# Patient Record
Sex: FEMALE | Race: BLACK OR AFRICAN AMERICAN | NOT HISPANIC OR LATINO | Employment: OTHER | URBAN - METROPOLITAN AREA
[De-identification: names, ages, dates, MRNs, and addresses within clinical notes are randomized per-mention and may not be internally consistent; named-entity substitution may affect disease eponyms.]

---

## 2017-02-22 ENCOUNTER — HOSPITAL ENCOUNTER (OUTPATIENT)
Dept: RADIOLOGY | Facility: HOSPITAL | Age: 82
Discharge: HOME | End: 2017-02-22
Attending: SPECIALIST
Payer: MEDICARE

## 2017-02-22 ENCOUNTER — HOSPITAL ENCOUNTER (OUTPATIENT)
Dept: RADIOLOGY | Facility: OTHER | Age: 82
Discharge: HOME | End: 2017-02-22
Attending: SPECIALIST
Payer: MEDICARE

## 2017-02-22 DIAGNOSIS — Z12.31 VISIT FOR SCREENING MAMMOGRAM: ICD-10-CM

## 2017-02-22 DIAGNOSIS — R19.00 PELVIC MASS: ICD-10-CM

## 2017-02-22 PROCEDURE — 77063 BREAST TOMOSYNTHESIS BI: CPT | Mod: 26,,, | Performed by: INTERNAL MEDICINE

## 2017-02-22 PROCEDURE — 77067 SCR MAMMO BI INCL CAD: CPT | Mod: 26,,, | Performed by: INTERNAL MEDICINE

## 2017-02-22 PROCEDURE — 77067 SCR MAMMO BI INCL CAD: CPT | Mod: TC

## 2017-02-22 PROCEDURE — 76856 US EXAM PELVIC COMPLETE: CPT | Mod: TC

## 2017-02-22 PROCEDURE — 76856 US EXAM PELVIC COMPLETE: CPT | Mod: 26,,, | Performed by: RADIOLOGY

## 2017-02-22 PROCEDURE — 76830 TRANSVAGINAL US NON-OB: CPT | Mod: 26,,, | Performed by: RADIOLOGY

## 2018-01-04 ENCOUNTER — HOSPITAL ENCOUNTER (OUTPATIENT)
Dept: RADIOLOGY | Facility: OTHER | Age: 83
Discharge: HOME | End: 2018-01-04
Attending: SPECIALIST
Payer: MEDICARE

## 2018-01-04 DIAGNOSIS — N63.20 LUMP OF BREAST, LEFT: ICD-10-CM

## 2018-01-04 DIAGNOSIS — R10.31 RLQ ABDOMINAL PAIN: ICD-10-CM

## 2018-01-04 PROCEDURE — 76642 ULTRASOUND BREAST LIMITED: CPT | Mod: 26,LT,, | Performed by: RADIOLOGY

## 2018-01-04 PROCEDURE — 76830 TRANSVAGINAL US NON-OB: CPT | Mod: TC

## 2018-01-04 PROCEDURE — 76856 US EXAM PELVIC COMPLETE: CPT | Mod: 26,,, | Performed by: RADIOLOGY

## 2018-01-04 PROCEDURE — 77066 DX MAMMO INCL CAD BI: CPT | Mod: 26,,, | Performed by: RADIOLOGY

## 2018-01-04 PROCEDURE — 76830 TRANSVAGINAL US NON-OB: CPT | Mod: 26,,, | Performed by: RADIOLOGY

## 2018-01-04 PROCEDURE — 77062 BREAST TOMOSYNTHESIS BI: CPT | Mod: 26,,, | Performed by: RADIOLOGY

## 2018-01-04 PROCEDURE — 76642 ULTRASOUND BREAST LIMITED: CPT | Mod: TC,LT

## 2018-01-04 PROCEDURE — 77062 BREAST TOMOSYNTHESIS BI: CPT | Mod: TC

## 2018-01-11 ENCOUNTER — HOSPITAL ENCOUNTER (OUTPATIENT)
Dept: RADIOLOGY | Facility: OTHER | Age: 83
Discharge: HOME | End: 2018-01-11
Attending: SPECIALIST
Payer: MEDICARE

## 2018-01-11 DIAGNOSIS — R92.8 ABNORMAL MAMMOGRAM: ICD-10-CM

## 2018-01-11 DIAGNOSIS — N63.0 LUMP OR MASS IN BREAST: Primary | ICD-10-CM

## 2018-01-11 PROCEDURE — 77065 DX MAMMO INCL CAD UNI: CPT | Mod: 26,LT,, | Performed by: RADIOLOGY

## 2018-01-11 PROCEDURE — A4648 IMPLANTABLE TISSUE MARKER: HCPCS

## 2018-01-11 PROCEDURE — 77065 DX MAMMO INCL CAD UNI: CPT | Mod: TC,LT

## 2018-01-11 PROCEDURE — 27201044 US BREAST BIOPSY WITH IMAGING 1ST SITE LEFT

## 2018-01-11 PROCEDURE — 27201044 US BIOPSY LYMPH NODE AXILLA

## 2018-01-11 PROCEDURE — 19083 BX BREAST 1ST LESION US IMAG: CPT | Mod: LT,,, | Performed by: RADIOLOGY

## 2018-01-11 PROCEDURE — 88305 TISSUE EXAM BY PATHOLOGIST: CPT | Mod: 26,,, | Performed by: PATHOLOGY

## 2018-01-11 PROCEDURE — 27201044 US BREAST BIOPSY WITH IMAGING EA ADDITIONAL

## 2018-01-11 PROCEDURE — 88305 TISSUE EXAM BY PATHOLOGIST: CPT | Performed by: PATHOLOGY

## 2018-01-11 PROCEDURE — 25000003 PHARM REV CODE 250: Performed by: SPECIALIST

## 2018-01-11 PROCEDURE — 19083 BX BREAST 1ST LESION US IMAG: CPT

## 2018-01-11 PROCEDURE — 88360 TUMOR IMMUNOHISTOCHEM/MANUAL: CPT | Mod: 26,,, | Performed by: PATHOLOGY

## 2018-01-11 PROCEDURE — 38505 NEEDLE BIOPSY LYMPH NODES: CPT | Mod: 59,LT,, | Performed by: RADIOLOGY

## 2018-01-11 RX ORDER — LIDOCAINE HYDROCHLORIDE AND EPINEPHRINE 20; 10 MG/ML; UG/ML
10 INJECTION, SOLUTION INFILTRATION; PERINEURAL ONCE
Status: COMPLETED | OUTPATIENT
Start: 2018-01-11 | End: 2018-01-11

## 2018-01-11 RX ORDER — LIDOCAINE HYDROCHLORIDE 10 MG/ML
5 INJECTION INFILTRATION; PERINEURAL ONCE
Status: COMPLETED | OUTPATIENT
Start: 2018-01-11 | End: 2018-01-11

## 2018-01-11 RX ADMIN — LIDOCAINE HYDROCHLORIDE 5 ML: 10 INJECTION, SOLUTION INFILTRATION; PERINEURAL at 02:01

## 2018-01-11 RX ADMIN — LIDOCAINE HYDROCHLORIDE AND EPINEPHRINE 10 ML: 20; 10 INJECTION, SOLUTION INFILTRATION; PERINEURAL at 02:01

## 2018-01-11 NOTE — PLAN OF CARE
Pt stable after US Guided Biopsy of LEFT Breast. No visible bleeding at site. Biopsy site covered with steri-strips, CDI. Pt escorted to post op mammography exam by nurse and family. Post biopsy education discussed prior to biopsy by nurse and will be readdressed by mammography tech before discharge. Pt verbalized instructions given by nurse. Geri

## 2018-01-11 NOTE — PLAN OF CARE
Pt stable after US Guided Biopsy of LEFT Breast/Axilla. No visible bleeding at site. Biopsy site covered with steri-strips, CDI. Pt escorted to post op mammography exam by nurse and family. Post biopsy education discussed prior to biopsy by nurse and will be readdressed by mammography tech before discharge. Pt verbalized instructions given by nurse. Geri

## 2018-01-15 ENCOUNTER — INITIAL CONSULT (OUTPATIENT)
Dept: GYNECOLOGIC ONCOLOGY | Facility: CLINIC | Age: 83
End: 2018-01-15
Payer: MEDICARE

## 2018-01-15 VITALS
BODY MASS INDEX: 31.67 KG/M2 | SYSTOLIC BLOOD PRESSURE: 159 MMHG | DIASTOLIC BLOOD PRESSURE: 71 MMHG | HEART RATE: 69 BPM | WEIGHT: 167.63 LBS

## 2018-01-15 DIAGNOSIS — Z80.3 FAMILY HISTORY OF BREAST CANCER: ICD-10-CM

## 2018-01-15 DIAGNOSIS — C50.412 MALIGNANT NEOPLASM OF UPPER-OUTER QUADRANT OF LEFT FEMALE BREAST, UNSPECIFIED ESTROGEN RECEPTOR STATUS: ICD-10-CM

## 2018-01-15 DIAGNOSIS — N83.8 OVARIAN MASS: ICD-10-CM

## 2018-01-15 PROBLEM — N63.0 BREAST MASS: Status: ACTIVE | Noted: 2018-01-15

## 2018-01-15 PROCEDURE — 99999 PR PBB SHADOW E&M-EST. PATIENT-LVL III: CPT | Mod: PBBFAC,,, | Performed by: OBSTETRICS & GYNECOLOGY

## 2018-01-15 PROCEDURE — 99205 OFFICE O/P NEW HI 60 MIN: CPT | Mod: S$GLB,,, | Performed by: OBSTETRICS & GYNECOLOGY

## 2018-01-15 RX ORDER — UMECLIDINIUM 62.5 UG/1
AEROSOL, POWDER ORAL
Refills: 2 | COMMUNITY
Start: 2018-01-08 | End: 2022-08-11

## 2018-01-15 RX ORDER — DOXYCYCLINE HYCLATE 100 MG
TABLET ORAL
COMMUNITY
Start: 2017-10-16 | End: 2018-01-15

## 2018-01-15 RX ORDER — PREDNISONE 20 MG/1
20 TABLET ORAL DAILY
COMMUNITY
Start: 2017-12-22 | End: 2018-07-11

## 2018-01-15 RX ORDER — CEFDINIR 300 MG/1
300 CAPSULE ORAL DAILY
COMMUNITY
Start: 2017-12-22 | End: 2018-02-09 | Stop reason: CLARIF

## 2018-01-15 NOTE — LETTER
January 17, 2018      Tamara Machado MD  [address] [facility]  [place] [address]           [facility] - Gynecologic Oncology  [address], [address]  [facility] [address]  Phone: [phone]  Fax: [phone]          Patient: Anahi Cook   MR Number: [id number]   YOB: 1934   Date of Visit: 1/15/2018       Dear Dr. Tamara Machado:    Thank you for referring Anahi Cook to me for evaluation. Attached you will find relevant portions of my assessment and plan of care.    If you have questions, please do not hesitate to call me. I look forward to following Anahi Cook along with you.    Sincerely,    Reena Chairez MD    Enclosure  CC:  No Recipients    If you would like to receive this communication electronically, please contact [email or web address] or [phone] to request more information on [employer] Link access.    For providers and/or their staff who would like to refer a patient to [facility], please contact us through our one-stop-shop provider referral line, [facility], at [phone].    If you feel you have received this communication in error or would no longer like to receive these types of communications, please e-mail [email or web address]

## 2018-01-16 ENCOUNTER — TELEPHONE (OUTPATIENT)
Dept: RADIOLOGY | Facility: OTHER | Age: 83
End: 2018-01-16

## 2018-01-17 ENCOUNTER — TELEPHONE (OUTPATIENT)
Dept: SURGERY | Facility: CLINIC | Age: 83
End: 2018-01-17

## 2018-01-17 ENCOUNTER — TELEPHONE (OUTPATIENT)
Dept: GYNECOLOGIC ONCOLOGY | Facility: CLINIC | Age: 83
End: 2018-01-17

## 2018-01-17 ENCOUNTER — PATIENT MESSAGE (OUTPATIENT)
Dept: GYNECOLOGIC ONCOLOGY | Facility: CLINIC | Age: 83
End: 2018-01-17

## 2018-01-17 PROBLEM — Z80.3 FAMILY HISTORY OF BREAST CANCER: Status: ACTIVE | Noted: 2018-01-17

## 2018-01-17 PROBLEM — C50.919 BREAST CANCER: Status: ACTIVE | Noted: 2018-01-17

## 2018-01-17 NOTE — TELEPHONE ENCOUNTER
Called pt to reschedule appts for today per patient due to weather. Appts rescheduled for Monday 1/22/18 and Tuesday 1/23/18 pt has been informed of the changes.  MA/LPN

## 2018-01-17 NOTE — TELEPHONE ENCOUNTER
Return call to pt's granddaughter. Appt accidentally got canceled and unable to reschedule it herself. Appt rebooked for 1/22/17.

## 2018-01-17 NOTE — TELEPHONE ENCOUNTER
----- Message from Urvashi Rocha sent at 1/17/2018  9:44 AM CST -----  Contact: santos/granddaughter/[phone]  Pt granddaughter states while she was on [facility] she accidentally cancelled pt appt on Monday at 2 pm. Tried to r/s patient was unable to. Pt granddaughter also tried to r/s through [facility] but was also unable to. Requesting a call back.    Please advise thanks

## 2018-01-17 NOTE — PROGRESS NOTES
Subjective:      Patient ID: Anahi Cook is a 83 y.o. female.    Chief Complaint: Advice Only      HPI  Patient is an 82yo female who presents today as a referral from Dr. Tamara Machado for pelvic mass. Her daughter is present with her today. According to the daughter adnexal mass has been present since . On recent follow up there was an interval increase in the size of the mass. Imaging reviewed.    Pelvic US 18  1.  Interval increase in size in the left adnexal mass.  This increase in size is more concerning for a malignant process in the previous stable exams. Today it measures 4.7 cm x 5.7 cm x 4.1 cm.     normal 17.     She also has a palpable L breast mass which was biopsied on 17 showing invasive ductal carcinoma. Scheduled to see Dr. Caballero 18.     Prior abdominal surgeries include TVH (ovaries in situ), cholecystectomy. Medical history significant for HTN, COPD and now breast cancer.     Family history significant for maternal GM with breast cancer, maternal aunt with breast cancer, and sister with breast cancer. No history of ovarian, uterine, or colon cancer. No genetic testing that she is aware of.      x 3.     Review of Systems   Constitutional: Negative for appetite change, chills, fatigue and fever.   HENT: Negative for mouth sores.    Respiratory: Negative for cough and shortness of breath.    Cardiovascular: Negative for leg swelling.   Gastrointestinal: Negative for abdominal pain, blood in stool, constipation and diarrhea.   Endocrine: Negative for cold intolerance.   Genitourinary: Negative for dysuria and vaginal bleeding.   Musculoskeletal: Negative for myalgias.   Skin: Negative for rash.   Allergic/Immunologic: Negative.    Neurological: Negative for weakness and numbness.   Hematological: Negative for adenopathy. Does not bruise/bleed easily.   Psychiatric/Behavioral: Negative for confusion.       Past Medical History:   Diagnosis Date    Breast cancer  1/17/2018    Breast mass 1/15/2018    Chronic kidney disease, stage 2, mildly decreased GFR     COPD (chronic obstructive pulmonary disease)     Family history of breast cancer 1/17/2018    Hypertension     Osteoporosis, senile     Ovarian mass 1/15/2018     Past Surgical History:   Procedure Laterality Date    CHOLECYSTECTOMY      HYSTERECTOMY      ROTATOR CUFF REPAIR Right     rotator cuff repair right shoulder    TONSILLECTOMY       Family History   Problem Relation Age of Onset    Heart failure Mother     Kidney failure Mother     Heart attack Father     Breast cancer Sister 66    Heart attack Brother 58    Pulmonary embolism Brother 45    Heart attack Brother 52     Social History     Social History    Marital status: Single     Spouse name: N/A    Number of children: 3    Years of education: N/A     Occupational History    Multiple jobs, see social documentation section      Retired     Social History Main Topics    Smoking status: Former Smoker     Packs/day: 1.00     Years: 40.00     Types: Cigarettes    Smokeless tobacco: Never Used      Comment: Smoked >1 ppd for at least 40 years, quit around 1995    Alcohol use Yes      Comment: occasional glass of wine or cocktail    Drug use: No    Sexual activity: Yes     Partners: Male     Other Topics Concern    Not on file     Social History Narrative    Worked many jobs while raising 3 children.  She was a nurses aid, worked in retail at [employer], sold insurance and was a  in the [employer] [facility] [employer]'s office under Sidney Barthelemy.  She was  from her first , but took care of him at the end of his life.     Current Outpatient Prescriptions   Medication Sig    acetaminophen (TYLENOL) 325 MG tablet Take 325 mg by mouth every 6 (six) hours as needed for Pain.    albuterol (PROAIR HFA) 90 mcg/actuation inhaler Inhale 1-2 puffs into the lungs every 6 (six) hours as needed for Wheezing or Shortness of Breath.    albuterol  2.5 mg /3 mL (0.083 %) Nebu 3 mL, albuterol 5 mg/mL Nebu 0.5 mL Use one vial in nebulizer three times daily as needed for shortness of breath or wheezing    atorvastatin (LIPITOR) 20 MG tablet Take 20 mg by mouth once daily.    benazepril (LOTENSIN) 10 MG tablet Take 10 mg by mouth once daily.    calcium-vitamin D 250-100 mg-unit per tablet Take 1 tablet by mouth 2 (two) times daily.    cefdinir (OMNICEF) 300 MG capsule Take 300 mg by mouth once daily.     citalopram (CELEXA) 20 MG tablet Take 20 mg by mouth once daily.    conjugated estrogens (PREMARIN) vaginal cream Place vaginally once daily.    felodipine (PLENDIL) 10 MG 24 hr tablet Take 10 mg by mouth once daily.    fluticasone-vilanterol (BREO) 100-25 mcg/dose diskus inhaler Inhale 1 puff into the lungs once daily.    INCRUSE ELLIPTA 62.5 mcg/actuation DsDv USE 1 PUFF QD AT SAME TIME    multivitamin (THERAGRAN) per tablet Take 1 tablet by mouth once daily.    predniSONE (DELTASONE) 20 MG tablet Take 20 mg by mouth once daily.     torsemide (DEMADEX) 20 MG Tab Take 20 mg by mouth once daily.     No current facility-administered medications for this visit.      Review of patient's allergies indicates:  No Known Allergies    Objective:   Physical Exam:   Constitutional: She is oriented to person, place, and time. She appears well-developed and well-nourished.    HENT:   Head: Normocephalic and atraumatic.    Eyes: EOM are normal. Pupils are equal, round, and reactive to light.    Neck: Normal range of motion. Neck supple. No thyromegaly present.    Cardiovascular: Normal rate, regular rhythm and intact distal pulses.     Pulmonary/Chest: Effort normal and breath sounds normal. No respiratory distress. She has no wheezes.        Abdominal: Soft. Bowel sounds are normal. She exhibits no distension, no ascites and no mass. There is no tenderness.             Musculoskeletal: Normal range of motion and moves all extremeties.      Lymphadenopathy:     She  has no cervical adenopathy.        Right: No supraclavicular adenopathy present.        Left: No supraclavicular adenopathy present.    Neurological: She is alert and oriented to person, place, and time.    Skin: Skin is warm and dry. No rash noted.    Psychiatric: She has a normal mood and affect.       Assessment:     1. Ovarian mass    2. Malignant neoplasm of upper-outer quadrant of left female breast, unspecified estrogen receptor status    3. Family history of breast cancer        Plan:     Orders Placed This Encounter   Procedures    CT Chest With Contrast    CT Abdomen Pelvis With Contrast    Creatinine, serum       I discussed with the patient and her daughters the differential diagnosis of pelvic mass in postmenopausal woman including benign, borderline, and malignant process. Though the mass has been present for several years and her  is normal there has been an interval increase in the size of the lesion. Definitive diagnosis can only be made with surgical resection. I have reviewed the imaging and ideally would like to surgically resect this. She is a candidate for minimally invasive approach. Would plan for robotic BSO/possible staging based on intraoperative assessment and frozen section evaluation. Malignancy could represent metastatic disease from breast primary or concurrent malignancy with ovarian primary.      Her clinical situation is complicated by very recent diagnosis of breast cancer. She is scheduled for consultation with Dr. Caballero on 1/22/18. I will coordinate care with Dr. Caballero pending further evaluation of her breast cancer and treatment plans. I have ordered CT for further evaluation of pelvic mass while we are awaiting breast eval.      Needs genetic testing given strong family history of breast cancer. I recommend she see Bianca Hightower with the high risk breast clinic given she will be establishing with Dr. Caballero.

## 2018-01-22 ENCOUNTER — LAB VISIT (OUTPATIENT)
Dept: LAB | Facility: OTHER | Age: 83
End: 2018-01-22
Attending: OBSTETRICS & GYNECOLOGY
Payer: MEDICARE

## 2018-01-22 ENCOUNTER — OFFICE VISIT (OUTPATIENT)
Dept: SURGERY | Facility: CLINIC | Age: 83
End: 2018-01-22
Payer: MEDICARE

## 2018-01-22 VITALS
DIASTOLIC BLOOD PRESSURE: 77 MMHG | HEART RATE: 67 BPM | TEMPERATURE: 97 F | HEIGHT: 62 IN | WEIGHT: 165.81 LBS | BODY MASS INDEX: 30.51 KG/M2 | SYSTOLIC BLOOD PRESSURE: 170 MMHG

## 2018-01-22 DIAGNOSIS — N83.8 OVARIAN MASS: ICD-10-CM

## 2018-01-22 DIAGNOSIS — C50.912 HORMONE RECEPTOR POSITIVE BREAST CANCER, LEFT: Primary | ICD-10-CM

## 2018-01-22 LAB
CREAT SERPL-MCNC: 1 MG/DL
EST. GFR  (AFRICAN AMERICAN): >60 ML/MIN/1.73 M^2
EST. GFR  (NON AFRICAN AMERICAN): 52 ML/MIN/1.73 M^2

## 2018-01-22 PROCEDURE — 1126F AMNT PAIN NOTED NONE PRSNT: CPT | Mod: S$GLB,,, | Performed by: SURGERY

## 2018-01-22 PROCEDURE — 82565 ASSAY OF CREATININE: CPT

## 2018-01-22 PROCEDURE — 3008F BODY MASS INDEX DOCD: CPT | Mod: S$GLB,,, | Performed by: SURGERY

## 2018-01-22 PROCEDURE — 36415 COLL VENOUS BLD VENIPUNCTURE: CPT

## 2018-01-22 PROCEDURE — 99205 OFFICE O/P NEW HI 60 MIN: CPT | Mod: S$GLB,,, | Performed by: SURGERY

## 2018-01-22 PROCEDURE — 1159F MED LIST DOCD IN RCRD: CPT | Mod: S$GLB,,, | Performed by: SURGERY

## 2018-01-22 NOTE — Clinical Note
January 25, 2018      Rafa Knutson MD  [address]  [facility] [address]           [facility]  [address], [address]  [facility] [address]  Phone: [phone]  Fax: [phone]          Patient: Anahi Cook   MR Number: [id number]   YOB: 1934   Date of Visit: 1/22/2018       Dear Dr. Rafa Knutson:    Thank you for referring Anahi Cook to me for evaluation. Attached you will find relevant portions of my assessment and plan of care.    If you have questions, please do not hesitate to call me. I look forward to following Anahi Cook along with you.    Sincerely,    Eladia Ledesma  CC:  No Recipients    If you would like to receive this communication electronically, please contact [email or web address] or [phone] to request more information on [email or web address] Link access.    For providers and/or their staff who would like to refer a patient to [facility], please contact us through our one-stop-shop provider referral line, [facility], at [phone].    If you feel you have received this communication in error or would no longer like to receive these types of communications, please e-mail [email or web address]

## 2018-01-23 ENCOUNTER — TELEPHONE (OUTPATIENT)
Dept: SURGERY | Facility: CLINIC | Age: 83
End: 2018-01-23

## 2018-01-23 ENCOUNTER — HOSPITAL ENCOUNTER (OUTPATIENT)
Dept: RADIOLOGY | Facility: OTHER | Age: 83
Discharge: HOME | End: 2018-01-23
Attending: OBSTETRICS & GYNECOLOGY
Payer: MEDICARE

## 2018-01-23 ENCOUNTER — TELEPHONE (OUTPATIENT)
Dept: HEMATOLOGY/ONCOLOGY | Facility: CLINIC | Age: 83
End: 2018-01-23

## 2018-01-23 DIAGNOSIS — N83.8 OVARIAN MASS: ICD-10-CM

## 2018-01-23 PROCEDURE — 74177 CT ABD & PELVIS W/CONTRAST: CPT | Mod: 26,,, | Performed by: RADIOLOGY

## 2018-01-23 PROCEDURE — 71260 CT THORAX DX C+: CPT | Mod: 26,,, | Performed by: RADIOLOGY

## 2018-01-23 PROCEDURE — 71260 CT THORAX DX C+: CPT | Mod: TC

## 2018-01-23 PROCEDURE — 74177 CT ABD & PELVIS W/CONTRAST: CPT | Mod: TC

## 2018-01-23 PROCEDURE — 25500020 PHARM REV CODE 255: Performed by: OBSTETRICS & GYNECOLOGY

## 2018-01-23 RX ADMIN — IOHEXOL 75 ML: 350 INJECTION, SOLUTION INTRAVENOUS at 02:01

## 2018-01-23 RX ADMIN — IOHEXOL 25 ML: 350 INJECTION, SOLUTION INTRAVENOUS at 02:01

## 2018-01-23 NOTE — TELEPHONE ENCOUNTER
----- Message from Laz Lin RN sent at 1/23/2018  9:52 AM CST -----  Dr Contreras Dior sent Bianca a note about getting this pt in for genetics, and from what I was told, Bianca said to have her see Dr Caballero, and then she would see the pt. Not sure why appt for genetics could not have just been booked, but, pt has seen Dr Caballero, so please book her for genetics per Dr Chairez's request. From what I understand, pt's family has some weird hx of cancers.    Thanks,  Laz

## 2018-01-23 NOTE — TELEPHONE ENCOUNTER
"Contacted the patient regarding scheduling genetic counseling appointment.  The patient stated that she does not want to schedule at this time, because she stated "I think I had that done with " and wanted our office to check with  before scheduling the appointment.  I message  regarding this matter and is awaiting a response from her.  Per Bianca Hightower NP if our office has not heard from  regarding this matter contact the patient again regarding scheduling the appointment.  Bianca Hightower NP and  notified of this matter.  "

## 2018-01-25 ENCOUNTER — OFFICE VISIT (OUTPATIENT)
Dept: SURGERY | Facility: CLINIC | Age: 83
End: 2018-01-25
Payer: MEDICARE

## 2018-01-25 VITALS
RESPIRATION RATE: 18 BRPM | WEIGHT: 167 LBS | SYSTOLIC BLOOD PRESSURE: 159 MMHG | TEMPERATURE: 98 F | BODY MASS INDEX: 30.73 KG/M2 | DIASTOLIC BLOOD PRESSURE: 80 MMHG | HEIGHT: 62 IN | HEART RATE: 65 BPM

## 2018-01-25 DIAGNOSIS — N83.8 OVARIAN MASS: Primary | ICD-10-CM

## 2018-01-25 DIAGNOSIS — C50.912 HORMONE RECEPTOR POSITIVE BREAST CANCER, LEFT: ICD-10-CM

## 2018-01-25 PROCEDURE — 99205 OFFICE O/P NEW HI 60 MIN: CPT | Mod: S$GLB,,, | Performed by: INTERNAL MEDICINE

## 2018-01-25 PROCEDURE — 99999 PR PBB SHADOW E&M-EST. PATIENT-LVL III: CPT | Mod: PBBFAC,,, | Performed by: INTERNAL MEDICINE

## 2018-01-25 RX ORDER — LETROZOLE 2.5 MG/1
2.5 TABLET, FILM COATED ORAL DAILY
Qty: 90 TABLET | Refills: 1 | Status: SHIPPED | OUTPATIENT
Start: 2018-01-25 | End: 2018-04-25

## 2018-01-25 NOTE — PROGRESS NOTES
Subjective:       Patient ID: Anahi Cook is a 83 y.o. female.    Chief Complaint: No chief complaint on file.    HPI   REFERRING PHYSICIANS:  Dr. Caballero and Dr. Chairez.    REASON FOR REFERRAL:  Recent diagnosis of breast cancer, consideration for   neoadjuvant treatment.    HISTORY OF PRESENT ILLNESS:  Mrs. Cook is an 83-year-old female who recently   felt a mass in her left breast.  A biopsy that was performed on 2018   showed an infiltrating ductal carcinoma that was ER strongly positive, MI   strongly positive and HER-2 negative by immunohistochemistry.  The patient was   seen by Dr. Caballero and she is being referred to our service for evaluation.  Of   note, she has a recent diagnosis of a pelvic mass for which she was seen by Dr. Chairez.  I have reviewed Dr. Chairez's note.  She is recommending that she undergo   diagnostic workup for the pelvic mass.    PAST MEDICAL HISTORY:  Also significant for prior hysterectomy due to uterine   prolapse at 32, tonsillectomy and cholecystectomy.  She also has a history of   hypertension and osteoporosis.  She has also been diagnosed with CKD and COPD.    FAMILY HISTORY:  A sister has been diagnosed with breast cancer while a brother   has been diagnosed with acute myeloid leukemia.    SOCIAL HISTORY:  She used to work as an .  She is   .  She has a 30-year history of smoking more than one pack a day and she   quit 20 years ago.  She drinks alcohol socially.    GYN HISTORY:  She is .  She had menarche at 12, first live birth at 19 and   she underwent a simple hysterectomy at 32 due to uterine prolapse.    Review of Systems    Overall she feels well.   However, when asked, she admits to chronic back pain from DJD for which she is receiving treatment at a pain management Clinic.  She denies any anxiety, depression, easy bruising, fevers, chills, night  sweats, weight loss, nausea, vomiting, diarrhea, constipation, diplopia, blurred  vision, headache, chest pain, palpitations, shortness of breath, breast pain, abdominal pain, extremity pain, or difficulty ambulating.  However,  The remainder of the ten-point ROS, including general, skin, lymph, H/N, cardiorespiratory, GI, , Neuro, Endocrine, and psychiatric is negative.       Objective:      Physical Exam      She is alert, oriented to time, place, person, pleasant, well      nourished, in no acute physical distress.   She is accompanied by her daughter and her grand daughter.                                 VITAL SIGNS:  Reviewed                                      HEENT:  Normal.  There are no nasal, oral, lip, gingival, auricular, lid,    or conjunctival lesions.  Mucosae are moist and pink, and there is no        thrush.  Pupils are equal, reactive to light and accommodation.              Extraocular muscle movements are intact.   Dentition is fair.  Maxillary teeth are missing.  There is no frontal or maxillary tenderness.                                     NECK:  Supple without JVD, adenopathy, or thyromegaly.                       LUNGS:  Clear to auscultation without wheezing, rales, or rhonchi.           CARDIOVASCULAR:  Reveals an S1, S2, no murmurs, no rubs, no gallops.         ABDOMEN:  Soft, nontender, without organomegaly.  Bowel sounds are    present.                                                                     EXTREMITIES:  No cyanosis, clubbing, or edema.                               BREASTS: There is a large *5 cm) mass at the 12 o' clock position in the left breast.  It is not attached to the underlying chest wall or the overlying skin.  She does have left palpable axillary adenopathy.  There are no masses in her right breast.    Both breasts are large and pendulous.   A sentinel node biopsy scar is seen in the left axilla.  It is also well  healed.                                     LYMPHATIC:  There is no cervical, axillary, or supraclavicular adenopathy.    SKIN:  Warm and moist, without petechiae, rashes, induration, or ecchymoses.           NEUROLOGIC:  DTRs are 0-1+ bilaterally, symmetrical, motor function is 5/5,  and cranial nerves are  within normal limits.      Assessment:       1. Ovarian mass    2. Hormone receptor positive breast cancer, left        Plan:        I had a long discussion with her and her two relatives.  She does have a pelvic mass which has not been biopsied, and a known left breast malignancy.  Given her age she is not a candidate for chemotherapy.  It would be reasonable to offer letrozole in the neoadjuvant setting while she undergoes workup for the pelvic mass.  I have discussed above with Dr. Chairez; she was given a prescription for letrozole, and I will reevaluate her in 27 days from now at the [facility] (at her request).  I spent approximately 60 minutes reviewing the available records and evaluating the patient, out of which over 50% of the time was spent face to face with the patient in counseling and coordinating this patient's care.   and reevaluate her in one week.  Her multiple questions were answered to her satisfaction.

## 2018-01-26 ENCOUNTER — TELEPHONE (OUTPATIENT)
Dept: SURGERY | Facility: CLINIC | Age: 83
End: 2018-01-26

## 2018-01-26 NOTE — TELEPHONE ENCOUNTER
Contacted the patient regarding scheduling genetic counseling appointment per Dr.Katrina Chairez.  The patient is scheduled to be seen on Wednesday 2/21/18 at 3:30 pm.  The patient was instructed to fast 30 minutes before the appointment and to bring insurance card to the appointment.  The patient voiced understanding of appointment date, time, location, and instructions.  Bianca Hightower NP and Dr.Katrina Chairez notified of this matter.

## 2018-01-29 ENCOUNTER — OFFICE VISIT (OUTPATIENT)
Dept: GYNECOLOGIC ONCOLOGY | Facility: CLINIC | Age: 83
End: 2018-01-29
Payer: MEDICARE

## 2018-01-29 ENCOUNTER — TELEPHONE (OUTPATIENT)
Dept: GYNECOLOGIC ONCOLOGY | Facility: CLINIC | Age: 83
End: 2018-01-29

## 2018-01-29 VITALS
HEART RATE: 69 BPM | SYSTOLIC BLOOD PRESSURE: 155 MMHG | DIASTOLIC BLOOD PRESSURE: 56 MMHG | WEIGHT: 166.44 LBS | BODY MASS INDEX: 30.44 KG/M2

## 2018-01-29 DIAGNOSIS — R19.00 PELVIC MASS: Primary | ICD-10-CM

## 2018-01-29 DIAGNOSIS — R30.0 DYSURIA: ICD-10-CM

## 2018-01-29 DIAGNOSIS — C50.912 HORMONE RECEPTOR POSITIVE BREAST CANCER, LEFT: Primary | ICD-10-CM

## 2018-01-29 DIAGNOSIS — N83.8 OVARIAN MASS: ICD-10-CM

## 2018-01-29 PROCEDURE — 99214 OFFICE O/P EST MOD 30 MIN: CPT | Mod: S$GLB,,, | Performed by: OBSTETRICS & GYNECOLOGY

## 2018-01-29 PROCEDURE — 3008F BODY MASS INDEX DOCD: CPT | Mod: S$GLB,,, | Performed by: OBSTETRICS & GYNECOLOGY

## 2018-01-29 PROCEDURE — 99999 PR PBB SHADOW E&M-EST. PATIENT-LVL III: CPT | Mod: PBBFAC,,, | Performed by: OBSTETRICS & GYNECOLOGY

## 2018-01-29 PROCEDURE — 1126F AMNT PAIN NOTED NONE PRSNT: CPT | Mod: S$GLB,,, | Performed by: OBSTETRICS & GYNECOLOGY

## 2018-01-29 PROCEDURE — 1159F MED LIST DOCD IN RCRD: CPT | Mod: S$GLB,,, | Performed by: OBSTETRICS & GYNECOLOGY

## 2018-01-29 NOTE — LETTER
February 2, 2018        Tamara Machado MD  [address] [facility]  [place] [address]             [facility] - Gynecologic Oncology  [address], [address]  [facility] [address]  Phone: [phone]  Fax: [phone]   Patient: Anahi Cook   MR Number: [id number]   YOB: 1934   Date of Visit: 1/29/2018       Dear Dr. Machado:    Thank you for referring Anahi Cook to me for evaluation. Attached you will find relevant portions of my assessment and plan of care.    If you have questions, please do not hesitate to call me. I look forward to following Anahi Cook along with you.    Sincerely,      Reena Chairez MD            CC  No Recipients    Enclosure

## 2018-01-30 ENCOUNTER — TELEPHONE (OUTPATIENT)
Dept: GYNECOLOGIC ONCOLOGY | Facility: CLINIC | Age: 83
End: 2018-01-30

## 2018-01-30 ENCOUNTER — LAB VISIT (OUTPATIENT)
Dept: LAB | Facility: OTHER | Age: 83
End: 2018-01-30
Attending: OBSTETRICS & GYNECOLOGY
Payer: MEDICARE

## 2018-01-30 DIAGNOSIS — R30.0 DYSURIA: ICD-10-CM

## 2018-01-30 DIAGNOSIS — R30.0 DYSURIA: Primary | ICD-10-CM

## 2018-01-30 LAB
BILIRUB UR QL STRIP: NEGATIVE
CLARITY UR: CLEAR
COLOR UR: YELLOW
GLUCOSE UR QL STRIP: NEGATIVE
HGB UR QL STRIP: NEGATIVE
KETONES UR QL STRIP: NEGATIVE
LEUKOCYTE ESTERASE UR QL STRIP: NEGATIVE
NITRITE UR QL STRIP: NEGATIVE
PH UR STRIP: 7 [PH] (ref 5–8)
PROT UR QL STRIP: NEGATIVE
SP GR UR STRIP: <=1.005 (ref 1–1.03)
URN SPEC COLLECT METH UR: ABNORMAL
UROBILINOGEN UR STRIP-ACNC: NEGATIVE EU/DL

## 2018-01-30 PROCEDURE — 87086 URINE CULTURE/COLONY COUNT: CPT

## 2018-01-30 PROCEDURE — 81003 URINALYSIS AUTO W/O SCOPE: CPT

## 2018-01-31 LAB
BACTERIA UR CULT: NORMAL
BACTERIA UR CULT: NORMAL

## 2018-02-01 ENCOUNTER — OFFICE VISIT (OUTPATIENT)
Dept: SPINE | Facility: CLINIC | Age: 83
End: 2018-02-01
Payer: MEDICARE

## 2018-02-01 VITALS
DIASTOLIC BLOOD PRESSURE: 69 MMHG | SYSTOLIC BLOOD PRESSURE: 152 MMHG | HEIGHT: 62 IN | WEIGHT: 175 LBS | HEART RATE: 60 BPM | BODY MASS INDEX: 32.2 KG/M2 | TEMPERATURE: 95 F

## 2018-02-01 DIAGNOSIS — M54.17 LUMBOSACRAL RADICULOPATHY: Primary | ICD-10-CM

## 2018-02-01 DIAGNOSIS — M70.62 TROCHANTERIC BURSITIS OF LEFT HIP: ICD-10-CM

## 2018-02-01 DIAGNOSIS — M51.36 DDD (DEGENERATIVE DISC DISEASE), LUMBAR: ICD-10-CM

## 2018-02-01 PROCEDURE — 99213 OFFICE O/P EST LOW 20 MIN: CPT | Mod: S$GLB,,, | Performed by: NURSE PRACTITIONER

## 2018-02-01 PROCEDURE — 1125F AMNT PAIN NOTED PAIN PRSNT: CPT | Mod: S$GLB,,, | Performed by: NURSE PRACTITIONER

## 2018-02-01 PROCEDURE — 3008F BODY MASS INDEX DOCD: CPT | Mod: S$GLB,,, | Performed by: NURSE PRACTITIONER

## 2018-02-01 PROCEDURE — 99999 PR PBB SHADOW E&M-EST. PATIENT-LVL III: CPT | Mod: PBBFAC,,, | Performed by: NURSE PRACTITIONER

## 2018-02-01 PROCEDURE — 1159F MED LIST DOCD IN RCRD: CPT | Mod: S$GLB,,, | Performed by: NURSE PRACTITIONER

## 2018-02-01 NOTE — PROGRESS NOTES
Chronic patient Established Note (Follow up visit)      SUBJECTIVE:    Anahi Cook presents to the clinic for a follow-up appointment for lower back and left lower extremity pain.  Her pain begins to her left lower back and radiates down the lateral aspect of her left leg, usually stopping at the knee.  She also has tenderness when she lays on her left hip.  She has had both lumbar ESIs and GT bursa injections in the past.  She feels as though the ESIs are most helpful.  She had a GTB injection by ortho a few months ago with limited benefit.  Her last ASHUTOSH was in 2016.  She is scheduled for laparoscopic hysterectomy with Dr. Reena Chairez on 2/23/18.  She reports that she was verbally told that she could have an injection prior to this.  Since the last visit, Anahi Cook states the pain has been worsening. Current pain intensity is 7/10.    Pain Medications:  OTC Tylenol    Opioid Contract: no     report:  Not applicable    Pain Procedures:   7/21/14 L4-5 IL ASHUTOSH- significant benefit  12/1/15 Left GT bursa injection  4/26/16 Left GT bursa injection  12/15/16 L4-5 IL ASHUTOSH- significant benefit    Physical Therapy/Home Exercise: yes    Imaging:     Lumbar MRI 5/21/14    Narrative     Comparison: None    Findings:  L5-S1: There is no disk disease.  There is a 8mm extra canal synovial cyst emanating from the right facet joint.  There is mild facet arthropathy.  There is no central canal or neural foraminal narrowing  L4-L5: There is grade 1 anterolisthesis of L4 on L5 with uncovering of the disk.  There is a diffuse posterior disk bulge.  There is severe ligamentous thickening and moderate facet arthropathy generating moderate severe central canal stenosis.  There is   moderate right neural foraminal stenosis and severe left neural foraminal stenosis.  L3-L4: There is a posterior disk bulge with super imposed right foraminal broad-based protrusion.  There is mild to moderate ligamentous thickening and mild facet  arthropathy.  There is mild narrowing of the right lateral recess.  There is mild left   neural foraminal narrowing and severe right neural foraminal narrowing due to the foraminal protrusion.  L2-L3: There is a diffuse disk bulge present.  There is minimal ligamentous thickening and mild facet arthropathy generating at most mild central canal stenosis.  There is an extraforaminal bulge at this level as well leading to moderate right neural   foraminal stenosis and mild left neural foraminal stenosis.  L1-L2: No significant disease.    There is no marrow replacement process, infection, tumor present.  Limited evaluation of intra-abdominal structures unremarkable.  No abnormal masses or fluid collections are present.   Impression       Multilevel degenerative changes most prominent at L4-L5 where there is moderate to severe central canal stenosis and severe left foraminal stenosis.  Severe right foraminal stenosis is present at L3-L4 as well.         Past Medical History:  Past Medical History:   Diagnosis Date    Breast cancer 1/17/2018    Breast mass 1/15/2018    Chronic kidney disease, stage 2, mildly decreased GFR     COPD (chronic obstructive pulmonary disease)     Dysuria 1/29/2018    Family history of breast cancer 1/17/2018    Hypertension     Osteoporosis, senile     Ovarian mass 1/15/2018       Past Surgical History:  Past Surgical History:   Procedure Laterality Date    CHOLECYSTECTOMY      HYSTERECTOMY      ROTATOR CUFF REPAIR Right     rotator cuff repair right shoulder    TONSILLECTOMY         Family History:  Family History   Problem Relation Age of Onset    Heart failure Mother     Kidney failure Mother     Heart attack Father     Breast cancer Sister 66    Heart attack Brother 58    Pulmonary embolism Brother 45    Heart attack Brother 52       Social History:  Social History     Social History    Marital status: Single     Spouse name: N/A    Number of children: 3    Years of  "education: N/A     Occupational History    Multiple jobs, see social documentation section      Retired     Social History Main Topics    Smoking status: Former Smoker     Packs/day: 1.00     Years: 40.00     Types: Cigarettes    Smokeless tobacco: Never Used      Comment: Smoked >1 ppd for at least 40 years, quit around 1995    Alcohol use Yes      Comment: occasional glass of wine or cocktail    Drug use: No    Sexual activity: Yes     Partners: Male     Other Topics Concern    None     Social History Narrative    Worked many jobs while raising 3 children.  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  She was  from her first , but took care of him at the end of his life.       REVIEW OF SYSTEMS:    GENERAL:  No weight loss, malaise or fevers.  HEENT:   No recent changes in vision or hearing  NECK:  Negative for lumps, no difficulty with swallowing.  RESPIRATORY:  Negative for cough, wheezing or shortness of breath, patient denies any recent URI. COPD.  CARDIOVASCULAR:  Negative for chest pain, leg swelling or palpitations. Hypertension.  GI:  Negative for abdominal discomfort, blood in stools or black stools or change in bowel habits.  MUSCULOSKELETAL:  See HPI.  SKIN:  Negative for lesions, rash, and itching.  PSYCH:  No mood disorder or recent psychosocial stressors.  Patients sleep is not disturbed secondary to pain.  HEMATOLOGY/LYMPHOLOGY:  Negative for prolonged bleeding, bruising easily or swollen nodes.  Patient is not currently taking any anti-coagulants  NEURO:   No history of headaches, syncope, paralysis, seizures or tremors.  All other reviewed and negative other than HPI.    OBJECTIVE:    BP (!) 152/69   Pulse 60   Temp (!) 94.5 °F (34.7 °C)   Ht 5' 2" (1.575 m)   Wt 79.4 kg (175 lb)   BMI 32.01 kg/m²     PHYSICAL EXAMINATION:    GENERAL: Well appearing, in no acute distress, " alert and oriented x3.  PSYCH:  Mood and affect appropriate.  SKIN: Skin color, texture, turgor normal, no rashes or lesions.  HEAD/FACE:  Normocephalic, atraumatic. Cranial nerves grossly intact.  CV: RRR with palpation of the radial artery.  PULM: No evidence of respiratory difficulty, symmetric chest rise.  GI:  Soft and non-tender.  BACK: Straight leg raising in the sitting and supine positions is negative to radicular pain. No pain to palpation over the facet joints of the lumbar spine or spinous processes. Limited flexion and extension with pain.  There is pain with left lateral extension.  Mild facet loading bilaterally.  EXTREMITIES: Peripheral joint ROM is full and pain free without obvious instability or laxity in all four extremities. No deformities, edema, or skin discoloration. Good capillary refill.  MUSCULOSKELETAL: TTP over left GT bursa.  Provocative maneuvers of hips do not cause pain. There is no pain with palpation over the sacroiliac joints bilaterally.  FABERs test is negative.  Bilateral upper and lower extremity strength is normal and symmetric.  No atrophy or tone abnormalities are noted.  NEURO: Bilateral upper and lower extremity coordination and muscle stretch reflexes are physiologic and symmetric.  Plantar response are downgoing. No clonus.  No loss of sensation is noted.  GAIT: Normal.      ASSESSMENT: 83 y.o. year old female with lower back and left lower extremity pain, consistent with the following diagnoses:     1. Lumbosacral radiculopathy     2. DDD (degenerative disc disease), lumbar     3. Trochanteric bursitis of left hip           PLAN:     - Previous imaging was reviewed and discussed with the patient today.    - Will schedule for L4-5 IL ASHUTOSH.    The procedure, risks, benefits and options were discussed with patient. There are no contraindications to the procedure. The patient expressed understanding and agreed to proceed.  Consent obtained today.    - I sent a staff message  to Dr. Reena Chairez for approval for procedure as she is scheduled for laparoscopic hysterectomy on 2/23/18.  She responded with approval.  I will let her know the date once I receive this information.    - Consider left GT bursa injection in the future.    - The patient will continue a home exercise routine to help with pain and strengthening.      - Dr. Wilcox was consulted on the patient and agrees with this plan.      The above plan and management options were discussed at length with patient. Patient is in agreement with the above and verbalized understanding.    Sofia Schultz  02/01/2018

## 2018-02-02 RX ORDER — LIDOCAINE HYDROCHLORIDE 10 MG/ML
1 INJECTION, SOLUTION EPIDURAL; INFILTRATION; INTRACAUDAL; PERINEURAL ONCE
Status: CANCELLED | OUTPATIENT
Start: 2018-02-02 | End: 2018-02-02

## 2018-02-02 RX ORDER — SODIUM CHLORIDE 9 MG/ML
INJECTION, SOLUTION INTRAVENOUS CONTINUOUS
Status: CANCELLED | OUTPATIENT
Start: 2018-02-02

## 2018-02-02 NOTE — PROGRESS NOTES
Subjective:      Patient ID: Anahi Cook is a 83 y.o. female.    Chief Complaint: Follow-up      HPI  Presents today for treatment planning regarding pelvic mass. She has seen Gaby Caballero and Marquise regarding her new breast cancer. I have communicated with them and she has been started on neoadjuvant letrozole. This will allow for us to remove pelvic mass.      History:  Patient is an 82yo female who originally presented as a referral from Dr. Tamara Machado for pelvic mass. According to the daughter adnexal mass has been present since . On recent follow up there was an interval increase in the size of the mass. Imaging reviewed.    Pelvic US 18  1.  Interval increase in size in the left adnexal mass.  This increase in size is more concerning for a malignant process in the previous stable exams. Today it measures 4.7 cm x 5.7 cm x 4.1 cm.      normal 17.      She also has a palpable L breast mass which was biopsied on 17 showing invasive ductal carcinoma.      Prior abdominal surgeries include TVH (ovaries in situ), cholecystectomy. Medical history significant for HTN, COPD and now breast cancer.      Family history significant for maternal GM with breast cancer, maternal aunt with breast cancer, and sister with breast cancer. No history of ovarian, uterine, or colon cancer. No genetic testing that she is aware of.       x 3.   Review of Systems   Constitutional: Negative for appetite change, chills, fatigue and fever.   HENT: Negative for mouth sores.    Respiratory: Negative for cough and shortness of breath.    Cardiovascular: Negative for leg swelling.   Gastrointestinal: Negative for abdominal pain, blood in stool, constipation and diarrhea.   Endocrine: Negative for cold intolerance.   Genitourinary: Negative for dysuria and vaginal bleeding.   Musculoskeletal: Negative for myalgias.   Skin: Negative for rash.   Allergic/Immunologic: Negative.    Neurological: Negative for weakness  and numbness.   Hematological: Negative for adenopathy. Does not bruise/bleed easily.   Psychiatric/Behavioral: Negative for confusion.       Objective:   Physical Exam:   Constitutional: She is oriented to person, place, and time. She appears well-developed and well-nourished.    HENT:   Head: Normocephalic and atraumatic.    Eyes: EOM are normal. Pupils are equal, round, and reactive to light.    Neck: Normal range of motion. Neck supple. No thyromegaly present.    Cardiovascular: Normal rate, regular rhythm and intact distal pulses.     Pulmonary/Chest: Effort normal and breath sounds normal. No respiratory distress. She has no wheezes.        Abdominal: Soft. Bowel sounds are normal. She exhibits no distension, no ascites and no mass. There is no tenderness.             Musculoskeletal: Normal range of motion and moves all extremeties.      Lymphadenopathy:     She has no cervical adenopathy.        Right: No supraclavicular adenopathy present.        Left: No supraclavicular adenopathy present.    Neurological: She is alert and oriented to person, place, and time.    Skin: Skin is warm and dry. No rash noted.    Psychiatric: She has a normal mood and affect.       Assessment:     1. Hormone receptor positive breast cancer, left    2. Dysuria    3. Ovarian mass        Plan:     Orders Placed This Encounter   Procedures    CULTURE, URINE     I again discussed with the patient and her daughters the differential diagnosis of pelvic mass in postmenopausal woman including benign, borderline, and malignant process. Though the mass has been present for several years and her  is normal there has been an interval increase in the size of the lesion. Definitive diagnosis can only be made with surgical resection. I have reviewed the imaging and recommend surgical resection as diagnosis will impact prognosis and further treatment decisions regarding her breast cancer.     She is a candidate for minimally invasive  approach. Would plan for robotic BSO. Will omit staging due to her age, overall health, and concurrent node positive breast cancer. She desires to proceed. The risks, benefits, and indications of the procedure were discussed with the patient and her family members if present.  These included bleeding, transfusion, infection, damage to surrounding tissues (bowel, bladder, ureter), wound separation, perioperative cardiac events, VTE, pneumonia, and possible death.  She voiced understanding, all questions were answered and consents were signed.    Plan for robotic BSO 2/23/18  Pre op anesthesia    Keep genetics referral as well.

## 2018-02-05 NOTE — PROGRESS NOTES
Breast Surgery  [facility]  Department of Surgery      REFERRING PROVIDER: Rafa Knutson MD  [address] [place] [address]    Chief Complaint:     Subjective:      Patient ID: Anahi Cook is a 83 y.o. female who presents with newly diagnosed L breast cancer with node positivity. There are two breast masses adjacent (may be one mass), at 12OC, path with IDC, grade II, ER+MO+H2N- with rashaad metastasis.  In addition, she is seen by Dr. Chairez for a pelvic mass that needs to be removed.    Patient does not routinely do self breast exams.  Patient has noted a change on breast exam.  Patient denies nipple discharge. Patient denies to previous breast biopsy. Patient denies a personal history of breast cancer.    Findings at that time were the following:   Tumor size: 2.3  + 1.4 cm   Tumor rdgrdrrdarddrderd:rd rd3rd Estrogen Receptor: +   Progesterone Receptor: +   Her-2 jessica: -   Lymph node status: +   Lymphatic invasion: unknown     GYN History:  Age of menarche was 12. Age of menopause was 32 with partial hysterectomy, took HRT until her 70s.    Patient is . Age of first live birth was 19.     Past Medical History:   Diagnosis Date    Breast cancer 2018    Breast mass 1/15/2018    Chronic kidney disease, stage 2, mildly decreased GFR     COPD (chronic obstructive pulmonary disease)     Dysuria 2018    Family history of breast cancer 2018    Hypertension     Osteoporosis, senile     Ovarian mass 1/15/2018     Past Surgical History:   Procedure Laterality Date    CHOLECYSTECTOMY      HYSTERECTOMY      ROTATOR CUFF REPAIR Right     rotator cuff repair right shoulder    TONSILLECTOMY       Current Outpatient Prescriptions on File Prior to Visit   Medication Sig Dispense Refill    acetaminophen (TYLENOL) 325 MG tablet Take 325 mg by mouth every 6 (six) hours as needed for Pain.      albuterol (PROAIR HFA) 90 mcg/actuation inhaler Inhale 1-2 puffs into the lungs every 6 (six) hours as  needed for Wheezing or Shortness of Breath.      albuterol 2.5 mg /3 mL (0.083 %) Nebu 3 mL, albuterol 5 mg/mL Nebu 0.5 mL Use one vial in nebulizer three times daily as needed for shortness of breath or wheezing      atorvastatin (LIPITOR) 20 MG tablet Take 20 mg by mouth once daily.      benazepril (LOTENSIN) 10 MG tablet Take 10 mg by mouth once daily.      calcium-vitamin D 250-100 mg-unit per tablet Take 1 tablet by mouth 2 (two) times daily.      cefdinir (OMNICEF) 300 MG capsule Take 300 mg by mouth once daily.       citalopram (CELEXA) 20 MG tablet Take 20 mg by mouth once daily.      conjugated estrogens (PREMARIN) vaginal cream Place vaginally once daily.      felodipine (PLENDIL) 10 MG 24 hr tablet Take 10 mg by mouth once daily.      fluticasone-vilanterol (BREO) 100-25 mcg/dose diskus inhaler Inhale 1 puff into the lungs once daily. 1 each 3    INCRUSE ELLIPTA 62.5 mcg/actuation DsDv USE 1 PUFF QD AT SAME TIME  2    multivitamin (THERAGRAN) per tablet Take 1 tablet by mouth once daily.      predniSONE (DELTASONE) 20 MG tablet Take 20 mg by mouth once daily.       torsemide (DEMADEX) 20 MG Tab Take 20 mg by mouth once daily.       No current facility-administered medications on file prior to visit.      Social History     Social History    Marital status: Single     Spouse name: N/A    Number of children: 3    Years of education: N/A     Occupational History    Multiple jobs, see social documentation section      Retired     Social History Main Topics    Smoking status: Former Smoker     Packs/day: 1.00     Years: 40.00     Types: Cigarettes    Smokeless tobacco: Never Used      Comment: Smoked >1 ppd for at least 40 years, quit around 1995    Alcohol use Yes      Comment: occasional glass of wine or cocktail    Drug use: No    Sexual activity: Yes     Partners: Male     Other Topics Concern    Not on file     Social History Narrative    Worked many jobs while raising 3 children.   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  She was  from her first , but took care of him at the end of his life.     Family History   Problem Relation Age of Onset    Heart failure Mother     Kidney failure Mother     Heart attack Father     Breast cancer Sister 66     Lump, XRT, chemo, recurrence 10 years later, still alive.    Cancer Brother      leukemia    Heart attack Brother 58    Pulmonary embolism Brother 45    Heart attack Brother 52    Breast cancer Maternal Grandmother 60     advanced at diagnosis    Breast cancer Maternal Aunt 83      at 92        Review of Systems   Constitutional: Negative for appetite change, chills, fever and unexpected weight change.   HENT: Negative for facial swelling, postnasal drip and sore throat.    Eyes: Negative for redness and itching.   Respiratory: Negative for chest tightness and shortness of breath.    Cardiovascular: Negative for chest pain and palpitations.   Gastrointestinal: Negative for blood in stool, diarrhea, nausea and vomiting.   Genitourinary: Positive for pelvic pain. Negative for difficulty urinating and dysuria.   Musculoskeletal: Negative for arthralgias and joint swelling.   Skin: Negative for rash and wound.   Neurological: Negative for dizziness and syncope.   Hematological: Negative for adenopathy.   Psychiatric/Behavioral: Negative for agitation. The patient is not nervous/anxious.      Objective:   BP (!) 170/77 (BP Location: Right arm, Patient Position: Sitting, BP Method: Medium (Automatic))   Pulse 67   Temp 96.5 °F (35.8 °C) (Oral)   Ht 5' 2" (1.575 m)   Wt 75.2 kg (165 lb 12.6 oz)   BMI 30.32 kg/m²      Physical Exam   Constitutional: She appears well-developed and well-nourished.   HENT:   Head: Normocephalic.   Eyes: No scleral icterus.   Neck: Neck supple. No tracheal deviation present.   Cardiovascular: " Normal rate and regular rhythm.    Pulmonary/Chest: Breath sounds normal. No respiratory distress. Right breast exhibits no inverted nipple, no mass, no nipple discharge and no skin change. Left breast exhibits no inverted nipple, no mass, no nipple discharge and no skin change.       Abdominal: Soft. She exhibits no mass. There is no tenderness.       Musculoskeletal: She exhibits no edema.   Lymphadenopathy:     She has no cervical adenopathy.   Neurological: She is alert.   Skin: No rash noted. No erythema.     Psychiatric: She has a normal mood and affect.       Radiology review: Images personally reviewed by me in the clinic.   Mammogram:Mammo Digital Diagnostic Bilat with Tomosynthesis_CAD  Left  1. Mass: There is a 17 mm irregularly shaped mass with spiculated margins seen in the central region of the left breast in the middle depth.     2. Mass: There is an irregularly shaped mass with obscured margins seen in the left breast, 0 cm from the nipple.      These two mass are approximately 5 cm apart from medial to lateral on the mammogram.      3. Lymph Node: There is a 15 mm lymph node seen in the left axilla.      US Breast Left Limited  Left  1. Mass: There is a 23 mm x 9 mm x 17 mm irregularly shaped, hypoechoic mass with spiculated margins seen in the left breast at 11 o'clock, 3 cm from the nipple. The mass correlates with today's mammogram finding. Associated features include internal vascularity.      2. Mass: There is a 12 mm x 10 mm x 14 mm irregularly shaped, heterogeneous mass with angular margins seen in the left breast at 10 o'clock, 4 cm from the nipple. The mass correlates with today's mammogram finding.      3. Lymph Node: There are multiple similar 8 mm x 10 mm x 10 mm lymph nodes with angular margins seen in the left axilla.      Right  There is no evidence of suspicious masses, calcifications, or other abnormal findings.     Impression:  Left  1.  Mass: Left breast 23 mm x 9 mm x 17 mm mass  at the 11 o'clock position. Assessment: 5 - Highly suggestive of malignancy. Biopsy is recommended.      2.  Mass: Left breast 12 mm x 10 mm x 14 mm mass at the 10 o'clock position. Assessment: 5 - Highly suggestive of malignancy. Biopsy is recommended.     3.  Lymph Node: Left axilla 8 mm x 10 mm x 10 mm lymph node (multiple findings). Assessment: 4C - Suspicious finding. Biopsy is recommended.      Right  There is no mammographic or sonographic evidence of malignancy.     The findings and recommendations were discussed in detail with the patient.      BI-RADS Category:   Overall: 5 - Highly Suggestive of Malignancy        The patient's estimated lifetime risk of breast cancer (to age 85) based on Tyrer-Cuzick - 7 risk assessment model is: Tyrer-Cuzick: 0.83 %. According to the American Cancer Society,  patients with a lifetime breast cancer risk of 20% or higher might benefit from supplemental screening tests.        PATH:  Ancillary studies- (Performed on biopsy specimen [id number], Block 2)  ER: Positive (Strong, % of tumor cell nuclei)  ID: Positive (Intermediate to strong, 80-90% of tumor cell nuclei)  HER2: Negative    .  FINAL PATHOLOGIC DIAGNOSIS  1. FIBROADIPOSE TISSUE: METASTATIC ADENOCARCINOMA.  Note: Lymphoid tissue is not present within the specimen. The possibility that this may represent a primary lesion  cannot be excluded. Clinical correlation is necessary.    2. BREAST (LEFT NEEDLE BIOPSIES): INVASIVE DUCTAL CARCINOMA.  Note: The tumor has a maximum 8 mm linear dimension and is moderately differentiated by Calderon Wolff  criteria (3, 2, 1). A small area of benign breast tissue is present within the specimen . Hormone receptor assays will  be performed and results will be issued as a Supplemental report.    Assessment:       82 y/o female with T2N1 breast cancer that is ++-, also with pelvic mass  Plan:     Options for management were discussed with the patient and her family. We reviewed  the existing data noting the equivalency of breast conserving surgery with radiation therapy and mastectomy. We also reviewed the guidelines of the National Comprehensive Cancer Network for Stage II-III breast carcinoma. We discussed the need for lumpectomy margins to be negative for carcinoma, the necessity for postoperative radiation therapy after breast conservation in most cases, the possibility of a failed or false negative sentinel lymph node biopsy and the potential need for complete lymphadenectomy for a failed or positive sentinel lymph node biopsy were fully discussed. In the setting of mastectomy, delayed or immediate reconstruction options are available and were discussed.     In the setting of lumpectomy, radiation therapy would be recommended majority of the time.  The duration and treatment side effects were discussed with the patient.  This will coordinated with the radiation oncologist pending final pathology.    We also discussed the role of systemic therapy in the treatment of early stage breast cancer.  We discussed that this is based on tumor biology and rashaad status and will be determined based on final pathology.  We discussed that if the cancer is hormone positive, endocrine therapy would be recommended in most cases and its use can reduce the risk of recurrence as well as improve survival. Side effects of treatment were briefly discussed. We also discussed the potential role for chemotherapy based on a number of factors such as tumor phenotype (ER+ vs. triple negative vs. Hdw1cjo+) and this would be determined in coordination with the medical oncologist.    I will have her see medical oncology to discuss endocrine therapy in the adjuvant vs neoadjuvant setting and to evaluate forrisk vs benefit of systemic chemotherapy prior to planning her surgery  Will have her see genetics.  Dr. Chairez will continue to follow and will proceed depending on med onc.  She is resectable at this time, but may be  controlled on endocrine therapy, and could potentially reduce tumor burden in the neoadjuvant setting allowing the pelvic mass to be evaluated appropriately.  She would need medical clearance prior to surgery.    Patient was educated on breast cancer, receptors, wire localization lumpectomy, mastectomy, sentinel lymph node mapping and biopsy, axillary lymph node dissection, reconstruction, breast prosthesis with post-mastectomy bra and radiation therapy. Patient was given patient information binder including [facility] breast cancer treatment brochure.  All her questions were answered.    Total time spent with the patient: 60 minutes.  45 minutes of face to face consultation and 15 minutes of chart review and coordination of care.

## 2018-02-06 ENCOUNTER — PATIENT MESSAGE (OUTPATIENT)
Dept: SURGERY | Facility: OTHER | Age: 83
End: 2018-02-06

## 2018-02-07 ENCOUNTER — TELEPHONE (OUTPATIENT)
Dept: GYNECOLOGIC ONCOLOGY | Facility: CLINIC | Age: 83
End: 2018-02-07

## 2018-02-09 ENCOUNTER — HOSPITAL ENCOUNTER (OUTPATIENT)
Dept: PREADMISSION TESTING | Facility: OTHER | Age: 83
Discharge: HOME | End: 2018-02-09
Attending: ANESTHESIOLOGY
Payer: MEDICARE

## 2018-02-09 ENCOUNTER — ANESTHESIA EVENT (OUTPATIENT)
Dept: SURGERY | Facility: OTHER | Age: 83
End: 2018-02-09
Payer: MEDICARE

## 2018-02-09 VITALS
HEART RATE: 64 BPM | DIASTOLIC BLOOD PRESSURE: 63 MMHG | WEIGHT: 175 LBS | SYSTOLIC BLOOD PRESSURE: 138 MMHG | TEMPERATURE: 99 F | OXYGEN SATURATION: 93 % | BODY MASS INDEX: 32.2 KG/M2 | HEIGHT: 62 IN

## 2018-02-09 LAB
ABO + RH BLD: NORMAL
ANION GAP SERPL CALC-SCNC: 11 MMOL/L
BASOPHILS # BLD AUTO: 0.06 K/UL
BASOPHILS NFR BLD: 0.6 %
BLD GP AB SCN CELLS X3 SERPL QL: NORMAL
BUN SERPL-MCNC: 14 MG/DL
CALCIUM SERPL-MCNC: 9.5 MG/DL
CHLORIDE SERPL-SCNC: 100 MMOL/L
CO2 SERPL-SCNC: 33 MMOL/L
CREAT SERPL-MCNC: 1 MG/DL
DIFFERENTIAL METHOD: ABNORMAL
EOSINOPHIL # BLD AUTO: 0.3 K/UL
EOSINOPHIL NFR BLD: 2.4 %
ERYTHROCYTE [DISTWIDTH] IN BLOOD BY AUTOMATED COUNT: 14 %
EST. GFR  (AFRICAN AMERICAN): >60 ML/MIN/1.73 M^2
EST. GFR  (NON AFRICAN AMERICAN): 52 ML/MIN/1.73 M^2
GLUCOSE SERPL-MCNC: 105 MG/DL
HCT VFR BLD AUTO: 43.5 %
HGB BLD-MCNC: 14.4 G/DL
LYMPHOCYTES # BLD AUTO: 2 K/UL
LYMPHOCYTES NFR BLD: 19.6 %
MCH RBC QN AUTO: 29.8 PG
MCHC RBC AUTO-ENTMCNC: 33.1 G/DL
MCV RBC AUTO: 90 FL
MONOCYTES # BLD AUTO: 0.8 K/UL
MONOCYTES NFR BLD: 7.9 %
NEUTROPHILS # BLD AUTO: 7.2 K/UL
NEUTROPHILS NFR BLD: 69.2 %
PLATELET # BLD AUTO: 363 K/UL
PMV BLD AUTO: 10.9 FL
POTASSIUM SERPL-SCNC: 3.4 MMOL/L
RBC # BLD AUTO: 4.84 M/UL
SODIUM SERPL-SCNC: 144 MMOL/L
WBC # BLD AUTO: 10.39 K/UL

## 2018-02-09 PROCEDURE — 80048 BASIC METABOLIC PNL TOTAL CA: CPT

## 2018-02-09 PROCEDURE — 86901 BLOOD TYPING SEROLOGIC RH(D): CPT

## 2018-02-09 PROCEDURE — 36415 COLL VENOUS BLD VENIPUNCTURE: CPT

## 2018-02-09 PROCEDURE — 85025 COMPLETE CBC W/AUTO DIFF WBC: CPT

## 2018-02-09 RX ORDER — LIDOCAINE HYDROCHLORIDE 10 MG/ML
0.5 INJECTION, SOLUTION EPIDURAL; INFILTRATION; INTRACAUDAL; PERINEURAL ONCE
Status: CANCELLED | OUTPATIENT
Start: 2018-02-09 | End: 2018-02-09

## 2018-02-09 RX ORDER — ALBUTEROL SULFATE 0.83 MG/ML
2.5 SOLUTION RESPIRATORY (INHALATION)
Status: CANCELLED | OUTPATIENT
Start: 2018-02-09 | End: 2018-02-09

## 2018-02-09 RX ORDER — LORATADINE 10 MG/1
10 TABLET ORAL DAILY
COMMUNITY
End: 2018-06-12

## 2018-02-09 RX ORDER — SODIUM CHLORIDE, SODIUM LACTATE, POTASSIUM CHLORIDE, CALCIUM CHLORIDE 600; 310; 30; 20 MG/100ML; MG/100ML; MG/100ML; MG/100ML
INJECTION, SOLUTION INTRAVENOUS CONTINUOUS
Status: CANCELLED | OUTPATIENT
Start: 2018-02-09

## 2018-02-09 RX ORDER — MIDAZOLAM HYDROCHLORIDE 1 MG/ML
4 INJECTION INTRAMUSCULAR; INTRAVENOUS
Status: DISCONTINUED | OUTPATIENT
Start: 2018-02-23 | End: 2018-02-10 | Stop reason: HOSPADM

## 2018-02-09 NOTE — DISCHARGE INSTRUCTIONS
PRE-ADMIT TESTING -  [phone]    [address]  [place] [facility]          Your surgery has been scheduled at [facility]. We are pleased to have the opportunity to serve you. For Further Information please call [phone].    On the day of surgery please report to the Information Desk on the 1st floor.    · CONTACT YOUR PHYSICIAN'S OFFICE THE DAY PRIOR TO YOUR SURGERY TO OBTAIN YOUR ARRIVAL TIME.     · The evening before surgery do not eat anything after 9 p.m. ( this includes hard candy, chewing gum and mints).  You may only have GATORADE, POWERADE AND WATER  from 9 p.m. until you leave your home.   DO NOT DRINK ANY LIQUIDS ON THE WAY TO THE HOSPITAL.      SPECIAL MEDICATION INSTRUCTIONS: TAKE medications checked off by the Anesthesiologist on your Medication List.    Angiogram Patients: Take medications as instructed by your physician, including aspirin.     Surgery Patients:    If you take ASPIRIN - Your PHYSICIAN/SURGEON will need to inform you IF/OR when you need to stop taking aspirin prior to your surgery.     Do Not take any medications containing IBUPROFEN.  Do Not Wear any make-up or dark nail polish   (especially eye make-up) to surgery. If you come to surgery with makeup on you will be required to remove the makeup or nail polish.    Do not shave your surgical area at least 5 days prior to your surgery. The surgical prep will be performed at the hospital according to Infection Control regulations.    Leave all valuables at home.   Do Not wear any jewelry or watches, including any metal in body piercings.  Contact Lens must be removed before surgery. Either do not wear the contact lens or bring a case and solution for storage.  Please bring a container for eyeglasses or dentures as required.  Bring any paperwork your physician has provided, such as consent forms,  history and physicals, doctor's orders, etc.   Bring comfortable clothes that are loose fitting to wear upon  discharge. Take into consideration the type of surgery being performed.  Maintain your diet as advised per your physician the day prior to surgery.      Adequate rest the night before surgery is advised.   Park in the Parking lot behind the hospital or in the [facility] across the street from the parking lot. Parking is complimentary.  If you will be discharged the same day as your procedure, please arrange for a responsible adult to drive you home or to accompany you if traveling by taxi.   YOU WILL NOT BE PERMITTED TO DRIVE OR TO LEAVE THE HOSPITAL ALONE AFTER SURGERY.   It is strongly recommended that you arrange for someone to remain with you for the first 24 hrs following your surgery.       Thank you for your cooperation.  The Staff of [facility].        Bathing Instructions                                                                 Please shower the evening before and morning of your procedure with    ANTIBACTERIAL SOAP. ( DIAL, etc )  Concentrate on the surgical area   for at least 3 minutes and rinse completely. Dry off as usual.   Do not use any deodorant, powder, body lotions, perfume, after shave or    cologne.

## 2018-02-09 NOTE — ANESTHESIA PREPROCEDURE EVALUATION
02/09/2018  Anahi Cook is a 83 y.o., female.    Anesthesia Evaluation    I have reviewed the Patient Summary Reports.    I have reviewed the Nursing Notes.   I have reviewed the Medications.   Prednisone    Review of Systems  Anesthesia Hx:  Denies Family Hx of Anesthesia complications.   Denies Personal Hx of Anesthesia complications.   Social:  Former Smoker    Hematology/Oncology:        Current/Recent Cancer.   Cardiovascular:   Exercise tolerance: poor Hypertension hyperlipidemia Pulm HTN on stress echo 11/2017 Pulm systolic 49.  LVEF 60-65   Pulmonary:   COPD, moderate    Renal/:   Chronic Renal Disease, CRI    Hepatic/GI:   GERD    Endocrine:  Endocrine Normal    Psych:   depression          Physical Exam  General:  Obesity    Airway/Jaw/Neck:  Airway Findings: Mouth Opening: Normal Tongue: Normal  General Airway Assessment: Adult  Mallampati: II  TM Distance: Normal, at least 6 cm      Dental:  Dental Findings: Upper Dentures, lower partial dentures   Chest/Lungs:  Chest/Lungs Findings: Clear to auscultation         Mental Status:  Mental Status Findings:  Alert and Oriented, Cooperative         Anesthesia Plan  Type of Anesthesia, risks & benefits discussed:  Anesthesia Type:  general  Patient's Preference:   Intra-op Monitoring Plan: standard ASA monitors  Intra-op Monitoring Plan Comments:   Post Op Pain Control Plan: multimodal analgesia  Post Op Pain Control Plan Comments:   Induction:   IV  Beta Blocker:         Informed Consent: Patient understands risks and agrees with Anesthesia plan.  Questions answered. Anesthesia consent signed with patient.  ASA Score: 3     Day of Surgery Review of History & Physical:    H&P update referred to the surgeon.     Anesthesia Plan Notes: Pt last saw primary MD Dec. 2017.  Labs, EKG on chart.  Completed steroid dose pack about 1 month ago for COPD  exacerbation. Breathing at baseline at pre-op visit.        Ready For Surgery From Anesthesia Perspective.

## 2018-02-12 NOTE — PRE ADMISSION SCREENING
Labs reviewed per ,anesthesia,instructed to call patient and instruct to eat foods high in potassium.

## 2018-02-14 ENCOUNTER — PATIENT MESSAGE (OUTPATIENT)
Dept: HEMATOLOGY/ONCOLOGY | Facility: CLINIC | Age: 83
End: 2018-02-14

## 2018-02-15 ENCOUNTER — HOSPITAL ENCOUNTER (OUTPATIENT)
Facility: OTHER | Age: 83
Discharge: HOME | End: 2018-02-15
Attending: ANESTHESIOLOGY | Admitting: ANESTHESIOLOGY
Payer: MEDICARE

## 2018-02-15 ENCOUNTER — SURGERY (OUTPATIENT)
Age: 83
End: 2018-02-15

## 2018-02-15 VITALS
HEIGHT: 62 IN | WEIGHT: 175 LBS | SYSTOLIC BLOOD PRESSURE: 158 MMHG | OXYGEN SATURATION: 93 % | RESPIRATION RATE: 18 BRPM | BODY MASS INDEX: 32.2 KG/M2 | TEMPERATURE: 99 F | DIASTOLIC BLOOD PRESSURE: 72 MMHG | HEART RATE: 57 BPM

## 2018-02-15 DIAGNOSIS — G89.29 CHRONIC PAIN: ICD-10-CM

## 2018-02-15 DIAGNOSIS — M47.26 OSTEOARTHRITIS OF SPINE WITH RADICULOPATHY, LUMBAR REGION: Primary | ICD-10-CM

## 2018-02-15 PROCEDURE — 62322 NJX INTERLAMINAR LMBR/SAC: CPT | Performed by: ANESTHESIOLOGY

## 2018-02-15 PROCEDURE — 25000003 PHARM REV CODE 250: Performed by: ANESTHESIOLOGY

## 2018-02-15 PROCEDURE — 99152 MOD SED SAME PHYS/QHP 5/>YRS: CPT | Mod: ,,, | Performed by: ANESTHESIOLOGY

## 2018-02-15 PROCEDURE — 63600175 PHARM REV CODE 636 W HCPCS: Performed by: ANESTHESIOLOGY

## 2018-02-15 PROCEDURE — 62327 NJX INTERLAMINAR LMBR/SAC: CPT | Mod: ,,, | Performed by: ANESTHESIOLOGY

## 2018-02-15 PROCEDURE — 62323 NJX INTERLAMINAR LMBR/SAC: CPT | Performed by: ANESTHESIOLOGY

## 2018-02-15 PROCEDURE — 25500020 PHARM REV CODE 255: Performed by: ANESTHESIOLOGY

## 2018-02-15 RX ORDER — SODIUM CHLORIDE 9 MG/ML
500 INJECTION, SOLUTION INTRAVENOUS CONTINUOUS
Status: DISCONTINUED | OUTPATIENT
Start: 2018-02-15 | End: 2018-02-15 | Stop reason: HOSPADM

## 2018-02-15 RX ORDER — FENTANYL CITRATE 50 UG/ML
INJECTION, SOLUTION INTRAMUSCULAR; INTRAVENOUS
Status: DISCONTINUED | OUTPATIENT
Start: 2018-02-15 | End: 2018-02-15 | Stop reason: HOSPADM

## 2018-02-15 RX ORDER — DEXAMETHASONE SODIUM PHOSPHATE 100 MG/10ML
INJECTION INTRAMUSCULAR; INTRAVENOUS
Status: DISCONTINUED | OUTPATIENT
Start: 2018-02-15 | End: 2018-02-15 | Stop reason: HOSPADM

## 2018-02-15 RX ORDER — LIDOCAINE HYDROCHLORIDE 10 MG/ML
INJECTION INFILTRATION; PERINEURAL
Status: DISCONTINUED | OUTPATIENT
Start: 2018-02-15 | End: 2018-02-15 | Stop reason: HOSPADM

## 2018-02-15 RX ORDER — LIDOCAINE HYDROCHLORIDE 10 MG/ML
INJECTION, SOLUTION EPIDURAL; INFILTRATION; INTRACAUDAL; PERINEURAL
Status: DISCONTINUED | OUTPATIENT
Start: 2018-02-15 | End: 2018-02-15 | Stop reason: HOSPADM

## 2018-02-15 RX ORDER — MIDAZOLAM HYDROCHLORIDE 1 MG/ML
INJECTION INTRAMUSCULAR; INTRAVENOUS
Status: DISCONTINUED | OUTPATIENT
Start: 2018-02-15 | End: 2018-02-15 | Stop reason: HOSPADM

## 2018-02-15 RX ADMIN — FENTANYL CITRATE 25 MCG: 50 INJECTION, SOLUTION INTRAMUSCULAR; INTRAVENOUS at 02:02

## 2018-02-15 RX ADMIN — DEXAMETHASONE SODIUM PHOSPHATE 10 MG: 10 INJECTION INTRAMUSCULAR; INTRAVENOUS at 02:02

## 2018-02-15 RX ADMIN — LIDOCAINE HYDROCHLORIDE 10 ML: 10 INJECTION, SOLUTION INFILTRATION; PERINEURAL at 02:02

## 2018-02-15 RX ADMIN — IOHEXOL 5 ML: 300 INJECTION, SOLUTION INTRAVENOUS at 02:02

## 2018-02-15 RX ADMIN — LIDOCAINE HYDROCHLORIDE 10 ML: 10 INJECTION, SOLUTION EPIDURAL; INFILTRATION; INTRACAUDAL; PERINEURAL at 02:02

## 2018-02-15 RX ADMIN — MIDAZOLAM HYDROCHLORIDE 2 MG: 1 INJECTION, SOLUTION INTRAMUSCULAR; INTRAVENOUS at 02:02

## 2018-02-15 NOTE — OP NOTE
Caudal Epidural Steroid Injection under Fluoroscopy  Current medications reviewed. Time-out taken to identify patient and procedure prior to starting the procedure.      Date of Service: 02/15/2018    PCP: Pepito Theodore MD    I attest that I have reviewed the patient's home medications prior to the procedure and no contraindication have been identified. I  re-evaluated the patient after the patient was positioned for the procedure in the procedure room immediately before the procedural time-out. The vital signs are current and represent the current state of the patient which has not significantly changed since the preprocedure assessment.                                                   PROCEDURE:  Caudal epidural steroid injection under fluoroscopy with insertion of flexible catheter    REASON FOR PROCEDURE:  Lumbar radiculopathy [M54.16]  1. Osteoarthritis of spine with radiculopathy, lumbar region    2. Chronic pain        PHYSICIAN: Merly Wilcox MD  ASSISTANTS: Dontrell Kinney MD    MEDICATIONS INJECTED:  Preservative-free dexamethasone 10mg, sterile preservative free normal saline 2-4ml and preservative free sterile Bupivicaine 0.25% 5ml.    LOCAL ANESTHETIC GIVEN:  Xylocaine 1% 9ml with Sodium Bicarbonate 1ml.   SEDATION MEDICATIONS: Versed 2 mg IV; Fentanyl 25 mcg IV    ESTIMATED BLOOD LOSS:  None.    COMPLICATIONS:  None.    TECHNIQUE:   Laying in the prone position, the patient was prepped and draped in the usual sterile fashion using ChloraPrep and fenestrated drape.  Appropriate anatomic landmarks were determined including the superior LS-spine and sacral hiatus.  Local anesthetic was given by raising a wheel and going down to the periosteum.  A 3.5in 16-gauge spinal needle was introduced thru the sacral hiatus.  Omnipaque was injected to confirm placement in the appropriate area and that there was no vascular runoff.  The flexible catheter threaded through to the desired levels. Omnipaque was reinjected  to confirm placement in the appropriate area. The medication was then injected slowly.  The patient tolerated the procedure well.    PAIN BEFORE THE PROCEDURE:  8/10.    PAIN AFTER THE PROCEDURE: 0/10.    The patient was monitored after the procedure.  Patient was given post procedure and discharge instructions to follow at home.  We will see the patient back in two weeks or the patient may call to inform of status. The patient was discharged in a stable condition.

## 2018-02-15 NOTE — H&P
"HPI  Patient is here today for an L4-5 ILESI.  Denies any new onset numbness/weakness in BLE, saddle anesthesia, bowel/bladder incontinence, recent fever or chills.    PMHx, PSHx, Allergies, Medications reviewed in epic    ROS negative except pain complaints in HPI    OBJECTIVE:    BP (!) 170/79   Pulse (!) 57   Temp 98.7 °F (37.1 °C) (Oral)   Resp 18   Ht 5' 2" (1.575 m)   Wt 79.4 kg (175 lb)   SpO2 95%   Breastfeeding? No   BMI 32.01 kg/m²     PHYSICAL EXAMINATION:    GENERAL: Well appearing, in no acute distress, alert and oriented x3.  PSYCH:  Mood and affect appropriate.  SKIN: Skin color, texture, turgor normal, no rashes or lesions.  CV: RRR with palpation of the radial artery.  PULM: No evidence of respiratory difficulty, symmetric chest rise. Clear to auscultation.  NEURO: Cranial nerves grossly intact.    Plan:    Proceed with L4-5 ILESI today    Dontrell Kinney  02/15/2018    "

## 2018-02-15 NOTE — DISCHARGE INSTRUCTIONS
Home Care Instructions Pain Management:    1. DIET:   You may resume your normal diet today.   2. BATHING:   You may shower with luke warm water.  3. DRESSING:   You may remove your bandage today.   4. ACTIVITY LEVEL:   You may resume your normal activities 24 hrs after your procedure.  5. MEDICATIONS:   You may resume your normal medications today.   6. SPECIAL INSTRUCTIONS:   No heat to the injection site for 24 hrs including, bath or shower, heating pad, moist heat, or hot tubs.    Use ice pack to injection site for any pain or discomfort.  Apply ice packs for 20 minute intervals as needed.   If you have received any sedatives by mouth today you may not drive for 12 hours.    If you have received any sedation through your IV, you may not drive for 24 hrs.     PLEASE CALL YOUR DOCTOR IF:  1. Redness or swelling around the injection site.  2. Fever of 101 degrees  3. Drainage (pus) from the injection site.  4. For any continuous bleeding (some dried blood over the incision is normal.)    FOR EMERGENCIES:   If any unusual problems or difficulties occur during clinic hours, call [phone] or [phone].   Adult Procedural Sedation Instructions    Recovery After Procedural Sedation (Adult)  You have been given medicine by vein to make you sleep during your surgery. This may have included both a pain medicine and sleeping medicine. Most of the effects have worn off. But you may still have some drowsiness for the next 6 to 8 hours.  Home care  Follow these guidelines when you get home:  · For the next 8 hours, you should be watched by a responsible adult. This person should make sure your condition is not getting worse.  · Don't drink any alcohol for the next 24 hours.  · Don't drive, operate dangerous machinery, or make important business or personal decisions during the next 24 hours.  Note: Your healthcare provider may tell you not to take any medicine by mouth for pain or sleep in the next 4 hours. These medicines may  react with the medicines you were given in the hospital. This could cause a much stronger response than usual.  Follow-up care  Follow up with your healthcare provider if you are not alert and back to your usual level of activity within 12 hours.  When to seek medical advice  Call your healthcare provider right away if any of these occur:  · Drowsiness gets worse  · Weakness or dizziness gets worse  · Repeated vomiting  · You can't be awakened   Date Last Reviewed: 10/18/2016  © 1774-7208 [employer]. [address] [place] [address]. All rights reserved. This information is not intended as a substitute for professional medical care. Always follow your healthcare professional's instructions.      Thank you for allowing us to care for you today. You may receive a survey about the care we provided. Your feedback is valuable and helps us provide excellent care throughout the community.

## 2018-02-15 NOTE — DISCHARGE SUMMARY
Discharge Diagnosis:Lumbar degenerative joint disease with radiculopathy   Condition on Discharge: Stable.  Diet on Discharge: Same as before.  Activity: as per instruction sheet.  Discharge to: Home with a responsible adult.  Follow up: as per Discharge instructions

## 2018-02-21 ENCOUNTER — PATIENT MESSAGE (OUTPATIENT)
Dept: SURGERY | Facility: OTHER | Age: 83
End: 2018-02-21

## 2018-02-21 ENCOUNTER — OFFICE VISIT (OUTPATIENT)
Dept: SURGERY | Facility: CLINIC | Age: 83
End: 2018-02-21
Payer: MEDICARE

## 2018-02-21 ENCOUNTER — TELEPHONE (OUTPATIENT)
Dept: GYNECOLOGIC ONCOLOGY | Facility: CLINIC | Age: 83
End: 2018-02-21

## 2018-02-21 DIAGNOSIS — Z80.3 FAMILY HISTORY OF BREAST CANCER: ICD-10-CM

## 2018-02-21 DIAGNOSIS — C50.912 HORMONE RECEPTOR POSITIVE BREAST CANCER, LEFT: Primary | ICD-10-CM

## 2018-02-21 DIAGNOSIS — Z71.83 ENCOUNTER FOR NONPROCREATIVE GENETIC COUNSELING: ICD-10-CM

## 2018-02-21 PROCEDURE — 99215 OFFICE O/P EST HI 40 MIN: CPT | Mod: S$GLB,,, | Performed by: SURGERY

## 2018-02-21 PROCEDURE — 1159F MED LIST DOCD IN RCRD: CPT | Mod: S$GLB,,, | Performed by: SURGERY

## 2018-02-21 PROCEDURE — 3008F BODY MASS INDEX DOCD: CPT | Mod: S$GLB,,, | Performed by: SURGERY

## 2018-02-21 NOTE — PROGRESS NOTES
Mrs Cook presents for genetic counseling, referred by Dr Caballero. She is a 83 year old female with recent diagnosis of IDC. She also has an ovarian mass with surgery scheduled this week for definitive diagnosis, CA-125 normal. See pedigree for full family history which will be scanned into Epic media.  This patient does not have a known hereditary cancer genetic mutation on either side of the family and does not have known Ashenazi Sikhism ancestry.      We reviewed her medical and family history and discussed the genetics of breast cancer, cancer risks associated with a hereditary predisposition to cancer, and the benefits, risks, and limitations of genetic testing according to current NCCN guidelines.  Discussed sporadic verses family clustering verses hereditary cancer. The patients history raises the possibility of a genetic susceptibility to breast cancer, with the two genes most strongly associated with early-onset breast cancer are BRCA1 and BRCA2. Mutations in these genes increase the risk for both breast and ovarian cancer in women and breast and early prostate cancer in men, along with an elevated risk of pancreatic cancer and melanoma. Due to significant clinical overlap in hereditary cancer susceptibility genes, a molecular diagnosis of a hereditary cancer condition is necessary to clarify the cancer risks this patient and multigene testing was discussed based on her history of malignancies reported today. Genetic testing for mutations in the BRCA1 and BRCA2 genes involves the complete sequencing of both BRCA1 and BRCA2, testing for 5 large gene rearrangement mutations in the BRCA1 gene, and the BRACAnalysis Large Rearrangement Test (HILARY accounts for 6-10% of all mutations in HBOC). This testing is estimated to identify greater than 92% of mutations in the BRCA1 and BRCA2 genes.      We discussed possible results of genetic testing: positive result would mean that a mutation known to be associated with  a higher risk of cancer was identified; negative result would mean that no mutation known to increase the risk of cancer was identified; variant of uncertain significance (VUS) in which a genetic change was identified in a gene, but it is unclear if this change causes an increase in cancer risk normally associated with mutations in the gene. There is an estimated 1-2% chance of a reported variant of uncertain significance in BRCA1 and BRCA2 and up to a 30% with newer genes included in multigene panels. Due to the clinical uncertainty of this type of change, VUSs are not used to make medical management decisions for a patient. Finally, I advised the patient that her medical management may change based on these results and may include increased surveillance, use of chemoprevention, or prophylactic surgery. A tailored cancer prevention plan and implications of the patients test results for relatives will be reviewed once results are available.      Women who carry mutations in the BRCA genes have a 56%-87% risk to develop breast cancer and up to a 27%-44% risk to develop ovarian cancer during their lifetime. Women who carry a BRCA gene mutation also have a greater chance of developing a second primary breast cancer, up to 40%.  Men who carry a BRCA gene mutation have up to a 6% risk to develop breast cancer and an increased risk for early-onset prostate cancer (diagnosed under age 60).  Mutations in the BRCA2 gene have also been associated with an increased risk other types of cancer in both men and women in some families, most notably pancreatic cancer and melanoma.  We discussed cancer risks vary depending on the tumor suppressor gene in which a mutation arises. We reviewed that if she carries a mutation within an autosomal inherited gene, her children would have a 50% chance of having the same mutation, along with her siblings.      Discussed the cost of testing, various labs which can conduct genetic testing and  potential insurance coverage. She desires to proceed with testing, she expressed her understanding of the information discussed today and provided informed consent for testing.          RECOMMENDATIONS:                                                                1. Integrated BRACAnalysis with [employer] through [employer], results expected in approximately 2-3 weeks.   2. Post test counseling will be provided once results are available with healthcare management per results, including testing of any additional family members if pathogenic mutation is identified.     Time in counseling 45 min, total time 45 min

## 2018-02-22 ENCOUNTER — TELEPHONE (OUTPATIENT)
Dept: GYNECOLOGIC ONCOLOGY | Facility: CLINIC | Age: 83
End: 2018-02-22

## 2018-02-22 NOTE — TELEPHONE ENCOUNTER
Called pt to confirm surgery date 3/23/18 arrival time 5:30am and location at [facility]. Pt said thanks for calling.  MA/JADEN

## 2018-02-23 ENCOUNTER — HOSPITAL ENCOUNTER (OUTPATIENT)
Facility: OTHER | Age: 83
Discharge: HOME | End: 2018-02-24
Attending: OBSTETRICS & GYNECOLOGY | Admitting: OBSTETRICS & GYNECOLOGY
Payer: MEDICARE

## 2018-02-23 ENCOUNTER — ANESTHESIA (OUTPATIENT)
Dept: SURGERY | Facility: OTHER | Age: 83
End: 2018-02-23
Payer: MEDICARE

## 2018-02-23 DIAGNOSIS — C50.912 HORMONE RECEPTOR POSITIVE BREAST CANCER, LEFT: ICD-10-CM

## 2018-02-23 DIAGNOSIS — N83.8 OVARIAN MASS: ICD-10-CM

## 2018-02-23 DIAGNOSIS — Z90.722 S/P BSO (STATUS POST BILATERAL SALPINGO-OOPHORECTOMY): Primary | ICD-10-CM

## 2018-02-23 PROCEDURE — 71000039 HC RECOVERY, EACH ADD'L HOUR: Performed by: OBSTETRICS & GYNECOLOGY

## 2018-02-23 PROCEDURE — 25000003 PHARM REV CODE 250: Performed by: NURSE ANESTHETIST, CERTIFIED REGISTERED

## 2018-02-23 PROCEDURE — 27100098 HC SPACER

## 2018-02-23 PROCEDURE — 58661 LAPAROSCOPY REMOVE ADNEXA: CPT | Mod: AS,50,, | Performed by: NURSE PRACTITIONER

## 2018-02-23 PROCEDURE — 94640 AIRWAY INHALATION TREATMENT: CPT

## 2018-02-23 PROCEDURE — 27201423 OPTIME MED/SURG SUP & DEVICES STERILE SUPPLY: Performed by: OBSTETRICS & GYNECOLOGY

## 2018-02-23 PROCEDURE — 94761 N-INVAS EAR/PLS OXIMETRY MLT: CPT

## 2018-02-23 PROCEDURE — 25000003 PHARM REV CODE 250: Performed by: STUDENT IN AN ORGANIZED HEALTH CARE EDUCATION/TRAINING PROGRAM

## 2018-02-23 PROCEDURE — 37000008 HC ANESTHESIA 1ST 15 MINUTES: Performed by: OBSTETRICS & GYNECOLOGY

## 2018-02-23 PROCEDURE — 25000242 PHARM REV CODE 250 ALT 637 W/ HCPCS: Performed by: STUDENT IN AN ORGANIZED HEALTH CARE EDUCATION/TRAINING PROGRAM

## 2018-02-23 PROCEDURE — 88307 TISSUE EXAM BY PATHOLOGIST: CPT | Mod: 26,,, | Performed by: PATHOLOGY

## 2018-02-23 PROCEDURE — 36000711: Performed by: OBSTETRICS & GYNECOLOGY

## 2018-02-23 PROCEDURE — 25000003 PHARM REV CODE 250: Performed by: ANESTHESIOLOGY

## 2018-02-23 PROCEDURE — 94799 UNLISTED PULMONARY SVC/PX: CPT

## 2018-02-23 PROCEDURE — 99900035 HC TECH TIME PER 15 MIN (STAT)

## 2018-02-23 PROCEDURE — 25000242 PHARM REV CODE 250 ALT 637 W/ HCPCS: Performed by: NURSE ANESTHETIST, CERTIFIED REGISTERED

## 2018-02-23 PROCEDURE — 27000221 HC OXYGEN, UP TO 24 HOURS

## 2018-02-23 PROCEDURE — 63600175 PHARM REV CODE 636 W HCPCS: Performed by: OBSTETRICS & GYNECOLOGY

## 2018-02-23 PROCEDURE — 63600175 PHARM REV CODE 636 W HCPCS: Performed by: NURSE ANESTHETIST, CERTIFIED REGISTERED

## 2018-02-23 PROCEDURE — 25000003 PHARM REV CODE 250: Performed by: NURSE PRACTITIONER

## 2018-02-23 PROCEDURE — 37000009 HC ANESTHESIA EA ADD 15 MINS: Performed by: OBSTETRICS & GYNECOLOGY

## 2018-02-23 PROCEDURE — 58661 LAPAROSCOPY REMOVE ADNEXA: CPT | Mod: 50,,, | Performed by: OBSTETRICS & GYNECOLOGY

## 2018-02-23 PROCEDURE — 71000033 HC RECOVERY, INTIAL HOUR: Performed by: OBSTETRICS & GYNECOLOGY

## 2018-02-23 PROCEDURE — 88307 TISSUE EXAM BY PATHOLOGIST: CPT | Performed by: PATHOLOGY

## 2018-02-23 PROCEDURE — 25000242 PHARM REV CODE 250 ALT 637 W/ HCPCS: Performed by: ANESTHESIOLOGY

## 2018-02-23 PROCEDURE — 36000710: Performed by: OBSTETRICS & GYNECOLOGY

## 2018-02-23 RX ORDER — SIMETHICONE 80 MG
80 TABLET,CHEWABLE ORAL EVERY 4 HOURS PRN
Status: DISCONTINUED | OUTPATIENT
Start: 2018-02-23 | End: 2018-02-24 | Stop reason: HOSPADM

## 2018-02-23 RX ORDER — ONDANSETRON 2 MG/ML
4 INJECTION INTRAMUSCULAR; INTRAVENOUS DAILY PRN
Status: DISCONTINUED | OUTPATIENT
Start: 2018-02-23 | End: 2018-02-23 | Stop reason: HOSPADM

## 2018-02-23 RX ORDER — HYDROMORPHONE HYDROCHLORIDE 2 MG/ML
1 INJECTION, SOLUTION INTRAMUSCULAR; INTRAVENOUS; SUBCUTANEOUS EVERY 4 HOURS PRN
Status: DISCONTINUED | OUTPATIENT
Start: 2018-02-23 | End: 2018-02-24 | Stop reason: HOSPADM

## 2018-02-23 RX ORDER — DIPHENHYDRAMINE HCL 25 MG
25 CAPSULE ORAL EVERY 4 HOURS PRN
Status: DISCONTINUED | OUTPATIENT
Start: 2018-02-23 | End: 2018-02-24 | Stop reason: HOSPADM

## 2018-02-23 RX ORDER — ROCURONIUM BROMIDE 10 MG/ML
INJECTION, SOLUTION INTRAVENOUS
Status: DISCONTINUED | OUTPATIENT
Start: 2018-02-23 | End: 2018-02-23

## 2018-02-23 RX ORDER — HYDROCODONE BITARTRATE AND ACETAMINOPHEN 5; 325 MG/1; MG/1
1 TABLET ORAL EVERY 4 HOURS PRN
Status: DISCONTINUED | OUTPATIENT
Start: 2018-02-23 | End: 2018-02-24 | Stop reason: HOSPADM

## 2018-02-23 RX ORDER — GLYCOPYRROLATE 0.2 MG/ML
INJECTION INTRAMUSCULAR; INTRAVENOUS
Status: DISCONTINUED | OUTPATIENT
Start: 2018-02-23 | End: 2018-02-23

## 2018-02-23 RX ORDER — IBUPROFEN 600 MG/1
600 TABLET ORAL EVERY 6 HOURS PRN
Status: DISCONTINUED | OUTPATIENT
Start: 2018-02-23 | End: 2018-02-24 | Stop reason: HOSPADM

## 2018-02-23 RX ORDER — ONDANSETRON 8 MG/1
8 TABLET, ORALLY DISINTEGRATING ORAL EVERY 8 HOURS PRN
Status: DISCONTINUED | OUTPATIENT
Start: 2018-02-23 | End: 2018-02-24 | Stop reason: HOSPADM

## 2018-02-23 RX ORDER — ALBUTEROL SULFATE 90 UG/1
2 AEROSOL, METERED RESPIRATORY (INHALATION) EVERY 8 HOURS
Status: DISCONTINUED | OUTPATIENT
Start: 2018-02-23 | End: 2018-02-24 | Stop reason: HOSPADM

## 2018-02-23 RX ORDER — ALBUTEROL SULFATE 0.83 MG/ML
2.5 SOLUTION RESPIRATORY (INHALATION)
Status: COMPLETED | OUTPATIENT
Start: 2018-02-23 | End: 2018-02-23

## 2018-02-23 RX ORDER — LIDOCAINE HYDROCHLORIDE 10 MG/ML
1 INJECTION, SOLUTION EPIDURAL; INFILTRATION; INTRACAUDAL; PERINEURAL ONCE
Status: DISCONTINUED | OUTPATIENT
Start: 2018-02-23 | End: 2018-02-23 | Stop reason: HOSPADM

## 2018-02-23 RX ORDER — ATORVASTATIN CALCIUM 20 MG/1
20 TABLET, FILM COATED ORAL DAILY
Status: DISCONTINUED | OUTPATIENT
Start: 2018-02-23 | End: 2018-02-24 | Stop reason: HOSPADM

## 2018-02-23 RX ORDER — OXYCODONE HYDROCHLORIDE 5 MG/1
5 TABLET ORAL
Status: DISCONTINUED | OUTPATIENT
Start: 2018-02-23 | End: 2018-02-23 | Stop reason: HOSPADM

## 2018-02-23 RX ORDER — DEXTROSE MONOHYDRATE, SODIUM CHLORIDE, AND POTASSIUM CHLORIDE 50; 1.49; 4.5 G/1000ML; G/1000ML; G/1000ML
INJECTION, SOLUTION INTRAVENOUS CONTINUOUS
Status: DISCONTINUED | OUTPATIENT
Start: 2018-02-23 | End: 2018-02-24 | Stop reason: HOSPADM

## 2018-02-23 RX ORDER — DEXAMETHASONE SODIUM PHOSPHATE 4 MG/ML
INJECTION, SOLUTION INTRA-ARTICULAR; INTRALESIONAL; INTRAMUSCULAR; INTRAVENOUS; SOFT TISSUE
Status: DISCONTINUED | OUTPATIENT
Start: 2018-02-23 | End: 2018-02-23

## 2018-02-23 RX ORDER — MEPERIDINE HYDROCHLORIDE 50 MG/ML
12.5 INJECTION INTRAMUSCULAR; INTRAVENOUS; SUBCUTANEOUS ONCE AS NEEDED
Status: DISCONTINUED | OUTPATIENT
Start: 2018-02-23 | End: 2018-02-23 | Stop reason: HOSPADM

## 2018-02-23 RX ORDER — SODIUM CHLORIDE, SODIUM LACTATE, POTASSIUM CHLORIDE, CALCIUM CHLORIDE 600; 310; 30; 20 MG/100ML; MG/100ML; MG/100ML; MG/100ML
INJECTION, SOLUTION INTRAVENOUS CONTINUOUS
Status: DISCONTINUED | OUTPATIENT
Start: 2018-02-23 | End: 2018-02-24 | Stop reason: HOSPADM

## 2018-02-23 RX ORDER — ACETAMINOPHEN 10 MG/ML
INJECTION, SOLUTION INTRAVENOUS
Status: DISCONTINUED | OUTPATIENT
Start: 2018-02-23 | End: 2018-02-23

## 2018-02-23 RX ORDER — FENTANYL CITRATE 50 UG/ML
25 INJECTION, SOLUTION INTRAMUSCULAR; INTRAVENOUS EVERY 5 MIN PRN
Status: DISCONTINUED | OUTPATIENT
Start: 2018-02-23 | End: 2018-02-23 | Stop reason: HOSPADM

## 2018-02-23 RX ORDER — ONDANSETRON 2 MG/ML
INJECTION INTRAMUSCULAR; INTRAVENOUS
Status: DISCONTINUED | OUTPATIENT
Start: 2018-02-23 | End: 2018-02-23

## 2018-02-23 RX ORDER — LIDOCAINE HCL/PF 100 MG/5ML
SYRINGE (ML) INTRAVENOUS
Status: DISCONTINUED | OUTPATIENT
Start: 2018-02-23 | End: 2018-02-23

## 2018-02-23 RX ORDER — BENAZEPRIL HYDROCHLORIDE 10 MG/1
10 TABLET ORAL DAILY
Status: DISCONTINUED | OUTPATIENT
Start: 2018-02-23 | End: 2018-02-24 | Stop reason: HOSPADM

## 2018-02-23 RX ORDER — FENTANYL CITRATE 50 UG/ML
INJECTION, SOLUTION INTRAMUSCULAR; INTRAVENOUS
Status: DISCONTINUED | OUTPATIENT
Start: 2018-02-23 | End: 2018-02-23

## 2018-02-23 RX ORDER — HYDROMORPHONE HYDROCHLORIDE 2 MG/ML
0.4 INJECTION, SOLUTION INTRAMUSCULAR; INTRAVENOUS; SUBCUTANEOUS EVERY 5 MIN PRN
Status: DISCONTINUED | OUTPATIENT
Start: 2018-02-23 | End: 2018-02-23 | Stop reason: HOSPADM

## 2018-02-23 RX ORDER — ALBUTEROL SULFATE 0.83 MG/ML
SOLUTION RESPIRATORY (INHALATION)
Status: DISCONTINUED | OUTPATIENT
Start: 2018-02-23 | End: 2018-02-23

## 2018-02-23 RX ORDER — CITALOPRAM 20 MG/1
20 TABLET, FILM COATED ORAL DAILY
Status: DISCONTINUED | OUTPATIENT
Start: 2018-02-23 | End: 2018-02-24 | Stop reason: HOSPADM

## 2018-02-23 RX ORDER — LIDOCAINE HYDROCHLORIDE 10 MG/ML
0.5 INJECTION, SOLUTION EPIDURAL; INFILTRATION; INTRACAUDAL; PERINEURAL ONCE
Status: DISCONTINUED | OUTPATIENT
Start: 2018-02-23 | End: 2018-02-23 | Stop reason: HOSPADM

## 2018-02-23 RX ORDER — SODIUM CHLORIDE 9 MG/ML
INJECTION, SOLUTION INTRAVENOUS CONTINUOUS
Status: DISCONTINUED | OUTPATIENT
Start: 2018-02-23 | End: 2018-02-24 | Stop reason: HOSPADM

## 2018-02-23 RX ORDER — PROPOFOL 10 MG/ML
VIAL (ML) INTRAVENOUS
Status: DISCONTINUED | OUTPATIENT
Start: 2018-02-23 | End: 2018-02-23

## 2018-02-23 RX ORDER — SODIUM CHLORIDE 0.9 % (FLUSH) 0.9 %
3 SYRINGE (ML) INJECTION
Status: DISCONTINUED | OUTPATIENT
Start: 2018-02-23 | End: 2018-02-24 | Stop reason: HOSPADM

## 2018-02-23 RX ORDER — CEFAZOLIN SODIUM 2 G/50ML
2 SOLUTION INTRAVENOUS
Status: COMPLETED | OUTPATIENT
Start: 2018-02-23 | End: 2018-02-23

## 2018-02-23 RX ORDER — NEOSTIGMINE METHYLSULFATE 1 MG/ML
INJECTION, SOLUTION INTRAVENOUS
Status: DISCONTINUED | OUTPATIENT
Start: 2018-02-23 | End: 2018-02-23

## 2018-02-23 RX ORDER — TORSEMIDE 20 MG/1
20 TABLET ORAL DAILY
Status: DISCONTINUED | OUTPATIENT
Start: 2018-02-23 | End: 2018-02-24 | Stop reason: HOSPADM

## 2018-02-23 RX ADMIN — SODIUM CHLORIDE, SODIUM LACTATE, POTASSIUM CHLORIDE, AND CALCIUM CHLORIDE: 600; 310; 30; 20 INJECTION, SOLUTION INTRAVENOUS at 07:02

## 2018-02-23 RX ADMIN — CARBOXYMETHYLCELLULOSE SODIUM 2 DROP: 2.5 SOLUTION/ DROPS OPHTHALMIC at 07:02

## 2018-02-23 RX ADMIN — CITALOPRAM HYDROBROMIDE 20 MG: 20 TABLET ORAL at 03:02

## 2018-02-23 RX ADMIN — HYDROCODONE BITARTRATE AND ACETAMINOPHEN 1 TABLET: 5; 325 TABLET ORAL at 10:02

## 2018-02-23 RX ADMIN — CEFAZOLIN SODIUM 2 G: 2 SOLUTION INTRAVENOUS at 07:02

## 2018-02-23 RX ADMIN — ONDANSETRON 4 MG: 2 INJECTION INTRAMUSCULAR; INTRAVENOUS at 07:02

## 2018-02-23 RX ADMIN — PROPOFOL 200 MG: 10 INJECTION, EMULSION INTRAVENOUS at 07:02

## 2018-02-23 RX ADMIN — GLYCOPYRROLATE 0.2 MG: 0.2 INJECTION, SOLUTION INTRAMUSCULAR; INTRAVENOUS at 08:02

## 2018-02-23 RX ADMIN — ALBUTEROL SULFATE 2.5 MG: 2.5 SOLUTION RESPIRATORY (INHALATION) at 05:02

## 2018-02-23 RX ADMIN — SUGAMMADEX 318 MG: 100 INJECTION, SOLUTION INTRAVENOUS at 08:02

## 2018-02-23 RX ADMIN — SIMETHICONE CHEW TAB 80 MG 80 MG: 80 TABLET ORAL at 05:02

## 2018-02-23 RX ADMIN — BENAZEPRIL HYDROCHLORIDE 10 MG: 10 TABLET, FILM COATED ORAL at 05:02

## 2018-02-23 RX ADMIN — ACETAMINOPHEN 1000 MG: 10 INJECTION, SOLUTION INTRAVENOUS at 07:02

## 2018-02-23 RX ADMIN — GLYCOPYRROLATE 0.8 MG: 0.2 INJECTION, SOLUTION INTRAMUSCULAR; INTRAVENOUS at 08:02

## 2018-02-23 RX ADMIN — ALBUTEROL SULFATE 2 PUFF: 90 AEROSOL, METERED RESPIRATORY (INHALATION) at 11:02

## 2018-02-23 RX ADMIN — ALBUTEROL SULFATE 5 MG: 2.5 SOLUTION RESPIRATORY (INHALATION) at 08:02

## 2018-02-23 RX ADMIN — TORSEMIDE 20 MG: 20 TABLET ORAL at 06:02

## 2018-02-23 RX ADMIN — EPHEDRINE SULFATE 20 MG: 50 INJECTION INTRAMUSCULAR; INTRAVENOUS; SUBCUTANEOUS at 07:02

## 2018-02-23 RX ADMIN — EPHEDRINE SULFATE 15 MG: 50 INJECTION INTRAMUSCULAR; INTRAVENOUS; SUBCUTANEOUS at 08:02

## 2018-02-23 RX ADMIN — DEXTROSE, SODIUM CHLORIDE, AND POTASSIUM CHLORIDE: 5; .45; .15 INJECTION INTRAVENOUS at 11:02

## 2018-02-23 RX ADMIN — ROCURONIUM BROMIDE 50 MG: 10 INJECTION, SOLUTION INTRAVENOUS at 07:02

## 2018-02-23 RX ADMIN — ALBUTEROL SULFATE 2 PUFF: 90 AEROSOL, METERED RESPIRATORY (INHALATION) at 05:02

## 2018-02-23 RX ADMIN — GLYCOPYRROLATE 0.2 MG: 0.2 INJECTION, SOLUTION INTRAMUSCULAR; INTRAVENOUS at 07:02

## 2018-02-23 RX ADMIN — FENTANYL CITRATE 50 MCG: 50 INJECTION, SOLUTION INTRAMUSCULAR; INTRAVENOUS at 07:02

## 2018-02-23 RX ADMIN — EPHEDRINE SULFATE 15 MG: 50 INJECTION INTRAMUSCULAR; INTRAVENOUS; SUBCUTANEOUS at 07:02

## 2018-02-23 RX ADMIN — LIDOCAINE HYDROCHLORIDE 100 MG: 20 INJECTION, SOLUTION INTRAVENOUS at 07:02

## 2018-02-23 RX ADMIN — DEXAMETHASONE SODIUM PHOSPHATE 8 MG: 4 INJECTION, SOLUTION INTRAMUSCULAR; INTRAVENOUS at 07:02

## 2018-02-23 RX ADMIN — NEOSTIGMINE METHYLSULFATE 5 MG: 1 INJECTION INTRAVENOUS at 08:02

## 2018-02-23 NOTE — HOSPITAL COURSE
02/23/2018 - Scheduled RA-BSO. Uncomplicated case.  02/24/2018 - Patient meeting post op milestones. Doing well. Will d/c home later today.

## 2018-02-23 NOTE — OR NURSING
Pt without change from previous assessment. Room available. No c/o pain at this time. Report to [facility]. Daughter Patience updated by phone and given room number.

## 2018-02-23 NOTE — INTERVAL H&P NOTE
The patient has been examined and the H&P has been reviewed:    I concur with the findings and no changes have occurred since H&P was written.    Anesthesia/Surgery risks, benefits and alternative options discussed and understood by patient/family.    Proceed to OR for Davinci assisted laparoscopic BSO, resection of pelvic mass.    Amy Sanabria, NP-C, [facility] On[facility]        Active Hospital Problems    Diagnosis  POA    Hormone receptor positive breast cancer, left [C50.912]  Yes      Resolved Hospital Problems    Diagnosis Date Resolved POA   No resolved problems to display.

## 2018-02-23 NOTE — OPERATIVE NOTE ADDENDUM
Certification of Assistant at Surgery       Surgery Date: 2/23/2018     Participating Surgeons:  Surgeon(s) and Role:     * Reena Chairez MD - Primary        Amy Sanabria NP-C, [facility]    Procedures:  Procedure(s) (LRB):  XI ROBOT ASSISTED LAPAROSCOPIC SALPINGO-OOPHERECTOMY (Bilateral)    Assistant Surgeon's Certification of Necessity:  I understand that section 1842 (b) (6) (d) of the Social Security Act generally prohibits Medicare Part B reasonable charge payment for the services of assistants at surgery in teaching hospitals when qualified residents are available to furnish such services. I certify that the services for which payment is claimed were medically necessary, and that no qualified resident was available to perform the services. I further understand that these services are subject to post-payment review by the Medicare carrier.      Amy Snaabria NP    02/23/2018  8:59 AM

## 2018-02-23 NOTE — SUBJECTIVE & OBJECTIVE
"Interval History: Patient says she doing well. She denies pain. She has tolerated a  "soft diet" without complication.  She has not yet ambulated or voided given the presence of a garcia.  She denies any SOB, fever or chills.      Scheduled Meds:   atorvastatin  20 mg Oral Daily    citalopram  20 mg Oral Daily     Continuous Infusions:   sodium chloride 0.9%      dextrose 5 % and 0.45 % NaCl with KCl 20 mEq 125 mL/hr at 02/23/18 1139    lactated ringers       PRN Meds:diphenhydrAMINE, hydrocodone-acetaminophen 5-325mg, HYDROmorphone, ibuprofen, ondansetron, simethicone, sodium chloride 0.9%    Review of patient's allergies indicates:  No Known Allergies    Objective:     Vital Signs (Most Recent):  Temp: 98 °F (36.7 °C) (02/23/18 1030)  Pulse: 60 (02/23/18 1045)  Resp: 16 (02/23/18 1045)  BP: (!) 161/73 (02/23/18 1045)  SpO2: (!) 91 % (02/23/18 1045) Vital Signs (24h Range):  Temp:  [97.9 °F (36.6 °C)-98 °F (36.7 °C)] 98 °F (36.7 °C)  Pulse:  [60-75] 60  Resp:  [16-20] 16  SpO2:  [91 %-97 %] 91 %  BP: (132-186)/(66-84) 161/73     Weight: 77 kg (169 lb 12.1 oz)  Body mass index is 31.05 kg/m².  No LMP recorded. Patient has had a hysterectomy.    I&O (Last 24H):    Intake/Output Summary (Last 24 hours) at 02/23/18 1453  Last data filed at 02/23/18 1059   Gross per 24 hour   Intake              900 ml   Output              200 ml   Net              700 ml       Physical Exam:   Constitutional: She is oriented to person, place, and time. She appears well-developed and well-nourished. No distress.    HENT:   Head: Normocephalic and atraumatic.    Eyes: Conjunctivae are normal.    Neck: Neck supple.    Cardiovascular: Normal rate, regular rhythm and intact distal pulses.     Pulmonary/Chest: Effort normal and breath sounds normal. No respiratory distress. She has no wheezes. She has no rales.   Nasal Cannulae on at bedside.         Abdominal: Bowel sounds are normal. She exhibits abdominal incision (C,D,I No signs of " infection or inflammation). She exhibits no distension. There is tenderness (Mild TTP, Appropriate for Post-Op Period). There is no rebound and no guarding.     Genitourinary: Vagina normal.   Genitourinary Comments: Norris in place.            Musculoskeletal:   TEDs and SCDs in place.       Neurological: She is alert and oriented to person, place, and time.    Skin: Skin is warm and dry. She is not diaphoretic.    Psychiatric: She has a normal mood and affect. Her behavior is normal. Judgment and thought content normal.

## 2018-02-23 NOTE — ASSESSMENT & PLAN NOTE
- Patient doing well.  - Starting to meet Post-Operative Goals  - I.S.  - Encourage ambulation  - IVF discontinued  - Advance diet as tolerated  - Routine Post-Op Pain Management  - TEDs and SCDs when not ambulating

## 2018-02-23 NOTE — TRANSFER OF CARE
"Anesthesia Transfer of Care Note    Patient: Anahi Cook    Procedure(s) Performed: Procedure(s) (LRB):  XI ROBOT ASSISTED LAPAROSCOPIC SALPINGO-OOPHERECTOMY (Bilateral)    Patient location: PACU    Anesthesia Type: general    Transport from OR: Transported from OR on 2-3 L/min O2 by NC with adequate spontaneous ventilation    Post pain: adequate analgesia    Post assessment: no apparent anesthetic complications    Post vital signs: stable    Level of consciousness: awake, alert and oriented    Nausea/Vomiting: no nausea/vomiting    Complications: none    Transfer of care protocol was followed      Last vitals:   Visit Vitals  /70   Pulse 64   Temp 36.6 °C (97.9 °F) (Oral)   Resp 18   Ht 5' 2" (1.575 m)   Wt 79.4 kg (175 lb)   SpO2 (!) 94%   BMI 32.01 kg/m²     "

## 2018-02-23 NOTE — HPI
This is an 82 yo female who presents today for a scheduled RA-BSO.     She originally presented as a referral from Dr. Tamara Machado for pelvic mass. According to the daughter adnexal mass has been present since . On recent follow up there was an interval increase in the size of the mass. Imaging reviewed.    Pelvic US 18    1.  Interval increase in size in the left adnexal mass.  This increase in size is more concerning for a malignant process in the previous stable exams. Today it measures 4.7 cm x 5.7 cm x 4.1 cm.      normal 17.      She also has a palpable L breast mass which was biopsied on 17 showing invasive ductal carcinoma.      Prior abdominal surgeries include TVH (ovaries in situ), cholecystectomy. Medical history significant for HTN, COPD and now breast cancer.      Family history significant for maternal GM with breast cancer, maternal aunt with breast cancer, and sister with breast cancer. No history of ovarian, uterine, or colon cancer. No genetic testing that she is aware of.       x 3.

## 2018-02-23 NOTE — H&P (VIEW-ONLY)
Subjective:      Patient ID: Anahi Cook is a 83 y.o. female.    Chief Complaint: Follow-up      HPI  Presents today for treatment planning regarding pelvic mass. She has seen Gaby Caballero and Marquise regarding her new breast cancer. I have communicated with them and she has been started on neoadjuvant letrozole. This will allow for us to remove pelvic mass.      History:  Patient is an 84yo female who originally presented as a referral from Dr. Tamara Machado for pelvic mass. According to the daughter adnexal mass has been present since . On recent follow up there was an interval increase in the size of the mass. Imaging reviewed.    Pelvic US 18  1.  Interval increase in size in the left adnexal mass.  This increase in size is more concerning for a malignant process in the previous stable exams. Today it measures 4.7 cm x 5.7 cm x 4.1 cm.      normal 17.      She also has a palpable L breast mass which was biopsied on 17 showing invasive ductal carcinoma.      Prior abdominal surgeries include TVH (ovaries in situ), cholecystectomy. Medical history significant for HTN, COPD and now breast cancer.      Family history significant for maternal GM with breast cancer, maternal aunt with breast cancer, and sister with breast cancer. No history of ovarian, uterine, or colon cancer. No genetic testing that she is aware of.       x 3.   Review of Systems   Constitutional: Negative for appetite change, chills, fatigue and fever.   HENT: Negative for mouth sores.    Respiratory: Negative for cough and shortness of breath.    Cardiovascular: Negative for leg swelling.   Gastrointestinal: Negative for abdominal pain, blood in stool, constipation and diarrhea.   Endocrine: Negative for cold intolerance.   Genitourinary: Negative for dysuria and vaginal bleeding.   Musculoskeletal: Negative for myalgias.   Skin: Negative for rash.   Allergic/Immunologic: Negative.    Neurological: Negative for weakness  and numbness.   Hematological: Negative for adenopathy. Does not bruise/bleed easily.   Psychiatric/Behavioral: Negative for confusion.       Objective:   Physical Exam:   Constitutional: She is oriented to person, place, and time. She appears well-developed and well-nourished.    HENT:   Head: Normocephalic and atraumatic.    Eyes: EOM are normal. Pupils are equal, round, and reactive to light.    Neck: Normal range of motion. Neck supple. No thyromegaly present.    Cardiovascular: Normal rate, regular rhythm and intact distal pulses.     Pulmonary/Chest: Effort normal and breath sounds normal. No respiratory distress. She has no wheezes.        Abdominal: Soft. Bowel sounds are normal. She exhibits no distension, no ascites and no mass. There is no tenderness.             Musculoskeletal: Normal range of motion and moves all extremeties.      Lymphadenopathy:     She has no cervical adenopathy.        Right: No supraclavicular adenopathy present.        Left: No supraclavicular adenopathy present.    Neurological: She is alert and oriented to person, place, and time.    Skin: Skin is warm and dry. No rash noted.    Psychiatric: She has a normal mood and affect.       Assessment:     1. Hormone receptor positive breast cancer, left    2. Dysuria    3. Ovarian mass        Plan:     Orders Placed This Encounter   Procedures    CULTURE, URINE     I again discussed with the patient and her daughters the differential diagnosis of pelvic mass in postmenopausal woman including benign, borderline, and malignant process. Though the mass has been present for several years and her  is normal there has been an interval increase in the size of the lesion. Definitive diagnosis can only be made with surgical resection. I have reviewed the imaging and recommend surgical resection as diagnosis will impact prognosis and further treatment decisions regarding her breast cancer.     She is a candidate for minimally invasive  approach. Would plan for robotic BSO. Will omit staging due to her age, overall health, and concurrent node positive breast cancer. She desires to proceed. The risks, benefits, and indications of the procedure were discussed with the patient and her family members if present.  These included bleeding, transfusion, infection, damage to surrounding tissues (bowel, bladder, ureter), wound separation, perioperative cardiac events, VTE, pneumonia, and possible death.  She voiced understanding, all questions were answered and consents were signed.    Plan for robotic BSO 2/23/18  Pre op anesthesia    Keep genetics referral as well.

## 2018-02-23 NOTE — NURSING
Pt states she did not take her benazipril or torsemide this morning.  Paged GYN re: adding home meds.

## 2018-02-23 NOTE — ANESTHESIA POSTPROCEDURE EVALUATION
"Anesthesia Post Evaluation    Patient: Anahi Cook    Procedure(s) Performed: Procedure(s) (LRB):  XI ROBOT ASSISTED LAPAROSCOPIC SALPINGO-OOPHERECTOMY (Bilateral)    Final Anesthesia Type: general  Patient location during evaluation: PACU  Patient participation: Yes- Able to Participate  Level of consciousness: awake and alert  Post-procedure vital signs: reviewed and stable  Pain management: adequate  Airway patency: patent  PONV status at discharge: No PONV  Anesthetic complications: no      Cardiovascular status: blood pressure returned to baseline  Respiratory status: unassisted and room air  Hydration status: euvolemic  Follow-up not needed.        Visit Vitals  BP (!) 161/73   Pulse 60   Temp 36.7 °C (98 °F) (Oral)   Resp 16   Ht 5' 2" (1.575 m)   Wt 77 kg (169 lb 12.1 oz)   SpO2 (!) 91%   Breastfeeding? No   BMI 31.05 kg/m²       Pain/Nohemi Score: Pain Assessment Performed: Yes (2/23/2018  5:49 AM)  Presence of Pain: denies (2/23/2018 10:54 AM)  Nohemi Score: 8 (2/23/2018 10:54 AM)      "

## 2018-02-23 NOTE — PROGRESS NOTES
"[facility]  Obstetrics & Gynecology  Progress Note    Patient Name: Anahi Cook  MRN: [id number]  Admission Date: 2018  Primary Care Provider: Pepito Theodore MD  Principal Problem: S/P BSO (status post bilateral salpingo-oophorectomy)    Subjective:     HPI:  This is an 84 yo female who presents today for a scheduled RA-BSO.     She originally presented as a referral from Dr. Tamara Machado for pelvic mass. According to the daughter adnexal mass has been present since . On recent follow up there was an interval increase in the size of the mass. Imaging reviewed.    Pelvic US 18    1.  Interval increase in size in the left adnexal mass.  This increase in size is more concerning for a malignant process in the previous stable exams. Today it measures 4.7 cm x 5.7 cm x 4.1 cm.      normal 17.      She also has a palpable L breast mass which was biopsied on 17 showing invasive ductal carcinoma.      Prior abdominal surgeries include TVH (ovaries in situ), cholecystectomy. Medical history significant for HTN, COPD and now breast cancer.      Family history significant for maternal GM with breast cancer, maternal aunt with breast cancer, and sister with breast cancer. No history of ovarian, uterine, or colon cancer. No genetic testing that she is aware of.       x 3.     Interval History: Patient says she doing well. She denies pain. She has tolerated a  "soft diet" without complication.  She has not yet ambulated or voided given the presence of a garcia.  She denies any SOB, fever or chills.      Scheduled Meds:   atorvastatin  20 mg Oral Daily    citalopram  20 mg Oral Daily     Continuous Infusions:   sodium chloride 0.9%      dextrose 5 % and 0.45 % NaCl with KCl 20 mEq 125 mL/hr at 18 1139    lactated ringers       PRN Meds:diphenhydrAMINE, hydrocodone-acetaminophen 5-325mg, HYDROmorphone, ibuprofen, ondansetron, simethicone, sodium chloride 0.9%    Review of " patient's allergies indicates:  No Known Allergies    Objective:     Vital Signs (Most Recent):  Temp: 98 °F (36.7 °C) (02/23/18 1030)  Pulse: 60 (02/23/18 1045)  Resp: 16 (02/23/18 1045)  BP: (!) 161/73 (02/23/18 1045)  SpO2: (!) 91 % (02/23/18 1045) Vital Signs (24h Range):  Temp:  [97.9 °F (36.6 °C)-98 °F (36.7 °C)] 98 °F (36.7 °C)  Pulse:  [60-75] 60  Resp:  [16-20] 16  SpO2:  [91 %-97 %] 91 %  BP: (132-186)/(66-84) 161/73     Weight: 77 kg (169 lb 12.1 oz)  Body mass index is 31.05 kg/m².  No LMP recorded. Patient has had a hysterectomy.    I&O (Last 24H):    Intake/Output Summary (Last 24 hours) at 02/23/18 1453  Last data filed at 02/23/18 1059   Gross per 24 hour   Intake              900 ml   Output              200 ml   Net              700 ml       Physical Exam:   Constitutional: She is oriented to person, place, and time. She appears well-developed and well-nourished. No distress.    HENT:   Head: Normocephalic and atraumatic.    Eyes: Conjunctivae are normal.    Neck: Neck supple.    Cardiovascular: Normal rate, regular rhythm and intact distal pulses.     Pulmonary/Chest: Effort normal and breath sounds normal. No respiratory distress. She has no wheezes. She has no rales.   Nasal Cannulae on at bedside.         Abdominal: Bowel sounds are normal. She exhibits abdominal incision (C,D,I No signs of infection or inflammation). She exhibits no distension. There is tenderness (Mild TTP, Appropriate for Post-Op Period). There is no rebound and no guarding.     Genitourinary: Vagina normal.   Genitourinary Comments: Norris in place.            Musculoskeletal:   TEDs and SCDs in place.       Neurological: She is alert and oriented to person, place, and time.    Skin: Skin is warm and dry. She is not diaphoretic.    Psychiatric: She has a normal mood and affect. Her behavior is normal. Judgment and thought content normal.       Assessment/Plan:     * S/P robotic assisted laparoscopic BSO (bilateral  salpingo-oophorectomy)    - Patient doing well.  - Starting to meet Post-Operative Goals  - I.S.  - Encourage ambulation  - IVF discontinued  - Advance diet as tolerated  - Routine Post-Op Pain Management  - TEDs and SCDs when not ambulating        COPD exacerbation    - Regla Alvarez MD  Obstetrics & Gynecology  [facility]

## 2018-02-24 VITALS
HEART RATE: 80 BPM | HEIGHT: 62 IN | RESPIRATION RATE: 18 BRPM | DIASTOLIC BLOOD PRESSURE: 92 MMHG | BODY MASS INDEX: 31.24 KG/M2 | TEMPERATURE: 98 F | OXYGEN SATURATION: 95 % | WEIGHT: 169.75 LBS | SYSTOLIC BLOOD PRESSURE: 142 MMHG

## 2018-02-24 LAB
ANION GAP SERPL CALC-SCNC: 9 MMOL/L
ANISOCYTOSIS BLD QL SMEAR: SLIGHT
BASOPHILS # BLD AUTO: ABNORMAL K/UL
BASOPHILS NFR BLD: 0 %
BUN SERPL-MCNC: 11 MG/DL
CALCIUM SERPL-MCNC: 8.7 MG/DL
CHLORIDE SERPL-SCNC: 102 MMOL/L
CO2 SERPL-SCNC: 31 MMOL/L
CREAT SERPL-MCNC: 0.9 MG/DL
DIFFERENTIAL METHOD: ABNORMAL
EOSINOPHIL # BLD AUTO: ABNORMAL K/UL
EOSINOPHIL NFR BLD: 0 %
ERYTHROCYTE [DISTWIDTH] IN BLOOD BY AUTOMATED COUNT: 14.1 %
EST. GFR  (AFRICAN AMERICAN): >60 ML/MIN/1.73 M^2
EST. GFR  (NON AFRICAN AMERICAN): 59 ML/MIN/1.73 M^2
GLUCOSE SERPL-MCNC: 133 MG/DL
HCT VFR BLD AUTO: 40 %
HGB BLD-MCNC: 13 G/DL
LYMPHOCYTES # BLD AUTO: ABNORMAL K/UL
LYMPHOCYTES NFR BLD: 9 %
MCH RBC QN AUTO: 29.7 PG
MCHC RBC AUTO-ENTMCNC: 32.5 G/DL
MCV RBC AUTO: 92 FL
MONOCYTES # BLD AUTO: ABNORMAL K/UL
MONOCYTES NFR BLD: 8 %
NEUTROPHILS NFR BLD: 79 %
NEUTS BAND NFR BLD MANUAL: 4 %
OVALOCYTES BLD QL SMEAR: ABNORMAL
PLATELET # BLD AUTO: 293 K/UL
PLATELET BLD QL SMEAR: ABNORMAL
PMV BLD AUTO: 11.2 FL
POTASSIUM SERPL-SCNC: 4.3 MMOL/L
RBC # BLD AUTO: 4.37 M/UL
SODIUM SERPL-SCNC: 142 MMOL/L
WBC # BLD AUTO: 14.11 K/UL

## 2018-02-24 PROCEDURE — 36415 COLL VENOUS BLD VENIPUNCTURE: CPT

## 2018-02-24 PROCEDURE — 85007 BL SMEAR W/DIFF WBC COUNT: CPT

## 2018-02-24 PROCEDURE — 85027 COMPLETE CBC AUTOMATED: CPT

## 2018-02-24 PROCEDURE — 94640 AIRWAY INHALATION TREATMENT: CPT

## 2018-02-24 PROCEDURE — 80048 BASIC METABOLIC PNL TOTAL CA: CPT

## 2018-02-24 PROCEDURE — 94761 N-INVAS EAR/PLS OXIMETRY MLT: CPT

## 2018-02-24 PROCEDURE — 25000003 PHARM REV CODE 250: Performed by: STUDENT IN AN ORGANIZED HEALTH CARE EDUCATION/TRAINING PROGRAM

## 2018-02-24 PROCEDURE — 25000003 PHARM REV CODE 250: Performed by: NURSE PRACTITIONER

## 2018-02-24 RX ORDER — IBUPROFEN 800 MG/1
800 TABLET ORAL EVERY 6 HOURS PRN
Qty: 30 TABLET | Refills: 1 | Status: SHIPPED | OUTPATIENT
Start: 2018-02-24 | End: 2018-06-12

## 2018-02-24 RX ORDER — HYDROCODONE BITARTRATE AND ACETAMINOPHEN 5; 325 MG/1; MG/1
1 TABLET ORAL EVERY 6 HOURS PRN
Qty: 35 TABLET | Refills: 0 | Status: SHIPPED | OUTPATIENT
Start: 2018-02-24 | End: 2018-06-12

## 2018-02-24 RX ADMIN — TORSEMIDE 20 MG: 20 TABLET ORAL at 08:02

## 2018-02-24 RX ADMIN — CITALOPRAM HYDROBROMIDE 20 MG: 20 TABLET ORAL at 08:02

## 2018-02-24 RX ADMIN — SIMETHICONE CHEW TAB 80 MG 80 MG: 80 TABLET ORAL at 02:02

## 2018-02-24 RX ADMIN — ATORVASTATIN CALCIUM 20 MG: 20 TABLET, FILM COATED ORAL at 08:02

## 2018-02-24 RX ADMIN — BENAZEPRIL HYDROCHLORIDE 10 MG: 10 TABLET, FILM COATED ORAL at 06:02

## 2018-02-24 RX ADMIN — ALBUTEROL SULFATE 2 PUFF: 90 AEROSOL, METERED RESPIRATORY (INHALATION) at 09:02

## 2018-02-24 NOTE — SUBJECTIVE & OBJECTIVE
Interval History: Patient says she doing well. She denies pain. She has tolerated a diet without complication and is hungry for breakfast this morning.Dnies N/V.   She has not yet ambulated or voided as garcia just removed.  She is passing flatus. She denies any SOB, fever or chills.      Scheduled Meds:   albuterol  2 puff Inhalation Q8H    atorvastatin  20 mg Oral Daily    benazepril  10 mg Oral Daily    citalopram  20 mg Oral Daily    torsemide  20 mg Oral Daily     Continuous Infusions:   sodium chloride 0.9%      dextrose 5 % and 0.45 % NaCl with KCl 20 mEq Stopped (02/23/18 1934)    lactated ringers       PRN Meds:diphenhydrAMINE, hydrocodone-acetaminophen 5-325mg, HYDROmorphone, ibuprofen, ondansetron, simethicone, sodium chloride 0.9%    Review of patient's allergies indicates:  No Known Allergies    Objective:     Vital Signs (Most Recent):  Temp: 99.1 °F (37.3 °C) (02/24/18 0453)  Pulse: 66 (02/24/18 0453)  Resp: 20 (02/24/18 0453)  BP: (!) 158/71 (02/24/18 0453)  SpO2: 96 % (02/24/18 0453) Vital Signs (24h Range):  Temp:  [97.3 °F (36.3 °C)-99.1 °F (37.3 °C)] 99.1 °F (37.3 °C)  Pulse:  [60-75] 66  Resp:  [16-20] 20  SpO2:  [91 %-97 %] 96 %  BP: (121-186)/(58-84) 158/71     Weight: 77 kg (169 lb 12.1 oz)  Body mass index is 31.05 kg/m².  No LMP recorded. Patient has had a hysterectomy.    I&O (Last 24H):    Intake/Output Summary (Last 24 hours) at 02/24/18 0641  Last data filed at 02/24/18 0600   Gross per 24 hour   Intake             1600 ml   Output             2175 ml   Net             -575 ml       Physical Exam:   Constitutional: She is oriented to person, place, and time. She appears well-developed and well-nourished. No distress.    HENT:   Head: Normocephalic and atraumatic.    Eyes: Conjunctivae are normal.    Neck: Neck supple.    Cardiovascular: Normal rate, regular rhythm and intact distal pulses.     Pulmonary/Chest: Effort normal and breath sounds normal. No respiratory distress. She  has no wheezes. She has no rales.   Nasal Cannulae on at bedside.         Abdominal: Bowel sounds are normal. She exhibits abdominal incision (C,D,I No signs of infection or inflammation). She exhibits no distension. There is tenderness (Mild TTP, Appropriate for Post-Op Period). There is no rebound and no guarding.     Genitourinary: Vagina normal.   Genitourinary Comments: Norris removed..            Musculoskeletal:   TEDs and SCDs in place.       Neurological: She is alert and oriented to person, place, and time.    Skin: Skin is warm and dry. She is not diaphoretic.    Psychiatric: She has a normal mood and affect. Her behavior is normal. Judgment and thought content normal.

## 2018-02-24 NOTE — ASSESSMENT & PLAN NOTE
- Patient doing well.  -  meeting Post-Operative Goals  - I.S.  - Encourage ambulation  - IVF discontinued  - Advance diet to regular  - Routine Post-Op Pain Management  - TEDs and SCDs when not ambulating    - Plan to discharge home later today

## 2018-02-24 NOTE — PLAN OF CARE
Problem: Patient Care Overview  Goal: Plan of Care Review  Outcome: Outcome(s) achieved Date Met: 02/24/18  Patient remains free from injury or falls. Vital signs stable throughout night on room air- NC. Positions self independently. Pain managed with PO medication x1. Norris to gravity with good output. Passing flatus. Tolerating diet with no N/V. Daughter at bedside. Pt eager for d/c. Bed in low locked position and call light within reach. Will continue to monitor.

## 2018-02-24 NOTE — DISCHARGE SUMMARY
[facility]  Obstetrics & Gynecology  Discharge Summary    Patient Name: Anahi Cook  MRN: [id number]  Admission Date: 2018  Hospital Length of Stay: 0 days  Discharge Date and Time:  2018 6:44 AM  Attending Physician: Reena Chairez MD   Discharging Provider: Ruthy Lu MD  Primary Care Provider: Pepito Theodore MD    HPI:  This is an 82 yo female who presents today for a scheduled RA-BSO.     She originally presented as a referral from Dr. Tamara Machado for pelvic mass. According to the daughter adnexal mass has been present since . On recent follow up there was an interval increase in the size of the mass. Imaging reviewed.    Pelvic US 18    1.  Interval increase in size in the left adnexal mass.  This increase in size is more concerning for a malignant process in the previous stable exams. Today it measures 4.7 cm x 5.7 cm x 4.1 cm.      normal 17.      She also has a palpable L breast mass which was biopsied on 17 showing invasive ductal carcinoma.      Prior abdominal surgeries include TVH (ovaries in situ), cholecystectomy. Medical history significant for HTN, COPD and now breast cancer.      Family history significant for maternal GM with breast cancer, maternal aunt with breast cancer, and sister with breast cancer. No history of ovarian, uterine, or colon cancer. No genetic testing that she is aware of.       x 3.     Hospital Course:  2018 - Scheduled RA-BSO. Uncomplicated case.  2018 - Patient meeting post op milestones. Doing well. Will d/c home later today.     Procedure(s) (LRB):  XI ROBOT ASSISTED LAPAROSCOPIC SALPINGO-OOPHERECTOMY (Bilateral)         Significant Diagnostic Studies: Labs:   CBC   Recent Labs  Lab 18  0510   WBC 14.11*   HGB 13.0   HCT 40.0          Pending Diagnostic Studies:     Procedure Component Value Units Date/Time    Basic metabolic panel [[id number]] Collected:  18 0510    Order  Status:  Sent Lab Status:  In process Updated:  02/24/18 0617    Specimen:  Blood from Blood         Final Active Diagnoses:    Diagnosis Date Noted POA    PRINCIPAL PROBLEM:  S/P robotic assisted laparoscopic BSO (bilateral salpingo-oophorectomy) [Z90.722] 02/23/2018 Not Applicable    Hormone receptor positive breast cancer, left [C50.912] 01/25/2018 Yes    COPD exacerbation [J44.1] 01/17/2016 Yes      Problems Resolved During this Admission:    Diagnosis Date Noted Date Resolved POA        Discharged Condition: good    Disposition: Home or Self Care    Follow Up:  Follow-up Information     Reena Chairez MD. Schedule an appointment as soon as possible for a visit in 2 weeks.    Specialty:  Gynecologic Oncology  Why:  For wound re-check and post op follow up  Contact information:  [facility]  [facility] [address]  [phone]                 Patient Instructions:     Diet general     Diet Adult Regular     Activity as tolerated     Shower on day dressing removed (No bath)     Lifting restrictions     Call MD for:  temperature >100.4     Call MD for:  persistent nausea and vomiting     Call MD for:  severe uncontrolled pain     Call MD for:  difficulty breathing, headache or visual disturbances     Call MD for:  redness, tenderness, or signs of infection (pain, swelling, redness, odor or green/yellow discharge around incision site)     Call MD for:  persistent dizziness or light-headedness     Call MD for:  hives     Call MD for:  extreme fatigue     No dressing needed     Activity as tolerated     Other restrictions (specify):   Order Comments: Pelvic rest for 6 weeks     Notify your health care provider if you experience any of the following:  redness, tenderness, or signs of infection (pain, swelling, redness, odor or green/yellow discharge around incision site)     Notify your health care provider if you experience any of the following:  severe uncontrolled pain     Notify your health care provider  if you experience any of the following:  persistent nausea and vomiting or diarrhea     Notify your health care provider if you experience any of the following:  temperature >100.4       Medications:  Reconciled Home Medications:   Current Discharge Medication List      START taking these medications    Details   hydrocodone-acetaminophen 5-325mg (NORCO) 5-325 mg per tablet Take 1 tablet by mouth every 6 (six) hours as needed for Pain.  Qty: 35 tablet, Refills: 0      ibuprofen (ADVIL,MOTRIN) 800 MG tablet Take 1 tablet (800 mg total) by mouth every 6 (six) hours as needed for Pain.  Qty: 30 tablet, Refills: 1         CONTINUE these medications which have NOT CHANGED    Details   acetaminophen (TYLENOL) 325 MG tablet Take 325 mg by mouth every 6 (six) hours as needed for Pain.      albuterol (PROAIR HFA) 90 mcg/actuation inhaler Inhale 1-2 puffs into the lungs every 6 (six) hours as needed for Wheezing or Shortness of Breath.      albuterol 2.5 mg /3 mL (0.083 %) Nebu 3 mL, albuterol 5 mg/mL Nebu 0.5 mL Use one vial in nebulizer three times daily as needed for shortness of breath or wheezing      atorvastatin (LIPITOR) 20 MG tablet Take 20 mg by mouth once daily.      benazepril (LOTENSIN) 10 MG tablet Take 10 mg by mouth once daily.      calcium-vitamin D 250-100 mg-unit per tablet Take 1 tablet by mouth 2 (two) times daily.      felodipine (PLENDIL) 10 MG 24 hr tablet Take 10 mg by mouth once daily.      fluticasone-vilanterol (BREO) 100-25 mcg/dose diskus inhaler Inhale 1 puff into the lungs once daily.  Qty: 1 each, Refills: 3      INCRUSE ELLIPTA 62.5 mcg/actuation DsDv USE 1 PUFF QD AT SAME TIME  (patient takes at night)  Refills: 2      letrozole (FEMARA) 2.5 mg Tab Take 1 tablet (2.5 mg total) by mouth once daily.  Qty: 90 tablet, Refills: 1    Associated Diagnoses: Hormone receptor positive breast cancer, left      loratadine (CLARITIN) 10 mg tablet Take 10 mg by mouth once daily.      multivitamin  (THERAGRAN) per tablet Take 1 tablet by mouth once daily.      torsemide (DEMADEX) 20 MG Tab Take 20 mg by mouth once daily.      citalopram (CELEXA) 20 MG tablet Take 20 mg by mouth once daily.      predniSONE (DELTASONE) 20 MG tablet Take 20 mg by mouth once daily.              Ruthy Lu MD  Obstetrics & Gynecology  [facility]

## 2018-02-24 NOTE — PLAN OF CARE
Problem: Patient Care Overview  Goal: Plan of Care Review  Outcome: Ongoing (interventions implemented as appropriate)  Patient in no apparent distress. Sat's 96 % on 2 lpm. IS done. MDI given with spacer Q 8 . Will continue to monitor.

## 2018-02-24 NOTE — PROGRESS NOTES
[facility]  Obstetrics & Gynecology  Progress Note    Patient Name: Anahi Cook  MRN: [id number]  Admission Date: 2018  Primary Care Provider: Pepito Theodore MD  Principal Problem: S/P BSO (status post bilateral salpingo-oophorectomy)    Subjective:     HPI:  This is an 82 yo female who presents today for a scheduled RA-BSO.     She originally presented as a referral from Dr. Tamara Machado for pelvic mass. According to the daughter adnexal mass has been present since . On recent follow up there was an interval increase in the size of the mass. Imaging reviewed.    Pelvic US 18    1.  Interval increase in size in the left adnexal mass.  This increase in size is more concerning for a malignant process in the previous stable exams. Today it measures 4.7 cm x 5.7 cm x 4.1 cm.      normal 17.      She also has a palpable L breast mass which was biopsied on 17 showing invasive ductal carcinoma.      Prior abdominal surgeries include TVH (ovaries in situ), cholecystectomy. Medical history significant for HTN, COPD and now breast cancer.      Family history significant for maternal GM with breast cancer, maternal aunt with breast cancer, and sister with breast cancer. No history of ovarian, uterine, or colon cancer. No genetic testing that she is aware of.       x 3.     Interval History: Patient says she doing well. She denies pain. She has tolerated a diet without complication and is hungry for breakfast this morning.Dnies N/V.   She has not yet ambulated or voided as garcia just removed.  She is passing flatus. She denies any SOB, fever or chills.      Scheduled Meds:   albuterol  2 puff Inhalation Q8H    atorvastatin  20 mg Oral Daily    benazepril  10 mg Oral Daily    citalopram  20 mg Oral Daily    torsemide  20 mg Oral Daily     Continuous Infusions:   sodium chloride 0.9%      dextrose 5 % and 0.45 % NaCl with KCl 20 mEq Stopped (18)    lactated  ringers       PRN Meds:diphenhydrAMINE, hydrocodone-acetaminophen 5-325mg, HYDROmorphone, ibuprofen, ondansetron, simethicone, sodium chloride 0.9%    Review of patient's allergies indicates:  No Known Allergies    Objective:     Vital Signs (Most Recent):  Temp: 99.1 °F (37.3 °C) (02/24/18 0453)  Pulse: 66 (02/24/18 0453)  Resp: 20 (02/24/18 0453)  BP: (!) 158/71 (02/24/18 0453)  SpO2: 96 % (02/24/18 0453) Vital Signs (24h Range):  Temp:  [97.3 °F (36.3 °C)-99.1 °F (37.3 °C)] 99.1 °F (37.3 °C)  Pulse:  [60-75] 66  Resp:  [16-20] 20  SpO2:  [91 %-97 %] 96 %  BP: (121-186)/(58-84) 158/71     Weight: 77 kg (169 lb 12.1 oz)  Body mass index is 31.05 kg/m².  No LMP recorded. Patient has had a hysterectomy.    I&O (Last 24H):    Intake/Output Summary (Last 24 hours) at 02/24/18 0641  Last data filed at 02/24/18 0600   Gross per 24 hour   Intake             1600 ml   Output             2175 ml   Net             -575 ml       Physical Exam:   Constitutional: She is oriented to person, place, and time. She appears well-developed and well-nourished. No distress.    HENT:   Head: Normocephalic and atraumatic.    Eyes: Conjunctivae are normal.    Neck: Neck supple.    Cardiovascular: Normal rate, regular rhythm and intact distal pulses.     Pulmonary/Chest: Effort normal and breath sounds normal. No respiratory distress. She has no wheezes. She has no rales.   Nasal Cannulae on at bedside.         Abdominal: Bowel sounds are normal. She exhibits abdominal incision (C,D,I No signs of infection or inflammation). She exhibits no distension. There is tenderness (Mild TTP, Appropriate for Post-Op Period). There is no rebound and no guarding.     Genitourinary: Vagina normal.   Genitourinary Comments: Norris removed..            Musculoskeletal:   TEDs and SCDs in place.       Neurological: She is alert and oriented to person, place, and time.    Skin: Skin is warm and dry. She is not diaphoretic.    Psychiatric: She has a normal  mood and affect. Her behavior is normal. Judgment and thought content normal.       Assessment/Plan:     * S/P robotic assisted laparoscopic BSO (bilateral salpingo-oophorectomy)    - Patient doing well.  -  meeting Post-Operative Goals  - I.S.  - Encourage ambulation  - IVF discontinued  - Advance diet to regular  - Routine Post-Op Pain Management  - TEDs and SCDs when not ambulating    - Plan to discharge home later today        COPD exacerbation    - Proventil ROJELION             Ruthy Lu MD  Obstetrics & Gynecology  [facility]

## 2018-02-28 ENCOUNTER — TELEPHONE (OUTPATIENT)
Dept: GYNECOLOGIC ONCOLOGY | Facility: CLINIC | Age: 83
End: 2018-02-28

## 2018-02-28 NOTE — TELEPHONE ENCOUNTER
Called to check on patient after surgery and review benign pathology. No answer. Left voicemail. Will reach out to her daughter as well.

## 2018-03-01 ENCOUNTER — OFFICE VISIT (OUTPATIENT)
Dept: SPINE | Facility: CLINIC | Age: 83
End: 2018-03-01
Payer: MEDICARE

## 2018-03-01 VITALS
HEART RATE: 58 BPM | WEIGHT: 166 LBS | SYSTOLIC BLOOD PRESSURE: 180 MMHG | HEIGHT: 62 IN | DIASTOLIC BLOOD PRESSURE: 79 MMHG | BODY MASS INDEX: 30.55 KG/M2

## 2018-03-01 DIAGNOSIS — M51.36 DDD (DEGENERATIVE DISC DISEASE), LUMBAR: ICD-10-CM

## 2018-03-01 DIAGNOSIS — M54.17 LUMBOSACRAL RADICULOPATHY: Primary | ICD-10-CM

## 2018-03-01 DIAGNOSIS — M70.62 TROCHANTERIC BURSITIS OF LEFT HIP: ICD-10-CM

## 2018-03-01 PROCEDURE — 3078F DIAST BP <80 MM HG: CPT | Mod: S$GLB,,, | Performed by: NURSE PRACTITIONER

## 2018-03-01 PROCEDURE — 99213 OFFICE O/P EST LOW 20 MIN: CPT | Mod: S$GLB,,, | Performed by: NURSE PRACTITIONER

## 2018-03-01 PROCEDURE — 99999 PR PBB SHADOW E&M-EST. PATIENT-LVL III: CPT | Mod: PBBFAC,,, | Performed by: NURSE PRACTITIONER

## 2018-03-01 PROCEDURE — 3077F SYST BP >= 140 MM HG: CPT | Mod: S$GLB,,, | Performed by: NURSE PRACTITIONER

## 2018-03-01 RX ORDER — IPRATROPIUM BROMIDE AND ALBUTEROL SULFATE 2.5; .5 MG/3ML; MG/3ML
3 SOLUTION RESPIRATORY (INHALATION)
COMMUNITY
Start: 2018-02-06 | End: 2023-01-26

## 2018-03-01 RX ORDER — OMEPRAZOLE 20 MG/1
20 CAPSULE, DELAYED RELEASE ORAL DAILY
COMMUNITY
Start: 2018-02-06 | End: 2019-10-08

## 2018-03-01 NOTE — PROGRESS NOTES
Chronic patient Established Note (Follow up visit)      SUBJECTIVE:    Anahi Cook presents to the clinic for a follow-up appointment for lower back and left lower extremity pain.  She is s/p L4-5 IL ASHUTOSH on 2/15/18 with 100% pain relief.  She is no longer having significant back and leg pain.  Since her last visit, she did undergo laparoscopic hysterectomy with Dr. Reena Chairez on 2/23/18.  She reports recovering well from this.  She also had a breast biopsy on 1/11/18 which showed metastatic adenocarcinoma.  She is scheduled to follow up with oncology next week to discuss plan of care.  Since the last visit, Anahi Cook states the pain has been improving. Current pain intensity is 0/10.    Pain Medications:  OTC Tylenol    Opioid Contract: no     report:  Not applicable    Pain Procedures:   7/21/14 L4-5 IL ASHUTOSH- significant benefit  12/1/15 Left GT bursa injection  4/26/16 Left GT bursa injection  12/15/16 L4-5 IL ASHUTOSH- significant benefit  2/15/18 L4-5 IL ASHUTOSH- 100% relief    Physical Therapy/Home Exercise: yes    Imaging:     Lumbar MRI 5/21/14    Narrative     Comparison: None    Findings:  L5-S1: There is no disk disease.  There is a 8mm extra canal synovial cyst emanating from the right facet joint.  There is mild facet arthropathy.  There is no central canal or neural foraminal narrowing  L4-L5: There is grade 1 anterolisthesis of L4 on L5 with uncovering of the disk.  There is a diffuse posterior disk bulge.  There is severe ligamentous thickening and moderate facet arthropathy generating moderate severe central canal stenosis.  There is   moderate right neural foraminal stenosis and severe left neural foraminal stenosis.  L3-L4: There is a posterior disk bulge with super imposed right foraminal broad-based protrusion.  There is mild to moderate ligamentous thickening and mild facet arthropathy.  There is mild narrowing of the right lateral recess.  There is mild left   neural foraminal narrowing and  severe right neural foraminal narrowing due to the foraminal protrusion.  L2-L3: There is a diffuse disk bulge present.  There is minimal ligamentous thickening and mild facet arthropathy generating at most mild central canal stenosis.  There is an extraforaminal bulge at this level as well leading to moderate right neural   foraminal stenosis and mild left neural foraminal stenosis.  L1-L2: No significant disease.    There is no marrow replacement process, infection, tumor present.  Limited evaluation of intra-abdominal structures unremarkable.  No abnormal masses or fluid collections are present.   Impression       Multilevel degenerative changes most prominent at L4-L5 where there is moderate to severe central canal stenosis and severe left foraminal stenosis.  Severe right foraminal stenosis is present at L3-L4 as well.         Past Medical History:  Past Medical History:   Diagnosis Date    Anxiety     Back pain     Breast cancer 1/17/2018    Breast mass 1/15/2018    Chronic kidney disease, stage 2, mildly decreased GFR     COPD (chronic obstructive pulmonary disease)     emphysema    Depression     Dysuria 1/29/2018    Family history of breast cancer 1/17/2018    Hypertension     Osteoporosis, senile     Ovarian mass 1/15/2018    Pneumonia     S/P robotic assisted laparoscopic BSO (bilateral salpingo-oophorectomy) 2/23/2018       Past Surgical History:  Past Surgical History:   Procedure Laterality Date    CHOLECYSTECTOMY      EYE SURGERY      HYSTERECTOMY      ROTATOR CUFF REPAIR Right     rotator cuff repair right shoulder    TONSILLECTOMY         Family History:  Family History   Problem Relation Age of Onset    Heart failure Mother     Kidney failure Mother     Heart attack Father     Breast cancer Sister 66     Lump, XRT, chemo, recurrence 10 years later, still alive.    Cancer Brother      leukemia    Heart attack Brother 58    Pulmonary embolism Brother 45    Heart attack  Brother 52    Breast cancer Maternal Grandmother 60     advanced at diagnosis    Breast cancer Maternal Aunt 83      at 92       Social History:  Social History     Social History    Marital status: Single     Spouse name: N/A    Number of children: 3    Years of education: N/A     Occupational History    Multiple jobs, see social documentation section      Retired     Social History Main Topics    Smoking status: Former Smoker     Packs/day: 1.00     Years: 40.00     Types: Cigarettes    Smokeless tobacco: Never Used      Comment: Smoked >1 ppd for at least 40 years, quit around     Alcohol use Yes      Comment: occasional glass of wine or cocktail    Drug use: No    Sexual activity: Yes     Partners: Male     Other Topics Concern    Not on file     Social History Narrative    Worked many jobs while raising 3 children.  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  She was  from her first , but took care of him at the end of his life.       REVIEW OF SYSTEMS:    GENERAL:  No weight loss, malaise or fevers.  HEENT:   No recent changes in vision or hearing  NECK:  Negative for lumps, no difficulty with swallowing.  RESPIRATORY:  Negative for cough, wheezing or shortness of breath, patient denies any recent URI. COPD.  CARDIOVASCULAR:  Negative for chest pain, leg swelling or palpitations. Hypertension.  GI:  Negative for abdominal discomfort, blood in stools or black stools or change in bowel habits.  MUSCULOSKELETAL:  See HPI.  SKIN:  Negative for lesions, rash, and itching.  PSYCH:  No mood disorder or recent psychosocial stressors.  Patients sleep is not disturbed secondary to pain.  HEMATOLOGY/LYMPHOLOGY:  Negative for prolonged bleeding, bruising easily or swollen nodes.  Patient is not currently taking any anti-coagulants  NEURO:   No history of headaches, syncope, paralysis,  "seizures or tremors.  All other reviewed and negative other than HPI.    OBJECTIVE:    BP (!) 180/79   Pulse (!) 58   Ht 5' 2" (1.575 m)   Wt 75.3 kg (166 lb)   BMI 30.36 kg/m²     PHYSICAL EXAMINATION:    GENERAL: Well appearing, in no acute distress, alert and oriented x3.  PSYCH:  Mood and affect appropriate.  SKIN: Skin color, texture, turgor normal, no rashes or lesions.  HEAD/FACE:  Normocephalic, atraumatic. Cranial nerves grossly intact.  CV: RRR with palpation of the radial artery.  PULM: No evidence of respiratory difficulty, symmetric chest rise.  GI:  Soft and non-tender.  BACK: Straight leg raising in the sitting and supine positions is negative to radicular pain. No pain to palpation over the facet joints of the lumbar spine or spinous processes. Full ROM without pain. Mild facet loading bilaterally.  EXTREMITIES: Peripheral joint ROM is full and pain free without obvious instability or laxity in all four extremities. No deformities, edema, or skin discoloration. Good capillary refill.  MUSCULOSKELETAL: Provocative maneuvers of hips do not cause pain. There is no pain with palpation over the sacroiliac joints bilaterally.  FABERs test is negative.  Bilateral upper and lower extremity strength is normal and symmetric.  No atrophy or tone abnormalities are noted.  NEURO: Bilateral upper and lower extremity coordination and muscle stretch reflexes are physiologic and symmetric.  Plantar response are downgoing. No clonus.  No loss of sensation is noted.  GAIT: Normal.      ASSESSMENT: 83 y.o. year old female with lower back and left lower extremity pain, consistent with the following diagnoses:     1. Lumbosacral radiculopathy     2. DDD (degenerative disc disease), lumbar     3. Trochanteric bursitis of left hip           PLAN:     - Previous imaging was reviewed and discussed with the patient today.    - She is s/p L4-5 IL ASHUTOSH with significant benefit.  Can repeat as needed in the future.    - She is " scheduled to follow up with oncology next week regarding metastatic adenocarcinoma.    - The patient will continue a home exercise routine to help with pain and strengthening.      - Dr. Wilcox was consulted on the patient and agrees with this plan.    - RTC 3 months or sooner if needed.      The above plan and management options were discussed at length with patient. Patient is in agreement with the above and verbalized understanding.    Sofia Schultz  03/01/2018

## 2018-03-07 ENCOUNTER — OFFICE VISIT (OUTPATIENT)
Dept: GYNECOLOGIC ONCOLOGY | Facility: CLINIC | Age: 83
End: 2018-03-07
Payer: MEDICARE

## 2018-03-07 VITALS
HEIGHT: 62 IN | WEIGHT: 168.44 LBS | HEART RATE: 64 BPM | BODY MASS INDEX: 31 KG/M2 | SYSTOLIC BLOOD PRESSURE: 156 MMHG | DIASTOLIC BLOOD PRESSURE: 75 MMHG

## 2018-03-07 DIAGNOSIS — Z90.722 S/P BSO (STATUS POST BILATERAL SALPINGO-OOPHORECTOMY): Primary | ICD-10-CM

## 2018-03-07 DIAGNOSIS — C50.412 MALIGNANT NEOPLASM OF UPPER-OUTER QUADRANT OF LEFT FEMALE BREAST, UNSPECIFIED ESTROGEN RECEPTOR STATUS: ICD-10-CM

## 2018-03-07 DIAGNOSIS — N83.8 OVARIAN MASS: ICD-10-CM

## 2018-03-07 PROCEDURE — 99999 PR PBB SHADOW E&M-EST. PATIENT-LVL III: CPT | Mod: PBBFAC,,, | Performed by: OBSTETRICS & GYNECOLOGY

## 2018-03-07 PROCEDURE — 99024 POSTOP FOLLOW-UP VISIT: CPT | Mod: S$GLB,,, | Performed by: OBSTETRICS & GYNECOLOGY

## 2018-03-07 NOTE — LETTER
March 9, 2018        Tamara Machado MD  [address] [facility]  [place] [address]             [facility]  [address], [address]  [facility] [address]  Phone: [phone]  Fax: [phone]   Patient: Anahi Cook   MR Number: [id number]   YOB: 1934   Date of Visit: 3/7/2018       Dear Dr. Machado:    Thank you for referring Anahi Cook to me for evaluation. Attached you will find relevant portions of my assessment and plan of care.    If you have questions, please do not hesitate to call me. I look forward to following Anahi Cook along with you.    Sincerely,      Reena Chairez MD            CC  No Recipients    Enclosure

## 2018-03-08 ENCOUNTER — OFFICE VISIT (OUTPATIENT)
Dept: HEMATOLOGY/ONCOLOGY | Facility: CLINIC | Age: 83
End: 2018-03-08
Payer: MEDICARE

## 2018-03-08 VITALS
WEIGHT: 165.38 LBS | DIASTOLIC BLOOD PRESSURE: 90 MMHG | TEMPERATURE: 97 F | HEART RATE: 68 BPM | BODY MASS INDEX: 30.24 KG/M2 | RESPIRATION RATE: 17 BRPM | SYSTOLIC BLOOD PRESSURE: 199 MMHG

## 2018-03-08 DIAGNOSIS — C50.912 HORMONE RECEPTOR POSITIVE BREAST CANCER, LEFT: Primary | ICD-10-CM

## 2018-03-08 DIAGNOSIS — Z79.811 USE OF AROMATASE INHIBITORS: ICD-10-CM

## 2018-03-08 PROCEDURE — 99214 OFFICE O/P EST MOD 30 MIN: CPT | Mod: 25,S$GLB,, | Performed by: INTERNAL MEDICINE

## 2018-03-08 PROCEDURE — 3080F DIAST BP >= 90 MM HG: CPT | Mod: S$GLB,,, | Performed by: INTERNAL MEDICINE

## 2018-03-08 PROCEDURE — 3077F SYST BP >= 140 MM HG: CPT | Mod: S$GLB,,, | Performed by: INTERNAL MEDICINE

## 2018-03-08 PROCEDURE — 99999 PR PBB SHADOW E&M-EST. PATIENT-LVL III: CPT | Mod: PBBFAC,,, | Performed by: INTERNAL MEDICINE

## 2018-03-08 NOTE — PROGRESS NOTES
Subjective:       Patient ID: Anahi Cook is a 83 y.o. female.    Chief Complaint: No chief complaint on file.    HPI   Mrs. Cook returns today for follow up.  On 2/23/2018 she underwent a laparoscopic BSO.  The ovarian mass was benign.   Briefly, she is an 83-year-old female who recently felt a mass in her left breast.  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    She was started on letrozole and the plan was for her to undergo a BSO by Dr. Chairez, which she did.  Results as above.      Review of Systems    Overall she feels well.   She again admits to chronic back pain from DJD for which she is receiving treatment at a pain management Clinic.  She denies any anxiety, depression, easy bruising, fevers, chills, night  sweats, weight loss, nausea, vomiting, diarrhea, constipation, diplopia, blurred vision, headache, chest pain, palpitations, shortness of breath, breast pain, abdominal pain, extremity pain, or difficulty ambulating.  However,  The remainder of the ten-point ROS, including general, skin, lymph, H/N, cardiorespiratory, GI, , Neuro, Endocrine, and psychiatric is negative.       Objective:      Physical Exam      She is alert, oriented to time, place, person, pleasant, well      nourished, in no acute physical distress.   She is accompanied by her daughter and her grand daughter.                                 VITAL SIGNS:  Reviewed                                      HEENT:  Normal.  There are no nasal, oral, lip, gingival, auricular, lid,    or conjunctival lesions.  Mucosae are moist and pink, and there is no        thrush.  Pupils are equal, reactive to light and accommodation.              Extraocular muscle movements are intact.   Dentition is fair.  Maxillary teeth are missing.  There is no frontal or maxillary tenderness.                                      NECK:  Supple without JVD, adenopathy, or thyromegaly.                       LUNGS:  Clear to auscultation without wheezing, rales, or rhonchi.           CARDIOVASCULAR:  Reveals an S1, S2, no murmurs, no rubs, no gallops.         ABDOMEN:  Soft, nontender, without organomegaly.  Bowel sounds are    present.    Scars from her laparoscopic DONOVAN BSO are seen.  They are healing nicely.                                                            EXTREMITIES:  No cyanosis, clubbing, or edema.                               BREASTS: There is a 4 mass at the 12 o' clock position in the left breast.  It is not attached to the underlying chest wall or the overlying skin.  She no longer has any left palpable axillary adenopathy.  There are no masses in her right breast.    Both breasts are large and pendulous.   A sentinel node biopsy scar is seen in the left axilla.  It is also well  healed.                                     LYMPHATIC:  There is no cervical, axillary, or supraclavicular adenopathy.   SKIN:  Warm and moist, without petechiae, rashes, induration, or ecchymoses.           NEUROLOGIC:  DTRs are 0-1+ bilaterally, symmetrical, motor function is 5/5,  and cranial nerves are  within normal limits.      Assessment:       1. Hormone receptor positive breast cancer, left    2. Use of aromatase inhibitors        Plan:        I had a long discussion with her and her two relatives.  She will remain on letrozole, and I will reevaluate her in 3 months.   I have explained to them that typically we continue the letrozole for 6 months in the neoadjuvant setting..  Her multiple questions were answered to her satisfaction.

## 2018-03-09 NOTE — PROGRESS NOTES
Subjective:      Patient ID: Anahi Cook is a 83 y.o. female.    Chief Complaint: Post-op Evaluation (RA BSO )      HPI  S/p RABSO 18. Uncomplicated post op course. Final pathology reviewed and benign ovarian cystadenoma. She presents today for first post operative visit. Recovering well from surgery. Up and about, eating, +BM.      History:  Patient is an 84yo female who originally presented as a referral from Dr. Tamara Machado for pelvic mass. According to the daughter adnexal mass has been present since . On recent follow up there was an interval increase in the size of the mass. Imaging reviewed.    Pelvic US 18  1.  Interval increase in size in the left adnexal mass.  This increase in size is more concerning for a malignant process in the previous stable exams. Today it measures 4.7 cm x 5.7 cm x 4.1 cm.      normal 17.      She also has a palpable L breast mass which was biopsied on 17 showing invasive ductal carcinoma.      Prior abdominal surgeries include TVH (ovaries in situ), cholecystectomy. Medical history significant for HTN, COPD and now breast cancer.      Family history significant for maternal GM with breast cancer, maternal aunt with breast cancer, and sister with breast cancer. No history of ovarian, uterine, or colon cancer. No genetic testing that she is aware of.       x 3.     She has seen Gaby Caballero and Marquise regarding her new breast cancer. I have communicated with them and she has been started on neoadjuvant letrozole. This will allow for us to remove pelvic mass.      Review of Systems   Constitutional: Negative for appetite change, chills, fatigue and fever.   HENT: Negative for mouth sores.    Respiratory: Negative for cough and shortness of breath.    Cardiovascular: Negative for leg swelling.   Gastrointestinal: Negative for abdominal pain, blood in stool, constipation and diarrhea.   Endocrine: Negative for cold intolerance.   Genitourinary:  Negative for dysuria and vaginal bleeding.   Musculoskeletal: Negative for myalgias.   Skin: Negative for rash.   Allergic/Immunologic: Negative.    Neurological: Negative for weakness and numbness.   Hematological: Negative for adenopathy. Does not bruise/bleed easily.   Psychiatric/Behavioral: Negative for confusion.       Objective:   Physical Exam:   Constitutional: She is oriented to person, place, and time. She appears well-developed and well-nourished.    HENT:   Head: Normocephalic and atraumatic.    Eyes: EOM are normal. Pupils are equal, round, and reactive to light.    Neck: Normal range of motion. Neck supple. No thyromegaly present.    Cardiovascular: Normal rate, regular rhythm and intact distal pulses.     Pulmonary/Chest: Effort normal and breath sounds normal. No respiratory distress. She has no wheezes.        Abdominal: Soft. Bowel sounds are normal. She exhibits abdominal incision. She exhibits no distension, no ascites and no mass. There is no tenderness.   Incision sites healing well.              Musculoskeletal: Normal range of motion and moves all extremeties.      Lymphadenopathy:     She has no cervical adenopathy.        Right: No supraclavicular adenopathy present.        Left: No supraclavicular adenopathy present.    Neurological: She is alert and oriented to person, place, and time.    Skin: Skin is warm and dry. No rash noted.    Psychiatric: She has a normal mood and affect.       Assessment:     1. S/P robotic assisted laparoscopic BSO (bilateral salpingo-oophorectomy)    2. Ovarian mass    3. Malignant neoplasm of upper-outer quadrant of left female breast, unspecified estrogen receptor status      - normal post op exam, recovering well from surgery  - benign pathology    Plan:   No orders of the defined types were placed in this encounter.    Will plan to return care to Dr. Machado. Benign final pathology. May RTC with me as needed.

## 2018-03-12 ENCOUNTER — DOCUMENTATION ONLY (OUTPATIENT)
Dept: SURGERY | Facility: CLINIC | Age: 83
End: 2018-03-12

## 2018-03-12 NOTE — PROGRESS NOTES
Results received from [facility], negative [facility] with [facility], patient was phoned and results discussed

## 2018-03-20 ENCOUNTER — PATIENT MESSAGE (OUTPATIENT)
Dept: SURGERY | Facility: CLINIC | Age: 83
End: 2018-03-20

## 2018-05-02 ENCOUNTER — HOSPITAL ENCOUNTER (EMERGENCY)
Facility: OTHER | Age: 83
Discharge: HOME | End: 2018-05-02
Attending: EMERGENCY MEDICINE
Payer: MEDICARE

## 2018-05-02 VITALS
TEMPERATURE: 99 F | BODY MASS INDEX: 30 KG/M2 | RESPIRATION RATE: 16 BRPM | SYSTOLIC BLOOD PRESSURE: 194 MMHG | WEIGHT: 163 LBS | DIASTOLIC BLOOD PRESSURE: 88 MMHG | HEIGHT: 62 IN | OXYGEN SATURATION: 95 % | HEART RATE: 61 BPM

## 2018-05-02 DIAGNOSIS — S22.000A THORACIC COMPRESSION FRACTURE, CLOSED, INITIAL ENCOUNTER: ICD-10-CM

## 2018-05-02 DIAGNOSIS — S39.92XA BACK INJURY: ICD-10-CM

## 2018-05-02 DIAGNOSIS — S62.025A CLOSED NONDISPLACED FRACTURE OF MIDDLE THIRD OF SCAPHOID BONE OF LEFT WRIST, INITIAL ENCOUNTER: Primary | ICD-10-CM

## 2018-05-02 DIAGNOSIS — R42 DIZZINESS: ICD-10-CM

## 2018-05-02 DIAGNOSIS — S69.90XA WRIST INJURY: ICD-10-CM

## 2018-05-02 LAB
ALBUMIN SERPL BCP-MCNC: 3.4 G/DL
ALP SERPL-CCNC: 52 U/L
ALT SERPL W/O P-5'-P-CCNC: 22 U/L
ANION GAP SERPL CALC-SCNC: 9 MMOL/L
AST SERPL-CCNC: 24 U/L
BASOPHILS # BLD AUTO: 0.04 K/UL
BASOPHILS NFR BLD: 0.3 %
BILIRUB SERPL-MCNC: 0.7 MG/DL
BILIRUB UR QL STRIP: NEGATIVE
BUN SERPL-MCNC: 16 MG/DL
CALCIUM SERPL-MCNC: 9.3 MG/DL
CHLORIDE SERPL-SCNC: 103 MMOL/L
CLARITY UR: CLEAR
CO2 SERPL-SCNC: 30 MMOL/L
COLOR UR: YELLOW
CREAT SERPL-MCNC: 1.1 MG/DL
DIFFERENTIAL METHOD: ABNORMAL
EOSINOPHIL # BLD AUTO: 0.1 K/UL
EOSINOPHIL NFR BLD: 0.5 %
ERYTHROCYTE [DISTWIDTH] IN BLOOD BY AUTOMATED COUNT: 14.4 %
EST. GFR  (AFRICAN AMERICAN): 54 ML/MIN/1.73 M^2
EST. GFR  (NON AFRICAN AMERICAN): 47 ML/MIN/1.73 M^2
GLUCOSE SERPL-MCNC: 114 MG/DL
GLUCOSE UR QL STRIP: NEGATIVE
HCT VFR BLD AUTO: 40.8 %
HGB BLD-MCNC: 13.7 G/DL
HGB UR QL STRIP: NEGATIVE
KETONES UR QL STRIP: NEGATIVE
LEUKOCYTE ESTERASE UR QL STRIP: NEGATIVE
LYMPHOCYTES # BLD AUTO: 1.5 K/UL
LYMPHOCYTES NFR BLD: 11.6 %
MCH RBC QN AUTO: 30.2 PG
MCHC RBC AUTO-ENTMCNC: 33.6 G/DL
MCV RBC AUTO: 90 FL
MONOCYTES # BLD AUTO: 0.6 K/UL
MONOCYTES NFR BLD: 4.6 %
NEUTROPHILS # BLD AUTO: 10.7 K/UL
NEUTROPHILS NFR BLD: 82.6 %
NITRITE UR QL STRIP: NEGATIVE
PH UR STRIP: 7 [PH] (ref 5–8)
PLATELET # BLD AUTO: 278 K/UL
PMV BLD AUTO: 10.4 FL
POCT GLUCOSE: 117 MG/DL (ref 70–110)
POTASSIUM SERPL-SCNC: 4.1 MMOL/L
PROT SERPL-MCNC: 6.3 G/DL
PROT UR QL STRIP: NEGATIVE
RBC # BLD AUTO: 4.53 M/UL
SODIUM SERPL-SCNC: 142 MMOL/L
SP GR UR STRIP: <=1.005 (ref 1–1.03)
URN SPEC COLLECT METH UR: ABNORMAL
UROBILINOGEN UR STRIP-ACNC: NEGATIVE EU/DL
WBC # BLD AUTO: 12.91 K/UL

## 2018-05-02 PROCEDURE — 81003 URINALYSIS AUTO W/O SCOPE: CPT

## 2018-05-02 PROCEDURE — 96374 THER/PROPH/DIAG INJ IV PUSH: CPT

## 2018-05-02 PROCEDURE — 25000003 PHARM REV CODE 250: Performed by: EMERGENCY MEDICINE

## 2018-05-02 PROCEDURE — 85025 COMPLETE CBC W/AUTO DIFF WBC: CPT

## 2018-05-02 PROCEDURE — 29125 APPL SHORT ARM SPLINT STATIC: CPT | Mod: LT

## 2018-05-02 PROCEDURE — 63600175 PHARM REV CODE 636 W HCPCS: Performed by: EMERGENCY MEDICINE

## 2018-05-02 PROCEDURE — 93010 ELECTROCARDIOGRAM REPORT: CPT | Mod: ,,, | Performed by: INTERNAL MEDICINE

## 2018-05-02 PROCEDURE — 99284 EMERGENCY DEPT VISIT MOD MDM: CPT | Mod: 25

## 2018-05-02 PROCEDURE — 80053 COMPREHEN METABOLIC PANEL: CPT

## 2018-05-02 PROCEDURE — 82962 GLUCOSE BLOOD TEST: CPT

## 2018-05-02 RX ORDER — KETOROLAC TROMETHAMINE 30 MG/ML
15 INJECTION, SOLUTION INTRAMUSCULAR; INTRAVENOUS
Status: COMPLETED | OUTPATIENT
Start: 2018-05-02 | End: 2018-05-02

## 2018-05-02 RX ORDER — TRAMADOL HYDROCHLORIDE 50 MG/1
50 TABLET ORAL EVERY 6 HOURS PRN
Qty: 15 TABLET | Refills: 0 | Status: SHIPPED | OUTPATIENT
Start: 2018-05-02 | End: 2018-05-07

## 2018-05-02 RX ADMIN — KETOROLAC TROMETHAMINE 15 MG: 30 INJECTION, SOLUTION INTRAMUSCULAR at 02:05

## 2018-05-02 RX ADMIN — SODIUM CHLORIDE 500 ML: 0.9 INJECTION, SOLUTION INTRAVENOUS at 02:05

## 2018-05-02 NOTE — ED NOTES
Thumb spica splint applied and assessed by CHANEL MCKEON. Pt tolerated well. Good cap refill present after splint application.

## 2018-05-02 NOTE — DISCHARGE INSTRUCTIONS
Keep the splint clean and dry.    We have prescribed you pain medication. Please fill and take as directed. Do not drink alcohol or drive while taking this medication.  It is important to take stool softeners and/or eat plenty of fiber to prevent constipation while taking this medication.        Please return to the ER if you have chest pain, difficulty breathing, fevers, altered mental status, dizziness, weakness, or any other concerns.        We have provided you with a bone doctor to follow up with.  Call to make an appointment and let them know you were seen in the Emergency Department and provided with a referral.

## 2018-05-02 NOTE — ED PROVIDER NOTES
"Encounter Date: 5/2/2018    SCRIBE #1 NOTE: I, Jhonny Castro, am scribing for, and in the presence of, Dr. Gaitan.       History     Chief Complaint   Patient presents with    Dizziness     fall this morning and generalized weakness at home. EMS reports BP went 130's to 100's systolic with standing with dizziness appeared pale     Seen by provider: 1:41 PM    Patient is a 83 y.o. female who presents to the ED via EMS s/p fall which occurred this morning approximately 9 hours ago. Patient states she was rushing to go to the bathroom when she slipped and fell backwards injuring her back and left wrist. She did not hit her head. She reports immediate onset of low-to-mid back and left wrist pain. She states her granddaughter immediatly came into the bathroom after hearing her fall and found her sitting upright. Patient states her daughter was "concerned about broken bones" when she had difficulty getting out of bed later this morning. Patient states she uses a walker only sometimes, and otherwise is able to ambulate unassisted. She also reports one brief episode of dizziness, where she felt "about to pass out" while sitting on the toilet, which has since resolved. Patient denies any current chest pain, shortness of breath, dizziness, nausea, vomiting, or diarrhea. She denies taking any blood thinners. Patient reports taking two tylenol with minimal relief to pain.          The history is provided by the patient.     Review of patient's allergies indicates:  No Known Allergies  Past Medical History:   Diagnosis Date    Anxiety     Back pain     Breast cancer 1/17/2018    Breast mass 1/15/2018    Chronic kidney disease, stage 2, mildly decreased GFR     COPD (chronic obstructive pulmonary disease)     emphysema    Depression     Dysuria 1/29/2018    Family history of breast cancer 1/17/2018    Hypertension     Osteoporosis, senile     Ovarian mass 1/15/2018    Pneumonia     S/P robotic assisted laparoscopic " BSO (bilateral salpingo-oophorectomy) 2018     Past Surgical History:   Procedure Laterality Date    CHOLECYSTECTOMY      EYE SURGERY      HYSTERECTOMY      PA REMOVAL OF OVARY/TUBE(S)  2018    Robotic Assisted    ROTATOR CUFF REPAIR Right     rotator cuff repair right shoulder    TONSILLECTOMY       Family History   Problem Relation Age of Onset    Heart failure Mother     Kidney failure Mother     Heart attack Father     Breast cancer Sister 66     Lump, XRT, chemo, recurrence 10 years later, still alive.    Cancer Brother      leukemia    Heart attack Brother 58    Pulmonary embolism Brother 45    Heart attack Brother 52    Breast cancer Maternal Grandmother 60     advanced at diagnosis    Breast cancer Maternal Aunt 83      at 92     Social History   Substance Use Topics    Smoking status: Former Smoker     Packs/day: 1.00     Years: 40.00     Types: Cigarettes    Smokeless tobacco: Never Used      Comment: Smoked >1 ppd for at least 40 years, quit around     Alcohol use Yes      Comment: occasional glass of wine or cocktail     Review of Systems   Constitutional: Negative for chills and fever.   HENT: Negative for congestion.    Respiratory: Negative for cough and shortness of breath.    Cardiovascular: Negative for chest pain.   Gastrointestinal: Negative for abdominal pain, diarrhea, nausea and vomiting.   Genitourinary: Negative for difficulty urinating and dysuria.   Musculoskeletal: Positive for back pain, gait problem (secondary to back pain) and joint swelling.        Positive for left wrist pain.   Skin: Positive for color change. Negative for rash.   Neurological: Positive for dizziness (resolved) and light-headedness. Negative for headaches.       Physical Exam     Vitals:    18 1443 18 1545 18 1717 18 1719   BP: (!) 181/80 (!) 195/88 (!) 211/90 (!) 210/95   Pulse: (!) 52 (!) 54 (!) 56 (!) 54   Resp:  16     Temp:  98 °F (36.7 °C)      TempSrc:       SpO2: 98% (!) 94%     Weight:       Height:        05/02/18 1720   BP: (!) 214/98   Pulse: (!) 56   Resp:    Temp:    TempSrc:    SpO2:    Weight:    Height:       Physical Exam    Nursing note and vitals reviewed.  Constitutional: She appears well-developed and well-nourished. No distress.   HENT:   Head: Normocephalic and atraumatic.   Eyes: Conjunctivae and EOM are normal. Pupils are equal, round, and reactive to light.   Neck: Normal range of motion. Neck supple.   Cardiovascular: Normal rate, regular rhythm and normal heart sounds.   2+ DP/PT pulses.  2+ radial pulses bilaterally   Pulmonary/Chest: Breath sounds normal. No respiratory distress. She has no wheezes. She has no rales.   Abdominal: Soft. Bowel sounds are normal. She exhibits no distension. There is no tenderness.   Musculoskeletal: She exhibits tenderness.   Midline thoracic and lumbar spinal tenderness. No midline cervical tenderness. Tenderness to the right paraspinal region. Tenderness and swelling to the left wrist including the thenar region of her left palm with associated ecchymosis.    Neurological: She is alert and oriented to person, place, and time. She has normal strength. No cranial nerve deficit or sensory deficit.   Skin: Skin is warm and dry.   Psychiatric: She has a normal mood and affect. Her behavior is normal.         ED Course   Splint Application  Date/Time: 5/2/2018 5:45 PM  Performed by: JOLLY LAGUNAS.  Authorized by: JOLLY LAGUNAS   Consent Done: Not Needed  Location details: left wrist  Splint type: thumb spica  Supplies used: Ortho-Glass  Post-procedure: The splinted body part was neurovascularly unchanged following the procedure.  Patient tolerance: Patient tolerated the procedure well with no immediate complications        Labs Reviewed   COMPREHENSIVE METABOLIC PANEL - Abnormal; Notable for the following:        Result Value    CO2 30 (*)     Glucose 114 (*)     Albumin 3.4 (*)     Alkaline  Phosphatase 52 (*)     eGFR if  54 (*)     eGFR if non  47 (*)     All other components within normal limits   CBC W/ AUTO DIFFERENTIAL - Abnormal; Notable for the following:     WBC 12.91 (*)     Gran # (ANC) 10.7 (*)     Gran% 82.6 (*)     Lymph% 11.6 (*)     All other components within normal limits   URINALYSIS - Abnormal; Notable for the following:     Specific Gravity, UA <=1.005 (*)     All other components within normal limits   POCT GLUCOSE - Abnormal; Notable for the following:     POCT Glucose 117 (*)     All other components within normal limits   POCT GLUCOSE MONITORING CONTINUOUS     Imaging Results          X-Ray Wrist Complete Left (Final result)  Result time 05/02/18 15:18:33    Final result by Fabi Merritt MD (05/02/18 15:18:33)                 Impression:      Scaphoid fracture.      Electronically signed by: Fabi Merritt MD  Date:    05/02/2018  Time:    15:18             Narrative:    EXAMINATION:  XR WRIST COMPLETE 3 VIEWS LEFT    CLINICAL HISTORY:  Unspecified injury of unspecified wrist, hand and finger(s), initial encounter    TECHNIQUE:  PA, lateral, and oblique views of the left wrist were performed.    COMPARISON:  None    FINDINGS:  There is diffuse osseous demineralization.  There is degenerative change at the 1st carpal/metacarpal joint space.  There is a prominent ossification just proximal to the 1st metacarpal.  There is a nondisplaced fracture through the mid scaphoid.                               X-Ray Thoracic Spine AP Lateral (Final result)  Result time 05/02/18 15:10:43    Final result by Fabi Merritt MD (05/02/18 15:10:43)                 Impression:      Mild compression deformity along superior endplate of T12 new compared to January 2018.  Exam somewhat limited due to osseous demineralization and patient obliquity.      Electronically signed by: Fabi Merritt MD  Date:    05/02/2018  Time:    15:10             Narrative:     EXAMINATION:  XR THORACIC SPINE AP LATERAL    CLINICAL HISTORY:  Unspecified injury of lower back, initial encounter    TECHNIQUE:  Two views obtained.  Note that patient is slightly obliquely oriented on the lateral views which combines with severe osseous demineralization and result in limitation.    COMPARISON:  CT January 2018    FINDINGS:  There are surgical clips in the right upper quadrant.  There is tortuosity of the aorta with calcification along its wall.  Mild curvature of the spine is noted.  There is mild compression deformity along the superior endplate of T12.  No definite additional compression deformity noted.                               X-Ray Lumbar Spine Ap And Lateral (Final result)  Result time 05/02/18 15:08:39    Final result by Fabi Merritt MD (05/02/18 15:08:39)                 Impression:      Mild compression deformity along the superior endplate of T12, new compared to January 2018 CT.      Electronically signed by: Fabi Merritt MD  Date:    05/02/2018  Time:    15:08             Narrative:    EXAMINATION:  XR LUMBAR SPINE AP AND LATERAL    CLINICAL HISTORY:  Low back pain, minor trauma;    TECHNIQUE:  AP, lateral and spot images were performed of the lumbar spine.  Patient is slightly obliquely positioned on the lateral view.    COMPARISON:  CT from January 2018    FINDINGS:  There is severe osseous demineralization.  Surgical clips are present in the abdomen.  There is calcification along the wall of the aorta and branches.    Grade 1 anterolisthesis is present at L4-5.  T12 demonstrates mild compression deformity along the superior endplate which is a change compared to the January examination.  Remaining vertebral bodies are normal in height and alignment.  Discs are maintained.                                EKG Readings: (Independently Interpreted)   14:42 - Rate of 54. Sinus bradycardia. Normal axis. Normal intervals. Diffuse T wave flattening. No other ST or ischemic  changes.          Medical Decision Making:   History:   Old Medical Records: I decided to obtain old medical records.  Initial Assessment:   1:41PM:  Pt is a 82 y/o F who presents to ED s/p fall.  Pt has resulting back pain and wrist pain. Pt appears well, nontoxic. She does have areas of tenderness to the L wrist and thoracic/lumbar spine.  Will plan for imaging, labs, will continue to follow and reassess.    Independently Interpreted Test(s):   I have ordered and independently interpreted X-rays - see prior notes.  I have ordered and independently interpreted EKG Reading(s) - see prior notes  Clinical Tests:   Lab Tests: Ordered and Reviewed  Radiological Study: Ordered and Reviewed  Medical Tests: Ordered and Reviewed    4:30 PM:  Pt doing well, she is feeling better.  She does have a scaphoid fracture and a T12 end-plate fracture.  I updated pt regarding results and need for splint.  Her labs are otherwise unremarkable.    5:42 PM:  Pt was able to ambulate with no issues and felt well with no dizziness or SOB.  Will plan to have her f/u with ortho.  Will provide a prescription for pain meds to take as needed.  I did discuss with family regarding need for caution when using pain medication.  Pt tolerated splint placement well and she remains neurovascularly intact.  I counseled pt regarding supportive care measures.  I have discussed with the pt ED return warnings and need for close PCP f/u.  Pt agreeable to plan and all questions answered.  I feel that pt is stable for discharge and management as an outpatient and no further intervention is needed at this time.  Pt is comfortable returning to the ED if needed.  Will DC home in stable condition.                Scribe Attestation:   Scribe #1: I performed the above scribed service and the documentation accurately describes the services I performed. I attest to the accuracy of the note.    Attending Attestation:           Physician Attestation for  Scribe:  Physician Attestation Statement for Scribe #1: I, Dr. Gaitan, reviewed documentation, as scribed by Jhonny Castro in my presence, and it is both accurate and complete.                    Clinical Impression:     1. Closed nondisplaced fracture of middle third of scaphoid bone of left wrist, initial encounter    2. Dizziness    3. Back injury    4. Wrist injury    5. Thoracic compression fracture, closed, initial encounter                              Kenna Gaitan MD  05/02/18 4012

## 2018-05-02 NOTE — ED TRIAGE NOTES
Granddaughter reports pt had fall at 5 am with incontinence. She reports has been cool, clammy since and not as orientated as normally. States it's like she's sedated. Pt reporting falling at 5 am and unsure why. Reports mid back pain immediately after. Reports she then went to urinate later this morning and became dizzy and nauseated.

## 2018-05-02 NOTE — ED NOTES
Dr. Gaitan aware of pt elevated BP. Pt instructed to take home BP when gets home. Pt has medication here and is taking it now.

## 2018-05-03 ENCOUNTER — PES CALL (OUTPATIENT)
Dept: ADMINISTRATIVE | Facility: CLINIC | Age: 83
End: 2018-05-03

## 2018-05-05 ENCOUNTER — NURSE TRIAGE (OUTPATIENT)
Dept: ADMINISTRATIVE | Facility: CLINIC | Age: 83
End: 2018-05-05

## 2018-05-06 NOTE — TELEPHONE ENCOUNTER
"  Reason for Disposition   [1] SEVERE back pain (e.g., excruciating, unable to do any normal activities) AND [2] not improved 2 hours after pain medicine    Answer Assessment - Initial Assessment Questions  1. ONSET: "When did the pain begin?"       4days  2. LOCATION: "Where does it hurt?" (upper, mid or lower back)      back  3. SEVERITY: "How bad is the pain?"  (e.g., Scale 1-10; mild, moderate, or severe)    - MILD (1-3): doesn't interfere with normal activities     - MODERATE (4-7): interferes with normal activities or awakens from sleep     - SEVERE (8-10): excruciating pain, unable to do any normal activities       severe  4. PATTERN: "Is the pain constant?" (e.g., yes, no; constant, intermittent)       Only with movement  5. RADIATION: "Does the pain shoot into your legs or elsewhere?"      no  6. CAUSE:  "What do you think is causing the back pain?"       fallll fx to back  7. BACK OVERUSE:  "Any recent lifting of heavy objects, strenuous work or exercise?"      No patient had fall  8. MEDICATIONS: "What have you taken so far for the pain?" (e.g., nothing, acetaminophen, NSAIDS)      tramadol  9. NEUROLOGIC SYMPTOMS: "Do you have any weakness, numbness, or problems with bowel/bladder control?"      no  10. OTHER SYMPTOMS: "Do you have any other symptoms?" (e.g., fever, abdominal pain, burning with urination, blood in urine)        no  11. PREGNANCY: "Is there any chance you are pregnant?" (e.g., yes, no; LMP)        hyst    Protocols used: ST BACK PAIN-A-AH    "

## 2018-05-07 ENCOUNTER — TELEPHONE (OUTPATIENT)
Dept: ORTHOPEDICS | Facility: CLINIC | Age: 83
End: 2018-05-07

## 2018-05-07 ENCOUNTER — OFFICE VISIT (OUTPATIENT)
Dept: SPINE | Facility: CLINIC | Age: 83
End: 2018-05-07
Payer: MEDICARE

## 2018-05-07 ENCOUNTER — TELEPHONE (OUTPATIENT)
Dept: PAIN MEDICINE | Facility: CLINIC | Age: 83
End: 2018-05-07

## 2018-05-07 VITALS — WEIGHT: 162.94 LBS | BODY MASS INDEX: 29.98 KG/M2 | HEIGHT: 62 IN

## 2018-05-07 DIAGNOSIS — S22.080A COMPRESSION FRACTURE OF T12 VERTEBRA: ICD-10-CM

## 2018-05-07 DIAGNOSIS — M51.36 DDD (DEGENERATIVE DISC DISEASE), LUMBAR: Primary | ICD-10-CM

## 2018-05-07 PROCEDURE — 99999 PR PBB SHADOW E&M-EST. PATIENT-LVL III: CPT | Mod: PBBFAC,,, | Performed by: PHYSICIAN ASSISTANT

## 2018-05-07 PROCEDURE — 99204 OFFICE O/P NEW MOD 45 MIN: CPT | Mod: S$GLB,,, | Performed by: PHYSICIAN ASSISTANT

## 2018-05-07 RX ORDER — TRAMADOL HYDROCHLORIDE 50 MG/1
50-100 TABLET ORAL EVERY 6 HOURS PRN
Qty: 56 TABLET | Refills: 0 | Status: SHIPPED | OUTPATIENT
Start: 2018-05-07 | End: 2018-05-17 | Stop reason: SDUPTHER

## 2018-05-07 RX ORDER — CALCITONIN SALMON 200 [IU]/.09ML
1 SPRAY, METERED NASAL DAILY
Qty: 1 BOTTLE | Refills: 0 | Status: SHIPPED | OUTPATIENT
Start: 2018-05-07 | End: 2018-05-07 | Stop reason: SDUPTHER

## 2018-05-07 NOTE — TELEPHONE ENCOUNTER
I have called all numbers in patient chart multiple times. The phone is busy and I can not get in touch with anyone. I was trying to contact patient to let her know it would be best to see her regular doctor.

## 2018-05-07 NOTE — TELEPHONE ENCOUNTER
----- Message from Josee Don sent at 5/7/2018  6:07 PM CDT -----  Contact: patient's daughter  Says her mother will be a few minutes late to her appointment today. Says her mom must be transported.    She can be reached at [phone]    Thanks  KB

## 2018-05-08 RX ORDER — CALCITONIN SALMON 200 [IU]/.09ML
SPRAY, METERED NASAL
Qty: 11.1 ML | Refills: 0 | Status: SHIPPED | OUTPATIENT
Start: 2018-05-08 | End: 2018-06-26

## 2018-05-08 NOTE — PROGRESS NOTES
DATE: 5/8/2018  PATIENT: Anahi Cook    Supervising Physician: Alber Perez M.D.    CHIEF COMPLAINT: back pain    HISTORY:  Anahi Cook is a 83 y.o. female recently seen in the ED after falling at her home here for initial evaluation of low back pain (Back - 10). The pain has been present since she fell from standing 5/2. The patient describes the pain as aching.  The pain is worse with any movement and improved by rest. There is no associated numbness and tingling. There is no subjective weakness. Prior treatments have included tramadol and tylenol, but no calcitonin, physical therapy, or surgery.  She had an ASHUTOSH prior to falling for radicular pain in the left leg which gave her good relief.  She denies having back pain prior to falling.     The patient denies myelopathic symptoms such as handwriting changes or difficulty with buttons/coins/keys. Denies perineal paresthesias, bowel/bladder dysfunction.    PAST MEDICAL/SURGICAL HISTORY:  Past Medical History:   Diagnosis Date    Anxiety     Back pain     Breast cancer 1/17/2018    Breast mass 1/15/2018    Chronic kidney disease, stage 2, mildly decreased GFR     COPD (chronic obstructive pulmonary disease)     emphysema    Depression     Dysuria 1/29/2018    Family history of breast cancer 1/17/2018    Hypertension     Osteoporosis, senile     Ovarian mass 1/15/2018    Pneumonia     S/P robotic assisted laparoscopic BSO (bilateral salpingo-oophorectomy) 2/23/2018     Past Surgical History:   Procedure Laterality Date    CHOLECYSTECTOMY      EYE SURGERY      HYSTERECTOMY      MO REMOVAL OF OVARY/TUBE(S)  02/23/2018    Robotic Assisted    ROTATOR CUFF REPAIR Right     rotator cuff repair right shoulder    TONSILLECTOMY         Current Medications:   Current Outpatient Prescriptions:     acetaminophen (TYLENOL) 325 MG tablet, Take 325 mg by mouth every 6 (six) hours as needed for Pain., Disp: , Rfl:     albuterol (PROAIR HFA) 90  mcg/actuation inhaler, Inhale 1-2 puffs into the lungs every 6 (six) hours as needed for Wheezing or Shortness of Breath., Disp: , Rfl:     albuterol 2.5 mg /3 mL (0.083 %) Nebu 3 mL, albuterol 5 mg/mL Nebu 0.5 mL, Use one vial in nebulizer three times daily as needed for shortness of breath or wheezing, Disp: , Rfl:     albuterol-ipratropium 2.5mg-0.5mg/3mL (DUO-NEB) 0.5 mg-3 mg(2.5 mg base)/3 mL nebulizer solution, , Disp: , Rfl:     atorvastatin (LIPITOR) 20 MG tablet, Take 20 mg by mouth once daily., Disp: , Rfl:     benazepril (LOTENSIN) 10 MG tablet, Take 10 mg by mouth once daily., Disp: , Rfl:     calcium-vitamin D 250-100 mg-unit per tablet, Take 1 tablet by mouth 2 (two) times daily., Disp: , Rfl:     CETIRIZINE HCL (ZYRTEC ORAL), Take 10 mg by mouth as needed. , Disp: , Rfl:     citalopram (CELEXA) 20 MG tablet, Take 20 mg by mouth once daily., Disp: , Rfl:     felodipine (PLENDIL) 10 MG 24 hr tablet, Take 10 mg by mouth once daily., Disp: , Rfl:     fluticasone-vilanterol (BREO) 100-25 mcg/dose diskus inhaler, Inhale 1 puff into the lungs once daily., Disp: 1 each, Rfl: 3    hydrocodone-acetaminophen 5-325mg (NORCO) 5-325 mg per tablet, Take 1 tablet by mouth every 6 (six) hours as needed for Pain., Disp: 35 tablet, Rfl: 0    ibuprofen (ADVIL,MOTRIN) 800 MG tablet, Take 1 tablet (800 mg total) by mouth every 6 (six) hours as needed for Pain., Disp: 30 tablet, Rfl: 1    INCRUSE ELLIPTA 62.5 mcg/actuation DsDv, USE 1 PUFF QD AT SAME TIME  (patient takes at night), Disp: , Rfl: 2    loratadine (CLARITIN) 10 mg tablet, Take 10 mg by mouth once daily., Disp: , Rfl:     multivitamin (THERAGRAN) per tablet, Take 1 tablet by mouth once daily., Disp: , Rfl:     omeprazole (PRILOSEC) 20 MG capsule, , Disp: , Rfl:     predniSONE (DELTASONE) 20 MG tablet, Take 20 mg by mouth once daily. , Disp: , Rfl:     torsemide (DEMADEX) 20 MG Tab, Take 20 mg by mouth once daily., Disp: , Rfl:     calcitonin,  salmon, (FORTICAL) 200 unit/actuation nasal spray, INSERT 1 SPRAY IN NOSTRIL EVERY DAY, Disp: 11.1 mL, Rfl: 0    traMADol (ULTRAM) 50 mg tablet, Take 1-2 tablets ( mg total) by mouth every 6 (six) hours as needed., Disp: 56 tablet, Rfl: 0    Social History:   Social History     Social History    Marital status: Single     Spouse name: N/A    Number of children: 3    Years of education: N/A     Occupational History    Multiple jobs, see social documentation section      Retired     Social History Main Topics    Smoking status: Former Smoker     Packs/day: 1.00     Years: 40.00     Types: Cigarettes    Smokeless tobacco: Never Used      Comment: Smoked >1 ppd for at least 40 years, quit around 1995    Alcohol use Yes      Comment: occasional glass of wine or cocktail    Drug use: No    Sexual activity: Yes     Partners: Male     Other Topics Concern    Not on file     Social History Narrative    Worked many jobs while raising 3 children.  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  She was  from her first , but took care of him at the end of his life.       REVIEW OF SYSTEMS:  Constitution: Negative. Negative for chills, fever and night sweats.   Cardiovascular: Negative for chest pain and syncope.   Respiratory: Negative for cough and shortness of breath.   Gastrointestinal: See HPI. Negative for nausea/vomiting. Negative for abdominal pain.  Genitourinary: See HPI. Negative for discoloration or dysuria.  Skin: Negative for dry skin, itching and rash.   Hematologic/Lymphatic: Negative for bleeding problem. Does not bruise/bleed easily.   Musculoskeletal: Negative for falls and muscle weakness.   Neurological: See HPI. No seizures.   Endocrine: Negative for polydipsia, polyphagia and polyuria.   Allergic/Immunologic: Negative for hives and persistent infections.    PHYSICAL  "EXAMINATION:    Ht 5' 2" (1.575 m)   Wt 73.9 kg (162 lb 14.7 oz)   BMI 29.80 kg/m²     General: The patient is a very pleasant 83 y.o. female in no apparent distress, the patient is oriented to person, place and time.   Psych: Normal mood and affect  HEENT: Vision grossly intact, hearing intact to the spoken word.  Lungs: Respirations unlabored.  Gait: Normal station and gait, no difficulty with toe or heel walk.   Skin: Dorsal lumbar skin negative for rashes, lesions, hairy patches and surgical scars.  Range of motion: Lumbar range of motion is acceptable. There is mild left paramedian lumbar tenderness to palpation.  Spinal Balance: Global saggital and coronal spinal balance acceptable, no significant for scoliosis and kyphosis.  Musculoskeletal: No pain with the range of motion of the bilateral hips. No trochanteric tenderness to palpation.  Vascular: Bilateral lower extremities warm and well perfused, Dorsalis pedis pulses 2+ bilaterally.  Neurological: Normal strength and tone in all major motor groups in the bilateral lower extremities. Normal sensation to light touch in the L2-S1 dermatomes bilaterally.  Deep tendon reflexes symmetric 2+ in the bilateral lower extremities.  Negative Babinski bilaterally.  Straight leg raise negative bilaterally.     IMAGING:   Today I personally reviewed AP, Lat and Flex/Ex upright L-spine films that demonstrate grade I anterolisthesis of L4/5.  There is mild compression of T12.      ASSESSMENT/PLAN:    Anahi was seen today for back pain.    Diagnoses and all orders for this visit:    DDD (degenerative disc disease), lumbar    Compression fracture of T12 vertebra    Other orders  -     Discontinue: calcitonin, salmon, (FORTICAL) 200 unit/actuation nasal spray; 1 spray by Nasal route once daily.  -     traMADol (ULTRAM) 50 mg tablet; Take 1-2 tablets ( mg total) by mouth every 6 (six) hours as needed.        Prescription for calcitonin sent to the pharmacy.  I have " given her a one week prescription for tramadol.  I will not refill this prescription.  She also has a scaphoid fracture.  I have scheduled an appointment with the hand clinic to follow up for this.  Follow up in 4 weeks if symptoms persist.     We discussed the department policy regarding pain medications.  Patient understands and agrees.         Follow-up if symptoms worsen or fail to improve.

## 2018-05-10 ENCOUNTER — TELEPHONE (OUTPATIENT)
Dept: ORTHOPEDICS | Facility: CLINIC | Age: 83
End: 2018-05-10

## 2018-05-10 DIAGNOSIS — M79.642 LEFT HAND PAIN: Primary | ICD-10-CM

## 2018-05-10 DIAGNOSIS — M25.532 LEFT WRIST PAIN: ICD-10-CM

## 2018-05-10 NOTE — TELEPHONE ENCOUNTER
----- Message from Chasidy Dickinson sent at 5/10/2018 12:25 PM CDT -----  Contact: Pt daughter Patience can be reached art [phone]  Patience is calling to speak with the nurse requesting an appt this week for left hand fracture for pt.    No appt available.    Thank you!

## 2018-05-11 ENCOUNTER — OFFICE VISIT (OUTPATIENT)
Dept: ORTHOPEDICS | Facility: CLINIC | Age: 83
End: 2018-05-11
Payer: MEDICARE

## 2018-05-11 ENCOUNTER — TELEPHONE (OUTPATIENT)
Dept: ORTHOPEDICS | Facility: CLINIC | Age: 83
End: 2018-05-11

## 2018-05-11 ENCOUNTER — HOSPITAL ENCOUNTER (OUTPATIENT)
Dept: RADIOLOGY | Facility: OTHER | Age: 83
Discharge: HOME | End: 2018-05-11
Attending: PHYSICIAN ASSISTANT
Payer: MEDICARE

## 2018-05-11 VITALS
HEART RATE: 72 BPM | BODY MASS INDEX: 29.81 KG/M2 | WEIGHT: 162 LBS | DIASTOLIC BLOOD PRESSURE: 66 MMHG | HEIGHT: 62 IN | SYSTOLIC BLOOD PRESSURE: 162 MMHG

## 2018-05-11 DIAGNOSIS — M25.532 LEFT WRIST PAIN: ICD-10-CM

## 2018-05-11 DIAGNOSIS — M79.642 LEFT HAND PAIN: ICD-10-CM

## 2018-05-11 DIAGNOSIS — S62.025A CLOSED NONDISPLACED FRACTURE OF MIDDLE THIRD OF SCAPHOID BONE OF LEFT WRIST, INITIAL ENCOUNTER: Primary | ICD-10-CM

## 2018-05-11 PROCEDURE — 3077F SYST BP >= 140 MM HG: CPT | Mod: CPTII,S$GLB,, | Performed by: PHYSICIAN ASSISTANT

## 2018-05-11 PROCEDURE — 73110 X-RAY EXAM OF WRIST: CPT | Mod: 26,LT,, | Performed by: RADIOLOGY

## 2018-05-11 PROCEDURE — 73130 X-RAY EXAM OF HAND: CPT | Mod: 26,LT,, | Performed by: RADIOLOGY

## 2018-05-11 PROCEDURE — 99204 OFFICE O/P NEW MOD 45 MIN: CPT | Mod: S$GLB,,, | Performed by: PHYSICIAN ASSISTANT

## 2018-05-11 PROCEDURE — 73110 X-RAY EXAM OF WRIST: CPT | Mod: TC,FY,LT

## 2018-05-11 PROCEDURE — 99999 PR PBB SHADOW E&M-EST. PATIENT-LVL III: CPT | Mod: PBBFAC,,, | Performed by: PHYSICIAN ASSISTANT

## 2018-05-11 PROCEDURE — 73130 X-RAY EXAM OF HAND: CPT | Mod: TC,FY,LT

## 2018-05-11 PROCEDURE — 3078F DIAST BP <80 MM HG: CPT | Mod: CPTII,S$GLB,, | Performed by: PHYSICIAN ASSISTANT

## 2018-05-11 NOTE — PROGRESS NOTES
Subjective:      Patient ID: Anahi Cook is a 83 y.o. female.    Chief Complaint: Pain of the Left Hand      HPI  Anahi Cook is a 83 y.o. female presenting today for left scaphoid fracture. Pt had a fall 5/2/18. She slipped and fell onto the left wrist and back. She was seen in the ED later the same day where she was found to have left scaphoid fracture. She was placed into a splint. Pt also has low back pain since incident. She is taking Norco and Tylenol prn with good relief.       Review of patient's allergies indicates:  No Known Allergies      Current Outpatient Prescriptions   Medication Sig Dispense Refill    acetaminophen (TYLENOL) 325 MG tablet Take 325 mg by mouth every 6 (six) hours as needed for Pain.      albuterol (PROAIR HFA) 90 mcg/actuation inhaler Inhale 1-2 puffs into the lungs every 6 (six) hours as needed for Wheezing or Shortness of Breath.      albuterol 2.5 mg /3 mL (0.083 %) Nebu 3 mL, albuterol 5 mg/mL Nebu 0.5 mL Use one vial in nebulizer three times daily as needed for shortness of breath or wheezing      albuterol-ipratropium 2.5mg-0.5mg/3mL (DUO-NEB) 0.5 mg-3 mg(2.5 mg base)/3 mL nebulizer solution       atorvastatin (LIPITOR) 20 MG tablet Take 20 mg by mouth once daily.      benazepril (LOTENSIN) 10 MG tablet Take 10 mg by mouth once daily.      calcitonin, salmon, (FORTICAL) 200 unit/actuation nasal spray INSERT 1 SPRAY IN NOSTRIL EVERY DAY 11.1 mL 0    calcium-vitamin D 250-100 mg-unit per tablet Take 1 tablet by mouth 2 (two) times daily.      CETIRIZINE HCL (ZYRTEC ORAL) Take 10 mg by mouth as needed.       citalopram (CELEXA) 20 MG tablet Take 20 mg by mouth once daily.      felodipine (PLENDIL) 10 MG 24 hr tablet Take 10 mg by mouth once daily.      fluticasone-vilanterol (BREO) 100-25 mcg/dose diskus inhaler Inhale 1 puff into the lungs once daily. 1 each 3    hydrocodone-acetaminophen 5-325mg (NORCO) 5-325 mg per tablet Take 1 tablet by mouth every 6 (six)  hours as needed for Pain. 35 tablet 0    ibuprofen (ADVIL,MOTRIN) 800 MG tablet Take 1 tablet (800 mg total) by mouth every 6 (six) hours as needed for Pain. 30 tablet 1    INCRUSE ELLIPTA 62.5 mcg/actuation DsDv USE 1 PUFF QD AT SAME TIME  (patient takes at night)  2    loratadine (CLARITIN) 10 mg tablet Take 10 mg by mouth once daily.      multivitamin (THERAGRAN) per tablet Take 1 tablet by mouth once daily.      omeprazole (PRILOSEC) 20 MG capsule       predniSONE (DELTASONE) 20 MG tablet Take 20 mg by mouth once daily.       torsemide (DEMADEX) 20 MG Tab Take 20 mg by mouth once daily.      traMADol (ULTRAM) 50 mg tablet Take 1-2 tablets ( mg total) by mouth every 6 (six) hours as needed. 56 tablet 0     No current facility-administered medications for this visit.        Past Medical History:   Diagnosis Date    Anxiety     Back pain     Breast cancer 1/17/2018    Breast mass 1/15/2018    Chronic kidney disease, stage 2, mildly decreased GFR     COPD (chronic obstructive pulmonary disease)     emphysema    Depression     Dysuria 1/29/2018    Family history of breast cancer 1/17/2018    Hypertension     Osteoporosis, senile     Ovarian mass 1/15/2018    Pneumonia     S/P robotic assisted laparoscopic BSO (bilateral salpingo-oophorectomy) 2/23/2018       Past Surgical History:   Procedure Laterality Date    CHOLECYSTECTOMY      EYE SURGERY      HYSTERECTOMY      FL REMOVAL OF OVARY/TUBE(S)  02/23/2018    Robotic Assisted    ROTATOR CUFF REPAIR Right     rotator cuff repair right shoulder    TONSILLECTOMY         Review of Systems:  Constitutional: Negative for chills and fever.   Respiratory: Negative for cough and shortness of breath.    Gastrointestinal: Negative for nausea and vomiting.   Skin: Negative for rash.   Neurological: Negative for dizziness and headaches.   Psychiatric/Behavioral: Negative for depression.   MSK as in HPI       OBJECTIVE:     PHYSICAL EXAM:  BP (!)  "162/66 (BP Location: Left arm, Patient Position: Sitting, BP Method: Small (Automatic))   Pulse 72   Ht 5' 2" (1.575 m)   Wt 73.5 kg (162 lb)   BMI 29.63 kg/m²     GEN:  NAD, well-developed, well-groomed.  NEURO: Awake, alert, and oriented. Normal attention and concentration.    PSYCH: Normal mood and affect. Behavior is normal.  HEENT: No cervical lymphadenopathy noted.  CARDIOVASCULAR: Radial pulses 2+ bilaterally. No LE edema noted.  PULMONARY: Breath sounds normal. No respiratory distress.  SKIN: Intact, no rashes.      MSK:   LUE:  orthoglass splint removed. Good active ROM fingers. No skin abrasions to the arm noted. AIN/PIN/Radial/Median/Ulnar Nerves assessed in isolation without deficit. Radial & Ulnar arteries palpated 2+. Capillary Refill <3s. orthoglass thumb spica splint re-applied.      RADIOGRAPHS:  Xray left wrist 5/2/18  FINDINGS:  There is diffuse osseous demineralization.  There is degenerative change at the 1st carpal/metacarpal joint space.  There is a prominent ossification just proximal to the 1st metacarpal.  There is a nondisplaced fracture through the mid scaphoid.    Xray left wrist 5/11/18  FINDINGS:  Fiberglass cast material obscures bone and soft tissue detail.  There is hyperextension at the thumb MCP joint.  No evidence for fracture or dislocation.  Severe degenerative changes are noted at the base of thumb and triscaphe joints.  Moderate to severe degenerative changes are seen throughout the IP joints.  There is dorsal tilt of the lunate.  There is generalized osteopenia.    Comments: I have personally reviewed the imaging and I agree with the above radiologist's report.    ASSESSMENT/PLAN:       ICD-10-CM ICD-9-CM   1. Closed nondisplaced fracture of middle third of scaphoid bone of left wrist, initial encounter S62.025A 814.01       Orders Placed This Encounter    CT Wrist Without Contrast Left        Plan:   -continue orthoglass thumb spica  -CT, remove splint   -RTC for " results         The patient indicates understanding of these issues and agrees to the plan.    Nruia Holloway PA-C  [facility]   [facility] [place]

## 2018-05-11 NOTE — TELEPHONE ENCOUNTER
VM left for pt to find out if she can come in earlier for her xray and appt w/Nuria per provider requests.

## 2018-05-14 ENCOUNTER — HOSPITAL ENCOUNTER (OUTPATIENT)
Dept: RADIOLOGY | Facility: OTHER | Age: 83
Discharge: HOME | End: 2018-05-14
Attending: PHYSICIAN ASSISTANT
Payer: MEDICARE

## 2018-05-14 DIAGNOSIS — S62.025A CLOSED NONDISPLACED FRACTURE OF MIDDLE THIRD OF SCAPHOID BONE OF LEFT WRIST, INITIAL ENCOUNTER: ICD-10-CM

## 2018-05-14 PROCEDURE — 73200 CT UPPER EXTREMITY W/O DYE: CPT | Mod: TC,LT

## 2018-05-14 PROCEDURE — 73200 CT UPPER EXTREMITY W/O DYE: CPT | Mod: 26,LT,, | Performed by: RADIOLOGY

## 2018-05-17 ENCOUNTER — DOCUMENTATION ONLY (OUTPATIENT)
Dept: ORTHOPEDICS | Facility: CLINIC | Age: 83
End: 2018-05-17

## 2018-05-17 ENCOUNTER — OFFICE VISIT (OUTPATIENT)
Dept: ORTHOPEDICS | Facility: CLINIC | Age: 83
End: 2018-05-17
Payer: MEDICARE

## 2018-05-17 VITALS
WEIGHT: 162 LBS | HEIGHT: 62 IN | SYSTOLIC BLOOD PRESSURE: 142 MMHG | DIASTOLIC BLOOD PRESSURE: 82 MMHG | BODY MASS INDEX: 29.81 KG/M2 | HEART RATE: 67 BPM

## 2018-05-17 DIAGNOSIS — S62.025A CLOSED NONDISPLACED FRACTURE OF MIDDLE THIRD OF SCAPHOID BONE OF LEFT WRIST, INITIAL ENCOUNTER: Primary | ICD-10-CM

## 2018-05-17 PROCEDURE — 3079F DIAST BP 80-89 MM HG: CPT | Mod: CPTII,S$GLB,, | Performed by: ORTHOPAEDIC SURGERY

## 2018-05-17 PROCEDURE — 3077F SYST BP >= 140 MM HG: CPT | Mod: CPTII,S$GLB,, | Performed by: ORTHOPAEDIC SURGERY

## 2018-05-17 PROCEDURE — 99999 PR PBB SHADOW E&M-EST. PATIENT-LVL III: CPT | Mod: PBBFAC,,, | Performed by: ORTHOPAEDIC SURGERY

## 2018-05-17 PROCEDURE — 29075 APPL CST ELBW FNGR SHORT ARM: CPT | Mod: LT,S$GLB,, | Performed by: ORTHOPAEDIC SURGERY

## 2018-05-17 PROCEDURE — 99214 OFFICE O/P EST MOD 30 MIN: CPT | Mod: 25,S$GLB,, | Performed by: ORTHOPAEDIC SURGERY

## 2018-05-17 RX ORDER — TRAMADOL HYDROCHLORIDE 50 MG/1
50-100 TABLET ORAL EVERY 6 HOURS PRN
Qty: 56 TABLET | Refills: 0 | Status: SHIPPED | OUTPATIENT
Start: 2018-05-17 | End: 2018-05-27

## 2018-05-19 NOTE — PROGRESS NOTES
CHIEF COMPLAINT:  MRI results, left wrist.    HISTORY OF PRESENT ILLNESS:  Ms. Cook is an 83-year-old female who had   sustained a left wrist fracture.  She was referred to our clinic, evaluated,   sent for CT to detect the amount of displacement of the scaphoid fracture.  The   patient is saying she is doing very well.  The pain is decreased.  The cast   makes it feel really good to her.  Fingers are clean, dry and intact.    CT results performed and reviewed show a scaphoid waist fracture, nondisplaced.    PLAN:  The patient is 83.  She does have significant arthritis.  She is doing   well with nondisplaced scaphoid fracture.  We discussed conservative versus   surgical treatment.  At this time, we are electing for conservative treatment.    A cast was placed today with a portal for a bone stimulator.  The patient will   be given a bone stimulator to help scaphoid fracture healing.  The patient can   return to clinic in two to four weeks for serial cast check.      LES/KADE  dd: 05/18/2018 11:30:55 (CDT)  td: 05/19/2018 03:22:57 (CDT)  Doc ID   #[id number]  Job ID #[id number]    CC:

## 2018-05-21 ENCOUNTER — TELEPHONE (OUTPATIENT)
Dept: ORTHOPEDICS | Facility: CLINIC | Age: 83
End: 2018-05-21

## 2018-05-21 NOTE — TELEPHONE ENCOUNTER
Spoke w/pt in regards to message below. Notified pt that Kurt the rep with [employer] bone stimulator usually will call the patient to set up a delivery date and will show her how to use it when they get the auth from her insurance. Pt verbalized understanding.

## 2018-05-21 NOTE — TELEPHONE ENCOUNTER
----- Message from Amisha Singleton sent at 5/21/2018 10:05 AM CDT -----  Contact: Self            Name of Who is Calling: Self      What is the request in detail: Pt states she has a ball/stimulator for her wrist and was told someone from the clinica would contact her to advise her on how to work it. Pt has not heard from anyone.      Can the clinic reply by [facility]: N    What Number [facility] Back [facility]: [phone]

## 2018-05-28 ENCOUNTER — PATIENT MESSAGE (OUTPATIENT)
Dept: ORTHOPEDICS | Facility: CLINIC | Age: 83
End: 2018-05-28

## 2018-05-28 ENCOUNTER — PATIENT MESSAGE (OUTPATIENT)
Dept: GYNECOLOGIC ONCOLOGY | Facility: CLINIC | Age: 83
End: 2018-05-28

## 2018-05-31 RX ORDER — TRAMADOL HYDROCHLORIDE 50 MG/1
50 TABLET ORAL EVERY 6 HOURS PRN
Qty: 28 TABLET | Refills: 0 | Status: SHIPPED | OUTPATIENT
Start: 2018-05-31 | End: 2018-06-14 | Stop reason: SDUPTHER

## 2018-05-31 NOTE — TELEPHONE ENCOUNTER
----- Message from Pelon Olivia sent at 5/31/2018  4:07 PM CDT -----  Contact: [place] ([employer])        Name of Who is Calling: [place] ([employer])      What is the request in detail: Would like to speak with staff in regards to patient bone stem.    Can the clinic reply by [facility]: no      What Number to Call Back if not in [facility]: [phone]

## 2018-05-31 NOTE — TELEPHONE ENCOUNTER
Spoke with Ruthy from [facility] informed her I have faxed but over the request for pt bone stem. Fax number [phone].

## 2018-05-31 NOTE — TELEPHONE ENCOUNTER
Please call in Rx as above  Tramadol 50 mg tablets  Si tab by mouth every 6 hours as needed for moderate to severe pain  Dispense: #28

## 2018-05-31 NOTE — TELEPHONE ENCOUNTER
----- Message from Anayeli Hodge sent at 5/31/2018 11:21 AM CDT -----  Contact: pt  Can the clinic reply in [facility]: no    Please refill the medication(s) listed below. Please call the patient when the prescription(s) is ready for  at this phone number      [phone]    Medication #1 traMADol (ULTRAM) 50 mg tablet  Medication #2    Preferred Pharmacy:[facility] [address] [place], [place] [address] AT [facility] [address]

## 2018-06-01 RX ORDER — TRAMADOL HYDROCHLORIDE 50 MG/1
TABLET ORAL
Qty: 56 TABLET | Refills: 0 | OUTPATIENT
Start: 2018-06-01

## 2018-06-04 RX ORDER — CALCITONIN SALMON 200 [IU]/.09ML
SPRAY, METERED NASAL
Qty: 3.7 ML | Refills: 0 | Status: SHIPPED | OUTPATIENT
Start: 2018-06-04 | End: 2018-06-26

## 2018-06-08 NOTE — PROGRESS NOTES
Subjective:       Patient ID: Anahi Cook is a 83 y.o. female.    Chief Complaint: No chief complaint on file.    HPI   Mrs. Cook returns today for follow up.   Briefly, she is an 83-year-old female who recently felt a mass in her left breast.  A biopsy that was performed on 01/11/2018 showed an infiltrating ductal carcinoma that was ER strongly positive, MA strongly positive and HER-2 negative by immunohistochemistry.    She was started on letrozole and undwerwent a BSO by Dr. Chairez for an ovarian mass.  Her ovarian mass was benign.     Apparently she recently sustained a fall and broke her left wrist.  She is wearing a cast.      Review of Systems    Overall she feels OK  She has tolerated the letrozole well so far.  She has a cast on her left wrist and left forearm.  She again mentions her chronic back pain.  She denies any anxiety, depression, easy bruising, fevers, chills, night  sweats, weight loss, nausea, vomiting, diarrhea, constipation, diplopia, blurred vision, headache, chest pain, palpitations, shortness of breath, breast pain, abdominal pain, extremity pain, or difficulty ambulating.  The remainder of the ten-point ROS, including general, skin, lymph, H/N, cardiorespiratory, GI, , Neuro, Endocrine, and psychiatric is negative.       Objective:      Physical Exam      She is alert, oriented to time, place, person, pleasant, well      nourished, in no acute physical distress.   She is accompanied by her daughter.                                 VITAL SIGNS:  Reviewed                                      HEENT:  Normal.  There are no nasal, oral, lip, gingival, auricular, lid,    or conjunctival lesions.  Mucosae are moist and pink, and there is no        thrush.  Pupils are equal, reactive to light and accommodation.              Extraocular muscle movements are intact.   Dentition is fair.  Maxillary teeth are missing.  There is no frontal or maxillary tenderness.                                      NECK:  Supple without JVD, adenopathy, or thyromegaly.                       LUNGS:  Clear to auscultation without wheezing, rales, or rhonchi.           CARDIOVASCULAR:  Reveals an S1, S2, no murmurs, no rubs, no gallops.         ABDOMEN:  Soft, nontender, without organomegaly.  Bowel sounds are    present.    Scars from her laparoscopic DONOVAN-BSO are seen.                                                            EXTREMITIES:  No cyanosis, clubbing, or edema.   The left forearm and wrist are in a cast.                            BREASTS: There is a barely palpable mass at the 12 o' clock position in the left breast.  She no longer has any left palpable axillary adenopathy.  There are no masses in her right breast.    Both breasts are large and pendulous.                                   LYMPHATIC:  There is no cervical, axillary, or supraclavicular adenopathy.   SKIN:  Warm and moist, without petechiae, rashes, induration, or ecchymoses.           NEUROLOGIC:  DTRs are 0-1+ bilaterally, symmetrical, motor function is 5/5,  and cranial nerves are  within normal limits.      Assessment:       1. Hormone receptor positive breast cancer, left, on neoadjuvant letrozole with a significant clinical response.   2. Use of aromatase inhibitors        Plan:        I had a long discussion with her and her daughter.  She will remain on letrozole, and meet with Dr. Caballero to discuss her surgical options.   I have explained to them that typically we continue the letrozole for 6 months in the neoadjuvant setting.  Assuming that she will have surgery some time in July, I will see her in two months for follow up.  Her multiple questions were answered to her satisfaction.

## 2018-06-11 ENCOUNTER — OFFICE VISIT (OUTPATIENT)
Dept: HEMATOLOGY/ONCOLOGY | Facility: CLINIC | Age: 83
End: 2018-06-11
Payer: MEDICARE

## 2018-06-11 VITALS
OXYGEN SATURATION: 99 % | DIASTOLIC BLOOD PRESSURE: 88 MMHG | TEMPERATURE: 98 F | RESPIRATION RATE: 18 BRPM | SYSTOLIC BLOOD PRESSURE: 186 MMHG | HEART RATE: 67 BPM

## 2018-06-11 DIAGNOSIS — C50.912 HORMONE RECEPTOR POSITIVE BREAST CANCER, LEFT: Primary | ICD-10-CM

## 2018-06-11 DIAGNOSIS — Z79.811 USE OF AROMATASE INHIBITORS: ICD-10-CM

## 2018-06-11 PROCEDURE — 99999 PR PBB SHADOW E&M-EST. PATIENT-LVL I: CPT | Mod: PBBFAC,,, | Performed by: INTERNAL MEDICINE

## 2018-06-11 PROCEDURE — 99214 OFFICE O/P EST MOD 30 MIN: CPT | Mod: S$GLB,,, | Performed by: INTERNAL MEDICINE

## 2018-06-12 ENCOUNTER — OFFICE VISIT (OUTPATIENT)
Dept: ORTHOPEDICS | Facility: CLINIC | Age: 83
End: 2018-06-12
Payer: MEDICARE

## 2018-06-12 VITALS
WEIGHT: 162 LBS | SYSTOLIC BLOOD PRESSURE: 171 MMHG | HEART RATE: 60 BPM | HEIGHT: 62 IN | BODY MASS INDEX: 29.81 KG/M2 | DIASTOLIC BLOOD PRESSURE: 78 MMHG

## 2018-06-12 DIAGNOSIS — S62.025A CLOSED NONDISPLACED FRACTURE OF MIDDLE THIRD OF SCAPHOID BONE OF LEFT WRIST, INITIAL ENCOUNTER: Primary | ICD-10-CM

## 2018-06-12 PROCEDURE — 99999 PR PBB SHADOW E&M-EST. PATIENT-LVL III: CPT | Mod: PBBFAC,,, | Performed by: ORTHOPAEDIC SURGERY

## 2018-06-12 PROCEDURE — 99213 OFFICE O/P EST LOW 20 MIN: CPT | Mod: S$GLB,,, | Performed by: ORTHOPAEDIC SURGERY

## 2018-06-12 PROCEDURE — 3077F SYST BP >= 140 MM HG: CPT | Mod: CPTII,S$GLB,, | Performed by: ORTHOPAEDIC SURGERY

## 2018-06-12 PROCEDURE — 3078F DIAST BP <80 MM HG: CPT | Mod: CPTII,S$GLB,, | Performed by: ORTHOPAEDIC SURGERY

## 2018-06-13 ENCOUNTER — HOSPITAL ENCOUNTER (OUTPATIENT)
Dept: RADIOLOGY | Facility: HOSPITAL | Age: 83
Discharge: HOME | End: 2018-06-13
Attending: SURGERY
Payer: MEDICARE

## 2018-06-13 ENCOUNTER — TELEPHONE (OUTPATIENT)
Dept: ORTHOPEDICS | Facility: CLINIC | Age: 83
End: 2018-06-13

## 2018-06-13 ENCOUNTER — OFFICE VISIT (OUTPATIENT)
Dept: SURGERY | Facility: CLINIC | Age: 83
End: 2018-06-13
Payer: MEDICARE

## 2018-06-13 ENCOUNTER — RESEARCH ENCOUNTER (OUTPATIENT)
Dept: RESEARCH | Facility: HOSPITAL | Age: 83
End: 2018-06-13

## 2018-06-13 VITALS
BODY MASS INDEX: 29.23 KG/M2 | DIASTOLIC BLOOD PRESSURE: 83 MMHG | HEART RATE: 58 BPM | SYSTOLIC BLOOD PRESSURE: 185 MMHG | HEIGHT: 63 IN | TEMPERATURE: 98 F | WEIGHT: 165 LBS

## 2018-06-13 DIAGNOSIS — C50.812 MALIGNANT NEOPLASM OF OVERLAPPING SITES OF LEFT BREAST IN FEMALE, ESTROGEN RECEPTOR POSITIVE: ICD-10-CM

## 2018-06-13 DIAGNOSIS — C50.812 MALIGNANT NEOPLASM OF OVERLAPPING SITES OF LEFT BREAST IN FEMALE, ESTROGEN RECEPTOR POSITIVE: Primary | ICD-10-CM

## 2018-06-13 DIAGNOSIS — Z17.0 MALIGNANT NEOPLASM OF OVERLAPPING SITES OF LEFT BREAST IN FEMALE, ESTROGEN RECEPTOR POSITIVE: ICD-10-CM

## 2018-06-13 DIAGNOSIS — Z17.0 MALIGNANT NEOPLASM OF OVERLAPPING SITES OF LEFT BREAST IN FEMALE, ESTROGEN RECEPTOR POSITIVE: Primary | ICD-10-CM

## 2018-06-13 PROCEDURE — 99999 PR PBB SHADOW E&M-EST. PATIENT-LVL III: CPT | Mod: PBBFAC,,, | Performed by: SURGERY

## 2018-06-13 PROCEDURE — 77065 DX MAMMO INCL CAD UNI: CPT | Mod: TC,PO,LT

## 2018-06-13 PROCEDURE — 77061 BREAST TOMOSYNTHESIS UNI: CPT | Mod: TC,PO,LT

## 2018-06-13 PROCEDURE — 77065 DX MAMMO INCL CAD UNI: CPT | Mod: 26,LT,, | Performed by: RADIOLOGY

## 2018-06-13 PROCEDURE — 76642 ULTRASOUND BREAST LIMITED: CPT | Mod: 26,LT,, | Performed by: RADIOLOGY

## 2018-06-13 PROCEDURE — 76642 ULTRASOUND BREAST LIMITED: CPT | Mod: TC,PO,LT

## 2018-06-13 PROCEDURE — 77061 BREAST TOMOSYNTHESIS UNI: CPT | Mod: 26,LT,, | Performed by: RADIOLOGY

## 2018-06-13 PROCEDURE — 3079F DIAST BP 80-89 MM HG: CPT | Mod: CPTII,S$GLB,, | Performed by: SURGERY

## 2018-06-13 PROCEDURE — 3077F SYST BP >= 140 MM HG: CPT | Mod: CPTII,S$GLB,, | Performed by: SURGERY

## 2018-06-13 PROCEDURE — 99215 OFFICE O/P EST HI 40 MIN: CPT | Mod: S$GLB,,, | Performed by: SURGERY

## 2018-06-13 RX ORDER — LETROZOLE 2.5 MG/1
1 TABLET, FILM COATED ORAL
COMMUNITY
Start: 2018-01-26 | End: 2018-07-18 | Stop reason: SDUPTHER

## 2018-06-13 NOTE — PROGRESS NOTES
The patient and her friend/family member were approached by me in [facility] regarding participation in [employer] ([id number] or [id number]) study (IRB#[id number]). They were agreeable.    The Informed Consent Form (ICF) was reviewed with pt and her friend/family member. The discussion included:    - participation is voluntary;  - tissue will be collected from Pathology after routine tests have been conducted;   - pt can change her mind about participating at any time;  - if she changes her mind about participation, she can call us at contact info in the ICF, and we will discard samples remaining;  - samples that have been used prior to her notification will still be included in research;  - specimens will be stored with unique code that can only be linked to pt by [employer] staff;  - all medical information released to researchers will be stripped of identifiers;  - samples will not be released to outside researchers unless approved by internal committee;  - there is a small risk of loss of confidentiality, but we make every effort to ensure privacy;  - no other physical risks outside of those involved in standard of care procedure.    Dr. Caballero approved of the patient's participation and will continue to take care of the patient per her usual protocol - participation in [employer] program will not change the patient's present or future medical care. Pt did not have any questions. Pt willingly and independently signed the ICF. A copy of the signed ICF and my business card were given to pt with instructions to call with any questions that may arise or if she should change her mind regarding participation in [employer] program.

## 2018-06-13 NOTE — TELEPHONE ENCOUNTER
VM left for pt daughter in regards to message below. Per Lynette if pt is taking medication for her back she needs to get refill from providers she sees for her back. I will fwd message to those providers as well.

## 2018-06-13 NOTE — PROGRESS NOTES
Breast Surgery  [facility]  Department of Surgery      REFERRING PROVIDER: No referring provider defined for this encounter.    Chief Complaint:     Subjective:      Patient ID: Anahi Cook is a 83 y.o. female who presents with  L breast cancer with node positivity. There are two breast masses adjacent (may be one mass), at 12OC, path with IDC, grade II, ER+KS+H2N- with rashaad metastasis.  In addition, she is seen by Dr. Chairez for a pelvic mass that needs to be removed.    Patient does not routinely do self breast exams.  Patient has noted a change on breast exam.  Patient denies nipple discharge. Patient denies to previous breast biopsy. Patient denies a personal history of breast cancer.    Findings at that time were the following:   Tumor size: 2.3  + 1.4 cm   Tumor rdgrdrrdarddrderd:rd rd3rd Estrogen Receptor: +   Progesterone Receptor: +   Her-2 jessica: -   Lymph node status: +   Lymphatic invasion: unknown     Interval history:  Patient underwent genetic testing which was negative. She also underwent bilateral salpingo-oophorectomy for ovarian mass on 18 which was benign. She is also being followed by Dr. Harris in medical oncology and was started on letrozole in March. She states she is tolerating it well, had some hot flashes initially but these have resolved. States she can no longer feel the left breast lump.    GYN History:  Age of menarche was 12. Age of menopause was 32 with partial hysterectomy, took HRT until her 70s.    Patient is . Age of first live birth was 19.     Past Medical History:   Diagnosis Date    Anxiety     Back pain     Breast cancer 2018    Breast mass 1/15/2018    Chronic kidney disease, stage 2, mildly decreased GFR     COPD (chronic obstructive pulmonary disease)     emphysema    Depression     Dysuria 2018    Family history of breast cancer 2018    Hypertension     Osteoporosis, senile     Ovarian mass 1/15/2018    Pneumonia     S/P robotic assisted  laparoscopic BSO (bilateral salpingo-oophorectomy) 2/23/2018     Past Surgical History:   Procedure Laterality Date    CHOLECYSTECTOMY      EYE SURGERY      HYSTERECTOMY      HI REMOVAL OF OVARY/TUBE(S)  02/23/2018    Robotic Assisted    ROTATOR CUFF REPAIR Right     rotator cuff repair right shoulder    TONSILLECTOMY       Current Outpatient Prescriptions on File Prior to Visit   Medication Sig Dispense Refill    acetaminophen (TYLENOL) 325 MG tablet Take 325 mg by mouth every 6 (six) hours as needed for Pain.      albuterol (PROAIR HFA) 90 mcg/actuation inhaler Inhale 1-2 puffs into the lungs every 6 (six) hours as needed for Wheezing or Shortness of Breath.      albuterol 2.5 mg /3 mL (0.083 %) Nebu 3 mL, albuterol 5 mg/mL Nebu 0.5 mL Use one vial in nebulizer three times daily as needed for shortness of breath or wheezing      albuterol-ipratropium 2.5mg-0.5mg/3mL (DUO-NEB) 0.5 mg-3 mg(2.5 mg base)/3 mL nebulizer solution       atorvastatin (LIPITOR) 20 MG tablet Take 20 mg by mouth once daily.      benazepril (LOTENSIN) 10 MG tablet Take 20 mg by mouth once daily.       calcitonin, salmon, (FORTICAL) 200 unit/actuation nasal spray INSERT 1 SPRAY IN NOSTRIL EVERY DAY 11.1 mL 0    calcitonin, salmon, (FORTICAL) 200 unit/actuation nasal spray INSERT 1 SPRAY IN NOSTRIL EVERY DAY 3.7 mL 0    calcium-vitamin D 250-100 mg-unit per tablet Take 1 tablet by mouth 2 (two) times daily.      CETIRIZINE HCL (ZYRTEC ORAL) Take 10 mg by mouth as needed.       citalopram (CELEXA) 20 MG tablet Take 20 mg by mouth once daily.      felodipine (PLENDIL) 10 MG 24 hr tablet Take 20 mg by mouth once daily.       fluticasone-vilanterol (BREO) 100-25 mcg/dose diskus inhaler Inhale 1 puff into the lungs once daily. 1 each 3    INCRUSE ELLIPTA 62.5 mcg/actuation DsDv USE 1 PUFF QD AT SAME TIME  (patient takes at night)  2    multivitamin (THERAGRAN) per tablet Take 1 tablet by mouth once daily.      omeprazole  (PRILOSEC) 20 MG capsule       predniSONE (DELTASONE) 20 MG tablet Take 20 mg by mouth once daily.       torsemide (DEMADEX) 20 MG Tab Take 20 mg by mouth once daily.      traMADol (ULTRAM) 50 mg tablet Take 1 tablet (50 mg total) by mouth every 6 (six) hours as needed for Pain (moderate to severe pain). 28 tablet 0     No current facility-administered medications on file prior to visit.      Social History     Social History    Marital status: Single     Spouse name: N/A    Number of children: 3    Years of education: N/A     Occupational History    Multiple jobs, see social documentation section      Retired     Social History Main Topics    Smoking status: Former Smoker     Packs/day: 1.00     Years: 40.00     Types: Cigarettes    Smokeless tobacco: Never Used      Comment: Smoked >1 ppd for at least 40 years, quit around     Alcohol use Yes      Comment: occasional glass of wine or cocktail    Drug use: No    Sexual activity: Yes     Partners: Male     Other Topics Concern    Not on file     Social History Narrative    Worked many jobs while raising 3 children.  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 [employer]'s office under Sidney Barthelemy.  She was  from her first , but took care of him at the end of his life.     Family History   Problem Relation Age of Onset    Heart failure Mother     Kidney failure Mother     Heart attack Father     Breast cancer Sister 66        Lump, XRT, chemo, recurrence 10 years later, still alive.    Cancer Brother         leukemia    Heart attack Brother 58    Pulmonary embolism Brother 45    Heart attack Brother 52    Breast cancer Maternal Grandmother 60        advanced at diagnosis    Breast cancer Maternal Aunt 83         at 92        Review of Systems   Constitutional: Negative for appetite change, chills, fever and unexpected weight change.   HENT: Negative for facial  "swelling, postnasal drip and sore throat.    Eyes: Negative for redness and itching.   Respiratory: Negative for chest tightness and shortness of breath.    Cardiovascular: Negative for chest pain and palpitations.   Gastrointestinal: Negative for blood in stool, diarrhea, nausea and vomiting.   Genitourinary: Negative for difficulty urinating and dysuria.   Musculoskeletal: Negative for arthralgias and joint swelling.   Skin: Negative for rash and wound.   Neurological: Negative for dizziness and syncope.   Hematological: Negative for adenopathy.   Psychiatric/Behavioral: Negative for agitation. The patient is not nervous/anxious.      Objective:   BP (!) 185/83 (BP Location: Right arm, Patient Position: Sitting, BP Method: Medium (Automatic))   Pulse (!) 58   Temp 97.8 °F (36.6 °C) (Oral)   Ht 5' 3" (1.6 m)   Wt 74.8 kg (165 lb)   BMI 29.23 kg/m²     Physical Exam   Constitutional: She appears well-developed and well-nourished.   HENT:   Head: Normocephalic.   Eyes: No scleral icterus.   Neck: Neck supple. No tracheal deviation present.   Cardiovascular: Normal rate and regular rhythm.    Pulmonary/Chest: No respiratory distress. Right breast exhibits no inverted nipple, no mass, no nipple discharge and no skin change. Left breast exhibits no inverted nipple, no mass, no nipple discharge and no skin change.       Abdominal: Soft. She exhibits no mass. There is no tenderness.   Musculoskeletal: She exhibits no edema.   Lymphadenopathy:     She has no cervical adenopathy.   Neurological: She is alert.   Skin: No rash noted. No erythema.     Psychiatric: She has a normal mood and affect.       Radiology review: Images personally reviewed by me in the clinic.   Mammogram:Mammo Digital Diagnostic Bilat with Tomosynthesis_CAD  Left  1. Mass: There is a 17 mm irregularly shaped mass with spiculated margins seen in the central region of the left breast in the middle depth.     2. Mass: There is an irregularly shaped " mass with obscured margins seen in the left breast, 0 cm from the nipple.      These two mass are approximately 5 cm apart from medial to lateral on the mammogram.      3. Lymph Node: There is a 15 mm lymph node seen in the left axilla.      US Breast Left Limited  Left  1. Mass: There is a 23 mm x 9 mm x 17 mm irregularly shaped, hypoechoic mass with spiculated margins seen in the left breast at 11 o'clock, 3 cm from the nipple. The mass correlates with today's mammogram finding. Associated features include internal vascularity.      2. Mass: There is a 12 mm x 10 mm x 14 mm irregularly shaped, heterogeneous mass with angular margins seen in the left breast at 10 o'clock, 4 cm from the nipple. The mass correlates with today's mammogram finding.      3. Lymph Node: There are multiple similar 8 mm x 10 mm x 10 mm lymph nodes with angular margins seen in the left axilla.      Right  There is no evidence of suspicious masses, calcifications, or other abnormal findings.     Impression:  Left  1.  Mass: Left breast 23 mm x 9 mm x 17 mm mass at the 11 o'clock position. Assessment: 5 - Highly suggestive of malignancy. Biopsy is recommended.      2.  Mass: Left breast 12 mm x 10 mm x 14 mm mass at the 10 o'clock position. Assessment: 5 - Highly suggestive of malignancy. Biopsy is recommended.     3.  Lymph Node: Left axilla 8 mm x 10 mm x 10 mm lymph node (multiple findings). Assessment: 4C - Suspicious finding. Biopsy is recommended.      Right  There is no mammographic or sonographic evidence of malignancy.     The findings and recommendations were discussed in detail with the patient.      BI-RADS Category:   Overall: 5 - Highly Suggestive of Malignancy        The patient's estimated lifetime risk of breast cancer (to age 85) based on Tyrer-Cuzick - 7 risk assessment model is: Tyrer-Cuzick: 0.83 %. According to the American Cancer Society,  patients with a lifetime breast cancer risk of 20% or higher might benefit from  supplemental screening tests.        PATH:  Ancillary studies- (Performed on biopsy specimen [id number], Block 2)  ER: Positive (Strong, % of tumor cell nuclei)  NM: Positive (Intermediate to strong, 80-90% of tumor cell nuclei)  HER2: Negative    .  FINAL PATHOLOGIC DIAGNOSIS  1. FIBROADIPOSE TISSUE: METASTATIC ADENOCARCINOMA.  Note: Lymphoid tissue is not present within the specimen. The possibility that this may represent a primary lesion  cannot be excluded. Clinical correlation is necessary.    2. BREAST (LEFT NEEDLE BIOPSIES): INVASIVE DUCTAL CARCINOMA.  Note: The tumor has a maximum 8 mm linear dimension and is moderately differentiated by Calderon Wolff  criteria (3, 2, 1). A small area of benign breast tissue is present within the specimen . Hormone receptor assays will  be performed and results will be issued as a Supplemental report.    Assessment:       84 y/o female with T2N1 breast cancer that is ++- on letrozole  Plan:     Options for management were discussed with the patient and her family. We reviewed the existing data noting the equivalency of breast conserving surgery with radiation therapy and mastectomy. We also reviewed the guidelines of the National Comprehensive Cancer Network for Stage II-III breast carcinoma. We discussed the need for lumpectomy margins to be negative for carcinoma, the necessity for postoperative radiation therapy after breast conservation in most cases, the possibility of a failed or false negative sentinel lymph node biopsy and the potential need for complete lymphadenectomy for a failed or positive sentinel lymph node biopsy were fully discussed. In the setting of mastectomy, delayed or immediate reconstruction options are available and were discussed.     In the setting of lumpectomy, radiation therapy would be recommended majority of the time.  The duration and treatment side effects were discussed with the patient.  This will coordinated with the radiation  oncologist pending final pathology.    We also discussed the role of systemic therapy in the treatment of early stage breast cancer.  We discussed that this is based on tumor biology and rashaad status and will be determined based on final pathology.  We discussed that if the cancer is hormone positive, endocrine therapy would be recommended in most cases and its use can reduce the risk of recurrence as well as improve survival. Side effects of treatment were briefly discussed. We also discussed the potential role for chemotherapy based on a number of factors such as tumor phenotype (ER+ vs. triple negative vs. [id number]+) and this would be determined in coordination with the medical oncologist.    Continue following with Dr. Harris for letrozole, will plan on left partial mastectomy with sentinel lymph node biopsy, possible axillary dissection.  Adjuvant radiation will be recommended.  We discussed the ALLIANCE trial but endocrine therapy in the neoadjuvant setting is not included on this trial so she does not meet criteria.  She will need medical clearance prior to surgery given her history of COPD.  Will have her back to clinic prior to surgery to assure that we can visualize both clips on US.    Patient was educated on breast cancer, receptors, wire localization lumpectomy, mastectomy, sentinel lymph node mapping and biopsy, axillary lymph node dissection, reconstruction, breast prosthesis with post-mastectomy bra and radiation therapy. Patient was given patient information binder [facility].  All her questions were answered.    Total time spent with the patient: 60 minutes.  45 minutes of face to face consultation and 15 minutes of chart review and coordination of care.

## 2018-06-13 NOTE — Clinical Note
Will plan for surgery in the next few weeks.  She would like to go for a lumpectomy.  Glad the AI worked well for her!  Amanda

## 2018-06-13 NOTE — TELEPHONE ENCOUNTER
----- Message from Karlene Escalante sent at 6/13/2018 10:50 AM CDT -----  Contact: Daughter/Patience  Called requesting a refill for traMADol (ULTRAM) 50 mg tablet and stated that her mom is currently taking this medication for her back and she will be out in a few days. Would like it called in to [facility] [address] [place], [place] [address]ANGELA HSIEH AT [facility] [address],[phone]. Daughter would like a call back once the medication has been called into the pharmacy at [phone]

## 2018-06-14 RX ORDER — TRAMADOL HYDROCHLORIDE 50 MG/1
50 TABLET ORAL EVERY 6 HOURS PRN
Qty: 28 TABLET | Refills: 0 | Status: SHIPPED | OUTPATIENT
Start: 2018-06-14 | End: 2018-07-11

## 2018-06-14 NOTE — PROGRESS NOTES
CHIEF COMPLAINT:  Cast check.    HISTORY OF PRESENT ILLNESS:  Ms. Cook is an 83-year-old female.  She is status   post left wrist fracture.  She has been treated in a cast.  Specifically,   scaphoid fracture we had bone stimulator placed and she is here just for a cast   check.  She did not complain of any pain at this time.  She states she had ____   the cast herself and put Band-Aid on some of the sharper areas.  It does not   bother her underneath the cast.  She does not feel like it is loose and she   feels like her skin is okay.    PHYSICAL EXAMINATION:  Her cast is clean, dry and intact.  The areas where the   skin is exposed around the cast were assessed and showed that it was in good   condition.  There are no ulcers.  ____    PLAN:  Considering that she just started bone stimulator, I would like to see   her back in roughly three weeks with x-rays out of cast.      LES/HN  dd: 06/13/2018 16:00:31 (CDT)  td: 06/14/2018 13:42:55 (CDT)  Doc ID   #[id number]  Job ID #[id number]    CC:

## 2018-06-18 DIAGNOSIS — C50.812 MALIGNANT NEOPLASM OF OVERLAPPING SITES OF LEFT BREAST IN FEMALE, ESTROGEN RECEPTOR POSITIVE: Primary | ICD-10-CM

## 2018-06-18 DIAGNOSIS — Z17.0 MALIGNANT NEOPLASM OF OVERLAPPING SITES OF LEFT BREAST IN FEMALE, ESTROGEN RECEPTOR POSITIVE: Primary | ICD-10-CM

## 2018-06-26 ENCOUNTER — OFFICE VISIT (OUTPATIENT)
Dept: ORTHOPEDICS | Facility: CLINIC | Age: 83
End: 2018-06-26
Payer: MEDICARE

## 2018-06-26 ENCOUNTER — HOSPITAL ENCOUNTER (OUTPATIENT)
Dept: RADIOLOGY | Facility: OTHER | Age: 83
Discharge: HOME | End: 2018-06-26
Attending: ORTHOPAEDIC SURGERY
Payer: MEDICARE

## 2018-06-26 VITALS
SYSTOLIC BLOOD PRESSURE: 154 MMHG | WEIGHT: 165 LBS | BODY MASS INDEX: 29.23 KG/M2 | HEIGHT: 63 IN | HEART RATE: 71 BPM | DIASTOLIC BLOOD PRESSURE: 79 MMHG

## 2018-06-26 DIAGNOSIS — S62.025A CLOSED NONDISPLACED FRACTURE OF MIDDLE THIRD OF SCAPHOID BONE OF LEFT WRIST, INITIAL ENCOUNTER: Primary | ICD-10-CM

## 2018-06-26 DIAGNOSIS — S62.025A CLOSED NONDISPLACED FRACTURE OF MIDDLE THIRD OF SCAPHOID BONE OF LEFT WRIST, INITIAL ENCOUNTER: ICD-10-CM

## 2018-06-26 DIAGNOSIS — S62.025S CLOSED NONDISPLACED FRACTURE OF MIDDLE THIRD OF SCAPHOID BONE OF LEFT WRIST, SEQUELA: Primary | ICD-10-CM

## 2018-06-26 PROCEDURE — 73110 X-RAY EXAM OF WRIST: CPT | Mod: 26,LT,, | Performed by: RADIOLOGY

## 2018-06-26 PROCEDURE — 99214 OFFICE O/P EST MOD 30 MIN: CPT | Mod: S$GLB,,, | Performed by: ORTHOPAEDIC SURGERY

## 2018-06-26 PROCEDURE — 73110 X-RAY EXAM OF WRIST: CPT | Mod: TC,FY,LT

## 2018-06-26 PROCEDURE — 99999 PR PBB SHADOW E&M-EST. PATIENT-LVL III: CPT | Mod: PBBFAC,,, | Performed by: ORTHOPAEDIC SURGERY

## 2018-06-26 PROCEDURE — 3077F SYST BP >= 140 MM HG: CPT | Mod: CPTII,S$GLB,, | Performed by: ORTHOPAEDIC SURGERY

## 2018-06-26 PROCEDURE — 3078F DIAST BP <80 MM HG: CPT | Mod: CPTII,S$GLB,, | Performed by: ORTHOPAEDIC SURGERY

## 2018-06-26 NOTE — PROGRESS NOTES
Subjective:      Patient ID: Anahi Cook is a 83 y.o. female.    Chief Complaint: Pain of the Left Wrist      HPI  Anahi Cook is a 83 y.o. female presenting today for left scaphoid fracture. Pt had a fall 5/2/18. She slipped and fell onto the left wrist and back. She was seen in the ED later the same day where she was found to have left scaphoid fracture. She was placed into a splint initially then transitioned into a cast 6/12/18. She is using the bone stimulator. Denies pain.       Review of patient's allergies indicates:  No Known Allergies      Current Outpatient Prescriptions   Medication Sig Dispense Refill    acetaminophen (TYLENOL) 325 MG tablet Take 325 mg by mouth every 6 (six) hours as needed for Pain.      albuterol (PROAIR HFA) 90 mcg/actuation inhaler Inhale 1-2 puffs into the lungs every 6 (six) hours as needed for Wheezing or Shortness of Breath.      albuterol 2.5 mg /3 mL (0.083 %) Nebu 3 mL, albuterol 5 mg/mL Nebu 0.5 mL Use one vial in nebulizer three times daily as needed for shortness of breath or wheezing      albuterol-ipratropium 2.5mg-0.5mg/3mL (DUO-NEB) 0.5 mg-3 mg(2.5 mg base)/3 mL nebulizer solution       atorvastatin (LIPITOR) 20 MG tablet Take 20 mg by mouth once daily.      benazepril (LOTENSIN) 10 MG tablet Take 20 mg by mouth once daily.       calcium-vitamin D 250-100 mg-unit per tablet Take 1 tablet by mouth 2 (two) times daily.      CETIRIZINE HCL (ZYRTEC ORAL) Take 10 mg by mouth as needed.       citalopram (CELEXA) 20 MG tablet Take 20 mg by mouth once daily.      felodipine (PLENDIL) 10 MG 24 hr tablet Take 20 mg by mouth once daily.       fluticasone-vilanterol (BREO) 100-25 mcg/dose diskus inhaler Inhale 1 puff into the lungs once daily. 1 each 3    INCRUSE ELLIPTA 62.5 mcg/actuation DsDv USE 1 PUFF QD AT SAME TIME  (patient takes at night)  2    letrozole (FEMARA) 2.5 mg Tab Take 1 tablet by mouth.      multivitamin (THERAGRAN) per tablet Take 1 tablet by  "mouth once daily.      omeprazole (PRILOSEC) 20 MG capsule       predniSONE (DELTASONE) 20 MG tablet Take 20 mg by mouth once daily.       torsemide (DEMADEX) 20 MG Tab Take 20 mg by mouth once daily.      traMADol (ULTRAM) 50 mg tablet Take 1 tablet (50 mg total) by mouth every 6 (six) hours as needed for Pain (moderate to severe pain). 28 tablet 0     No current facility-administered medications for this visit.        Past Medical History:   Diagnosis Date    Anxiety     Back pain     Breast cancer 1/17/2018    Breast mass 1/15/2018    Chronic kidney disease, stage 2, mildly decreased GFR     COPD (chronic obstructive pulmonary disease)     emphysema    Depression     Dysuria 1/29/2018    Family history of breast cancer 1/17/2018    Hypertension     Osteoporosis, senile     Ovarian mass 1/15/2018    Pneumonia     S/P robotic assisted laparoscopic BSO (bilateral salpingo-oophorectomy) 2/23/2018       Past Surgical History:   Procedure Laterality Date    CHOLECYSTECTOMY      EYE SURGERY      HYSTERECTOMY      [place] REMOVAL OF OVARY/TUBE(S)  02/23/2018    Robotic Assisted    ROTATOR CUFF REPAIR Right     rotator cuff repair right shoulder    TONSILLECTOMY         Review of Systems:  Constitutional: Negative for chills and fever.   Respiratory: Negative for cough and shortness of breath.    Gastrointestinal: Negative for nausea and vomiting.   Skin: Negative for rash.   Neurological: Negative for dizziness and headaches.   Psychiatric/Behavioral: Negative for depression.   MSK as in HPI       OBJECTIVE:     PHYSICAL EXAM:  BP (!) 154/79   Pulse 71   Ht 5' 3" (1.6 m)   Wt 74.8 kg (165 lb)   LMP  (LMP Unknown)   BMI 29.23 kg/m²     GEN:  NAD, well-developed, well-groomed.  NEURO: Awake, alert, and oriented. Normal attention and concentration.    PSYCH: Normal mood and affect. Behavior is normal.  HEENT: No cervical lymphadenopathy noted.  CARDIOVASCULAR: Radial pulses 2+ bilaterally. No LE " edema noted.  PULMONARY: Breath sounds normal. No respiratory distress.  SKIN: Intact, no rashes.      MSK:   LUE:  Cast removed. Good active ROM fingers. No significant ttp fracture site. AIN/PIN/Radial/Median/Ulnar Nerves assessed in isolation without deficit. Radial & Ulnar arteries palpated 2+. Capillary Refill <3s.       RADIOGRAPHS:  Xray left wrist 6/26/18  FINDINGS:  Scaphoid waist fracture more apparent compared to prior.  There is continue widening scapholunate interval.  Advanced degenerative change of the 1st carpometacarpal joint.  With degenerative change of the radiocarpal joint and questionable ulnar positive.  Prominent overlying cast overlies the wrist in visual distal forearm with unusual superimposed metallic device projecting over the lateral wrist soft tissues.    Comments: I have personally reviewed the imaging and I agree with the above radiologist's report.    ASSESSMENT/PLAN:       ICD-10-CM ICD-9-CM   1. Closed nondisplaced fracture of middle third of scaphoid bone of left wrist, initial encounter S62.025A 814.01        Plan:   -pt transitioned to bracing today   -continue bone stimulator   -RTC 6 wks with xrays       The patient indicates understanding of these issues and agrees to the plan.    Nuria Holloway PA-C  Hand Clinic   [facility]

## 2018-06-28 RX ORDER — CALCITONIN SALMON 200 [IU]/.09ML
SPRAY, METERED NASAL
Qty: 3.7 ML | Refills: 0 | OUTPATIENT
Start: 2018-06-28

## 2018-06-29 NOTE — PROGRESS NOTES
I have personally taken the history and examined the patient. I agree with the Hand Surgery PA's note. The plan will be bone stim, brace. Pt NTTP over scaphoid, xrays show interval healing. PLan for cont bone stim. RTC 6 weeks with xrays

## 2018-07-02 ENCOUNTER — TELEPHONE (OUTPATIENT)
Dept: SURGERY | Facility: CLINIC | Age: 83
End: 2018-07-02

## 2018-07-02 NOTE — TELEPHONE ENCOUNTER
Return call to pt after receiving a voice mail Friday PM 6/29/18 after business hours. Pt asks if she can proceed with her scheduled pain injection 7/10/18. Pt due to have breast surgery w/Dr Caballero 7/12/18. Per Dr Caballero, pt may have her pain injection as scheduled 7/10/18. Pt voiced understanding.

## 2018-07-05 ENCOUNTER — HOSPITAL ENCOUNTER (OUTPATIENT)
Dept: RADIOLOGY | Facility: HOSPITAL | Age: 83
Discharge: HOME | End: 2018-07-05
Attending: SURGERY
Payer: MEDICARE

## 2018-07-05 DIAGNOSIS — C50.919 BREAST CANCER: ICD-10-CM

## 2018-07-05 PROCEDURE — A4648 IMPLANTABLE TISSUE MARKER: HCPCS | Mod: PO

## 2018-07-05 PROCEDURE — 19281 PERQ DEVICE BREAST 1ST IMAG: CPT | Mod: LT,,, | Performed by: RADIOLOGY

## 2018-07-06 ENCOUNTER — TELEPHONE (OUTPATIENT)
Dept: PAIN MEDICINE | Facility: CLINIC | Age: 83
End: 2018-07-06

## 2018-07-06 NOTE — TELEPHONE ENCOUNTER
----- Message from Lashawn Villarreal sent at 7/6/2018 11:33 AM CDT -----  Hello    Please advise if patients upcoming procedure is medically urgent  as its still pending auth with the ins comp?

## 2018-07-06 NOTE — TELEPHONE ENCOUNTER
Please see response below from Sofia Schultz NP    As per Sofia Schultz, patient was contacted to inform her ASHUTOSH procedure has to be cancelled on 07/09/18 due to her having breast surgery on 07/12/18.    Patient was also informed to call and reschedule the ASHUTOSH 4-6 weeks after her breast surgery.    Message   Received: Today   Message Contents   Sofia Schultz, LORAINE Lugo RN   Caller: Unspecified (Today, 11:50 AM)             We need to wait for auth.  Also, it looks like she is having a mastectomy next week.  She cannot have steroid injection 2 weeks prior to this regardless.

## 2018-07-06 NOTE — TELEPHONE ENCOUNTER
Please see message below, review and advise. Thank you.    Patient is scheduled for a Lumbar IL ASHUTOSH L4-5 on 07/09/18.

## 2018-07-11 ENCOUNTER — ANESTHESIA EVENT (OUTPATIENT)
Dept: SURGERY | Facility: HOSPITAL | Age: 83
End: 2018-07-11
Payer: MEDICARE

## 2018-07-11 ENCOUNTER — TELEPHONE (OUTPATIENT)
Dept: SURGERY | Facility: CLINIC | Age: 83
End: 2018-07-11

## 2018-07-11 NOTE — ANESTHESIA PREPROCEDURE EVALUATION
[facility]  Anesthesia Pre-Operative Evaluation         Patient Name: Anahi Cook  YOB: 1934  MRN: [id number]    SUBJECTIVE:     Pre-operative evaluation for Procedure(s) (LRB):  MASTECTOMY, PARTIAL-W/SEED LOCALIZATION (Left)  INJECTION, FOR SENTINEL NODE IDENTIFICATION (Left)  BIOPSY, LYMPH NODE, SENTINEL (Left)  LYMPHADENECTOMY, AXILLARY (Left)     07/11/2018    Anahi Cook is a 83 y.o. female w/ a significant PMHx of HTN, COPD, CKD Stage 2, chronic pain and Left breast invasive ductal carcinoma.     Patient now presents for the above procedure(s).      LDA: None documented.    Prev airway:   Present Prior to Hospital Arrival?: No; Method of Intubation: Direct laryngoscopy; Inserted by: CRNA; Airway Device: Endotracheal Tube; Mask Ventilation: Easy; Intubated: Postinduction; Blade: Claire #3; Airway Device Size: 7.5; Style: Cuffed; Cuff Inflation: Minimal occlusive pressure; Inflation Amount: 3; Placement Verified By: Auscultation, Capnometry; Grade: Grade I; Complicating Factors: None; Intubation Findings: Positive EtCO2, Bilateral breath sounds, Atraumatic/Condition of teeth unchanged;  Depth of Insertion: 21; Securment: Lips; Complications: None; Breath Sounds: Equal Bilateral; Insertion Attempts: 1; Removal Date: 02/23/18;  Removal Time: 0854    Drips: None documented.      Patient Active Problem List   Diagnosis    Lumbar radiculopathy    Trochanteric bursitis of left hip    Iliotibial band tendonitis    COPD exacerbation    Pneumonia, organism unspecified(486)    Essential hypertension    Hyperglycemia, unspecified    Hypoxemia    Ovarian mass    Breast mass    Breast cancer    Family history of breast cancer    Hormone receptor positive breast cancer, left    Dysuria    Chronic pain    S/P robotic assisted laparoscopic BSO (bilateral salpingo-oophorectomy)    Use of aromatase inhibitors       Review of patient's allergies indicates:   Allergen Reactions     Levaquin [levofloxacin] Itching    Penicillins Itching       Current Inpatient Medications:      No current facility-administered medications on file prior to encounter.      Current Outpatient Prescriptions on File Prior to Encounter   Medication Sig Dispense Refill    albuterol (PROAIR HFA) 90 mcg/actuation inhaler Inhale 1-2 puffs into the lungs every 6 (six) hours as needed for Wheezing or Shortness of Breath.      albuterol-ipratropium 2.5mg-0.5mg/3mL (DUO-NEB) 0.5 mg-3 mg(2.5 mg base)/3 mL nebulizer solution       atorvastatin (LIPITOR) 20 MG tablet Take 20 mg by mouth nightly.       benazepril (LOTENSIN) 10 MG tablet Take 20 mg by mouth once daily.       calcium-vitamin D 250-100 mg-unit per tablet Take 1 tablet by mouth 2 (two) times daily.      CETIRIZINE HCL (ZYRTEC ORAL) Take 10 mg by mouth nightly as needed.       citalopram (CELEXA) 20 MG tablet Take 20 mg by mouth nightly.       fluticasone-vilanterol (BREO) 100-25 mcg/dose diskus inhaler Inhale 1 puff into the lungs once daily. 1 each 3    INCRUSE ELLIPTA 62.5 mcg/actuation DsDv USE 1 PUFF QD AT SAME TIME  (patient takes at night)  2    letrozole (FEMARA) 2.5 mg Tab Take 1 tablet by mouth.      multivitamin (THERAGRAN) per tablet Take 1 tablet by mouth once daily.      omeprazole (PRILOSEC) 20 MG capsule       torsemide (DEMADEX) 20 MG Tab Take 20 mg by mouth once daily.      acetaminophen (TYLENOL) 325 MG tablet Take 325 mg by mouth every 6 (six) hours as needed for Pain.      felodipine (PLENDIL) 10 MG 24 hr tablet Take 20 mg by mouth once daily.          Past Surgical History:   Procedure Laterality Date    CHOLECYSTECTOMY      EYE SURGERY      HYSTERECTOMY      [place] REMOVAL OF OVARY/TUBE(S)  02/23/2018    Robotic Assisted    ROTATOR CUFF REPAIR Right     rotator cuff repair right shoulder    TONSILLECTOMY         Social History     Social History    Marital status: Single     Spouse name: N/A    Number of children: 3     Years of education: N/A     Occupational History    Multiple jobs, see social documentation section      Retired     Social History Main Topics    Smoking status: Former Smoker     Packs/day: 1.00     Years: 40.00     Types: Cigarettes    Smokeless tobacco: Never Used      Comment: Smoked >1 ppd for at least 40 years, quit around 1995    Alcohol use Yes      Comment: occasional glass of wine or cocktail    Drug use: No    Sexual activity: Yes     Partners: Male     Other Topics Concern    Not on file     Social History Narrative    Worked many jobs while raising 3 children.  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  She was  from her first , but took care of him at the end of his life.       OBJECTIVE:     Vital Signs Range (Last 24H):         CBC:   No results for input(s): WBC, RBC, HGB, HCT, PLT, MCV, MCH, MCHC in the last 72 hours.    CMP: No results for input(s): NA, K, CL, CO2, BUN, CREATININE, GLU, MG, PHOS, CALCIUM, ALBUMIN, PROT, ALKPHOS, ALT, AST, BILITOT in the last 72 hours.    INR:  No results for input(s): PT, INR, PROTIME, APTT in the last 72 hours.    Diagnostic Studies: No relevant studies.    EKG:   Vent. Rate : 054 BPM     Atrial Rate : 056 BPM     P-R Int : 152 ms          QRS Dur : 092 ms      QT Int : 418 ms       P-R-T Axes : 078 021 056 degrees     QTc Int : 397 ms    Sinus bradycardia with sinus arrhythmia  Right ventricular conduction delay  Nonspecific T wave abnormality  Abnormal ECG    Confirmed by Garrick Abreu MD ([id number]) on 5/2/2018 6:55:13 PM    2D ECHO:  Results for orders placed or performed during the hospital encounter of 10/12/16   2D echo with color flow doppler   Result Value Ref Range    EF 65 55 - 65    Diastolic Dysfunction No     Est. PA Systolic Pressure 49 (A)     Tricuspid Valve Regurgitation MILD TO MODERATE          ASSESSMENT/PLAN:          Anesthesia Evaluation    I have reviewed the Patient Summary Reports.    I have reviewed the Nursing Notes.   I have reviewed the Medications.     Review of Systems  Anesthesia Hx:  No problems with previous Anesthesia  History of prior surgery of interest to airway management or planning: Denies Family Hx of Anesthesia complications.   Denies Personal Hx of Anesthesia complications.   Social:  Former Smoker    Hematology/Oncology:  Hematology Normal      Current/Recent Cancer. Breast left   EENT/Dental:EENT/Dental Normal   Cardiovascular:   Hypertension    Pulmonary:   COPD    Renal/:   Chronic Renal Disease    Hepatic/GI:  Hepatic/GI Normal    Musculoskeletal:  Musculoskeletal Normal    Neurological:  Neurology Normal    Endocrine:  Endocrine Normal    Dermatological:  Skin Normal    Psych:   depression          Physical Exam  General:  Well nourished    Airway/Jaw/Neck:  Airway Findings: Mouth Opening: Normal Tongue: Large  General Airway Assessment: Adult  Mallampati: IV  Improves to III with phonation.  TM Distance: Normal, at least 6 cm  Jaw/Neck Findings:  Neck ROM: Normal ROM     Eyes/Ears/Nose:  EYES/EARS/NOSE FINDINGS: Normal   Dental:  Dental Findings: Upper Dentures   Chest/Lungs:  Chest/Lungs Clear    Heart/Vascular:  Heart Findings: Normal    Abdomen:  Abdomen Findings: Normal    Musculoskeletal:  Musculoskeletal Findings: Normal   Skin:  Skin Findings: Normal    Mental Status:  Mental Status Findings: Normal        Anesthesia Plan  Type of Anesthesia, risks & benefits discussed:  Anesthesia Type:  general  Patient's Preference:   Intra-op Monitoring Plan: standard ASA monitors  Intra-op Monitoring Plan Comments:   Post Op Pain Control Plan: multimodal analgesia, IV/PO Opioids PRN and per primary service following discharge from PACU  Post Op Pain Control Plan Comments:   Induction:   IV  Beta Blocker:  Patient is not currently on a Beta-Blocker (No further documentation required).        Informed Consent: Patient understands risks and agrees with Anesthesia plan.  Questions answered. Anesthesia consent signed with patient.  ASA Score: 3     Day of Surgery Review of History & Physical:    H&P update referred to the surgeon.         Ready For Surgery From Anesthesia Perspective.

## 2018-07-11 NOTE — PRE-PROCEDURE INSTRUCTIONS
Preop instructions: NPO after midnight, shower instructions, directions, leave all valuables at home, medication instructions for PM prior & am of procedure explained. Patient stated an understanding.     Patient denies any side effects or issues with anesthesia or sedation.   Patient does not know arrival time. Explained that this information comes from the surgeons office and if they have not heard from them by 3pm,  to call office. Patient stated an understanding.

## 2018-07-12 ENCOUNTER — HOSPITAL ENCOUNTER (OUTPATIENT)
Dept: RADIOLOGY | Facility: HOSPITAL | Age: 83
Discharge: HOME | End: 2018-07-12
Attending: SURGERY | Admitting: SURGERY
Payer: MEDICARE

## 2018-07-12 ENCOUNTER — ANESTHESIA (OUTPATIENT)
Dept: SURGERY | Facility: HOSPITAL | Age: 83
End: 2018-07-12
Payer: MEDICARE

## 2018-07-12 ENCOUNTER — HOSPITAL ENCOUNTER (OUTPATIENT)
Facility: HOSPITAL | Age: 83
Discharge: HOME | End: 2018-07-13
Attending: SURGERY | Admitting: SURGERY
Payer: MEDICARE

## 2018-07-12 DIAGNOSIS — C50.919 BREAST CANCER: ICD-10-CM

## 2018-07-12 DIAGNOSIS — C50.912 INFILTRATING DUCTAL CARCINOMA OF BREAST, LEFT: Primary | ICD-10-CM

## 2018-07-12 DIAGNOSIS — Z17.0 MALIGNANT NEOPLASM OF OVERLAPPING SITES OF LEFT BREAST IN FEMALE, ESTROGEN RECEPTOR POSITIVE: ICD-10-CM

## 2018-07-12 DIAGNOSIS — C50.812 MALIGNANT NEOPLASM OF OVERLAPPING SITES OF LEFT BREAST IN FEMALE, ESTROGEN RECEPTOR POSITIVE: ICD-10-CM

## 2018-07-12 PROCEDURE — 25000003 PHARM REV CODE 250: Performed by: STUDENT IN AN ORGANIZED HEALTH CARE EDUCATION/TRAINING PROGRAM

## 2018-07-12 PROCEDURE — D9220A PRA ANESTHESIA: Mod: ,,, | Performed by: ANESTHESIOLOGY

## 2018-07-12 PROCEDURE — 88307 TISSUE EXAM BY PATHOLOGIST: CPT | Performed by: PATHOLOGY

## 2018-07-12 PROCEDURE — 71000033 HC RECOVERY, INTIAL HOUR: Performed by: SURGERY

## 2018-07-12 PROCEDURE — 88360 TUMOR IMMUNOHISTOCHEM/MANUAL: CPT | Mod: 26,,, | Performed by: PATHOLOGY

## 2018-07-12 PROCEDURE — 94761 N-INVAS EAR/PLS OXIMETRY MLT: CPT

## 2018-07-12 PROCEDURE — 88360 TUMOR IMMUNOHISTOCHEM/MANUAL: CPT | Mod: 59 | Performed by: PATHOLOGY

## 2018-07-12 PROCEDURE — 76098 X-RAY EXAM SURGICAL SPECIMEN: CPT | Mod: 26,,, | Performed by: RADIOLOGY

## 2018-07-12 PROCEDURE — 27201423 OPTIME MED/SURG SUP & DEVICES STERILE SUPPLY: Performed by: SURGERY

## 2018-07-12 PROCEDURE — 88307 TISSUE EXAM BY PATHOLOGIST: CPT | Mod: 26,,, | Performed by: PATHOLOGY

## 2018-07-12 PROCEDURE — 88305 TISSUE EXAM BY PATHOLOGIST: CPT | Performed by: PATHOLOGY

## 2018-07-12 PROCEDURE — 88305 TISSUE EXAM BY PATHOLOGIST: CPT | Mod: 26,,, | Performed by: PATHOLOGY

## 2018-07-12 PROCEDURE — 37000009 HC ANESTHESIA EA ADD 15 MINS: Performed by: SURGERY

## 2018-07-12 PROCEDURE — 27200750 HC INSULATED NEEDLE/ STIMUPLEX: Performed by: STUDENT IN AN ORGANIZED HEALTH CARE EDUCATION/TRAINING PROGRAM

## 2018-07-12 PROCEDURE — G0378 HOSPITAL OBSERVATION PER HR: HCPCS

## 2018-07-12 PROCEDURE — 71000039 HC RECOVERY, EACH ADD'L HOUR: Performed by: SURGERY

## 2018-07-12 PROCEDURE — 63600175 PHARM REV CODE 636 W HCPCS: Performed by: ANESTHESIOLOGY

## 2018-07-12 PROCEDURE — A9520 TC99 TILMANOCEPT DIAG 0.5MCI: HCPCS

## 2018-07-12 PROCEDURE — 63600175 PHARM REV CODE 636 W HCPCS: Performed by: STUDENT IN AN ORGANIZED HEALTH CARE EDUCATION/TRAINING PROGRAM

## 2018-07-12 PROCEDURE — 36000707: Performed by: SURGERY

## 2018-07-12 PROCEDURE — 76942 ECHO GUIDE FOR BIOPSY: CPT | Performed by: ANESTHESIOLOGY

## 2018-07-12 PROCEDURE — 76098 X-RAY EXAM SURGICAL SPECIMEN: CPT | Mod: TC,PO

## 2018-07-12 PROCEDURE — 14301 TIS TRNFR ANY 30.1-60 SQ CM: CPT | Mod: ,,, | Performed by: SURGERY

## 2018-07-12 PROCEDURE — 37000008 HC ANESTHESIA 1ST 15 MINUTES: Performed by: SURGERY

## 2018-07-12 PROCEDURE — 63600175 PHARM REV CODE 636 W HCPCS

## 2018-07-12 PROCEDURE — 19301 PARTIAL MASTECTOMY: CPT | Mod: 51,LT,, | Performed by: SURGERY

## 2018-07-12 PROCEDURE — 63600175 PHARM REV CODE 636 W HCPCS: Mod: JW,JG | Performed by: SURGERY

## 2018-07-12 PROCEDURE — C1729 CATH, DRAINAGE: HCPCS | Performed by: SURGERY

## 2018-07-12 PROCEDURE — 64461 PVB THORACIC SINGLE INJ SITE: CPT | Mod: 59,LT,, | Performed by: ANESTHESIOLOGY

## 2018-07-12 PROCEDURE — 88342 IMHCHEM/IMCYTCHM 1ST ANTB: CPT | Mod: 26,59,, | Performed by: PATHOLOGY

## 2018-07-12 PROCEDURE — 36000706: Performed by: SURGERY

## 2018-07-12 PROCEDURE — 14302 TIS TRNFR ADDL 30 SQ CM: CPT | Mod: ,,, | Performed by: SURGERY

## 2018-07-12 RX ORDER — ONDANSETRON 2 MG/ML
INJECTION INTRAMUSCULAR; INTRAVENOUS
Status: DISCONTINUED | OUTPATIENT
Start: 2018-07-12 | End: 2018-07-12

## 2018-07-12 RX ORDER — ONDANSETRON 2 MG/ML
4 INJECTION INTRAMUSCULAR; INTRAVENOUS DAILY PRN
Status: DISCONTINUED | OUTPATIENT
Start: 2018-07-12 | End: 2018-07-12 | Stop reason: HOSPADM

## 2018-07-12 RX ORDER — ONDANSETRON 4 MG/1
4 TABLET, ORALLY DISINTEGRATING ORAL ONCE
Status: DISCONTINUED | OUTPATIENT
Start: 2018-07-12 | End: 2018-07-13 | Stop reason: HOSPADM

## 2018-07-12 RX ORDER — FLUTICASONE FUROATE AND VILANTEROL 100; 25 UG/1; UG/1
1 POWDER RESPIRATORY (INHALATION) DAILY
Status: DISCONTINUED | OUTPATIENT
Start: 2018-07-12 | End: 2018-07-13 | Stop reason: HOSPADM

## 2018-07-12 RX ORDER — ONDANSETRON 8 MG/1
8 TABLET, ORALLY DISINTEGRATING ORAL EVERY 8 HOURS PRN
Status: DISCONTINUED | OUTPATIENT
Start: 2018-07-12 | End: 2018-07-13 | Stop reason: HOSPADM

## 2018-07-12 RX ORDER — SODIUM CHLORIDE 9 MG/ML
INJECTION, SOLUTION INTRAVENOUS CONTINUOUS
Status: DISCONTINUED | OUTPATIENT
Start: 2018-07-12 | End: 2018-07-12

## 2018-07-12 RX ORDER — FENTANYL CITRATE 50 UG/ML
INJECTION, SOLUTION INTRAMUSCULAR; INTRAVENOUS
Status: DISCONTINUED | OUTPATIENT
Start: 2018-07-12 | End: 2018-07-12

## 2018-07-12 RX ORDER — ACETAMINOPHEN 10 MG/ML
INJECTION, SOLUTION INTRAVENOUS
Status: DISCONTINUED | OUTPATIENT
Start: 2018-07-12 | End: 2018-07-12

## 2018-07-12 RX ORDER — CEFAZOLIN SODIUM 1 G/3ML
INJECTION, POWDER, FOR SOLUTION INTRAMUSCULAR; INTRAVENOUS
Status: DISCONTINUED | OUTPATIENT
Start: 2018-07-12 | End: 2018-07-12

## 2018-07-12 RX ORDER — DEXAMETHASONE SODIUM PHOSPHATE 4 MG/ML
INJECTION, SOLUTION INTRA-ARTICULAR; INTRALESIONAL; INTRAMUSCULAR; INTRAVENOUS; SOFT TISSUE
Status: DISCONTINUED | OUTPATIENT
Start: 2018-07-12 | End: 2018-07-12

## 2018-07-12 RX ORDER — BENAZEPRIL HYDROCHLORIDE 20 MG/1
20 TABLET ORAL DAILY
Status: DISCONTINUED | OUTPATIENT
Start: 2018-07-13 | End: 2018-07-13 | Stop reason: HOSPADM

## 2018-07-12 RX ORDER — FELODIPINE 10 MG/1
20 TABLET, EXTENDED RELEASE ORAL DAILY
Status: DISCONTINUED | OUTPATIENT
Start: 2018-07-13 | End: 2018-07-13 | Stop reason: HOSPADM

## 2018-07-12 RX ORDER — CLINDAMYCIN PHOSPHATE 900 MG/50ML
900 INJECTION, SOLUTION INTRAVENOUS
Status: DISCONTINUED | OUTPATIENT
Start: 2018-07-12 | End: 2018-07-12

## 2018-07-12 RX ORDER — TORSEMIDE 20 MG/1
20 TABLET ORAL DAILY
Status: DISCONTINUED | OUTPATIENT
Start: 2018-07-13 | End: 2018-07-13 | Stop reason: HOSPADM

## 2018-07-12 RX ORDER — RAMELTEON 8 MG/1
8 TABLET ORAL NIGHTLY PRN
Status: DISCONTINUED | OUTPATIENT
Start: 2018-07-12 | End: 2018-07-13 | Stop reason: HOSPADM

## 2018-07-12 RX ORDER — GLYCOPYRROLATE 0.2 MG/ML
INJECTION INTRAMUSCULAR; INTRAVENOUS
Status: DISCONTINUED | OUTPATIENT
Start: 2018-07-12 | End: 2018-07-12

## 2018-07-12 RX ORDER — FENTANYL CITRATE 50 UG/ML
25 INJECTION, SOLUTION INTRAMUSCULAR; INTRAVENOUS EVERY 5 MIN PRN
Status: DISCONTINUED | OUTPATIENT
Start: 2018-07-12 | End: 2018-07-12

## 2018-07-12 RX ORDER — ROCURONIUM BROMIDE 10 MG/ML
INJECTION, SOLUTION INTRAVENOUS
Status: DISCONTINUED | OUTPATIENT
Start: 2018-07-12 | End: 2018-07-12

## 2018-07-12 RX ORDER — OXYCODONE AND ACETAMINOPHEN 5; 325 MG/1; MG/1
1 TABLET ORAL EVERY 4 HOURS PRN
Status: DISCONTINUED | OUTPATIENT
Start: 2018-07-12 | End: 2018-07-13 | Stop reason: HOSPADM

## 2018-07-12 RX ORDER — PROPOFOL 10 MG/ML
VIAL (ML) INTRAVENOUS
Status: DISCONTINUED | OUTPATIENT
Start: 2018-07-12 | End: 2018-07-12

## 2018-07-12 RX ORDER — SODIUM CHLORIDE AND POTASSIUM CHLORIDE 150; 900 MG/100ML; MG/100ML
INJECTION, SOLUTION INTRAVENOUS CONTINUOUS
Status: DISCONTINUED | OUTPATIENT
Start: 2018-07-12 | End: 2018-07-13

## 2018-07-12 RX ORDER — ISOSULFAN BLUE 50 MG/5ML
INJECTION, SOLUTION SUBCUTANEOUS
Status: DISCONTINUED | OUTPATIENT
Start: 2018-07-12 | End: 2018-07-12

## 2018-07-12 RX ORDER — EPHEDRINE SULFATE 50 MG/ML
INJECTION, SOLUTION INTRAVENOUS
Status: DISCONTINUED | OUTPATIENT
Start: 2018-07-12 | End: 2018-07-12

## 2018-07-12 RX ORDER — SODIUM CHLORIDE 0.9 % (FLUSH) 0.9 %
3 SYRINGE (ML) INJECTION
Status: DISCONTINUED | OUTPATIENT
Start: 2018-07-12 | End: 2018-07-13 | Stop reason: HOSPADM

## 2018-07-12 RX ORDER — ATORVASTATIN CALCIUM 20 MG/1
20 TABLET, FILM COATED ORAL NIGHTLY
Status: DISCONTINUED | OUTPATIENT
Start: 2018-07-12 | End: 2018-07-13 | Stop reason: HOSPADM

## 2018-07-12 RX ORDER — SODIUM CHLORIDE 0.9 % (FLUSH) 0.9 %
3 SYRINGE (ML) INJECTION
Status: DISCONTINUED | OUTPATIENT
Start: 2018-07-12 | End: 2018-07-12 | Stop reason: HOSPADM

## 2018-07-12 RX ORDER — IPRATROPIUM BROMIDE AND ALBUTEROL SULFATE 2.5; .5 MG/3ML; MG/3ML
3 SOLUTION RESPIRATORY (INHALATION) EVERY 4 HOURS PRN
Status: DISCONTINUED | OUTPATIENT
Start: 2018-07-12 | End: 2018-07-13

## 2018-07-12 RX ORDER — SODIUM CHLORIDE AND POTASSIUM CHLORIDE 150; 900 MG/100ML; MG/100ML
INJECTION, SOLUTION INTRAVENOUS
Status: COMPLETED
Start: 2018-07-12 | End: 2018-07-12

## 2018-07-12 RX ORDER — MIDAZOLAM HYDROCHLORIDE 1 MG/ML
0.5 INJECTION INTRAMUSCULAR; INTRAVENOUS
Status: DISCONTINUED | OUTPATIENT
Start: 2018-07-12 | End: 2018-07-12

## 2018-07-12 RX ORDER — PANTOPRAZOLE SODIUM 40 MG/1
40 TABLET, DELAYED RELEASE ORAL DAILY
Status: DISCONTINUED | OUTPATIENT
Start: 2018-07-12 | End: 2018-07-13 | Stop reason: HOSPADM

## 2018-07-12 RX ORDER — FENTANYL CITRATE 50 UG/ML
25 INJECTION, SOLUTION INTRAMUSCULAR; INTRAVENOUS EVERY 5 MIN PRN
Status: DISCONTINUED | OUTPATIENT
Start: 2018-07-12 | End: 2018-07-12 | Stop reason: HOSPADM

## 2018-07-12 RX ORDER — CITALOPRAM 20 MG/1
20 TABLET, FILM COATED ORAL NIGHTLY
Status: DISCONTINUED | OUTPATIENT
Start: 2018-07-12 | End: 2018-07-13 | Stop reason: HOSPADM

## 2018-07-12 RX ORDER — NEOSTIGMINE METHYLSULFATE 1 MG/ML
INJECTION, SOLUTION INTRAVENOUS
Status: DISCONTINUED | OUTPATIENT
Start: 2018-07-12 | End: 2018-07-12

## 2018-07-12 RX ORDER — LIDOCAINE HCL/PF 100 MG/5ML
SYRINGE (ML) INTRAVENOUS
Status: DISCONTINUED | OUTPATIENT
Start: 2018-07-12 | End: 2018-07-12

## 2018-07-12 RX ADMIN — ROCURONIUM BROMIDE 10 MG: 10 INJECTION, SOLUTION INTRAVENOUS at 10:07

## 2018-07-12 RX ADMIN — FENTANYL CITRATE 50 MCG: 50 INJECTION INTRAMUSCULAR; INTRAVENOUS at 09:07

## 2018-07-12 RX ADMIN — ROCURONIUM BROMIDE 5 MG: 10 INJECTION, SOLUTION INTRAVENOUS at 11:07

## 2018-07-12 RX ADMIN — SODIUM CHLORIDE: 0.9 INJECTION, SOLUTION INTRAVENOUS at 08:07

## 2018-07-12 RX ADMIN — FENTANYL CITRATE 75 MCG: 50 INJECTION, SOLUTION INTRAMUSCULAR; INTRAVENOUS at 11:07

## 2018-07-12 RX ADMIN — OXYCODONE HYDROCHLORIDE AND ACETAMINOPHEN 1 TABLET: 5; 325 TABLET ORAL at 05:07

## 2018-07-12 RX ADMIN — ATORVASTATIN CALCIUM 20 MG: 20 TABLET, FILM COATED ORAL at 07:07

## 2018-07-12 RX ADMIN — ACETAMINOPHEN 1000 MG: 10 INJECTION, SOLUTION INTRAVENOUS at 10:07

## 2018-07-12 RX ADMIN — ROCURONIUM BROMIDE 35 MG: 10 INJECTION, SOLUTION INTRAVENOUS at 09:07

## 2018-07-12 RX ADMIN — OXYCODONE HYDROCHLORIDE AND ACETAMINOPHEN 1 TABLET: 5; 325 TABLET ORAL at 10:07

## 2018-07-12 RX ADMIN — PROPOFOL 120 MG: 10 INJECTION, EMULSION INTRAVENOUS at 09:07

## 2018-07-12 RX ADMIN — FENTANYL CITRATE 25 MCG: 50 INJECTION, SOLUTION INTRAMUSCULAR; INTRAVENOUS at 11:07

## 2018-07-12 RX ADMIN — PROPOFOL 30 MG: 10 INJECTION, EMULSION INTRAVENOUS at 10:07

## 2018-07-12 RX ADMIN — PROPOFOL 30 MG: 10 INJECTION, EMULSION INTRAVENOUS at 12:07

## 2018-07-12 RX ADMIN — LIDOCAINE HYDROCHLORIDE 70 MG: 20 INJECTION, SOLUTION INTRAVENOUS at 09:07

## 2018-07-12 RX ADMIN — SODIUM CHLORIDE, SODIUM GLUCONATE, SODIUM ACETATE, POTASSIUM CHLORIDE, MAGNESIUM CHLORIDE, SODIUM PHOSPHATE, DIBASIC, AND POTASSIUM PHOSPHATE: .53; .5; .37; .037; .03; .012; .00082 INJECTION, SOLUTION INTRAVENOUS at 11:07

## 2018-07-12 RX ADMIN — ONDANSETRON 4 MG: 2 INJECTION INTRAMUSCULAR; INTRAVENOUS at 12:07

## 2018-07-12 RX ADMIN — FENTANYL CITRATE 100 MCG: 50 INJECTION, SOLUTION INTRAMUSCULAR; INTRAVENOUS at 09:07

## 2018-07-12 RX ADMIN — PROPOFOL 10 MG: 10 INJECTION, EMULSION INTRAVENOUS at 12:07

## 2018-07-12 RX ADMIN — SODIUM CHLORIDE AND POTASSIUM CHLORIDE: 9; 1.49 INJECTION, SOLUTION INTRAVENOUS at 02:07

## 2018-07-12 RX ADMIN — PROPOFOL 20 MG: 10 INJECTION, EMULSION INTRAVENOUS at 12:07

## 2018-07-12 RX ADMIN — EPHEDRINE SULFATE 10 MG: 50 INJECTION, SOLUTION INTRAMUSCULAR; INTRAVENOUS; SUBCUTANEOUS at 09:07

## 2018-07-12 RX ADMIN — PANTOPRAZOLE SODIUM 40 MG: 40 TABLET, DELAYED RELEASE ORAL at 05:07

## 2018-07-12 RX ADMIN — NEOSTIGMINE METHYLSULFATE 3 MG: 1 INJECTION INTRAVENOUS at 12:07

## 2018-07-12 RX ADMIN — DEXAMETHASONE SODIUM PHOSPHATE 4 MG: 4 INJECTION, SOLUTION INTRAMUSCULAR; INTRAVENOUS at 09:07

## 2018-07-12 RX ADMIN — CEFAZOLIN 2 G: 330 INJECTION, POWDER, FOR SOLUTION INTRAMUSCULAR; INTRAVENOUS at 09:07

## 2018-07-12 RX ADMIN — GLYCOPYRROLATE 0.6 MG: 0.2 INJECTION, SOLUTION INTRAMUSCULAR; INTRAVENOUS at 12:07

## 2018-07-12 RX ADMIN — SODIUM CHLORIDE AND POTASSIUM CHLORIDE: 150; 900 INJECTION, SOLUTION INTRAVENOUS at 02:07

## 2018-07-12 RX ADMIN — CITALOPRAM HYDROBROMIDE 20 MG: 20 TABLET ORAL at 07:07

## 2018-07-12 NOTE — TRANSFER OF CARE
"Anesthesia Transfer of Care Note    Patient: Anahi Cook    Procedure(s) Performed: Procedure(s) (LRB):  MASTECTOMY, PARTIAL-W/SEED LOCALIZATION (Left)  INJECTION, FOR SENTINEL NODE IDENTIFICATION (Left)  LYMPHADENECTOMY, AXILLARY (Left)    Patient location: PACU    Anesthesia Type: general    Transport from OR: Transported from OR on 6-10 L/min O2 by face mask with adequate spontaneous ventilation    Post pain: adequate analgesia    Post assessment: no apparent anesthetic complications    Post vital signs: stable    Level of consciousness: awake    Nausea/Vomiting: no nausea/vomiting    Complications: none    Transfer of care protocol was followed      Last vitals:   Visit Vitals  BP (!) 151/62 (BP Location: Right arm, Patient Position: Lying)   Pulse 65   Temp 36.8 °C (98.3 °F)   Resp 16   Ht 5' 2" (1.575 m)   Wt 72.1 kg (159 lb)   LMP  (LMP Unknown)   SpO2 97%   BMI 29.08 kg/m²     "

## 2018-07-12 NOTE — H&P (VIEW-ONLY)
Breast Surgery  [facility]  Department of Surgery      REFERRING PROVIDER: No referring provider defined for this encounter.    Chief Complaint:     Subjective:      Patient ID: Anahi Cook is a 83 y.o. female who presents with  L breast cancer with node positivity. There are two breast masses adjacent (may be one mass), at 12OC, path with IDC, grade II, ER+CO+H2N- with rashaad metastasis.  In addition, she is seen by Dr. Chairez for a pelvic mass that needs to be removed.    Patient does not routinely do self breast exams.  Patient has noted a change on breast exam.  Patient denies nipple discharge. Patient denies to previous breast biopsy. Patient denies a personal history of breast cancer.    Findings at that time were the following:   Tumor size: 2.3  + 1.4 cm   Tumor stgstrstastdstest:st st1st Estrogen Receptor: +   Progesterone Receptor: +   Her-2 jessica: -   Lymph node status: +   Lymphatic invasion: unknown     Interval history:  Patient underwent genetic testing which was negative. She also underwent bilateral salpingo-oophorectomy for ovarian mass on 18 which was benign. She is also being followed by Dr. Harris in medical oncology and was started on letrozole in March. She states she is tolerating it well, had some hot flashes initially but these have resolved. States she can no longer feel the left breast lump.    GYN History:  Age of menarche was 12. Age of menopause was 32 with partial hysterectomy, took HRT until her 70s.    Patient is . Age of first live birth was 19.     Past Medical History:   Diagnosis Date    Anxiety     Back pain     Breast cancer 2018    Breast mass 1/15/2018    Chronic kidney disease, stage 2, mildly decreased GFR     COPD (chronic obstructive pulmonary disease)     emphysema    Depression     Dysuria 2018    Family history of breast cancer 2018    Hypertension     Osteoporosis, senile     Ovarian mass 1/15/2018    Pneumonia     S/P robotic assisted  laparoscopic BSO (bilateral salpingo-oophorectomy) 2/23/2018     Past Surgical History:   Procedure Laterality Date    CHOLECYSTECTOMY      EYE SURGERY      HYSTERECTOMY      [place] REMOVAL OF OVARY/TUBE(S)  02/23/2018    Robotic Assisted    ROTATOR CUFF REPAIR Right     rotator cuff repair right shoulder    TONSILLECTOMY       Current Outpatient Prescriptions on File Prior to Visit   Medication Sig Dispense Refill    acetaminophen (TYLENOL) 325 MG tablet Take 325 mg by mouth every 6 (six) hours as needed for Pain.      albuterol (PROAIR HFA) 90 mcg/actuation inhaler Inhale 1-2 puffs into the lungs every 6 (six) hours as needed for Wheezing or Shortness of Breath.      albuterol 2.5 mg /3 mL (0.083 %) Nebu 3 mL, albuterol 5 mg/mL Nebu 0.5 mL Use one vial in nebulizer three times daily as needed for shortness of breath or wheezing      albuterol-ipratropium 2.5mg-0.5mg/3mL (DUO-NEB) 0.5 mg-3 mg(2.5 mg base)/3 mL nebulizer solution       atorvastatin (LIPITOR) 20 MG tablet Take 20 mg by mouth once daily.      benazepril (LOTENSIN) 10 MG tablet Take 20 mg by mouth once daily.       calcitonin, salmon, (FORTICAL) 200 unit/actuation nasal spray INSERT 1 SPRAY IN NOSTRIL EVERY DAY 11.1 mL 0    calcitonin, salmon, (FORTICAL) 200 unit/actuation nasal spray INSERT 1 SPRAY IN NOSTRIL EVERY DAY 3.7 mL 0    calcium-vitamin D 250-100 mg-unit per tablet Take 1 tablet by mouth 2 (two) times daily.      CETIRIZINE HCL (ZYRTEC ORAL) Take 10 mg by mouth as needed.       citalopram (CELEXA) 20 MG tablet Take 20 mg by mouth once daily.      felodipine (PLENDIL) 10 MG 24 hr tablet Take 20 mg by mouth once daily.       fluticasone-vilanterol (BREO) 100-25 mcg/dose diskus inhaler Inhale 1 puff into the lungs once daily. 1 each 3    INCRUSE ELLIPTA 62.5 mcg/actuation DsDv USE 1 PUFF QD AT SAME TIME  (patient takes at night)  2    multivitamin (THERAGRAN) per tablet Take 1 tablet by mouth once daily.      omeprazole  (PRILOSEC) 20 MG capsule       predniSONE (DELTASONE) 20 MG tablet Take 20 mg by mouth once daily.       torsemide (DEMADEX) 20 MG Tab Take 20 mg by mouth once daily.      traMADol (ULTRAM) 50 mg tablet Take 1 tablet (50 mg total) by mouth every 6 (six) hours as needed for Pain (moderate to severe pain). 28 tablet 0     No current facility-administered medications on file prior to visit.      Social History     Social History    Marital status: Single     Spouse name: N/A    Number of children: 3    Years of education: N/A     Occupational History    Multiple jobs, see social documentation section      Retired     Social History Main Topics    Smoking status: Former Smoker     Packs/day: 1.00     Years: 40.00     Types: Cigarettes    Smokeless tobacco: Never Used      Comment: Smoked >1 ppd for at least 40 years, quit around     Alcohol use Yes      Comment: occasional glass of wine or cocktail    Drug use: No    Sexual activity: Yes     Partners: Male     Other Topics Concern    Not on file     Social History Narrative    Worked many jobs while raising 3 children.  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  She was  from her first , but took care of him at the end of his life.     Family History   Problem Relation Age of Onset    Heart failure Mother     Kidney failure Mother     Heart attack Father     Breast cancer Sister 66        Lump, XRT, chemo, recurrence 10 years later, still alive.    Cancer Brother         leukemia    Heart attack Brother 58    Pulmonary embolism Brother 45    Heart attack Brother 52    Breast cancer Maternal Grandmother 60        advanced at diagnosis    Breast cancer Maternal Aunt 83         at 92        Review of Systems   Constitutional: Negative for appetite change, chills, fever and unexpected weight change.   HENT: Negative for facial  "swelling, postnasal drip and sore throat.    Eyes: Negative for redness and itching.   Respiratory: Negative for chest tightness and shortness of breath.    Cardiovascular: Negative for chest pain and palpitations.   Gastrointestinal: Negative for blood in stool, diarrhea, nausea and vomiting.   Genitourinary: Negative for difficulty urinating and dysuria.   Musculoskeletal: Negative for arthralgias and joint swelling.   Skin: Negative for rash and wound.   Neurological: Negative for dizziness and syncope.   Hematological: Negative for adenopathy.   Psychiatric/Behavioral: Negative for agitation. The patient is not nervous/anxious.      Objective:   BP (!) 185/83 (BP Location: Right arm, Patient Position: Sitting, BP Method: Medium (Automatic))   Pulse (!) 58   Temp 97.8 °F (36.6 °C) (Oral)   Ht 5' 3" (1.6 m)   Wt 74.8 kg (165 lb)   BMI 29.23 kg/m²     Physical Exam   Constitutional: She appears well-developed and well-nourished.   HENT:   Head: Normocephalic.   Eyes: No scleral icterus.   Neck: Neck supple. No tracheal deviation present.   Cardiovascular: Normal rate and regular rhythm.    Pulmonary/Chest: No respiratory distress. Right breast exhibits no inverted nipple, no mass, no nipple discharge and no skin change. Left breast exhibits no inverted nipple, no mass, no nipple discharge and no skin change.       Abdominal: Soft. She exhibits no mass. There is no tenderness.   Musculoskeletal: She exhibits no edema.   Lymphadenopathy:     She has no cervical adenopathy.   Neurological: She is alert.   Skin: No rash noted. No erythema.     Psychiatric: She has a normal mood and affect.       Radiology review: Images personally reviewed by me in the clinic.   Mammogram:Mammo Digital Diagnostic Bilat with Tomosynthesis_CAD  Left  1. Mass: There is a 17 mm irregularly shaped mass with spiculated margins seen in the central region of the left breast in the middle depth.     2. Mass: There is an irregularly shaped " mass with obscured margins seen in the left breast, 0 cm from the nipple.      These two mass are approximately 5 cm apart from medial to lateral on the mammogram.      3. Lymph Node: There is a 15 mm lymph node seen in the left axilla.      US Breast Left Limited  Left  1. Mass: There is a 23 mm x 9 mm x 17 mm irregularly shaped, hypoechoic mass with spiculated margins seen in the left breast at 11 o'clock, 3 cm from the nipple. The mass correlates with today's mammogram finding. Associated features include internal vascularity.      2. Mass: There is a 12 mm x 10 mm x 14 mm irregularly shaped, heterogeneous mass with angular margins seen in the left breast at 10 o'clock, 4 cm from the nipple. The mass correlates with today's mammogram finding.      3. Lymph Node: There are multiple similar 8 mm x 10 mm x 10 mm lymph nodes with angular margins seen in the left axilla.      Right  There is no evidence of suspicious masses, calcifications, or other abnormal findings.     Impression:  Left  1.  Mass: Left breast 23 mm x 9 mm x 17 mm mass at the 11 o'clock position. Assessment: 5 - Highly suggestive of malignancy. Biopsy is recommended.      2.  Mass: Left breast 12 mm x 10 mm x 14 mm mass at the 10 o'clock position. Assessment: 5 - Highly suggestive of malignancy. Biopsy is recommended.     3.  Lymph Node: Left axilla 8 mm x 10 mm x 10 mm lymph node (multiple findings). Assessment: 4C - Suspicious finding. Biopsy is recommended.      Right  There is no mammographic or sonographic evidence of malignancy.     The findings and recommendations were discussed in detail with the patient.      BI-RADS Category:   Overall: 5 - Highly Suggestive of Malignancy        The patient's estimated lifetime risk of breast cancer (to age 85) based on Tyrer-Cuzick - 7 risk assessment model is: Tyrer-Cuzick: 0.83 %. According to the American Cancer Society,  patients with a lifetime breast cancer risk of 20% or higher might benefit from  supplemental screening tests.        PATH:  Ancillary studies- (Performed on biopsy specimen [id number], Block 2)  ER: Positive (Strong, % of tumor cell nuclei)  WI: Positive (Intermediate to strong, 80-90% of tumor cell nuclei)  HER2: Negative    .  FINAL PATHOLOGIC DIAGNOSIS  1. FIBROADIPOSE TISSUE: METASTATIC ADENOCARCINOMA.  Note: Lymphoid tissue is not present within the specimen. The possibility that this may represent a primary lesion  cannot be excluded. Clinical correlation is necessary.    2. BREAST (LEFT NEEDLE BIOPSIES): INVASIVE DUCTAL CARCINOMA.  Note: The tumor has a maximum 8 mm linear dimension and is moderately differentiated by Calderon Wolff  criteria (3, 2, 1). A small area of benign breast tissue is present within the specimen . Hormone receptor assays will  be performed and results will be issued as a Supplemental report.    Assessment:       82 y/o female with T2N1 breast cancer that is ++- on letrozole  Plan:     Options for management were discussed with the patient and her family. We reviewed the existing data noting the equivalency of breast conserving surgery with radiation therapy and mastectomy. We also reviewed the guidelines of the National Comprehensive Cancer Network for Stage II-III breast carcinoma. We discussed the need for lumpectomy margins to be negative for carcinoma, the necessity for postoperative radiation therapy after breast conservation in most cases, the possibility of a failed or false negative sentinel lymph node biopsy and the potential need for complete lymphadenectomy for a failed or positive sentinel lymph node biopsy were fully discussed. In the setting of mastectomy, delayed or immediate reconstruction options are available and were discussed.     In the setting of lumpectomy, radiation therapy would be recommended majority of the time.  The duration and treatment side effects were discussed with the patient.  This will coordinated with the radiation  oncologist pending final pathology.    We also discussed the role of systemic therapy in the treatment of early stage breast cancer.  We discussed that this is based on tumor biology and rashaad status and will be determined based on final pathology.  We discussed that if the cancer is hormone positive, endocrine therapy would be recommended in most cases and its use can reduce the risk of recurrence as well as improve survival. Side effects of treatment were briefly discussed. We also discussed the potential role for chemotherapy based on a number of factors such as tumor phenotype (ER+ vs. triple negative vs. [id number] and this would be determined in coordination with the medical oncologist.    Continue following with Dr. Harris for letrozole, will plan on left partial mastectomy with sentinel lymph node biopsy, possible axillary dissection.  Adjuvant radiation will be recommended.  We discussed the ALLIANCE trial but endocrine therapy in the neoadjuvant setting is not included on this trial so she does not meet criteria.  She will need medical clearance prior to surgery given her history of COPD.  Will have her back to clinic prior to surgery to assure that we can visualize both clips on US.    Patient was educated on breast cancer, receptors, wire localization lumpectomy, mastectomy, sentinel lymph node mapping and biopsy, axillary lymph node dissection, reconstruction, breast prosthesis with post-mastectomy bra and radiation therapy. Patient was given patient information binder [facility].  All her questions were answered.    Total time spent with the patient: 60 minutes.  45 minutes of face to face consultation and 15 minutes of chart review and coordination of care.

## 2018-07-12 NOTE — BRIEF OP NOTE
[facility]  Surgery Department  Operative Note    SUMMARY     Date of Procedure: 7/12/2018     Procedure: Procedure(s) (LRB):  MASTECTOMY, PARTIAL-W/SEED LOCALIZATION (Left)  INJECTION, FOR SENTINEL NODE IDENTIFICATION (Left)  LYMPHADENECTOMY, AXILLARY (Left)     Surgeon(s) and Role:     * Amanda Caballero MD - Primary     * Adenike Gonzalez MD - Resident - Assisting        Pre-Operative Diagnosis: Malignant neoplasm of overlapping sites of left breast in female, estrogen receptor positive [C50.812, Z17.0]    Post-Operative Diagnosis: Post-Op Diagnosis Codes:     * Malignant neoplasm of overlapping sites of left breast in female, estrogen receptor positive [C50.812, Z17.0]    Anesthesia: General    Technical Procedures Used: Magseed localized lumpectomy and axillary dissection    Description of the Findings of the Procedure: Clip in specimens    Significant Surgical Tasks Conducted by the Assistant(s), if Applicable: n/a    Complications: No    Estimated Blood Loss (EBL): * No values recorded between 7/12/2018 10:15 AM and 7/12/2018  1:12 PM *           Implants: * No implants in log *    Specimens:   Specimen (12h ago through future)    Start     Ordered    07/12/18 1227  Specimen to Pathology - Surgery  Once     Comments:  1.) left breast lumpectomy   -  sent to  patho.2.) left axillary content  -  PERMANENT      07/12/18 1227                  Condition: Good    Disposition: PACU - hemodynamically stable.    Attestation: I was present and scrubbed for the entire procedure.

## 2018-07-12 NOTE — ANESTHESIA PROCEDURE NOTES
Erector Spinae Plane Single Injection Block    Patient location during procedure: pre-op   Block not for primary anesthetic.  Reason for block: at surgeon's request and post-op pain management   Post-op Pain Location: L chest pain   Start time: 7/12/2018 9:01 AM  Timeout: 7/12/2018 9:00 AM   End time: 7/12/2018 9:10 AM  Staffing  Anesthesiologist: VLADIMIR NEWBY  Resident/CRNA: QUIANA COX  Performed: resident/CRNA   Preanesthetic Checklist  Completed: patient identified, site marked, surgical consent, pre-op evaluation, timeout performed, IV checked, risks and benefits discussed and monitors and equipment checked  Peripheral Block  Patient position: sitting  Prep: ChloraPrep  Patient monitoring: heart rate, cardiac monitor, continuous pulse ox, continuous capnometry and frequent blood pressure checks  Block type: erector spinae plane (Erector Spinae Plane Block)  Laterality: left  Injection technique: single shot  Needle  Needle type: Tuohy   Needle gauge: 17 G  Needle length: 3.5 in  Needle localization: anatomical landmarks and ultrasound guidance   -ultrasound image captured on disc.  Assessment  Injection assessment: negative aspiration, negative parasthesia and local visualized surrounding nerve  Paresthesia pain: none  Heart rate change: no  Slow fractionated injection: yes  Medications:  Bolus administered: 40 mL of 0.375 ropivacaine  Additional Notes  Patient tolerated well.  [facility].

## 2018-07-12 NOTE — OP NOTE
DATE OF PROCEDURE: 7/12/2018    SURGEON: Surgeon(s) and Role:     * Amanda Caballero MD - Primary     * Adenike Gonzalez MD - Resident - Assisting    PREOPERATIVE DIAGNOSIS: Invasive breast carcinoma of the left breast upper outer quadrant    POSTOPERATIVE DIAGNOSIS: same    ANESTHESIA: regional and general    PROCEDURES PERFORMED:   1. left breast magseed localization partial mastectomy (lumpectomy) with excision for clear margins   2. injection of left breast with technetium-labeled radiocolloid for sentinel lymph node identification, but did not map  3. Axillary lymph node dissection      PROCEDURE IN DETAIL:   The patient underwent informed consent.  The films were reviewed.    She was then brought to the Operating Room and placed in the supine position. regional and general anesthesia was administered.    The left breast was injected in the subareolar region with the technetium-labeled radiocolloid. The left breast was further injected with the isosulfan Lymphazurin blue dye in the central subareolar region.  The left breast, anterior chest, arm and axilla were then prepped and draped in a sterile fashion.     Using the gamma probe, no activity was noted in the left axilla. We added 10cc of injectable saline in the subareolar region that did not ever map either the radiotracer or the blue dye to the axilla and therefore the decision was made to proceed with axillary dissection after more time was allowed for mapping while the lumpectomy was performed.      Next, we turned our attention to the left breast itself. Ultrasound was used to identify the breast mass at 12 o'clock.  The area if interest was identified with a clip within and then was marked for localization using ultrasound guidance.  Magseed guidance was also used to identify the prior biopsy site.  An incision was made in the upper outer quadrant of the left breast over the anticipated tract of the lesion in a mariella-areolar location with a crescent of  skin taken superior to the areola and de-epithelialized to allow for oncloplastic closure of the skin envelope.  The specimen was dissected circumferentially around the cancer.  We did dissect all the way down to, but not including the underlying pectoralis fascia.  The lumpectomy specimen was inked on the back table using green ink inferiorly, blue ink superiorly, orange ink laterally, yellow ink anteriorly, black ink posteriorly, and the red ink medially.  It was then fixed with acetic acid and submitted for specimen radiograph [facility], which confirmed the clip and area of interest within the specimen, and was interpreted in the operating room.  The additional area of suspicion was localized with ultrasound and taken in the same specimen.   Given the appearance and location of the lesion, no additional margins were taken.       Within the lumpectomy cavity, hemostasis was achieved with cautery. The wound was irrigated until clear. There was no evidence of bleeding.     We then turned our attention to the defect of the lumpectomy cavity. There was a lumpectomy defect measuring [id number], in total 30 squared centimeters. We proceeded to mobilize the adjacent tissue in all directions by using separate incisions posterior to the breast raising the remaining breast tissue off the pectoralis muscle followed by separate glandular incisions in the anterior plane lifting the subcutaneous tissue from the breast. This was carried inferiorly past the nipple for a 10 cm incision, and laterally and medially to include the remaining breast tissue in those directions for an incision of 10cm, followed by an incision of 3cm in depth. Ultimately we mobilized 100 squared centimeters of adjacent breast tissue and rearranged this with interrupted 3-0 vicryl and 2-0 vicryl sutures to eliminate the defect of the lumpectomy cavity. This was performed for a closure of the seroma cavity in preparation for radiotherapy per NCCN  guidelines.  There was no evidence of bleeding. It was closed in multiple layers with deep dermal and subcutaneous interrupted Vicryl sutures and a running 4-0 vicryl subcuticular skin closure.      We then turned our attention to the axilla where no trace of radioactivity was present.  We made a transverse incision and proceeded to look for blue lymphatics but none were found.   An axillary dissection was performed with removal of the associated lymph nodes and surrounding adipose tissue. This included levels I and II. This was accomplished by exposing the axillary vein superiorly. Small venous tributaries, lymphatics, and vessels were clipped and ligated or cauterized and divided. The subscapularis muscle was skeletonized. The long thoracic and thoracodorsal neurovascular bundles were identified and preserved. The tissue between the long thoracic and thoracodorsal bundle was removed.  Of note, at the end of the dissection, level 3 nodes were palpated and no abnormal nodes were noted.  Specimen radiograph was performed and identified the clip node present in the specimen.    The specimen was submitted to pathology. The wound was irrigated. A 19 Yi drain was placed in the axillary space. The skin incision was closed in layers with a 3-0 Vicryl suture for the deep dermis followed by a a running 4-0 vicryl used for the subcuticular closure. The drains were secured with sutures. Dermabond was applied along with a dry bulky gauze dressing.    Instrument, sponge, and needle counts were correct at closure and at the conclusion of the case.     Sterile fluff gauze was placed and a post-procedure bra was placed. She tolerated the procedure well without complication and was turned over to Anesthesia for transport to the recovery area in a satisfactory condition. All specimens were sent to Pathology for permanent sectioning.    ESTIMATED BLOOD LOSS: 20ml    COMPLICATIONS: none    DISPOSITION:  PACU--hemodynamically  stable    ATTESTATION:  I was present and scrubbed for the entire procedure.

## 2018-07-12 NOTE — PROGRESS NOTES
Spoke with Dr. Rosa and notified him that H&P needs to be updated. He states he will notify his colleague that this needs to be done.

## 2018-07-12 NOTE — PLAN OF CARE
Problem: Patient Care Overview  Goal: Plan of Care Review  Outcome: Ongoing (interventions implemented as appropriate)  Pt's VSS, NAD noted. Left chest incision clean, dry, and intact. Pt denies pain at this time. Bed locked in lowest position, side rails upx2, call bell within reach. Nonskids socks, winifred/scds on pt. Daughter at bedside.

## 2018-07-12 NOTE — PLAN OF CARE
Plan of care and periop routine discussed with patient. Pt verbalized understanding. All questions answered. VSS. Respirations even and unlabored. Pt reports surgical pain is tolerable. Family has been updated. Will continue to monitor.

## 2018-07-12 NOTE — ANESTHESIA POSTPROCEDURE EVALUATION
"Anesthesia Post Evaluation    Patient: Anahi Cook    Procedure(s) Performed: Procedure(s) (LRB):  MASTECTOMY, PARTIAL-W/SEED LOCALIZATION (Left)  INJECTION, FOR SENTINEL NODE IDENTIFICATION (Left)  LYMPHADENECTOMY, AXILLARY (Left)    Final Anesthesia Type: general  Patient location during evaluation: PACU  Patient participation: Yes- Able to Participate  Level of consciousness: awake and alert and oriented  Post-procedure vital signs: reviewed and stable  Pain management: adequate  Airway patency: patent  PONV status at discharge: No PONV  Anesthetic complications: no      Cardiovascular status: blood pressure returned to baseline  Respiratory status: unassisted, room air and spontaneous ventilation  Hydration status: euvolemic  Follow-up not needed.        Visit Vitals  BP (!) 149/66 (BP Location: Right arm, Patient Position: Lying)   Pulse 64   Temp 36.8 °C (98.3 °F)   Resp 14   Ht 5' 2" (1.575 m)   Wt 72.1 kg (159 lb)   LMP  (LMP Unknown)   SpO2 99%   BMI 29.08 kg/m²       Pain/Nohemi Score: Pain Assessment Performed: Yes (7/12/2018  1:15 PM)  Presence of Pain: non-verbal indicators absent (7/12/2018  1:15 PM)  Pain Rating Prior to Med Admin: 0 (7/12/2018  9:00 AM)  Nohemi Score: 8 (7/12/2018  1:15 PM)      "

## 2018-07-13 VITALS
DIASTOLIC BLOOD PRESSURE: 68 MMHG | HEART RATE: 71 BPM | SYSTOLIC BLOOD PRESSURE: 144 MMHG | OXYGEN SATURATION: 94 % | RESPIRATION RATE: 20 BRPM | TEMPERATURE: 98 F | HEIGHT: 62 IN | WEIGHT: 166 LBS | BODY MASS INDEX: 30.55 KG/M2

## 2018-07-13 PROCEDURE — 25000003 PHARM REV CODE 250: Performed by: STUDENT IN AN ORGANIZED HEALTH CARE EDUCATION/TRAINING PROGRAM

## 2018-07-13 PROCEDURE — 25000242 PHARM REV CODE 250 ALT 637 W/ HCPCS: Performed by: STUDENT IN AN ORGANIZED HEALTH CARE EDUCATION/TRAINING PROGRAM

## 2018-07-13 PROCEDURE — 94640 AIRWAY INHALATION TREATMENT: CPT

## 2018-07-13 PROCEDURE — G0378 HOSPITAL OBSERVATION PER HR: HCPCS

## 2018-07-13 PROCEDURE — 94761 N-INVAS EAR/PLS OXIMETRY MLT: CPT

## 2018-07-13 PROCEDURE — 25000242 PHARM REV CODE 250 ALT 637 W/ HCPCS

## 2018-07-13 RX ORDER — OXYCODONE AND ACETAMINOPHEN 5; 325 MG/1; MG/1
1-2 TABLET ORAL EVERY 4 HOURS PRN
Qty: 31 TABLET | Refills: 0 | Status: SHIPPED | OUTPATIENT
Start: 2018-07-13 | End: 2018-07-13

## 2018-07-13 RX ORDER — ALBUTEROL SULFATE 90 UG/1
2 AEROSOL, METERED RESPIRATORY (INHALATION) EVERY 6 HOURS PRN
Status: DISCONTINUED | OUTPATIENT
Start: 2018-07-13 | End: 2018-07-13 | Stop reason: HOSPADM

## 2018-07-13 RX ORDER — TIOTROPIUM BROMIDE 18 UG/1
1 CAPSULE ORAL; RESPIRATORY (INHALATION) DAILY
Status: DISCONTINUED | OUTPATIENT
Start: 2018-07-13 | End: 2018-07-13 | Stop reason: HOSPADM

## 2018-07-13 RX ORDER — IPRATROPIUM BROMIDE AND ALBUTEROL SULFATE 2.5; .5 MG/3ML; MG/3ML
3 SOLUTION RESPIRATORY (INHALATION) EVERY 4 HOURS
Status: DISCONTINUED | OUTPATIENT
Start: 2018-07-13 | End: 2018-07-13 | Stop reason: HOSPADM

## 2018-07-13 RX ORDER — IPRATROPIUM BROMIDE AND ALBUTEROL SULFATE 2.5; .5 MG/3ML; MG/3ML
SOLUTION RESPIRATORY (INHALATION)
Status: COMPLETED
Start: 2018-07-13 | End: 2018-07-13

## 2018-07-13 RX ORDER — IPRATROPIUM BROMIDE AND ALBUTEROL SULFATE 2.5; .5 MG/3ML; MG/3ML
3 SOLUTION RESPIRATORY (INHALATION) ONCE
Status: COMPLETED | OUTPATIENT
Start: 2018-07-13 | End: 2018-07-13

## 2018-07-13 RX ORDER — OXYCODONE AND ACETAMINOPHEN 5; 325 MG/1; MG/1
1-2 TABLET ORAL EVERY 4 HOURS PRN
Qty: 31 TABLET | Refills: 0 | Status: SHIPPED | OUTPATIENT
Start: 2018-07-13 | End: 2018-09-20

## 2018-07-13 RX ADMIN — FELODIPINE 20 MG: 10 TABLET, EXTENDED RELEASE ORAL at 09:07

## 2018-07-13 RX ADMIN — IPRATROPIUM BROMIDE AND ALBUTEROL SULFATE 3 ML: .5; 3 SOLUTION RESPIRATORY (INHALATION) at 07:07

## 2018-07-13 RX ADMIN — BENAZEPRIL HYDROCHLORIDE 20 MG: 20 TABLET, FILM COATED ORAL at 09:07

## 2018-07-13 RX ADMIN — TORSEMIDE 20 MG: 20 TABLET ORAL at 09:07

## 2018-07-13 RX ADMIN — IPRATROPIUM BROMIDE AND ALBUTEROL SULFATE 3 ML: 2.5; .5 SOLUTION RESPIRATORY (INHALATION) at 07:07

## 2018-07-13 RX ADMIN — PANTOPRAZOLE SODIUM 40 MG: 40 TABLET, DELAYED RELEASE ORAL at 09:07

## 2018-07-13 NOTE — PLAN OF CARE
Problem: Pain, Acute (Adult)  Goal: Acceptable Pain Control/Comfort Level  Patient will demonstrate the desired outcomes by discharge/transition of care.   Outcome: Ongoing (interventions implemented as appropriate)  Pt states pain is relieved with Percocet. Comfort and safety maintained

## 2018-07-13 NOTE — PLAN OF CARE
Problem: Patient Care Overview  Goal: Plan of Care Review  Outcome: Ongoing (interventions implemented as appropriate)  Pt's VSS, NAD noted. Pt educated on how to drain and measure fluid from ZANDRA drain. Pt aware of discharge and is awaiting her ride. Pt denies pain at this time. Bed locked in lowest position, side rails upx2, call bell within reach. Nonskid socks, teds, scds on pt.

## 2018-07-13 NOTE — PLAN OF CARE
Problem: Fall Risk (Adult)  Goal: Absence of Falls  Patient will demonstrate the desired outcomes by discharge/transition of care.   Outcome: Ongoing (interventions implemented as appropriate)  Pt instructed on calling for assist with ambulation due to limited range of motion to the left arm.

## 2018-07-13 NOTE — DISCHARGE INSTRUCTIONS
POSTOPERATIVE INSTRUCTIONS FOLLOWING BREAST BIOPSY OR LUMPECTOMY    The following are post-operative instructions that will help you to recover from your surgery.  Please read over these instructions carefully and contact us if we can answer any of your questions or concerns.    Dressing/breast binder (surgi-bra)  A surgical bra may be placed around your chest after your surgery.  If you are given the bra, please wear it for the first 48 hours after surgery. After 48 hours you can remove your surgical bra and dressing to shower/cleanse the breast with antibacterial soap and warm water. Do not take a tub bath and do not soak the surgical site for at least 2 weeks. Please do not remove the white strips of tape (steri-strips) that cover your incision- they will be removed at your clinic visit.    The final pathology report will be available approximately 7-10 days after your surgery.  Our office will call you with your pathology report when it becomes available.    Dr. Womack patients: please wear the surgical bra as close to 24 hours a day as possible until your post-operative clinic appointment.  If the elastic around the bra irritates your skin, you may wear a soft t-shirt underneath the bra. You may shower AFTER the drains are removed.  Please sponge bathe until then. You may go without wearing the bra long enough to bath, to launder and dry the bra. If you have fluffy filler placed inside the bra, the filler should be removed whenever the bra is taken off. Please reinsert the fluffy filler, or insert the new soft filler, under the bra when you put the bra back on.  If the bra is extremely uncomfortable, you may wear a supportive sports bra instead after 2 days.    Activity   You should be able to return to your regular activities 2 days after your surgery.  However, do not engage in strenuous activities in which you use your upper body such as:  golf, tennis, aerobics, washing windows, raking the yard, mopping,  vacuuming, heavy lifting (e.g children) until you are seen for your follow-up appointment in clinic. Do not lift anything heavier than a gallon of milk.    Medication for pain  You may find that over the counter pain medications may be sufficient for your pain.  You will be given a prescription for pain medication for more severe pain.  You should not drive or operate machinery while taking these.  Please take prescription pain medicine (narcotics) with food.  Narcotics can cause, or worsen, constipation.  You will need to increase your fluid intake, eat high fiber foods (such as fruits and bran) and make sure that you are up and walking. You may need to take an over the counter stool softener for constipation.    Please report the following:   Temperature greater than 101 degrees   Discharge or bad odor from the wound   Excessive bleeding, such as saturated bloody dressing or extreme bruising   Redness at incision and/or drain sites   Swelling or buildup of fluid around incision   Persistent fevers, chills, nausea, vomiting, or diarrhea    Additional information  Your surgeon will see you approximately 2 weeks following your surgery.  If this follow-up appointment has not been made, please call the office.    If you have any questions or problems, please call my office or my nurse.    Dr. April Weeks, LYSSA Lin, LYSSA Lin, LYSSA Rose RN  [phone]    Yelitza Rowland PA-C  [phone]  Otilia Corrigan RN  [phone]    After hours and on weekends, you may call the main [facility] line at [phone] and ask to have the general surgery resident paged or have me paged.    POSTOPERATIVE INSTRUCTIONS FOLLOWING SENTINEL   LYMPH NODE BIOPSY AND LUMPECTOMY      The following are post-operative instructions that will help you to recover from your surgery.  Please read  over these instructions carefully and contact us if we can answer any of your questions or concerns.    Post-op care/dressing/breast binder (surgi-bra)  A surgical bra may be placed around your chest after your surgery.  If you are given the bra, please wear it for the first 48 hours after surgery. After 48 hours you can remove your surgical bra and dressing to shower/cleanse the breast with antibacterial soap and warm water. Do not take a tub bath and do not soak the surgical site for at least 2 weeks. Please do not remove the white strips of tape (steri-strips) that cover your incision- they will be removed at your clinic visit.    The final pathology report will be available approximately 7-10 days after your surgery.  Our office will call you with your pathology report when it becomes available.    If blue dye was used to locate your sentinel lymph nodes, your urine and stool may be blue-green in color for 1 or 2 days.    Dr. Womack patients: please wear the surgical bra as close to 24 hours a day as possible until your post-operative clinic appointment.  If the elastic around the bra irritates your skin, you may wear a soft t-shirt underneath the bra. You may shower AFTER the drains are removed.  Please sponge bathe until then. You may go without wearing the bra long enough to bath, to launder and dry the bra. If you have fluffy filler placed inside the bra, the filler should be removed whenever the bra is taken off. Please reinsert the fluffy filler, or insert the new soft filler, under the bra when you put the bra back on.  If the bra is extremely uncomfortable, you may wear a supportive sports bra instead after 2 days.    Activity    You will be able to do much of your own personal care, such as bathing, dressing, preparing simple meals, etc.   A short walk each day will help with your recovery   You may find that you need to take rest breaks between activities, but you should not need to stay in bed for  prolonged periods of time during the day. A good rule during this time is to listen to your body, do what is comfortable, and stop and rest when your feel tired.  If it hurts, dont do it.   Return to taking your daily medications as prescribed   Please avoid activities that require moderate to heavy lifting (grocery shopping) or pushing/pulling (vacuuming) and repetitive motions (such as washing windows). Do not lift anything heavier than a gallon of milk.   Following a lymph node dissection, dont avoid using your arm, but dont exercise your arm until after your first post-operative visit.  At your first post-op visit, you will be given arm exercises to regain movement and flexibility.  You may be referred to physical therapy if needed.   You may restart driving when you are no longer on narcotics and you feel safe turning the wheel and stopping quickly.   You will need to be out of work approximately 1-2 weeks depending on your particular surgery and how well you are recovering.  We will evaluate how you are doing at the first post-op appointment.  This is a good time to ask when you may return to work and what activities you may do.    Medication for pain  You may find that over the counter pain medications may be sufficient for your pain.  You will be given a prescription for pain medication for more severe pain.  You should not drive or operate machinery while taking these.  Please take prescription pain medication (narcotics) with food.  Narcotics can cause, or worsen, constipation.  You will need to increase your fluid intake, eat high fiber foods (such as fruits and bran) and make sure that you are up and walking. You may need to take an over the counter stool softener for constipation. Short term use of an icepack may be helpful to decrease discomfort and swelling, particularly to the armpit after lymph node surgery. A small pillow positioned in the armpit may also decrease discomfort after lymph  node surgery.      Please report the following:     Temperature greater than 101 degrees   Discharge or bad odor from the wound   Excessive bleeding, such as bloody dressing or extreme bruising   Redness at incision and/or drain sites   Swelling or buildup of fluid around incision   Persistent fevers, chills, nausea, vomiting, or diarrhea    Additional information  Your surgeon will see you approximately 2 weeks following your surgery.  If this follow-up appointment has not been made, please call the office.    If you have any questions or problems, please call my office or my nurse.    Dr. April Weeks, RN Laz Lin, LYSSA Lin, RN Reena Rose, LYSSA  [phone]    Yelitza Rowland PA-C  [phone]  Otilia Corrigan, LYSSA  [phone]    After hours and on weekends, you may call the main [facility] line at [phone] and ask to have the general surgery resident paged or have me paged.  How to care for surgical Drain(s)  1) Wash hands--STRIP or milk the drainage tube as it comes out of your body toward the bulb.   a) Beginning where the drain comes out of your body, hold drainage tubing with one hand and with the other, stretch and release tubing an inch at time while moving downward with both hands toward the bulb.  b) Do this 2-3 times before emptying the bulb.  2) Remove the stopper from the bulbs port  (drainage port)  3) Pour the drainage in the measuring cup provided by the nurse  4) Flatten/squeeze the bulb to create a vacuum and replace the stopper before letting go of the bulb.  5) Record the date, time and amount of drainage in ccs (not ounces) each time bulb is emptied. If you have more than one drain, record each separately.  6) Discard the drainage into the toilet after measuring and then wash hands.  7) Empty bulbs 2-3 times/day or as needed if it fills up  before 8 hours.  8) Remember to bring the output record with you to your doctors appointment.      Lymphedema Risk Reduction    Lymphedema is a swelling of a part of the body, caused by an insufficient lymphatic system and an accumulation of fluid in the bodys tissues.  Lymphedema may occur when normal drainage of fluid is disrupted, such as an infection, injury, cancer, scar tissue, or removal of lymph nodes.    If you had a full axillary lymph node dissection procedure, you may be at greater risk for lymphedema.     For those patients having a sentinel lymph node biopsy, these risks may be smaller and the recommendations are provided for your review and consideration.    The following list contains recommendations for reducing your risk of developing lymphedema.    I. Skin Care--avoid trauma/injury to reduce infection risk   Keep the hand and arm on the side of surgery clean and dry   Pay attention to nail care and do not cut cuticles   Avoid punctures, such as injections and blood draws from you on the side of your surgery   Wear gloves while doing activities that may cause skin injury (washing dishes, gardening, etc.)   If scratches or punctures occur, wash area with soap and water, and observe for signs of infections (redness, drainage, swelling)   If a rash, itching, redness, pain, increased skin temperatures, fever, or flu-like symptoms occur, contact your physician immediately for early treatment of a possible infection  II. Activity/Lifestyle   Gradually build up the duration and intensity of any activity or exercise   Take frequent rest periods during activity to allow for arm recovery   Monitor your arm and upper body during and after activity for any change in size, shape, tissue, texture, soreness, heaviness, or firmness  III. Avoid constriction of your arm on the side of your surgery   Avoid having blood pressure taken on the arm on the side of your surgery   Wear loose fitting jewelry and  clothing   Be careful not to rest a heavy purse, luggage, or grocery bags on that arm   When you return to wearing a bra, make sure that it is well fitted and not too tight

## 2018-07-13 NOTE — SUBJECTIVE & OBJECTIVE
Interval History: No acute events overnight. Pain well controlled.Tolerating diet.    Medications:  Continuous Infusions:  Scheduled Meds:   albuterol-ipratropium  3 mL Nebulization Q4H    atorvastatin  20 mg Oral Nightly    benazepril  20 mg Oral Daily    citalopram  20 mg Oral Nightly    felodipine  20 mg Oral Daily    fluticasone-vilanterol  1 puff Inhalation Daily    ondansetron  4 mg Oral Once    pantoprazole  40 mg Oral Daily    tiotropium  1 capsule Inhalation Daily    torsemide  20 mg Oral Daily     PRN Meds:albuterol, ondansetron, oxyCODONE-acetaminophen, promethazine (PHENERGAN) IVPB, ramelteon, sodium chloride 0.9%     Review of patient's allergies indicates:   Allergen Reactions    Levaquin [levofloxacin] Itching    Penicillins Itching     Objective:     Vital Signs (Most Recent):  Temp: 98.1 °F (36.7 °C) (07/13/18 1032)  Pulse: 71 (07/13/18 1032)  Resp: 20 (07/13/18 1032)  BP: (!) 144/68 (07/13/18 1057)  SpO2: (!) 94 % (07/13/18 1032) Vital Signs (24h Range):  Temp:  [96.3 °F (35.7 °C)-98.1 °F (36.7 °C)] 98.1 °F (36.7 °C)  Pulse:  [61-72] 71  Resp:  [14-20] 20  SpO2:  [90 %-99 %] 94 %  BP: (141-174)/(61-76) 144/68     Weight: 75.3 kg (166 lb 0.1 oz)  Body mass index is 30.36 kg/m².    Intake/Output - Last 3 Shifts       07/11 0700 - 07/12 0659 07/12 0700 - 07/13 0659 07/13 0700 - 07/14 0659    P.O.  1040     I.V. (mL/kg)  2452.5 (32.6)     Total Intake(mL/kg)  3492.5 (46.4)     Urine (mL/kg/hr)  300     Drains  130 40 (0.1)    Total Output   430 40    Net   +3062.5 -40                 Physical Exam   Constitutional: She is oriented to person, place, and time. She appears well-developed and well-nourished. No distress.   Cardiovascular: Normal rate and regular rhythm.    Pulmonary/Chest: Effort normal. No respiratory distress.   Neurological: She is alert and oriented to person, place, and time.   Incisions healing well. NO signs of hematoma. ZANDRA with serosanguinous output    Significant  Labs:  CBC: No results for input(s): WBC, RBC, HGB, HCT, PLT, MCV, MCH, MCHC in the last 168 hours.  CMP: No results for input(s): GLU, CALCIUM, ALBUMIN, PROT, NA, K, CO2, CL, BUN, CREATININE, ALKPHOS, ALT, AST, BILITOT in the last 168 hours.

## 2018-07-13 NOTE — PROGRESS NOTES
[facility]  General Surgery  Progress Note    Subjective:     History of Present Illness:  No notes on file    Post-Op Info:  Procedure(s) (LRB):  MASTECTOMY, PARTIAL-W/SEED LOCALIZATION (Left)  INJECTION, FOR SENTINEL NODE IDENTIFICATION (Left)  LYMPHADENECTOMY, AXILLARY (Left)   1 Day Post-Op     Interval History: No acute events overnight. Pain well controlled.Tolerating diet.    Medications:  Continuous Infusions:  Scheduled Meds:   albuterol-ipratropium  3 mL Nebulization Q4H    atorvastatin  20 mg Oral Nightly    benazepril  20 mg Oral Daily    citalopram  20 mg Oral Nightly    felodipine  20 mg Oral Daily    fluticasone-vilanterol  1 puff Inhalation Daily    ondansetron  4 mg Oral Once    pantoprazole  40 mg Oral Daily    tiotropium  1 capsule Inhalation Daily    torsemide  20 mg Oral Daily     PRN Meds:albuterol, ondansetron, oxyCODONE-acetaminophen, promethazine (PHENERGAN) IVPB, ramelteon, sodium chloride 0.9%     Review of patient's allergies indicates:   Allergen Reactions    Levaquin [levofloxacin] Itching    Penicillins Itching     Objective:     Vital Signs (Most Recent):  Temp: 98.1 °F (36.7 °C) (07/13/18 1032)  Pulse: 71 (07/13/18 1032)  Resp: 20 (07/13/18 1032)  BP: (!) 144/68 (07/13/18 1057)  SpO2: (!) 94 % (07/13/18 1032) Vital Signs (24h Range):  Temp:  [96.3 °F (35.7 °C)-98.1 °F (36.7 °C)] 98.1 °F (36.7 °C)  Pulse:  [61-72] 71  Resp:  [14-20] 20  SpO2:  [90 %-99 %] 94 %  BP: (141-174)/(61-76) 144/68     Weight: 75.3 kg (166 lb 0.1 oz)  Body mass index is 30.36 kg/m².    Intake/Output - Last 3 Shifts       07/11 0700 - 07/12 0659 07/12 0700 - 07/13 0659 07/13 0700 - 07/14 0659    P.O.  1040     I.V. (mL/kg)  2452.5 (32.6)     Total Intake(mL/kg)  3492.5 (46.4)     Urine (mL/kg/hr)  300     Drains  130 40 (0.1)    Total Output   430 40    Net   +3062.5 -40                 Physical Exam   Constitutional: She is oriented to person, place, and time. She appears  well-developed and well-nourished. No distress.   Cardiovascular: Normal rate and regular rhythm.    Pulmonary/Chest: Effort normal. No respiratory distress.   Neurological: She is alert and oriented to person, place, and time.   Incisions healing well. NO signs of hematoma. ZANDRA with serosanguinous output    Significant Labs:  CBC: No results for input(s): WBC, RBC, HGB, HCT, PLT, MCV, MCH, MCHC in the last 168 hours.  CMP: No results for input(s): GLU, CALCIUM, ALBUMIN, PROT, NA, K, CO2, CL, BUN, CREATININE, ALKPHOS, ALT, AST, BILITOT in the last 168 hours.      Assessment/Plan:     * Infiltrating ductal carcinoma of breast, left    82 yo female s/p left lumpectomy and axillary dissection    Plan:  - Regular diet  - PO meds  - Home today            Adenike Gonzalez MD  General Surgery  [facility]

## 2018-07-13 NOTE — PROGRESS NOTES
Pt discharged and left unit via wheelchair with transport tech and daughter. Pt's VSS, NAD noted. Surgical site clean, dry, and intact. Pt and daughter both educated on ZANDRA drain measurement and care. Pt received PRN pain prescription, discharge instructions and Zandra drain log. Pt and daughter both performed teach back method and verbalized understanding. IV removed with catheter intact.

## 2018-07-13 NOTE — PLAN OF CARE
Problem: Patient Care Overview  Goal: Plan of Care Review  Outcome: Ongoing (interventions implemented as appropriate)  Pt in bed watching TV with daughter at bedside. No distress noted. Plan for the night and morning discussed. Meds discussed. Pt instructed on post mastectomy and ZANDRA drain care. Pending discharge for the morning discussed. Fall and safety maintained. Call light in reach.

## 2018-07-13 NOTE — ASSESSMENT & PLAN NOTE
82 yo female s/p left lumpectomy and axillary dissection    Plan:  - Regular diet  - PO meds  - Home today

## 2018-07-13 NOTE — DISCHARGE SUMMARY
[facility]  General Surgery  Discharge Summary      Patient Name: Anahi Cook  MRN: [id number]  Admission Date: 7/12/2018  Hospital Length of Stay: 0 days  Discharge Date and Time:  07/13/2018 6:59 AM  Attending Physician: Amanda Caballero MD   Discharging Provider: Norma De La Garza MD  Primary Care Provider: Pepito Theodore MD     HPI: Anahi Cook is a 83 y.o. female who presents with  L breast cancer with node positivity. There are two breast masses adjacent (may be one mass), at 12OC, path with IDC, grade II, ER+NJ+H2N- with rashaad metastasis.  In addition, she is seen by Dr. Chairez for a pelvic mass that needs to be removed.     Patient does not routinely do self breast exams.  Patient has noted a change on breast exam.  Patient denies nipple discharge. Patient denies to previous breast biopsy. Patient denies a personal history of breast cancer.     Findings at that time were the following:   Tumor size: 2.3  + 1.4 cm   Tumor rdgrdrrdarddrderd:rd rd3rd Estrogen Receptor: +   Progesterone Receptor: +   Her-2 jessica: -   Lymph node status: +   Lymphatic invasion: unknown     Procedure(s) (LRB):  MASTECTOMY, PARTIAL-W/SEED LOCALIZATION (Left)  INJECTION, FOR SENTINEL NODE IDENTIFICATION (Left)  LYMPHADENECTOMY, AXILLARY (Left)     Hospital Course: Anahi Cook underwent the above procedure on 7/12/18 as treatment for infiltrating ductal carcinoma of the left breast. She tolerated the procedure well and her post-op course was uncomplicated. Prior to discharge home on 07/13/2018 her pain was well controlled on oral medications, tolerated diet, ambulated, spontaneously voided. She was discharged home in good condition on POD#1.      Consults:     Significant Diagnostic Studies: Labs: CMP No results for input(s): NA, K, CL, CO2, GLU, BUN, CREATININE, CALCIUM, PROT, ALBUMIN, BILITOT, ALKPHOS, AST, ALT, ANIONGAP, ESTGFRAFRICA, EGFRNONAA in the last 48 hours. and CBC No results for input(s): WBC, HGB, HCT, PLT in the last 48  hours.    Pending Diagnostic Studies:     None        Final Active Diagnoses:    Diagnosis Date Noted POA    PRINCIPAL PROBLEM:  Infiltrating ductal carcinoma of breast, left [C50.912] 07/12/2018 Yes      Problems Resolved During this Admission:    Diagnosis Date Noted Date Resolved POA      Discharged Condition: good    Physical exam:  General: NAD  Neuro: AAOx4  Cardio: S1 and S2, RRR  Resp: Moving air appropriately, breathing even and unlabored, some wheezing noted, patient using home inhaler during rounds this morning  Breast: left breast periareolar incision clean, dry and intact with dermabond in place. Left axillary incision clean, dry and intact with dermabond in place. Drain with serosanguinous output.   Abd: Soft, NT, ND  Ext: Warm and well perfused      Disposition:     Follow Up:  Follow-up Information     Amanda Caballero MD In 2 weeks.    Specialties:  General Surgery, Breast Surgery  Why:  Post-op  Contact information:  [address]  [facility] [address]  [phone]                 Patient Instructions:     Diet Adult Regular     No driving until:   Order Comments: Do not drive while taking pain medications.     Notify your health care provider if you experience any of the following:  temperature >100.4     Notify your health care provider if you experience any of the following:  persistent nausea and vomiting or diarrhea     Notify your health care provider if you experience any of the following:  severe uncontrolled pain     Notify your health care provider if you experience any of the following:  redness, tenderness, or signs of infection (pain, swelling, redness, odor or green/yellow discharge around incision site)     No dressing needed   Order Comments: Incisions covered with dermabond (superglue), no dressing needed. Can keep covered with gauze as needed to protect clothing.     Activity as tolerated     Shower on day dressing removed (No bath)   Order Comments: Shower after 48 hours  after surgery, do not submerge incisions for 6 weeks.       Medications:  Reconciled Home Medications:      Medication List      START taking these medications    oxyCODONE-acetaminophen 5-325 mg per tablet  Commonly known as:  PERCOCET  Take 1-2 tablets by mouth every 4 (four) hours as needed for Pain (Do not drive while taking pain medication.).        CONTINUE taking these medications    acetaminophen 325 MG tablet  Commonly known as:  TYLENOL  Take 325 mg by mouth every 6 (six) hours as needed for Pain.     albuterol-ipratropium 2.5 mg-0.5 mg/3 mL nebulizer solution  Commonly known as:  DUO-NEB     atorvastatin 20 MG tablet  Commonly known as:  LIPITOR  Take 20 mg by mouth nightly.     benazepril 10 MG tablet  Commonly known as:  LOTENSIN  Take 20 mg by mouth once daily.     calcium-vitamin D 250-100 mg-unit per tablet  Take 1 tablet by mouth 2 (two) times daily.     citalopram 20 MG tablet  Commonly known as:  CELEXA  Take 20 mg by mouth nightly.     felodipine 10 MG 24 hr tablet  Commonly known as:  PLENDIL  Take 20 mg by mouth once daily.     FEMARA 2.5 mg Tab  Generic drug:  letrozole  Take 1 tablet by mouth.     fluticasone-vilanterol 100-25 mcg/dose diskus inhaler  Commonly known as:  BREO  Inhale 1 puff into the lungs once daily.     INCRUSE ELLIPTA 62.5 mcg/actuation Dsdv  Generic drug:  umeclidinium  USE 1 PUFF QD AT SAME TIME  (patient takes at night)     multivitamin per tablet  Commonly known as:  THERAGRAN  Take 1 tablet by mouth once daily.     omeprazole 20 MG capsule  Commonly known as:  PRILOSEC     PROAIR HFA 90 mcg/actuation inhaler  Generic drug:  albuterol  Inhale 1-2 puffs into the lungs every 6 (six) hours as needed for Wheezing or Shortness of Breath.     torsemide 20 MG Tab  Commonly known as:  DEMADEX  Take 20 mg by mouth once daily.     ZYRTEC ORAL  Take 10 mg by mouth nightly as needed.            Norma De La Garza MD  General Surgery  [facility]

## 2018-07-18 DIAGNOSIS — Z17.0 MALIGNANT NEOPLASM OF NIPPLE OF LEFT BREAST IN FEMALE, ESTROGEN RECEPTOR POSITIVE: Primary | ICD-10-CM

## 2018-07-18 DIAGNOSIS — C50.012 MALIGNANT NEOPLASM OF NIPPLE OF LEFT BREAST IN FEMALE, ESTROGEN RECEPTOR POSITIVE: Primary | ICD-10-CM

## 2018-07-18 RX ORDER — LETROZOLE 2.5 MG/1
2.5 TABLET, FILM COATED ORAL DAILY
Qty: 30 TABLET | Refills: 11 | Status: SHIPPED | OUTPATIENT
Start: 2018-07-18 | End: 2019-07-11 | Stop reason: SDUPTHER

## 2018-07-18 NOTE — TELEPHONE ENCOUNTER
----- Message from Lawson Mendoza sent at 7/18/2018 12:46 PM CDT -----  Contact: Pt   Pt is requesting refill letrozole (FEMARA) 2.5 mg Tab    Will like Rx sent [place] [email or web address] [id number]     Contact::[phone]

## 2018-07-19 ENCOUNTER — PATIENT MESSAGE (OUTPATIENT)
Dept: SURGERY | Facility: CLINIC | Age: 83
End: 2018-07-19

## 2018-07-20 ENCOUNTER — OFFICE VISIT (OUTPATIENT)
Dept: SURGERY | Facility: CLINIC | Age: 83
End: 2018-07-20
Payer: MEDICARE

## 2018-07-20 VITALS — DIASTOLIC BLOOD PRESSURE: 68 MMHG | HEART RATE: 80 BPM | SYSTOLIC BLOOD PRESSURE: 132 MMHG | TEMPERATURE: 98 F

## 2018-07-20 DIAGNOSIS — C50.912 INFILTRATING DUCTAL CARCINOMA OF BREAST, LEFT: Primary | ICD-10-CM

## 2018-07-20 PROCEDURE — 99999 PR PBB SHADOW E&M-EST. PATIENT-LVL II: CPT | Mod: PBBFAC,,, | Performed by: SURGERY

## 2018-07-20 PROCEDURE — 99024 POSTOP FOLLOW-UP VISIT: CPT | Mod: S$GLB,,, | Performed by: SURGERY

## 2018-07-20 NOTE — PROGRESS NOTES
Came in for drain malfunction and pain.   Stripped drain and got additional serous output near 30 cc although emptied earlier today.  Incisions CDI, no edema, no erythema other than immediately around the drain site.  Advised her to stop placing dressings around the drain.  Keep drain in place.  RTC next week for drain removal; and path.

## 2018-07-24 ENCOUNTER — OFFICE VISIT (OUTPATIENT)
Dept: SURGERY | Facility: CLINIC | Age: 83
End: 2018-07-24
Payer: MEDICARE

## 2018-07-24 VITALS
BODY MASS INDEX: 29.22 KG/M2 | DIASTOLIC BLOOD PRESSURE: 76 MMHG | HEART RATE: 73 BPM | HEIGHT: 62 IN | TEMPERATURE: 98 F | WEIGHT: 158.81 LBS | SYSTOLIC BLOOD PRESSURE: 141 MMHG

## 2018-07-24 DIAGNOSIS — C50.412 MALIGNANT NEOPLASM OF UPPER-OUTER QUADRANT OF LEFT BREAST IN FEMALE, ESTROGEN RECEPTOR POSITIVE: Primary | ICD-10-CM

## 2018-07-24 DIAGNOSIS — Z17.0 MALIGNANT NEOPLASM OF UPPER-OUTER QUADRANT OF LEFT BREAST IN FEMALE, ESTROGEN RECEPTOR POSITIVE: Primary | ICD-10-CM

## 2018-07-24 PROCEDURE — 99024 POSTOP FOLLOW-UP VISIT: CPT | Mod: S$GLB,,, | Performed by: SURGERY

## 2018-07-24 PROCEDURE — 99999 PR PBB SHADOW E&M-EST. PATIENT-LVL III: CPT | Mod: PBBFAC,,, | Performed by: SURGERY

## 2018-07-24 RX ORDER — PREDNISONE 20 MG/1
1 TABLET ORAL
COMMUNITY
End: 2018-12-14

## 2018-07-24 RX ORDER — ALBUTEROL SULFATE 0.83 MG/ML
3 SOLUTION RESPIRATORY (INHALATION)
COMMUNITY
End: 2023-01-26

## 2018-07-24 NOTE — PROGRESS NOTES
Breast Surgery  [facility]  Department of Surgery      REFERRING PROVIDER: No referring provider defined for this encounter.    Chief Complaint: Breast cancer    Subjective:      Patient ID: Anahi Cook is a 83 y.o. female who returns s/p L magseed localized lumpectomy with ALND on 7/12/18.  She is recovering well.  Drain output has been decreasing to minimal.        She initially presented with  L breast cancer with node positivity. There are two breast masses adjacent (may be one mass), at 12OC, path with IDC, grade II, ER+PA+H2N- with rashaad metastasis.  In addition, she is seen by Dr. Chairez for a pelvic mass that needs to be removed.  This was found to be benign but was resected by Dr. Chairez while receiving neoadjuvant endocrine therapy for the past 6 months with Dr. Harris.  She appeared to have some response on imaging but not complete response.    Patient does not routinely do self breast exams.  Patient has noted a change on breast exam.  Patient denies nipple discharge. Patient denies to previous breast biopsy. Patient denies a personal history of breast cancer.    Findings at that time were the following:   Tumor size: 2.3  + 1.4 cm   Tumor stgstrstastdstest:st st1st Estrogen Receptor: +   Progesterone Receptor: +   Her-2 jessica: -   Lymph node status: +   Lymphatic invasion: unknown     Interval history:  Patient underwent a lumpectomy with axillary dissection on 7/12/18. She has done well since surgery. Her ZANDRA output has been 15-20cc's/day for the past several days. She denies any fevers, chills, redness or tenderness. She is otherwise well.    Pathology:  SPECIMEN  1) Left breast lumpectomy.  2) Left axillary contents.  FINAL PATHOLOGIC DIAGNOSIS  1. 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  - Biopsy site is present.  - See CAP synoptic summary below and comment.  2. LEFT AXILLARY CONTENTS, AXILLARY DISSECTION:  - Invasive ductal  carcinoma, grade 1, 13 mm.  - Metastatic carcinoma in four of eighteen lymph nodes (4/18).  - Largest metastatic deposit measures 9 mm.  - Extranodal extension is present.  - See CAP synoptic summary below and comment.  SURGICAL PATHOLOGY CANCER CASE SUMMARY- Breast  Procedure: Excision (less than total mastectomy) and axillary dissection.  Specimen laterality: Left.  Tumor size: 2 foci, 33 mm (largest focus, greatest dimension) and 13 mm (greatest dimension).  Histologic type: Invasive ductal carcinoma.  Histologic grade: Grade 1 (glandular/tubular differentiation- 2, nuclear pleomorphism- 1, mitotic rate- 1)  Tumor focality: Multifocal, 2 foci.  Ductal carcinoma in situ: Present, negative for extensive intraductal component (EIC).  Size of DCIS: 8 mm.  - Number of blocks with DCIS: 3.  FCA9XAZ,ER,PGR,QWR5NTJ,ER,PGR  - Number of blocks examined: 9.  Architectural patterns: Cribriform and solid.  Nuclear grade: Low grade.  Necrosis: Not identified.  Note: The size (extent) of DCIS is an estimation of the volume of breast tissue occupied by DCIS.  Margins (33 mm focus)-  Uninvolved by invasive carcinoma, distances from closest margins are 3 mm to the lateral margin and 4 mm to the  medial margin.  Uninvolved by DCIS, distance from closest margin (medial) is 9 mm.  Treatment effect: No definite response to presurgical therapy in the invasive carcinoma or lymph nodes.  Lymphovascular invasion: Present.  Lymph nodes:  Total number of lymph nodes examined (sentinel and non-sentinel): 18.  Number of lymph nodes with Macrometastases (>2 mm): 2.  Number of lymph nodes with Micrometastases (> 0.2 mm to 2 mm and/or > 200 cells): 2.  Number of lymph nodes with isolated tumor cells (less than or equal to 0.2 mm and less than or equal to 200 cells):  0.  Size of largest metastatic deposit: 9 mm.  Extranodal extension: Present.  Pathologic staging (pTNM, AJCC 8th edition): ympT2 N2a.    GYN History:  Age of menarche was 12. Age  of menopause was 32 with partial hysterectomy, took HRT until her 70s.    Patient is . Age of first live birth was 19.     Past Medical History:   Diagnosis Date    Anxiety     Back pain     Breast cancer 2018    Breast mass 1/15/2018    Chronic kidney disease, stage 2, mildly decreased GFR     COPD (chronic obstructive pulmonary disease)     emphysema    Depression     Dysuria 2018    Family history of breast cancer 2018    Hypertension     Osteoporosis, senile     Ovarian mass 1/15/2018    Pneumonia     S/P robotic assisted laparoscopic BSO (bilateral salpingo-oophorectomy) 2018     Past Surgical History:   Procedure Laterality Date    AXILLARY NODE DISSECTION Left 2018    Procedure: LYMPHADENECTOMY, AXILLARY;  Surgeon: Amanda Caballero MD;  Location: [facility] OR [address] [place] [address];  Service: General;  Laterality: Left;    CHOLECYSTECTOMY      EYE SURGERY      HYSTERECTOMY      INJECTION FOR SENTINEL NODE IDENTIFICATION Left 2018    Procedure: INJECTION, FOR SENTINEL NODE IDENTIFICATION;  Surgeon: Amanda Caballero MD;  Location: [facility] OR [address] [place] [address];  Service: General;  Laterality: Left;    MASTECTOMY, PARTIAL Left 2018    Procedure: MASTECTOMY, PARTIAL-W/SEED LOCALIZATION;  Surgeon: Amanda Caballero MD;  Location: [facility] OR [address] [place] [address];  Service: General;  Laterality: Left;    [place] REMOVAL OF OVARY/TUBE(S)  2018    Robotic Assisted    ROTATOR CUFF REPAIR Right     rotator cuff repair right shoulder    TONSILLECTOMY       Current Outpatient Prescriptions on File Prior to Visit   Medication Sig Dispense Refill    acetaminophen (TYLENOL) 325 MG tablet Take 325 mg by mouth every 6 (six) hours as needed for Pain.      albuterol (PROAIR HFA) 90 mcg/actuation inhaler Inhale 1-2 puffs into the lungs every 6 (six) hours as needed for Wheezing or Shortness of Breath.      albuterol-ipratropium 2.5mg-0.5mg/3mL (DUO-NEB) 0.5 mg-3 mg(2.5 mg base)/3 mL nebulizer solution       atorvastatin  (LIPITOR) 20 MG tablet Take 20 mg by mouth nightly.       benazepril (LOTENSIN) 10 MG tablet Take 20 mg by mouth once daily.       calcium-vitamin D 250-100 mg-unit per tablet Take 1 tablet by mouth 2 (two) times daily.      CETIRIZINE HCL (ZYRTEC ORAL) Take 10 mg by mouth nightly as needed.       citalopram (CELEXA) 20 MG tablet Take 20 mg by mouth nightly.       felodipine (PLENDIL) 10 MG 24 hr tablet Take 20 mg by mouth once daily.       fluticasone-vilanterol (BREO) 100-25 mcg/dose diskus inhaler Inhale 1 puff into the lungs once daily. 1 each 3    INCRUSE ELLIPTA 62.5 mcg/actuation DsDv USE 1 PUFF QD AT SAME TIME  (patient takes at night)  2    letrozole (FEMARA) 2.5 mg Tab Take 1 tablet (2.5 mg total) by mouth once daily. 30 tablet 11    multivitamin (THERAGRAN) per tablet Take 1 tablet by mouth once daily.      omeprazole (PRILOSEC) 20 MG capsule       oxyCODONE-acetaminophen (PERCOCET) 5-325 mg per tablet Take 1-2 tablets by mouth every 4 (four) hours as needed for Pain (Do not drive while taking pain medication.). 31 tablet 0    torsemide (DEMADEX) 20 MG Tab Take 20 mg by mouth once daily.       No current facility-administered medications on file prior to visit.      Social History     Social History    Marital status: Single     Spouse name: N/A    Number of children: 3    Years of education: N/A     Occupational History    Multiple jobs, see social documentation section      Retired     Social History Main Topics    Smoking status: Former Smoker     Packs/day: 1.00     Years: 40.00     Types: Cigarettes    Smokeless tobacco: Never Used      Comment: Smoked >1 ppd for at least 40 years, quit around 1995    Alcohol use Yes      Comment: occasional glass of wine or cocktail    Drug use: No    Sexual activity: Yes     Partners: Male     Other Topics Concern    Not on file     Social History Narrative    Worked many jobs while raising 3 children.  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  She was  from her first , but took care of him at the end of his life.     Family History   Problem Relation Age of Onset    Heart failure Mother     Kidney failure Mother     Heart attack Father     Breast cancer Sister 66        Lump, XRT, chemo, recurrence 10 years later, still alive.    Cancer Brother         leukemia    Heart attack Brother 58    Pulmonary embolism Brother 45    Heart attack Brother 52    Breast cancer Maternal Grandmother 60        advanced at diagnosis    Breast cancer Maternal Aunt 83         at 92        Review of Systems   Constitutional: Negative for appetite change, chills, fever and unexpected weight change.   HENT: Negative for facial swelling, postnasal drip and sore throat.    Eyes: Negative for redness and itching.   Respiratory: Negative for chest tightness and shortness of breath.    Cardiovascular: Negative for chest pain and palpitations.   Gastrointestinal: Negative for blood in stool, diarrhea, nausea and vomiting.   Genitourinary: Negative for difficulty urinating and dysuria.   Musculoskeletal: Negative for arthralgias and joint swelling.   Skin: Negative for rash and wound.   Neurological: Negative for dizziness and syncope.   Hematological: Negative for adenopathy.   Psychiatric/Behavioral: Negative for agitation. The patient is not nervous/anxious.      Objective:   BP (!) 141/76 (BP Location: Right arm, Patient Position: Sitting, BP Method: Medium (Automatic))   Pulse 73   Temp 98.2 °F (36.8 °C)   Ht 5' 2" (1.575 m)   Wt 72 kg (158 lb 13.5 oz)   LMP  (LMP Unknown)   BMI 29.05 kg/m²     Physical Exam   Constitutional: She appears well-developed and well-nourished.   HENT:   Head: Normocephalic.   Eyes: No scleral icterus.   Neck: Neck supple. No tracheal deviation present.   Cardiovascular: Normal rate and regular rhythm.  "   Pulmonary/Chest: No respiratory distress. Right breast exhibits no inverted nipple, no mass, no nipple discharge and no skin change. Left breast exhibits no inverted nipple, no mass, no nipple discharge and no skin change.   Incision healing well. No signs of redness, tenderness or drainage. ZANDRA drain with serous drainage.   Abdominal: Soft. She exhibits no mass. There is no tenderness.   Musculoskeletal: She exhibits no edema.   Lymphadenopathy:     She has no cervical adenopathy.   Neurological: She is alert.   Skin: No rash noted. No erythema.     Psychiatric: She has a normal mood and affect.       FINAL PATHOLOGIC DIAGNOSIS  1. 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  - Biopsy site is present.  - See CAP synoptic summary below and comment.  2. LEFT AXILLARY CONTENTS, AXILLARY DISSECTION:  - Invasive ductal carcinoma, grade 1, 13 mm.  - Metastatic carcinoma in four of eighteen lymph nodes (4/18).  - Largest metastatic deposit measures 9 mm.  - Extranodal extension is present.  - See CAP synoptic summary below and comment.  SURGICAL PATHOLOGY CANCER CASE SUMMARY- Breast  Procedure: Excision (less than total mastectomy) and axillary dissection.  Specimen laterality: Left.  Tumor size: 2 foci, 33 mm (largest focus, greatest dimension) and 13 mm (greatest dimension).  Histologic type: Invasive ductal carcinoma.  Histologic grade: Grade 1 (glandular/tubular differentiation- 2, nuclear pleomorphism- 1, mitotic rate- 1)  Tumor focality: Multifocal, 2 foci.  Ductal carcinoma in situ: Present, negative for extensive intraductal component (EIC).  Size of DCIS: 8 mm.  - Number of blocks with DCIS: 3.  NDP5CUR,ER,PGR,TKV1QIK,ER,PGR    - Number of blocks examined: 9.  Architectural patterns: Cribriform and solid.  Nuclear grade: Low grade.  Necrosis: Not identified.  Note: The size (extent) of DCIS is an estimation of the volume of breast  tissue occupied by DCIS.  Margins (33 mm focus)-  Uninvolved by invasive carcinoma, distances from closest margins are 3 mm to the lateral margin and 4 mm to the  medial margin.  Uninvolved by DCIS, distance from closest margin (medial) is 9 mm.  Treatment effect: No definite response to presurgical therapy in the invasive carcinoma or lymph nodes.  Lymphovascular invasion: Present.  Lymph nodes:  Total number of lymph nodes examined (sentinel and non-sentinel): 18.  Number of lymph nodes with Macrometastases (>2 mm): 2.  Number of lymph nodes with Micrometastases (> 0.2 mm to 2 mm and/or > 200 cells): 2.  Number of lymph nodes with isolated tumor cells (less than or equal to 0.2 mm and less than or equal to 200 cells):  0.  Size of largest metastatic deposit: 9 mm.  Extranodal extension: Present.  Pathologic staging (pTNM, AJCC 8th edition): ympT2 N2a.  Ancillary studies- (Performed on 33 mm focus, block 1A)  ER: Positive (moderate intensity, >95% of tumor cell nuclei).  DC: Positive (weak to moderate intensity, 60% of tumor cell nuclei).  HER2: Negative.  Ki-67: 2%.  Ancillary studies- (Performed on 13 mm focus, block 2F)  ER: Positive (strong intensity, >95% of tumor cell nuclei).  DC: Positive (weak to moderate intensity, 21-30% of tumor cell nuclei).  HER2: Negative.  Ki-67: 2%.  COMMENT:  Immunohistochemistry for p63 was performed on block 1A to confirm the presence and quantify the DCIS.  All stains have satisfactory positive and negative controls.  Blocks for potential molecular or ancillary studies:  Tumor block: 1G (33 mm focus) and 2F (13 mm focus).    Assessment:       84 y/o female with T2N1 breast cancer that is ++-, neoadjuvant  Letrozole treatment  Plan:     Drain removed today.  Recovering well.  Will present at tumor board.  Continue follow up with Dr. Harris for continuation of Letrozole vs alternative  Will need rad onc consult for WBRT with axillary XRT.  Return in 4 months or sooner if there are  any concerns.

## 2018-07-27 ENCOUNTER — TELEPHONE (OUTPATIENT)
Dept: ORTHOPEDICS | Facility: CLINIC | Age: 83
End: 2018-07-27

## 2018-07-27 NOTE — TELEPHONE ENCOUNTER
VM left to notify pt LS out of office week of 8/7/18. Appt changed provider to see Segundo MCKEON but pt can r/s to see LS if she would like the following week.

## 2018-07-29 PROBLEM — C50.912 HORMONE RECEPTOR POSITIVE BREAST CANCER, LEFT: Status: RESOLVED | Noted: 2018-01-25 | Resolved: 2018-07-29

## 2018-07-29 PROBLEM — C50.412 MALIGNANT NEOPLASM OF UPPER-OUTER QUADRANT OF LEFT BREAST IN FEMALE, ESTROGEN RECEPTOR POSITIVE: Status: ACTIVE | Noted: 2018-07-29

## 2018-07-29 PROBLEM — C50.919 BREAST CANCER: Status: RESOLVED | Noted: 2018-01-17 | Resolved: 2018-07-29

## 2018-07-29 PROBLEM — Z17.0 MALIGNANT NEOPLASM OF UPPER-OUTER QUADRANT OF LEFT BREAST IN FEMALE, ESTROGEN RECEPTOR POSITIVE: Status: ACTIVE | Noted: 2018-07-29

## 2018-07-31 ENCOUNTER — TUMOR BOARD CONFERENCE (OUTPATIENT)
Dept: SURGERY | Facility: CLINIC | Age: 83
End: 2018-07-31

## 2018-07-31 NOTE — PROGRESS NOTES
Interdisciplinary Breast Cancer Conference    Anahi Cook    Female    Date Presented to Tumor Board: 07/31/18    HOSPTIAL/CLINIC PRESENTING: [facility]    TUMOR SITE: LEFT    TUMOR SITE: CENTRAL (10:00 middle depth 5cm FN)    Presenter: Norma De La Garza (on behalf of Amanda Caballero MD)    Reason For Consultation: Initial Presentation    Specialties Present: Medical Oncology;Research;Radiology;Navigation;Pathology;Surgical Oncology    Patient Status: a current patient    Treatment to Date: Hormonal Therapy;Surgical Intervention(s) (neoadjuvant AI for 6mo, partial mastectomy with SLNB)    Clinical Trial Eligibility: None available    Estrogen Receptor Status: Positive    Progesterone Status: Positive    Her2/TEQUILA Status: Negative    Cancer Staging  Malignant neoplasm of upper-outer quadrant of left breast in female, estrogen receptor positive  Staging form: Breast, AJCC 8th Edition  - Clinical stage from 7/29/2018: No Stage Recommended (ycT2(2), cN3, cM0, ER: Positive, OH: Positive, HER2: Negative) - Unsigned    At time of surgery: uptH6C0w, two foci, 4/18 lymph nodes positive  Stage IIA per AJCC 8th ed. pathologic prognostic staging system        RECOMMENDED PLAN: Radiation;Endocrine Therapy   No change in AI choice, continue with this adjuvantly  Radiation to breast, lymph nodes, supraclavicular area.     Neoadjuvant AI was done due to patient being worked up for pelvic mass, which was benign after workup    UNSTAGEABLE: No         PRESENTATION AT CANCER CONFERENCE: Prospective

## 2018-08-01 ENCOUNTER — TELEPHONE (OUTPATIENT)
Dept: ADMINISTRATIVE | Facility: OTHER | Age: 83
End: 2018-08-01

## 2018-08-01 NOTE — TELEPHONE ENCOUNTER
----- Message from LORAINE Hopkins sent at 8/1/2018 11:48 AM CDT -----  Regarding: FW: epidural  Dr. Wilcox said that the patient can have her ASHUTOSH with another physician to have it sooner.  Below is clearance from recent breast surgery.  Please call patient to reschedule injection.  ----- Message -----  From: Amanda Caballero MD  Sent: 7/31/2018   5:49 PM  To: LORAINE Hopkins  Subject: RE: epidural                                     She is free to have the injection from a breast surgery standpoint.    Thanks for reaching out!    Amanda      ----- Message -----  From: LORAINE Hopkins  Sent: 7/31/2018   8:21 AM  To: Amanda Caballero MD  Subject: epidural                                         Good Morning Dr. Caballero,  The patient would like to schedule and epidural with our office for her pain.  Is there a certain time you want her to wait after her surgery?  She is trying to get it in the next week.

## 2018-08-08 ENCOUNTER — HOSPITAL ENCOUNTER (OUTPATIENT)
Dept: RADIOLOGY | Facility: OTHER | Age: 83
Discharge: HOME | End: 2018-08-08
Attending: ORTHOPAEDIC SURGERY
Payer: MEDICARE

## 2018-08-08 ENCOUNTER — OFFICE VISIT (OUTPATIENT)
Dept: ORTHOPEDICS | Facility: CLINIC | Age: 83
End: 2018-08-08
Payer: MEDICARE

## 2018-08-08 ENCOUNTER — TELEPHONE (OUTPATIENT)
Dept: ORTHOPEDICS | Facility: CLINIC | Age: 83
End: 2018-08-08

## 2018-08-08 VITALS
HEART RATE: 80 BPM | SYSTOLIC BLOOD PRESSURE: 126 MMHG | DIASTOLIC BLOOD PRESSURE: 69 MMHG | BODY MASS INDEX: 29.08 KG/M2 | WEIGHT: 158 LBS | RESPIRATION RATE: 18 BRPM | HEIGHT: 62 IN

## 2018-08-08 DIAGNOSIS — S62.025S CLOSED NONDISPLACED FRACTURE OF MIDDLE THIRD OF SCAPHOID BONE OF LEFT WRIST, SEQUELA: Primary | ICD-10-CM

## 2018-08-08 DIAGNOSIS — S62.025S CLOSED NONDISPLACED FRACTURE OF MIDDLE THIRD OF SCAPHOID BONE OF LEFT WRIST, SEQUELA: ICD-10-CM

## 2018-08-08 DIAGNOSIS — S62.025K CLOSED NONDISPLACED FRACTURE OF MIDDLE THIRD OF SCAPHOID OF LEFT WRIST WITH NONUNION, SUBSEQUENT ENCOUNTER: ICD-10-CM

## 2018-08-08 PROCEDURE — 99213 OFFICE O/P EST LOW 20 MIN: CPT | Mod: S$GLB,,, | Performed by: PHYSICIAN ASSISTANT

## 2018-08-08 PROCEDURE — 99999 PR PBB SHADOW E&M-EST. PATIENT-LVL III: CPT | Mod: PBBFAC,,, | Performed by: PHYSICIAN ASSISTANT

## 2018-08-08 PROCEDURE — 73110 X-RAY EXAM OF WRIST: CPT | Mod: TC,FY,LT

## 2018-08-08 PROCEDURE — 3074F SYST BP LT 130 MM HG: CPT | Mod: CPTII,S$GLB,, | Performed by: PHYSICIAN ASSISTANT

## 2018-08-08 PROCEDURE — 3078F DIAST BP <80 MM HG: CPT | Mod: CPTII,S$GLB,, | Performed by: PHYSICIAN ASSISTANT

## 2018-08-08 PROCEDURE — 73110 X-RAY EXAM OF WRIST: CPT | Mod: 26,LT,, | Performed by: RADIOLOGY

## 2018-08-08 NOTE — TELEPHONE ENCOUNTER
Spoke w/pt, she states she does not want an xray at her next appointment. Pt states she thinks todays appt was pointless as she did not get to see the surgeon, and that a determination can be made by Dr Lopez off of todays xray. I notified pt I would note it on her next. Pt verbalized understanding.

## 2018-08-08 NOTE — PROGRESS NOTES
Subjective:      Patient ID: Anahi Cook is a 83 y.o. female.    Chief Complaint: Pain of the Left Wrist      HPI  Anahi Cook is a 83 y.o. female presenting today for left scaphoid fracture. Pt had a fall 5/2/18. She slipped and fell onto the left wrist and back. She was seen in the ED later the same day where she was found to have left scaphoid fracture. She was placed into a splint initially then transitioned into a cast 5/17/18. She was transitioned back into a splint at last visit 6/26/18. She is using the bone stimulator. Denies pain.       Review of patient's allergies indicates:  No Known Allergies      Current Outpatient Prescriptions   Medication Sig Dispense Refill    acetaminophen (TYLENOL) 325 MG tablet Take 325 mg by mouth every 6 (six) hours as needed for Pain.      albuterol (PROAIR HFA) 90 mcg/actuation inhaler Inhale 1-2 puffs into the lungs every 6 (six) hours as needed for Wheezing or Shortness of Breath.      albuterol (PROVENTIL) 2.5 mg /3 mL (0.083 %) nebulizer solution Inhale 3 mLs into the lungs.      albuterol-ipratropium 2.5mg-0.5mg/3mL (DUO-NEB) 0.5 mg-3 mg(2.5 mg base)/3 mL nebulizer solution       atorvastatin (LIPITOR) 20 MG tablet Take 20 mg by mouth nightly.       benazepril (LOTENSIN) 10 MG tablet Take 20 mg by mouth once daily.       calcium-vitamin D 250-100 mg-unit per tablet Take 1 tablet by mouth 2 (two) times daily.      CETIRIZINE HCL (ZYRTEC ORAL) Take 10 mg by mouth nightly as needed.       citalopram (CELEXA) 20 MG tablet Take 20 mg by mouth nightly.       felodipine (PLENDIL) 10 MG 24 hr tablet Take 20 mg by mouth once daily.       fluticasone-vilanterol (BREO) 100-25 mcg/dose diskus inhaler Inhale 1 puff into the lungs once daily. 1 each 3    INCRUSE ELLIPTA 62.5 mcg/actuation DsDv USE 1 PUFF QD AT SAME TIME  (patient takes at night)  2    letrozole (FEMARA) 2.5 mg Tab Take 1 tablet (2.5 mg total) by mouth once daily. 30 tablet 11    multivitamin  (THERAGRAN) per tablet Take 1 tablet by mouth once daily.      omeprazole (PRILOSEC) 20 MG capsule       oxyCODONE-acetaminophen (PERCOCET) 5-325 mg per tablet Take 1-2 tablets by mouth every 4 (four) hours as needed for Pain (Do not drive while taking pain medication.). 31 tablet 0    predniSONE (DELTASONE) 20 MG tablet Take 1 tablet by mouth.      torsemide (DEMADEX) 20 MG Tab Take 20 mg by mouth once daily.       No current facility-administered medications for this visit.        Past Medical History:   Diagnosis Date    Anxiety     Back pain     Breast cancer 1/17/2018    Breast mass 1/15/2018    Chronic kidney disease, stage 2, mildly decreased GFR     COPD (chronic obstructive pulmonary disease)     emphysema    Depression     Dysuria 1/29/2018    Family history of breast cancer 1/17/2018    Hypertension     Osteoporosis, senile     Ovarian mass 1/15/2018    Pneumonia     S/P robotic assisted laparoscopic BSO (bilateral salpingo-oophorectomy) 2/23/2018       Past Surgical History:   Procedure Laterality Date    AXILLARY NODE DISSECTION Left 7/12/2018    Procedure: LYMPHADENECTOMY, AXILLARY;  Surgeon: Amanda Caballero MD;  Location: [facility] OR [address] [place] [address];  Service: General;  Laterality: Left;    CHOLECYSTECTOMY      EYE SURGERY      HYSTERECTOMY      INJECTION FOR SENTINEL NODE IDENTIFICATION Left 7/12/2018    Procedure: INJECTION, FOR SENTINEL NODE IDENTIFICATION;  Surgeon: Amanda Caballero MD;  Location: [facility] OR [address] [place] [address];  Service: General;  Laterality: Left;    MASTECTOMY, PARTIAL Left 7/12/2018    Procedure: MASTECTOMY, PARTIAL-W/SEED LOCALIZATION;  Surgeon: Amanda Caballero MD;  Location: [address];  Service: General;  Laterality: Left;    [place] REMOVAL OF OVARY/TUBE(S)  02/23/2018    Robotic Assisted    ROTATOR CUFF REPAIR Right     rotator cuff repair right shoulder    TONSILLECTOMY         Review of Systems:  Constitutional: Negative for chills and fever.   Respiratory: Negative for  "cough and shortness of breath.    Gastrointestinal: Negative for nausea and vomiting.   Skin: Negative for rash.   Neurological: Negative for dizziness and headaches.   Psychiatric/Behavioral: Negative for depression.   MSK as in HPI       OBJECTIVE:     PHYSICAL EXAM:  /69 (BP Location: Right arm, Patient Position: Sitting, BP Method: Small (Automatic))   Pulse 80   Resp 18   Ht 5' 2" (1.575 m)   Wt 71.7 kg (158 lb)   LMP  (LMP Unknown)   BMI 28.90 kg/m²     GEN:  NAD, well-developed, well-groomed.  NEURO: Awake, alert, and oriented. Normal attention and concentration.    PSYCH: Normal mood and affect. Behavior is normal.  HEENT: No cervical lymphadenopathy noted.  CARDIOVASCULAR: Radial pulses 2+ bilaterally. No LE edema noted.  PULMONARY: Breath sounds normal. No respiratory distress.  SKIN: Intact, no rashes.      MSK:   LUE:  Good active ROM fingers. No ttp fracture site. AIN/PIN/Radial/Median/Ulnar Nerves assessed in isolation without deficit. Radial & Ulnar arteries palpated 2+. Capillary Refill <3s.       RADIOGRAPHS:  Xray left wrist 8/8/18  FINDINGS:  Once again, there is a fracture through the waist of the scaphoid bone and the fracture line is still quite evident.  There appears to be relatively increased density of the proximal pole of the scaphoid relative to the distal pole and avascular necrosis of the proximal pole of the scaphoid bone cannot be excluded.  There are degenerative changes which are most pronounced at the left 1st carpometacarpal joint.    Comments: I have personally reviewed the imaging and I agree with the above radiologist's report.    ASSESSMENT/PLAN:       ICD-10-CM ICD-9-CM   1. Closed nondisplaced fracture of middle third of scaphoid bone of left wrist, sequela S62.025S 905.2   2. 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 Da Silva to discuss possible surgery for non-union       The patient indicates understanding of these issues and agrees to the plan.    Nuria Holloway PA-C  [facility]   [facility]  [place], [place]

## 2018-08-08 NOTE — TELEPHONE ENCOUNTER
----- Message from Tammy Bosch sent at 8/8/2018 11:03 AM CDT -----  Contact: pt            Name of Who is Calling: pt      What is the request in detail: pt states she donot need a xray and is needing to speak with staff      Can the clinic reply by [facility]: no      What Number to Call Back [facility]: [phone]

## 2018-08-08 NOTE — PROGRESS NOTES
Subjective:       Patient ID: Anahi Cook is a 83 y.o. female.    Chief Complaint: No chief complaint on file.    HPI   Mrs. Cook returns today for follow up.   Briefly, she is an 83-year-old female who, late last year had felt a mass in her left breast.  A biopsy that was performed on 01/11/2018 showed an infiltrating ductal carcinoma that was ER strongly positive, IA strongly positive and HER-2 negative by immunohistochemistry.    She was started on letrozole and undwerwent a BSO by Dr. Chairez for an ovarian mass.  Her ovarian mass was benign. In mid July 2018 she underwent a lumpectomy and AND.  She had a 3.3 cm carcinoma and 4/8 positive nodes.  She has remained on letrozole, now in the adjuvant setting, and she returns today for follow up.      Review of Systems    Overall she feels OK  She has tolerated the letrozole well so far.  She still complains of pain on her left wrist and left forearm from her recent wrist fracture.  According to her daughter, surgery is contemplated.  She denies any anxiety, depression, easy bruising, fevers, chills, night  sweats, weight loss, nausea, vomiting, diarrhea, constipation, diplopia, blurred vision, headache, chest pain, palpitations, shortness of breath, breast pain, abdominal pain, extremity pain, or difficulty ambulating.  The remainder of the ten-point ROS, including general, skin, lymph, H/N, cardiorespiratory, GI, , Neuro, Endocrine, and psychiatric is negative.       Objective:      Physical Exam      She is alert, oriented to time, place, person, pleasant, well      nourished, in no acute physical distress.   She is accompanied by her daughter.                                 VITAL SIGNS:  Reviewed                                      HEENT:  Normal.  There are no nasal, oral, lip, gingival, auricular, lid,    or conjunctival lesions.  Mucosae are moist and pink, and there is no        thrush.  Pupils are equal, reactive to light and accommodation.               Extraocular muscle movements are intact.   Dentition is fair.  Maxillary teeth are missing.  There is no frontal or maxillary tenderness.                                     NECK:  Supple without JVD, adenopathy, or thyromegaly.                       LUNGS:  Clear to auscultation without wheezing, rales, or rhonchi.           CARDIOVASCULAR:  Reveals an S1, S2, no murmurs, no rubs, no gallops.         ABDOMEN:  Soft, nontender, without organomegaly.  Bowel sounds are    present.    Scars from her laparoscopic DONOVAN-BSO are seen.                                                            EXTREMITIES:  No cyanosis, clubbing, or edema.   The left forearm and wrist are in a cast.                            BREASTS: She is s/p left l;umpectomy with a healed periareolar incision.  She also has an incision from her axillary dissection;  It is well healed.   There are no masses in her right breast.    Both breasts are large and pendulous.                                   LYMPHATIC:  There is no cervical, axillary, or supraclavicular adenopathy.   SKIN:  Warm and moist, without petechiae, rashes, induration, or ecchymoses.           NEUROLOGIC:  DTRs are 0-1+ bilaterally, symmetrical, motor function is 5/5,  and cranial nerves are  within normal limits.      Assessment:       1. Hormone receptor positive breast cancer, left, on neoadjuvant letrozole with a significant clinical response.   2. Use of aromatase inhibitors        Plan:        I had a long discussion with her and her daughter.  She will remain on letrozole, and has an appointment with the radiation oncologist on the 16th of August.  Assuming she will start XRT in early September, I will see her in late October for follow up.  Her multiple questions were answered to her satisfaction.

## 2018-08-10 ENCOUNTER — OFFICE VISIT (OUTPATIENT)
Dept: HEMATOLOGY/ONCOLOGY | Facility: CLINIC | Age: 83
End: 2018-08-10
Payer: MEDICARE

## 2018-08-10 VITALS
RESPIRATION RATE: 16 BRPM | BODY MASS INDEX: 29.7 KG/M2 | HEIGHT: 62 IN | TEMPERATURE: 99 F | DIASTOLIC BLOOD PRESSURE: 66 MMHG | SYSTOLIC BLOOD PRESSURE: 141 MMHG | HEART RATE: 77 BPM | WEIGHT: 161.38 LBS | OXYGEN SATURATION: 91 %

## 2018-08-10 DIAGNOSIS — C50.412 MALIGNANT NEOPLASM OF UPPER-OUTER QUADRANT OF LEFT BREAST IN FEMALE, ESTROGEN RECEPTOR POSITIVE: Primary | ICD-10-CM

## 2018-08-10 DIAGNOSIS — Z79.811 USE OF AROMATASE INHIBITORS: ICD-10-CM

## 2018-08-10 DIAGNOSIS — Z17.0 MALIGNANT NEOPLASM OF UPPER-OUTER QUADRANT OF LEFT BREAST IN FEMALE, ESTROGEN RECEPTOR POSITIVE: Primary | ICD-10-CM

## 2018-08-10 PROCEDURE — 99214 OFFICE O/P EST MOD 30 MIN: CPT | Mod: S$GLB,,, | Performed by: INTERNAL MEDICINE

## 2018-08-10 PROCEDURE — 3078F DIAST BP <80 MM HG: CPT | Mod: CPTII,S$GLB,, | Performed by: INTERNAL MEDICINE

## 2018-08-10 PROCEDURE — 3077F SYST BP >= 140 MM HG: CPT | Mod: CPTII,S$GLB,, | Performed by: INTERNAL MEDICINE

## 2018-08-10 PROCEDURE — 99999 PR PBB SHADOW E&M-EST. PATIENT-LVL III: CPT | Mod: PBBFAC,,, | Performed by: INTERNAL MEDICINE

## 2018-08-16 ENCOUNTER — OFFICE VISIT (OUTPATIENT)
Dept: SURGERY | Facility: CLINIC | Age: 83
End: 2018-08-16
Payer: MEDICARE

## 2018-08-16 ENCOUNTER — TELEPHONE (OUTPATIENT)
Dept: PAIN MEDICINE | Facility: CLINIC | Age: 83
End: 2018-08-16

## 2018-08-16 VITALS
BODY MASS INDEX: 28.36 KG/M2 | TEMPERATURE: 98 F | SYSTOLIC BLOOD PRESSURE: 127 MMHG | HEART RATE: 70 BPM | DIASTOLIC BLOOD PRESSURE: 74 MMHG | HEIGHT: 63 IN | RESPIRATION RATE: 18 BRPM | WEIGHT: 160.06 LBS

## 2018-08-16 DIAGNOSIS — C50.412 MALIGNANT NEOPLASM OF UPPER-OUTER QUADRANT OF LEFT BREAST IN FEMALE, ESTROGEN RECEPTOR POSITIVE: Primary | ICD-10-CM

## 2018-08-16 DIAGNOSIS — Z17.0 MALIGNANT NEOPLASM OF UPPER-OUTER QUADRANT OF LEFT BREAST IN FEMALE, ESTROGEN RECEPTOR POSITIVE: Primary | ICD-10-CM

## 2018-08-16 PROBLEM — C50.912 INFILTRATING DUCTAL CARCINOMA OF BREAST, LEFT: Status: RESOLVED | Noted: 2018-07-12 | Resolved: 2018-08-16

## 2018-08-16 PROCEDURE — 99999 PR PBB SHADOW E&M-EST. PATIENT-LVL III: CPT | Mod: PBBFAC,,, | Performed by: RADIOLOGY

## 2018-08-16 PROCEDURE — 99204 OFFICE O/P NEW MOD 45 MIN: CPT | Mod: S$GLB,,, | Performed by: RADIOLOGY

## 2018-08-16 NOTE — TELEPHONE ENCOUNTER
----- Message from Melodie Ponce sent at 8/16/2018  3:00 PM CDT -----  Contact: pt  Name of Who is Calling:SHAYY JAMIL [[id number]]    What is the request in detail:  patient want to know is she can take predniSONE (DELTASONE) 20 MG tablet for her breathing before her procedure on 08/21/18    Can the clinic reply by [facility]: No    What Number to Call Back if not in [facility]:- [phone]

## 2018-08-16 NOTE — PROGRESS NOTES
REFERRING PHYSICIAN:   Amanda Caballero M.D., Martín Camarillo M.D.    DIAGNOSIS:    multifocal cT2 N1 M0 ([id number]) invasive ductal carcinoma of the left breast    HISTORY OF PRESENT ILLNESS:   Ms. Cook is an 83-year-old female who was recently diagnosed with left breast cancer after she presented with a palpable mass in the upper outer quadrant.  A mammogram on January 4, 2018 revealed a 17 mm spiculated lesion in the central region of the left breast as well as a 2nd mass in the 10 o'clock position.  There were also palpable left axillary lymphadenopathy.  A core needle biopsy of both of these lesions on January 11, 2018 revealed invasive ductal carcinoma which was ER positive (%), [place] positive (80- 90%), and her 2-.  A CT of the chest, abdomen, and pelvis on January 23, 2018 revealed no evidence of distant metastatic disease.  However there was a she 5.4 cm left adnexal mass which was suspicious.  She underwent bilateral salpingo-oophorectomy on February 23, 2018 which was negative for malignancy. She was started on endocrine therapy with letrozole and completed approximately 6 months.  On July 12, 2018, she underwent left breast lumpectomy and left axillary dissection.  The pathology from the left breast revealed 2 foci of grade 1, invasive ductal carcinoma, one measuring 33 mm and the other measuring 13 mm. The closest margin is 3 mm laterally.  Out of 18 lymph nodes removed, 2 were involved with macro metastasis and 2 were involved with micrometastasis with the largest deposit measuring 9 mm, for a total of 4/18 lymph nodes with involvement.  There was also extranodal extension noted.  She is recommended to undergo postoperative radiation and is here today with her daughter for discussion regarding that.    At present, patient is healing from the surgery without any unexpected side effects.  However, she fell approximately 6 weeks ago and had a fracture of the left wrist.  She has required a walker for  balance after her fall.  She denies left breast pain, edema, erythema, or nipple discharge. She also denies fever, night sweats, or weight loss. She also reports edema of the left elbow since her fall.    REVIEW OF SYSTEMS:  As above.  In addition, patient denies headaches, visual problems, dizziness, chest pain, shortness of breath, cough, nausea, vomiting, diarrhea, or any new bony pains. Patient also denies easy bruising, skin rashes, or numbness or tingling.    GYN HISTORY:   Menarche at age 12, , simple hystorectomy at age 32. BSO in 2018.     ECO-2, walks with walker after her recent fall    PAST MEDICAL HISTORY:  Past Medical History:   Diagnosis Date    Anxiety     Back pain     Breast cancer 2018    Breast mass 1/15/2018    Chronic kidney disease, stage 2, mildly decreased GFR     COPD (chronic obstructive pulmonary disease)     emphysema    Depression     Dysuria 2018    Family history of breast cancer 2018    Hypertension     Osteoporosis, senile     Ovarian mass 1/15/2018    Pneumonia     S/P robotic assisted laparoscopic BSO (bilateral salpingo-oophorectomy) 2018       PAST SURGICAL HISTORY:  Past Surgical History:   Procedure Laterality Date    CHOLECYSTECTOMY      EYE SURGERY      HYSTERECTOMY      MO REMOVAL OF OVARY/TUBE(S)  2018    Robotic Assisted    ROTATOR CUFF REPAIR Right     rotator cuff repair right shoulder    TONSILLECTOMY         ALLERGIES:   Review of patient's allergies indicates:   Allergen Reactions    Levaquin [levofloxacin] Itching    Penicillins Itching       MEDICATIONS:  Current Outpatient Medications   Medication Sig    acetaminophen (TYLENOL) 325 MG tablet Take 325 mg by mouth every 6 (six) hours as needed for Pain.    albuterol (PROAIR HFA) 90 mcg/actuation inhaler Inhale 1-2 puffs into the lungs every 6 (six) hours as needed for Wheezing or Shortness of Breath.    albuterol (PROVENTIL) 2.5 mg /3 mL (0.083 %)  nebulizer solution Inhale 3 mLs into the lungs.    albuterol-ipratropium 2.5mg-0.5mg/3mL (DUO-NEB) 0.5 mg-3 mg(2.5 mg base)/3 mL nebulizer solution     atorvastatin (LIPITOR) 20 MG tablet Take 20 mg by mouth nightly.     benazepril (LOTENSIN) 10 MG tablet Take 20 mg by mouth once daily.     calcium-vitamin D 250-100 mg-unit per tablet Take 1 tablet by mouth 2 (two) times daily.    CETIRIZINE HCL (ZYRTEC ORAL) Take 10 mg by mouth nightly as needed.     citalopram (CELEXA) 20 MG tablet Take 20 mg by mouth nightly.     felodipine (PLENDIL) 10 MG 24 hr tablet Take 20 mg by mouth once daily.     fluticasone-vilanterol (BREO) 100-25 mcg/dose diskus inhaler Inhale 1 puff into the lungs once daily.    INCRUSE ELLIPTA 62.5 mcg/actuation DsDv USE 1 PUFF QD AT SAME TIME  (patient takes at night)    letrozole (FEMARA) 2.5 mg Tab Take 1 tablet (2.5 mg total) by mouth once daily.    multivitamin (THERAGRAN) per tablet Take 1 tablet by mouth once daily.    omeprazole (PRILOSEC) 20 MG capsule     predniSONE (DELTASONE) 20 MG tablet Take 1 tablet by mouth.    torsemide (DEMADEX) 20 MG Tab Take 20 mg by mouth once daily.    oxyCODONE-acetaminophen (PERCOCET) 5-325 mg per tablet Take 1-2 tablets by mouth every 4 (four) hours as needed for Pain (Do not drive while taking pain medication.).     No current facility-administered medications for this visit.        SOCIAL HISTORY:  Social History     Socioeconomic History    Marital status: Single     Spouse name: Not on file    Number of children: 3    Years of education: Not on file    Highest education level: Not on file   Social Needs    Financial resource strain: Not on file    Food insecurity - worry: Not on file    Food insecurity - inability: Not on file    Transportation needs - medical: Not on file    Transportation needs - non-medical: Not on file   Occupational History    Occupation: Multiple jobs, see social documentation section     Comment: Retired  "  Tobacco Use    Smoking status: Former Smoker     Packs/day: 1.00     Years: 40.00     Pack years: 40.00     Types: Cigarettes    Smokeless tobacco: Never Used    Tobacco comment: Smoked >1 ppd for at least 40 years, quit around    Substance and Sexual Activity    Alcohol use: Yes     Comment: occasional glass of wine or cocktail    Drug use: No    Sexual activity: Yes     Partners: Male   Other Topics Concern    Not on file   Social History Narrative    Worked many jobs while raising 3 children.  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  She was  from her first , but took care of him at the end of his life.       FAMILY HISTORY:  Family History   Problem Relation Age of Onset    Heart failure Mother     Kidney failure Mother     Heart attack Father     Breast cancer Sister 66        Lump, XRT, chemo, recurrence 10 years later, still alive.    Cancer Brother         leukemia    Heart attack Brother 58    Pulmonary embolism Brother 45    Heart attack Brother 52    Breast cancer Maternal Grandmother 60        advanced at diagnosis    Breast cancer Maternal Aunt 83         at 92         PHYSICAL EXAMINATION:  Vitals:    18 1012   BP: 127/74   Pulse: 70   Resp: 18   Temp: 97.5 °F (36.4 °C)   Weight: 72.6 kg (160 lb 0.9 oz)   Height: 5' 2.5" (1.588 m)   Body mass index is 28.81 kg/m².  GENERAL: Patient is alert and oriented, in no acute distress.  HEENT:Extraocular muscles are intact.  Oropharynx is clear without lesions.  There is no cervical or supraclavicular lymphadenopathy palpated.  No thyromegaly noted.  HEART: Regular rate and rhythm.  LUNGS: Clear to auscultation bilaterally.  BREAST EXAM:  The left breast is slightly smaller than the right.  The scar secondary to lumpectomy is noted in the periareolar region of the left breast.  There is also a scar in the left " axilla secondary to sentinel node biopsy.  No abnormal masses palpated in the left breast or left axilla.  The right breast and right axilla are also without palpable masses.  ABDOMEN:Soft, nontender, nondistended, without hepatosplenomegaly.  Normoactive bowel sounds.  EXTREMITIES: No clubbing, cyanosis, or edema.  NEUROLOGICAL: Cranial nerve II through XII grossly intact.  Sensation is intact.  Strength is 5 out of 5 in the upper and lower extremities bilaterally.     ASSESSMENT:   This is an 83-year-old female with multifocal [id number] ([id number]) invasive ductal carcinoma of the left breast who underwent neoadjuvant endocrine therapy followed by a left breast lumpectomy and sentinel node biopsy on July 12, 2018 with 2 foci of primary lesions and 4/18 lymph nodes positive with extranodal extension, ER/MO positive and her 2-.    PLAN:   After review of pathology and radiological images, Ms. Cook is recommended to undergo postoperative radiation to the left breast and draining lymph nodes including the left supraclavicular and left internal mammary nodes to reduce her chance of local regional recurrence especially given the 4/18 lymph nodes positive with extranodal extension.  I plan to deliver approximately 5040 cGy +1000 cGy boost.    The risks, benefits, and side effects of radiation were explained in detail to the patient and her daughter.  All questions were answered and informed consent was signed.  I plan to see the patient back for radiation planning CT in approximately 2 weeks.    Psychosocial Distress screening score of Distress Score: 0 noted and reviewed. No intervention indicated.    I spent approximately 60 minutes reviewing the available records and evaluating the patient, out of which over 50% of the time was spent face to face with the patient in counseling and coordinating this patient's care.

## 2018-08-16 NOTE — PATIENT INSTRUCTIONS
Radiation Therapy Treatment  Radiation therapy can help you in your fight against cancer. It begins with a session to discuss treatment with your doctor. If you and your doctor decide on radiation, you will return for a simulation. The simulation is a planning session that helps the doctor target your cancer. He or she will design a radiation plan to protect normal tissues. When the simulation and plan are completed, you will begin your daily treatments. Treatment is usually once daily Monday to Friday. It takes less than a half an hour. Sometimes you may need radiation twice a day, with usually 6 hours between treatments. After the course of radiation is complete, you will be scheduled for follow-up appointments. This is to make sure the cancer is under control. The follow-ups will also make sure that any side effects from the treatment are taken care of.  Radiation therapy uses high-energy X-rays to kill cancer cells.   Your treatment planning visit: The simulation  Your radiation therapy team uses a special machine called a simulator to map out your treatment. The simulator is usually an X-ray machine (fluoroscopy), CT scanner, MRI scanner, or PET-CT scanner machine. Laser lights act as guides to help position your body accurately. During this visit:  · The team figures out the best position for your body. They make notes in your chart so youll be placed the same way each time.  · You may use special devices to keep your body correctly positioned and still during treatment. These may include molds, masks, rests, and blocks.  · The team makes ink marks on your skin. These will help you get in the same position for each treatment. Tiny permanent tattoos may also be used.   · Markers such as metal balls or wires may be put on or in your body. Sometime these are taped to the skin to help with the imaging process. These work with the X-rays to position your body. The markers are removed when the visit is  over.  After the team has the imaging and data, the information is sent into the computer planning system. Your doctor and the team of physicists and dosimetrists design a treatment field. The field will best target your cancer and how it might spread. It will also help limit radiation to nearby normal tissues.  Your treatments  Each treatment usually takes 10 to 30 minutes. You may need to change into a hospital gown. The radiation therapist puts you in the correct position on the treatment table, then leaves the room. Sometimes you may need more imaging before each treatment. The machine may take digital X-rays or a CT scan to help make sure you are lined up correctly. During treatment, lie as still as you can and breathe normally. You will hear noises coming from the machine. You can talk to the radiation therapist, who watches you from the control room on a TV monitor. After treatment, the therapist will help you off the table. You can then get dressed and go back to your normal activities.  Date Last Reviewed: 1/14/2016 © 2000-2017 [employer]. [address] [place] [address]. All rights reserved. This information is not intended as a substitute for professional medical care. Always follow your healthcare professional's instructions.        Discharge Instructions for Radiation Therapy  Radiation therapy uses high-energy X-rays to kill cancer cells and help you in your fight against cancer. Radiation destroys cancer cells gradually, over time. The goal of therapy is to focus on and kill as many cancer cells as possible. Radiation can also damage or kill some of the normal cells that are closest to the tumor. Damaged normal cells can repair themselves, often within a few days.  Caring for your skin  You should ask your healthcare provider for specific products that he or she recommends for washing and bathing. In general, use a mild nondetergent soap and warm (not hot) water to clean the  "area receiving radiation. Pat the region dry rather than rubbing.  Your healthcare provider may give you products to moisturize the skin and prevent infection. The goal is to prevent cracks or breaks in the skin that may be sensitive from treatment:   · Dont be surprised if your treatment causes skin redness, and a type of "sunburn" over time. Some radiation treatments can cause this.   · Ask your therapy team what lotion to use. Also ask for directions about when and how to apply it.  · Avoid prolonged or direct sunlight on the treated area. Ask your therapy team about using a sunscreen. You do not have to avoid going outside altogether, but must take appropriate precautions.  · Dont remove ink marks unless your radiation therapist says its OK. Dont scrub or use soap on the marks when you wash. Let water run over them and pat them dry.  · Protect your skin from heat or cold. Avoid hot tubs, saunas, heating pads, or ice packs.  · Avoid clothing that causes friction or rubbing on the skin.  Fighting fatigue  Radiation therapy may cause you to feel tired. Your body is working hard to heal and repair itself. To feel better, try these things:  · Do light exercise each day. Take short walks.  · Plan tasks for the times when you tend to have the most energy. Ask for help when you need it.  · Relax before you go to bed. This will help you sleep better. Try reading or listening to soothing music.  Coping with appetite changes  Here are ways to cope:  · Tell your therapy team if you find it hard to eat or you have no appetite. You may be referred to a nutritionist to help you with meal planning.  · Radiation to certain internal sites can cause nausea, depending on the location of treatment. This can affect your appetite. Think of healthy eating as part of your treatment. Try these tips:  ¨ Eat slowly.  ¨ Eat small meals several times a day.  ¨ Eat more food when youre feeling better.  ¨ Ask others to keep you company " when you eat.  ¨ Stock up on easy-to-prepare foods.  ¨ Eat foods high in protein and calories. Your healthcare provider may recommend liquid meal supplements.  ¨ Drink plenty of water and other fluids.  ¨ Ask your healthcare provider before taking any vitamins or over-the-counter supplements. Such products are not regulated by the FDA and can sometimes interfere with your treatments.   Dealing with other side effects  Here are suggestions to deal with other side effects:   · Be prepared for hair loss in the area being treated. The hair loss may be permanent. Be sure to discuss this with your healthcare provider.  · Sip cool water if your mouth or throat becomes dry or sore. Ice chips may also help.  · Tell your healthcare provider if you have diarrhea or constipation. You may be given a special diet.  · If you have trouble swallowing liquids, tell your healthcare provider.  Follow-up  Make a follow-up appointment as directed by your healthcare provider.     When to call your healthcare provider  Call your healthcare provider right away if you have any of the following:  · Unexpected headaches  · Trouble concentrating  · Ongoing fatigue  · Wheezing, shortness of breath, or trouble breathing  · Pain that doesnt go away, especially if its always in the same place  · New or unusual lumps, bumps, or swelling  · Dizziness or lightheadedness  · Unusual rashes, bruises, or bleeding  · Fever of 100.4°F (38°C) or higher, or chills  · Nausea and vomiting  · Diarrhea that doesnt improve with time  · Skin breakdown; significant pain due to skin irritation   Date Last Reviewed: 1/13/2016  © 4387-0095 [employer]. [address] [place] [address], [place] [address]. All rights reserved. This information is not intended as a substitute for professional medical care. Always follow your healthcare professional's instructions.        Radiation Therapy: Managing Short-Term Side Effects     Take short walks daily.   Radiation  therapy uses high-energy X-rays or particles to kill cancer cells. Some normal cells can also be affected. This causes side effects such as dry skin, tiredness (fatigue), or changes in your appetite. Most side effects go away when your radiation therapy is over.  Having side effects of radiation therapy does not mean that your cancer is getting worse or that therapy isnt working.   Caring for your skin  Skin problems may happen where your body gets radiation. Your skin may become dry, itchy, red, and peeling. It may darken in that spot, like a tan. To care for your skin:  · Dont scrub on the treatment area. Clean that area of the skin every day. Use warm water and mild soap, or as your healthcare provider advises. Pat the skin afterward or let it air dry.  · Ask your therapy team what lotion to use and when to use it.  · Keep the treated area out of the sun. Ask your team about using a sunscreen.  · Don't remove ink marks unless your radiation therapist says you can. Dont scrub the marks when you wash. Let water run over them and pat them dry.  · Protect your skin from heat or cold. Avoid hot tubs, saunas, hot pads, and ice packs.  · Wear soft, loose clothing to avoid rubbing skin.  Fighting tiredness  The cancer itself or the radiation therapy may cause you to feel tired. Your body is working hard to heal and repair itself. To feel better:  · Try light exercise each day. Take short walks.  · Plan tasks for the times when you tend to have the most energy. Ask for help when you need it.  · Relax before you go to bed to sleep better. Try reading or listening to soothing music.  · Be sure to let your cancer care team know if you continue to have fatigue that is not getting better. They may be able to offer ways to help.   Coping with appetite changes  Tell your therapy team if you find it hard to eat or have no appetite. You may need to see a nutritionist. This is a healthcare provider with special training in meal  planning. To keep your strength up, you need to eat well and maintain your weight. Think of healthy eating as part of your treatment. Try these tips:  · Eat slowly.  · Eat small meals several times a day.  · Eat more food when youre feeling better, even if it is not mealtime.  · Ask others to keep you company when you eat.  · Stock up on easy-to-prepare foods.  · Eat foods high in protein and calories.  · Drink plenty of water and other fluids.  · Ask your healthcare provider before taking any vitamins.  Site-specific side effects  These side effects include the following:   · You may lose hair in the area being treated. The hair often grows back after treatment.  · Your mouth or throat can become dry or sore if your head or neck is being treated. Sip cool water to help ease discomfort.  · Nausea and bowel changes can happen with radiation to the pelvic region. Tell your healthcare provider if you have nausea, diarrhea, or constipation. You may need to take medicine or follow a special diet.  Talk with your healthcare team  Radiation therapy can also have other side effects, including some that might not show up until years later. Be sure to talk with your healthcare team about what to expect with the type of radiation therapy you are getting, including when you should call them with concerns.   Date Last Reviewed: 5/1/2016  © 5389-6951 [employer]. [address] [place] [address], [place] [address]. All rights reserved. This information is not intended as a substitute for professional medical care. Always follow your healthcare professional's instructions.      Return for ct/sim in 2 weeks.

## 2018-08-16 NOTE — TELEPHONE ENCOUNTER
Staff contacted and spoke to patient, she was informed she can't have the oral steroids prior to her procedure, but we will not tell her she can't take her prednisone if it is to help her breathe.     Ms. Cook verbalized understanding

## 2018-08-21 ENCOUNTER — TELEPHONE (OUTPATIENT)
Dept: PAIN MEDICINE | Facility: CLINIC | Age: 83
End: 2018-08-21

## 2018-08-21 ENCOUNTER — HOSPITAL ENCOUNTER (OUTPATIENT)
Facility: OTHER | Age: 83
Discharge: HOME | End: 2018-08-21
Attending: ANESTHESIOLOGY | Admitting: ANESTHESIOLOGY
Payer: MEDICARE

## 2018-08-21 VITALS
RESPIRATION RATE: 18 BRPM | SYSTOLIC BLOOD PRESSURE: 155 MMHG | BODY MASS INDEX: 29.26 KG/M2 | TEMPERATURE: 99 F | DIASTOLIC BLOOD PRESSURE: 74 MMHG | HEIGHT: 62 IN | WEIGHT: 159 LBS | OXYGEN SATURATION: 93 % | HEART RATE: 66 BPM

## 2018-08-21 DIAGNOSIS — S22.080A T12 COMPRESSION FRACTURE: Primary | ICD-10-CM

## 2018-08-21 DIAGNOSIS — R93.7 ABNORMAL X-RAY OF THORACIC SPINE: ICD-10-CM

## 2018-08-21 DIAGNOSIS — C50.412 MALIGNANT NEOPLASM OF UPPER-OUTER QUADRANT OF LEFT BREAST IN FEMALE, ESTROGEN RECEPTOR POSITIVE: ICD-10-CM

## 2018-08-21 DIAGNOSIS — S22.000A COMPRESSION FRACTURE OF THORACIC VERTEBRA, CLOSED, INITIAL ENCOUNTER: Primary | ICD-10-CM

## 2018-08-21 DIAGNOSIS — M54.16 LUMBAR RADICULOPATHY: Primary | ICD-10-CM

## 2018-08-21 DIAGNOSIS — Z17.0 MALIGNANT NEOPLASM OF UPPER-OUTER QUADRANT OF LEFT BREAST IN FEMALE, ESTROGEN RECEPTOR POSITIVE: ICD-10-CM

## 2018-08-21 PROCEDURE — 62323 NJX INTERLAMINAR LMBR/SAC: CPT | Mod: ,,, | Performed by: ANESTHESIOLOGY

## 2018-08-21 PROCEDURE — 25500020 PHARM REV CODE 255: Performed by: ANESTHESIOLOGY

## 2018-08-21 PROCEDURE — A4216 STERILE WATER/SALINE, 10 ML: HCPCS | Performed by: ANESTHESIOLOGY

## 2018-08-21 PROCEDURE — 63600175 PHARM REV CODE 636 W HCPCS: Performed by: ANESTHESIOLOGY

## 2018-08-21 PROCEDURE — 25000003 PHARM REV CODE 250: Performed by: ANESTHESIOLOGY

## 2018-08-21 PROCEDURE — 62323 NJX INTERLAMINAR LMBR/SAC: CPT | Performed by: ANESTHESIOLOGY

## 2018-08-21 RX ORDER — LIDOCAINE HYDROCHLORIDE 10 MG/ML
INJECTION INFILTRATION; PERINEURAL
Status: DISCONTINUED | OUTPATIENT
Start: 2018-08-21 | End: 2018-08-21 | Stop reason: HOSPADM

## 2018-08-21 RX ORDER — DEXAMETHASONE SODIUM PHOSPHATE 100 MG/10ML
INJECTION INTRAMUSCULAR; INTRAVENOUS
Status: DISCONTINUED | OUTPATIENT
Start: 2018-08-21 | End: 2018-08-21 | Stop reason: HOSPADM

## 2018-08-21 RX ORDER — LIDOCAINE HYDROCHLORIDE 10 MG/ML
INJECTION, SOLUTION EPIDURAL; INFILTRATION; INTRACAUDAL; PERINEURAL
Status: DISCONTINUED | OUTPATIENT
Start: 2018-08-21 | End: 2018-08-21 | Stop reason: HOSPADM

## 2018-08-21 RX ORDER — SODIUM CHLORIDE 9 MG/ML
500 INJECTION, SOLUTION INTRAVENOUS CONTINUOUS
Status: DISCONTINUED | OUTPATIENT
Start: 2018-08-21 | End: 2018-08-21 | Stop reason: HOSPADM

## 2018-08-21 RX ORDER — ALPRAZOLAM 0.5 MG/1
0.5 TABLET, ORALLY DISINTEGRATING ORAL ONCE
Status: COMPLETED | OUTPATIENT
Start: 2018-08-21 | End: 2018-08-21

## 2018-08-21 RX ORDER — SODIUM CHLORIDE 9 MG/ML
INJECTION, SOLUTION INTRAMUSCULAR; INTRAVENOUS; SUBCUTANEOUS
Status: DISCONTINUED | OUTPATIENT
Start: 2018-08-21 | End: 2018-08-21 | Stop reason: HOSPADM

## 2018-08-21 RX ADMIN — ALPRAZOLAM 0.5 MG: 0.5 TABLET, ORALLY DISINTEGRATING ORAL at 01:08

## 2018-08-21 NOTE — TELEPHONE ENCOUNTER
----- Message from Merly Wilcox MD sent at 8/21/2018  3:50 PM CDT -----  Ok to do MRi & F/U I spoke with th patient today & she wanted to do something about the pain in this location. The sooner the better for fractures, thank you  ----- Message -----  From: LORAINE Hopkins  Sent: 8/21/2018   3:11 PM  To: Merly Wilcox MD, Reji Craig RN, #    Rachel called her daughter and I also got on the phone with her to discuss everything.  She does not want to schedule kypho right now but they did schedule the MRI.  She says someone told her her fracture had healed (maybe Kizzy, not sure).  I explained to her that this was most likely not the case as it was new and she was symptomatic.  She wants to see what the MRI shows and make a decision.  I offered her an appointment as well.  ----- Message -----  From: Merly Wilcox MD  Sent: 8/21/2018   3:02 PM  To: Reji Craig RN, #    Please make sure we get MRI & Kypho ASAP  ----- Message -----  From: LORAINE Hopkins  Sent: 8/21/2018   2:39 PM  To: Merly Wilcox MD    Oh man.  I didn't know she had a fracture.  We did not end up seeing her after her fall because she made an appointment with ortho instead.  ----- Message -----  From: Merly Wilcox MD  Sent: 8/21/2018   2:06 PM  To: LORAINE Hopkins, #    I ordered MRI T spine  Schedule ASAP for T12 Kyphoplasty once MRI done. Thank you

## 2018-08-21 NOTE — TELEPHONE ENCOUNTER
----- Message from Merly Wilcox MD sent at 8/21/2018  2:06 PM CDT -----  I ordered MRI T spine  Schedule ASAP for T12 Kyphoplasty once MRI done. Thank you

## 2018-08-21 NOTE — OP NOTE
Lumbar Interlaminar Epidural Steroid Injection under Fluoroscopic Guidance.  Time-out taken to identify patient and procedure side prior to starting the procedure.   I attest that I have reviewed the patient's home medications prior to the procedure and no contraindication have been identified. I  re-evaluated the patient after the patient was positioned for the procedure in the procedure room immediately before the procedural time-out. The vital signs are current and represent the current state of the patient which has not significantly changed since the preprocedure assessment.                                                               Date of Service: 08/21/2018    PCP: Pepito Theodore MD    Referring Physician:    Procedure:  L4-5 Interlaminar epidural steroid injection under fluoroscopic guidance.    Reasons for procedure: Lumbar radiculopathy [M54.16]   1. Lumbar radiculopathy      POSTOP DIAGNOSIS:  Lumbar radiculopathy [M54.16]     1. Lumbar radiculopathy      Physician: Merly Wilcox MD  ASSISTANTS: Jhonny Lainez MD    Medications injected: Preservative-free dexamethasone 10mg with 4mL of sterile Xylocaine-MPF 1% and 1ml of sterile preservative-free normal saline.    Local anesthetic injected:    Xylocaine 1% 9ml with Sodium Bicarbonate 1ml.   Sedation Medications: None    Estimated blood loss:  none.    Complications:  none.    Technique:  With the patient laying in a prone position, the area was prepped and draped in the usual sterile fashion using ChloraPrep and a fenestrated drape.  Local anesthetic was given using a 27-gauge needle by raising a wheal and going down to the hub of the needle.  A 3.5 inch 20-gauge Touhy needle was introduced under fluoroscopic guidance.  It met the lamina of the posterior element. The needle was then hinged above the lamina.  Loss of resistance technique was employed while advancing the needle.  Once in the desired position, contrast dye Omnipaque was injected to  confirm placement and there was no vascular runoff.  Digital subtraction was employed to confirm that there was no vascular runoff.  The medication was then injected slowly.  The patient tolerated the procedure well.      Pain before the procedure: 10/10    Pain after the procedure: 5/10    The patient was monitored after the procedure.   They were given post-procedure and discharge instructions to follow at home.  The patient was discharged in a stable condition.

## 2018-08-21 NOTE — PLAN OF CARE
Problem: Patient Care Overview  Goal: Plan of Care Review  Pt tolerated procedure well. Pt reports 5/10 pain after procedure. Assisted pt up for first time. Steady on feet. All discharge instructions given.

## 2018-08-21 NOTE — TELEPHONE ENCOUNTER
MD Sofia Maya, LORAINE; Shilpi Lugo RN; Bev Tripp MA   Cc: Reji Craig RN             Ok to do MRi & F/U I spoke with th patient today & she wanted to do something about the pain in this location. The sooner the better for fractures, thank you

## 2018-08-21 NOTE — DISCHARGE INSTRUCTIONS
Thank you for allowing us to care for you today. You may receive a survey about the care we provided. Your feedback is valuable and helps us provide excellent care throughout the community.     Home Care Instructions for Pain Management:    1. DIET:   You may resume your normal diet today.   2. BATHING:   You may shower with luke warm water. No tub baths or anything that will soak injection sites under water for the next 24 hours.  3. DRESSING:   You may remove your bandage today.   4. ACTIVITY LEVEL:   You may resume your normal activities 24 hrs after your procedure. Nothing strenuous today.  5. MEDICATIONS:   You may resume your normal medications today. To restart blood thinners, ask your doctor.  6. DRIVING    If you have received any sedatives by mouth today, you may not drive for 12 hours.    If you have received any sedation through your IV, you may not drive for 24 hrs.   7. SPECIAL INSTRUCTIONS:   No heat to the injection site for 24 hrs including, hot bath or shower, heating pad, moist heat, or hot tubs.    Use ice pack to injection site for any pain or discomfort.  Apply ice packs for 20 minute intervals as needed.    IF you have diabetes, be sure to monitor your blood sugar more closely. IF your injection contained steroids your blood sugar levels may become higher than normal.    If you are still having pain upon discharge:  Your pain may improve over the next 48 hours. The anesthetic (numbing medication) works immediately to 48 hours. IF your injection contained a steroid (anti-inflammatory medication), it takes approximately 3 days to start feeling relief and 7-10 days to see your greatest results from the medication. It is possible you may need subsequent injections. This would be discussed at your follow up appointment with pain management or your referring doctor.      PLEASE CALL YOUR DOCTOR IF:  1. Redness or swelling around the injection site.  2. Fever of 101 degrees or more  3. Drainage  (pus) from the injection site.  4. For any continuous bleeding (some dried blood over the incision is normal.)    FOR EMERGENCIES:   If any unusual problems or difficulties occur during clinic hours, call [phone] or 236.

## 2018-08-21 NOTE — TELEPHONE ENCOUNTER
----- Message from LORAINE Hopkins sent at 8/21/2018  3:11 PM CDT -----  Rachel called her daughter and I also got on the phone with her to discuss everything.  She does not want to schedule kypho right now but they did schedule the MRI.  She says someone told her her fracture had healed (maybe Kizzy, not sure).  I explained to her that this was most likely not the case as it was new and she was symptomatic.  She wants to see what the MRI shows and make a decision.  I offered her an appointment as well.  ----- Message -----  From: Merly Wilcox MD  Sent: 8/21/2018   3:02 PM  To: Reji Craig RN, #    Please make sure we get MRI & Kypho ASAP  ----- Message -----  From: LORAINE Hopkins  Sent: 8/21/2018   2:39 PM  To: Merly Wilcox MD    Oh man.  I didn't know she had a fracture.  We did not end up seeing her after her fall because she made an appointment with ortho instead.  ----- Message -----  From: Merly Wilcox MD  Sent: 8/21/2018   2:06 PM  To: LORAINE Hopkins, #    I ordered MRI T spine  Schedule ASAP for T12 Kyphoplasty once MRI done. Thank you

## 2018-08-21 NOTE — TELEPHONE ENCOUNTER
Contacted and spoke to patient daughter Patience Brandt, regarding scheduling patient for thoracic MRI and a kyphoplasty.     Daughter expressed her concern that they were aware she had a fractured, unsure what the procedure today will do if she requires something totally different.    The daughter spoke to Andrew Schultz and discuss the need for the MRI as soon as possible even if they want to hold off on the kyphoplasty until her radiation treatment is complete.     Ms. Brandt was also informed that since the MRI will include contrast, she would need a update creatinine level lab.     Ms. Brandt at that time ask that we hold off on scheduling anything as everything needs to be cleared by patient oncologist.     She will contact us, once they receive the all clear.

## 2018-08-21 NOTE — TELEPHONE ENCOUNTER
Ms. Parkinson, please see messages below.     Also, the case request order was entered in case the patient decides to have the Kypho.

## 2018-08-23 ENCOUNTER — TELEPHONE (OUTPATIENT)
Dept: PAIN MEDICINE | Facility: CLINIC | Age: 83
End: 2018-08-23

## 2018-08-23 ENCOUNTER — TELEPHONE (OUTPATIENT)
Dept: ORTHOPEDICS | Facility: CLINIC | Age: 83
End: 2018-08-23

## 2018-08-23 NOTE — TELEPHONE ENCOUNTER
----- Message from Leela Muñiz sent at 8/23/2018  4:01 PM CDT -----  Contact: pt            Name of Who is Calling: pt      What is the request in detail: pt is still in pain in her lower back. Call pt      Can the clinic reply by [facility]: no      What Number to Call Back if not in [facility]: [phone]

## 2018-08-23 NOTE — TELEPHONE ENCOUNTER
Spoke with patient and she is starting Radiation treatments that will last approx. 6-8 weeks and she wants to wait until that is completed before  Having an MRI and Kyphoplasty after she discusses results with Dr. Wilcox.

## 2018-08-23 NOTE — TELEPHONE ENCOUNTER
Spoke with pt in regards to message below , pt states she has on going Radiation at this time and wants to postpone any hand treatment that she has. Pt states she is doing fine at this time and will call if needed and will call back to schedule after radiation.

## 2018-08-23 NOTE — TELEPHONE ENCOUNTER
----- Message from Jackson Wayne sent at 8/23/2018  9:32 AM CDT -----            Name of Who is Calling:SHAYY JAMIL [[id number]]      What is the request in detail: Pt states she has to postpone any hand treatment or surgery until after she's done with radiation. She states the therapy did not work, but she will not  on any heavy items in the meantime. She's able to make a fist and open her hand and she's not having any pain. She's going to continue to use the ultrasound machine. If needed, you can contact the pt to discuss.      Can the clinic reply by [facility]: no      What Number to Call Back if not in [facility]: [phone]

## 2018-08-23 NOTE — TELEPHONE ENCOUNTER
Contacted and spoke with pt regarding back pain. Pt explained she is still having back pain and just wanted to see what area did the injection treat. Pt was informed the injection was for lower back pain and it takes 7-14 days to receive full benefit of injection. Pt was informed to continue using ice on that injection site area and to give it some time for medication to start working. Pt stated she was just confirming the treated are,and will give it more time.    Pt verbalized understanding

## 2018-08-28 ENCOUNTER — HOSPITAL ENCOUNTER (OUTPATIENT)
Dept: RADIATION THERAPY | Facility: HOSPITAL | Age: 83
Discharge: HOME | End: 2018-08-28
Attending: RADIOLOGY
Payer: MEDICARE

## 2018-08-28 PROCEDURE — 77290 THER RAD SIMULAJ FIELD CPLX: CPT | Mod: TC | Performed by: RADIOLOGY

## 2018-08-28 PROCEDURE — 77334 RADIATION TREATMENT AID(S): CPT | Mod: TC | Performed by: RADIOLOGY

## 2018-08-28 PROCEDURE — 77290 THER RAD SIMULAJ FIELD CPLX: CPT | Mod: 26,,, | Performed by: RADIOLOGY

## 2018-08-28 PROCEDURE — 77334 RADIATION TREATMENT AID(S): CPT | Mod: 26,,, | Performed by: RADIOLOGY

## 2018-08-28 PROCEDURE — 77014 HC CT GUIDANCE RADIATION THERAPY FLDS PLACEMENT: CPT | Mod: TC | Performed by: RADIOLOGY

## 2018-08-28 PROCEDURE — 77263 THER RADIOLOGY TX PLNG CPLX: CPT | Mod: ,,, | Performed by: RADIOLOGY

## 2018-08-31 PROCEDURE — 77300 RADIATION THERAPY DOSE PLAN: CPT | Mod: 26,,, | Performed by: RADIOLOGY

## 2018-08-31 PROCEDURE — 77300 RADIATION THERAPY DOSE PLAN: CPT | Mod: TC | Performed by: RADIOLOGY

## 2018-08-31 PROCEDURE — 77295 3-D RADIOTHERAPY PLAN: CPT | Mod: TC | Performed by: RADIOLOGY

## 2018-08-31 PROCEDURE — 77334 RADIATION TREATMENT AID(S): CPT | Mod: 26,,, | Performed by: RADIOLOGY

## 2018-08-31 PROCEDURE — 77295 3-D RADIOTHERAPY PLAN: CPT | Mod: 26,,, | Performed by: RADIOLOGY

## 2018-08-31 PROCEDURE — 77334 RADIATION TREATMENT AID(S): CPT | Mod: TC | Performed by: RADIOLOGY

## 2018-09-04 ENCOUNTER — HOSPITAL ENCOUNTER (OUTPATIENT)
Dept: RADIATION THERAPY | Facility: HOSPITAL | Age: 83
Discharge: HOME | End: 2018-09-04
Attending: RADIOLOGY
Payer: MEDICARE

## 2018-09-04 ENCOUNTER — APPOINTMENT (OUTPATIENT)
Dept: RADIATION THERAPY | Facility: OTHER | Age: 83
End: 2018-09-04
Attending: RADIOLOGY
Payer: MEDICARE

## 2018-09-05 PROCEDURE — 77280 THER RAD SIMULAJ FIELD SMPL: CPT | Mod: TC | Performed by: RADIOLOGY

## 2018-09-05 PROCEDURE — 77280 THER RAD SIMULAJ FIELD SMPL: CPT | Mod: 26,,, | Performed by: RADIOLOGY

## 2018-09-06 PROCEDURE — 77412 RADIATION TX DELIVERY LVL 3: CPT | Performed by: RADIOLOGY

## 2018-09-07 PROCEDURE — 77412 RADIATION TX DELIVERY LVL 3: CPT | Performed by: RADIOLOGY

## 2018-09-10 PROCEDURE — 77412 RADIATION TX DELIVERY LVL 3: CPT | Performed by: RADIOLOGY

## 2018-09-11 ENCOUNTER — DOCUMENTATION ONLY (OUTPATIENT)
Dept: RADIATION ONCOLOGY | Facility: CLINIC | Age: 83
End: 2018-09-11

## 2018-09-11 PROCEDURE — 77412 RADIATION TX DELIVERY LVL 3: CPT | Performed by: RADIOLOGY

## 2018-09-11 NOTE — PLAN OF CARE
Problem: Patient Care Overview  Goal: Plan of Care Review  Outcome: Ongoing (interventions implemented as appropriate)  Pt. On day 4 of xrt to breast.  Pt. With no c/o.  Using cream.  Skin care discussed.

## 2018-09-12 PROCEDURE — 77336 RADIATION PHYSICS CONSULT: CPT | Performed by: RADIOLOGY

## 2018-09-12 PROCEDURE — 77412 RADIATION TX DELIVERY LVL 3: CPT | Performed by: RADIOLOGY

## 2018-09-13 PROCEDURE — 77412 RADIATION TX DELIVERY LVL 3: CPT | Performed by: RADIOLOGY

## 2018-09-13 PROCEDURE — 77417 THER RADIOLOGY PORT IMAGE(S): CPT | Performed by: RADIOLOGY

## 2018-09-14 PROCEDURE — 77412 RADIATION TX DELIVERY LVL 3: CPT | Performed by: RADIOLOGY

## 2018-09-18 ENCOUNTER — DOCUMENTATION ONLY (OUTPATIENT)
Dept: RADIATION ONCOLOGY | Facility: CLINIC | Age: 83
End: 2018-09-18

## 2018-09-18 PROCEDURE — 77412 RADIATION TX DELIVERY LVL 3: CPT | Performed by: RADIOLOGY

## 2018-09-19 ENCOUNTER — TELEPHONE (OUTPATIENT)
Dept: PAIN MEDICINE | Facility: CLINIC | Age: 83
End: 2018-09-19

## 2018-09-19 PROCEDURE — 77412 RADIATION TX DELIVERY LVL 3: CPT | Performed by: RADIOLOGY

## 2018-09-19 NOTE — TELEPHONE ENCOUNTER
Staff contacted patient regarding her message., she was informed she would require an office visit to discuss what is her next treatment option.     Patient agreed and appointment was scheduled.

## 2018-09-19 NOTE — TELEPHONE ENCOUNTER
----- Message from Mara Johnston sent at 9/19/2018 10:56 AM CDT -----            Name of Who is Calling:SHAYY JAMIL [[id number]]    What is the request in detail: please call pt her last procedure on 8/21 never relieved the pain in her lower back.     Can the clinic reply by [facility]: no    What Number to Call Back [facility] not [facility]: [phone]

## 2018-09-20 ENCOUNTER — OFFICE VISIT (OUTPATIENT)
Dept: PAIN MEDICINE | Facility: CLINIC | Age: 83
End: 2018-09-20
Payer: MEDICARE

## 2018-09-20 ENCOUNTER — LAB VISIT (OUTPATIENT)
Dept: LAB | Facility: OTHER | Age: 83
End: 2018-09-20
Attending: ANESTHESIOLOGY
Payer: MEDICARE

## 2018-09-20 VITALS
DIASTOLIC BLOOD PRESSURE: 78 MMHG | SYSTOLIC BLOOD PRESSURE: 158 MMHG | HEIGHT: 62 IN | TEMPERATURE: 97 F | HEART RATE: 63 BPM | BODY MASS INDEX: 29.86 KG/M2 | WEIGHT: 162.25 LBS

## 2018-09-20 DIAGNOSIS — R93.7 ABNORMAL X-RAY OF THORACIC SPINE: ICD-10-CM

## 2018-09-20 DIAGNOSIS — C50.412 MALIGNANT NEOPLASM OF UPPER-OUTER QUADRANT OF LEFT BREAST IN FEMALE, ESTROGEN RECEPTOR POSITIVE: ICD-10-CM

## 2018-09-20 DIAGNOSIS — S22.080A T12 COMPRESSION FRACTURE: ICD-10-CM

## 2018-09-20 DIAGNOSIS — S22.000A COMPRESSION FRACTURE OF THORACIC VERTEBRA, CLOSED, INITIAL ENCOUNTER: ICD-10-CM

## 2018-09-20 DIAGNOSIS — M47.816 FACET ARTHRITIS OF LUMBAR REGION: Primary | ICD-10-CM

## 2018-09-20 DIAGNOSIS — Z17.0 MALIGNANT NEOPLASM OF UPPER-OUTER QUADRANT OF LEFT BREAST IN FEMALE, ESTROGEN RECEPTOR POSITIVE: ICD-10-CM

## 2018-09-20 DIAGNOSIS — M47.816 LUMBAR SPONDYLOSIS: ICD-10-CM

## 2018-09-20 DIAGNOSIS — M54.16 LUMBAR RADICULOPATHY: ICD-10-CM

## 2018-09-20 DIAGNOSIS — M47.816 LUMBAR SPONDYLOSIS: Primary | ICD-10-CM

## 2018-09-20 LAB
CREAT SERPL-MCNC: 0.9 MG/DL
EST. GFR  (AFRICAN AMERICAN): >60 ML/MIN/1.73 M^2
EST. GFR  (NON AFRICAN AMERICAN): 59 ML/MIN/1.73 M^2

## 2018-09-20 PROCEDURE — 36415 COLL VENOUS BLD VENIPUNCTURE: CPT

## 2018-09-20 PROCEDURE — 99214 OFFICE O/P EST MOD 30 MIN: CPT | Mod: S$PBB,,, | Performed by: NURSE PRACTITIONER

## 2018-09-20 PROCEDURE — 99213 OFFICE O/P EST LOW 20 MIN: CPT | Mod: PBBFAC,25 | Performed by: NURSE PRACTITIONER

## 2018-09-20 PROCEDURE — 3077F SYST BP >= 140 MM HG: CPT | Mod: CPTII,,, | Performed by: NURSE PRACTITIONER

## 2018-09-20 PROCEDURE — 77336 RADIATION PHYSICS CONSULT: CPT | Performed by: RADIOLOGY

## 2018-09-20 PROCEDURE — 99999 PR PBB SHADOW E&M-EST. PATIENT-LVL III: CPT | Mod: PBBFAC,,, | Performed by: NURSE PRACTITIONER

## 2018-09-20 PROCEDURE — 1101F PT FALLS ASSESS-DOCD LE1/YR: CPT | Mod: CPTII,,, | Performed by: NURSE PRACTITIONER

## 2018-09-20 PROCEDURE — 77412 RADIATION TX DELIVERY LVL 3: CPT | Performed by: RADIOLOGY

## 2018-09-20 PROCEDURE — 82565 ASSAY OF CREATININE: CPT

## 2018-09-20 PROCEDURE — 3078F DIAST BP <80 MM HG: CPT | Mod: CPTII,,, | Performed by: NURSE PRACTITIONER

## 2018-09-20 NOTE — PLAN OF CARE
Problem: Patient Care Overview  Goal: Plan of Care Review  Outcome: Ongoing (interventions implemented as appropriate)  Day 8 of radiation to the left breast no skin changes noted  Using miaderm

## 2018-09-20 NOTE — PROGRESS NOTES
Chronic patient Established Note (Follow up visit)      SUBJECTIVE:    Anahi Cook presents to the clinic for a follow-up appointment for lower back and left lower extremity pain.  She is s/p repeat L4-5 IL ASHUTOSH with limited relief.  Previous in April provided significant benefit of leg pain.  Today, she is reporting lower back pain with radiation into the posterolateral aspect of both legs.  Her back pain is greater than her leg pain.  Her imaging in May showed a T12 compression fracture.  We ordered a thoracic MRI.  However, she did not have this.  She says that Dr. Wilcox discussed a kyphoplasty with her but she does not wish to have because she is currently undergoing radiation for breast cancer.  Since the last visit, Anahi Cook states the pain has been worsening. Current pain intensity is 10/10.  She is accompanied by her daughter who is active in her care.    Pain Medications:  OTC Tylenol    Opioid Contract: no     report:  Not applicable    Pain Procedures:   7/21/14 L4-5 IL ASHUTOSH- significant benefit  12/1/15 Left GT bursa injection  4/26/16 Left GT bursa injection  12/15/16 L4-5 IL ASHUTOSH- significant benefit  2/15/18 L4-5 IL ASHUTOSH- 100% relief    Physical Therapy/Home Exercise: yes in the past    Imaging:     Narrative     EXAMINATION:  XR THORACIC SPINE AP LATERAL    CLINICAL HISTORY:  Unspecified injury of lower back, initial encounter    TECHNIQUE:  Two views obtained.  Note that patient is slightly obliquely oriented on the lateral views which combines with severe osseous demineralization and result in limitation.    COMPARISON:  CT January 2018    FINDINGS:  There are surgical clips in the right upper quadrant.  There is tortuosity of the aorta with calcification along its wall.  Mild curvature of the spine is noted.  There is mild compression deformity along the superior endplate of T12.  No definite additional compression deformity noted.      Impression       Mild compression deformity along  superior endplate of T12 new compared to January 2018.  Exam somewhat limited due to osseous demineralization and patient obliquity.     Narrative     EXAMINATION:  XR LUMBAR SPINE AP AND LATERAL    CLINICAL HISTORY:  Low back pain, minor trauma;    TECHNIQUE:  AP, lateral and spot images were performed of the lumbar spine.  Patient is slightly obliquely positioned on the lateral view.    COMPARISON:  CT from January 2018    FINDINGS:  There is severe osseous demineralization.  Surgical clips are present in the abdomen.  There is calcification along the wall of the aorta and branches.    Grade 1 anterolisthesis is present at L4-5.  T12 demonstrates mild compression deformity along the superior endplate which is a change compared to the January examination.  Remaining vertebral bodies are normal in height and alignment.  Discs are maintained.      Impression       Mild compression deformity along the superior endplate of T12, new compared to January 2018 CT.       Lumbar MRI 5/21/14    Narrative     Comparison: None    Findings:  L5-S1: There is no disk disease.  There is a 8mm extra canal synovial cyst emanating from the right facet joint.  There is mild facet arthropathy.  There is no central canal or neural foraminal narrowing  L4-L5: There is grade 1 anterolisthesis of L4 on L5 with uncovering of the disk.  There is a diffuse posterior disk bulge.  There is severe ligamentous thickening and moderate facet arthropathy generating moderate severe central canal stenosis.  There is   moderate right neural foraminal stenosis and severe left neural foraminal stenosis.  L3-L4: There is a posterior disk bulge with super imposed right foraminal broad-based protrusion.  There is mild to moderate ligamentous thickening and mild facet arthropathy.  There is mild narrowing of the right lateral recess.  There is mild left   neural foraminal narrowing and severe right neural foraminal narrowing due to the foraminal  protrusion.  L2-L3: There is a diffuse disk bulge present.  There is minimal ligamentous thickening and mild facet arthropathy generating at most mild central canal stenosis.  There is an extraforaminal bulge at this level as well leading to moderate right neural   foraminal stenosis and mild left neural foraminal stenosis.  L1-L2: No significant disease.    There is no marrow replacement process, infection, tumor present.  Limited evaluation of intra-abdominal structures unremarkable.  No abnormal masses or fluid collections are present.   Impression       Multilevel degenerative changes most prominent at L4-L5 where there is moderate to severe central canal stenosis and severe left foraminal stenosis.  Severe right foraminal stenosis is present at L3-L4 as well.         Past Medical History:  Past Medical History:   Diagnosis Date    Anxiety     Back pain     Breast cancer 1/17/2018    Breast mass 1/15/2018    Chronic kidney disease, stage 2, mildly decreased GFR     COPD (chronic obstructive pulmonary disease)     emphysema    Depression     Dysuria 1/29/2018    Family history of breast cancer 1/17/2018    Hypertension     Infiltrating ductal carcinoma of breast, left 7/12/2018    Osteoporosis, senile     Ovarian mass 1/15/2018    Pneumonia     S/P robotic assisted laparoscopic BSO (bilateral salpingo-oophorectomy) 2/23/2018       Past Surgical History:  Past Surgical History:   Procedure Laterality Date    AXILLARY NODE DISSECTION Left 7/12/2018    Procedure: LYMPHADENECTOMY, AXILLARY;  Surgeon: Amanda Caballero MD;  Location: [facility] OR [address] [place] [address];  Service: General;  Laterality: Left;    CHOLECYSTECTOMY      EYE SURGERY      HYSTERECTOMY      INFUSION, INTRAVENOUS N/A 4/17/2013    Performed by Pepito Theodore MD at [facility] OR    INJECTION FOR SENTINEL NODE IDENTIFICATION Left 7/12/2018    Procedure: INJECTION, FOR SENTINEL NODE IDENTIFICATION;  Surgeon: Amanda Caballero MD;  Location: [facility] OR [address] [place] [address];   Service: General;  Laterality: Left;    INJECTION, FOR SENTINEL NODE IDENTIFICATION Left 2018    [facility]    INJECTION,STEROID,EPIDURAL N/A 2018    Performed by Merly Wilcox MD at [facility]IN [facility]    INJECTION-STEROID-EPIDURAL-LUMBAR N/A 2/15/2018    Per[facility] at [facility]    INJECTION-STEROID-EPIDURAL-LUMBAR N/A 2/15/2016    Per[facility] at [facility]    INJECTION-STEROID-EPIDURAL-LUMBAR N/A 2014    [facility] at [facility]    LYMPHADENECTOMY, AXILLARY Left 2018    [facility]    MASTECTOMY, PARTIAL Left 2018    Procedure: MASTECTOMY, PARTIAL-W/SEED LOCALIZATION;  Surgeon: Amanda Caballero MD;  Location: [facility] OR [address] [place] [address];  Service: General;  Laterality: Left;    MASTECTOMY, PARTIAL-W/SEED LOCALIZATION Left 2018    [facility]    OR REMOVAL OF OVARY/TUBE(S)  2018    Robotic Assisted    ROTATOR CUFF REPAIR Right     rotator cuff repair right shoulder    TONSILLECTOMY      XI ROBOT ASSISTED LAPAROSCOPIC SALPINGO-OOPHERECTOMY Bilateral 2018    Performed by Reena Chairez MD at [facility] OR       Family History:  Family History   Problem Relation Age of Onset    Heart failure Mother     Kidney failure Mother     Heart attack Father     Breast cancer Sister 66        Lump, XRT, chemo, recurrence 10 years later, still alive.    Cancer Brother         leukemia    Heart attack Brother 58    Pulmonary embolism Brother 45    Heart attack Brother 52    Breast cancer Maternal Grandmother 60        advanced at diagnosis    Breast cancer Maternal Aunt 83         at 92       Social History:  Social History     Socioeconomic History    Marital status: Single     Spouse name: None    Number of children: 3    Years of education: None    Highest education level: None   Social Needs    Financial  resource strain: None    Food insecurity - worry: None    Food insecurity - inability: None    Transportation needs - medical: None    Transportation needs - non-medical: None   Occupational History    Occupation: Multiple jobs, see social documentation section     Comment: Retired   Tobacco Use    Smoking status: Former Smoker     Packs/day: 1.00     Years: 40.00     Pack years: 40.00     Types: Cigarettes    Smokeless tobacco: Never Used    Tobacco comment: Smoked >1 ppd for at least 40 years, quit around 1995   Substance and Sexual Activity    Alcohol use: Yes     Comment: occasional glass of wine or cocktail    Drug use: No    Sexual activity: Yes     Partners: Male   Other Topics Concern    None   Social History Narrative    Worked many jobs while raising 3 children.  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  She was  from her first , but took care of him at the end of his life.       REVIEW OF SYSTEMS:    GENERAL:  No weight loss, malaise or fevers.  HEENT:   No recent changes in vision or hearing  NECK:  Negative for lumps, no difficulty with swallowing.  RESPIRATORY:  Negative for cough, wheezing or shortness of breath, patient denies any recent URI. COPD.  CARDIOVASCULAR:  Negative for chest pain, leg swelling or palpitations. Hypertension.  GI:  Negative for abdominal discomfort, blood in stools or black stools or change in bowel habits.  MUSCULOSKELETAL:  See HPI.  SKIN:  Negative for lesions, rash, and itching.  PSYCH:  No mood disorder or recent psychosocial stressors.  Patients sleep is not disturbed secondary to pain.  HEMATOLOGY/LYMPHOLOGY:  Negative for prolonged bleeding, bruising easily or swollen nodes.  Patient is not currently taking any anti-coagulants  NEURO:   No history of headaches, syncope, paralysis, seizures or tremors.  All other reviewed and negative other  "than HPI.    OBJECTIVE:    BP (!) 158/78   Pulse 63   Temp 97 °F (36.1 °C)   Ht 5' 2" (1.575 m)   Wt 73.6 kg (162 lb 4.1 oz)   LMP  (LMP Unknown)   BMI 29.68 kg/m²     PHYSICAL EXAMINATION:    GENERAL: Well appearing, in no acute distress, alert and oriented x3.  PSYCH:  Mood and affect appropriate.  SKIN: Skin color, texture, turgor normal, no rashes or lesions.  HEAD/FACE:  Normocephalic, atraumatic. Cranial nerves grossly intact.  CV: RRR with palpation of the radial artery.  PULM: No evidence of respiratory difficulty, symmetric chest rise.  GI:  Soft and non-tender.  BACK: Straight leg raising in the sitting and supine positions is negative to radicular pain.  There is pain with palpation over the lumbar facet joints.  There is mild pain to palpation over T12 area.  Positive facet loading bilaterally.  EXTREMITIES: Peripheral joint ROM is full and pain free without obvious instability or laxity in all four extremities. No deformities, edema, or skin discoloration. Good capillary refill.  MUSCULOSKELETAL: Provocative maneuvers of hips do not cause pain.  No atrophy or tone abnormalities are noted.  NEURO: Bilateral upper and lower extremity coordination and muscle stretch reflexes are physiologic and symmetric.  Plantar response are downgoing. No clonus.  No loss of sensation is noted.  GAIT: Antalgic.      ASSESSMENT: 84 y.o. year old female with lower back and lower extremity pain, consistent with the following diagnoses:     1. Lumbar spondylosis     2. Compression fracture of thoracic vertebra, closed, initial encounter     3. Lumbar radiculopathy     4. Malignant neoplasm of upper-outer quadrant of left breast in female, estrogen receptor positive     5. T12 compression fracture           PLAN:     - Previous imaging was reviewed and discussed with the patient today.    - She is s/p L4-5 IL ASHUTOSH with limited benefit although previous did provide significant benefit.  Her biggest complaint today is pain " across the lower back.  We discussed bilateral L3-4, 4-5 and 5-S1 facet joint injections vs MBB.  She would like to move forward with facet injections.      - She will have thoracic MRI as previously ordered.    - Continue to follow up with oncology next week regarding metastatic adenocarcinoma.    - The patient will continue a home exercise routine to help with pain and strengthening.      - RTC 2 weeks after procedure.      The above plan and management options were discussed at length with patient. Patient is in agreement with the above and verbalized understanding.    Sofia Schultz  09/20/2018

## 2018-09-21 ENCOUNTER — TELEPHONE (OUTPATIENT)
Dept: PAIN MEDICINE | Facility: CLINIC | Age: 83
End: 2018-09-21

## 2018-09-21 PROCEDURE — 77412 RADIATION TX DELIVERY LVL 3: CPT | Performed by: RADIOLOGY

## 2018-09-21 PROCEDURE — 77417 THER RADIOLOGY PORT IMAGE(S): CPT | Performed by: RADIOLOGY

## 2018-09-21 NOTE — TELEPHONE ENCOUNTER
----- Message from Melodie Ponce sent at 9/21/2018  8:35 AM CDT -----  Contact: Patience  Name of Who is Calling:Patience    What is the request in detail: Patient daughter  states she have some questions about patient MRI Please contact to further discuss and advise    Can the clinic reply by [facility]: No    What Number to Call Back if not in [facility]: [phone]

## 2018-09-21 NOTE — TELEPHONE ENCOUNTER
Staff returned patient daughter call, there was no answer, we left a voicemail for her today requesting a return call to discuss her message further.

## 2018-09-21 NOTE — TELEPHONE ENCOUNTER
----- Message from Mara Johnston sent at 9/21/2018  2:15 PM CDT -----            Name of Who is Calling: pt morro Morin    What is the request in detail: please call, the pt went to her doctor today and he put her on antibiotics and steroids, pt is scheduled for a procedure on 10/1/18, please call     Can the clinic reply by [facility]: no    What Number to Call Back if not in [facility]: [phone]

## 2018-09-24 PROCEDURE — 77412 RADIATION TX DELIVERY LVL 3: CPT | Performed by: RADIOLOGY

## 2018-09-25 ENCOUNTER — DOCUMENTATION ONLY (OUTPATIENT)
Dept: RADIATION ONCOLOGY | Facility: CLINIC | Age: 83
End: 2018-09-25

## 2018-09-25 ENCOUNTER — TELEPHONE (OUTPATIENT)
Dept: PAIN MEDICINE | Facility: CLINIC | Age: 83
End: 2018-09-25

## 2018-09-25 PROCEDURE — 77417 THER RADIOLOGY PORT IMAGE(S): CPT | Performed by: RADIOLOGY

## 2018-09-25 PROCEDURE — 77412 RADIATION TX DELIVERY LVL 3: CPT | Performed by: RADIOLOGY

## 2018-09-25 NOTE — TELEPHONE ENCOUNTER
----- Message from Luzma Montemayor sent at 9/25/2018  8:41 AM CDT -----  Left message informing pt. Procedure has been rescheduled from 10/1/18 to 10/15/118 arrival with Dr. Wilcox, due to pt. Taking antibiotics.....

## 2018-09-25 NOTE — TELEPHONE ENCOUNTER
Regarding: RE: Reminder for Upcoming Procedure  Contact: [phone]  ----- Message from [facility] System Message sent at 9/24/2018  9:12 PM CDT -----    Dr Moreland:    My grandmother (Anahi Cook) is currently being treated by her pulmonologist for a respiratory infection.  She will be finished her antibiotic and oral steroid on 9/28/18.     Please reschedule her procedure for the first available date, after the prescribed waiting period.     Thank you, Anastasia (granddaughter) or Patience (daughter) @ [phone].     ----- Message -----  From: Merly Wilcox MD  Sent: 9/24/18, 8:30 AM  To: Anahi Cook  Subject: Reminder for Upcoming Procedure    [facility]   [address] [place] [id number]      09/24/2018      Dear Anahi Cook,      This is a reminder for your upcoming procedure with Merly Wilcox MD on 10/1/2018. We will contact you again before the day of your procedure with your scheduled arrival time.        If you have questions or scheduling concerns, you can contact your physicians office:   Merly Wilcox MD  Phone Number: [phone]      Sincerely,

## 2018-09-25 NOTE — PLAN OF CARE
Problem: Patient Care Overview  Goal: Plan of Care Review  Outcome: Ongoing (interventions implemented as appropriate)  Pt. On day 13 of xrt to breast.  Mild tenderness in breast.  Using miaderm 3-4 x daily.

## 2018-09-26 ENCOUNTER — TELEPHONE (OUTPATIENT)
Dept: PAIN MEDICINE | Facility: CLINIC | Age: 83
End: 2018-09-26

## 2018-09-26 ENCOUNTER — HOSPITAL ENCOUNTER (OUTPATIENT)
Dept: RADIOLOGY | Facility: OTHER | Age: 83
Discharge: HOME | End: 2018-09-26
Attending: ANESTHESIOLOGY
Payer: MEDICARE

## 2018-09-26 DIAGNOSIS — R93.7 ABNORMAL X-RAY OF THORACIC SPINE: ICD-10-CM

## 2018-09-26 PROCEDURE — 77321 SPECIAL TELETX PORT PLAN: CPT | Mod: TC | Performed by: RADIOLOGY

## 2018-09-26 PROCEDURE — 77334 RADIATION TREATMENT AID(S): CPT | Mod: TC | Performed by: RADIOLOGY

## 2018-09-26 PROCEDURE — 77412 RADIATION TX DELIVERY LVL 3: CPT | Performed by: RADIOLOGY

## 2018-09-26 PROCEDURE — A9585 GADOBUTROL INJECTION: HCPCS | Performed by: ANESTHESIOLOGY

## 2018-09-26 PROCEDURE — 77321 SPECIAL TELETX PORT PLAN: CPT | Mod: 26,,, | Performed by: RADIOLOGY

## 2018-09-26 PROCEDURE — 72157 MRI CHEST SPINE W/O & W/DYE: CPT | Mod: 26,,, | Performed by: RADIOLOGY

## 2018-09-26 PROCEDURE — 72157 MRI CHEST SPINE W/O & W/DYE: CPT | Mod: TC

## 2018-09-26 PROCEDURE — 77334 RADIATION TREATMENT AID(S): CPT | Mod: 26,,, | Performed by: RADIOLOGY

## 2018-09-26 PROCEDURE — 25500020 PHARM REV CODE 255: Performed by: ANESTHESIOLOGY

## 2018-09-26 RX ORDER — GADOBUTROL 604.72 MG/ML
7.5 INJECTION INTRAVENOUS
Status: COMPLETED | OUTPATIENT
Start: 2018-09-26 | End: 2018-09-26

## 2018-09-26 RX ADMIN — GADOBUTROL 7.5 ML: 604.72 INJECTION INTRAVENOUS at 09:09

## 2018-09-26 NOTE — TELEPHONE ENCOUNTER
----- Message from Melodie Ponce sent at 9/26/2018 11:43 AM CDT -----  Contact: Patience  Name of Who is Calling:Patience    What is the request in detail:  Patience patient daughter returning a call to staff to discuss MRI results  Please contact to further discuss and advise    Can the clinic reply by [facility]: Patience    What Number to Call Back if not in [facility]:[phone]

## 2018-09-27 PROCEDURE — 77336 RADIATION PHYSICS CONSULT: CPT | Performed by: RADIOLOGY

## 2018-09-27 PROCEDURE — 77412 RADIATION TX DELIVERY LVL 3: CPT | Performed by: RADIOLOGY

## 2018-09-28 PROCEDURE — 77412 RADIATION TX DELIVERY LVL 3: CPT | Performed by: RADIOLOGY

## 2018-10-01 ENCOUNTER — HOSPITAL ENCOUNTER (OUTPATIENT)
Dept: RADIATION THERAPY | Facility: HOSPITAL | Age: 83
Discharge: HOME | End: 2018-10-01
Attending: RADIOLOGY
Payer: MEDICARE

## 2018-10-01 ENCOUNTER — APPOINTMENT (OUTPATIENT)
Dept: RADIATION THERAPY | Facility: OTHER | Age: 83
End: 2018-10-01
Attending: RADIOLOGY
Payer: MEDICARE

## 2018-10-01 PROCEDURE — 77412 RADIATION TX DELIVERY LVL 3: CPT | Performed by: RADIOLOGY

## 2018-10-02 ENCOUNTER — DOCUMENTATION ONLY (OUTPATIENT)
Dept: RADIATION ONCOLOGY | Facility: CLINIC | Age: 83
End: 2018-10-02

## 2018-10-02 PROCEDURE — 77412 RADIATION TX DELIVERY LVL 3: CPT | Performed by: RADIOLOGY

## 2018-10-02 NOTE — PLAN OF CARE
Problem: Patient Care Overview  Goal: Plan of Care Review  Outcome: Ongoing (interventions implemented as appropriate)  Pt. On day 18 of xrt to breast.  Pt. With mild hyperpigmentation.  Using cream.  Skin intact.

## 2018-10-03 PROCEDURE — 77412 RADIATION TX DELIVERY LVL 3: CPT | Performed by: RADIOLOGY

## 2018-10-04 PROCEDURE — 77336 RADIATION PHYSICS CONSULT: CPT | Performed by: RADIOLOGY

## 2018-10-04 PROCEDURE — 77412 RADIATION TX DELIVERY LVL 3: CPT | Performed by: RADIOLOGY

## 2018-10-05 PROCEDURE — 77417 THER RADIOLOGY PORT IMAGE(S): CPT | Performed by: RADIOLOGY

## 2018-10-05 PROCEDURE — 77412 RADIATION TX DELIVERY LVL 3: CPT | Performed by: RADIOLOGY

## 2018-10-08 PROCEDURE — 77412 RADIATION TX DELIVERY LVL 3: CPT | Performed by: RADIOLOGY

## 2018-10-09 ENCOUNTER — DOCUMENTATION ONLY (OUTPATIENT)
Dept: RADIATION ONCOLOGY | Facility: CLINIC | Age: 83
End: 2018-10-09

## 2018-10-09 PROCEDURE — 77412 RADIATION TX DELIVERY LVL 3: CPT | Performed by: RADIOLOGY

## 2018-10-09 NOTE — PLAN OF CARE
Problem: Patient Care Overview  Goal: Plan of Care Review  Outcome: Ongoing (interventions implemented as appropriate)  Pt. On day 23 of xrt to breast.  Pt. With brisk erythema.  Given gel packs for inframammary and scapular area.  Pt. Using cream.

## 2018-10-10 PROCEDURE — 77412 RADIATION TX DELIVERY LVL 3: CPT | Performed by: RADIOLOGY

## 2018-10-11 PROCEDURE — 77412 RADIATION TX DELIVERY LVL 3: CPT | Performed by: RADIOLOGY

## 2018-10-12 PROCEDURE — 77412 RADIATION TX DELIVERY LVL 3: CPT | Performed by: RADIOLOGY

## 2018-10-15 ENCOUNTER — HOSPITAL ENCOUNTER (OUTPATIENT)
Facility: OTHER | Age: 83
Discharge: HOME | End: 2018-10-15
Attending: ANESTHESIOLOGY | Admitting: ANESTHESIOLOGY
Payer: MEDICARE

## 2018-10-15 VITALS
SYSTOLIC BLOOD PRESSURE: 180 MMHG | TEMPERATURE: 98 F | WEIGHT: 162 LBS | HEART RATE: 64 BPM | RESPIRATION RATE: 18 BRPM | BODY MASS INDEX: 29.81 KG/M2 | DIASTOLIC BLOOD PRESSURE: 81 MMHG | HEIGHT: 62 IN | OXYGEN SATURATION: 92 %

## 2018-10-15 DIAGNOSIS — M47.9 OSTEOARTHRITIS OF SPINE, UNSPECIFIED SPINAL OSTEOARTHRITIS COMPLICATION STATUS, UNSPECIFIED SPINAL REGION: Primary | ICD-10-CM

## 2018-10-15 PROCEDURE — 64494 INJ PARAVERT F JNT L/S 2 LEV: CPT | Mod: 50,,, | Performed by: ANESTHESIOLOGY

## 2018-10-15 PROCEDURE — 25000003 PHARM REV CODE 250: Performed by: ANESTHESIOLOGY

## 2018-10-15 PROCEDURE — 64495 INJ PARAVERT F JNT L/S 3 LEV: CPT | Mod: 50 | Performed by: ANESTHESIOLOGY

## 2018-10-15 PROCEDURE — 63600175 PHARM REV CODE 636 W HCPCS: Performed by: ANESTHESIOLOGY

## 2018-10-15 PROCEDURE — 77336 RADIATION PHYSICS CONSULT: CPT | Performed by: RADIOLOGY

## 2018-10-15 PROCEDURE — 64493 INJ PARAVERT F JNT L/S 1 LEV: CPT | Mod: 50 | Performed by: ANESTHESIOLOGY

## 2018-10-15 PROCEDURE — 64493 INJ PARAVERT F JNT L/S 1 LEV: CPT | Mod: 50,,, | Performed by: ANESTHESIOLOGY

## 2018-10-15 PROCEDURE — 64494 INJ PARAVERT F JNT L/S 2 LEV: CPT | Mod: 50 | Performed by: ANESTHESIOLOGY

## 2018-10-15 PROCEDURE — 25500020 PHARM REV CODE 255: Performed by: ANESTHESIOLOGY

## 2018-10-15 PROCEDURE — 64495 INJ PARAVERT F JNT L/S 3 LEV: CPT | Mod: 50,,, | Performed by: ANESTHESIOLOGY

## 2018-10-15 PROCEDURE — 77417 THER RADIOLOGY PORT IMAGE(S): CPT | Performed by: RADIOLOGY

## 2018-10-15 PROCEDURE — 77412 RADIATION TX DELIVERY LVL 3: CPT | Performed by: RADIOLOGY

## 2018-10-15 RX ORDER — BUPIVACAINE HYDROCHLORIDE 2.5 MG/ML
INJECTION, SOLUTION EPIDURAL; INFILTRATION; INTRACAUDAL
Status: DISCONTINUED | OUTPATIENT
Start: 2018-10-15 | End: 2018-10-15 | Stop reason: HOSPADM

## 2018-10-15 RX ORDER — LIDOCAINE HYDROCHLORIDE 10 MG/ML
INJECTION INFILTRATION; PERINEURAL
Status: DISCONTINUED | OUTPATIENT
Start: 2018-10-15 | End: 2018-10-15 | Stop reason: HOSPADM

## 2018-10-15 RX ORDER — TRIAMCINOLONE ACETONIDE 40 MG/ML
INJECTION, SUSPENSION INTRA-ARTICULAR; INTRAMUSCULAR
Status: DISCONTINUED | OUTPATIENT
Start: 2018-10-15 | End: 2018-10-15 | Stop reason: HOSPADM

## 2018-10-15 RX ORDER — ALPRAZOLAM 0.5 MG/1
0.5 TABLET, ORALLY DISINTEGRATING ORAL NIGHTLY PRN
Status: DISCONTINUED | OUTPATIENT
Start: 2018-10-15 | End: 2018-10-15 | Stop reason: HOSPADM

## 2018-10-15 RX ADMIN — ALPRAZOLAM 0.5 MG: 0.5 TABLET, ORALLY DISINTEGRATING ORAL at 10:10

## 2018-10-15 NOTE — DISCHARGE INSTRUCTIONS
Thank you for allowing us to care for you today. You may receive a survey about the care we provided. Your feedback is valuable and helps us provide excellent care throughout the community.     Home Care Instructions for Pain Management:    1. DIET:   You may resume your normal diet today.   2. BATHING:   You may shower with luke warm water. No tub baths or anything that will soak injection sites under water for the next 24 hours.  3. DRESSING:   You may remove your bandage today.   4. ACTIVITY LEVEL:   You may resume your normal activities 24 hrs after your procedure. Nothing strenuous today.  5. MEDICATIONS:   You may resume your normal medications today. To restart blood thinners, ask your doctor.  6. DRIVING    If you have received any sedatives by mouth today, you may not drive for 12 hours.    If you have received any sedation through your IV, you may not drive for 24 hrs.   7. SPECIAL INSTRUCTIONS:   No heat to the injection site for 24 hrs including, hot bath or shower, heating pad, moist heat, or hot tubs.    Use ice pack to injection site for any pain or discomfort.  Apply ice packs for 20 minute intervals as needed.    IF you have diabetes, be sure to monitor your blood sugar more closely. IF your injection contained steroids your blood sugar levels may become higher than normal.    If you are still having pain upon discharge:  Your pain may improve over the next 48 hours. The anesthetic (numbing medication) works immediately to 48 hours. IF your injection contained a steroid (anti-inflammatory medication), it takes approximately 3 days to start feeling relief and 7-10 days to see your greatest results from the medication. It is possible you may need subsequent injections. This would be discussed at your follow up appointment with pain management or your referring doctor.      PLEASE CALL YOUR DOCTOR IF:  1. Redness or swelling around the injection site.  2. Fever of 101 degrees or more  3. Drainage  (pus) from the injection site.  4. For any continuous bleeding (some dried blood over the incision is normal.)    FOR EMERGENCIES:   If any unusual problems or difficulties occur during clinic hours, call [phone] or [phone].

## 2018-10-15 NOTE — OP NOTE
Lumbar Facet Joint Injection Under Fluoroscopy  Time-out taken to identify patient and procedure side prior to starting the procedure.            I attest that I have reviewed the patient's home medications prior to the procedure and no contraindication have been identified. I  re-evaluated the patient after the patient was positioned for the procedure in the procedure room immediately before the procedural time-out. The vital signs are current and represent the current state of the patient which has not significantly changed since the preprocedure assessment.   Date of Service: 10/15/2018    PCP: Pepito Theodore MD    Referring Physician:                                                        PROCEDURE:  bilateral facet joint injection at L3/4, L4/5, L5/S1 under fluoroscopy.    REASON FOR PROCEDURE: Lumbar spondylosis [M47.816]  Facet arthritis of lumbar region [M46.96]  1. Osteoarthritis of spine, unspecified spinal osteoarthritis complication status, unspecified spinal region      POSTOP DIAGNOSIS: Lumbar spondylosis [M47.816]  Facet arthritis of lumbar region [M46.96]  1. Osteoarthritis of spine, unspecified spinal osteoarthritis complication status, unspecified spinal region      PHYSICIAN: Merly Wilcox MD  ASSISTANTS: none      MEDICATIONS INJECTED:  0.5ml Kenalog 40mg and Bupivacaine 0.25% 10ml. Injected 1.5ml  per level.  LOCAL ANESTHETIC USED:   Xylocaine 1% 9ml with Sodium Bicarbonate 1ml. 3ml per site.  SEDATION MEDICATIONS: None    ESTIMATED BLOOD LOSS:  None.    COMPLICATIONS:  None.    TECHNIQUE:   Lying in a prone position, the patient was prepped and draped in the usual sterile fashion using ChloraPrep and fenestrated drape.  The level was determined under fluoroscopic guidance.  Local anesthetic was given by going down to the hub of the 27-gauge 1.25in needle and raising a wheel.  The 3.5in 22-gauge needle was introduced into the  L3/4, L4/5, L5/S1  facet joints.  Negative pressure applied to make  sure that there was no intravascular placement.  Omnipaque was injected to confirm placement.  It was also done to confirm that there was no vascular runoff.  Medication was then injected slowly.  The patient tolerated the procedure well.     PAIN BEFORE THE PROCEDURE:  9/10.    PAIN AFTER THE PROCEDURE: 0/10.    The patient was monitored after the procedure.  Patient was given post procedure and discharge instructions to follow at home.  We will see the patient back in two weeks or the patient may call to inform of status. The patient was discharged in a stable condition

## 2018-10-16 ENCOUNTER — DOCUMENTATION ONLY (OUTPATIENT)
Dept: RADIATION ONCOLOGY | Facility: CLINIC | Age: 83
End: 2018-10-16

## 2018-10-16 ENCOUNTER — HOSPITAL ENCOUNTER (OUTPATIENT)
Dept: RADIOLOGY | Facility: OTHER | Age: 83
Discharge: HOME | End: 2018-10-16
Attending: PHYSICIAN ASSISTANT
Payer: MEDICARE

## 2018-10-16 ENCOUNTER — TELEPHONE (OUTPATIENT)
Dept: PAIN MEDICINE | Facility: CLINIC | Age: 83
End: 2018-10-16

## 2018-10-16 ENCOUNTER — OFFICE VISIT (OUTPATIENT)
Dept: ORTHOPEDICS | Facility: CLINIC | Age: 83
End: 2018-10-16
Payer: MEDICARE

## 2018-10-16 VITALS
BODY MASS INDEX: 29.81 KG/M2 | HEART RATE: 67 BPM | DIASTOLIC BLOOD PRESSURE: 75 MMHG | WEIGHT: 162 LBS | SYSTOLIC BLOOD PRESSURE: 178 MMHG | HEIGHT: 62 IN

## 2018-10-16 DIAGNOSIS — S62.025S CLOSED NONDISPLACED FRACTURE OF MIDDLE THIRD OF SCAPHOID BONE OF LEFT WRIST, SEQUELA: ICD-10-CM

## 2018-10-16 DIAGNOSIS — S62.025S CLOSED NONDISPLACED FRACTURE OF MIDDLE THIRD OF SCAPHOID BONE OF LEFT WRIST, SEQUELA: Primary | ICD-10-CM

## 2018-10-16 PROCEDURE — 3077F SYST BP >= 140 MM HG: CPT | Mod: CPTII,,, | Performed by: ORTHOPAEDIC SURGERY

## 2018-10-16 PROCEDURE — 77412 RADIATION TX DELIVERY LVL 3: CPT | Performed by: RADIOLOGY

## 2018-10-16 PROCEDURE — 73110 X-RAY EXAM OF WRIST: CPT | Mod: 26,LT,, | Performed by: RADIOLOGY

## 2018-10-16 PROCEDURE — 99999 PR PBB SHADOW E&M-EST. PATIENT-LVL III: CPT | Mod: PBBFAC,,, | Performed by: ORTHOPAEDIC SURGERY

## 2018-10-16 PROCEDURE — 99213 OFFICE O/P EST LOW 20 MIN: CPT | Mod: S$PBB,,, | Performed by: ORTHOPAEDIC SURGERY

## 2018-10-16 PROCEDURE — 73110 X-RAY EXAM OF WRIST: CPT | Mod: TC,FY,LT

## 2018-10-16 PROCEDURE — 3078F DIAST BP <80 MM HG: CPT | Mod: CPTII,,, | Performed by: ORTHOPAEDIC SURGERY

## 2018-10-16 PROCEDURE — 1101F PT FALLS ASSESS-DOCD LE1/YR: CPT | Mod: CPTII,,, | Performed by: ORTHOPAEDIC SURGERY

## 2018-10-16 PROCEDURE — 99213 OFFICE O/P EST LOW 20 MIN: CPT | Mod: PBBFAC,25 | Performed by: ORTHOPAEDIC SURGERY

## 2018-10-16 RX ORDER — MONTELUKAST SODIUM 10 MG/1
10 TABLET ORAL NIGHTLY
COMMUNITY
End: 2022-08-11

## 2018-10-16 NOTE — TELEPHONE ENCOUNTER
Spoke with patients daughter Patience and she stated her mother has other issues to resolve before getting scheduled for a T12 Kyphoplasty with  Dr. Wilcox.  She will call back when ready to schedule.

## 2018-10-16 NOTE — PROGRESS NOTES
Subjective:      Patient ID: Anahi Cook is a 84 y.o. female.    Chief Complaint: Pain of the Left Wrist    At last visit:  HPI  Anahi Cook is a 84 y.o. female presenting today for left scaphoid fracture. Pt had a fall 5/2/18. She slipped and fell onto the left wrist and back. She was seen in the ED later the same day where she was found to have left scaphoid fracture. She was placed into a splint initially then transitioned into a cast 5/17/18. She was transitioned back into a splint at last visit 6/26/18. She is using the bone stimulator. Denies pain.       Review of patient's allergies indicates:  No Known Allergies    Today: Pt has no pain, good use of hand and ROM. USes a walker without difficulty    Current Outpatient Medications   Medication Sig Dispense Refill    acetaminophen (TYLENOL) 325 MG tablet Take 325 mg by mouth every 6 (six) hours as needed for Pain.      albuterol (PROAIR HFA) 90 mcg/actuation inhaler Inhale 1-2 puffs into the lungs every 6 (six) hours as needed for Wheezing or Shortness of Breath.      albuterol (PROVENTIL) 2.5 mg /3 mL (0.083 %) nebulizer solution Inhale 3 mLs into the lungs.      albuterol-ipratropium 2.5mg-0.5mg/3mL (DUO-NEB) 0.5 mg-3 mg(2.5 mg base)/3 mL nebulizer solution       atorvastatin (LIPITOR) 20 MG tablet Take 20 mg by mouth nightly.       benazepril (LOTENSIN) 10 MG tablet Take 20 mg by mouth once daily.       calcium-vitamin D 250-100 mg-unit per tablet Take 1 tablet by mouth 2 (two) times daily.      CETIRIZINE HCL (ZYRTEC ORAL) Take 10 mg by mouth nightly as needed.       citalopram (CELEXA) 20 MG tablet Take 20 mg by mouth nightly.       felodipine (PLENDIL) 10 MG 24 hr tablet Take 20 mg by mouth once daily.       fluticasone-vilanterol (BREO) 100-25 mcg/dose diskus inhaler Inhale 1 puff into the lungs once daily. 1 each 3    INCRUSE ELLIPTA 62.5 mcg/actuation DsDv USE 1 PUFF QD AT SAME TIME  (patient takes at night)  2    letrozole (FEMARA)  2.5 mg Tab Take 1 tablet (2.5 mg total) by mouth once daily. 30 tablet 11    montelukast (SINGULAIR) 10 mg tablet Take 10 mg by mouth every evening.      multivitamin (THERAGRAN) per tablet Take 1 tablet by mouth once daily.      omeprazole (PRILOSEC) 20 MG capsule       predniSONE (DELTASONE) 20 MG tablet Take 1 tablet by mouth.      torsemide (DEMADEX) 20 MG Tab Take 20 mg by mouth once daily.       No current facility-administered medications for this visit.        Past Medical History:   Diagnosis Date    Anxiety     Back pain     Breast cancer 1/17/2018    Breast mass 1/15/2018    Chronic kidney disease, stage 2, mildly decreased GFR     COPD (chronic obstructive pulmonary disease)     emphysema    Depression     Dysuria 1/29/2018    Family history of breast cancer 1/17/2018    Hypertension     Infiltrating ductal carcinoma of breast, left 7/12/2018    Osteoporosis, senile     Ovarian mass 1/15/2018    Pneumonia     S/P robotic assisted laparoscopic BSO (bilateral salpingo-oophorectomy) 2/23/2018       Past Surgical History:   Procedure Laterality Date    AXILLARY NODE DISSECTION Left 7/12/2018    Procedure: LYMPHADENECTOMY, AXILLARY;  Surgeon: Amanda Caballero MD;  Location: [facility] OR [address] [place] [address];  Service: General;  Laterality: Left;    CHOLECYSTECTOMY      EYE SURGERY      HYSTERECTOMY      INFUSION, INTRAVENOUS N/A 4/17/2013    Performed by Pepito Theodore MD at [facility]    INJECTION FOR SENTINEL NODE IDENTIFICATION Left 7/12/2018    Procedure: INJECTION, FOR SENTINEL NODE IDENTIFICATION;  Surgeon: Amanda Caballero MD;  Location: [facility] OR [address] [place] [address];  Service: General;  Laterality: Left;    INJECTION OF FACET JOINT Bilateral 10/15/2018    Procedure: INJECTION, FACET JOINT, BILATERAL LUMBAR L3-4, 4-5, 5-S1 FACET JOINT INJECTIONS;  Surgeon: Merly Wilcox MD;  Location: [facility] PAIN MGT;  Service: Pain Management;  Laterality: Bilateral;    INJECTION, FACET JOINT, BILATERAL LUMBAR L3-4, 4-5, 5-S1  "FACET JOINT INJECTIONS Bilateral 10/15/2018    Performed by Merly Wilcox MD at [facility] PAIN [facility]GT    INJECTION, FOR SENTINEL NODE IDENTIFICATION Left 7/12/2018    Performed by Amanda Caballero MD at [facility] OR [facility]    INJECTION,STEROID,EPIDURAL N/A 8/21/2018    Performed by Merly Wilcox MD at [facility] PAIN [facility]    INJECTION-STEROID-EPIDURAL-LUMBAR N/A 2/15/2018    Performed by Merly Wilcox MD at [facility] PAIN [facility]    INJECTION-STEROID-EPIDURAL-LUMBAR N/A 2/15/2016    Performed by Merly Wilcox MD at [facility]    INJECTION-STEROID-EPIDURAL-LUMBAR N/A 7/21/2014    Performed by Merly Wilcox MD at [facility] PAIN [facility]    LYMPHADENECTOMY, AXILLARY Left 7/12/2018    Performed by Amanda Caballero MD at [facility] OR [facility]    MASTECTOMY, PARTIAL Left 7/12/2018    Procedure: MASTECTOMY, PARTIAL-W/SEED LOCALIZATION;  Surgeon: Amanda Caballero MD;  Location: [facility] OR [address] [place] [address];  Service: General;  Laterality: Left;    MASTECTOMY, PARTIAL-W/SEED LOCALIZATION Left 7/12/2018    Performed by Amanda Caballero MD at [facility] OR 2ND FLR    UT REMOVAL OF OVARY/TUBE(S)  02/23/2018    Robotic Assisted    ROTATOR CUFF REPAIR Right     rotator cuff repair right shoulder    TONSILLECTOMY      XI ROBOT ASSISTED LAPAROSCOPIC SALPINGO-OOPHERECTOMY Bilateral 2/23/2018    Performed by Reena Chairez MD at [facility] OR       Review of Systems:  Constitutional: Negative for chills and fever.   Respiratory: Negative for cough and shortness of breath.    Gastrointestinal: Negative for nausea and vomiting.   Skin: Negative for rash.   Neurological: Negative for dizziness and headaches.   Psychiatric/Behavioral: Negative for depression.   MSK as in HPI       OBJECTIVE:     PHYSICAL EXAM:  BP (!) 178/75   Pulse 67   Ht 5' 2" (1.575 m)   Wt 73.5 kg (162 lb)   LMP  (LMP Unknown)   BMI 29.63 kg/m²     GEN:  NAD, well-developed, well-groomed.  NEURO: Awake, alert, and oriented. Normal attention and concentration.    PSYCH: Normal mood and affect. Behavior is normal.  HEENT: No " cervical lymphadenopathy noted.  CARDIOVASCULAR: Radial pulses 2+ bilaterally. No LE edema noted.  PULMONARY: Breath sounds normal. No respiratory distress.  SKIN: Intact, no rashes.      MSK:   LUE:  Good active ROM fingers. No ttp fracture site. AIN/PIN/Radial/Median/Ulnar Nerves assessed in isolation without deficit. Radial & Ulnar arteries palpated 2+. Capillary Refill <3s.       RADIOGRAPHS:  Xray left wrist 8/8/18  FINDINGS:  Once again, there is a fracture through the waist of the scaphoid bone and the fracture line is still quite evident.  There appears to be relatively increased density of the proximal pole of the scaphoid relative to the distal pole and avascular necrosis of the proximal pole of the scaphoid bone cannot be excluded.  There are degenerative changes which are most pronounced at the left 1st carpometacarpal joint.    Comments: I have personally reviewed the imaging and I agree with the above radiologist's report.    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 Pt at this time has no pain, Good use of hand. Plan to RTC PRN

## 2018-10-16 NOTE — PLAN OF CARE
Problem: Patient Care Overview  Goal: Plan of Care Review  Outcome: Ongoing (interventions implemented as appropriate)  Pt. On day 28of xrt to breast.  Pt. Will start boost tomorrow.  Brisk erythema.  No desquamation.

## 2018-10-17 PROCEDURE — 77412 RADIATION TX DELIVERY LVL 3: CPT | Performed by: RADIOLOGY

## 2018-10-18 PROCEDURE — 77412 RADIATION TX DELIVERY LVL 3: CPT | Performed by: RADIOLOGY

## 2018-10-19 PROCEDURE — 77412 RADIATION TX DELIVERY LVL 3: CPT | Performed by: RADIOLOGY

## 2018-10-22 ENCOUNTER — OFFICE VISIT (OUTPATIENT)
Dept: HEMATOLOGY/ONCOLOGY | Facility: CLINIC | Age: 83
End: 2018-10-22
Payer: MEDICARE

## 2018-10-22 VITALS
DIASTOLIC BLOOD PRESSURE: 74 MMHG | OXYGEN SATURATION: 95 % | TEMPERATURE: 97 F | RESPIRATION RATE: 16 BRPM | HEIGHT: 62 IN | BODY MASS INDEX: 29.62 KG/M2 | HEART RATE: 70 BPM | SYSTOLIC BLOOD PRESSURE: 168 MMHG | WEIGHT: 160.94 LBS

## 2018-10-22 DIAGNOSIS — Z17.0 MALIGNANT NEOPLASM OF UPPER-OUTER QUADRANT OF LEFT BREAST IN FEMALE, ESTROGEN RECEPTOR POSITIVE: Primary | ICD-10-CM

## 2018-10-22 DIAGNOSIS — C50.412 MALIGNANT NEOPLASM OF UPPER-OUTER QUADRANT OF LEFT BREAST IN FEMALE, ESTROGEN RECEPTOR POSITIVE: Primary | ICD-10-CM

## 2018-10-22 DIAGNOSIS — Z79.811 USE OF AROMATASE INHIBITORS: ICD-10-CM

## 2018-10-22 PROCEDURE — 3077F SYST BP >= 140 MM HG: CPT | Mod: CPTII,,, | Performed by: INTERNAL MEDICINE

## 2018-10-22 PROCEDURE — 77336 RADIATION PHYSICS CONSULT: CPT | Performed by: RADIOLOGY

## 2018-10-22 PROCEDURE — 3078F DIAST BP <80 MM HG: CPT | Mod: CPTII,,, | Performed by: INTERNAL MEDICINE

## 2018-10-22 PROCEDURE — 99999 PR PBB SHADOW E&M-EST. PATIENT-LVL III: CPT | Mod: PBBFAC,,, | Performed by: INTERNAL MEDICINE

## 2018-10-22 PROCEDURE — 1101F PT FALLS ASSESS-DOCD LE1/YR: CPT | Mod: CPTII,,, | Performed by: INTERNAL MEDICINE

## 2018-10-22 PROCEDURE — 99213 OFFICE O/P EST LOW 20 MIN: CPT | Mod: PBBFAC,25 | Performed by: INTERNAL MEDICINE

## 2018-10-22 PROCEDURE — 99213 OFFICE O/P EST LOW 20 MIN: CPT | Mod: S$PBB,,, | Performed by: INTERNAL MEDICINE

## 2018-10-22 PROCEDURE — 77412 RADIATION TX DELIVERY LVL 3: CPT | Performed by: RADIOLOGY

## 2018-10-22 NOTE — PROGRESS NOTES
Subjective:       Patient ID: Anahi Cook is a 84 y.o. female.    Chief Complaint: No chief complaint on file.    HPI   Mrs. Cook returns today for follow up.   Briefly, she is an 84-year-old female who, late last year had felt a mass in her left breast.  A biopsy that was performed on 01/11/2018 showed an infiltrating ductal carcinoma that was ER strongly positive, AR strongly positive and HER-2 negative by immunohistochemistry.    She was started on letrozole and undwerwent a BSO by Dr. Chairez for an ovarian mass.  Her ovarian mass was benign. In mid July 2018 she underwent a lumpectomy and AND.  She had a 3.3 cm carcinoma and 4/8 positive nodes.  She has remained on letrozole, now in the adjuvant setting, and she returns today for follow up.   Of note,s he is also receiving chest wall XRT, which she will finish later this week.    Review of Systems    Overall she feels OK  She has tolerated the letrozole well so far.  She still complains of pain on her left wrist and left forearm from her recent wrist fracture.  According to her daughter, surgery is contemplated.  She denies any anxiety, depression, easy bruising, fevers, chills, night  sweats, weight loss, nausea, vomiting, diarrhea, constipation, diplopia, blurred vision, headache, chest pain, palpitations, shortness of breath, breast pain, abdominal pain, extremity pain, or difficulty ambulating.  The remainder of the ten-point ROS, including general, skin, lymph, H/N, cardiorespiratory, GI, , Neuro, Endocrine, and psychiatric is negative.       Objective:      Physical Exam      She is alert, oriented to time, place, person, pleasant, well      nourished, in no acute physical distress.   She is accompanied by her daughter.                                 VITAL SIGNS:  Reviewed                                      HEENT:  Normal.  There are no nasal, oral, lip, gingival, auricular, lid,    or conjunctival lesions.  Mucosae are moist and pink, and there is  no        thrush.  Pupils are equal, reactive to light and accommodation.              Extraocular muscle movements are intact.   Dentition is fair.  Maxillary teeth are missing.                                      NECK:  Supple without JVD, adenopathy, or thyromegaly.                       LUNGS:  Clear to auscultation without wheezing, rales, or rhonchi.           CARDIOVASCULAR:  Reveals an S1, S2, no murmurs, no rubs, no gallops.         ABDOMEN:  Soft, nontender, without organomegaly.  Bowel sounds are    present.    Scars from her laparoscopic DONOVAN-BSO are seen.                                                            EXTREMITIES:  No cyanosis, clubbing, or edema.   The left forearm and wrist are in a cast.                            BREASTS: She is s/p left l;umpectomy with a healed periareolar incision.  She also has an incision from her axillary dissection;  It is well healed.   There are no masses in her right breast.    Both breasts are large and pendulous.    There is erythema within the radiation field.                                 LYMPHATIC:  There is no cervical, axillary, or supraclavicular adenopathy.   SKIN:  Warm and moist, without petechiae, rashes, induration, or ecchymoses.           NEUROLOGIC:  DTRs are 0-1+ bilaterally, symmetrical, motor function is 5/5,  and cranial nerves are  within normal limits.      Assessment:       1. Hormone receptor positive breast cancer, left, on neoadjuvant letrozole with a significant clinical response.   2. Use of aromatase inhibitors        Plan:        I had a long discussion with her and her daughter.  She will remain on letrozole, and see me in three months for follow up.  Her multiple questions were answered to her satisfaction.

## 2018-10-23 ENCOUNTER — DOCUMENTATION ONLY (OUTPATIENT)
Dept: RADIATION ONCOLOGY | Facility: CLINIC | Age: 83
End: 2018-10-23

## 2018-10-23 PROCEDURE — 77412 RADIATION TX DELIVERY LVL 3: CPT | Performed by: RADIOLOGY

## 2018-10-23 NOTE — PLAN OF CARE
Problem: Patient Care Overview  Goal: Plan of Care Review  Outcome: Outcome(s) achieved Date Met: 10/23/18  Pt. Completed xrt to breast.  rtc 6 weeks.

## 2018-10-24 ENCOUNTER — PATIENT MESSAGE (OUTPATIENT)
Dept: PAIN MEDICINE | Facility: CLINIC | Age: 83
End: 2018-10-24

## 2018-10-25 ENCOUNTER — PATIENT MESSAGE (OUTPATIENT)
Dept: PAIN MEDICINE | Facility: CLINIC | Age: 83
End: 2018-10-25

## 2018-10-25 NOTE — TELEPHONE ENCOUNTER
Ms. Oconnor was informed that we are unable to discuss patient medical findings with her until we have written approval on file from the patient.     However, this question is being presented to the provider as he makes the final decision on rather or not to discuss patient medical issues.

## 2018-10-26 ENCOUNTER — TELEPHONE (OUTPATIENT)
Dept: PAIN MEDICINE | Facility: CLINIC | Age: 83
End: 2018-10-26

## 2018-10-26 ENCOUNTER — DOCUMENTATION ONLY (OUTPATIENT)
Dept: RADIATION ONCOLOGY | Facility: CLINIC | Age: 83
End: 2018-10-26

## 2018-10-26 DIAGNOSIS — S22.080B: Primary | ICD-10-CM

## 2018-10-26 NOTE — TELEPHONE ENCOUNTER
Left another voice message asking for a return call to get patient scheduled for a kyphoplasty with Dr. Wilcox

## 2018-10-26 NOTE — TELEPHONE ENCOUNTER
"Dr Wilcox:     This is Nadiyah on behalf of my grandmother, Anahi Cook.     I'm writing in follow up to our conversation regarding my grandmother's compression fracture. I need a few points of clarity in order to determine when to schedule her procedure.     1) is the procedure Vertebroplasty or Kyphoplasty     2) will this procedure provide pain relief     3) what is the after-care requirement; will I need to be home with her to help perform any personal care     4) how long is the procedure     5) you mentioned "worsening of pain symptoms" as a possible side effect; what is the likelihood of this and any other serious side effects     My grandmother has requested that I manage this process for her. If your team needs to reach me, please email me here or by phone at [phone].     Thanks in advance.     Anastasia"

## 2018-10-26 NOTE — TELEPHONE ENCOUNTER
Preferred Name:   Anahi Coulter   Female, 84 y.o., 9/5/1934  Phone:   [phone] (M)  PCP:   Pepito Theodore MD  Language:   English  Need Interp:   No  Allergies Last Reviewed:   10/22/18  Allergies:   Levaquin [Levofloxacin], Penicillins  Health Maintenance:   Due  Primary Ins.:   [employer]  MRN:   [id number]  Pt Comm Pref:   Patient Portal, Mail  Patient Portal:   Active  Next Appt:   10/31/2018  My Sticky Note:     Specialty Comments:    FW: Referral Request   Merly Wilcox MD   Sent: Thu October 25, 2018 10:27 AM   To: Bev Tripp MA   Cc: Pepito Theodore MD   Referral Request     You   Anahi Cook Just now (8:55 PM)         Ms. Ramirez,     I do apologize for any delay.     Your request was presneted to Dr. Wilcox for response.     Prior to responding to your message the file was checked to see if anything was documented. I didn't see the letter, but after a 2nd review and going further the documentation was found.     Please accept my apologies.     Dr. Wilcox suggest Ms. Cook see her PCP for the findings so he can discuss next steps.     If you have further concerns or questions please do not hesitate to contact us.     Bev REY   Supervisor-Clinical Services   [facility] department   [phone]        This Patient Portal message has not been read.      Merly Wilcox MD   You; Pepito Theodore MD 10 hours ago (10:27 AM)      I think she should see her PCP and he can guide her to what to do. (Routing comment)       You routed conversation to Merly Wilcox MD 13 hours ago (7:39 AM)      You 13 hours ago (7:30 AM)         Ms. Oconnor was informed that we are unable to discuss patient medical findings with her until we have written approval on file from the patient.      However, this question is being presented to the provider as he makes the final decision on rather or not to discuss patient medical issues.          Documentation       Joyce  Merly Hutchison MD 21 hours ago (11:50 PM)         I have a question about MRI SPINE THORACIC W/WO CONTRAST resulted on 9/26/18, 9:22 AM.     The report list an enlarged heart. Is this something we need to see a cardiologist about?     Thanks,     Anastasia (granddaughter)          Responsible Party     Message handled by Bev Tripp MA on 10/25/2018  8:55 PM. Action: Message Reply from Visit Navigator

## 2018-10-26 NOTE — TELEPHONE ENCOUNTER
Left voice message requesting a return call regarding the scheduling of Kyphoplasty with Dr. Wilcox.

## 2018-10-31 ENCOUNTER — OFFICE VISIT (OUTPATIENT)
Dept: PAIN MEDICINE | Facility: CLINIC | Age: 83
End: 2018-10-31
Attending: ANESTHESIOLOGY
Payer: MEDICARE

## 2018-10-31 VITALS
HEART RATE: 67 BPM | SYSTOLIC BLOOD PRESSURE: 157 MMHG | TEMPERATURE: 99 F | BODY MASS INDEX: 29.2 KG/M2 | WEIGHT: 158.69 LBS | RESPIRATION RATE: 18 BRPM | HEIGHT: 62 IN | DIASTOLIC BLOOD PRESSURE: 77 MMHG

## 2018-10-31 DIAGNOSIS — M47.816 LUMBAR SPONDYLOSIS: ICD-10-CM

## 2018-10-31 DIAGNOSIS — M48.50XA VERTEBRAL COMPRESSION FRACTURE: Primary | ICD-10-CM

## 2018-10-31 PROCEDURE — 3077F SYST BP >= 140 MM HG: CPT | Mod: CPTII,,, | Performed by: ANESTHESIOLOGY

## 2018-10-31 PROCEDURE — 1101F PT FALLS ASSESS-DOCD LE1/YR: CPT | Mod: CPTII,,, | Performed by: ANESTHESIOLOGY

## 2018-10-31 PROCEDURE — 99999 PR PBB SHADOW E&M-EST. PATIENT-LVL III: CPT | Mod: PBBFAC,,, | Performed by: ANESTHESIOLOGY

## 2018-10-31 PROCEDURE — 99213 OFFICE O/P EST LOW 20 MIN: CPT | Mod: PBBFAC | Performed by: ANESTHESIOLOGY

## 2018-10-31 PROCEDURE — 3078F DIAST BP <80 MM HG: CPT | Mod: CPTII,,, | Performed by: ANESTHESIOLOGY

## 2018-10-31 PROCEDURE — 99214 OFFICE O/P EST MOD 30 MIN: CPT | Mod: S$PBB,,, | Performed by: ANESTHESIOLOGY

## 2018-10-31 NOTE — H&P (VIEW-ONLY)
Subjective:      Patient ID: Anahi Cook is a 84 y.o. female.    Chief Complaint: No chief complaint on file.    Referred by: No ref. provider found     Pain Scales  Best: 8/10  Worst: 9/10  Usually: 8/10  Today: 3/10    Patient is here for 2 week follow up from bilateral L3/4, L4/5, and L5/S1 on 10/15/18. She states that she got about 80% relief from pain across her low back with this procedure, and she is still getting this good benefit at this time. She reports continued pain in the area of the T12 compression fracture, and is ready to have the kyphoplasty done. She denies any recent health changes, bowel/bladder incontinence, or saddle anesthesia.      Past Medical History:   Diagnosis Date    Anxiety     Back pain     Breast cancer 1/17/2018    Breast mass 1/15/2018    Chronic kidney disease, stage 2, mildly decreased GFR     COPD (chronic obstructive pulmonary disease)     emphysema    Depression     Dysuria 1/29/2018    Family history of breast cancer 1/17/2018    Hypertension     Infiltrating ductal carcinoma of breast, left 7/12/2018    Osteoporosis, senile     Ovarian mass 1/15/2018    Pneumonia     S/P robotic assisted laparoscopic BSO (bilateral salpingo-oophorectomy) 2/23/2018       Past Surgical History:   Procedure Laterality Date    AXILLARY NODE DISSECTION Left 7/12/2018    Procedure: LYMPHADENECTOMY, AXILLARY;  Surgeon: Amanda Caballero MD;  Location: [facility] OR [address] [place] [address];  Service: General;  Laterality: Left;    CHOLECYSTECTOMY      EYE SURGERY      HYSTERECTOMY      INFUSION, INTRAVENOUS N/A 4/17/2013    Performed by [facility] OR    INJECTION FOR SENTINEL NODE IDENTIFICATION Left 7/12/2018    Procedure: INJECTION, FOR SENTINEL NODE IDENTIFICATION;  Surgeon: Amanda Caballero MD;  Location: [facility] OR [address] [place] [address];  Service: General;  Laterality: Left;    INJECTION OF FACET JOINT Bilateral 10/15/2018    Procedure: INJECTION, FACET JOINT, BILATERAL LUMBAR L3-4, 4-5, 5-S1  FACET JOINT INJECTIONS;  Surgeon: Merly Wilcox MD;  Location: [facility];  Service: Pain Management;  Laterality: Bilateral;    INJECTION, FACET JOINT, BILATERAL LUMBAR L3-4, 4-5, 5-S1 FACET JOINT INJECTIONS Bilateral 10/15/2018    [facility]    INJECTION, FOR SENTINEL NODE IDENTIFICATION Left 7/12/2018    [facility]    INJECTION,STEROID,EPIDURAL N/A 8/21/2018    [facility]    INJECTION-STEROID-EPIDURAL-LUMBAR N/A 2/15/2018    [facility]    INJECTION-STEROID-EPIDURAL-LUMBAR N/A 2/15/2016    [facility]    INJECTION-STEROID-EPIDURAL-LUMBAR N/A 7/21/2014    [facility] at [facility]    LYMPHADENECTOMY, AXILLARY Left 7/12/2018    Performed by [facility]ero [facility] at [facility] OR [address] [place] [address]    MASTECTOMY, PARTIAL Left 7/12/2018    Procedure: MASTECTOMY, PARTIAL-W/SEED LOCALIZATION;  Surgeon: Amanda Caballero MD;  Location: [facility] OR [address] [place] [address];  Service: General;  Laterality: Left;    MASTECTOMY, PARTIAL-W/SEED LOCALIZATION Left 7/12/2018    Performed by [facility]ero MD at [facility] OR [address] [place] [address]    MI REMOVAL OF OVARY/TUBE(S)  02/23/2018    Robotic Assisted    ROTATOR CUFF REPAIR Right     rotator cuff repair right shoulder    TONSILLECTOMY      XI ROBOT ASSISTED LAPAROSCOPIC SALPINGO-OOPHERECTOMY Bilateral 2/23/2018    [facility]       Review of patient's allergies indicates:   Allergen Reactions    Levaquin [levofloxacin] Itching    Penicillins Itching       Current Outpatient Medications   Medication Sig Dispense Refill    acetaminophen (TYLENOL) 325 MG tablet Take 325 mg by mouth every 6 (six) hours as needed for Pain.      albuterol (PROAIR HFA) 90 mcg/actuation inhaler Inhale 1-2 puffs into the lungs every 6 (six) hours as needed for Wheezing or Shortness of Breath.      albuterol  (PROVENTIL) 2.5 mg /3 mL (0.083 %) nebulizer solution Inhale 3 mLs into the lungs.      albuterol-ipratropium 2.5mg-0.5mg/3mL (DUO-NEB) 0.5 mg-3 mg(2.5 mg base)/3 mL nebulizer solution       atorvastatin (LIPITOR) 20 MG tablet Take 20 mg by mouth nightly.       benazepril (LOTENSIN) 10 MG tablet Take 20 mg by mouth once daily.       calcium-vitamin D 250-100 mg-unit per tablet Take 1 tablet by mouth 2 (two) times daily.      CETIRIZINE HCL (ZYRTEC ORAL) Take 10 mg by mouth nightly as needed.       felodipine (PLENDIL) 10 MG 24 hr tablet Take 20 mg by mouth once daily.       fluticasone-vilanterol (BREO) 100-25 mcg/dose diskus inhaler Inhale 1 puff into the lungs once daily. 1 each 3    INCRUSE ELLIPTA 62.5 mcg/actuation DsDv USE 1 PUFF QD AT SAME TIME  (patient takes at night)  2    letrozole (FEMARA) 2.5 mg Tab Take 1 tablet (2.5 mg total) by mouth once daily. 30 tablet 11    montelukast (SINGULAIR) 10 mg tablet Take 10 mg by mouth every evening.      multivitamin (THERAGRAN) per tablet Take 1 tablet by mouth once daily.      omeprazole (PRILOSEC) 20 MG capsule       predniSONE (DELTASONE) 20 MG tablet Take 1 tablet by mouth.      torsemide (DEMADEX) 20 MG Tab Take 20 mg by mouth once daily.      citalopram (CELEXA) 20 MG tablet Take 20 mg by mouth nightly.        No current facility-administered medications for this visit.        Family History   Problem Relation Age of Onset    Heart failure Mother     Kidney failure Mother     Heart attack Father     Breast cancer Sister 66        Lump, XRT, chemo, recurrence 10 years later, still alive.    Cancer Brother         leukemia    Heart attack Brother 58    Pulmonary embolism Brother 45    Heart attack Brother 52    Breast cancer Maternal Grandmother 60        advanced at diagnosis    Breast cancer Maternal Aunt 83         at 92       Social History     Socioeconomic History    Marital status: Single     Spouse name: Not on file     Number of children: 3    Years of education: Not on file    Highest education level: Not on file   Social Needs    Financial resource strain: Not on file    Food insecurity - worry: Not on file    Food insecurity - inability: Not on file    Transportation needs - medical: Not on file    Transportation needs - non-medical: Not on file   Occupational History    Occupation: Multiple jobs, see social documentation section     Comment: Retired   Tobacco Use    Smoking status: Former Smoker     Packs/day: 1.00     Years: 40.00     Pack years: 40.00     Types: Cigarettes    Smokeless tobacco: Never Used    Tobacco comment: Smoked >1 ppd for at least 40 years, quit around 1995   Substance and Sexual Activity    Alcohol use: Yes     Comment: occasional glass of wine or cocktail    Drug use: No    Sexual activity: Yes     Partners: Male   Other Topics Concern    Not on file   Social History Narrative    Worked many jobs while raising 3 children.  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  She was  from her first , but took care of him at the end of his life.           Review of Systems   Constitution: Negative for chills, fever, malaise/fatigue, weight gain and weight loss.   HENT: Negative for ear pain and hoarse voice.    Eyes: Negative for blurred vision, pain and visual disturbance.   Cardiovascular: Negative for chest pain, dyspnea on exertion and irregular heartbeat.   Respiratory: Negative for cough, shortness of breath and wheezing.    Endocrine: Negative for cold intolerance and heat intolerance.   Hematologic/Lymphatic: Negative for adenopathy and bleeding problem. Does not bruise/bleed easily.   Skin: Negative for color change, itching and rash.   Musculoskeletal: Negative for back pain and neck pain.   Gastrointestinal: Negative for change in bowel habit, diarrhea, hematemesis,  "hematochezia, melena and vomiting.   Genitourinary: Negative for flank pain, frequency, hematuria and urgency.   Neurological: Negative for difficulty with concentration, dizziness, headaches, loss of balance and seizures.   Psychiatric/Behavioral: Negative for altered mental status, depression and suicidal ideas. The patient is not nervous/anxious.    Allergic/Immunologic: Negative for HIV exposure.           Objective:   Resp 18   Ht 5' 2" (1.575 m)   Wt 72 kg (158 lb 11.2 oz)   LMP  (LMP Unknown)   BMI 29.03 kg/m²   Pain Disability Index Review:  Last 3 PDI Scores 10/31/2018 9/20/2018 3/1/2018   Pain Disability Index (PDI) 58 44 32     Normocephalic.  Atraumatic.  Affect appropriate.  Breathing unlabored.  Extra ocular muscles intact.  BP (!) 157/77   Pulse 67   Temp 99 °F (37.2 °C) (Oral)   Resp 18   Ht 5' 2" (1.575 m)   Wt 72 kg (158 lb 11.2 oz)   LMP  (LMP Unknown)   BMI 29.03 kg/m²     PHYSICAL EXAMINATION:  GEN:  Well developed, well nourished.  No acute distress. Normal pain behavior.  HEENT:  No trauma.  Mucous membranes moist.  Nares patent bilaterally.  PSYCH: Normal affect. Thought content appropriate.  CHEST:  Breathing symmetric.  No audible wheezing.  ABD: Soft, non-tender, non-distended.  SKIN:  Warm, pink, dry.  No rash on exposed areas.    EXT:  No cyanosis, clubbing, or edema.  No color change or changes in nail or hair growth.    L-Spine:  Pain with extension. + facet loading bilaterally.    No TTP over lumbar paraspinals. + TTP over lower thoracic/upper lumbar spine at midline     Ortho/SPM Exam      Assessment:       Encounter Diagnoses   Name Primary?    Vertebral compression fracture Yes    Lumbar spondylosis          Plan:       Diagnoses and all orders for this visit:    Vertebral compression fracture    Lumbar spondylosis    We discussed with the patient the assessment and recommendations. The following is the plan we agreed on:    1. Will proceed with T12 kyphoplasty as " scheduled.  2. Will plan to enroll in Functional Restoration Program following her kyphoplasty. A representative from this program spoke with the patient and her daughter today.  3. She is still getting good relief from the bilateral L3/4, L4/5, and L5/S1 facet injections at this time.     Angel Mcmullen M.D.  [facility] Physical Medicine & Rehabilitation  PGY-II    I have personally taken the history and examined this patient and agree with the resident's note as stated above.

## 2018-10-31 NOTE — PROGRESS NOTES
Subjective:      Patient ID: Anahi Cook is a 84 y.o. female.    Chief Complaint: No chief complaint on file.    Referred by: No ref. provider found     Pain Scales  Best: 8/10  Worst: 9/10  Usually: 8/10  Today: 3/10    Patient is here for 2 week follow up from bilateral L3/4, L4/5, and L5/S1 on 10/15/18. She states that she got about 80% relief from pain across her low back with this procedure, and she is still getting this good benefit at this time. She reports continued pain in the area of the T12 compression fracture, and is ready to have the kyphoplasty done. She denies any recent health changes, bowel/bladder incontinence, or saddle anesthesia.      Past Medical History:   Diagnosis Date    Anxiety     Back pain     Breast cancer 1/17/2018    Breast mass 1/15/2018    Chronic kidney disease, stage 2, mildly decreased GFR     COPD (chronic obstructive pulmonary disease)     emphysema    Depression     Dysuria 1/29/2018    Family history of breast cancer 1/17/2018    Hypertension     Infiltrating ductal carcinoma of breast, left 7/12/2018    Osteoporosis, senile     Ovarian mass 1/15/2018    Pneumonia     S/P robotic assisted laparoscopic BSO (bilateral salpingo-oophorectomy) 2/23/2018       Past Surgical History:   Procedure Laterality Date    AXILLARY NODE DISSECTION Left 7/12/2018    Procedure: LYMPHADENECTOMY, AXILLARY;  Surgeon: Amanda Caballero MD;  Location: [facility] OR [address] [place] [address];  Service: General;  Laterality: Left;    CHOLECYSTECTOMY      EYE SURGERY      HYSTERECTOMY      INFUSION, INTRAVENOUS N/A 4/17/2013    Performed by Pepito Theodore [facility] [place] OR    INJECTION FOR SENTINEL NODE IDENTIFICATION Left 7/12/2018    Procedure: INJECTION, FOR SENTINEL NODE IDENTIFICATION;  Surgeon: Amanda Caballero MD;  Location: [facility] OR [address] [place] [address];  Service: General;  Laterality: Left;    INJECTION OF FACET JOINT Bilateral 10/15/2018    Procedure: INJECTION, FACET JOINT, BILATERAL LUMBAR L3-4, 4-5, 5-S1  FACET JOINT INJECTIONS;  Surgeon: Merly Wilcox MD;  Location: [facility];  Service: Pain Management;  Laterality: Bilateral;    INJECTION, FACET JOINT, BILATERAL LUMBAR L3-4, 4-5, 5-S1 FACET JOINT INJECTIONS Bilateral 10/15/2018    [facility]    INJECTION, FOR SENTINEL NODE IDENTIFICATION Left 7/12/2018    [facility]    INJECTION,STEROID,EPIDURAL N/A 8/21/2018    [facility]    INJECTION-STEROID-EPIDURAL-LUMBAR N/A 2/15/2018    [facility]    INJECTION-STEROID-EPIDURAL-LUMBAR N/A 2/15/2016    [facility]    INJECTION-STEROID-EPIDURAL-LUMBAR N/A 7/21/2014    [facility] at [facility]    LYMPHADENECTOMY, AXILLARY Left 7/12/2018    Performed by [facility]ero MD at [facility] OR [address] [place] [address]    MASTECTOMY, PARTIAL Left 7/12/2018    Procedure: MASTECTOMY, PARTIAL-W/SEED LOCALIZATION;  Surgeon: Amanda Caballero MD;  Location: [facility] OR [address] [place] [address];  Service: General;  Laterality: Left;    MASTECTOMY, PARTIAL-W/SEED LOCALIZATION Left 7/12/2018    Performed by [facility]ero MD at [facility] OR [address] [place] [address]    VA REMOVAL OF OVARY/TUBE(S)  02/23/2018    Robotic Assisted    ROTATOR CUFF REPAIR Right     rotator cuff repair right shoulder    TONSILLECTOMY      XI ROBOT ASSISTED LAPAROSCOPIC SALPINGO-OOPHERECTOMY Bilateral 2/23/2018    [facility] OR       Review of patient's allergies indicates:   Allergen Reactions    Levaquin [levofloxacin] Itching    Penicillins Itching       Current Outpatient Medications   Medication Sig Dispense Refill    acetaminophen (TYLENOL) 325 MG tablet Take 325 mg by mouth every 6 (six) hours as needed for Pain.      albuterol (PROAIR HFA) 90 mcg/actuation inhaler Inhale 1-2 puffs into the lungs every 6 (six) hours as needed for Wheezing or Shortness of Breath.      albuterol  (PROVENTIL) 2.5 mg /3 mL (0.083 %) nebulizer solution Inhale 3 mLs into the lungs.      albuterol-ipratropium 2.5mg-0.5mg/3mL (DUO-NEB) 0.5 mg-3 mg(2.5 mg base)/3 mL nebulizer solution       atorvastatin (LIPITOR) 20 MG tablet Take 20 mg by mouth nightly.       benazepril (LOTENSIN) 10 MG tablet Take 20 mg by mouth once daily.       calcium-vitamin D 250-100 mg-unit per tablet Take 1 tablet by mouth 2 (two) times daily.      CETIRIZINE HCL (ZYRTEC ORAL) Take 10 mg by mouth nightly as needed.       felodipine (PLENDIL) 10 MG 24 hr tablet Take 20 mg by mouth once daily.       fluticasone-vilanterol (BREO) 100-25 mcg/dose diskus inhaler Inhale 1 puff into the lungs once daily. 1 each 3    INCRUSE ELLIPTA 62.5 mcg/actuation DsDv USE 1 PUFF QD AT SAME TIME  (patient takes at night)  2    letrozole (FEMARA) 2.5 mg Tab Take 1 tablet (2.5 mg total) by mouth once daily. 30 tablet 11    montelukast (SINGULAIR) 10 mg tablet Take 10 mg by mouth every evening.      multivitamin (THERAGRAN) per tablet Take 1 tablet by mouth once daily.      omeprazole (PRILOSEC) 20 MG capsule       predniSONE (DELTASONE) 20 MG tablet Take 1 tablet by mouth.      torsemide (DEMADEX) 20 MG Tab Take 20 mg by mouth once daily.      citalopram (CELEXA) 20 MG tablet Take 20 mg by mouth nightly.        No current facility-administered medications for this visit.        Family History   Problem Relation Age of Onset    Heart failure Mother     Kidney failure Mother     Heart attack Father     Breast cancer Sister 66        Lump, XRT, chemo, recurrence 10 years later, still alive.    Cancer Brother         leukemia    Heart attack Brother 58    Pulmonary embolism Brother 45    Heart attack Brother 52    Breast cancer Maternal Grandmother 60        advanced at diagnosis    Breast cancer Maternal Aunt 83         at 92       Social History     Socioeconomic History    Marital status: Single     Spouse name: Not on file     Number of children: 3    Years of education: Not on file    Highest education level: Not on file   Social Needs    Financial resource strain: Not on file    Food insecurity - worry: Not on file    Food insecurity - inability: Not on file    Transportation needs - medical: Not on file    Transportation needs - non-medical: Not on file   Occupational History    Occupation: Multiple jobs, see social documentation section     Comment: Retired   Tobacco Use    Smoking status: Former Smoker     Packs/day: 1.00     Years: 40.00     Pack years: 40.00     Types: Cigarettes    Smokeless tobacco: Never Used    Tobacco comment: Smoked >1 ppd for at least 40 years, quit around 1995   Substance and Sexual Activity    Alcohol use: Yes     Comment: occasional glass of wine or cocktail    Drug use: No    Sexual activity: Yes     Partners: Male   Other Topics Concern    Not on file   Social History Narrative    Worked many jobs while raising 3 children.  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  She was  from her first , but took care of him at the end of his life.           Review of Systems   Constitution: Negative for chills, fever, malaise/fatigue, weight gain and weight loss.   HENT: Negative for ear pain and hoarse voice.    Eyes: Negative for blurred vision, pain and visual disturbance.   Cardiovascular: Negative for chest pain, dyspnea on exertion and irregular heartbeat.   Respiratory: Negative for cough, shortness of breath and wheezing.    Endocrine: Negative for cold intolerance and heat intolerance.   Hematologic/Lymphatic: Negative for adenopathy and bleeding problem. Does not bruise/bleed easily.   Skin: Negative for color change, itching and rash.   Musculoskeletal: Negative for back pain and neck pain.   Gastrointestinal: Negative for change in bowel habit, diarrhea, hematemesis,  "hematochezia, melena and vomiting.   Genitourinary: Negative for flank pain, frequency, hematuria and urgency.   Neurological: Negative for difficulty with concentration, dizziness, headaches, loss of balance and seizures.   Psychiatric/Behavioral: Negative for altered mental status, depression and suicidal ideas. The patient is not nervous/anxious.    Allergic/Immunologic: Negative for HIV exposure.           Objective:   Resp 18   Ht 5' 2" (1.575 m)   Wt 72 kg (158 lb 11.2 oz)   LMP  (LMP Unknown)   BMI 29.03 kg/m²   Pain Disability Index Review:  Last 3 PDI Scores 10/31/2018 9/20/2018 3/1/2018   Pain Disability Index (PDI) 58 44 32     Normocephalic.  Atraumatic.  Affect appropriate.  Breathing unlabored.  Extra ocular muscles intact.  BP (!) 157/77   Pulse 67   Temp 99 °F (37.2 °C) (Oral)   Resp 18   Ht 5' 2" (1.575 m)   Wt 72 kg (158 lb 11.2 oz)   LMP  (LMP Unknown)   BMI 29.03 kg/m²     PHYSICAL EXAMINATION:  GEN:  Well developed, well nourished.  No acute distress. Normal pain behavior.  HEENT:  No trauma.  Mucous membranes moist.  Nares patent bilaterally.  PSYCH: Normal affect. Thought content appropriate.  CHEST:  Breathing symmetric.  No audible wheezing.  ABD: Soft, non-tender, non-distended.  SKIN:  Warm, pink, dry.  No rash on exposed areas.    EXT:  No cyanosis, clubbing, or edema.  No color change or changes in nail or hair growth.    L-Spine:  Pain with extension. + facet loading bilaterally.    No TTP over lumbar paraspinals. + TTP over lower thoracic/upper lumbar spine at midline     Ortho/SPM Exam      Assessment:       Encounter Diagnoses   Name Primary?    Vertebral compression fracture Yes    Lumbar spondylosis          Plan:       Diagnoses and all orders for this visit:    Vertebral compression fracture    Lumbar spondylosis    We discussed with the patient the assessment and recommendations. The following is the plan we agreed on:    1. Will proceed with T12 kyphoplasty as " scheduled.  2. Will plan to enroll in Functional Restoration Program following her kyphoplasty. A representative from this program spoke with the patient and her daughter today.  3. She is still getting good relief from the bilateral L3/4, L4/5, and L5/S1 facet injections at this time.     Angel Mcmullen M.D.  [facility] Physical Medicine & Rehabilitation  PGY-II    I have personally taken the history and examined this patient and agree with the resident's note as stated above.

## 2018-11-06 ENCOUNTER — TELEPHONE (OUTPATIENT)
Dept: RADIATION ONCOLOGY | Facility: CLINIC | Age: 83
End: 2018-11-06

## 2018-11-07 ENCOUNTER — TELEPHONE (OUTPATIENT)
Dept: PAIN MEDICINE | Facility: CLINIC | Age: 83
End: 2018-11-07

## 2018-11-07 NOTE — TELEPHONE ENCOUNTER
Spoke with patient's daughter Patience and confirmed her kyphoplasy is scheduled for 11-8-18 at 7:30am with her arrival at 6:00am with Dr. Wilcox.

## 2018-11-08 ENCOUNTER — HOSPITAL ENCOUNTER (OUTPATIENT)
Facility: OTHER | Age: 83
Discharge: HOME | End: 2018-11-08
Attending: ANESTHESIOLOGY | Admitting: ANESTHESIOLOGY
Payer: MEDICARE

## 2018-11-08 VITALS
HEART RATE: 58 BPM | RESPIRATION RATE: 16 BRPM | SYSTOLIC BLOOD PRESSURE: 131 MMHG | WEIGHT: 160 LBS | TEMPERATURE: 99 F | DIASTOLIC BLOOD PRESSURE: 66 MMHG | OXYGEN SATURATION: 95 % | HEIGHT: 62 IN | BODY MASS INDEX: 29.44 KG/M2

## 2018-11-08 DIAGNOSIS — M48.50XA VERTEBRAL COMPRESSION FRACTURE: Primary | ICD-10-CM

## 2018-11-08 DIAGNOSIS — S22.080B: ICD-10-CM

## 2018-11-08 PROCEDURE — 63600175 PHARM REV CODE 636 W HCPCS: Performed by: STUDENT IN AN ORGANIZED HEALTH CARE EDUCATION/TRAINING PROGRAM

## 2018-11-08 PROCEDURE — 22513 PERQ VERTEBRAL AUGMENTATION: CPT | Performed by: ANESTHESIOLOGY

## 2018-11-08 PROCEDURE — 88311 DECALCIFY TISSUE: CPT | Mod: 26,,, | Performed by: PATHOLOGY

## 2018-11-08 PROCEDURE — 25500020 PHARM REV CODE 255: Performed by: ANESTHESIOLOGY

## 2018-11-08 PROCEDURE — 63600175 PHARM REV CODE 636 W HCPCS: Performed by: ANESTHESIOLOGY

## 2018-11-08 PROCEDURE — 27201423 OPTIME MED/SURG SUP & DEVICES STERILE SUPPLY: Performed by: ANESTHESIOLOGY

## 2018-11-08 PROCEDURE — 88307 TISSUE EXAM BY PATHOLOGIST: CPT | Mod: 26,,, | Performed by: PATHOLOGY

## 2018-11-08 PROCEDURE — 99152 MOD SED SAME PHYS/QHP 5/>YRS: CPT | Performed by: ANESTHESIOLOGY

## 2018-11-08 PROCEDURE — 25000003 PHARM REV CODE 250: Performed by: ANESTHESIOLOGY

## 2018-11-08 PROCEDURE — 99152 MOD SED SAME PHYS/QHP 5/>YRS: CPT | Mod: ,,, | Performed by: ANESTHESIOLOGY

## 2018-11-08 PROCEDURE — C1713 ANCHOR/SCREW BN/BN,TIS/BN: HCPCS | Performed by: ANESTHESIOLOGY

## 2018-11-08 PROCEDURE — 99153 MOD SED SAME PHYS/QHP EA: CPT | Performed by: ANESTHESIOLOGY

## 2018-11-08 PROCEDURE — 22513 PERQ VERTEBRAL AUGMENTATION: CPT | Mod: ,,, | Performed by: ANESTHESIOLOGY

## 2018-11-08 PROCEDURE — 88311 DECALCIFY TISSUE: CPT | Performed by: PATHOLOGY

## 2018-11-08 DEVICE — SYSTEM WITHOUT NEEDLES WITH HV BONE CEMENT
Type: IMPLANTABLE DEVICE | Site: BACK | Status: FUNCTIONAL
Brand: AUTOPLEX, VERTAPLEX

## 2018-11-08 RX ORDER — CEFAZOLIN SODIUM 2 G/50ML
2 SOLUTION INTRAVENOUS
Status: COMPLETED | OUTPATIENT
Start: 2018-11-08 | End: 2018-11-08

## 2018-11-08 RX ORDER — SODIUM CHLORIDE 9 MG/ML
500 INJECTION, SOLUTION INTRAVENOUS CONTINUOUS
Status: DISCONTINUED | OUTPATIENT
Start: 2018-11-08 | End: 2018-11-08 | Stop reason: HOSPADM

## 2018-11-08 RX ORDER — MIDAZOLAM HYDROCHLORIDE 1 MG/ML
INJECTION, SOLUTION INTRAMUSCULAR; INTRAVENOUS
Status: DISCONTINUED | OUTPATIENT
Start: 2018-11-08 | End: 2018-11-08 | Stop reason: HOSPADM

## 2018-11-08 RX ORDER — LIDOCAINE HYDROCHLORIDE 10 MG/ML
INJECTION, SOLUTION EPIDURAL; INFILTRATION; INTRACAUDAL; PERINEURAL
Status: DISCONTINUED | OUTPATIENT
Start: 2018-11-08 | End: 2018-11-08 | Stop reason: HOSPADM

## 2018-11-08 RX ORDER — BUPIVACAINE HYDROCHLORIDE 2.5 MG/ML
INJECTION, SOLUTION EPIDURAL; INFILTRATION; INTRACAUDAL
Status: DISCONTINUED | OUTPATIENT
Start: 2018-11-08 | End: 2018-11-08 | Stop reason: HOSPADM

## 2018-11-08 RX ORDER — FENTANYL CITRATE 50 UG/ML
INJECTION, SOLUTION INTRAMUSCULAR; INTRAVENOUS
Status: DISCONTINUED | OUTPATIENT
Start: 2018-11-08 | End: 2018-11-08 | Stop reason: HOSPADM

## 2018-11-08 NOTE — DISCHARGE INSTRUCTIONS
Discharge Instructions for Kyphoplasty  Fractures in the bones of the spine (vertebrae) can cause severe back pain and loss of movement. You had a procedure called kyphoplasty to cement the fractures in your spine, restore the height of the vertebrae, and help relieve pain. Using image-guided X-rays, your doctor made 1 or 2 small cuts (incisions) in your back for each vertebra treated. The doctor put a balloon on each side of the broken vertebra and inflated the balloons until they expanded the right amount. Then the balloons were removed. The spaces created by the balloons were filled with orthopedic cement. This gave strength and stability to your vertebra. The following are instructions to help you care for your back when you are at home.    Home care  · Take your medicine exactly as directed.  · Remove the small bandages on your incision 24 to 48 hours after the surgery.  · Dont shower or soak in a bathtub for 1 to 2 days after the surgery.  · Use an ice pack or bag of frozen peas--or something similar--wrapped in a thin towel to reduce the swelling and pain around incision sites. Put the ice pack on the area for 20 minutes, then remove it for 20 minutes. Repeat as needed.  · Wear your brace, if you were told to do so by your doctor. And to help stay flexible, bend as much as the brace allows you to.  · For the first 1 to 2 days after the surgery, keep your head raised up when you are lying down.  · Take short walks. Start by walking for 5 minutes at a time. Then gradually build up your time and distance.  · Dont drive for 2 days after surgery. And never drive while taking opioid pain medicine.  · Ask your healthcare provider when you can begin lifting objects again. Ask him or her about any weight limits for lifting.    Follow-up  Make a follow-up appointment as directed by your doctor.     When to seek medical attention  Call 911 right away if you have any of the following:  · Chest pain  · Shortness of  breath  Otherwise, call your doctor right away if you have any of the following:  · Increased redness, swelling, drainage, or warmth around the incision sites  · Severe pain at the incision site  · Weakness, numbness, or tingling in your legs  · Fever above 100.4°F  (38.0°C) or shaking chills       Anesthesia: Monitored Anesthesia Care (MAC)    Anesthesia Safety  · Have an adult family member or friend drive you home after the procedure.  · For the first 24 hours after your surgery:  ¨ Do not drive or use heavy equipment.  ¨ Do not make important decisions or sign documents.  ¨ Avoid alcohol.  ¨ Have someone stay with you, if possible. They can watch for problems and help keep you safe.    PLEASE FOLLOW ANY OTHER INSTRUCTIONS PROVIDED TO YOU BY DR. ZHOU!

## 2018-11-08 NOTE — INTERVAL H&P NOTE
The patient has been examined and the H&P has been reviewed:    I concur with the findings and no changes have occurred since H&P was written.    Anesthesia/Surgery risks, benefits and alternative options discussed and understood by patient/family.          Active Hospital Problems    Diagnosis  POA    Vertebral compression fracture [M48.50XA]  Yes      Resolved Hospital Problems   No resolved problems to display.

## 2018-11-08 NOTE — BRIEF OP NOTE
Discharge Diagnosis:Open wedge compression fracture of twelfth thoracic vertebra, initial encounter [S22.237X]  Condition on Discharge: Stable.  Diet on Discharge: Same as before.  Activity: as per instruction sheet.  Discharge to: Home with a responsible adult.  Follow up: 2-4 weeks &/or as per Discharge instructions

## 2018-11-08 NOTE — PLAN OF CARE
Anahi Cook has met all discharge criteria from Phase II. Vital Signs are stable, ambulating  without difficulty. Discharge instructions given, patient verbalized understanding. Discharged from facility via wheelchair in stable condition.

## 2018-11-08 NOTE — Clinical Note
0.5 ml injected throughout the case. 49.5 mL total wasted during the case. 50 mL total used in the case.

## 2018-11-08 NOTE — NURSING
Dr. Diehl made aware of PCN allergy. Allergies reviewed with pt per Dr. Diehl. Ok to give Ancef per Dr. Diehl.

## 2018-11-08 NOTE — OP NOTE
Kyphoplasty  Time-out taken to identify patient and procedure side prior to starting the procedure.   I attest that I have reviewed the patient's home medications prior to the procedure and no contraindication have been identified. I  re-evaluated the patient after the patient was positioned for the procedure in the procedure room immediately before the procedural time-out. The vital signs are current and represent the current state of the patient which has not significantly changed since the preprocedure assessment.                                                                Date of Service: 11/08/2018    PCP: Pepito Theodore MD    Referring Physician:                                                                           SURGEON:  Merly Wilcox MD  ASSISTANTS: Gin Diehl DO  fellow                                                                                                                                         PROCEDURE PERFORMED:    1.  Transpedicular kyphoplasty at the 12 level(s), on the Bilateral side.   2.  Bone biopsy at T12 level   3.  Epidural intralaminar injection at T12-L1 without corticosteroids.                                                                               PREPROCEDURE DIAGNOSIS:                                                      1.  Compression fracture of the lumbar vertebra at the level T12.         2.  Pain at these levels of compression fracture.                            3.  No myelopathy or retropulsed fragments.                                                                                                             POSTPROCEDURE DIAGNOSIS:   Open wedge compression fracture of twelfth thoracic vertebra, initial encounter [S22.080B]    1. Vertebral compression fracture    2. Open wedge compression fracture of twelfth thoracic vertebra, initial encounter                                                                                                                                SEDATION USED: 3mg versed 75mcg fentanyl  (See nurse documentation and case log for sedation time)                                                                                                               LOCAL ANESTHETIC USED:  8 mL at each level from a mixture of Xylocaine 1% 9ml with Sodium Bicarbonate 1ml                                                                               ESTIMATED BLOOD LOSS: Minimal.                                                                                                                          COMPLICATIONS: None.                                                                                                                                                                                                                 TECHNIQUE: With the patient lying in the prone position and after            receiving the intravenous sedation, the area was prepped and draped using the     usual sterile fashion, using ChloraPrep and a body fenestrated drape.        The area of the compression fractures was determined under fluoroscopic guidance         using fluoroscope.  Local anesthetic was given with a           27-gauge 1-1/4-inch needle.  That was followed with completing the local     anesthetic injection with a 3-1/2-inch 22-gauge spinal needle going down     all the way to the periosteum of the desired pedicle.  A 3-mm                longitudinal incision done around the 22-gauge spinal needle so we can       introduce the 10-gauge trocar and cannula to the vertebral body.  Through    a transpedicular approach, the trocar and cannula were introduced and        advanced slowly.  Once in the dorsal one-third of the vertebral body, and   if bone biopsy was done, the bone biopsy cannula was introduced to the ventral portion of the vertebral      body.  Biopsy was obtained in  the usual fashion.  The balloon was introduced to the anterior vertebral body & inflated  up to 2cc on the right and 1cc on the left. The     methyl methacrylate resin was mixed together      in the Nia mixer and the amounts were applied to each level.  No       escape was observed while introducing the cement and this was done under     live fluoroscopy.  The cannula was applied to each trocar under live         fluoroscopy as well.  Each trocar and cannula was removed under live         fluoroscopy in a very slow fashion to observe the contrast-impregnated       cement.    The same process was repeated at other kyphoplasty levels.                                                                                                                                              The patient tolerated the procedure well        Pain prior to the procedure: 8/10                                                                                Pain after the procedure: 0/10                                                                               The patient was monitored after the procedure for a period of 2-3 hours,     Was given post-procedure and discharge instructions at home.  Instruction regarding bandage were given.  The patient was advised to call again/follow-up if needed, otherwise to follow up with us in 2 weeks at our clinic.

## 2018-11-14 ENCOUNTER — OFFICE VISIT (OUTPATIENT)
Dept: PAIN MEDICINE | Facility: CLINIC | Age: 83
End: 2018-11-14
Attending: ANESTHESIOLOGY
Payer: MEDICARE

## 2018-11-14 VITALS
WEIGHT: 156.5 LBS | SYSTOLIC BLOOD PRESSURE: 143 MMHG | TEMPERATURE: 98 F | HEART RATE: 74 BPM | HEIGHT: 62 IN | RESPIRATION RATE: 18 BRPM | BODY MASS INDEX: 28.8 KG/M2 | DIASTOLIC BLOOD PRESSURE: 83 MMHG

## 2018-11-14 DIAGNOSIS — M48.50XA VERTEBRAL COMPRESSION FRACTURE: Primary | ICD-10-CM

## 2018-11-14 PROCEDURE — 99024 POSTOP FOLLOW-UP VISIT: CPT | Mod: S$GLB,,, | Performed by: ANESTHESIOLOGY

## 2018-11-14 PROCEDURE — 99999 PR PBB SHADOW E&M-EST. PATIENT-LVL III: CPT | Mod: PBBFAC,,, | Performed by: ANESTHESIOLOGY

## 2018-11-14 NOTE — PROGRESS NOTES
Subjective:      Patient ID: Anahi Cook is a 84 y.o. female.    Chief Complaint: Back Pain    Referred by: No ref. provider found     85 yo F presents to clinic as follow up to T12 kyphoplasty performed on 11/8/18. Pt states she still feels pain over the incision site. Pt granddaughter states she has noticed a significant improvement in the pt. Pt now stands taller than prior to surgery. She still ambulates with walker but with  Less pain. Denies any pain in her lower extremities. No new numbness/tingling/weakness.       Pain Scales  Best: 9/10  Worst: 9/10  Usually: 9/10  Today: 9/10        Interventional Pain History    Past Medical History:   Diagnosis Date    Anxiety     Back pain     Breast cancer 1/17/2018    Breast mass 1/15/2018    Chronic kidney disease, stage 2, mildly decreased GFR     COPD (chronic obstructive pulmonary disease)     emphysema    Depression     Dysuria 1/29/2018    Family history of breast cancer 1/17/2018    Hypertension     Infiltrating ductal carcinoma of breast, left 7/12/2018    Osteoporosis, senile     Ovarian mass 1/15/2018    Pneumonia     S/P robotic assisted laparoscopic BSO (bilateral salpingo-oophorectomy) 2/23/2018       Past Surgical History:   Procedure Laterality Date    AXILLARY NODE DISSECTION Left 7/12/2018    Procedure: LYMPHADENECTOMY, AXILLARY;  Surgeon: Amanda Caballero MD;  Location: [facility] OR [address] [place] [address];  Service: General;  Laterality: Left;    CHOLECYSTECTOMY      EYE SURGERY      FIXATION KYPHOPLASTY N/A 11/8/2018    Procedure: Kyphoplasty   T12;  Surgeon: Merly Wilcox MD;  Location: [facility] CATH LAB;  Service: Pain Management;  Laterality: N/A;  T12  Nia Reps e-mailed with date and time    HYSTERECTOMY      INFUSION, INTRAVENOUS N/A 4/17/2013    Performed by Pepito Theodore MD at [facility] OR    INJECTION FOR SENTINEL NODE IDENTIFICATION Left 7/12/2018    Procedure: INJECTION, FOR SENTINEL NODE IDENTIFICATION;  Surgeon: Amanda Caballero MD;   Location: [facility] OR [address] [place] [address];  Service: General;  Laterality: Left;    INJECTION OF FACET JOINT Bilateral 10/15/2018    Procedure: INJECTION, FACET JOINT, BILATERAL LUMBAR L3-4, 4-5, 5-S1 FACET JOINT INJECTIONS;  Surgeon: Merly Wilcox MD;  Location: [facility] PAIN MGT;  Service: Pain Management;  Laterality: Bilateral;    INJECTION, FACET JOINT, BILATERAL LUMBAR L3-4, 4-5, 5-S1 FACET JOINT INJECTIONS Bilateral 10/15/2018    Performed by Merly Wilcox MD at [facility] PAIN MGT    INJECTION, FOR SENTINEL NODE IDENTIFICATION Left 7/12/2018    Performed by Amanda Caballero MD at [facility] OR [facility]    INJECTION,STEROID,EPIDURAL N/A 8/21/2018    Performed by Merly Wilcox MD at [facility] PAIN MGT    INJECTION-STEROID-EPIDURAL-LUMBAR N/A 2/15/2018    Performed by Merly Wilcox MD at [facility] PAIN MGT    INJECTION-STEROID-EPIDURAL-LUMBAR N/A 2/15/2016    Performed by Merly Wilcox MD at [facility] PAIN MGT    INJECTION-STEROID-EPIDURAL-LUMBAR N/A 7/21/2014    Performed by Merly Wilcox MD at [facility] PAIN MGT    Kyphoplasty   T12 N/A 11/8/2018    Performed by Merly Wilcox MD at [facility] CATH LAB    LYMPHADENECTOMY, AXILLARY Left 7/12/2018    Performed by Amanda Caballero MD at [facility] OR [facility]    MASTECTOMY, PARTIAL Left 7/12/2018    Procedure: MASTECTOMY, PARTIAL-W/SEED LOCALIZATION;  Surgeon: Amanda Caballero MD;  Location: [facility] OR [address] [place] [address];  Service: General;  Laterality: Left;    MASTECTOMY, PARTIAL-W/SEED LOCALIZATION Left 7/12/2018    [facility]    GA REMOVAL OF OVARY/TUBE(S)  02/23/2018    Robotic Assisted    ROTATOR CUFF REPAIR Right     rotator cuff repair right shoulder    TONSILLECTOMY      XI ROBOT ASSISTED LAPAROSCOPIC SALPINGO-OOPHERECTOMY Bilateral 2/23/2018    Performed by Reena Chairez MD at [facility] OR       Review of patient's allergies indicates:   Allergen Reactions    Levaquin [levofloxacin] Itching    Penicillins Itching       Current Outpatient Medications   Medication Sig Dispense Refill     acetaminophen (TYLENOL) 325 MG tablet Take 650 mg by mouth every 6 (six) hours as needed for Pain.       albuterol (PROAIR HFA) 90 mcg/actuation inhaler Inhale 1-2 puffs into the lungs every 6 (six) hours as needed for Wheezing or Shortness of Breath.      albuterol (PROVENTIL) 2.5 mg /3 mL (0.083 %) nebulizer solution Inhale 3 mLs into the lungs.      albuterol-ipratropium 2.5mg-0.5mg/3mL (DUO-NEB) 0.5 mg-3 mg(2.5 mg base)/3 mL nebulizer solution       atorvastatin (LIPITOR) 20 MG tablet Take 20 mg by mouth nightly.       benazepril (LOTENSIN) 10 MG tablet Take 20 mg by mouth once daily.       calcium-vitamin D 250-100 mg-unit per tablet Take 1 tablet by mouth 2 (two) times daily.      CETIRIZINE HCL (ZYRTEC ORAL) Take 10 mg by mouth nightly as needed.       citalopram (CELEXA) 20 MG tablet Take 20 mg by mouth nightly.       felodipine (PLENDIL) 10 MG 24 hr tablet Take 20 mg by mouth once daily.       fluticasone-vilanterol (BREO) 100-25 mcg/dose diskus inhaler Inhale 1 puff into the lungs once daily. 1 each 3    INCRUSE ELLIPTA 62.5 mcg/actuation DsDv USE 1 PUFF QD AT SAME TIME  (patient takes at night)  2    letrozole (FEMARA) 2.5 mg Tab Take 1 tablet (2.5 mg total) by mouth once daily. 30 tablet 11    montelukast (SINGULAIR) 10 mg tablet Take 10 mg by mouth every evening.      multivitamin (THERAGRAN) per tablet Take 1 tablet by mouth once daily.      omeprazole (PRILOSEC) 20 MG capsule       torsemide (DEMADEX) 20 MG Tab Take 20 mg by mouth once daily.      predniSONE (DELTASONE) 20 MG tablet Take 1 tablet by mouth.       No current facility-administered medications for this visit.        Family History   Problem Relation Age of Onset    Heart failure Mother     Kidney failure Mother     Heart attack Father     Breast cancer Sister 66        Lump, XRT, chemo, recurrence 10 years later, still alive.    Cancer Brother         leukemia    Heart attack Brother 58    Pulmonary embolism  Brother 45    Heart attack Brother 52    Breast cancer Maternal Grandmother 60        advanced at diagnosis    Breast cancer Maternal Aunt 83         at 92       Social History     Socioeconomic History    Marital status: Single     Spouse name: Not on file    Number of children: 3    Years of education: Not on file    Highest education level: Not on file   Social Needs    Financial resource strain: Not on file    Food insecurity - worry: Not on file    Food insecurity - inability: Not on file    Transportation needs - medical: Not on file    Transportation needs - non-medical: Not on file   Occupational History    Occupation: Multiple jobs, see social documentation section     Comment: Retired   Tobacco Use    Smoking status: Former Smoker     Packs/day: 1.00     Years: 40.00     Pack years: 40.00     Types: Cigarettes    Smokeless tobacco: Never Used    Tobacco comment: Smoked >1 ppd for at least 40 years, quit around    Substance and Sexual Activity    Alcohol use: Yes     Comment: occasional glass of wine or cocktail    Drug use: No    Sexual activity: Yes     Partners: Male   Other Topics Concern    Not on file   Social History Narrative    Worked many jobs while raising 3 children.  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  She was  from her first , but took care of him at the end of his life.           Review of Systems   Constitution: Negative for chills, fever, malaise/fatigue, weight gain and weight loss.   HENT: Negative for ear pain and hoarse voice.    Eyes: Negative for blurred vision, pain and visual disturbance.   Cardiovascular: Negative for chest pain, dyspnea on exertion and irregular heartbeat.   Respiratory: Negative for cough, shortness of breath and wheezing.    Endocrine: Negative for cold intolerance and heat intolerance.   Hematologic/Lymphatic:  "Negative for adenopathy and bleeding problem. Does not bruise/bleed easily.   Skin: Negative for color change, itching and rash.   Musculoskeletal: Positive for back pain. Negative for neck pain.   Gastrointestinal: Negative for change in bowel habit, diarrhea, hematemesis, hematochezia, melena and vomiting.   Genitourinary: Negative for flank pain, frequency, hematuria and urgency.   Neurological: Negative for difficulty with concentration, dizziness, headaches, loss of balance and seizures.   Psychiatric/Behavioral: Negative for altered mental status, depression and suicidal ideas. The patient is not nervous/anxious.    Allergic/Immunologic: Negative for HIV exposure.           Objective:   BP (!) 143/83   Pulse 74   Temp 97.5 °F (36.4 °C) (Oral)   Resp 18   Ht 5' 2" (1.575 m)   Wt 71 kg (156 lb 8 oz)   LMP  (LMP Unknown)   BMI 28.62 kg/m²   Pain Disability Index Review:  Last 3 PDI Scores 11/14/2018 10/31/2018 9/20/2018   Pain Disability Index (PDI) 63 58 44     Normocephalic.  Atraumatic.  Affect appropriate.  Breathing unlabored.  Extra ocular muscles intact.  BP (!) 143/83   Pulse 74   Temp 97.5 °F (36.4 °C) (Oral)   Resp 18   Ht 5' 2" (1.575 m)   Wt 71 kg (156 lb 8 oz)   LMP  (LMP Unknown)   BMI 28.62 kg/m²     PHYSICAL EXAMINATION:  GEN:  Well developed, well nourished.  No acute distress. Normal pain behavior.  HEENT:  No trauma.  Mucous membranes moist.  Nares patent bilaterally.  PSYCH: Normal affect. Thought content appropriate.  CHEST:  Breathing symmetric.  No audible wheezing.  ABD: Soft, non-tender, non-distended.  SKIN:  Warm, pink, dry.  No rash on exposed areas.    EXT:  No cyanosis, clubbing, or edema.  No color change or changes in nail or hair growth.  NEURO/MUSCULOSKELETAL:  Fully alert, oriented, and appropriate. Speech normal lelo. No cranial nerve deficits.   Gait: with walker   Strength: 5/5 motor strength throughout bilateral upper and lower extremities myotomes. "   Sensory: No sensory deficit in the lower extremities dermatomes.   Reflexes:  2+ and symmetric throughout.  Downgoing Babinski's bilaterally, negative cummins's.  No clonus or spasticity.  Thoracic incision well healed. +TTP over incision, superficial. Not TTP over spinous process         Ortho/SPM Exam      Assessment:     Vertebral compression fracture     Lumbar spondylosis       Plan:   We discussed with the patient the assessment and recommendations. The following is the plan we agreed on:  1. Will prescribe compound cream for area of incision.  2. RTC 1 month with NP    Pt seen and discussed with Dr. Wilcox who agrees with the above.    Gin Diehl, DO    I have personally taken the history and examined this patient and agree with the fellow's note as stated above.

## 2018-11-28 ENCOUNTER — PATIENT MESSAGE (OUTPATIENT)
Dept: PAIN MEDICINE | Facility: CLINIC | Age: 83
End: 2018-11-28

## 2018-11-28 ENCOUNTER — CLINICAL SUPPORT (OUTPATIENT)
Dept: REHABILITATION | Facility: HOSPITAL | Age: 83
End: 2018-11-28
Attending: ANESTHESIOLOGY
Payer: MEDICARE

## 2018-11-28 DIAGNOSIS — R26.2 DIFFICULTY WALKING: ICD-10-CM

## 2018-11-28 DIAGNOSIS — R53.1 WEAKNESS: ICD-10-CM

## 2018-11-28 PROCEDURE — 97162 PT EVAL MOD COMPLEX 30 MIN: CPT | Mod: PO

## 2018-11-28 PROCEDURE — G8978 MOBILITY CURRENT STATUS: HCPCS | Mod: CK,PO

## 2018-11-28 PROCEDURE — G8979 MOBILITY GOAL STATUS: HCPCS | Mod: CJ,PO

## 2018-11-28 PROCEDURE — 97110 THERAPEUTIC EXERCISES: CPT | Mod: PO

## 2018-11-28 NOTE — PLAN OF CARE
Physical Therapy Evaluation    Name: Anahi Cook  [facility] Number: [id number]    Diagnosis:   Encounter Diagnoses   Name Primary?    Weakness     Difficulty walking      Physician: Merly Wilcox MD  Treatment Orders: PT Eval and Treat    History     Past Medical History:   Diagnosis Date    Anxiety     Back pain     Breast cancer 1/17/2018    Breast mass 1/15/2018    Chronic kidney disease, stage 2, mildly decreased GFR     COPD (chronic obstructive pulmonary disease)     emphysema    Depression     Dysuria 1/29/2018    Family history of breast cancer 1/17/2018    Hypertension     Infiltrating ductal carcinoma of breast, left 7/12/2018    Osteoporosis, senile     Ovarian mass 1/15/2018    Pneumonia     S/P robotic assisted laparoscopic BSO (bilateral salpingo-oophorectomy) 2/23/2018     Current Outpatient Medications   Medication Sig    acetaminophen (TYLENOL) 325 MG tablet Take 650 mg by mouth every 6 (six) hours as needed for Pain.     albuterol (PROAIR HFA) 90 mcg/actuation inhaler Inhale 1-2 puffs into the lungs every 6 (six) hours as needed for Wheezing or Shortness of Breath.    albuterol (PROVENTIL) 2.5 mg /3 mL (0.083 %) nebulizer solution Inhale 3 mLs into the lungs.    albuterol-ipratropium 2.5mg-0.5mg/3mL (DUO-NEB) 0.5 mg-3 mg(2.5 mg base)/3 mL nebulizer solution     atorvastatin (LIPITOR) 20 MG tablet Take 20 mg by mouth nightly.     benazepril (LOTENSIN) 10 MG tablet Take 20 mg by mouth once daily.     calcium-vitamin D 250-100 mg-unit per tablet Take 1 tablet by mouth 2 (two) times daily.    CETIRIZINE HCL (ZYRTEC ORAL) Take 10 mg by mouth nightly as needed.     citalopram (CELEXA) 20 MG tablet Take 20 mg by mouth nightly.     felodipine (PLENDIL) 10 MG 24 hr tablet Take 20 mg by mouth once daily.     fluticasone-vilanterol (BREO) 100-25 mcg/dose diskus inhaler Inhale 1 puff into the lungs once daily.    INCRUSE ELLIPTA 62.5 mcg/actuation DsDv USE 1 PUFF QD AT SAME TIME   (patient takes at night)    letrozole (FEMARA) 2.5 mg Tab Take 1 tablet (2.5 mg total) by mouth once daily.    montelukast (SINGULAIR) 10 mg tablet Take 10 mg by mouth every evening.    multivitamin (THERAGRAN) per tablet Take 1 tablet by mouth once daily.    omeprazole (PRILOSEC) 20 MG capsule     predniSONE (DELTASONE) 20 MG tablet Take 1 tablet by mouth.    torsemide (DEMADEX) 20 MG Tab Take 20 mg by mouth once daily.     No current facility-administered medications for this visit.      Review of patient's allergies indicates:   Allergen Reactions    Levaquin [levofloxacin] Itching    Penicillins Itching       Evaluation Date: 11/28/18  Visit # authorized: 1/  Authorization period: 10/31/19  Plan of care expiration: 1/27/18    Subjective     History of present condition:  Anahi is a 84 y.o. female that presents to [facility] secondary to spinal compression fracture and lumbar spondylosis. Injury/surgery occurred approximately: sx: 11/8/18. Pt. presents with the following co-morbidities and personal factors that directly impact her plan of care: fall risk.        Precautions: standard; fall risk    Onset/GLO: sudden: In February pt. had a fall during the night as she quickly went to the bathroom. Pt. is not aware how she fell. Her granddaughter heard her fall during the night. She was found in a seated position. Pt. went to the ER: x-ray: left wrist fx and Tspine compression fx. Pt. was informed that her fractures would heal on their own. Pt. was given a bone stimulator for 6 to 8 weeks. Tspine pain got progressively worse and she was having great difficulty with walking. Pt. had a f/u with Dr. Wilcox and he reported that her fx would not heal on it's own. Pt. agreed with plan to undergo sx. Pt. underwent sx on 11/8/18: Tspine kyphoplasty. PMH of 3 falls: 1: tripped over a throw rug; 2: grandchild ran to her and knocked her down on her bottom: sacral fx; 3: most recent fall (occurred over  8 months time frame). Pt. had f/u with Dr. Wilcox since sx: pt. was having some pain at incision site: pt. was given voltaren gel. Pt.'s current signs and symptoms include: limited ability to exercise and perform daily tasks.  Red Flags:  · Bowel/bladder symptoms (urinary retention/fecal incontinence)? No  · Recent weight loss? No.  · Constant/Night pain that is unchanging with change of position? No.  · PMH of CA? Yes; breast CA.   · Numbness or Tingling? PMH of left sided sciatica.    Aggravating factors: avoids bending/stooping (per MD precautions), transferring from sitting to standing requires bracing, community ambulation  Relieving factors: Voltaren gel  Pain Scale: Anahi rates pain on a scale of 0-10 to be 3 at currently; 3 at best; 9 at worst.    Diagnostic tests: MRI: revealed Subacute severe biconcave compression fracture of the T12 vertebral body with retropulsion of fracture fragments into the central canal resulting in moderate central canal stenosis..    PLOF: lives with daughter in a one level home with 3 steps to enter with bilateral rails; Senior center: 3x/wk: chair exercises; errands; drives to grocery store, [facility], and pharmacy; light household/cooking activities  DME own/use: handheld shower head, shower chair, and RW  Occupation: Pt is retired    Previous treatment: none.  ADLs: Pt has a decrease ability to perform ADLs such as see above.    Patient Goals: Pt would like to decrease pain and increase function so she can return to walking distance (community ambulation and ability to return to Senior center    Objective     Observation: slightly deconditioned    Posture: slightly kyphotic    Lumbar ROM: (measured in degrees)    Degrees Quality   Flexion NT      Extension NT    Left Side Bending WFL    Right Side Bending WFL    Left rotation NT    Right Rotation NT      Dermatomes: (impaired/normal)     RLE LLE   L2 Intact Intact   L3 Intact Intact   L4 Intact Intact   L5 Intact Intact   S1  Intact Intact     Reflexes: L4/S1:2 bilaterally    Lower Extremity Strength (graded 0-5 out of 5)   RLE LLE   Hip flexion: 3/5 3/5   Hip ER 4/5 4/5   Hip IR 4+/5 4/5   Knee extension: 4/5 4/5   Ankle dorsiflexion: 5/5 5/5   Great toe extension: 4/5 4-/5   Posterior fibers of Gluteus medius 4-/5 3+/5   Knee flexion 4-/5 3+/5   Ankle plantarflexion 5/5 5/5   Hip extension: 3+/5 3+/5     Special Tests: ((+): pos.; (-): neg.)    · Bridge Test: +  · Pirformis Test: -    Flexibility:   · Popliteal Angle: bilateral: 90 deg.    Palpation for condition:   · Position: see posture  · Warmth:   · Swelling:   · Texture:     Joint Mobility: (graded 0-6 out of 6) NT    Functional Status Measures:    Intake Score     Pts Physical FS Primary Measure      NT                        Risk Adjustment Statistical FOTO     NT        PT reviewed FOTO scores for Anahi Cook on 11/28/2018.   FOTO scores were entered into EPIC - see media section.    History  Co-morbidities and personal factors that may impact the plan of care Examination  Body Structures and Functions, activity limitations and participation restrictions that may impact the plan of care Clinical Presentation   Decision Making/ Complexity Score   Co-morbidities:   fall risk            Personal Factors:   low pain tolerance Body Regions: Tspine/Lspine    Body Systems: Decreased AROM; pelvic dysfunction; core hip lumbopelvic weakness; poor posture; pain with transitional activities, decrease exercise ability, and pain with ADLs.     Activity limitations: community ambulation, prolonged sitting/standing, transitional activities, household tasks    Participation Restrictions: driving and senior center work out   evolving with changing clinical characteristics   moderate     Clinical Presentation/complexity category  Moderate complexity category: pt. has 1-2 personal factors and/or co-morbidities directly  impacting POC, 3 or more body system impairments/functional  "limitations/participation restrictions; as well as, condition is evolving with changing clinical characteristics.    PT Evaluation Completed? Yes  Discussed Plan of Care with patient: Yes    TREATMENT:  Therapeutic exercise: Anahi received therapeutic exercises to develop strength and endurance, flexibility for 10 minutes including: View at "[employer] using code: [id number]   *Pt was advised to perform these exercises free of pain, and discontinue use if symptoms persist/worsen.    Pt. Education: Instructed pt. regarding:body mechanics, posture, activity modification/avoidance, and proper technique with all exercises. Pt. to demonstrate good understanding of the education provided. Anahi demonstrated good return demonstration of activities. No cultural, environmental, or spiritual barriers identified to treatment or learning.    Medical necessity is demonstrated by the following IMPAIRMENTS/PROBLEM LIST:   1) Pain limiting function   2) Posture dysfunction   3) Core/Lumbar/LE weakness   4) Decreased thoracic/lumbar joint mobility   5) Decreased Lumbar ROM   6) Decreased soft tissue extensibility/fascia restriction   7) Decreased exercise ability   8) Lack of HEP    GOALS:   Short Term Goals:  4 weeks  1. Report decreased back pain </= 5/10 at worst to increase tolerance for prolonged sitting/standing  2. Pt. to demonstrate proper cervical and scapula retraction requiring min. to no verbal cues from PT  3. Pt. to demonstrate increased MMT for core/lumbar paraspinals to 3/5 to increase endurance with prolonged sitting/standing.  4. Pt. to demonstrate increased MMT for left gluteus medius to 4-/5  to increase stability during community ambulation  5. Pt. to be independent with symptom management  6. Pt to tolerate HEP to improve ROM and independence with ADL's    Long Term Goals: 8 weeks  1. Report decreased back pain </=  2/10 at worst to increase tolerance for community ambulation  2. Pt. to demonstrate proper " cervical and scapula retraction requiring no verbal cues from PT  3. Pt. to demonstrate increased MMT for core/lumbar paraspinals to 3+/5 to increase endurance with prolonged sitting.   4. Pt. to demonstrate increased MMT for bilateral gluteus medius to 4+/5  to increase stability during ambulation on uneven surfaces.  5. Pt. to demonstrate increased MMT for bilateral hip flexor to 4+/5 to increase tolerance for ADL and work activities.   6. Pt to be independent with HEP to improve ROM and independence with ADL's    Assessment   This is a 84 y.o. female referred to outpatient physical therapy who presents with a medical diagnosis of vertebral compression and lumbar spondylosis PT diagnosis of weakness and difficulty walking demonstrating joint dysfunction and functional limitation as described above. Level of complexity is moderate;  based on patient's past medical history including the above co-morbidities and personal factors; functional limitations, and clinical presentation directly impacting his/her plan of care. Pt demonstrates good rehab potential.    Patient presents with signs and symptoms consistent with the above diagnoses. Patient was in agreement with set goals and plan of care. . Pt will benefit from physical therapy services in order to maximize pain free functional independence.     Plan     Pt will be treated by physical therapy 1-3 times a week for 8 weeks for pt. education, HEP, aquatic therapy if land based therapy is not tolerated, therapeutic exercises, neuromuscular re-education, soft tissue and joint mobilizations; and modalities, including but not exclusive to dry needling, prn to achieve established goals. Anahi may at times be seen by a PTA as part of the Rehab Team.     I certify the need for these services furnished under this plan of treatment and while under my care.______________________________ Physician/Referring Practitioner  Date of Signature

## 2018-11-30 NOTE — PHYSICIAN QUERY
"PT Name: Anahi Cook  MR #: [id number]    Physician Query Form - Musculoskeletal Clarification       CDS/: Tess Jacobs               Contact information: [email or web address]     This form is a permanent document in the medical record.     Query Date: November 30, 2018    By submitting this query, we are merely seeking further clarification of documentation. Please utilize your independent clinical judgment when addressing the question(s) below.    The Medical record contains the following:     Indicators   Supporting Clinical Findings   Location in Medical Record    "Fracture" documented                                                                              POSTPROCEDURE DIAGNOSIS:   Open wedge compression fracture of twelfth thoracic vertebra, initial encounter [S22.980B]    1. Vertebral compression fracture    2. Open wedge compression fracture of twelfth thoracic vertebra, initial encounter         Op report    Chronic conditions (Osteoporosis, Malignancy, etc.) Osteoporosis, senile H&P    Xray findings      Medication:      Treatment:      Other:        Provider, please specify the diagnosis or diagnoses associated with above clinical findings.    Traumatic Fracture (Specify)  [   ] Fracture due to a trauma   [   ] Compression fracture, trauma   [  x] Pathologic, trauma       Non-Traumatic Fracture (Specify)  [   ] Compression fracture secondary to osteoporosis   [   ] Compression fracture, nontraumatic   [   ] Fracture secondary to malignancy   [ x ] Osteoporotic   [   ] Stress   [   ] Spontaneous fracture   [   ] Pathological fracture       [   ] Other Musculoskeletal Diagnosis (please specify)    Clinically Undetermined                                                                                                                                     "

## 2018-12-07 ENCOUNTER — OFFICE VISIT (OUTPATIENT)
Dept: RADIATION ONCOLOGY | Facility: CLINIC | Age: 83
End: 2018-12-07
Payer: MEDICARE

## 2018-12-07 ENCOUNTER — CLINICAL SUPPORT (OUTPATIENT)
Dept: REHABILITATION | Facility: HOSPITAL | Age: 83
End: 2018-12-07
Attending: ANESTHESIOLOGY
Payer: MEDICARE

## 2018-12-07 VITALS
HEIGHT: 62 IN | DIASTOLIC BLOOD PRESSURE: 77 MMHG | SYSTOLIC BLOOD PRESSURE: 183 MMHG | WEIGHT: 156 LBS | HEART RATE: 71 BPM | BODY MASS INDEX: 28.71 KG/M2 | TEMPERATURE: 98 F | RESPIRATION RATE: 16 BRPM

## 2018-12-07 DIAGNOSIS — Z17.0 MALIGNANT NEOPLASM OF UPPER-OUTER QUADRANT OF LEFT BREAST IN FEMALE, ESTROGEN RECEPTOR POSITIVE: Primary | ICD-10-CM

## 2018-12-07 DIAGNOSIS — C50.412 MALIGNANT NEOPLASM OF UPPER-OUTER QUADRANT OF LEFT BREAST IN FEMALE, ESTROGEN RECEPTOR POSITIVE: Primary | ICD-10-CM

## 2018-12-07 DIAGNOSIS — R26.2 DIFFICULTY WALKING: ICD-10-CM

## 2018-12-07 DIAGNOSIS — R53.1 WEAKNESS: ICD-10-CM

## 2018-12-07 PROCEDURE — 99999 PR PBB SHADOW E&M-EST. PATIENT-LVL III: CPT | Mod: PBBFAC,,, | Performed by: RADIOLOGY

## 2018-12-07 PROCEDURE — 99213 OFFICE O/P EST LOW 20 MIN: CPT | Mod: S$GLB,,, | Performed by: RADIOLOGY

## 2018-12-07 PROCEDURE — 1101F PT FALLS ASSESS-DOCD LE1/YR: CPT | Mod: CPTII,S$GLB,, | Performed by: RADIOLOGY

## 2018-12-07 PROCEDURE — 3078F DIAST BP <80 MM HG: CPT | Mod: CPTII,S$GLB,, | Performed by: RADIOLOGY

## 2018-12-07 PROCEDURE — 3077F SYST BP >= 140 MM HG: CPT | Mod: CPTII,S$GLB,, | Performed by: RADIOLOGY

## 2018-12-07 PROCEDURE — 97110 THERAPEUTIC EXERCISES: CPT | Mod: PO

## 2018-12-07 NOTE — PROGRESS NOTES
REFERRING PHYSICIAN: Amanda Caballero MD    DIAGNOSIS: multifocal cT2 N1 M0 ([id number]) invasive ductal carcinoma of the left breast    Radiation Treatment Summary  Ms. Cook completed radiation to the left breast as shown below.    Treatment Site Energy Dose/Fx (Gy) #Fx Total Dose (Gy) Start Date End Date   BOOST 12E 2 5 / 5 10 10/17/2018 10/23/2018   LTBREAST/ IM 18X/6X 1.8 28 / 28 50.4 9/6/2018 10/16/2018   Lt supraclav 18X/6X 1.8 28 / 28 50.4 9/6/2018 10/16/2018     INTERVAL HISTORY:   Ms. Cook is an 84-year-old female who was recently diagnosed with left breast cancer after she presented with a palpable mass in the upper outer quadrant. A mammogram on January 4, 2018 revealed a 17 mm spiculated lesion in the central region of the left breast as well as a 2nd mass in the 10 o'clock position. There were also palpable left axillary lymphadenopathy. A core needle biopsy of both of these lesions on January 11, 2018 revealed invasive ductal carcinoma which was ER positive (%), FL positive (80- 90%), and her 2-. A CT of the chest, abdomen, and pelvis on January 23, 2018 revealed no evidence of distant metastatic disease. However there was a she 5.4 cm left adnexal mass which was suspicious. She underwent bilateral salpingo-oophorectomy on February 23, 2018 which was negative for malignancy. She was started on endocrine therapy with letrozole and completed approximately 6 months. On July 12, 2018, she underwent left breast lumpectomy and left axillary dissection. The pathology from the left breast revealed 2 foci of grade 1, invasive ductal carcinoma, one measuring 33 mm and the other measuring 13 mm. The closest margin is 3 mm laterally. Out of 18 lymph nodes removed, 2 were involved with macro metastasis and 2 were involved with micrometastasis with the largest deposit measuring 9 mm, for a total of 4/18 lymph nodes with involvement. There was also extranodal extension noted. To reduce her chance of local regional  "recurrence, she received postoperative radiation to the left breast and draining lymph nodes including the left internal mammary and left supraclavicular is nodes as above.  She is here today for routine follow-up.    She is currently on letrozole.   At present, she reports occasional discomfort in the left axillary region which resolved without intervention.  She denies left breast edema, erythema, or nipple discharge. She also denies fever, night sweats, or weight loss. The she is scheduled to repeat the bilateral mammogram in January 2019 and follow-up with Dr. Caballero after that.      PHYSICAL EXAMINATION:  VITAL SINGS: BP (!) 183/77   Pulse 71   Temp 97.9 °F (36.6 °C) (Oral)   Resp 16   Ht 5' 2" (1.575 m)   Wt 70.8 kg (156 lb)   LMP  (LMP Unknown)   BMI 28.53 kg/m²   GENERAL: Patient is alert and oriented, in no acute distress.  HEENT: Extraocular muscles are intact.  Oropharynx is clear without lesions.  There is no cervical or supraclavicular adenopathy palpated.    CHEST: Breath sounds clear bilaterally.  No rales.  No rhonchi.  Unlabored respirations.  CARDIOVASCULAR: Normal S1, S2.  Normal rate.  Regular rhythm.  BREAST EXAM:  There is mild residual hyperpigmentation of the skin of the left breast.  The left breast is slightly smaller than the right. The scar secondary to lumpectomy is noted in the periareolar region of the left breast. There is also a scar in the left axilla secondary to sentinel node biopsy. No abnormal masses palpated in the left breast or left axilla. The right breast and right axilla are also without palpable masses.  ABDOMEN: Bowel sounds normal.  No tenderness.  No abdominal distention.  No hepatomegaly.  No splenomegaly.  EXTREMITIES: No clubbing, cyanosis, edema  NEUROLOGIC: Cranial nerves II through XII are grossly intact.  Sensation is intact.  Strength is 5 out of 5 in the upper and lower extremities bilaterally.    ASSESSMENT:   This is an 84-year-old female with " multifocal [id number] ([id number]) invasive ductal carcinoma of the left breast who underwent neoadjuvant endocrine therapy followed by a left breast lumpectomy and sentinel node biopsy on July 12, 2018 and postoperative radiation, with 2 foci of primary lesions and 4/18 lymph nodes positive with extranodal extension, ER/PA positive and her 2 negative.    PLAN:   Ms. Cook is recovering from the radiation without any unexpected side effects.  She will continue letrozole as planned.  She will repeat mammogram in January 2019 and follow-up with Dr. Caballero after that.  I plan to see her back in approximately 6 months, unless symptoms warrant otherwise.

## 2018-12-07 NOTE — PATIENT INSTRUCTIONS
Get mammogram in January 2019 as planned and follow up with Dr. Caballero as planned.  Return to see me in 6 months.

## 2018-12-07 NOTE — PROGRESS NOTES
"Name: Anahi Cook  Clinic Number: [id number]  Date of Treatment: 12/07/2018   Diagnosis:   Encounter Diagnoses   Name Primary?    Weakness     Difficulty walking        Physician: Merly Wilcox MD    Time in: 2:10 PM  Time Out: 3:00 PM  Total Treatment Time: 50  Evaluation Date: 11/28/18  Visit # authorized: 2/12  Authorization period: 10/31/19  Plan of care expiration: 1/27/18    Precautions: standard; fall risk      Subjective     Anahi reports she is in a little pain but it is the low back that is bothering her today and other than that she is ok.  Patient reports their pain to be 4/10 on a 0-10 scale with 0 being no pain and 10 being the worst pain imaginable.    Objective     From [facility]:  Lower Extremity Strength (graded 0-5 out of 5)    RLE LLE   Hip flexion: 3/5 3/5   Hip ER 4/5 4/5   Hip IR 4+/5 4/5   Knee extension: 4/5 4/5   Ankle dorsiflexion: 5/5 5/5   Great toe extension: 4/5 4-/5   Posterior fibers of Gluteus medius 4-/5 3+/5   Knee flexion 4-/5 3+/5   Ankle plantarflexion 5/5 5/5   Hip extension: 3+/5 3+/5        Anahi received therapeutic exercises to develop strength, endurance and flexibility for 50 minutes including:     TrA Activation 20 x 5"   TrA + March 2 x 10   Bridges x 10  Sit to Stand x 10  Seated marches x 20   Seated Heel raises x 30  Supine Hip Abduction x 20, OTB  Ball Squeeze 20 x 5"  HS Stretch 2 x 20"   SAQ 2 x 10, 3"   Piriformis Stretch 2 x 20"  Standing Hip abduction 2 x 10 each - pt reports back pain     Written Home Exercises Provided: no change     Pt educated on continuing HEP. Pt demo good understanding of the education provided. Anahi demonstrated good return demonstration of activities.     Assessment   Anahi participated in today's treatment session without symptom provocation or adverse effects. Pt demonstrated good carryover of exercises performed at [facility]. Patient reports she feels a little light headed going from supine to sit and that that does sometimes occur. Patient " took a seated rest break EOB before proceeding with standing exercises. Patient tolerated all additional exercises well today.       Pt will continue to benefit from skilled PT intervention. Medical Necessity is demonstrated by:  Fall Risk and Pain limits function of effected part for some activities.    Patient is making good progress towards established goals.    GOALS:   Short Term Goals:  4 weeks  1. Report decreased back pain </= 5/10 at worst to increase tolerance for prolonged sitting/standing  2. Pt. to demonstrate proper cervical and scapula retraction requiring min. to no verbal cues from PT  3. Pt. to demonstrate increased MMT for core/lumbar paraspinals to 3/5 to increase endurance with prolonged sitting/standing.  4. Pt. to demonstrate increased MMT for left gluteus medius to 4-/5  to increase stability during community ambulation  5. Pt. to be independent with symptom management  6. Pt to tolerate HEP to improve ROM and independence with ADL's     Long Term Goals: 8 weeks  1. Report decreased back pain </=  2/10 at worst to increase tolerance for community ambulation  2. Pt. to demonstrate proper cervical and scapula retraction requiring no verbal cues from PT  3. Pt. to demonstrate increased MMT for core/lumbar paraspinals to 3+/5 to increase endurance with prolonged sitting.   4. Pt. to demonstrate increased MMT for bilateral gluteus medius to 4+/5  to increase stability during ambulation on uneven surfaces.  5. Pt. to demonstrate increased MMT for bilateral hip flexor to 4+/5 to increase tolerance for ADL and work activities.   6. Pt to be independent with HEP to improve ROM and independence with ADL's    New/Revised goals: none at this time      Plan       Continue with established Plan of Care towards PT goals.

## 2018-12-10 ENCOUNTER — CLINICAL SUPPORT (OUTPATIENT)
Dept: REHABILITATION | Facility: HOSPITAL | Age: 83
End: 2018-12-10
Attending: ANESTHESIOLOGY
Payer: MEDICARE

## 2018-12-10 DIAGNOSIS — R53.1 WEAKNESS: ICD-10-CM

## 2018-12-10 DIAGNOSIS — R26.2 DIFFICULTY WALKING: ICD-10-CM

## 2018-12-10 PROCEDURE — 97110 THERAPEUTIC EXERCISES: CPT | Mod: PO

## 2018-12-10 NOTE — PROGRESS NOTES
"Name: Anahi Cook  Clinic Number: [id number]  Date of Treatment: 12/10/2018   Diagnosis:   Encounter Diagnoses   Name Primary?    Weakness     Difficulty walking        Physician: Merly Wilcox MD    Time in: 5:13 PM (late arrival)  Time Out: 6:00 PM  Total Treatment Time: 47  Evaluation Date: 11/28/18  Visit # authorized: 3/12  Authorization period: 10/31/19  Plan of care expiration: 1/27/18    Precautions: standard; fall risk      Subjective     Anahi reports she is feeling a little better and some slight pain in the middle back. Reports she has an appt with MD Friday. Patient reports their pain to be 3-4/10 on a 0-10 scale with 0 being no pain and 10 being the worst pain imaginable.    Objective     From Eval:  Lower Extremity Strength (graded 0-5 out of 5)    RLE LLE   Hip flexion: 3/5 3/5   Hip ER 4/5 4/5   Hip IR 4+/5 4/5   Knee extension: 4/5 4/5   Ankle dorsiflexion: 5/5 5/5   Great toe extension: 4/5 4-/5   Posterior fibers of Gluteus medius 4-/5 3+/5   Knee flexion 4-/5 3+/5   Ankle plantarflexion 5/5 5/5   Hip extension: 3+/5 3+/5        Anahi received therapeutic exercises to develop strength, endurance and flexibility for 47 minutes including:     TrA Activation 20 x 5"   TrA + March 2 x 10   Bridges x 10  Sit to Stand 2 x 10  Seated marches x 20 (performed standing)  Seated Heel raises x 30  Supine Hip Abduction x 20, OTB  Ball Squeeze 20 x 5"  HS Stretch 2 x 20"   SAQ 2 x 10, 3"   Piriformis Stretch 2 x 30"  Standing Hip abduction 2 x 10 each   Standing Marches x 10 each   SLR x 10 each    Written Home Exercises Provided: no change     Pt educated on continuing HEP. Pt demo good understanding of the education provided. Anahi demonstrated good return demonstration of activities.     Assessment   Anahi participated in today's treatment session without symptom provocation or adverse effects. Patient able to advance seated marches to standing today with no onset of pain. Patient reported no pain today during " standing hip abduction.     Pt will continue to benefit from skilled PT intervention. Medical Necessity is demonstrated by:  Fall Risk and Pain limits function of effected part for some activities.    Patient is making good progress towards established goals.    GOALS:   Short Term Goals:  4 weeks  1. Report decreased back pain </= 5/10 at worst to increase tolerance for prolonged sitting/standing  2. Pt. to demonstrate proper cervical and scapula retraction requiring min. to no verbal cues from PT  3. Pt. to demonstrate increased MMT for core/lumbar paraspinals to 3/5 to increase endurance with prolonged sitting/standing.  4. Pt. to demonstrate increased MMT for left gluteus medius to 4-/5  to increase stability during community ambulation  5. Pt. to be independent with symptom management  6. Pt to tolerate HEP to improve ROM and independence with ADL's     Long Term Goals: 8 weeks  1. Report decreased back pain </=  2/10 at worst to increase tolerance for community ambulation  2. Pt. to demonstrate proper cervical and scapula retraction requiring no verbal cues from PT  3. Pt. to demonstrate increased MMT for core/lumbar paraspinals to 3+/5 to increase endurance with prolonged sitting.   4. Pt. to demonstrate increased MMT for bilateral gluteus medius to 4+/5  to increase stability during ambulation on uneven surfaces.  5. Pt. to demonstrate increased MMT for bilateral hip flexor to 4+/5 to increase tolerance for ADL and work activities.   6. Pt to be independent with HEP to improve ROM and independence with ADL's    New/Revised goals: none at this time      Plan       Continue with established Plan of Care towards PT goals.

## 2018-12-14 ENCOUNTER — OFFICE VISIT (OUTPATIENT)
Dept: PAIN MEDICINE | Facility: CLINIC | Age: 83
End: 2018-12-14
Payer: MEDICARE

## 2018-12-14 ENCOUNTER — CLINICAL SUPPORT (OUTPATIENT)
Dept: REHABILITATION | Facility: HOSPITAL | Age: 83
End: 2018-12-14
Attending: ANESTHESIOLOGY
Payer: MEDICARE

## 2018-12-14 VITALS
WEIGHT: 158.69 LBS | RESPIRATION RATE: 18 BRPM | BODY MASS INDEX: 29.2 KG/M2 | SYSTOLIC BLOOD PRESSURE: 152 MMHG | HEIGHT: 62 IN | HEART RATE: 60 BPM | DIASTOLIC BLOOD PRESSURE: 70 MMHG | TEMPERATURE: 98 F

## 2018-12-14 DIAGNOSIS — M48.50XA VERTEBRAL COMPRESSION FRACTURE: ICD-10-CM

## 2018-12-14 DIAGNOSIS — S22.000A COMPRESSION FRACTURE OF THORACIC VERTEBRA, CLOSED, INITIAL ENCOUNTER: ICD-10-CM

## 2018-12-14 DIAGNOSIS — R26.2 DIFFICULTY WALKING: ICD-10-CM

## 2018-12-14 DIAGNOSIS — M47.816 FACET ARTHRITIS OF LUMBAR REGION: ICD-10-CM

## 2018-12-14 DIAGNOSIS — M47.816 LUMBAR SPONDYLOSIS: Primary | ICD-10-CM

## 2018-12-14 DIAGNOSIS — R53.1 WEAKNESS: ICD-10-CM

## 2018-12-14 PROCEDURE — 3077F SYST BP >= 140 MM HG: CPT | Mod: CPTII,S$GLB,, | Performed by: NURSE PRACTITIONER

## 2018-12-14 PROCEDURE — 99213 OFFICE O/P EST LOW 20 MIN: CPT | Mod: S$GLB,,, | Performed by: NURSE PRACTITIONER

## 2018-12-14 PROCEDURE — 99999 PR PBB SHADOW E&M-EST. PATIENT-LVL III: CPT | Mod: PBBFAC,,, | Performed by: NURSE PRACTITIONER

## 2018-12-14 PROCEDURE — 97110 THERAPEUTIC EXERCISES: CPT | Mod: PO

## 2018-12-14 PROCEDURE — 3078F DIAST BP <80 MM HG: CPT | Mod: CPTII,S$GLB,, | Performed by: NURSE PRACTITIONER

## 2018-12-14 PROCEDURE — 1101F PT FALLS ASSESS-DOCD LE1/YR: CPT | Mod: CPTII,S$GLB,, | Performed by: NURSE PRACTITIONER

## 2018-12-14 NOTE — PROGRESS NOTES
Chronic patient Established Note (Follow up visit)      SUBJECTIVE:    Anahi Cook presents to the clinic for a follow-up appointment for lower back and left lower extremity pain.  She had T12 kyphoplasty performed on 11/8/18 with significant improvement in symptoms.  She has been able to stand up and ambulate better as well.  Prior to this, she had lumbar facet joint injections which helped for a couple of week.  Her biggest complaint today is pain across the lower back.  She does have intermittent radiation down the left leg.  Her back pain is greater than her leg pain.  She is currently in PT which is helping.  Since the last visit, Anahi Cook states the pain has been improving.  Current pain intensity is 4/10.     Pain Medications:  OTC Tylenol    Opioid Contract: no     report:  Not applicable    Pain Procedures:   7/21/14 L4-5 IL ASHUTOSH- significant benefit  12/1/15 Left GT bursa injection  4/26/16 Left GT bursa injection  12/15/16 L4-5 IL ASHUTOSH- significant benefit  2/15/18 L4-5 IL ASHUTOSH- 100% relief  8/21/18 L4-5 IL ASHUTOSH- significant relief  10/15/18 Bilateral L3-4, L4-5 L5-S1 facet joint injections  11/8/18 T12 kyphoplasty- significant relief    Physical Therapy/Home Exercise: yes currently    Imaging:     Narrative     EXAMINATION:  XR THORACIC SPINE AP LATERAL    CLINICAL HISTORY:  Unspecified injury of lower back, initial encounter    TECHNIQUE:  Two views obtained.  Note that patient is slightly obliquely oriented on the lateral views which combines with severe osseous demineralization and result in limitation.    COMPARISON:  CT January 2018    FINDINGS:  There are surgical clips in the right upper quadrant.  There is tortuosity of the aorta with calcification along its wall.  Mild curvature of the spine is noted.  There is mild compression deformity along the superior endplate of T12.  No definite additional compression deformity noted.      Impression       Mild compression deformity along superior  endplate of T12 new compared to January 2018.  Exam somewhat limited due to osseous demineralization and patient obliquity.     Narrative     EXAMINATION:  XR LUMBAR SPINE AP AND LATERAL    CLINICAL HISTORY:  Low back pain, minor trauma;    TECHNIQUE:  AP, lateral and spot images were performed of the lumbar spine.  Patient is slightly obliquely positioned on the lateral view.    COMPARISON:  CT from January 2018    FINDINGS:  There is severe osseous demineralization.  Surgical clips are present in the abdomen.  There is calcification along the wall of the aorta and branches.    Grade 1 anterolisthesis is present at L4-5.  T12 demonstrates mild compression deformity along the superior endplate which is a change compared to the January examination.  Remaining vertebral bodies are normal in height and alignment.  Discs are maintained.      Impression       Mild compression deformity along the superior endplate of T12, new compared to January 2018 CT.       Lumbar MRI 5/21/14    Narrative     Comparison: None    Findings:  L5-S1: There is no disk disease.  There is a 8mm extra canal synovial cyst emanating from the right facet joint.  There is mild facet arthropathy.  There is no central canal or neural foraminal narrowing  L4-L5: There is grade 1 anterolisthesis of L4 on L5 with uncovering of the disk.  There is a diffuse posterior disk bulge.  There is severe ligamentous thickening and moderate facet arthropathy generating moderate severe central canal stenosis.  There is   moderate right neural foraminal stenosis and severe left neural foraminal stenosis.  L3-L4: There is a posterior disk bulge with super imposed right foraminal broad-based protrusion.  There is mild to moderate ligamentous thickening and mild facet arthropathy.  There is mild narrowing of the right lateral recess.  There is mild left   neural foraminal narrowing and severe right neural foraminal narrowing due to the foraminal protrusion.  L2-L3:  There is a diffuse disk bulge present.  There is minimal ligamentous thickening and mild facet arthropathy generating at most mild central canal stenosis.  There is an extraforaminal bulge at this level as well leading to moderate right neural   foraminal stenosis and mild left neural foraminal stenosis.  L1-L2: No significant disease.    There is no marrow replacement process, infection, tumor present.  Limited evaluation of intra-abdominal structures unremarkable.  No abnormal masses or fluid collections are present.   Impression       Multilevel degenerative changes most prominent at L4-L5 where there is moderate to severe central canal stenosis and severe left foraminal stenosis.  Severe right foraminal stenosis is present at L3-L4 as well.     Narrative     EXAMINATION:  MRI THORACIC SPINE W WO CONTRAST    CLINICAL HISTORY:  Abnormal xray, thoracic spine, DJD;Spine fracture, traumatic, thoracic;Compression fracture T12 and breast cancer;  Abnormal findings on diagnostic imaging of other parts of musculoskeletal system    TECHNIQUE:  Multiplanar, multisequence images were performed through the thoracic spine.  Contrast was not administered.    COMPARISON:  CT chest 01/23/2018    FINDINGS:  There is a subacute severe biconcave compression fracture of the T12 vertebral body.  There is retropulsion of fracture fragments into the central canal resulting in moderate central canal stenosis with impression on the spinal cord.  The visualized portions of the spinal cord are otherwise unremarkable.    There is multilevel degenerative change with posterior disc bulges most prominent at T6-T7 where there is mild central canal stenosis.  No other evidence of osseous fracture.  The vertebral body heights are otherwise well maintained.    The heart is enlarged.  There is a probable 1.5 cm right renal cyst.  The pre and post vertebral soft tissues are otherwise unremarkable.      Impression       1.  Subacute severe biconcave  compression fracture of the T12 vertebral body with retropulsion of fracture fragments into the central canal resulting in moderate central canal stenosis.    2.  Cardiomegaly.         Past Medical History:  Past Medical History:   Diagnosis Date    Anxiety     Back pain     Breast cancer 1/17/2018    Breast mass 1/15/2018    Chronic kidney disease, stage 2, mildly decreased GFR     COPD (chronic obstructive pulmonary disease)     emphysema    Depression     Dysuria 1/29/2018    Family history of breast cancer 1/17/2018    Hypertension     Infiltrating ductal carcinoma of breast, left 7/12/2018    Osteoporosis, senile     Ovarian mass 1/15/2018    Pneumonia     S/P robotic assisted laparoscopic BSO (bilateral salpingo-oophorectomy) 2/23/2018       Past Surgical History:  Past Surgical History:   Procedure Laterality Date    AXILLARY NODE DISSECTION Left 7/12/2018    Procedure: LYMPHADENECTOMY, AXILLARY;  Surgeon: Amanda Caballero MD;  Location: [facility] OR [address] [place] [address];  Service: General;  Laterality: Left;    CHOLECYSTECTOMY      EYE SURGERY      FIXATION KYPHOPLASTY N/A 11/8/2018    Procedure: Kyphoplasty   T12;  Surgeon: Merly Wilcox MD;  Location: [facility] CATH LAB;  Service: Pain Management;  Laterality: N/A;  T12  [employer] [employer] e-mailed with date and time    HYSTERECTOMY      INFUSION, INTRAVENOUS N/A 4/17/2013    Performed by Pepito Theodore MD at [facility] OR    INJECTION FOR SENTINEL NODE IDENTIFICATION Left 7/12/2018    Procedure: INJECTION, FOR SENTINEL NODE IDENTIFICATION;  Surgeon: Amanda Caballero MD;  Location: [facility] OR [address] [place] [address];  Service: General;  Laterality: Left;    INJECTION OF FACET JOINT Bilateral 10/15/2018    Procedure: INJECTION, FACET JOINT, BILATERAL LUMBAR L3-4, 4-5, 5-S1 FACET JOINT INJECTIONS;  Surgeon: Merly Wilcox MD;  Location: [facility] PAIN MGT;  Service: Pain Management;  Laterality: Bilateral;    INJECTION, FACET JOINT, BILATERAL LUMBAR L3-4, 4-5, 5-S1 FACET JOINT INJECTIONS Bilateral  10/15/2018    Performed by Merly Wilcox MD at [facility] PAIN MGT    INJECTION, FOR SENTINEL NODE IDENTIFICATION Left 2018    Performed by Amanda Caballero MD at [facility] OR 2ND FLR    INJECTION,STEROID,EPIDURAL N/A 2018    Performed by Merly Wilcox MD at [facility] PAIN MGT    INJECTION-STEROID-EPIDURAL-LUMBAR N/A 2/15/2018    Performed by Merly Wilcox MD at [facility] PAIN MGT    INJECTION-STEROID-EPIDURAL-LUMBAR N/A 2/15/2016    Performed by Merly Wilcox MD at [facility] PAIN MGT    INJECTION-STEROID-EPIDURAL-LUMBAR N/A 2014    Performed by Merly Wilcox MD at [facility] PAIN MGT    Kyphoplasty   T12 N/A 2018    Performed by Merly Wilcox MD at [facility] CATH LAB    LYMPHADENECTOMY, AXILLARY Left 2018    Performed by Amanda Caballero MD at [facility] OR 2ND FLR    MASTECTOMY, PARTIAL Left 2018    Procedure: MASTECTOMY, PARTIAL-W/SEED LOCALIZATION;  Surgeon: Amanda Caballero MD;  Location: [facility] OR [address] [place] [address];  Service: General;  Laterality: Left;    MASTECTOMY, PARTIAL-W/SEED LOCALIZATION Left 2018    Performed by Amanda Caballero MD at [facility] OR 2ND FLR    CA REMOVAL OF OVARY/TUBE(S)  2018    Robotic Assisted    ROTATOR CUFF REPAIR Right     rotator cuff repair right shoulder    TONSILLECTOMY      XI ROBOT ASSISTED LAPAROSCOPIC SALPINGO-OOPHERECTOMY Bilateral 2018    Performed by Reena Chairez MD at [facility] OR       Family History:  Family History   Problem Relation Age of Onset    Heart failure Mother     Kidney failure Mother     Heart attack Father     Breast cancer Sister 66        Lump, XRT, chemo, recurrence 10 years later, still alive.    Cancer Brother         leukemia    Heart attack Brother 58    Pulmonary embolism Brother 45    Heart attack Brother 52    Breast cancer Maternal Grandmother 60        advanced at diagnosis    Breast cancer Maternal Aunt 83         at 92       Social History:  Social History     Socioeconomic History    Marital status: Single     Spouse name: Not on file     Number of children: 3    Years of education: Not on file    Highest education level: Not on file   Social Needs    Financial resource strain: Not on file    Food insecurity - worry: Not on file    Food insecurity - inability: Not on file    Transportation needs - medical: Not on file    Transportation needs - non-medical: Not on file   Occupational History    Occupation: Multiple jobs, see social documentation section     Comment: Retired   Tobacco Use    Smoking status: Former Smoker     Packs/day: 1.00     Years: 40.00     Pack years: 40.00     Types: Cigarettes    Smokeless tobacco: Never Used    Tobacco comment: Smoked >1 ppd for at least 40 years, quit around 1995   Substance and Sexual Activity    Alcohol use: Yes     Comment: occasional glass of wine or cocktail    Drug use: No    Sexual activity: Yes     Partners: Male   Other Topics Concern    Not on file   Social History Narrative    Worked many jobs while raising 3 children.  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  She was  from her first , but took care of him at the end of his life.       REVIEW OF SYSTEMS:    GENERAL:  No weight loss, malaise or fevers.  HEENT:   No recent changes in vision or hearing  NECK:  Negative for lumps, no difficulty with swallowing.  RESPIRATORY:  Negative for cough, wheezing or shortness of breath, patient denies any recent URI. COPD.  CARDIOVASCULAR:  Negative for chest pain, leg swelling or palpitations. Hypertension.  GI:  Negative for abdominal discomfort, blood in stools or black stools or change in bowel habits.  MUSCULOSKELETAL:  See HPI.  SKIN:  Negative for lesions, rash, and itching.  PSYCH:  No mood disorder or recent psychosocial stressors.  Patients sleep is not disturbed secondary to pain.  HEMATOLOGY/LYMPHOLOGY:  Negative for prolonged bleeding, bruising easily or swollen nodes.   Patient is not currently taking any anti-coagulants  NEURO:   No history of headaches, syncope, paralysis, seizures or tremors.  All other reviewed and negative other than HPI.    OBJECTIVE:    LMP  (LMP Unknown)     PHYSICAL EXAMINATION:    GENERAL: Well appearing, in no acute distress, alert and oriented x3.  PSYCH:  Mood and affect appropriate.  SKIN: Skin color, texture, turgor normal, no rashes or lesions.  HEAD/FACE:  Normocephalic, atraumatic. Cranial nerves grossly intact.  CV: RRR with palpation of the radial artery.  PULM: No evidence of respiratory difficulty, symmetric chest rise.  GI:  Soft and non-tender.  BACK: Straight leg raising in the sitting and supine positions is negative to radicular pain.  There is pain with palpation over the lumbar facet joints.  No pain with palpation of thoracic spine.  Limited ROM with pain on flexion and extension.  EXTREMITIES: Peripheral joint ROM is full and pain free without obvious instability or laxity in all four extremities. No deformities, edema, or skin discoloration. Good capillary refill.  MUSCULOSKELETAL: Provocative maneuvers of hips do not cause pain.  No atrophy or tone abnormalities are noted.  NEURO: Bilateral upper and lower extremity coordination and muscle stretch reflexes are physiologic and symmetric.  Plantar response are downgoing. No clonus.  Decreased sensation at left L4 TF ASHUTOSH.  GAIT: Antalgic.      ASSESSMENT: 84 y.o. year old female with lower back and lower extremity pain, consistent with the following diagnoses:     1. Lumbar spondylosis     2. Vertebral compression fracture     3. Compression fracture of thoracic vertebra, closed, initial encounter     4. Facet arthritis of lumbar region           PLAN:     - Previous imaging was reviewed and discussed with the patient today.    - She had significant improvement after kypho.  She still has some back pain most likely due to arthritis.  She had short term benefit with facet injections  before.  Consider MBBx2 to be followed by RFA if pain worsens.    - Continue with PT.    - Continue OTC Tylenol.  Avoid NSAIDs.    - RTC in 1 month or sooner if needed.      The above plan and management options were discussed at length with patient. Patient is in agreement with the above and verbalized understanding.    Sofia Schultz  12/14/2018

## 2018-12-14 NOTE — PROGRESS NOTES
"Name: Anahi Cook  Clinic Number: [id number]  Date of Treatment: 12/14/2018   Diagnosis:   Encounter Diagnoses   Name Primary?    Weakness     Difficulty walking        Physician: Merly Wilcox MD    Time in: 4:14 PM (late arrival)  Time Out: 4:56 PM  Total Treatment Time: 42  Evaluation Date: 11/28/18  Visit # authorized: 4/12  Authorization period: 10/31/19  Plan of care expiration: 1/27/18    Precautions: standard; fall risk      Subjective     Anahi reports she is feeling good today. Slight pain in her low back. Patient reports she has consistently been performing her HEP. Patient reports their pain to be 3-4/10 on a 0-10 scale with 0 being no pain and 10 being the worst pain imaginable.    Objective     From Eval:  Lower Extremity Strength (graded 0-5 out of 5)    RLE LLE   Hip flexion: 3/5 3/5   Hip ER 4/5 4/5   Hip IR 4+/5 4/5   Knee extension: 4/5 4/5   Ankle dorsiflexion: 5/5 5/5   Great toe extension: 4/5 4-/5   Posterior fibers of Gluteus medius 4-/5 3+/5   Knee flexion 4-/5 3+/5   Ankle plantarflexion 5/5 5/5   Hip extension: 3+/5 3+/5        Anahi received therapeutic exercises to develop strength, endurance and flexibility for 40 minutes including:     TrA Activation 20 x 5" - NP  TrA + March 2 x 10   Bridges 2 x 10  Sit to Stand 2 x 10 - NP  Seated marches x 20 (performed standing) - NP  Seated Heel raises x 30 - NP  Supine Hip Abduction x 20, OTB - NP  Ball Squeeze 20 x 5"  HS Stretch 2 x 20" - performed with Therapist assist  SAQ 2 x 10, 3"   Piriformis Stretch 2 x 30" - NP  Standing Hip abduction 2 x 10 each  - NP  Standing Marches x 10 each  - NP  SLR x 10 each    Written Home Exercises Provided: SLR, standing hip abduction     Pt educated on continuing HEP. Pt demo good understanding of the education provided. Anahi demonstrated good return demonstration of activities.     Assessment   Anahi participated in today's treatment session fairly well. Patient reports some Right sided back pain during " bridges that eased up as she continued through the exercise. Patient reported that her hand is bruised from holding the strap to perform HS stretch therefore performed with Therapist assistance today. Patient began to feel slightly nauseated and light headed during mat exercises, patient sat EOB after a few minutes she reported she no longer felt nauseated however still felt slightly lightheaded. BP = 135/89. Provided patient with crackers and she reported that helped and she is feeling better. Discontinued exercises for today.     Pt will continue to benefit from skilled PT intervention. Medical Necessity is demonstrated by:  Fall Risk and Pain limits function of effected part for some activities.    Patient is making good progress towards established goals.    GOALS:   Short Term Goals:  4 weeks  1. Report decreased back pain </= 5/10 at worst to increase tolerance for prolonged sitting/standing  2. Pt. to demonstrate proper cervical and scapula retraction requiring min. to no verbal cues from PT  3. Pt. to demonstrate increased MMT for core/lumbar paraspinals to 3/5 to increase endurance with prolonged sitting/standing.  4. Pt. to demonstrate increased MMT for left gluteus medius to 4-/5  to increase stability during community ambulation  5. Pt. to be independent with symptom management  6. Pt to tolerate HEP to improve ROM and independence with ADL's     Long Term Goals: 8 weeks  1. Report decreased back pain </=  2/10 at worst to increase tolerance for community ambulation  2. Pt. to demonstrate proper cervical and scapula retraction requiring no verbal cues from PT  3. Pt. to demonstrate increased MMT for core/lumbar paraspinals to 3+/5 to increase endurance with prolonged sitting.   4. Pt. to demonstrate increased MMT for bilateral gluteus medius to 4+/5  to increase stability during ambulation on uneven surfaces.  5. Pt. to demonstrate increased MMT for bilateral hip flexor to 4+/5 to increase tolerance for  ADL and work activities.   6. Pt to be independent with HEP to improve ROM and independence with ADL's    New/Revised goals: none at this time      Plan       Continue with established Plan of Care towards PT goals.

## 2018-12-17 ENCOUNTER — CLINICAL SUPPORT (OUTPATIENT)
Dept: REHABILITATION | Facility: HOSPITAL | Age: 83
End: 2018-12-17
Attending: ANESTHESIOLOGY
Payer: MEDICARE

## 2018-12-17 DIAGNOSIS — R26.2 DIFFICULTY WALKING: ICD-10-CM

## 2018-12-17 DIAGNOSIS — R53.1 WEAKNESS: ICD-10-CM

## 2018-12-17 PROCEDURE — 97110 THERAPEUTIC EXERCISES: CPT | Mod: PO

## 2018-12-17 NOTE — PROGRESS NOTES
"Name: Anahi Cook  Clinic Number: [id number]  Date of Treatment: 12/17/2018   Diagnosis:   Encounter Diagnoses   Name Primary?    Weakness     Difficulty walking        Physician: Merly Wilcox MD    Time in: 3:05 PM   Time Out: 3:50 PM  Total Treatment Time: 45  30 min. 1:1    Evaluation Date: 11/28/18  Visit # authorized: 4/12  Authorization period: 10/31/19  Plan of care expiration: 1/27/18    Precautions: standard; fall risk      Subjective     Anahi Patient reports their pain to be 3/10 on a 0-10 scale with 0 being no pain and 10 being the worst pain imaginable. Notes that she is doing so much better each day.     Objective     From Eval:  Lower Extremity Strength (graded 0-5 out of 5)    RLE LLE   Hip flexion: 3/5 3/5   Hip ER 4/5 4/5   Hip IR 4+/5 4/5   Knee extension: 4/5 4/5   Ankle dorsiflexion: 5/5 5/5   Great toe extension: 4/5 4-/5   Posterior fibers of Gluteus medius 4-/5 3+/5   Knee flexion 4-/5 3+/5   Ankle plantarflexion 5/5 5/5   Hip extension: 3+/5 3+/5        Anahi received therapeutic exercises to develop strength, endurance and flexibility for 40 minutes including:   Sitting  · Sit to Stand 2 x 10   · Seated marches x 20 (peform standing next visit)    Supine  · TrA + March 2 x 10   · Bridges 2 x 10    Standing:  · Standing Heel raises 2x8  · Standing Hip abduction: 2x8 bilaterally    Supine:  · SAQ 2 x 10 3" bilaterally  · Knee fall out with OTB: 2x8  · HS Stretch 2 x 20" - performed with Therapist assist  · Piriformis Stretch 2 x 30" - NP  · SLR x 10 each    Written Home Exercises Provided: SLR, standing hip abduction     Pt educated on continuing HEP. Pt demo good understanding of the education provided. Anahi demonstrated good return demonstration of activities.     Assessment   Anahi had fair tolerance to exercise progression mostly reporting fatigue and not increased pain. Pt. reported feeling some right sided lumbar back pain with bridges that was resolved with verbal/tactile cuing for " proper TA/gluteus criselda recruitment. Pt. is slowly progressing towards goals.    Pt will continue to benefit from skilled PT intervention. Medical Necessity is demonstrated by:  Fall Risk and Pain limits function of effected part for some activities.    Patient is making good progress towards established goals.    GOALS:   Short Term Goals:  4 weeks  1. Report decreased back pain </= 5/10 at worst to increase tolerance for prolonged sitting/standing  2. Pt. to demonstrate proper cervical and scapula retraction requiring min. to no verbal cues from PT  3. Pt. to demonstrate increased MMT for core/lumbar paraspinals to 3/5 to increase endurance with prolonged sitting/standing.  4. Pt. to demonstrate increased MMT for left gluteus medius to 4-/5  to increase stability during community ambulation  5. Pt. to be independent with symptom management  6. Pt to tolerate HEP to improve ROM and independence with ADL's     Long Term Goals: 8 weeks  1. Report decreased back pain </=  2/10 at worst to increase tolerance for community ambulation  2. Pt. to demonstrate proper cervical and scapula retraction requiring no verbal cues from PT  3. Pt. to demonstrate increased MMT for core/lumbar paraspinals to 3+/5 to increase endurance with prolonged sitting.   4. Pt. to demonstrate increased MMT for bilateral gluteus medius to 4+/5  to increase stability during ambulation on uneven surfaces.  5. Pt. to demonstrate increased MMT for bilateral hip flexor to 4+/5 to increase tolerance for ADL and work activities.   6. Pt to be independent with HEP to improve ROM and independence with ADL's    New/Revised goals: none at this time      Plan       Continue with established Plan of Care towards PT goals.

## 2018-12-21 ENCOUNTER — CLINICAL SUPPORT (OUTPATIENT)
Dept: REHABILITATION | Facility: HOSPITAL | Age: 83
End: 2018-12-21
Attending: ANESTHESIOLOGY
Payer: MEDICARE

## 2018-12-21 DIAGNOSIS — R26.2 DIFFICULTY WALKING: ICD-10-CM

## 2018-12-21 DIAGNOSIS — R53.1 WEAKNESS: Primary | ICD-10-CM

## 2018-12-21 PROCEDURE — 97110 THERAPEUTIC EXERCISES: CPT | Mod: PO

## 2018-12-21 NOTE — PROGRESS NOTES
"Name: Anahi Cook  Clinic Number: [id number]  Date of Treatment: 12/21/2018   Diagnosis:   Encounter Diagnoses   Name Primary?    Weakness Yes    Difficulty walking        Physician: Merly Wilcox MD    Time in: 3:08 PM   Time Out: 4:08 PM  Total Treatment Time: 60  30 min. 1:1    Evaluation Date: 11/28/18  Visit # authorized: 4/12  Authorization period: 10/31/19  Plan of care expiration: 1/27/18    Precautions: standard; fall risk      Subjective     Anahi reports each day she is feeling much better. Reports their pain to be 0/10 on a 0-10 scale with 0 being no pain and 10 being the worst pain imaginable.     Objective     From Eval:  Lower Extremity Strength (graded 0-5 out of 5)    RLE LLE   Hip flexion: 3/5 3/5   Hip ER 4/5 4/5   Hip IR 4+/5 4/5   Knee extension: 4/5 4/5   Ankle dorsiflexion: 5/5 5/5   Great toe extension: 4/5 4-/5   Posterior fibers of Gluteus medius 4-/5 3+/5   Knee flexion 4-/5 3+/5   Ankle plantarflexion 5/5 5/5   Hip extension: 3+/5 3+/5        Anahi received therapeutic exercises to develop strength, endurance and flexibility for 50 minutes including:   Sitting  · Sit to Stand 2 x 10 - patient with mild SOB at end of exercise.  · Seated marches x 20 - NP    Supine  · TrA + March 2 x 10   · Bridges 2 x 10 OTB     Standing:  · Standing Heel raises 2x10  · Standing Hip abduction: 2x8 bilaterally  · Standing Marches 2 x 10     Supine:  · SAQ 2 x 10 3" bilaterally  · Knee fall out with OTB: 2x8 - NP  · HS Stretch 2 x 20" - performed with Therapist assist  · Piriformis Stretch 2 x 30"   · SLR x 10 each    SpO2 90% - post sit to stand exercise    SpO2 = 95%, HR= 66-75bpm - after seated rest break.    Written Home Exercises Provided: piriformis stretch     Pt educated on continuing HEP. Pt demo good understanding of the education provided. Anahi demonstrated good return demonstration of activities.     Assessment   Anahi had good tolerance to exercises today up until sit to stand exercise. Patient " with mild reports of SOB after sit to stand exercises that resolved with seated rest break. Discussed with patient and she reports she checks her O2 sats and they usually stay 95% or greater. Reports sometimes when she is winded they do drop to around 90%. Reports she knows to call her MD with any issues and s/s. Patient is slowly progressing towards goals.    Pt will continue to benefit from skilled PT intervention. Medical Necessity is demonstrated by:  Fall Risk and Pain limits function of effected part for some activities.    Patient is making good progress towards established goals.    GOALS:   Short Term Goals:  4 weeks  1. Report decreased back pain </= 5/10 at worst to increase tolerance for prolonged sitting/standing  2. Pt. to demonstrate proper cervical and scapula retraction requiring min. to no verbal cues from PT  3. Pt. to demonstrate increased MMT for core/lumbar paraspinals to 3/5 to increase endurance with prolonged sitting/standing.  4. Pt. to demonstrate increased MMT for left gluteus medius to 4-/5  to increase stability during community ambulation  5. Pt. to be independent with symptom management  6. Pt to tolerate HEP to improve ROM and independence with ADL's     Long Term Goals: 8 weeks  1. Report decreased back pain </=  2/10 at worst to increase tolerance for community ambulation  2. Pt. to demonstrate proper cervical and scapula retraction requiring no verbal cues from PT  3. Pt. to demonstrate increased MMT for core/lumbar paraspinals to 3+/5 to increase endurance with prolonged sitting.   4. Pt. to demonstrate increased MMT for bilateral gluteus medius to 4+/5  to increase stability during ambulation on uneven surfaces.  5. Pt. to demonstrate increased MMT for bilateral hip flexor to 4+/5 to increase tolerance for ADL and work activities.   6. Pt to be independent with HEP to improve ROM and independence with ADL's    New/Revised goals: none at this time      Plan       Continue with  established Plan of Care towards PT goals.

## 2018-12-28 ENCOUNTER — CLINICAL SUPPORT (OUTPATIENT)
Dept: REHABILITATION | Facility: HOSPITAL | Age: 83
End: 2018-12-28
Attending: ANESTHESIOLOGY
Payer: MEDICARE

## 2018-12-28 DIAGNOSIS — R53.1 WEAKNESS: ICD-10-CM

## 2018-12-28 DIAGNOSIS — R26.2 DIFFICULTY WALKING: ICD-10-CM

## 2018-12-28 PROCEDURE — 97110 THERAPEUTIC EXERCISES: CPT | Mod: PO

## 2018-12-28 NOTE — PROGRESS NOTES
Name: Anahi Cook  Clinic Number: [id number]  Date of Treatment: 12/28/2018   Diagnosis:   Encounter Diagnoses   Name Primary?    Weakness     Difficulty walking        Physician: Merly Wilcox MD    Time in: 4 PM   Time Out: 5  Total Treatment Time: 60  30 min. 1:1    Evaluation Date: 11/28/18  Visit # authorized: 7/12  Authorization period: 10/31/19  Plan of care expiration: 1/27/18    Precautions: standard; fall risk      Subjective     Anahi reports she is doing much better: more upright; less need of walker within home; no problem with stair negotiation; and noted increased exercise ability. Notes that she is no longer experiencing upper back pain.  Reports their pain to be 0/10 on a 0-10 scale with 0 being no pain and 10 being the worst pain imaginable.     Objective   Lumbar ROM: (measured in degrees)     Degrees Quality   Flexion WFL        Extension NT     Left Side Bending WFL     Right Side Bending WFL     Left rotation WFL     Right Rotation WFL        Dermatomes: (impaired/normal)      RLE LLE   L2 Intact Intact   L3 Intact Intact   L4 Intact Intact   L5 Intact Intact   S1 Intact Intact      Reflexes: L4/S1:2 bilaterally     Lower Extremity Strength (graded 0-5 out of 5)    RLE LLE   Hip flexion: 3+/5 3/5   Hip ER 4+/5 4+/5   Hip IR 4+/5 4/5   Knee extension: 4+/5 4+/5   Ankle dorsiflexion: 5/5 5/5   Great toe extension: 4/5 4-/5   Posterior fibers of Gluteus medius 4-/5 3+/5   Knee flexion 4-/5 4-/5   Ankle plantarflexion 5/5 5/5   Hip extension: 3+/5 3+/5   Glute max 3-/5 3-/5      Special Tests: ((+): pos.; (-): neg.)    · Bridge Test: +  · Pirformis Test: -     Flexibility:   · Popliteal Angle: bilateral: 90 deg.       Anahi received therapeutic exercises to develop strength, endurance and flexibility for 50 minutes including:   Sitting  · Sit to Stand 2 x 10 - patient with mild SOB at end of exercise.  · Seated marches x 20 - NP    Supine  · TrA + March 2 x 10   · Bridges 2 x 10 OTB   Sidelying  "  · s/l clams: 2x8 bilaterally  Standing:  · Standing Heel raises 2x10  · Standing Hip abduction: 2x8 bilaterally  · Standing Marches 2 x 10     Supine:  · SAQ 2 x 10 3" bilaterally  · HS Stretch 2 x 20" - performed with Therapist assist  · Piriformis Stretch 2 x 30"   · SLR x 10 each    SpO2 90% - post sit to stand exercise    SpO2 = 95%, HR= 66-75bpm - after seated rest break.    Written Home Exercises Provided: View at "[email or web address]" using code: [email or web address]    Pt educated on continuing HEP. Pt demo good understanding of the education provided. Anahi demonstrated good return demonstration of activities.     Assessment   Anahi has made significant improvements in AROM and strength; however she still has significant weakness in her hamstrings/gluteus medius/criselda. Pt. has decreased endurance with standing tasks and requires recurrent reminders to correct her posture and utilize her core/hip musculature properly to decrease undue stress on her LB. Pt. could benefit from additional therapy to further progress towards goals and decrease risk of falls.     Pt will continue to benefit from skilled PT intervention. Medical Necessity is demonstrated by:  Fall Risk and Pain limits function of effected part for some activities.    Patient is making good progress towards established goals.    GOALS:   Short Term Goals:  4 weeks  1. Report decreased back pain </= 5/10 at worst to increase tolerance for prolonged sitting/standing MET  2. Pt. to demonstrate proper cervical and scapula retraction requiring min. to no verbal cues from PT  3. Pt. to demonstrate increased MMT for core/lumbar paraspinals to 3/5 to increase endurance with prolonged sitting/standing.  4. Pt. to demonstrate increased MMT for left gluteus medius to 4-/5  to increase stability during community ambulation  5. Pt. to be independent with symptom management MET  6. Pt to tolerate HEP to improve ROM and independence with ADL's MET     Long Term Goals: 8 " weeks  1. Report decreased back pain </=  2/10 at worst to increase tolerance for community ambulation  2. Pt. to demonstrate proper cervical and scapula retraction requiring no verbal cues from PT  3. Pt. to demonstrate increased MMT for core/lumbar paraspinals to 3+/5 to increase endurance with prolonged sitting.   4. Pt. to demonstrate increased MMT for bilateral gluteus medius to 4+/5  to increase stability during ambulation on uneven surfaces.  5. Pt. to demonstrate increased MMT for bilateral hip flexor to 4+/5 to increase tolerance for ADL and work activities.   6. Pt to be independent with HEP to improve ROM and independence with ADL's    New/Revised goals: none at this time      Plan       Continue with established Plan of Care towards PT goals.

## 2019-01-07 ENCOUNTER — CLINICAL SUPPORT (OUTPATIENT)
Dept: REHABILITATION | Facility: HOSPITAL | Age: 84
End: 2019-01-07
Attending: ANESTHESIOLOGY
Payer: MEDICARE

## 2019-01-07 DIAGNOSIS — R26.2 DIFFICULTY WALKING: ICD-10-CM

## 2019-01-07 DIAGNOSIS — R53.1 WEAKNESS: ICD-10-CM

## 2019-01-07 PROCEDURE — 97110 THERAPEUTIC EXERCISES: CPT | Mod: PO

## 2019-01-07 NOTE — PROGRESS NOTES
"Name: Anahi Cook  Clinic Number: [id number]  Date of Treatment: 01/07/2019   Diagnosis:   Encounter Diagnoses   Name Primary?    Weakness     Difficulty walking        Physician: Merly Wilcox MD    Time in: 4:13 PM (late arrival)  Time Out: 5:00 PM  Total Treatment Time: 47  45 min. 1:1    Evaluation Date: 11/28/18  Visit # authorized: 8/12  Authorization period: 10/31/19  Plan of care expiration: 1/27/18    Precautions: standard; fall risk      Subjective     Anahi reports her low back is bothering her today but other than that she is ok. Reports their pain in the low back to be 2/10 on a 0-10 scale with 0 being no pain and 10 being the worst pain imaginable.     Objective     Anahi received therapeutic exercises to develop strength, endurance and flexibility for 45 minutes including:   Sitting  · Sit to Stand 2 x 10   · Seated marches x 20 - NP    Supine  · TrA + March 2 x 10   · Bridges 2 x 10 OTB   Sidelying   · s/l clams: 2x8 bilaterally YTB  Standing:  · Standing Heel raises 2x10  · Standing Hip abduction: 2x8 bilaterally  · Standing Marches 2 x 10     Supine:  · SAQ 3 x 10 3" bilaterally  · HS Stretch 3 x 30" - performed with Therapist assist  · Piriformis Stretch 3 x 30"   · SLR x 10 each    Post Treatment after seated rest:  SpO2 = 90-96%  HR = 71 bpm    Written Home Exercises Provided: no change     Pt educated on continuing HEP. Pt demo good understanding of the education provided. Anahi demonstrated good return demonstration of activities.     Assessment   Anahi tolerated treatment well today. Patient with some mild back pain during sit to stand exercises. Patient tolerated additional set for SAQs and TB with clamshells well today with no onset of adverse effects. Patient continues to fatigue during standing activities. Pt. could benefit from additional therapy to further progress towards goals and decrease risk of falls.     Pt will continue to benefit from skilled PT intervention. Medical Necessity is " demonstrated by:  Fall Risk and Pain limits function of effected part for some activities.    Patient is making good progress towards established goals.    GOALS:   Short Term Goals:  4 weeks  1. Report decreased back pain </= 5/10 at worst to increase tolerance for prolonged sitting/standing MET  2. Pt. to demonstrate proper cervical and scapula retraction requiring min. to no verbal cues from PT  3. Pt. to demonstrate increased MMT for core/lumbar paraspinals to 3/5 to increase endurance with prolonged sitting/standing.  4. Pt. to demonstrate increased MMT for left gluteus medius to 4-/5  to increase stability during community ambulation  5. Pt. to be independent with symptom management MET  6. Pt to tolerate HEP to improve ROM and independence with ADL's MET     Long Term Goals: 8 weeks  1. Report decreased back pain </=  2/10 at worst to increase tolerance for community ambulation  2. Pt. to demonstrate proper cervical and scapula retraction requiring no verbal cues from PT  3. Pt. to demonstrate increased MMT for core/lumbar paraspinals to 3+/5 to increase endurance with prolonged sitting.   4. Pt. to demonstrate increased MMT for bilateral gluteus medius to 4+/5  to increase stability during ambulation on uneven surfaces.  5. Pt. to demonstrate increased MMT for bilateral hip flexor to 4+/5 to increase tolerance for ADL and work activities.   6. Pt to be independent with HEP to improve ROM and independence with ADL's    New/Revised goals: none at this time      Plan       Continue with established Plan of Care towards PT goals.

## 2019-01-11 ENCOUNTER — CLINICAL SUPPORT (OUTPATIENT)
Dept: REHABILITATION | Facility: HOSPITAL | Age: 84
End: 2019-01-11
Attending: ANESTHESIOLOGY
Payer: MEDICARE

## 2019-01-11 DIAGNOSIS — R53.1 WEAKNESS: ICD-10-CM

## 2019-01-11 DIAGNOSIS — R26.2 DIFFICULTY WALKING: ICD-10-CM

## 2019-01-11 PROCEDURE — 97110 THERAPEUTIC EXERCISES: CPT | Mod: PO

## 2019-01-11 NOTE — PROGRESS NOTES
"Name: Anahi Cook  Clinic Number: [id number]  Date of Treatment: 01/11/2019   Diagnosis:   Encounter Diagnoses   Name Primary?    Weakness     Difficulty walking        Physician: Merly Wilcox MD    Time in: 4 PM   Time Out: 5:00 PM  Total Treatment Time: 47  45 min. 1:1    Evaluation Date: 11/28/18  Visit # authorized: 8/12  Authorization period: 10/31/19  Plan of care expiration: 1/27/18    Precautions: standard; fall risk      Subjective     Anahi reports she is doing really well. Denies SOB, pain, and/or difficulty with endurance.. Reports their pain in the low back to be 0/10 on a 0-10 scale with 0 being no pain and 10 being the worst pain imaginable.     Objective     Anahi received therapeutic exercises to develop strength, endurance and flexibility for 45 minutes including:   Sitting  · Sit to Stand 2 x 10   · Seated marches x 20 - NP  Sidelying   · s/l clams: 2x8 bilaterally YTB  Standing:  · Standing Heel raises 2x10  · Standing Hip abduction: 2x8 bilaterally  · Standing Marches 2 x 10     Supine:  · Bridge: 2x10  · SAQ 3 x 10 3" bilaterally  · HS Stretch 3 x 30" - performed with Therapist assist  · Piriformis Stretch 3 x 30"   · SLR x 10 each    Post Treatment after seated rest:  SpO2 = 90-96%  HR = 71 bpm    Written Home Exercises Provided: no change     Pt educated on continuing HEP. Pt demo good understanding of the education provided. Anahi demonstrated good return demonstration of activities.     Assessment   Anahi had good tolerance to exercise with fatigue at end of tx session. Pt. could benefit from additional therapy to further progress towards goals and decrease risk of falls.     Pt will continue to benefit from skilled PT intervention. Medical Necessity is demonstrated by:  Fall Risk and Pain limits function of effected part for some activities.    Patient is making good progress towards established goals.    GOALS:   Short Term Goals:  4 weeks  1. Report decreased back pain </= 5/10 at worst to " increase tolerance for prolonged sitting/standing MET  2. Pt. to demonstrate proper cervical and scapula retraction requiring min. to no verbal cues from PT  3. Pt. to demonstrate increased MMT for core/lumbar paraspinals to 3/5 to increase endurance with prolonged sitting/standing.  4. Pt. to demonstrate increased MMT for left gluteus medius to 4-/5  to increase stability during community ambulation  5. Pt. to be independent with symptom management MET  6. Pt to tolerate HEP to improve ROM and independence with ADL's MET     Long Term Goals: 8 weeks  1. Report decreased back pain </=  2/10 at worst to increase tolerance for community ambulation  2. Pt. to demonstrate proper cervical and scapula retraction requiring no verbal cues from PT  3. Pt. to demonstrate increased MMT for core/lumbar paraspinals to 3+/5 to increase endurance with prolonged sitting.   4. Pt. to demonstrate increased MMT for bilateral gluteus medius to 4+/5  to increase stability during ambulation on uneven surfaces.  5. Pt. to demonstrate increased MMT for bilateral hip flexor to 4+/5 to increase tolerance for ADL and work activities.   6. Pt to be independent with HEP to improve ROM and independence with ADL's    New/Revised goals: none at this time      Plan       Continue with established Plan of Care towards PT goals.

## 2019-01-14 ENCOUNTER — CLINICAL SUPPORT (OUTPATIENT)
Dept: REHABILITATION | Facility: HOSPITAL | Age: 84
End: 2019-01-14
Attending: ANESTHESIOLOGY
Payer: MEDICARE

## 2019-01-14 DIAGNOSIS — R53.1 WEAKNESS: ICD-10-CM

## 2019-01-14 DIAGNOSIS — R26.2 DIFFICULTY WALKING: ICD-10-CM

## 2019-01-14 PROCEDURE — 97110 THERAPEUTIC EXERCISES: CPT | Mod: PO

## 2019-01-14 NOTE — PROGRESS NOTES
"Name: Anahi Cook  Clinic Number: [id number]  Date of Treatment: 01/14/2019   Diagnosis:   Encounter Diagnoses   Name Primary?    Weakness     Difficulty walking        Physician: Merly Wilcox MD    Time in: 4 PM   Time Out: 5:00 PM  Total Treatment Time: 47  45 min. 1:1    Evaluation Date: 11/28/18  Visit # authorized: 10/12  Authorization period: 1/28/19  Plan of care expiration: 1/27/18    Precautions: standard; fall risk  Subjective     Anahi reports no pain denying any adverse effects after last tx session.     Objective     Anahi received therapeutic exercises to develop strength, endurance and flexibility for 45 minutes including:     warm-up  · recumbent bike: no resistance: 5'  Sitting  · Sit to Stand: 3x10  Supine:  · Bridge: 2x10  · HS Stretch 3 x 30" - performed with Therapist assist  · Piriformis Stretch 3 x 30" performed with therapist assist  · SLR 2x8 each  Sidelying   · s/l clams: 2x8 bilaterally 1#  Standing:  · Standing Heel raises 2x10  · Standing Hip abduction and extension: 2x8 bilaterally  · Standing Marches 2 x 10   · side stepping at countertop: 2 laps  · 4" step up: 2x8  · add in airex balance: 2x30 sec.     Post Treatment after seated rest:  SpO2 = 90-96%  HR = 71 bpm    Written Home Exercises Provided: no change     Pt educated on continuing HEP. Pt demo good understanding of the education provided. Anahi demonstrated good return demonstration of activities.     Assessment   Anahi had fair tolerance to exercise regimen noting that she is just a bit winded due to her hx of COPD. Pt. denied increased pain only fatigue. PT inquired about additional goals and pt. reported desire to discharge walker. Pt. was informed that she should be trained in balance/proprioceptive training to assist with transition to walking without a device. Pt. verbalized understanding. Additionally, pt. does rely on walker to assist with her breathing. So that maybe only a realistic goal for within the home alone. Pt. " could benefit from additional therapy to further progress towards goals and decrease risk of falls.     Pt will continue to benefit from skilled PT intervention. Medical Necessity is demonstrated by:  Fall Risk and Pain limits function of effected part for some activities.    Patient is making good progress towards established goals.    GOALS:   Short Term Goals:  4 weeks  1. Report decreased back pain </= 5/10 at worst to increase tolerance for prolonged sitting/standing MET  2. Pt. to demonstrate proper cervical and scapula retraction requiring min. to no verbal cues from PT  3. Pt. to demonstrate increased MMT for core/lumbar paraspinals to 3/5 to increase endurance with prolonged sitting/standing.  4. Pt. to demonstrate increased MMT for left gluteus medius to 4-/5  to increase stability during community ambulation  5. Pt. to be independent with symptom management MET  6. Pt to tolerate HEP to improve ROM and independence with ADL's MET     Long Term Goals: 8 weeks  1. Report decreased back pain </=  2/10 at worst to increase tolerance for community ambulation  2. Pt. to demonstrate proper cervical and scapula retraction requiring no verbal cues from PT  3. Pt. to demonstrate increased MMT for core/lumbar paraspinals to 3+/5 to increase endurance with prolonged sitting.   4. Pt. to demonstrate increased MMT for bilateral gluteus medius to 4+/5  to increase stability during ambulation on uneven surfaces.  5. Pt. to demonstrate increased MMT for bilateral hip flexor to 4+/5 to increase tolerance for ADL and work activities.   6. Pt to be independent with HEP to improve ROM and independence with ADL's    New/Revised goals: none at this time      Plan       Continue with established Plan of Care towards PT goals.

## 2019-01-18 ENCOUNTER — TELEPHONE (OUTPATIENT)
Dept: HEMATOLOGY/ONCOLOGY | Facility: CLINIC | Age: 84
End: 2019-01-18

## 2019-01-18 NOTE — TELEPHONE ENCOUNTER
----- Message from Marilee Chery sent at 1/18/2019  4:12 PM CST -----  Contact: Patient  Pt wants to reschedule 01/21 appt with Rosa ignacio, wants to come in on 01/25 if avail.     Contact:: [phone]

## 2019-01-21 ENCOUNTER — TELEPHONE (OUTPATIENT)
Dept: HEMATOLOGY/ONCOLOGY | Facility: CLINIC | Age: 84
End: 2019-01-21

## 2019-01-21 NOTE — TELEPHONE ENCOUNTER
----- Message from Coco Wilson RN sent at 1/21/2019  1:00 PM CST -----      ----- Message -----  From: Gemini Joshua  Sent: 1/21/2019  12:52 PM  To: Gavin Lopez    Name of Who is Calling: #SHAYY JAMIL [[id number]]##      What is the request in detail: Pt is asking to  Speak to vivian      Can the clinic reply by [facility]:    No       [facility] #[phone]

## 2019-01-21 NOTE — TELEPHONE ENCOUNTER
Returned call, spoke with patient in regards to her apt on 1/30. She wanted to confirm it as she was not sure what she wrote down.

## 2019-01-21 NOTE — TELEPHONE ENCOUNTER
----- Message from Coco Wilson RN sent at 1/21/2019 11:58 AM CST -----  Contact: SHAYY JAMIL [[id number]]      ----- Message -----  From: Crystal Brewer  Sent: 1/21/2019  11:54 AM  To: Gavin Lopez    Name of Who is Calling: SHAYY JAMIL [[id number]]        What is the request in detail: [facility] Contact at your earliest convenience.  Thanks-          Can the clinic reply by [facility]: N    What Number to Call Back [facility] [phone]

## 2019-01-21 NOTE — TELEPHONE ENCOUNTER
Returned call, spoke with patient and assisted with rescheduling her apt. New apt made for 1/30 at 1:30pm

## 2019-01-23 ENCOUNTER — HOSPITAL ENCOUNTER (OUTPATIENT)
Dept: RADIOLOGY | Facility: OTHER | Age: 84
Discharge: HOME | End: 2019-01-23
Attending: SPECIALIST
Payer: MEDICARE

## 2019-01-23 ENCOUNTER — OFFICE VISIT (OUTPATIENT)
Dept: PAIN MEDICINE | Facility: CLINIC | Age: 84
End: 2019-01-23
Payer: MEDICARE

## 2019-01-23 VITALS
DIASTOLIC BLOOD PRESSURE: 78 MMHG | BODY MASS INDEX: 29.82 KG/M2 | HEIGHT: 62 IN | HEART RATE: 62 BPM | WEIGHT: 162.06 LBS | TEMPERATURE: 98 F | SYSTOLIC BLOOD PRESSURE: 139 MMHG

## 2019-01-23 DIAGNOSIS — Z12.31 VISIT FOR SCREENING MAMMOGRAM: ICD-10-CM

## 2019-01-23 DIAGNOSIS — M79.10 MYALGIA: Primary | ICD-10-CM

## 2019-01-23 DIAGNOSIS — M47.816 FACET ARTHRITIS OF LUMBAR REGION: ICD-10-CM

## 2019-01-23 DIAGNOSIS — M48.50XA VERTEBRAL COMPRESSION FRACTURE: ICD-10-CM

## 2019-01-23 DIAGNOSIS — R92.8 ABNORMAL MAMMOGRAM: ICD-10-CM

## 2019-01-23 DIAGNOSIS — S22.080B: ICD-10-CM

## 2019-01-23 DIAGNOSIS — M47.816 LUMBAR SPONDYLOSIS: ICD-10-CM

## 2019-01-23 DIAGNOSIS — Z12.39 BREAST CANCER SCREENING, HIGH RISK PATIENT: ICD-10-CM

## 2019-01-23 DIAGNOSIS — G89.4 CHRONIC PAIN SYNDROME: ICD-10-CM

## 2019-01-23 PROCEDURE — 20553 NJX 1/MLT TRIGGER POINTS 3/>: CPT | Mod: S$GLB,,, | Performed by: NURSE PRACTITIONER

## 2019-01-23 PROCEDURE — 99499 RISK ADDL DX/OHS AUDIT: ICD-10-PCS | Mod: S$GLB,,, | Performed by: NURSE PRACTITIONER

## 2019-01-23 PROCEDURE — 3075F PR MOST RECENT SYSTOLIC BLOOD PRESS GE 130-139MM HG: ICD-10-PCS | Mod: CPTII,S$GLB,, | Performed by: NURSE PRACTITIONER

## 2019-01-23 PROCEDURE — 3078F DIAST BP <80 MM HG: CPT | Mod: CPTII,S$GLB,, | Performed by: NURSE PRACTITIONER

## 2019-01-23 PROCEDURE — 99999 PR PBB SHADOW E&M-EST. PATIENT-LVL III: ICD-10-PCS | Mod: PBBFAC,,, | Performed by: NURSE PRACTITIONER

## 2019-01-23 PROCEDURE — 99213 PR OFFICE/OUTPT VISIT, EST, LEVL III, 20-29 MIN: ICD-10-PCS | Mod: 25,S$GLB,, | Performed by: NURSE PRACTITIONER

## 2019-01-23 PROCEDURE — 3075F SYST BP GE 130 - 139MM HG: CPT | Mod: CPTII,S$GLB,, | Performed by: NURSE PRACTITIONER

## 2019-01-23 PROCEDURE — 99999 PR PBB SHADOW E&M-EST. PATIENT-LVL III: CPT | Mod: PBBFAC,,, | Performed by: NURSE PRACTITIONER

## 2019-01-23 PROCEDURE — 1101F PT FALLS ASSESS-DOCD LE1/YR: CPT | Mod: CPTII,S$GLB,, | Performed by: NURSE PRACTITIONER

## 2019-01-23 PROCEDURE — 3078F PR MOST RECENT DIASTOLIC BLOOD PRESSURE < 80 MM HG: ICD-10-PCS | Mod: CPTII,S$GLB,, | Performed by: NURSE PRACTITIONER

## 2019-01-23 PROCEDURE — 99499 UNLISTED E&M SERVICE: CPT | Mod: S$GLB,,, | Performed by: NURSE PRACTITIONER

## 2019-01-23 PROCEDURE — 99213 OFFICE O/P EST LOW 20 MIN: CPT | Mod: 25,S$GLB,, | Performed by: NURSE PRACTITIONER

## 2019-01-23 PROCEDURE — 1101F PR PT FALLS ASSESS DOC 0-1 FALLS W/OUT INJ PAST YR: ICD-10-PCS | Mod: CPTII,S$GLB,, | Performed by: NURSE PRACTITIONER

## 2019-01-23 PROCEDURE — 20553 PR INJECT TRIGGER POINTS, > 3: ICD-10-PCS | Mod: S$GLB,,, | Performed by: NURSE PRACTITIONER

## 2019-01-23 RX ORDER — BUPIVACAINE HYDROCHLORIDE 5 MG/ML
9 INJECTION, SOLUTION EPIDURAL; INTRACAUDAL ONCE
Status: COMPLETED | OUTPATIENT
Start: 2019-01-23 | End: 2019-01-23

## 2019-01-23 RX ORDER — METHYLPREDNISOLONE ACETATE 40 MG/ML
40 INJECTION, SUSPENSION INTRA-ARTICULAR; INTRALESIONAL; INTRAMUSCULAR; SOFT TISSUE ONCE
Status: COMPLETED | OUTPATIENT
Start: 2019-01-23 | End: 2019-01-23

## 2019-01-23 RX ADMIN — METHYLPREDNISOLONE ACETATE 40 MG: 40 INJECTION, SUSPENSION INTRA-ARTICULAR; INTRALESIONAL; INTRAMUSCULAR; SOFT TISSUE at 02:01

## 2019-01-23 RX ADMIN — BUPIVACAINE HYDROCHLORIDE 45 MG: 5 INJECTION, SOLUTION EPIDURAL; INTRACAUDAL at 02:01

## 2019-01-23 NOTE — PROGRESS NOTES
Chronic patient Established Note (Follow up visit)      SUBJECTIVE:    Anahi Cook presents to the clinic for a follow-up appointment for lower back and left lower extremity pain.  She had T12 kyphoplasty performed on 11/8/18 with significant improvement in symptoms.  She reports feeling much better.  She is able to ambulate better since the kypho as well.  She is having some left buttock pain which feels tight.  She has tried stretching with limited benefit.  She would like an injection today.  Since the last visit, Anahi Cook states the pain has been improving.  Current pain intensity is 2/10.     Pain Medications:  OTC Tylenol    Opioid Contract: no     report:  Not applicable    Pain Procedures:   7/21/14 L4-5 IL ASHUTOSH- significant benefit  12/1/15 Left GT bursa injection  4/26/16 Left GT bursa injection  12/15/16 L4-5 IL ASHUTOSH- significant benefit  2/15/18 L4-5 IL ASHUTOSH- 100% relief  8/21/18 L4-5 IL ASHUTOSH- significant relief  10/15/18 Bilateral L3-4, L4-5 L5-S1 facet joint injections  11/8/18 T12 kyphoplasty- significant relief    Physical Therapy/Home Exercise: yes currently    Imaging:     Narrative     EXAMINATION:  XR THORACIC SPINE AP LATERAL    CLINICAL HISTORY:  Unspecified injury of lower back, initial encounter    TECHNIQUE:  Two views obtained.  Note that patient is slightly obliquely oriented on the lateral views which combines with severe osseous demineralization and result in limitation.    COMPARISON:  CT January 2018    FINDINGS:  There are surgical clips in the right upper quadrant.  There is tortuosity of the aorta with calcification along its wall.  Mild curvature of the spine is noted.  There is mild compression deformity along the superior endplate of T12.  No definite additional compression deformity noted.      Impression       Mild compression deformity along superior endplate of T12 new compared to January 2018.  Exam somewhat limited due to osseous demineralization and patient  obliquity.     Narrative     EXAMINATION:  XR LUMBAR SPINE AP AND LATERAL    CLINICAL HISTORY:  Low back pain, minor trauma;    TECHNIQUE:  AP, lateral and spot images were performed of the lumbar spine.  Patient is slightly obliquely positioned on the lateral view.    COMPARISON:  CT from January 2018    FINDINGS:  There is severe osseous demineralization.  Surgical clips are present in the abdomen.  There is calcification along the wall of the aorta and branches.    Grade 1 anterolisthesis is present at L4-5.  T12 demonstrates mild compression deformity along the superior endplate which is a change compared to the January examination.  Remaining vertebral bodies are normal in height and alignment.  Discs are maintained.      Impression       Mild compression deformity along the superior endplate of T12, new compared to January 2018 CT.       Lumbar MRI 5/21/14    Narrative     Comparison: None    Findings:  L5-S1: There is no disk disease.  There is a 8mm extra canal synovial cyst emanating from the right facet joint.  There is mild facet arthropathy.  There is no central canal or neural foraminal narrowing  L4-L5: There is grade 1 anterolisthesis of L4 on L5 with uncovering of the disk.  There is a diffuse posterior disk bulge.  There is severe ligamentous thickening and moderate facet arthropathy generating moderate severe central canal stenosis.  There is   moderate right neural foraminal stenosis and severe left neural foraminal stenosis.  L3-L4: There is a posterior disk bulge with super imposed right foraminal broad-based protrusion.  There is mild to moderate ligamentous thickening and mild facet arthropathy.  There is mild narrowing of the right lateral recess.  There is mild left   neural foraminal narrowing and severe right neural foraminal narrowing due to the foraminal protrusion.  L2-L3: There is a diffuse disk bulge present.  There is minimal ligamentous thickening and mild facet arthropathy  generating at most mild central canal stenosis.  There is an extraforaminal bulge at this level as well leading to moderate right neural   foraminal stenosis and mild left neural foraminal stenosis.  L1-L2: No significant disease.    There is no marrow replacement process, infection, tumor present.  Limited evaluation of intra-abdominal structures unremarkable.  No abnormal masses or fluid collections are present.   Impression       Multilevel degenerative changes most prominent at L4-L5 where there is moderate to severe central canal stenosis and severe left foraminal stenosis.  Severe right foraminal stenosis is present at L3-L4 as well.     Narrative     EXAMINATION:  MRI THORACIC SPINE W WO CONTRAST    CLINICAL HISTORY:  Abnormal xray, thoracic spine, DJD;Spine fracture, traumatic, thoracic;Compression fracture T12 and breast cancer;  Abnormal findings on diagnostic imaging of other parts of musculoskeletal system    TECHNIQUE:  Multiplanar, multisequence images were performed through the thoracic spine.  Contrast was not administered.    COMPARISON:  CT chest 01/23/2018    FINDINGS:  There is a subacute severe biconcave compression fracture of the T12 vertebral body.  There is retropulsion of fracture fragments into the central canal resulting in moderate central canal stenosis with impression on the spinal cord.  The visualized portions of the spinal cord are otherwise unremarkable.    There is multilevel degenerative change with posterior disc bulges most prominent at T6-T7 where there is mild central canal stenosis.  No other evidence of osseous fracture.  The vertebral body heights are otherwise well maintained.    The heart is enlarged.  There is a probable 1.5 cm right renal cyst.  The pre and post vertebral soft tissues are otherwise unremarkable.      Impression       1.  Subacute severe biconcave compression fracture of the T12 vertebral body with retropulsion of fracture fragments into the central  canal resulting in moderate central canal stenosis.    2.  Cardiomegaly.         Past Medical History:  Past Medical History:   Diagnosis Date    Anxiety     Back pain     Breast cancer 1/17/2018    Breast mass 1/15/2018    Chronic kidney disease, stage 2, mildly decreased GFR     COPD (chronic obstructive pulmonary disease)     emphysema    Depression     Dysuria 1/29/2018    Family history of breast cancer 1/17/2018    Hypertension     Infiltrating ductal carcinoma of breast, left 7/12/2018    Osteoporosis, senile     Ovarian mass 1/15/2018    Pneumonia     S/P robotic assisted laparoscopic BSO (bilateral salpingo-oophorectomy) 2/23/2018       Past Surgical History:  Past Surgical History:   Procedure Laterality Date    CHOLECYSTECTOMY      EYE SURGERY      HYSTERECTOMY      INFUSION, INTRAVENOUS N/A 4/17/2013    Performed by [facility] at [facility] OR    INJECTION, FACET JOINT, BILATERAL LUMBAR L3-4, 4-5, 5-S1 FACET JOINT INJECTIONS Bilateral 10/15/2018    Performed by Merly Wilcox MD at [facility] PAIN MGT    INJECTION, FOR SENTINEL NODE IDENTIFICATION Left 7/12/2018    Performed by Amanda Caballero MD at [facility] OR 2ND FLR    INJECTION,STEROID,EPIDURAL N/A 8/21/2018    Performed by Merly Wilcox [facility] at [facility] PAIN MGT    INJECTION-STEROID-EPIDURAL-LUMBAR N/A 2/15/2018    Performed by Merly Wilcox MD at [facility] PAIN MGT    INJECTION-STEROID-EPIDURAL-LUMBAR N/A 2/15/2016    Performed by [facility] Wilcox [facility] at [facility] PAIN MGT    INJECTION-STEROID-EPIDURAL-LUMBAR N/A 7/21/2014    Performed by Merly Wilcox MD at [facility] PAIN MGT    Kyphoplasty   T12 N/A 11/8/2018    Performed by Merly Wilcox MD at [facility] CATH LAB    LYMPHADENECTOMY, AXILLARY Left 7/12/2018    Performed by Amanda [facility]allero MD at [facility] OR 2ND FLR    MASTECTOMY, PARTIAL-W/SEED LOCALIZATION Left 7/12/2018    Performed by Amanda Caballero MD at [facility] OR 2ND FLR    ND REMOVAL OF OVARY/TUBE(S)  02/23/2018    Robotic Assisted    ROTATOR CUFF REPAIR Right      rotator cuff repair right shoulder    TONSILLECTOMY      XI ROBOT ASSISTED LAPAROSCOPIC SALPINGO-OOPHERECTOMY Bilateral 2018    Performed by [facility]z [facility] at [facility] OR       Family History:  Family History   Problem Relation Age of Onset    Heart failure Mother     Kidney failure Mother     Heart attack Father     Breast cancer Sister 66        Lump, XRT, chemo, recurrence 10 years later, still alive.    Cancer Brother         leukemia    Heart attack Brother 58    Pulmonary embolism Brother 45    Heart attack Brother 52    Breast cancer Maternal Grandmother 60        advanced at diagnosis    Breast cancer Maternal Aunt 83         at 92       Social History:  Social History     Socioeconomic History    Marital status: Single     Spouse name: Not on file    Number of children: 3    Years of education: Not on file    Highest education level: Not on file   Social Needs    Financial resource strain: Not on file    Food insecurity - worry: Not on file    Food insecurity - inability: Not on file    Transportation needs - medical: Not on file    Transportation needs - non-medical: Not on file   Occupational History    Occupation: Multiple jobs, see social documentation section     Comment: Retired   Tobacco Use    Smoking status: Former Smoker     Packs/day: 1.00     Years: 40.00     Pack years: 40.00     Types: Cigarettes    Smokeless tobacco: Never Used    Tobacco comment: Smoked >1 ppd for at least 40 years, quit around    Substance and Sexual Activity    Alcohol use: Yes     Comment: occasional glass of wine or cocktail    Drug use: No    Sexual activity: Yes     Partners: Male   Other Topics Concern    Not on file   Social History Narrative    Worked many jobs while raising 3 children.  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  She was  from her first , but  "took care of him at the end of his life.       REVIEW OF SYSTEMS:    GENERAL:  No weight loss, malaise or fevers.  HEENT:   No recent changes in vision or hearing  NECK:  Negative for lumps, no difficulty with swallowing.  RESPIRATORY:  Negative for cough, wheezing or shortness of breath, patient denies any recent URI. COPD.  CARDIOVASCULAR:  Negative for chest pain, leg swelling or palpitations. Hypertension.  GI:  Negative for abdominal discomfort, blood in stools or black stools or change in bowel habits.  MUSCULOSKELETAL:  See HPI.  SKIN:  Negative for lesions, rash, and itching.  PSYCH:  No mood disorder or recent psychosocial stressors.  Patients sleep is not disturbed secondary to pain.  HEMATOLOGY/LYMPHOLOGY:  Negative for prolonged bleeding, bruising easily or swollen nodes.  Patient is not currently taking any anti-coagulants  NEURO:   No history of headaches, syncope, paralysis, seizures or tremors.  All other reviewed and negative other than HPI.    OBJECTIVE:    /78   Pulse 62   Temp 97.7 °F (36.5 °C)   Ht 5' 2" (1.575 m)   Wt 73.5 kg (162 lb 0.6 oz)   LMP  (LMP Unknown)   BMI 29.64 kg/m²     PHYSICAL EXAMINATION:    GENERAL: Well appearing, in no acute distress, alert and oriented x3.  PSYCH:  Mood and affect appropriate.  SKIN: Skin color, texture, turgor normal, no rashes or lesions.  HEAD/FACE:  Normocephalic, atraumatic. Cranial nerves grossly intact.  CV: RRR with palpation of the radial artery.  PULM: No evidence of respiratory difficulty, symmetric chest rise.  GI:  Soft and non-tender.  BACK: Straight leg raising in the sitting and supine positions is negative to radicular pain.  There is no pain with palpation over the lumbar spine.  No pain with palpation of thoracic spine.  Limited ROM with pain on extension.  Mild facet loading bilaterally.  EXTREMITIES: Peripheral joint ROM is full and pain free without obvious instability or laxity in all four extremities. No deformities, " edema, or skin discoloration. Good capillary refill.  MUSCULOSKELETAL: Mild TTP over left GT bursa.  There is tenderness with palpation over gluteal muscles on the left.  Provocative maneuvers of hips do not cause pain.  No atrophy or tone abnormalities are noted.  NEURO: Bilateral upper and lower extremity coordination and muscle stretch reflexes are physiologic and symmetric.  Plantar response are downgoing. No clonus.  Normal sensation.  GAIT: Antalgic- ambulates with rolling walker.      ASSESSMENT: 84 y.o. year old female with lower back and lower extremity pain, consistent with the following diagnoses:     1. Vertebral compression fracture     2. Lumbar spondylosis     3. Open wedge compression fracture of twelfth thoracic vertebra, initial encounter     4. Facet arthritis of lumbar region     5. Chronic pain syndrome     6. Myalgia           PLAN:     - Previous imaging was reviewed and discussed with the patient today.    - She had significant improvement after kypho.     - Will give TPIs today for muscular pain to left buttocks.    - Continue with PT as this is helping.    - Continue OTC Tylenol.  Avoid NSAIDs.    - RTC in 2 months or sooner if needed.      The above plan and management options were discussed at length with patient. Patient is in agreement with the above and verbalized understanding.    Sofia Schultz  01/23/2019     Trigger Point Injection:   The procedure was discussed with the patient including complications of nerve damage,  bleeding, infection, and failure of pain relief.   Trigger points were identified by palpation and marked. Alcohol prep of sites done. A mixture of 9mL 050% bupivacaine +40mg Depo-Medrol was prepared (10 mL total).   A 27-gauge needle was advanced to the point of maximal tenderness, and medication was injected after negative aspiration. All sites done in the same manner. Patient tolerated the procedure well and without complications. Sites injected included:  gluteal muscles on the left (3 sites total).

## 2019-01-24 ENCOUNTER — HOSPITAL ENCOUNTER (OUTPATIENT)
Dept: RADIOLOGY | Facility: OTHER | Age: 84
Discharge: HOME | End: 2019-01-24
Attending: SPECIALIST
Payer: MEDICARE

## 2019-01-24 VITALS — WEIGHT: 162 LBS | BODY MASS INDEX: 29.81 KG/M2 | HEIGHT: 62 IN

## 2019-01-24 PROCEDURE — 77066 MAMMO DIGITAL DIAGNOSTIC BILAT WITH TOMOSYNTHESIS_CAD: ICD-10-PCS | Mod: 26,,, | Performed by: RADIOLOGY

## 2019-01-24 PROCEDURE — 77062 MAMMO DIGITAL DIAGNOSTIC BILAT WITH TOMOSYNTHESIS_CAD: ICD-10-PCS | Mod: 26,,, | Performed by: RADIOLOGY

## 2019-01-24 PROCEDURE — 77066 DX MAMMO INCL CAD BI: CPT | Mod: 26,,, | Performed by: RADIOLOGY

## 2019-01-24 PROCEDURE — 77062 BREAST TOMOSYNTHESIS BI: CPT | Mod: 26,,, | Performed by: RADIOLOGY

## 2019-01-24 PROCEDURE — 77062 BREAST TOMOSYNTHESIS BI: CPT | Mod: TC

## 2019-01-25 ENCOUNTER — CLINICAL SUPPORT (OUTPATIENT)
Dept: REHABILITATION | Facility: HOSPITAL | Age: 84
End: 2019-01-25
Attending: ANESTHESIOLOGY
Payer: MEDICARE

## 2019-01-25 DIAGNOSIS — R53.1 WEAKNESS: ICD-10-CM

## 2019-01-25 DIAGNOSIS — R26.2 DIFFICULTY WALKING: ICD-10-CM

## 2019-01-25 PROCEDURE — 97110 THERAPEUTIC EXERCISES: CPT | Mod: PO

## 2019-01-25 NOTE — PROGRESS NOTES
"Name: Anahi Cook  Clinic Number: [id number]  Date of Treatment: 01/24/2019   Diagnosis:   Encounter Diagnoses   Name Primary?    Weakness     Difficulty walking        Physician: Merly Wilcox MD    Time in: 2  Time Out: 3  Total Treatment Time: 47  30 min. 1:1    Evaluation Date: 11/28/18  Visit # authorized: 10/12  Authorization period: 1/28/19  Plan of care expiration: 2/28/18    Precautions: standard; fall risk  Subjective     Anahi reports she is doing pretty well. Denies pain.     Objective   Lumbar ROM: (measured in degrees)     Degrees Quality   Flexion WFL        Extension NT     Left Side Bending WFL     Right Side Bending WFL     Left rotation WFL     Right Rotation WFL        Dermatomes: (impaired/normal)      RLE LLE   L2 Intact Intact   L3 Intact Intact   L4 Intact Intact   L5 Intact Intact   S1 Intact Intact      Reflexes: L4/S1:2 bilaterally     Lower Extremity Strength (graded 0-5 out of 5)    RLE LLE   Hip flexion: 3+/5 3+/5   Hip ER 4+/5 4+/5   Hip IR 4+/5 4+/5   Knee extension: 4+/5 4+/5   Ankle dorsiflexion: 5/5 5/5   Great toe extension: 4/5 4-/5   Posterior fibers of Gluteus medius 4-/5 4-/5   Knee flexion 4+/5 4+/5   Ankle plantarflexion 5/5 5/5   Hip extension: 3+/5 4-/5   Glute max 3+/5 3+/5      Special Tests: ((+): pos.; (-): neg.)    · Bridge Test: +  · Pirformis Test: -     Flexibility:   · Popliteal Angle: bilateral: 90 deg.    · 30 sec. sit to stand: 9x    Dynamic Gait Index  1. Gait on level surface: 3  2. Change in Gait speed: 3  3. Gait with Horizontal Head Turns: 2  4. Gait with Vertical Head Turns: 2  5. Gait and Pivot turn: 3  6. Step Over Obstacle: 3  7. Step around obstacles: 2  8. Steps: 3    Score: 21/24    Anahi received therapeutic exercises to develop strength, endurance and flexibility for 45 minutes including:     warm-up  · recumbent bike: no resistance: 5'  Sitting  · Sit to Stand: 3x10  Supine:  · Bridge: 2x10  · HS Stretch 3 x 30" - performed with Therapist " "assist  · Piriformis Stretch 3 x 30" performed with therapist assist  · SLR 2x8 each  Sidelying   · s/l clams: 2x8 bilaterally 1#  Standing:  · Standing Heel raises 2x10  · Standing Hip abduction and extension: 2x8 bilaterally  · Standing Marches 2 x 10   · side stepping at countertop: 2 laps  · 4" step up: 2x8  · add in airex balance: 2x30 sec.     Post Treatment after seated rest:  SpO2 = 90-96%  HR = 71 bpm    Written Home Exercises Provided: no change     Pt educated on continuing HEP. Pt demo good understanding of the education provided. Anahi demonstrated good return demonstration of activities.     Assessment   Anahi continues to improve in LE strength, function, and ability to ambulate without an AD. Based on assessment pt. is still a low risk for falls due to occasional LOB without use of AD. Pt. is especially challenged with neck movement while walking and quick pace as she often sways and/or looses balance. Pt. could benefit from additional therapy to decrease risk for falls and need for an AD while walking. Pt. advised to use an AD while walking outdoors and none within home until she has improved. Pt. verbalized understanding.     Pt will continue to benefit from skilled PT intervention. Medical Necessity is demonstrated by:  Fall Risk and Pain limits function of effected part for some activities.    Patient is making good progress towards established goals.    GOALS:   Short Term Goals:  4 weeks  1. Report decreased back pain </= 5/10 at worst to increase tolerance for prolonged sitting/standing MET  2. Pt. to demonstrate proper cervical and scapula retraction requiring min. to no verbal cues from PT  3. Pt. to demonstrate increased MMT for core/lumbar paraspinals to 3/5 to increase endurance with prolonged sitting/standing.  4. Pt. to demonstrate increased MMT for left gluteus medius to 4-/5  to increase stability during community ambulation  5. Pt. to be independent with symptom management MET  6. Pt " to tolerate HEP to improve ROM and independence with ADL's MET     Long Term Goals: 8 weeks  1. Report decreased back pain </=  2/10 at worst to increase tolerance for community ambulation  2. Pt. to demonstrate proper cervical and scapula retraction requiring no verbal cues from PT  3. Pt. to demonstrate increased MMT for core/lumbar paraspinals to 3+/5 to increase endurance with prolonged sitting.   4. Pt. to demonstrate increased MMT for bilateral gluteus medius to 4+/5  to increase stability during ambulation on uneven surfaces.  5. Pt. to demonstrate increased MMT for bilateral hip flexor to 4+/5 to increase tolerance for ADL and work activities.   6. Pt to be independent with HEP to improve ROM and independence with ADL's    New/Revised goals: none at this time      Plan     Continue with established Plan of Care towards PT goals.

## 2019-01-25 NOTE — PLAN OF CARE
"Name: Anahi Cook  Clinic Number: [id number]  Date of Treatment: 01/24/2019   Diagnosis:   Encounter Diagnoses   Name Primary?    Weakness     Difficulty walking        Physician: Merly Wilcox MD    Time in: 2  Time Out: 3  Total Treatment Time: 47  30 min. 1:1    Evaluation Date: 11/28/18  Visit # authorized: 10/12  Authorization period: 1/28/19  Plan of care expiration: 2/28/18    Precautions: standard; fall risk  Subjective     Anahi reports she is doing pretty well. Denies pain.     Objective   Lumbar ROM: (measured in degrees)     Degrees Quality   Flexion WFL        Extension NT     Left Side Bending WFL     Right Side Bending WFL     Left rotation WFL     Right Rotation WFL        Dermatomes: (impaired/normal)      RLE LLE   L2 Intact Intact   L3 Intact Intact   L4 Intact Intact   L5 Intact Intact   S1 Intact Intact      Reflexes: L4/S1:2 bilaterally     Lower Extremity Strength (graded 0-5 out of 5)    RLE LLE   Hip flexion: 3+/5 3+/5   Hip ER 4+/5 4+/5   Hip IR 4+/5 4+/5   Knee extension: 4+/5 4+/5   Ankle dorsiflexion: 5/5 5/5   Great toe extension: 4/5 4-/5   Posterior fibers of Gluteus medius 4-/5 4-/5   Knee flexion 4+/5 4+/5   Ankle plantarflexion 5/5 5/5   Hip extension: 3+/5 4-/5   Glute max 3+/5 3+/5      Special Tests: ((+): pos.; (-): neg.)    · Bridge Test: +  · Pirformis Test: -     Flexibility:   · Popliteal Angle: bilateral: 90 deg.    · 30 sec. sit to stand: 9x    Dynamic Gait Index  1. Gait on level surface: 3  2. Change in Gait speed: 3  3. Gait with Horizontal Head Turns: 2  4. Gait with Vertical Head Turns: 2  5. Gait and Pivot turn: 3  6. Step Over Obstacle: 3  7. Step around obstacles: 2  8. Steps: 3    Score: 21/24    Anahi received therapeutic exercises to develop strength, endurance and flexibility for 45 minutes including:     warm-up  · recumbent bike: no resistance: 5'  Sitting  · Sit to Stand: 3x10  Supine:  · Bridge: 2x10  · HS Stretch 3 x 30" - performed with Therapist " "assist  · Piriformis Stretch 3 x 30" performed with therapist assist  · SLR 2x8 each  Sidelying   · s/l clams: 2x8 bilaterally 1#  Standing:  · Standing Heel raises 2x10  · Standing Hip abduction and extension: 2x8 bilaterally  · Standing Marches 2 x 10   · side stepping at countertop: 2 laps  · 4" step up: 2x8  · add in airex balance: 2x30 sec.     Post Treatment after seated rest:  SpO2 = 90-96%  HR = 71 bpm    Written Home Exercises Provided: no change     Pt educated on continuing HEP. Pt demo good understanding of the education provided. Anahi demonstrated good return demonstration of activities.     Assessment   Anahi continues to improve in LE strength, function, and ability to ambulate without an AD. Based on assessment pt. is still a low risk for falls due to occasional LOB without use of AD. Pt. is especially challenged with neck movement while walking and quick pace as she often sways and/or looses balance. Pt. could benefit from additional therapy to decrease risk for falls and need for an AD while walking. Pt. advised to use an AD while walking outdoors and none within home until she has improved. Pt. verbalized understanding.     Pt will continue to benefit from skilled PT intervention. Medical Necessity is demonstrated by:  Fall Risk and Pain limits function of effected part for some activities.    Patient is making good progress towards established goals.    GOALS:   Short Term Goals:  4 weeks  1. Report decreased back pain </= 5/10 at worst to increase tolerance for prolonged sitting/standing MET  2. Pt. to demonstrate proper cervical and scapula retraction requiring min. to no verbal cues from PT  3. Pt. to demonstrate increased MMT for core/lumbar paraspinals to 3/5 to increase endurance with prolonged sitting/standing.  4. Pt. to demonstrate increased MMT for left gluteus medius to 4-/5  to increase stability during community ambulation  5. Pt. to be independent with symptom management MET  6. Pt " to tolerate HEP to improve ROM and independence with ADL's MET     Long Term Goals: 8 weeks  1. Report decreased back pain </=  2/10 at worst to increase tolerance for community ambulation  2. Pt. to demonstrate proper cervical and scapula retraction requiring no verbal cues from PT  3. Pt. to demonstrate increased MMT for core/lumbar paraspinals to 3+/5 to increase endurance with prolonged sitting.   4. Pt. to demonstrate increased MMT for bilateral gluteus medius to 4+/5  to increase stability during ambulation on uneven surfaces.  5. Pt. to demonstrate increased MMT for bilateral hip flexor to 4+/5 to increase tolerance for ADL and work activities.   6. Pt to be independent with HEP to improve ROM and independence with ADL's    New/Revised goals: none at this time      Plan     Continue with established Plan of Care towards PT goals.

## 2019-01-28 ENCOUNTER — CLINICAL SUPPORT (OUTPATIENT)
Dept: REHABILITATION | Facility: HOSPITAL | Age: 84
End: 2019-01-28
Attending: ANESTHESIOLOGY
Payer: MEDICARE

## 2019-01-28 DIAGNOSIS — R26.2 DIFFICULTY WALKING: ICD-10-CM

## 2019-01-28 DIAGNOSIS — R53.1 WEAKNESS: ICD-10-CM

## 2019-01-28 PROCEDURE — 97110 THERAPEUTIC EXERCISES: CPT | Mod: PO

## 2019-01-28 NOTE — PROGRESS NOTES
"Name: Anahi Cook  Clinic Number: [id number]  Date of Treatment: 01/28/2019   Diagnosis:   Encounter Diagnoses   Name Primary?    Weakness     Difficulty walking        Physician: Merly Wilcox MD    Time in: 10  Time Out: 11  Total Treatment Time: 55 min.   30 min. 1:1    Evaluation Date: 11/28/18  Visit # authorized: 12/12  Authorization period: 1/28/19  Plan of care expiration: 2/28/19    Precautions: standard; fall risk  Subjective     Anahi reports she is doing well. Denies increased pain with balance activities/gait.     Objective     Anahi received therapeutic exercises to develop strength, endurance and flexibility for 45 minutes including:     warm-up  · recumbent bike: no resistance: 8'  Sitting  · Sit to Stand: 3x10  Supine:  · Bridge: 2x10  · HS Stretch 3 x 30" - performed with Therapist assist  · Piriformis Stretch 3 x 30" performed with therapist assist  · SLR 2x8 each  Sidelying   · s/l clams: 2x8 bilaterally 1#  Standing:  · cones: fwd and sideways through cones: 1 lap  · cones: fwd and backwards through cones: 1 lap  · Hurdles: fwd: 2 laps  · Hurdles: laterally: 2 laps  · side stepping at countertop: 2 laps NP  · add in airex balance: 2x30 sec. NP    Gait:   Gait: ambulation perimeter of clinic with SPC (rest midway)  Gait: ambulation without AD perimeter of clinic (rest midway)    Post Treatment after seated rest:  SpO2 = 90-96%  HR = 71 bpm    Written Home Exercises Provided: no change     Pt educated on continuing HEP. Pt demo good understanding of the education provided. Anahi demonstrated good return demonstration of activities.     Assessment   Anahi had good tolerance to standing balance and gait training requiring intermittent rest breaks. Pt. had to take repeated rest breaks due to SOB. Occasional LOB with good recovery. Pt. continues to benefit from additional therapy to further progress SL stability, decrease risk for falls, and increase safety with ambulation with LRD. Will continue to " progress as jadyn.     Pt will continue to benefit from skilled PT intervention. Medical Necessity is demonstrated by:  Fall Risk and Pain limits function of effected part for some activities.    GOALS:   Short Term Goals:  4 weeks  1. Report decreased back pain </= 5/10 at worst to increase tolerance for prolonged sitting/standing MET  2. Pt. to demonstrate proper cervical and scapula retraction requiring min. to no verbal cues from PT  3. Pt. to demonstrate increased MMT for core/lumbar paraspinals to 3/5 to increase endurance with prolonged sitting/standing.  4. Pt. to demonstrate increased MMT for left gluteus medius to 4-/5  to increase stability during community ambulation  5. Pt. to be independent with symptom management MET  6. Pt to tolerate HEP to improve ROM and independence with ADL's MET     Long Term Goals: 8 weeks  1. Report decreased back pain </=  2/10 at worst to increase tolerance for community ambulation  2. Pt. to demonstrate proper cervical and scapula retraction requiring no verbal cues from PT  3. Pt. to demonstrate increased MMT for core/lumbar paraspinals to 3+/5 to increase endurance with prolonged sitting.   4. Pt. to demonstrate increased MMT for bilateral gluteus medius to 4+/5  to increase stability during ambulation on uneven surfaces.  5. Pt. to demonstrate increased MMT for bilateral hip flexor to 4+/5 to increase tolerance for ADL and work activities.   6. Pt to be independent with HEP to improve ROM and independence with ADL's    New/Revised goals: none at this time    Plan     Continue with established Plan of Care towards PT goals.

## 2019-01-29 NOTE — PROGRESS NOTES
Subjective:       Patient ID: Anahi Cook is a 84 y.o. female.    Chief Complaint: Breast Cancer    HPI   Mrs. Cook returns today for follow up. She tells me that she is followed by her PCP Dr. Martinez for labs/health maintenance.     Briefly, she is an 84-year-old female who had felt a mass in her left breast.  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    She was started on letrozole and undwerwent a BSO by Dr. Chairez for an ovarian mass.  Her ovarian mass was benign. In mid July 2018 she underwent a lumpectomy.  She had a 3.3 cm carcinoma and 4/8 positive nodes.  She has remained on letrozole, now in the adjuvant setting. Of note, she is also received chest wall XRT, which she completed 10/23/18.    A mammogram 1/24/2019 was read as a BIRADS 2, and a repeat was recommended in 1 year.     Review of Systems    Overall she feels OK. She notes left breast heaviness and occasional sharp pains since her surgery. Left arm with minimal swelling- new since she was here last. No pain. No other complaints. Staying active. Using walker for walking assistance. She denies any anxiety, depression, easy bruising, fevers, chills, night  sweats, weight loss, nausea, vomiting, diarrhea, constipation, diplopia, blurred vision, headache, chest pain, palpitations, shortness of breath, breast pain, abdominal pain, extremity pain, or difficulty ambulating.  The remainder of the ten-point ROS, including general, skin, lymph, H/N, cardiorespiratory, GI, , Neuro, Endocrine, and psychiatric is negative.       Objective:      Physical Exam      She is alert, oriented to time, place, person, pleasant, well nourished, in no acute physical distress. She presents alone. Ambulating with walker.                               VITAL SIGNS:  Reviewed                                      HEENT:  Normal.  There are  no nasal, oral, lip, gingival, auricular, lid,    or conjunctival lesions.  Mucosae are moist and pink, and there is no        thrush.  Pupils are equal, reactive to light and accommodation.              Extraocular muscle movements are intact.   Dentition is fair.  Maxillary teeth are missing.                                      NECK:  Supple without JVD, adenopathy, or thyromegaly. Left clavicle less prominent than right.                      LUNGS:  Clear to auscultation without wheezing, rales, or rhonchi.           CARDIOVASCULAR:  Reveals an S1, S2, no murmurs, no rubs, no gallops.         ABDOMEN:  Soft, nontender, without organomegaly.  Bowel sounds are    present.    Scars from her laparoscopic DONOVAN-BSO are seen.                                                            EXTREMITIES:  No cyanosis, clubbing, or edema.   The left arm is slightly larger than the right. Mild lymphedema.                         BREASTS: She is s/p left lumpectomy with a healed periareolar incision.  She also has an incision from her axillary dissection;  It is well healed.   There are no masses in her right breast.    Both breasts are large and pendulous.    There is hyperpigmentation from previous XRT to left breast.                                  LYMPHATIC:  There is no cervical, axillary, or supraclavicular adenopathy.   SKIN:  Warm and moist, without petechiae, rashes, induration, or ecchymoses.           NEUROLOGIC:  DTRs are 0-1+ bilaterally, symmetrical, motor function is 5/5,  and cranial nerves are  within normal limits.      Assessment:         1. Malignant neoplasm of upper-outer quadrant of left breast in female, estrogen receptor positive  Bone Density Spine And Hip   2. Use of aromatase inhibitors  Bone Density Spine And Hip   3. Osteoporosis, senile  Bone Density Spine And Hip   4. Lymphedema of left arm  US LUE VENOUS   5. Edema, unspecified type   US LUE VENOUS             Plan:           Doing well, JARROD.  Recent mammogram negative. Repeat in 1 year.  Of note, she has Osteoporosis but is not on treatment. No recent BMD. She tells me that Dr. Martinez is her PCP but didn't mention repeating BMD (she states its been over 2 years since her last one)   Continue Calcium and vit D. She understands that she needs a Vit D level yearly and will get this through Dr. Martinez.   In regards to her left arm swelling, will get ultrasound to rule out DVT. Likely mild lymphedema.   Continue letrozole  RTC in 3 months to see Dr. Harris.     Patient is in agreement with the proposed treatment plan. All questions were answered to the patient's satisfaction. Pt knows to call clinic for any new or worsening symptoms and if anything is needed before the next clinic visit.      Jimbo Mckeon, JIMBOP-C  Hematology & Oncology  [address] [place] [id number]  ph. [phone]  Fax. [phone]     I spent 30 minutes (face to face) with the patient, more than 50% was in counseling and coordination of care as detailed above.   '     Distress Screening Results: Psychosocial Distress screening score of Distress Score: 0 noted and reviewed. No intervention indicated.

## 2019-01-30 ENCOUNTER — OFFICE VISIT (OUTPATIENT)
Dept: HEMATOLOGY/ONCOLOGY | Facility: CLINIC | Age: 84
End: 2019-01-30
Payer: MEDICARE

## 2019-01-30 VITALS
RESPIRATION RATE: 14 BRPM | DIASTOLIC BLOOD PRESSURE: 86 MMHG | BODY MASS INDEX: 29.21 KG/M2 | WEIGHT: 158.75 LBS | OXYGEN SATURATION: 95 % | TEMPERATURE: 99 F | HEIGHT: 62 IN | HEART RATE: 62 BPM | SYSTOLIC BLOOD PRESSURE: 173 MMHG

## 2019-01-30 DIAGNOSIS — M81.0 OSTEOPOROSIS, SENILE: ICD-10-CM

## 2019-01-30 DIAGNOSIS — R60.9 EDEMA, UNSPECIFIED TYPE: ICD-10-CM

## 2019-01-30 DIAGNOSIS — C50.412 MALIGNANT NEOPLASM OF UPPER-OUTER QUADRANT OF LEFT BREAST IN FEMALE, ESTROGEN RECEPTOR POSITIVE: Primary | ICD-10-CM

## 2019-01-30 DIAGNOSIS — Z17.0 MALIGNANT NEOPLASM OF UPPER-OUTER QUADRANT OF LEFT BREAST IN FEMALE, ESTROGEN RECEPTOR POSITIVE: Primary | ICD-10-CM

## 2019-01-30 DIAGNOSIS — I89.0 LYMPHEDEMA OF LEFT ARM: ICD-10-CM

## 2019-01-30 DIAGNOSIS — Z79.811 USE OF AROMATASE INHIBITORS: ICD-10-CM

## 2019-01-30 PROCEDURE — 3077F PR MOST RECENT SYSTOLIC BLOOD PRESSURE >= 140 MM HG: ICD-10-PCS | Mod: CPTII,S$GLB,, | Performed by: NURSE PRACTITIONER

## 2019-01-30 PROCEDURE — 1101F PR PT FALLS ASSESS DOC 0-1 FALLS W/OUT INJ PAST YR: ICD-10-PCS | Mod: CPTII,S$GLB,, | Performed by: NURSE PRACTITIONER

## 2019-01-30 PROCEDURE — 99999 PR PBB SHADOW E&M-EST. PATIENT-LVL IV: CPT | Mod: PBBFAC,,, | Performed by: NURSE PRACTITIONER

## 2019-01-30 PROCEDURE — 3079F DIAST BP 80-89 MM HG: CPT | Mod: CPTII,S$GLB,, | Performed by: NURSE PRACTITIONER

## 2019-01-30 PROCEDURE — 1101F PT FALLS ASSESS-DOCD LE1/YR: CPT | Mod: CPTII,S$GLB,, | Performed by: NURSE PRACTITIONER

## 2019-01-30 PROCEDURE — 3079F PR MOST RECENT DIASTOLIC BLOOD PRESSURE 80-89 MM HG: ICD-10-PCS | Mod: CPTII,S$GLB,, | Performed by: NURSE PRACTITIONER

## 2019-01-30 PROCEDURE — 3077F SYST BP >= 140 MM HG: CPT | Mod: CPTII,S$GLB,, | Performed by: NURSE PRACTITIONER

## 2019-01-30 PROCEDURE — 99999 PR PBB SHADOW E&M-EST. PATIENT-LVL IV: ICD-10-PCS | Mod: PBBFAC,,, | Performed by: NURSE PRACTITIONER

## 2019-01-30 PROCEDURE — 99214 PR OFFICE/OUTPT VISIT, EST, LEVL IV, 30-39 MIN: ICD-10-PCS | Mod: S$GLB,,, | Performed by: NURSE PRACTITIONER

## 2019-01-30 PROCEDURE — 99214 OFFICE O/P EST MOD 30 MIN: CPT | Mod: S$GLB,,, | Performed by: NURSE PRACTITIONER

## 2019-01-30 NOTE — Clinical Note
-BMD and Ultrasound to left arm- do both on same day if able; requests [facility] location.-RTC in 3 months to see Dr. Harris.

## 2019-01-31 ENCOUNTER — TELEPHONE (OUTPATIENT)
Dept: HEMATOLOGY/ONCOLOGY | Facility: CLINIC | Age: 84
End: 2019-01-31

## 2019-01-31 NOTE — PROGRESS NOTES
"Name: Anahi Cook  Clinic Number: [id number]  Date of Treatment: 01/31/2019   Diagnosis:   Encounter Diagnoses   Name Primary?    Weakness     Difficulty walking        Physician: Merly Wilcox MD    Time in: 3  Time Out: 3:45  Total Treatment Time: 45 min.   45 min. 1:1    Evaluation Date: 11/28/18  Visit # authorized: 12/12  Authorization period: 1/28/19  Plan of care expiration: 2/28/19    Precautions: standard; fall risk  Subjective     Anahi reports she is doing pretty well. Denies adverse reaction after last tx session.     Objective     Anahi received therapeutic exercises to develop strength, endurance and flexibility for 45 minutes including:     warm-up  · recumbent bike: no resistance: 8'  Sitting  · Sit to Stand: 3x10  Supine: NP except therapist assisted stretching  · Bridge: 2x10  · HS Stretch 3 x 30" - performed with Therapist assist  · Piriformis Stretch 3 x 30" performed with therapist assist  · SLR 2x8 each  Sidelying NP  · s/l clams: 2x8 bilaterally 1#  Neuromuscular re-education: Patient participated in neuromuscular re-education activities to improve: Balance, Coordination, Kinesthetic, Proprioception and Posture for 40 minutes. The following activities were included:   Standing:  · cones: fwd and sideways through cones: 1 lap  · cones: fwd and backwards through cones: 1 lap  · Hurdles: fwd: 2 laps  · Hurdles: laterally: 2 laps  · side stepping at countertop: 2 laps   · fitterboard a/p and side to side: 2x8 ea.   · standing hip width apart on airex: 1x30 sec.  · standing feet together on airex: 1x30 sec.  · standing hip width apart on airex with eyes closed: 1x30 sec.    Gait:   · Gait: ambulation perimeter of clinic with SPC (rest midway)  · Gait: ambulation without AD perimeter of clinic (rest midway)    Post Treatment after seated rest:  SpO2 = 90-96%  HR = 71 bpm    Written Home Exercises Provided: no change     Pt educated on continuing HEP. Pt demo good understanding of the education " provided. Anahi demonstrated good return demonstration of activities.     Assessment   Anahi had intermittent LBP, which is more than likely related to her posture. Pt. denied pain upon completion; only fatigue/SOB. Pt. is improving in her endurance/exercise tolerance and ability to perform exercise/gait training with less rest breaks. Will continue to progress as jadyn.     Pt will continue to benefit from skilled PT intervention. Medical Necessity is demonstrated by:  Fall Risk and Pain limits function of effected part for some activities.    GOALS:   Short Term Goals:  4 weeks  1. Report decreased back pain </= 5/10 at worst to increase tolerance for prolonged sitting/standing MET  2. Pt. to demonstrate proper cervical and scapula retraction requiring min. to no verbal cues from PT  3. Pt. to demonstrate increased MMT for core/lumbar paraspinals to 3/5 to increase endurance with prolonged sitting/standing.  4. Pt. to demonstrate increased MMT for left gluteus medius to 4-/5  to increase stability during community ambulation  5. Pt. to be independent with symptom management MET  6. Pt to tolerate HEP to improve ROM and independence with ADL's MET     Long Term Goals: 8 weeks  1. Report decreased back pain </=  2/10 at worst to increase tolerance for community ambulation  2. Pt. to demonstrate proper cervical and scapula retraction requiring no verbal cues from PT  3. Pt. to demonstrate increased MMT for core/lumbar paraspinals to 3+/5 to increase endurance with prolonged sitting.   4. Pt. to demonstrate increased MMT for bilateral gluteus medius to 4+/5  to increase stability during ambulation on uneven surfaces.  5. Pt. to demonstrate increased MMT for bilateral hip flexor to 4+/5 to increase tolerance for ADL and work activities.   6. Pt to be independent with HEP to improve ROM and independence with ADL's    New/Revised goals: none at this time    Plan     Continue with established Plan of Care towards PT goals.

## 2019-01-31 NOTE — TELEPHONE ENCOUNTER
Called patient scheduled the BMD and the ultra sound at [facility] on Tuesday 2/5.        ----- Message from Jimbo Mckeon NP sent at 1/30/2019  3:04 PM CST -----  -BMD and Ultrasound to left arm- do both on same day if able; requests [facility] location.  -RTC in 3 months to see Dr. Harris.

## 2019-02-01 ENCOUNTER — CLINICAL SUPPORT (OUTPATIENT)
Dept: REHABILITATION | Facility: HOSPITAL | Age: 84
End: 2019-02-01
Attending: ANESTHESIOLOGY
Payer: MEDICARE

## 2019-02-01 DIAGNOSIS — R53.1 WEAKNESS: ICD-10-CM

## 2019-02-01 DIAGNOSIS — R26.2 DIFFICULTY WALKING: ICD-10-CM

## 2019-02-01 PROCEDURE — 97112 NEUROMUSCULAR REEDUCATION: CPT | Mod: PO

## 2019-02-05 ENCOUNTER — HOSPITAL ENCOUNTER (OUTPATIENT)
Dept: RADIOLOGY | Facility: OTHER | Age: 84
Discharge: HOME | End: 2019-02-05
Attending: NURSE PRACTITIONER
Payer: MEDICARE

## 2019-02-05 DIAGNOSIS — M81.0 OSTEOPOROSIS, SENILE: ICD-10-CM

## 2019-02-05 DIAGNOSIS — Z17.0 MALIGNANT NEOPLASM OF UPPER-OUTER QUADRANT OF LEFT BREAST IN FEMALE, ESTROGEN RECEPTOR POSITIVE: ICD-10-CM

## 2019-02-05 DIAGNOSIS — R60.9 EDEMA, UNSPECIFIED TYPE: ICD-10-CM

## 2019-02-05 DIAGNOSIS — I89.0 LYMPHEDEMA OF LEFT ARM: ICD-10-CM

## 2019-02-05 DIAGNOSIS — C50.412 MALIGNANT NEOPLASM OF UPPER-OUTER QUADRANT OF LEFT BREAST IN FEMALE, ESTROGEN RECEPTOR POSITIVE: ICD-10-CM

## 2019-02-05 DIAGNOSIS — Z79.811 USE OF AROMATASE INHIBITORS: ICD-10-CM

## 2019-02-05 PROCEDURE — 77080 DEXA BONE DENSITY SPINE HIP: ICD-10-PCS | Mod: 26,,, | Performed by: RADIOLOGY

## 2019-02-05 PROCEDURE — 93971 EXTREMITY STUDY: CPT | Mod: 26,LT,, | Performed by: RADIOLOGY

## 2019-02-05 PROCEDURE — 93971 EXTREMITY STUDY: CPT | Mod: TC,LT

## 2019-02-05 PROCEDURE — 93971 US UPPER EXTREMITY VEINS LEFT: ICD-10-PCS | Mod: 26,LT,, | Performed by: RADIOLOGY

## 2019-02-05 PROCEDURE — 77080 DXA BONE DENSITY AXIAL: CPT | Mod: TC

## 2019-02-05 PROCEDURE — 77080 DXA BONE DENSITY AXIAL: CPT | Mod: 26,,, | Performed by: RADIOLOGY

## 2019-02-06 ENCOUNTER — DOCUMENTATION ONLY (OUTPATIENT)
Dept: HEMATOLOGY/ONCOLOGY | Facility: CLINIC | Age: 84
End: 2019-02-06

## 2019-02-06 NOTE — Clinical Note
Please let her know. U/s negative for DVT. Needs to f/u with Dr. Martinez for possible treatment of her Osteoporosis as seen on BMD.

## 2019-02-07 ENCOUNTER — TELEPHONE (OUTPATIENT)
Dept: HEMATOLOGY/ONCOLOGY | Facility: CLINIC | Age: 84
End: 2019-02-07

## 2019-02-07 NOTE — TELEPHONE ENCOUNTER
----- Message from Jimbo Mckeon NP sent at 2/6/2019  4:35 PM CST -----  Please let her know.   U/s negative for DVT.   Needs to f/u with Dr. Martinez for possible treatment of her Osteoporosis as seen on BMD.

## 2019-02-07 NOTE — TELEPHONE ENCOUNTER
Contacted patient to let her know the results of her US and explain that she needs f/u with PCP for management of osteoporosis. Patient did not answer, vm left regarding information. Office number provided for further questions and concerns.

## 2019-02-12 ENCOUNTER — CLINICAL SUPPORT (OUTPATIENT)
Dept: REHABILITATION | Facility: HOSPITAL | Age: 84
End: 2019-02-12
Attending: ANESTHESIOLOGY
Payer: MEDICARE

## 2019-02-12 DIAGNOSIS — R53.1 WEAKNESS: ICD-10-CM

## 2019-02-12 DIAGNOSIS — R26.2 DIFFICULTY WALKING: ICD-10-CM

## 2019-02-12 PROCEDURE — 97112 NEUROMUSCULAR REEDUCATION: CPT | Mod: PO

## 2019-02-12 NOTE — PROGRESS NOTES
"Name: Anahi Cook  Clinic Number: [id number]  Date of Treatment: 02/12/2019   Diagnosis:   Encounter Diagnoses   Name Primary?    Weakness     Difficulty walking        Physician: Merly Wilcox MD    Time in:12:30  Time Out: 1:30  Total Treatment Time: 55 min.   30 min. 1:1    Evaluation Date: 11/28/18  Visit # authorized: 14/24  Authorization period: 1/28/19  Plan of care expiration: 2/28/19    Precautions: standard; fall risk  Subjective     Anahi reports she is doing pretty well. Notes she has been walking around the house without an AD as jadyn.     Objective     Anahi received therapeutic exercises to develop strength, endurance and flexibility for 45 minutes including:     warm-up  · recumbent bike: no resistance: 8'  Sitting  · Sit to Stand: 3x10 NP  Supine:  · Bridge: 2x10  · HS Stretch 3 x 30" - performed with Therapist assist  · Piriformis Stretch 3 x 30" performed with therapist assist  · SLR 2x8 each  Sidelying NP  · s/l clams: 2x8 bilaterally 1#  Neuromuscular re-education: Patient participated in neuromuscular re-education activities to improve: Balance, Coordination, Kinesthetic, Proprioception and Posture for 40 minutes. The following activities were included:   Standing:  · cones: fwd and sideways through cones: 1 lap  · cones: fwd and backwards through cones: 1 lap  · Hurdles: fwd: 2 laps  · Hurdles: laterally: 2 laps  · side stepping at countertop: 2 laps   · fitterboard a/p and side to side: 2x8 ea.   · standing hip width apart on airex: 1x30 sec.  · standing feet together on airex: 1x30 sec.  · standing hip width apart on airex with eyes closed: 1x30 sec.    Gait:   · Gait: ambulation perimeter of clinic with SPC (rest midway)  · Gait: ambulation without AD perimeter of clinic (rest midway)    Post Treatment after seated rest:  SpO2 = 90-96%  HR = 71 bpm    Written Home Exercises Provided: no change     Pt educated on continuing HEP. Pt demo good understanding of the education provided. Anahi " demonstrated good return demonstration of activities.     Assessment   Anahi had good tolerance to balance activities; however, was only limited by endurance. Pt .had a few times that she lost her balance;however, she was able to recover with CGA. Pt. continues to progress in exercise ability. Will continue to progress as jadyn.     Pt will continue to benefit from skilled PT intervention. Medical Necessity is demonstrated by:  Fall Risk and Pain limits function of effected part for some activities.    GOALS:   Short Term Goals:  4 weeks  1. Report decreased back pain </= 5/10 at worst to increase tolerance for prolonged sitting/standing MET  2. Pt. to demonstrate proper cervical and scapula retraction requiring min. to no verbal cues from PT  3. Pt. to demonstrate increased MMT for core/lumbar paraspinals to 3/5 to increase endurance with prolonged sitting/standing.  4. Pt. to demonstrate increased MMT for left gluteus medius to 4-/5  to increase stability during community ambulation  5. Pt. to be independent with symptom management MET  6. Pt to tolerate HEP to improve ROM and independence with ADL's MET     Long Term Goals: 8 weeks  1. Report decreased back pain </=  2/10 at worst to increase tolerance for community ambulation  2. Pt. to demonstrate proper cervical and scapula retraction requiring no verbal cues from PT  3. Pt. to demonstrate increased MMT for core/lumbar paraspinals to 3+/5 to increase endurance with prolonged sitting.   4. Pt. to demonstrate increased MMT for bilateral gluteus medius to 4+/5  to increase stability during ambulation on uneven surfaces.  5. Pt. to demonstrate increased MMT for bilateral hip flexor to 4+/5 to increase tolerance for ADL and work activities.   6. Pt to be independent with HEP to improve ROM and independence with ADL's    New/Revised goals: none at this time    Plan     Continue with established Plan of Care towards PT goals.

## 2019-02-15 ENCOUNTER — CLINICAL SUPPORT (OUTPATIENT)
Dept: REHABILITATION | Facility: HOSPITAL | Age: 84
End: 2019-02-15
Attending: ANESTHESIOLOGY
Payer: MEDICARE

## 2019-02-15 DIAGNOSIS — R53.1 WEAKNESS: ICD-10-CM

## 2019-02-15 DIAGNOSIS — R26.2 DIFFICULTY WALKING: ICD-10-CM

## 2019-02-15 PROCEDURE — 97110 THERAPEUTIC EXERCISES: CPT | Mod: PO

## 2019-02-15 PROCEDURE — 97112 NEUROMUSCULAR REEDUCATION: CPT | Mod: PO

## 2019-02-15 NOTE — PROGRESS NOTES
"Name: Anahi Cook  Clinic Number: [id number]  Date of Treatment: 02/15/2019   Diagnosis:   Encounter Diagnoses   Name Primary?    Weakness     Difficulty walking        Physician: Merly Wilcox MD    Time in: 9:00 AM  Time Out: 10:04 AM  Total Treatment Time: 64 min.   55 min. 1:1    Evaluation Date: 11/28/18  Visit # authorized: 15/24  Authorization period: 1/28/19  Plan of care expiration: 2/28/19    Precautions: standard; fall risk  Subjective     Anahi reports her low back is bothering her today.      Objective     Anahi received therapeutic exercises to develop strength, endurance and flexibility for 30 minutes including:     warm-up  · recumbent bike: no resistance: 8'  Sitting  · Sit to Stand: 3x10 NP  Supine:  · Bridge: 2x10  · HS Stretch 3 x 30" - performed with Therapist assist  · Piriformis Stretch 3 x 30" performed with therapist assist  · SLR 2x8 each  Sidelying NP  · s/l clams: 2x8 bilaterally 1#  Neuromuscular re-education: Patient participated in neuromuscular re-education activities to improve: Balance, Coordination, Kinesthetic, Proprioception and Posture for 30 minutes. The following activities were included:   Standing:  · cones: fwd and sideways through cones: 1 lap - NP  · cones: fwd and backwards through cones: 1 lap - NP  · Hurdles: fwd: 1 lap  · Hurdles: laterally: 1 lap  · side stepping at countertop: 3 laps   · fitterboard a/p and side to side: x 20 each   · standing hip width apart on airex: 1x30 sec - NP  · Standing feet two inches apart on airex x 30 sec  · standing feet together on airex: 2x30 sec.  · standing hip width apart on airex with eyes closed: 2x30 sec.    Gait:   · Gait: ambulation perimeter of clinic with SPC (patient takes a seated rest break in between lap with SPC and lap without AD)   · Gait: ambulation without AD perimeter of clinic x 2 laps. - reports of fatigue and LBP towards the end of second lap.     Post Treatment after seated rest:  SpO2 = 93%  HR = 67 " bpm    Written Home Exercises Provided: no change     Pt educated on continuing HEP. Pt demo good understanding of the education provided. Anahi demonstrated good return demonstration of activities.     Assessment   Anahi tolerated treatment well today. Patient continues to improve in balance activities and able to perform additional set with feet together on airex and eyes closed on airex. Patient with reports of fatigue and back pain towards the end of the second lap of ambulation without AD. Patient required CGA during lateral and forward steps over hurdles with brief moments of Min-Mod A during lateral steps over hurdles. Pt. continues to progress in exercise ability. Will continue to progress as jadyn.     Pt will continue to benefit from skilled PT intervention. Medical Necessity is demonstrated by:  Fall Risk and Pain limits function of effected part for some activities.    GOALS:   Short Term Goals:  4 weeks  1. Report decreased back pain </= 5/10 at worst to increase tolerance for prolonged sitting/standing MET  2. Pt. to demonstrate proper cervical and scapula retraction requiring min. to no verbal cues from PT  3. Pt. to demonstrate increased MMT for core/lumbar paraspinals to 3/5 to increase endurance with prolonged sitting/standing.  4. Pt. to demonstrate increased MMT for left gluteus medius to 4-/5  to increase stability during community ambulation  5. Pt. to be independent with symptom management MET  6. Pt to tolerate HEP to improve ROM and independence with ADL's MET     Long Term Goals: 8 weeks  1. Report decreased back pain </=  2/10 at worst to increase tolerance for community ambulation  2. Pt. to demonstrate proper cervical and scapula retraction requiring no verbal cues from PT  3. Pt. to demonstrate increased MMT for core/lumbar paraspinals to 3+/5 to increase endurance with prolonged sitting.   4. Pt. to demonstrate increased MMT for bilateral gluteus medius to 4+/5  to increase stability  during ambulation on uneven surfaces.  5. Pt. to demonstrate increased MMT for bilateral hip flexor to 4+/5 to increase tolerance for ADL and work activities.   6. Pt to be independent with HEP to improve ROM and independence with ADL's    New/Revised goals: none at this time    Plan     Continue with established Plan of Care towards PT goals.

## 2019-02-19 ENCOUNTER — CLINICAL SUPPORT (OUTPATIENT)
Dept: REHABILITATION | Facility: HOSPITAL | Age: 84
End: 2019-02-19
Attending: ANESTHESIOLOGY
Payer: MEDICARE

## 2019-02-19 DIAGNOSIS — R53.1 WEAKNESS: ICD-10-CM

## 2019-02-19 DIAGNOSIS — R26.2 DIFFICULTY WALKING: ICD-10-CM

## 2019-02-19 PROCEDURE — 97110 THERAPEUTIC EXERCISES: CPT | Mod: PO

## 2019-02-19 PROCEDURE — 97112 NEUROMUSCULAR REEDUCATION: CPT | Mod: PO

## 2019-02-19 NOTE — PROGRESS NOTES
"Name: Anahi Cook  Clinic Number: [id number]  Date of Treatment: 02/19/2019   Diagnosis:   Encounter Diagnoses   Name Primary?    Weakness     Difficulty walking        Physician: Merly Wilcox MD    Time in: 10:00 AM  Time Out: 10:58 AM  Total Treatment Time: 58 min.   40 min. 1:1    Evaluation Date: 11/28/18  Visit # authorized: 16/24  Authorization period: 1/28/19  Plan of care expiration: 2/28/19    Precautions: standard; fall risk  Subjective     Anahi reports mild back pain upon arrival and reports she wakes up with mild back pain every morning. Reports exercise and moving around helps her back pain.       Objective     Anahi received therapeutic exercises to develop strength, endurance and flexibility for 15 minutes including:       warm-up  · recumbent bike: no resistance: 8' - NP  Sitting  · Sit to Stand: 3x10 NP  Supine:  · Bridge: 2x10  · HS Stretch 3 x 30" - performed with Therapist assist  · Piriformis Stretch 3 x 30" performed with therapist assist  · SLR 2x8 each  Sidelying NP  · s/l clams: 2x10 bilaterally 1#  Neuromuscular re-education: Patient participated in neuromuscular re-education activities to improve: Balance, Coordination, Kinesthetic, Proprioception and Posture for 30 minutes. The following activities were included:   Standing:  · cones: fwd and sideways through cones: 1 lap - NP  · cones: fwd and backwards through cones: 1 lap - NP  · Hurdles: fwd: 1 lap - NP  · Hurdles: laterally: 1 lap - NP  · side stepping at countertop: 3 laps   · fitterboard a/p and side to side: x 30 each   · standing hip width apart on airex: 1x30 sec - NP  · Standing feet two inches apart on airex x 30 sec - NP  · standing feet together on airex: 2x30 sec.  · standing hip width apart on airex with eyes closed: 2x30 sec.    Gait:   · Gait: ambulation perimeter of clinic with SPC x 2   · Gait: ambulation without AD perimeter of clinic x 1 laps.     Post Treatment after seated rest:  SpO2 = 91-93%  HR = 62-65 " bpm    Written Home Exercises Provided: no change     Pt educated on continuing HEP. Pt demo good understanding of the education provided. Anahi demonstrated good return demonstration of activities.     Assessment   Anahi tolerated treatment well today. Patient with LOB while ambulating in clinic was SC today requiring mod-max A. Post treatment patient reports her back is no longer is hurting her. Pt. continues to progress in exercise ability. Will continue to progress as jadyn.     Pt will continue to benefit from skilled PT intervention. Medical Necessity is demonstrated by:  Fall Risk and Pain limits function of effected part for some activities.    GOALS:   Short Term Goals:  4 weeks  1. Report decreased back pain </= 5/10 at worst to increase tolerance for prolonged sitting/standing MET  2. Pt. to demonstrate proper cervical and scapula retraction requiring min. to no verbal cues from PT  3. Pt. to demonstrate increased MMT for core/lumbar paraspinals to 3/5 to increase endurance with prolonged sitting/standing.  4. Pt. to demonstrate increased MMT for left gluteus medius to 4-/5  to increase stability during community ambulation  5. Pt. to be independent with symptom management MET  6. Pt to tolerate HEP to improve ROM and independence with ADL's MET     Long Term Goals: 8 weeks  1. Report decreased back pain </=  2/10 at worst to increase tolerance for community ambulation  2. Pt. to demonstrate proper cervical and scapula retraction requiring no verbal cues from PT  3. Pt. to demonstrate increased MMT for core/lumbar paraspinals to 3+/5 to increase endurance with prolonged sitting.   4. Pt. to demonstrate increased MMT for bilateral gluteus medius to 4+/5  to increase stability during ambulation on uneven surfaces.  5. Pt. to demonstrate increased MMT for bilateral hip flexor to 4+/5 to increase tolerance for ADL and work activities.   6. Pt to be independent with HEP to improve ROM and independence with  ADL's    New/Revised goals: none at this time    Plan     Continue with established Plan of Care towards PT goals.

## 2019-02-21 ENCOUNTER — CLINICAL SUPPORT (OUTPATIENT)
Dept: REHABILITATION | Facility: HOSPITAL | Age: 84
End: 2019-02-21
Attending: ANESTHESIOLOGY
Payer: MEDICARE

## 2019-02-21 DIAGNOSIS — R53.1 WEAKNESS: ICD-10-CM

## 2019-02-21 DIAGNOSIS — R26.2 DIFFICULTY WALKING: ICD-10-CM

## 2019-02-21 PROCEDURE — 97112 NEUROMUSCULAR REEDUCATION: CPT | Mod: PO

## 2019-02-21 PROCEDURE — 97110 THERAPEUTIC EXERCISES: CPT | Mod: PO

## 2019-02-21 NOTE — PROGRESS NOTES
"Name: Anahi Cook  Clinic Number: [id number]  Date of Treatment: 02/21/2019   Diagnosis:   Encounter Diagnoses   Name Primary?    Weakness     Difficulty walking        Physician: Merly Wilcox MD    Time in: 10:10 AM  Time Out: 11:15 AM  Total Treatment Time: 65 min.   65 min. 1:1    Evaluation Date: 11/28/18  Visit # authorized: 17/24  Authorization period: 1/28/19  Plan of care expiration: 2/28/19    Precautions: standard; fall risk  Subjective     Anahi reports that upper back is feeling good but does experience mild back pain when she wakes up in the morning. Reports exercise and moving around helps her back pain.       Objective     Anahi received therapeutic exercises to develop strength, endurance and flexibility for 35 minutes including:        warm-up  · Nu- Step recumbent stepper: L5 resistance: 6'   Sitting  · Sit to Stand: 3x10 NP  Supine:  · Bridges with isometric hip abd with green TB: 2x10  · SLR 2x8 each with 1.5#   · HS Stretch 3 x 30" - performed with Therapist assist  · Piriformis Stretch 3 x 30" performed with therapist assist  Sidelying  · AAROM SLR 2x8  · s/l clams: 2x10 bilaterally 2#      Neuromuscular re-education: Patient participated in neuromuscular re-education activities to improve: Balance, Coordination, Kinesthetic, Proprioception and Posture for 30 minutes. The following activities were included:     Standing:  · Marching with 3 sec holds at ballet bar, CGA with occasional UE support: 2 laps  · Tandem walks at ballet bar, Min A with 1 UE support: 2 laps   · Lateral walks at ballet bar, CGA with no UE support: 1 lap  · [place] walks, Min A with occasional UE support: 2 laps  · Static standing with narrow EVELYN, CGA no UE support: 2x30 sec  · Static standing with narrow EVELYN, eyes closed, CGA/min A no UE support: 2x30 sec  · Step overs on airex, CGA: 5x each LE  · Step ups on 4 point fitter with contralateral LE 90 degree knee flexion: 5x each LE  · cones: fwd and sideways through cones: " 1 lap - NP  · cones: fwd and backwards through cones: 1 lap - NP  · Hurdles: fwd: 1 lap - NP  · Hurdles: laterally: 1 lap - NP  · fitterboard a/p and side to side: x 30 each - NP    Gait:   · Gait: ambulation with SPC 80 ft, CGA/SBA,   · Gait: ambulation without AD 80 ft, CGA, mild unsteadiness    Post Treatment after seated rest:  SpO2 = NT  HR = NT    Written Home Exercises Provided: no change     Pt educated on continuing HEP. Pt demo good understanding of the education provided. Anahi demonstrated good return demonstration of activities.     Assessment   Anahi tolerated treatment well today. Pt tolerated therapeutic exercise progression well with no adverse affects. Pt also tolerated all balance exercises well, but did report mild LBP towards the end of the tx session. Pt demonstrated good gait stability when ambulating with a SPC and mild instability ambulating without an AD. Pt could benefit from glut max and glut med strengthening in order to stabilize hips and improve gait stability.     Will continue to progress as jadyn.     Pt will continue to benefit from skilled PT intervention. Medical Necessity is demonstrated by:  Fall Risk and Pain limits function of effected part for some activities.    GOALS:   Short Term Goals:  4 weeks  1. Report decreased back pain </= 5/10 at worst to increase tolerance for prolonged sitting/standing MET  2. Pt. to demonstrate proper cervical and scapula retraction requiring min. to no verbal cues from PT  3. Pt. to demonstrate increased MMT for core/lumbar paraspinals to 3/5 to increase endurance with prolonged sitting/standing.  4. Pt. to demonstrate increased MMT for left gluteus medius to 4-/5  to increase stability during community ambulation  5. Pt. to be independent with symptom management MET  6. Pt to tolerate HEP to improve ROM and independence with ADL's MET     Long Term Goals: 8 weeks  1. Report decreased back pain </=  2/10 at worst to increase tolerance for  community ambulation  2. Pt. to demonstrate proper cervical and scapula retraction requiring no verbal cues from PT  3. Pt. to demonstrate increased MMT for core/lumbar paraspinals to 3+/5 to increase endurance with prolonged sitting.   4. Pt. to demonstrate increased MMT for bilateral gluteus medius to 4+/5  to increase stability during ambulation on uneven surfaces.  5. Pt. to demonstrate increased MMT for bilateral hip flexor to 4+/5 to increase tolerance for ADL and work activities.   6. Pt to be independent with HEP to improve ROM and independence with ADL's    New/Revised goals: none at this time    Plan     Continue with established Plan of Care towards PT goals.

## 2019-02-28 ENCOUNTER — CLINICAL SUPPORT (OUTPATIENT)
Dept: REHABILITATION | Facility: HOSPITAL | Age: 84
End: 2019-02-28
Attending: ANESTHESIOLOGY
Payer: MEDICARE

## 2019-02-28 DIAGNOSIS — R26.2 DIFFICULTY WALKING: ICD-10-CM

## 2019-02-28 DIAGNOSIS — R53.1 WEAKNESS: ICD-10-CM

## 2019-02-28 PROCEDURE — 97110 THERAPEUTIC EXERCISES: CPT | Mod: PO

## 2019-02-28 NOTE — PROGRESS NOTES
"Name: Anahi Cook  Clinic Number: [id number]  Date of Treatment: 02/28/2019   Diagnosis:   Encounter Diagnoses   Name Primary?    Weakness     Difficulty walking        Physician: Merly Wilcox MD    Time in: 12  Time Out: 1  Total Treatment Time: 60 min.   30 min. 1:1    Evaluation Date: 11/28/18  Visit # authorized: 18/24  Authorization period: 3/2/19  Plan of care expiration: 2/28/19    Precautions: standard; fall risk  Subjective     Anahi reports feeling pretty. Only mild low back pain.     Objective     Anahi received therapeutic exercises to develop strength, endurance and flexibility for 15 minutes including:     warm-up  · Recumbent bike: L1: 10 min.   Sitting  · Sit to Stand: 3x10 NP  Supine:  · Bridges with isometric hip abd with green TB: 2x10  · SLR 2x8 each with 1.5# NP  · HS Stretch 3 x 30" - performed with Therapist assist  · Piriformis Stretch 3 x 30" performed with therapist assist  Sidelying  · AAROM SLR 2x8 NP  · s/l clams: 2x10 bilaterally 2# NP    Neuromuscular re-education: Patient participated in neuromuscular re-education activities to improve: Balance, Coordination, Kinesthetic, Proprioception and Posture for 30 minutes. The following activities were included:     Standing:  · Marching with 3 sec holds at ballet bar, CGA with occasional UE support: 2 laps  · Tandem walks at ballet bar, Min A with 1 UE support: 2 laps   · Lateral walks at ballet bar, CGA with no UE support: 1 lap  · [place] walks, Min A with occasional UE support: 2 laps  · Static standing with narrow EVELYN, CGA no UE support: 2x30 sec  · Static standing with narrow EVELYN, eyes closed, CGA/min A no UE support: 2x30 sec  · Step overs on airex, CGA: 5x each LE  · Step ups on 4 point fitter with contralateral LE 90 degree knee flexion: 5x each LE  · obstacle course: combination of cones, airexes, and hurdles: 2 laps  · Hurdles: fwd step over step: 1 lap   · Hurdles: laterally: 1 lap   · fitterboard a/p and side to side: x 30 each "     Gait:   · Gait: ambulation with SPC 80 ft, CGA/SBA,   · Gait: ambulation without AD 80 ft, CGA, mild unsteadiness    Post Treatment after seated rest:  SpO2 = NT  HR = NT    Written Home Exercises Provided: no change     Pt educated on continuing HEP. Pt demo good understanding of the education provided. Anahi demonstrated good return demonstration of activities.     Assessment   Anahi had improved exercise tolerance/endurance; however, still demonstrates intermittent LOB with balance/gait tasks. Pt. still has tendency to transition quickly partly due to SOB, which is more than likely the reason she loses her balance. Pt. continues to benefit from greater static/dynamic balance training to improve safe ambulation without an AD. Will await insurance approval.       Will continue to progress as jadyn.     Pt will continue to benefit from skilled PT intervention. Medical Necessity is demonstrated by:  Fall Risk and Pain limits function of effected part for some activities.    GOALS:   Short Term Goals:  4 weeks  1. Report decreased back pain </= 5/10 at worst to increase tolerance for prolonged sitting/standing MET  2. Pt. to demonstrate proper cervical and scapula retraction requiring min. to no verbal cues from PT  3. Pt. to demonstrate increased MMT for core/lumbar paraspinals to 3/5 to increase endurance with prolonged sitting/standing.  4. Pt. to demonstrate increased MMT for left gluteus medius to 4-/5  to increase stability during community ambulation  5. Pt. to be independent with symptom management MET  6. Pt to tolerate HEP to improve ROM and independence with ADL's MET     Long Term Goals: 8 weeks  1. Report decreased back pain </=  2/10 at worst to increase tolerance for community ambulation  2. Pt. to demonstrate proper cervical and scapula retraction requiring no verbal cues from PT  3. Pt. to demonstrate increased MMT for core/lumbar paraspinals to 3+/5 to increase endurance with prolonged sitting.   4.  Pt. to demonstrate increased MMT for bilateral gluteus medius to 4+/5  to increase stability during ambulation on uneven surfaces.  5. Pt. to demonstrate increased MMT for bilateral hip flexor to 4+/5 to increase tolerance for ADL and work activities.   6. Pt to be independent with HEP to improve ROM and independence with ADL's    New/Revised goals: none at this time    Plan     Continue with established Plan of Care towards PT goals.

## 2019-03-08 ENCOUNTER — CLINICAL SUPPORT (OUTPATIENT)
Dept: REHABILITATION | Facility: HOSPITAL | Age: 84
End: 2019-03-08
Attending: ANESTHESIOLOGY
Payer: MEDICARE

## 2019-03-08 DIAGNOSIS — R53.1 WEAKNESS: ICD-10-CM

## 2019-03-08 DIAGNOSIS — R26.2 DIFFICULTY WALKING: ICD-10-CM

## 2019-03-08 PROCEDURE — 97110 THERAPEUTIC EXERCISES: CPT | Mod: PO

## 2019-03-08 PROCEDURE — 97112 NEUROMUSCULAR REEDUCATION: CPT | Mod: PO

## 2019-03-08 NOTE — PROGRESS NOTES
"Name: Anahi Cook  Clinic Number: [id number]  Date of Treatment: 03/07/2019   Diagnosis:   Encounter Diagnoses   Name Primary?    Weakness     Difficulty walking        Physician: Merly Wilcox MD    Time in: 9  Time Out: 10  Total Treatment Time: 60 min.   30 min. 1:1    Evaluation Date: 11/28/18  Visit # authorized: 19/24  Authorization period: 3/29/19  Plan of care expiration: 3/31/19     Precautions: standard; fall risk  Subjective     Anahi she has occasional LBP; denies performing exercises in the morning to assist with her pain.     Objective     Anahi received therapeutic exercises to develop strength, endurance and flexibility for 15 minutes including:     warm-up  · Recumbent bike: L1: 10 min.   Sitting  · Sit to Stand with green medicine ball: 2x8  Supine:  · Bridges with isometric hip abd with green TB: 2x10  · SLR 2x8 each with 1.5# NP  · HS Stretch 3 x 30" - performed with Therapist assist  · Piriformis Stretch 3 x 30" performed with therapist assist  Sidelying  · s/l clams: 2x10 bilaterally 2# NP    Neuromuscular re-education: Patient participated in neuromuscular re-education activities to improve: Balance, Coordination, Kinesthetic, Proprioception and Posture for 30 minutes. The following activities were included:     Standing:  · Marching with 3 sec holds at ballet bar, CGA with occasional UE support: 2 laps  · Tandem walks at ballet bar, Min A with 1 UE support: 2 laps   · Lateral walks at ballet bar, CGA with no UE support: 1 lap  · [place] walks, Min A with occasional UE support: 2 laps  · Static standing with narrow EVELYN, eyes closed, CGA/min A no UE support: 2x30 sec  · Step overs on 4" step, CGA: 5x each LE  · Step ups on bosu dome side down with contralateral LE 90 degree knee flexion: 5x each LE  · obstacle course: combination of cones, airexes, and hurdles: 2 laps  · Hurdles: fwd step over step: 1 lap   · Hurdles: laterally: 1 lap     Gait:   · Gait: ambulation with SPC 80 ft, CGA/SBA, "   · Gait: ambulation without AD 80 ft, CGA, mild unsteadiness    Post Treatment after seated rest:  SpO2 = NT  HR = NT    Written Home Exercises Provided: no change     Pt educated on continuing HEP. Pt demo good understanding of the education provided. Anahi demonstrated good return demonstration of activities.     Assessment   Anahi had improved endurance with exercise regimen despite not sleeping well. Pt. was educated on the need to perform stretching exercises prior to getting out of bed to assist with stiffness. Pt. verbalized understanding. Pt. continues to benefit from greater static/dynamic balance training to improve safe ambulation without an AD. Will await insurance approval.       Will continue to progress as jadyn.     Pt will continue to benefit from skilled PT intervention. Medical Necessity is demonstrated by:  Fall Risk and Pain limits function of effected part for some activities.    GOALS:   Short Term Goals:  4 weeks  1. Report decreased back pain </= 5/10 at worst to increase tolerance for prolonged sitting/standing MET  2. Pt. to demonstrate proper cervical and scapula retraction requiring min. to no verbal cues from PT  3. Pt. to demonstrate increased MMT for core/lumbar paraspinals to 3/5 to increase endurance with prolonged sitting/standing.  4. Pt. to demonstrate increased MMT for left gluteus medius to 4-/5  to increase stability during community ambulation  5. Pt. to be independent with symptom management MET  6. Pt to tolerate HEP to improve ROM and independence with ADL's MET     Long Term Goals: 8 weeks  1. Report decreased back pain </=  2/10 at worst to increase tolerance for community ambulation  2. Pt. to demonstrate proper cervical and scapula retraction requiring no verbal cues from PT  3. Pt. to demonstrate increased MMT for core/lumbar paraspinals to 3+/5 to increase endurance with prolonged sitting.   4. Pt. to demonstrate increased MMT for bilateral gluteus medius to 4+/5  to  increase stability during ambulation on uneven surfaces.  5. Pt. to demonstrate increased MMT for bilateral hip flexor to 4+/5 to increase tolerance for ADL and work activities.   6. Pt to be independent with HEP to improve ROM and independence with ADL's    New/Revised goals: none at this time    Plan     Continue with established Plan of Care towards PT goals.

## 2019-03-12 ENCOUNTER — TELEPHONE (OUTPATIENT)
Dept: SURGERY | Facility: CLINIC | Age: 84
End: 2019-03-12

## 2019-03-12 ENCOUNTER — PATIENT MESSAGE (OUTPATIENT)
Dept: SURGERY | Facility: CLINIC | Age: 84
End: 2019-03-12

## 2019-03-12 RX ORDER — SULFAMETHOXAZOLE AND TRIMETHOPRIM 800; 160 MG/1; MG/1
1 TABLET ORAL 2 TIMES DAILY
COMMUNITY
Start: 2019-03-12 | End: 2019-03-19

## 2019-03-12 NOTE — TELEPHONE ENCOUNTER
Rx for Bactrim DS called to pt's pharmacy per Dr Caballero, as pt c/o left breast swelling that is warm to the touch. Rx called to [facility] doctor hotline for Batrim DS, Disp:14, Si tab by mouth BID until complete, no refills ([facility]). Pt's granddaughter notified per pt's request. Pt is to keep appt with Dr Caballero 3/18/19 and wear a support bra in the meantime.

## 2019-03-18 ENCOUNTER — OFFICE VISIT (OUTPATIENT)
Dept: SURGERY | Facility: CLINIC | Age: 84
End: 2019-03-18
Payer: MEDICARE

## 2019-03-18 VITALS
HEART RATE: 66 BPM | HEIGHT: 61 IN | DIASTOLIC BLOOD PRESSURE: 71 MMHG | SYSTOLIC BLOOD PRESSURE: 143 MMHG | TEMPERATURE: 99 F | BODY MASS INDEX: 30.57 KG/M2 | WEIGHT: 161.94 LBS

## 2019-03-18 DIAGNOSIS — C50.412 MALIGNANT NEOPLASM OF UPPER-OUTER QUADRANT OF LEFT BREAST IN FEMALE, ESTROGEN RECEPTOR POSITIVE: Primary | ICD-10-CM

## 2019-03-18 DIAGNOSIS — Z17.0 MALIGNANT NEOPLASM OF UPPER-OUTER QUADRANT OF LEFT BREAST IN FEMALE, ESTROGEN RECEPTOR POSITIVE: Primary | ICD-10-CM

## 2019-03-18 PROCEDURE — 1100F PTFALLS ASSESS-DOCD GE2>/YR: CPT | Mod: CPTII,S$GLB,, | Performed by: SURGERY

## 2019-03-18 PROCEDURE — 3288F FALL RISK ASSESSMENT DOCD: CPT | Mod: CPTII,S$GLB,, | Performed by: SURGERY

## 2019-03-18 PROCEDURE — 3078F PR MOST RECENT DIASTOLIC BLOOD PRESSURE < 80 MM HG: ICD-10-PCS | Mod: CPTII,S$GLB,, | Performed by: SURGERY

## 2019-03-18 PROCEDURE — 3288F PR FALLS RISK ASSESSMENT DOCUMENTED: ICD-10-PCS | Mod: CPTII,S$GLB,, | Performed by: SURGERY

## 2019-03-18 PROCEDURE — 99213 OFFICE O/P EST LOW 20 MIN: CPT | Mod: S$GLB,,, | Performed by: SURGERY

## 2019-03-18 PROCEDURE — 3078F DIAST BP <80 MM HG: CPT | Mod: CPTII,S$GLB,, | Performed by: SURGERY

## 2019-03-18 PROCEDURE — 99999 PR PBB SHADOW E&M-EST. PATIENT-LVL III: ICD-10-PCS | Mod: PBBFAC,,, | Performed by: SURGERY

## 2019-03-18 PROCEDURE — 3077F SYST BP >= 140 MM HG: CPT | Mod: CPTII,S$GLB,, | Performed by: SURGERY

## 2019-03-18 PROCEDURE — 1100F PR PT FALLS ASSESS DOC 2+ FALLS/FALL W/INJURY/YR: ICD-10-PCS | Mod: CPTII,S$GLB,, | Performed by: SURGERY

## 2019-03-18 PROCEDURE — 99213 PR OFFICE/OUTPT VISIT, EST, LEVL III, 20-29 MIN: ICD-10-PCS | Mod: S$GLB,,, | Performed by: SURGERY

## 2019-03-18 PROCEDURE — 99499 UNLISTED E&M SERVICE: CPT | Mod: S$GLB,,, | Performed by: SURGERY

## 2019-03-18 PROCEDURE — 99499 RISK ADDL DX/OHS AUDIT: ICD-10-PCS | Mod: S$GLB,,, | Performed by: SURGERY

## 2019-03-18 PROCEDURE — 99999 PR PBB SHADOW E&M-EST. PATIENT-LVL III: CPT | Mod: PBBFAC,,, | Performed by: SURGERY

## 2019-03-18 PROCEDURE — 3077F PR MOST RECENT SYSTOLIC BLOOD PRESSURE >= 140 MM HG: ICD-10-PCS | Mod: CPTII,S$GLB,, | Performed by: SURGERY

## 2019-03-18 RX ORDER — OMEPRAZOLE 20 MG/1
CAPSULE, DELAYED RELEASE ORAL
COMMUNITY
Start: 2019-02-12 | End: 2019-03-18

## 2019-03-18 NOTE — PROGRESS NOTES
"Breast Surgery  [facility]  Department of Surgery      REFERRING PROVIDER: No referring provider defined for this encounter.    Chief Complaint: Breast cancer    Subjective:      Patient ID: Anahi Cook is a 84 y.o. female who returns s/p L magseed localized lumpectomy with ALND on 18.   She initially presented with  L breast cancer with node positivity. There are two breast masses adjacent (may be one mass), at 12OC, path with IDC, grade II, ER+UT+H2N- with rashaad metastasis.  In addition, she is seen by Dr. Chairez for a pelvic mass that needs to be removed.  This was found to be benign but was resected by Dr. Chairez while receiving neoadjuvant endocrine therapy for the past 6 months with Dr. Harris.  She appeared to have some response on imaging but not complete response.    Patient does not routinely do self breast exams.  Patient has noted a change on breast exam.  Patient denies nipple discharge. Patient denies to previous breast biopsy. Patient denies a personal history of breast cancer.    Findings at that time were the following:   Tumor size: 2.3  + 1.4 cm   Tumor stgstrstastdstest:st st1st Estrogen Receptor: +   Progesterone Receptor: +   Her-2 jessica: -   Lymph node status: +   Lymphatic invasion: unknown     Interval history:  She is recovering well, continues to work with PT. Noted 2 weeks ago that L breast became enlarged, erythematous, "piping hot." Was started on antibiotic therapy last week. Had a LUE US in February which was negative for a DVT. Patient w/ reported h/o lymphedema in the LUE however has not noted arm swelling, just states she feels a heaviness when she lifts her arm above her head. Since starting antibiotic the L breast swelling has improved, redness has resolved but is still larger in comparison to the R breast and slightly warm to the touch..      GYN History:  Age of menarche was 12. Age of menopause was 32 with partial hysterectomy, took HRT until her 70s.    Patient is . Age of first " live birth was 19.     Past Medical History:   Diagnosis Date    Anxiety     Back pain     Breast cancer 1/17/2018    Breast mass 1/15/2018    Chronic kidney disease, stage 2, mildly decreased GFR     COPD (chronic obstructive pulmonary disease)     emphysema    Depression     Dysuria 1/29/2018    Family history of breast cancer 1/17/2018    Hypertension     Infiltrating ductal carcinoma of breast, left 7/12/2018    Osteoporosis, senile     Ovarian mass 1/15/2018    Pneumonia     S/P robotic assisted laparoscopic BSO (bilateral salpingo-oophorectomy) 2/23/2018     Past Surgical History:   Procedure Laterality Date    BREAST BIOPSY      BREAST LUMPECTOMY      CHOLECYSTECTOMY      EYE SURGERY      HYSTERECTOMY      INFUSION, INTRAVENOUS N/A 4/17/2013    Performed by Pepito Theodore MD at [facility] OR    INJECTION, FACET JOINT, BILATERAL LUMBAR L3-4, 4-5, 5-S1 FACET JOINT INJECTIONS Bilateral 10/15/2018    Performed by Merly Wilcox MD at [facility] PAIN MGT    INJECTION, FOR SENTINEL NODE IDENTIFICATION Left 7/12/2018    Performed by Amanda Caballero MD at [facility] OR 2ND FLR    INJECTION,STEROID,EPIDURAL N/A 8/21/2018    Performed by Merly Wilcox MD at [facility] PAIN MGT    INJECTION-STEROID-EPIDURAL-LUMBAR N/A 2/15/2018    Performed by Merly Wilcox MD at [facility] PAIN MGT    INJECTION-STEROID-EPIDURAL-LUMBAR N/A 2/15/2016    Performed by Merly Wilcox MD at [facility] PAIN MGT    INJECTION-STEROID-EPIDURAL-LUMBAR N/A 7/21/2014    Performed by Merly Wilcox MD at [facility] PAIN MGT    Kyphoplasty   T12 N/A 11/8/2018    Performed by Merly Wilcox MD at [facility] CATH LAB    LYMPHADENECTOMY, AXILLARY Left 7/12/2018    Performed by Amanda Caballero MD at [facility] OR 2ND FLR    MASTECTOMY, PARTIAL-W/SEED LOCALIZATION Left 7/12/2018    Performed by Amanda Caballero MD at [facility] OR 2ND FLR    ID REMOVAL OF OVARY/TUBE(S)  02/23/2018    Robotic Assisted    ROTATOR CUFF REPAIR Right     rotator cuff repair right shoulder    TONSILLECTOMY      XI  ROBOT ASSISTED LAPAROSCOPIC SALPINGO-OOPHERECTOMY Bilateral 2/23/2018    Performed by Reena [facility]z [facility] at [facility] OR     Current Outpatient Medications on File Prior to Visit   Medication Sig Dispense Refill    acetaminophen (TYLENOL) 325 MG tablet Take 650 mg by mouth every 6 (six) hours as needed for Pain.       albuterol (PROAIR HFA) 90 mcg/actuation inhaler Inhale 1-2 puffs into the lungs every 6 (six) hours as needed for Wheezing or Shortness of Breath.      albuterol (PROVENTIL) 2.5 mg /3 mL (0.083 %) nebulizer solution Inhale 3 mLs into the lungs.      albuterol-ipratropium 2.5mg-0.5mg/3mL (DUO-NEB) 0.5 mg-3 mg(2.5 mg base)/3 mL nebulizer solution       atorvastatin (LIPITOR) 20 MG tablet Take 20 mg by mouth nightly.       benazepril (LOTENSIN) 10 MG tablet Take 20 mg by mouth once daily.       calcium-vitamin D 250-100 mg-unit per tablet Take 1 tablet by mouth 2 (two) times daily.      CETIRIZINE HCL (ZYRTEC ORAL) Take 10 mg by mouth nightly as needed.       citalopram (CELEXA) 20 MG tablet Take 20 mg by mouth nightly.       felodipine (PLENDIL) 10 MG 24 hr tablet Take 20 mg by mouth once daily.       fluticasone-vilanterol (BREO) 100-25 mcg/dose diskus inhaler Inhale 1 puff into the lungs once daily. 1 each 3    INCRUSE ELLIPTA 62.5 mcg/actuation DsDv USE 1 PUFF QD AT SAME TIME  (patient takes at night)  2    letrozole (FEMARA) 2.5 mg Tab Take 1 tablet (2.5 mg total) by mouth once daily. 30 tablet 11    montelukast (SINGULAIR) 10 mg tablet Take 10 mg by mouth every evening.      multivitamin (THERAGRAN) per tablet Take 1 tablet by mouth once daily.      omeprazole (PRILOSEC) 20 MG capsule       sulfamethoxazole-trimethoprim 800-160mg (BACTRIM DS) 800-160 mg Tab Take 1 tablet by mouth 2 (two) times daily.      torsemide (DEMADEX) 20 MG Tab Take 20 mg by mouth once daily.       No current facility-administered medications on file prior to visit.      Social History     Socioeconomic History     Marital status: Single     Spouse name: Not on file    Number of children: 3    Years of education: Not on file    Highest education level: Not on file   Social Needs    Financial resource strain: Not on file    Food insecurity - worry: Not on file    Food insecurity - inability: Not on file    Transportation needs - medical: Not on file    Transportation needs - non-medical: Not on file   Occupational History    Occupation: Multiple jobs, see social documentation section     Comment: Retired   Tobacco Use    Smoking status: Former Smoker     Packs/day: 1.00     Years: 40.00     Pack years: 40.00     Types: Cigarettes    Smokeless tobacco: Never Used    Tobacco comment: Smoked >1 ppd for at least 40 years, quit around    Substance and Sexual Activity    Alcohol use: Yes     Comment: occasional glass of wine or cocktail    Drug use: No    Sexual activity: Yes     Partners: Male   Other Topics Concern    Not on file   Social History Narrative    Worked many jobs while raising 3 children.  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  She was  from her first , but took care of him at the end of his life.     Family History   Problem Relation Age of Onset    Heart failure Mother     Kidney failure Mother     Heart attack Father     Breast cancer Sister 66        Lump, XRT, chemo, recurrence 10 years later, still alive.    Cancer Brother         leukemia    Heart attack Brother 58    Pulmonary embolism Brother 45    Heart attack Brother 52    Breast cancer Maternal Grandmother 60        advanced at diagnosis    Breast cancer Maternal Aunt 83         at 92        Review of Systems   Constitutional: Negative for appetite change, chills, fever and unexpected weight change.   HENT: Negative for facial swelling, postnasal drip and sore throat.    Eyes: Negative for redness and  "itching.   Respiratory: Negative for chest tightness and shortness of breath.    Cardiovascular: Negative for chest pain and palpitations.   Gastrointestinal: Negative for blood in stool, diarrhea, nausea and vomiting.   Genitourinary: Negative for difficulty urinating and dysuria.   Musculoskeletal: Negative for arthralgias and joint swelling.   Skin: Negative for rash and wound.        Warmth, swelling of the L breast   Neurological: Negative for dizziness and syncope.   Hematological: Negative for adenopathy.   Psychiatric/Behavioral: Negative for agitation. The patient is not nervous/anxious.      Objective:   LMP  (LMP Unknown)   BP (!) 143/71 (BP Location: Right arm, Patient Position: Sitting, BP Method: Medium (Automatic))   Pulse 66   Temp 98.7 °F (37.1 °C) (Oral)   Ht 5' 1" (1.549 m)   Wt 73.5 kg (161 lb 14.9 oz)   LMP  (LMP Unknown)   BMI 30.60 kg/m²     Physical Exam   Constitutional: She appears well-developed and well-nourished.   HENT:   Head: Normocephalic.   Eyes: No scleral icterus.   Neck: Neck supple. No tracheal deviation present.   Cardiovascular: Normal rate and regular rhythm.    Pulmonary/Chest: No respiratory distress. She exhibits no mass and no tenderness. Right breast exhibits no inverted nipple, no mass, no nipple discharge and no skin change. Left breast exhibits no inverted nipple, no mass, no nipple discharge and no skin change. There is breast swelling.       Swelling of the L breast in comparison to the R, fullness in the NAC in comparison, warm to the touch   Abdominal: Soft. She exhibits no mass. There is no tenderness.   Musculoskeletal: She exhibits no edema.   Lymphadenopathy:     She has no cervical adenopathy.   Neurological: She is alert.   Skin: No rash noted. No erythema.     Psychiatric: She has a normal mood and affect.     MMG 1/24/2019  Films Compared:  Compared to: 06/13/2018 US Breast Left Limited, 06/13/2018 Mammo Digital Diagnostic Left with " Tomosynthesis_CAD, 01/11/2018 Mammo Digital Diagnostic Left without CAD, 01/04/2018 Mammo Digital Diagnostic Bilat with Tomosynthesis_CAD, and 02/22/2017 Mammo Digital Screening Bilat with Tomosynthesis_CAD     Findings:  This procedure was performed using tomosynthesis.  Computer-aided detection was utilized in the interpretation of this examination.  The breasts are heterogeneously dense, which may obscure small masses.      Left  There are post-surgical findings from a previous lumpectomy with radiation seen in the left breast. There has been no interval development of a suspicious mass, microcalcification, or architectural distortion.      Right  There is no evidence of suspicious masses, calcifications, or other abnormal findings.     Impression:  Bilateral  There is no mammographic evidence of malignancy.     BI-RADS Category:   Overall: 2 - Benign     Recommendation:  Routine screening mammogram in 1 year is recommended      FINAL PATHOLOGIC DIAGNOSIS  1. 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  - Biopsy site is present.  - See CAP synoptic summary below and comment.  2. LEFT AXILLARY CONTENTS, AXILLARY DISSECTION:  - Invasive ductal carcinoma, grade 1, 13 mm.  - Metastatic carcinoma in four of eighteen lymph nodes (4/18).  - Largest metastatic deposit measures 9 mm.  - Extranodal extension is present.  - See CAP synoptic summary below and comment.  SURGICAL PATHOLOGY CANCER CASE SUMMARY- Breast  Procedure: Excision (less than total mastectomy) and axillary dissection.  Specimen laterality: Left.  Tumor size: 2 foci, 33 mm (largest focus, greatest dimension) and 13 mm (greatest dimension).  Histologic type: Invasive ductal carcinoma.  Histologic grade: Grade 1 (glandular/tubular differentiation- 2, nuclear pleomorphism- 1, mitotic rate- 1)  Tumor focality: Multifocal, 2 foci.  Ductal carcinoma in situ: Present, negative for  extensive intraductal component (EIC).  Size of DCIS: 8 mm.  - Number of blocks with DCIS: 3.  AAE4MXQ,ER,PGR,EAI3GFC,ER,PGR    - Number of blocks examined: 9.  Architectural patterns: Cribriform and solid.  Nuclear grade: Low grade.  Necrosis: Not identified.  Note: The size (extent) of DCIS is an estimation of the volume of breast tissue occupied by DCIS.  Margins (33 mm focus)-  Uninvolved by invasive carcinoma, distances from closest margins are 3 mm to the lateral margin and 4 mm to the  medial margin.  Uninvolved by DCIS, distance from closest margin (medial) is 9 mm.  Treatment effect: No definite response to presurgical therapy in the invasive carcinoma or lymph nodes.  Lymphovascular invasion: Present.  Lymph nodes:  Total number of lymph nodes examined (sentinel and non-sentinel): 18.  Number of lymph nodes with Macrometastases (>2 mm): 2.  Number of lymph nodes with Micrometastases (> 0.2 mm to 2 mm and/or > 200 cells): 2.  Number of lymph nodes with isolated tumor cells (less than or equal to 0.2 mm and less than or equal to 200 cells):  0.  Size of largest metastatic deposit: 9 mm.  Extranodal extension: Present.  Pathologic staging (pTNM, AJCC 8th edition): ympT2 N2a.  Ancillary studies- (Performed on 33 mm focus, block 1A)  ER: Positive (moderate intensity, >95% of tumor cell nuclei).  WA: Positive (weak to moderate intensity, 60% of tumor cell nuclei).  HER2: Negative.  Ki-67: 2%.  Ancillary studies- (Performed on 13 mm focus, block 2F)  ER: Positive (strong intensity, >95% of tumor cell nuclei).  WA: Positive (weak to moderate intensity, 21-30% of tumor cell nuclei).  HER2: Negative.  Ki-67: 2%.  COMMENT:  Immunohistochemistry for p63 was performed on block 1A to confirm the presence and quantify the DCIS.  All stains have satisfactory positive and negative controls.  Blocks for potential molecular or ancillary studies:  Tumor block: 1G (33 mm focus) and 2F (13 mm focus).    Assessment:       85 y/o  female with T2N1 breast cancer that is ++-, s/p neoadjuvant  Letrozole treatment s/p partial mastectomy (07/2018) w/adjuvant Letrozole, presenting with erythema/edema/warmth of L breast.  Plan:     Continue Bactrim as prescribed  Keep L breast elevated with well fitting bra  Continue follow up with Dr. Harris for continuation of Letrozole, due for follow up at the end of April  Return to clinic in July for CBE, sooner if there are any concerns

## 2019-03-19 ENCOUNTER — CLINICAL SUPPORT (OUTPATIENT)
Dept: REHABILITATION | Facility: HOSPITAL | Age: 84
End: 2019-03-19
Attending: ANESTHESIOLOGY
Payer: MEDICARE

## 2019-03-19 DIAGNOSIS — R53.1 WEAKNESS: ICD-10-CM

## 2019-03-19 DIAGNOSIS — R26.2 DIFFICULTY WALKING: ICD-10-CM

## 2019-03-19 PROCEDURE — 97110 THERAPEUTIC EXERCISES: CPT | Mod: PO

## 2019-03-19 NOTE — PROGRESS NOTES
"Name: Anahi Cook  Clinic Number: [id number]  Date of Treatment: 03/18/2019   Diagnosis:   Encounter Diagnoses   Name Primary?    Weakness     Difficulty walking        Physician: Merly Wilcox MD    Time in: 9  Time Out: 10  Total Treatment Time: 60 min.   30 min. 1:1    Evaluation Date: 11/28/18  Visit # authorized: 19/24  Authorization period: 3/29/19  Plan of care expiration: 3/31/19     Precautions: standard; fall risk  Subjective     Anahi she has been still recovering from being ill last week. Pt. notes she had diarrhea and an infection in her breast. Pt. has finished her antibiotics, but her stamina has not returned.     Objective   before tx: pulse ox: 93; BP: 130/78  after walking: pulse Ox: 83 improved after purse lip breathing; BP: 120/60    Anahi received therapeutic exercises to develop strength, endurance and flexibility for 15 minutes including:     warm-up  · Recumbent bike: L1: 10 min.   Sitting  · Sit to Stand with green medicine ball: 2x8  Supine:  · Bridges with isometric hip abd with green TB: 2x10  · SLR 2x8 each with 1.5# NP  · HS Stretch 3 x 30" - performed with Therapist assist  · Piriformis Stretch 3 x 30" performed with therapist assist  Sidelying  · s/l clams: 2x10 bilaterally 2# NP    Neuromuscular re-education: Patient participated in neuromuscular re-education activities to improve: Balance, Coordination, Kinesthetic, Proprioception and Posture for 30 minutes. The following activities were included:     Standing:NP only gait with SPC 1 lap with a rest break  · Marching with 3 sec holds at [place] bar, CGA with occasional UE support: 2 laps  · Tandem walks at ballet bar, Min A with 1 UE support: 2 laps   · Lateral walks at ballet bar, CGA with no UE support: 1 lap  · [place] walks, Min A with occasional UE support: 2 laps  · Static standing with narrow EVELYN, eyes closed, CGA/min A no UE support: 2x30 sec  · Step overs on 4" step, CGA: 5x each LE  · Step ups on bosu dome side down with " contralateral LE 90 degree knee flexion: 5x each LE  · obstacle course: combination of cones, airexes, and hurdles: 2 laps  · Hurdles: fwd step over step: 1 lap   · Hurdles: laterally: 1 lap     Gait:   · Gait: ambulation with SPC 80 ft, CGA/SBA,   · Gait: ambulation without AD 80 ft, CGA, mild unsteadiness    Written Home Exercises Provided: no change     Pt educated on continuing HEP. Pt demo good understanding of the education provided. Anahi demonstrated good return demonstration of activities.     Assessment   Anahi had decreased endurance and was easily taxed with today's tx session. PT gave pt. frequent rest breaks and was encouraged to practice breathing techniques to regulate her HR and pulse ox. Pt. was advised to defer next visit if she is still not feeling well to ensure decreased likelihood of relapse. Pt. verbalized understanding. Pt. continues to benefit from greater static/dynamic balance training to improve safe ambulation without an AD. Will await insurance approval.       Will continue to progress as jadyn.     Pt will continue to benefit from skilled PT intervention. Medical Necessity is demonstrated by:  Fall Risk and Pain limits function of effected part for some activities.    GOALS:   Short Term Goals:  4 weeks  1. Report decreased back pain </= 5/10 at worst to increase tolerance for prolonged sitting/standing MET  2. Pt. to demonstrate proper cervical and scapula retraction requiring min. to no verbal cues from PT  3. Pt. to demonstrate increased MMT for core/lumbar paraspinals to 3/5 to increase endurance with prolonged sitting/standing.  4. Pt. to demonstrate increased MMT for left gluteus medius to 4-/5  to increase stability during community ambulation  5. Pt. to be independent with symptom management MET  6. Pt to tolerate HEP to improve ROM and independence with ADL's MET     Long Term Goals: 8 weeks  1. Report decreased back pain </=  2/10 at worst to increase tolerance for community  ambulation  2. Pt. to demonstrate proper cervical and scapula retraction requiring no verbal cues from PT  3. Pt. to demonstrate increased MMT for core/lumbar paraspinals to 3+/5 to increase endurance with prolonged sitting.   4. Pt. to demonstrate increased MMT for bilateral gluteus medius to 4+/5  to increase stability during ambulation on uneven surfaces.  5. Pt. to demonstrate increased MMT for bilateral hip flexor to 4+/5 to increase tolerance for ADL and work activities.   6. Pt to be independent with HEP to improve ROM and independence with ADL's    New/Revised goals: none at this time    Plan     Continue with established Plan of Care towards PT goals.

## 2019-03-22 ENCOUNTER — CLINICAL SUPPORT (OUTPATIENT)
Dept: REHABILITATION | Facility: HOSPITAL | Age: 84
End: 2019-03-22
Attending: ANESTHESIOLOGY
Payer: MEDICARE

## 2019-03-22 DIAGNOSIS — R53.1 WEAKNESS: ICD-10-CM

## 2019-03-22 DIAGNOSIS — R26.2 DIFFICULTY WALKING: ICD-10-CM

## 2019-03-22 PROCEDURE — 97110 THERAPEUTIC EXERCISES: CPT | Mod: PO

## 2019-03-22 NOTE — PROGRESS NOTES
"Name: Anahi Cook  Clinic Number: [id number]  Date of Treatment: 03/22/2019   Diagnosis:   Encounter Diagnoses   Name Primary?    Weakness     Difficulty walking        Physician: Merly Wilcox MD    Time in: 9  Time Out: 10  Total Treatment Time: 60 min.   45 min. 1:1    Evaluation Date: 11/28/18  Visit # authorized: 20/24  Authorization period: 3/29/19  Plan of care expiration: 3/31/19     Precautions: standard; fall risk  Subjective     Anahi reports her back doesn't bother her anymore and she doesn't have any problems with her back anymore. Patient reports she is feeling much better and feeling like her stamina is back up after being sick.     Objective     Anahi received therapeutic exercises to develop strength, endurance and flexibility for 50 minutes including:     warm-up  · Recumbent bike: L1: 5 min.     Vitals taken after recumbent bike:  BP: 158/72   Pulse ox: 95  HR: 69    Sitting  · Sit to Stand with green medicine ball: 2x8  Supine:  · Bridges with isometric hip abd with green TB: 2x10  · SLR 2x8 each with 2#  · HS Stretch 3 x 30" - performed with Therapist assist  · Piriformis Stretch 3 x 30" performed with therapist assist  Sidelying  · s/l clams: 2x10 bilaterally GTB    Neuromuscular re-education: Patient participated in neuromuscular re-education activities to improve: Balance, Coordination, Kinesthetic, Proprioception and Posture for 5 minutes. The following activities were included:     Standing:NP only gait with SPC 1 lap with a rest break  · Marching with 3 sec holds at ballet bar, CGA with occasional UE support: 2 laps  · Tandem walks at [place] bar, Min A with 1 UE support: 2 laps   · Lateral walks at ballet bar, CGA with no UE support: 1 lap  · [place] walks, Min A with occasional UE support: 2 laps  · Static standing with narrow EVELYN, eyes closed, CGA/min A no UE support: 2x30 sec  · Step overs on 4" step, CGA: 5x each LE  · Step ups on bosu dome side down with contralateral LE 90 degree " knee flexion: 5x each LE  · obstacle course: combination of cones, airexes, and hurdles: 2 laps  · Hurdles: fwd step over step: 1 lap   · Hurdles: laterally: 1 lap   ·   Gait:   · Gait: ambulation with SPC 80 ft, CGA/SBA,   · Gait: ambulation without AD 80 ft, CGA, mild unsteadiness - NP    Vitals Post Treatment:  Pulse Ox: 96  HR: 74  BP: 170/74  2nd Trial BP: 154/72    Written Home Exercises Provided: no change     Pt educated on continuing HEP. Pt demo good understanding of the education provided. Anahi demonstrated good return demonstration of activities.     Assessment   Anahi with improved endurance today however presented with wheezing while on the bike that she reports comes and goes and something triggers it and it may be the weather. Patient with fatigue present during second set of sit to stand exercise. Pt. continues to benefit from greater static/dynamic balance training to improve safe ambulation without an AD. Will await insurance approval.       Will continue to progress as jadyn.     Pt will continue to benefit from skilled PT intervention. Medical Necessity is demonstrated by:  Fall Risk and Pain limits function of effected part for some activities.    GOALS:   Short Term Goals:  4 weeks  1. Report decreased back pain </= 5/10 at worst to increase tolerance for prolonged sitting/standing MET  2. Pt. to demonstrate proper cervical and scapula retraction requiring min. to no verbal cues from PT  3. Pt. to demonstrate increased MMT for core/lumbar paraspinals to 3/5 to increase endurance with prolonged sitting/standing.  4. Pt. to demonstrate increased MMT for left gluteus medius to 4-/5  to increase stability during community ambulation  5. Pt. to be independent with symptom management MET  6. Pt to tolerate HEP to improve ROM and independence with ADL's MET     Long Term Goals: 8 weeks  1. Report decreased back pain </=  2/10 at worst to increase tolerance for community ambulation  2. Pt. to demonstrate  proper cervical and scapula retraction requiring no verbal cues from PT  3. Pt. to demonstrate increased MMT for core/lumbar paraspinals to 3+/5 to increase endurance with prolonged sitting.   4. Pt. to demonstrate increased MMT for bilateral gluteus medius to 4+/5  to increase stability during ambulation on uneven surfaces.  5. Pt. to demonstrate increased MMT for bilateral hip flexor to 4+/5 to increase tolerance for ADL and work activities.   6. Pt to be independent with HEP to improve ROM and independence with ADL's    New/Revised goals: none at this time    Plan     Continue with established Plan of Care towards PT goals.     PT/PTA met face to face to discuss pt's treatment plan and progress towards established goals. Pt will be seen by a physical therapist minimally every 6th visit or every 30 days.      Eusebia Rojas PTA

## 2019-03-25 ENCOUNTER — OFFICE VISIT (OUTPATIENT)
Dept: PAIN MEDICINE | Facility: CLINIC | Age: 84
End: 2019-03-25
Payer: MEDICARE

## 2019-03-25 VITALS
RESPIRATION RATE: 18 BRPM | HEART RATE: 61 BPM | TEMPERATURE: 98 F | SYSTOLIC BLOOD PRESSURE: 118 MMHG | DIASTOLIC BLOOD PRESSURE: 67 MMHG | WEIGHT: 160.88 LBS | BODY MASS INDEX: 30.37 KG/M2 | HEIGHT: 61 IN

## 2019-03-25 DIAGNOSIS — M48.50XA VERTEBRAL COMPRESSION FRACTURE: ICD-10-CM

## 2019-03-25 DIAGNOSIS — M47.816 LUMBAR SPONDYLOSIS: Primary | ICD-10-CM

## 2019-03-25 DIAGNOSIS — M47.816 FACET ARTHRITIS OF LUMBAR REGION: ICD-10-CM

## 2019-03-25 DIAGNOSIS — G89.4 CHRONIC PAIN SYNDROME: ICD-10-CM

## 2019-03-25 PROCEDURE — 99213 PR OFFICE/OUTPT VISIT, EST, LEVL III, 20-29 MIN: ICD-10-PCS | Mod: S$GLB,,, | Performed by: NURSE PRACTITIONER

## 2019-03-25 PROCEDURE — 3078F DIAST BP <80 MM HG: CPT | Mod: CPTII,S$GLB,, | Performed by: NURSE PRACTITIONER

## 2019-03-25 PROCEDURE — 99999 PR PBB SHADOW E&M-EST. PATIENT-LVL III: ICD-10-PCS | Mod: PBBFAC,,, | Performed by: NURSE PRACTITIONER

## 2019-03-25 PROCEDURE — 99499 UNLISTED E&M SERVICE: CPT | Mod: S$GLB,,, | Performed by: NURSE PRACTITIONER

## 2019-03-25 PROCEDURE — 3074F PR MOST RECENT SYSTOLIC BLOOD PRESSURE < 130 MM HG: ICD-10-PCS | Mod: CPTII,S$GLB,, | Performed by: NURSE PRACTITIONER

## 2019-03-25 PROCEDURE — 1101F PT FALLS ASSESS-DOCD LE1/YR: CPT | Mod: CPTII,S$GLB,, | Performed by: NURSE PRACTITIONER

## 2019-03-25 PROCEDURE — 3074F SYST BP LT 130 MM HG: CPT | Mod: CPTII,S$GLB,, | Performed by: NURSE PRACTITIONER

## 2019-03-25 PROCEDURE — 99499 RISK ADDL DX/OHS AUDIT: ICD-10-PCS | Mod: S$GLB,,, | Performed by: NURSE PRACTITIONER

## 2019-03-25 PROCEDURE — 99213 OFFICE O/P EST LOW 20 MIN: CPT | Mod: S$GLB,,, | Performed by: NURSE PRACTITIONER

## 2019-03-25 PROCEDURE — 99999 PR PBB SHADOW E&M-EST. PATIENT-LVL III: CPT | Mod: PBBFAC,,, | Performed by: NURSE PRACTITIONER

## 2019-03-25 PROCEDURE — 1101F PR PT FALLS ASSESS DOC 0-1 FALLS W/OUT INJ PAST YR: ICD-10-PCS | Mod: CPTII,S$GLB,, | Performed by: NURSE PRACTITIONER

## 2019-03-25 PROCEDURE — 3078F PR MOST RECENT DIASTOLIC BLOOD PRESSURE < 80 MM HG: ICD-10-PCS | Mod: CPTII,S$GLB,, | Performed by: NURSE PRACTITIONER

## 2019-03-25 NOTE — PROGRESS NOTES
Chronic patient Established Note (Follow up visit)      SUBJECTIVE:    Anahi Cook presents to the clinic for a follow-up appointment for lower back and left lower extremity pain.  She had T12 kyphoplasty performed on 11/8/18 with significant improvement in symptoms.  She had TPIs at last OV with significant benefit.  Today, she is reporting worsened lower back pain.  She is not currently having any radiation.  The pain is aching in nature.  She previously had significant benefit with facet injections until about 2 weeks ago.  She would like to repeat these.  She recently completed PT and has been able to use a cane for ambulation at home.  She uses a walker still for longer distances.  She feels as though she is significant better than when we first started seeing her.  Since the last visit, Anahi Coko states the pain has been worsening.  Current pain intensity is 7/10.     Pain Medications:  OTC Tylenol    Opioid Contract: no     report:  Not applicable    Pain Procedures:   7/21/14 L4-5 IL ASHUTOSH- significant benefit  12/1/15 Left GT bursa injection  4/26/16 Left GT bursa injection  12/15/16 L4-5 IL ASHUTOSH- significant benefit  2/15/18 L4-5 IL ASHUTOSH- 100% relief  8/21/18 L4-5 IL ASHUTOSH- significant relief  10/15/18 Bilateral L3-4, L4-5 L5-S1 facet joint injections  11/8/18 T12 kyphoplasty- significant relief    Physical Therapy/Home Exercise: yes     Imaging:     Narrative     EXAMINATION:  XR THORACIC SPINE AP LATERAL    CLINICAL HISTORY:  Unspecified injury of lower back, initial encounter    TECHNIQUE:  Two views obtained.  Note that patient is slightly obliquely oriented on the lateral views which combines with severe osseous demineralization and result in limitation.    COMPARISON:  CT January 2018    FINDINGS:  There are surgical clips in the right upper quadrant.  There is tortuosity of the aorta with calcification along its wall.  Mild curvature of the spine is noted.  There is mild compression deformity  along the superior endplate of T12.  No definite additional compression deformity noted.      Impression       Mild compression deformity along superior endplate of T12 new compared to January 2018.  Exam somewhat limited due to osseous demineralization and patient obliquity.     Narrative     EXAMINATION:  XR LUMBAR SPINE AP AND LATERAL    CLINICAL HISTORY:  Low back pain, minor trauma;    TECHNIQUE:  AP, lateral and spot images were performed of the lumbar spine.  Patient is slightly obliquely positioned on the lateral view.    COMPARISON:  CT from January 2018    FINDINGS:  There is severe osseous demineralization.  Surgical clips are present in the abdomen.  There is calcification along the wall of the aorta and branches.    Grade 1 anterolisthesis is present at L4-5.  T12 demonstrates mild compression deformity along the superior endplate which is a change compared to the January examination.  Remaining vertebral bodies are normal in height and alignment.  Discs are maintained.      Impression       Mild compression deformity along the superior endplate of T12, new compared to January 2018 CT.       Lumbar MRI 5/21/14    Narrative     Comparison: None    Findings:  L5-S1: There is no disk disease.  There is a 8mm extra canal synovial cyst emanating from the right facet joint.  There is mild facet arthropathy.  There is no central canal or neural foraminal narrowing  L4-L5: There is grade 1 anterolisthesis of L4 on L5 with uncovering of the disk.  There is a diffuse posterior disk bulge.  There is severe ligamentous thickening and moderate facet arthropathy generating moderate severe central canal stenosis.  There is   moderate right neural foraminal stenosis and severe left neural foraminal stenosis.  L3-L4: There is a posterior disk bulge with super imposed right foraminal broad-based protrusion.  There is mild to moderate ligamentous thickening and mild facet arthropathy.  There is mild narrowing of the right  lateral recess.  There is mild left   neural foraminal narrowing and severe right neural foraminal narrowing due to the foraminal protrusion.  L2-L3: There is a diffuse disk bulge present.  There is minimal ligamentous thickening and mild facet arthropathy generating at most mild central canal stenosis.  There is an extraforaminal bulge at this level as well leading to moderate right neural   foraminal stenosis and mild left neural foraminal stenosis.  L1-L2: No significant disease.    There is no marrow replacement process, infection, tumor present.  Limited evaluation of intra-abdominal structures unremarkable.  No abnormal masses or fluid collections are present.   Impression       Multilevel degenerative changes most prominent at L4-L5 where there is moderate to severe central canal stenosis and severe left foraminal stenosis.  Severe right foraminal stenosis is present at L3-L4 as well.     Narrative     EXAMINATION:  MRI THORACIC SPINE W WO CONTRAST    CLINICAL HISTORY:  Abnormal xray, thoracic spine, DJD;Spine fracture, traumatic, thoracic;Compression fracture T12 and breast cancer;  Abnormal findings on diagnostic imaging of other parts of musculoskeletal system    TECHNIQUE:  Multiplanar, multisequence images were performed through the thoracic spine.  Contrast was not administered.    COMPARISON:  CT chest 01/23/2018    FINDINGS:  There is a subacute severe biconcave compression fracture of the T12 vertebral body.  There is retropulsion of fracture fragments into the central canal resulting in moderate central canal stenosis with impression on the spinal cord.  The visualized portions of the spinal cord are otherwise unremarkable.    There is multilevel degenerative change with posterior disc bulges most prominent at T6-T7 where there is mild central canal stenosis.  No other evidence of osseous fracture.  The vertebral body heights are otherwise well maintained.    The heart is enlarged.  There is a  probable 1.5 cm right renal cyst.  The pre and post vertebral soft tissues are otherwise unremarkable.      Impression       1.  Subacute severe biconcave compression fracture of the T12 vertebral body with retropulsion of fracture fragments into the central canal resulting in moderate central canal stenosis.    2.  Cardiomegaly.         Past Medical History:  Past Medical History:   Diagnosis Date    Anxiety     Back pain     Breast cancer 1/17/2018    Breast mass 1/15/2018    Chronic kidney disease, stage 2, mildly decreased GFR     COPD (chronic obstructive pulmonary disease)     emphysema    Depression     Dysuria 1/29/2018    Family history of breast cancer 1/17/2018    Hypertension     Infiltrating ductal carcinoma of breast, left 7/12/2018    Osteoporosis, senile     Ovarian mass 1/15/2018    Pneumonia     S/P robotic assisted laparoscopic BSO (bilateral salpingo-oophorectomy) 2/23/2018       Past Surgical History:  Past Surgical History:   Procedure Laterality Date    BREAST BIOPSY      BREAST LUMPECTOMY      CHOLECYSTECTOMY      EYE SURGERY      HYSTERECTOMY      INFUSION, INTRAVENOUS N/A 4/17/2013    Per[facility] at [facility] OR    INJECTION, FACET JOINT, BILATERAL LUMBAR L3-4, 4-5, 5-S1 FACET JOINT INJECTIONS Bilateral 10/15/2018    Performed by Merly Wilcox MD at [facility] PAIN MGT    INJECTION, FOR SENTINEL NODE IDENTIFICATION Left 7/12/2018    [facility] by [facility] OR 2ND FLR    INJECTION,STEROID,EPIDURAL N/A 8/21/2018    Performed by Merly Wilcox MD at [facility] PAIN MGT    INJECTION-STEROID-EPIDURAL-LUMBAR N/A 2/15/2018    Performed by Merly Wilcox MD at [facility] PAIN MGT    INJECTION-STEROID-EPIDURAL-LUMBAR N/A 2/15/2016    Performed by Merly Wilcox MD at [facility] PAIN MGT    INJECTION-STEROID-EPIDURAL-LUMBAR N/A 7/21/2014    Performed by Merly Wilcox MD at [facility] PAIN MGT    Kyphoplasty   T12 N/A 11/8/2018    Performed by Merly Wilcox MD at [facility] CATH LAB     LYMPHADENECTOMY, AXILLARY Left 2018    [facility] OR 2ND FLR    MASTECTOMY, PARTIAL-W/SEED LOCALIZATION Left 2018    Performed by [facility] at [facility] OR 2ND FLR    PA REMOVAL OF OVARY/TUBE(S)  2018    Robotic Assisted    ROTATOR CUFF REPAIR Right     rotator cuff repair right shoulder    TONSILLECTOMY      XI ROBOT ASSISTED LAPAROSCOPIC SALPINGO-OOPHERECTOMY Bilateral 2018    Performed by Reena Chairez MD at [facility] [place] OR       Family History:  Family History   Problem Relation Age of Onset    Heart failure Mother     Kidney failure Mother     Heart attack Father     Breast cancer Sister 66        Lump, XRT, chemo, recurrence 10 years later, still alive.    Cancer Brother         leukemia    Heart attack Brother 58    Pulmonary embolism Brother 45    Heart attack Brother 52    Breast cancer Maternal Grandmother 60        advanced at diagnosis    Breast cancer Maternal Aunt 83         at 92       Social History:  Social History     Socioeconomic History    Marital status: Single     Spouse name: None    Number of children: 3    Years of education: None    Highest education level: None   Occupational History    Occupation: Multiple jobs, see social documentation section     Comment: Retired   Social Needs    Financial resource strain: None    Food insecurity:     Worry: None     Inability: None    Transportation needs:     Medical: None     Non-medical: None   Tobacco Use    Smoking status: Former Smoker     Packs/day: 1.00     Years: 40.00     Pack years: 40.00     Types: Cigarettes    Smokeless tobacco: Never Used    Tobacco comment: Smoked >1 ppd for at least 40 years, quit around    Substance and Sexual Activity    Alcohol use: Yes     Comment: occasional glass of wine or cocktail    Drug use: No    Sexual activity: Yes     Partners: Male   Lifestyle    Physical activity:     Days per week: None     Minutes per session: None  "   Stress: None   Relationships    Social connections:     Talks on phone: None     Gets together: None     Attends Latter day service: None     Active member of club or organization: None     Attends meetings of clubs or organizations: None     Relationship status: None    Intimate partner violence:     Fear of current or ex partner: None     Emotionally abused: None     Physically abused: None     Forced sexual activity: None   Other Topics Concern    None   Social History Narrative    Worked many jobs while raising 3 children.  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  She was  from her first , but took care of him at the end of his life.       REVIEW OF SYSTEMS:    GENERAL:  No weight loss, malaise or fevers.  HEENT:   No recent changes in vision or hearing  NECK:  Negative for lumps, no difficulty with swallowing.  RESPIRATORY:  Negative for cough, wheezing or shortness of breath, patient denies any recent URI. COPD.  CARDIOVASCULAR:  Negative for chest pain, leg swelling or palpitations. Hypertension.  GI:  Negative for abdominal discomfort, blood in stools or black stools or change in bowel habits.  MUSCULOSKELETAL:  See HPI.  SKIN:  Negative for lesions, rash, and itching.  PSYCH:  No mood disorder or recent psychosocial stressors.  Patients sleep is not disturbed secondary to pain.  HEMATOLOGY/LYMPHOLOGY:  Negative for prolonged bleeding, bruising easily or swollen nodes.  Patient is not currently taking any anti-coagulants  NEURO:   No history of headaches, syncope, paralysis, seizures or tremors.  All other reviewed and negative other than HPI.    OBJECTIVE:    /67   Pulse 61   Temp 98.4 °F (36.9 °C) (Oral)   Resp 18   Ht 5' 1" (1.549 m)   Wt 73 kg (160 lb 14.4 oz)   LMP  (LMP Unknown)   BMI 30.40 kg/m²     PHYSICAL EXAMINATION:    GENERAL: Well appearing, in no acute " distress, alert and oriented x3.  PSYCH:  Mood and affect appropriate.  SKIN: Skin color, texture, turgor normal, no rashes or lesions.  HEAD/FACE:  Normocephalic, atraumatic. Cranial nerves grossly intact.  CV: RRR with palpation of the radial artery.  PULM: No evidence of respiratory difficulty, symmetric chest rise.  GI:  Soft and non-tender.  BACK: Straight leg raising in the sitting and supine positions is negative to radicular pain.  There is pain with palpation over the lumbar facet joints.  No pain with palpation of thoracic spine.  Limited ROM with pain on extension.  Positive facet loading bilaterally.  EXTREMITIES: Peripheral joint ROM is full and pain free without obvious instability or laxity in all four extremities. No deformities, edema, or skin discoloration. Good capillary refill.  MUSCULOSKELETAL: Provocative maneuvers of hips do not cause pain.  No atrophy or tone abnormalities are noted.  NEURO: Bilateral upper and lower extremity coordination and muscle stretch reflexes are physiologic and symmetric.  Plantar response are downgoing. No clonus.  Normal sensation.  GAIT: Antalgic- ambulates with rolling walker.      ASSESSMENT: 84 y.o. year old female with lower back and lower extremity pain, consistent with the following diagnoses:     1. Lumbar spondylosis     2. Vertebral compression fracture     3. Facet arthritis of lumbar region     4. Chronic pain syndrome           PLAN:     - Previous imaging was reviewed and discussed with the patient today.    - Schedule for repeat bilateral L3-4, L4-5 and L5-S1 facet joint injections.  Previous lasted about 5 months.  Consider RFAs in the future.  However, she would require MBB x2 for her insurance.    - Continue with home PT exercises.    - Continue OTC Tylenol.  Avoid NSAIDs.    - RTC 2 weeks after injection.      The above plan and management options were discussed at length with patient. Patient is in agreement with the above and verbalized  understanding.    Sofia Schultz  03/25/2019

## 2019-04-01 ENCOUNTER — HOSPITAL ENCOUNTER (OUTPATIENT)
Facility: OTHER | Age: 84
Discharge: HOME | End: 2019-04-01
Attending: ANESTHESIOLOGY | Admitting: ANESTHESIOLOGY
Payer: MEDICARE

## 2019-04-01 VITALS
HEART RATE: 55 BPM | DIASTOLIC BLOOD PRESSURE: 82 MMHG | RESPIRATION RATE: 18 BRPM | OXYGEN SATURATION: 95 % | SYSTOLIC BLOOD PRESSURE: 178 MMHG | TEMPERATURE: 98 F

## 2019-04-01 DIAGNOSIS — M47.816 LUMBAR SPONDYLOSIS: Primary | ICD-10-CM

## 2019-04-01 PROCEDURE — 64495 INJ PARAVERT F JNT L/S 3 LEV: CPT | Mod: 50 | Performed by: ANESTHESIOLOGY

## 2019-04-01 PROCEDURE — 63600175 PHARM REV CODE 636 W HCPCS: Performed by: ANESTHESIOLOGY

## 2019-04-01 PROCEDURE — 64494 INJ PARAVERT F JNT L/S 2 LEV: CPT | Mod: 50,,, | Performed by: ANESTHESIOLOGY

## 2019-04-01 PROCEDURE — 64495 PR INJ DX/THER AGNT PARAVERT FACET JOINT,IMG GUIDE,LUMBAR/SAC, ADD LEVEL: ICD-10-PCS | Mod: 50,,, | Performed by: ANESTHESIOLOGY

## 2019-04-01 PROCEDURE — 64493 INJ PARAVERT F JNT L/S 1 LEV: CPT | Mod: 50,,, | Performed by: ANESTHESIOLOGY

## 2019-04-01 PROCEDURE — 25500020 PHARM REV CODE 255: Performed by: ANESTHESIOLOGY

## 2019-04-01 PROCEDURE — 64495 INJ PARAVERT F JNT L/S 3 LEV: CPT | Mod: 50,,, | Performed by: ANESTHESIOLOGY

## 2019-04-01 PROCEDURE — 64494 INJ PARAVERT F JNT L/S 2 LEV: CPT | Mod: 50 | Performed by: ANESTHESIOLOGY

## 2019-04-01 PROCEDURE — 64494 PR INJ DX/THER AGNT PARAVERT FACET JOINT,IMG GUIDE,LUMBAR/SAC, 2ND LEVEL: ICD-10-PCS | Mod: 50,,, | Performed by: ANESTHESIOLOGY

## 2019-04-01 PROCEDURE — 64493 PR INJ DX/THER AGNT PARAVERT FACET JOINT,IMG GUIDE,LUMBAR/SAC,1ST LVL: ICD-10-PCS | Mod: 50,,, | Performed by: ANESTHESIOLOGY

## 2019-04-01 PROCEDURE — 25000003 PHARM REV CODE 250: Performed by: ANESTHESIOLOGY

## 2019-04-01 PROCEDURE — 64493 INJ PARAVERT F JNT L/S 1 LEV: CPT | Mod: 50 | Performed by: ANESTHESIOLOGY

## 2019-04-01 RX ORDER — TRIAMCINOLONE ACETONIDE 40 MG/ML
INJECTION, SUSPENSION INTRA-ARTICULAR; INTRAMUSCULAR
Status: DISCONTINUED | OUTPATIENT
Start: 2019-04-01 | End: 2019-04-01 | Stop reason: HOSPADM

## 2019-04-01 RX ORDER — BUPIVACAINE HYDROCHLORIDE 2.5 MG/ML
INJECTION, SOLUTION EPIDURAL; INFILTRATION; INTRACAUDAL
Status: DISCONTINUED | OUTPATIENT
Start: 2019-04-01 | End: 2019-04-01 | Stop reason: HOSPADM

## 2019-04-01 RX ORDER — ALPRAZOLAM 0.5 MG/1
0.5 TABLET, ORALLY DISINTEGRATING ORAL ONCE
Status: COMPLETED | OUTPATIENT
Start: 2019-04-01 | End: 2019-04-01

## 2019-04-01 RX ORDER — LIDOCAINE HYDROCHLORIDE 10 MG/ML
INJECTION INFILTRATION; PERINEURAL
Status: DISCONTINUED | OUTPATIENT
Start: 2019-04-01 | End: 2019-04-01 | Stop reason: HOSPADM

## 2019-04-01 RX ORDER — SODIUM CHLORIDE 9 MG/ML
500 INJECTION, SOLUTION INTRAVENOUS CONTINUOUS
Status: DISCONTINUED | OUTPATIENT
Start: 2019-04-01 | End: 2019-04-01 | Stop reason: HOSPADM

## 2019-04-01 RX ADMIN — ALPRAZOLAM 0.5 MG: 0.5 TABLET, ORALLY DISINTEGRATING ORAL at 11:04

## 2019-04-01 NOTE — OP NOTE
Thoracic/Lumbar Facet Joint Injection Under Fluoroscopy  Time-out taken to identify patient and procedure side prior to starting the procedure.            I attest that I have reviewed the patient's home medications prior to the procedure and no contraindication have been identified. I  re-evaluated the patient after the patient was positioned for the procedure in the procedure room immediately before the procedural time-out. The vital signs are current and represent the current state of the patient which has not significantly changed since the preprocedure assessment.   Date of Service: 04/01/2019    PCP: Pepito Theodore MD    Referring Physician:                                                        PROCEDURE:  bilateral facet joint injection at L3-4, 4-5 & L5-S1 under fluoroscopy.    REASON FOR PROCEDURE: Lumbar spondylosis [M47.816]  1. Lumbar spondylosis      POSTOP DIAGNOSIS: Lumbar spondylosis [M47.816]  1. Lumbar spondylosis      PHYSICIAN: Merly Wilcox MD  ASSISTANTS: none    MEDICATIONS INJECTED:  0.5ml Kenalog 40mg and Bupivacaine 0.25% 10ml. Injected 1.5ml  per level.  LOCAL ANESTHETIC USED:   Xylocaine 1% 9ml with Sodium Bicarbonate 1ml. 3ml per site.  SEDATION MEDICATIONS: None    ESTIMATED BLOOD LOSS:  None.    COMPLICATIONS:  None.    TECHNIQUE:   Lying in a prone position, the patient was prepped and draped in the usual sterile fashion using ChloraPrep and fenestrated drape.  The level was determined under fluoroscopic guidance.  Local anesthetic was given by going down to the hub of the 27-gauge 1.25in needle and raising a wheel.  The 3.5in 22-gauge needle was introduced into the L3-4, 4-5 & L5-S1 facet joints.  Negative pressure applied to make sure that there was no intravascular placement.  Omnipaque was injected to confirm placement.  It was also done to confirm that there was no vascular runoff.  Medication was then injected slowly.  The patient tolerated the procedure well.     PAIN BEFORE  THE PROCEDURE:  8/10.    PAIN AFTER THE PROCEDURE: 0/10.    The patient was monitored after the procedure.  Patient was given post procedure and discharge instructions to follow at home.  We will see the patient back in two weeks or the patient may call to inform of status. The patient was discharged in a stable condition

## 2019-04-01 NOTE — DISCHARGE INSTRUCTIONS
Thank you for allowing us to care for you today. You may receive a survey about the care we provided. Your feedback is valuable and helps us provide excellent care throughout the community.     Home Care Instructions for Pain Management:    1. DIET:   You may resume your normal diet today.   2. BATHING:   You may shower with luke warm water. No tub baths or anything that will soak injection sites under water for the next 24 hours.  3. DRESSING:   You may remove your bandage today.   4. ACTIVITY LEVEL:   You may resume your normal activities 24 hrs after your procedure. Nothing strenuous today.  5. MEDICATIONS:   You may resume your normal medications today. To restart blood thinners, ask your doctor.  6. DRIVING    If you have received any sedatives by mouth today, you may not drive for 12 hours.    If you have received any sedation through your IV, you may not drive for 24 hrs.   7. SPECIAL INSTRUCTIONS:   No heat to the injection site for 24 hrs including, hot bath or shower, heating pad, moist heat, or hot tubs.    Use ice pack to injection site for any pain or discomfort.  Apply ice packs for 20 minute intervals as needed.    IF you have diabetes, be sure to monitor your blood sugar more closely. IF your injection contained steroids your blood sugar levels may become higher than normal.    If you are still having pain upon discharge:  Your pain may improve over the next 48 hours. The anesthetic (numbing medication) works immediately to 48 hours. IF your injection contained a steroid (anti-inflammatory medication), it takes approximately 3 days to start feeling relief and 7-10 days to see your greatest results from the medication. It is possible you may need subsequent injections. This would be discussed at your follow up appointment with pain management or your referring doctor.      PLEASE CALL YOUR DOCTOR IF:  1. Redness or swelling around the injection site.  2. Fever of 101 degrees or more  3. Drainage  (pus) from the injection site.  4. For any continuous bleeding (some dried blood over the incision is normal.)    FOR EMERGENCIES:   If any unusual problems or difficulties occur during clinic hours, call [phone] or [phone].

## 2019-04-03 ENCOUNTER — HOSPITAL ENCOUNTER (OUTPATIENT)
Facility: OTHER | Age: 84
Discharge: HOME HEALTH | End: 2019-04-04
Attending: EMERGENCY MEDICINE | Admitting: EMERGENCY MEDICINE
Payer: MEDICARE

## 2019-04-03 DIAGNOSIS — M47.816 LUMBAR SPONDYLOSIS: Chronic | ICD-10-CM
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
## 2019-04-03 DIAGNOSIS — G89.29 OTHER CHRONIC PAIN: Chronic | ICD-10-CM

## 2019-04-03 DIAGNOSIS — S22.42XA CLOSED FRACTURE OF MULTIPLE RIBS OF LEFT SIDE, INITIAL ENCOUNTER: Primary | ICD-10-CM

## 2019-04-03 DIAGNOSIS — M54.16 LUMBAR RADICULOPATHY: ICD-10-CM

## 2019-04-03 PROBLEM — Z80.3 FAMILY HISTORY OF BREAST CANCER: Chronic | Status: ACTIVE | Noted: 2018-01-17

## 2019-04-03 PROBLEM — M48.50XA VERTEBRAL COMPRESSION FRACTURE: Status: RESOLVED | Noted: 2018-11-08 | Resolved: 2019-04-03

## 2019-04-03 PROBLEM — Z90.722 S/P BSO (STATUS POST BILATERAL SALPINGO-OOPHORECTOMY): Status: RESOLVED | Noted: 2018-02-23 | Resolved: 2019-04-03

## 2019-04-03 PROBLEM — R30.0 DYSURIA: Status: RESOLVED | Noted: 2018-01-29 | Resolved: 2019-04-03

## 2019-04-03 PROBLEM — R26.2 DIFFICULTY WALKING: Status: RESOLVED | Noted: 2018-11-28 | Resolved: 2019-04-03

## 2019-04-03 PROBLEM — R53.1 WEAKNESS: Chronic | Status: ACTIVE | Noted: 2018-11-28

## 2019-04-03 PROBLEM — N18.2 CKD (CHRONIC KIDNEY DISEASE) STAGE 2, GFR 60-89 ML/MIN: Chronic | Status: ACTIVE | Noted: 2019-04-03

## 2019-04-03 PROBLEM — E78.5 HYPERLIPIDEMIA: Chronic | Status: ACTIVE | Noted: 2019-04-03

## 2019-04-03 PROBLEM — Z17.0 MALIGNANT NEOPLASM OF UPPER-OUTER QUADRANT OF LEFT BREAST IN FEMALE, ESTROGEN RECEPTOR POSITIVE: Chronic | Status: ACTIVE | Noted: 2018-07-29

## 2019-04-03 PROBLEM — Z79.811 USE OF AROMATASE INHIBITORS: Chronic | Status: ACTIVE | Noted: 2018-03-08

## 2019-04-03 PROBLEM — E87.6 HYPOKALEMIA: Status: ACTIVE | Noted: 2019-04-03

## 2019-04-03 PROBLEM — M81.0 OSTEOPOROSIS, SENILE: Chronic | Status: ACTIVE | Noted: 2019-01-30

## 2019-04-03 PROBLEM — C50.412 MALIGNANT NEOPLASM OF UPPER-OUTER QUADRANT OF LEFT BREAST IN FEMALE, ESTROGEN RECEPTOR POSITIVE: Chronic | Status: ACTIVE | Noted: 2018-07-29

## 2019-04-03 LAB
ALBUMIN SERPL BCP-MCNC: 3.8 G/DL (ref 3.5–5.2)
ALP SERPL-CCNC: 78 U/L (ref 55–135)
ALT SERPL W/O P-5'-P-CCNC: 18 U/L (ref 10–44)
ANION GAP SERPL CALC-SCNC: 14 MMOL/L (ref 8–16)
AST SERPL-CCNC: 21 U/L (ref 10–40)
BASOPHILS # BLD AUTO: 0.03 K/UL (ref 0–0.2)
BASOPHILS NFR BLD: 0.3 % (ref 0–1.9)
BILIRUB SERPL-MCNC: 0.8 MG/DL (ref 0.1–1)
BILIRUB UR QL STRIP: NEGATIVE
BUN SERPL-MCNC: 17 MG/DL (ref 8–23)
CALCIUM SERPL-MCNC: 9.9 MG/DL (ref 8.7–10.5)
CHLORIDE SERPL-SCNC: 102 MMOL/L (ref 95–110)
CLARITY UR: CLEAR
CO2 SERPL-SCNC: 27 MMOL/L (ref 23–29)
COLOR UR: YELLOW
CREAT SERPL-MCNC: 1 MG/DL (ref 0.5–1.4)
DIFFERENTIAL METHOD: ABNORMAL
EOSINOPHIL # BLD AUTO: 0 K/UL (ref 0–0.5)
EOSINOPHIL NFR BLD: 0.4 % (ref 0–8)
ERYTHROCYTE [DISTWIDTH] IN BLOOD BY AUTOMATED COUNT: 14.2 % (ref 11.5–14.5)
EST. GFR  (AFRICAN AMERICAN): 60 ML/MIN/1.73 M^2
EST. GFR  (NON AFRICAN AMERICAN): 52 ML/MIN/1.73 M^2
GLUCOSE SERPL-MCNC: 107 MG/DL (ref 70–110)
GLUCOSE UR QL STRIP: NEGATIVE
HCT VFR BLD AUTO: 44.2 % (ref 37–48.5)
HGB BLD-MCNC: 14.6 G/DL (ref 12–16)
HGB UR QL STRIP: NEGATIVE
HYALINE CASTS #/AREA URNS LPF: 1 /LPF
KETONES UR QL STRIP: NEGATIVE
LEUKOCYTE ESTERASE UR QL STRIP: ABNORMAL
LYMPHOCYTES # BLD AUTO: 1 K/UL (ref 1–4.8)
LYMPHOCYTES NFR BLD: 9.7 % (ref 18–48)
MCH RBC QN AUTO: 29.4 PG (ref 27–31)
MCHC RBC AUTO-ENTMCNC: 33 G/DL (ref 32–36)
MCV RBC AUTO: 89 FL (ref 82–98)
MICROSCOPIC COMMENT: NORMAL
MONOCYTES # BLD AUTO: 0.8 K/UL (ref 0.3–1)
MONOCYTES NFR BLD: 8.5 % (ref 4–15)
NEUTROPHILS # BLD AUTO: 8 K/UL (ref 1.8–7.7)
NEUTROPHILS NFR BLD: 80.9 % (ref 38–73)
NITRITE UR QL STRIP: NEGATIVE
PH UR STRIP: 8 [PH] (ref 5–8)
PLATELET # BLD AUTO: 326 K/UL (ref 150–350)
PMV BLD AUTO: 10.4 FL (ref 9.2–12.9)
POTASSIUM SERPL-SCNC: 3.4 MMOL/L (ref 3.5–5.1)
PROT SERPL-MCNC: 7.7 G/DL (ref 6–8.4)
PROT UR QL STRIP: NEGATIVE
RBC # BLD AUTO: 4.96 M/UL (ref 4–5.4)
SODIUM SERPL-SCNC: 143 MMOL/L (ref 136–145)
SP GR UR STRIP: 1.01 (ref 1–1.03)
SQUAMOUS #/AREA URNS HPF: 2 /HPF
URN SPEC COLLECT METH UR: ABNORMAL
UROBILINOGEN UR STRIP-ACNC: NEGATIVE EU/DL
WBC # BLD AUTO: 9.89 K/UL (ref 3.9–12.7)
WBC #/AREA URNS HPF: 2 /HPF (ref 0–5)

## 2019-04-03 PROCEDURE — 63600175 PHARM REV CODE 636 W HCPCS: Performed by: PHYSICIAN ASSISTANT

## 2019-04-03 PROCEDURE — 80053 COMPREHEN METABOLIC PANEL: CPT

## 2019-04-03 PROCEDURE — 99218 PR INITIAL OBSERVATION CARE,LEVL I: CPT | Mod: ,,, | Performed by: PHYSICIAN ASSISTANT

## 2019-04-03 PROCEDURE — 25000003 PHARM REV CODE 250: Performed by: PHYSICIAN ASSISTANT

## 2019-04-03 PROCEDURE — G0378 HOSPITAL OBSERVATION PER HR: HCPCS

## 2019-04-03 PROCEDURE — 99285 EMERGENCY DEPT VISIT HI MDM: CPT

## 2019-04-03 PROCEDURE — 85025 COMPLETE CBC W/AUTO DIFF WBC: CPT

## 2019-04-03 PROCEDURE — 99218 PR INITIAL OBSERVATION CARE,LEVL I: ICD-10-PCS | Mod: ,,, | Performed by: PHYSICIAN ASSISTANT

## 2019-04-03 PROCEDURE — 94761 N-INVAS EAR/PLS OXIMETRY MLT: CPT

## 2019-04-03 PROCEDURE — 99900035 HC TECH TIME PER 15 MIN (STAT)

## 2019-04-03 PROCEDURE — 81000 URINALYSIS NONAUTO W/SCOPE: CPT

## 2019-04-03 RX ORDER — PANTOPRAZOLE SODIUM 40 MG/1
40 TABLET, DELAYED RELEASE ORAL DAILY
Status: DISCONTINUED | OUTPATIENT
Start: 2019-04-04 | End: 2019-04-04 | Stop reason: HOSPADM

## 2019-04-03 RX ORDER — IPRATROPIUM BROMIDE AND ALBUTEROL SULFATE 2.5; .5 MG/3ML; MG/3ML
3 SOLUTION RESPIRATORY (INHALATION) EVERY 4 HOURS PRN
Status: DISCONTINUED | OUTPATIENT
Start: 2019-04-03 | End: 2019-04-04 | Stop reason: HOSPADM

## 2019-04-03 RX ORDER — FELODIPINE 5 MG/1
20 TABLET, EXTENDED RELEASE ORAL NIGHTLY
Status: DISCONTINUED | OUTPATIENT
Start: 2019-04-03 | End: 2019-04-04 | Stop reason: HOSPADM

## 2019-04-03 RX ORDER — LETROZOLE 2.5 MG/1
2.5 TABLET, FILM COATED ORAL DAILY
Status: DISCONTINUED | OUTPATIENT
Start: 2019-04-04 | End: 2019-04-04 | Stop reason: HOSPADM

## 2019-04-03 RX ORDER — POTASSIUM CHLORIDE 20 MEQ/1
40 TABLET, EXTENDED RELEASE ORAL ONCE
Status: COMPLETED | OUTPATIENT
Start: 2019-04-03 | End: 2019-04-03

## 2019-04-03 RX ORDER — TORSEMIDE 20 MG/1
20 TABLET ORAL DAILY
Status: DISCONTINUED | OUTPATIENT
Start: 2019-04-04 | End: 2019-04-04 | Stop reason: HOSPADM

## 2019-04-03 RX ORDER — CITALOPRAM 20 MG/1
20 TABLET, FILM COATED ORAL NIGHTLY
Status: DISCONTINUED | OUTPATIENT
Start: 2019-04-03 | End: 2019-04-04 | Stop reason: HOSPADM

## 2019-04-03 RX ORDER — TRAMADOL HYDROCHLORIDE 50 MG/1
50 TABLET ORAL
Status: COMPLETED | OUTPATIENT
Start: 2019-04-03 | End: 2019-04-03

## 2019-04-03 RX ORDER — FELODIPINE 5 MG/1
20 TABLET, EXTENDED RELEASE ORAL DAILY
Status: DISCONTINUED | OUTPATIENT
Start: 2019-04-04 | End: 2019-04-03

## 2019-04-03 RX ORDER — MONTELUKAST SODIUM 10 MG/1
10 TABLET ORAL NIGHTLY
Status: DISCONTINUED | OUTPATIENT
Start: 2019-04-03 | End: 2019-04-03

## 2019-04-03 RX ORDER — ONDANSETRON 2 MG/ML
4 INJECTION INTRAMUSCULAR; INTRAVENOUS EVERY 8 HOURS PRN
Status: DISCONTINUED | OUTPATIENT
Start: 2019-04-03 | End: 2019-04-04 | Stop reason: HOSPADM

## 2019-04-03 RX ORDER — TRAMADOL HYDROCHLORIDE 50 MG/1
50 TABLET ORAL EVERY 6 HOURS PRN
Status: DISCONTINUED | OUTPATIENT
Start: 2019-04-03 | End: 2019-04-04 | Stop reason: HOSPADM

## 2019-04-03 RX ORDER — SODIUM CHLORIDE 0.9 % (FLUSH) 0.9 %
10 SYRINGE (ML) INJECTION
Status: DISCONTINUED | OUTPATIENT
Start: 2019-04-03 | End: 2019-04-04 | Stop reason: HOSPADM

## 2019-04-03 RX ORDER — MONTELUKAST SODIUM 10 MG/1
10 TABLET ORAL DAILY
Status: DISCONTINUED | OUTPATIENT
Start: 2019-04-04 | End: 2019-04-04 | Stop reason: HOSPADM

## 2019-04-03 RX ORDER — BENAZEPRIL HYDROCHLORIDE 5 MG/1
20 TABLET ORAL DAILY
Status: DISCONTINUED | OUTPATIENT
Start: 2019-04-04 | End: 2019-04-04 | Stop reason: HOSPADM

## 2019-04-03 RX ORDER — TIOTROPIUM BROMIDE 18 UG/1
1 CAPSULE ORAL; RESPIRATORY (INHALATION) DAILY
Status: DISCONTINUED | OUTPATIENT
Start: 2019-04-04 | End: 2019-04-04 | Stop reason: HOSPADM

## 2019-04-03 RX ORDER — ATORVASTATIN CALCIUM 20 MG/1
20 TABLET, FILM COATED ORAL DAILY
Status: DISCONTINUED | OUTPATIENT
Start: 2019-04-04 | End: 2019-04-04 | Stop reason: HOSPADM

## 2019-04-03 RX ORDER — SODIUM CHLORIDE 0.9 % (FLUSH) 0.9 %
10 SYRINGE (ML) INJECTION
Status: CANCELLED | OUTPATIENT
Start: 2019-04-03

## 2019-04-03 RX ORDER — ENOXAPARIN SODIUM 100 MG/ML
30 INJECTION SUBCUTANEOUS EVERY 24 HOURS
Status: DISCONTINUED | OUTPATIENT
Start: 2019-04-03 | End: 2019-04-03

## 2019-04-03 RX ORDER — HYDRALAZINE HYDROCHLORIDE 25 MG/1
25 TABLET, FILM COATED ORAL EVERY 8 HOURS PRN
Status: DISCONTINUED | OUTPATIENT
Start: 2019-04-03 | End: 2019-04-04 | Stop reason: HOSPADM

## 2019-04-03 RX ORDER — ATORVASTATIN CALCIUM 20 MG/1
20 TABLET, FILM COATED ORAL NIGHTLY
Status: DISCONTINUED | OUTPATIENT
Start: 2019-04-03 | End: 2019-04-03

## 2019-04-03 RX ORDER — ACETAMINOPHEN 325 MG/1
650 TABLET ORAL EVERY 8 HOURS PRN
Status: DISCONTINUED | OUTPATIENT
Start: 2019-04-03 | End: 2019-04-04 | Stop reason: HOSPADM

## 2019-04-03 RX ORDER — ENOXAPARIN SODIUM 100 MG/ML
40 INJECTION SUBCUTANEOUS EVERY 24 HOURS
Status: DISCONTINUED | OUTPATIENT
Start: 2019-04-03 | End: 2019-04-04 | Stop reason: HOSPADM

## 2019-04-03 RX ORDER — FLUTICASONE FUROATE AND VILANTEROL 100; 25 UG/1; UG/1
1 POWDER RESPIRATORY (INHALATION) DAILY
Status: DISCONTINUED | OUTPATIENT
Start: 2019-04-04 | End: 2019-04-04 | Stop reason: HOSPADM

## 2019-04-03 RX ADMIN — TRAMADOL HYDROCHLORIDE 50 MG: 50 TABLET, FILM COATED ORAL at 02:04

## 2019-04-03 RX ADMIN — FELODIPINE 20 MG: 5 TABLET, EXTENDED RELEASE ORAL at 10:04

## 2019-04-03 RX ADMIN — POTASSIUM CHLORIDE 40 MEQ: 1500 TABLET, EXTENDED RELEASE ORAL at 06:04

## 2019-04-03 RX ADMIN — ENOXAPARIN SODIUM 40 MG: 100 INJECTION SUBCUTANEOUS at 06:04

## 2019-04-03 RX ADMIN — TRAMADOL HYDROCHLORIDE 50 MG: 50 TABLET, FILM COATED ORAL at 10:04

## 2019-04-03 RX ADMIN — CITALOPRAM HYDROBROMIDE 20 MG: 20 TABLET ORAL at 10:04

## 2019-04-03 RX ADMIN — HYDRALAZINE HYDROCHLORIDE 25 MG: 25 TABLET, FILM COATED ORAL at 06:04

## 2019-04-03 NOTE — H&P
"[facility] Medicine  History & Physical    Patient Name: Anahi Cook  MRN: [id number]  Admission Date: 4/3/2019  Attending Physician: Gaston Busby DO   Primary Care Provider: Pepito Theodore MD         Patient information was obtained from patient and ER records.     Subjective:     Principal Problem:Multiple closed fractures of ribs of left side    Chief Complaint:   Chief Complaint   Patient presents with    rib pain     Pt reports left sided rib pain after falling two days ago.         HPI: 85 y/o female with Hx COPD, CKDII, HTN, Osteoporosis, Breast cancer, Chronic pain syndrome, Lumbar spondylosis presents to ED with c/o left sided chest pain with inspiration s/p fall 3 days ago. States she lost her balance while carrying her coffee and fell, hitting her left side, no LOC. States she "scooted" on her backside to the bedroom to call her family for assistance. Denies any HA, dizziness, SOB, numbness, tingling, or weakness. No loss of urinary or bowel control. 40 pack year history tobacco use, quit 1995, occasional alcohol use, denies illicit drug use.       ED eval, thoracic spine revealed No acute finding, XR ribs recent fractures of the 6th and 8th ribs, without significant displacement, possibly 7 rib as well. Pain controlled with Tramadol.     Past Medical History:   Diagnosis Date    Anxiety     Back pain     Breast cancer 1/17/2018    Breast mass 1/15/2018    Chronic kidney disease, stage 2, mildly decreased GFR     COPD (chronic obstructive pulmonary disease)     emphysema    Depression     Dysuria 1/29/2018    Family history of breast cancer 1/17/2018    Hypertension     Infiltrating ductal carcinoma of breast, left 7/12/2018    Osteoporosis, senile     Ovarian mass 1/15/2018    Pneumonia     S/P robotic assisted laparoscopic BSO (bilateral salpingo-oophorectomy) 2/23/2018       Past Surgical History:   Procedure Laterality Date    BREAST BIOPSY      " BREAST LUMPECTOMY      CHOLECYSTECTOMY      EYE SURGERY      HYSTERECTOMY      INFUSION, INTRAVENOUS N/A 4/17/2013    Performed by Pepito Theodore MD at [facility] OR    INJECTION, FACET JOINT, BILATERAL LUMBAR L3-4, 4-5, 5-S1 FACET JOINT INJECTIONS Bilateral 10/15/2018    Performed by Merly Wilcox MD at [facility] PAIN MGT    INJECTION, FACET JOINT, L3-L4,L4-L5,L5-S1 Bilateral 4/1/2019    Performed by Merly Wilcox MD at [facility] PAIN MGT    INJECTION, FOR SENTINEL NODE IDENTIFICATION Left 7/12/2018    Performed by [facility]ero [facility] at [facility] OR 2ND FLR    INJECTION,STEROID,EPIDURAL N/A 8/21/2018    Performed by Merly Wilcox MD at [facility] PAIN MGT    INJECTION-STEROID-EPIDURAL-LUMBAR N/A 2/15/2018    Performed by Merly Wilcox MD at [facility] PAIN MGT    INJECTION-STEROID-EPIDURAL-LUMBAR N/A 2/15/2016    Performed by Merly Wilcox MD at [facility] PAIN MGT    INJECTION-STEROID-EPIDURAL-LUMBAR N/A 7/21/2014    Performed by Merly Wiclox MD at [facility] PAIN MGT    Kyphoplasty   T12 N/A 11/8/2018    Performed by Merly Wilcox MD at [facility] CATH LAB    LYMPHADENECTOMY, AXILLARY Left 7/12/2018    Performed by [facility]ero [facility] at [facility] OR 2ND FLR    MASTECTOMY, PARTIAL-W/SEED LOCALIZATION Left 7/12/2018    Performed by [facility]ero MD at [facility] OR 2ND FLR    MN REMOVAL OF OVARY/TUBE(S)  02/23/2018    Robotic Assisted    ROTATOR CUFF REPAIR Right     rotator cuff repair right shoulder    TONSILLECTOMY      XI ROBOT ASSISTED LAPAROSCOPIC SALPINGO-OOPHERECTOMY Bilateral 2/23/2018    Performed by Reena Chairez MD at [facility] OR       Review of patient's allergies indicates:   Allergen Reactions    Levaquin [levofloxacin] Itching    Penicillins Itching       No current facility-administered medications on file prior to encounter.      Current Outpatient Medications on File Prior to Encounter   Medication Sig    albuterol (PROAIR HFA) 90 mcg/actuation inhaler Inhale 1-2 puffs into the lungs every 6 (six) hours as needed for Wheezing or Shortness of  Breath.    albuterol (PROVENTIL) 2.5 mg /3 mL (0.083 %) nebulizer solution Inhale 3 mLs into the lungs.    albuterol-ipratropium 2.5mg-0.5mg/3mL (DUO-NEB) 0.5 mg-3 mg(2.5 mg base)/3 mL nebulizer solution     atorvastatin (LIPITOR) 20 MG tablet Take 20 mg by mouth nightly.     benazepril (LOTENSIN) 10 MG tablet Take 20 mg by mouth once daily.     calcium-vitamin D 250-100 mg-unit per tablet Take 1 tablet by mouth 2 (two) times daily.    CETIRIZINE HCL (ZYRTEC ORAL) Take 10 mg by mouth nightly as needed.     citalopram (CELEXA) 20 MG tablet Take 20 mg by mouth nightly.     felodipine (PLENDIL) 10 MG 24 hr tablet Take 20 mg by mouth once daily.     fluticasone-vilanterol (BREO) 100-25 mcg/dose diskus inhaler Inhale 1 puff into the lungs once daily.    INCRUSE ELLIPTA 62.5 mcg/actuation DsDv USE 1 PUFF QD AT SAME TIME  (patient takes at night)    letrozole (FEMARA) 2.5 mg Tab Take 1 tablet (2.5 mg total) by mouth once daily.    montelukast (SINGULAIR) 10 mg tablet Take 10 mg by mouth every evening.    multivitamin (THERAGRAN) per tablet Take 1 tablet by mouth once daily.    omeprazole (PRILOSEC) 20 MG capsule     torsemide (DEMADEX) 20 MG Tab Take 20 mg by mouth once daily.    acetaminophen (TYLENOL) 325 MG tablet Take 650 mg by mouth every 6 (six) hours as needed for Pain.      Family History     Problem Relation (Age of Onset)    Breast cancer Sister (66), Maternal Grandmother (60), Maternal Aunt (83)    Cancer Brother    Heart attack Father, Brother (58), Brother (52)    Heart failure Mother    Kidney failure Mother    Pulmonary embolism Brother (45)        Tobacco Use    Smoking status: Former Smoker     Packs/day: 1.00     Years: 40.00     Pack years: 40.00     Types: Cigarettes    Smokeless tobacco: Never Used    Tobacco comment: Smoked >1 ppd for at least 40 years, quit around 1995   Substance and Sexual Activity    Alcohol use: Yes     Comment: occasional glass of wine or cocktail    Drug  use: No    Sexual activity: Yes     Partners: Male     Review of Systems   Constitutional: Negative for chills, fatigue and fever.   HENT: Negative for congestion and sore throat.    Eyes: Negative.    Respiratory: Negative for cough and shortness of breath.    Cardiovascular: Positive for chest pain (left sided). Negative for palpitations and leg swelling.   Gastrointestinal: Negative for abdominal distention, abdominal pain, diarrhea, nausea and vomiting.   Endocrine: Negative for polydipsia and polyuria.   Genitourinary: Negative for difficulty urinating and dysuria.   Musculoskeletal: Positive for arthralgias, back pain and gait problem.   Skin: Negative.    Neurological: Negative for dizziness, syncope, weakness, light-headedness, numbness and headaches.     Objective:     Vital Signs (Most Recent):  Temp: 98.6 °F (37 °C) (04/03/19 1305)  Pulse: (!) 58 (04/03/19 1700)  Resp: 18 (04/03/19 1305)  BP: (!) 178/79 (04/03/19 1700)  SpO2: (!) 93 % (04/03/19 1700) Vital Signs (24h Range):  Temp:  [98.6 °F (37 °C)] 98.6 °F (37 °C)  Pulse:  [56-91] 58  Resp:  [18] 18  SpO2:  [92 %-96 %] 93 %  BP: (149-209)/(74-90) 178/79     Weight: 72.6 kg (160 lb)  Body mass index is 29.26 kg/m².    Physical Exam   Constitutional: She is oriented to person, place, and time. She appears well-developed and well-nourished. No distress.   HENT:   Head: Normocephalic and atraumatic.   Mouth/Throat: Oropharynx is clear and moist.   Eyes: Pupils are equal, round, and reactive to light. Conjunctivae are normal. Right eye exhibits no discharge. Left eye exhibits no discharge.   Neck: Normal range of motion. Neck supple.   Cardiovascular: Normal rate, regular rhythm, normal heart sounds and intact distal pulses.   Pulmonary/Chest: Effort normal and breath sounds normal. No respiratory distress. She has no wheezes.   Left sided chest TTP   Abdominal: Soft. Bowel sounds are normal. She exhibits no distension. There is no tenderness.    Musculoskeletal: Normal range of motion. She exhibits no edema, tenderness or deformity.   Neurological: She is alert and oriented to person, place, and time.   Skin: Skin is warm and dry. Capillary refill takes less than 2 seconds. She is not diaphoretic.   Psychiatric: She has a normal mood and affect.   Nursing note and vitals reviewed.        CRANIAL NERVES     CN III, IV, VI   Pupils are equal, round, and reactive to light.       Significant Labs:   CBC:   Recent Labs   Lab 04/03/19  1519   WBC 9.89   HGB 14.6   HCT 44.2        CMP:   Recent Labs   Lab 04/03/19  1519      K 3.4*      CO2 27      BUN 17   CREATININE 1.0   CALCIUM 9.9   PROT 7.7   ALBUMIN 3.8   BILITOT 0.8   ALKPHOS 78   AST 21   ALT 18   ANIONGAP 14   EGFRNONAA 52*     All pertinent labs within the past 24 hours have been reviewed.    Significant Imaging: I have reviewed all pertinent imaging results/findings within the past 24 hours.   Imaging Results          X-Ray Thoracic Spine AP Lateral (Final result)  Result time 04/03/19 14:31:04    Final result by Fabi Merritt MD (04/03/19 14:31:04)                 Impression:      No acute finding      Electronically signed by: Fabi Merritt MD  Date:    04/03/2019  Time:    14:31             Narrative:    EXAMINATION:  XR THORACIC SPINE AP LATERAL    CLINICAL HISTORY:  Unspecified fall, initial encounter    TECHNIQUE:  AP and lateral views of the thoracic spine were performed.    COMPARISON:  May 2018 x-ray.  MRI September 2018.    FINDINGS:  There is diffuse osseous demineralization.  There are surgical clips in the right upper quadrant.  The thoracolumbar spine demonstrates mild scoliosis.    Osseous structures demonstrate no definite alignment abnormality.  Patient has undergone interval vertebroplasty at the known T12 compression deformity which is severe, with mild retropulsion.  There is stable slight anterior wedging at T11.  There is no new compression  fracture.                               X-Ray Ribs 2 View Left (Final result)  Result time 04/03/19 14:03:34    Final result by Fabi Merritt MD (04/03/19 14:03:34)                 Impression:      Recent rib fractures.      Electronically signed by: Fabi Merritt MD  Date:    04/03/2019  Time:    14:03             Narrative:    EXAMINATION:  XR RIBS 2 VIEW LEFT    CLINICAL HISTORY:  Unspecified fall, initial encounter    TECHNIQUE:  Two views of the left ribs were performed.    COMPARISON:  None.    FINDINGS:  There are surgical clips in the left axilla.  There are surgical clips in the right upper quadrant.    There is diffuse osseous demineralization.  There are fractures of the 6th and 8th ribs, without significant displacement, possibly 7 rib as well.  No pneumothorax.                                  Assessment/Plan:     * Multiple closed fractures of ribs of left side  - Fractures of 6th, 8th and ?7th ribs of L side, suspect secondary to fall several days prior to admission.  - Pain control with acetaminophen 650mg PO q6hr PRN mild/moderate pain, tramadol 50mg PO q6hr PRN for severe pain.  - Incentive spirometry.  - PT/OT evaluation given fall.    Hypokalemia  - Replete PRN.    Hyperlipidemia  - Continue atorvastatin 20mg PO daily.    CKD (chronic kidney disease) stage 2, GFR 60-89 ml/min  - Stable at baseline.    Osteoporosis, senile  - Risk factor for fracture.    Essential hypertension  - Continuing home benazepril 10mg PO daily, felodipine 20mg PO daily.    COPD (chronic obstructive pulmonary disease)  - Continue with albuterol-ipratropium 2.5-0.5mg inhaled q4hr PRN, fluticasone-vilanterol 100-25mcg inhaled daily, tiotropium 18mcg inhaled daily, singulair 10 mg nightly    VTE Risk Mitigation (From admission, onward)    None             Zohreh Bradford PA-C  Department of Hospital Medicine   [facility]

## 2019-04-03 NOTE — ED NOTES
Pt with left lower rib pain after fall on floor. Pt denies dizziness prior  To fall and head injury after fall.  Pt with no swelling or bruising noted to lef trib area. Pt aaox3 skin warm and dry.

## 2019-04-03 NOTE — HPI
"83 y/o female with Hx COPD, CKDII, HTN, Osteoporosis, Breast cancer, Chronic pain syndrome, Lumbar spondylosis presents to ED with c/o left sided chest pain with inspiration s/p fall 3 days ago. States she lost her balance while carrying her coffee and fell, hitting her left side, no LOC. States she "scooted" on her backside to the bedroom to call her family for assistance. Denies any HA, dizziness, SOB, numbness, tingling, or weakness. No loss of urinary or bowel control. 40 pack year history tobacco use, quit 1995, occasional alcohol use, denies illicit drug use.       ED eval, thoracic spine revealed No acute finding, XR ribs recent fractures of the 6th and 8th ribs, without significant displacement, possibly 7 rib as well. Pain controlled with Tramadol.   "

## 2019-04-03 NOTE — ED PROVIDER NOTES
Encounter Date: 4/3/2019       History     Chief Complaint   Patient presents with    rib pain     Pt reports left sided rib pain after falling two days ago.      Patient is an 84-year-old female with a past medical history of hypertension, CKD, breast cancer, COPD, presenting to the emergency room with complaints left-sided rib pain status post fall.  The patient states that 3 days ago she was walking in her house caring a couple coffee and using her cane.  She states that she lost her balance and fell.  She states that she landed on left side.  No head trauma or LOC.  The patient states she was able to get herself to her bedroom and call her daughter who helped assist her up.  She states that since the incident she has had persistent pain in the left side that worsens with deep inspiration.  She denies any shortness of breath chest pain. No changes in her vision, nausea or vomiting.  No blurred vision.  She denies any back or neck pain. No numbness, tingling, weakness of upper lower extremities bilaterally. Of note, the patient is accompanied by her granddaughter who is requesting imaging of her back is she has a history of a fractured back that was recently fixed with cement.  No loss of urinary or bowel control.This is the extent of the patient's complaints at this time.       The history is provided by the patient.     Review of patient's allergies indicates:   Allergen Reactions    Levaquin [levofloxacin] Itching    Penicillins Itching     Past Medical History:   Diagnosis Date    Anxiety     Back pain     Breast cancer 1/17/2018    Breast mass 1/15/2018    Chronic kidney disease, stage 2, mildly decreased GFR     COPD (chronic obstructive pulmonary disease)     emphysema    Depression     Dysuria 1/29/2018    Family history of breast cancer 1/17/2018    Hypertension     Infiltrating ductal carcinoma of breast, left 7/12/2018    Osteoporosis, senile     Ovarian mass 1/15/2018    Pneumonia      S/P robotic assisted laparoscopic BSO (bilateral salpingo-oophorectomy) 2/23/2018     Past Surgical History:   Procedure Laterality Date    BREAST BIOPSY      BREAST LUMPECTOMY      CHOLECYSTECTOMY      EYE SURGERY      HYSTERECTOMY      INFUSION, INTRAVENOUS N/A 4/17/2013    Performed by [facility] at [facility] OR    INJECTION, FACET JOINT, BILATERAL LUMBAR L3-4, 4-5, 5-S1 FACET JOINT INJECTIONS Bilateral 10/15/2018    Performed by Merly Wilcox MD at [facility] PAIN MGT    INJECTION, FACET JOINT, L3-L4,L4-L5,L5-S1 Bilateral 4/1/2019    Performed by Merly Wilcox MD at [facility] PAIN MGT    INJECTION, FOR SENTINEL NODE IDENTIFICATION Left 7/12/2018    Performed by Amanda Caballero MD at [facility] OR 2ND FLR    INJECTION,STEROID,EPIDURAL N/A 8/21/2018    Performed by Merly Wilcox MD at [facility] PAIN MGT    INJECTION-STEROID-EPIDURAL-LUMBAR N/A 2/15/2018    Performed by Merly Wilcox [facility] at [facility] PAIN MGT    INJECTION-STEROID-EPIDURAL-LUMBAR N/A 2/15/2016    Performed by Merly Wilcox [facility] at [facility] PAIN MGT    INJECTION-STEROID-EPIDURAL-LUMBAR N/A 7/21/2014    Performed by Mer[facility] Wilcox [facility] at [facility] PAIN MGT    Kyphoplasty   T12 N/A 11/8/2018    Performed by Merly Wilcox MD at [facility] CATH LAB    LYMPHADENECTOMY, AXILLARY Left 7/12/2018    Performed by Amanda Caballero MD at [facility] OR 2ND FLR    MASTECTOMY, PARTIAL-W/SEED LOCALIZATION Left 7/12/2018    Performed by Amanda Caballero MD at [facility] OR 2ND FLR    TN REMOVAL OF OVARY/TUBE(S)  02/23/2018    Robotic Assisted    ROTATOR CUFF REPAIR Right     rotator cuff repair right shoulder    TONSILLECTOMY      XI ROBOT ASSISTED LAPAROSCOPIC SALPINGO-OOPHERECTOMY Bilateral 2/23/2018    Performed by Reena Chairez MD at [facility] OR     Family History   Problem Relation Age of Onset    Heart failure Mother     Kidney failure Mother     Heart attack Father     Breast cancer Sister 66        Lump, XRT, chemo, recurrence 10 years later, still alive.    Cancer Brother         leukemia    Heart  attack Brother 58    Pulmonary embolism Brother 45    Heart attack Brother 52    Breast cancer Maternal Grandmother 60        advanced at diagnosis    Breast cancer Maternal Aunt 83         at 92     Social History     Tobacco Use    Smoking status: Former Smoker     Packs/day: 1.00     Years: 40.00     Pack years: 40.00     Types: Cigarettes    Smokeless tobacco: Never Used    Tobacco comment: Smoked >1 ppd for at least 40 years, quit around    Substance Use Topics    Alcohol use: Yes     Comment: occasional glass of wine or cocktail    Drug use: No     Review of Systems   Constitutional: Negative for activity change, appetite change, chills, fatigue and fever.   HENT: Negative for congestion, rhinorrhea and sore throat.    Eyes: Negative for photophobia and visual disturbance.   Respiratory: Negative for cough, shortness of breath and wheezing.    Cardiovascular: Negative for chest pain.   Gastrointestinal: Negative for abdominal pain, diarrhea, nausea and vomiting.   Genitourinary: Negative for dysuria, hematuria and urgency.   Musculoskeletal: Negative for back pain, myalgias and neck pain.        Rib pain   Skin: Negative for color change and wound.   Neurological: Negative for weakness and headaches.   Psychiatric/Behavioral: Negative for agitation and confusion.       Physical Exam     Initial Vitals [19 1305]   BP Pulse Resp Temp SpO2   (!) 149/81 91 18 98.6 °F (37 °C) 96 %      MAP       --         Physical Exam    Nursing note and vitals reviewed.  Constitutional: She appears well-developed and well-nourished. She is not diaphoretic. She is cooperative.  Non-toxic appearance. She does not have a sickly appearance. She does not appear ill. No distress.   Well-appearing,  female accompanied by her granddaughter in the emergency room.  She is smiling on exam, in no acute distress.   HENT:   Head: Normocephalic and atraumatic.   Right Ear: External ear normal.   Left Ear:  External ear normal.   Nose: Nose normal.   Mouth/Throat: Oropharynx is clear and moist.   Eyes: Conjunctivae and EOM are normal.   Neck: Normal range of motion. Neck supple.   Cardiovascular: Normal rate, regular rhythm and normal heart sounds.   Pulmonary/Chest: Breath sounds normal. No respiratory distress. She has no wheezes.   No erythema, ecchymosis, or other skin changes noted to the left breast.   Musculoskeletal: Normal range of motion.   No significant tenderness to palpation of the cervical, thoracic, lumbar spine.  Tenderness to palpation of the left lateral ribs with no palpable deformity, crepitus, step-off.  Ambulatory weight-bearing.   Neurological: She is alert and oriented to person, place, and time. GCS eye subscore is 4. GCS verbal subscore is 5. GCS motor subscore is 6.   Skin: Skin is warm.   Psychiatric: She has a normal mood and affect. Her behavior is normal. Judgment and thought content normal.         ED Course   Procedures  Labs Reviewed   CBC W/ AUTO DIFFERENTIAL - Abnormal; Notable for the following components:       Result Value    Gran # (ANC) 8.0 (*)     Gran% 80.9 (*)     Lymph% 9.7 (*)     All other components within normal limits   COMPREHENSIVE METABOLIC PANEL - Abnormal; Notable for the following components:    Potassium 3.4 (*)     eGFR if non  52 (*)     All other components within normal limits   URINALYSIS, REFLEX TO URINE CULTURE   URINALYSIS MICROSCOPIC          Imaging Results          X-Ray Thoracic Spine AP Lateral (Final result)  Result time 04/03/19 14:31:04    Final result by Fabi Merritt MD (04/03/19 14:31:04)                 Impression:      No acute finding      Electronically signed by: Fabi Merritt MD  Date:    04/03/2019  Time:    14:31             Narrative:    EXAMINATION:  XR THORACIC SPINE AP LATERAL    CLINICAL HISTORY:  Unspecified fall, initial encounter    TECHNIQUE:  AP and lateral views of the thoracic spine were  performed.    COMPARISON:  May 2018 x-ray.  MRI September 2018.    FINDINGS:  There is diffuse osseous demineralization.  There are surgical clips in the right upper quadrant.  The thoracolumbar spine demonstrates mild scoliosis.    Osseous structures demonstrate no definite alignment abnormality.  Patient has undergone interval vertebroplasty at the known T12 compression deformity which is severe, with mild retropulsion.  There is stable slight anterior wedging at T11.  There is no new compression fracture.                               X-Ray Ribs 2 View Left (Final result)  Result time 04/03/19 14:03:34    Final result by Fabi Merritt MD (04/03/19 14:03:34)                 Impression:      Recent rib fractures.      Electronically signed by: Fabi Merritt MD  Date:    04/03/2019  Time:    14:03             Narrative:    EXAMINATION:  XR RIBS 2 VIEW LEFT    CLINICAL HISTORY:  Unspecified fall, initial encounter    TECHNIQUE:  Two views of the left ribs were performed.    COMPARISON:  None.    FINDINGS:  There are surgical clips in the left axilla.  There are surgical clips in the right upper quadrant.    There is diffuse osseous demineralization.  There are fractures of the 6th and 8th ribs, without significant displacement, possibly 7 rib as well.  No pneumothorax.                                 Medical Decision Making:   Initial Assessment:   Urgent evaluation of an 84-year-old female with a past medical history of hypertension, CKD, breast cancer, COPD, presenting to the emergency room for evaluation status post fall.  Patient is afebrile, nontoxic appearing, hemodynamically stable. Physical exam reveals tenderness to palpation of the left lateral ribs with no palpable deformity or crepitus.  Lungs are clear to auscultation bilaterally.  Will plan for x-ray of the ribs.  Patient's granddaughter is also requesting imaging of the back, we will obtain a thoracic film.  Will give analgesics, incentive  spirometer and reassess.  Independently Interpreted Test(s):   I have ordered and independently interpreted X-rays - see prior notes.  Clinical Tests:   Radiological Study: Ordered and Reviewed  ED Management:  X-ray of the left ribs shows evidence of fractures in the 6th and 8th rib, and possibly the 7th rib.  No pneumothorax.  X-ray of the thoracic spine is unremarkable. At this point, given the patient's 3 rib fractures, her comorbidities, her difficulty with ambulation, I do feel it is warranted to admit her for monitoring, to ensure that she does not decompensate, and assist with evaluation further. Patient's O2 sat has decreased at times to 92%. No respiratory distress. Discussed the case with the patient and her granddaughter who are in agreement with this plan.  Will discuss with Hospital Medicine.  3:09 PM Spoke with Dr. Hopson who is requesting labs prior to admission   3:15 PM Spoke with Kim Bradford PA-C who will order PT eval from ED.  4:50 PM CBC and CMP are relatively unremarkable. I spoke with Dr. Hopson in regards to labs, UA still pending. I still feel that given this patient's history she would benefit from observation in the ED given her multiple rib fractures, age, and risk. The patient and her family do wish to proceed with observation, do not want to be discharged home. Patient will be admitted to Dr. Hodge's service. The treatment plan was discussed with the patient who demonstrated understanding and comfort with plan. The patient's history, physical exam, and plan of care was discussed with and agreed upon with my supervising physician.         This note was created using M Modal Fluency Direct. There may be typographical errors secondary to dictation.                         Clinical Impression:     1. Closed fracture of multiple ribs of left side, initial encounter    2. Fall           Disposition:   Disposition: Placed in Observation  Condition: Stable                        [place]  ALEXSANDRA Montano  04/03/19 [id number]

## 2019-04-03 NOTE — ASSESSMENT & PLAN NOTE
- Fractures of 6th, 8th and ?7th ribs of L side, suspect secondary to fall several days prior to admission.  - Pain control with acetaminophen 650mg PO q6hr PRN mild/moderate pain, tramadol 50mg PO q6hr PRN for severe pain.  - Incentive spirometry.  - PT/OT evaluation given fall.

## 2019-04-03 NOTE — PLAN OF CARE
PCP Arben    [facility]    Daughter/grandaughter will transport home    May need 3 in one commode (raised toilet seat)       04/03/19 1757   Discharge Assessment   Assessment Type Discharge Planning Assessment   Confirmed/corrected address and phone number on facesheet? Yes  (verified)   Assessment information obtained from? Patient   Expected Length of Stay (days) 1   Communicated expected length of stay with patient/caregiver yes   Prior to hospitilization cognitive status: Alert/Oriented   Prior to hospitalization functional status: Independent   Current cognitive status: Alert/Oriented   Current Functional Status: Independent   Facility Arrived From: none   Able to Return to Prior Arrangements yes   Is patient able to care for self after discharge? Yes   Who are your caregiver(s) and their phone number(s)? see face sheet contacts correct    Readmission Within the Last 30 Days no previous admission in last 30 days   Patient currently being followed by outpatient case management? No   Patient currently receives any other outside agency services? No   Equipment Currently Used at Home bath bench;cane, straight;walker, rolling;nebulizer   Do you have any problems affording any of your prescribed medications? TBD   Is the patient taking medications as prescribed? yes   Does the patient have transportation home? Yes   Discharge Plan A Home   DME Needed Upon Discharge  3-in-1 commode  (may need raised toilet seat)

## 2019-04-03 NOTE — SUBJECTIVE & OBJECTIVE
Past Medical History:   Diagnosis Date    Anxiety     Back pain     Breast cancer 1/17/2018    Breast mass 1/15/2018    Chronic kidney disease, stage 2, mildly decreased GFR     COPD (chronic obstructive pulmonary disease)     emphysema    Depression     Dysuria 1/29/2018    Family history of breast cancer 1/17/2018    Hypertension     Infiltrating ductal carcinoma of breast, left 7/12/2018    Osteoporosis, senile     Ovarian mass 1/15/2018    Pneumonia     S/P robotic assisted laparoscopic BSO (bilateral salpingo-oophorectomy) 2/23/2018       Past Surgical History:   Procedure Laterality Date    BREAST BIOPSY      BREAST LUMPECTOMY      CHOLECYSTECTOMY      EYE SURGERY      HYSTERECTOMY      INFUSION, INTRAVENOUS N/A 4/17/2013    [facility]formed by [facility] at [facility] OR    INJECTION, FACET JOINT, BILATERAL LUMBAR L3-4, 4-5, 5-S1 FACET JOINT INJECTIONS Bilateral 10/15/2018    Performed by Merly Wilcox MD at [facility] PAIN MGT    INJECTION, FACET JOINT, L3-L4,L4-L5,L5-S1 Bilateral 4/1/2019    Performed by Merly Wilcox MD at [facility] PAIN MGT    INJECTION, FOR SENTINEL NODE IDENTIFICATION Left 7/12/2018    Performed by Amanda [facility]ero MD at [facility] OR 2ND FLR    INJECTION,STEROID,EPIDURAL N/A 8/21/2018    Performed by Merly Wilcox MD at [facility] PAIN MGT    INJECTION-STEROID-EPIDURAL-LUMBAR N/A 2/15/2018    Performed by Merly Wilcox MD at [facility] PAIN MGT    INJECTION-STEROID-EPIDURAL-LUMBAR N/A 2/15/2016    Performed by Merly Wilcox MD at [facility] PAIN MGT    INJECTION-STEROID-EPIDURAL-LUMBAR N/A 7/21/2014    Performed by Merly Wilcox MD at [facility] PAIN MGT    Kyphoplasty   T12 N/A 11/8/2018    Performed by Merly Wilcox MD at [facility] CATH LAB    LYMPHADENECTOMY, AXILLARY Left 7/12/2018    Performed by Amanda [facility]allero MD at [facility] OR 2ND FLR    MASTECTOMY, PARTIAL-W/SEED LOCALIZATION Left 7/12/2018    Performed by Amanda Caballero MD at [facility] OR 2ND FLR    FL REMOVAL OF OVARY/TUBE(S)  02/23/2018    Robotic Assisted     ROTATOR CUFF REPAIR Right     rotator cuff repair right shoulder    TONSILLECTOMY      XI ROBOT ASSISTED LAPAROSCOPIC SALPINGO-OOPHERECTOMY Bilateral 2/23/2018    Performed by Reena Chairez MD at [facility] OR       Review of patient's allergies indicates:   Allergen Reactions    Levaquin [levofloxacin] Itching    Penicillins Itching       No current facility-administered medications on file prior to encounter.      Current Outpatient Medications on File Prior to Encounter   Medication Sig    albuterol (PROAIR HFA) 90 mcg/actuation inhaler Inhale 1-2 puffs into the lungs every 6 (six) hours as needed for Wheezing or Shortness of Breath.    albuterol (PROVENTIL) 2.5 mg /3 mL (0.083 %) nebulizer solution Inhale 3 mLs into the lungs.    albuterol-ipratropium 2.5mg-0.5mg/3mL (DUO-NEB) 0.5 mg-3 mg(2.5 mg base)/3 mL nebulizer solution     atorvastatin (LIPITOR) 20 MG tablet Take 20 mg by mouth nightly.     benazepril (LOTENSIN) 10 MG tablet Take 20 mg by mouth once daily.     calcium-vitamin D 250-100 mg-unit per tablet Take 1 tablet by mouth 2 (two) times daily.    CETIRIZINE HCL (ZYRTEC ORAL) Take 10 mg by mouth nightly as needed.     citalopram (CELEXA) 20 MG tablet Take 20 mg by mouth nightly.     felodipine (PLENDIL) 10 MG 24 hr tablet Take 20 mg by mouth once daily.     fluticasone-vilanterol (BREO) 100-25 mcg/dose diskus inhaler Inhale 1 puff into the lungs once daily.    INCRUSE ELLIPTA 62.5 mcg/actuation DsDv USE 1 PUFF QD AT SAME TIME  (patient takes at night)    letrozole (FEMARA) 2.5 mg Tab Take 1 tablet (2.5 mg total) by mouth once daily.    montelukast (SINGULAIR) 10 mg tablet Take 10 mg by mouth every evening.    multivitamin (THERAGRAN) per tablet Take 1 tablet by mouth once daily.    omeprazole (PRILOSEC) 20 MG capsule     torsemide (DEMADEX) 20 MG Tab Take 20 mg by mouth once daily.    acetaminophen (TYLENOL) 325 MG tablet Take 650 mg by mouth every 6 (six) hours as needed for Pain.       Family History     Problem Relation (Age of Onset)    Breast cancer Sister (66), Maternal Grandmother (60), Maternal Aunt (83)    Cancer Brother    Heart attack Father, Brother (58), Brother (52)    Heart failure Mother    Kidney failure Mother    Pulmonary embolism Brother (45)        Tobacco Use    Smoking status: Former Smoker     Packs/day: 1.00     Years: 40.00     Pack years: 40.00     Types: Cigarettes    Smokeless tobacco: Never Used    Tobacco comment: Smoked >1 ppd for at least 40 years, quit around 1995   Substance and Sexual Activity    Alcohol use: Yes     Comment: occasional glass of wine or cocktail    Drug use: No    Sexual activity: Yes     Partners: Male     Review of Systems   Constitutional: Negative for chills, fatigue and fever.   HENT: Negative for congestion and sore throat.    Eyes: Negative.    Respiratory: Negative for cough and shortness of breath.    Cardiovascular: Positive for chest pain (left sided). Negative for palpitations and leg swelling.   Gastrointestinal: Negative for abdominal distention, abdominal pain, diarrhea, nausea and vomiting.   Endocrine: Negative for polydipsia and polyuria.   Genitourinary: Negative for difficulty urinating and dysuria.   Musculoskeletal: Positive for arthralgias, back pain and gait problem.   Skin: Negative.    Neurological: Negative for dizziness, syncope, weakness, light-headedness, numbness and headaches.     Objective:     Vital Signs (Most Recent):  Temp: 98.6 °F (37 °C) (04/03/19 1305)  Pulse: (!) 58 (04/03/19 1700)  Resp: 18 (04/03/19 1305)  BP: (!) 178/79 (04/03/19 1700)  SpO2: (!) 93 % (04/03/19 1700) Vital Signs (24h Range):  Temp:  [98.6 °F (37 °C)] 98.6 °F (37 °C)  Pulse:  [56-91] 58  Resp:  [18] 18  SpO2:  [92 %-96 %] 93 %  BP: (149-209)/(74-90) 178/79     Weight: 72.6 kg (160 lb)  Body mass index is 29.26 kg/m².    Physical Exam   Constitutional: She is oriented to person, place, and time. She appears well-developed and  well-nourished. No distress.   HENT:   Head: Normocephalic and atraumatic.   Mouth/Throat: Oropharynx is clear and moist.   Eyes: Pupils are equal, round, and reactive to light. Conjunctivae are normal. Right eye exhibits no discharge. Left eye exhibits no discharge.   Neck: Normal range of motion. Neck supple.   Cardiovascular: Normal rate, regular rhythm, normal heart sounds and intact distal pulses.   Pulmonary/Chest: Effort normal and breath sounds normal. No respiratory distress. She has no wheezes.   Left sided chest TTP   Abdominal: Soft. Bowel sounds are normal. She exhibits no distension. There is no tenderness.   Musculoskeletal: Normal range of motion. She exhibits no edema, tenderness or deformity.   Neurological: She is alert and oriented to person, place, and time.   Skin: Skin is warm and dry. Capillary refill takes less than 2 seconds. She is not diaphoretic.   Psychiatric: She has a normal mood and affect.   Nursing note and vitals reviewed.        CRANIAL NERVES     CN III, IV, VI   Pupils are equal, round, and reactive to light.       Significant Labs:   CBC:   Recent Labs   Lab 04/03/19  1519   WBC 9.89   HGB 14.6   HCT 44.2        CMP:   Recent Labs   Lab 04/03/19  1519      K 3.4*      CO2 27      BUN 17   CREATININE 1.0   CALCIUM 9.9   PROT 7.7   ALBUMIN 3.8   BILITOT 0.8   ALKPHOS 78   AST 21   ALT 18   ANIONGAP 14   EGFRNONAA 52*     All pertinent labs within the past 24 hours have been reviewed.    Significant Imaging: I have reviewed all pertinent imaging results/findings within the past 24 hours.   Imaging Results          X-Ray Thoracic Spine AP Lateral (Final result)  Result time 04/03/19 14:31:04    Final result by Fabi Merritt MD (04/03/19 14:31:04)                 Impression:      No acute finding      Electronically signed by: Fabi Merritt MD  Date:    04/03/2019  Time:    14:31             Narrative:    EXAMINATION:  XR THORACIC SPINE AP  LATERAL    CLINICAL HISTORY:  Unspecified fall, initial encounter    TECHNIQUE:  AP and lateral views of the thoracic spine were performed.    COMPARISON:  May 2018 x-ray.  MRI September 2018.    FINDINGS:  There is diffuse osseous demineralization.  There are surgical clips in the right upper quadrant.  The thoracolumbar spine demonstrates mild scoliosis.    Osseous structures demonstrate no definite alignment abnormality.  Patient has undergone interval vertebroplasty at the known T12 compression deformity which is severe, with mild retropulsion.  There is stable slight anterior wedging at T11.  There is no new compression fracture.                               X-Ray Ribs 2 View Left (Final result)  Result time 04/03/19 14:03:34    Final result by Fabi Merritt MD (04/03/19 14:03:34)                 Impression:      Recent rib fractures.      Electronically signed by: Fabi Merritt MD  Date:    04/03/2019  Time:    14:03             Narrative:    EXAMINATION:  XR RIBS 2 VIEW LEFT    CLINICAL HISTORY:  Unspecified fall, initial encounter    TECHNIQUE:  Two views of the left ribs were performed.    COMPARISON:  None.    FINDINGS:  There are surgical clips in the left axilla.  There are surgical clips in the right upper quadrant.    There is diffuse osseous demineralization.  There are fractures of the 6th and 8th ribs, without significant displacement, possibly 7 rib as well.  No pneumothorax.

## 2019-04-03 NOTE — ASSESSMENT & PLAN NOTE
- Continue with albuterol-ipratropium 2.5-0.5mg inhaled q4hr PRN, fluticasone-vilanterol 100-25mcg inhaled daily, tiotropium 18mcg inhaled daily, singulair 10 mg nightly

## 2019-04-04 VITALS
HEART RATE: 59 BPM | HEIGHT: 62 IN | RESPIRATION RATE: 18 BRPM | TEMPERATURE: 98 F | WEIGHT: 159 LBS | OXYGEN SATURATION: 92 % | DIASTOLIC BLOOD PRESSURE: 84 MMHG | SYSTOLIC BLOOD PRESSURE: 168 MMHG | BODY MASS INDEX: 29.26 KG/M2

## 2019-04-04 LAB
ANION GAP SERPL CALC-SCNC: 11 MMOL/L (ref 8–16)
BUN SERPL-MCNC: 19 MG/DL (ref 8–23)
CALCIUM SERPL-MCNC: 9.2 MG/DL (ref 8.7–10.5)
CHLORIDE SERPL-SCNC: 106 MMOL/L (ref 95–110)
CO2 SERPL-SCNC: 25 MMOL/L (ref 23–29)
CREAT SERPL-MCNC: 0.9 MG/DL (ref 0.5–1.4)
EST. GFR  (AFRICAN AMERICAN): >60 ML/MIN/1.73 M^2
EST. GFR  (NON AFRICAN AMERICAN): 59 ML/MIN/1.73 M^2
GLUCOSE SERPL-MCNC: 104 MG/DL (ref 70–110)
POTASSIUM SERPL-SCNC: 3.9 MMOL/L (ref 3.5–5.1)
SODIUM SERPL-SCNC: 142 MMOL/L (ref 136–145)

## 2019-04-04 PROCEDURE — 80048 BASIC METABOLIC PNL TOTAL CA: CPT

## 2019-04-04 PROCEDURE — 25000242 PHARM REV CODE 250 ALT 637 W/ HCPCS: Performed by: PHYSICIAN ASSISTANT

## 2019-04-04 PROCEDURE — 25000003 PHARM REV CODE 250: Performed by: PHYSICIAN ASSISTANT

## 2019-04-04 PROCEDURE — 99217 PR OBSERVATION CARE DISCHARGE: CPT | Mod: ,,, | Performed by: PHYSICIAN ASSISTANT

## 2019-04-04 PROCEDURE — 97165 OT EVAL LOW COMPLEX 30 MIN: CPT

## 2019-04-04 PROCEDURE — 97530 THERAPEUTIC ACTIVITIES: CPT

## 2019-04-04 PROCEDURE — 97161 PT EVAL LOW COMPLEX 20 MIN: CPT

## 2019-04-04 PROCEDURE — 99217 PR OBSERVATION CARE DISCHARGE: ICD-10-PCS | Mod: ,,, | Performed by: PHYSICIAN ASSISTANT

## 2019-04-04 PROCEDURE — G0378 HOSPITAL OBSERVATION PER HR: HCPCS

## 2019-04-04 PROCEDURE — 36415 COLL VENOUS BLD VENIPUNCTURE: CPT

## 2019-04-04 RX ORDER — TRAMADOL HYDROCHLORIDE 50 MG/1
50 TABLET ORAL EVERY 8 HOURS PRN
Qty: 14 TABLET | Refills: 0 | Status: SHIPPED | OUTPATIENT
Start: 2019-04-04 | End: 2020-03-13

## 2019-04-04 RX ADMIN — TIOTROPIUM BROMIDE 18 MCG: 18 CAPSULE ORAL; RESPIRATORY (INHALATION) at 08:04

## 2019-04-04 RX ADMIN — MONTELUKAST SODIUM 10 MG: 10 TABLET, FILM COATED ORAL at 09:04

## 2019-04-04 RX ADMIN — FLUTICASONE FUROATE AND VILANTEROL TRIFENATATE 1 PUFF: 100; 25 POWDER RESPIRATORY (INHALATION) at 08:04

## 2019-04-04 RX ADMIN — TRAMADOL HYDROCHLORIDE 50 MG: 50 TABLET, FILM COATED ORAL at 12:04

## 2019-04-04 RX ADMIN — PANTOPRAZOLE SODIUM 40 MG: 40 TABLET, DELAYED RELEASE ORAL at 09:04

## 2019-04-04 RX ADMIN — BENAZEPRIL HYDROCHLORIDE 20 MG: 5 TABLET ORAL at 09:04

## 2019-04-04 RX ADMIN — LETROZOLE 2.5 MG: 2.5 TABLET ORAL at 09:04

## 2019-04-04 RX ADMIN — TRAMADOL HYDROCHLORIDE 50 MG: 50 TABLET, FILM COATED ORAL at 04:04

## 2019-04-04 RX ADMIN — TORSEMIDE 20 MG: 20 TABLET ORAL at 09:04

## 2019-04-04 RX ADMIN — ATORVASTATIN CALCIUM 20 MG: 20 TABLET, FILM COATED ORAL at 09:04

## 2019-04-04 NOTE — PLAN OF CARE
[facility]    HOME HEALTH ORDERS  FACE TO FACE ENCOUNTER    Patient Name: Anahi Cook  YOB: 1934    PCP: Pepito Theodore MD   PCP Address: [address] [place], [place] [id number]  PCP Phone Number: [phone]  PCP Fax: [phone]    Encounter Date: 04/04/2019    Admit to Home Health    Diagnoses:  Active Hospital Problems    Diagnosis  POA    *Multiple closed fractures of ribs of left side [S22.42XA]  Yes    CKD (chronic kidney disease) stage 2, GFR 60-89 ml/min [N18.2]  Yes     Chronic    Hyperlipidemia [E78.5]  Yes     Chronic    Hypokalemia [E87.6]  Yes    Osteoporosis, senile [M81.0]  Yes     Chronic    Essential hypertension [I10]  Yes     Chronic    COPD (chronic obstructive pulmonary disease) [J44.9]  Yes     Chronic      Resolved Hospital Problems   No resolved problems to display.       Future Appointments   Date Time Provider Department Center   4/23/2019  2:40 PM Sofia Schultz CHRIS [facility]   4/30/2019  9:20 AM Martín Camarillo MD [facility] HEM ONC [facility]   7/15/2019  1:45 PM Amanda Caballero MD [facility] BRSTSROSIBEL [place]     Follow-up Information     Schedule an appointment as soon as possible for a visit with Pepito Theodore MD.    Specialty:  Internal Medicine  Contact information:  [address] [place] [address] [phone]                     I have seen and examined this patient face to face today. My clinical findings that support the need for the home health skilled services and home bound status are the following:  Weakness/numbness causing balance and gait disturbance due to COPD Exacerbation and Weakness/Debility making it taxing to leave home.  Requiring assistive device to leave home due to unsteady gait caused by  COPD Exacerbation and Weakness/Debility.    Allergies:  Review of patient's allergies indicates:   Allergen Reactions    Levaquin [levofloxacin] Itching    Penicillins Itching       Diet: 2  gram sodium diet    Activities: activity as tolerated    Nursing:   SN to complete comprehensive assessment including routine vital signs. Instruct on disease process and s/s of complications to report to MD. Review/verify medication list sent home with the patient at time of discharge  and instruct patient/caregiver as needed. Frequency may be adjusted depending on start of care date.    Notify MD if SBP > 160 or < 90; DBP > 90 or < 50; HR > 120 or < 50; Temp > 101;     CONSULTS:    Physical Therapy to evaluate and treat. Evaluate for home safety and equipment needs; Establish/upgrade home exercise program. Perform / instruct on therapeutic exercises, gait training, transfer training, and Range of Motion.  Occupational Therapy to evaluate and treat. Evaluate home environment for safety and equipment needs. Perform/Instruct on transfers, ADL training, ROM, and therapeutic exercises.  Aide to provide assistance with personal care, ADLs, and vital signs.      Medications: Review discharge medications with patient and family and provide education.      Current Discharge Medication List      START taking these medications    Details   traMADol (ULTRAM) 50 mg tablet Take 1 tablet (50 mg total) by mouth every 8 (eight) hours as needed.  Qty: 14 tablet, Refills: 0         CONTINUE these medications which have NOT CHANGED    Details   acetaminophen (TYLENOL) 325 MG tablet Take 650 mg by mouth every 6 (six) hours as needed for Pain.       albuterol (PROAIR HFA) 90 mcg/actuation inhaler Inhale 1-2 puffs into the lungs every 6 (six) hours as needed for Wheezing or Shortness of Breath.      albuterol (PROVENTIL) 2.5 mg /3 mL (0.083 %) nebulizer solution Inhale 3 mLs into the lungs.      albuterol-ipratropium 2.5mg-0.5mg/3mL (DUO-NEB) 0.5 mg-3 mg(2.5 mg base)/3 mL nebulizer solution       atorvastatin (LIPITOR) 20 MG tablet Take 20 mg by mouth nightly.       benazepril (LOTENSIN) 10 MG tablet Take 20 mg by mouth once daily.        calcium-vitamin D 250-100 mg-unit per tablet Take 1 tablet by mouth 2 (two) times daily.      CETIRIZINE HCL (ZYRTEC ORAL) Take 10 mg by mouth nightly as needed.       citalopram (CELEXA) 20 MG tablet Take 20 mg by mouth nightly.       felodipine (PLENDIL) 10 MG 24 hr tablet Take 20 mg by mouth once daily.       fluticasone-vilanterol (BREO) 100-25 mcg/dose diskus inhaler Inhale 1 puff into the lungs once daily.  Qty: 1 each, Refills: 3      INCRUSE ELLIPTA 62.5 mcg/actuation DsDv USE 1 PUFF QD AT SAME TIME  (patient takes at night)  Refills: 2      letrozole (FEMARA) 2.5 mg Tab Take 1 tablet (2.5 mg total) by mouth once daily.  Qty: 30 tablet, Refills: 11    Associated Diagnoses: Malignant neoplasm of nipple of left breast in female, estrogen receptor positive      montelukast (SINGULAIR) 10 mg tablet Take 10 mg by mouth every evening.      multivitamin (THERAGRAN) per tablet Take 1 tablet by mouth once daily.      omeprazole (PRILOSEC) 20 MG capsule       torsemide (DEMADEX) 20 MG Tab Take 20 mg by mouth once daily.             I certify that this patient is confined to her home and needs intermittent skilled nursing care, physical therapy and occupational therapy.

## 2019-04-04 NOTE — PLAN OF CARE
BSC delivered by JOHNNA Ng.     PHN assigned [facility] .  Information [facility] to [facility]S [facility].     Daughter to drive home.  No further DC needs from CM persepctive.       04/04/19 1540   Final Note   Assessment Type Final Discharge Note   Anticipated Discharge Disposition Home-Health   What phone number can be called within the next 1-3 days to see how you are doing after discharge? [phone]   Hospital Follow Up  Appt(s) scheduled? Yes   Discharge plans and expectations educations in teach back method with documentation complete? Yes   Right Care Referral Info   Post Acute Recommendation Home-care

## 2019-04-04 NOTE — DISCHARGE SUMMARY
"[facility] Hospital Medicine  Discharge Summary      Patient Name: Anahi Cook  MRN: [id number]  Admission Date: 4/3/2019  Hospital Length of Stay: 0 days  Discharge Date and Time:  04/04/2019 9:55 AM  Attending Physician: GIOVANY Hodge MD   Discharging Provider: Zohreh Bradford PA-C  Primary Care Provider: Pepito Theodore MD      HPI:   85 y/o female with Hx COPD, CKDII, HTN, Osteoporosis, Breast cancer, Chronic pain syndrome, Lumbar spondylosis presents to ED with c/o left sided chest pain with inspiration s/p fall 3 days ago. States she lost her balance while carrying her coffee and fell, hitting her left side, no LOC. States she "scooted" on her backside to the bedroom to call her family for assistance. Denies any HA, dizziness, SOB, numbness, tingling, or weakness. No loss of urinary or bowel control. 40 pack year history tobacco use, quit 1995, occasional alcohol use, denies illicit drug use.       ED eval, thoracic spine revealed No acute finding, XR ribs recent fractures of the 6th and 8th ribs, without significant displacement, possibly 7 rib as well. Pain controlled with Tramadol.     * No surgery found *      Hospital Course:   84 year old female admitted with Fractures of 6th, 8th and and possible 7th ribs of L side, suspect secondary to fall several days prior to admission. No evidence PTX. Spo2 on RA 92-96% (Hx COPD), no SOBContinued home bronchodilators, Incentive spirometer, pain controlled with oral tramadol. Home health and DME arranged, vitals stable, discharged home.          Consults:   Consults (From admission, onward)        Status Ordering Provider     Case Management/  Once     Provider:  (Not yet assigned)    Completed ZOHREH BRADFORD            Final Active Diagnoses:    Diagnosis Date Noted POA    PRINCIPAL PROBLEM:  Multiple closed fractures of ribs of left side [S22.42XA] 04/03/2019 Yes    CKD (chronic kidney disease) stage 2, GFR 60-89 ml/min " "[N18.2] 04/03/2019 Yes     Chronic    Hyperlipidemia [E78.5] 04/03/2019 Yes     Chronic    Hypokalemia [E87.6] 04/03/2019 Yes    Osteoporosis, senile [M81.0] 01/30/2019 Yes     Chronic    Essential hypertension [I10] 01/18/2016 Yes     Chronic    COPD (chronic obstructive pulmonary disease) [J44.9] 01/17/2016 Yes     Chronic      Problems Resolved During this Admission:       Discharged Condition: stable    Disposition: Home with Home health    Follow Up:  Follow-up Information     Schedule an appointment as soon as possible for a visit with Pepito Theodore MD.    Specialty:  Internal Medicine  Contact information:  [address] [facility] [address]  [phone]             [facility].    Specialty:  DME Provider  Why:  they will call you to schedule first appointment for home health  Contact information:  [address] [facility]  [address]  [facility] [address]  [phone]                 Patient Instructions:      COMMODE FOR HOME USE     Order Specific Question Answer Comments   Type: Standard    Height: 5' 2" (1.575 m)    Weight: 72.1 kg (159 lb)    Does patient have medical equipment at home? walker, rolling    Length of need (1-99 months): 99    Vendor: [facility]   Expected Date of Delivery: 4/4/2019      Diet Adult Regular     Order Specific Question Answer Comments   Na restriction, if any: 2gNa      Notify your health care provider if you experience any of the following:  temperature >100.4     Notify your health care provider if you experience any of the following:  persistent nausea and vomiting or diarrhea     Notify your health care provider if you experience any of the following:  severe uncontrolled pain     Notify your health care provider if you experience any of the following:  difficulty breathing or increased cough     Activity as tolerated       Significant Diagnostic Studies: Radiology:   Imaging Results          X-Ray Thoracic Spine AP Lateral (Final result)  " Result time 04/03/19 14:31:04    Final result by Fabi Merritt MD (04/03/19 14:31:04)                 Impression:      No acute finding      Electronically signed by: Fabi Merritt MD  Date:    04/03/2019  Time:    14:31             Narrative:    EXAMINATION:  XR THORACIC SPINE AP LATERAL    CLINICAL HISTORY:  Unspecified fall, initial encounter    TECHNIQUE:  AP and lateral views of the thoracic spine were performed.    COMPARISON:  May 2018 x-ray.  MRI September 2018.    FINDINGS:  There is diffuse osseous demineralization.  There are surgical clips in the right upper quadrant.  The thoracolumbar spine demonstrates mild scoliosis.    Osseous structures demonstrate no definite alignment abnormality.  Patient has undergone interval vertebroplasty at the known T12 compression deformity which is severe, with mild retropulsion.  There is stable slight anterior wedging at T11.  There is no new compression fracture.                               X-Ray Ribs 2 View Left (Final result)  Result time 04/03/19 14:03:34    Final result by Fabi Merritt MD (04/03/19 14:03:34)                 Impression:      Recent rib fractures.      Electronically signed by: Fabi Merritt MD  Date:    04/03/2019  Time:    14:03             Narrative:    EXAMINATION:  XR RIBS 2 VIEW LEFT    CLINICAL HISTORY:  Unspecified fall, initial encounter    TECHNIQUE:  Two views of the left ribs were performed.    COMPARISON:  None.    FINDINGS:  There are surgical clips in the left axilla.  There are surgical clips in the right upper quadrant.    There is diffuse osseous demineralization.  There are fractures of the 6th and 8th ribs, without significant displacement, possibly 7 rib as well.  No pneumothorax.                                  Pending Diagnostic Studies:     None         Medications:  Reconciled Home Medications:      Medication List      START taking these medications    traMADol 50 mg tablet  Commonly known as:   ULTRAM  Take 1 tablet (50 mg total) by mouth every 8 (eight) hours as needed.        CONTINUE taking these medications    acetaminophen 325 MG tablet  Commonly known as:  TYLENOL  Take 650 mg by mouth every 6 (six) hours as needed for Pain.     albuterol-ipratropium 2.5 mg-0.5 mg/3 mL nebulizer solution  Commonly known as:  DUO-NEB  Take 3 mLs by nebulization as needed.     atorvastatin 20 MG tablet  Commonly known as:  LIPITOR  Take 20 mg by mouth nightly.     benazepril 20 MG tablet  Commonly known as:  LOTENSIN  Take 20 mg by mouth once daily.     calcium-vitamin D 250-100 mg-unit per tablet  Take 1 tablet by mouth 2 (two) times daily.     citalopram 20 MG tablet  Commonly known as:  CELEXA  Take 20 mg by mouth nightly.     felodipine 10 MG 24 hr tablet  Commonly known as:  PLENDIL  Take 20 mg by mouth once daily.     fluticasone-vilanterol 100-25 mcg/dose diskus inhaler  Commonly known as:  BREO  Inhale 1 puff into the lungs once daily.     INCRUSE ELLIPTA 62.5 mcg/actuation Dsdv  Generic drug:  umeclidinium  USE 1 PUFF QD AT SAME TIME  (patient takes at night)     letrozole 2.5 mg Tab  Commonly known as:  FEMARA  Take 1 tablet (2.5 mg total) by mouth once daily.     montelukast 10 mg tablet  Commonly known as:  SINGULAIR  Take 10 mg by mouth every evening.     multivitamin per tablet  Commonly known as:  THERAGRAN  Take 1 tablet by mouth once daily.     omeprazole 20 MG capsule  Commonly known as:  PRILOSEC  Take 20 mg by mouth once daily.     * PROAIR HFA 90 mcg/actuation inhaler  Generic drug:  albuterol  Inhale 1-2 puffs into the lungs every 6 (six) hours as needed for Wheezing or Shortness of Breath.     * albuterol 2.5 mg /3 mL (0.083 %) nebulizer solution  Commonly known as:  PROVENTIL  Inhale 3 mLs into the lungs as needed.     torsemide 20 MG Tab  Commonly known as:  DEMADEX  Take 20 mg by mouth once daily.     ZYRTEC ORAL  Take 10 mg by mouth nightly as needed.         * This list has 2 medication(s)  that are the same as other medications prescribed for you. Read the directions carefully, and ask your doctor or other care provider to review them with you.                Indwelling Lines/Drains at time of discharge:   Lines/Drains/Airways          None          Time spent on the discharge of patient: >30 minutes  Patient was seen and examined on the date of discharge and determined to be suitable for discharge.         Zohreh Bradford PA-C  Department of Hospital Medicine  [facility]

## 2019-04-04 NOTE — PT/OT/SLP EVAL
"Occupational Therapy   Evaluation and Discharge Note    Name: Anahi Cook  MRN: [id number]  Admitting Diagnosis:  Multiple closed fractures of ribs of left side      Recommendations:     Discharge Recommendations: outpatient PT(LOB with SPC led to fall in home)  Discharge Equipment Recommendations:  other (see comments)(3-in-1 commode)  Barriers to discharge:  Decreased caregiver support    Assessment:     Anahi Cook is a 84 y.o. female with a medical diagnosis of Multiple closed fractures of ribs of left side. At this time, patient is functioning at their prior level of function and does not require further acute OT services. Initial OT evaluation completed.  Despite pain, patient is moving well with RW.  Educated on use of pillow for pressure to relieve pain when coughing.  Verbalized understanding.  Recommend 3-in-1 over toilet in home for ease of transfers while pain remains.  No further OT services recommended at this time.    Plan:     During this hospitalization, patient does not require further acute OT services.  Please re-consult if situation changes.    · Plan of Care Reviewed with: patient    Subjective     Chief Complaint: "I just finished OP PT... I was walking with my cane and coffee..."  Patient/Family Comments/goals: Home    Occupational Profile:  Living Environment: Patient resides with [facility] and [facility] in one story home with 5 steps to enter from back entrance.  There are (B) HRs.  Her bath room has a standard toilet and tub/shower combination.  Previous level of function: Modified independent with DME; does not drive or cook  Roles and Routines: Enjoys computer games, walking around her home  Equipment Used at home:  cane, straight, shower chair, walker, rolling, nebulizer, rollator  Assistance upon Discharge: Family works    Pain/Comfort:  Pain Rating 1: 6/10  Location - Orientation 1: lateral  Location 1: rib(s)  Pain Addressed 1: Pre-medicate for activity, Reposition, Distraction  Pain Rating " "Post-Intervention 1: 6/10    Patients cultural, spiritual, Hoahaoism conflicts given the current situation: no    Objective:     Communicated with: RN prior to session.  Patient found HOB elevated with peripheral IV upon OT entry to room.    General Precautions: Standard, anti-coagulation medicine, fall, other (see comments)(low sodium diet)   Orthopedic Precautions:N/A   Braces: N/A     Occupational Performance:    Bed Mobility:    · Patient completed Rolling/Turning to Right with modified independence  · Patient completed Scooting/Bridging with modified independence  · Patient completed Supine to Sit with modified independence    Functional Mobility/Transfers:  · Patient completed Sit <> Stand Transfer with supervision  with  rolling walker   · Patient completed Toilet Transfer Step Transfer technique with supervision with  rolling walker and bedside commode over toilet  · Functional Mobility: Supervision within room with RW    Activities of Daily Living:  · Feeding:  modified independence for tray  · Grooming: supervision in standing at sink with RW  · Lower Body Dressing: supervision for doffing/donning socks seated at EOB  · Toileting: supervision from 3-in-1 over toilet     Cognitive/Visual Perceptual:  Cognitive/Psychosocial Skills:     -       Oriented to: Person, Place, Time and Situation   -       Follows Commands/attention:Follows two-step commands  -       Communication: clear/fluent  -       Memory: No Deficits noted  -       Safety awareness/insight to disability: impaired   -       Mood/Affect/Coping skills/emotional control: Appropriate to situation, Cooperative and Pleasant  Visual/Perceptual:      -Intact     Physical Exam:  Postural examination/scapula alignment:    -       Rounded shoulders  -       Kyphosis  Skin integrity: Visible skin intact  Edema:  None noted in (B) UEs  Sensation:    -       Intact  light/touch (B) UEs, but secondary to breast CA has intermittent "numbness" in (L) UE per " patient  Upper Extremity Range of Motion:     -       Right Upper Extremity: WFL  -       Left Upper Extremity: WFL to comfort level  Upper Extremity Strength:    -       Right Upper Extremity: WFL  -       Left Upper Extremity: deferred   Strength:    -       Right Upper Extremity: WFL  -       Left Upper Extremity: WFL  Gross motor coordination:   WFL    AMPAC 6 Click ADL:  AMPAC Total Score: 19    Treatment & Education:  Educated to role of OT, POC, use of RW with in home mobility for safety and use of 3-in-1 over toilet.  Patient verbalized understanding.  Education:    Patient left up in chair with all lines intact, call button in reach, RN notified and PT present    GOALS:   Multidisciplinary Problems     Occupational Therapy Goals     Not on file          Multidisciplinary Problems (Resolved)        Problem: Occupational Therapy Goal    Goal Priority Disciplines Outcome Interventions   Occupational Therapy Goal   (Resolved)     OT, PT/OT Outcome(s) achieved                    History:     Past Medical History:   Diagnosis Date    Anxiety     Back pain     Breast cancer 1/17/2018    Breast mass 1/15/2018    Chronic kidney disease, stage 2, mildly decreased GFR     COPD (chronic obstructive pulmonary disease)     emphysema    Depression     Dysuria 1/29/2018    Family history of breast cancer 1/17/2018    Hypertension     Infiltrating ductal carcinoma of breast, left 7/12/2018    Osteoporosis, senile     Ovarian mass 1/15/2018    Pneumonia     S/P robotic assisted laparoscopic BSO (bilateral salpingo-oophorectomy) 2/23/2018         Past Surgical History:   Procedure Laterality Date    BREAST BIOPSY      BREAST LUMPECTOMY      CHOLECYSTECTOMY      EYE SURGERY      HYSTERECTOMY      INFUSION, INTRAVENOUS N/A 4/17/2013    [facility]    INJECTION, FACET JOINT, BILATERAL LUMBAR L3-4, 4-5, 5-S1 FACET JOINT INJECTIONS Bilateral 10/15/2018    Performed by Merly  MD Brenden at [facility] PAIN MGT    INJECTION, FACET JOINT, L3-L4,L4-L5,L5-S1 Bilateral 4/1/2019    Performed by Merly Wilcox MD at [facility] PAIN MGT    INJECTION, FOR SENTINEL NODE IDENTIFICATION Left 7/12/2018    Performed by [facility] at [facility] OR 2ND FLR    INJECTION,STEROID,EPIDURAL N/A 8/21/2018    Performed by Merly Wilcox MD at [facility] PAIN MGT    INJECTION-STEROID-EPIDURAL-LUMBAR N/A 2/15/2018    Performed by Merly Wilcox MD at [facility] PAIN MGT    INJECTION-STEROID-EPIDURAL-LUMBAR N/A 2/15/2016    Performed by Merly Wilcox MD at [facility] PAIN MGT    INJECTION-STEROID-EPIDURAL-LUMBAR N/A 7/21/2014    Performed by Merly Wilcox MD at [facility] PAIN MGT    Kyphoplasty   T12 N/A 11/8/2018    Performed by Merly Wilcox MD at [facility] CATH LAB    LYMPHADENECTOMY, AXILLARY Left 7/12/2018    Performed by Amanda [facility]ero MD at [facility] OR 2ND FLR    MASTECTOMY, PARTIAL-W/SEED LOCALIZATION Left 7/12/2018    Performed by Amanda [facility]ero MD at [facility] OR 2ND FLR    SC REMOVAL OF OVARY/TUBE(S)  02/23/2018    Robotic Assisted    ROTATOR CUFF REPAIR Right     rotator cuff repair right shoulder    TONSILLECTOMY      XI ROBOT ASSISTED LAPAROSCOPIC SALPINGO-OOPHERECTOMY Bilateral 2/23/2018    Performed by Reena Chairez MD at [facility] OR       Time Tracking:     OT Date of Treatment: 04/04/19  OT Start Time: 0902  OT Stop Time: 0922  OT Total Time (min): 20 min    Billable Minutes:Evaluation 20    DOROTHY Marroquin  4/4/2019

## 2019-04-04 NOTE — PLAN OF CARE
Problem: Adult Inpatient Plan of Care  Goal: Plan of Care Review  Outcome: Ongoing (interventions implemented as appropriate)  Pt on room air, SpO2 94%. No respiratory distress noted, IS done achieving 750 ml times 10 breaths. Will continue to monitor.

## 2019-04-04 NOTE — PLAN OF CARE
Problem: Adult Inpatient Plan of Care  Goal: Plan of Care Review  Outcome: Ongoing (interventions implemented as appropriate)  Pt arrived to unit stable with no current complaints. Pt's BP was elevated - Hydralazine was administered. Pt is using her incentive spirometer in order to improve oxygen saturation. Pt is resting comfortably in room

## 2019-04-04 NOTE — PLAN OF CARE
Problem: Physical Therapy Goal  Goal: Physical Therapy Goal  Outcome: Outcome(s) achieved Date Met: 04/04/19  Patient evaluated and no acute care PT goals identified. Patient ambulates x125 ft with RW with supervision progressing to supervision. Ascended/descended 4 stairs with (B) HR with reciprocal pattern with SBA. Supine>sit with mod(I) for extra time to perform. Recommendation for home with assistance as needed and OP PT to address balance impairments.

## 2019-04-04 NOTE — PLAN OF CARE
HH orders sent to [facility], awaiting their assignment of HH company.     BSC orders placed. Suzette Smith with [employer] [employer] auth to pull equipment.  Art, SSC to deliver to bedside prior to delivery.     Information added to AVS.

## 2019-04-04 NOTE — PLAN OF CARE
[facility]  [address] [place]. [address]           HOME  HEALTH ORDERS        Admit to Home Health    Diagnoses:  Active Hospital Problems    Diagnosis  POA    *Multiple closed fractures of ribs of left side [S22.42XA]  Yes    CKD (chronic kidney disease) stage 2, GFR 60-89 ml/min [N18.2]  Yes     Chronic    Hyperlipidemia [E78.5]  Yes     Chronic    Hypokalemia [E87.6]  Yes    Osteoporosis, senile [M81.0]  Yes     Chronic    Essential hypertension [I10]  Yes     Chronic    COPD (chronic obstructive pulmonary disease) [J44.9]  Yes     Chronic      Resolved Hospital Problems   No resolved problems to display.       Patient is homebound due to: Pt requires home health services due to taxing effort to leave the home as a result of weakness/debility from Multiple closed fractures of ribs of left side    Face to face services were provided on 4/4/2019    Allergies:  Review of patient's allergies indicates:   Allergen Reactions    Levaquin [levofloxacin] Itching    Penicillins Itching       Diet: low sodium 2 g    Activity: as tolerated    Nursing:   SN to complete comprehensive assessment including routine vital signs. Instruct on disease process and s/s of complications to report to MD. Review/verify medication list sent home with the patient at time of discharge  and instruct patient/caregiver as needed. Frequency may be adjusted depending on start of care date. Safety evaluation.     Notify MD if SBP > 160 or < 90; DBP > 90 or < 50; HR > 120 or < 50; Temp > 101;     CONSULTS:     Physical Therapy to evaluate and treat. Evaluate for home safety and equipment needs; Establish/upgrade home exercise program. Perform / instruct on therapeutic exercises, gait training, transfer training, and Range of Motion.    Occupational Therapy to evaluate and treat. Evaluate home environment for safety and equipment needs. Perform/Instruct on transfers, ADL training, ROM, and  therapeutic exercises.      Medications: Review discharge medications with patient and family and provide education.         Medication List      START taking these medications    traMADol 50 mg tablet  Commonly known as:  ULTRAM  Take 1 tablet (50 mg total) by mouth every 8 (eight) hours as needed.        CONTINUE taking these medications    acetaminophen 325 MG tablet  Commonly known as:  TYLENOL     albuterol-ipratropium 2.5 mg-0.5 mg/3 mL nebulizer solution  Commonly known as:  DUO-NEB     atorvastatin 20 MG tablet  Commonly known as:  LIPITOR     benazepril 10 MG tablet  Commonly known as:  LOTENSIN     calcium-vitamin D 250-100 mg-unit per tablet     citalopram 20 MG tablet  Commonly known as:  CELEXA     felodipine 10 MG 24 hr tablet  Commonly known as:  PLENDIL     fluticasone-vilanterol 100-25 mcg/dose diskus inhaler  Commonly known as:  BREO  Inhale 1 puff into the lungs once daily.     INCRUSE ELLIPTA 62.5 mcg/actuation Dsdv  Generic drug:  umeclidinium     letrozole 2.5 mg Tab  Commonly known as:  FEMARA  Take 1 tablet (2.5 mg total) by mouth once daily.     montelukast 10 mg tablet  Commonly known as:  SINGULAIR     multivitamin per tablet  Commonly known as:  THERAGRAN     omeprazole 20 MG capsule  Commonly known as:  PRILOSEC     * PROAIR HFA 90 mcg/actuation inhaler  Generic drug:  albuterol     * albuterol 2.5 mg /3 mL (0.083 %) nebulizer solution  Commonly known as:  PROVENTIL     torsemide 20 MG Tab  Commonly known as:  DEMADEX     ZYRTEC ORAL         * This list has 2 medication(s) that are the same as other medications prescribed for you. Read the directions carefully, and ask your doctor or other care provider to review them with you.               Where to Get Your Medications      You can get these medications from any pharmacy    Bring a paper prescription for each of these medications  · traMADol 50 mg tablet           _________________________________  D. Gaston Hodge MD      4/4/2019

## 2019-04-04 NOTE — PT/OT/SLP EVAL
Physical Therapy Evaluation, Treatment, and Discharge Note    Patient Name:  Anahi Cook   MRN:  [id number]    Recommendations:     Discharge Recommendations:  outpatient PT, home(Home with assistance as needed)   Discharge Equipment Recommendations: (3-in-1 commode)   Barriers to discharge: None    Assessment:     Anahi Cook is a 84 y.o. female admitted with a medical diagnosis of Multiple closed fractures of ribs of left side s/p ground level fall. Pmhx significant for COPD, CKD-II, HTN, osteoporosis, breast cancer (2018), and lumbar spondylosis. At this time, patient is functioning at their prior level of function and does not require further acute PT services. Patient with impaired balance and increased pain and could benefit from continued physical therapy services with OP PT.    Recent Surgery: * No surgery found *      Plan:     During this hospitalization, patient does not require further acute PT services.  Please re-consult if situation changes.      Subjective     Chief Complaint: Pain in L ribs  Patient/Family Comments/goals: To go home  Pain/Comfort:  · Pain Rating 1: 6/10  · Location - Side 1: Left  · Location - Orientation 1: lateral  · Location 1: rib(s)  · Pain Addressed 1: Pre-medicate for activity, Reposition, Distraction  · Pain Rating Post-Intervention 1: 6/10    Patients cultural, spiritual, Mormonism conflicts given the current situation: no    Living Environment:  Patient resides in a [facility] with her daughter and granddaughter (both work). There are [facility] with [facility].    Prior to admission, patients level of function was modified independence with rollator or single point cane in the home and rolling walker in the community. She enters and exits her home with supervision. Patient is (I) with ADLs and requires assistance from IADLs. She enjoys playing games on her tablet and walking around her house. Equipment used at home: cane, straight, shower chair, rollator, walker, rolling, nebulizer.  DME  owned (not currently used): none.  Upon discharge, patient will have assistance from her daughter and granddaughter when they are not at work.    Objective:     Communicated with LINDA Bruner prior to session.  Patient found HOB elevated with peripheral IV upon PT entry to room. Patient agreeable to evaluation.    General Precautions: Standard, anti-coagulation medicine, fall   Orthopedic Precautions:N/A   Braces: N/A     Patient donned red socks and gait belt for OOB mobility.    Exams:  · Cognition:   · Patient is A&Ox4.  · Pt follows approximately 100% of one and two step commands.    · Mood: Pleasant and cooperative.  · Musculoskeletal:  · Posture:    · Increased kyphotic curve  · Rounded shoulders  · Forward head  · LE ROM/Strength:   · R ROM: WFL  · L ROM: WFL  · R Strength: WFL   · L Strength: WFL  · Neuromuscular:  · Sensation: Intact to light touch bilateral LEs.  · Coordination/Tone/Reflexes: No impairments identified with functional mobility. No formal testing performed.   · Balance:   · Normal stance, eyes open: Maintains 20 s with minimal sway  · Narrow stance, eyes open: Maintains 20 s with moderate to max sway with minimal postural corrections noted  · Narrow stance, eyes closed: Maintains 10 sec with max sway with minimal postural corrections  · Narrow stance, perturbations: With min perturbation, sways to edge of EVELYN and returns to neutral slowly. With moderate, sways outside EVELYN and no postural correction (no stepping response) and requires external support to maintain balance  · Visual-vestibular: No impairments identified with functional mobility. No formal testing performed.  · Integument: Visible skin intact  · Cardiopulmonary:  · Edema: None noted    Outcome Measures  TINETTI BALANCE ASSESSMENT TOOL    Demetrioetti ME, Frantz TF, Maytrish R, Fall Risk Index for elderly patients based on number of chronic disabilities.Am J Med 1986:[id number]    Assistive Device  Normally Used: Yes. Type of Assistive  "Device: RW  Assistive Device During Testing: Yes     BALANCE SECTION  Patient is seated in a hard, armless chair.    1. Sitting Balance:   Steady; safe = 1  2 .Rises from chair :  Able, uses arms to help = 1  3. Attempts to arise :  Able to arise, 1 attempt = 2  4. Immediate standing Balance (first 5 seconds) : Steady but uses walker or other support = 1  5. Standing balance: Steady but wide stance (medal heel>4" apart) & uses cane or other support = 1  6. Nudged : Begins to fall = 0  7. Eyes closed: Unsteady = 0  8. Turning 360 degrees:  Continuous = 1 and Unsteady (grabs, staggers) = 0  9. Sitting Down : Uses arms or not a smooth motion = 1    Balance Score:  8/16    GAIT SECTION  Patient stands with therapist, walks across room (+/- aids), first at usual pace, then at rapid pace, but safe pace.     10. Initiation of Gait (Immediately after told to go.): No hesitancy = 1  11. Step length and height & Foot Clearance: Right swing foot passes left stance foot = 1 and Right foot completely clears floor = 1 and Left swing foot passes right stance foot = 1 and Left foot completely clears floor = 1  12. Step symmetry: Right & Left step length appear equal = 1  13. Step continuity : Steps appear continuous = 1  14. Path: Mild/moderate deviation or uses walking aid = 1  15. Trunk : Marked sway or uses walking aid = 0  16. Walking Time: Heels amost touching while walking = 1    Balance score:8/16  Gait Score: 9/12    Total Score=Balance + Gait Score: 17/28     Risk Indicators:    Tinetti Tool Score   Risk of Falls   ?18    High   19-23   Moderate   ?24   Low        Functional Mobility:  · Bed Mobility:     · Supine to Sit: modified independence  · Transfers:     · Sit to Stand:  modified independence and supervision with rolling walker  · Gait: x125 ft with RW and supervision progressing to mod(I).   · Pt with no LOB noted during gait. Decreased gait speed.  · Balance: See above for Tinetti Balance Assessment   · Stairs: " Pt ascended/descended 4 stairs with (B) HR with reciprocal gait and supervision.    AM-PAC 6 CLICK MOBILITY  Total Score:23       Therapeutic Activities and Exercises:   Supine>sit, sit<>stand from variety of surfaces, stairs    PT educated patient re: PT plan of care, role of PT, safety with OOB mobility, use of RW, discharge disposition, safety from falls.  Pt verbalize understanding.      AM-PAC 6 CLICK MOBILITY  Total Score:23     Patient left up in chair with all lines intact, call button in reach and LINDA Bruner notified.    GOALS:   Multidisciplinary Problems     Physical Therapy Goals     Not on file          Multidisciplinary Problems (Resolved)        Problem: Physical Therapy Goal    Goal Priority Disciplines Outcome Goal Variances Interventions   Physical Therapy Goal   (Resolved)     PT, PT/OT Outcome(s) achieved                     History:     Past Medical History:   Diagnosis Date    Anxiety     Back pain     Breast cancer 1/17/2018    Breast mass 1/15/2018    Chronic kidney disease, stage 2, mildly decreased GFR     COPD (chronic obstructive pulmonary disease)     emphysema    Depression     Dysuria 1/29/2018    Family history of breast cancer 1/17/2018    Hypertension     Infiltrating ductal carcinoma of breast, left 7/12/2018    Osteoporosis, senile     Ovarian mass 1/15/2018    Pneumonia     S/P robotic assisted laparoscopic BSO (bilateral salpingo-oophorectomy) 2/23/2018       Past Surgical History:   Procedure Laterality Date    BREAST BIOPSY      BREAST LUMPECTOMY      CHOLECYSTECTOMY      EYE SURGERY      HYSTERECTOMY      INFUSION, INTRAVENOUS N/A 4/17/2013    Performed [facility] at [facility] OR    INJECTION, FACET JOINT, BILATERAL LUMBAR L3-4, 4-5, 5-S1 FACET JOINT INJECTIONS Bilateral 10/15/2018    Performed by Merly Wilcox MD at [facility] PAIN T    INJECTION, FACET JOINT, L3-L4,L4-L5,L5-S1 Bilateral 4/1/2019    Performed by Merly Wilcox MD at [facility] PAIN T     INJECTION, FOR SENTINEL NODE IDENTIFICATION Left 7/12/2018    Performed by Amanda Caballero MD at [facility] OR 2ND FLR    INJECTION,STEROID,EPIDURAL N/A 8/21/2018    Performed by Merly Wilcox MD at [facility] PAIN MGT    INJECTION-STEROID-EPIDURAL-LUMBAR N/A 2/15/2018    Performed by Merly Wilcox MD at [facility] PAIN MGT    INJECTION-STEROID-EPIDURAL-LUMBAR N/A 2/15/2016    Performed by Merly Wilcox MD at [facility] PAIN MGT    INJECTION-STEROID-EPIDURAL-LUMBAR N/A 7/21/2014    Performed by Merly Wilcox MD at [facility] PAIN MGT    Kyphoplasty   T12 N/A 11/8/2018    Performed by Merly Wilcxo MD at [facility] CATH LAB    LYMPHADENECTOMY, AXILLARY Left 7/12/2018    Performed by Amanda [facility]allero MD at [facility] OR 2ND FLR    MASTECTOMY, PARTIAL-W/SEED LOCALIZATION Left 7/12/2018    Performed by Amanda Caballero MD at [facility] OR 2ND FLR    NJ REMOVAL OF OVARY/TUBE(S)  02/23/2018    Robotic Assisted    ROTATOR CUFF REPAIR Right     rotator cuff repair right shoulder    TONSILLECTOMY      XI ROBOT ASSISTED LAPAROSCOPIC SALPINGO-OOPHERECTOMY Bilateral 2/23/2018    Performed by Reena Chairez MD at [facility] OR       Time Tracking:     PT Received On: 04/04/19  PT Start Time: 0903     PT Stop Time: 0929  PT Total Time (min): 26 min     Overlapping co-evaluation with OT    Billable Minutes: Evaluation 15 and Therapeutic Activity 10      Zee Membreno, PT  04/04/2019

## 2019-04-04 NOTE — HOSPITAL COURSE
84 year old female admitted with Fractures of 6th, 8th and and possible 7th ribs of L side, suspect secondary to fall several days prior to admission. No evidence PTX. Spo2 on RA 92-96% (Hx COPD), no SOBContinued home bronchodilators, Incentive spirometer, pain controlled with oral tramadol. Home health and DME arranged, vitals stable, discharged home.

## 2019-04-04 NOTE — PLAN OF CARE
Problem: Occupational Therapy Goal  Goal: Occupational Therapy Goal  Outcome: Outcome(s) achieved Date Met: 04/04/19  Initial OT evaluation completed.  Despite pain, patient is moving well with RW.  Educated on use of pillow for pressure to relieve pain when coughing.  Verbalized understanding.  Recommend 3-in-1 over toilet in home for ease of transfers while pain remains.  No further OT services recommended at this time.    Comments: DOROTHY Marroquin, 4/4/2019

## 2019-04-04 NOTE — PLAN OF CARE
Problem: Adult Inpatient Plan of Care  Goal: Plan of Care Review  Outcome: Ongoing (interventions implemented as appropriate)     04/04/19 5035   Plan of Care Review   Plan of Care Reviewed With patient   Progress improving   Pt rested peacefully throughout the night. Pt performed Incentive spirometry every 4 hours and averaged 750 ml. O2 SATs averaged at 97% and Pt performed pursed lip breathing. Pain managed with prn med's.  Pt transferred to and from bedside commode wit min assist. Remain free of falls and injuries this shift. Will continue to monitor.

## 2019-04-30 ENCOUNTER — OFFICE VISIT (OUTPATIENT)
Dept: HEMATOLOGY/ONCOLOGY | Facility: CLINIC | Age: 84
End: 2019-04-30
Payer: MEDICARE

## 2019-04-30 VITALS
RESPIRATION RATE: 16 BRPM | HEIGHT: 62 IN | TEMPERATURE: 98 F | DIASTOLIC BLOOD PRESSURE: 71 MMHG | HEART RATE: 66 BPM | WEIGHT: 160.5 LBS | SYSTOLIC BLOOD PRESSURE: 151 MMHG | OXYGEN SATURATION: 92 % | BODY MASS INDEX: 29.53 KG/M2

## 2019-04-30 DIAGNOSIS — C50.412 MALIGNANT NEOPLASM OF UPPER-OUTER QUADRANT OF LEFT BREAST IN FEMALE, ESTROGEN RECEPTOR POSITIVE: Chronic | ICD-10-CM

## 2019-04-30 DIAGNOSIS — Z17.0 MALIGNANT NEOPLASM OF UPPER-OUTER QUADRANT OF LEFT BREAST IN FEMALE, ESTROGEN RECEPTOR POSITIVE: Chronic | ICD-10-CM

## 2019-04-30 DIAGNOSIS — Z79.811 USE OF AROMATASE INHIBITORS: Primary | Chronic | ICD-10-CM

## 2019-04-30 PROCEDURE — 3077F SYST BP >= 140 MM HG: CPT | Mod: CPTII,S$GLB,, | Performed by: INTERNAL MEDICINE

## 2019-04-30 PROCEDURE — 3077F PR MOST RECENT SYSTOLIC BLOOD PRESSURE >= 140 MM HG: ICD-10-PCS | Mod: CPTII,S$GLB,, | Performed by: INTERNAL MEDICINE

## 2019-04-30 PROCEDURE — 99999 PR PBB SHADOW E&M-EST. PATIENT-LVL III: CPT | Mod: PBBFAC,,, | Performed by: INTERNAL MEDICINE

## 2019-04-30 PROCEDURE — 1101F PT FALLS ASSESS-DOCD LE1/YR: CPT | Mod: CPTII,S$GLB,, | Performed by: INTERNAL MEDICINE

## 2019-04-30 PROCEDURE — 99213 PR OFFICE/OUTPT VISIT, EST, LEVL III, 20-29 MIN: ICD-10-PCS | Mod: S$GLB,,, | Performed by: INTERNAL MEDICINE

## 2019-04-30 PROCEDURE — 3078F PR MOST RECENT DIASTOLIC BLOOD PRESSURE < 80 MM HG: ICD-10-PCS | Mod: CPTII,S$GLB,, | Performed by: INTERNAL MEDICINE

## 2019-04-30 PROCEDURE — 99999 PR PBB SHADOW E&M-EST. PATIENT-LVL III: ICD-10-PCS | Mod: PBBFAC,,, | Performed by: INTERNAL MEDICINE

## 2019-04-30 PROCEDURE — 99213 OFFICE O/P EST LOW 20 MIN: CPT | Mod: S$GLB,,, | Performed by: INTERNAL MEDICINE

## 2019-04-30 PROCEDURE — 1101F PR PT FALLS ASSESS DOC 0-1 FALLS W/OUT INJ PAST YR: ICD-10-PCS | Mod: CPTII,S$GLB,, | Performed by: INTERNAL MEDICINE

## 2019-04-30 PROCEDURE — 3078F DIAST BP <80 MM HG: CPT | Mod: CPTII,S$GLB,, | Performed by: INTERNAL MEDICINE

## 2019-04-30 RX ORDER — PREDNISONE 10 MG/1
TABLET ORAL
Refills: 0 | COMMUNITY
Start: 2019-04-16 | End: 2021-06-02

## 2019-04-30 NOTE — PROGRESS NOTES
Subjective:       Patient ID: Anahi Cook is a 84 y.o. female.    Chief Complaint: No chief complaint on file.    HPI   Mrs. Cook returns today for follow up.   I had last seen her 6 months ago.  Briefly, she is an 84-year-old female who in late 2017 had initially felt a mass in her left breast.  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    She was started on letrozole and undwerwent a BSO by Dr. Chairez for an ovarian mass.  Her ovarian mass was benign. In mid July 2018 she underwent a lumpectomy and AND.  She had a 3.3 cm carcinoma and 4/8 positive nodes.  She has remained on letrozole, now in the adjuvant setting, and she returns today for follow up.   Of note, she also received chest wall XRT.    Review of Systems    Overall she feels OK  She has tolerated the letrozole well so far.  She complains of occasional left breast pain and heaviness.  She denies any anxiety, depression, easy bruising, fevers, chills, night  sweats, weight loss, nausea, vomiting, diarrhea, constipation, diplopia, blurred vision, headache, chest pain, palpitations, shortness of breath, breast pain, abdominal pain, extremity pain, or difficulty ambulating.  The remainder of the ten-point ROS, including general, skin, lymph, H/N, cardiorespiratory, GI, , Neuro, Endocrine, and psychiatric is negative.       Objective:      Physical Exam      She is alert, oriented to time, place, person, pleasant, well      nourished, in no acute physical distress.   She is accompanied by her daughter.                                 VITAL SIGNS:  Reviewed                                      HEENT:  Normal.  There are no nasal, oral, lip, gingival, auricular, lid,    or conjunctival lesions.  Mucosae are moist and pink, and there is no        thrush.  Pupils are equal, reactive to light and accommodation.               Extraocular muscle movements are intact.   Dentition is fair.  Maxillary teeth are missing.                                      NECK:  Supple without JVD, adenopathy, or thyromegaly.                       LUNGS:  Clear to auscultation without wheezing, rales, or rhonchi.           CARDIOVASCULAR:  Reveals an S1, S2, no murmurs, no rubs, no gallops.         ABDOMEN:  Soft, nontender, without organomegaly.  Bowel sounds are    present.    Scars from her laparoscopic DONOVAN-BSO are seen.                                                            EXTREMITIES:  No cyanosis, clubbing, or edema.   The left forearm and wrist are in a cast.                            BREASTS: She is s/p left lumpectomy with a healed periareolar incision.  She also has an incision from her axillary dissection;  It is well healed.   There are no masses in her right breast.    Both breasts are large and pendulous.    There is mild hyperpigmentation within the radiation field.                                 LYMPHATIC:  There is no cervical, axillary, or supraclavicular adenopathy.   SKIN:  Warm and moist, without petechiae, rashes, induration, or ecchymoses.           NEUROLOGIC:  DTRs are 0-1+ bilaterally, symmetrical, motor function is 5/5,  and cranial nerves are  within normal limits.      Assessment:       1. Hormone receptor positive breast cancer, left, on neoadjuvant letrozole with a significant clinical response.   2. Use of aromatase inhibitors        Plan:        I had a long discussion with her and her daughter.  She will remain on letrozole, and see me in three months for follow up.  Her multiple questions were answered to her satisfaction.

## 2019-05-13 ENCOUNTER — OFFICE VISIT (OUTPATIENT)
Dept: PAIN MEDICINE | Facility: CLINIC | Age: 84
End: 2019-05-13
Payer: MEDICARE

## 2019-05-13 VITALS
RESPIRATION RATE: 15 BRPM | DIASTOLIC BLOOD PRESSURE: 79 MMHG | SYSTOLIC BLOOD PRESSURE: 142 MMHG | HEART RATE: 67 BPM | BODY MASS INDEX: 29.7 KG/M2 | WEIGHT: 161.38 LBS | TEMPERATURE: 98 F | HEIGHT: 62 IN

## 2019-05-13 DIAGNOSIS — M47.816 LUMBAR SPONDYLOSIS: Primary | ICD-10-CM

## 2019-05-13 DIAGNOSIS — M47.816 FACET ARTHRITIS OF LUMBAR REGION: ICD-10-CM

## 2019-05-13 DIAGNOSIS — M79.10 MYALGIA: ICD-10-CM

## 2019-05-13 DIAGNOSIS — M48.50XA VERTEBRAL COMPRESSION FRACTURE: ICD-10-CM

## 2019-05-13 DIAGNOSIS — G89.4 CHRONIC PAIN SYNDROME: ICD-10-CM

## 2019-05-13 PROCEDURE — 3077F PR MOST RECENT SYSTOLIC BLOOD PRESSURE >= 140 MM HG: ICD-10-PCS | Mod: CPTII,S$GLB,, | Performed by: NURSE PRACTITIONER

## 2019-05-13 PROCEDURE — 1101F PT FALLS ASSESS-DOCD LE1/YR: CPT | Mod: CPTII,S$GLB,, | Performed by: NURSE PRACTITIONER

## 2019-05-13 PROCEDURE — 99999 PR PBB SHADOW E&M-EST. PATIENT-LVL III: CPT | Mod: PBBFAC,,, | Performed by: NURSE PRACTITIONER

## 2019-05-13 PROCEDURE — 3078F DIAST BP <80 MM HG: CPT | Mod: CPTII,S$GLB,, | Performed by: NURSE PRACTITIONER

## 2019-05-13 PROCEDURE — 3078F PR MOST RECENT DIASTOLIC BLOOD PRESSURE < 80 MM HG: ICD-10-PCS | Mod: CPTII,S$GLB,, | Performed by: NURSE PRACTITIONER

## 2019-05-13 PROCEDURE — 99999 PR PBB SHADOW E&M-EST. PATIENT-LVL III: ICD-10-PCS | Mod: PBBFAC,,, | Performed by: NURSE PRACTITIONER

## 2019-05-13 PROCEDURE — 99213 PR OFFICE/OUTPT VISIT, EST, LEVL III, 20-29 MIN: ICD-10-PCS | Mod: S$GLB,,, | Performed by: NURSE PRACTITIONER

## 2019-05-13 PROCEDURE — 99499 UNLISTED E&M SERVICE: CPT | Mod: S$GLB,,, | Performed by: NURSE PRACTITIONER

## 2019-05-13 PROCEDURE — 99213 OFFICE O/P EST LOW 20 MIN: CPT | Mod: S$GLB,,, | Performed by: NURSE PRACTITIONER

## 2019-05-13 PROCEDURE — 3077F SYST BP >= 140 MM HG: CPT | Mod: CPTII,S$GLB,, | Performed by: NURSE PRACTITIONER

## 2019-05-13 PROCEDURE — 1101F PR PT FALLS ASSESS DOC 0-1 FALLS W/OUT INJ PAST YR: ICD-10-PCS | Mod: CPTII,S$GLB,, | Performed by: NURSE PRACTITIONER

## 2019-05-13 PROCEDURE — 99499 RISK ADDL DX/OHS AUDIT: ICD-10-PCS | Mod: S$GLB,,, | Performed by: NURSE PRACTITIONER

## 2019-05-13 NOTE — PROGRESS NOTES
Chronic patient Established Note (Follow up visit)      SUBJECTIVE:    Anahi Cook presents to the clinic for a follow-up appointment for lower back and left lower extremity pain.  She had T12 kyphoplasty performed on 11/8/18 with significant improvement in symptoms.  She was admitted through the ED on 4/3/19 after suffering fractures of 6th, 8th and possible 7th ribs of the left side after a fall at home.  Thoracic imaging also showed stable slight anterior wedging at T11.  She had bilateral L3-4, L4-5 and L5-S1 facet injections on 4/1/19 with 80% relief of lower back pain.  Her current pain is minimal.  She has mild leg pain with walking.  She continues to follow up with oncology regularly.  Since the last visit, Anahi Cook states the pain has been improving.  Current pain intensity is 2/10.     Pain Medications:  OTC Tylenol    Opioid Contract: no     report:  Not applicable    Pain Procedures:   7/21/14 L4-5 IL ASHUTOSH- significant benefit  12/1/15 Left GT bursa injection  4/26/16 Left GT bursa injection  12/15/16 L4-5 IL ASHUTOSH- significant benefit  2/15/18 L4-5 IL ASHUTOSH- 100% relief  8/21/18 L4-5 IL ASHUTOSH- significant relief  10/15/18 Bilateral L3-4, L4-5 L5-S1 facet joint injections  11/8/18 T12 kyphoplasty- significant relief  4/1/19 Bilateral L3-4, L4-5 L5-S1 facet joint injections- 80% relief    Physical Therapy/Home Exercise: yes     Imaging:     Narrative     EXAMINATION:  XR THORACIC SPINE AP LATERAL    CLINICAL HISTORY:  Unspecified injury of lower back, initial encounter    TECHNIQUE:  Two views obtained.  Note that patient is slightly obliquely oriented on the lateral views which combines with severe osseous demineralization and result in limitation.    COMPARISON:  CT January 2018    FINDINGS:  There are surgical clips in the right upper quadrant.  There is tortuosity of the aorta with calcification along its wall.  Mild curvature of the spine is noted.  There is mild compression deformity along the  superior endplate of T12.  No definite additional compression deformity noted.      Impression       Mild compression deformity along superior endplate of T12 new compared to January 2018.  Exam somewhat limited due to osseous demineralization and patient obliquity.     Narrative     EXAMINATION:  XR LUMBAR SPINE AP AND LATERAL    CLINICAL HISTORY:  Low back pain, minor trauma;    TECHNIQUE:  AP, lateral and spot images were performed of the lumbar spine.  Patient is slightly obliquely positioned on the lateral view.    COMPARISON:  CT from January 2018    FINDINGS:  There is severe osseous demineralization.  Surgical clips are present in the abdomen.  There is calcification along the wall of the aorta and branches.    Grade 1 anterolisthesis is present at L4-5.  T12 demonstrates mild compression deformity along the superior endplate which is a change compared to the January examination.  Remaining vertebral bodies are normal in height and alignment.  Discs are maintained.      Impression       Mild compression deformity along the superior endplate of T12, new compared to January 2018 CT.       Lumbar MRI 5/21/14    Narrative     Comparison: None    Findings:  L5-S1: There is no disk disease.  There is a 8mm extra canal synovial cyst emanating from the right facet joint.  There is mild facet arthropathy.  There is no central canal or neural foraminal narrowing  L4-L5: There is grade 1 anterolisthesis of L4 on L5 with uncovering of the disk.  There is a diffuse posterior disk bulge.  There is severe ligamentous thickening and moderate facet arthropathy generating moderate severe central canal stenosis.  There is   moderate right neural foraminal stenosis and severe left neural foraminal stenosis.  L3-L4: There is a posterior disk bulge with super imposed right foraminal broad-based protrusion.  There is mild to moderate ligamentous thickening and mild facet arthropathy.  There is mild narrowing of the right lateral  recess.  There is mild left   neural foraminal narrowing and severe right neural foraminal narrowing due to the foraminal protrusion.  L2-L3: There is a diffuse disk bulge present.  There is minimal ligamentous thickening and mild facet arthropathy generating at most mild central canal stenosis.  There is an extraforaminal bulge at this level as well leading to moderate right neural   foraminal stenosis and mild left neural foraminal stenosis.  L1-L2: No significant disease.    There is no marrow replacement process, infection, tumor present.  Limited evaluation of intra-abdominal structures unremarkable.  No abnormal masses or fluid collections are present.   Impression       Multilevel degenerative changes most prominent at L4-L5 where there is moderate to severe central canal stenosis and severe left foraminal stenosis.  Severe right foraminal stenosis is present at L3-L4 as well.     Narrative     EXAMINATION:  MRI THORACIC SPINE W WO CONTRAST    CLINICAL HISTORY:  Abnormal xray, thoracic spine, DJD;Spine fracture, traumatic, thoracic;Compression fracture T12 and breast cancer;  Abnormal findings on diagnostic imaging of other parts of musculoskeletal system    TECHNIQUE:  Multiplanar, multisequence images were performed through the thoracic spine.  Contrast was not administered.    COMPARISON:  CT chest 01/23/2018    FINDINGS:  There is a subacute severe biconcave compression fracture of the T12 vertebral body.  There is retropulsion of fracture fragments into the central canal resulting in moderate central canal stenosis with impression on the spinal cord.  The visualized portions of the spinal cord are otherwise unremarkable.    There is multilevel degenerative change with posterior disc bulges most prominent at T6-T7 where there is mild central canal stenosis.  No other evidence of osseous fracture.  The vertebral body heights are otherwise well maintained.    The heart is enlarged.  There is a probable 1.5  cm right renal cyst.  The pre and post vertebral soft tissues are otherwise unremarkable.      Impression       1.  Subacute severe biconcave compression fracture of the T12 vertebral body with retropulsion of fracture fragments into the central canal resulting in moderate central canal stenosis.    2.  Cardiomegaly.     Narrative     EXAMINATION:  XR THORACIC SPINE AP LATERAL    CLINICAL HISTORY:  Unspecified fall, initial encounter    TECHNIQUE:  AP and lateral views of the thoracic spine were performed.    COMPARISON:  May 2018 x-ray.  MRI September 2018.    FINDINGS:  There is diffuse osseous demineralization.  There are surgical clips in the right upper quadrant.  The thoracolumbar spine demonstrates mild scoliosis.    Osseous structures demonstrate no definite alignment abnormality.  Patient has undergone interval vertebroplasty at the known T12 compression deformity which is severe, with mild retropulsion.  There is stable slight anterior wedging at T11.  There is no new compression fracture.      Impression       No acute finding     Narrative     EXAMINATION:  XR RIBS 2 VIEW LEFT    CLINICAL HISTORY:  Unspecified fall, initial encounter    TECHNIQUE:  Two views of the left ribs were performed.    COMPARISON:  None.    FINDINGS:  There are surgical clips in the left axilla.  There are surgical clips in the right upper quadrant.    There is diffuse osseous demineralization.  There are fractures of the 6th and 8th ribs, without significant displacement, possibly 7 rib as well.  No pneumothorax.      Impression       Recent rib fractures.         Past Medical History:  Past Medical History:   Diagnosis Date    Anxiety     Back pain     Breast cancer 1/17/2018    Breast mass 1/15/2018    Chronic kidney disease, stage 2, mildly decreased GFR     COPD (chronic obstructive pulmonary disease)     emphysema    Depression     Dysuria 1/29/2018    Family history of breast cancer 1/17/2018    Hypertension      Infiltrating ductal carcinoma of breast, left 7/12/2018    Osteoporosis, senile     Ovarian mass 1/15/2018    Pneumonia     S/P robotic assisted laparoscopic BSO (bilateral salpingo-oophorectomy) 2/23/2018       Past Surgical History:  Past Surgical History:   Procedure Laterality Date    BREAST BIOPSY      BREAST LUMPECTOMY      CHOLECYSTECTOMY      EYE SURGERY      HYSTERECTOMY      INFUSION, INTRAVENOUS N/A 4/17/2013    Performed by Pepito Theodore [facility] at [facility] OR    INJECTION, FACET JOINT, BILATERAL LUMBAR L3-4, 4-5, 5-S1 FACET JOINT INJECTIONS Bilateral 10/15/2018    Performed by Merly Wilcox MD at [facility] PAIN MGT    INJECTION, FACET JOINT, L3-L4,L4-L5,L5-S1 Bilateral 4/1/2019    Performed by Merly Wilcox [facility] at [facility] PAIN MGT    INJECTION, FOR SENTINEL NODE IDENTIFICATION Left 7/12/2018    Performed by [facility]ero [facility] at [facility] OR 2ND FLR    INJECTION,STEROID,EPIDURAL N/A 8/21/2018    Performed by Merly Wilcox [facility] at [facility] PAIN MGT    INJECTION-STEROID-EPIDURAL-LUMBAR N/A 2/15/2018    Performed by eMrly Wilcox MD at [facility] PAIN MGT    INJECTION-STEROID-EPIDURAL-LUMBAR N/A 2/15/2016    Performed by Merly Wilcox MD at [facility] PAIN MGT    INJECTION-STEROID-EPIDURAL-LUMBAR N/A 7/21/2014    Performed by Merly Wilcox MD at [facility] PAIN MGT    Kyphoplasty   T12 N/A 11/8/2018    Performed by Merly Wilcox MD at [facility] CATH LAB    LYMPHADENECTOMY, AXILLARY Left 7/12/2018    Performed by [facility] at [facility] OR 2ND FLR    MASTECTOMY, PARTIAL-W/SEED LOCALIZATION Left 7/12/2018    Performed by [facility]ero [facility] at [facility] OR 2ND FLR    MA REMOVAL OF OVARY/TUBE(S)  02/23/2018    Robotic Assisted    ROTATOR CUFF REPAIR Right     rotator cuff repair right shoulder    TONSILLECTOMY      XI ROBOT ASSISTED LAPAROSCOPIC SALPINGO-OOPHERECTOMY Bilateral 2/23/2018    Performed by Reena Chairez MD at [facility] OR       Family History:  Family History   Problem Relation Age of Onset    Heart failure Mother     Kidney failure  Mother     Heart attack Father     Breast cancer Sister 66        Lump, XRT, chemo, recurrence 10 years later, still alive.    Cancer Brother         leukemia    Heart attack Brother 58    Pulmonary embolism Brother 45    Heart attack Brother 52    Breast cancer Maternal Grandmother 60        advanced at diagnosis    Breast cancer Maternal Aunt 83         at 92       Social History:  Social History     Socioeconomic History    Marital status: Single     Spouse name: Not on file    Number of children: 3    Years of education: Not on file    Highest education level: Not on file   Occupational History    Occupation: Multiple jobs, see social documentation section     Comment: Retired   Social Needs    Financial resource strain: Not on file    Food insecurity:     Worry: Not on file     Inability: Not on file    Transportation needs:     Medical: Not on file     Non-medical: Not on file   Tobacco Use    Smoking status: Former Smoker     Packs/day: 1.00     Years: 40.00     Pack years: 40.00     Types: Cigarettes    Smokeless tobacco: Never Used    Tobacco comment: Smoked >1 ppd for at least 40 years, quit around    Substance and Sexual Activity    Alcohol use: Yes     Comment: occasional glass of wine or cocktail    Drug use: No    Sexual activity: Yes     Partners: Male   Lifestyle    Physical activity:     Days per week: Not on file     Minutes per session: Not on file    Stress: Not on file   Relationships    Social connections:     Talks on phone: Not on file     Gets together: Not on file     Attends Latter-day service: Not on file     Active member of club or organization: Not on file     Attends meetings of clubs or organizations: Not on file     Relationship status: Not on file   Other Topics Concern    Not on file   Social History Narrative    Worked many jobs while raising 3 children.  She was a nurses aid, worked in retail at [employer], sold insurance and was a   in the [facility]'s office under [employer].  She was  from her first , but took care of him at the end of his life.       REVIEW OF SYSTEMS:    GENERAL:  No weight loss, malaise or fevers.  HEENT:   No recent changes in vision or hearing  NECK:  Negative for lumps, no difficulty with swallowing.  RESPIRATORY:  Negative for cough, wheezing or shortness of breath, patient denies any recent URI. COPD.  CARDIOVASCULAR:  Negative for chest pain, leg swelling or palpitations. Hypertension.  GI:  Negative for abdominal discomfort, blood in stools or black stools or change in bowel habits.  MUSCULOSKELETAL:  See HPI.  SKIN:  Negative for lesions, rash, and itching.  PSYCH:  No mood disorder or recent psychosocial stressors.  Patients sleep is not disturbed secondary to pain.  HEMATOLOGY/LYMPHOLOGY:  Negative for prolonged bleeding, bruising easily or swollen nodes.  Patient is not currently taking any anti-coagulants  NEURO:   No history of headaches, syncope, paralysis, seizures or tremors.  All other reviewed and negative other than HPI.    OBJECTIVE:    LMP  (LMP Unknown)     PHYSICAL EXAMINATION:    GENERAL: Well appearing, in no acute distress, alert and oriented x3.  PSYCH:  Mood and affect appropriate.  SKIN: Skin color, texture, turgor normal, no rashes or lesions.  HEAD/FACE:  Normocephalic, atraumatic. Cranial nerves grossly intact.  CV: RRR with palpation of the radial artery.  PULM: No evidence of respiratory difficulty, symmetric chest rise.  GI:  Soft and non-tender.  BACK: Straight leg raising in the sitting and supine positions is negative to radicular pain.  There is pain with palpation over the lumbar facet joints.  No pain with palpation of thoracic spine.  Limited ROM with pain on extension.  Positive facet loading bilaterally.  EXTREMITIES: Peripheral joint ROM is full and pain free without obvious instability or laxity in all four extremities. No deformities, edema, or skin  discoloration. Good capillary refill.  MUSCULOSKELETAL: Provocative maneuvers of hips do not cause pain.  No atrophy or tone abnormalities are noted.  NEURO: Bilateral upper and lower extremity coordination and muscle stretch reflexes are physiologic and symmetric.  Plantar response are downgoing. No clonus.  Normal sensation.  GAIT: Antalgic- ambulates with rolling walker.      ASSESSMENT: 84 y.o. year old female with lower back and lower extremity pain, consistent with the following diagnoses:     1. Lumbar spondylosis     2. Vertebral compression fracture     3. Myalgia     4. Facet arthritis of lumbar region     5. Chronic pain syndrome           PLAN:     - Previous imaging was reviewed and discussed with the patient today.    - She is s/p repeat bilateral L3-4, L4-5 and L5-S1 facet joint injections with benefit.    - She is recovering well from recent rib fracture after fall at home.    - Continue with home PT exercises.    - Continue OTC Tylenol.  Avoid NSAIDs.    - RTC in 3 months or sooner if needed.      The above plan and management options were discussed at length with patient. Patient is in agreement with the above and verbalized understanding.    Sofia Schultz  05/13/2019

## 2019-06-07 ENCOUNTER — OFFICE VISIT (OUTPATIENT)
Dept: RADIATION ONCOLOGY | Facility: CLINIC | Age: 84
End: 2019-06-07
Payer: MEDICARE

## 2019-06-07 VITALS
TEMPERATURE: 69 F | DIASTOLIC BLOOD PRESSURE: 65 MMHG | RESPIRATION RATE: 16 BRPM | BODY MASS INDEX: 28.26 KG/M2 | SYSTOLIC BLOOD PRESSURE: 148 MMHG | WEIGHT: 159.5 LBS | HEIGHT: 63 IN

## 2019-06-07 DIAGNOSIS — C50.412 MALIGNANT NEOPLASM OF UPPER-OUTER QUADRANT OF LEFT BREAST IN FEMALE, ESTROGEN RECEPTOR POSITIVE: Primary | Chronic | ICD-10-CM

## 2019-06-07 DIAGNOSIS — Z17.0 MALIGNANT NEOPLASM OF UPPER-OUTER QUADRANT OF LEFT BREAST IN FEMALE, ESTROGEN RECEPTOR POSITIVE: Primary | Chronic | ICD-10-CM

## 2019-06-07 PROCEDURE — 3077F SYST BP >= 140 MM HG: CPT | Mod: CPTII,S$GLB,, | Performed by: RADIOLOGY

## 2019-06-07 PROCEDURE — 1101F PR PT FALLS ASSESS DOC 0-1 FALLS W/OUT INJ PAST YR: ICD-10-PCS | Mod: CPTII,S$GLB,, | Performed by: RADIOLOGY

## 2019-06-07 PROCEDURE — 99213 OFFICE O/P EST LOW 20 MIN: CPT | Mod: S$GLB,,, | Performed by: RADIOLOGY

## 2019-06-07 PROCEDURE — 3078F DIAST BP <80 MM HG: CPT | Mod: CPTII,S$GLB,, | Performed by: RADIOLOGY

## 2019-06-07 PROCEDURE — 99999 PR PBB SHADOW E&M-EST. PATIENT-LVL III: CPT | Mod: PBBFAC,,, | Performed by: RADIOLOGY

## 2019-06-07 PROCEDURE — 3078F PR MOST RECENT DIASTOLIC BLOOD PRESSURE < 80 MM HG: ICD-10-PCS | Mod: CPTII,S$GLB,, | Performed by: RADIOLOGY

## 2019-06-07 PROCEDURE — 99213 PR OFFICE/OUTPT VISIT, EST, LEVL III, 20-29 MIN: ICD-10-PCS | Mod: S$GLB,,, | Performed by: RADIOLOGY

## 2019-06-07 PROCEDURE — 99999 PR PBB SHADOW E&M-EST. PATIENT-LVL III: ICD-10-PCS | Mod: PBBFAC,,, | Performed by: RADIOLOGY

## 2019-06-07 PROCEDURE — 1101F PT FALLS ASSESS-DOCD LE1/YR: CPT | Mod: CPTII,S$GLB,, | Performed by: RADIOLOGY

## 2019-06-07 PROCEDURE — 3077F PR MOST RECENT SYSTOLIC BLOOD PRESSURE >= 140 MM HG: ICD-10-PCS | Mod: CPTII,S$GLB,, | Performed by: RADIOLOGY

## 2019-06-07 NOTE — PROGRESS NOTES
REFERRING PHYSICIAN: Amanda Caballero MD     DIAGNOSIS: multifocal cT2 N1 M0 ([id number]) invasive ductal carcinoma of the left breast     Radiation Treatment Summary  Ms. Cook completed radiation to the left breast as shown below.     Treatment Site Energy Dose/Fx (Gy) #Fx Total Dose (Gy) Start Date End Date   BOOST 12E 2 5 / 5 10 10/17/2018 10/23/2018   LTBREAST/ IM 18X/6X 1.8 28 / 28 50.4 9/6/2018 10/16/2018   Lt supraclav 18X/6X 1.8 28 / 28 50.4 9/6/2018 10/16/2018      INTERVAL HISTORY:   Ms. Cook is an 84-year-old female who was recently diagnosed with left breast cancer after she presented with a palpable mass in the upper outer quadrant. A mammogram on January 4, 2018 revealed a 17 mm spiculated lesion in the central region of the left breast as well as a 2nd mass in the 10 o'clock position. There were also palpable left axillary lymphadenopathy. A core needle biopsy of both of these lesions on January 11, 2018 revealed invasive ductal carcinoma which was ER positive (%), VT positive (80- 90%), and her 2-. A CT of the chest, abdomen, and pelvis on January 23, 2018 revealed no evidence of distant metastatic disease. However there was a she 5.4 cm left adnexal mass which was suspicious. She underwent bilateral salpingo-oophorectomy on February 23, 2018 which was negative for malignancy. She was started on endocrine therapy with letrozole and completed approximately 6 months. On July 12, 2018, she underwent left breast lumpectomy and left axillary dissection. The pathology from the left breast revealed 2 foci of grade 1, invasive ductal carcinoma, one measuring 33 mm and the other measuring 13 mm. The closest margin is 3 mm laterally. Out of 18 lymph nodes removed, 2 were involved with macro metastasis and 2 were involved with micrometastasis with the largest deposit measuring 9 mm, for a total of 4/18 lymph nodes with involvement. There was also extranodal extension noted. To reduce her chance of local  "regional recurrence, she received postoperative radiation to the left breast and draining lymph nodes including the left internal mammary and left supraclavicular is nodes as above.  She is here today for routine follow-up.     She is currently on letrozole.  She reports joint pain associated with that.  She underwent a repeat mammogram on January 24, 2019 which is negative for any suspicious lesions.  She will repeat that in 1 year.   At present, she denies left breast pain, edema, erythema, or nipple discharge. She also denies fever, night sweats, or weight loss.     PHYSICAL EXAMINATION:  VITAL SINGS: BP (!) 148/65   Temp (!) 69 °F (20.6 °C) (Oral)   Resp 16   Ht 5' 3" (1.6 m)   Wt 72.3 kg (159 lb 8 oz)   LMP  (LMP Unknown)   BMI 28.25 kg/m²   GENERAL: Patient is alert and oriented, in no acute distress.  HEENT: Extraocular muscles are intact.  Oropharynx is clear without lesions.  There is no cervical or supraclavicular adenopathy palpated.    CHEST: Breath sounds clear bilaterally.  No rales.  No rhonchi.  Unlabored respirations.  CARDIOVASCULAR: Normal S1, S2.  Normal rate.  Regular rhythm.  BREAST EXAM:  There is mild residual hyperpigmentation of the skin of the left breast.  The left breast is slightly smaller than the right. The scar secondary to lumpectomy is noted in the periareolar region of the left breast. There is also a scar in the left axilla secondary to sentinel node biopsy. No abnormal masses palpated in the left breast or left axilla. The right breast and right axilla are also without palpable masses.  ABDOMEN: Bowel sounds normal.  No tenderness.  No abdominal distention.  No hepatomegaly.  No splenomegaly.  EXTREMITIES: No clubbing, cyanosis, edema  NEUROLOGIC: Cranial nerves II through XII are grossly intact.  Sensation is intact.  Strength is 5 out of 5 in the upper and lower extremities bilaterally.    ASSESSMENT:   This is an 84-year-old female with multifocal [id number] ([id number]) " invasive ductal carcinoma of the left breast who underwent neoadjuvant endocrine therapy followed by a left breast lumpectomy and sentinel node biopsy on July 12, 2018 and postoperative radiation, with 2 foci of primary lesions and 4/18 lymph nodes positive with extranodal extension, ER/OR positive and her 2 negative.     PLAN:   Ms. Cook is doing well from the standpoint of left breast cancer.  Her most recent mammogram in January 2019 is negative for any suspicious lesions.  She will repeat that in 1 year.  She will continue endocrine therapy as planned and follow-up with Dr. Camarillo and Dr. Caballero.  I plan to see her back as needed, unless symptoms warrant otherwise.

## 2019-06-14 ENCOUNTER — TELEPHONE (OUTPATIENT)
Dept: PAIN MEDICINE | Facility: CLINIC | Age: 84
End: 2019-06-14

## 2019-06-14 NOTE — TELEPHONE ENCOUNTER
----- Message from Anayeli Hodge sent at 6/14/2019 11:57 AM CDT -----  Contact: daughter  Name of Who is Calling: SHAYY JAMIL [[id number]]      What is the request in detail: pt needs an injection appt per daughter.. Please advise      Can the clinic reply by [facility]: no      [facility]  Patience [phone]

## 2019-06-14 NOTE — TELEPHONE ENCOUNTER
Spoke with patient regarding repeating her last injection, stated pain is in the same area, message sent to Jake Schultz NP

## 2019-06-14 NOTE — TELEPHONE ENCOUNTER
----- Message from Anayeli Hodge sent at 6/14/2019 11:57 AM CDT -----  Contact: daughter  Name of Who is Calling: SHAYY JAMIL [[id number]]      What is the request in detail: pt needs an injection appt per daughter.. Please advise      Can the clinic reply by [facility]: no      What [facility]  Patience [phone]

## 2019-06-20 ENCOUNTER — HOSPITAL ENCOUNTER (OUTPATIENT)
Facility: OTHER | Age: 84
Discharge: HOME | End: 2019-06-20
Attending: ANESTHESIOLOGY | Admitting: ANESTHESIOLOGY
Payer: MEDICARE

## 2019-06-20 VITALS
TEMPERATURE: 98 F | HEART RATE: 57 BPM | RESPIRATION RATE: 18 BRPM | WEIGHT: 158 LBS | HEIGHT: 63 IN | DIASTOLIC BLOOD PRESSURE: 64 MMHG | OXYGEN SATURATION: 95 % | SYSTOLIC BLOOD PRESSURE: 140 MMHG | BODY MASS INDEX: 28 KG/M2

## 2019-06-20 DIAGNOSIS — M47.816 LUMBAR SPONDYLOSIS: ICD-10-CM

## 2019-06-20 DIAGNOSIS — M47.9 OSTEOARTHRITIS OF SPINE, UNSPECIFIED SPINAL OSTEOARTHRITIS COMPLICATION STATUS, UNSPECIFIED SPINAL REGION: Primary | ICD-10-CM

## 2019-06-20 DIAGNOSIS — G89.29 CHRONIC PAIN: ICD-10-CM

## 2019-06-20 PROCEDURE — 64494 PR INJ DX/THER AGNT PARAVERT FACET JOINT,IMG GUIDE,LUMBAR/SAC, 2ND LEVEL: ICD-10-PCS | Mod: 50,,, | Performed by: ANESTHESIOLOGY

## 2019-06-20 PROCEDURE — 64495 INJ PARAVERT F JNT L/S 3 LEV: CPT | Mod: 50 | Performed by: ANESTHESIOLOGY

## 2019-06-20 PROCEDURE — 64495 INJ PARAVERT F JNT L/S 3 LEV: CPT | Mod: 50,,, | Performed by: ANESTHESIOLOGY

## 2019-06-20 PROCEDURE — 25500020 PHARM REV CODE 255: Performed by: ANESTHESIOLOGY

## 2019-06-20 PROCEDURE — 64494 INJ PARAVERT F JNT L/S 2 LEV: CPT | Mod: 50 | Performed by: ANESTHESIOLOGY

## 2019-06-20 PROCEDURE — 63600175 PHARM REV CODE 636 W HCPCS: Performed by: ANESTHESIOLOGY

## 2019-06-20 PROCEDURE — 64493 INJ PARAVERT F JNT L/S 1 LEV: CPT | Mod: 50 | Performed by: ANESTHESIOLOGY

## 2019-06-20 PROCEDURE — 64493 PR INJ DX/THER AGNT PARAVERT FACET JOINT,IMG GUIDE,LUMBAR/SAC,1ST LVL: ICD-10-PCS | Mod: 50,,, | Performed by: ANESTHESIOLOGY

## 2019-06-20 PROCEDURE — 64494 INJ PARAVERT F JNT L/S 2 LEV: CPT | Mod: 50,,, | Performed by: ANESTHESIOLOGY

## 2019-06-20 PROCEDURE — 64493 INJ PARAVERT F JNT L/S 1 LEV: CPT | Mod: 50,,, | Performed by: ANESTHESIOLOGY

## 2019-06-20 PROCEDURE — 25000003 PHARM REV CODE 250: Performed by: ANESTHESIOLOGY

## 2019-06-20 PROCEDURE — 25000003 PHARM REV CODE 250: Performed by: STUDENT IN AN ORGANIZED HEALTH CARE EDUCATION/TRAINING PROGRAM

## 2019-06-20 PROCEDURE — 64495 PR INJ DX/THER AGNT PARAVERT FACET JOINT,IMG GUIDE,LUMBAR/SAC, ADD LEVEL: ICD-10-PCS | Mod: 50,,, | Performed by: ANESTHESIOLOGY

## 2019-06-20 RX ORDER — LIDOCAINE HYDROCHLORIDE 10 MG/ML
INJECTION INFILTRATION; PERINEURAL
Status: DISCONTINUED | OUTPATIENT
Start: 2019-06-20 | End: 2019-06-20 | Stop reason: HOSPADM

## 2019-06-20 RX ORDER — TRIAMCINOLONE ACETONIDE 40 MG/ML
INJECTION, SUSPENSION INTRA-ARTICULAR; INTRAMUSCULAR
Status: DISCONTINUED | OUTPATIENT
Start: 2019-06-20 | End: 2019-06-20 | Stop reason: HOSPADM

## 2019-06-20 RX ORDER — BUPIVACAINE HYDROCHLORIDE 2.5 MG/ML
INJECTION, SOLUTION EPIDURAL; INFILTRATION; INTRACAUDAL
Status: DISCONTINUED | OUTPATIENT
Start: 2019-06-20 | End: 2019-06-20 | Stop reason: HOSPADM

## 2019-06-20 RX ORDER — ALPRAZOLAM 0.5 MG/1
0.5 TABLET ORAL
Status: DISCONTINUED | OUTPATIENT
Start: 2019-06-20 | End: 2019-06-20 | Stop reason: HOSPADM

## 2019-06-20 RX ADMIN — ALPRAZOLAM 0.5 MG: 0.5 TABLET ORAL at 03:06

## 2019-06-20 NOTE — DISCHARGE INSTRUCTIONS
Thank you for allowing us to care for you today. You may receive a survey about the care we provided. Your feedback is valuable and helps us provide excellent care throughout the community.     Home Care Instructions for Pain Management:    1. DIET:   You may resume your normal diet today.   2. BATHING:   You may shower with luke warm water. No tub baths or anything that will soak injection sites under water for the next 24 hours.  3. DRESSING:   You may remove your bandage today.   4. ACTIVITY LEVEL:   You may resume your normal activities 24 hrs after your procedure. Nothing strenuous today.  5. MEDICATIONS:   You may resume your normal medications today. To restart blood thinners, ask your doctor.  6. DRIVING    If you have received any sedatives by mouth today, you may not drive for 12 hours.    If you have received any sedation through your IV, you may not drive for 24 hrs.   7. SPECIAL INSTRUCTIONS:   No heat to the injection site for 24 hrs including, hot bath or shower, heating pad, moist heat, or hot tubs.    Use ice pack to injection site for any pain or discomfort.  Apply ice packs for 20 minute intervals as needed.    IF you have diabetes, be sure to monitor your blood sugar more closely. IF your injection contained steroids your blood sugar levels may become higher than normal.    If you are still having pain upon discharge:  Your pain may improve over the next 48 hours. The anesthetic (numbing medication) works immediately to 48 hours. IF your injection contained a steroid (anti-inflammatory medication), it takes approximately 3 days to start feeling relief and 7-10 days to see your greatest results from the medication. It is possible you may need subsequent injections. This would be discussed at your follow up appointment with pain management or your referring doctor.      PLEASE CALL YOUR DOCTOR IF:  1. Redness or swelling around the injection site.  2. Fever of 101 degrees or more  3. Drainage  (pus) from the injection site.  4. For any continuous bleeding (some dried blood over the incision is normal.)    FOR EMERGENCIES:   If any unusual problems or difficulties occur during clinic hours, call [phone] or [phone].

## 2019-06-20 NOTE — DISCHARGE SUMMARY
Discharge Note  Short Stay      SUMMARY     Admit Date: 6/20/2019    Attending Physician: Merly Wilcox      Discharge Physician: Merly Wilcox      Discharge Date: 6/20/2019 3:57 PM    Procedure(s) (LRB):  INJECTION, FACET JOINT, L3-L4,L4-L5,L5-S1 NEED CONSENT (Bilateral)    Final Diagnosis: Lumbar spondylosis [M47.816]    Disposition: Home or self care    Patient Instructions:   Current Discharge Medication List      CONTINUE these medications which have NOT CHANGED    Details   acetaminophen (TYLENOL) 325 MG tablet Take 650 mg by mouth every 6 (six) hours as needed for Pain.       albuterol (PROAIR HFA) 90 mcg/actuation inhaler Inhale 1-2 puffs into the lungs every 6 (six) hours as needed for Wheezing or Shortness of Breath.      albuterol (PROVENTIL) 2.5 mg /3 mL (0.083 %) nebulizer solution Inhale 3 mLs into the lungs as needed.       albuterol-ipratropium 2.5mg-0.5mg/3mL (DUO-NEB) 0.5 mg-3 mg(2.5 mg base)/3 mL nebulizer solution Take 3 mLs by nebulization as needed.       atorvastatin (LIPITOR) 20 MG tablet Take 20 mg by mouth nightly.       benazepril (LOTENSIN) 20 MG tablet Take 20 mg by mouth once daily.       calcium-vitamin D 250-100 mg-unit per tablet Take 1 tablet by mouth 2 (two) times daily.      CETIRIZINE HCL (ZYRTEC ORAL) Take 10 mg by mouth nightly as needed.       citalopram (CELEXA) 20 MG tablet Take 20 mg by mouth nightly.       felodipine (PLENDIL) 10 MG 24 hr tablet Take 20 mg by mouth once daily.       fluticasone-vilanterol (BREO) 100-25 mcg/dose diskus inhaler Inhale 1 puff into the lungs once daily.  Qty: 1 each, Refills: 3      INCRUSE ELLIPTA 62.5 mcg/actuation DsDv USE 1 PUFF QD AT SAME TIME  (patient takes at night)  Refills: 2      letrozole (FEMARA) 2.5 mg Tab Take 1 tablet (2.5 mg total) by mouth once daily.  Qty: 30 tablet, Refills: 11    Associated Diagnoses: Malignant neoplasm of nipple of left breast in female, estrogen receptor positive      montelukast (SINGULAIR) 10 mg tablet  Take 10 mg by mouth every evening.      multivitamin (THERAGRAN) per tablet Take 1 tablet by mouth once daily.      mv-min-FA-coQ10-lycopen-lutein (THERAGRAN-M PREMIER 50 PLUS) 400-250-375 mcg Tab 1 tablet my mouth once daily      omeprazole (PRILOSEC) 20 MG capsule Take 20 mg by mouth once daily.       predniSONE (DELTASONE) 10 MG tablet Refills: 0      torsemide (DEMADEX) 20 MG Tab Take 20 mg by mouth once daily.      traMADol (ULTRAM) 50 mg tablet Take 1 tablet (50 mg total) by mouth every 8 (eight) hours as needed.  Qty: 14 tablet, Refills: 0                 Discharge Diagnosis: Lumbar spondylosis [M47.816]  Condition on Discharge: Stable with no complications to procedure   Diet on Discharge: Same as before.  Activity: as per instruction sheet.  Discharge to: Home with a responsible adult.  Follow up: 2-4 weeks

## 2019-06-20 NOTE — OP NOTE
Thoracic/Lumbar Facet Joint Injection Under Fluoroscopy  Time-out taken to identify patient and procedure side prior to starting the procedure.            I attest that I have reviewed the patient's home medications prior to the procedure and no contraindication have been identified. I  re-evaluated the patient after the patient was positioned for the procedure in the procedure room immediately before the procedural time-out. The vital signs are current and represent the current state of the patient which has not significantly changed since the preprocedure assessment.   Date of Service: 06/20/2019    PCP: Pepito Theodore MD    Referring Physician:                                                        PROCEDURE:  bilateral facet joint injection at L3-4, L4-5 & L5-S1 under fluoroscopy.    REASON FOR PROCEDURE: Lumbar spondylosis [M47.816]  1. Osteoarthritis of spine, unspecified spinal osteoarthritis complication status, unspecified spinal region    2. Lumbar spondylosis    3. Chronic pain      POSTOP DIAGNOSIS: Lumbar spondylosis [M47.816]  1. Osteoarthritis of spine, unspecified spinal osteoarthritis complication status, unspecified spinal region    2. Lumbar spondylosis    3. Chronic pain      PHYSICIAN: Merly Wilcox MD  ASSISTANTS: Sharyn Rg MD fellow         MEDICATIONS INJECTED:  0.5ml Kenalog 40mg and Bupivacaine 0.25% 10ml. Injected 1.5ml  per level.  LOCAL ANESTHETIC USED:   Xylocaine 1% 9ml with Sodium Bicarbonate 1ml. 3ml per site.  SEDATION MEDICATIONS: None    ESTIMATED BLOOD LOSS:  None.    COMPLICATIONS:  None.    TECHNIQUE:   Lying in a prone position, the patient was prepped and draped in the usual sterile fashion using ChloraPrep and fenestrated drape.  The level was determined under fluoroscopic guidance.  Local anesthetic was given by going down to the hub of the 27-gauge 1.25in needle and raising a wheel.  The 3.5in 22-gauge needle was introduced into the  facet joints.  Negative pressure  applied to make sure that there was no intravascular placement.  Omnipaque was injected to confirm placement.  It was also done to confirm that there was no vascular runoff.  Medication was then injected slowly.  The patient tolerated the procedure well.     PAIN BEFORE THE PROCEDURE:  9/10.    PAIN AFTER THE PROCEDURE: 0/10.    The patient was monitored after the procedure.  Patient was given post procedure and discharge instructions to follow at home.  We will see the patient back in two weeks or the patient may call to inform of status. The patient was discharged in a stable condition

## 2019-07-11 DIAGNOSIS — C50.012 MALIGNANT NEOPLASM OF NIPPLE OF LEFT BREAST IN FEMALE, ESTROGEN RECEPTOR POSITIVE: ICD-10-CM

## 2019-07-11 DIAGNOSIS — Z17.0 MALIGNANT NEOPLASM OF NIPPLE OF LEFT BREAST IN FEMALE, ESTROGEN RECEPTOR POSITIVE: ICD-10-CM

## 2019-07-11 RX ORDER — LETROZOLE 2.5 MG/1
2.5 TABLET, FILM COATED ORAL DAILY
Qty: 30 TABLET | Refills: 11 | Status: SHIPPED | OUTPATIENT
Start: 2019-07-11 | End: 2019-08-14

## 2019-07-11 NOTE — TELEPHONE ENCOUNTER
----- Message from Kavya Dwyer sent at 7/11/2019 12:36 PM CDT -----  Contact: Pt daughter cristina   Type:  RX Refill Request    Who Called: Pt Daughter Cristina   Refill or New Rx: Refill  RX Name and Strength:letrozole (FEMARA) 2.5 mg Tab  How is the patient currently taking it? (ex. 1XDay):   Is this a 30 day or 90 day RX:   Preferred Pharmacy with phone number: [id number] - [place] [address] [facility] [phone]   Local or Mail Order:   Ordering Provider:   Best Call Back Number: [phone]  Additional Information:    Thank You  OBI Dwyer

## 2019-07-12 DIAGNOSIS — C50.012 MALIGNANT NEOPLASM OF NIPPLE OF LEFT BREAST IN FEMALE, ESTROGEN RECEPTOR POSITIVE: ICD-10-CM

## 2019-07-12 DIAGNOSIS — Z17.0 MALIGNANT NEOPLASM OF NIPPLE OF LEFT BREAST IN FEMALE, ESTROGEN RECEPTOR POSITIVE: ICD-10-CM

## 2019-07-12 RX ORDER — LETROZOLE 2.5 MG/1
TABLET, FILM COATED ORAL
Qty: 30 TABLET | Refills: 11 | Status: SHIPPED | OUTPATIENT
Start: 2019-07-12 | End: 2019-08-14

## 2019-07-22 ENCOUNTER — TELEPHONE (OUTPATIENT)
Dept: HEMATOLOGY/ONCOLOGY | Facility: CLINIC | Age: 84
End: 2019-07-22

## 2019-07-22 NOTE — TELEPHONE ENCOUNTER
----- Message from Marilee Chery sent at 7/22/2019  1:53 PM CDT -----  Contact: Patient  Pt would to reschedule 07/30 appt with Rosa, needs it to be scheduled after 08/03.      Contact:: [phone]

## 2019-07-23 NOTE — NURSING
Discharge instructions given to patient. No distress noted at this time. Peripheral IV removed with catheter intact. Pt's daughter at bedside. Pt and family verbalizes understanding of medications and follow up appts. Bedside commode delivered for home. Pt wheeled out via wheelchair and RN without incident.    23-Jul-2019 10:33

## 2019-08-09 ENCOUNTER — TELEPHONE (OUTPATIENT)
Dept: HEMATOLOGY/ONCOLOGY | Facility: CLINIC | Age: 84
End: 2019-08-09

## 2019-08-09 NOTE — TELEPHONE ENCOUNTER
Returned call, spoke with patient and assisted with rescheduling her apt to 8/14 as she gets rides from other people and she will already be here on 8/14

## 2019-08-09 NOTE — TELEPHONE ENCOUNTER
----- Message from Kavya Dwyer sent at 8/9/2019 10:11 AM CDT -----  Contact: Pt   Pt called to check to see if there is a appt for 8/14  Openings   Callback#[phone]  Thank You  OBI Dwyer

## 2019-08-13 NOTE — PROGRESS NOTES
Subjective:       Patient ID: Anahi Cook is a 84 y.o. female.    Chief Complaint: Malignant neoplasm of upper-outer quadrant of left breast in    HPI   Mrs. Cook returns today for follow up.    She saw her PCP today for a swollen left foot. [place]. Swelling noted below ankle with mild bruising. No leg pain. No pain when walking. Swelling has improved since initial occurrence. PCP rx'd prednisone as though may be gout. She also saw Dr. Caballero today.  She discussed osteoporosis with Dr. Martinez but no treatment ordered.     Briefly, she is an 84-year-old female who in late 2017 had initially felt a mass in her left breast.  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    She was started on letrozole and undwerwent a BSO by Dr. Chairez for an ovarian mass.  Her ovarian mass was benign. In mid July 2018 she underwent a lumpectomy and AND.  She had a 3.3 cm carcinoma and 4/8 positive nodes.  She has remained on letrozole, now in the adjuvant setting, and she returns today for follow up.   Of note, she also received chest wall XRT.    Review of Systems    Overall she feels OK.    She has tolerated the letrozole well so far.  She complains of occasional left breast pain and heaviness- no worse than usual.   She denies any anxiety, depression, easy bruising, fevers, chills, night  sweats, weight loss, nausea, vomiting, diarrhea, constipation, diplopia, blurred vision, headache, chest pain, palpitations, shortness of breath,  abdominal pain, extremity pain, or difficulty ambulating.  The remainder of the ten-point ROS, including general, skin, lymph, H/N, cardiorespiratory, GI, , Neuro, Endocrine, and psychiatric is negative.       Objective:      Physical Exam      She is alert, oriented to time, place, person, pleasant, well       nourished, in no acute physical distress. Uses walker for ambulation assistance. She is accompanied by her daughter.                                 VITAL SIGNS:  Reviewed                                      HEENT:  Normal.  There are no nasal, oral, lip, gingival, auricular, lid,    or conjunctival lesions.  Mucosae are moist and pink, and there is no        thrush.  Pupils are equal, reactive to light and accommodation.              Extraocular muscle movements are intact.   Dentition is fair.  Maxillary teeth are missing.                                      NECK:  Supple without JVD, adenopathy, or thyromegaly.                       LUNGS:  Clear to auscultation without wheezing, rales, or rhonchi.           CARDIOVASCULAR:  Reveals an S1, S2, no murmurs, no rubs, no gallops.         ABDOMEN:  Soft, nontender, without organomegaly.  Bowel sounds are    present.    Scars from her laparoscopic DONOVAN-BSO are seen.                                                            EXTREMITIES:  No cyanosis, clubbing, or edema. Soft tissue swelling below left ankle -tender to palpate. Small area on linear flat multicolor bruising noted.                 BREASTS: She is s/p left lumpectomy with a healed periareolar incision.  She also has an incision from her axillary dissection;  It is well healed.   There are no masses in her right breast.    Both breasts are large and pendulous.    There is mild hyperpigmentation within the radiation field.                                 LYMPHATIC:  There is no cervical, axillary, or supraclavicular adenopathy.   SKIN:  Warm and moist, without petechiae, rashes, induration, or ecchymoses.           NEUROLOGIC:  DTRs are 0-1+ bilaterally, symmetrical, motor function is 5/5,  and cranial nerves are  within normal limits.      Assessment:       1. Malignant neoplasm of upper-outer quadrant of left breast in female, estrogen receptor positive          2. Osteoporosis without current  pathological fracture, unspecified osteoporosis type  Ambulatory Referral to Endocrinology   3. Aromatase inhibitor use  Ambulatory Referral to Endocrinology       Plan:           Doing well, JARROD clinically.   Continue Letrozole.   Continue Calcium and Vit D  RTC in 3 months to see Dr. Steven ELDER due 1/2020.   F/u with PCP regarding left ankle swelling; Discussed RICE. May need xray.   Referral to endocrinology for osteoporosis management.     Patient is in agreement with the proposed treatment plan. All questions were answered to the patient's satisfaction. Pt knows to call clinic for any new or worsening symptoms and if anything is needed before the next clinic visit.      Jimbo Mckeon, JIMBOP-C  Hematology & Oncology  [facility] [place] [address]  ph. [phone]  Fax. [phone]     I spent 30 minutes (face to face) with the patient, more than 50% was in counseling and coordination of care as detailed above.

## 2019-08-13 NOTE — PROGRESS NOTES
Breast Surgery  [facility]  Department of Surgery      REFERRING PROVIDER: No referring provider defined for this encounter.    Chief Complaint: Breast cancer    Subjective:      Patient ID: Anahi Cook is a 84 y.o. female who returns s/p L magseed localized lumpectomy with ALND on 18.   She initially presented with  L breast cancer with node positivity. There are two breast masses adjacent (may be one mass), at 12OC, path with IDC, grade II, ER+MD+H2N- with rashaad metastasis.  In addition, she is seen by Dr. Chairez for a pelvic mass that needs to be removed.  This was found to be benign but was resected by Dr. Chairez while receiving neoadjuvant endocrine therapy for the past 6 months with Dr. Harris.  She appeared to have some response on imaging but not complete response.    Patient does not routinely do self breast exams.  Patient has noted a change on breast exam.  Patient denies nipple discharge. Patient denies to previous breast biopsy. Patient denies a personal history of breast cancer.    Findings at that time were the following:   Tumor size: 2.3  + 1.4 cm   Tumor stgstrstastdstest:st st1st Estrogen Receptor: +   Progesterone Receptor: +   Her-2 jessica: -   Lymph node status: +   Lymphatic invasion: unknown         Interval History (2019):  Doing well.  Erythema resolved.  Lateral breast pain - may be more chest wall pain, seems to be below the breast.  There are no new masses,no bony pain, no HA, no SOB.      GYN History:  Age of menarche was 12. Age of menopause was 32 with partial hysterectomy, took HRT until her 70s.    Patient is . Age of first live birth was 19.     Past Medical History:   Diagnosis Date    Anxiety     Back pain     Breast cancer 2018    Breast mass 1/15/2018    Chronic kidney disease, stage 2, mildly decreased GFR     COPD (chronic obstructive pulmonary disease)     emphysema    Depression     Dysuria 2018    Family history of breast cancer 2018     Hypertension     Infiltrating ductal carcinoma of breast, left 7/12/2018    Osteoporosis, senile     Ovarian mass 1/15/2018    Pneumonia     S/P robotic assisted laparoscopic BSO (bilateral salpingo-oophorectomy) 2/23/2018     Past Surgical History:   Procedure Laterality Date    BREAST BIOPSY      BREAST LUMPECTOMY      CHOLECYSTECTOMY      EYE SURGERY      HYSTERECTOMY      INFUSION, INTRAVENOUS N/A 4/17/2013    Performed by Pepito Theodore [facility] at [facility] OR    INJECTION, FACET JOINT, BILATERAL LUMBAR L3-4, 4-5, 5-S1 FACET JOINT INJECTIONS Bilateral 10/15/2018    Performed by Merly Wilcox MD at [facility] PAIN MGT    INJECTION, FACET JOINT, L3-L4,L4-L5,L5-S1 Bilateral 4/1/2019    Performed by Merly Wilcox MD at [facility] PAIN MGT    INJECTION, FACET JOINT, L3-L4,L4-L5,L5-S1 NEED CONSENT Bilateral 6/20/2019    Performed by Merly Wilcox MD at [facility] PAIN MGT    INJECTION, FOR SENTINEL NODE IDENTIFICATION Left 7/12/2018    Performed by Amanda Caballero MD at [facility] OR 2ND FLR    INJECTION,STEROID,EPIDURAL N/A 8/21/2018    Performed by Merly Wilcox MD at [facility] PAIN MGT    INJECTION-STEROID-EPIDURAL-LUMBAR N/A 2/15/2018    Performed by Merly Wilcox MD at [facility] PAIN MGT    INJECTION-STEROID-EPIDURAL-LUMBAR N/A 2/15/2016    Performed by Merly Wilcox MD at [facility] PAIN MGT    INJECTION-STEROID-EPIDURAL-LUMBAR N/A 7/21/2014    Performed by Merly Wilcox MD at [facility] PAIN MGT    Kyphoplasty   T12 N/A 11/8/2018    Performed by Merly Wilcox MD at [facility] CATH LAB    LYMPHADENECTOMY, AXILLARY Left 7/12/2018    Performed by Amanda Caballero MD at [facility] OR 2ND FLR    MASTECTOMY, PARTIAL-W/SEED LOCALIZATION Left 7/12/2018    Performed by Amanda Caballero MD at [facility] OR 2ND FLR    NE REMOVAL OF OVARY/TUBE(S)  02/23/2018    Robotic Assisted    ROTATOR CUFF REPAIR Right     rotator cuff repair right shoulder    TONSILLECTOMY      XI ROBOT ASSISTED LAPAROSCOPIC SALPINGO-OOPHERECTOMY Bilateral 2/23/2018    Performed by Reena Chairez MD at  [facility] [place] OR     Current Outpatient Medications on File Prior to Visit   Medication Sig Dispense Refill    acetaminophen (TYLENOL) 325 MG tablet Take 650 mg by mouth every 6 (six) hours as needed for Pain.       albuterol (PROAIR HFA) 90 mcg/actuation inhaler Inhale 1-2 puffs into the lungs every 6 (six) hours as needed for Wheezing or Shortness of Breath.      albuterol (PROVENTIL) 2.5 mg /3 mL (0.083 %) nebulizer solution Inhale 3 mLs into the lungs as needed.       albuterol-ipratropium 2.5mg-0.5mg/3mL (DUO-NEB) 0.5 mg-3 mg(2.5 mg base)/3 mL nebulizer solution Take 3 mLs by nebulization as needed.       atorvastatin (LIPITOR) 20 MG tablet Take 20 mg by mouth nightly.       benazepril (LOTENSIN) 20 MG tablet Take 20 mg by mouth once daily.       calcium-vitamin D 250-100 mg-unit per tablet Take 1 tablet by mouth 2 (two) times daily.      CETIRIZINE HCL (ZYRTEC ORAL) Take 10 mg by mouth nightly as needed.       citalopram (CELEXA) 20 MG tablet Take 20 mg by mouth nightly.       felodipine (PLENDIL) 10 MG 24 hr tablet Take 20 mg by mouth once daily.       fluticasone-vilanterol (BREO) 100-25 mcg/dose diskus inhaler Inhale 1 puff into the lungs once daily. 1 each 3    INCRUSE ELLIPTA 62.5 mcg/actuation DsDv USE 1 PUFF QD AT SAME TIME  (patient takes at night)  2    letrozole (FEMARA) 2.5 mg Tab Take 1 tablet (2.5 mg total) by mouth once daily. 30 tablet 11    letrozole (FEMARA) 2.5 mg Tab TAKE 1 TABLET(2.5 MG) BY MOUTH EVERY DAY 30 tablet 11    montelukast (SINGULAIR) 10 mg tablet Take 10 mg by mouth every evening.      multivitamin (THERAGRAN) per tablet Take 1 tablet by mouth once daily.      mv-min-FA-coQ10-lycopen-lutein (THERAGRAN-M PREMIER 50 PLUS) 400-250-375 mcg Tab 1 tablet my mouth once daily      omeprazole (PRILOSEC) 20 MG capsule Take 20 mg by mouth once daily.       predniSONE (DELTASONE) 10 MG tablet   0    torsemide (DEMADEX) 20 MG Tab Take 20 mg by mouth once daily.      traMADol  (ULTRAM) 50 mg tablet Take 1 tablet (50 mg total) by mouth every 8 (eight) hours as needed. 14 tablet 0     No current facility-administered medications on file prior to visit.      Social History     Socioeconomic History    Marital status: Single     Spouse name: Not on file    Number of children: 3    Years of education: Not on file    Highest education level: Not on file   Occupational History    Occupation: Multiple jobs, see social documentation section     Comment: Retired   Social Needs    Financial resource strain: Not on file    Food insecurity:     Worry: Not on file     Inability: Not on file    Transportation needs:     Medical: Not on file     Non-medical: Not on file   Tobacco Use    Smoking status: Former Smoker     Packs/day: 1.00     Years: 40.00     Pack years: 40.00     Types: Cigarettes    Smokeless tobacco: Never Used    Tobacco comment: Smoked >1 ppd for at least 40 years, quit around 1995   Substance and Sexual Activity    Alcohol use: Yes     Comment: occasional glass of wine or cocktail    Drug use: No    Sexual activity: Yes     Partners: Male   Lifestyle    Physical activity:     Days per week: Not on file     Minutes per session: Not on file    Stress: Not on file   Relationships    Social connections:     Talks on phone: Not on file     Gets together: Not on file     Attends Zoroastrian service: Not on file     Active member of club or organization: Not on file     Attends meetings of clubs or organizations: Not on file     Relationship status: Not on file   Other Topics Concern    Not on file   Social History Narrative    Worked many jobs while raising 3 children.  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  She was  from her first , but took care of him at the end of his life.     Family History   Problem Relation Age of Onset    Heart failure Mother      "Kidney failure Mother     Heart attack Father     Breast cancer Sister 66        Lump, XRT, chemo, recurrence 10 years later, still alive.    Cancer Brother         leukemia    Heart attack Brother 58    Pulmonary embolism Brother 45    Heart attack Brother 52    Breast cancer Maternal Grandmother 60        advanced at diagnosis    Breast cancer Maternal Aunt 83         at 92        Review of Systems   Constitutional: Negative for appetite change, chills, fever and unexpected weight change.   HENT: Negative for facial swelling, postnasal drip and sore throat.    Eyes: Negative for redness and itching.   Respiratory: Negative for chest tightness and shortness of breath.    Cardiovascular: Negative for chest pain and palpitations.   Gastrointestinal: Negative for blood in stool, diarrhea, nausea and vomiting.   Genitourinary: Negative for difficulty urinating and dysuria.   Musculoskeletal: Negative for arthralgias and joint swelling.   Skin: Negative for rash and wound.   Neurological: Negative for dizziness and syncope.   Hematological: Negative for adenopathy.   Psychiatric/Behavioral: Negative for agitation. The patient is not nervous/anxious.      Objective:   BP (!) 162/74 (BP Location: Right arm, Patient Position: Sitting, BP Method: Medium (Automatic))   Pulse 61   Temp 97.8 °F (36.6 °C) (Oral)   Ht 5' 3" (1.6 m)   Wt 71.7 kg (158 lb 1.1 oz)   LMP  (LMP Unknown)   BMI 28.00 kg/m²     Physical Exam   Constitutional: She appears well-developed and well-nourished.   HENT:   Head: Normocephalic.   Eyes: No scleral icterus.   Neck: Neck supple. No tracheal deviation present.   Cardiovascular: Normal rate and regular rhythm.    Pulmonary/Chest: No respiratory distress. She exhibits no mass and no tenderness. Right breast exhibits no inverted nipple, no mass, no nipple discharge and no skin change. Left breast exhibits no inverted nipple, no mass, no nipple discharge and no skin change. No breast " swelling.   Abdominal: Soft. She exhibits no mass. There is no tenderness.   Musculoskeletal: She exhibits no edema.   Lymphadenopathy:     She has no cervical adenopathy.   Neurological: She is alert.   Skin: No rash noted. No erythema.     Psychiatric: She has a normal mood and affect.     MMG 1/24/2019  Films Compared:  Compared to: 06/13/2018 US Breast Left Limited, 06/13/2018 Mammo Digital Diagnostic Left with Tomosynthesis_CAD, 01/11/2018 Mammo Digital Diagnostic Left without CAD, 01/04/2018 Mammo Digital Diagnostic Bilat with Tomosynthesis_CAD, and 02/22/2017 Mammo Digital Screening Bilat with Tomosynthesis_CAD     Findings:  This procedure was performed using tomosynthesis.  Computer-aided detection was utilized in the interpretation of this examination.  The breasts are heterogeneously dense, which may obscure small masses.      Left  There are post-surgical findings from a previous lumpectomy with radiation seen in the left breast. There has been no interval development of a suspicious mass, microcalcification, or architectural distortion.      Right  There is no evidence of suspicious masses, calcifications, or other abnormal findings.     Impression:  Bilateral  There is no mammographic evidence of malignancy.     BI-RADS Category:   Overall: 2 - Benign     Recommendation:  Routine screening mammogram in 1 year is recommended      FINAL PATHOLOGIC DIAGNOSIS  1. 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  - Biopsy site is present.  - See CAP synoptic summary below and comment.  2. LEFT AXILLARY CONTENTS, AXILLARY DISSECTION:  - Invasive ductal carcinoma, grade 1, 13 mm.  - Metastatic carcinoma in four of eighteen lymph nodes (4/18).  - Largest metastatic deposit measures 9 mm.  - Extranodal extension is present.  - See CAP synoptic summary below and comment.  SURGICAL PATHOLOGY CANCER CASE SUMMARY- Breast  Procedure: Excision  (less than total mastectomy) and axillary dissection.  Specimen laterality: Left.  Tumor size: 2 foci, 33 mm (largest focus, greatest dimension) and 13 mm (greatest dimension).  Histologic type: Invasive ductal carcinoma.  Histologic grade: Grade 1 (glandular/tubular differentiation- 2, nuclear pleomorphism- 1, mitotic rate- 1)  Tumor focality: Multifocal, 2 foci.  Ductal carcinoma in situ: Present, negative for extensive intraductal component (EIC).  Size of DCIS: 8 mm.  - Number of blocks with DCIS: 3.  MRB5XOE,ER,PGR,UZB1RGZ,ER,PGR    - Number of blocks examined: 9.  Architectural patterns: Cribriform and solid.  Nuclear grade: Low grade.  Necrosis: Not identified.  Note: The size (extent) of DCIS is an estimation of the volume of breast tissue occupied by DCIS.  Margins (33 mm focus)-  Uninvolved by invasive carcinoma, distances from closest margins are 3 mm to the lateral margin and 4 mm to the  medial margin.  Uninvolved by DCIS, distance from closest margin (medial) is 9 mm.  Treatment effect: No definite response to presurgical therapy in the invasive carcinoma or lymph nodes.  Lymphovascular invasion: Present.  Lymph nodes:  Total number of lymph nodes examined (sentinel and non-sentinel): 18.  Number of lymph nodes with Macrometastases (>2 mm): 2.  Number of lymph nodes with Micrometastases (> 0.2 mm to 2 mm and/or > 200 cells): 2.  Number of lymph nodes with isolated tumor cells (less than or equal to 0.2 mm and less than or equal to 200 cells):  0.  Size of largest metastatic deposit: 9 mm.  Extranodal extension: Present.  Pathologic staging (pTNM, AJCC 8th edition): ympT2 N2a.  Ancillary studies- (Performed on 33 mm focus, block 1A)  ER: Positive (moderate intensity, >95% of tumor cell nuclei).  MS: Positive (weak to moderate intensity, 60% of tumor cell nuclei).  HER2: Negative.  Ki-67: 2%.  Ancillary studies- (Performed on 13 mm focus, block 2F)  ER: Positive (strong intensity, >95% of tumor cell  nuclei).  SC: Positive (weak to moderate intensity, 21-30% of tumor cell nuclei).  HER2: Negative.  Ki-67: 2%.  COMMENT:  Immunohistochemistry for p63 was performed on block 1A to confirm the presence and quantify the DCIS.  All stains have satisfactory positive and negative controls.  Blocks for potential molecular or ancillary studies:  Tumor block: 1G (33 mm focus) and 2F (13 mm focus).    Assessment:       83 y/o female with T2N1 breast cancer that is ++-, s/p neoadjuvant  Letrozole treatment s/p partial mastectomy (07/2018) w/adjuvant Letrozole  Plan:     JARROD  Continue follow up with Dr. Harris for continuation of Letrozole  Return to clinic in January for CBE with MMG, sooner if there are any concerns

## 2019-08-14 ENCOUNTER — OFFICE VISIT (OUTPATIENT)
Dept: SURGERY | Facility: CLINIC | Age: 84
End: 2019-08-14
Payer: MEDICARE

## 2019-08-14 ENCOUNTER — OFFICE VISIT (OUTPATIENT)
Dept: HEMATOLOGY/ONCOLOGY | Facility: CLINIC | Age: 84
End: 2019-08-14
Payer: MEDICARE

## 2019-08-14 VITALS
HEART RATE: 61 BPM | HEIGHT: 63 IN | WEIGHT: 158.06 LBS | SYSTOLIC BLOOD PRESSURE: 162 MMHG | BODY MASS INDEX: 28 KG/M2 | DIASTOLIC BLOOD PRESSURE: 74 MMHG | TEMPERATURE: 98 F

## 2019-08-14 VITALS
OXYGEN SATURATION: 95 % | HEART RATE: 59 BPM | HEIGHT: 63 IN | RESPIRATION RATE: 16 BRPM | WEIGHT: 160.13 LBS | TEMPERATURE: 98 F | DIASTOLIC BLOOD PRESSURE: 77 MMHG | BODY MASS INDEX: 28.37 KG/M2 | SYSTOLIC BLOOD PRESSURE: 171 MMHG

## 2019-08-14 DIAGNOSIS — C50.412 MALIGNANT NEOPLASM OF UPPER-OUTER QUADRANT OF LEFT BREAST IN FEMALE, ESTROGEN RECEPTOR POSITIVE: Primary | Chronic | ICD-10-CM

## 2019-08-14 DIAGNOSIS — Z17.0 MALIGNANT NEOPLASM OF UPPER-OUTER QUADRANT OF LEFT BREAST IN FEMALE, ESTROGEN RECEPTOR POSITIVE: Primary | Chronic | ICD-10-CM

## 2019-08-14 DIAGNOSIS — Z79.811 AROMATASE INHIBITOR USE: ICD-10-CM

## 2019-08-14 DIAGNOSIS — M81.0 OSTEOPOROSIS WITHOUT CURRENT PATHOLOGICAL FRACTURE, UNSPECIFIED OSTEOPOROSIS TYPE: ICD-10-CM

## 2019-08-14 DIAGNOSIS — C50.012 MALIGNANT NEOPLASM OF NIPPLE OF LEFT BREAST IN FEMALE, ESTROGEN RECEPTOR POSITIVE: ICD-10-CM

## 2019-08-14 DIAGNOSIS — Z17.0 MALIGNANT NEOPLASM OF NIPPLE OF LEFT BREAST IN FEMALE, ESTROGEN RECEPTOR POSITIVE: ICD-10-CM

## 2019-08-14 PROCEDURE — 99999 PR PBB SHADOW E&M-EST. PATIENT-LVL III: CPT | Mod: PBBFAC,,, | Performed by: SURGERY

## 2019-08-14 PROCEDURE — 99213 PR OFFICE/OUTPT VISIT, EST, LEVL III, 20-29 MIN: ICD-10-PCS | Mod: S$GLB,,, | Performed by: SURGERY

## 2019-08-14 PROCEDURE — 3078F PR MOST RECENT DIASTOLIC BLOOD PRESSURE < 80 MM HG: ICD-10-PCS | Mod: CPTII,S$GLB,, | Performed by: SURGERY

## 2019-08-14 PROCEDURE — 99214 PR OFFICE/OUTPT VISIT, EST, LEVL IV, 30-39 MIN: ICD-10-PCS | Mod: S$GLB,,, | Performed by: NURSE PRACTITIONER

## 2019-08-14 PROCEDURE — 99213 OFFICE O/P EST LOW 20 MIN: CPT | Mod: S$GLB,,, | Performed by: SURGERY

## 2019-08-14 PROCEDURE — 3078F PR MOST RECENT DIASTOLIC BLOOD PRESSURE < 80 MM HG: ICD-10-PCS | Mod: CPTII,S$GLB,, | Performed by: NURSE PRACTITIONER

## 2019-08-14 PROCEDURE — 3077F SYST BP >= 140 MM HG: CPT | Mod: CPTII,S$GLB,, | Performed by: SURGERY

## 2019-08-14 PROCEDURE — 3078F DIAST BP <80 MM HG: CPT | Mod: CPTII,S$GLB,, | Performed by: NURSE PRACTITIONER

## 2019-08-14 PROCEDURE — 99214 OFFICE O/P EST MOD 30 MIN: CPT | Mod: S$GLB,,, | Performed by: NURSE PRACTITIONER

## 2019-08-14 PROCEDURE — 1101F PR PT FALLS ASSESS DOC 0-1 FALLS W/OUT INJ PAST YR: ICD-10-PCS | Mod: CPTII,S$GLB,, | Performed by: NURSE PRACTITIONER

## 2019-08-14 PROCEDURE — 3077F PR MOST RECENT SYSTOLIC BLOOD PRESSURE >= 140 MM HG: ICD-10-PCS | Mod: CPTII,S$GLB,, | Performed by: NURSE PRACTITIONER

## 2019-08-14 PROCEDURE — 3078F DIAST BP <80 MM HG: CPT | Mod: CPTII,S$GLB,, | Performed by: SURGERY

## 2019-08-14 PROCEDURE — 99999 PR PBB SHADOW E&M-EST. PATIENT-LVL III: ICD-10-PCS | Mod: PBBFAC,,, | Performed by: SURGERY

## 2019-08-14 PROCEDURE — 3077F SYST BP >= 140 MM HG: CPT | Mod: CPTII,S$GLB,, | Performed by: NURSE PRACTITIONER

## 2019-08-14 PROCEDURE — 99999 PR PBB SHADOW E&M-EST. PATIENT-LVL III: ICD-10-PCS | Mod: PBBFAC,,, | Performed by: NURSE PRACTITIONER

## 2019-08-14 PROCEDURE — 1101F PR PT FALLS ASSESS DOC 0-1 FALLS W/OUT INJ PAST YR: ICD-10-PCS | Mod: CPTII,S$GLB,, | Performed by: SURGERY

## 2019-08-14 PROCEDURE — 1101F PT FALLS ASSESS-DOCD LE1/YR: CPT | Mod: CPTII,S$GLB,, | Performed by: NURSE PRACTITIONER

## 2019-08-14 PROCEDURE — 3077F PR MOST RECENT SYSTOLIC BLOOD PRESSURE >= 140 MM HG: ICD-10-PCS | Mod: CPTII,S$GLB,, | Performed by: SURGERY

## 2019-08-14 PROCEDURE — 99999 PR PBB SHADOW E&M-EST. PATIENT-LVL III: CPT | Mod: PBBFAC,,, | Performed by: NURSE PRACTITIONER

## 2019-08-14 PROCEDURE — 1101F PT FALLS ASSESS-DOCD LE1/YR: CPT | Mod: CPTII,S$GLB,, | Performed by: SURGERY

## 2019-08-14 RX ORDER — POTASSIUM CHLORIDE 750 MG/1
1 TABLET, EXTENDED RELEASE ORAL
COMMUNITY
Start: 2019-08-14 | End: 2020-02-11

## 2019-08-14 RX ORDER — LETROZOLE 2.5 MG/1
2.5 TABLET, FILM COATED ORAL DAILY
Qty: 90 TABLET | Refills: 1 | Status: SHIPPED | OUTPATIENT
Start: 2019-08-14 | End: 2020-06-15 | Stop reason: SDUPTHER

## 2019-08-14 NOTE — Clinical Note
RTC in 3 months to see Dr. Harris.Refer to Endocrinology for osteoporosis with history of compression fracture and on aromatase inhibitor.

## 2019-08-29 ENCOUNTER — OFFICE VISIT (OUTPATIENT)
Dept: ENDOCRINOLOGY | Facility: CLINIC | Age: 84
End: 2019-08-29
Payer: MEDICARE

## 2019-08-29 VITALS
HEART RATE: 70 BPM | WEIGHT: 156.94 LBS | HEIGHT: 63 IN | SYSTOLIC BLOOD PRESSURE: 112 MMHG | DIASTOLIC BLOOD PRESSURE: 60 MMHG | BODY MASS INDEX: 27.81 KG/M2

## 2019-08-29 DIAGNOSIS — M81.8 OTHER OSTEOPOROSIS WITHOUT CURRENT PATHOLOGICAL FRACTURE: ICD-10-CM

## 2019-08-29 DIAGNOSIS — N18.2 CKD (CHRONIC KIDNEY DISEASE) STAGE 2, GFR 60-89 ML/MIN: Chronic | ICD-10-CM

## 2019-08-29 DIAGNOSIS — Z17.0 MALIGNANT NEOPLASM OF UPPER-OUTER QUADRANT OF LEFT BREAST IN FEMALE, ESTROGEN RECEPTOR POSITIVE: Chronic | ICD-10-CM

## 2019-08-29 DIAGNOSIS — M81.0 OSTEOPOROSIS, SENILE: Primary | Chronic | ICD-10-CM

## 2019-08-29 DIAGNOSIS — C50.412 MALIGNANT NEOPLASM OF UPPER-OUTER QUADRANT OF LEFT BREAST IN FEMALE, ESTROGEN RECEPTOR POSITIVE: Chronic | ICD-10-CM

## 2019-08-29 PROCEDURE — 99999 PR PBB SHADOW E&M-EST. PATIENT-LVL III: ICD-10-PCS | Mod: PBBFAC,,, | Performed by: INTERNAL MEDICINE

## 2019-08-29 PROCEDURE — 99499 RISK ADDL DX/OHS AUDIT: ICD-10-PCS | Mod: S$GLB,,, | Performed by: INTERNAL MEDICINE

## 2019-08-29 PROCEDURE — 99204 OFFICE O/P NEW MOD 45 MIN: CPT | Mod: S$GLB,,, | Performed by: INTERNAL MEDICINE

## 2019-08-29 PROCEDURE — 1101F PT FALLS ASSESS-DOCD LE1/YR: CPT | Mod: CPTII,S$GLB,, | Performed by: INTERNAL MEDICINE

## 2019-08-29 PROCEDURE — 99499 UNLISTED E&M SERVICE: CPT | Mod: S$GLB,,, | Performed by: INTERNAL MEDICINE

## 2019-08-29 PROCEDURE — 3074F PR MOST RECENT SYSTOLIC BLOOD PRESSURE < 130 MM HG: ICD-10-PCS | Mod: CPTII,S$GLB,, | Performed by: INTERNAL MEDICINE

## 2019-08-29 PROCEDURE — 99204 PR OFFICE/OUTPT VISIT, NEW, LEVL IV, 45-59 MIN: ICD-10-PCS | Mod: S$GLB,,, | Performed by: INTERNAL MEDICINE

## 2019-08-29 PROCEDURE — 3078F PR MOST RECENT DIASTOLIC BLOOD PRESSURE < 80 MM HG: ICD-10-PCS | Mod: CPTII,S$GLB,, | Performed by: INTERNAL MEDICINE

## 2019-08-29 PROCEDURE — 1101F PR PT FALLS ASSESS DOC 0-1 FALLS W/OUT INJ PAST YR: ICD-10-PCS | Mod: CPTII,S$GLB,, | Performed by: INTERNAL MEDICINE

## 2019-08-29 PROCEDURE — 3078F DIAST BP <80 MM HG: CPT | Mod: CPTII,S$GLB,, | Performed by: INTERNAL MEDICINE

## 2019-08-29 PROCEDURE — 3074F SYST BP LT 130 MM HG: CPT | Mod: CPTII,S$GLB,, | Performed by: INTERNAL MEDICINE

## 2019-08-29 PROCEDURE — 99999 PR PBB SHADOW E&M-EST. PATIENT-LVL III: CPT | Mod: PBBFAC,,, | Performed by: INTERNAL MEDICINE

## 2019-08-29 NOTE — PROGRESS NOTES
"Subjective:      Patient ID: Anahi Cook is a 84 y.o. female.    Chief Complaint:  Osteoporosis      History of Present Illness  With regards to osteoporosis:   Diagnosed with osteoporosis on bone density scan from:  2011      Fractures:        Subacute severe biconcave compression fracture of the T12 vertebral body with retropulsion of fracture fragments into the central canal resulting in moderate central canal stenosis    Left wrist fx, +ribs         Last bone density:  2019     Osteoporosis medications used in the past: She believes she was on treatment -maybe fosamax and she thinks that it didn't agree with her      FH hip fracture, scoliosis or osteoporosis?  Yes          Calcium intake?  1000 mg a day   Vit D intake?  2000 iu a day   Weight bearing exercise?   Walking using a roller       They have made fall precautions in house     Used pred for copd flares and recent foot pain        tob use ?  None , past use      etoh use? None      No current diarrhea or h/o malabsorption     Thas 2N1 breast cancer that is ++-, s/p neoadjuvant  Letrozole treatment s/p partial mastectomy (07/2018) w/adjuvant Letrozole    Review of Systems   Constitutional: Negative for unexpected weight change.   Eyes: Negative for visual disturbance.   Respiratory: Negative for shortness of breath.    Cardiovascular: Negative for chest pain.   Gastrointestinal: Negative for abdominal pain.   Musculoskeletal: Negative for arthralgias.   Skin: Negative for wound.   Neurological: Negative for headaches.   Hematological: Does not bruise/bleed easily.   Psychiatric/Behavioral: Negative for sleep disturbance.       Objective:     /60   Pulse 70   Ht 5' 3" (1.6 m)   Wt 71.2 kg (156 lb 15.5 oz)   LMP  (LMP Unknown)   BMI 27.81 kg/m²   BP Readings from Last 3 Encounters:   08/29/19 112/60   08/14/19 (!) 171/77   08/14/19 (!) 162/74     Wt Readings from Last 1 Encounters:   08/29/19 0818 71.2 kg (156 lb 15.5 oz)     Body mass index is " 27.81 kg/m².      Physical Exam   Constitutional: She appears well-developed.   HENT:   Right Ear: External ear normal.   Left Ear: External ear normal.   Nose: Nose normal.   Hearing normal  Dentition good    Neck: No tracheal deviation present. No thyromegaly present.   Cardiovascular: Normal rate.   No murmur heard.  Pulmonary/Chest: Effort normal and breath sounds normal.   Abdominal: Soft. There is no tenderness. No hernia.   Musculoskeletal: She exhibits no edema.   Gait normal  No clubbing or cyanosis noted   Neurological: She displays normal reflexes. No cranial nerve deficit.   Skin: No rash noted.   No nodules       Psychiatric: She has a normal mood and affect. Judgment normal.   Vitals reviewed.             Lab Review:   No results found for: HGBA1C  No results found for: CHOL, HDL, LDLCALC, TRIG, CHOLHDL  Lab Results   Component Value Date     04/04/2019    K 3.9 04/04/2019     04/04/2019    CO2 25 04/04/2019     04/04/2019    BUN 19 04/04/2019    CREATININE 0.9 04/04/2019    CALCIUM 9.2 04/04/2019    PROT 7.7 04/03/2019    ALBUMIN 3.8 04/03/2019    BILITOT 0.8 04/03/2019    ALKPHOS 78 04/03/2019    AST 21 04/03/2019    ALT 18 04/03/2019    ANIONGAP 11 04/04/2019    ESTGFRAFRICA >60 04/04/2019    EGFRNONAA 59 (A) 04/04/2019     No results found for: MIGJMMWK45MM    Assessment and Plan     Osteoporosis, senile       Reviewed basics of quantity versus quality   she is fracturing and needs therapy   Check  TSH, vit D, CMP      RDA of calcium (6072-5303 mg /day in divided doses) and vitamin D 2000iu a day  discussed  Calcium from food sources preferred  Fall precautions emphasized     Treatment options and potential side effects discussed for PO bisphosphonates, reclast, Denosumab, and Teriparatide)     Low risk for ONJ and atypical fractures reviewed and discussed. Alerted that if dental work needs to be done it should be done prior to initiating therapy     Plan prolia     Malignant  neoplasm of upper-outer quadrant of left breast in female, estrogen receptor positive  On Letrozole    CKD (chronic kidney disease) stage 2, GFR 60-89 ml/min  Follow  Check cmp and vit D

## 2019-08-29 NOTE — ASSESSMENT & PLAN NOTE
Reviewed basics of quantity versus quality   she is fracturing and needs therapy   Check  TSH, vit D, CMP      RDA of calcium (7205-3814 mg /day in divided doses) and vitamin D 2000iu a day  discussed  Calcium from food sources preferred  Fall precautions emphasized     Treatment options and potential side effects discussed for PO bisphosphonates, reclast, Denosumab, and Teriparatide)     Low risk for ONJ and atypical fractures reviewed and discussed. Alerted that if dental work needs to be done it should be done prior to initiating therapy     Plan prolia

## 2019-08-29 NOTE — LETTER
August 29, 2019      Jimbo Mckeon, NP  [address] [facility] [address]           [facility] - [address] [place]  [address]  [place] [address]  Phone: [phone]          Patient: Anahi Cook   MR Number: [id number]   YOB: 1934   Date of Visit: 8/29/2019       Dear Jimbo Mckeon:    Thank you for referring Anahi Cook to me for evaluation. Attached you will find relevant portions of my assessment and plan of care.    If you have questions, please do not hesitate to call me. I look forward to following Anahi Cook along with you.    Sincerely,    Hyacinth Garcia MD    Enclosure  CC:  No Recipients    If you would like to receive this communication electronically, please contact [email or web address] or [phone] to request more information on [employer] access.    For providers and/or their staff who would like to refer a patient to [facility], please contact us through our one-stop-shop provider referral line, [facility], at [phone].    If you feel you have received this communication in error or would no longer like to receive these types of communications, please e-mail [email or web address]

## 2019-10-08 ENCOUNTER — HOSPITAL ENCOUNTER (OUTPATIENT)
Dept: RADIOLOGY | Facility: HOSPITAL | Age: 84
Discharge: HOME | End: 2019-10-08
Attending: PHYSICIAN ASSISTANT
Payer: MEDICARE

## 2019-10-08 ENCOUNTER — TELEPHONE (OUTPATIENT)
Dept: ENDOSCOPY | Facility: HOSPITAL | Age: 84
End: 2019-10-08

## 2019-10-08 ENCOUNTER — OFFICE VISIT (OUTPATIENT)
Dept: GASTROENTEROLOGY | Facility: CLINIC | Age: 84
End: 2019-10-08
Payer: MEDICARE

## 2019-10-08 VITALS
DIASTOLIC BLOOD PRESSURE: 69 MMHG | WEIGHT: 152 LBS | HEART RATE: 63 BPM | BODY MASS INDEX: 27.97 KG/M2 | HEIGHT: 62 IN | SYSTOLIC BLOOD PRESSURE: 121 MMHG

## 2019-10-08 DIAGNOSIS — R11.2 NON-INTRACTABLE VOMITING WITH NAUSEA, UNSPECIFIED VOMITING TYPE: ICD-10-CM

## 2019-10-08 DIAGNOSIS — R68.81 EARLY SATIETY: ICD-10-CM

## 2019-10-08 DIAGNOSIS — R14.0 ABDOMINAL BLOATING: ICD-10-CM

## 2019-10-08 DIAGNOSIS — J44.9 CHRONIC OBSTRUCTIVE PULMONARY DISEASE, UNSPECIFIED COPD TYPE: Chronic | ICD-10-CM

## 2019-10-08 DIAGNOSIS — R11.2 NON-INTRACTABLE VOMITING WITH NAUSEA, UNSPECIFIED VOMITING TYPE: Primary | ICD-10-CM

## 2019-10-08 DIAGNOSIS — K58.2 IRRITABLE BOWEL SYNDROME WITH BOTH CONSTIPATION AND DIARRHEA: ICD-10-CM

## 2019-10-08 DIAGNOSIS — R09.A2 GLOBUS SENSATION: ICD-10-CM

## 2019-10-08 DIAGNOSIS — I10 ESSENTIAL HYPERTENSION: Chronic | ICD-10-CM

## 2019-10-08 DIAGNOSIS — R13.19 ESOPHAGEAL DYSPHAGIA: ICD-10-CM

## 2019-10-08 DIAGNOSIS — K21.9 GASTROESOPHAGEAL REFLUX DISEASE WITHOUT ESOPHAGITIS: ICD-10-CM

## 2019-10-08 PROCEDURE — 3078F DIAST BP <80 MM HG: CPT | Mod: CPTII,S$GLB,, | Performed by: PHYSICIAN ASSISTANT

## 2019-10-08 PROCEDURE — 74019 XR ABDOMEN FLAT AND ERECT: ICD-10-PCS | Mod: 26,,, | Performed by: RADIOLOGY

## 2019-10-08 PROCEDURE — 3074F PR MOST RECENT SYSTOLIC BLOOD PRESSURE < 130 MM HG: ICD-10-PCS | Mod: CPTII,S$GLB,, | Performed by: PHYSICIAN ASSISTANT

## 2019-10-08 PROCEDURE — 99204 PR OFFICE/OUTPT VISIT, NEW, LEVL IV, 45-59 MIN: ICD-10-PCS | Mod: S$GLB,,, | Performed by: PHYSICIAN ASSISTANT

## 2019-10-08 PROCEDURE — 74019 RADEX ABDOMEN 2 VIEWS: CPT | Mod: TC

## 2019-10-08 PROCEDURE — 99999 PR PBB SHADOW E&M-EST. PATIENT-LVL V: ICD-10-PCS | Mod: PBBFAC,,, | Performed by: PHYSICIAN ASSISTANT

## 2019-10-08 PROCEDURE — 3074F SYST BP LT 130 MM HG: CPT | Mod: CPTII,S$GLB,, | Performed by: PHYSICIAN ASSISTANT

## 2019-10-08 PROCEDURE — 3078F PR MOST RECENT DIASTOLIC BLOOD PRESSURE < 80 MM HG: ICD-10-PCS | Mod: CPTII,S$GLB,, | Performed by: PHYSICIAN ASSISTANT

## 2019-10-08 PROCEDURE — 1101F PT FALLS ASSESS-DOCD LE1/YR: CPT | Mod: CPTII,S$GLB,, | Performed by: PHYSICIAN ASSISTANT

## 2019-10-08 PROCEDURE — 99204 OFFICE O/P NEW MOD 45 MIN: CPT | Mod: S$GLB,,, | Performed by: PHYSICIAN ASSISTANT

## 2019-10-08 PROCEDURE — 74019 RADEX ABDOMEN 2 VIEWS: CPT | Mod: 26,,, | Performed by: RADIOLOGY

## 2019-10-08 PROCEDURE — 1101F PR PT FALLS ASSESS DOC 0-1 FALLS W/OUT INJ PAST YR: ICD-10-PCS | Mod: CPTII,S$GLB,, | Performed by: PHYSICIAN ASSISTANT

## 2019-10-08 PROCEDURE — 99999 PR PBB SHADOW E&M-EST. PATIENT-LVL V: CPT | Mod: PBBFAC,,, | Performed by: PHYSICIAN ASSISTANT

## 2019-10-08 RX ORDER — OMEPRAZOLE 40 MG/1
40 CAPSULE, DELAYED RELEASE ORAL
Qty: 60 CAPSULE | Refills: 2 | Status: SHIPPED | OUTPATIENT
Start: 2019-10-08 | End: 2019-12-18

## 2019-10-08 NOTE — PATIENT INSTRUCTIONS
Discontinue the pepto and santosh seltzer    Avoid: Milk, cheese, butter yogurt  GERD  Worst Foods for Acid Reflux  Chocolate (milk chocolate worse than dark chocolate)  Soda (all carbonated beverages)  Alcohol (beer, liquor, wine)  Fried foods  Dao, sausage, ribs  Cream sauce  Fatty meats (beef)  Butter, margarine, lard, shortening  Coffee, tea  Mint   High fat nuts  Hot sauces and pepper  Citrus fruit/juices      Acidic foods (pH - 1 is MORE acidic, 5 is LESS acidic)     Do not eat or drink these (lower numbers are worse)    Induction diet - For 2 weeks eat nothing below pH 5     Lemon juice 2.3  Grape cranberry juice 2.5  Stomach Acid 2.5  Gelatin Dessert 2.6  Lemon/lime 2.9/2.7  Vinegar 2.9  Gatorade 3.0  Fruits - plums, apricots, strawberries, cherries 3.0  Vitamin C (ascorbic acid) 3.0  Iced tea, Snapple 3.1  Mustard 3.2  Soft drinks 3.3  Nectarines 3.3  Pomegranate 3.3  Applesauce 3.4  Grapefruit 3.4  Kiwi 3.4  Barbecue sauce 3.4  Caesar dressing 3.5  Thousand island dressing 3.6  Strawberries 3.5  Pineapple juice 3.5  Beer 3.5  Wine 3.5  Grape 3.6  Apples 3.6  Pineapple 3.7  Pickle 3.7  Blackberries, blueberries 3.7  [place] 3.7  Orange 3.8  Cherries 3.9  Red Bull 3.9  Tomatoes 4.2  Coffee 5.1      These are Safe foods:  Agave  Aloe Vera  Apple (only red)  Bagels  Banana (worsens reflux in 1%)  Beans - black, red, lima, lentils  Bread - whole grain, rye  Caramel  Celery  Chamomile tea  Chicken - skinless, never fried  Chicken stock or bouillon  Coffee - one cup/day with milk  Fennel  Fish  Tricia  Green vegetables (no green peppers)  Herbs  Honey  Melon  Milk - skin, soy, or Lactaid skim milk  Mushrooms  Oatmeal  Olive oil  Parsley  Pasta  Pears  Popcorn  Potatoes  Red bell peppers  Rice  Soups  Tofu  Turkey Breast  Turnip  Vegetables - no onion, tomatoes, peppers  Vinaigrette  Water - non carbonated  Whole grain breads, crackers, breakfast cereals      Best Foods for Acid Reflux  Whole grain  breads  Oatmeal  Aloe Vera  Salad (no tomatoes, onions, cheese, or high fat dressing)  Banana  Melon  Fennel  Chicken and turkey (skinless, never fried)  Fish/seafood (never fried)  Celery  Parsley  Couscous and Rice    Maybe bad foods (Everyone is unique)  Tomatoes  Garlic  Onion  Nuts (macadamia nuts)  Apples (especially green)  Cucumber  Green peppers  Spicy food  Some herbal teas    GERD tips  Change what you eat:  Eat smaller meals  Eat slowly and chew thoroughly until food is almost liquid  Cut down on junk carbohydrates such as sugar and white flour  Use herbs in your cooking  Eat more raw foods (more than 10 ingredients is not a raw food)  Avoid trans fats and partially hydrogenated oils  Eat more fish and switch to grass fed beef  Switch your cooking oil to macadamia nut or olive oil  Watch extremes of salt intake (too high or too low is bad)    Change these habits:  Stop smoking  Eat dinner earlier (3-4 hours before lying down to sleep)  Elevate the head of your bed 6 inches (blocks under the head of the bed are better than pillows)  Exercise (but wait 2 hours after eating)  Drink more water (between meals)    Take these supplements:  Multi vitamin  Probiotic  Fish oil    Most common food allergens: milk, eggs, peanuts, tree nuts, fish, shellfish, wheat, and soy    All natural immediate relief:  Chew 2-3 soft probiotic capsules - Dr. Barnhart's Probiotics 12 Plus  Chew chewable DGL licorice tablet  Chew papaya tablet with high protein meal - [employer]  Drink 2 ounces of aloe vera juice  Swedish bitters  Prelief- reduce the acid in food to keep it form burning sensitive tissue  Iberogast  [place]  Drink Chamomile Tea  Teaspoon of baking soda in water  Spoonful of vinegar in water      All natural ulcer healers:  Zinc carnosine - 75.5 mg with food twice a day x 8 weeks   Melissa Au - $8 for 60 pills  DGL (deglycyrrhizinated licorice) - 2 tablets before meals. Heals stomach lining   Natural  Factors brand, Enzymatic therapy brand.  Aloe Vera juice  - 2 to 8 ounces a day   [place] or [place]

## 2019-10-08 NOTE — PROGRESS NOTES
[facility] Consultation Note    Reason for Consult:  The primary encounter diagnosis was Non-intractable vomiting with nausea, unspecified vomiting type. Diagnoses of Abdominal bloating, Esophageal dysphagia, Early satiety, Gastroesophageal reflux disease without esophagitis, Globus sensation, Irritable bowel syndrome with both constipation and diarrhea, Chronic obstructive pulmonary disease, unspecified COPD type, and Essential hypertension were also pertinent to this visit.    PCP:   Pepito Theodore   No address on file    Referring MD:  Aaareferral Self  No address on file    HPI:  This is a 85 y.o. female here for evaluation of vomiting and diarrhea  Duration -3 months  Intermittent vomiting   Recent episode was after eating a BBQ rib, potatoes  Frequent regurg  Feels  A gas pocket in her chest, occurs after meals  Says food does not get stuck as it passing through the esophagus  abdominal bloating chronic in nature  Has been on prilosec daily for many years without relief  Taking pepto,Imodium, lactaid, santosh seltzer    Hx of diarrhea or constipation - BM range from bristol type 1 -6  2 episodes of vomiting and diarrhea at the same type  + early satiety, eating less  Denies Rectal bleeding    S/p radiation for breast cancer    Took prednisone for 9 days for COPD exacerbation recently    1 coke and coffee daily     ROS:  Constitutional: No fevers, chills, +weight loss  ENT: No allergies  CV: No chest pain  Pulm: No cough, No shortness of breath  Ophtho: No vision changes  GI: see HPI  Derm: No rash  Heme: No lymphadenopathy, No bruising  MSK: No arthritis  : No dysuria, No hematuria  Endo: No hot or cold intolerance  Neuro: No syncope, No seizure  Psych: No anxiety, No depression    Medical History:  has a past medical history of Anxiety, Back pain, Breast cancer (1/17/2018), Breast mass (1/15/2018), Chronic kidney disease, stage 2, mildly decreased GFR, COPD (chronic obstructive  pulmonary disease), Depression, Dysuria (1/29/2018), Family history of breast cancer (1/17/2018), GERD (gastroesophageal reflux disease), Hypertension, Infiltrating ductal carcinoma of breast, left (7/12/2018), Osteoporosis, senile, Ovarian mass (1/15/2018), Pneumonia, and S/P robotic assisted laparoscopic BSO (bilateral salpingo-oophorectomy) (2/23/2018).    Surgical History:  has a past surgical history that includes Hysterectomy; Tonsillectomy; Rotator cuff repair (Right); Eye surgery; pr removal of ovary/tube(s) (02/23/2018); Mastectomy, partial (Left, 7/12/2018); Injection for sentinel node identification (Left, 7/12/2018); Axillary node dissection (Left, 7/12/2018); Injection of facet joint (Bilateral, 10/15/2018); Fixation Kyphoplasty (N/A, 11/8/2018); Breast biopsy; Breast lumpectomy; Injection of facet joint (Bilateral, 4/1/2019); Injection of facet joint (Bilateral, 6/20/2019); and Cholecystectomy.    Family History: family history includes Breast cancer (age of onset: 60) in her maternal grandmother; Breast cancer (age of onset: 66) in her sister; Breast cancer (age of onset: 83) in her maternal aunt; Cancer in her brother; Heart attack in her father; Heart attack (age of onset: 52) in her brother; Heart attack (age of onset: 58) in her brother; Heart failure in her mother; Kidney failure in her mother; Pulmonary embolism (age of onset: 45) in her brother..     Social History:  reports that she has quit smoking. Her smoking use included cigarettes. She has a 40.00 pack-year smoking history. She has never used smokeless tobacco. She reports that she drinks alcohol. She reports that she does not use drugs.    Review of patient's allergies indicates:   Allergen Reactions    Levaquin [levofloxacin] Itching    Penicillins Itching       Current Outpatient Medications on File Prior to Visit   Medication Sig Dispense Refill    acetaminophen (TYLENOL) 325 MG tablet Take 650 mg by mouth every 6 (six) hours as  "needed for Pain.       albuterol (PROAIR HFA) 90 mcg/actuation inhaler Inhale 1-2 puffs into the lungs every 6 (six) hours as needed for Wheezing or Shortness of Breath.      albuterol (PROVENTIL) 2.5 mg /3 mL (0.083 %) nebulizer solution Inhale 3 mLs into the lungs as needed.       albuterol-ipratropium 2.5mg-0.5mg/3mL (DUO-NEB) 0.5 mg-3 mg(2.5 mg base)/3 mL nebulizer solution Take 3 mLs by nebulization as needed.       atorvastatin (LIPITOR) 20 MG tablet Take 20 mg by mouth nightly.       benazepril (LOTENSIN) 20 MG tablet Take 20 mg by mouth once daily.       calcium-vitamin D 250-100 mg-unit per tablet Take 1 tablet by mouth 2 (two) times daily.      CETIRIZINE HCL (ZYRTEC ORAL) Take 10 mg by mouth nightly as needed.       citalopram (CELEXA) 20 MG tablet Take 20 mg by mouth nightly.       felodipine (PLENDIL) 10 MG 24 hr tablet Take 20 mg by mouth once daily.       fluticasone-vilanterol (BREO) 100-25 mcg/dose diskus inhaler Inhale 1 puff into the lungs once daily. 1 each 3    INCRUSE ELLIPTA 62.5 mcg/actuation DsDv USE 1 PUFF QD AT SAME TIME  (patient takes at night)  2    letrozole (FEMARA) 2.5 mg Tab Take 1 tablet (2.5 mg total) by mouth once daily. 90 tablet 1    montelukast (SINGULAIR) 10 mg tablet Take 10 mg by mouth every evening.      multivitamin (THERAGRAN) per tablet Take 1 tablet by mouth once daily.      predniSONE (DELTASONE) 10 MG tablet   0    torsemide (DEMADEX) 20 MG Tab Take 20 mg by mouth once daily.      traMADol (ULTRAM) 50 mg tablet Take 1 tablet (50 mg total) by mouth every 8 (eight) hours as needed. 14 tablet 0    potassium chloride (KLOR-CON) 10 MEQ TbSR Take 1 tablet by mouth.       No current facility-administered medications on file prior to visit.          Objective Findings:    Vital Signs:  /69   Pulse 63   Ht 5' 2" (1.575 m)   Wt 68.9 kg (152 lb)   LMP  (LMP Unknown)   BMI 27.80 kg/m²   Body mass index is 27.8 kg/m².    Physical Exam:  General " Appearance: Well appearing in no acute distress  Head:   Normocephalic, without obvious abnormality  Eyes:    No scleral icterus  ENT: Neck supple, Lips, mucosa, and tongue normal  Lungs: CTA bilaterally in anterior and posterior fields, no wheezes, no crackles.  Heart:  Regular rate and rhythm, S1, S2 normal, no murmurs heard  Abdomen: Soft, LUQ > epigastric, RLQ > LLQ tenderness, no guarding or rebound tenderness, non distended with positive bowel sounds in all four quadrants.   Extremities: no edema  Skin: No rash  Neurologic: AAO x 3      Labs:  Lab Results   Component Value Date    WBC 11.02 10/08/2019    HGB 14.7 10/08/2019    HCT 46.2 10/08/2019     10/08/2019    ALT 24 10/08/2019    AST 23 10/08/2019     10/08/2019    K 3.3 (L) 10/08/2019     10/08/2019    CREATININE 1.2 10/08/2019    BUN 15 10/08/2019    CO2 29 10/08/2019    TSH 2.036 08/29/2019       Imaging:  CT - 2018 atherosc  Endoscopy:    Colonoscopy-patient was advised she did not need a no other  Assessment:  1. Non-intractable vomiting with nausea, unspecified vomiting type    2. Abdominal bloating    3. Esophageal dysphagia    4. Early satiety    5. Gastroesophageal reflux disease without esophagitis    6. Globus sensation    7. Irritable bowel syndrome with both constipation and diarrhea    8. Chronic obstructive pulmonary disease, unspecified COPD type    9. Essential hypertension       85-year-old female presents with GERD, symptoms suggestive of esophageal dysphagia, and vomiting x3 months.  She does have a long history of GERD for which she has been taking omeprazole for many years.  Also has had 2 episodes of vomiting and having diarrhea at the same time.  Long history of alternating bowel habits.    Recommendations:  1.  Schedule EGD with possible dilation to rule esophageal stricture and gastric ulcers  2.  Labs  3.  Increase Prilosec to 40 mg twice a day.  GERD diet  4.  Advised she discontinue [place], and  Pepto  5.  Continue to avoid dairy for her lactose intolerant    The patient is here today with her granddaughter.  She says that in general she is in good health and wishes to proceed with the EGD.    Addendum:  X-ray did reveal moderate stool burden, mild diffuse gaseous extension of the intestine.  Will advise MiraLax for 1 week    Will need to address her PPI use with history of osteoporosis in the future  Follow up in about 2 months (around 12/8/2019).      Order summary:  Orders Placed This Encounter    X-Ray Abdomen Flat And Erect    H. PYLORI ANTIBODY, IGG    CBC auto differential    TISSUE TRANSGLUTAMINASE (TTG), IGA    IgA    Comprehensive metabolic panel    omeprazole (PRILOSEC) 40 MG capsule    Case request GI: EGD (ESOPHAGOGASTRODUODENOSCOPY)         Thank you so much for allowing me to participate in the care of Anahi Crow PA-C

## 2019-10-11 ENCOUNTER — TELEPHONE (OUTPATIENT)
Dept: GASTROENTEROLOGY | Facility: CLINIC | Age: 84
End: 2019-10-11

## 2019-10-11 NOTE — TELEPHONE ENCOUNTER
Pt aware and understanding of Tristin message below:           Please notify the patient that her x-ray revealed a moderate amount of stool in the colon.  I would like for her to take MiraLax 17g for a week to help clean out her bowels.  Her lab work revealed that her potassium was a little low.  She needs to follow-up with her PCP about this.  Her albumin was also low which could be result of GI inflammation or kidney disease. She also needs to follow up with her PCP about her decreased kidney function.     Her lab test to rule out H pylori is still pending.

## 2019-10-13 ENCOUNTER — NURSE TRIAGE (OUTPATIENT)
Dept: ADMINISTRATIVE | Facility: CLINIC | Age: 84
End: 2019-10-13

## 2019-10-13 NOTE — TELEPHONE ENCOUNTER
Reason for Disposition   Nursing judgment    Protocols used: NO GUIDELINE OR REFERENCE [id number]    Pt stated she forgot she was supposed to start her clear liquid diet for her colonoscopy today and she ate a peanut butter and Jelly sand which. Paged Provider on call. Informed Pt I will call her back after speaking with Provider.    Spoke with Dr. Humphrey, he stated tell the Pt she can continue with the prep, but if she's not clear it will be cancelled or she can call tomorrow and reschedule. Pt stated she will continue the prep.

## 2019-10-14 ENCOUNTER — HOSPITAL ENCOUNTER (OUTPATIENT)
Facility: HOSPITAL | Age: 84
Discharge: HOME | End: 2019-10-14
Attending: INTERNAL MEDICINE | Admitting: INTERNAL MEDICINE
Payer: MEDICARE

## 2019-10-14 ENCOUNTER — ANESTHESIA (OUTPATIENT)
Dept: ENDOSCOPY | Facility: HOSPITAL | Age: 84
End: 2019-10-14
Payer: MEDICARE

## 2019-10-14 ENCOUNTER — ANESTHESIA EVENT (OUTPATIENT)
Dept: ENDOSCOPY | Facility: HOSPITAL | Age: 84
End: 2019-10-14
Payer: MEDICARE

## 2019-10-14 VITALS
TEMPERATURE: 98 F | RESPIRATION RATE: 21 BRPM | WEIGHT: 154 LBS | HEART RATE: 62 BPM | BODY MASS INDEX: 28.34 KG/M2 | HEIGHT: 62 IN | DIASTOLIC BLOOD PRESSURE: 66 MMHG | SYSTOLIC BLOOD PRESSURE: 144 MMHG | OXYGEN SATURATION: 96 %

## 2019-10-14 DIAGNOSIS — R11.2 NAUSEA & VOMITING: ICD-10-CM

## 2019-10-14 PROCEDURE — 43239 EGD BIOPSY SINGLE/MULTIPLE: CPT | Mod: ,,, | Performed by: INTERNAL MEDICINE

## 2019-10-14 PROCEDURE — 63600175 PHARM REV CODE 636 W HCPCS: Performed by: NURSE ANESTHETIST, CERTIFIED REGISTERED

## 2019-10-14 PROCEDURE — 43239 EGD BIOPSY SINGLE/MULTIPLE: CPT | Performed by: INTERNAL MEDICINE

## 2019-10-14 PROCEDURE — D9220A PRA ANESTHESIA: ICD-10-PCS | Mod: CRNA,,, | Performed by: NURSE ANESTHETIST, CERTIFIED REGISTERED

## 2019-10-14 PROCEDURE — D9220A PRA ANESTHESIA: ICD-10-PCS | Mod: ANES,,, | Performed by: ANESTHESIOLOGY

## 2019-10-14 PROCEDURE — D9220A PRA ANESTHESIA: Mod: CRNA,,, | Performed by: NURSE ANESTHETIST, CERTIFIED REGISTERED

## 2019-10-14 PROCEDURE — 88305 TISSUE EXAM BY PATHOLOGIST: CPT | Mod: 26,,, | Performed by: PATHOLOGY

## 2019-10-14 PROCEDURE — 37000009 HC ANESTHESIA EA ADD 15 MINS: Performed by: INTERNAL MEDICINE

## 2019-10-14 PROCEDURE — 63600175 PHARM REV CODE 636 W HCPCS: Performed by: INTERNAL MEDICINE

## 2019-10-14 PROCEDURE — 88305 TISSUE SPECIMEN TO PATHOLOGY - SURGERY: ICD-10-PCS | Mod: 26,,, | Performed by: PATHOLOGY

## 2019-10-14 PROCEDURE — 88305 TISSUE EXAM BY PATHOLOGIST: CPT | Mod: 59 | Performed by: PATHOLOGY

## 2019-10-14 PROCEDURE — 27201012 HC FORCEPS, HOT/COLD, DISP: Performed by: INTERNAL MEDICINE

## 2019-10-14 PROCEDURE — 37000008 HC ANESTHESIA 1ST 15 MINUTES: Performed by: INTERNAL MEDICINE

## 2019-10-14 PROCEDURE — 43239 PR EGD, FLEX, W/BIOPSY, SGL/MULTI: ICD-10-PCS | Mod: ,,, | Performed by: INTERNAL MEDICINE

## 2019-10-14 PROCEDURE — D9220A PRA ANESTHESIA: Mod: ANES,,, | Performed by: ANESTHESIOLOGY

## 2019-10-14 RX ORDER — PROPOFOL 10 MG/ML
VIAL (ML) INTRAVENOUS
Status: DISCONTINUED | OUTPATIENT
Start: 2019-10-14 | End: 2019-10-14

## 2019-10-14 RX ORDER — HYDROMORPHONE HYDROCHLORIDE 1 MG/ML
0.2 INJECTION, SOLUTION INTRAMUSCULAR; INTRAVENOUS; SUBCUTANEOUS EVERY 5 MIN PRN
Status: DISCONTINUED | OUTPATIENT
Start: 2019-10-14 | End: 2019-10-14 | Stop reason: HOSPADM

## 2019-10-14 RX ORDER — SODIUM CHLORIDE 0.9 % (FLUSH) 0.9 %
10 SYRINGE (ML) INJECTION
Status: DISCONTINUED | OUTPATIENT
Start: 2019-10-14 | End: 2019-10-14 | Stop reason: HOSPADM

## 2019-10-14 RX ORDER — DIPHENHYDRAMINE HYDROCHLORIDE 50 MG/ML
25 INJECTION INTRAMUSCULAR; INTRAVENOUS EVERY 6 HOURS PRN
Status: DISCONTINUED | OUTPATIENT
Start: 2019-10-14 | End: 2019-10-14 | Stop reason: HOSPADM

## 2019-10-14 RX ORDER — LIDOCAINE HCL/PF 100 MG/5ML
SYRINGE (ML) INTRAVENOUS
Status: DISCONTINUED | OUTPATIENT
Start: 2019-10-14 | End: 2019-10-14

## 2019-10-14 RX ORDER — SODIUM CHLORIDE 9 MG/ML
INJECTION, SOLUTION INTRAVENOUS CONTINUOUS
Status: DISCONTINUED | OUTPATIENT
Start: 2019-10-14 | End: 2019-10-14 | Stop reason: HOSPADM

## 2019-10-14 RX ORDER — FENTANYL CITRATE 50 UG/ML
25 INJECTION, SOLUTION INTRAMUSCULAR; INTRAVENOUS EVERY 5 MIN PRN
Status: DISCONTINUED | OUTPATIENT
Start: 2019-10-14 | End: 2019-10-14 | Stop reason: HOSPADM

## 2019-10-14 RX ORDER — FENTANYL CITRATE 50 UG/ML
INJECTION, SOLUTION INTRAMUSCULAR; INTRAVENOUS
Status: DISCONTINUED | OUTPATIENT
Start: 2019-10-14 | End: 2019-10-14

## 2019-10-14 RX ORDER — PROPOFOL 10 MG/ML
VIAL (ML) INTRAVENOUS CONTINUOUS PRN
Status: DISCONTINUED | OUTPATIENT
Start: 2019-10-14 | End: 2019-10-14

## 2019-10-14 RX ADMIN — PROPOFOL 50 MG: 10 INJECTION, EMULSION INTRAVENOUS at 02:10

## 2019-10-14 RX ADMIN — SODIUM CHLORIDE: 0.9 INJECTION, SOLUTION INTRAVENOUS at 12:10

## 2019-10-14 RX ADMIN — PROPOFOL 150 MCG/KG/MIN: 10 INJECTION, EMULSION INTRAVENOUS at 02:10

## 2019-10-14 RX ADMIN — FENTANYL CITRATE 25 MCG: 50 INJECTION, SOLUTION INTRAMUSCULAR; INTRAVENOUS at 02:10

## 2019-10-14 RX ADMIN — LIDOCAINE HYDROCHLORIDE 60 MG: 20 INJECTION, SOLUTION INTRAVENOUS at 02:10

## 2019-10-14 RX ADMIN — SODIUM CHLORIDE: 0.9 INJECTION, SOLUTION INTRAVENOUS at 01:10

## 2019-10-14 NOTE — ANESTHESIA PREPROCEDURE EVALUATION
10/14/2019  Anahi Cook is a 85 y.o., female   Patient Active Problem List   Diagnosis    Lumbar radiculopathy    COPD (chronic obstructive pulmonary disease)    Essential hypertension    Ovarian mass    Breast mass    Family history of breast cancer    Chronic pain    Use of aromatase inhibitors    Malignant neoplasm of upper-outer quadrant of left breast in female, estrogen receptor positive    Weakness    Osteoporosis, senile    Lumbar spondylosis    Multiple closed fractures of ribs of left side    CKD (chronic kidney disease) stage 2, GFR 60-89 ml/min    Hyperlipidemia    Hypokalemia    Nausea & vomiting     .    Anesthesia Evaluation         Review of Systems      Physical Exam  General:  Well nourished    Airway/Jaw/Neck:  Airway Findings: Mouth Opening: Normal Tongue: Normal  General Airway Assessment: Adult  Mallampati: II  Improves to II with phonation.  TM Distance: Normal, at least 6 cm      Dental:  Dental Findings: In tact   Chest/Lungs:  Chest/Lungs Findings: Clear to auscultation     Heart/Vascular:  Heart Findings: Rate: Normal  Rhythm: Regular Rhythm  Sounds: Normal        Mental Status:  Mental Status Findings:  Cooperative, Alert and Oriented         Anesthesia Plan  Type of Anesthesia, risks & benefits discussed:  Anesthesia Type:  general  Patient's Preference: General  Intra-op Monitoring Plan: standard ASA monitors  Intra-op Monitoring Plan Comments: Standard ASA monitors.   Post Op Pain Control Plan: per primary service following discharge from PACU  Post Op Pain Control Plan Comments: Per primary service.     Induction:   IV  Beta Blocker:  Patient is not currently on a Beta-Blocker (No further documentation required).       Informed Consent: Patient understands risks and agrees with Anesthesia plan.  Questions answered. Anesthesia consent signed with patient.  ASA  Score: 3     Day of Surgery Review of History & Physical:    H&P update referred to the surgeon.     Anesthesia Plan Notes: Chart reviewed, patient interviewed and examined.  The plan for general anesthesia was explained.  Questions were answered and the consent was signed.  [facility] From Anesthesia Perspective.

## 2019-10-14 NOTE — ANESTHESIA POSTPROCEDURE EVALUATION
Anesthesia Post Evaluation    Patient: Anahi Cook    Procedure(s) Performed: Procedure(s) (LRB):  EGD (ESOPHAGOGASTRODUODENOSCOPY) (N/A)    Final Anesthesia Type: general  Patient location during evaluation: PACU  Patient participation: Yes- Able to Participate  Level of consciousness: awake and alert  Post-procedure vital signs: reviewed and stable  Pain management: adequate  Airway patency: patent  PONV status at discharge: No PONV  Anesthetic complications: no      Cardiovascular status: hemodynamically stable  Respiratory status: unassisted  Hydration status: euvolemic  Follow-up not needed.          Vitals Value Taken Time   /66 10/14/2019  2:46 PM   Temp 36.6 °C (97.9 °F) 10/14/2019  2:15 PM   Pulse 62 10/14/2019  2:45 PM   Resp 21 10/14/2019  2:45 PM   SpO2 96 % 10/14/2019  2:45 PM   Vitals shown include unvalidated device data.      No case tracking events are documented in the log.      Pain/Nohemi Score: Nohemi Score: 10 (10/14/2019  2:55 PM)

## 2019-10-14 NOTE — [employer] PATIENT INSTRUCTIONS
Discharge Summary/Instructions after an Endoscopic Procedure  Patient Name: Anahi Cook  Patient MRN: [id number]  Patient YOB: 1934 Monday, October 14, 2019  Davonte Thompson MD  RESTRICTIONS:  During your procedure today, you received medications for sedation.  These   medications may affect your judgment, balance and coordination.  Therefore,   for 24 hours, you have the following restrictions:   - DO NOT drive a car, operate machinery, make legal/financial decisions,   sign important papers or drink alcohol.    ACTIVITY:  Today: no heavy lifting, straining or running due to procedural   sedation/anesthesia.  The following day: return to full activity including work.  DIET:  Eat and drink normally unless instructed otherwise.     TREATMENT FOR COMMON SIDE EFFECTS:  - Mild abdominal pain, nausea, belching, bloating or excessive gas:  rest,   eat lightly and use a heating pad.  - Sore Throat: treat with throat lozenges and/or gargle with warm salt   water.  - Because air was used during the procedure, expelling large amounts of air   from your rectum or belching is normal.  - If a bowel prep was taken, you may not have a bowel movement for 1-3 days.    This is normal.  SYMPTOMS TO WATCH FOR AND REPORT TO YOUR PHYSICIAN:  1. Abdominal pain or bloating, other than gas cramps.  2. Chest pain.  3. Back pain.  4. Signs of infection such as: chills or fever occurring within 24 hours   after the procedure.  5. Rectal bleeding, which would show as bright red, maroon, or black stools.   (A tablespoon of blood from the rectum is not serious, especially if   hemorrhoids are present.)  6. Vomiting.  7. Weakness or dizziness.  GO DIRECTLY TO THE NEAREST EMERGENCY ROOM IF YOU HAVE ANY OF THE FOLLOWING:      Difficulty breathing              Chills and/or fever over 101 F   Persistent vomiting and/or vomiting blood   Severe abdominal pain   Severe chest pain   Black, tarry stools   Bleeding- more than one  tablespoon   Any other symptom or condition that you feel may need urgent attention  Your doctor recommends these additional instructions:  If any biopsies were taken, your doctors clinic will contact you in 1 to 2   weeks with any results.  - Discharge patient to home.   - Follow an antireflux regimen indefinitely.   - Await pathology results.   - Telephone endoscopist for pathology results in 2 weeks.   - Telephone physician assistant LINDA Thomas for study results in   1 week.   - Return to physician assistant LINDA Thomas. Recommend Esophgram   with 13mm Barium tablet and High resolution esophageal motility for furter   evaluation of dyphagia.  - The findings and recommendations were discussed with the patient.  For questions, problems or results please call your physician - Davonte Thompson MD at Work:  [phone].  [facility], EMERGENCY ROOM PHONE NUMBER: [phone]  IF A COMPLICATION OR EMERGENCY SITUATION ARISES AND YOU ARE UNABLE TO REACH   YOUR PHYSICIAN - GO DIRECTLY TO THE EMERGENCY ROOM.  Davonte Thompson MD  10/14/2019 2:15:52 PM  This report has been verified and signed electronically.  [employer]

## 2019-10-14 NOTE — TRANSFER OF CARE
"Anesthesia Transfer of Care Note    Patient: Anahi Cook    Procedure(s) Performed: Procedure(s) (LRB):  EGD (ESOPHAGOGASTRODUODENOSCOPY) (N/A)    Patient location: [facility]    Anesthesia Type: general    Transport from OR: Transported from OR on 2-3 L/min O2 by NC with adequate spontaneous ventilation    Post pain: adequate analgesia    Post assessment: no apparent anesthetic complications and tolerated procedure well    Post vital signs: stable    Level of consciousness: awake, alert and oriented    Nausea/Vomiting: no nausea/vomiting    Complications: none    Transfer of care protocol was followed      Last vitals:   Visit Vitals  /60 (BP Location: Right arm, Patient Position: Lying)   Pulse 64   Temp 36.6 °C (97.9 °F) (Temporal)   Resp 18   Ht 5' 2" (1.575 m)   Wt 69.9 kg (154 lb)   LMP  (LMP Unknown)   SpO2 95%   Breastfeeding? No   BMI 28.17 kg/m²     "

## 2019-10-14 NOTE — PLAN OF CARE
Patient tolerated procedure well.  VSS, no complaints of pain, tolerating po.  Preparing for discharge.  AVS reviewed with pt and her daughter at bedside, who verbalized understanding of S/S of complications, advancement of diet, activity restrictions, follow up and general recovery.  IV removed prior to departure.  Dr. Thompson met with pt and family at bedside.

## 2019-10-14 NOTE — H&P
[facility]  History & Physical    Subjective:      Chief Complaint/Reason for Admission:     EGD    Anahi Cook is a 85 y.o. female.    Past Medical History:   Diagnosis Date    Anxiety     Back pain     Breast cancer 1/17/2018    Breast mass 1/15/2018    Chronic kidney disease, stage 2, mildly decreased GFR     COPD (chronic obstructive pulmonary disease)     emphysema    Depression     Dysuria 1/29/2018    Family history of breast cancer 1/17/2018    GERD (gastroesophageal reflux disease)     Hypertension     Infiltrating ductal carcinoma of breast, left 7/12/2018    Osteoporosis, senile     Ovarian mass 1/15/2018    Pneumonia     S/P robotic assisted laparoscopic BSO (bilateral salpingo-oophorectomy) 2/23/2018     Past Surgical History:   Procedure Laterality Date    AXILLARY NODE DISSECTION Left 7/12/2018    Procedure: LYMPHADENECTOMY, AXILLARY;  Surgeon: Amanda Caballero MD;  Location: [facility] OR [address] [place] [address];  Service: General;  Laterality: Left;    BREAST BIOPSY      BREAST LUMPECTOMY      CHOLECYSTECTOMY      COLONOSCOPY      ESOPHAGOGASTRODUODENOSCOPY      EYE SURGERY      FIXATION KYPHOPLASTY N/A 11/8/2018    Procedure: Kyphoplasty   T12;  Surgeon: Merly Wilcox MD;  Location: [facility] CATH LAB;  Service: Pain Management;  Laterality: N/A;  T12  [place] [employer] e-mailed with date and time    HYSTERECTOMY      INJECTION FOR SENTINEL NODE IDENTIFICATION Left 7/12/2018    Procedure: INJECTION, FOR SENTINEL NODE IDENTIFICATION;  Surgeon: Amanda Caballero MD;  Location: [facility] OR [address] [place] [address];  Service: General;  Laterality: Left;    INJECTION OF FACET JOINT Bilateral 10/15/2018    Procedure: INJECTION, FACET JOINT, BILATERAL LUMBAR L3-4, 4-5, 5-S1 FACET JOINT INJECTIONS;  Surgeon: Merly Wilcox MD;  Location: [facility] PAIN MGT;  Service: Pain Management;  Laterality: Bilateral;    INJECTION OF FACET JOINT Bilateral 4/1/2019    Procedure: INJECTION, FACET JOINT, L3-L4,L4-L5,L5-S1;  Surgeon:  Merly Wilcox MD;  Location: [facility] PAIN MGT;  Service: Pain Management;  Laterality: Bilateral;    INJECTION OF FACET JOINT Bilateral 2019    Procedure: INJECTION, FACET JOINT, L3-L4,L4-L5,L5-S1 NEED CONSENT;  Surgeon: Merly Wilcox MD;  Location: [facility] PAIN MGT;  Service: Pain Management;  Laterality: Bilateral;    MASTECTOMY, PARTIAL Left 2018    Procedure: MASTECTOMY, PARTIAL-W/SEED LOCALIZATION;  Surgeon: Amanda Caballero MD;  [facility];  Service: General;  Laterality: Left;    LA REMOVAL OF OVARY/TUBE(S)  2018    Robotic Assisted    ROTATOR CUFF REPAIR Right     rotator cuff repair right shoulder    TONSILLECTOMY       Family History   Problem Relation Age of Onset    Heart failure Mother     Kidney failure Mother     Heart attack Father     Breast cancer Sister 66        Lump, XRT, chemo, recurrence 10 years later, still alive.    Cancer Brother         leukemia    Heart attack Brother 58    Pulmonary embolism Brother 45    Heart attack Brother 52    Breast cancer Maternal Grandmother 60        advanced at diagnosis    Breast cancer Maternal Aunt 83         at 92    Colon cancer Neg Hx     Esophageal cancer Neg Hx      Social History     Tobacco Use    Smoking status: Former Smoker     Packs/day: 1.00     Years: 40.00     Pack years: 40.00     Types: Cigarettes    Smokeless tobacco: Never Used    Tobacco comment: Smoked >1 ppd for at least 40 years, quit around    Substance Use Topics    Alcohol use: Yes     Comment: occasional glass of wine or cocktail    Drug use: No       PTA Medications   Medication Sig    acetaminophen (TYLENOL) 325 MG tablet Take 650 mg by mouth every 6 (six) hours as needed for Pain.     albuterol (PROAIR HFA) 90 mcg/actuation inhaler Inhale 1-2 puffs into the lungs every 6 (six) hours as needed for Wheezing or Shortness of Breath.    albuterol (PROVENTIL) 2.5 mg /3 mL (0.083 %) nebulizer solution Inhale 3 mLs into the lungs as  needed.     albuterol-ipratropium 2.5mg-0.5mg/3mL (DUO-NEB) 0.5 mg-3 mg(2.5 mg base)/3 mL nebulizer solution Take 3 mLs by nebulization as needed.     atorvastatin (LIPITOR) 20 MG tablet Take 20 mg by mouth nightly.     benazepril (LOTENSIN) 20 MG tablet Take 20 mg by mouth once daily.     calcium-vitamin D 250-100 mg-unit per tablet Take 1 tablet by mouth 2 (two) times daily.    CETIRIZINE HCL (ZYRTEC ORAL) Take 10 mg by mouth nightly as needed.     citalopram (CELEXA) 20 MG tablet Take 20 mg by mouth nightly.     felodipine (PLENDIL) 10 MG 24 hr tablet Take 20 mg by mouth once daily.     fluticasone-vilanterol (BREO) 100-25 mcg/dose diskus inhaler Inhale 1 puff into the lungs once daily.    INCRUSE ELLIPTA 62.5 mcg/actuation DsDv USE 1 PUFF QD AT SAME TIME  (patient takes at night)    letrozole (FEMARA) 2.5 mg Tab Take 1 tablet (2.5 mg total) by mouth once daily.    montelukast (SINGULAIR) 10 mg tablet Take 10 mg by mouth every evening.    multivitamin (THERAGRAN) per tablet Take 1 tablet by mouth once daily.    omeprazole (PRILOSEC) 40 MG capsule Take 1 capsule (40 mg total) by mouth 2 (two) times daily before meals.    potassium chloride (KLOR-CON) 10 MEQ TbSR Take 1 tablet by mouth.    torsemide (DEMADEX) 20 MG Tab Take 20 mg by mouth once daily.    traMADol (ULTRAM) 50 mg tablet Take 1 tablet (50 mg total) by mouth every 8 (eight) hours as needed.    predniSONE (DELTASONE) 10 MG tablet      Review of patient's allergies indicates:   Allergen Reactions    Levaquin [levofloxacin] Itching    Penicillins Itching        Review of Systems   Constitutional: Negative for chills, fever and weight loss.   Respiratory: Negative for shortness of breath.    Cardiovascular: Negative for chest pain.   Gastrointestinal: Positive for heartburn, nausea and vomiting. Negative for abdominal pain.       Objective:      Vital Signs (Most Recent)  Temp: 97.8 °F (36.6 °C) (10/14/19 1236)  Pulse: 74 (10/14/19  1236)  Resp: 18 (10/14/19 1236)  BP: (!) 140/65 (10/14/19 1236)  SpO2: 97 % (10/14/19 1236)    Vital Signs Range (Last 24H):  Temp:  [97.8 °F (36.6 °C)]   Pulse:  [74]   Resp:  [18]   BP: (140)/(65)   SpO2:  [97 %]     Physical Exam   Constitutional: She is oriented to person, place, and time. She appears well-developed and well-nourished.   Cardiovascular: Normal rate.   Pulmonary/Chest: Effort normal.   Abdominal: Soft.   Neurological: She is alert and oriented to person, place, and time.   Skin: Skin is warm and dry.   Psychiatric: She has a normal mood and affect. Her behavior is normal. Judgment and thought content normal.            Assessment:      Active Hospital Problems    Diagnosis  POA    Nausea & vomiting [R11.2]  Yes      Resolved Hospital Problems   No resolved problems to display.       Plan:    EGD for nausea and vomiting and GERD and dysphagia

## 2019-10-15 ENCOUNTER — TELEPHONE (OUTPATIENT)
Dept: GASTROENTEROLOGY | Facility: CLINIC | Age: 84
End: 2019-10-15

## 2019-10-15 NOTE — TELEPHONE ENCOUNTER
----- Message from Claudia Murdock MA sent at 10/15/2019  1:59 PM CDT -----  Contact: pt [phone] Patience      ----- Message -----  From: Iva Roman  Sent: 10/15/2019   1:38 PM CDT  To: Tristin Jonas    Patience (daughter) calling her mom has not had a very small bowel movements.  It is very hard and small. Murelax not working as suppose to.

## 2019-10-16 ENCOUNTER — PATIENT MESSAGE (OUTPATIENT)
Dept: GASTROENTEROLOGY | Facility: CLINIC | Age: 84
End: 2019-10-16

## 2019-10-30 NOTE — OP NOTE
DATE OF PROCEDURE:  2/23/2018.     SURGEON:  Reena Chairez M.D.     ASSISTANT:  Amy Sanabria, certified first assist.  No qualified resident was available for her portion of the procedure.     PREOPERATIVE DIAGNOSES:  1.  Enlarging pelvic mass.  2.  Newly diagnosed advanced breast cancer  3. Prior hysterectomy     POSTOPERATIVE DIAGNOSES:  1.  Enlarging pelvic mass.  2.  Newly diagnosed advanced breast cancer  3. Prior hysterectomy     PROCEDURE PERFORMED:  Robotic-assisted bilateral salpingo-oophorectomy      ANESTHESIA:  General endotracheal anesthesia.     SPECIMENS REMOVED:  Bilateral fallopian tubes and ovaries.     ESTIMATED BLOOD LOSS:  50 mL     COMPLICATIONS:  None.     FINDINGS:  There was an approximately 6 cm lefy ovarian solid cystic lesion, normal fallopian tube. There was a normal right ovary and fallopian tube.  There was no ascites.  There was no evidence of obvious intraperitoneal disease.       PROCEDURE IN DETAIL:  The patient was taken to the Operating Room.  Informed consent had been obtained.  She underwent general endotracheal anesthesia without difficulty, was prepped and draped in the normal sterile fashion in dorsal lithotomy position.  Timeout was performed.  All parties agreed to the   planned procedure.  Perioperative antibiotics were administered.  Norris catheter was placed under sterile conditions.      Gloves were changed and attention was turned to the patient's abdomen.  The umbilicus was inverted. Veress needle was gently inserted.  Intra-abdominal placement was confirmed with low CO2 pressure  and water drop test.  Abdomen was insufflated and pneumoperitoneum was obtained.  Laparoscope was introduced and intra-abdominal placement was confirmed.  Additional robotic trocars were placed under direct visualization, 2 robotic trocars to the left of the camera, 1 robotic trocar to the right of the camera and an 8 mm robotic trocar as the assist port.  The patient was placed in steep  Trendelenburg.     Operating surgeon reported to the console.  Survey of the abdomen and pelvis revealed the above findings. Bilateral remnant of the round ligaments were identified.  These were cauterized and transected. The posterior leaf of the broad ligament was then opened and the infundibulopelvic ligament was skeletonized bilaterally with good visualization of the ureter beneath.  The IP ligaments were then cauterized and transected and the remainder of the peritoneal attachments of the bilateral fallopian tubes and ovaries were released.  The ovaries were placed in EndoCatch bags and removed through a trocar incision that was extended slightly, bag intact.      The pelvis was copiously irrigated, cleared of all clot and debris and noted to be hemostatic.  At this point, the procedure was deemed complete.  The trocars were removed from the abdomen.  The trocar incision site that was extended slightly, the fascia of this incision was reapproximated with Vicryl suture in a running fashion.  Skin incisions were then rendered hemostatic, closed with 4-0 Monocryl and topped with sterile dressing.  The patient tolerated the procedure well.  Sponge, lap, needle and instrument counts were correct x2 as reported by the circulating nurse. She was awakened from anesthesia and taken to recovery in stable condition.    None known

## 2019-11-13 ENCOUNTER — TELEPHONE (OUTPATIENT)
Dept: HEMATOLOGY/ONCOLOGY | Facility: CLINIC | Age: 84
End: 2019-11-13

## 2019-11-14 ENCOUNTER — OFFICE VISIT (OUTPATIENT)
Dept: HEMATOLOGY/ONCOLOGY | Facility: CLINIC | Age: 84
End: 2019-11-14
Payer: MEDICARE

## 2019-11-14 VITALS
HEIGHT: 62 IN | OXYGEN SATURATION: 97 % | WEIGHT: 157.44 LBS | BODY MASS INDEX: 28.97 KG/M2 | SYSTOLIC BLOOD PRESSURE: 125 MMHG | RESPIRATION RATE: 20 BRPM | HEART RATE: 75 BPM | DIASTOLIC BLOOD PRESSURE: 60 MMHG

## 2019-11-14 DIAGNOSIS — C50.412 MALIGNANT NEOPLASM OF UPPER-OUTER QUADRANT OF LEFT BREAST IN FEMALE, ESTROGEN RECEPTOR POSITIVE: Primary | Chronic | ICD-10-CM

## 2019-11-14 DIAGNOSIS — Z79.811 USE OF AROMATASE INHIBITORS: Chronic | ICD-10-CM

## 2019-11-14 DIAGNOSIS — Z17.0 MALIGNANT NEOPLASM OF UPPER-OUTER QUADRANT OF LEFT BREAST IN FEMALE, ESTROGEN RECEPTOR POSITIVE: Primary | Chronic | ICD-10-CM

## 2019-11-14 DIAGNOSIS — N18.2 CKD (CHRONIC KIDNEY DISEASE) STAGE 2, GFR 60-89 ML/MIN: Chronic | ICD-10-CM

## 2019-11-14 DIAGNOSIS — M81.0 OSTEOPOROSIS, SENILE: Chronic | ICD-10-CM

## 2019-11-14 PROCEDURE — 3074F PR MOST RECENT SYSTOLIC BLOOD PRESSURE < 130 MM HG: ICD-10-PCS | Mod: CPTII,S$GLB,, | Performed by: NURSE PRACTITIONER

## 2019-11-14 PROCEDURE — 99999 PR PBB SHADOW E&M-EST. PATIENT-LVL IV: CPT | Mod: PBBFAC,,, | Performed by: NURSE PRACTITIONER

## 2019-11-14 PROCEDURE — 1101F PR PT FALLS ASSESS DOC 0-1 FALLS W/OUT INJ PAST YR: ICD-10-PCS | Mod: CPTII,S$GLB,, | Performed by: NURSE PRACTITIONER

## 2019-11-14 PROCEDURE — 1101F PT FALLS ASSESS-DOCD LE1/YR: CPT | Mod: CPTII,S$GLB,, | Performed by: NURSE PRACTITIONER

## 2019-11-14 PROCEDURE — 99999 PR PBB SHADOW E&M-EST. PATIENT-LVL IV: ICD-10-PCS | Mod: PBBFAC,,, | Performed by: NURSE PRACTITIONER

## 2019-11-14 PROCEDURE — 99214 OFFICE O/P EST MOD 30 MIN: CPT | Mod: S$GLB,,, | Performed by: NURSE PRACTITIONER

## 2019-11-14 PROCEDURE — 3078F DIAST BP <80 MM HG: CPT | Mod: CPTII,S$GLB,, | Performed by: NURSE PRACTITIONER

## 2019-11-14 PROCEDURE — 3074F SYST BP LT 130 MM HG: CPT | Mod: CPTII,S$GLB,, | Performed by: NURSE PRACTITIONER

## 2019-11-14 PROCEDURE — 3078F PR MOST RECENT DIASTOLIC BLOOD PRESSURE < 80 MM HG: ICD-10-PCS | Mod: CPTII,S$GLB,, | Performed by: NURSE PRACTITIONER

## 2019-11-14 PROCEDURE — 99214 PR OFFICE/OUTPT VISIT, EST, LEVL IV, 30-39 MIN: ICD-10-PCS | Mod: S$GLB,,, | Performed by: NURSE PRACTITIONER

## 2019-11-14 NOTE — PROGRESS NOTES
Subjective:       Patient ID: Anahi Cook is a 85 y.o. female.    Chief Complaint: Malignant neoplasm of upper-outer quadrant of left breast in and Use of aromatase inhibitors    HPI   Mrs. Cook returns today for follow up.    In the interval, saw Endocrine who recommends Prolia. Patient opted out as recommended by granddaughter but will reconsider.       Briefly, she is an 85-year-old female who in late 2017 had initially felt a mass in her left breast.  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    She was started on letrozole and undwerwent a BSO by Dr. Chairez for an ovarian mass.  Her ovarian mass was benign. In mid July 2018 she underwent a lumpectomy and AND.  She had a 3.3 cm carcinoma and 4/8 positive nodes.  She has remained on letrozole, now in the adjuvant setting, and she returns today for follow up.   Of note, she also received chest wall XRT.    Review of Systems    Overall she feels OK.    She has tolerated the letrozole well so far.  No complaints today.   She denies any anxiety, depression, easy bruising, fevers, chills, night  sweats, weight loss, nausea, vomiting, diarrhea, constipation, diplopia, blurred vision, headache, chest pain, palpitations, shortness of breath,  abdominal pain, extremity pain, or difficulty ambulating.  The remainder of the ten-point ROS, including general, skin, lymph, H/N, cardiorespiratory, GI, , Neuro, Endocrine, and psychiatric is negative.       Objective:      Physical Exam      She is alert, oriented to time, place, person, pleasant, well      nourished, in no acute physical distress. Uses walker for ambulation assistance. She is accompanied by her daughter.                                 VITAL SIGNS:  Reviewed                                      HEENT:  Normal.  There are no nasal, oral, lip, gingival, auricular, lid,    or  conjunctival lesions.  Mucosae are moist and pink, and there is no        thrush.  Pupils are equal, reactive to light and accommodation.              Extraocular muscle movements are intact.   Dentition is fair.  Maxillary teeth are missing.                                      NECK:  Supple without JVD, adenopathy, or thyromegaly.                       LUNGS:  Clear to auscultation without wheezing, rales, or rhonchi.           CARDIOVASCULAR:  Reveals an S1, S2, no murmurs, no rubs, no gallops.         ABDOMEN:  Soft, nontender, without organomegaly.  Bowel sounds are    present.    Scars from her laparoscopic DONOVAN-BSO are seen.                                                            EXTREMITIES:  No cyanosis, clubbing, or edema. Soft tissue swelling below left ankle -tender to palpate. Small area on linear flat multicolor bruising noted.                 BREASTS: She is s/p left lumpectomy with a healed periareolar incision.  She also has an incision from her axillary dissection;  It is well healed.   There are no masses in her right breast.    Both breasts are large and pendulous.    There is mild hyperpigmentation within the radiation field.                                 LYMPHATIC:  There is no cervical, axillary, or supraclavicular adenopathy.   SKIN:  Warm and moist, without petechiae, rashes, induration, or ecchymoses.           NEUROLOGIC:  DTRs are 0-1+ bilaterally, symmetrical, motor function is 5/5,  and cranial nerves are  within normal limits.      Assessment:         1. Malignant neoplasm of upper-outer quadrant of left breast in female, estrogen receptor positive  Mammo Digital Diagnostic Bilat w/ Matteo   2. CKD (chronic kidney disease) stage 2, GFR 60-89 ml/min     3. Osteoporosis, senile     4. Use of aromatase inhibitors         Plan:           Doing well, JARROD clinically.   Continue Letrozole.   Continue Calcium and Vit D  RTC in 3 months to see Dr. Steven ELDER due 1/2020.   F/u with  endocrinology for osteoporosis management. We discussed Prolia and she will reconsider. She understands risks vs benefit. Fall precautions discussed.     Patient is in agreement with the proposed treatment plan. All questions were answered to the patient's satisfaction. Pt knows to call clinic for any new or worsening symptoms and if anything is needed before the next clinic visit.      Jimbo Mckeon, JIMBOP-C  Hematology & Oncology  [facility] [place] [address]  ph. [phone]  Fax. [phone]     I spent 30 minutes (face to face) with the patient, more than 50% was in counseling and coordination of care as detailed above.

## 2019-11-22 ENCOUNTER — TELEPHONE (OUTPATIENT)
Dept: PAIN MEDICINE | Facility: CLINIC | Age: 84
End: 2019-11-22

## 2019-11-22 NOTE — TELEPHONE ENCOUNTER
----- Message from Lesly Cleveland sent at 11/22/2019 12:47 PM CST -----  Contact: pt   Name of Who is Calling: .Anahi Cook      What is the request in detail: pt states that she would like a refill on her compound cream. States that it the cream she rub on her back but insurance doesn't cover it. Please contact to further discuss and advise.    Please send to:  [facility] [address] [place], [place] [address] [address], Phone: [phone]  Fax: [phone]  Can the clinic reply by [facility]: n    What Number to Call Back if not in [facility]: [phone]

## 2019-12-01 ENCOUNTER — OFFICE VISIT (OUTPATIENT)
Dept: URGENT CARE | Facility: CLINIC | Age: 84
End: 2019-12-01
Payer: MEDICARE

## 2019-12-01 VITALS
HEIGHT: 62 IN | OXYGEN SATURATION: 99 % | BODY MASS INDEX: 28.89 KG/M2 | DIASTOLIC BLOOD PRESSURE: 62 MMHG | WEIGHT: 157 LBS | SYSTOLIC BLOOD PRESSURE: 108 MMHG | RESPIRATION RATE: 16 BRPM | HEART RATE: 58 BPM | TEMPERATURE: 98 F

## 2019-12-01 DIAGNOSIS — R53.83 FATIGUE, UNSPECIFIED TYPE: Primary | ICD-10-CM

## 2019-12-01 DIAGNOSIS — M25.473 ANKLE SWELLING, UNSPECIFIED LATERALITY: ICD-10-CM

## 2019-12-01 LAB
CTP QC/QA: YES
FLUAV AG NPH QL: NEGATIVE
FLUBV AG NPH QL: NEGATIVE
GLUCOSE SERPL-MCNC: 127 MG/DL (ref 70–110)
POC ANION GAP: 17 MMOL/L (ref 10–20)
POC BUN: 11 MMOL/L (ref 8–26)
POC CHLORIDE: 101 MMOL/L (ref 98–109)
POC CREATININE: 1.1 MG/DL (ref 0.6–1.3)
POC HEMATOCRIT: 45 %PCV (ref 37–47)
POC HEMOGLOBIN: 15.3 G/DL (ref 12.5–16)
POC ICA: 1.09 MMOL/L (ref 1.12–1.32)
POC POTASSIUM: 3.3 MMOL/L (ref 3.5–4.9)
POC SODIUM: 140 MMOL/L (ref 138–146)
POC TCO2: 26 MMOL/L (ref 24–29)

## 2019-12-01 PROCEDURE — 80047 POCT CHEMISTRY PANEL: ICD-10-PCS | Mod: QW,S$GLB,, | Performed by: FAMILY MEDICINE

## 2019-12-01 PROCEDURE — 93010 ELECTROCARDIOGRAM REPORT: CPT | Mod: S$GLB,,, | Performed by: INTERNAL MEDICINE

## 2019-12-01 PROCEDURE — 80047 BASIC METABLC PNL IONIZED CA: CPT | Mod: QW,S$GLB,, | Performed by: FAMILY MEDICINE

## 2019-12-01 PROCEDURE — 93005 EKG 12-LEAD: ICD-10-PCS | Mod: S$GLB,,, | Performed by: FAMILY MEDICINE

## 2019-12-01 PROCEDURE — 87804 POCT INFLUENZA A/B: ICD-10-PCS | Mod: 59,QW,S$GLB, | Performed by: FAMILY MEDICINE

## 2019-12-01 PROCEDURE — 99203 OFFICE O/P NEW LOW 30 MIN: CPT | Mod: S$GLB,,, | Performed by: FAMILY MEDICINE

## 2019-12-01 PROCEDURE — 99203 PR OFFICE/OUTPT VISIT, NEW, LEVL III, 30-44 MIN: ICD-10-PCS | Mod: S$GLB,,, | Performed by: FAMILY MEDICINE

## 2019-12-01 PROCEDURE — 87804 INFLUENZA ASSAY W/OPTIC: CPT | Mod: QW,S$GLB,, | Performed by: FAMILY MEDICINE

## 2019-12-01 PROCEDURE — 93005 ELECTROCARDIOGRAM TRACING: CPT | Mod: S$GLB,,, | Performed by: FAMILY MEDICINE

## 2019-12-01 PROCEDURE — 93010 EKG 12-LEAD: ICD-10-PCS | Mod: S$GLB,,, | Performed by: INTERNAL MEDICINE

## 2019-12-01 NOTE — PROGRESS NOTES
"Subjective:       Patient ID: Anahi Cook is a 85 y.o. female.    Vitals:  height is 5' 2" (1.575 m) and weight is 71.2 kg (157 lb). Her temperature is 97.7 °F (36.5 °C). Her blood pressure is 108/62 and her pulse is 58 (abnormal). Her respiration is 16 and oxygen saturation is 99%.     Chief Complaint: Foot Pain (Both)    Patient here with her daughter states for the past 3 days she has been feeling more fatigued and tired than normal felt warm yesterday and mild cough.  No sore throat or congestion.  Family member just wanted to make sure not the flu as she has been around some grandchildren with the flu.    Also mentions her ankles have been swelling for the past few months no shortness of breath chest pain. The go down in the middle the night and then swell up again throughout the day.  She has tried elevation.  No calf pain tenderness, or unilateral swelling    Foot Pain   This is a new problem. The current episode started in the past 7 days. The problem occurs constantly. The problem has been unchanged. Associated symptoms include coughing and fatigue. Pertinent negatives include no arthralgias, chest pain, chills, congestion, fever, headaches, joint swelling, myalgias, nausea, rash, sore throat, vertigo, vomiting or weakness. Nothing aggravates the symptoms. She has tried nothing for the symptoms.       Constitution: Positive for fatigue. Negative for chills and fever.   HENT: Negative for congestion and sore throat.    Neck: Negative for painful lymph nodes.   Cardiovascular: Negative for chest pain and leg swelling.   Eyes: Negative for double vision and blurred vision.   Respiratory: Positive for cough. Negative for shortness of breath.    Gastrointestinal: Negative for nausea, vomiting and diarrhea.   Genitourinary: Negative for dysuria, frequency, urgency and history of kidney stones.   Musculoskeletal: Positive for pain. Negative for joint pain, joint swelling, muscle cramps and muscle ache.   Skin: " Negative for color change, pale, rash and bruising.   Allergic/Immunologic: Negative for seasonal allergies.   Neurological: Negative for dizziness, history of vertigo, light-headedness, passing out and headaches.   Hematologic/Lymphatic: Negative for swollen lymph nodes.   Psychiatric/Behavioral: Negative for nervous/anxious, sleep disturbance and depression. The patient is not nervous/anxious.        Objective:      Physical Exam   Constitutional: She is oriented to person, place, and time. She appears well-developed and well-nourished. She is cooperative.  Non-toxic appearance. She does not appear ill. No distress.   HENT:   Head: Normocephalic and atraumatic.   Right Ear: Hearing, tympanic membrane, external ear and ear canal normal. No middle ear effusion.   Left Ear: Hearing, tympanic membrane, external ear and ear canal normal.  No middle ear effusion.   Nose: Nose normal. No mucosal edema, rhinorrhea or nasal deformity. No epistaxis. Right sinus exhibits no maxillary sinus tenderness and no frontal sinus tenderness. Left sinus exhibits no maxillary sinus tenderness and no frontal sinus tenderness.   Mouth/Throat: Uvula is midline, oropharynx is clear and moist and mucous membranes are normal. No trismus in the jaw. Normal dentition. No uvula swelling. No posterior oropharyngeal erythema.   Eyes: Conjunctivae and lids are normal. Right eye exhibits no discharge. Left eye exhibits no discharge. No scleral icterus.   Neck: Trachea normal, normal range of motion, full passive range of motion without pain and phonation normal. Neck supple.   Cardiovascular: Normal rate, regular rhythm, normal heart sounds, intact distal pulses and normal pulses.   Pulmonary/Chest: Effort normal. No respiratory distress. She has no decreased breath sounds. She has no wheezes. She has no rhonchi. She has no rales.   Abdominal: Soft. Normal appearance and bowel sounds are normal. She exhibits no distension, no pulsatile midline mass  and no mass. There is no tenderness.   Musculoskeletal: Normal range of motion. She exhibits no edema or deformity.        Right ankle: She exhibits swelling (Very mild).        Left ankle: She exhibits swelling (Very mild).        Right lower leg: She exhibits no tenderness and no swelling.        Left lower leg: She exhibits no tenderness and no swelling.   Cap refill 2 seconds, pedal pulse 1+, sensation intact  bilat   Neurological: She is alert and oriented to person, place, and time. She exhibits normal muscle tone. Coordination normal.   Skin: Skin is warm, dry, intact, not diaphoretic and not pale.   Psychiatric: She has a normal mood and affect. Her speech is normal and behavior is normal. Judgment and thought content normal. Cognition and memory are normal.   Nursing note and vitals reviewed.    Results for orders placed or performed in visit on 12/01/19   POCT Influenza A/B   Result Value Ref Range    Rapid Influenza A Ag Negative Negative    Rapid Influenza B Ag Negative Negative     Acceptable Yes    POCT Chemistry Panel   Result Value Ref Range    POC Sodium 140 138 - 146 MMOL/L    POC Potassium 3.3 (A) 3.5 - 4.9 MMOL/L    POC Chloride 101 98 - 109 MMOL/L    POC BUN 11 8 - 26 MMOL/L    POC Glucose 127 (A) 70 - 110 MG/DL    POC Creatinine 1.1 0.6 - 1.3 mg/dL    POC iCA 1.09 (A) 1.12 - 1.32 MMOL/L    POC TCO2 26 24 - 29 MMOL/L    POC Hematocrit 45 37 - 47 %PCV    POC Hemoglobin 15.3 12.5 - 16 g/dL    POC Anion Gap 17 10.0 - 20 MMOL/L     EKG: sinus dasha rate of 53 bpm, unchanged from previous tracings. No st segment elevation or depression. No ectopy. T wave inverted V4/5. Appears to be present on past ekg.           Assessment:       1. Fatigue, unspecified type    2. Ankle swelling, unspecified laterality        Plan:         Fatigue, unspecified type  -     POCT Influenza A/B  -     POCT Chemistry Panel  -     EKG 12-lead    Ankle swelling, unspecified laterality    advised elevation of  legs and compression stockings, chem 8 with slight hypokalemia that was present a month ago, states she has stopped eating her banana daily for the past few days advised to restart this and pickle juice.  Advised follow-up with her primary in the next 2 days for further evaluation we discussed ER precautions patient and daughter agreeable to plan    Patient Instructions   PLEASE READ YOUR DISCHARGE INSTRUCTIONS ENTIRELY AS IT CONTAINS IMPORTANT INFORMATION.    Drink plenty of fluids    Eat good meals rest    Wear compression stockings and elevate legs as much as possible    Please see your primary care doctor for further workup    Please return or see your primary care doctor if you develop new or worsening symptoms.     You must understand that you have received an Urgent Care treatment only and that you may be released before all of your medical problems are known or treated.    Weakness (Uncertain Cause)  Based on your exam today, the exact cause of your weakness is not certain. However, your weakness does not seem to be a sign of a serious illness at this time. Keep an eye on your symptoms and get medical advice as instructed below.  Home care  · Rest at home today. Do not over-exert yourself.  · Take any medicine as prescribed.  · For the next few days, drink extra fluids (unless your healthcare provider wants you to restrict fluids for other reasons). Do not skip meals.  Follow-up care  Follow up with your healthcare provider or as advised.  When to seek medical advice  Call your healthcare provider for any of the following  · Worsening of your symptoms  · Symptoms don't start getting better within 2 days  · Fever of 100.4º F (38º C) or higher, or as directed by your healthcare provider·    Call 911  Get emergency medical care for any of these:  · Chest, arm, neck, jaw or upper back pain  · Trouble breathing  · Numbness or weakness of the face, one arm or one leg  · Slurred speech, confusion, trouble speaking,  walking or seeing  · Blood in vomit or stool (black or red color)  · Loss of consciousness  Date Last Reviewed: 6/10/2015  © 6637-1509 [employer]. [address] [place] [address], [place] [address]. All rights reserved. This information is not intended as a substitute for professional medical care. Always follow your healthcare professional's instructions.        Leg Swelling in Both Legs    Swelling of the feet, ankles, and legs is called edema. It is caused by excess fluid that has collected in the tissues. Extra fluid in the body settles in the lowest part because of gravity. This is why the legs and feet are most affected.  Some of the causes for edema include:  · Disease of the heart like congestive heart failure  · Standing or sitting for long periods of time  · Infection of the feet or legs  · Blood pooling in the veins of your legs (venous insufficiency)  · Dilated veins in your lower leg (varicose veins)  · Garters or other clothing that is tight on your legs. This will cause blood to pool in your legs because the clothing limits blood flow.  · Some medicines such as hormones like birth control pills, some blood pressure medicines like calcium channel blockers (amlodipine) and steroids, some antidepressants like MAO inhibitors and tricyclics  · Menstrual periods that cause you to retain fluids  · Many types of renal disease  · Liver failure or cirrhosis  · Pregnancy, some swelling is normal, but a sudden increase in leg swelling or weight gain can be a sign of a dangerous complication of pregnancy  · Poor nutrition  · Thyroid disease  Medical treatment will depend on what is causing the swelling in your legs. Your healthcare provider may prescribe water pills (diuretics) to get rid of the extra fluid.  Home care  Follow these guidelines when caring for yourself at home:  · Don't wear clothing like garters that is tight on your legs.  · Keep your legs up while lying or sitting.  · If infection, injury,  or recent surgery is causing the swelling, stay off your legs as much as possible until symptoms get better.  · If your healthcare provider says that your leg swelling is caused by venous insufficiency or varicose veins, don't sit or  one place for long periods of time. Take breaks and walk about every few hours. Brisk walking is a good exercise. It helps circulate the blood that has collected in your leg. Talk with your provider about using support stockings to stop daytime leg swelling.  · If your provider says that heart disease is causing your leg swelling, follow a low-salt diet to stop extra fluid from staying in your body. You may also need medicine.  Follow-up care  Follow up with your healthcare provider, or as advised.  When to seek medical advice  Call your healthcare provider right away if any of these occur:  · New shortness of breath or chest pain  · Shortness of breath or chest pain that gets worse  · Swelling in both legs or ankles that gets worse  · Swelling of the abdomen  · Redness, warmth, or swelling in one leg  · Fever of 100.4ºF (38ºC) or higher, or as directed by your healthcare provider  · Yellow color to your skin or eyes  · Rapid, unexplained weight gain  · Having to sleep upright or use an increased number of pillows  Date Last Reviewed: 3/31/2016  © 1969-5155 [employer], [employer]. [address] [place] [address], [place] [address]. All rights reserved. This information is not intended as a substitute for professional medical care. Always follow your healthcare professional's instructions.

## 2019-12-01 NOTE — PATIENT INSTRUCTIONS
PLEASE READ YOUR DISCHARGE INSTRUCTIONS ENTIRELY AS IT CONTAINS IMPORTANT INFORMATION.    Drink plenty of fluids    Eat good meals rest    Wear compression stockings and elevate legs as much as possible    Please see your primary care doctor for further workup    Please return or see your primary care doctor if you develop new or worsening symptoms.     You must understand that you have received an Urgent Care treatment only and that you may be released before all of your medical problems are known or treated.    Weakness (Uncertain Cause)  Based on your exam today, the exact cause of your weakness is not certain. However, your weakness does not seem to be a sign of a serious illness at this time. Keep an eye on your symptoms and get medical advice as instructed below.  Home care  · Rest at home today. Do not over-exert yourself.  · Take any medicine as prescribed.  · For the next few days, drink extra fluids (unless your healthcare provider wants you to restrict fluids for other reasons). Do not skip meals.  Follow-up care  Follow up with your healthcare provider or as advised.  When to seek medical advice  Call your healthcare provider for any of the following  · Worsening of your symptoms  · Symptoms don't start getting better within 2 days  · Fever of 100.4º F (38º C) or higher, or as directed by your healthcare provider·    Call 911  Get emergency medical care for any of these:  · Chest, arm, neck, jaw or upper back pain  · Trouble breathing  · Numbness or weakness of the face, one arm or one leg  · Slurred speech, confusion, trouble speaking, walking or seeing  · Blood in vomit or stool (black or red color)  · Loss of consciousness  Date Last Reviewed: 6/10/2015  © 7164-6128 [employer]. [address] [place] [address], [place] [address]. All rights reserved. This information is not intended as a substitute for professional medical care. Always follow your healthcare professional's  instructions.        Leg Swelling in Both Legs    Swelling of the feet, ankles, and legs is called edema. It is caused by excess fluid that has collected in the tissues. Extra fluid in the body settles in the lowest part because of gravity. This is why the legs and feet are most affected.  Some of the causes for edema include:  · Disease of the heart like congestive heart failure  · Standing or sitting for long periods of time  · Infection of the feet or legs  · Blood pooling in the veins of your legs (venous insufficiency)  · Dilated veins in your lower leg (varicose veins)  · Garters or other clothing that is tight on your legs. This will cause blood to pool in your legs because the clothing limits blood flow.  · Some medicines such as hormones like birth control pills, some blood pressure medicines like calcium channel blockers (amlodipine) and steroids, some antidepressants like MAO inhibitors and tricyclics  · Menstrual periods that cause you to retain fluids  · Many types of renal disease  · Liver failure or cirrhosis  · Pregnancy, some swelling is normal, but a sudden increase in leg swelling or weight gain can be a sign of a dangerous complication of pregnancy  · Poor nutrition  · Thyroid disease  Medical treatment will depend on what is causing the swelling in your legs. Your healthcare provider may prescribe water pills (diuretics) to get rid of the extra fluid.  Home care  Follow these guidelines when caring for yourself at home:  · Don't wear clothing like garters that is tight on your legs.  · Keep your legs up while lying or sitting.  · If infection, injury, or recent surgery is causing the swelling, stay off your legs as much as possible until symptoms get better.  · If your healthcare provider says that your leg swelling is caused by venous insufficiency or varicose veins, don't sit or  one place for long periods of time. Take breaks and walk about every few hours. Brisk walking is a good  exercise. It helps circulate the blood that has collected in your leg. Talk with your provider about using support stockings to stop daytime leg swelling.  · If your provider says that heart disease is causing your leg swelling, follow a low-salt diet to stop extra fluid from staying in your body. You may also need medicine.  Follow-up care  Follow up with your healthcare provider, or as advised.  When to seek medical advice  Call your healthcare provider right away if any of these occur:  · New shortness of breath or chest pain  · Shortness of breath or chest pain that gets worse  · Swelling in both legs or ankles that gets worse  · Swelling of the abdomen  · Redness, warmth, or swelling in one leg  · Fever of 100.4ºF (38ºC) or higher, or as directed by your healthcare provider  · Yellow color to your skin or eyes  · Rapid, unexplained weight gain  · Having to sleep upright or use an increased number of pillows  Date Last Reviewed: 3/31/2016  © 3615-9287 [employer], [employer]. [address] [place] [address], [place] [address]. All rights reserved. This information is not intended as a substitute for professional medical care. Always follow your healthcare professional's instructions.

## 2019-12-03 ENCOUNTER — TELEPHONE (OUTPATIENT)
Dept: PAIN MEDICINE | Facility: CLINIC | Age: 84
End: 2019-12-03

## 2019-12-03 NOTE — TELEPHONE ENCOUNTER
----- Message from Annmarie Aldrich sent at 12/3/2019  4:32 PM CST -----  Contact: SHAYY JAMIL    Type:  Patient Returning Call    Who Called: SHAYY JAMIL     Who Left Message for Patient:annalisa    Does the patient know what this is regarding?yes    Best Call Back Number:[phone]    Additional Information:

## 2019-12-03 NOTE — TELEPHONE ENCOUNTER
----- Message from Cathy Bradford sent at 12/3/2019  2:54 PM CST -----  Contact: SHAYY JAMIL [[id number]]  Name of Who is Calling: SHAYY JAMIL [[id number]]     What is the request in detail:SHAYY JAMIL [[id number]] is requesting a call back in regards to injections...  Please contact to further discuss and advise      Can the clinic reply by [facility]:     What [facility]  [phone] cristina [place]

## 2019-12-03 NOTE — TELEPHONE ENCOUNTER
Contacted patient daughter Ms. Morin regarding her message to discuss injections. There was no answer, left a voicemail requesting a return call to schedule.

## 2019-12-05 ENCOUNTER — OFFICE VISIT (OUTPATIENT)
Dept: SPINE | Facility: CLINIC | Age: 84
End: 2019-12-05
Payer: MEDICARE

## 2019-12-05 VITALS
RESPIRATION RATE: 18 BRPM | BODY MASS INDEX: 28.72 KG/M2 | SYSTOLIC BLOOD PRESSURE: 129 MMHG | HEART RATE: 71 BPM | DIASTOLIC BLOOD PRESSURE: 60 MMHG | HEIGHT: 62 IN | OXYGEN SATURATION: 100 % | WEIGHT: 156.06 LBS

## 2019-12-05 DIAGNOSIS — M51.36 DDD (DEGENERATIVE DISC DISEASE), LUMBAR: Primary | ICD-10-CM

## 2019-12-05 DIAGNOSIS — M79.18 MYOFASCIAL PAIN: ICD-10-CM

## 2019-12-05 DIAGNOSIS — M70.62 TROCHANTERIC BURSITIS OF LEFT HIP: ICD-10-CM

## 2019-12-05 PROCEDURE — 1101F PT FALLS ASSESS-DOCD LE1/YR: CPT | Mod: CPTII,S$GLB,, | Performed by: NURSE PRACTITIONER

## 2019-12-05 PROCEDURE — 3078F DIAST BP <80 MM HG: CPT | Mod: CPTII,S$GLB,, | Performed by: NURSE PRACTITIONER

## 2019-12-05 PROCEDURE — 1125F PR PAIN SEVERITY QUANTIFIED, PAIN PRESENT: ICD-10-PCS | Mod: S$GLB,,, | Performed by: NURSE PRACTITIONER

## 2019-12-05 PROCEDURE — 3074F PR MOST RECENT SYSTOLIC BLOOD PRESSURE < 130 MM HG: ICD-10-PCS | Mod: CPTII,S$GLB,, | Performed by: NURSE PRACTITIONER

## 2019-12-05 PROCEDURE — 20553 NJX 1/MLT TRIGGER POINTS 3/>: CPT | Mod: S$GLB,,, | Performed by: NURSE PRACTITIONER

## 2019-12-05 PROCEDURE — 20553 PR INJECT TRIGGER POINTS, > 3: ICD-10-PCS | Mod: S$GLB,,, | Performed by: NURSE PRACTITIONER

## 2019-12-05 PROCEDURE — 1125F AMNT PAIN NOTED PAIN PRSNT: CPT | Mod: S$GLB,,, | Performed by: NURSE PRACTITIONER

## 2019-12-05 PROCEDURE — 3078F PR MOST RECENT DIASTOLIC BLOOD PRESSURE < 80 MM HG: ICD-10-PCS | Mod: CPTII,S$GLB,, | Performed by: NURSE PRACTITIONER

## 2019-12-05 PROCEDURE — 1159F PR MEDICATION LIST DOCUMENTED IN MEDICAL RECORD: ICD-10-PCS | Mod: S$GLB,,, | Performed by: NURSE PRACTITIONER

## 2019-12-05 PROCEDURE — 1101F PR PT FALLS ASSESS DOC 0-1 FALLS W/OUT INJ PAST YR: ICD-10-PCS | Mod: CPTII,S$GLB,, | Performed by: NURSE PRACTITIONER

## 2019-12-05 PROCEDURE — 99213 OFFICE O/P EST LOW 20 MIN: CPT | Mod: 25,S$GLB,, | Performed by: NURSE PRACTITIONER

## 2019-12-05 PROCEDURE — 99213 PR OFFICE/OUTPT VISIT, EST, LEVL III, 20-29 MIN: ICD-10-PCS | Mod: 25,S$GLB,, | Performed by: NURSE PRACTITIONER

## 2019-12-05 PROCEDURE — 3074F SYST BP LT 130 MM HG: CPT | Mod: CPTII,S$GLB,, | Performed by: NURSE PRACTITIONER

## 2019-12-05 PROCEDURE — 99999 PR PBB SHADOW E&M-EST. PATIENT-LVL V: CPT | Mod: PBBFAC,,, | Performed by: NURSE PRACTITIONER

## 2019-12-05 PROCEDURE — 1159F MED LIST DOCD IN RCRD: CPT | Mod: S$GLB,,, | Performed by: NURSE PRACTITIONER

## 2019-12-05 PROCEDURE — 99999 PR PBB SHADOW E&M-EST. PATIENT-LVL V: ICD-10-PCS | Mod: PBBFAC,,, | Performed by: NURSE PRACTITIONER

## 2019-12-05 RX ORDER — METHYLPREDNISOLONE ACETATE 40 MG/ML
40 INJECTION, SUSPENSION INTRA-ARTICULAR; INTRALESIONAL; INTRAMUSCULAR; SOFT TISSUE
Status: COMPLETED | OUTPATIENT
Start: 2019-12-05 | End: 2019-12-05

## 2019-12-05 RX ORDER — BUPIVACAINE HYDROCHLORIDE 2.5 MG/ML
9 INJECTION, SOLUTION EPIDURAL; INFILTRATION; INTRACAUDAL
Status: COMPLETED | OUTPATIENT
Start: 2019-12-05 | End: 2019-12-05

## 2019-12-05 RX ADMIN — BUPIVACAINE HYDROCHLORIDE 22.5 MG: 2.5 INJECTION, SOLUTION EPIDURAL; INFILTRATION; INTRACAUDAL at 02:12

## 2019-12-05 RX ADMIN — METHYLPREDNISOLONE ACETATE 40 MG: 40 INJECTION, SUSPENSION INTRA-ARTICULAR; INTRALESIONAL; INTRAMUSCULAR; SOFT TISSUE at 02:12

## 2019-12-05 NOTE — PROGRESS NOTES
Chronic patient Established Note (Follow up visit)      SUBJECTIVE:    Interval History:  The patient is here for follow up of lower back and bilateral hip pain.  Her back pain has been minimal.  She is having increased lateral hip and buttock pain recently.  She had TPIs in the past and would like to repeat this today.  Her pain today is 8/10.    Previous Encounter:  Anahi Cook presents to the clinic for a follow-up appointment for lower back and left lower extremity pain.  She had T12 kyphoplasty performed on 11/8/18 with significant improvement in symptoms.  She was admitted through the ED on 4/3/19 after suffering fractures of 6th, 8th and possible 7th ribs of the left side after a fall at home.  Thoracic imaging also showed stable slight anterior wedging at T11.  She had bilateral L3-4, L4-5 and L5-S1 facet injections on 4/1/19 with 80% relief of lower back pain.  Her current pain is minimal.  She has mild leg pain with walking.  She continues to follow up with oncology regularly.  Since the last visit, Anahi Cook states the pain has been improving.  Current pain intensity is 2/10.     Pain Medications:  OTC Tylenol    Opioid Contract: no     report:  Not applicable    Pain Procedures:   7/21/14 L4-5 IL ASHUTOSH- significant benefit  12/1/15 Left GT bursa injection  4/26/16 Left GT bursa injection  12/15/16 L4-5 IL ASHUTOSH- significant benefit  2/15/18 L4-5 IL ASHUTOSH- 100% relief  8/21/18 L4-5 IL ASHUTOSH- significant relief  10/15/18 Bilateral L3-4, L4-5 L5-S1 facet joint injections  11/8/18 T12 kyphoplasty- significant relief  4/1/19 Bilateral L3-4, L4-5 L5-S1 facet joint injections- 80% relief    Physical Therapy/Home Exercise: yes     Imaging:     Narrative     EXAMINATION:  XR THORACIC SPINE AP LATERAL    CLINICAL HISTORY:  Unspecified injury of lower back, initial encounter    TECHNIQUE:  Two views obtained.  Note that patient is slightly obliquely oriented on the lateral views which combines with severe osseous  demineralization and result in limitation.    COMPARISON:  CT January 2018    FINDINGS:  There are surgical clips in the right upper quadrant.  There is tortuosity of the aorta with calcification along its wall.  Mild curvature of the spine is noted.  There is mild compression deformity along the superior endplate of T12.  No definite additional compression deformity noted.      Impression       Mild compression deformity along superior endplate of T12 new compared to January 2018.  Exam somewhat limited due to osseous demineralization and patient obliquity.     Narrative     EXAMINATION:  XR LUMBAR SPINE AP AND LATERAL    CLINICAL HISTORY:  Low back pain, minor trauma;    TECHNIQUE:  AP, lateral and spot images were performed of the lumbar spine.  Patient is slightly obliquely positioned on the lateral view.    COMPARISON:  CT from January 2018    FINDINGS:  There is severe osseous demineralization.  Surgical clips are present in the abdomen.  There is calcification along the wall of the aorta and branches.    Grade 1 anterolisthesis is present at L4-5.  T12 demonstrates mild compression deformity along the superior endplate which is a change compared to the January examination.  Remaining vertebral bodies are normal in height and alignment.  Discs are maintained.      Impression       Mild compression deformity along the superior endplate of T12, new compared to January 2018 CT.       Lumbar MRI 5/21/14    Narrative     Comparison: None    Findings:  L5-S1: There is no disk disease.  There is a 8mm extra canal synovial cyst emanating from the right facet joint.  There is mild facet arthropathy.  There is no central canal or neural foraminal narrowing  L4-L5: There is grade 1 anterolisthesis of L4 on L5 with uncovering of the disk.  There is a diffuse posterior disk bulge.  There is severe ligamentous thickening and moderate facet arthropathy generating moderate severe central canal stenosis.  There is   moderate  right neural foraminal stenosis and severe left neural foraminal stenosis.  L3-L4: There is a posterior disk bulge with super imposed right foraminal broad-based protrusion.  There is mild to moderate ligamentous thickening and mild facet arthropathy.  There is mild narrowing of the right lateral recess.  There is mild left   neural foraminal narrowing and severe right neural foraminal narrowing due to the foraminal protrusion.  L2-L3: There is a diffuse disk bulge present.  There is minimal ligamentous thickening and mild facet arthropathy generating at most mild central canal stenosis.  There is an extraforaminal bulge at this level as well leading to moderate right neural   foraminal stenosis and mild left neural foraminal stenosis.  L1-L2: No significant disease.    There is no marrow replacement process, infection, tumor present.  Limited evaluation of intra-abdominal structures unremarkable.  No abnormal masses or fluid collections are present.   Impression       Multilevel degenerative changes most prominent at L4-L5 where there is moderate to severe central canal stenosis and severe left foraminal stenosis.  Severe right foraminal stenosis is present at L3-L4 as well.     Narrative     EXAMINATION:  MRI THORACIC SPINE W WO CONTRAST    CLINICAL HISTORY:  Abnormal xray, thoracic spine, DJD;Spine fracture, traumatic, thoracic;Compression fracture T12 and breast cancer;  Abnormal findings on diagnostic imaging of other parts of musculoskeletal system    TECHNIQUE:  Multiplanar, multisequence images were performed through the thoracic spine.  Contrast was not administered.    COMPARISON:  CT chest 01/23/2018    FINDINGS:  There is a subacute severe biconcave compression fracture of the T12 vertebral body.  There is retropulsion of fracture fragments into the central canal resulting in moderate central canal stenosis with impression on the spinal cord.  The visualized portions of the spinal cord are otherwise  unremarkable.    There is multilevel degenerative change with posterior disc bulges most prominent at T6-T7 where there is mild central canal stenosis.  No other evidence of osseous fracture.  The vertebral body heights are otherwise well maintained.    The heart is enlarged.  There is a probable 1.5 cm right renal cyst.  The pre and post vertebral soft tissues are otherwise unremarkable.      Impression       1.  Subacute severe biconcave compression fracture of the T12 vertebral body with retropulsion of fracture fragments into the central canal resulting in moderate central canal stenosis.    2.  Cardiomegaly.     Narrative     EXAMINATION:  XR THORACIC SPINE AP LATERAL    CLINICAL HISTORY:  Unspecified fall, initial encounter    TECHNIQUE:  AP and lateral views of the thoracic spine were performed.    COMPARISON:  May 2018 x-ray.  MRI September 2018.    FINDINGS:  There is diffuse osseous demineralization.  There are surgical clips in the right upper quadrant.  The thoracolumbar spine demonstrates mild scoliosis.    Osseous structures demonstrate no definite alignment abnormality.  Patient has undergone interval vertebroplasty at the known T12 compression deformity which is severe, with mild retropulsion.  There is stable slight anterior wedging at T11.  There is no new compression fracture.      Impression       No acute finding     Narrative     EXAMINATION:  XR RIBS 2 VIEW LEFT    CLINICAL HISTORY:  Unspecified fall, initial encounter    TECHNIQUE:  Two views of the left ribs were performed.    COMPARISON:  None.    FINDINGS:  There are surgical clips in the left axilla.  There are surgical clips in the right upper quadrant.    There is diffuse osseous demineralization.  There are fractures of the 6th and 8th ribs, without significant displacement, possibly 7 rib as well.  No pneumothorax.      Impression       Recent rib fractures.         Past Medical History:  Past Medical History:   Diagnosis Date     Anxiety     Back pain     Breast cancer 1/17/2018    Breast mass 1/15/2018    Chronic kidney disease, stage 2, mildly decreased GFR     COPD (chronic obstructive pulmonary disease)     emphysema    Depression     Dysuria 1/29/2018    Family history of breast cancer 1/17/2018    GERD (gastroesophageal reflux disease)     Hypertension     Infiltrating ductal carcinoma of breast, left 7/12/2018    Osteoporosis, senile     Ovarian mass 1/15/2018    Pneumonia     S/P robotic assisted laparoscopic BSO (bilateral salpingo-oophorectomy) 2/23/2018       Past Surgical History:  Past Surgical History:   Procedure Laterality Date    AXILLARY NODE DISSECTION Left 7/12/2018    Procedure: LYMPHADENECTOMY, AXILLARY;  Surgeon: Amanda Caballero MD;  Location: [facility] OR [address] [place] [address];  Service: General;  Laterality: Left;    BREAST BIOPSY      BREAST LUMPECTOMY      CHOLECYSTECTOMY      COLONOSCOPY      ESOPHAGOGASTRODUODENOSCOPY      ESOPHAGOGASTRODUODENOSCOPY N/A 10/14/2019    Procedure: EGD (ESOPHAGOGASTRODUODENOSCOPY);  Surgeon: Davonte Thompson MD;  Location: [facility] ENDO ([address] [place] [address]);  Service: Endoscopy;  Laterality: N/A;  hx of pulmonary hypertension-PA 50     pt requesting ASAP    EYE SURGERY      FIXATION KYPHOPLASTY N/A 11/8/2018    Procedure: Kyphoplasty   T12;  Surgeon: Merly Wilcox MD;  Location: [facility] CATH LAB;  Service: Pain Management;  Laterality: N/A;  T12  [place] [employer] e-mailed with date and time    HYSTERECTOMY      INJECTION FOR SENTINEL NODE IDENTIFICATION Left 7/12/2018    Procedure: INJECTION, FOR SENTINEL NODE IDENTIFICATION;  Surgeon: Amanda Caballero MD;  Location: [facility] OR [address] [place] [address];  Service: General;  Laterality: Left;    INJECTION OF FACET JOINT Bilateral 10/15/2018    Procedure: INJECTION, FACET JOINT, BILATERAL LUMBAR L3-4, 4-5, 5-S1 FACET JOINT INJECTIONS;  Surgeon: Merly Wilcox MD;  Location: [facility] PAIN MGT;  Service: Pain Management;  Laterality: Bilateral;    INJECTION OF FACET JOINT  Bilateral 2019    Procedure: INJECTION, FACET JOINT, L3-L4,L4-L5,L5-S1;  Surgeon: Merly Wilcox MD;  Location: [facility] PAIN MGT;  Service: Pain Management;  Laterality: Bilateral;    INJECTION OF FACET JOINT Bilateral 2019    Procedure: INJECTION, FACET JOINT, L3-L4,L4-L5,L5-S1 NEED CONSENT;  Surgeon: Merly iWlcox MD;  Location: [facility] PAIN MGT;  Service: Pain Management;  Laterality: Bilateral;    MASTECTOMY, PARTIAL Left 2018    Procedure: MASTECTOMY, PARTIAL-W/SEED LOCALIZATION;  Surgeon: Amanda Caballero MD;  Location: [facility] OR 2ND FLR;  Service: General;  Laterality: Left;    CT REMOVAL OF OVARY/TUBE(S)  2018    Robotic Assisted    ROTATOR CUFF REPAIR Right     rotator cuff repair right shoulder    TONSILLECTOMY         Family History:  Family History   Problem Relation Age of Onset    Heart failure Mother     Kidney failure Mother     Heart attack Father     Breast cancer Sister 66        Lump, XRT, chemo, recurrence 10 years later, still alive.    Cancer Brother         leukemia    Heart attack Brother 58    Pulmonary embolism Brother 45    Heart attack Brother 52    Breast cancer Maternal Grandmother 60        advanced at diagnosis    Breast cancer Maternal Aunt 83         at 92    Colon cancer Neg Hx     Esophageal cancer Neg Hx        Social History:  Social History     Socioeconomic History    Marital status: Single     Spouse name: Not on file    Number of children: 3    Years of education: Not on file    Highest education level: Not on file   Occupational History    Occupation: Multiple jobs, see social documentation section     Comment: Retired   Social Needs    Financial resource strain: Not on file    Food insecurity:     Worry: Not on file     Inability: Not on file    Transportation needs:     Medical: Not on file     Non-medical: Not on file   Tobacco Use    Smoking status: Former Smoker     Packs/day: 1.00     Years: 40.00     Pack years: 40.00     Types:  Cigarettes    Smokeless tobacco: Never Used    Tobacco comment: Smoked >1 ppd for at least 40 years, quit around 1995   Substance and Sexual Activity    Alcohol use: Yes     Comment: occasional glass of wine or cocktail    Drug use: No    Sexual activity: Yes     Partners: Male   Lifestyle    Physical activity:     Days per week: Not on file     Minutes per session: Not on file    Stress: Not on file   Relationships    Social connections:     Talks on phone: Not on file     Gets together: Not on file     Attends Sikh service: Not on file     Active member of club or organization: Not on file     Attends meetings of clubs or organizations: Not on file     Relationship status: Not on file   Other Topics Concern    Not on file   Social History Narrative    Worked many jobs while raising 3 children.  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  She was  from her first , but took care of him at the end of his life.       REVIEW OF SYSTEMS:    GENERAL:  No weight loss, malaise or fevers.  HEENT:   No recent changes in vision or hearing  NECK:  Negative for lumps, no difficulty with swallowing.  RESPIRATORY:  Negative for cough, wheezing or shortness of breath, patient denies any recent URI. COPD.  CARDIOVASCULAR:  Negative for chest pain, leg swelling or palpitations. Hypertension.  GI:  Negative for abdominal discomfort, blood in stools or black stools or change in bowel habits.  MUSCULOSKELETAL:  See HPI.  SKIN:  Negative for lesions, rash, and itching.  PSYCH:  No mood disorder or recent psychosocial stressors.  Patients sleep is not disturbed secondary to pain.  HEMATOLOGY/LYMPHOLOGY:  Negative for prolonged bleeding, bruising easily or swollen nodes.  Patient is not currently taking any anti-coagulants  NEURO:   No history of headaches, syncope, paralysis, seizures or tremors.  All other  "reviewed and negative other than HPI.    OBJECTIVE:    /60   Pulse 71   Resp 18   Ht 5' 2" (1.575 m)   Wt 70.8 kg (156 lb 1.4 oz)   LMP  (LMP Unknown)   SpO2 100%   BMI 28.55 kg/m²     PHYSICAL EXAMINATION:    GENERAL: Well appearing, in no acute distress, alert and oriented x3.  PSYCH:  Mood and affect appropriate.  SKIN: Skin color, texture, turgor normal, no rashes or lesions.  HEAD/FACE:  Normocephalic, atraumatic. Cranial nerves grossly intact.  CV: RRR with palpation of the radial artery.  PULM: No evidence of respiratory difficulty, symmetric chest rise.  GI:  Soft and non-tender.  BACK: Straight leg raising in the sitting and supine positions is negative to radicular pain.  No pain with palpation of thoracic spine.  Limited ROM with pain on extension.  Mild facet loading bilaterally.  EXTREMITIES: Peripheral joint ROM is full and pain free without obvious instability or laxity in all four extremities. No deformities, edema, or skin discoloration. Good capillary refill.  MUSCULOSKELETAL: Provocative maneuvers of hips do not cause pain.  TTP over both GT bursa and gluteal muscles.  No atrophy or tone abnormalities are noted.  NEURO: Bilateral upper and lower extremity coordination and muscle stretch reflexes are physiologic and symmetric.  Plantar response are downgoing. No clonus.  Normal sensation.  GAIT: Antalgic- ambulates with rolling walker.      ASSESSMENT: 85 y.o. year old female with lower back and lower extremity pain, consistent with the following diagnoses:     1. DDD (degenerative disc disease), lumbar     2. Trochanteric bursitis of left hip     3. Myofascial pain  methylPREDNISolone acetate injection 40 mg    bupivacaine (PF) 0.25% (2.5 mg/ml) injection 22.5 mg         PLAN:     - Previous imaging was reviewed and discussed with the patient today.    - TPIs today as below.    - She is recovering well from recent rib fracture after fall at home.    - Continue with home PT " exercises.    - RTC PRN.      The above plan and management options were discussed at length with patient. Patient is in agreement with the above and verbalized understanding.    Sofia Schultz  12/05/2019     Trigger Point Injection:   The procedure was discussed with the patient including complications of nerve damage,  bleeding, infection, and failure of pain relief.   Trigger points were identified by palpation and marked. Alcohol prep of sites done. A mixture of 9mL 0.25% bupivacaine +40mg Depo-Medrol was prepared (10 mL total).   A 27-gauge needle was advanced to the point of maximal tenderness, and medication was injected after negative aspiration. All sites done in the same manner. Patient tolerated the procedure well and without complications. Sites injected included: gluteal muscles bilaterally (6 sites total).

## 2019-12-18 ENCOUNTER — OFFICE VISIT (OUTPATIENT)
Dept: GASTROENTEROLOGY | Facility: CLINIC | Age: 84
End: 2019-12-18
Payer: MEDICARE

## 2019-12-18 VITALS
WEIGHT: 152.56 LBS | BODY MASS INDEX: 28.07 KG/M2 | SYSTOLIC BLOOD PRESSURE: 131 MMHG | HEART RATE: 59 BPM | DIASTOLIC BLOOD PRESSURE: 78 MMHG | HEIGHT: 62 IN

## 2019-12-18 DIAGNOSIS — K21.9 GASTROESOPHAGEAL REFLUX DISEASE WITHOUT ESOPHAGITIS: Primary | ICD-10-CM

## 2019-12-18 PROCEDURE — 1159F PR MEDICATION LIST DOCUMENTED IN MEDICAL RECORD: ICD-10-PCS | Mod: S$GLB,,, | Performed by: NURSE PRACTITIONER

## 2019-12-18 PROCEDURE — 1101F PR PT FALLS ASSESS DOC 0-1 FALLS W/OUT INJ PAST YR: ICD-10-PCS | Mod: CPTII,S$GLB,, | Performed by: NURSE PRACTITIONER

## 2019-12-18 PROCEDURE — 3078F DIAST BP <80 MM HG: CPT | Mod: CPTII,S$GLB,, | Performed by: NURSE PRACTITIONER

## 2019-12-18 PROCEDURE — 3078F PR MOST RECENT DIASTOLIC BLOOD PRESSURE < 80 MM HG: ICD-10-PCS | Mod: CPTII,S$GLB,, | Performed by: NURSE PRACTITIONER

## 2019-12-18 PROCEDURE — 1126F AMNT PAIN NOTED NONE PRSNT: CPT | Mod: S$GLB,,, | Performed by: NURSE PRACTITIONER

## 2019-12-18 PROCEDURE — 99999 PR PBB SHADOW E&M-EST. PATIENT-LVL III: CPT | Mod: PBBFAC,,, | Performed by: NURSE PRACTITIONER

## 2019-12-18 PROCEDURE — 1126F PR PAIN SEVERITY QUANTIFIED, NO PAIN PRESENT: ICD-10-PCS | Mod: S$GLB,,, | Performed by: NURSE PRACTITIONER

## 2019-12-18 PROCEDURE — 99214 PR OFFICE/OUTPT VISIT, EST, LEVL IV, 30-39 MIN: ICD-10-PCS | Mod: S$GLB,,, | Performed by: NURSE PRACTITIONER

## 2019-12-18 PROCEDURE — 99214 OFFICE O/P EST MOD 30 MIN: CPT | Mod: S$GLB,,, | Performed by: NURSE PRACTITIONER

## 2019-12-18 PROCEDURE — 1159F MED LIST DOCD IN RCRD: CPT | Mod: S$GLB,,, | Performed by: NURSE PRACTITIONER

## 2019-12-18 PROCEDURE — 1101F PT FALLS ASSESS-DOCD LE1/YR: CPT | Mod: CPTII,S$GLB,, | Performed by: NURSE PRACTITIONER

## 2019-12-18 PROCEDURE — 3075F SYST BP GE 130 - 139MM HG: CPT | Mod: CPTII,S$GLB,, | Performed by: NURSE PRACTITIONER

## 2019-12-18 PROCEDURE — 3075F PR MOST RECENT SYSTOLIC BLOOD PRESS GE 130-139MM HG: ICD-10-PCS | Mod: CPTII,S$GLB,, | Performed by: NURSE PRACTITIONER

## 2019-12-18 PROCEDURE — 99999 PR PBB SHADOW E&M-EST. PATIENT-LVL III: ICD-10-PCS | Mod: PBBFAC,,, | Performed by: NURSE PRACTITIONER

## 2019-12-18 RX ORDER — OMEPRAZOLE 20 MG/1
20 CAPSULE, DELAYED RELEASE ORAL DAILY
Qty: 30 CAPSULE | Refills: 2 | Status: SHIPPED | OUTPATIENT
Start: 2019-12-18 | End: 2020-02-03

## 2019-12-18 NOTE — LETTER
December 19, 2019      Pepito Theodore MD  [facility] [address]           [facility] - Gastroenterology  [address]  [place] [address]  Phone: [phone]  Fax: [phone]          Patient: Anahi Cook   MR Number: [id number]   YOB: 1934   Date of Visit: 12/18/2019       Dear Dr. Pepito Theodore:    Thank you for referring Anahi Cook to me for evaluation. Attached you will find relevant portions of my assessment and plan of care.    If you have questions, please do not hesitate to call me. I look forward to following Anahi Cook along with you.    Sincerely,    Iva Marrero, YANE    Enclosure  CC:  No Recipients    If you would like to receive this communication electronically, please contact [email or web address] or [phone] to request more information on [employer] Link access.    For providers and/or their staff who would like to refer a patient to [facility], please contact us through our one-stop-shop provider referral line, [facility], at [phone].    If you feel you have received this communication in error or would no longer like to receive these types of communications, please e-mail [email or web address]

## 2019-12-18 NOTE — PROGRESS NOTES
[facility] Consultation Note    Reason for Consult:  The encounter diagnosis was Gastroesophageal reflux disease without esophagitis.    PCP:   Pepito Theodore   [address] [facility] [address]    Referring MD:  Pepito Theodore Md  [address] [place] [id number]    HPI:  This is a 85 y.o. female here for evaluation of vomiting and diarrhea  Duration -3 months  Intermittent vomiting   Recent episode was after eating a BBQ rib, potatoes  Frequent regurg  Feels  A gas pocket in her chest, occurs after meals  Says food does not get stuck as it passing through the esophagus  abdominal bloating chronic in nature  Has been on prilosec daily for many years without relief  Taking pepto,Imodium, lactaid, santosh seltzer    Hx of diarrhea or constipation - BM range from bristol type 1 -6  2 episodes of vomiting and diarrhea at the same type  + early satiety, eating less  Denies Rectal bleeding    S/p radiation for breast cancer    Took prednisone for 9 days for COPD exacerbation recently    1 coke and coffee daily     Interval History 12/18/19:  Today she presents for follow up with her daughter. Since her last visit she has had an EGD and increased 40mg Protonix to BID. She has also been following an anti reflux diet and made lifestyle modifications. This has significantly improved her symptoms. She has only had 1 episode of reflux in the past 2 months that was relieved with camomile tea. She has also noted an improvement in her constipation with Smooth move tea. She has a bowel movement every 2 days. She denies reflux, dysphagia, nausea, vomiting, diarrhea, rectal bleeding, melena, or NSAID use.     ROS:  Constitutional: No fevers, chills, +weight loss d/t better diet  ENT: No allergies  CV: No chest pain  Pulm: No cough, No shortness of breath  Ophtho: No vision changes  GI: see HPI  Derm: No rash  Heme: No lymphadenopathy, No bruising  MSK: No arthritis  : No dysuria, No  hematuria  Endo: No hot or cold intolerance  Neuro: No syncope, No seizure  Psych: No anxiety, No depression    Medical History:  has a past medical history of Anxiety, Back pain, Breast cancer (1/17/2018), Breast mass (1/15/2018), Chronic kidney disease, stage 2, mildly decreased GFR, COPD (chronic obstructive pulmonary disease), Depression, Dysuria (1/29/2018), Family history of breast cancer (1/17/2018), GERD (gastroesophageal reflux disease), Hypertension, Infiltrating ductal carcinoma of breast, left (7/12/2018), Osteoporosis, senile, Ovarian mass (1/15/2018), Pneumonia, and S/P robotic assisted laparoscopic BSO (bilateral salpingo-oophorectomy) (2/23/2018).    Surgical History:  has a past surgical history that includes Hysterectomy; Tonsillectomy; Rotator cuff repair (Right); Eye surgery; pr removal of ovary/tube(s) (02/23/2018); Mastectomy, partial (Left, 7/12/2018); Injection for sentinel node identification (Left, 7/12/2018); Axillary node dissection (Left, 7/12/2018); Injection of facet joint (Bilateral, 10/15/2018); Fixation Kyphoplasty (N/A, 11/8/2018); Breast biopsy; Breast lumpectomy; Injection of facet joint (Bilateral, 4/1/2019); Injection of facet joint (Bilateral, 6/20/2019); Cholecystectomy; Colonoscopy; Esophagogastroduodenoscopy; and Esophagogastroduodenoscopy (N/A, 10/14/2019).    Family History: family history includes Breast cancer (age of onset: 60) in her maternal grandmother; Breast cancer (age of onset: 66) in her sister; Breast cancer (age of onset: 83) in her maternal aunt; Cancer in her brother; Heart attack in her father; Heart attack (age of onset: 52) in her brother; Heart attack (age of onset: 58) in her brother; Heart failure in her mother; Kidney failure in her mother; Pulmonary embolism (age of onset: 45) in her brother..     Social History:  reports that she has quit smoking. Her smoking use included cigarettes. She has a 40.00 pack-year smoking history. She has never used  smokeless tobacco. She reports that she drinks alcohol. She reports that she does not use drugs.    Review of patient's allergies indicates:   Allergen Reactions    Levaquin [levofloxacin] Itching    Penicillins Itching       Current Outpatient Medications on File Prior to Visit   Medication Sig Dispense Refill    acetaminophen (TYLENOL) 325 MG tablet Take 650 mg by mouth every 6 (six) hours as needed for Pain.       albuterol (PROAIR HFA) 90 mcg/actuation inhaler Inhale 1-2 puffs into the lungs every 6 (six) hours as needed for Wheezing or Shortness of Breath.      albuterol (PROVENTIL) 2.5 mg /3 mL (0.083 %) nebulizer solution Inhale 3 mLs into the lungs as needed.       albuterol-ipratropium 2.5mg-0.5mg/3mL (DUO-NEB) 0.5 mg-3 mg(2.5 mg base)/3 mL nebulizer solution Take 3 mLs by nebulization as needed.       atorvastatin (LIPITOR) 20 MG tablet Take 20 mg by mouth nightly.       benazepril (LOTENSIN) 20 MG tablet Take 20 mg by mouth once daily.       calcium-vitamin D 250-100 mg-unit per tablet Take 1 tablet by mouth 2 (two) times daily.      CETIRIZINE HCL (ZYRTEC ORAL) Take 10 mg by mouth nightly as needed.       citalopram (CELEXA) 20 MG tablet Take 20 mg by mouth nightly.       felodipine (PLENDIL) 10 MG 24 hr tablet Take 20 mg by mouth once daily.       fluticasone-vilanterol (BREO) 100-25 mcg/dose diskus inhaler Inhale 1 puff into the lungs once daily. 1 each 3    INCRUSE ELLIPTA 62.5 mcg/actuation DsDv USE 1 PUFF QD AT SAME TIME  (patient takes at night)  2    letrozole (FEMARA) 2.5 mg Tab Take 1 tablet (2.5 mg total) by mouth once daily. 90 tablet 1    montelukast (SINGULAIR) 10 mg tablet Take 10 mg by mouth every evening.      multivitamin (THERAGRAN) per tablet Take 1 tablet by mouth once daily.      potassium chloride (KLOR-CON) 10 MEQ TbSR Take 1 tablet by mouth.      predniSONE (DELTASONE) 10 MG tablet   0    torsemide (DEMADEX) 20 MG Tab Take 20 mg by mouth once daily.       "traMADol (ULTRAM) 50 mg tablet Take 1 tablet (50 mg total) by mouth every 8 (eight) hours as needed. 14 tablet 0    [DISCONTINUED] omeprazole (PRILOSEC) 40 MG capsule Take 1 capsule (40 mg total) by mouth 2 (two) times daily before meals. 60 capsule 2     No current facility-administered medications on file prior to visit.          Objective Findings:    Vital Signs:  Blood Pressure 131/78 (BP Location: Right arm)   Pulse (Abnormal) 59   Height 5' 2" (1.575 m)   Weight 69.2 kg (152 lb 8.9 oz)   Last Menstrual Period  (LMP Unknown)   Body Mass Index 27.90 kg/m²   Body mass index is 27.9 kg/m².    Physical Exam:  General Appearance: Well appearing in no acute distress  Head:   Normocephalic, without obvious abnormality  Eyes:    No scleral icterus  ENT: Neck supple, Lips, mucosa, and tongue normal  Extremities: no edema  Skin: No rash  Neurologic: AAO x 3      Labs:  Lab Results   Component Value Date    WBC 11.02 10/08/2019    HGB 14.7 10/08/2019    HCT 46.2 10/08/2019     10/08/2019    ALT 24 10/08/2019    AST 23 10/08/2019     10/08/2019    K 3.3 (L) 10/08/2019     10/08/2019    CREATININE 1.2 10/08/2019    BUN 15 10/08/2019    CO2 29 10/08/2019    TSH 2.036 08/29/2019       Imaging:  10/9/19 X-ray  There is a moderate of stool in the colon.  There is mild diffuse gaseous distension of the visualized intestinal loops.  Upright images do not demonstrate any free air.  There are degenerative changes of the spine and pubic symphysis with a thoracolumbar levoscoliosis.  Changes of vertebroplasty at T12 remain.  There are surgical clips overlying the right upper quadrant of the abdomen.  There are vascular calcifications in the pelvis.    Endoscopy:    10/14/19 EGD  - Normal examined duodenum. Biopsied.  - Erythematous mucosa in the gastric body, antrum and prepyloric region of the stomach. Biopsied.  - 4 cm hiatal hernia.  - Widely patent, non-obstructing and mild Schatzki ring.  - Tortuous " esophagus.    Path: 1. Duodenum, biopsy:  - Small intestinal mucosa with preserved villous architecture.  - Negative for increased intraepithelial lymphocytes, granulomata or dysplasia.  2. Stomach, biopsy:  - Fundic and antral type mucosa without significant histologic alteration.  - Negative for Helicobacter organisms on routine hematoxylin and eosin (H&E) stained sections.    Colonoscopy-patient was advised she did not need another      Assessment:  1. Gastroesophageal reflux disease without esophagitis       85-year-old female presents for f/u of GERD. Her symptoms have improved greatly with an antireflux diet and 40mg PPI BID. Extensive discussion with pt and her daughter about the s/e with long term PPI use jerica with her history of decreased kidney function and osteoporosis. After discussion it was determined she will decrease dose to 20mg BID for the next 1-2 months and RTC to reassess. If at that time symptoms are still well controlled, will consider once daily dosing and possible discontinuance.      Recommendations:  1.  Decrease Prilosec to 20mg once daily  2. Continue antireflux diet    Follow up in about 6 weeks (around 1/29/2020).      Order summary:  Orders Placed This Encounter    omeprazole (PRILOSEC) 20 MG capsule         Thank you for allowing me to participate in the care of LORAINE Hill

## 2020-01-27 ENCOUNTER — HOSPITAL ENCOUNTER (OUTPATIENT)
Dept: RADIOLOGY | Facility: OTHER | Age: 85
Discharge: HOME | End: 2020-01-27
Attending: NURSE PRACTITIONER
Payer: MEDICARE

## 2020-01-27 VITALS — BODY MASS INDEX: 28.07 KG/M2 | HEIGHT: 62 IN | WEIGHT: 152.56 LBS

## 2020-01-27 DIAGNOSIS — C50.412 MALIGNANT NEOPLASM OF UPPER-OUTER QUADRANT OF LEFT BREAST IN FEMALE, ESTROGEN RECEPTOR POSITIVE: ICD-10-CM

## 2020-01-27 DIAGNOSIS — Z17.0 MALIGNANT NEOPLASM OF UPPER-OUTER QUADRANT OF LEFT BREAST IN FEMALE, ESTROGEN RECEPTOR POSITIVE: ICD-10-CM

## 2020-01-27 PROCEDURE — 77066 MAMMO DIGITAL DIAGNOSTIC BILAT WITH TOMOSYNTHESIS_CAD: ICD-10-PCS | Mod: 26,,, | Performed by: RADIOLOGY

## 2020-01-27 PROCEDURE — 77062 MAMMO DIGITAL DIAGNOSTIC BILAT WITH TOMOSYNTHESIS_CAD: ICD-10-PCS | Mod: 26,,, | Performed by: RADIOLOGY

## 2020-01-27 PROCEDURE — 77062 BREAST TOMOSYNTHESIS BI: CPT | Mod: 26,,, | Performed by: RADIOLOGY

## 2020-01-27 PROCEDURE — 77062 BREAST TOMOSYNTHESIS BI: CPT | Mod: TC

## 2020-01-27 PROCEDURE — 77066 DX MAMMO INCL CAD BI: CPT | Mod: 26,,, | Performed by: RADIOLOGY

## 2020-01-29 ENCOUNTER — PATIENT OUTREACH (OUTPATIENT)
Dept: ADMINISTRATIVE | Facility: HOSPITAL | Age: 85
End: 2020-01-29

## 2020-01-29 NOTE — PROGRESS NOTES
Pre-visit chart review completed.  Immunizations reviewed and updated.    Patient due for the following    Lipid Panel     TETANUS VACCINE     Shingles Vaccine (1 of 2)    Pneumococcal Vaccine (65+ High/Highest Risk) (1 of 2 - PCV13)     Patient new to provider.

## 2020-02-03 ENCOUNTER — OFFICE VISIT (OUTPATIENT)
Dept: GASTROENTEROLOGY | Facility: CLINIC | Age: 85
End: 2020-02-03
Payer: MEDICARE

## 2020-02-03 VITALS
DIASTOLIC BLOOD PRESSURE: 79 MMHG | HEIGHT: 62 IN | HEART RATE: 60 BPM | BODY MASS INDEX: 28.44 KG/M2 | SYSTOLIC BLOOD PRESSURE: 153 MMHG | WEIGHT: 154.56 LBS

## 2020-02-03 DIAGNOSIS — K21.9 GASTROESOPHAGEAL REFLUX DISEASE WITHOUT ESOPHAGITIS: Primary | ICD-10-CM

## 2020-02-03 PROCEDURE — 1101F PR PT FALLS ASSESS DOC 0-1 FALLS W/OUT INJ PAST YR: ICD-10-PCS | Mod: CPTII,S$GLB,, | Performed by: NURSE PRACTITIONER

## 2020-02-03 PROCEDURE — 3078F PR MOST RECENT DIASTOLIC BLOOD PRESSURE < 80 MM HG: ICD-10-PCS | Mod: CPTII,S$GLB,, | Performed by: NURSE PRACTITIONER

## 2020-02-03 PROCEDURE — 99999 PR PBB SHADOW E&M-EST. PATIENT-LVL IV: ICD-10-PCS | Mod: PBBFAC,,, | Performed by: NURSE PRACTITIONER

## 2020-02-03 PROCEDURE — 3078F DIAST BP <80 MM HG: CPT | Mod: CPTII,S$GLB,, | Performed by: NURSE PRACTITIONER

## 2020-02-03 PROCEDURE — 99999 PR PBB SHADOW E&M-EST. PATIENT-LVL IV: CPT | Mod: PBBFAC,,, | Performed by: NURSE PRACTITIONER

## 2020-02-03 PROCEDURE — 1126F AMNT PAIN NOTED NONE PRSNT: CPT | Mod: S$GLB,,, | Performed by: NURSE PRACTITIONER

## 2020-02-03 PROCEDURE — 3077F SYST BP >= 140 MM HG: CPT | Mod: CPTII,S$GLB,, | Performed by: NURSE PRACTITIONER

## 2020-02-03 PROCEDURE — 1159F MED LIST DOCD IN RCRD: CPT | Mod: S$GLB,,, | Performed by: NURSE PRACTITIONER

## 2020-02-03 PROCEDURE — 3077F PR MOST RECENT SYSTOLIC BLOOD PRESSURE >= 140 MM HG: ICD-10-PCS | Mod: CPTII,S$GLB,, | Performed by: NURSE PRACTITIONER

## 2020-02-03 PROCEDURE — 1126F PR PAIN SEVERITY QUANTIFIED, NO PAIN PRESENT: ICD-10-PCS | Mod: S$GLB,,, | Performed by: NURSE PRACTITIONER

## 2020-02-03 PROCEDURE — 1159F PR MEDICATION LIST DOCUMENTED IN MEDICAL RECORD: ICD-10-PCS | Mod: S$GLB,,, | Performed by: NURSE PRACTITIONER

## 2020-02-03 PROCEDURE — 1101F PT FALLS ASSESS-DOCD LE1/YR: CPT | Mod: CPTII,S$GLB,, | Performed by: NURSE PRACTITIONER

## 2020-02-03 PROCEDURE — 99214 PR OFFICE/OUTPT VISIT, EST, LEVL IV, 30-39 MIN: ICD-10-PCS | Mod: S$GLB,,, | Performed by: NURSE PRACTITIONER

## 2020-02-03 PROCEDURE — 99214 OFFICE O/P EST MOD 30 MIN: CPT | Mod: S$GLB,,, | Performed by: NURSE PRACTITIONER

## 2020-02-03 RX ORDER — OMEPRAZOLE 20 MG/1
20 CAPSULE, DELAYED RELEASE ORAL DAILY
Qty: 90 CAPSULE | Refills: 2 | Status: SHIPPED | OUTPATIENT
Start: 2020-02-03 | End: 2020-12-01

## 2020-02-03 NOTE — PROGRESS NOTES
[facility] Consultation Note    Reason for Consult:  The encounter diagnosis was Gastroesophageal reflux disease without esophagitis.    PCP:   Minerva Mathias       Referring MD:  No referring provider defined for this encounter.    HPI:  This is a 85 y.o. female here for evaluation of vomiting and diarrhea  Duration -3 months  Intermittent vomiting   Recent episode was after eating a BBQ rib, potatoes  Frequent regurg  Feels  A gas pocket in her chest, occurs after meals  Says food does not get stuck as it passing through the esophagus  abdominal bloating chronic in nature  Has been on prilosec daily for many years without relief  Taking pepto,Imodium, lactaid, santosh seltzer    Hx of diarrhea or constipation - BM range from bristol type 1 -6  2 episodes of vomiting and diarrhea at the same type  + early satiety, eating less  Denies Rectal bleeding    S/p radiation for breast cancer    Took prednisone for 9 days for COPD exacerbation recently    1 coke and coffee daily     Interval History 12/18/19:  Today she presents for follow up with her daughter. Since her last visit she has had an EGD and increased 40mg Protonix to BID. She has also been following an anti reflux diet and made lifestyle modifications. This has significantly improved her symptoms. She has only had 1 episode of reflux in the past 2 months that was relieved with camomile tea. She has also noted an improvement in her constipation with Smooth move tea. She has a bowel movement every 2 days. She denies reflux, dysphagia, nausea, vomiting, diarrhea, rectal bleeding, melena, or NSAID use.     Interval History 2/3/2020  Changed her eating habits and taking omeprazle 20 once a day and her symptoms are very well controlled.    ROS:  Constitutional: No fevers, chills, +weight loss d/t better diet  GI: see HPI  Medical History:  has a past medical history of Anxiety, Back pain, Breast cancer (1/17/2018), Breast mass (1/15/2018),  Chronic kidney disease, stage 2, mildly decreased GFR, COPD (chronic obstructive pulmonary disease), Depression, Dysuria (1/29/2018), Family history of breast cancer (1/17/2018), GERD (gastroesophageal reflux disease), Hypertension, Infiltrating ductal carcinoma of breast, left (7/12/2018), Osteoporosis, senile, Ovarian mass (1/15/2018), Pneumonia, and S/P robotic assisted laparoscopic BSO (bilateral salpingo-oophorectomy) (2/23/2018).    Surgical History:  has a past surgical history that includes Hysterectomy; Tonsillectomy; Rotator cuff repair (Right); Eye surgery; pr removal of ovary/tube(s) (02/23/2018); Mastectomy, partial (Left, 7/12/2018); Injection for sentinel node identification (Left, 7/12/2018); Axillary node dissection (Left, 7/12/2018); Injection of facet joint (Bilateral, 10/15/2018); Fixation Kyphoplasty (N/A, 11/8/2018); Breast biopsy; Breast lumpectomy; Injection of facet joint (Bilateral, 4/1/2019); Injection of facet joint (Bilateral, 6/20/2019); Cholecystectomy; Colonoscopy; Esophagogastroduodenoscopy; and Esophagogastroduodenoscopy (N/A, 10/14/2019).    Family History: family history includes Breast cancer (age of onset: 60) in her maternal grandmother; Breast cancer (age of onset: 66) in her sister; Breast cancer (age of onset: 83) in her maternal aunt; Cancer in her brother; Heart attack in her father; Heart attack (age of onset: 52) in her brother; Heart attack (age of onset: 58) in her brother; Heart failure in her mother; Kidney failure in her mother; Pulmonary embolism (age of onset: 45) in her brother..     Social History:  reports that she has quit smoking. Her smoking use included cigarettes. She has a 40.00 pack-year smoking history. She has never used smokeless tobacco. She reports that she drinks alcohol. She reports that she does not use drugs.    Review of patient's allergies indicates:   Allergen Reactions    Levaquin [levofloxacin] Itching    Penicillins Itching       Current  Outpatient Medications on File Prior to Visit   Medication Sig Dispense Refill    acetaminophen (TYLENOL) 325 MG tablet Take 650 mg by mouth every 6 (six) hours as needed for Pain.       albuterol (PROAIR HFA) 90 mcg/actuation inhaler Inhale 1-2 puffs into the lungs every 6 (six) hours as needed for Wheezing or Shortness of Breath.      albuterol (PROVENTIL) 2.5 mg /3 mL (0.083 %) nebulizer solution Inhale 3 mLs into the lungs as needed.       albuterol-ipratropium 2.5mg-0.5mg/3mL (DUO-NEB) 0.5 mg-3 mg(2.5 mg base)/3 mL nebulizer solution Take 3 mLs by nebulization as needed.       atorvastatin (LIPITOR) 20 MG tablet Take 20 mg by mouth nightly.       benazepril (LOTENSIN) 20 MG tablet Take 20 mg by mouth once daily.       calcium-vitamin D 250-100 mg-unit per tablet Take 1 tablet by mouth 2 (two) times daily.      CETIRIZINE HCL (ZYRTEC ORAL) Take 10 mg by mouth nightly as needed.       citalopram (CELEXA) 20 MG tablet Take 20 mg by mouth nightly.       felodipine (PLENDIL) 10 MG 24 hr tablet Take 20 mg by mouth once daily.       fluticasone-vilanterol (BREO) 100-25 mcg/dose diskus inhaler Inhale 1 puff into the lungs once daily. 1 each 3    INCRUSE ELLIPTA 62.5 mcg/actuation DsDv USE 1 PUFF QD AT SAME TIME  (patient takes at night)  2    letrozole (FEMARA) 2.5 mg Tab Take 1 tablet (2.5 mg total) by mouth once daily. 90 tablet 1    montelukast (SINGULAIR) 10 mg tablet Take 10 mg by mouth every evening.      multivitamin (THERAGRAN) per tablet Take 1 tablet by mouth once daily.      potassium chloride (KLOR-CON) 10 MEQ TbSR Take 1 tablet by mouth.      predniSONE (DELTASONE) 10 MG tablet   0    torsemide (DEMADEX) 20 MG Tab Take 20 mg by mouth once daily.      traMADol (ULTRAM) 50 mg tablet Take 1 tablet (50 mg total) by mouth every 8 (eight) hours as needed. 14 tablet 0    [DISCONTINUED] omeprazole (PRILOSEC) 20 MG capsule Take 1 capsule (20 mg total) by mouth once daily. 30 capsule 2     No  "current facility-administered medications on file prior to visit.          Objective Findings:    Vital Signs:  BP (!) 153/79 (BP Location: Right arm)   Pulse 60   Ht 5' 2" (1.575 m)   Wt 70.1 kg (154 lb 8.7 oz)   LMP  (LMP Unknown)   BMI 28.27 kg/m²   Body mass index is 28.27 kg/m².    Physical Exam:  General Appearance: Well appearing in no acute distress  Head:   Normocephalic, without obvious abnormality  Eyes:    No scleral icterus  ENT: Neck supple, Lips, mucosa, and tongue normal  Extremities: no edema  Skin: No rash  Neurologic: AAO x 3      Labs:  Lab Results   Component Value Date    WBC 11.02 10/08/2019    HGB 14.7 10/08/2019    HCT 46.2 10/08/2019     10/08/2019    ALT 24 10/08/2019    AST 23 10/08/2019     10/08/2019    K 3.3 (L) 10/08/2019     10/08/2019    CREATININE 1.2 10/08/2019    BUN 15 10/08/2019    CO2 29 10/08/2019    TSH 2.036 08/29/2019       Imaging:  10/9/19 X-ray  There is a moderate of stool in the colon.  There is mild diffuse gaseous distension of the visualized intestinal loops.  Upright images do not demonstrate any free air.  There are degenerative changes of the spine and pubic symphysis with a thoracolumbar levoscoliosis.  Changes of vertebroplasty at T12 remain.  There are surgical clips overlying the right upper quadrant of the abdomen.  There are vascular calcifications in the pelvis.    Endoscopy:    10/14/19 EGD  - Normal examined duodenum. Biopsied.  - Erythematous mucosa in the gastric body, antrum and prepyloric region of the stomach. Biopsied.  - 4 cm hiatal hernia.  - Widely patent, non-obstructing and mild Schatzki ring.  - Tortuous esophagus.    Path: 1. Duodenum, biopsy:  - Small intestinal mucosa with preserved villous architecture.  - Negative for increased intraepithelial lymphocytes, granulomata or dysplasia.  2. Stomach, biopsy:  - Fundic and antral type mucosa without significant histologic alteration.  - Negative for Helicobacter " organisms on routine hematoxylin and eosin (H&E) stained sections.    Colonoscopy-patient was advised she did not need another      Assessment:  1. Gastroesophageal reflux disease without esophagitis       85-year-old female presents for f/u of GERD. Her symptoms are stable from the decrease of PPI BID to omeprazole 20 mg QD.      Recommendations:  1.  Decrease Prilosec to 20mg every other day. We did discuss if her sx return to go back to daily dosing  2. Continue antireflux diet    F/u if sx worsen or change    Order summary:  Orders Placed This Encounter    omeprazole (PRILOSEC) 20 MG capsule         Thank you for allowing me to participate in the care of LORAINE Hill

## 2020-02-11 ENCOUNTER — OFFICE VISIT (OUTPATIENT)
Dept: PRIMARY CARE CLINIC | Facility: CLINIC | Age: 85
End: 2020-02-11
Payer: MEDICARE

## 2020-02-11 VITALS
HEART RATE: 74 BPM | WEIGHT: 153.44 LBS | SYSTOLIC BLOOD PRESSURE: 123 MMHG | TEMPERATURE: 98 F | OXYGEN SATURATION: 98 % | HEIGHT: 62 IN | BODY MASS INDEX: 28.24 KG/M2 | RESPIRATION RATE: 18 BRPM | DIASTOLIC BLOOD PRESSURE: 65 MMHG

## 2020-02-11 DIAGNOSIS — J44.9 CHRONIC OBSTRUCTIVE PULMONARY DISEASE, UNSPECIFIED COPD TYPE: ICD-10-CM

## 2020-02-11 DIAGNOSIS — M47.816 LUMBAR SPONDYLOSIS: ICD-10-CM

## 2020-02-11 DIAGNOSIS — I11.9 HYPERTENSIVE HEART DISEASE WITHOUT HEART FAILURE: Primary | ICD-10-CM

## 2020-02-11 DIAGNOSIS — Z17.0 MALIGNANT NEOPLASM OF UPPER-OUTER QUADRANT OF LEFT BREAST IN FEMALE, ESTROGEN RECEPTOR POSITIVE: ICD-10-CM

## 2020-02-11 DIAGNOSIS — K21.9 GASTROESOPHAGEAL REFLUX DISEASE WITHOUT ESOPHAGITIS: ICD-10-CM

## 2020-02-11 DIAGNOSIS — I70.0 AORTIC ATHEROSCLEROSIS: ICD-10-CM

## 2020-02-11 DIAGNOSIS — M81.0 OSTEOPOROSIS WITHOUT CURRENT PATHOLOGICAL FRACTURE, UNSPECIFIED OSTEOPOROSIS TYPE: ICD-10-CM

## 2020-02-11 DIAGNOSIS — E78.5 HYPERLIPIDEMIA, UNSPECIFIED HYPERLIPIDEMIA TYPE: ICD-10-CM

## 2020-02-11 DIAGNOSIS — N18.30 CKD (CHRONIC KIDNEY DISEASE) STAGE 3, GFR 30-59 ML/MIN: ICD-10-CM

## 2020-02-11 DIAGNOSIS — F41.8 DEPRESSION WITH ANXIETY: ICD-10-CM

## 2020-02-11 DIAGNOSIS — C50.412 MALIGNANT NEOPLASM OF UPPER-OUTER QUADRANT OF LEFT BREAST IN FEMALE, ESTROGEN RECEPTOR POSITIVE: ICD-10-CM

## 2020-02-11 PROCEDURE — 1159F MED LIST DOCD IN RCRD: CPT | Mod: S$GLB,,, | Performed by: INTERNAL MEDICINE

## 2020-02-11 PROCEDURE — 99999 PR PBB SHADOW E&M-EST. PATIENT-LVL III: ICD-10-PCS | Mod: PBBFAC,,, | Performed by: INTERNAL MEDICINE

## 2020-02-11 PROCEDURE — 3074F PR MOST RECENT SYSTOLIC BLOOD PRESSURE < 130 MM HG: ICD-10-PCS | Mod: CPTII,S$GLB,, | Performed by: INTERNAL MEDICINE

## 2020-02-11 PROCEDURE — 99999 PR PBB SHADOW E&M-EST. PATIENT-LVL III: CPT | Mod: PBBFAC,,, | Performed by: INTERNAL MEDICINE

## 2020-02-11 PROCEDURE — 3078F DIAST BP <80 MM HG: CPT | Mod: CPTII,S$GLB,, | Performed by: INTERNAL MEDICINE

## 2020-02-11 PROCEDURE — 1126F AMNT PAIN NOTED NONE PRSNT: CPT | Mod: S$GLB,,, | Performed by: INTERNAL MEDICINE

## 2020-02-11 PROCEDURE — 3074F SYST BP LT 130 MM HG: CPT | Mod: CPTII,S$GLB,, | Performed by: INTERNAL MEDICINE

## 2020-02-11 PROCEDURE — 3078F PR MOST RECENT DIASTOLIC BLOOD PRESSURE < 80 MM HG: ICD-10-PCS | Mod: CPTII,S$GLB,, | Performed by: INTERNAL MEDICINE

## 2020-02-11 PROCEDURE — 99205 PR OFFICE/OUTPT VISIT, NEW, LEVL V, 60-74 MIN: ICD-10-PCS | Mod: S$GLB,,, | Performed by: INTERNAL MEDICINE

## 2020-02-11 PROCEDURE — 1126F PR PAIN SEVERITY QUANTIFIED, NO PAIN PRESENT: ICD-10-PCS | Mod: S$GLB,,, | Performed by: INTERNAL MEDICINE

## 2020-02-11 PROCEDURE — 1101F PR PT FALLS ASSESS DOC 0-1 FALLS W/OUT INJ PAST YR: ICD-10-PCS | Mod: CPTII,S$GLB,, | Performed by: INTERNAL MEDICINE

## 2020-02-11 PROCEDURE — 1101F PT FALLS ASSESS-DOCD LE1/YR: CPT | Mod: CPTII,S$GLB,, | Performed by: INTERNAL MEDICINE

## 2020-02-11 PROCEDURE — 1159F PR MEDICATION LIST DOCUMENTED IN MEDICAL RECORD: ICD-10-PCS | Mod: S$GLB,,, | Performed by: INTERNAL MEDICINE

## 2020-02-11 PROCEDURE — 99205 OFFICE O/P NEW HI 60 MIN: CPT | Mod: S$GLB,,, | Performed by: INTERNAL MEDICINE

## 2020-02-11 RX ORDER — ACETAMINOPHEN, DIPHENHYDRAMINE HCL, PHENYLEPHRINE HCL 325; 25; 5 MG/1; MG/1; MG/1
TABLET ORAL NIGHTLY
COMMUNITY

## 2020-02-12 RX ORDER — ATORVASTATIN CALCIUM 20 MG/1
20 TABLET, FILM COATED ORAL NIGHTLY
Qty: 90 TABLET | Refills: 1 | Status: SHIPPED | OUTPATIENT
Start: 2020-02-12 | End: 2020-12-01 | Stop reason: SDUPTHER

## 2020-02-12 RX ORDER — CITALOPRAM 20 MG/1
20 TABLET, FILM COATED ORAL NIGHTLY
Qty: 90 TABLET | Refills: 1 | Status: SHIPPED | OUTPATIENT
Start: 2020-02-12 | End: 2020-08-19

## 2020-02-12 RX ORDER — FELODIPINE 10 MG/1
20 TABLET, EXTENDED RELEASE ORAL DAILY
Qty: 180 TABLET | Refills: 1 | Status: SHIPPED | OUTPATIENT
Start: 2020-02-12 | End: 2020-09-15

## 2020-02-12 RX ORDER — BENAZEPRIL HYDROCHLORIDE 20 MG/1
20 TABLET ORAL DAILY
Qty: 90 TABLET | Refills: 1 | Status: SHIPPED | OUTPATIENT
Start: 2020-02-12 | End: 2020-09-30 | Stop reason: SDUPTHER

## 2020-02-12 RX ORDER — TORSEMIDE 20 MG/1
20 TABLET ORAL DAILY
Qty: 90 TABLET | Refills: 1 | Status: SHIPPED | OUTPATIENT
Start: 2020-02-12 | End: 2020-09-03

## 2020-02-14 RX ORDER — FLUTICASONE FUROATE AND VILANTEROL 100; 25 UG/1; UG/1
1 POWDER RESPIRATORY (INHALATION) DAILY
Qty: 1 EACH | Refills: 3 | OUTPATIENT
Start: 2020-02-14

## 2020-02-14 NOTE — TELEPHONE ENCOUNTER
----- Message from Dian Deleon sent at 2/14/2020  9:39 AM CST -----  Contact: Patient [phone]  Prescription Request:     Name of medication: fluticasone-vilanterol (BREO) 100-25 mcg/dose diskus inhaler    Reason for request: Refill    Pharmacy: [id number] [address] AT [facility] [address]    Please advise.    Thank You

## 2020-02-14 NOTE — TELEPHONE ENCOUNTER
Pt advised refill request was denied because it was 's understanding that pt's pulmonologist would manage all her inhalers.  Advised pt to contact Gerda to request that refill request is recent to pt's pulmonologist.

## 2020-02-17 ENCOUNTER — OFFICE VISIT (OUTPATIENT)
Dept: HEMATOLOGY/ONCOLOGY | Facility: CLINIC | Age: 85
End: 2020-02-17
Payer: MEDICARE

## 2020-02-17 VITALS
RESPIRATION RATE: 16 BRPM | SYSTOLIC BLOOD PRESSURE: 161 MMHG | BODY MASS INDEX: 28.39 KG/M2 | DIASTOLIC BLOOD PRESSURE: 74 MMHG | TEMPERATURE: 98 F | HEIGHT: 62 IN | WEIGHT: 154.31 LBS | HEART RATE: 68 BPM | OXYGEN SATURATION: 95 %

## 2020-02-17 DIAGNOSIS — C50.412 MALIGNANT NEOPLASM OF UPPER-OUTER QUADRANT OF LEFT BREAST IN FEMALE, ESTROGEN RECEPTOR POSITIVE: Primary | Chronic | ICD-10-CM

## 2020-02-17 DIAGNOSIS — Z17.0 MALIGNANT NEOPLASM OF UPPER-OUTER QUADRANT OF LEFT BREAST IN FEMALE, ESTROGEN RECEPTOR POSITIVE: Primary | Chronic | ICD-10-CM

## 2020-02-17 DIAGNOSIS — Z79.811 USE OF AROMATASE INHIBITORS: Chronic | ICD-10-CM

## 2020-02-17 PROCEDURE — 3078F PR MOST RECENT DIASTOLIC BLOOD PRESSURE < 80 MM HG: ICD-10-PCS | Mod: CPTII,S$GLB,, | Performed by: INTERNAL MEDICINE

## 2020-02-17 PROCEDURE — 99999 PR PBB SHADOW E&M-EST. PATIENT-LVL III: ICD-10-PCS | Mod: PBBFAC,,, | Performed by: INTERNAL MEDICINE

## 2020-02-17 PROCEDURE — 1126F PR PAIN SEVERITY QUANTIFIED, NO PAIN PRESENT: ICD-10-PCS | Mod: S$GLB,,, | Performed by: INTERNAL MEDICINE

## 2020-02-17 PROCEDURE — 3078F DIAST BP <80 MM HG: CPT | Mod: CPTII,S$GLB,, | Performed by: INTERNAL MEDICINE

## 2020-02-17 PROCEDURE — 1126F AMNT PAIN NOTED NONE PRSNT: CPT | Mod: S$GLB,,, | Performed by: INTERNAL MEDICINE

## 2020-02-17 PROCEDURE — 1159F MED LIST DOCD IN RCRD: CPT | Mod: S$GLB,,, | Performed by: INTERNAL MEDICINE

## 2020-02-17 PROCEDURE — 3077F PR MOST RECENT SYSTOLIC BLOOD PRESSURE >= 140 MM HG: ICD-10-PCS | Mod: CPTII,S$GLB,, | Performed by: INTERNAL MEDICINE

## 2020-02-17 PROCEDURE — 99213 PR OFFICE/OUTPT VISIT, EST, LEVL III, 20-29 MIN: ICD-10-PCS | Mod: S$GLB,,, | Performed by: INTERNAL MEDICINE

## 2020-02-17 PROCEDURE — 1101F PR PT FALLS ASSESS DOC 0-1 FALLS W/OUT INJ PAST YR: ICD-10-PCS | Mod: CPTII,S$GLB,, | Performed by: INTERNAL MEDICINE

## 2020-02-17 PROCEDURE — 1101F PT FALLS ASSESS-DOCD LE1/YR: CPT | Mod: CPTII,S$GLB,, | Performed by: INTERNAL MEDICINE

## 2020-02-17 PROCEDURE — 3077F SYST BP >= 140 MM HG: CPT | Mod: CPTII,S$GLB,, | Performed by: INTERNAL MEDICINE

## 2020-02-17 PROCEDURE — 99213 OFFICE O/P EST LOW 20 MIN: CPT | Mod: S$GLB,,, | Performed by: INTERNAL MEDICINE

## 2020-02-17 PROCEDURE — 1159F PR MEDICATION LIST DOCUMENTED IN MEDICAL RECORD: ICD-10-PCS | Mod: S$GLB,,, | Performed by: INTERNAL MEDICINE

## 2020-02-17 PROCEDURE — 99999 PR PBB SHADOW E&M-EST. PATIENT-LVL III: CPT | Mod: PBBFAC,,, | Performed by: INTERNAL MEDICINE

## 2020-02-17 NOTE — PROGRESS NOTES
Subjective:       Patient ID: Anahi Cook is a 85 y.o. female.    Chief Complaint: No chief complaint on file.    HPI   Mrs. Cook returns today for follow up.   She is on anastrozole, now on the adjuvant setting.  A recent mammogram 3 weeks ago was BI-RADS 2 and a one year follow-up was recommended.  Briefly, she is an 85-year-old female who in late 2017 had initially felt a mass in her left breast.  A biopsy that was performed on 01/11/2018 showed an infiltrating ductal carcinoma that was ER strongly positive, UT strongly positive and HER-2 negative by immunohistochemistry.    She was started on letrozole and undwerwent a BSO by Dr. Chairez for an ovarian mass.  Her ovarian mass was benign. In mid July 2018 she underwent a lumpectomy and AND.  She had a 3.3 cm carcinoma and 4/8 positive nodes.  She has remained on letrozole, now in the adjuvant setting, and she returns today for follow up.   Of note, she also received chest wall XRT.    Review of Systems    Overall she feels OK  She has tolerated the letrozole well so far.  She complains of occasional left breast pain and left axillary pain.  Her ECOG performance status remains at 1.  She denies any anxiety, depression, easy bruising, fevers, chills, night  sweats, weight loss, nausea, vomiting, diarrhea, constipation, diplopia, blurred vision, headache, chest pain, palpitations, shortness of breath, breast pain, abdominal pain, extremity pain, or difficulty ambulating.  The remainder of the ten-point ROS, including general, skin, lymph, H/N, cardiorespiratory, GI, , Neuro, Endocrine, and psychiatric is negative.       Objective:      Physical Exam      She is alert, oriented to time, place, person, pleasant, well      nourished, in no acute physical distress.   She is accompanied by her daughter.                                 VITAL SIGNS:  Reviewed                                      HEENT:  Normal.  There are no nasal, oral, lip, gingival, auricular, lid,     or conjunctival lesions.  Mucosae are moist and pink, and there is no        thrush.  Pupils are equal, reactive to light and accommodation.              Extraocular muscle movements are intact.   Dentition is fair.  Maxillary teeth are missing.                                      NECK:  Supple without JVD, adenopathy, or thyromegaly.                       LUNGS:  Clear to auscultation without wheezing, rales, or rhonchi.           CARDIOVASCULAR:  Reveals an S1, S2, no murmurs, no rubs, no gallops.         ABDOMEN:  Soft, nontender, without organomegaly.  Bowel sounds are    present.    Scars from her laparoscopic DONOVAN-BSO are seen.                                                            EXTREMITIES:  No cyanosis, clubbing, or edema.   The left forearm and wrist are in a cast.                            BREASTS: She is s/p left lumpectomy with a healed periareolar incision.  She also has an incision from her axillary dissection;  It is well healed.   There are no masses in her right breast.    Both breasts are large and pendulous.    There is mild hyperpigmentation within the radiation field.                                 LYMPHATIC:  There is no cervical, axillary, or supraclavicular adenopathy.   SKIN:  Warm and moist, without petechiae, rashes, induration, or ecchymoses.           NEUROLOGIC:  DTRs are 0-1+ bilaterally, symmetrical, motor function is 5/5,  and cranial nerves are  within normal limits.      Assessment:       1. Hormone receptor positive breast cancer, left, on neoadjuvant letrozole with a significant clinical response.   2. Use of aromatase inhibitors        Plan:        I had a long discussion with her and her daughter.  She will remain on letrozole, and see me in 4 months for follow up.  Her multiple questions were answered to her satisfaction.

## 2020-03-13 DIAGNOSIS — M47.816 LUMBAR SPONDYLOSIS: Primary | ICD-10-CM

## 2020-03-13 RX ORDER — TRAMADOL HYDROCHLORIDE 50 MG/1
50 TABLET ORAL EVERY 12 HOURS PRN
Qty: 40 TABLET | Refills: 0 | Status: SHIPPED | OUTPATIENT
Start: 2020-03-13 | End: 2020-04-24

## 2020-03-13 NOTE — TELEPHONE ENCOUNTER
----- Message from Marshall Celestin sent at 3/13/2020  4:52 PM CDT -----  Contact: self   Requesting an RX refill or new RX.  Is this a refill or new RX:    RX name and strength: traMADol (ULTRAM) 50 mg tablet  Directions (copy/paste from chart):    Is this a 30 day or 90 day RX:    Local pharmacy or mail order pharmacy:  [facility] [id number] [address] [facility] [address] [phone] (Phone)  [phone] (Fax)  Pharmacy name and phone # (copy/paste from chart):     Comments:

## 2020-04-22 DIAGNOSIS — M47.816 LUMBAR SPONDYLOSIS: ICD-10-CM

## 2020-04-24 RX ORDER — TRAMADOL HYDROCHLORIDE 50 MG/1
TABLET ORAL
Qty: 40 TABLET | Refills: 0 | Status: SHIPPED | OUTPATIENT
Start: 2020-04-24 | End: 2020-06-19

## 2020-05-07 ENCOUNTER — TELEPHONE (OUTPATIENT)
Dept: PRIMARY CARE CLINIC | Facility: CLINIC | Age: 85
End: 2020-05-07

## 2020-05-07 DIAGNOSIS — L03.011 PARONYCHIA OF RIGHT THUMB: Primary | ICD-10-CM

## 2020-05-07 RX ORDER — MUPIROCIN 20 MG/G
OINTMENT TOPICAL 3 TIMES DAILY
Qty: 22 G | Refills: 0 | Status: SHIPPED | OUTPATIENT
Start: 2020-05-07 | End: 2020-05-14

## 2020-05-07 NOTE — TELEPHONE ENCOUNTER
Pt state her thumb is still infected and red for almost a week she has tried neosprin and peroxide and its not getting better. Can she get something else prescribed

## 2020-05-07 NOTE — TELEPHONE ENCOUNTER
----- Message from Shannan Caban sent at 5/7/2020 12:25 PM CDT -----  Patient has an infected thumb and she tried antibiotic ointments, but they are not working.  Patient is requesting if the doctor can call in a antibiotic ointment for her to take.    [id number] [address] [facility] [address] [facility] [address].    Please advise, thank you.

## 2020-05-07 NOTE — TELEPHONE ENCOUNTER
Spoke to patient: redness and tenderness of skin at base of right thumb nail not improving with Neosporin. Denies swelling of thumb or limited range of motion, seems very localized. Also denies abscess formation, no pus visible. Ordered topical mupirocin ointment, call back if no improvement in a week.

## 2020-06-10 ENCOUNTER — TELEPHONE (OUTPATIENT)
Dept: HEMATOLOGY/ONCOLOGY | Facility: CLINIC | Age: 85
End: 2020-06-10

## 2020-06-15 ENCOUNTER — OFFICE VISIT (OUTPATIENT)
Dept: HEMATOLOGY/ONCOLOGY | Facility: CLINIC | Age: 85
End: 2020-06-15
Payer: MEDICARE

## 2020-06-15 DIAGNOSIS — Z79.811 USE OF AROMATASE INHIBITORS: Chronic | ICD-10-CM

## 2020-06-15 DIAGNOSIS — C50.012 MALIGNANT NEOPLASM OF NIPPLE OF LEFT BREAST IN FEMALE, ESTROGEN RECEPTOR POSITIVE: ICD-10-CM

## 2020-06-15 DIAGNOSIS — C50.412 MALIGNANT NEOPLASM OF UPPER-OUTER QUADRANT OF LEFT BREAST IN FEMALE, ESTROGEN RECEPTOR POSITIVE: Primary | Chronic | ICD-10-CM

## 2020-06-15 DIAGNOSIS — Z17.0 MALIGNANT NEOPLASM OF NIPPLE OF LEFT BREAST IN FEMALE, ESTROGEN RECEPTOR POSITIVE: ICD-10-CM

## 2020-06-15 DIAGNOSIS — Z17.0 MALIGNANT NEOPLASM OF UPPER-OUTER QUADRANT OF LEFT BREAST IN FEMALE, ESTROGEN RECEPTOR POSITIVE: Primary | Chronic | ICD-10-CM

## 2020-06-15 PROCEDURE — 99213 PR OFFICE/OUTPT VISIT, EST, LEVL III, 20-29 MIN: ICD-10-PCS | Mod: 95,,, | Performed by: INTERNAL MEDICINE

## 2020-06-15 PROCEDURE — 99499 UNLISTED E&M SERVICE: CPT | Mod: 95,,, | Performed by: INTERNAL MEDICINE

## 2020-06-15 PROCEDURE — 99499 RISK ADDL DX/OHS AUDIT: ICD-10-PCS | Mod: 95,,, | Performed by: INTERNAL MEDICINE

## 2020-06-15 PROCEDURE — 1101F PR PT FALLS ASSESS DOC 0-1 FALLS W/OUT INJ PAST YR: ICD-10-PCS | Mod: CPTII,95,, | Performed by: INTERNAL MEDICINE

## 2020-06-15 PROCEDURE — 1159F PR MEDICATION LIST DOCUMENTED IN MEDICAL RECORD: ICD-10-PCS | Mod: 95,,, | Performed by: INTERNAL MEDICINE

## 2020-06-15 PROCEDURE — 1159F MED LIST DOCD IN RCRD: CPT | Mod: 95,,, | Performed by: INTERNAL MEDICINE

## 2020-06-15 PROCEDURE — 99213 OFFICE O/P EST LOW 20 MIN: CPT | Mod: 95,,, | Performed by: INTERNAL MEDICINE

## 2020-06-15 PROCEDURE — 1101F PT FALLS ASSESS-DOCD LE1/YR: CPT | Mod: CPTII,95,, | Performed by: INTERNAL MEDICINE

## 2020-06-15 RX ORDER — LETROZOLE 2.5 MG/1
2.5 TABLET, FILM COATED ORAL DAILY
Qty: 90 TABLET | Refills: 1 | Status: SHIPPED | OUTPATIENT
Start: 2020-06-15 | End: 2020-12-07 | Stop reason: SDUPTHER

## 2020-06-15 NOTE — PROGRESS NOTES
Subjective:       Patient ID: Anahi Cook is a 85 y.o. female.    Chief Complaint: No chief complaint on file.    HPI     The patient location is: home  The chief complaint leading to consultation is:  Adjuvant treatment of breast cancer    Visit type:  Video  Face to Face time with patient: 15 minutes of total time spent for this encounter.  This includes face to face time and non-face to face time preparing to see the patient (eg, review of tests), Obtaining and/or reviewing separately obtained history, Documenting clinical information in the electronic or other health record, Independently interpreting results (not separately reported) and communicating results to the patient/family/caregiver, or Care coordination (not separately reported).     Each patient to whom he or she provides medical services by telemedicine is:  (1) informed of the relationship between the physician and patient and the respective role of any other health care provider with respect to management of the patient; and (2) notified that he or she may decline to receive medical services by telemedicine and may withdraw from such care at any time.      Mrs. Cook had an appointment with me today, however, due to the COVID-19 pandemic it was switched to a virtual visit.  Briefly, she is an 85-year-old female who in late 2017 had initially felt a mass in her left breast.  A biopsy that was performed on 01/11/2018 showed an infiltrating ductal carcinoma that was ER strongly positive, AK strongly positive and HER-2 negative by immunohistochemistry.    She was started on letrozole and undwerwent a BSO by Dr. Chairez for an ovarian mass.  Her ovarian mass was benign. In mid July 2018 she underwent a lumpectomy and AND.  She had a 3.3 cm carcinoma and 4/8 positive nodes.  She has remained on letrozole, now in the adjuvant setting, and she returns today for follow up.   Of note, she also received chest wall XRT.    Review of Systems    Overall she  feels OK and she has no complaints today.  She has tolerated the letrozole well so far.  Her ECOG performance status is 1.  She denies any anxiety, depression, easy bruising, fevers, chills, night  sweats, weight loss, nausea, vomiting, diarrhea, constipation, diplopia, blurred vision, headache, chest pain, palpitations, shortness of breath, breast pain, abdominal pain, extremity pain, or difficulty ambulating.  The remainder of the ten-point ROS, including general, skin, lymph, H/N, cardiorespiratory, GI, , Neuro, Endocrine, and psychiatric is negative.       Objective:      Physical Exam    Deferred.  This was a virtual visit.    Assessment:       1. Hormone receptor positive breast cancer, left, on neoadjuvant letrozole with a significant clinical response.   2. Use of aromatase inhibitors        Plan:        I had a long discussion with her.  She will remain on letrozole, and see me in 3 months for follow up.  Her multiple questions were answered to her satisfaction.

## 2020-06-19 DIAGNOSIS — M47.816 LUMBAR SPONDYLOSIS: ICD-10-CM

## 2020-06-19 RX ORDER — TRAMADOL HYDROCHLORIDE 50 MG/1
TABLET ORAL
Qty: 40 TABLET | Refills: 0 | Status: SHIPPED | OUTPATIENT
Start: 2020-06-19 | End: 2020-08-31

## 2020-07-13 ENCOUNTER — TELEPHONE (OUTPATIENT)
Dept: PAIN MEDICINE | Facility: CLINIC | Age: 85
End: 2020-07-13

## 2020-07-13 NOTE — TELEPHONE ENCOUNTER
----- Message from Ian Tripp sent at 7/13/2020  3:00 PM CDT -----   Name of Who is Calling:     What is the request in detail:  patient request call back in reference to injection appointment Please contact to further discuss and advise      Can the clinic reply by [facility]: no    [facility]  [phone]

## 2020-07-13 NOTE — TELEPHONE ENCOUNTER
Patient was contacted as per patient she stated she would like to get scheduled for an injection. Patient was offered an appt with  for re evaluation and she accepted. Patient was scheduled

## 2020-07-15 ENCOUNTER — TELEPHONE (OUTPATIENT)
Dept: PAIN MEDICINE | Facility: CLINIC | Age: 85
End: 2020-07-15

## 2020-07-15 NOTE — TELEPHONE ENCOUNTER
"This message is for patient in regards to his/her appointment 7/16/20 at 01:30p       Staff informed patient of the policy in place by [facility] for the safety of all patients and staff members.   Staff also informed patient that all visitors will not be allowed to accompany patient during their appointment with provider Dr. Wilcox and any visitors will have to remain inside his/her vehicle until patient appointment is over and patient must wear a face mask the whole time here at [facility].    Upon arriving patient must contact clinic at this number [phone] to notify staff that they have arrive, Staff will give the patient the "okay" to come up or wait inside their vehicle until clinic is ready for the next patient to enter inside the building.    Pt verbalized understanding and has confirmed appt      "

## 2020-07-16 ENCOUNTER — OFFICE VISIT (OUTPATIENT)
Dept: PAIN MEDICINE | Facility: CLINIC | Age: 85
End: 2020-07-16
Attending: ANESTHESIOLOGY
Payer: MEDICARE

## 2020-07-16 VITALS
OXYGEN SATURATION: 100 % | BODY MASS INDEX: 28.39 KG/M2 | HEIGHT: 62 IN | TEMPERATURE: 98 F | SYSTOLIC BLOOD PRESSURE: 155 MMHG | HEART RATE: 67 BPM | DIASTOLIC BLOOD PRESSURE: 81 MMHG | RESPIRATION RATE: 18 BRPM | WEIGHT: 154.31 LBS

## 2020-07-16 DIAGNOSIS — M47.816 FACET ARTHRITIS, DEGENERATIVE, LUMBAR SPINE: ICD-10-CM

## 2020-07-16 DIAGNOSIS — M70.60 TROCHANTERIC BURSITIS, UNSPECIFIED LATERALITY: Primary | ICD-10-CM

## 2020-07-16 PROCEDURE — 1101F PT FALLS ASSESS-DOCD LE1/YR: CPT | Mod: CPTII,S$GLB,, | Performed by: ANESTHESIOLOGY

## 2020-07-16 PROCEDURE — 99214 OFFICE O/P EST MOD 30 MIN: CPT | Mod: S$GLB,,, | Performed by: ANESTHESIOLOGY

## 2020-07-16 PROCEDURE — 3079F PR MOST RECENT DIASTOLIC BLOOD PRESSURE 80-89 MM HG: ICD-10-PCS | Mod: CPTII,S$GLB,, | Performed by: ANESTHESIOLOGY

## 2020-07-16 PROCEDURE — 3077F SYST BP >= 140 MM HG: CPT | Mod: CPTII,S$GLB,, | Performed by: ANESTHESIOLOGY

## 2020-07-16 PROCEDURE — 1101F PR PT FALLS ASSESS DOC 0-1 FALLS W/OUT INJ PAST YR: ICD-10-PCS | Mod: CPTII,S$GLB,, | Performed by: ANESTHESIOLOGY

## 2020-07-16 PROCEDURE — 99999 PR PBB SHADOW E&M-EST. PATIENT-LVL V: CPT | Mod: PBBFAC,,, | Performed by: ANESTHESIOLOGY

## 2020-07-16 PROCEDURE — 1125F AMNT PAIN NOTED PAIN PRSNT: CPT | Mod: S$GLB,,, | Performed by: ANESTHESIOLOGY

## 2020-07-16 PROCEDURE — 3077F PR MOST RECENT SYSTOLIC BLOOD PRESSURE >= 140 MM HG: ICD-10-PCS | Mod: CPTII,S$GLB,, | Performed by: ANESTHESIOLOGY

## 2020-07-16 PROCEDURE — 3079F DIAST BP 80-89 MM HG: CPT | Mod: CPTII,S$GLB,, | Performed by: ANESTHESIOLOGY

## 2020-07-16 PROCEDURE — 99999 PR PBB SHADOW E&M-EST. PATIENT-LVL V: ICD-10-PCS | Mod: PBBFAC,,, | Performed by: ANESTHESIOLOGY

## 2020-07-16 PROCEDURE — 1125F PR PAIN SEVERITY QUANTIFIED, PAIN PRESENT: ICD-10-PCS | Mod: S$GLB,,, | Performed by: ANESTHESIOLOGY

## 2020-07-16 PROCEDURE — 99214 PR OFFICE/OUTPT VISIT, EST, LEVL IV, 30-39 MIN: ICD-10-PCS | Mod: S$GLB,,, | Performed by: ANESTHESIOLOGY

## 2020-07-16 PROCEDURE — 1159F MED LIST DOCD IN RCRD: CPT | Mod: S$GLB,,, | Performed by: ANESTHESIOLOGY

## 2020-07-16 PROCEDURE — 1159F PR MEDICATION LIST DOCUMENTED IN MEDICAL RECORD: ICD-10-PCS | Mod: S$GLB,,, | Performed by: ANESTHESIOLOGY

## 2020-07-16 NOTE — PROGRESS NOTES
Subjective:      Patient ID: Anahi Cook is a 85 y.o. female.    Chief Complaint: No chief complaint on file.    Referred by: No ref. provider found     Mrs. Cook presents to clinic w/ complaints of Lower back pain. S/p Bilateral L3/4 L4/5 L5/S1 in June 2019. Last seen in clinic in 12/2019 in which she received bilateral trigger point injections in gluteal region (6 sites total). She states that she had adequate relief for ~6months following each procedure and now the pain has returned. She also endorses intermittent Bilateral Hip pain that reaches 8/10 when it does occur. She endorses relief w/ prescribed tramadol and supplements with tylenol occasionally.             Past Medical History:   Diagnosis Date    Anxiety     Back pain     Breast cancer 1/17/2018    Breast mass 1/15/2018    Chronic kidney disease, stage 2, mildly decreased GFR     COPD (chronic obstructive pulmonary disease)     emphysema    Depression     Dysuria 1/29/2018    Family history of breast cancer 1/17/2018    GERD (gastroesophageal reflux disease)     Hypertension     Infiltrating ductal carcinoma of breast, left 7/12/2018    Osteoporosis, senile     Ovarian mass 1/15/2018    Pneumonia     S/P robotic assisted laparoscopic BSO (bilateral salpingo-oophorectomy) 2/23/2018       Past Surgical History:   Procedure Laterality Date    AXILLARY NODE DISSECTION Left 7/12/2018    Procedure: LYMPHADENECTOMY, AXILLARY;  Surgeon: Amanda Caballero MD;  Location: [facility] OR [address] [place] [address];  Service: General;  Laterality: Left;    BREAST BIOPSY      BREAST LUMPECTOMY      CHOLECYSTECTOMY      COLONOSCOPY      ESOPHAGOGASTRODUODENOSCOPY      ESOPHAGOGASTRODUODENOSCOPY N/A 10/14/2019    Procedure: EGD (ESOPHAGOGASTRODUODENOSCOPY);  Surgeon: Davonte Thompson MD;  Location: [address]);  Service: Endoscopy;  Laterality: N/A;  hx of pulmonary hypertension-PA 50     pt requesting ASAP    EYE SURGERY      FIXATION KYPHOPLASTY N/A  11/8/2018    Procedure: Kyphoplasty   T12;  Surgeon: Merly Wilcox MD;  Location: [facility] CATH LAB;  Service: Pain Management;  Laterality: N/A;  T12  Nia Reps e-mailed with date and time    HYSTERECTOMY      INJECTION FOR SENTINEL NODE IDENTIFICATION Left 7/12/2018    Procedure: INJECTION, FOR SENTINEL NODE IDENTIFICATION;  Surgeon: Amanda Caballero MD;  [facility];  Service: General;  Laterality: Left;    INJECTION OF FACET JOINT Bilateral 10/15/2018    Procedure: INJECTION, FACET JOINT, BILATERAL LUMBAR L3-4, 4-5, 5-S1 FACET JOINT INJECTIONS;  Surgeon: Merly Wilcox MD;  Location: [facility] PAIN MGT;  Service: Pain Management;  Laterality: Bilateral;    INJECTION OF FACET JOINT Bilateral 4/1/2019    Procedure: INJECTION, FACET JOINT, L3-L4,L4-L5,L5-S1;  Surgeon: Merly Wilcox MD;  Location: [facility] PAIN MGT;  Service: Pain Management;  Laterality: Bilateral;    INJECTION OF FACET JOINT Bilateral 6/20/2019    Procedure: INJECTION, FACET JOINT, L3-L4,L4-L5,L5-S1 NEED CONSENT;  Surgeon: Merly Wilcox MD;  Location: [facility] PAIN MGT;  Service: Pain Management;  Laterality: Bilateral;    MASTECTOMY, PARTIAL Left 7/12/2018    Procedure: MASTECTOMY, PARTIAL-W/SEED LOCALIZATION;  Surgeon: Amanda Caballero MD;  Location: [facility] OR [address] [place] [address];  Service: General;  Laterality: Left;    IN REMOVAL OF OVARY/TUBE(S)  02/23/2018    Robotic Assisted    ROTATOR CUFF REPAIR Right     rotator cuff repair right shoulder    TONSILLECTOMY         Review of patient's allergies indicates:   Allergen Reactions    Levaquin [levofloxacin] Itching    Penicillins Itching       Current Outpatient Medications   Medication Sig Dispense Refill    albuterol (PROAIR HFA) 90 mcg/actuation inhaler Inhale 1-2 puffs into the lungs every 6 (six) hours as needed for Wheezing or Shortness of Breath.      albuterol (PROVENTIL) 2.5 mg /3 mL (0.083 %) nebulizer solution Inhale 3 mLs into the lungs as needed.       albuterol-ipratropium 2.5mg-0.5mg/3mL  (DUO-NEB) 0.5 mg-3 mg(2.5 mg base)/3 mL nebulizer solution Take 3 mLs by nebulization as needed.       atorvastatin (LIPITOR) 20 MG tablet Take 1 tablet (20 mg total) by mouth nightly. 90 tablet 1    benazepriL (LOTENSIN) 20 MG tablet Take 1 tablet (20 mg total) by mouth once daily. 90 tablet 1    calcium-vitamin D 250-100 mg-unit per tablet Take 1 tablet by mouth 2 (two) times daily.      CETIRIZINE HCL (ZYRTEC ORAL) Take 10 mg by mouth nightly as needed.       citalopram (CELEXA) 20 MG tablet Take 1 tablet (20 mg total) by mouth nightly. 90 tablet 1    felodipine (PLENDIL) 10 MG 24 hr tablet Take 2 tablets (20 mg total) by mouth once daily. 180 tablet 1    fluticasone-vilanterol (BREO) 100-25 mcg/dose diskus inhaler Inhale 1 puff into the lungs once daily. 1 each 3    INCRUSE ELLIPTA 62.5 mcg/actuation DsDv USE 1 PUFF QD AT SAME TIME  (patient takes at night)  2    ketoprofen 10 % CrPk APPLY UP TO 4.8 GRAMS (3 PUMPS) TO PAINFUL AREAS UP TO FIVE TIMES DAILY. RUB IN WELL  99    letrozole (FEMARA) 2.5 mg Tab Take 1 tablet (2.5 mg total) by mouth once daily. 90 tablet 1    melatonin 10 mg Tab Take by mouth every evening.      montelukast (SINGULAIR) 10 mg tablet Take 10 mg by mouth every evening.      multivitamin (THERAGRAN) per tablet Take 1 tablet by mouth once daily.      predniSONE (DELTASONE) 10 MG tablet   0    torsemide (DEMADEX) 20 MG Tab Take 1 tablet (20 mg total) by mouth once daily. 90 tablet 1    traMADoL (ULTRAM) 50 mg tablet TAKE 1 TABLET(50 MG) BY MOUTH EVERY 12 HOURS AS NEEDED FOR PAIN 40 tablet 0    acetaminophen (TYLENOL) 325 MG tablet Take 650 mg by mouth every 6 (six) hours as needed for Pain.       omeprazole (PRILOSEC) 20 MG capsule Take 1 capsule (20 mg total) by mouth once daily. (Patient taking differently: Take 20 mg by mouth every 48 hours. ) 90 capsule 2     No current facility-administered medications for this visit.        Family History   Problem Relation Age of Onset     Heart failure Mother     Kidney failure Mother     Heart attack Father     Breast cancer Sister 66        Lump, XRT, chemo, recurrence 10 years later.    Cancer Brother         leukemia    Heart attack Brother 58    Pulmonary embolism Brother 45    Heart attack Brother 52    Breast cancer Maternal Grandmother 60        advanced at diagnosis    Breast cancer Maternal Aunt 83         at 92    Colon cancer Neg Hx     Esophageal cancer Neg Hx        Social History     Socioeconomic History    Marital status: Single     Spouse name: Not on file    Number of children: 3    Years of education: Not on file    Highest education level: Not on file   Occupational History    Occupation: Multiple jobs, see social documentation section     Comment: Retired   Social Needs    Financial resource strain: Not on file    Food insecurity     Worry: Not on file     Inability: Not on file    Transportation needs     Medical: Not on file     Non-medical: Not on file   Tobacco Use    Smoking status: Former Smoker     Packs/day: 1.00     Years: 40.00     Pack years: 40.00     Types: Cigarettes    Smokeless tobacco: Never Used    Tobacco comment: Smoked >1 ppd for at least 40 years, quit around    Substance and Sexual Activity    Alcohol use: Yes     Comment: occasional glass of wine or cocktail    Drug use: No    Sexual activity: Yes     Partners: Male   Lifestyle    Physical activity     Days per week: Not on file     Minutes per session: Not on file    Stress: Not on file   Relationships    Social connections     Talks on phone: Not on file     Gets together: Not on file     Attends Bahai service: Not on file     Active member of club or organization: Not on file     Attends meetings of clubs or organizations: Not on file     Relationship status: Not on file   Other Topics Concern    Not on file   Social History Narrative    Worked many jobs while raising 3 children.  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  She was  from her first , but took care of him at the end of his life.           Review of Systems   Constitution: Negative.   HENT: Negative.    Cardiovascular: Negative.    Respiratory: Negative.    Musculoskeletal: Positive for back pain, joint pain and myalgias. Negative for muscle cramps, neck pain and stiffness.   Gastrointestinal: Negative.    Genitourinary: Negative.    Neurological: Negative for dizziness, focal weakness, loss of balance, numbness and weakness.   Psychiatric/Behavioral: Negative.            Objective:   BP (!) 155/81   Pulse 67   Temp 97.6 °F (36.4 °C)   Resp 18   Ht 5' 2" (1.575 m)   Wt 70 kg (154 lb 5.2 oz)   LMP  (LMP Unknown)   SpO2 100%   BMI 28.23 kg/m²   Pain Disability Index Review:  Last 3 PDI Scores 7/16/2020 12/5/2019 3/25/2019   Pain Disability Index (PDI) 48 0 14     Normocephalic.  Atraumatic.  Affect appropriate.  Breathing unlabored.  Extra ocular muscles intact.           General    Constitutional: She is oriented to person, place, and time. She appears well-developed and well-nourished. No distress.   HENT:   Nose: Nose normal.   Eyes: Pupils are equal, round, and reactive to light.   Cardiovascular: Normal rate and regular rhythm.    Pulmonary/Chest: Effort normal.   Abdominal: Soft.   Neurological: She is alert and oriented to person, place, and time. She has normal reflexes.   Psychiatric: She has a normal mood and affect.             Right Hip Exam     Tenderness   The patient tender to palpation of the trochanteric bursa.    Other   Sensation: normal  Left Hip Exam     Tenderness   The patient tender to palpation of the trochanteric bursa.    Other   Sensation: normal          Muscle Strength   Right Lower Extremity   Hip Abduction: 5/5   Left Lower Extremity   Hip Abduction: 5/5         Assessment:       Encounter Diagnoses "   Name Primary?    Trochanteric bursitis, unspecified laterality Yes    Facet arthritis, degenerative, lumbar spine          Plan:   We discussed with the patient the assessment and recommendations. The following is the plan we agreed on:        Diagnoses and all orders for this visit:    Trochanteric bursitis, unspecified laterality    Facet arthritis, degenerative, lumbar spine       -- Schedule Repeat Bilateral L3/4 L4/5 L5/S1 Facet injection   -- Will preform Bilateral GTB injection at the same time   -- RTC 2 weeks following procedure w/ TIM     Leif Garcia MD  PGY3, CA2 Anethesiology   [facility] [place] [facility] [place]    07/16/2020       I have personally taken the history and examined this patient and agree with the resident's note as stated above.

## 2020-07-20 ENCOUNTER — TELEPHONE (OUTPATIENT)
Dept: PAIN MEDICINE | Facility: CLINIC | Age: 85
End: 2020-07-20

## 2020-07-27 ENCOUNTER — HOSPITAL ENCOUNTER (OUTPATIENT)
Facility: OTHER | Age: 85
Discharge: HOME | End: 2020-07-27
Attending: ANESTHESIOLOGY | Admitting: ANESTHESIOLOGY
Payer: MEDICARE

## 2020-07-27 VITALS
SYSTOLIC BLOOD PRESSURE: 189 MMHG | BODY MASS INDEX: 27.42 KG/M2 | TEMPERATURE: 98 F | OXYGEN SATURATION: 93 % | DIASTOLIC BLOOD PRESSURE: 89 MMHG | HEIGHT: 62 IN | RESPIRATION RATE: 14 BRPM | HEART RATE: 58 BPM | WEIGHT: 149 LBS

## 2020-07-27 DIAGNOSIS — M47.816 LUMBAR SPONDYLOSIS: Primary | ICD-10-CM

## 2020-07-27 DIAGNOSIS — G89.29 CHRONIC PAIN: ICD-10-CM

## 2020-07-27 PROCEDURE — 64495 INJ PARAVERT F JNT L/S 3 LEV: CPT | Mod: 50,,, | Performed by: ANESTHESIOLOGY

## 2020-07-27 PROCEDURE — 64495 PR INJ DX/THER AGNT PARAVERT FACET JOINT,IMG GUIDE,LUMBAR/SAC, ADD LEVEL: ICD-10-PCS | Mod: 50,,, | Performed by: ANESTHESIOLOGY

## 2020-07-27 PROCEDURE — 25500020 PHARM REV CODE 255: Performed by: ANESTHESIOLOGY

## 2020-07-27 PROCEDURE — 64493 INJ PARAVERT F JNT L/S 1 LEV: CPT | Mod: 50,,, | Performed by: ANESTHESIOLOGY

## 2020-07-27 PROCEDURE — 64494 INJ PARAVERT F JNT L/S 2 LEV: CPT | Mod: 50,,, | Performed by: ANESTHESIOLOGY

## 2020-07-27 PROCEDURE — 64493 INJ PARAVERT F JNT L/S 1 LEV: CPT | Mod: 50 | Performed by: ANESTHESIOLOGY

## 2020-07-27 PROCEDURE — 20610 DRAIN/INJ JOINT/BURSA W/O US: CPT | Mod: 50,59 | Performed by: ANESTHESIOLOGY

## 2020-07-27 PROCEDURE — 64495 INJ PARAVERT F JNT L/S 3 LEV: CPT | Mod: 50 | Performed by: ANESTHESIOLOGY

## 2020-07-27 PROCEDURE — 25000003 PHARM REV CODE 250: Performed by: ANESTHESIOLOGY

## 2020-07-27 PROCEDURE — 64494 INJ PARAVERT F JNT L/S 2 LEV: CPT | Mod: 50 | Performed by: ANESTHESIOLOGY

## 2020-07-27 PROCEDURE — 64494 PR INJ DX/THER AGNT PARAVERT FACET JOINT,IMG GUIDE,LUMBAR/SAC, 2ND LEVEL: ICD-10-PCS | Mod: 50,,, | Performed by: ANESTHESIOLOGY

## 2020-07-27 PROCEDURE — 64493 PR INJ DX/THER AGNT PARAVERT FACET JOINT,IMG GUIDE,LUMBAR/SAC,1ST LVL: ICD-10-PCS | Mod: 50,,, | Performed by: ANESTHESIOLOGY

## 2020-07-27 PROCEDURE — 25000003 PHARM REV CODE 250: Performed by: STUDENT IN AN ORGANIZED HEALTH CARE EDUCATION/TRAINING PROGRAM

## 2020-07-27 PROCEDURE — 63600175 PHARM REV CODE 636 W HCPCS: Performed by: ANESTHESIOLOGY

## 2020-07-27 RX ORDER — TRIAMCINOLONE ACETONIDE 40 MG/ML
INJECTION, SUSPENSION INTRA-ARTICULAR; INTRAMUSCULAR
Status: DISCONTINUED | OUTPATIENT
Start: 2020-07-27 | End: 2020-07-27 | Stop reason: HOSPADM

## 2020-07-27 RX ORDER — SODIUM CHLORIDE 9 MG/ML
500 INJECTION, SOLUTION INTRAVENOUS CONTINUOUS
Status: DISCONTINUED | OUTPATIENT
Start: 2020-07-27 | End: 2020-07-27 | Stop reason: HOSPADM

## 2020-07-27 RX ORDER — FENTANYL CITRATE 50 UG/ML
INJECTION, SOLUTION INTRAMUSCULAR; INTRAVENOUS
Status: DISCONTINUED | OUTPATIENT
Start: 2020-07-27 | End: 2020-07-27 | Stop reason: HOSPADM

## 2020-07-27 RX ORDER — MIDAZOLAM HYDROCHLORIDE 1 MG/ML
INJECTION INTRAMUSCULAR; INTRAVENOUS
Status: DISCONTINUED | OUTPATIENT
Start: 2020-07-27 | End: 2020-07-27 | Stop reason: HOSPADM

## 2020-07-27 RX ORDER — BUPIVACAINE HYDROCHLORIDE 2.5 MG/ML
INJECTION, SOLUTION EPIDURAL; INFILTRATION; INTRACAUDAL
Status: DISCONTINUED | OUTPATIENT
Start: 2020-07-27 | End: 2020-07-27 | Stop reason: HOSPADM

## 2020-07-27 RX ORDER — LIDOCAINE HYDROCHLORIDE 10 MG/ML
INJECTION INFILTRATION; PERINEURAL
Status: DISCONTINUED | OUTPATIENT
Start: 2020-07-27 | End: 2020-07-27 | Stop reason: HOSPADM

## 2020-07-27 RX ADMIN — SODIUM CHLORIDE 500 ML: 0.9 INJECTION, SOLUTION INTRAVENOUS at 03:07

## 2020-07-27 NOTE — DISCHARGE INSTRUCTIONS

## 2020-07-27 NOTE — DISCHARGE SUMMARY
Discharge Note  Short Stay      SUMMARY     Admit Date: 7/27/2020    Attending Physician: Merly Wilcox      Discharge Physician: Merly Wilcox      Discharge Date: 7/27/2020 4:06 PM    Procedure(s) (LRB):  INJECTION, FACET JOINT BILATERAL L3/4, L4/5 and L5/S1 (Bilateral)  INJECTION, JOINT, BILATERAL GREATER TROCHANTERIC BURSA (Bilateral)    Final Diagnosis: Lumbar spondylosis [M47.816]  Greater trochanteric bursitis of both hips [M70.61, M70.62]    Disposition: Home or self care    Patient Instructions:   Current Discharge Medication List      CONTINUE these medications which have NOT CHANGED    Details   acetaminophen (TYLENOL) 325 MG tablet Take 650 mg by mouth every 6 (six) hours as needed for Pain.       albuterol (PROAIR HFA) 90 mcg/actuation inhaler Inhale 1-2 puffs into the lungs every 6 (six) hours as needed for Wheezing or Shortness of Breath.      albuterol (PROVENTIL) 2.5 mg /3 mL (0.083 %) nebulizer solution Inhale 3 mLs into the lungs as needed.       albuterol-ipratropium 2.5mg-0.5mg/3mL (DUO-NEB) 0.5 mg-3 mg(2.5 mg base)/3 mL nebulizer solution Take 3 mLs by nebulization as needed.       atorvastatin (LIPITOR) 20 MG tablet Take 1 tablet (20 mg total) by mouth nightly.  Qty: 90 tablet, Refills: 1    Associated Diagnoses: Hyperlipidemia, unspecified hyperlipidemia type; Aortic atherosclerosis      benazepriL (LOTENSIN) 20 MG tablet Take 1 tablet (20 mg total) by mouth once daily.  Qty: 90 tablet, Refills: 1    Associated Diagnoses: Hypertensive heart disease without heart failure      calcium-vitamin D 250-100 mg-unit per tablet Take 1 tablet by mouth 2 (two) times daily.      CETIRIZINE HCL (ZYRTEC ORAL) Take 10 mg by mouth nightly as needed.       citalopram (CELEXA) 20 MG tablet Take 1 tablet (20 mg total) by mouth nightly.  Qty: 90 tablet, Refills: 1    Associated Diagnoses: Depression with anxiety      felodipine (PLENDIL) 10 MG 24 hr tablet Take 2 tablets (20 mg total) by mouth once daily.  Qty: 180  tablet, Refills: 1    Associated Diagnoses: Hypertensive heart disease without heart failure      fluticasone-vilanterol (BREO) 100-25 mcg/dose diskus inhaler Inhale 1 puff into the lungs once daily.  Qty: 1 each, Refills: 3      INCRUSE ELLIPTA 62.5 mcg/actuation DsDv USE 1 PUFF QD AT SAME TIME  (patient takes at night)  Refills: 2      ketoprofen 10 % CrPk APPLY UP TO 4.8 GRAMS (3 PUMPS) TO PAINFUL AREAS UP TO FIVE TIMES DAILY. RUB IN WELL  Refills: 99      letrozole (FEMARA) 2.5 mg Tab Take 1 tablet (2.5 mg total) by mouth once daily.  Qty: 90 tablet, Refills: 1    Associated Diagnoses: Malignant neoplasm of nipple of left breast in female, estrogen receptor positive      melatonin 10 mg Tab Take by mouth every evening.      montelukast (SINGULAIR) 10 mg tablet Take 10 mg by mouth every evening.      multivitamin (THERAGRAN) per tablet Take 1 tablet by mouth once daily.      omeprazole (PRILOSEC) 20 MG capsule Take 1 capsule (20 mg total) by mouth once daily.  Qty: 90 capsule, Refills: 2      predniSONE (DELTASONE) 10 MG tablet Refills: 0      torsemide (DEMADEX) 20 MG Tab Take 1 tablet (20 mg total) by mouth once daily.  Qty: 90 tablet, Refills: 1    Associated Diagnoses: Hypertensive heart disease without heart failure      traMADoL (ULTRAM) 50 mg tablet TAKE 1 TABLET(50 MG) BY MOUTH EVERY 12 HOURS AS NEEDED FOR PAIN  Qty: 40 tablet, Refills: 0    Associated Diagnoses: Lumbar spondylosis                 Discharge Diagnosis: Lumbar spondylosis [M47.816]  Greater trochanteric bursitis of both hips [M70.61, M70.62]  Condition on Discharge: Stable with no complications to procedure   Diet on Discharge: Same as before.  Activity: as per instruction sheet.  Discharge to: Home with a responsible adult.  Follow up: 2-4 weeks       Please call my office or pager at [phone] if experienced any weakness or loss of sensation, fever > 101.5, pain uncontrolled with oral medications, persistent nausea/vomiting/or  diarrhea, redness or drainage from the incisions, or any other worrisome concerns. If physician on call was not reached or could not communicate with our office for any reason please go to the nearest emergency department

## 2020-07-27 NOTE — H&P
HPI  Patient presenting for Procedure(s) (LRB):  INJECTION, FACET JOINT BILATERAL L3/4, L4/5 and L5/S1 (Bilateral)  INJECTION, JOINT, BILATERAL GREATER TROCHANTERIC BURSA (Bilateral)     Patient on Anti-coagulation No    No health changes since previous encounter    Past Medical History:   Diagnosis Date    Anxiety     Back pain     Breast cancer 1/17/2018    Breast mass 1/15/2018    Chronic kidney disease, stage 2, mildly decreased GFR     COPD (chronic obstructive pulmonary disease)     emphysema    Depression     Dysuria 1/29/2018    Family history of breast cancer 1/17/2018    GERD (gastroesophageal reflux disease)     Hypertension     Infiltrating ductal carcinoma of breast, left 7/12/2018    Osteoporosis, senile     Ovarian mass 1/15/2018    Pneumonia     S/P robotic assisted laparoscopic BSO (bilateral salpingo-oophorectomy) 2/23/2018     Past Surgical History:   Procedure Laterality Date    AXILLARY NODE DISSECTION Left 7/12/2018    Procedure: LYMPHADENECTOMY, AXILLARY;  Surgeon: Amanda Caballero MD;  Location: [facility] OR [address] [place] [address];  Service: General;  Laterality: Left;    BREAST BIOPSY      BREAST LUMPECTOMY      CHOLECYSTECTOMY      COLONOSCOPY      ESOPHAGOGASTRODUODENOSCOPY      ESOPHAGOGASTRODUODENOSCOPY N/A 10/14/2019    Procedure: EGD (ESOPHAGOGASTRODUODENOSCOPY);  Surgeon: Davonte Thompson MD;  Location: [facility] ENDO ([address] [place] [address]);  Service: Endoscopy;  Laterality: N/A;  hx of pulmonary hypertension-PA 50     pt requesting ASAP    EYE SURGERY      FIXATION KYPHOPLASTY N/A 11/8/2018    Procedure: Kyphoplasty   T12;  Surgeon: Merly Wilcox MD;  Location: [facility] CATH LAB;  Service: Pain Management;  Laterality: N/A;  T12  [place] Reps e-mailed with date and time    HYSTERECTOMY      INJECTION FOR SENTINEL NODE IDENTIFICATION Left 7/12/2018    Procedure: INJECTION, FOR SENTINEL NODE IDENTIFICATION;  Surgeon: Amanda Caballero MD;  Location: [facility] OR [address] [place] [address];  Service: General;  Laterality: Left;  "   INJECTION OF FACET JOINT Bilateral 10/15/2018    Procedure: INJECTION, FACET JOINT, BILATERAL LUMBAR L3-4, 4-5, 5-S1 FACET JOINT INJECTIONS;  Surgeon: Merly Wilcox MD;  Location: [facility] PAIN MGT;  Service: Pain Management;  Laterality: Bilateral;    INJECTION OF FACET JOINT Bilateral 4/1/2019    Procedure: INJECTION, FACET JOINT, L3-L4,L4-L5,L5-S1;  Surgeon: Merly Wilcox MD;  Location: [facility] PAIN MGT;  Service: Pain Management;  Laterality: Bilateral;    INJECTION OF FACET JOINT Bilateral 6/20/2019    Procedure: INJECTION, FACET JOINT, L3-L4,L4-L5,L5-S1 NEED CONSENT;  Surgeon: Merly Wilcox MD;  Location: [facility] PAIN MGT;  Service: Pain Management;  Laterality: Bilateral;    MASTECTOMY, PARTIAL Left 7/12/2018    Procedure: MASTECTOMY, PARTIAL-W/SEED LOCALIZATION;  Surgeon: Amanda Caballero MD;  Location: [facility] OR [address] [place] [address];  Service: General;  Laterality: Left;    ME REMOVAL OF OVARY/TUBE(S)  02/23/2018    Robotic Assisted    ROTATOR CUFF REPAIR Right     rotator cuff repair right shoulder    TONSILLECTOMY       Review of patient's allergies indicates:   Allergen Reactions    Levaquin [levofloxacin] Itching    Penicillins Itching      No current facility-administered medications for this encounter.        PMHx, PSHx, Allergies, Medications reviewed in epic    ROS negative except pain complaints in HPI    OBJECTIVE:    BP (!) 160/74 (BP Location: Right arm, Patient Position: Sitting)   Pulse 60   Temp 97.9 °F (36.6 °C) (Oral)   Resp 16   Ht 5' 2" (1.575 m)   Wt 67.6 kg (149 lb)   LMP  (LMP Unknown)   SpO2 95%   BMI 27.25 kg/m²     PHYSICAL EXAMINATION:    GENERAL: Well appearing, in no acute distress, alert and oriented x3.  PSYCH:  Mood and affect appropriate.  SKIN: Skin color, texture, turgor normal, no rashes or lesions which will impact the procedure.  CV: RRR with palpation of the radial artery.  PULM: No evidence of respiratory difficulty, symmetric chest rise. Clear to auscultation.  NEURO: Cranial " nerves grossly intact.    Plan:    Proceed with procedure as planned Procedure(s) (LRB):  INJECTION, FACET JOINT BILATERAL L3/4, L4/5 and L5/S1 (Bilateral)  INJECTION, JOINT, BILATERAL GREATER TROCHANTERIC BURSA (Bilateral)    Lief Garcia  07/27/2020

## 2020-07-27 NOTE — OP NOTE
Thoracic/Lumbar Facet Joint Injection Under Fluoroscopy  Time-out taken to identify patient and procedure side prior to starting the procedure. I attest that I have reviewed the patient's home medications prior to the procedure and no contraindication have been identified. I  re-evaluated the patient after the patient was positioned for the procedure in the procedure room immediately before the procedural time-out. The vital signs are current and represent the current state of the patient which has not significantly changed since the preprocedure assessment.   Date of Service: 07/27/2020    PCP: Minerva Mathias MD    Referring Physician:                                                        PROCEDURE:    1. Bilateral facet joint injection at L3/4, L4/5, L5/S1 under fluoroscopy.  2. Bilateral greater trochanteric bursae injection under fluoroscopy.    REASON FOR PROCEDURE: Lumbar spondylosis [M47.816]  Greater trochanteric bursitis of both hips [M70.61, M70.62]  1. Lumbar spondylosis    2. Chronic pain      POSTOP DIAGNOSIS: Lumbar spondylosis [M47.816]  Greater trochanteric bursitis of both hips [M70.61, M70.62]  1. Lumbar spondylosis    2. Chronic pain      PHYSICIAN: Merly Wilcox MD  ASSISTANTS: Delbert Perez MD Resident    MEDICATIONS INJECTED:      1. Facets: 0.5ml Kenalog 40mg and Bupivacaine 0.25% 10ml. Injected 1.5ml  per level.    2. GTB: Kenalog 20mg, bupivacaine 0.25%  10ml Bupivicaine 0.25% and 10 ml Xylocaine 1%  LOCAL ANESTHETIC USED:   Xylocaine 1% 9ml with Sodium Bicarbonate 1ml. 3ml per site.    SEDATION MEDICATIONS: local/IV sedation: Versed 1 mg and fentanyl 25 mcg IV.  Conscious sedation ordered by MD.  Patient reevaluated and sedation administered by MD and monitored by RN.  Total sedation time was less than 20 min. (See nurse documentation and case log for sedation time)    ESTIMATED BLOOD LOSS:  None.    COMPLICATIONS:  None.    TECHNIQUE:   Lying in a prone position, the patient was  prepped and draped in the usual sterile fashion using ChloraPrep and fenestrated drape.  The level was determined under fluoroscopic guidance.  Local anesthetic was given by going down to the hub of the 27-gauge 1.25in needle and raising a wheel.  The 3.5in 22-gauge needle was introduced into all levels as stated above facet joints.  Negative pressure applied to make sure that there was no intravascular placement.  Omnipaque was injected to confirm placement.  It was also done to confirm that there was no vascular runoff.  Medication was then injected slowly.  Attention was then turned to the bilateral greater trochanteric bursae. Local anesthetic was used, given by raising a wheal and    going down to the hub of the 27-gauge needle.  A 3-1/2 inch 22-gauge         needle was introduced under fluoroscopy until the tip reached the right greater trochanteric bursa.  When the tip of the needle was thought     to be in appropriate position, contrast was injected to confirm proper placement.  Medication was then injected slowly.  The same procedure was then completed on the left.  The patient tolerated the procedure well.     PAIN BEFORE THE PROCEDURE:  9-10/10.    PAIN AFTER THE PROCEDURE: 3/10.    The patient was monitored after the procedure.  Patient was given post procedure and discharge instructions to follow at home.  We will see the patient back in two weeks or the patient may call to inform of status. The patient was discharged in a stable condition

## 2020-08-10 ENCOUNTER — TELEPHONE (OUTPATIENT)
Dept: PAIN MEDICINE | Facility: CLINIC | Age: 85
End: 2020-08-10

## 2020-08-10 NOTE — TELEPHONE ENCOUNTER
Staff called to confirm 08/11/20 1:20 pm in office visit with Sofia Schultz Np. Patient informed of instructions. Patient is to arrive on 8th floor [address].     Patient did not answer therefore staff left a detailed voice message instructing the patient of the above information.     My chart email sent

## 2020-08-11 ENCOUNTER — OFFICE VISIT (OUTPATIENT)
Dept: PAIN MEDICINE | Facility: CLINIC | Age: 85
End: 2020-08-11
Payer: MEDICARE

## 2020-08-11 DIAGNOSIS — G89.29 OTHER CHRONIC PAIN: ICD-10-CM

## 2020-08-11 DIAGNOSIS — M70.60 TROCHANTERIC BURSITIS, UNSPECIFIED LATERALITY: ICD-10-CM

## 2020-08-11 DIAGNOSIS — M25.552 CHRONIC LEFT HIP PAIN: Primary | ICD-10-CM

## 2020-08-11 DIAGNOSIS — G89.29 CHRONIC LEFT HIP PAIN: Primary | ICD-10-CM

## 2020-08-11 PROCEDURE — 1159F MED LIST DOCD IN RCRD: CPT | Mod: 95,,, | Performed by: NURSE PRACTITIONER

## 2020-08-11 PROCEDURE — 99213 OFFICE O/P EST LOW 20 MIN: CPT | Mod: 95,,, | Performed by: NURSE PRACTITIONER

## 2020-08-11 PROCEDURE — 1101F PR PT FALLS ASSESS DOC 0-1 FALLS W/OUT INJ PAST YR: ICD-10-PCS | Mod: CPTII,95,, | Performed by: NURSE PRACTITIONER

## 2020-08-11 PROCEDURE — 1159F PR MEDICATION LIST DOCUMENTED IN MEDICAL RECORD: ICD-10-PCS | Mod: 95,,, | Performed by: NURSE PRACTITIONER

## 2020-08-11 PROCEDURE — 99213 PR OFFICE/OUTPT VISIT, EST, LEVL III, 20-29 MIN: ICD-10-PCS | Mod: 95,,, | Performed by: NURSE PRACTITIONER

## 2020-08-11 PROCEDURE — 1101F PT FALLS ASSESS-DOCD LE1/YR: CPT | Mod: CPTII,95,, | Performed by: NURSE PRACTITIONER

## 2020-08-11 NOTE — PROGRESS NOTES
Chronic Pain-Tele-Medicine-Established Note (Follow up visit)        The patient location is: Home  The chief complaint leading to consultation is: pain  Visit type: Virtual visit with synchronous audio and video  Total time spent with patient: 15 min  Each patient to whom he or she provides medical services by telemedicine is:  (1) informed of the relationship between the physician and patient and the respective role of any other health care provider with respect to management of the patient; and (2) notified that he or she may decline to receive medical services by telemedicine and may withdraw from such care at any time.        SUBJECTIVE:    Interval History 8/11/2020:  The patient has a virtual visit today for follow up.  She is s/p Bilateral L3-4, L4-5 L5-S1 facet joint injections with 90% relief.  She says that her back pain is minimal at this time.  She is able to move around and walk better since the procedure as well.  She is having some left hip pain and feels as though her leg will give out sometimes when walking.  She would like a referral to ortho close to her home in [facility].  Her pain today is 1/10.  She denies any respiratory changes since procedure including fever, cough or SOB.    Interval History 12/5/2019:  The patient is here for follow up of lower back and bilateral hip pain.  Her back pain has been minimal.  She is having increased lateral hip and buttock pain recently.  She had TPIs in the past and would like to repeat this today.  Her pain today is 8/10.    Previous Encounter:  Anahi Cook presents to the clinic for a follow-up appointment for lower back and left lower extremity pain.  She had T12 kyphoplasty performed on 11/8/18 with significant improvement in symptoms.  She was admitted through the ED on 4/3/19 after suffering fractures of 6th, 8th and possible 7th ribs of the left side after a fall at home.  Thoracic imaging also showed stable slight anterior wedging at T11.  She had  bilateral L3-4, L4-5 and L5-S1 facet injections on 4/1/19 with 80% relief of lower back pain.  Her current pain is minimal.  She has mild leg pain with walking.  She continues to follow up with oncology regularly.  Since the last visit, Anahi Cook states the pain has been improving.  Current pain intensity is 2/10.     Pain Medications:  OTC Tylenol    Opioid Contract: no     report:  Not applicable    Pain Procedures:   7/21/14 L4-5 IL ASHUTOSH- significant benefit  12/1/15 Left GT bursa injection  4/26/16 Left GT bursa injection  12/15/16 L4-5 IL ASHUTOSH- significant benefit  2/15/18 L4-5 IL ASHUTOSH- 100% relief  8/21/18 L4-5 IL ASHUTOSH- significant relief  10/15/18 Bilateral L3-4, L4-5 L5-S1 facet joint injections  11/8/18 T12 kyphoplasty- significant relief  4/1/19 Bilateral L3-4, L4-5 L5-S1 facet joint injections- 80% relief  7/27/20 Bilateral L3-4, L4-5 L5-S1 facet joint injections- 90% relief    Physical Therapy/Home Exercise: yes     Imaging:     Narrative     EXAMINATION:  XR THORACIC SPINE AP LATERAL    CLINICAL HISTORY:  Unspecified injury of lower back, initial encounter    TECHNIQUE:  Two views obtained.  Note that patient is slightly obliquely oriented on the lateral views which combines with severe osseous demineralization and result in limitation.    COMPARISON:  CT January 2018    FINDINGS:  There are surgical clips in the right upper quadrant.  There is tortuosity of the aorta with calcification along its wall.  Mild curvature of the spine is noted.  There is mild compression deformity along the superior endplate of T12.  No definite additional compression deformity noted.      Impression       Mild compression deformity along superior endplate of T12 new compared to January 2018.  Exam somewhat limited due to osseous demineralization and patient obliquity.     Narrative     EXAMINATION:  XR LUMBAR SPINE AP AND LATERAL    CLINICAL HISTORY:  Low back pain, minor trauma;    TECHNIQUE:  AP, lateral and spot  images were performed of the lumbar spine.  Patient is slightly obliquely positioned on the lateral view.    COMPARISON:  CT from January 2018    FINDINGS:  There is severe osseous demineralization.  Surgical clips are present in the abdomen.  There is calcification along the wall of the aorta and branches.    Grade 1 anterolisthesis is present at L4-5.  T12 demonstrates mild compression deformity along the superior endplate which is a change compared to the January examination.  Remaining vertebral bodies are normal in height and alignment.  Discs are maintained.      Impression       Mild compression deformity along the superior endplate of T12, new compared to January 2018 CT.       Lumbar MRI 5/21/14    Narrative     Comparison: None    Findings:  L5-S1: There is no disk disease.  There is a 8mm extra canal synovial cyst emanating from the right facet joint.  There is mild facet arthropathy.  There is no central canal or neural foraminal narrowing  L4-L5: There is grade 1 anterolisthesis of L4 on L5 with uncovering of the disk.  There is a diffuse posterior disk bulge.  There is severe ligamentous thickening and moderate facet arthropathy generating moderate severe central canal stenosis.  There is   moderate right neural foraminal stenosis and severe left neural foraminal stenosis.  L3-L4: There is a posterior disk bulge with super imposed right foraminal broad-based protrusion.  There is mild to moderate ligamentous thickening and mild facet arthropathy.  There is mild narrowing of the right lateral recess.  There is mild left   neural foraminal narrowing and severe right neural foraminal narrowing due to the foraminal protrusion.  L2-L3: There is a diffuse disk bulge present.  There is minimal ligamentous thickening and mild facet arthropathy generating at most mild central canal stenosis.  There is an extraforaminal bulge at this level as well leading to moderate right neural   foraminal stenosis and mild  left neural foraminal stenosis.  L1-L2: No significant disease.    There is no marrow replacement process, infection, tumor present.  Limited evaluation of intra-abdominal structures unremarkable.  No abnormal masses or fluid collections are present.   Impression       Multilevel degenerative changes most prominent at L4-L5 where there is moderate to severe central canal stenosis and severe left foraminal stenosis.  Severe right foraminal stenosis is present at L3-L4 as well.     Narrative     EXAMINATION:  MRI THORACIC SPINE W WO CONTRAST    CLINICAL HISTORY:  Abnormal xray, thoracic spine, DJD;Spine fracture, traumatic, thoracic;Compression fracture T12 and breast cancer;  Abnormal findings on diagnostic imaging of other parts of musculoskeletal system    TECHNIQUE:  Multiplanar, multisequence images were performed through the thoracic spine.  Contrast was not administered.    COMPARISON:  CT chest 01/23/2018    FINDINGS:  There is a subacute severe biconcave compression fracture of the T12 vertebral body.  There is retropulsion of fracture fragments into the central canal resulting in moderate central canal stenosis with impression on the spinal cord.  The visualized portions of the spinal cord are otherwise unremarkable.    There is multilevel degenerative change with posterior disc bulges most prominent at T6-T7 where there is mild central canal stenosis.  No other evidence of osseous fracture.  The vertebral body heights are otherwise well maintained.    The heart is enlarged.  There is a probable 1.5 cm right renal cyst.  The pre and post vertebral soft tissues are otherwise unremarkable.      Impression       1.  Subacute severe biconcave compression fracture of the T12 vertebral body with retropulsion of fracture fragments into the central canal resulting in moderate central canal stenosis.    2.  Cardiomegaly.     Narrative     EXAMINATION:  XR THORACIC SPINE AP LATERAL    CLINICAL HISTORY:  Unspecified  fall, initial encounter    TECHNIQUE:  AP and lateral views of the thoracic spine were performed.    COMPARISON:  May 2018 x-ray.  MRI September 2018.    FINDINGS:  There is diffuse osseous demineralization.  There are surgical clips in the right upper quadrant.  The thoracolumbar spine demonstrates mild scoliosis.    Osseous structures demonstrate no definite alignment abnormality.  Patient has undergone interval vertebroplasty at the known T12 compression deformity which is severe, with mild retropulsion.  There is stable slight anterior wedging at T11.  There is no new compression fracture.      Impression       No acute finding     Narrative     EXAMINATION:  XR RIBS 2 VIEW LEFT    CLINICAL HISTORY:  Unspecified fall, initial encounter    TECHNIQUE:  Two views of the left ribs were performed.    COMPARISON:  None.    FINDINGS:  There are surgical clips in the left axilla.  There are surgical clips in the right upper quadrant.    There is diffuse osseous demineralization.  There are fractures of the 6th and 8th ribs, without significant displacement, possibly 7 rib as well.  No pneumothorax.      Impression       Recent rib fractures.         Past Medical History:  Past Medical History:   Diagnosis Date    Anxiety     Back pain     Breast cancer 1/17/2018    Breast mass 1/15/2018    Chronic kidney disease, stage 2, mildly decreased GFR     COPD (chronic obstructive pulmonary disease)     emphysema    Depression     Dysuria 1/29/2018    Family history of breast cancer 1/17/2018    GERD (gastroesophageal reflux disease)     Hypertension     Infiltrating ductal carcinoma of breast, left 7/12/2018    Osteoporosis, senile     Ovarian mass 1/15/2018    Pneumonia     S/P robotic assisted laparoscopic BSO (bilateral salpingo-oophorectomy) 2/23/2018       Past Surgical History:  Past Surgical History:   Procedure Laterality Date    AXILLARY NODE DISSECTION Left 7/12/2018    Procedure: LYMPHADENECTOMY,  AXILLARY;  Surgeon: Amanda Caballero MD;  [facility];  Service: General;  Laterality: Left;    BREAST BIOPSY      BREAST LUMPECTOMY      CHOLECYSTECTOMY      COLONOSCOPY      ESOPHAGOGASTRODUODENOSCOPY      ESOPHAGOGASTRODUODENOSCOPY N/A 10/14/2019    Procedure: EGD (ESOPHAGOGASTRODUODENOSCOPY);  Surgeon: Davonte Thompson MD;  Location: [facility] ENDO (2ND FLR);  Service: Endoscopy;  Laterality: N/A;  hx of pulmonary hypertension-PA 50     pt requesting ASAP    EYE SURGERY      FIXATION KYPHOPLASTY N/A 11/8/2018    Procedure: Kyphoplasty   T12;  Surgeon: Merly Wilcox MD;  Location: [facility] CATH LAB;  Service: Pain Management;  Laterality: N/A;  T12  [email or web address] Reps e-mailed with date and time    HYSTERECTOMY      INJECTION FOR SENTINEL NODE IDENTIFICATION Left 7/12/2018    Procedure: INJECTION, FOR SENTINEL NODE IDENTIFICATION;  Surgeon: Amanda Caballero MD;  [facility];  Service: General;  Laterality: Left;    INJECTION OF FACET JOINT Bilateral 10/15/2018    Procedure: INJECTION, FACET JOINT, BILATERAL LUMBAR L3-4, 4-5, 5-S1 FACET JOINT INJECTIONS;  Surgeon: Merly Wilcox MD;  Location: [facility] PAIN MGT;  Service: Pain Management;  Laterality: Bilateral;    INJECTION OF FACET JOINT Bilateral 4/1/2019    Procedure: INJECTION, FACET JOINT, L3-L4,L4-L5,L5-S1;  Surgeon: Merly Wilcox MD;  Location: [facility] PAIN MGT;  Service: Pain Management;  Laterality: Bilateral;    INJECTION OF FACET JOINT Bilateral 6/20/2019    Procedure: INJECTION, FACET JOINT, L3-L4,L4-L5,L5-S1 NEED CONSENT;  Surgeon: Merly Wilcox MD;  Location: [facility] PAIN MGT;  Service: Pain Management;  Laterality: Bilateral;    INJECTION OF FACET JOINT Bilateral 7/27/2020    Procedure: INJECTION, FACET JOINT BILATERAL L3/4, L4/5 and L5/S1;  Surgeon: Merly Wilcox MD;  Location: [facility] PAIN MGT;  Service: Pain Management;  Laterality: Bilateral;    INJECTION OF JOINT Bilateral 7/27/2020    Procedure: INJECTION, JOINT, BILATERAL GREATER  TROCHANTERIC BURSA;  Surgeon: Merly Wilcox MD;  Location: [facility] PAIN MGT;  Service: Pain Management;  Laterality: Bilateral;    MASTECTOMY, PARTIAL Left 2018    Procedure: MASTECTOMY, PARTIAL-W/SEED LOCALIZATION;  Surgeon: Amanda Caballero MD;  Location: [facility] OR 2ND FLR;  Service: General;  Laterality: Left;    MS REMOVAL OF OVARY/TUBE(S)  2018    Robotic Assisted    ROTATOR CUFF REPAIR Right     rotator cuff repair right shoulder    TONSILLECTOMY         Family History:  Family History   Problem Relation Age of Onset    Heart failure Mother     Kidney failure Mother     Heart attack Father     Breast cancer Sister 66        Lump, XRT, chemo, recurrence 10 years later.    Cancer Brother         leukemia    Heart attack Brother 58    Pulmonary embolism Brother 45    Heart attack Brother 52    Breast cancer Maternal Grandmother 60        advanced at diagnosis    Breast cancer Maternal Aunt 83         at 92    Colon cancer Neg Hx     Esophageal cancer Neg Hx        Social History:  Social History     Socioeconomic History    Marital status: Single     Spouse name: Not on file    Number of children: 3    Years of education: Not on file    Highest education level: Not on file   Occupational History    Occupation: Multiple jobs, see social documentation section     Comment: Retired   Social Needs    Financial resource strain: Not on file    Food insecurity     Worry: Not on file     Inability: Not on file    Transportation needs     Medical: Not on file     Non-medical: Not on file   Tobacco Use    Smoking status: Former Smoker     Packs/day: 1.00     Years: 40.00     Pack years: 40.00     Types: Cigarettes    Smokeless tobacco: Never Used    Tobacco comment: Smoked >1 ppd for at least 40 years, quit around    Substance and Sexual Activity    Alcohol use: Yes     Comment: occasional glass of wine or cocktail    Drug use: No    Sexual activity: Yes     Partners: Male    Lifestyle    Physical activity     Days per week: Not on file     Minutes per session: Not on file    Stress: Not on file   Relationships    Social connections     Talks on phone: Not on file     Gets together: Not on file     Attends [employer] service: Not on file     Active member of club or organization: Not on file     Attends meetings of clubs or organizations: Not on file     Relationship status: Not on file   Other Topics Concern    Not on file   Social History Narrative    Worked many jobs while raising 3 children.  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  She was  from her first , but took care of him at the end of his life.       REVIEW OF SYSTEMS:    GENERAL:  No weight loss, malaise or fevers.  HEENT:   No recent changes in vision or hearing  NECK:  Negative for lumps, no difficulty with swallowing.  RESPIRATORY:  Negative for cough, wheezing or shortness of breath, patient denies any recent URI. COPD.  CARDIOVASCULAR:  Negative for chest pain, leg swelling or palpitations. Hypertension.  GI:  Negative for abdominal discomfort, blood in stools or black stools or change in bowel habits.  MUSCULOSKELETAL:  See HPI.  SKIN:  Negative for lesions, rash, and itching.  PSYCH:  No mood disorder or recent psychosocial stressors.  Patients sleep is not disturbed secondary to pain.  HEMATOLOGY/LYMPHOLOGY:  Negative for prolonged bleeding, bruising easily or swollen nodes.  Patient is not currently taking any anti-coagulants  NEURO:   No history of headaches, syncope, paralysis, seizures or tremors.  All other reviewed and negative other than HPI.    OBJECTIVE:    LMP  (LMP Unknown)     No exam performed today due to virtual visit.     PREVIOUS PHYSICAL EXAMINATION:     GENERAL: Well appearing, in no acute distress, alert and oriented x3.  PSYCH:  Mood and affect appropriate.  SKIN: Skin color,  texture, turgor normal, no rashes or lesions.  HEAD/FACE:  Normocephalic, atraumatic. Cranial nerves grossly intact.  CV: RRR with palpation of the radial artery.  PULM: No evidence of respiratory difficulty, symmetric chest rise.  GI:  Soft and non-tender.  BACK: Straight leg raising in the sitting and supine positions is negative to radicular pain.  No pain with palpation of thoracic spine.  Limited ROM with pain on extension.  Mild facet loading bilaterally.  EXTREMITIES: Peripheral joint ROM is full and pain free without obvious instability or laxity in all four extremities. No deformities, edema, or skin discoloration. Good capillary refill.  MUSCULOSKELETAL: Provocative maneuvers of hips do not cause pain.  TTP over both GT bursa and gluteal muscles.  No atrophy or tone abnormalities are noted.  NEURO: Bilateral upper and lower extremity coordination and muscle stretch reflexes are physiologic and symmetric.  Plantar response are downgoing. No clonus.  Normal sensation.  GAIT: Antalgic- ambulates with rolling walker.      ASSESSMENT: 85 y.o. year old female with lower back and lower extremity pain, consistent with the following diagnoses:     1. Chronic left hip pain  Ambulatory referral/consult to Orthopedics   2. Other chronic pain     3. Trochanteric bursitis, unspecified laterality           PLAN:     - Previous imaging was reviewed and discussed with the patient today.    - She had significant benefit with Bilateral L3-4, L4-5 L5-S1 facet joint injections.  Will continue to monitor progress.    - I will place referral for Ortho in [facility] for her left hip.    - Continue with home PT exercises.    - RTC PRN.      The above plan and management options were discussed at length with patient. Patient is in agreement with the above and verbalized understanding.    Sofia Schultz  08/11/2020

## 2020-08-12 ENCOUNTER — TELEPHONE (OUTPATIENT)
Dept: ADMINISTRATIVE | Facility: OTHER | Age: 85
End: 2020-08-12

## 2020-08-12 NOTE — TELEPHONE ENCOUNTER
Left voice message for patient to return call to schedule appointment from referral to Orthopedic.  Ester CORTEZ [phone]

## 2020-08-14 DIAGNOSIS — M25.552 LEFT HIP PAIN: Primary | ICD-10-CM

## 2020-08-27 DIAGNOSIS — M25.511 RIGHT SHOULDER PAIN, UNSPECIFIED CHRONICITY: Primary | ICD-10-CM

## 2020-08-28 ENCOUNTER — TELEPHONE (OUTPATIENT)
Dept: SPORTS MEDICINE | Facility: CLINIC | Age: 85
End: 2020-08-28

## 2020-08-31 DIAGNOSIS — M47.816 LUMBAR SPONDYLOSIS: ICD-10-CM

## 2020-08-31 RX ORDER — TRAMADOL HYDROCHLORIDE 50 MG/1
TABLET ORAL
Qty: 40 TABLET | Refills: 0 | Status: SHIPPED | OUTPATIENT
Start: 2020-08-31 | End: 2020-10-15 | Stop reason: SDUPTHER

## 2020-09-09 ENCOUNTER — OFFICE VISIT (OUTPATIENT)
Dept: SPORTS MEDICINE | Facility: CLINIC | Age: 85
End: 2020-09-09
Payer: MEDICARE

## 2020-09-09 ENCOUNTER — HOSPITAL ENCOUNTER (OUTPATIENT)
Dept: RADIOLOGY | Facility: HOSPITAL | Age: 85
Discharge: HOME | End: 2020-09-09
Attending: PHYSICIAN ASSISTANT
Payer: MEDICARE

## 2020-09-09 ENCOUNTER — TELEPHONE (OUTPATIENT)
Dept: SPORTS MEDICINE | Facility: CLINIC | Age: 85
End: 2020-09-09

## 2020-09-09 VITALS
SYSTOLIC BLOOD PRESSURE: 171 MMHG | DIASTOLIC BLOOD PRESSURE: 79 MMHG | WEIGHT: 149.88 LBS | HEIGHT: 62 IN | BODY MASS INDEX: 27.58 KG/M2 | HEART RATE: 71 BPM

## 2020-09-09 DIAGNOSIS — M16.0 PRIMARY OSTEOARTHRITIS OF BOTH HIPS: Primary | ICD-10-CM

## 2020-09-09 DIAGNOSIS — M19.011 PRIMARY OSTEOARTHRITIS OF RIGHT SHOULDER: ICD-10-CM

## 2020-09-09 DIAGNOSIS — M25.552 LEFT HIP PAIN: ICD-10-CM

## 2020-09-09 DIAGNOSIS — M25.511 RIGHT SHOULDER PAIN, UNSPECIFIED CHRONICITY: ICD-10-CM

## 2020-09-09 PROCEDURE — 73521 XR HIPS BILATERAL 2 VIEW INCL AP PELVIS: ICD-10-PCS | Mod: 26,,, | Performed by: RADIOLOGY

## 2020-09-09 PROCEDURE — 1126F PR PAIN SEVERITY QUANTIFIED, NO PAIN PRESENT: ICD-10-PCS | Mod: S$GLB,,, | Performed by: PHYSICIAN ASSISTANT

## 2020-09-09 PROCEDURE — 20610 PR DRAIN/INJECT LARGE JOINT/BURSA: ICD-10-PCS | Mod: RT,S$GLB,, | Performed by: PHYSICIAN ASSISTANT

## 2020-09-09 PROCEDURE — 1159F PR MEDICATION LIST DOCUMENTED IN MEDICAL RECORD: ICD-10-PCS | Mod: S$GLB,,, | Performed by: PHYSICIAN ASSISTANT

## 2020-09-09 PROCEDURE — 99999 PR PBB SHADOW E&M-EST. PATIENT-LVL IV: CPT | Mod: PBBFAC,,, | Performed by: PHYSICIAN ASSISTANT

## 2020-09-09 PROCEDURE — 73521 X-RAY EXAM HIPS BI 2 VIEWS: CPT | Mod: TC,PN

## 2020-09-09 PROCEDURE — 1126F AMNT PAIN NOTED NONE PRSNT: CPT | Mod: S$GLB,,, | Performed by: PHYSICIAN ASSISTANT

## 2020-09-09 PROCEDURE — 73030 X-RAY EXAM OF SHOULDER: CPT | Mod: TC,PN,RT

## 2020-09-09 PROCEDURE — 1159F MED LIST DOCD IN RCRD: CPT | Mod: S$GLB,,, | Performed by: PHYSICIAN ASSISTANT

## 2020-09-09 PROCEDURE — 3288F PR FALLS RISK ASSESSMENT DOCUMENTED: ICD-10-PCS | Mod: CPTII,S$GLB,, | Performed by: PHYSICIAN ASSISTANT

## 2020-09-09 PROCEDURE — 99999 PR PBB SHADOW E&M-EST. PATIENT-LVL IV: ICD-10-PCS | Mod: PBBFAC,,, | Performed by: PHYSICIAN ASSISTANT

## 2020-09-09 PROCEDURE — 73521 X-RAY EXAM HIPS BI 2 VIEWS: CPT | Mod: 26,,, | Performed by: RADIOLOGY

## 2020-09-09 PROCEDURE — 73030 X-RAY EXAM OF SHOULDER: CPT | Mod: 26,RT,, | Performed by: RADIOLOGY

## 2020-09-09 PROCEDURE — 99204 OFFICE O/P NEW MOD 45 MIN: CPT | Mod: 25,S$GLB,, | Performed by: PHYSICIAN ASSISTANT

## 2020-09-09 PROCEDURE — 99204 PR OFFICE/OUTPT VISIT, NEW, LEVL IV, 45-59 MIN: ICD-10-PCS | Mod: 25,S$GLB,, | Performed by: PHYSICIAN ASSISTANT

## 2020-09-09 PROCEDURE — 1100F PR PT FALLS ASSESS DOC 2+ FALLS/FALL W/INJURY/YR: ICD-10-PCS | Mod: CPTII,S$GLB,, | Performed by: PHYSICIAN ASSISTANT

## 2020-09-09 PROCEDURE — 1100F PTFALLS ASSESS-DOCD GE2>/YR: CPT | Mod: CPTII,S$GLB,, | Performed by: PHYSICIAN ASSISTANT

## 2020-09-09 PROCEDURE — 3288F FALL RISK ASSESSMENT DOCD: CPT | Mod: CPTII,S$GLB,, | Performed by: PHYSICIAN ASSISTANT

## 2020-09-09 PROCEDURE — 73030 XR SHOULDER COMPLETE 2 OR MORE VIEWS RIGHT: ICD-10-PCS | Mod: 26,RT,, | Performed by: RADIOLOGY

## 2020-09-09 PROCEDURE — 20610 DRAIN/INJ JOINT/BURSA W/O US: CPT | Mod: RT,S$GLB,, | Performed by: PHYSICIAN ASSISTANT

## 2020-09-09 RX ORDER — TRIAMCINOLONE ACETONIDE 40 MG/ML
40 INJECTION, SUSPENSION INTRA-ARTICULAR; INTRAMUSCULAR
Status: COMPLETED | OUTPATIENT
Start: 2020-09-09 | End: 2020-09-09

## 2020-09-09 RX ADMIN — TRIAMCINOLONE ACETONIDE 40 MG: 40 INJECTION, SUSPENSION INTRA-ARTICULAR; INTRAMUSCULAR at 12:09

## 2020-09-09 NOTE — LETTER
September 9, 2020      Minerva Mathias MD  [address]  [facility] [address]           [facility]  [address]  [facility] [address]  Phone: [phone]  Fax: [phone]          Patient: Anahi Cook   MR Number: [id number]   YOB: 1934   Date of Visit: 9/9/2020       Dear Dr. Minerva Matihas:    Thank you for referring Anahi Cook to me for evaluation. Attached you will find relevant portions of my assessment and plan of care.    If you have questions, please do not hesitate to call me. I look forward to following Anahi Cook along with you.    Sincerely,    Reji Gimenez PA-C    Enclosure  CC:  No Recipients    If you would like to receive this communication electronically, please contact [email or web address] or [phone] to request more information on [employer] access.    For providers and/or their staff who would like to refer a patient to [facility], please contact us through our one-stop-shop provider referral line, [facility] , at [phone].    If you feel you have received this communication in error or would no longer like to receive these types of communications, please e-mail [email or web address]

## 2020-09-09 NOTE — TELEPHONE ENCOUNTER
LVM for patient Anahi to see if she is still coming to her appointment today schedule for 11:30 with Eduardo, but had x-ray orders at 10:45 and 11 for two different areas. Please contact the office or send message if need to reschedule

## 2020-09-09 NOTE — PROGRESS NOTES
CC: RIGHT shoulder and left hip pain     86 y.o. Female presents as a new patient to me, presents with her daughter. Right shoulder pain x many years. Atraumatic onset. She states she did have rotator cuff surgery many years ago, is not sure who did the surgery or when it was specifically. Pain localizes posterior and lateral aspect of shoulder. Worse with lifting overhead and sleeping on right side. She reports that the pain and weakness are worse with overhead activity. It also bothers her at night.  Better with rest. Denies neck pain or radicular symptoms. Treatment thus far has included activity modifications, rest, and oral medication.  Here today to discuss diagnosis and treatment options.     She also mentions having left hip pain x many years as well.  She describes the pain as soreness, rated 1/10 today as she recently had an injection by Dr. Wilcox in bilateral hip and back.  The pain is located groin, worse with prolonged standing/ambulation, better with injections.  Her hip pain does not radiate to her back and/or leg.  Treatment this far has consisted of injections, rest which have helped.  She states she is more concerned because her hip will sometimes give out on her and she will feel like she almost falls.  She uses a walker for assistance with ambulation.    PAST MEDICAL HISTORY:   Past Medical History:   Diagnosis Date    Anxiety     Back pain     Breast cancer 1/17/2018    Breast mass 1/15/2018    Chronic kidney disease, stage 2, mildly decreased GFR     COPD (chronic obstructive pulmonary disease)     emphysema    Depression     Dysuria 1/29/2018    Family history of breast cancer 1/17/2018    GERD (gastroesophageal reflux disease)     Hypertension     Infiltrating ductal carcinoma of breast, left 7/12/2018    Osteoporosis, senile     Ovarian mass 1/15/2018    Pneumonia     S/P robotic assisted laparoscopic BSO (bilateral salpingo-oophorectomy) 2/23/2018       PAST SURGICAL  HISTORY:  Past Surgical History:   Procedure Laterality Date    AXILLARY NODE DISSECTION Left 7/12/2018    Procedure: LYMPHADENECTOMY, AXILLARY;  Surgeon: Amanda Caballero MD;  Location: [facility] OR [address] [place] [address];  Service: General;  Laterality: Left;    BREAST BIOPSY      BREAST LUMPECTOMY      CHOLECYSTECTOMY      COLONOSCOPY      ESOPHAGOGASTRODUODENOSCOPY      ESOPHAGOGASTRODUODENOSCOPY N/A 10/14/2019    Procedure: EGD (ESOPHAGOGASTRODUODENOSCOPY);  Surgeon: Davonte Thompson MD;  [facility]  Service: Endoscopy;  Laterality: N/A;  hx of pulmonary hypertension-PA 50     pt requesting ASAP    EYE SURGERY      FIXATION KYPHOPLASTY N/A 11/8/2018    Procedure: Kyphoplasty   T12;  Surgeon: Merly Wilcox MD;  Location: [facility] CATH LAB;  Service: Pain Management;  Laterality: N/A;  T12  [employer] e-mailed with date and time    HYSTERECTOMY      INJECTION FOR SENTINEL NODE IDENTIFICATION Left 7/12/2018    Procedure: INJECTION, FOR SENTINEL NODE IDENTIFICATION;  Surgeon: Amanda Caballero MD;  Location: [facility] OR [address] [place] [address];  Service: General;  Laterality: Left;    INJECTION OF FACET JOINT Bilateral 10/15/2018    Procedure: INJECTION, FACET JOINT, BILATERAL LUMBAR L3-4, 4-5, 5-S1 FACET JOINT INJECTIONS;  Surgeon: Merly Wilcox MD;  Location: [facility] PAIN MGT;  Service: Pain Management;  Laterality: Bilateral;    INJECTION OF FACET JOINT Bilateral 4/1/2019    Procedure: INJECTION, FACET JOINT, L3-L4,L4-L5,L5-S1;  Surgeon: Merly Wilcox MD;  Location: [facility] PAIN MGT;  Service: Pain Management;  Laterality: Bilateral;    INJECTION OF FACET JOINT Bilateral 6/20/2019    Procedure: INJECTION, FACET JOINT, L3-L4,L4-L5,L5-S1 NEED CONSENT;  Surgeon: Merly Wilcox MD;  Location: [facility] PAIN MGT;  Service: Pain Management;  Laterality: Bilateral;    INJECTION OF FACET JOINT Bilateral 7/27/2020    Procedure: INJECTION, FACET JOINT BILATERAL L3/4, L4/5 and L5/S1;  Surgeon: Merly Wilcox MD;  Location: [facility] PAIN MGT;  Service:  Pain Management;  Laterality: Bilateral;    INJECTION OF JOINT Bilateral 2020    Procedure: INJECTION, JOINT, BILATERAL GREATER TROCHANTERIC BURSA;  Surgeon: Merly Wilcox MD;  Location: [facility] PAIN MGT;  Service: Pain Management;  Laterality: Bilateral;    MASTECTOMY, PARTIAL Left 2018    Procedure: MASTECTOMY, PARTIAL-W/SEED LOCALIZATION;  Surgeon: Amanda Caballero MD;  [facility] OR [facility] FLR;  Service: General;  Laterality: Left;    DC REMOVAL OF OVARY/TUBE(S)  2018    Robotic Assisted    ROTATOR CUFF REPAIR Right     rotator cuff repair right shoulder    TONSILLECTOMY         FAMILY HISTORY:  Family History   Problem Relation Age of Onset    Heart failure Mother     Kidney failure Mother     Heart attack Father     Breast cancer Sister 66        Lump, XRT, chemo, recurrence 10 years later.    Cancer Brother         leukemia    Heart attack Brother 58    Pulmonary embolism Brother 45    Heart attack Brother 52    Breast cancer Maternal Grandmother 60        advanced at diagnosis    Breast cancer Maternal Aunt 83         at 92    Colon cancer Neg Hx     Esophageal cancer Neg Hx        MEDICATIONS:    Current Outpatient Medications:     acetaminophen (TYLENOL) 325 MG tablet, Take 650 mg by mouth every 6 (six) hours as needed for Pain. , Disp: , Rfl:     albuterol (PROAIR HFA) 90 mcg/actuation inhaler, Inhale 1-2 puffs into the lungs every 6 (six) hours as needed for Wheezing or Shortness of Breath., Disp: , Rfl:     albuterol (PROVENTIL) 2.5 mg /3 mL (0.083 %) nebulizer solution, Inhale 3 mLs into the lungs as needed. , Disp: , Rfl:     albuterol-ipratropium 2.5mg-0.5mg/3mL (DUO-NEB) 0.5 mg-3 mg(2.5 mg base)/3 mL nebulizer solution, Take 3 mLs by nebulization as needed. , Disp: , Rfl:     atorvastatin (LIPITOR) 20 MG tablet, Take 1 tablet (20 mg total) by mouth nightly., Disp: 90 tablet, Rfl: 1    benazepriL (LOTENSIN) 20 MG tablet, Take 1 tablet (20 mg total) by mouth once  daily., Disp: 90 tablet, Rfl: 1    calcium-vitamin D 250-100 mg-unit per tablet, Take 1 tablet by mouth 2 (two) times daily., Disp: , Rfl:     CETIRIZINE HCL (ZYRTEC ORAL), Take 10 mg by mouth nightly as needed. , Disp: , Rfl:     citalopram (CELEXA) 20 MG tablet, TAKE 1 TABLET(20 MG) BY MOUTH EVERY NIGHT, Disp: 90 tablet, Rfl: 0    felodipine (PLENDIL) 10 MG 24 hr tablet, Take 2 tablets (20 mg total) by mouth once daily., Disp: 180 tablet, Rfl: 1    fluticasone-vilanterol (BREO) 100-25 mcg/dose diskus inhaler, Inhale 1 puff into the lungs once daily., Disp: 1 each, Rfl: 3    INCRUSE ELLIPTA 62.5 mcg/actuation DsDv, USE 1 PUFF QD AT SAME TIME  (patient takes at night), Disp: , Rfl: 2    ketoprofen 10 % CrPk, APPLY UP TO 4.8 GRAMS (3 PUMPS) TO PAINFUL AREAS UP TO FIVE TIMES DAILY. RUB IN WELL, Disp: , Rfl: 99    letrozole (FEMARA) 2.5 mg Tab, Take 1 tablet (2.5 mg total) by mouth once daily., Disp: 90 tablet, Rfl: 1    melatonin 10 mg Tab, Take by mouth every evening., Disp: , Rfl:     montelukast (SINGULAIR) 10 mg tablet, Take 10 mg by mouth every evening., Disp: , Rfl:     multivitamin (THERAGRAN) per tablet, Take 1 tablet by mouth once daily., Disp: , Rfl:     omeprazole (PRILOSEC) 20 MG capsule, Take 1 capsule (20 mg total) by mouth once daily. (Patient taking differently: Take 20 mg by mouth every 48 hours. ), Disp: 90 capsule, Rfl: 2    predniSONE (DELTASONE) 10 MG tablet, , Disp: , Rfl: 0    torsemide (DEMADEX) 20 MG Tab, TAKE 1 TABLET(20 MG) BY MOUTH EVERY DAY, Disp: 90 tablet, Rfl: 1    traMADoL (ULTRAM) 50 mg tablet, TAKE 1 TABLET(50 MG) BY MOUTH EVERY 12 HOURS AS NEEDED FOR PAIN, Disp: 40 tablet, Rfl: 0    ALLERGIES:  Review of patient's allergies indicates:   Allergen Reactions    Levaquin [levofloxacin] Itching    Penicillins Itching        REVIEW OF SYSTEMS:  Constitution: Negative. Negative for chills, fever and night sweats.    Hematologic/Lymphatic: Negative for bleeding problem.  Does not bruise/bleed easily.   Skin: Negative for dry skin, itching and rash.   Musculoskeletal: Negative for falls. Positive for right shoulder and left hip pain and muscle weakness.     All other review of symptoms were reviewed and found to be noncontributory.     PHYSICAL EXAMINATION:  Vitals:  LMP  (LMP Unknown)    General: Well-developed well-nourished 86 y.o. femalein no acute distress   Cardiovascular: Regular rhythm by palpation of distal pulse, normal color and temperature, no concerning varicosities on symptomatic side   Lungs: No labored breathing or wheezing appreciated   Neuro: Alert and oriented ×3   Psychiatric: well oriented to person, place and time, demonstrates normal mood and affect   Skin: No rashes, lesions or ulcers, normal temperature, turgor, and texture on uninvolved extremity    Ortho/SPM Exam  active forward elevation to 160 with mild pain in posterior shoulder and deltoid, ER with arm at side to 45 without pain. IR to T10 without pain. Passive FE to 165 with mild pain over deltoid and posterior shoulder, ER to 45. Prominent tenderness along the proximal biceps tendon. Negative AC tenderness. 5-/5 resisted supraspinatus testing. 5-/5 resisted infraspinatus testing. Negative belly press test. Stable shoulder. No midline neck tenderness. Negative Spurling's maneuver.     Hip exam: Left  Flexion: 110 with minimal pain in groin, she attributes lack of pain to recent injection  Ext:0  Int Rot: 20  Ext Rot: 25  Abd: 40  Add: 20  SLR: neg  TTP over GTB: minimal    IMAGING:  Xrays including AP, Outlet and Axillary Lateral of RIGHT shoulder are ordered / images reviewed by me:   No fracture or acute change appreciated. Moderate to severe glenohumeral degenerative changes. Mild to moderate AC joint arthritis.     Xray of bilateral hip taken in clinic today, images reviewed by me, demonstrates moderate to severe joint space narrowing without evidence of fracture or dislocation.  ASSESSMENT:       ICD-10-CM ICD-9-CM   1. Primary osteoarthritis of both hips  M16.0 715.15   2. Left hip pain  M25.552 719.45   3. Primary osteoarthritis of right shoulder  M19.011 715.11   4. Right shoulder pain, unspecified chronicity  M25.511 719.41       PLAN:       -Findings and treatment options were discussed with the patient  -I will proceed with a cortisone injection in her right shoulder.  -She was given a hip and shoulder conditioning program in clinic today.  -RTC as needed for hip and shoulder pain.  I advised that if her hip was to start feeling weak again after trying hip conditioning program, I will refer to formal PT.  -All questions answered      Procedures  The injection site was identified and the skin was prepared with Chloraprep. The right shoulder was injected with 1 ml of kenalog 40mg and 4 ml lidocaine under sterile technique. Anahi Cook tolerated the procedure well, she was advised to rest the right shoulder today, ice and support. she did receive immediate relief of the pain in and about her right shoulder. she was told this would be short lived and is secondary to the lidocaine. she may have an increase in her discomfort tonight followed by steady improvement over the next several days. It may take 1-3 weeks following the injection to get the full benefit of the medication.  I will see her back in 4-6 months. Sooner if she has any problems or concerns.

## 2020-09-30 DIAGNOSIS — I11.9 HYPERTENSIVE HEART DISEASE WITHOUT HEART FAILURE: ICD-10-CM

## 2020-09-30 RX ORDER — BENAZEPRIL HYDROCHLORIDE 20 MG/1
20 TABLET ORAL DAILY
Qty: 90 TABLET | Refills: 0 | Status: SHIPPED | OUTPATIENT
Start: 2020-09-30 | End: 2020-12-01 | Stop reason: SDUPTHER

## 2020-10-08 ENCOUNTER — IMMUNIZATION (OUTPATIENT)
Dept: PHARMACY | Facility: CLINIC | Age: 85
End: 2020-10-08
Payer: MEDICARE

## 2020-10-13 ENCOUNTER — TELEPHONE (OUTPATIENT)
Dept: HEMATOLOGY/ONCOLOGY | Facility: CLINIC | Age: 85
End: 2020-10-13

## 2020-10-14 ENCOUNTER — OFFICE VISIT (OUTPATIENT)
Dept: SURGERY | Facility: CLINIC | Age: 85
End: 2020-10-14
Payer: MEDICARE

## 2020-10-14 ENCOUNTER — OFFICE VISIT (OUTPATIENT)
Dept: HEMATOLOGY/ONCOLOGY | Facility: CLINIC | Age: 85
End: 2020-10-14
Payer: MEDICARE

## 2020-10-14 VITALS
TEMPERATURE: 98 F | OXYGEN SATURATION: 95 % | RESPIRATION RATE: 16 BRPM | DIASTOLIC BLOOD PRESSURE: 80 MMHG | WEIGHT: 147.94 LBS | SYSTOLIC BLOOD PRESSURE: 176 MMHG | BODY MASS INDEX: 27.22 KG/M2 | HEIGHT: 62 IN | HEART RATE: 70 BPM

## 2020-10-14 VITALS
HEIGHT: 62 IN | WEIGHT: 147 LBS | SYSTOLIC BLOOD PRESSURE: 180 MMHG | BODY MASS INDEX: 27.05 KG/M2 | DIASTOLIC BLOOD PRESSURE: 83 MMHG | TEMPERATURE: 98 F | HEART RATE: 54 BPM

## 2020-10-14 DIAGNOSIS — Z17.0 MALIGNANT NEOPLASM OF UPPER-OUTER QUADRANT OF LEFT BREAST IN FEMALE, ESTROGEN RECEPTOR POSITIVE: Chronic | ICD-10-CM

## 2020-10-14 DIAGNOSIS — C50.412 MALIGNANT NEOPLASM OF UPPER-OUTER QUADRANT OF LEFT BREAST IN FEMALE, ESTROGEN RECEPTOR POSITIVE: Chronic | ICD-10-CM

## 2020-10-14 DIAGNOSIS — Z79.811 USE OF AROMATASE INHIBITORS: Chronic | ICD-10-CM

## 2020-10-14 DIAGNOSIS — C50.412 MALIGNANT NEOPLASM OF UPPER-OUTER QUADRANT OF LEFT BREAST IN FEMALE, ESTROGEN RECEPTOR POSITIVE: Primary | Chronic | ICD-10-CM

## 2020-10-14 DIAGNOSIS — Z17.0 MALIGNANT NEOPLASM OF UPPER-OUTER QUADRANT OF LEFT BREAST IN FEMALE, ESTROGEN RECEPTOR POSITIVE: Primary | Chronic | ICD-10-CM

## 2020-10-14 DIAGNOSIS — Z12.31 ENCOUNTER FOR SCREENING MAMMOGRAM FOR BREAST CANCER: Primary | ICD-10-CM

## 2020-10-14 PROCEDURE — 1101F PT FALLS ASSESS-DOCD LE1/YR: CPT | Mod: CPTII,S$GLB,, | Performed by: INTERNAL MEDICINE

## 2020-10-14 PROCEDURE — 1159F PR MEDICATION LIST DOCUMENTED IN MEDICAL RECORD: ICD-10-PCS | Mod: S$GLB,,, | Performed by: INTERNAL MEDICINE

## 2020-10-14 PROCEDURE — 99999 PR PBB SHADOW E&M-EST. PATIENT-LVL V: CPT | Mod: PBBFAC,,, | Performed by: SURGERY

## 2020-10-14 PROCEDURE — 1101F PT FALLS ASSESS-DOCD LE1/YR: CPT | Mod: CPTII,S$GLB,, | Performed by: SURGERY

## 2020-10-14 PROCEDURE — 99213 PR OFFICE/OUTPT VISIT, EST, LEVL III, 20-29 MIN: ICD-10-PCS | Mod: S$GLB,,, | Performed by: SURGERY

## 2020-10-14 PROCEDURE — 1101F PR PT FALLS ASSESS DOC 0-1 FALLS W/OUT INJ PAST YR: ICD-10-PCS | Mod: CPTII,S$GLB,, | Performed by: INTERNAL MEDICINE

## 2020-10-14 PROCEDURE — 1126F PR PAIN SEVERITY QUANTIFIED, NO PAIN PRESENT: ICD-10-PCS | Mod: S$GLB,,, | Performed by: SURGERY

## 2020-10-14 PROCEDURE — 99499 UNLISTED E&M SERVICE: CPT | Mod: S$GLB,,, | Performed by: SURGERY

## 2020-10-14 PROCEDURE — 1159F MED LIST DOCD IN RCRD: CPT | Mod: S$GLB,,, | Performed by: SURGERY

## 2020-10-14 PROCEDURE — 99999 PR PBB SHADOW E&M-EST. PATIENT-LVL V: ICD-10-PCS | Mod: PBBFAC,,, | Performed by: SURGERY

## 2020-10-14 PROCEDURE — 1126F AMNT PAIN NOTED NONE PRSNT: CPT | Mod: S$GLB,,, | Performed by: SURGERY

## 2020-10-14 PROCEDURE — 99999 PR PBB SHADOW E&M-EST. PATIENT-LVL V: CPT | Mod: PBBFAC,,, | Performed by: INTERNAL MEDICINE

## 2020-10-14 PROCEDURE — 99213 OFFICE O/P EST LOW 20 MIN: CPT | Mod: S$GLB,,, | Performed by: INTERNAL MEDICINE

## 2020-10-14 PROCEDURE — 1101F PR PT FALLS ASSESS DOC 0-1 FALLS W/OUT INJ PAST YR: ICD-10-PCS | Mod: CPTII,S$GLB,, | Performed by: SURGERY

## 2020-10-14 PROCEDURE — 1126F AMNT PAIN NOTED NONE PRSNT: CPT | Mod: S$GLB,,, | Performed by: INTERNAL MEDICINE

## 2020-10-14 PROCEDURE — 99213 PR OFFICE/OUTPT VISIT, EST, LEVL III, 20-29 MIN: ICD-10-PCS | Mod: S$GLB,,, | Performed by: INTERNAL MEDICINE

## 2020-10-14 PROCEDURE — 99213 OFFICE O/P EST LOW 20 MIN: CPT | Mod: S$GLB,,, | Performed by: SURGERY

## 2020-10-14 PROCEDURE — 1126F PR PAIN SEVERITY QUANTIFIED, NO PAIN PRESENT: ICD-10-PCS | Mod: S$GLB,,, | Performed by: INTERNAL MEDICINE

## 2020-10-14 PROCEDURE — 1159F MED LIST DOCD IN RCRD: CPT | Mod: S$GLB,,, | Performed by: INTERNAL MEDICINE

## 2020-10-14 PROCEDURE — 99999 PR PBB SHADOW E&M-EST. PATIENT-LVL V: ICD-10-PCS | Mod: PBBFAC,,, | Performed by: INTERNAL MEDICINE

## 2020-10-14 PROCEDURE — 99499 RISK ADDL DX/OHS AUDIT: ICD-10-PCS | Mod: S$GLB,,, | Performed by: SURGERY

## 2020-10-14 PROCEDURE — 1159F PR MEDICATION LIST DOCUMENTED IN MEDICAL RECORD: ICD-10-PCS | Mod: S$GLB,,, | Performed by: SURGERY

## 2020-10-14 NOTE — PROGRESS NOTES
Subjective:       Patient ID: Anahi Cook is a 86 y.o. female.    Chief Complaint: No chief complaint on file.    HPI   Mrs. Cook returns today for follow up.   She is on anastrozole, now on the adjuvant setting.  Her most recent mammogram in late January 2020 was read as BI-RADS II, and a one year follow-up was recommended.  Briefly, she is an 86-year-old female who in late 2017 had initially felt a mass in her left breast.  A biopsy that was performed on 01/11/2018 showed an infiltrating ductal carcinoma that was ER strongly positive, WI strongly positive and HER-2 negative by immunohistochemistry.    She was started on letrozole and undwerwent a BSO by Dr. Chairez for an ovarian mass.  Her ovarian mass was benign. In mid July 2018 she underwent a lumpectomy and AND.  She had a 3.3 cm carcinoma and 4/8 positive nodes.  She has remained on letrozole, now in the adjuvant setting, and she returns today for follow up.   Of note, she also received chest wall XRT.    Review of Systems    Overall she feels OK  She has tolerated the letrozole well so far.  She complains of occasional left breast pain and left axillary pain.  Her ECOG performance status remains at 1.  She denies any anxiety, depression, easy bruising, fevers, chills, night  sweats, weight loss, nausea, vomiting, diarrhea, constipation, diplopia, blurred vision, headache, chest pain, palpitations, shortness of breath, breast pain, abdominal pain, extremity pain, or difficulty ambulating.  The remainder of the ten-point ROS, including general, skin, lymph, H/N, cardiorespiratory, GI, , Neuro, Endocrine, and psychiatric is negative.       Objective:      Physical Exam      She is alert, oriented to time, place, person, pleasant, well      nourished, in no acute physical distress.   She is accompanied by her caregiver.                                 VITAL SIGNS:  Reviewed                                      HEENT:  Normal.  There are no nasal, oral, lip,  gingival, auricular, lid,    or conjunctival lesions.  Mucosae are moist and pink, and there is no        thrush.  Pupils are equal, reactive to light and accommodation.              Extraocular muscle movements are intact.   Dentition is fair.  Maxillary teeth are missing.                                      NECK:  Supple without JVD, adenopathy, or thyromegaly.                       LUNGS:  Clear to auscultation without wheezing, rales, or rhonchi.           CARDIOVASCULAR:  Reveals an S1, S2, no murmurs, no rubs, no gallops.         ABDOMEN:  Soft, nontender, without organomegaly.  Bowel sounds are    present.    Scars from her laparoscopic DONOVAN-BSO are seen.                                                            EXTREMITIES:  No cyanosis, clubbing, or edema.   The left forearm and wrist are in a cast.                            BREASTS: She is s/p left lumpectomy with a healed periareolar incision.  She also has an incision from her axillary dissection;  It is well healed.   There are no masses in her right breast.    Both breasts are large and pendulous.    There is mild hyperpigmentation within the radiation field.                                 LYMPHATIC:  There is no cervical, axillary, or supraclavicular adenopathy.   SKIN:  Warm and moist, without petechiae, rashes, induration, or ecchymoses.           NEUROLOGIC:  DTRs are 0-1+ bilaterally, symmetrical, motor function is 5/5,  and cranial nerves are  within normal limits.      Assessment:       1. Hormone receptor positive breast cancer, left, on neoadjuvant letrozole with a significant clinical response.   2. Use of aromatase inhibitors        Plan:        I had a long discussion with her and her caregiver.  She will remain on letrozole, and see me in 4 months for follow up.  Her multiple questions were answered to her satisfaction.

## 2020-10-14 NOTE — PROGRESS NOTES
Subjective:       Patient ID: Anahi Cook is a 86 y.o. female.    Chief Complaint: No chief complaint on file.    HPI     The patient location is: home  The chief complaint leading to consultation is:  Adjuvant treatment of breast cancer    Visit type:  Video  Face to Face time with patient: 15 minutes of total time spent for this encounter.  This includes face to face time and non-face to face time preparing to see the patient (eg, review of tests), Obtaining and/or reviewing separately obtained history, Documenting clinical information in the electronic or other health record, Independently interpreting results (not separately reported) and communicating results to the patient/family/caregiver, or Care coordination (not separately reported).     Each patient to whom he or she provides medical services by telemedicine is:  (1) informed of the relationship between the physician and patient and the respective role of any other health care provider with respect to management of the patient; and (2) notified that he or she may decline to receive medical services by telemedicine and may withdraw from such care at any time.      Mrs. Cook had an appointment with me today, however, due to the COVID-19 pandemic it was switched to a virtual visit.  Briefly, she is an 85-year-old female who in late 2017 had initially felt a mass in her left breast.  A biopsy that was performed on 01/11/2018 showed an infiltrating ductal carcinoma that was ER strongly positive, [place] strongly positive and HER-2 negative by immunohistochemistry.    She was started on letrozole and undwerwent a BSO by Dr. Chairez for an ovarian mass.  Her ovarian mass was benign. In mid July 2018 she underwent a lumpectomy and AND.  She had a 3.3 cm carcinoma and 4/8 positive nodes.  She has remained on letrozole, now in the adjuvant setting, and she returns today for follow up.   Of note, she also received chest wall XRT.    Review of Systems    Overall she  feels OK and she has no complaints today.  She has tolerated the letrozole well so far.  Her ECOG performance status is 1.  She denies any anxiety, depression, easy bruising, fevers, chills, night  sweats, weight loss, nausea, vomiting, diarrhea, constipation, diplopia, blurred vision, headache, chest pain, palpitations, shortness of breath, breast pain, abdominal pain, extremity pain, or difficulty ambulating.  The remainder of the ten-point ROS, including general, skin, lymph, H/N, cardiorespiratory, GI, , Neuro, Endocrine, and psychiatric is negative.       Objective:      Physical Exam        Assessment:       1. Hormone receptor positive breast cancer, left, on neoadjuvant letrozole with a significant clinical response.   2. Use of aromatase inhibitors        Plan:        I had a long discussion with her.  She will remain on letrozole, and see me in 3 months for follow up.  Her multiple questions were answered to her satisfaction.

## 2020-10-14 NOTE — PROGRESS NOTES
Breast Surgery  [facility]  Department of Surgery      REFERRING PROVIDER: No referring provider defined for this encounter.    Chief Complaint: Breast cancer    Subjective:      Patient ID: Anahi Cook is a 86 y.o. female who returns s/p L magseed localized lumpectomy with ALND on 18.   She initially presented with  L breast cancer with node positivity. There are two breast masses adjacent (may be one mass), at 12OC, path with IDC, grade II, ER+MA+H2N- with rashaad metastasis.  In addition, she is seen by Dr. Chairez for a pelvic mass that needs to be removed.  This was found to be benign but was resected by Dr. Chairez while receiving neoadjuvant endocrine therapy for the past 6 months with Dr. Harris.  She appeared to have some response on imaging but not complete response.    Patient does not routinely do self breast exams.  Patient has noted a change on breast exam.  Patient denies nipple discharge. Patient denies to previous breast biopsy. Patient denies a personal history of breast cancer.    Findings at that time were the following:   Tumor size: 2.3  + 1.4 cm   Tumor rdgrdrrdarddrderd:rd rd3rd Estrogen Receptor: +   Progesterone Receptor: +   Her-2 jessica: -   Lymph node status: +   Lymphatic invasion: unknown         Interval History (10/14/2020):  Doing well. No new changes on exam.  She denies any new headaches, vision changes, shortness of breath, new bony or back pain.  She is overall doing well.  No breast complaints.      GYN History:  Age of menarche was 12. Age of menopause was 32 with partial hysterectomy, took HRT until her 70s.    Patient is . Age of first live birth was 19.     Past Medical History:   Diagnosis Date    Anxiety     Back pain     Breast cancer 2018    Breast mass 1/15/2018    Chronic kidney disease, stage 2, mildly decreased GFR     COPD (chronic obstructive pulmonary disease)     emphysema    Depression     Dysuria 2018    Family history of breast cancer 2018     GERD (gastroesophageal reflux disease)     Hypertension     Infiltrating ductal carcinoma of breast, left 7/12/2018    Osteoporosis, senile     Ovarian mass 1/15/2018    Pneumonia     S/P robotic assisted laparoscopic BSO (bilateral salpingo-oophorectomy) 2/23/2018     Past Surgical History:   Procedure Laterality Date    AXILLARY NODE DISSECTION Left 7/12/2018    Procedure: LYMPHADENECTOMY, AXILLARY;  Surgeon: Amanda Caballero MD;  Location: [facility] OR [address] [place] [address];  Service: General;  Laterality: Left;    BREAST BIOPSY      BREAST LUMPECTOMY      CHOLECYSTECTOMY      COLONOSCOPY      ESOPHAGOGASTRODUODENOSCOPY      ESOPHAGOGASTRODUODENOSCOPY N/A 10/14/2019    Procedure: EGD (ESOPHAGOGASTRODUODENOSCOPY);  Surgeon: Davonte Thompson MD;  Location: [facility] ENDO (2ND FLR);  Service: Endoscopy;  Laterality: N/A;  hx of pulmonary hypertension-PA 50     pt requesting ASAP    EYE SURGERY      FIXATION KYPHOPLASTY N/A 11/8/2018    Procedure: Kyphoplasty   T12;  Surgeon: Merly Wilcox MD;  Location: [facility] CATH LAB;  Service: Pain Management;  Laterality: N/A;  T12  [place] [employer] e-mailed with date and time    HYSTERECTOMY      INJECTION FOR SENTINEL NODE IDENTIFICATION Left 7/12/2018    Procedure: INJECTION, FOR SENTINEL NODE IDENTIFICATION;  Surgeon: Amanda Caballero MD;  Location: [facility] OR [address] [place] [address];  Service: General;  Laterality: Left;    INJECTION OF FACET JOINT Bilateral 10/15/2018    Procedure: INJECTION, FACET JOINT, BILATERAL LUMBAR L3-4, 4-5, 5-S1 FACET JOINT INJECTIONS;  Surgeon: Merly Wilcox MD;  Location: [facility] PAIN MGT;  Service: Pain Management;  Laterality: Bilateral;    INJECTION OF FACET JOINT Bilateral 4/1/2019    Procedure: INJECTION, FACET JOINT, L3-L4,L4-L5,L5-S1;  Surgeon: Merly Wilcox MD;  Location: [facility] PAIN MGT;  Service: Pain Management;  Laterality: Bilateral;    INJECTION OF FACET JOINT Bilateral 6/20/2019    Procedure: INJECTION, FACET JOINT, L3-L4,L4-L5,L5-S1 NEED CONSENT;  Surgeon: Merly Wilcox  MD;  Location: [facility] PAIN MGT;  Service: Pain Management;  Laterality: Bilateral;    INJECTION OF FACET JOINT Bilateral 7/27/2020    Procedure: INJECTION, FACET JOINT BILATERAL L3/4, L4/5 and L5/S1;  Surgeon: Merly Wilcox MD;  Location: [facility] PAIN MGT;  Service: Pain Management;  Laterality: Bilateral;    INJECTION OF JOINT Bilateral 7/27/2020    Procedure: INJECTION, JOINT, BILATERAL GREATER TROCHANTERIC BURSA;  Surgeon: Merly Wilcox MD;  Location: [facility] PAIN MGT;  Service: Pain Management;  Laterality: Bilateral;    MASTECTOMY, PARTIAL Left 7/12/2018    Procedure: MASTECTOMY, PARTIAL-W/SEED LOCALIZATION;  Surgeon: Amanda Caballero MD;  Location: [facility] OR 2ND FLR;  Service: General;  Laterality: Left;    SC REMOVAL OF OVARY/TUBE(S)  02/23/2018    Robotic Assisted    ROTATOR CUFF REPAIR Right     rotator cuff repair right shoulder    TONSILLECTOMY       Current Outpatient Medications on File Prior to Visit   Medication Sig Dispense Refill    acetaminophen (TYLENOL) 325 MG tablet Take 650 mg by mouth every 6 (six) hours as needed for Pain.       albuterol (PROAIR HFA) 90 mcg/actuation inhaler Inhale 1-2 puffs into the lungs every 6 (six) hours as needed for Wheezing or Shortness of Breath.      albuterol (PROVENTIL) 2.5 mg /3 mL (0.083 %) nebulizer solution Inhale 3 mLs into the lungs as needed.       albuterol-ipratropium 2.5mg-0.5mg/3mL (DUO-NEB) 0.5 mg-3 mg(2.5 mg base)/3 mL nebulizer solution Take 3 mLs by nebulization as needed.       atorvastatin (LIPITOR) 20 MG tablet Take 1 tablet (20 mg total) by mouth nightly. 90 tablet 1    benazepriL (LOTENSIN) 20 MG tablet Take 1 tablet (20 mg total) by mouth once daily. 90 tablet 0    calcium-vitamin D 250-100 mg-unit per tablet Take 1 tablet by mouth 2 (two) times daily.      CETIRIZINE HCL (ZYRTEC ORAL) Take 10 mg by mouth nightly as needed.       citalopram (CELEXA) 20 MG tablet TAKE 1 TABLET(20 MG) BY MOUTH EVERY NIGHT 90 tablet 0    felodipine (PLENDIL)  10 MG 24 hr tablet TAKE 2 TABLETS(20 MG) BY MOUTH EVERY  tablet 1    fluticasone-vilanterol (BREO) 100-25 mcg/dose diskus inhaler Inhale 1 puff into the lungs once daily. 1 each 3    INCRUSE ELLIPTA 62.5 mcg/actuation DsDv USE 1 PUFF QD AT SAME TIME  (patient takes at night)  2    ketoprofen 10 % CrPk APPLY UP TO 4.8 GRAMS (3 PUMPS) TO PAINFUL AREAS UP TO FIVE TIMES DAILY. RUB IN WELL  99    letrozole (FEMARA) 2.5 mg Tab Take 1 tablet (2.5 mg total) by mouth once daily. 90 tablet 1    melatonin 10 mg Tab Take by mouth every evening.      montelukast (SINGULAIR) 10 mg tablet Take 10 mg by mouth every evening.      multivitamin (THERAGRAN) per tablet Take 1 tablet by mouth once daily.      omeprazole (PRILOSEC) 20 MG capsule Take 1 capsule (20 mg total) by mouth once daily. (Patient taking differently: Take 20 mg by mouth every 48 hours. ) 90 capsule 2    predniSONE (DELTASONE) 10 MG tablet   0    torsemide (DEMADEX) 20 MG Tab TAKE 1 TABLET(20 MG) BY MOUTH EVERY DAY 90 tablet 1    traMADoL (ULTRAM) 50 mg tablet TAKE 1 TABLET(50 MG) BY MOUTH EVERY 12 HOURS AS NEEDED FOR PAIN 40 tablet 0     No current facility-administered medications on file prior to visit.      Social History     Socioeconomic History    Marital status: Single     Spouse name: Not on file    Number of children: 3    Years of education: Not on file    Highest education level: Not on file   Occupational History    Occupation: Multiple jobs, see social documentation section     Comment: Retired   Social Needs    Financial resource strain: Not on file    Food insecurity     Worry: Not on file     Inability: Not on file    Transportation needs     Medical: Not on file     Non-medical: Not on file   Tobacco Use    Smoking status: Former Smoker     Packs/day: 1.00     Years: 40.00     Pack years: 40.00     Types: Cigarettes    Smokeless tobacco: Never Used    Tobacco comment: Smoked >1 ppd for at least 40 years, quit around 1995    Substance and Sexual Activity    Alcohol use: Yes     Comment: occasional glass of wine or cocktail    Drug use: No    Sexual activity: Yes     Partners: Male   Lifestyle    Physical activity     Days per week: Not on file     Minutes per session: Not on file    Stress: Not on file   Relationships    Social connections     Talks on phone: Not on file     Gets together: Not on file     Attends Buddhist service: Not on file     Active member of club or organization: Not on file     Attends meetings of clubs or organizations: Not on file     Relationship status: Not on file   Other Topics Concern    Not on file   Social History Narrative    Worked many jobs while raising 3 children.  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  She was  from her first , but took care of him at the end of his life.     Family History   Problem Relation Age of Onset    Heart failure Mother     Kidney failure Mother     Heart attack Father     Breast cancer Sister 66        Lump, XRT, chemo, recurrence 10 years later.    Cancer Brother         leukemia    Heart attack Brother 58    Pulmonary embolism Brother 45    Heart attack Brother 52    Breast cancer Maternal Grandmother 60        advanced at diagnosis    Breast cancer Maternal Aunt 83         at 92    Colon cancer Neg Hx     Esophageal cancer Neg Hx         Review of Systems   Constitutional: Negative for appetite change, chills, fever and unexpected weight change.   HENT: Negative for facial swelling, postnasal drip and sore throat.    Eyes: Negative for redness and itching.   Respiratory: Negative for chest tightness and shortness of breath.    Cardiovascular: Negative for chest pain and palpitations.   Gastrointestinal: Negative for blood in stool, diarrhea, nausea and vomiting.   Genitourinary: Negative for difficulty urinating and dysuria.  "  Musculoskeletal: Negative for arthralgias and joint swelling.   Skin: Negative for rash and wound.   Neurological: Negative for dizziness and syncope.   Hematological: Negative for adenopathy.   Psychiatric/Behavioral: Negative for agitation. The patient is not nervous/anxious.      Objective:   BP (!) 180/83 (BP Location: Right arm, Patient Position: Sitting, BP Method: Medium (Automatic))   Pulse (!) 54   Temp 97.5 °F (36.4 °C) (Oral)   Ht 5' 2" (1.575 m)   Wt 66.7 kg (147 lb)   LMP  (LMP Unknown)   BMI 26.89 kg/m²     Physical Exam   Constitutional: She appears well-developed and well-nourished.   HENT:   Head: Normocephalic.   Eyes: No scleral icterus.   Neck: Neck supple. No tracheal deviation present.   Cardiovascular: Normal rate and regular rhythm.    Pulmonary/Chest: No respiratory distress. She exhibits no mass and no tenderness. Right breast exhibits no inverted nipple, no mass, no nipple discharge and no skin change. Left breast exhibits no inverted nipple, no mass, no nipple discharge and no skin change. No breast swelling.   Abdominal: Soft. She exhibits no mass. There is no abdominal tenderness.   Musculoskeletal: No edema.   Lymphadenopathy:     She has no cervical adenopathy.   Neurological: She is alert.   Skin: No rash noted. No erythema.     Psychiatric: She has a normal mood and affect.     MM 1/24/2019  Films Compared:  Compared to: 06/13/2018 US Breast Left Limited, 06/13/2018 Mammo Digital Diagnostic Left with Tomosynthesis_CAD, 01/11/2018 Mammo Digital Diagnostic Left without CAD, 01/04/2018 Mammo Digital Diagnostic Bilat with Tomosynthesis_CAD, and 02/22/2017 Mammo Digital Screening Bilat with Tomosynthesis_CAD     Findings:  This procedure was performed using tomosynthesis.  Computer-aided detection was utilized in the interpretation of this examination.  The breasts are heterogeneously dense, which may obscure small masses.      Left  There are post-surgical findings from a " previous lumpectomy with radiation seen in the left breast. There has been no interval development of a suspicious mass, microcalcification, or architectural distortion.      Right  There is no evidence of suspicious masses, calcifications, or other abnormal findings.     Impression:  Bilateral  There is no mammographic evidence of malignancy.     BI-RADS Category:   Overall: 2 - Benign     Recommendation:  Routine screening mammogram in 1 year is recommended      FINAL PATHOLOGIC DIAGNOSIS  1. 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  - Biopsy site is present.  - See CAP synoptic summary below and comment.  2. LEFT AXILLARY CONTENTS, AXILLARY DISSECTION:  - Invasive ductal carcinoma, grade 1, 13 mm.  - Metastatic carcinoma in four of eighteen lymph nodes (4/18).  - Largest metastatic deposit measures 9 mm.  - Extranodal extension is present.  - See CAP synoptic summary below and comment.  SURGICAL PATHOLOGY CANCER CASE SUMMARY- Breast  Procedure: Excision (less than total mastectomy) and axillary dissection.  Specimen laterality: Left.  Tumor size: 2 foci, 33 mm (largest focus, greatest dimension) and 13 mm (greatest dimension).  Histologic type: Invasive ductal carcinoma.  Histologic grade: Grade 1 (glandular/tubular differentiation- 2, nuclear pleomorphism- 1, mitotic rate- 1)  Tumor focality: Multifocal, 2 foci.  Ductal carcinoma in situ: Present, negative for extensive intraductal component (EIC).  Size of DCIS: 8 mm.  - Number of blocks with DCIS: 3.  [id number],ER,PGR,[id number],ER,PGR    - Number of blocks examined: 9.  Architectural patterns: Cribriform and solid.  Nuclear grade: Low grade.  Necrosis: Not identified.  Note: The size (extent) of DCIS is an estimation of the volume of breast tissue occupied by DCIS.  Margins (33 mm focus)-  Uninvolved by invasive carcinoma, distances from closest margins are 3 mm to the lateral  margin and 4 mm to the  medial margin.  Uninvolved by DCIS, distance from closest margin (medial) is 9 mm.  Treatment effect: No definite response to presurgical therapy in the invasive carcinoma or lymph nodes.  Lymphovascular invasion: Present.  Lymph nodes:  Total number of lymph nodes examined (sentinel and non-sentinel): 18.  Number of lymph nodes with Macrometastases (>2 mm): 2.  Number of lymph nodes with Micrometastases (> 0.2 mm to 2 mm and/or > 200 cells): 2.  Number of lymph nodes with isolated tumor cells (less than or equal to 0.2 mm and less than or equal to 200 cells):  0.  Size of largest metastatic deposit: 9 mm.  Extranodal extension: Present.  Pathologic staging (pTNM, AJCC 8th edition): ympT2 N2a.  Ancillary studies- (Performed on 33 mm focus, block 1A)  ER: Positive (moderate intensity, >95% of tumor cell nuclei).  NV: Positive (weak to moderate intensity, 60% of tumor cell nuclei).  HER2: Negative.  Ki-67: 2%.  Ancillary studies- (Performed on 13 mm focus, block 2F)  ER: Positive (strong intensity, >95% of tumor cell nuclei).  NV: Positive (weak to moderate intensity, 21-30% of tumor cell nuclei).  HER2: Negative.  Ki-67: 2%.  COMMENT:  Immunohistochemistry for p63 was performed on block 1A to confirm the presence and quantify the DCIS.  All stains have satisfactory positive and negative controls.  Blocks for potential molecular or ancillary studies:  Tumor block: 1G (33 mm focus) and 2F (13 mm focus).    Assessment:       83 y/o female with T2N1 breast cancer that is ++-, s/p neoadjuvant  Letrozole treatment s/p partial mastectomy (07/2018) w/adjuvant Letrozole  Plan:     Continue follow up with Dr. Harris for continuation of Letrozole in January 2021  Return to clinic in January 2022 for CBE with TIM with MMG, sooner if there are any concerns.  Continue self exams in let us know if she has any concern.

## 2020-10-15 DIAGNOSIS — M47.816 LUMBAR SPONDYLOSIS: ICD-10-CM

## 2020-10-16 RX ORDER — TRAMADOL HYDROCHLORIDE 50 MG/1
TABLET ORAL
Qty: 40 TABLET | Refills: 0 | Status: SHIPPED | OUTPATIENT
Start: 2020-10-16 | End: 2020-11-16 | Stop reason: SDUPTHER

## 2020-10-16 NOTE — TELEPHONE ENCOUNTER
Seen once to establish care in February, advised return in 6 months. Currently not scheduled until next February. Needs follow up sooner, blood pressure has been very high at every visit with other doctors over the past 8 months. One refill of Tramadol authorized.

## 2020-11-06 ENCOUNTER — PATIENT MESSAGE (OUTPATIENT)
Dept: RESEARCH | Facility: OTHER | Age: 85
End: 2020-11-06

## 2020-11-16 DIAGNOSIS — M47.816 LUMBAR SPONDYLOSIS: ICD-10-CM

## 2020-11-16 RX ORDER — TRAMADOL HYDROCHLORIDE 50 MG/1
TABLET ORAL
Qty: 40 TABLET | Refills: 0 | Status: SHIPPED | OUTPATIENT
Start: 2020-11-16 | End: 2020-12-01 | Stop reason: SDUPTHER

## 2020-11-16 NOTE — TELEPHONE ENCOUNTER
----- Message from Yas Ramirez sent at 11/16/2020 12:35 PM CST -----  Regarding: RX refill  Pt`s daughter called in to request an rx refill for traMADoL (ULTRAM) 50 mg tablet,    [facility] pharmacy # [phone]    Pt can be reached at [phone]    Thank you

## 2020-11-23 DIAGNOSIS — F41.8 DEPRESSION WITH ANXIETY: ICD-10-CM

## 2020-11-23 RX ORDER — CITALOPRAM 20 MG/1
20 TABLET, FILM COATED ORAL NIGHTLY
Qty: 90 TABLET | Refills: 0 | Status: SHIPPED | OUTPATIENT
Start: 2020-11-23 | End: 2020-12-01 | Stop reason: SDUPTHER

## 2020-11-27 ENCOUNTER — TELEPHONE (OUTPATIENT)
Dept: PAIN MEDICINE | Facility: CLINIC | Age: 85
End: 2020-11-27

## 2020-11-27 NOTE — TELEPHONE ENCOUNTER
----- Message from Nettie Rod sent at 11/27/2020 12:58 PM CST -----  Who Called:  Patience (daughter)What is the reqeust in detail: Patient would like to schedule hip injection appt. Please adviseCan the clinic reply by [facility]?: No Best Call Back Number: [phone]

## 2020-12-01 ENCOUNTER — OFFICE VISIT (OUTPATIENT)
Dept: PRIMARY CARE CLINIC | Facility: CLINIC | Age: 85
End: 2020-12-01
Payer: MEDICARE

## 2020-12-01 ENCOUNTER — LAB VISIT (OUTPATIENT)
Dept: LAB | Facility: HOSPITAL | Age: 85
End: 2020-12-01
Attending: INTERNAL MEDICINE
Payer: MEDICARE

## 2020-12-01 VITALS
HEART RATE: 60 BPM | WEIGHT: 151.69 LBS | SYSTOLIC BLOOD PRESSURE: 127 MMHG | DIASTOLIC BLOOD PRESSURE: 70 MMHG | RESPIRATION RATE: 18 BRPM | OXYGEN SATURATION: 99 % | TEMPERATURE: 98 F | BODY MASS INDEX: 27.92 KG/M2 | HEIGHT: 62 IN

## 2020-12-01 DIAGNOSIS — R73.09 ELEVATED RANDOM BLOOD GLUCOSE LEVEL: ICD-10-CM

## 2020-12-01 DIAGNOSIS — M81.0 OSTEOPOROSIS WITHOUT CURRENT PATHOLOGICAL FRACTURE, UNSPECIFIED OSTEOPOROSIS TYPE: ICD-10-CM

## 2020-12-01 DIAGNOSIS — M47.816 LUMBAR SPONDYLOSIS: ICD-10-CM

## 2020-12-01 DIAGNOSIS — F41.8 DEPRESSION WITH ANXIETY: ICD-10-CM

## 2020-12-01 DIAGNOSIS — J44.9 CHRONIC OBSTRUCTIVE PULMONARY DISEASE, UNSPECIFIED COPD TYPE: ICD-10-CM

## 2020-12-01 DIAGNOSIS — E78.5 HYPERLIPIDEMIA, UNSPECIFIED HYPERLIPIDEMIA TYPE: ICD-10-CM

## 2020-12-01 DIAGNOSIS — N18.31 STAGE 3A CHRONIC KIDNEY DISEASE: ICD-10-CM

## 2020-12-01 DIAGNOSIS — I70.0 AORTIC ATHEROSCLEROSIS: ICD-10-CM

## 2020-12-01 DIAGNOSIS — Z23 NEED FOR SHINGLES VACCINE: ICD-10-CM

## 2020-12-01 DIAGNOSIS — K21.9 GASTROESOPHAGEAL REFLUX DISEASE WITHOUT ESOPHAGITIS: ICD-10-CM

## 2020-12-01 DIAGNOSIS — I11.9 HYPERTENSIVE HEART DISEASE WITHOUT HEART FAILURE: Primary | ICD-10-CM

## 2020-12-01 DIAGNOSIS — I11.9 HYPERTENSIVE HEART DISEASE WITHOUT HEART FAILURE: ICD-10-CM

## 2020-12-01 LAB
25(OH)D3+25(OH)D2 SERPL-MCNC: 66 NG/ML (ref 30–96)
ALBUMIN SERPL BCP-MCNC: 3.7 G/DL (ref 3.5–5.2)
ALP SERPL-CCNC: 73 U/L (ref 55–135)
ALT SERPL W/O P-5'-P-CCNC: 15 U/L (ref 10–44)
ANION GAP SERPL CALC-SCNC: 10 MMOL/L (ref 8–16)
AST SERPL-CCNC: 23 U/L (ref 10–40)
BASOPHILS # BLD AUTO: 0.07 K/UL (ref 0–0.2)
BASOPHILS NFR BLD: 0.8 % (ref 0–1.9)
BILIRUB SERPL-MCNC: 0.7 MG/DL (ref 0.1–1)
BILIRUB UR QL STRIP: NEGATIVE
BUN SERPL-MCNC: 14 MG/DL (ref 8–23)
CALCIUM SERPL-MCNC: 9.3 MG/DL (ref 8.7–10.5)
CHLORIDE SERPL-SCNC: 98 MMOL/L (ref 95–110)
CHOLEST SERPL-MCNC: 127 MG/DL (ref 120–199)
CHOLEST/HDLC SERPL: 2.3 {RATIO} (ref 2–5)
CLARITY UR REFRACT.AUTO: ABNORMAL
CO2 SERPL-SCNC: 30 MMOL/L (ref 23–29)
COLOR UR AUTO: YELLOW
CREAT SERPL-MCNC: 1.1 MG/DL (ref 0.5–1.4)
CREAT UR-MCNC: 71 MG/DL (ref 15–325)
DIFFERENTIAL METHOD: ABNORMAL
EOSINOPHIL # BLD AUTO: 0.2 K/UL (ref 0–0.5)
EOSINOPHIL NFR BLD: 2.4 % (ref 0–8)
ERYTHROCYTE [DISTWIDTH] IN BLOOD BY AUTOMATED COUNT: 13.2 % (ref 11.5–14.5)
EST. GFR  (AFRICAN AMERICAN): 52.5 ML/MIN/1.73 M^2
EST. GFR  (NON AFRICAN AMERICAN): 45.6 ML/MIN/1.73 M^2
ESTIMATED AVG GLUCOSE: 120 MG/DL (ref 68–131)
GLUCOSE SERPL-MCNC: 96 MG/DL (ref 70–110)
GLUCOSE UR QL STRIP: NEGATIVE
HBA1C MFR BLD HPLC: 5.8 % (ref 4–5.6)
HCT VFR BLD AUTO: 47.5 % (ref 37–48.5)
HDLC SERPL-MCNC: 56 MG/DL (ref 40–75)
HDLC SERPL: 44.1 % (ref 20–50)
HGB BLD-MCNC: 14.9 G/DL (ref 12–16)
HGB UR QL STRIP: NEGATIVE
HYALINE CASTS UR QL AUTO: 19 /LPF
IMM GRANULOCYTES # BLD AUTO: 0.02 K/UL (ref 0–0.04)
IMM GRANULOCYTES NFR BLD AUTO: 0.2 % (ref 0–0.5)
KETONES UR QL STRIP: NEGATIVE
LDLC SERPL CALC-MCNC: 50.8 MG/DL (ref 63–159)
LEUKOCYTE ESTERASE UR QL STRIP: ABNORMAL
LYMPHOCYTES # BLD AUTO: 1.2 K/UL (ref 1–4.8)
LYMPHOCYTES NFR BLD: 14.2 % (ref 18–48)
MCH RBC QN AUTO: 28.7 PG (ref 27–31)
MCHC RBC AUTO-ENTMCNC: 31.4 G/DL (ref 32–36)
MCV RBC AUTO: 92 FL (ref 82–98)
MICROSCOPIC COMMENT: ABNORMAL
MONOCYTES # BLD AUTO: 0.6 K/UL (ref 0.3–1)
MONOCYTES NFR BLD: 7.2 % (ref 4–15)
NEUTROPHILS # BLD AUTO: 6.4 K/UL (ref 1.8–7.7)
NEUTROPHILS NFR BLD: 75.2 % (ref 38–73)
NITRITE UR QL STRIP: NEGATIVE
NONHDLC SERPL-MCNC: 71 MG/DL
NRBC BLD-RTO: 0 /100 WBC
PH UR STRIP: 7 [PH] (ref 5–8)
PLATELET # BLD AUTO: 356 K/UL (ref 150–350)
PMV BLD AUTO: 11.2 FL (ref 9.2–12.9)
POTASSIUM SERPL-SCNC: 3.8 MMOL/L (ref 3.5–5.1)
PROT SERPL-MCNC: 7.3 G/DL (ref 6–8.4)
PROT UR QL STRIP: NEGATIVE
PROT UR-MCNC: 10 MG/DL (ref 0–15)
PROT/CREAT UR: 0.14 MG/G{CREAT} (ref 0–0.2)
RBC # BLD AUTO: 5.19 M/UL (ref 4–5.4)
RBC #/AREA URNS AUTO: 1 /HPF (ref 0–4)
SODIUM SERPL-SCNC: 138 MMOL/L (ref 136–145)
SP GR UR STRIP: 1.01 (ref 1–1.03)
SQUAMOUS #/AREA URNS AUTO: 1 /HPF
TRIGL SERPL-MCNC: 101 MG/DL (ref 30–150)
URN SPEC COLLECT METH UR: ABNORMAL
WBC # BLD AUTO: 8.58 K/UL (ref 3.9–12.7)
WBC #/AREA URNS AUTO: 4 /HPF (ref 0–5)

## 2020-12-01 PROCEDURE — 99214 OFFICE O/P EST MOD 30 MIN: CPT | Mod: S$GLB,,, | Performed by: INTERNAL MEDICINE

## 2020-12-01 PROCEDURE — 1125F AMNT PAIN NOTED PAIN PRSNT: CPT | Mod: S$GLB,,, | Performed by: INTERNAL MEDICINE

## 2020-12-01 PROCEDURE — 99999 PR PBB SHADOW E&M-EST. PATIENT-LVL V: ICD-10-PCS | Mod: PBBFAC,,, | Performed by: INTERNAL MEDICINE

## 2020-12-01 PROCEDURE — 81001 URINALYSIS AUTO W/SCOPE: CPT

## 2020-12-01 PROCEDURE — 1101F PT FALLS ASSESS-DOCD LE1/YR: CPT | Mod: CPTII,S$GLB,, | Performed by: INTERNAL MEDICINE

## 2020-12-01 PROCEDURE — 1125F PR PAIN SEVERITY QUANTIFIED, PAIN PRESENT: ICD-10-PCS | Mod: S$GLB,,, | Performed by: INTERNAL MEDICINE

## 2020-12-01 PROCEDURE — 3288F FALL RISK ASSESSMENT DOCD: CPT | Mod: CPTII,S$GLB,, | Performed by: INTERNAL MEDICINE

## 2020-12-01 PROCEDURE — 83036 HEMOGLOBIN GLYCOSYLATED A1C: CPT

## 2020-12-01 PROCEDURE — 82306 VITAMIN D 25 HYDROXY: CPT

## 2020-12-01 PROCEDURE — 3288F PR FALLS RISK ASSESSMENT DOCUMENTED: ICD-10-PCS | Mod: CPTII,S$GLB,, | Performed by: INTERNAL MEDICINE

## 2020-12-01 PROCEDURE — 1101F PR PT FALLS ASSESS DOC 0-1 FALLS W/OUT INJ PAST YR: ICD-10-PCS | Mod: CPTII,S$GLB,, | Performed by: INTERNAL MEDICINE

## 2020-12-01 PROCEDURE — 85025 COMPLETE CBC W/AUTO DIFF WBC: CPT

## 2020-12-01 PROCEDURE — 99214 PR OFFICE/OUTPT VISIT, EST, LEVL IV, 30-39 MIN: ICD-10-PCS | Mod: S$GLB,,, | Performed by: INTERNAL MEDICINE

## 2020-12-01 PROCEDURE — 99499 RISK ADDL DX/OHS AUDIT: ICD-10-PCS | Mod: S$GLB,,, | Performed by: INTERNAL MEDICINE

## 2020-12-01 PROCEDURE — 82570 ASSAY OF URINE CREATININE: CPT

## 2020-12-01 PROCEDURE — 1159F MED LIST DOCD IN RCRD: CPT | Mod: S$GLB,,, | Performed by: INTERNAL MEDICINE

## 2020-12-01 PROCEDURE — 80053 COMPREHEN METABOLIC PANEL: CPT

## 2020-12-01 PROCEDURE — 36415 COLL VENOUS BLD VENIPUNCTURE: CPT | Mod: PN

## 2020-12-01 PROCEDURE — 1159F PR MEDICATION LIST DOCUMENTED IN MEDICAL RECORD: ICD-10-PCS | Mod: S$GLB,,, | Performed by: INTERNAL MEDICINE

## 2020-12-01 PROCEDURE — 99999 PR PBB SHADOW E&M-EST. PATIENT-LVL V: CPT | Mod: PBBFAC,,, | Performed by: INTERNAL MEDICINE

## 2020-12-01 PROCEDURE — 99499 UNLISTED E&M SERVICE: CPT | Mod: S$GLB,,, | Performed by: INTERNAL MEDICINE

## 2020-12-01 PROCEDURE — 80061 LIPID PANEL: CPT

## 2020-12-01 RX ORDER — FELODIPINE 10 MG/1
TABLET, EXTENDED RELEASE ORAL
Qty: 180 TABLET | Refills: 0 | Status: SHIPPED | OUTPATIENT
Start: 2020-12-01 | End: 2021-02-28

## 2020-12-01 RX ORDER — BENAZEPRIL HYDROCHLORIDE 20 MG/1
20 TABLET ORAL DAILY
Qty: 90 TABLET | Refills: 1 | Status: SHIPPED | OUTPATIENT
Start: 2020-12-01 | End: 2021-06-02 | Stop reason: SDUPTHER

## 2020-12-01 RX ORDER — ATORVASTATIN CALCIUM 20 MG/1
20 TABLET, FILM COATED ORAL NIGHTLY
Qty: 90 TABLET | Refills: 1 | Status: SHIPPED | OUTPATIENT
Start: 2020-12-01 | End: 2021-05-27

## 2020-12-01 RX ORDER — CITALOPRAM 20 MG/1
20 TABLET, FILM COATED ORAL NIGHTLY
Qty: 90 TABLET | Refills: 1 | Status: SHIPPED | OUTPATIENT
Start: 2020-12-01 | End: 2021-06-02 | Stop reason: SDUPTHER

## 2020-12-01 RX ORDER — TRAMADOL HYDROCHLORIDE 50 MG/1
TABLET ORAL
Qty: 60 TABLET | Refills: 2 | Status: SHIPPED | OUTPATIENT
Start: 2020-12-07 | End: 2021-03-10 | Stop reason: SDUPTHER

## 2020-12-01 NOTE — PROGRESS NOTES
Subjective:       Patient ID: Anahi Cook is a 86 y.o. female.    Chief Complaint: Annual Exam    Seen for initial visit to establish care 10 months ago. Returns for annual exam and f/u chronic medical conditions. Last labs done one year ago. Here with daughter. No acute complaints. No COVID concerns.     PMH: .  Hypertension with Concentric Remodeling and normal LV function on Echo 10/16.  Hyperlipidemia, no labs on record.   Aortic Atherosclerosis seen on chest and abdominal imaging.   CKD stage 3, GFR 47.  COPD, not oxygen-dependent. Pulmonologist Dr. Bradford at [facility] .   Breast Cancer, on Letrozole, Heme/Onc 10/20, Breast Surg 10/20.  Osteoporosis, Endocrinology  recommended Prolia - patient declines.   Vertebral Compression Fractures.  GERD, Tortuous Esophagus on EGD 10/19, benign H pyl negative gastritis.   Lumbar Spondylosis, [facility] , Pain Management .   Allergic Rhinitis.  Depression/Anxiety/Insomnia.    PSH:  2018: AXILLARY NODE DISSECTION; Left  BREAST BIOPSY  BREAST LUMPECTOMY  CHOLECYSTECTOMY  EYE SURGERY  2018: FIXATION KYPHOPLASTY; N/A  HYSTERECTOMY  10/15/2018: INJECTION OF FACET JOINT; Bilateral  2019: INJECTION OF FACET JOINT; Bilateral  2019: INJECTION OF FACET JOINT; Bilateral  2018: MASTECTOMY, PARTIAL; Left  2018: CT REMOVAL OF OVARY/TUBE(S)  ROTATOR CUFF REPAIR; Right  TONSILLECTOMY    Mammogram stable . BMD . Colonoscopy years ago, not on record. Eye exam  in [facility]. Pneumovax , Prevnar 11/15, Flu shot 10/20.      Social: Former tobacco use, rare alcohol. , daughter lives locally, two sons out of state.     FMH: Breast cancer in multiple, HTN, Heart dis, Kidney dis.     Allergies: PCN, Levofloxacin.     Medications: list reviewed and reconciled. Multivitamin and Calcium plus D daily.        Review of Systems   Constitutional: Negative for activity change, appetite change, fatigue, fever and unexpected weight  "change.   HENT: Negative for nasal congestion, ear pain, hearing loss, rhinorrhea, sneezing, sore throat, trouble swallowing and voice change.    Eyes: Negative for pain and visual disturbance.   Respiratory: Negative for cough, chest tightness, shortness of breath and wheezing.         Respiratory status is stable, rarely needs rescue meds. Uses a short course of Prednisone prn flare ups.   Cardiovascular: Negative for chest pain, palpitations and leg swelling.   Gastrointestinal: Negative for abdominal pain, blood in stool, constipation, diarrhea, nausea and vomiting.   Genitourinary: Negative for dysuria and frequency.   Musculoskeletal: Positive for arthralgias and back pain. Negative for gait problem, joint swelling and myalgias.   Integumentary:  Negative for color change and rash.   Neurological: Negative for dizziness, syncope, facial asymmetry, speech difficulty, weakness, numbness and headaches.   Hematological: Negative for adenopathy. Does not bruise/bleed easily.   Psychiatric/Behavioral: Negative for agitation, confusion, dysphoric mood and sleep disturbance. The patient is not nervous/anxious.          Objective:    /70, Pulse 60, Temp 98, O2 Sat 99%, Ht 5' 2", Wt 151.7 lbs (from 153), BMI=27.7  Physical Exam  Vitals signs reviewed.   Constitutional:       General: She is not in acute distress.     Appearance: Normal appearance. She is well-developed. She is not diaphoretic.      Comments: Well groomed, ambulatory with walker, accompanied by dtr.   HENT:      Head: Normocephalic and atraumatic.      Right Ear: Tympanic membrane and ear canal normal.      Left Ear: Tympanic membrane and ear canal normal.      Nose: Nose normal. No congestion.   Eyes:      General: No scleral icterus.     Extraocular Movements: Extraocular movements intact.      Conjunctiva/sclera: Conjunctivae normal.      Right eye: Right conjunctiva is not injected.      Left eye: Left conjunctiva is not injected.   Neck:      " Musculoskeletal: Normal range of motion and neck supple.      Thyroid: No thyromegaly.      Vascular: No carotid bruit or JVD.   Cardiovascular:      Rate and Rhythm: Normal rate and regular rhythm.      Pulses: Normal pulses.      Heart sounds: Normal heart sounds. No murmur.   Pulmonary:      Effort: Pulmonary effort is normal. No respiratory distress.      Breath sounds: Normal breath sounds. No wheezing, rhonchi or rales.   Abdominal:      General: Bowel sounds are normal. There is no distension.      Palpations: Abdomen is soft. There is no mass.      Tenderness: There is no abdominal tenderness.   Musculoskeletal: Normal range of motion.         General: No deformity.      Right lower leg: No edema.      Left lower leg: No edema.      Comments: Mild tenderness in low back.   Lymphadenopathy:      Cervical: No cervical adenopathy.   Skin:     General: Skin is warm and dry.      Coloration: Skin is not pale.      Findings: No erythema or rash.      Nails: There is no clubbing.     Neurological:      General: No focal deficit present.      Mental Status: She is alert and oriented to person, place, and time.      Cranial Nerves: No cranial nerve deficit.      Motor: No abnormal muscle tone.      Coordination: Coordination normal.      Gait: Gait normal.   Psychiatric:         Mood and Affect: Mood normal.         Behavior: Behavior normal.         Thought Content: Thought content normal.      Comments: Talkative, in good spirits.         Assessment:       1. Hypertensive heart disease without heart failure    2. Hyperlipidemia, unspecified hyperlipidemia type    3. Stage 3a chronic kidney disease    4. Aortic atherosclerosis    5. Elevated random blood glucose level    6. Chronic obstructive pulmonary disease, unspecified COPD type    7. Osteoporosis without current pathological fracture, unspecified osteoporosis type    8. Gastroesophageal reflux disease without esophagitis    9. Lumbar spondylosis    10.  Depression with anxiety    11. Need for shingles vaccine        Plan:       Hypertensive heart disease without heart failure - controlled.  -     CBC Auto Differential; Future; Expected date: 12/01/2020  -     Comprehensive Metabolic Panel; Future; Expected date: 12/01/2020  -     benazepriL (LOTENSIN) 20 MG tablet; Take 1 tablet (20 mg total) by mouth once daily.  Dispense: 90 tablet; Refill: 1  -     felodipine (PLENDIL) 10 MG 24 hr tablet; TAKE 2 TABLETS(20 MG) BY MOUTH EVERY DAY  Dispense: 180 tablet; Refill: 0    Hyperlipidemia, unspecified hyperlipidemia type  -     Lipid Panel; Future; Expected date: 12/01/2020  -     atorvastatin (LIPITOR) 20 MG tablet; Take 1 tablet (20 mg total) by mouth nightly.  Dispense: 90 tablet; Refill: 1    Stage 3a chronic kidney disease  -     Urinalysis  -     Protein/Creatinine Ratio, Urine    Aortic atherosclerosis  -     atorvastatin (LIPITOR) 20 MG tablet; Take 1 tablet (20 mg total) by mouth nightly.  Dispense: 90 tablet; Refill: 1    Elevated random blood glucose level - mild elevation 110-120's on previous labs.  -     Hemoglobin A1C; Future; Expected date: 12/01/2020    Chronic obstructive pulmonary disease, unspecified COPD type        -    Stable on current meds, no severe exacerbations.     Osteoporosis without current pathological fracture, unspecified osteoporosis type  -     Vitamin D; Future; Expected date: 12/01/2020    Gastroesophageal reflux disease without esophagitis    Lumbar spondylosis  -     traMADoL (ULTRAM) 50 mg tablet; TAKE 1 TABLET(50 MG) BY MOUTH EVERY 12 HOURS AS NEEDED FOR PAIN  Dispense: 60 tablet; Refill: 2    Depression with anxiety  -     citalopram (CELEXA) 20 MG tablet; Take 1 tablet (20 mg total) by mouth every evening.  Dispense: 90 tablet; Refill: 1    Need for shingles vaccine        -     Shingrix today.     Other orders  -     Urinalysis Microscopic

## 2020-12-05 ENCOUNTER — PATIENT OUTREACH (OUTPATIENT)
Dept: ADMINISTRATIVE | Facility: OTHER | Age: 85
End: 2020-12-05

## 2020-12-05 NOTE — PROGRESS NOTES
Health Maintenance Due   Topic Date Due    TETANUS VACCINE  09/05/1952    Shingles Vaccine (1 of 2) 09/05/1984     Updates were requested from care everywhere.  Chart was reviewed for overdue Proactive [facility]counters ([facility]) topics (CRS, Breast Cancer Screening, Eye exam)  Health Maintenance has been updated.  LINKS immunization registry triggered.  Immunizations were reconciled.

## 2020-12-07 DIAGNOSIS — C50.012 MALIGNANT NEOPLASM OF NIPPLE OF LEFT BREAST IN FEMALE, ESTROGEN RECEPTOR POSITIVE: ICD-10-CM

## 2020-12-07 DIAGNOSIS — Z17.0 MALIGNANT NEOPLASM OF NIPPLE OF LEFT BREAST IN FEMALE, ESTROGEN RECEPTOR POSITIVE: ICD-10-CM

## 2020-12-07 RX ORDER — LETROZOLE 2.5 MG/1
2.5 TABLET, FILM COATED ORAL DAILY
Qty: 90 TABLET | Refills: 1 | Status: SHIPPED | OUTPATIENT
Start: 2020-12-07 | End: 2021-07-12

## 2020-12-08 ENCOUNTER — TELEPHONE (OUTPATIENT)
Dept: PAIN MEDICINE | Facility: CLINIC | Age: 85
End: 2020-12-08

## 2020-12-08 NOTE — TELEPHONE ENCOUNTER
"This message is for patient in regards to his/her appointment 12/09/20 at 11:30a       [facility] Policy: For the safety of all patients and staff members.     Patient Visitor policy: Due to social distancing and limited seating staff are requesting patient to arrive to their schedule appointments alone. If patient do not need assistance with their visit, we're asking all visitors to remain outside the waiting area.    Upon arriving to facility; patient will have temperature taking and are required to wear a face mask, if patient do not have a face mask one will be provided. Upon arriving patient we ask patients to contact clinic at this number [phone] to notify staff that they have arrived or they may do so by utilizing the Mobile checked in [place](if patient have patient portal; clinical staff will send a message through there letting them know it's okay to proceed to their visit). Staff will give the patient the "okay" to come up or wait inside their vehicle until clinic is ready for patient to be seen by Dr. Merly Wilcox MD. If you have any questions or concerns please contact [phone]    Staff left a voicemail reminding patient of their appointment.    "

## 2020-12-09 ENCOUNTER — OFFICE VISIT (OUTPATIENT)
Dept: PAIN MEDICINE | Facility: CLINIC | Age: 85
End: 2020-12-09
Attending: ANESTHESIOLOGY
Payer: MEDICARE

## 2020-12-09 VITALS
HEART RATE: 64 BPM | OXYGEN SATURATION: 100 % | SYSTOLIC BLOOD PRESSURE: 168 MMHG | HEIGHT: 62 IN | BODY MASS INDEX: 27.14 KG/M2 | WEIGHT: 147.5 LBS | DIASTOLIC BLOOD PRESSURE: 89 MMHG | RESPIRATION RATE: 18 BRPM

## 2020-12-09 DIAGNOSIS — M70.60 TROCHANTERIC BURSITIS, UNSPECIFIED LATERALITY: Primary | ICD-10-CM

## 2020-12-09 DIAGNOSIS — M76.31 ILIOTIBIAL BAND TENDINITIS OF RIGHT SIDE: ICD-10-CM

## 2020-12-09 PROCEDURE — 99999 PR PBB SHADOW E&M-EST. PATIENT-LVL IV: CPT | Mod: PBBFAC,,, | Performed by: ANESTHESIOLOGY

## 2020-12-09 PROCEDURE — 99213 OFFICE O/P EST LOW 20 MIN: CPT | Mod: 25,S$GLB,, | Performed by: ANESTHESIOLOGY

## 2020-12-09 PROCEDURE — 1101F PT FALLS ASSESS-DOCD LE1/YR: CPT | Mod: CPTII,S$GLB,, | Performed by: ANESTHESIOLOGY

## 2020-12-09 PROCEDURE — 20611 PR DRAIN/ASP/INJECT MAJOR JOINT/BURSA W/US GUIDANCE: ICD-10-PCS | Mod: RT,S$GLB,, | Performed by: ANESTHESIOLOGY

## 2020-12-09 PROCEDURE — 1159F MED LIST DOCD IN RCRD: CPT | Mod: S$GLB,,, | Performed by: ANESTHESIOLOGY

## 2020-12-09 PROCEDURE — 3288F PR FALLS RISK ASSESSMENT DOCUMENTED: ICD-10-PCS | Mod: CPTII,S$GLB,, | Performed by: ANESTHESIOLOGY

## 2020-12-09 PROCEDURE — 20550 PR INJECT TENDON SHEATH/LIGAMENT: ICD-10-PCS | Mod: 59,RT,S$GLB, | Performed by: ANESTHESIOLOGY

## 2020-12-09 PROCEDURE — 99213 PR OFFICE/OUTPT VISIT, EST, LEVL III, 20-29 MIN: ICD-10-PCS | Mod: 25,S$GLB,, | Performed by: ANESTHESIOLOGY

## 2020-12-09 PROCEDURE — 1101F PR PT FALLS ASSESS DOC 0-1 FALLS W/OUT INJ PAST YR: ICD-10-PCS | Mod: CPTII,S$GLB,, | Performed by: ANESTHESIOLOGY

## 2020-12-09 PROCEDURE — 1125F AMNT PAIN NOTED PAIN PRSNT: CPT | Mod: S$GLB,,, | Performed by: ANESTHESIOLOGY

## 2020-12-09 PROCEDURE — 20611 DRAIN/INJ JOINT/BURSA W/US: CPT | Mod: RT,S$GLB,, | Performed by: ANESTHESIOLOGY

## 2020-12-09 PROCEDURE — 99999 PR PBB SHADOW E&M-EST. PATIENT-LVL IV: ICD-10-PCS | Mod: PBBFAC,,, | Performed by: ANESTHESIOLOGY

## 2020-12-09 PROCEDURE — 3288F FALL RISK ASSESSMENT DOCD: CPT | Mod: CPTII,S$GLB,, | Performed by: ANESTHESIOLOGY

## 2020-12-09 PROCEDURE — 20550 NJX 1 TENDON SHEATH/LIGAMENT: CPT | Mod: 59,RT,S$GLB, | Performed by: ANESTHESIOLOGY

## 2020-12-09 PROCEDURE — 1125F PR PAIN SEVERITY QUANTIFIED, PAIN PRESENT: ICD-10-PCS | Mod: S$GLB,,, | Performed by: ANESTHESIOLOGY

## 2020-12-09 PROCEDURE — 1159F PR MEDICATION LIST DOCUMENTED IN MEDICAL RECORD: ICD-10-PCS | Mod: S$GLB,,, | Performed by: ANESTHESIOLOGY

## 2020-12-09 RX ORDER — BETAMETHASONE SODIUM PHOSPHATE AND BETAMETHASONE ACETATE 3; 3 MG/ML; MG/ML
6 INJECTION, SUSPENSION INTRA-ARTICULAR; INTRALESIONAL; INTRAMUSCULAR; SOFT TISSUE
Status: COMPLETED | OUTPATIENT
Start: 2020-12-09 | End: 2020-12-09

## 2020-12-09 RX ADMIN — BETAMETHASONE SODIUM PHOSPHATE AND BETAMETHASONE ACETATE 6 MG: 3; 3 INJECTION, SUSPENSION INTRA-ARTICULAR; INTRALESIONAL; INTRAMUSCULAR; SOFT TISSUE at 01:12

## 2020-12-09 NOTE — PROGRESS NOTES
Subjective:      Patient ID: Anahi Cook is a 86 y.o. female.    Chief Complaint: No chief complaint on file.    Referred by: No ref. provider found     HPI  She returns for follow-up.  She is doing well since her facet injections.  Her right hip is hurting.  It interferes with her mobility and comfort while laying down.  No new symptomatology otherwise.      Past Medical History:   Diagnosis Date    Anxiety     Back pain     Breast cancer 1/17/2018    Breast mass 1/15/2018    Chronic kidney disease, stage 2, mildly decreased GFR     COPD (chronic obstructive pulmonary disease)     emphysema    Depression     Dysuria 1/29/2018    Family history of breast cancer 1/17/2018    GERD (gastroesophageal reflux disease)     Hypertension     Infiltrating ductal carcinoma of breast, left 7/12/2018    Osteoporosis, senile     Ovarian mass 1/15/2018    Pneumonia     S/P robotic assisted laparoscopic BSO (bilateral salpingo-oophorectomy) 2/23/2018       Past Surgical History:   Procedure Laterality Date    AXILLARY NODE DISSECTION Left 7/12/2018    Procedure: LYMPHADENECTOMY, AXILLARY;  Surgeon: Amanda Caballero MD;  Location: [address];  Service: General;  Laterality: Left;    BREAST BIOPSY      BREAST LUMPECTOMY      CHOLECYSTECTOMY      COLONOSCOPY      ESOPHAGOGASTRODUODENOSCOPY      ESOPHAGOGASTRODUODENOSCOPY N/A 10/14/2019    Procedure: EGD (ESOPHAGOGASTRODUODENOSCOPY);  Surgeon: Davonte Thompson MD;  Location: [address]);  Service: Endoscopy;  Laterality: N/A;  hx of pulmonary hypertension-PA 50     pt requesting ASAP    EYE SURGERY      FIXATION KYPHOPLASTY N/A 11/8/2018    Procedure: Kyphoplasty   T12;  Surgeon: Merly Wilcox MD;  Location: [facility] CATH LAB;  Service: Pain Management;  Laterality: N/A;  T12  [employer] e-mailed with date and time    HYSTERECTOMY      INJECTION FOR SENTINEL NODE IDENTIFICATION Left 7/12/2018    Procedure: INJECTION, FOR SENTINEL NODE  IDENTIFICATION;  Surgeon: Amanda Caballero MD;  Location: [facility];  Service: General;  Laterality: Left;    INJECTION OF FACET JOINT Bilateral 10/15/2018    Procedure: INJECTION, FACET JOINT, BILATERAL LUMBAR L3-4, 4-5, 5-S1 FACET JOINT INJECTIONS;  Surgeon: Merly Wilcox MD;  [place] [facility] [place];  Service: Pain Management;  Laterality: Bilateral;    INJECTION OF FACET JOINT Bilateral 4/1/2019    Procedure: INJECTION, FACET JOINT, L3-L4,L4-L5,L5-S1;  Surgeon: Merly Wilcox MD;  [place] [facility] [place];  Service: Pain Management;  Laterality: Bilateral;    INJECTION OF FACET JOINT Bilateral 6/20/2019    Procedure: INJECTION, FACET JOINT, L3-L4,L4-L5,L5-S1 NEED CONSENT;  Surgeon: Merly Wilcox MD;  Location: [facility];  Service: Pain Management;  Laterality: Bilateral;    INJECTION OF FACET JOINT Bilateral 7/27/2020    Procedure: INJECTION, FACET JOINT BILATERAL L3/4, L4/5 and L5/S1;  Surgeon: Merly Wilcox MD;  Location: [facility];  Service: Pain Management;  Laterality: Bilateral;    INJECTION OF JOINT Bilateral 7/27/2020    Procedure: INJECTION, JOINT, BILATERAL GREATER TROCHANTERIC BURSA;  Surgeon: Merly Wilcox MD;  Location: [facility];  Service: Pain Management;  Laterality: Bilateral;    MASTECTOMY, PARTIAL Left 7/12/2018    Procedure: MASTECTOMY, PARTIAL-W/SEED LOCALIZATION;  Surgeon: Amanda Caballero MD;  Location: [facility];  Service: General;  Laterality: Left;    MA REMOVAL OF OVARY/TUBE(S)  02/23/2018    Robotic Assisted    ROTATOR CUFF REPAIR Right     rotator cuff repair right shoulder    TONSILLECTOMY         Review of patient's allergies indicates:   Allergen Reactions    Levaquin [levofloxacin] Itching    Penicillins Itching       Current Outpatient Medications   Medication Sig Dispense Refill    acetaminophen (TYLENOL) 325 MG tablet Take 650 mg by mouth every 6 (six) hours as needed for Pain.       albuterol (PROAIR HFA) 90 mcg/actuation inhaler Inhale 1-2 puffs  into the lungs every 6 (six) hours as needed for Wheezing or Shortness of Breath.      albuterol (PROVENTIL) 2.5 mg /3 mL (0.083 %) nebulizer solution Inhale 3 mLs into the lungs as needed.       albuterol-ipratropium 2.5mg-0.5mg/3mL (DUO-NEB) 0.5 mg-3 mg(2.5 mg base)/3 mL nebulizer solution Take 3 mLs by nebulization as needed.       atorvastatin (LIPITOR) 20 MG tablet Take 1 tablet (20 mg total) by mouth nightly. 90 tablet 1    benazepriL (LOTENSIN) 20 MG tablet Take 1 tablet (20 mg total) by mouth once daily. 90 tablet 1    calcium-vitamin D 250-100 mg-unit per tablet Take 1 tablet by mouth 2 (two) times daily.      CETIRIZINE HCL (ZYRTEC ORAL) Take 10 mg by mouth nightly as needed.       citalopram (CELEXA) 20 MG tablet Take 1 tablet (20 mg total) by mouth every evening. 90 tablet 1    felodipine (PLENDIL) 10 MG 24 hr tablet TAKE 2 TABLETS(20 MG) BY MOUTH EVERY  tablet 0    fluticasone-vilanterol (BREO) 100-25 mcg/dose diskus inhaler Inhale 1 puff into the lungs once daily. 1 each 3    INCRUSE ELLIPTA 62.5 mcg/actuation DsDv USE 1 PUFF QD AT SAME TIME  (patient takes at night)  2    ketoprofen 10 % CrPk APPLY UP TO 4.8 GRAMS (3 PUMPS) TO PAINFUL AREAS UP TO FIVE TIMES DAILY. RUB IN WELL  99    ketoprofen 10 % CrPk APPLY UP TO 4.8 GRAMS TO PAINFUL AREAS UP TO FIVE TIMES DAILY. RUB IN WELL      letrozole (FEMARA) 2.5 mg Tab Take 1 tablet (2.5 mg total) by mouth once daily. 90 tablet 1    melatonin 10 mg Tab Take by mouth every evening.      montelukast (SINGULAIR) 10 mg tablet Take 10 mg by mouth every evening.      multivitamin (THERAGRAN) per tablet Take 1 tablet by mouth once daily.      predniSONE (DELTASONE) 10 MG tablet   0    torsemide (DEMADEX) 20 MG Tab TAKE 1 TABLET(20 MG) BY MOUTH EVERY DAY 90 tablet 1    traMADoL (ULTRAM) 50 mg tablet TAKE 1 TABLET(50 MG) BY MOUTH EVERY 12 HOURS AS NEEDED FOR PAIN 60 tablet 2     No current facility-administered medications for this visit.         Family History   Problem Relation Age of Onset    Heart failure Mother     Kidney failure Mother     Heart attack Father     Breast cancer Sister 66        Lump, XRT, chemo, recurrence 10 years later.    Cancer Brother         leukemia    Heart attack Brother 58    Pulmonary embolism Brother 45    Heart attack Brother 52    Breast cancer Maternal Grandmother 60        advanced at diagnosis    Breast cancer Maternal Aunt 83         at 92    Colon cancer Neg Hx     Esophageal cancer Neg Hx        Social History     Socioeconomic History    Marital status: Single     Spouse name: Not on file    Number of children: 3    Years of education: Not on file    Highest education level: Not on file   Occupational History    Occupation: Multiple jobs, see social documentation section     Comment: Retired   Social Needs    Financial resource strain: Not on file    Food insecurity     Worry: Not on file     Inability: Not on file    Transportation needs     Medical: Not on file     Non-medical: Not on file   Tobacco Use    Smoking status: Former Smoker     Packs/day: 1.00     Years: 40.00     Pack years: 40.00     Types: Cigarettes    Smokeless tobacco: Never Used    Tobacco comment: Smoked >1 ppd for at least 40 years, quit around    Substance and Sexual Activity    Alcohol use: Yes     Comment: occasional glass of wine or cocktail    Drug use: No    Sexual activity: Yes     Partners: Male   Lifestyle    Physical activity     Days per week: Not on file     Minutes per session: Not on file    Stress: Not on file   Relationships    Social connections     Talks on phone: Not on file     Gets together: Not on file     Attends Mosque service: Not on file     Active member of club or organization: Not on file     Attends meetings of clubs or organizations: Not on file     Relationship status: Not on file   Other Topics Concern    Not on file   Social History Narrative    Worked many jobs  "while raising 3 children.  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  She was  from her first , but took care of him at the end of his life.           ROS        Objective:   BP (!) 168/89   Pulse 64   Resp 18   Ht 5' 2" (1.575 m)   Wt 66.9 kg (147 lb 7.8 oz)   LMP  (LMP Unknown)   SpO2 100%   BMI 26.98 kg/m²   Pain Disability Index Review:  Last 3 PDI Scores 12/9/2020 7/16/2020 12/5/2019   Pain Disability Index (PDI) 29 48 0     Normocephalic.  Atraumatic.  Affect appropriate.  Breathing unlabored.  Extra ocular muscles intact.           Ortho/SPM Exam    positive pain and tenderness on the right greater trochanter.  Positive Irena's test.  No sides of infection.  Hip provocative maneuvers negative  Assessment:       Encounter Diagnoses   Name Primary?    Trochanteric bursitis, unspecified laterality Yes    Iliotibial band tendinitis of right side          Plan:   We discussed with the patient the assessment and recommendations. The following is the plan we agreed on:  1.Procedure:   under clean technique, ultrasound guidance & after discussing with the patient.   Bupivacaine 0.25% 10 mL mixed with 0.4 mL of betamethasone injected into right  trochanteric bursa and iliotibial band tendon sheath  Patient tolerated well & had improvement in her symptomatology.  2.  Return as needed.  Otherwise follow-up in 6 weeks.  If her symptomatology resolves she may call and cancel.         Diagnoses and all orders for this visit:    Trochanteric bursitis, unspecified laterality  -     betamethasone acetate-betamethasone sodium phosphate injection 6 mg    Iliotibial band tendinitis of right side  -     betamethasone acetate-betamethasone sodium phosphate injection 6 mg               "

## 2020-12-29 ENCOUNTER — PATIENT MESSAGE (OUTPATIENT)
Dept: PRIMARY CARE CLINIC | Facility: CLINIC | Age: 85
End: 2020-12-29

## 2021-01-03 ENCOUNTER — HOSPITAL ENCOUNTER (EMERGENCY)
Facility: HOSPITAL | Age: 86
Discharge: HOME | End: 2021-01-03
Attending: EMERGENCY MEDICINE
Payer: MEDICARE

## 2021-01-03 ENCOUNTER — OFFICE VISIT (OUTPATIENT)
Dept: URGENT CARE | Facility: CLINIC | Age: 86
End: 2021-01-03
Payer: MEDICARE

## 2021-01-03 VITALS
HEART RATE: 63 BPM | TEMPERATURE: 99 F | DIASTOLIC BLOOD PRESSURE: 80 MMHG | OXYGEN SATURATION: 94 % | HEIGHT: 62 IN | WEIGHT: 149 LBS | RESPIRATION RATE: 18 BRPM | SYSTOLIC BLOOD PRESSURE: 173 MMHG | BODY MASS INDEX: 27.42 KG/M2

## 2021-01-03 VITALS
DIASTOLIC BLOOD PRESSURE: 76 MMHG | RESPIRATION RATE: 19 BRPM | HEART RATE: 64 BPM | SYSTOLIC BLOOD PRESSURE: 183 MMHG | TEMPERATURE: 98 F | BODY MASS INDEX: 27.42 KG/M2 | OXYGEN SATURATION: 98 % | HEIGHT: 62 IN | WEIGHT: 149 LBS

## 2021-01-03 DIAGNOSIS — S40.021A ARM CONTUSION, RIGHT, INITIAL ENCOUNTER: ICD-10-CM
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
## 2021-01-03 DIAGNOSIS — M79.652 LEFT THIGH PAIN: ICD-10-CM

## 2021-01-03 DIAGNOSIS — S52.502A CLOSED FRACTURE DISTAL RADIUS AND ULNA, LEFT, INITIAL ENCOUNTER: Primary | ICD-10-CM
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
## 2021-01-03 DIAGNOSIS — T14.90XA TRAUMA: ICD-10-CM

## 2021-01-03 DIAGNOSIS — S52.602A CLOSED FRACTURE DISTAL RADIUS AND ULNA, LEFT, INITIAL ENCOUNTER: Primary | ICD-10-CM

## 2021-01-03 DIAGNOSIS — S52.502A CLOSED FRACTURE OF DISTAL END OF LEFT RADIUS, UNSPECIFIED FRACTURE MORPHOLOGY, INITIAL ENCOUNTER: Primary | ICD-10-CM

## 2021-01-03 DIAGNOSIS — R26.89 INABILITY TO BEAR WEIGHT: ICD-10-CM

## 2021-01-03 PROCEDURE — 99214 OFFICE O/P EST MOD 30 MIN: CPT | Mod: S$GLB,,, | Performed by: FAMILY MEDICINE

## 2021-01-03 PROCEDURE — 99284 PR EMERGENCY DEPT VISIT,LEVEL IV: ICD-10-PCS | Mod: ,,, | Performed by: EMERGENCY MEDICINE

## 2021-01-03 PROCEDURE — 29105 APPLICATION LONG ARM SPLINT: CPT | Mod: LT

## 2021-01-03 PROCEDURE — 72170 XR PELVIS ROUTINE AP: ICD-10-PCS | Mod: FY,XS,S$GLB, | Performed by: RADIOLOGY

## 2021-01-03 PROCEDURE — 99284 EMERGENCY DEPT VISIT MOD MDM: CPT | Mod: ,,, | Performed by: EMERGENCY MEDICINE

## 2021-01-03 PROCEDURE — 73110 XR WRIST COMPLETE 3 VIEWS LEFT: ICD-10-PCS | Mod: FY,LT,S$GLB, | Performed by: RADIOLOGY

## 2021-01-03 PROCEDURE — 99214 PR OFFICE/OUTPT VISIT, EST, LEVL IV, 30-39 MIN: ICD-10-PCS | Mod: S$GLB,,, | Performed by: FAMILY MEDICINE

## 2021-01-03 PROCEDURE — 99285 EMERGENCY DEPT VISIT HI MDM: CPT | Mod: 25

## 2021-01-03 PROCEDURE — 73502 X-RAY EXAM HIP UNI 2-3 VIEWS: CPT | Mod: FY,LT,S$GLB, | Performed by: RADIOLOGY

## 2021-01-03 PROCEDURE — 25605 CLTX DST RDL FX/EPHYS SEP W/: CPT | Mod: LT

## 2021-01-03 PROCEDURE — 72170 X-RAY EXAM OF PELVIS: CPT | Mod: FY,XS,S$GLB, | Performed by: RADIOLOGY

## 2021-01-03 PROCEDURE — 25000003 PHARM REV CODE 250: Performed by: STUDENT IN AN ORGANIZED HEALTH CARE EDUCATION/TRAINING PROGRAM

## 2021-01-03 PROCEDURE — 73110 X-RAY EXAM OF WRIST: CPT | Mod: FY,LT,S$GLB, | Performed by: RADIOLOGY

## 2021-01-03 PROCEDURE — 73502 XR HIP 2 VIEW LEFT: ICD-10-PCS | Mod: FY,LT,S$GLB, | Performed by: RADIOLOGY

## 2021-01-03 RX ORDER — LIDOCAINE HYDROCHLORIDE 10 MG/ML
10 INJECTION INFILTRATION; PERINEURAL ONCE
Status: DISCONTINUED | OUTPATIENT
Start: 2021-01-03 | End: 2021-01-04 | Stop reason: HOSPADM

## 2021-01-03 RX ORDER — ASCORBIC ACID 500 MG
500 TABLET ORAL DAILY
Qty: 50 TABLET | COMMUNITY
Start: 2021-01-03 | End: 2021-02-22

## 2021-01-03 RX ORDER — LIDOCAINE HYDROCHLORIDE 10 MG/ML
20 INJECTION INFILTRATION; PERINEURAL ONCE
Status: COMPLETED | OUTPATIENT
Start: 2021-01-03 | End: 2021-01-03

## 2021-01-03 RX ADMIN — LIDOCAINE HYDROCHLORIDE 20 ML: 10 INJECTION, SOLUTION INFILTRATION; PERINEURAL at 07:01

## 2021-01-04 ENCOUNTER — TELEPHONE (OUTPATIENT)
Dept: ORTHOPEDICS | Facility: CLINIC | Age: 86
End: 2021-01-04

## 2021-01-05 ENCOUNTER — PATIENT OUTREACH (OUTPATIENT)
Dept: ADMINISTRATIVE | Facility: OTHER | Age: 86
End: 2021-01-05

## 2021-01-05 ENCOUNTER — TELEPHONE (OUTPATIENT)
Dept: ORTHOPEDICS | Facility: CLINIC | Age: 86
End: 2021-01-05

## 2021-01-13 ENCOUNTER — TELEPHONE (OUTPATIENT)
Dept: ORTHOPEDICS | Facility: CLINIC | Age: 86
End: 2021-01-13

## 2021-01-13 ENCOUNTER — OFFICE VISIT (OUTPATIENT)
Dept: ORTHOPEDICS | Facility: CLINIC | Age: 86
End: 2021-01-13
Payer: MEDICARE

## 2021-01-13 VITALS
DIASTOLIC BLOOD PRESSURE: 77 MMHG | BODY MASS INDEX: 27.42 KG/M2 | HEART RATE: 65 BPM | WEIGHT: 149 LBS | HEIGHT: 62 IN | SYSTOLIC BLOOD PRESSURE: 147 MMHG

## 2021-01-13 DIAGNOSIS — Z01.818 PRE-OP EXAM: Primary | ICD-10-CM

## 2021-01-13 DIAGNOSIS — S52.502A CLOSED FRACTURE OF DISTAL END OF LEFT RADIUS, UNSPECIFIED FRACTURE MORPHOLOGY, INITIAL ENCOUNTER: Primary | ICD-10-CM

## 2021-01-13 PROCEDURE — 99999 PR PBB SHADOW E&M-EST. PATIENT-LVL IV: CPT | Mod: PBBFAC,,, | Performed by: PHYSICIAN ASSISTANT

## 2021-01-13 PROCEDURE — 1125F AMNT PAIN NOTED PAIN PRSNT: CPT | Mod: S$GLB,,, | Performed by: PHYSICIAN ASSISTANT

## 2021-01-13 PROCEDURE — 3288F FALL RISK ASSESSMENT DOCD: CPT | Mod: CPTII,S$GLB,, | Performed by: PHYSICIAN ASSISTANT

## 2021-01-13 PROCEDURE — 1159F MED LIST DOCD IN RCRD: CPT | Mod: S$GLB,,, | Performed by: PHYSICIAN ASSISTANT

## 2021-01-13 PROCEDURE — 1159F PR MEDICATION LIST DOCUMENTED IN MEDICAL RECORD: ICD-10-PCS | Mod: S$GLB,,, | Performed by: PHYSICIAN ASSISTANT

## 2021-01-13 PROCEDURE — 1101F PR PT FALLS ASSESS DOC 0-1 FALLS W/OUT INJ PAST YR: ICD-10-PCS | Mod: CPTII,S$GLB,, | Performed by: PHYSICIAN ASSISTANT

## 2021-01-13 PROCEDURE — 1101F PT FALLS ASSESS-DOCD LE1/YR: CPT | Mod: CPTII,S$GLB,, | Performed by: PHYSICIAN ASSISTANT

## 2021-01-13 PROCEDURE — 99999 PR PBB SHADOW E&M-EST. PATIENT-LVL IV: ICD-10-PCS | Mod: PBBFAC,,, | Performed by: PHYSICIAN ASSISTANT

## 2021-01-13 PROCEDURE — 1125F PR PAIN SEVERITY QUANTIFIED, PAIN PRESENT: ICD-10-PCS | Mod: S$GLB,,, | Performed by: PHYSICIAN ASSISTANT

## 2021-01-13 PROCEDURE — 3288F PR FALLS RISK ASSESSMENT DOCUMENTED: ICD-10-PCS | Mod: CPTII,S$GLB,, | Performed by: PHYSICIAN ASSISTANT

## 2021-01-13 PROCEDURE — 99213 PR OFFICE/OUTPT VISIT, EST, LEVL III, 20-29 MIN: ICD-10-PCS | Mod: S$GLB,,, | Performed by: PHYSICIAN ASSISTANT

## 2021-01-13 PROCEDURE — 99213 OFFICE O/P EST LOW 20 MIN: CPT | Mod: S$GLB,,, | Performed by: PHYSICIAN ASSISTANT

## 2021-01-15 ENCOUNTER — TELEPHONE (OUTPATIENT)
Dept: HEMATOLOGY/ONCOLOGY | Facility: CLINIC | Age: 86
End: 2021-01-15

## 2021-01-19 ENCOUNTER — HOSPITAL ENCOUNTER (OUTPATIENT)
Dept: RADIOLOGY | Facility: OTHER | Age: 86
Discharge: HOME | End: 2021-01-19
Attending: PHYSICIAN ASSISTANT
Payer: MEDICARE

## 2021-01-19 ENCOUNTER — OFFICE VISIT (OUTPATIENT)
Dept: ORTHOPEDICS | Facility: CLINIC | Age: 86
End: 2021-01-19
Payer: MEDICARE

## 2021-01-19 ENCOUNTER — DOCUMENTATION ONLY (OUTPATIENT)
Dept: ORTHOPEDICS | Facility: CLINIC | Age: 86
End: 2021-01-19

## 2021-01-19 VITALS
BODY MASS INDEX: 27.42 KG/M2 | SYSTOLIC BLOOD PRESSURE: 147 MMHG | HEIGHT: 62 IN | DIASTOLIC BLOOD PRESSURE: 86 MMHG | HEART RATE: 67 BPM | WEIGHT: 149 LBS

## 2021-01-19 DIAGNOSIS — S52.502A CLOSED FRACTURE OF DISTAL END OF LEFT RADIUS, UNSPECIFIED FRACTURE MORPHOLOGY, INITIAL ENCOUNTER: Primary | ICD-10-CM

## 2021-01-19 DIAGNOSIS — S52.502A CLOSED FRACTURE OF DISTAL END OF LEFT RADIUS, UNSPECIFIED FRACTURE MORPHOLOGY, INITIAL ENCOUNTER: ICD-10-CM

## 2021-01-19 PROCEDURE — 29075 PR APPLY FOREARM CAST: ICD-10-PCS | Mod: LT,S$GLB,, | Performed by: PHYSICIAN ASSISTANT

## 2021-01-19 PROCEDURE — 99213 OFFICE O/P EST LOW 20 MIN: CPT | Mod: 25,S$GLB,, | Performed by: PHYSICIAN ASSISTANT

## 2021-01-19 PROCEDURE — 3288F FALL RISK ASSESSMENT DOCD: CPT | Mod: CPTII,S$GLB,, | Performed by: PHYSICIAN ASSISTANT

## 2021-01-19 PROCEDURE — 29075 APPL CST ELBW FNGR SHORT ARM: CPT | Mod: LT,S$GLB,, | Performed by: PHYSICIAN ASSISTANT

## 2021-01-19 PROCEDURE — 99999 PR PBB SHADOW E&M-EST. PATIENT-LVL IV: CPT | Mod: PBBFAC,,, | Performed by: PHYSICIAN ASSISTANT

## 2021-01-19 PROCEDURE — 1125F AMNT PAIN NOTED PAIN PRSNT: CPT | Mod: S$GLB,,, | Performed by: PHYSICIAN ASSISTANT

## 2021-01-19 PROCEDURE — 1125F PR PAIN SEVERITY QUANTIFIED, PAIN PRESENT: ICD-10-PCS | Mod: S$GLB,,, | Performed by: PHYSICIAN ASSISTANT

## 2021-01-19 PROCEDURE — 99213 PR OFFICE/OUTPT VISIT, EST, LEVL III, 20-29 MIN: ICD-10-PCS | Mod: 25,S$GLB,, | Performed by: PHYSICIAN ASSISTANT

## 2021-01-19 PROCEDURE — 1101F PR PT FALLS ASSESS DOC 0-1 FALLS W/OUT INJ PAST YR: ICD-10-PCS | Mod: CPTII,S$GLB,, | Performed by: PHYSICIAN ASSISTANT

## 2021-01-19 PROCEDURE — 1101F PT FALLS ASSESS-DOCD LE1/YR: CPT | Mod: CPTII,S$GLB,, | Performed by: PHYSICIAN ASSISTANT

## 2021-01-19 PROCEDURE — 3288F PR FALLS RISK ASSESSMENT DOCUMENTED: ICD-10-PCS | Mod: CPTII,S$GLB,, | Performed by: PHYSICIAN ASSISTANT

## 2021-01-19 PROCEDURE — 1159F PR MEDICATION LIST DOCUMENTED IN MEDICAL RECORD: ICD-10-PCS | Mod: S$GLB,,, | Performed by: PHYSICIAN ASSISTANT

## 2021-01-19 PROCEDURE — 73110 X-RAY EXAM OF WRIST: CPT | Mod: 26,LT,, | Performed by: RADIOLOGY

## 2021-01-19 PROCEDURE — 1159F MED LIST DOCD IN RCRD: CPT | Mod: S$GLB,,, | Performed by: PHYSICIAN ASSISTANT

## 2021-01-19 PROCEDURE — 73110 X-RAY EXAM OF WRIST: CPT | Mod: TC,FY,LT

## 2021-01-19 PROCEDURE — 99999 PR PBB SHADOW E&M-EST. PATIENT-LVL IV: ICD-10-PCS | Mod: PBBFAC,,, | Performed by: PHYSICIAN ASSISTANT

## 2021-01-19 PROCEDURE — 73110 XR WRIST COMPLETE 3 VIEWS LEFT: ICD-10-PCS | Mod: 26,LT,, | Performed by: RADIOLOGY

## 2021-01-29 ENCOUNTER — TELEPHONE (OUTPATIENT)
Dept: ORTHOPEDICS | Facility: CLINIC | Age: 86
End: 2021-01-29

## 2021-02-02 ENCOUNTER — OFFICE VISIT (OUTPATIENT)
Dept: ORTHOPEDICS | Facility: CLINIC | Age: 86
End: 2021-02-02
Payer: MEDICARE

## 2021-02-02 ENCOUNTER — HOSPITAL ENCOUNTER (OUTPATIENT)
Dept: RADIOLOGY | Facility: OTHER | Age: 86
Discharge: HOME | End: 2021-02-02
Attending: PHYSICIAN ASSISTANT
Payer: MEDICARE

## 2021-02-02 ENCOUNTER — DOCUMENTATION ONLY (OUTPATIENT)
Dept: ORTHOPEDICS | Facility: CLINIC | Age: 86
End: 2021-02-02

## 2021-02-02 VITALS
HEART RATE: 56 BPM | HEIGHT: 62 IN | BODY MASS INDEX: 27.42 KG/M2 | DIASTOLIC BLOOD PRESSURE: 84 MMHG | SYSTOLIC BLOOD PRESSURE: 175 MMHG | WEIGHT: 149 LBS

## 2021-02-02 DIAGNOSIS — S52.502A CLOSED FRACTURE OF DISTAL END OF LEFT RADIUS, UNSPECIFIED FRACTURE MORPHOLOGY, INITIAL ENCOUNTER: ICD-10-CM

## 2021-02-02 DIAGNOSIS — T14.8XXA FRACTURE: Primary | ICD-10-CM

## 2021-02-02 DIAGNOSIS — S52.502A CLOSED FRACTURE OF DISTAL END OF LEFT RADIUS, UNSPECIFIED FRACTURE MORPHOLOGY, INITIAL ENCOUNTER: Primary | ICD-10-CM

## 2021-02-02 PROCEDURE — 1159F PR MEDICATION LIST DOCUMENTED IN MEDICAL RECORD: ICD-10-PCS | Mod: S$GLB,,, | Performed by: PHYSICIAN ASSISTANT

## 2021-02-02 PROCEDURE — 3288F PR FALLS RISK ASSESSMENT DOCUMENTED: ICD-10-PCS | Mod: CPTII,S$GLB,, | Performed by: PHYSICIAN ASSISTANT

## 2021-02-02 PROCEDURE — 99999 PR PBB SHADOW E&M-EST. PATIENT-LVL IV: CPT | Mod: PBBFAC,,, | Performed by: PHYSICIAN ASSISTANT

## 2021-02-02 PROCEDURE — 1159F MED LIST DOCD IN RCRD: CPT | Mod: S$GLB,,, | Performed by: PHYSICIAN ASSISTANT

## 2021-02-02 PROCEDURE — 29075 APPL CST ELBW FNGR SHORT ARM: CPT | Mod: 58,LT,S$GLB, | Performed by: PHYSICIAN ASSISTANT

## 2021-02-02 PROCEDURE — 73110 X-RAY EXAM OF WRIST: CPT | Mod: TC,FY,LT

## 2021-02-02 PROCEDURE — 1100F PTFALLS ASSESS-DOCD GE2>/YR: CPT | Mod: CPTII,S$GLB,, | Performed by: PHYSICIAN ASSISTANT

## 2021-02-02 PROCEDURE — 1100F PR PT FALLS ASSESS DOC 2+ FALLS/FALL W/INJURY/YR: ICD-10-PCS | Mod: CPTII,S$GLB,, | Performed by: PHYSICIAN ASSISTANT

## 2021-02-02 PROCEDURE — 73110 X-RAY EXAM OF WRIST: CPT | Mod: 26,LT,, | Performed by: RADIOLOGY

## 2021-02-02 PROCEDURE — 3288F FALL RISK ASSESSMENT DOCD: CPT | Mod: CPTII,S$GLB,, | Performed by: PHYSICIAN ASSISTANT

## 2021-02-02 PROCEDURE — 99999 PR PBB SHADOW E&M-EST. PATIENT-LVL IV: ICD-10-PCS | Mod: PBBFAC,,, | Performed by: PHYSICIAN ASSISTANT

## 2021-02-02 PROCEDURE — 1126F PR PAIN SEVERITY QUANTIFIED, NO PAIN PRESENT: ICD-10-PCS | Mod: S$GLB,,, | Performed by: PHYSICIAN ASSISTANT

## 2021-02-02 PROCEDURE — 99213 PR OFFICE/OUTPT VISIT, EST, LEVL III, 20-29 MIN: ICD-10-PCS | Mod: 25,S$GLB,, | Performed by: PHYSICIAN ASSISTANT

## 2021-02-02 PROCEDURE — 1126F AMNT PAIN NOTED NONE PRSNT: CPT | Mod: S$GLB,,, | Performed by: PHYSICIAN ASSISTANT

## 2021-02-02 PROCEDURE — 73110 XR WRIST COMPLETE 3 VIEWS LEFT: ICD-10-PCS | Mod: 26,LT,, | Performed by: RADIOLOGY

## 2021-02-02 PROCEDURE — 29075 PR APPLY FOREARM CAST: ICD-10-PCS | Mod: 58,LT,S$GLB, | Performed by: PHYSICIAN ASSISTANT

## 2021-02-02 PROCEDURE — 99213 OFFICE O/P EST LOW 20 MIN: CPT | Mod: 25,S$GLB,, | Performed by: PHYSICIAN ASSISTANT

## 2021-02-17 ENCOUNTER — PATIENT OUTREACH (OUTPATIENT)
Dept: ADMINISTRATIVE | Facility: OTHER | Age: 86
End: 2021-02-17

## 2021-02-17 ENCOUNTER — TELEPHONE (OUTPATIENT)
Dept: ORTHOPEDICS | Facility: CLINIC | Age: 86
End: 2021-02-17

## 2021-02-18 ENCOUNTER — OFFICE VISIT (OUTPATIENT)
Dept: ORTHOPEDICS | Facility: CLINIC | Age: 86
End: 2021-02-18
Payer: MEDICARE

## 2021-02-18 ENCOUNTER — HOSPITAL ENCOUNTER (OUTPATIENT)
Dept: RADIOLOGY | Facility: OTHER | Age: 86
Discharge: HOME | End: 2021-02-18
Attending: ORTHOPAEDIC SURGERY
Payer: MEDICARE

## 2021-02-18 VITALS
HEIGHT: 62 IN | SYSTOLIC BLOOD PRESSURE: 145 MMHG | HEART RATE: 64 BPM | DIASTOLIC BLOOD PRESSURE: 83 MMHG | WEIGHT: 149 LBS | BODY MASS INDEX: 27.42 KG/M2

## 2021-02-18 DIAGNOSIS — S52.502A CLOSED FRACTURE OF DISTAL END OF LEFT RADIUS, UNSPECIFIED FRACTURE MORPHOLOGY, INITIAL ENCOUNTER: Primary | ICD-10-CM

## 2021-02-18 DIAGNOSIS — T14.8XXA FRACTURE: ICD-10-CM

## 2021-02-18 PROCEDURE — 73110 X-RAY EXAM OF WRIST: CPT | Mod: 26,LT,, | Performed by: RADIOLOGY

## 2021-02-18 PROCEDURE — 99999 PR PBB SHADOW E&M-EST. PATIENT-LVL V: ICD-10-PCS | Mod: PBBFAC,,, | Performed by: PHYSICIAN ASSISTANT

## 2021-02-18 PROCEDURE — 3288F PR FALLS RISK ASSESSMENT DOCUMENTED: ICD-10-PCS | Mod: CPTII,S$GLB,, | Performed by: PHYSICIAN ASSISTANT

## 2021-02-18 PROCEDURE — 97760 ORTHOTIC MGMT&TRAING 1ST ENC: CPT | Mod: GP,S$GLB,, | Performed by: PHYSICIAN ASSISTANT

## 2021-02-18 PROCEDURE — 1126F AMNT PAIN NOTED NONE PRSNT: CPT | Mod: S$GLB,,, | Performed by: PHYSICIAN ASSISTANT

## 2021-02-18 PROCEDURE — 73110 XR WRIST COMPLETE 3 VIEWS LEFT: ICD-10-PCS | Mod: 26,LT,, | Performed by: RADIOLOGY

## 2021-02-18 PROCEDURE — 1101F PT FALLS ASSESS-DOCD LE1/YR: CPT | Mod: CPTII,S$GLB,, | Performed by: PHYSICIAN ASSISTANT

## 2021-02-18 PROCEDURE — 1159F PR MEDICATION LIST DOCUMENTED IN MEDICAL RECORD: ICD-10-PCS | Mod: S$GLB,,, | Performed by: PHYSICIAN ASSISTANT

## 2021-02-18 PROCEDURE — 97760 PR ORTHOTIC MGMT&TRAINJ INITIAL ENC EA 15 MINS: ICD-10-PCS | Mod: GP,S$GLB,, | Performed by: PHYSICIAN ASSISTANT

## 2021-02-18 PROCEDURE — 99999 PR PBB SHADOW E&M-EST. PATIENT-LVL V: CPT | Mod: PBBFAC,,, | Performed by: PHYSICIAN ASSISTANT

## 2021-02-18 PROCEDURE — 1126F PR PAIN SEVERITY QUANTIFIED, NO PAIN PRESENT: ICD-10-PCS | Mod: S$GLB,,, | Performed by: PHYSICIAN ASSISTANT

## 2021-02-18 PROCEDURE — 99214 OFFICE O/P EST MOD 30 MIN: CPT | Mod: S$GLB,,, | Performed by: PHYSICIAN ASSISTANT

## 2021-02-18 PROCEDURE — 1101F PR PT FALLS ASSESS DOC 0-1 FALLS W/OUT INJ PAST YR: ICD-10-PCS | Mod: CPTII,S$GLB,, | Performed by: PHYSICIAN ASSISTANT

## 2021-02-18 PROCEDURE — 3288F FALL RISK ASSESSMENT DOCD: CPT | Mod: CPTII,S$GLB,, | Performed by: PHYSICIAN ASSISTANT

## 2021-02-18 PROCEDURE — 1159F MED LIST DOCD IN RCRD: CPT | Mod: S$GLB,,, | Performed by: PHYSICIAN ASSISTANT

## 2021-02-18 PROCEDURE — 73110 X-RAY EXAM OF WRIST: CPT | Mod: TC,FY,LT

## 2021-02-18 PROCEDURE — 99214 PR OFFICE/OUTPT VISIT, EST, LEVL IV, 30-39 MIN: ICD-10-PCS | Mod: S$GLB,,, | Performed by: PHYSICIAN ASSISTANT

## 2021-02-24 ENCOUNTER — TELEPHONE (OUTPATIENT)
Dept: HEMATOLOGY/ONCOLOGY | Facility: CLINIC | Age: 86
End: 2021-02-24

## 2021-02-24 ENCOUNTER — HOSPITAL ENCOUNTER (EMERGENCY)
Facility: HOSPITAL | Age: 86
Discharge: HOME | End: 2021-02-24
Attending: EMERGENCY MEDICINE
Payer: MEDICARE

## 2021-02-24 VITALS
OXYGEN SATURATION: 96 % | HEART RATE: 62 BPM | HEIGHT: 62 IN | TEMPERATURE: 98 F | DIASTOLIC BLOOD PRESSURE: 74 MMHG | RESPIRATION RATE: 20 BRPM | BODY MASS INDEX: 27.97 KG/M2 | SYSTOLIC BLOOD PRESSURE: 165 MMHG | WEIGHT: 152 LBS

## 2021-02-24 DIAGNOSIS — R42 DIZZINESS: ICD-10-CM

## 2021-02-24 LAB
ALBUMIN SERPL BCP-MCNC: 3.4 G/DL (ref 3.5–5.2)
ALP SERPL-CCNC: 82 U/L (ref 55–135)
ALT SERPL W/O P-5'-P-CCNC: 15 U/L (ref 10–44)
ANION GAP SERPL CALC-SCNC: 8 MMOL/L (ref 8–16)
AST SERPL-CCNC: 22 U/L (ref 10–40)
BASOPHILS # BLD AUTO: 0.07 K/UL (ref 0–0.2)
BASOPHILS NFR BLD: 0.9 % (ref 0–1.9)
BILIRUB SERPL-MCNC: 0.7 MG/DL (ref 0.1–1)
BILIRUB UR QL STRIP: NEGATIVE
BUN SERPL-MCNC: 17 MG/DL (ref 8–23)
CALCIUM SERPL-MCNC: 9.3 MG/DL (ref 8.7–10.5)
CHLORIDE SERPL-SCNC: 104 MMOL/L (ref 95–110)
CLARITY UR REFRACT.AUTO: CLEAR
CO2 SERPL-SCNC: 29 MMOL/L (ref 23–29)
COLOR UR AUTO: YELLOW
CREAT SERPL-MCNC: 1 MG/DL (ref 0.5–1.4)
CTP QC/QA: YES
DIFFERENTIAL METHOD: ABNORMAL
EOSINOPHIL # BLD AUTO: 0.2 K/UL (ref 0–0.5)
EOSINOPHIL NFR BLD: 2.4 % (ref 0–8)
ERYTHROCYTE [DISTWIDTH] IN BLOOD BY AUTOMATED COUNT: 14.9 % (ref 11.5–14.5)
EST. GFR  (AFRICAN AMERICAN): 58.9 ML/MIN/1.73 M^2
EST. GFR  (NON AFRICAN AMERICAN): 51.1 ML/MIN/1.73 M^2
GLUCOSE SERPL-MCNC: 100 MG/DL (ref 70–110)
GLUCOSE UR QL STRIP: NEGATIVE
HCT VFR BLD AUTO: 43.2 % (ref 37–48.5)
HGB BLD-MCNC: 13.9 G/DL (ref 12–16)
HGB UR QL STRIP: NEGATIVE
IMM GRANULOCYTES # BLD AUTO: 0.02 K/UL (ref 0–0.04)
IMM GRANULOCYTES NFR BLD AUTO: 0.3 % (ref 0–0.5)
KETONES UR QL STRIP: NEGATIVE
LEUKOCYTE ESTERASE UR QL STRIP: NEGATIVE
LYMPHOCYTES # BLD AUTO: 1 K/UL (ref 1–4.8)
LYMPHOCYTES NFR BLD: 12.4 % (ref 18–48)
MCH RBC QN AUTO: 28.5 PG (ref 27–31)
MCHC RBC AUTO-ENTMCNC: 32.2 G/DL (ref 32–36)
MCV RBC AUTO: 89 FL (ref 82–98)
MONOCYTES # BLD AUTO: 0.6 K/UL (ref 0.3–1)
MONOCYTES NFR BLD: 7.5 % (ref 4–15)
NEUTROPHILS # BLD AUTO: 6.1 K/UL (ref 1.8–7.7)
NEUTROPHILS NFR BLD: 76.5 % (ref 38–73)
NITRITE UR QL STRIP: NEGATIVE
NRBC BLD-RTO: 0 /100 WBC
PH UR STRIP: 8 [PH] (ref 5–8)
PLATELET # BLD AUTO: 292 K/UL (ref 150–350)
PMV BLD AUTO: 10.9 FL (ref 9.2–12.9)
POTASSIUM SERPL-SCNC: 3.3 MMOL/L (ref 3.5–5.1)
PROT SERPL-MCNC: 6.6 G/DL (ref 6–8.4)
PROT UR QL STRIP: NEGATIVE
RBC # BLD AUTO: 4.87 M/UL (ref 4–5.4)
SARS-COV-2 RDRP RESP QL NAA+PROBE: NEGATIVE
SODIUM SERPL-SCNC: 141 MMOL/L (ref 136–145)
SP GR UR STRIP: 1.01 (ref 1–1.03)
TROPONIN I SERPL DL<=0.01 NG/ML-MCNC: 0.01 NG/ML (ref 0–0.03)
URN SPEC COLLECT METH UR: NORMAL
WBC # BLD AUTO: 7.99 K/UL (ref 3.9–12.7)

## 2021-02-24 PROCEDURE — 99285 PR EMERGENCY DEPT VISIT,LEVEL V: ICD-10-PCS | Mod: CS,,, | Performed by: EMERGENCY MEDICINE

## 2021-02-24 PROCEDURE — 99285 EMERGENCY DEPT VISIT HI MDM: CPT | Mod: CS,,, | Performed by: EMERGENCY MEDICINE

## 2021-02-24 PROCEDURE — 81003 URINALYSIS AUTO W/O SCOPE: CPT

## 2021-02-24 PROCEDURE — 96360 HYDRATION IV INFUSION INIT: CPT

## 2021-02-24 PROCEDURE — 25000003 PHARM REV CODE 250: Performed by: EMERGENCY MEDICINE

## 2021-02-24 PROCEDURE — 93010 EKG 12-LEAD: ICD-10-PCS | Mod: ,,, | Performed by: INTERNAL MEDICINE

## 2021-02-24 PROCEDURE — 99285 EMERGENCY DEPT VISIT HI MDM: CPT | Mod: 25

## 2021-02-24 PROCEDURE — U0002 COVID-19 LAB TEST NON-CDC: HCPCS | Performed by: EMERGENCY MEDICINE

## 2021-02-24 PROCEDURE — 93010 ELECTROCARDIOGRAM REPORT: CPT | Mod: ,,, | Performed by: INTERNAL MEDICINE

## 2021-02-24 PROCEDURE — 93005 ELECTROCARDIOGRAM TRACING: CPT

## 2021-02-24 PROCEDURE — 80053 COMPREHEN METABOLIC PANEL: CPT

## 2021-02-24 PROCEDURE — 84484 ASSAY OF TROPONIN QUANT: CPT

## 2021-02-24 PROCEDURE — 85025 COMPLETE CBC W/AUTO DIFF WBC: CPT

## 2021-02-24 RX ORDER — MECLIZINE HCL 12.5 MG 12.5 MG/1
25 TABLET ORAL
Status: COMPLETED | OUTPATIENT
Start: 2021-02-24 | End: 2021-02-24

## 2021-02-24 RX ORDER — MECLIZINE HYDROCHLORIDE 25 MG/1
25 TABLET ORAL 3 TIMES DAILY PRN
Qty: 20 TABLET | Refills: 0 | Status: SHIPPED | OUTPATIENT
Start: 2021-02-24 | End: 2023-02-05

## 2021-02-24 RX ADMIN — SODIUM CHLORIDE 250 ML: 0.9 INJECTION, SOLUTION INTRAVENOUS at 03:02

## 2021-02-24 RX ADMIN — MECLIZINE 25 MG: 12.5 TABLET ORAL at 01:02

## 2021-02-24 RX ADMIN — SODIUM CHLORIDE 250 ML: 0.9 INJECTION, SOLUTION INTRAVENOUS at 12:02

## 2021-03-04 ENCOUNTER — TELEPHONE (OUTPATIENT)
Dept: AUDIOLOGY | Facility: CLINIC | Age: 86
End: 2021-03-04

## 2021-03-04 ENCOUNTER — OFFICE VISIT (OUTPATIENT)
Dept: OTOLARYNGOLOGY | Facility: CLINIC | Age: 86
End: 2021-03-04
Payer: MEDICARE

## 2021-03-04 VITALS
OXYGEN SATURATION: 95 % | DIASTOLIC BLOOD PRESSURE: 80 MMHG | BODY MASS INDEX: 26.86 KG/M2 | HEIGHT: 62 IN | WEIGHT: 145.94 LBS | HEART RATE: 61 BPM | SYSTOLIC BLOOD PRESSURE: 177 MMHG

## 2021-03-04 DIAGNOSIS — H61.23 IMPACTED CERUMEN, BILATERAL: ICD-10-CM

## 2021-03-04 DIAGNOSIS — H83.2X9 LABYRINTHINE DYSFUNCTION, UNSPECIFIED LATERALITY: ICD-10-CM

## 2021-03-04 DIAGNOSIS — R42 VERTIGO: Primary | ICD-10-CM

## 2021-03-04 PROCEDURE — 69210 PR REMOVAL IMPACTED CERUMEN REQUIRING INSTRUMENTATION, UNILATERAL: ICD-10-PCS | Mod: S$GLB,,, | Performed by: OTOLARYNGOLOGY

## 2021-03-04 PROCEDURE — 69210 REMOVE IMPACTED EAR WAX UNI: CPT | Mod: S$GLB,,, | Performed by: OTOLARYNGOLOGY

## 2021-03-04 PROCEDURE — 3288F FALL RISK ASSESSMENT DOCD: CPT | Mod: CPTII,S$GLB,, | Performed by: OTOLARYNGOLOGY

## 2021-03-04 PROCEDURE — 1159F MED LIST DOCD IN RCRD: CPT | Mod: S$GLB,,, | Performed by: OTOLARYNGOLOGY

## 2021-03-04 PROCEDURE — 1126F PR PAIN SEVERITY QUANTIFIED, NO PAIN PRESENT: ICD-10-PCS | Mod: S$GLB,,, | Performed by: OTOLARYNGOLOGY

## 2021-03-04 PROCEDURE — 1100F PTFALLS ASSESS-DOCD GE2>/YR: CPT | Mod: CPTII,S$GLB,, | Performed by: OTOLARYNGOLOGY

## 2021-03-04 PROCEDURE — 3288F PR FALLS RISK ASSESSMENT DOCUMENTED: ICD-10-PCS | Mod: CPTII,S$GLB,, | Performed by: OTOLARYNGOLOGY

## 2021-03-04 PROCEDURE — 1159F PR MEDICATION LIST DOCUMENTED IN MEDICAL RECORD: ICD-10-PCS | Mod: S$GLB,,, | Performed by: OTOLARYNGOLOGY

## 2021-03-04 PROCEDURE — 1100F PR PT FALLS ASSESS DOC 2+ FALLS/FALL W/INJURY/YR: ICD-10-PCS | Mod: CPTII,S$GLB,, | Performed by: OTOLARYNGOLOGY

## 2021-03-04 PROCEDURE — 1126F AMNT PAIN NOTED NONE PRSNT: CPT | Mod: S$GLB,,, | Performed by: OTOLARYNGOLOGY

## 2021-03-04 PROCEDURE — 99204 PR OFFICE/OUTPT VISIT, NEW, LEVL IV, 45-59 MIN: ICD-10-PCS | Mod: 25,S$GLB,, | Performed by: OTOLARYNGOLOGY

## 2021-03-04 PROCEDURE — 99204 OFFICE O/P NEW MOD 45 MIN: CPT | Mod: 25,S$GLB,, | Performed by: OTOLARYNGOLOGY

## 2021-03-05 ENCOUNTER — TELEPHONE (OUTPATIENT)
Dept: AUDIOLOGY | Facility: CLINIC | Age: 86
End: 2021-03-05

## 2021-03-08 ENCOUNTER — HOSPITAL ENCOUNTER (OUTPATIENT)
Dept: RADIOLOGY | Facility: HOSPITAL | Age: 86
Discharge: HOME | End: 2021-03-08
Attending: SURGERY
Payer: MEDICARE

## 2021-03-08 ENCOUNTER — TELEPHONE (OUTPATIENT)
Dept: AUDIOLOGY | Facility: CLINIC | Age: 86
End: 2021-03-08

## 2021-03-08 ENCOUNTER — OFFICE VISIT (OUTPATIENT)
Dept: HEMATOLOGY/ONCOLOGY | Facility: CLINIC | Age: 86
End: 2021-03-08
Payer: MEDICARE

## 2021-03-08 VITALS
HEIGHT: 62 IN | OXYGEN SATURATION: 93 % | DIASTOLIC BLOOD PRESSURE: 86 MMHG | RESPIRATION RATE: 16 BRPM | HEART RATE: 60 BPM | BODY MASS INDEX: 26.53 KG/M2 | TEMPERATURE: 98 F | SYSTOLIC BLOOD PRESSURE: 184 MMHG | WEIGHT: 144.19 LBS

## 2021-03-08 DIAGNOSIS — Z79.811 USE OF AROMATASE INHIBITORS: Chronic | ICD-10-CM

## 2021-03-08 DIAGNOSIS — Z12.31 ENCOUNTER FOR SCREENING MAMMOGRAM FOR BREAST CANCER: ICD-10-CM

## 2021-03-08 DIAGNOSIS — C50.412 MALIGNANT NEOPLASM OF UPPER-OUTER QUADRANT OF LEFT BREAST IN FEMALE, ESTROGEN RECEPTOR POSITIVE: Primary | Chronic | ICD-10-CM

## 2021-03-08 DIAGNOSIS — Z17.0 MALIGNANT NEOPLASM OF UPPER-OUTER QUADRANT OF LEFT BREAST IN FEMALE, ESTROGEN RECEPTOR POSITIVE: Primary | Chronic | ICD-10-CM

## 2021-03-08 PROCEDURE — 77063 BREAST TOMOSYNTHESIS BI: CPT | Mod: 26,,, | Performed by: RADIOLOGY

## 2021-03-08 PROCEDURE — 77067 SCR MAMMO BI INCL CAD: CPT | Mod: 26,,, | Performed by: RADIOLOGY

## 2021-03-08 PROCEDURE — 99213 PR OFFICE/OUTPT VISIT, EST, LEVL III, 20-29 MIN: ICD-10-PCS | Mod: S$GLB,,, | Performed by: INTERNAL MEDICINE

## 2021-03-08 PROCEDURE — 99999 PR PBB SHADOW E&M-EST. PATIENT-LVL V: ICD-10-PCS | Mod: PBBFAC,,, | Performed by: INTERNAL MEDICINE

## 2021-03-08 PROCEDURE — 77067 SCR MAMMO BI INCL CAD: CPT | Mod: TC

## 2021-03-08 PROCEDURE — 1159F MED LIST DOCD IN RCRD: CPT | Mod: S$GLB,,, | Performed by: INTERNAL MEDICINE

## 2021-03-08 PROCEDURE — 77067 MAMMO DIGITAL SCREENING BILAT WITH TOMO: ICD-10-PCS | Mod: 26,,, | Performed by: RADIOLOGY

## 2021-03-08 PROCEDURE — 99999 PR PBB SHADOW E&M-EST. PATIENT-LVL V: CPT | Mod: PBBFAC,,, | Performed by: INTERNAL MEDICINE

## 2021-03-08 PROCEDURE — 1100F PR PT FALLS ASSESS DOC 2+ FALLS/FALL W/INJURY/YR: ICD-10-PCS | Mod: CPTII,S$GLB,, | Performed by: INTERNAL MEDICINE

## 2021-03-08 PROCEDURE — 3288F PR FALLS RISK ASSESSMENT DOCUMENTED: ICD-10-PCS | Mod: CPTII,S$GLB,, | Performed by: INTERNAL MEDICINE

## 2021-03-08 PROCEDURE — 1125F AMNT PAIN NOTED PAIN PRSNT: CPT | Mod: S$GLB,,, | Performed by: INTERNAL MEDICINE

## 2021-03-08 PROCEDURE — 1100F PTFALLS ASSESS-DOCD GE2>/YR: CPT | Mod: CPTII,S$GLB,, | Performed by: INTERNAL MEDICINE

## 2021-03-08 PROCEDURE — 77063 MAMMO DIGITAL SCREENING BILAT WITH TOMO: ICD-10-PCS | Mod: 26,,, | Performed by: RADIOLOGY

## 2021-03-08 PROCEDURE — 3288F FALL RISK ASSESSMENT DOCD: CPT | Mod: CPTII,S$GLB,, | Performed by: INTERNAL MEDICINE

## 2021-03-08 PROCEDURE — 1125F PR PAIN SEVERITY QUANTIFIED, PAIN PRESENT: ICD-10-PCS | Mod: S$GLB,,, | Performed by: INTERNAL MEDICINE

## 2021-03-08 PROCEDURE — 1159F PR MEDICATION LIST DOCUMENTED IN MEDICAL RECORD: ICD-10-PCS | Mod: S$GLB,,, | Performed by: INTERNAL MEDICINE

## 2021-03-08 PROCEDURE — 99213 OFFICE O/P EST LOW 20 MIN: CPT | Mod: S$GLB,,, | Performed by: INTERNAL MEDICINE

## 2021-03-08 RX ORDER — TRIAMCINOLONE ACETONIDE 1 MG/G
PASTE DENTAL
COMMUNITY
Start: 2021-03-04

## 2021-03-10 ENCOUNTER — TELEPHONE (OUTPATIENT)
Dept: PAIN MEDICINE | Facility: CLINIC | Age: 86
End: 2021-03-10

## 2021-03-10 DIAGNOSIS — M47.816 LUMBAR SPONDYLOSIS: ICD-10-CM

## 2021-03-10 RX ORDER — TRAMADOL HYDROCHLORIDE 50 MG/1
TABLET ORAL
Qty: 60 TABLET | Refills: 2 | Status: SHIPPED | OUTPATIENT
Start: 2021-03-10 | End: 2021-06-01 | Stop reason: SDUPTHER

## 2021-03-12 ENCOUNTER — TELEPHONE (OUTPATIENT)
Dept: PAIN MEDICINE | Facility: CLINIC | Age: 86
End: 2021-03-12

## 2021-03-22 ENCOUNTER — TELEPHONE (OUTPATIENT)
Dept: PAIN MEDICINE | Facility: CLINIC | Age: 86
End: 2021-03-22

## 2021-03-22 ENCOUNTER — PATIENT OUTREACH (OUTPATIENT)
Dept: ADMINISTRATIVE | Facility: OTHER | Age: 86
End: 2021-03-22

## 2021-03-23 ENCOUNTER — HOSPITAL ENCOUNTER (OUTPATIENT)
Dept: RADIOLOGY | Facility: OTHER | Age: 86
Discharge: HOME | End: 2021-03-23
Attending: PHYSICIAN ASSISTANT
Payer: MEDICARE

## 2021-03-23 ENCOUNTER — OFFICE VISIT (OUTPATIENT)
Dept: PAIN MEDICINE | Facility: CLINIC | Age: 86
End: 2021-03-23
Payer: MEDICARE

## 2021-03-23 ENCOUNTER — OFFICE VISIT (OUTPATIENT)
Dept: ORTHOPEDICS | Facility: CLINIC | Age: 86
End: 2021-03-23
Payer: MEDICARE

## 2021-03-23 VITALS
BODY MASS INDEX: 26.49 KG/M2 | HEIGHT: 62 IN | HEART RATE: 54 BPM | WEIGHT: 143.94 LBS | SYSTOLIC BLOOD PRESSURE: 181 MMHG | DIASTOLIC BLOOD PRESSURE: 81 MMHG

## 2021-03-23 VITALS
BODY MASS INDEX: 26.5 KG/M2 | HEIGHT: 62 IN | WEIGHT: 144 LBS | SYSTOLIC BLOOD PRESSURE: 165 MMHG | DIASTOLIC BLOOD PRESSURE: 92 MMHG | HEART RATE: 59 BPM

## 2021-03-23 DIAGNOSIS — M16.0 PRIMARY OSTEOARTHRITIS OF BOTH HIPS: ICD-10-CM

## 2021-03-23 DIAGNOSIS — M47.816 LUMBAR SPONDYLOSIS: ICD-10-CM

## 2021-03-23 DIAGNOSIS — M25.551 CHRONIC PAIN OF BOTH HIPS: Primary | ICD-10-CM

## 2021-03-23 DIAGNOSIS — M25.472 PAIN AND SWELLING OF ANKLE, LEFT: ICD-10-CM

## 2021-03-23 DIAGNOSIS — S52.502A CLOSED FRACTURE OF DISTAL END OF LEFT RADIUS, UNSPECIFIED FRACTURE MORPHOLOGY, INITIAL ENCOUNTER: ICD-10-CM

## 2021-03-23 DIAGNOSIS — M25.572 PAIN AND SWELLING OF ANKLE, LEFT: Primary | ICD-10-CM

## 2021-03-23 DIAGNOSIS — M25.472 PAIN AND SWELLING OF ANKLE, LEFT: Primary | ICD-10-CM

## 2021-03-23 DIAGNOSIS — M53.3 SACROILIAC JOINT PAIN: ICD-10-CM

## 2021-03-23 DIAGNOSIS — M25.552 CHRONIC PAIN OF BOTH HIPS: Primary | ICD-10-CM

## 2021-03-23 DIAGNOSIS — G89.29 CHRONIC PAIN OF BOTH HIPS: Primary | ICD-10-CM

## 2021-03-23 DIAGNOSIS — M25.572 PAIN AND SWELLING OF ANKLE, LEFT: ICD-10-CM

## 2021-03-23 DIAGNOSIS — M51.36 DDD (DEGENERATIVE DISC DISEASE), LUMBAR: ICD-10-CM

## 2021-03-23 PROCEDURE — 1126F PR PAIN SEVERITY QUANTIFIED, NO PAIN PRESENT: ICD-10-PCS | Mod: S$GLB,,, | Performed by: PHYSICIAN ASSISTANT

## 2021-03-23 PROCEDURE — 1125F PR PAIN SEVERITY QUANTIFIED, PAIN PRESENT: ICD-10-PCS | Mod: S$GLB,,, | Performed by: NURSE PRACTITIONER

## 2021-03-23 PROCEDURE — 99213 PR OFFICE/OUTPT VISIT, EST, LEVL III, 20-29 MIN: ICD-10-PCS | Mod: S$GLB,,, | Performed by: PHYSICIAN ASSISTANT

## 2021-03-23 PROCEDURE — 99213 PR OFFICE/OUTPT VISIT, EST, LEVL III, 20-29 MIN: ICD-10-PCS | Mod: S$GLB,,, | Performed by: NURSE PRACTITIONER

## 2021-03-23 PROCEDURE — 1100F PR PT FALLS ASSESS DOC 2+ FALLS/FALL W/INJURY/YR: ICD-10-PCS | Mod: CPTII,S$GLB,, | Performed by: NURSE PRACTITIONER

## 2021-03-23 PROCEDURE — 1125F AMNT PAIN NOTED PAIN PRSNT: CPT | Mod: S$GLB,,, | Performed by: NURSE PRACTITIONER

## 2021-03-23 PROCEDURE — 1100F PTFALLS ASSESS-DOCD GE2>/YR: CPT | Mod: CPTII,S$GLB,, | Performed by: PHYSICIAN ASSISTANT

## 2021-03-23 PROCEDURE — 1100F PTFALLS ASSESS-DOCD GE2>/YR: CPT | Mod: CPTII,S$GLB,, | Performed by: NURSE PRACTITIONER

## 2021-03-23 PROCEDURE — 73110 XR WRIST COMPLETE 3 VIEWS LEFT: ICD-10-PCS | Mod: 26,LT,, | Performed by: RADIOLOGY

## 2021-03-23 PROCEDURE — 99999 PR PBB SHADOW E&M-EST. PATIENT-LVL IV: ICD-10-PCS | Mod: PBBFAC,,, | Performed by: NURSE PRACTITIONER

## 2021-03-23 PROCEDURE — 99999 PR PBB SHADOW E&M-EST. PATIENT-LVL V: CPT | Mod: PBBFAC,,, | Performed by: PHYSICIAN ASSISTANT

## 2021-03-23 PROCEDURE — 1126F AMNT PAIN NOTED NONE PRSNT: CPT | Mod: S$GLB,,, | Performed by: PHYSICIAN ASSISTANT

## 2021-03-23 PROCEDURE — 1159F MED LIST DOCD IN RCRD: CPT | Mod: S$GLB,,, | Performed by: NURSE PRACTITIONER

## 2021-03-23 PROCEDURE — 3288F FALL RISK ASSESSMENT DOCD: CPT | Mod: CPTII,S$GLB,, | Performed by: NURSE PRACTITIONER

## 2021-03-23 PROCEDURE — 73610 X-RAY EXAM OF ANKLE: CPT | Mod: TC,FY,LT

## 2021-03-23 PROCEDURE — 1100F PR PT FALLS ASSESS DOC 2+ FALLS/FALL W/INJURY/YR: ICD-10-PCS | Mod: CPTII,S$GLB,, | Performed by: PHYSICIAN ASSISTANT

## 2021-03-23 PROCEDURE — 99999 PR PBB SHADOW E&M-EST. PATIENT-LVL IV: CPT | Mod: PBBFAC,,, | Performed by: NURSE PRACTITIONER

## 2021-03-23 PROCEDURE — 73110 X-RAY EXAM OF WRIST: CPT | Mod: TC,FY,LT

## 2021-03-23 PROCEDURE — 1159F MED LIST DOCD IN RCRD: CPT | Mod: S$GLB,,, | Performed by: PHYSICIAN ASSISTANT

## 2021-03-23 PROCEDURE — 99213 OFFICE O/P EST LOW 20 MIN: CPT | Mod: S$GLB,,, | Performed by: NURSE PRACTITIONER

## 2021-03-23 PROCEDURE — 99999 PR PBB SHADOW E&M-EST. PATIENT-LVL V: ICD-10-PCS | Mod: PBBFAC,,, | Performed by: PHYSICIAN ASSISTANT

## 2021-03-23 PROCEDURE — 73610 XR ANKLE COMPLETE 3 VIEW LEFT: ICD-10-PCS | Mod: 26,LT,, | Performed by: RADIOLOGY

## 2021-03-23 PROCEDURE — 3288F PR FALLS RISK ASSESSMENT DOCUMENTED: ICD-10-PCS | Mod: CPTII,S$GLB,, | Performed by: NURSE PRACTITIONER

## 2021-03-23 PROCEDURE — 99213 OFFICE O/P EST LOW 20 MIN: CPT | Mod: S$GLB,,, | Performed by: PHYSICIAN ASSISTANT

## 2021-03-23 PROCEDURE — 99499 RISK ADDL DX/OHS AUDIT: ICD-10-PCS | Mod: S$GLB,,, | Performed by: NURSE PRACTITIONER

## 2021-03-23 PROCEDURE — 1159F PR MEDICATION LIST DOCUMENTED IN MEDICAL RECORD: ICD-10-PCS | Mod: S$GLB,,, | Performed by: NURSE PRACTITIONER

## 2021-03-23 PROCEDURE — 73110 X-RAY EXAM OF WRIST: CPT | Mod: 26,LT,, | Performed by: RADIOLOGY

## 2021-03-23 PROCEDURE — 3288F PR FALLS RISK ASSESSMENT DOCUMENTED: ICD-10-PCS | Mod: CPTII,S$GLB,, | Performed by: PHYSICIAN ASSISTANT

## 2021-03-23 PROCEDURE — 3288F FALL RISK ASSESSMENT DOCD: CPT | Mod: CPTII,S$GLB,, | Performed by: PHYSICIAN ASSISTANT

## 2021-03-23 PROCEDURE — 1159F PR MEDICATION LIST DOCUMENTED IN MEDICAL RECORD: ICD-10-PCS | Mod: S$GLB,,, | Performed by: PHYSICIAN ASSISTANT

## 2021-03-23 PROCEDURE — 99499 UNLISTED E&M SERVICE: CPT | Mod: S$GLB,,, | Performed by: NURSE PRACTITIONER

## 2021-03-23 PROCEDURE — 73610 X-RAY EXAM OF ANKLE: CPT | Mod: 26,LT,, | Performed by: RADIOLOGY

## 2021-03-24 ENCOUNTER — TELEPHONE (OUTPATIENT)
Dept: PRIMARY CARE CLINIC | Facility: CLINIC | Age: 86
End: 2021-03-24

## 2021-03-24 ENCOUNTER — TELEPHONE (OUTPATIENT)
Dept: ORTHOPEDICS | Facility: CLINIC | Age: 86
End: 2021-03-24

## 2021-03-24 ENCOUNTER — TELEPHONE (OUTPATIENT)
Dept: PAIN MEDICINE | Facility: CLINIC | Age: 86
End: 2021-03-24

## 2021-03-24 DIAGNOSIS — L85.3 XEROSIS OF SKIN: Primary | ICD-10-CM

## 2021-03-26 ENCOUNTER — HOSPITAL ENCOUNTER (OUTPATIENT)
Dept: CARDIOLOGY | Facility: HOSPITAL | Age: 86
Discharge: HOME | End: 2021-03-26
Attending: NURSE PRACTITIONER
Payer: MEDICARE

## 2021-03-26 ENCOUNTER — OFFICE VISIT (OUTPATIENT)
Dept: ORTHOPEDICS | Facility: CLINIC | Age: 86
End: 2021-03-26
Payer: MEDICARE

## 2021-03-26 ENCOUNTER — TELEPHONE (OUTPATIENT)
Dept: ORTHOPEDICS | Facility: CLINIC | Age: 86
End: 2021-03-26

## 2021-03-26 DIAGNOSIS — M25.572 PAIN AND SWELLING OF ANKLE, LEFT: ICD-10-CM

## 2021-03-26 DIAGNOSIS — M25.572 PAIN AND SWELLING OF ANKLE, LEFT: Primary | ICD-10-CM

## 2021-03-26 DIAGNOSIS — M25.472 PAIN AND SWELLING OF ANKLE, LEFT: ICD-10-CM

## 2021-03-26 DIAGNOSIS — M25.472 PAIN AND SWELLING OF ANKLE, LEFT: Primary | ICD-10-CM

## 2021-03-26 DIAGNOSIS — R60.0 LOCALIZED EDEMA: ICD-10-CM

## 2021-03-26 PROCEDURE — 1125F AMNT PAIN NOTED PAIN PRSNT: CPT | Mod: S$GLB,,, | Performed by: NURSE PRACTITIONER

## 2021-03-26 PROCEDURE — 93971 CV US DOPPLER VENOUS LEG LEFT (CUPID ONLY): ICD-10-PCS | Mod: 26,LT,, | Performed by: INTERNAL MEDICINE

## 2021-03-26 PROCEDURE — 93971 EXTREMITY STUDY: CPT | Mod: LT

## 2021-03-26 PROCEDURE — 93971 EXTREMITY STUDY: CPT | Mod: 26,LT,, | Performed by: INTERNAL MEDICINE

## 2021-03-26 PROCEDURE — 1125F PR PAIN SEVERITY QUANTIFIED, PAIN PRESENT: ICD-10-PCS | Mod: S$GLB,,, | Performed by: NURSE PRACTITIONER

## 2021-03-26 PROCEDURE — 99999 PR PBB SHADOW E&M-EST. PATIENT-LVL III: CPT | Mod: PBBFAC,,, | Performed by: NURSE PRACTITIONER

## 2021-03-26 PROCEDURE — 1159F PR MEDICATION LIST DOCUMENTED IN MEDICAL RECORD: ICD-10-PCS | Mod: S$GLB,,, | Performed by: NURSE PRACTITIONER

## 2021-03-26 PROCEDURE — 1159F MED LIST DOCD IN RCRD: CPT | Mod: S$GLB,,, | Performed by: NURSE PRACTITIONER

## 2021-03-26 PROCEDURE — 99214 OFFICE O/P EST MOD 30 MIN: CPT | Mod: S$GLB,,, | Performed by: NURSE PRACTITIONER

## 2021-03-26 PROCEDURE — 99214 PR OFFICE/OUTPT VISIT, EST, LEVL IV, 30-39 MIN: ICD-10-PCS | Mod: S$GLB,,, | Performed by: NURSE PRACTITIONER

## 2021-03-26 PROCEDURE — 99999 PR PBB SHADOW E&M-EST. PATIENT-LVL III: ICD-10-PCS | Mod: PBBFAC,,, | Performed by: NURSE PRACTITIONER

## 2021-03-29 ENCOUNTER — TELEPHONE (OUTPATIENT)
Dept: ORTHOPEDICS | Facility: CLINIC | Age: 86
End: 2021-03-29

## 2021-04-01 ENCOUNTER — TELEPHONE (OUTPATIENT)
Dept: PAIN MEDICINE | Facility: CLINIC | Age: 86
End: 2021-04-01

## 2021-04-21 ENCOUNTER — OFFICE VISIT (OUTPATIENT)
Dept: DERMATOLOGY | Facility: CLINIC | Age: 86
End: 2021-04-21
Payer: MEDICARE

## 2021-04-21 DIAGNOSIS — L60.3 ONYCHODYSTROPHY: ICD-10-CM

## 2021-04-21 DIAGNOSIS — Z76.89 ENCOUNTER FOR SKIN CARE: ICD-10-CM

## 2021-04-21 DIAGNOSIS — L30.9 ECZEMA, UNSPECIFIED TYPE: Primary | ICD-10-CM

## 2021-04-21 DIAGNOSIS — L21.9 SEBORRHEIC DERMATITIS OF SCALP: ICD-10-CM

## 2021-04-21 DIAGNOSIS — L82.1 SK (SEBORRHEIC KERATOSIS): ICD-10-CM

## 2021-04-21 DIAGNOSIS — L85.3 XEROSIS OF SKIN: ICD-10-CM

## 2021-04-21 PROCEDURE — 1159F MED LIST DOCD IN RCRD: CPT | Mod: S$GLB,,, | Performed by: DERMATOLOGY

## 2021-04-21 PROCEDURE — 1159F PR MEDICATION LIST DOCUMENTED IN MEDICAL RECORD: ICD-10-PCS | Mod: S$GLB,,, | Performed by: DERMATOLOGY

## 2021-04-21 PROCEDURE — 99204 PR OFFICE/OUTPT VISIT, NEW, LEVL IV, 45-59 MIN: ICD-10-PCS | Mod: S$GLB,,, | Performed by: DERMATOLOGY

## 2021-04-21 PROCEDURE — 1101F PT FALLS ASSESS-DOCD LE1/YR: CPT | Mod: CPTII,S$GLB,, | Performed by: DERMATOLOGY

## 2021-04-21 PROCEDURE — 99999 PR PBB SHADOW E&M-EST. PATIENT-LVL IV: CPT | Mod: PBBFAC,,, | Performed by: DERMATOLOGY

## 2021-04-21 PROCEDURE — 99204 OFFICE O/P NEW MOD 45 MIN: CPT | Mod: S$GLB,,, | Performed by: DERMATOLOGY

## 2021-04-21 PROCEDURE — 1126F AMNT PAIN NOTED NONE PRSNT: CPT | Mod: S$GLB,,, | Performed by: DERMATOLOGY

## 2021-04-21 PROCEDURE — 3288F FALL RISK ASSESSMENT DOCD: CPT | Mod: CPTII,S$GLB,, | Performed by: DERMATOLOGY

## 2021-04-21 PROCEDURE — 1126F PR PAIN SEVERITY QUANTIFIED, NO PAIN PRESENT: ICD-10-PCS | Mod: S$GLB,,, | Performed by: DERMATOLOGY

## 2021-04-21 PROCEDURE — 3288F PR FALLS RISK ASSESSMENT DOCUMENTED: ICD-10-PCS | Mod: CPTII,S$GLB,, | Performed by: DERMATOLOGY

## 2021-04-21 PROCEDURE — 99999 PR PBB SHADOW E&M-EST. PATIENT-LVL IV: ICD-10-PCS | Mod: PBBFAC,,, | Performed by: DERMATOLOGY

## 2021-04-21 PROCEDURE — 1101F PR PT FALLS ASSESS DOC 0-1 FALLS W/OUT INJ PAST YR: ICD-10-PCS | Mod: CPTII,S$GLB,, | Performed by: DERMATOLOGY

## 2021-04-21 RX ORDER — TRIAMCINOLONE ACETONIDE 1 MG/G
CREAM TOPICAL
Qty: 45 G | Refills: 0 | Status: SHIPPED | OUTPATIENT
Start: 2021-04-21

## 2021-04-21 RX ORDER — TRIAMCINOLONE ACETONIDE 1 MG/G
CREAM TOPICAL
Qty: 80 G | Refills: 3 | Status: SHIPPED | OUTPATIENT
Start: 2021-04-21 | End: 2021-04-21

## 2021-04-26 ENCOUNTER — HOSPITAL ENCOUNTER (OUTPATIENT)
Facility: OTHER | Age: 86
Discharge: HOME | End: 2021-04-26
Attending: ANESTHESIOLOGY | Admitting: ANESTHESIOLOGY
Payer: MEDICARE

## 2021-04-26 VITALS
BODY MASS INDEX: 26.31 KG/M2 | OXYGEN SATURATION: 98 % | SYSTOLIC BLOOD PRESSURE: 192 MMHG | TEMPERATURE: 99 F | HEART RATE: 54 BPM | RESPIRATION RATE: 16 BRPM | HEIGHT: 62 IN | WEIGHT: 143 LBS | DIASTOLIC BLOOD PRESSURE: 84 MMHG

## 2021-04-26 DIAGNOSIS — G89.29 CHRONIC HIP PAIN, BILATERAL: ICD-10-CM

## 2021-04-26 DIAGNOSIS — M25.551 CHRONIC HIP PAIN, BILATERAL: ICD-10-CM

## 2021-04-26 DIAGNOSIS — M25.552 CHRONIC HIP PAIN, BILATERAL: ICD-10-CM

## 2021-04-26 DIAGNOSIS — G89.29 CHRONIC PAIN: ICD-10-CM

## 2021-04-26 DIAGNOSIS — M16.0 PRIMARY OSTEOARTHRITIS OF HIPS, BILATERAL: Primary | ICD-10-CM

## 2021-04-26 PROCEDURE — 77002 NEEDLE LOCALIZATION BY XRAY: CPT | Mod: 26,,, | Performed by: ANESTHESIOLOGY

## 2021-04-26 PROCEDURE — 63600175 PHARM REV CODE 636 W HCPCS: Performed by: ANESTHESIOLOGY

## 2021-04-26 PROCEDURE — 20610 DRAIN/INJ JOINT/BURSA W/O US: CPT | Mod: 50,,, | Performed by: ANESTHESIOLOGY

## 2021-04-26 PROCEDURE — 25000003 PHARM REV CODE 250: Performed by: ANESTHESIOLOGY

## 2021-04-26 PROCEDURE — 77002 NEEDLE LOCALIZATION BY XRAY: CPT | Mod: 26 | Performed by: ANESTHESIOLOGY

## 2021-04-26 PROCEDURE — 77002 PR FLUOROSCOPIC GUIDANCE NEEDLE PLACEMENT: ICD-10-PCS | Mod: 26,,, | Performed by: ANESTHESIOLOGY

## 2021-04-26 PROCEDURE — 25500020 PHARM REV CODE 255: Performed by: ANESTHESIOLOGY

## 2021-04-26 PROCEDURE — 20610 DRAIN/INJ JOINT/BURSA W/O US: CPT | Mod: 50 | Performed by: ANESTHESIOLOGY

## 2021-04-26 PROCEDURE — 20610 PR DRAIN/INJECT LARGE JOINT/BURSA: ICD-10-PCS | Mod: 50,,, | Performed by: ANESTHESIOLOGY

## 2021-04-26 RX ORDER — BUPIVACAINE HYDROCHLORIDE 2.5 MG/ML
INJECTION, SOLUTION EPIDURAL; INFILTRATION; INTRACAUDAL
Status: DISCONTINUED | OUTPATIENT
Start: 2021-04-26 | End: 2021-04-26 | Stop reason: HOSPADM

## 2021-04-26 RX ORDER — ALPRAZOLAM 0.5 MG/1
0.5 TABLET ORAL
Status: DISCONTINUED | OUTPATIENT
Start: 2021-04-26 | End: 2021-04-26 | Stop reason: HOSPADM

## 2021-04-26 RX ORDER — TRIAMCINOLONE ACETONIDE 40 MG/ML
INJECTION, SUSPENSION INTRA-ARTICULAR; INTRAMUSCULAR
Status: DISCONTINUED | OUTPATIENT
Start: 2021-04-26 | End: 2021-04-26 | Stop reason: HOSPADM

## 2021-04-26 RX ORDER — LIDOCAINE HYDROCHLORIDE 10 MG/ML
INJECTION INFILTRATION; PERINEURAL
Status: DISCONTINUED | OUTPATIENT
Start: 2021-04-26 | End: 2021-04-26 | Stop reason: HOSPADM

## 2021-04-26 RX ADMIN — ALPRAZOLAM 0.5 MG: 0.5 TABLET ORAL at 01:04

## 2021-05-08 ENCOUNTER — PATIENT OUTREACH (OUTPATIENT)
Dept: ADMINISTRATIVE | Facility: OTHER | Age: 86
End: 2021-05-08

## 2021-05-13 ENCOUNTER — TELEPHONE (OUTPATIENT)
Dept: PAIN MEDICINE | Facility: CLINIC | Age: 86
End: 2021-05-13

## 2021-05-19 ENCOUNTER — TELEPHONE (OUTPATIENT)
Dept: PAIN MEDICINE | Facility: CLINIC | Age: 86
End: 2021-05-19

## 2021-05-20 ENCOUNTER — OFFICE VISIT (OUTPATIENT)
Dept: PAIN MEDICINE | Facility: CLINIC | Age: 86
End: 2021-05-20
Payer: MEDICARE

## 2021-05-20 DIAGNOSIS — M48.061 SPINAL STENOSIS OF LUMBAR REGION WITHOUT NEUROGENIC CLAUDICATION: ICD-10-CM

## 2021-05-20 DIAGNOSIS — M54.16 LUMBAR RADICULOPATHY: Primary | ICD-10-CM

## 2021-05-20 DIAGNOSIS — M47.816 LUMBAR SPONDYLOSIS: ICD-10-CM

## 2021-05-20 DIAGNOSIS — M53.3 SACROILIAC JOINT PAIN: ICD-10-CM

## 2021-05-20 DIAGNOSIS — M51.36 DDD (DEGENERATIVE DISC DISEASE), LUMBAR: ICD-10-CM

## 2021-05-20 PROCEDURE — 1159F MED LIST DOCD IN RCRD: CPT | Mod: 95,,, | Performed by: NURSE PRACTITIONER

## 2021-05-20 PROCEDURE — 99213 OFFICE O/P EST LOW 20 MIN: CPT | Mod: 95,,, | Performed by: NURSE PRACTITIONER

## 2021-05-20 PROCEDURE — 99213 PR OFFICE/OUTPT VISIT, EST, LEVL III, 20-29 MIN: ICD-10-PCS | Mod: 95,,, | Performed by: NURSE PRACTITIONER

## 2021-05-20 PROCEDURE — 1159F PR MEDICATION LIST DOCUMENTED IN MEDICAL RECORD: ICD-10-PCS | Mod: 95,,, | Performed by: NURSE PRACTITIONER

## 2021-05-20 RX ORDER — GABAPENTIN 100 MG/1
100 CAPSULE ORAL NIGHTLY
Qty: 30 CAPSULE | Refills: 1 | Status: SHIPPED | OUTPATIENT
Start: 2021-05-20 | End: 2021-06-08 | Stop reason: SDUPTHER

## 2021-05-21 ENCOUNTER — TELEPHONE (OUTPATIENT)
Dept: PAIN MEDICINE | Facility: CLINIC | Age: 86
End: 2021-05-21

## 2021-05-29 ENCOUNTER — HOSPITAL ENCOUNTER (OUTPATIENT)
Dept: RADIOLOGY | Facility: OTHER | Age: 86
Discharge: HOME | End: 2021-05-29
Attending: NURSE PRACTITIONER
Payer: MEDICARE

## 2021-05-29 DIAGNOSIS — M54.16 LUMBAR RADICULOPATHY: ICD-10-CM

## 2021-05-29 PROCEDURE — 72148 MRI LUMBAR SPINE W/O DYE: CPT | Mod: TC

## 2021-05-29 PROCEDURE — 72148 MRI LUMBAR SPINE W/O DYE: CPT | Mod: 26,,, | Performed by: INTERNAL MEDICINE

## 2021-05-29 PROCEDURE — 72148 MRI LUMBAR SPINE WITHOUT CONTRAST: ICD-10-PCS | Mod: 26,,, | Performed by: INTERNAL MEDICINE

## 2021-06-01 ENCOUNTER — OFFICE VISIT (OUTPATIENT)
Dept: PRIMARY CARE CLINIC | Facility: CLINIC | Age: 86
End: 2021-06-01
Payer: MEDICARE

## 2021-06-01 VITALS
TEMPERATURE: 98 F | DIASTOLIC BLOOD PRESSURE: 80 MMHG | OXYGEN SATURATION: 97 % | HEIGHT: 62 IN | BODY MASS INDEX: 26.41 KG/M2 | RESPIRATION RATE: 18 BRPM | SYSTOLIC BLOOD PRESSURE: 132 MMHG | HEART RATE: 60 BPM | WEIGHT: 143.5 LBS

## 2021-06-01 DIAGNOSIS — Z17.0 MALIGNANT NEOPLASM OF UPPER-OUTER QUADRANT OF LEFT BREAST IN FEMALE, ESTROGEN RECEPTOR POSITIVE: Chronic | ICD-10-CM

## 2021-06-01 DIAGNOSIS — M80.00XD OSTEOPOROSIS WITH CURRENT PATHOLOGICAL FRACTURE WITH ROUTINE HEALING, UNSPECIFIED OSTEOPOROSIS TYPE, SUBSEQUENT ENCOUNTER: ICD-10-CM

## 2021-06-01 DIAGNOSIS — I70.0 AORTIC ATHEROSCLEROSIS: ICD-10-CM

## 2021-06-01 DIAGNOSIS — C50.412 MALIGNANT NEOPLASM OF UPPER-OUTER QUADRANT OF LEFT BREAST IN FEMALE, ESTROGEN RECEPTOR POSITIVE: Chronic | ICD-10-CM

## 2021-06-01 DIAGNOSIS — E78.5 HYPERLIPIDEMIA, UNSPECIFIED HYPERLIPIDEMIA TYPE: ICD-10-CM

## 2021-06-01 DIAGNOSIS — Z23 NEED FOR SHINGLES VACCINE: ICD-10-CM

## 2021-06-01 DIAGNOSIS — I11.9 HYPERTENSIVE HEART DISEASE WITHOUT HEART FAILURE: Primary | ICD-10-CM

## 2021-06-01 DIAGNOSIS — J44.9 CHRONIC OBSTRUCTIVE PULMONARY DISEASE, UNSPECIFIED COPD TYPE: ICD-10-CM

## 2021-06-01 DIAGNOSIS — K21.9 GASTROESOPHAGEAL REFLUX DISEASE WITHOUT ESOPHAGITIS: ICD-10-CM

## 2021-06-01 DIAGNOSIS — M47.816 LUMBAR SPONDYLOSIS: ICD-10-CM

## 2021-06-01 DIAGNOSIS — Z79.811 USE OF AROMATASE INHIBITORS: Chronic | ICD-10-CM

## 2021-06-01 DIAGNOSIS — R73.03 PRE-DIABETES: ICD-10-CM

## 2021-06-01 DIAGNOSIS — F41.8 DEPRESSION WITH ANXIETY: ICD-10-CM

## 2021-06-01 DIAGNOSIS — N18.31 STAGE 3A CHRONIC KIDNEY DISEASE: ICD-10-CM

## 2021-06-01 PROCEDURE — 3288F PR FALLS RISK ASSESSMENT DOCUMENTED: ICD-10-PCS | Mod: CPTII,S$GLB,, | Performed by: INTERNAL MEDICINE

## 2021-06-01 PROCEDURE — 1159F MED LIST DOCD IN RCRD: CPT | Mod: S$GLB,,, | Performed by: INTERNAL MEDICINE

## 2021-06-01 PROCEDURE — 1101F PR PT FALLS ASSESS DOC 0-1 FALLS W/OUT INJ PAST YR: ICD-10-PCS | Mod: CPTII,S$GLB,, | Performed by: INTERNAL MEDICINE

## 2021-06-01 PROCEDURE — 99499 RISK ADDL DX/OHS AUDIT: ICD-10-PCS | Mod: S$GLB,,, | Performed by: INTERNAL MEDICINE

## 2021-06-01 PROCEDURE — 1125F PR PAIN SEVERITY QUANTIFIED, PAIN PRESENT: ICD-10-PCS | Mod: S$GLB,,, | Performed by: INTERNAL MEDICINE

## 2021-06-01 PROCEDURE — 1101F PT FALLS ASSESS-DOCD LE1/YR: CPT | Mod: CPTII,S$GLB,, | Performed by: INTERNAL MEDICINE

## 2021-06-01 PROCEDURE — 99499 UNLISTED E&M SERVICE: CPT | Mod: S$GLB,,, | Performed by: INTERNAL MEDICINE

## 2021-06-01 PROCEDURE — 99214 OFFICE O/P EST MOD 30 MIN: CPT | Mod: S$GLB,,, | Performed by: INTERNAL MEDICINE

## 2021-06-01 PROCEDURE — 99999 PR PBB SHADOW E&M-EST. PATIENT-LVL V: CPT | Mod: PBBFAC,,, | Performed by: INTERNAL MEDICINE

## 2021-06-01 PROCEDURE — 1159F PR MEDICATION LIST DOCUMENTED IN MEDICAL RECORD: ICD-10-PCS | Mod: S$GLB,,, | Performed by: INTERNAL MEDICINE

## 2021-06-01 PROCEDURE — 1125F AMNT PAIN NOTED PAIN PRSNT: CPT | Mod: S$GLB,,, | Performed by: INTERNAL MEDICINE

## 2021-06-01 PROCEDURE — 3288F FALL RISK ASSESSMENT DOCD: CPT | Mod: CPTII,S$GLB,, | Performed by: INTERNAL MEDICINE

## 2021-06-01 PROCEDURE — 99214 PR OFFICE/OUTPT VISIT, EST, LEVL IV, 30-39 MIN: ICD-10-PCS | Mod: S$GLB,,, | Performed by: INTERNAL MEDICINE

## 2021-06-01 PROCEDURE — 99999 PR PBB SHADOW E&M-EST. PATIENT-LVL V: ICD-10-PCS | Mod: PBBFAC,,, | Performed by: INTERNAL MEDICINE

## 2021-06-01 RX ORDER — TRAMADOL HYDROCHLORIDE 50 MG/1
TABLET ORAL
Qty: 60 TABLET | Refills: 2 | Status: SHIPPED | OUTPATIENT
Start: 2021-06-01 | End: 2021-09-13 | Stop reason: SDUPTHER

## 2021-06-02 PROBLEM — R73.03 PRE-DIABETES: Status: ACTIVE | Noted: 2021-06-02

## 2021-06-02 RX ORDER — BENAZEPRIL HYDROCHLORIDE 20 MG/1
20 TABLET ORAL DAILY
Qty: 90 TABLET | Refills: 1 | Status: SHIPPED | OUTPATIENT
Start: 2021-06-02 | End: 2021-09-13 | Stop reason: SDUPTHER

## 2021-06-02 RX ORDER — CITALOPRAM 20 MG/1
20 TABLET, FILM COATED ORAL NIGHTLY
Qty: 90 TABLET | Refills: 1 | Status: SHIPPED | OUTPATIENT
Start: 2021-06-02 | End: 2021-11-08

## 2021-06-07 ENCOUNTER — TELEPHONE (OUTPATIENT)
Dept: PAIN MEDICINE | Facility: CLINIC | Age: 86
End: 2021-06-07

## 2021-06-08 ENCOUNTER — OFFICE VISIT (OUTPATIENT)
Dept: PAIN MEDICINE | Facility: CLINIC | Age: 86
End: 2021-06-08
Payer: MEDICARE

## 2021-06-08 VITALS
SYSTOLIC BLOOD PRESSURE: 153 MMHG | WEIGHT: 143.5 LBS | BODY MASS INDEX: 26.41 KG/M2 | HEART RATE: 72 BPM | DIASTOLIC BLOOD PRESSURE: 79 MMHG | HEIGHT: 62 IN

## 2021-06-08 DIAGNOSIS — M53.3 SACROILIAC JOINT PAIN: ICD-10-CM

## 2021-06-08 DIAGNOSIS — M47.816 LUMBAR SPONDYLOSIS: ICD-10-CM

## 2021-06-08 DIAGNOSIS — M48.061 SPINAL STENOSIS OF LUMBAR REGION WITHOUT NEUROGENIC CLAUDICATION: ICD-10-CM

## 2021-06-08 DIAGNOSIS — M51.36 DDD (DEGENERATIVE DISC DISEASE), LUMBAR: ICD-10-CM

## 2021-06-08 DIAGNOSIS — M54.16 LUMBAR RADICULOPATHY: Primary | ICD-10-CM

## 2021-06-08 PROCEDURE — 1125F PR PAIN SEVERITY QUANTIFIED, PAIN PRESENT: ICD-10-PCS | Mod: S$GLB,,, | Performed by: NURSE PRACTITIONER

## 2021-06-08 PROCEDURE — 3288F FALL RISK ASSESSMENT DOCD: CPT | Mod: CPTII,S$GLB,, | Performed by: NURSE PRACTITIONER

## 2021-06-08 PROCEDURE — 1159F PR MEDICATION LIST DOCUMENTED IN MEDICAL RECORD: ICD-10-PCS | Mod: S$GLB,,, | Performed by: NURSE PRACTITIONER

## 2021-06-08 PROCEDURE — 1101F PT FALLS ASSESS-DOCD LE1/YR: CPT | Mod: CPTII,S$GLB,, | Performed by: NURSE PRACTITIONER

## 2021-06-08 PROCEDURE — 99999 PR PBB SHADOW E&M-EST. PATIENT-LVL IV: ICD-10-PCS | Mod: PBBFAC,,, | Performed by: NURSE PRACTITIONER

## 2021-06-08 PROCEDURE — 3288F PR FALLS RISK ASSESSMENT DOCUMENTED: ICD-10-PCS | Mod: CPTII,S$GLB,, | Performed by: NURSE PRACTITIONER

## 2021-06-08 PROCEDURE — 99999 PR PBB SHADOW E&M-EST. PATIENT-LVL IV: CPT | Mod: PBBFAC,,, | Performed by: NURSE PRACTITIONER

## 2021-06-08 PROCEDURE — 99213 OFFICE O/P EST LOW 20 MIN: CPT | Mod: S$GLB,,, | Performed by: NURSE PRACTITIONER

## 2021-06-08 PROCEDURE — 99213 PR OFFICE/OUTPT VISIT, EST, LEVL III, 20-29 MIN: ICD-10-PCS | Mod: S$GLB,,, | Performed by: NURSE PRACTITIONER

## 2021-06-08 PROCEDURE — 1159F MED LIST DOCD IN RCRD: CPT | Mod: S$GLB,,, | Performed by: NURSE PRACTITIONER

## 2021-06-08 PROCEDURE — 1101F PR PT FALLS ASSESS DOC 0-1 FALLS W/OUT INJ PAST YR: ICD-10-PCS | Mod: CPTII,S$GLB,, | Performed by: NURSE PRACTITIONER

## 2021-06-08 PROCEDURE — 1125F AMNT PAIN NOTED PAIN PRSNT: CPT | Mod: S$GLB,,, | Performed by: NURSE PRACTITIONER

## 2021-06-08 RX ORDER — GABAPENTIN 100 MG/1
100 CAPSULE ORAL NIGHTLY
Qty: 30 CAPSULE | Refills: 2 | Status: SHIPPED | OUTPATIENT
Start: 2021-06-08 | End: 2021-07-20

## 2021-06-21 ENCOUNTER — TELEPHONE (OUTPATIENT)
Dept: PAIN MEDICINE | Facility: CLINIC | Age: 86
End: 2021-06-21

## 2021-06-21 ENCOUNTER — HOSPITAL ENCOUNTER (OUTPATIENT)
Facility: OTHER | Age: 86
Discharge: HOME | End: 2021-06-21
Attending: ANESTHESIOLOGY | Admitting: ANESTHESIOLOGY
Payer: MEDICARE

## 2021-06-21 VITALS
BODY MASS INDEX: 26.31 KG/M2 | WEIGHT: 143 LBS | RESPIRATION RATE: 16 BRPM | HEART RATE: 60 BPM | OXYGEN SATURATION: 96 % | TEMPERATURE: 98 F | SYSTOLIC BLOOD PRESSURE: 138 MMHG | HEIGHT: 62 IN | DIASTOLIC BLOOD PRESSURE: 70 MMHG

## 2021-06-21 DIAGNOSIS — M51.36 DDD (DEGENERATIVE DISC DISEASE), LUMBAR: ICD-10-CM

## 2021-06-21 DIAGNOSIS — G89.29 CHRONIC PAIN: ICD-10-CM

## 2021-06-21 DIAGNOSIS — M54.16 LUMBAR RADICULOPATHY: Primary | ICD-10-CM

## 2021-06-21 PROCEDURE — 64483 PR EPIDURAL INJ, ANES/STEROID, TRANSFORAMINAL, LUMB/SACR, SNGL LEVL: ICD-10-PCS | Mod: 50,,, | Performed by: ANESTHESIOLOGY

## 2021-06-21 PROCEDURE — 64483 NJX AA&/STRD TFRM EPI L/S 1: CPT | Mod: 50,,, | Performed by: ANESTHESIOLOGY

## 2021-06-21 PROCEDURE — 25500020 PHARM REV CODE 255: Performed by: ANESTHESIOLOGY

## 2021-06-21 PROCEDURE — 63600175 PHARM REV CODE 636 W HCPCS: Performed by: ANESTHESIOLOGY

## 2021-06-21 PROCEDURE — 25000003 PHARM REV CODE 250: Performed by: STUDENT IN AN ORGANIZED HEALTH CARE EDUCATION/TRAINING PROGRAM

## 2021-06-21 PROCEDURE — 64483 NJX AA&/STRD TFRM EPI L/S 1: CPT | Mod: 50 | Performed by: ANESTHESIOLOGY

## 2021-06-21 PROCEDURE — 25000003 PHARM REV CODE 250: Performed by: ANESTHESIOLOGY

## 2021-06-21 RX ORDER — MIDAZOLAM HYDROCHLORIDE 1 MG/ML
INJECTION INTRAMUSCULAR; INTRAVENOUS
Status: DISCONTINUED | OUTPATIENT
Start: 2021-06-21 | End: 2021-06-21 | Stop reason: HOSPADM

## 2021-06-21 RX ORDER — LIDOCAINE HYDROCHLORIDE 10 MG/ML
INJECTION, SOLUTION EPIDURAL; INFILTRATION; INTRACAUDAL; PERINEURAL
Status: DISCONTINUED | OUTPATIENT
Start: 2021-06-21 | End: 2021-06-21 | Stop reason: HOSPADM

## 2021-06-21 RX ORDER — DEXAMETHASONE SODIUM PHOSPHATE 100 MG/10ML
INJECTION INTRAMUSCULAR; INTRAVENOUS
Status: DISCONTINUED | OUTPATIENT
Start: 2021-06-21 | End: 2021-06-21 | Stop reason: HOSPADM

## 2021-06-21 RX ORDER — LIDOCAINE HYDROCHLORIDE 10 MG/ML
INJECTION INFILTRATION; PERINEURAL
Status: DISCONTINUED | OUTPATIENT
Start: 2021-06-21 | End: 2021-06-21 | Stop reason: HOSPADM

## 2021-06-21 RX ORDER — FENTANYL CITRATE 50 UG/ML
INJECTION, SOLUTION INTRAMUSCULAR; INTRAVENOUS
Status: DISCONTINUED | OUTPATIENT
Start: 2021-06-21 | End: 2021-06-21 | Stop reason: HOSPADM

## 2021-06-21 RX ORDER — SODIUM CHLORIDE 9 MG/ML
INJECTION, SOLUTION INTRAVENOUS CONTINUOUS
Status: DISCONTINUED | OUTPATIENT
Start: 2021-06-21 | End: 2021-06-21 | Stop reason: HOSPADM

## 2021-06-21 RX ADMIN — SODIUM CHLORIDE: 0.9 INJECTION, SOLUTION INTRAVENOUS at 02:06

## 2021-07-01 ENCOUNTER — PATIENT OUTREACH (OUTPATIENT)
Dept: ADMINISTRATIVE | Facility: OTHER | Age: 86
End: 2021-07-01

## 2021-07-02 ENCOUNTER — PATIENT MESSAGE (OUTPATIENT)
Dept: PAIN MEDICINE | Facility: CLINIC | Age: 86
End: 2021-07-02

## 2021-07-06 ENCOUNTER — OFFICE VISIT (OUTPATIENT)
Dept: PAIN MEDICINE | Facility: CLINIC | Age: 86
End: 2021-07-06
Payer: MEDICARE

## 2021-07-06 DIAGNOSIS — M48.061 SPINAL STENOSIS OF LUMBAR REGION WITHOUT NEUROGENIC CLAUDICATION: ICD-10-CM

## 2021-07-06 DIAGNOSIS — M51.36 DDD (DEGENERATIVE DISC DISEASE), LUMBAR: ICD-10-CM

## 2021-07-06 DIAGNOSIS — M25.551 CHRONIC PAIN OF BOTH HIPS: ICD-10-CM

## 2021-07-06 DIAGNOSIS — M16.0 PRIMARY OSTEOARTHRITIS OF BOTH HIPS: ICD-10-CM

## 2021-07-06 DIAGNOSIS — M53.3 SACROILIAC JOINT PAIN: ICD-10-CM

## 2021-07-06 DIAGNOSIS — M47.816 LUMBAR SPONDYLOSIS: ICD-10-CM

## 2021-07-06 DIAGNOSIS — M25.552 CHRONIC PAIN OF BOTH HIPS: ICD-10-CM

## 2021-07-06 DIAGNOSIS — M54.16 LUMBAR RADICULOPATHY: Primary | ICD-10-CM

## 2021-07-06 DIAGNOSIS — G89.29 CHRONIC PAIN OF BOTH HIPS: ICD-10-CM

## 2021-07-06 PROCEDURE — 99213 PR OFFICE/OUTPT VISIT, EST, LEVL III, 20-29 MIN: ICD-10-PCS | Mod: 95,,, | Performed by: NURSE PRACTITIONER

## 2021-07-06 PROCEDURE — 1159F PR MEDICATION LIST DOCUMENTED IN MEDICAL RECORD: ICD-10-PCS | Mod: 95,,, | Performed by: NURSE PRACTITIONER

## 2021-07-06 PROCEDURE — 1159F MED LIST DOCD IN RCRD: CPT | Mod: 95,,, | Performed by: NURSE PRACTITIONER

## 2021-07-06 PROCEDURE — 99213 OFFICE O/P EST LOW 20 MIN: CPT | Mod: 95,,, | Performed by: NURSE PRACTITIONER

## 2021-07-08 ENCOUNTER — OFFICE VISIT (OUTPATIENT)
Dept: HEMATOLOGY/ONCOLOGY | Facility: CLINIC | Age: 86
End: 2021-07-08
Payer: MEDICARE

## 2021-07-08 VITALS
TEMPERATURE: 98 F | BODY MASS INDEX: 26.37 KG/M2 | SYSTOLIC BLOOD PRESSURE: 201 MMHG | DIASTOLIC BLOOD PRESSURE: 90 MMHG | HEIGHT: 62 IN | RESPIRATION RATE: 16 BRPM | OXYGEN SATURATION: 93 % | HEART RATE: 62 BPM | WEIGHT: 143.31 LBS

## 2021-07-08 DIAGNOSIS — C50.412 MALIGNANT NEOPLASM OF UPPER-OUTER QUADRANT OF LEFT BREAST IN FEMALE, ESTROGEN RECEPTOR POSITIVE: Primary | Chronic | ICD-10-CM

## 2021-07-08 DIAGNOSIS — Z17.0 MALIGNANT NEOPLASM OF UPPER-OUTER QUADRANT OF LEFT BREAST IN FEMALE, ESTROGEN RECEPTOR POSITIVE: Primary | Chronic | ICD-10-CM

## 2021-07-08 DIAGNOSIS — C50.012 MALIGNANT NEOPLASM OF NIPPLE OF LEFT BREAST IN FEMALE, ESTROGEN RECEPTOR POSITIVE: ICD-10-CM

## 2021-07-08 DIAGNOSIS — Z17.0 MALIGNANT NEOPLASM OF UPPER-OUTER QUADRANT OF LEFT BREAST IN FEMALE, ESTROGEN RECEPTOR POSITIVE: Primary | ICD-10-CM

## 2021-07-08 DIAGNOSIS — Z79.811 USE OF AROMATASE INHIBITORS: ICD-10-CM

## 2021-07-08 DIAGNOSIS — Z17.0 MALIGNANT NEOPLASM OF NIPPLE OF LEFT BREAST IN FEMALE, ESTROGEN RECEPTOR POSITIVE: ICD-10-CM

## 2021-07-08 DIAGNOSIS — C50.412 MALIGNANT NEOPLASM OF UPPER-OUTER QUADRANT OF LEFT BREAST IN FEMALE, ESTROGEN RECEPTOR POSITIVE: Primary | ICD-10-CM

## 2021-07-08 DIAGNOSIS — Z79.811 USE OF AROMATASE INHIBITORS: Chronic | ICD-10-CM

## 2021-07-08 PROCEDURE — 99999 PR PBB SHADOW E&M-EST. PATIENT-LVL V: ICD-10-PCS | Mod: PBBFAC,,, | Performed by: INTERNAL MEDICINE

## 2021-07-08 PROCEDURE — 1159F PR MEDICATION LIST DOCUMENTED IN MEDICAL RECORD: ICD-10-PCS | Mod: S$GLB,,, | Performed by: INTERNAL MEDICINE

## 2021-07-08 PROCEDURE — 3288F PR FALLS RISK ASSESSMENT DOCUMENTED: ICD-10-PCS | Mod: CPTII,S$GLB,, | Performed by: INTERNAL MEDICINE

## 2021-07-08 PROCEDURE — 99213 OFFICE O/P EST LOW 20 MIN: CPT | Mod: S$GLB,,, | Performed by: INTERNAL MEDICINE

## 2021-07-08 PROCEDURE — 99213 PR OFFICE/OUTPT VISIT, EST, LEVL III, 20-29 MIN: ICD-10-PCS | Mod: S$GLB,,, | Performed by: INTERNAL MEDICINE

## 2021-07-08 PROCEDURE — 3288F FALL RISK ASSESSMENT DOCD: CPT | Mod: CPTII,S$GLB,, | Performed by: INTERNAL MEDICINE

## 2021-07-08 PROCEDURE — 99999 PR PBB SHADOW E&M-EST. PATIENT-LVL V: CPT | Mod: PBBFAC,,, | Performed by: INTERNAL MEDICINE

## 2021-07-08 PROCEDURE — 1126F PR PAIN SEVERITY QUANTIFIED, NO PAIN PRESENT: ICD-10-PCS | Mod: S$GLB,,, | Performed by: INTERNAL MEDICINE

## 2021-07-08 PROCEDURE — 1126F AMNT PAIN NOTED NONE PRSNT: CPT | Mod: S$GLB,,, | Performed by: INTERNAL MEDICINE

## 2021-07-08 PROCEDURE — 1101F PT FALLS ASSESS-DOCD LE1/YR: CPT | Mod: CPTII,S$GLB,, | Performed by: INTERNAL MEDICINE

## 2021-07-08 PROCEDURE — 1101F PR PT FALLS ASSESS DOC 0-1 FALLS W/OUT INJ PAST YR: ICD-10-PCS | Mod: CPTII,S$GLB,, | Performed by: INTERNAL MEDICINE

## 2021-07-08 PROCEDURE — 1159F MED LIST DOCD IN RCRD: CPT | Mod: S$GLB,,, | Performed by: INTERNAL MEDICINE

## 2021-09-13 DIAGNOSIS — M47.816 LUMBAR SPONDYLOSIS: ICD-10-CM

## 2021-09-13 DIAGNOSIS — I11.9 HYPERTENSIVE HEART DISEASE WITHOUT HEART FAILURE: ICD-10-CM

## 2021-09-13 RX ORDER — TRAMADOL HYDROCHLORIDE 50 MG/1
TABLET ORAL
Qty: 60 TABLET | Refills: 0 | Status: SHIPPED | OUTPATIENT
Start: 2021-09-13 | End: 2021-10-13

## 2021-09-13 RX ORDER — BENAZEPRIL HYDROCHLORIDE 20 MG/1
20 TABLET ORAL DAILY
Qty: 90 TABLET | Refills: 0 | Status: SHIPPED | OUTPATIENT
Start: 2021-09-13 | End: 2021-10-13

## 2021-10-20 ENCOUNTER — TELEPHONE (OUTPATIENT)
Dept: ORTHOPEDICS | Facility: CLINIC | Age: 86
End: 2021-10-20

## 2021-10-25 ENCOUNTER — TELEPHONE (OUTPATIENT)
Dept: ORTHOPEDICS | Facility: CLINIC | Age: 86
End: 2021-10-25
Payer: MEDICARE

## 2021-10-25 DIAGNOSIS — M79.642 BILATERAL HAND PAIN: Primary | ICD-10-CM

## 2021-10-25 DIAGNOSIS — M79.641 BILATERAL HAND PAIN: Primary | ICD-10-CM

## 2021-10-26 ENCOUNTER — HOSPITAL ENCOUNTER (OUTPATIENT)
Dept: RADIOLOGY | Facility: OTHER | Age: 86
Discharge: HOME | End: 2021-10-26
Attending: ORTHOPAEDIC SURGERY
Payer: MEDICARE

## 2021-10-26 ENCOUNTER — IMMUNIZATION (OUTPATIENT)
Dept: INTERNAL MEDICINE | Facility: CLINIC | Age: 86
End: 2021-10-26
Payer: MEDICARE

## 2021-10-26 ENCOUNTER — OFFICE VISIT (OUTPATIENT)
Dept: ORTHOPEDICS | Facility: CLINIC | Age: 86
End: 2021-10-26
Payer: MEDICARE

## 2021-10-26 VITALS
BODY MASS INDEX: 26.31 KG/M2 | DIASTOLIC BLOOD PRESSURE: 78 MMHG | SYSTOLIC BLOOD PRESSURE: 145 MMHG | WEIGHT: 143 LBS | HEIGHT: 62 IN | HEART RATE: 51 BPM

## 2021-10-26 DIAGNOSIS — M79.641 BILATERAL HAND PAIN: ICD-10-CM

## 2021-10-26 DIAGNOSIS — M25.511 RIGHT SHOULDER PAIN, UNSPECIFIED CHRONICITY: ICD-10-CM

## 2021-10-26 DIAGNOSIS — Z23 NEED FOR VACCINATION: Primary | ICD-10-CM

## 2021-10-26 DIAGNOSIS — M79.642 BILATERAL HAND PAIN: ICD-10-CM

## 2021-10-26 DIAGNOSIS — M25.511 RIGHT SHOULDER PAIN, UNSPECIFIED CHRONICITY: Primary | ICD-10-CM

## 2021-10-26 DIAGNOSIS — M65.30 TRIGGER FINGER, UNSPECIFIED FINGER, UNSPECIFIED LATERALITY: Primary | ICD-10-CM

## 2021-10-26 PROCEDURE — 1125F PR PAIN SEVERITY QUANTIFIED, PAIN PRESENT: ICD-10-PCS | Mod: CPTII,S$GLB,, | Performed by: ORTHOPAEDIC SURGERY

## 2021-10-26 PROCEDURE — 99214 PR OFFICE/OUTPT VISIT, EST, LEVL IV, 30-39 MIN: ICD-10-PCS | Mod: 25,S$GLB,, | Performed by: ORTHOPAEDIC SURGERY

## 2021-10-26 PROCEDURE — 99999 PR PBB SHADOW E&M-EST. PATIENT-LVL IV: CPT | Mod: PBBFAC,,, | Performed by: ORTHOPAEDIC SURGERY

## 2021-10-26 PROCEDURE — 0003A COVID-19, MRNA, LNP-S, PF, 30 MCG/0.3 ML DOSE VACCINE: CPT | Mod: PBBFAC | Performed by: INTERNAL MEDICINE

## 2021-10-26 PROCEDURE — 3288F FALL RISK ASSESSMENT DOCD: CPT | Mod: CPTII,S$GLB,, | Performed by: ORTHOPAEDIC SURGERY

## 2021-10-26 PROCEDURE — 73130 X-RAY EXAM OF HAND: CPT | Mod: 26,50,, | Performed by: RADIOLOGY

## 2021-10-26 PROCEDURE — 99999 PR PBB SHADOW E&M-EST. PATIENT-LVL IV: ICD-10-PCS | Mod: PBBFAC,,, | Performed by: ORTHOPAEDIC SURGERY

## 2021-10-26 PROCEDURE — 1101F PT FALLS ASSESS-DOCD LE1/YR: CPT | Mod: CPTII,S$GLB,, | Performed by: ORTHOPAEDIC SURGERY

## 2021-10-26 PROCEDURE — 73030 X-RAY EXAM OF SHOULDER: CPT | Mod: 26,RT,, | Performed by: RADIOLOGY

## 2021-10-26 PROCEDURE — 1159F PR MEDICATION LIST DOCUMENTED IN MEDICAL RECORD: ICD-10-PCS | Mod: CPTII,S$GLB,, | Performed by: ORTHOPAEDIC SURGERY

## 2021-10-26 PROCEDURE — 20550 TENDON SHEATH: ICD-10-PCS | Mod: 50,S$GLB,, | Performed by: ORTHOPAEDIC SURGERY

## 2021-10-26 PROCEDURE — 73030 XR SHOULDER COMPLETE 2 OR MORE VIEWS RIGHT: ICD-10-PCS | Mod: 26,RT,, | Performed by: RADIOLOGY

## 2021-10-26 PROCEDURE — 73130 XR HAND COMPLETE 3 VIEWS BILATERAL: ICD-10-PCS | Mod: 26,50,, | Performed by: RADIOLOGY

## 2021-10-26 PROCEDURE — 1101F PR PT FALLS ASSESS DOC 0-1 FALLS W/OUT INJ PAST YR: ICD-10-PCS | Mod: CPTII,S$GLB,, | Performed by: ORTHOPAEDIC SURGERY

## 2021-10-26 PROCEDURE — 20550 NJX 1 TENDON SHEATH/LIGAMENT: CPT | Mod: 50,S$GLB,, | Performed by: ORTHOPAEDIC SURGERY

## 2021-10-26 PROCEDURE — 3288F PR FALLS RISK ASSESSMENT DOCUMENTED: ICD-10-PCS | Mod: CPTII,S$GLB,, | Performed by: ORTHOPAEDIC SURGERY

## 2021-10-26 PROCEDURE — 1125F AMNT PAIN NOTED PAIN PRSNT: CPT | Mod: CPTII,S$GLB,, | Performed by: ORTHOPAEDIC SURGERY

## 2021-10-26 PROCEDURE — 1159F MED LIST DOCD IN RCRD: CPT | Mod: CPTII,S$GLB,, | Performed by: ORTHOPAEDIC SURGERY

## 2021-10-26 PROCEDURE — 73130 X-RAY EXAM OF HAND: CPT | Mod: TC,50,FY

## 2021-10-26 PROCEDURE — 73030 X-RAY EXAM OF SHOULDER: CPT | Mod: TC,FY,RT

## 2021-10-26 PROCEDURE — 99214 OFFICE O/P EST MOD 30 MIN: CPT | Mod: 25,S$GLB,, | Performed by: ORTHOPAEDIC SURGERY

## 2021-10-26 PROCEDURE — 91300 COVID-19, MRNA, LNP-S, PF, 30 MCG/0.3 ML DOSE VACCINE: CPT | Mod: PBBFAC | Performed by: INTERNAL MEDICINE

## 2021-10-26 RX ADMIN — DEXAMETHASONE SODIUM PHOSPHATE 4 MG: 4 INJECTION, SOLUTION INTRA-ARTICULAR; INTRALESIONAL; INTRAMUSCULAR; INTRAVENOUS; SOFT TISSUE at 01:10

## 2021-10-27 DIAGNOSIS — M65.332 TRIGGER MIDDLE FINGER OF LEFT HAND: Primary | ICD-10-CM

## 2021-10-27 DIAGNOSIS — M65.342 TRIGGER RING FINGER OF LEFT HAND: ICD-10-CM

## 2021-10-29 RX ORDER — DEXAMETHASONE SODIUM PHOSPHATE 4 MG/ML
4 INJECTION, SOLUTION INTRA-ARTICULAR; INTRALESIONAL; INTRAMUSCULAR; INTRAVENOUS; SOFT TISSUE
Status: DISCONTINUED | OUTPATIENT
Start: 2021-10-26 | End: 2021-10-29 | Stop reason: HOSPADM

## 2021-11-08 ENCOUNTER — ANESTHESIA EVENT (OUTPATIENT)
Dept: SURGERY | Facility: HOSPITAL | Age: 86
End: 2021-11-08
Payer: MEDICARE

## 2021-11-12 ENCOUNTER — TELEPHONE (OUTPATIENT)
Dept: ORTHOPEDICS | Facility: CLINIC | Age: 86
End: 2021-11-12
Payer: MEDICARE

## 2021-11-12 RX ORDER — TRAMADOL HYDROCHLORIDE 50 MG/1
50 TABLET ORAL EVERY 6 HOURS PRN
Qty: 10 TABLET | Refills: 0 | Status: SHIPPED | OUTPATIENT
Start: 2021-11-12 | End: 2021-11-12

## 2021-11-12 RX ORDER — ACETAMINOPHEN AND CODEINE PHOSPHATE 300; 30 MG/1; MG/1
1 TABLET ORAL EVERY 6 HOURS PRN
Qty: 10 TABLET | Refills: 0 | Status: SHIPPED | OUTPATIENT
Start: 2021-11-12 | End: 2021-11-22 | Stop reason: ALTCHOICE

## 2021-11-14 DIAGNOSIS — M65.30 TRIGGER FINGER: ICD-10-CM

## 2021-11-15 ENCOUNTER — ANESTHESIA (OUTPATIENT)
Dept: SURGERY | Facility: HOSPITAL | Age: 86
End: 2021-11-15
Payer: MEDICARE

## 2021-11-15 ENCOUNTER — HOSPITAL ENCOUNTER (OUTPATIENT)
Facility: HOSPITAL | Age: 86
Discharge: HOME | End: 2021-11-15
Attending: ORTHOPAEDIC SURGERY | Admitting: ORTHOPAEDIC SURGERY
Payer: MEDICARE

## 2021-11-15 VITALS
BODY MASS INDEX: 26.31 KG/M2 | SYSTOLIC BLOOD PRESSURE: 161 MMHG | WEIGHT: 143 LBS | TEMPERATURE: 98 F | HEART RATE: 47 BPM | HEIGHT: 62 IN | DIASTOLIC BLOOD PRESSURE: 71 MMHG | RESPIRATION RATE: 15 BRPM | OXYGEN SATURATION: 94 %

## 2021-11-15 DIAGNOSIS — M65.332 TRIGGER MIDDLE FINGER OF LEFT HAND: Primary | ICD-10-CM

## 2021-11-15 DIAGNOSIS — M65.30 TRIGGER FINGER: ICD-10-CM

## 2021-11-15 PROCEDURE — 99900035 HC TECH TIME PER 15 MIN (STAT)

## 2021-11-15 PROCEDURE — 26055 INCISE FINGER TENDON SHEATH: CPT | Mod: F2,,, | Performed by: ORTHOPAEDIC SURGERY

## 2021-11-15 PROCEDURE — 37000008 HC ANESTHESIA 1ST 15 MINUTES: Performed by: ORTHOPAEDIC SURGERY

## 2021-11-15 PROCEDURE — 37000009 HC ANESTHESIA EA ADD 15 MINS: Performed by: ORTHOPAEDIC SURGERY

## 2021-11-15 PROCEDURE — D9220A PRA ANESTHESIA: Mod: CRNA,,, | Performed by: NURSE ANESTHETIST, CERTIFIED REGISTERED

## 2021-11-15 PROCEDURE — 25000003 PHARM REV CODE 250: Performed by: NURSE ANESTHETIST, CERTIFIED REGISTERED

## 2021-11-15 PROCEDURE — 36000707: Performed by: ORTHOPAEDIC SURGERY

## 2021-11-15 PROCEDURE — 63600175 PHARM REV CODE 636 W HCPCS: Performed by: ORTHOPAEDIC SURGERY

## 2021-11-15 PROCEDURE — 27201423 OPTIME MED/SURG SUP & DEVICES STERILE SUPPLY: Performed by: ORTHOPAEDIC SURGERY

## 2021-11-15 PROCEDURE — 94761 N-INVAS EAR/PLS OXIMETRY MLT: CPT

## 2021-11-15 PROCEDURE — 63600175 PHARM REV CODE 636 W HCPCS: Performed by: NURSE ANESTHETIST, CERTIFIED REGISTERED

## 2021-11-15 PROCEDURE — 25000003 PHARM REV CODE 250: Performed by: ORTHOPAEDIC SURGERY

## 2021-11-15 PROCEDURE — 25000 PR INCIS TENDON SHEATH,RADIAL STYLOID: ICD-10-PCS | Mod: 59,51,F7, | Performed by: ORTHOPAEDIC SURGERY

## 2021-11-15 PROCEDURE — 25000003 PHARM REV CODE 250: Performed by: SURGERY

## 2021-11-15 PROCEDURE — 36000706: Performed by: ORTHOPAEDIC SURGERY

## 2021-11-15 PROCEDURE — D9220A PRA ANESTHESIA: Mod: ANES,,, | Performed by: ANESTHESIOLOGY

## 2021-11-15 PROCEDURE — D9220A PRA ANESTHESIA: ICD-10-PCS | Mod: ANES,,, | Performed by: ANESTHESIOLOGY

## 2021-11-15 PROCEDURE — 26055 PR INCISE FINGER TENDON SHEATH: ICD-10-PCS | Mod: 51,F3,, | Performed by: ORTHOPAEDIC SURGERY

## 2021-11-15 PROCEDURE — 71000033 HC RECOVERY, INTIAL HOUR: Performed by: ORTHOPAEDIC SURGERY

## 2021-11-15 PROCEDURE — 71000015 HC POSTOP RECOV 1ST HR: Performed by: ORTHOPAEDIC SURGERY

## 2021-11-15 PROCEDURE — 25000 INCISION OF TENDON SHEATH: CPT | Mod: 59,51,F7, | Performed by: ORTHOPAEDIC SURGERY

## 2021-11-15 PROCEDURE — D9220A PRA ANESTHESIA: ICD-10-PCS | Mod: CRNA,,, | Performed by: NURSE ANESTHETIST, CERTIFIED REGISTERED

## 2021-11-15 RX ORDER — LIDOCAINE HYDROCHLORIDE 20 MG/ML
INJECTION INTRAVENOUS
Status: DISCONTINUED | OUTPATIENT
Start: 2021-11-15 | End: 2021-11-15

## 2021-11-15 RX ORDER — OXYCODONE HYDROCHLORIDE 5 MG/1
5 TABLET ORAL EVERY 4 HOURS PRN
Status: DISCONTINUED | OUTPATIENT
Start: 2021-11-15 | End: 2021-11-15 | Stop reason: HOSPADM

## 2021-11-15 RX ORDER — LIDOCAINE HYDROCHLORIDE 10 MG/ML
INJECTION, SOLUTION EPIDURAL; INFILTRATION; INTRACAUDAL; PERINEURAL
Status: DISCONTINUED | OUTPATIENT
Start: 2021-11-15 | End: 2021-11-15 | Stop reason: HOSPADM

## 2021-11-15 RX ORDER — LIDOCAINE HYDROCHLORIDE 10 MG/ML
1 INJECTION, SOLUTION EPIDURAL; INFILTRATION; INTRACAUDAL; PERINEURAL ONCE
Status: DISCONTINUED | OUTPATIENT
Start: 2021-11-15 | End: 2021-11-15 | Stop reason: HOSPADM

## 2021-11-15 RX ORDER — PROPOFOL 10 MG/ML
VIAL (ML) INTRAVENOUS CONTINUOUS PRN
Status: DISCONTINUED | OUTPATIENT
Start: 2021-11-15 | End: 2021-11-15

## 2021-11-15 RX ORDER — BACITRACIN ZINC 500 UNIT/G
OINTMENT (GRAM) TOPICAL
Status: DISCONTINUED | OUTPATIENT
Start: 2021-11-15 | End: 2021-11-15 | Stop reason: HOSPADM

## 2021-11-15 RX ORDER — DEXAMETHASONE SODIUM PHOSPHATE 4 MG/ML
INJECTION, SOLUTION INTRA-ARTICULAR; INTRALESIONAL; INTRAMUSCULAR; INTRAVENOUS; SOFT TISSUE
Status: DISCONTINUED | OUTPATIENT
Start: 2021-11-15 | End: 2021-11-15 | Stop reason: HOSPADM

## 2021-11-15 RX ORDER — ACETAMINOPHEN 500 MG
1000 TABLET ORAL
Status: COMPLETED | OUTPATIENT
Start: 2021-11-15 | End: 2021-11-15

## 2021-11-15 RX ORDER — PROPOFOL 10 MG/ML
VIAL (ML) INTRAVENOUS
Status: DISCONTINUED | OUTPATIENT
Start: 2021-11-15 | End: 2021-11-15

## 2021-11-15 RX ORDER — ACETAMINOPHEN 325 MG/1
650 TABLET ORAL EVERY 4 HOURS PRN
Status: DISCONTINUED | OUTPATIENT
Start: 2021-11-15 | End: 2021-11-15 | Stop reason: HOSPADM

## 2021-11-15 RX ORDER — ONDANSETRON 2 MG/ML
INJECTION INTRAMUSCULAR; INTRAVENOUS
Status: DISCONTINUED | OUTPATIENT
Start: 2021-11-15 | End: 2021-11-15

## 2021-11-15 RX ORDER — ONDANSETRON 4 MG/1
8 TABLET, ORALLY DISINTEGRATING ORAL EVERY 8 HOURS PRN
Status: DISCONTINUED | OUTPATIENT
Start: 2021-11-15 | End: 2021-11-15 | Stop reason: HOSPADM

## 2021-11-15 RX ORDER — BUPIVACAINE HYDROCHLORIDE 2.5 MG/ML
INJECTION, SOLUTION EPIDURAL; INFILTRATION; INTRACAUDAL
Status: DISCONTINUED | OUTPATIENT
Start: 2021-11-15 | End: 2021-11-15 | Stop reason: HOSPADM

## 2021-11-15 RX ORDER — SODIUM CHLORIDE 0.9 % (FLUSH) 0.9 %
3 SYRINGE (ML) INJECTION
Status: CANCELLED | OUTPATIENT
Start: 2021-11-15

## 2021-11-15 RX ORDER — CEFAZOLIN SODIUM 1 G/3ML
2 INJECTION, POWDER, FOR SOLUTION INTRAMUSCULAR; INTRAVENOUS
Status: DISCONTINUED | OUTPATIENT
Start: 2021-11-15 | End: 2021-11-15 | Stop reason: HOSPADM

## 2021-11-15 RX ORDER — SODIUM CHLORIDE 0.9 % (FLUSH) 0.9 %
10 SYRINGE (ML) INJECTION
Status: DISCONTINUED | OUTPATIENT
Start: 2021-11-15 | End: 2021-11-15 | Stop reason: HOSPADM

## 2021-11-15 RX ADMIN — ONDANSETRON 4 MG: 2 INJECTION, SOLUTION INTRAMUSCULAR; INTRAVENOUS at 07:11

## 2021-11-15 RX ADMIN — ACETAMINOPHEN 1000 MG: 500 TABLET ORAL at 06:11

## 2021-11-15 RX ADMIN — LIDOCAINE HYDROCHLORIDE 75 MG: 20 INJECTION, SOLUTION INTRAVENOUS at 07:11

## 2021-11-15 RX ADMIN — PROPOFOL 40 MG: 10 INJECTION, EMULSION INTRAVENOUS at 07:11

## 2021-11-15 RX ADMIN — PROPOFOL 50 MCG/KG/MIN: 10 INJECTION, EMULSION INTRAVENOUS at 07:11

## 2021-11-15 RX ADMIN — SODIUM CHLORIDE: 9 INJECTION, SOLUTION INTRAVENOUS at 06:11

## 2021-11-22 ENCOUNTER — OFFICE VISIT (OUTPATIENT)
Dept: PRIMARY CARE CLINIC | Facility: CLINIC | Age: 86
End: 2021-11-22
Payer: MEDICARE

## 2021-11-22 ENCOUNTER — LAB VISIT (OUTPATIENT)
Dept: LAB | Facility: HOSPITAL | Age: 86
End: 2021-11-22
Attending: INTERNAL MEDICINE
Payer: MEDICARE

## 2021-11-22 VITALS
TEMPERATURE: 98 F | DIASTOLIC BLOOD PRESSURE: 74 MMHG | SYSTOLIC BLOOD PRESSURE: 160 MMHG | HEART RATE: 69 BPM | WEIGHT: 141.31 LBS | HEIGHT: 62 IN | OXYGEN SATURATION: 97 % | BODY MASS INDEX: 26.01 KG/M2

## 2021-11-22 DIAGNOSIS — N18.31 STAGE 3A CHRONIC KIDNEY DISEASE: ICD-10-CM

## 2021-11-22 DIAGNOSIS — E78.5 HYPERLIPIDEMIA, UNSPECIFIED HYPERLIPIDEMIA TYPE: ICD-10-CM

## 2021-11-22 DIAGNOSIS — J44.9 CHRONIC OBSTRUCTIVE PULMONARY DISEASE, UNSPECIFIED COPD TYPE: ICD-10-CM

## 2021-11-22 DIAGNOSIS — M81.0 OSTEOPOROSIS WITHOUT CURRENT PATHOLOGICAL FRACTURE, UNSPECIFIED OSTEOPOROSIS TYPE: ICD-10-CM

## 2021-11-22 DIAGNOSIS — R73.03 PRE-DIABETES: ICD-10-CM

## 2021-11-22 DIAGNOSIS — M17.11 PRIMARY OSTEOARTHRITIS OF RIGHT KNEE: ICD-10-CM

## 2021-11-22 DIAGNOSIS — I11.9 HYPERTENSIVE HEART DISEASE WITHOUT HEART FAILURE: ICD-10-CM

## 2021-11-22 DIAGNOSIS — F41.8 DEPRESSION WITH ANXIETY: ICD-10-CM

## 2021-11-22 DIAGNOSIS — M47.816 LUMBAR SPONDYLOSIS: ICD-10-CM

## 2021-11-22 DIAGNOSIS — I11.9 HYPERTENSIVE HEART DISEASE WITHOUT HEART FAILURE: Primary | ICD-10-CM

## 2021-11-22 PROCEDURE — 82306 VITAMIN D 25 HYDROXY: CPT | Performed by: INTERNAL MEDICINE

## 2021-11-22 PROCEDURE — 83036 HEMOGLOBIN GLYCOSYLATED A1C: CPT | Performed by: INTERNAL MEDICINE

## 2021-11-22 PROCEDURE — 80053 COMPREHEN METABOLIC PANEL: CPT | Performed by: INTERNAL MEDICINE

## 2021-11-22 PROCEDURE — 99214 OFFICE O/P EST MOD 30 MIN: CPT | Mod: S$GLB,,, | Performed by: INTERNAL MEDICINE

## 2021-11-22 PROCEDURE — 36415 COLL VENOUS BLD VENIPUNCTURE: CPT | Mod: PN | Performed by: INTERNAL MEDICINE

## 2021-11-22 PROCEDURE — 99214 PR OFFICE/OUTPT VISIT, EST, LEVL IV, 30-39 MIN: ICD-10-PCS | Mod: S$GLB,,, | Performed by: INTERNAL MEDICINE

## 2021-11-22 PROCEDURE — 99999 PR PBB SHADOW E&M-EST. PATIENT-LVL V: CPT | Mod: PBBFAC,,, | Performed by: INTERNAL MEDICINE

## 2021-11-22 PROCEDURE — 99999 PR PBB SHADOW E&M-EST. PATIENT-LVL V: ICD-10-PCS | Mod: PBBFAC,,, | Performed by: INTERNAL MEDICINE

## 2021-11-22 PROCEDURE — 80061 LIPID PANEL: CPT | Performed by: INTERNAL MEDICINE

## 2021-11-22 RX ORDER — BENAZEPRIL HYDROCHLORIDE 40 MG/1
40 TABLET ORAL DAILY
Qty: 90 TABLET | Refills: 1 | Status: SHIPPED | OUTPATIENT
Start: 2021-11-22 | End: 2022-03-04

## 2021-11-22 RX ORDER — FELODIPINE 10 MG/1
10 TABLET, EXTENDED RELEASE ORAL DAILY
Qty: 180 TABLET | Refills: 2 | Status: SHIPPED | OUTPATIENT
Start: 2021-11-22 | End: 2022-02-07

## 2021-11-22 RX ORDER — TORSEMIDE 20 MG/1
20 TABLET ORAL DAILY
Qty: 90 TABLET | Refills: 2 | Status: SHIPPED | OUTPATIENT
Start: 2021-11-22 | End: 2022-11-08

## 2021-11-22 RX ORDER — TRAMADOL HYDROCHLORIDE 50 MG/1
50 TABLET ORAL EVERY 8 HOURS PRN
Qty: 80 TABLET | Refills: 2 | Status: SHIPPED | OUTPATIENT
Start: 2021-11-22 | End: 2022-03-20 | Stop reason: SDUPTHER

## 2021-11-23 ENCOUNTER — PATIENT MESSAGE (OUTPATIENT)
Dept: PRIMARY CARE CLINIC | Facility: CLINIC | Age: 86
End: 2021-11-23
Payer: MEDICARE

## 2021-11-23 LAB
25(OH)D3+25(OH)D2 SERPL-MCNC: 75 NG/ML (ref 30–96)
ALBUMIN SERPL BCP-MCNC: 3.3 G/DL (ref 3.5–5.2)
ALP SERPL-CCNC: 67 U/L (ref 55–135)
ALT SERPL W/O P-5'-P-CCNC: 12 U/L (ref 10–44)
ANION GAP SERPL CALC-SCNC: 11 MMOL/L (ref 8–16)
AST SERPL-CCNC: 20 U/L (ref 10–40)
BILIRUB SERPL-MCNC: 1 MG/DL (ref 0.1–1)
BUN SERPL-MCNC: 23 MG/DL (ref 8–23)
CALCIUM SERPL-MCNC: 10 MG/DL (ref 8.7–10.5)
CHLORIDE SERPL-SCNC: 97 MMOL/L (ref 95–110)
CHOLEST SERPL-MCNC: 126 MG/DL (ref 120–199)
CHOLEST/HDLC SERPL: 2.3 {RATIO} (ref 2–5)
CO2 SERPL-SCNC: 31 MMOL/L (ref 23–29)
CREAT SERPL-MCNC: 1.1 MG/DL (ref 0.5–1.4)
EST. GFR  (AFRICAN AMERICAN): 52.2 ML/MIN/1.73 M^2
EST. GFR  (NON AFRICAN AMERICAN): 45.3 ML/MIN/1.73 M^2
ESTIMATED AVG GLUCOSE: 120 MG/DL (ref 68–131)
GLUCOSE SERPL-MCNC: 92 MG/DL (ref 70–110)
HBA1C MFR BLD: 5.8 % (ref 4–5.6)
HDLC SERPL-MCNC: 55 MG/DL (ref 40–75)
HDLC SERPL: 43.7 % (ref 20–50)
LDLC SERPL CALC-MCNC: 53.4 MG/DL (ref 63–159)
NONHDLC SERPL-MCNC: 71 MG/DL
POTASSIUM SERPL-SCNC: 3.5 MMOL/L (ref 3.5–5.1)
PROT SERPL-MCNC: 7.3 G/DL (ref 6–8.4)
SODIUM SERPL-SCNC: 139 MMOL/L (ref 136–145)
TRIGL SERPL-MCNC: 88 MG/DL (ref 30–150)

## 2021-11-24 ENCOUNTER — PES CALL (OUTPATIENT)
Dept: ADMINISTRATIVE | Facility: CLINIC | Age: 86
End: 2021-11-24
Payer: MEDICARE

## 2021-11-29 ENCOUNTER — OFFICE VISIT (OUTPATIENT)
Dept: ORTHOPEDICS | Facility: CLINIC | Age: 86
End: 2021-11-29
Payer: MEDICARE

## 2021-11-29 VITALS — HEIGHT: 62 IN | BODY MASS INDEX: 25.95 KG/M2 | WEIGHT: 141 LBS

## 2021-11-29 DIAGNOSIS — Z98.890 POST-OPERATIVE STATE: Primary | ICD-10-CM

## 2021-11-29 PROCEDURE — 99024 POSTOP FOLLOW-UP VISIT: CPT | Mod: S$GLB,,, | Performed by: PHYSICIAN ASSISTANT

## 2021-11-29 PROCEDURE — 99999 PR PBB SHADOW E&M-EST. PATIENT-LVL III: ICD-10-PCS | Mod: PBBFAC,,, | Performed by: PHYSICIAN ASSISTANT

## 2021-11-29 PROCEDURE — 99999 PR PBB SHADOW E&M-EST. PATIENT-LVL III: CPT | Mod: PBBFAC,,, | Performed by: PHYSICIAN ASSISTANT

## 2021-11-29 PROCEDURE — 99024 PR POST-OP FOLLOW-UP VISIT: ICD-10-PCS | Mod: S$GLB,,, | Performed by: PHYSICIAN ASSISTANT

## 2021-12-03 ENCOUNTER — TELEPHONE (OUTPATIENT)
Dept: PAIN MEDICINE | Facility: CLINIC | Age: 86
End: 2021-12-03
Payer: MEDICARE

## 2021-12-03 ENCOUNTER — TELEPHONE (OUTPATIENT)
Dept: SPORTS MEDICINE | Facility: CLINIC | Age: 86
End: 2021-12-03
Payer: MEDICARE

## 2021-12-06 ENCOUNTER — OFFICE VISIT (OUTPATIENT)
Dept: PAIN MEDICINE | Facility: CLINIC | Age: 86
End: 2021-12-06
Payer: MEDICARE

## 2021-12-06 ENCOUNTER — HOSPITAL ENCOUNTER (OUTPATIENT)
Dept: RADIOLOGY | Facility: HOSPITAL | Age: 86
Discharge: HOME | End: 2021-12-06
Attending: ORTHOPAEDIC SURGERY
Payer: MEDICARE

## 2021-12-06 ENCOUNTER — OFFICE VISIT (OUTPATIENT)
Dept: SPORTS MEDICINE | Facility: CLINIC | Age: 86
End: 2021-12-06
Payer: MEDICARE

## 2021-12-06 VITALS
RESPIRATION RATE: 18 BRPM | DIASTOLIC BLOOD PRESSURE: 77 MMHG | HEIGHT: 62 IN | SYSTOLIC BLOOD PRESSURE: 173 MMHG | TEMPERATURE: 98 F | WEIGHT: 141.13 LBS | HEART RATE: 53 BPM | BODY MASS INDEX: 25.97 KG/M2

## 2021-12-06 VITALS
HEIGHT: 62 IN | BODY MASS INDEX: 25.97 KG/M2 | SYSTOLIC BLOOD PRESSURE: 163 MMHG | HEART RATE: 68 BPM | DIASTOLIC BLOOD PRESSURE: 82 MMHG | WEIGHT: 141.13 LBS

## 2021-12-06 DIAGNOSIS — M25.561 RIGHT KNEE PAIN, UNSPECIFIED CHRONICITY: Primary | ICD-10-CM

## 2021-12-06 DIAGNOSIS — M51.36 DDD (DEGENERATIVE DISC DISEASE), LUMBAR: ICD-10-CM

## 2021-12-06 DIAGNOSIS — M17.0 PRIMARY OSTEOARTHRITIS OF BOTH KNEES: ICD-10-CM

## 2021-12-06 DIAGNOSIS — M47.816 LUMBAR SPONDYLOSIS: ICD-10-CM

## 2021-12-06 DIAGNOSIS — M54.16 LUMBAR RADICULOPATHY: Primary | ICD-10-CM

## 2021-12-06 DIAGNOSIS — M25.561 RIGHT KNEE PAIN, UNSPECIFIED CHRONICITY: ICD-10-CM

## 2021-12-06 PROCEDURE — 73564 XR KNEE ORTHO RIGHT WITH FLEXION: ICD-10-PCS | Mod: 26,RT,, | Performed by: RADIOLOGY

## 2021-12-06 PROCEDURE — 99999 PR PBB SHADOW E&M-EST. PATIENT-LVL IV: CPT | Mod: PBBFAC,,, | Performed by: NURSE PRACTITIONER

## 2021-12-06 PROCEDURE — 20610 DRAIN/INJ JOINT/BURSA W/O US: CPT | Mod: 50,S$GLB,, | Performed by: ORTHOPAEDIC SURGERY

## 2021-12-06 PROCEDURE — 99213 PR OFFICE/OUTPT VISIT, EST, LEVL III, 20-29 MIN: ICD-10-PCS | Mod: S$GLB,,, | Performed by: NURSE PRACTITIONER

## 2021-12-06 PROCEDURE — 99214 OFFICE O/P EST MOD 30 MIN: CPT | Mod: 25,S$GLB,, | Performed by: ORTHOPAEDIC SURGERY

## 2021-12-06 PROCEDURE — 73564 X-RAY EXAM KNEE 4 OR MORE: CPT | Mod: 26,RT,, | Performed by: RADIOLOGY

## 2021-12-06 PROCEDURE — 73562 XR KNEE ORTHO RIGHT WITH FLEXION: ICD-10-PCS | Mod: 26,LT,, | Performed by: RADIOLOGY

## 2021-12-06 PROCEDURE — 73564 X-RAY EXAM KNEE 4 OR MORE: CPT | Mod: TC,RT

## 2021-12-06 PROCEDURE — 99214 PR OFFICE/OUTPT VISIT, EST, LEVL IV, 30-39 MIN: ICD-10-PCS | Mod: 25,S$GLB,, | Performed by: ORTHOPAEDIC SURGERY

## 2021-12-06 PROCEDURE — 73562 X-RAY EXAM OF KNEE 3: CPT | Mod: 26,LT,, | Performed by: RADIOLOGY

## 2021-12-06 PROCEDURE — 99999 PR PBB SHADOW E&M-EST. PATIENT-LVL IV: CPT | Mod: PBBFAC,,, | Performed by: ORTHOPAEDIC SURGERY

## 2021-12-06 PROCEDURE — 99999 PR PBB SHADOW E&M-EST. PATIENT-LVL IV: ICD-10-PCS | Mod: PBBFAC,,, | Performed by: ORTHOPAEDIC SURGERY

## 2021-12-06 PROCEDURE — 99999 PR PBB SHADOW E&M-EST. PATIENT-LVL IV: ICD-10-PCS | Mod: PBBFAC,,, | Performed by: NURSE PRACTITIONER

## 2021-12-06 PROCEDURE — 20610 LARGE JOINT ASPIRATION/INJECTION: R KNEE: ICD-10-PCS | Mod: 50,S$GLB,, | Performed by: ORTHOPAEDIC SURGERY

## 2021-12-06 PROCEDURE — 99213 OFFICE O/P EST LOW 20 MIN: CPT | Mod: S$GLB,,, | Performed by: NURSE PRACTITIONER

## 2021-12-06 RX ORDER — GABAPENTIN 100 MG/1
100 CAPSULE ORAL NIGHTLY
Qty: 30 CAPSULE | Refills: 2 | Status: SHIPPED | OUTPATIENT
Start: 2021-12-06 | End: 2022-07-15

## 2021-12-06 RX ADMIN — TRIAMCINOLONE ACETONIDE 40 MG: 40 INJECTION, SUSPENSION INTRA-ARTICULAR; INTRAMUSCULAR at 10:12

## 2021-12-06 NOTE — H&P (VIEW-ONLY)
Chronic Pain-Established Visit            SUBJECTIVE:    Interval History 12/6/2021:  The patient returns to clinic today for follow up of low back pain. She reports increased low back and bilateral hip pain over the last two weeks. She reports intermittent radiating pain into her lateral thighs. She does have difficulty sleeping due to pain. Her pain is also worse with standing and walking. She continues to take Gabapentin with benefit. She also takes Tramadol per her PCP with benefit. She denies any other health changes. Her pain today is 8/10.    Interval History 7/6/2021:  The patient returns to clinic today for follow up of low back pain via virtual visit. She is s/p bilateral L4/5 TF ASHUTOSH on 6/21/2021. She reports 75% relief of her low back and leg pain. She reports low back pain, worse in the morning. She reports intermittent radiating pain into the posterolateral aspect of both legs to her knees. She continues to take Gabapentin with benefit. She denies any weakness. She denies any other health changes. Her pain today is 2/10.    Interval History 6/8/2021:  The patient returns to clinic today for follow up of low back pain. She is here today for imaging review. She continues to report low back pain that radiates into the posterolateral aspect of both legs to below her knee. Her pain is worse with prolonged standing and walking. She does feel like she will fall with prolonged walking. She also reports relief with bending forward. She is currently taking Gabapentin at bedtime with benefit. She continues to take Tramadol per her PCP with benefit. She denies any other health changes. Her pain today is 8/10.    Interval History 5/20/2021:  The patient returns to clinic today for follow up of low back pain. She is s/p bilateral hip joint injections on 4/26/2021. She reports no relief. She reports increased low back pain that radiates into the posterior aspect of both legs to her ankles. She does report numbness to  her feet. Her pain is worse with prolonged standing and walking. She feels as though her legs will give out with prolonged walking. She denies any bowel or bladder incontinence. She denies any other health changes. Her pain today is 9/10.    Interval History 3/23/2021:  The patient returns to clinic today for follow up of low back and hip pain. She reports increased bilateral hip pain. She describes this pain as deep and aching. Her pain is worse with prolonged standing and walking. She also reports low back pain that is aching in nature. She denies any radicular leg pain. Of note, she reports increased swelling and pain to her left ankle and foot. She does report a trip last week. She did see Orthopedics today and is scheduled for xray. She denies any other health changes. Her pain today is 8/10.    Interval History 12/9/2020:  She returns for follow-up.  She is doing well since her facet injections.  Her right hip is hurting.  It interferes with her mobility and comfort while laying down.  No new symptomatology otherwise.    Interval History 8/11/2020:  The patient has a virtual visit today for follow up.  She is s/p Bilateral L3-4, L4-5 L5-S1 facet joint injections with 90% relief.  She says that her back pain is minimal at this time.  She is able to move around and walk better since the procedure as well.  She is having some left hip pain and feels as though her leg will give out sometimes when walking.  She would like a referral to ortho close to her home in [facility].  Her pain today is 1/10.  She denies any respiratory changes since procedure including fever, cough or SOB.    Interval History 12/5/2019:  The patient is here for follow up of lower back and bilateral hip pain.  Her back pain has been minimal.  She is having increased lateral hip and buttock pain recently.  She had TPIs in the past and would like to repeat this today.  Her pain today is 8/10.    Previous Encounter:  Anahi Cook presents to the  clinic for a follow-up appointment for lower back and left lower extremity pain.  She had T12 kyphoplasty performed on 11/8/18 with significant improvement in symptoms.  She was admitted through the ED on 4/3/19 after suffering fractures of 6th, 8th and possible 7th ribs of the left side after a fall at home.  Thoracic imaging also showed stable slight anterior wedging at T11.  She had bilateral L3-4, L4-5 and L5-S1 facet injections on 4/1/19 with 80% relief of lower back pain.  Her current pain is minimal.  She has mild leg pain with walking.  She continues to follow up with oncology regularly.  Since the last visit, Anahi Cook states the pain has been improving.  Current pain intensity is 2/10.     Pain Medications:  OTC Tylenol    Opioid Contract: no     report:  Not applicable    Pain Procedures:   7/21/14 L4-5 IL ASHUTOSH- significant benefit  12/1/15 Left GT bursa injection  4/26/16 Left GT bursa injection  12/15/16 L4-5 IL ASHUTOSH- significant benefit  2/15/18 L4-5 IL ASHUTOSH- 100% relief  8/21/18 L4-5 IL ASHUTOSH- significant relief  10/15/18 Bilateral L3-4, L4-5 L5-S1 facet joint injections  11/8/18 T12 kyphoplasty- significant relief  4/1/19 Bilateral L3-4, L4-5 L5-S1 facet joint injections- 80% relief  7/27/20 Bilateral L3-4, L4-5 L5-S1 facet joint injections- 90% relief  4/26/2021- Bilateral hip joint injections- no relief    6/21/2021- Bilateral L4/5 TF ASHUTOSH- 75% relief    Physical Therapy/Home Exercise: yes     Imaging:   MRI Lumbar Spine 5/29/2021:  COMPARISON:  Prior MRI from 2014 and 2018     FINDINGS:  There is a transitional lumbosacral segment and spine labeling is as on prior examinations.     Stable severe compression deformity at T12 with retropulsion.  Remaining vertebral body heights are maintained.  Grade 1 anterolisthesis appears increased at L4-5 as compared to previous.  There is no marrow replacement process.  The spinal cord is normal in signal.  Review of paravertebral structures demonstrates high  T2 low T1 signal foci within kidneys.     Not included in the field of view of the axial, there is disc bulge at T10-11.  This does not appear to result in a significant degree of spinal canal narrowing on the basis of the sagittal imaging.     T12 demonstrates retropulsion with mild narrowing of the spinal canal.     T12-L1 demonstrates minor disc bulge.     L1-L2 demonstrates mild facet degenerative change.  There is no significant spinal canal stenosis.  There is mild bilateral neural foraminal narrowing.     L2-3 demonstrates facet degenerative change.  There is no significant spinal canal stenosis.  There is right foraminal protrusion.  This results in a moderate degree of right foraminal narrowing.  There is mild left foraminal narrowing.     L3-4 demonstrates facet degenerative change and ligamentum flavum thickening.  There is a facet joint synovial cyst along the posterolateral aspect of the left facet.  There is right foraminal protrusion.  There is mild left and moderate right foraminal narrowing.     L4-5 demonstrates facet degenerative change and ligamentum flavum thickening.  There is anterolisthesis.  There is severe left and moderate right foraminal narrowing.  There is a severe degree of spinal canal narrowing.     L5-S1 is unremarkable.     Impression:     Multilevel degenerative changes as further detailed above.  Findings are most significant at the L4-5 level, noting transitional lumbosacral segment.     Stable severe compression deformity at T12.     Foci within the kidneys bilaterally likely representing cyst can be correlated with ultrasound.          Past Medical History:  Past Medical History:   Diagnosis Date    Anxiety     Back pain     Breast cancer 1/17/2018    Breast mass 1/15/2018    Chronic kidney disease, stage 2, mildly decreased GFR     COPD (chronic obstructive pulmonary disease)     emphysema    Depression     Dysuria 1/29/2018    Family history of breast cancer  1/17/2018    GERD (gastroesophageal reflux disease)     Hypertension     Infiltrating ductal carcinoma of breast, left 7/12/2018    Osteoporosis, senile     Ovarian mass 1/15/2018    Pneumonia     S/P robotic assisted laparoscopic BSO (bilateral salpingo-oophorectomy) 2/23/2018       Past Surgical History:  Past Surgical History:   Procedure Laterality Date    AXILLARY NODE DISSECTION Left 7/12/2018    Procedure: LYMPHADENECTOMY, AXILLARY;  Surgeon: Amanda Caballero MD;  Location: [facility] OR [address] [place] [address];  Service: General;  Laterality: Left;    BREAST BIOPSY      BREAST LUMPECTOMY      CHOLECYSTECTOMY      COLONOSCOPY      ESOPHAGOGASTRODUODENOSCOPY      ESOPHAGOGASTRODUODENOSCOPY N/A 10/14/2019    Procedure: EGD (ESOPHAGOGASTRODUODENOSCOPY);  Surgeon: Davonte Thompson MD;  Location: [facility] ENDO ([address] [place] [address]);  Service: Endoscopy;  Laterality: N/A;  hx of pulmonary hypertension-[place] 50     pt requesting ASAP    EYE SURGERY      FIXATION KYPHOPLASTY N/A 11/8/2018    Procedure: Kyphoplasty   T12;  Surgeon: Merly Wilcox MD;  Location: [facility] CATH LAB;  Service: Pain Management;  Laterality: N/A;  T12  [place] [employer] e-mailed with date and time    HYSTERECTOMY      INJECTION FOR SENTINEL NODE IDENTIFICATION Left 7/12/2018    Procedure: INJECTION, FOR SENTINEL NODE IDENTIFICATION;  Surgeon: Amanda Caballero MD;  Location: [facility] OR [address] [place] [address];  Service: General;  Laterality: Left;    INJECTION OF FACET JOINT Bilateral 10/15/2018    Procedure: INJECTION, FACET JOINT, BILATERAL LUMBAR L3-4, 4-5, 5-S1 FACET JOINT INJECTIONS;  Surgeon: Merly Wilcox MD;  Location: [facility] PAIN MGT;  Service: Pain Management;  Laterality: Bilateral;    INJECTION OF FACET JOINT Bilateral 4/1/2019    Procedure: INJECTION, FACET JOINT, L3-L4,L4-L5,L5-S1;  Surgeon: Merly Wilcox MD;  Location: [facility] PAIN MGT;  Service: Pain Management;  Laterality: Bilateral;    INJECTION OF FACET JOINT Bilateral 6/20/2019    Procedure: INJECTION, FACET JOINT,  L3-L4,L4-L5,L5-S1 NEED CONSENT;  Surgeon: Merly Wilcox MD;  Location: [facility] PAIN MGT;  Service: Pain Management;  Laterality: Bilateral;    INJECTION OF FACET JOINT Bilateral 7/27/2020    Procedure: INJECTION, FACET JOINT BILATERAL L3/4, L4/5 and L5/S1;  Surgeon: Merly Wilcox MD;  Location: [facility] PAIN MGT;  Service: Pain Management;  Laterality: Bilateral;    INJECTION OF JOINT Bilateral 7/27/2020    Procedure: INJECTION, JOINT, BILATERAL GREATER TROCHANTERIC BURSA;  Surgeon: Merly Wilcox MD;  Location: [facility] PAIN MGT;  Service: Pain Management;  Laterality: Bilateral;    INJECTION OF JOINT Bilateral 4/26/2021    Procedure: INJECTION, JOINT, HIP;  Surgeon: Merly Wilcox MD;  Location: [facility] PAIN MGT;  Service: Pain Management;  Laterality: Bilateral;  consent needed    INJECTION OF STEROID Right 11/15/2021    Procedure: INJECTION, STEROID RIGHT MIDDLE AND SAMLL FINGER;  Surgeon: Florecita Da Silva MD;  Location: [facility] OR;  Service: Orthopedics;  Laterality: Right;    MASTECTOMY, PARTIAL Left 7/12/2018    Procedure: MASTECTOMY, PARTIAL-W/SEED LOCALIZATION;  Surgeon: Amanda Caballero MD;  [facility];  Service: General;  Laterality: Left;    [place] REMOVAL OF OVARY/TUBE(S)  02/23/2018    Robotic Assisted    ROTATOR CUFF REPAIR Right     rotator cuff repair right shoulder    TONSILLECTOMY      TRANSFORAMINAL EPIDURAL INJECTION OF STEROID Bilateral 6/21/2021    Procedure: INJECTION, STEROID, EPIDURAL, TRANSFORAMINAL APPROACH  bilateral L4/5 TF ASHUTOSH;  Surgeon: Merly Wilcox MD;  Location: [facility] PAIN MGT;  Service: Pain Management;  Laterality: Bilateral;  consent needed    TRIGGER FINGER RELEASE Left 11/15/2021    Procedure: RELEASE, TRIGGER FINGER, LEFT MIDDLE AND RING;  Surgeon: Florecita Da Silva MD;  Location: [facility] OR;  Service: Orthopedics;  Laterality: Left;       Family History:  Family History   Problem Relation Age of Onset    Heart failure Mother     Kidney failure Mother     Heart attack  Father     Breast cancer Sister 66        Lump, XRT, chemo, recurrence 10 years later.    Cancer Brother         leukemia    Heart attack Brother 58    Pulmonary embolism Brother 45    Heart attack Brother 52    Breast cancer Maternal Grandmother 60        advanced at diagnosis    Breast cancer Maternal Aunt 83         at 92    Colon cancer Neg Hx     Esophageal cancer Neg Hx        Social History:  Social History     Socioeconomic History    Marital status: Single    Number of children: 3   Occupational History    Occupation: Multiple jobs, see social documentation section     Comment: Retired   Tobacco Use    Smoking status: Former Smoker     Packs/day: 1.00     Years: 40.00     Pack years: 40.00     Types: Cigarettes    Smokeless tobacco: Never Used    Tobacco comment: Smoked >1 ppd for at least 40 years, quit around    Substance and Sexual Activity    Alcohol use: Yes     Comment: occasional glass of wine or cocktail    Drug use: No    Sexual activity: Yes     Partners: Male   Social History Narrative    Worked many jobs while raising 3 children.  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  She was  from her first , but took care of him at the end of his life.       REVIEW OF SYSTEMS:    GENERAL:  No weight loss, malaise or fevers.  HEENT:   No recent changes in vision or hearing  NECK:  Negative for lumps, no difficulty with swallowing.  RESPIRATORY:  Negative for cough, wheezing or shortness of breath, patient denies any recent URI. COPD.  CARDIOVASCULAR:  Negative for chest pain, leg swelling or palpitations. Hypertension.  GI:  Negative for abdominal discomfort, blood in stools or black stools or change in bowel habits.  MUSCULOSKELETAL:  See HPI.  SKIN:  Negative for lesions, rash, and itching.  PSYCH:  No mood disorder or recent psychosocial stressors.  Patients sleep  "is not disturbed secondary to pain.  HEMATOLOGY/LYMPHOLOGY:  Negative for prolonged bleeding, bruising easily or swollen nodes.  Patient is not currently taking any anti-coagulants  NEURO:   No history of headaches, syncope, paralysis, seizures or tremors.  All other reviewed and negative other than HPI.    OBJECTIVE:    BP (!) 173/77   Pulse (!) 53   Temp 97.7 °F (36.5 °C)   Resp 18   Ht 5' 2" (1.575 m)   Wt 64 kg (141 lb 1.5 oz)   LMP  (LMP Unknown)   BMI 25.81 kg/m²     PHYSICAL EXAMINATION:     GENERAL: Well appearing, in no acute distress, alert and oriented x3.  PSYCH:  Mood and affect appropriate.  SKIN: Skin color, texture, turgor normal, no rashes or lesions.  HEAD/FACE:  Normocephalic, atraumatic. Cranial nerves grossly intact.  CV: RRR with palpation of the radial artery.  PULM: No evidence of respiratory difficulty, symmetric chest rise.  BACK: Straight leg raising in the sitting position is negative to radicular pain bilaterally.  There is pain with palpation over lumbar facet joints bilaterally.  Limited ROM with pain on flexion and  extension.  Positive facet loading bilaterally.  EXTREMITIES:  Good capillary refill.  MUSCULOSKELETAL: There is mild pain with internal and external rotation of both hips. There is pain with palpation over bilateral SI joints. FABERs is positive bilaterally.  No atrophy or tone abnormalities are noted.  NEURO: Bilateral lower extremity coordination and muscle stretch reflexes are physiologic and symmetric.  Plantar response are downgoing. No clonus.  Normal sensation.  GAIT: Antalgic- ambulates with rolling walker.      ASSESSMENT: 87 y.o. year old female with lower back and lower extremity pain, consistent with the following diagnoses:     1. Lumbar radiculopathy  Procedure Order to Pain Management    gabapentin (NEURONTIN) 100 MG capsule   2. Lumbar spondylosis     3. DDD (degenerative disc disease), lumbar           PLAN:     - Previous imaging was reviewed and " discussed with the patient today.    - Schedule for bilateral L4/5 TF ASHUTOSH with benefit.     - We can repeat bilateral L3/4, L4/5, and L5/S1 facet joint injections if needed.     - Continue Gabapentin 100 mg at bedtime.     - Continue Tramadol per PCP.     - Continue with home PT exercises.    - RTC 2 weeks after above procedure.     - Dr. Wilcox was consulted on the patient and agrees with this plan.     The above plan and management options were discussed at length with patient. Patient is in agreement with the above and verbalized understanding.    Amanda Garcia  12/06/2021

## 2021-12-07 ENCOUNTER — TELEPHONE (OUTPATIENT)
Dept: ADMINISTRATIVE | Facility: CLINIC | Age: 86
End: 2021-12-07
Payer: MEDICARE

## 2021-12-08 ENCOUNTER — TELEPHONE (OUTPATIENT)
Dept: PAIN MEDICINE | Facility: CLINIC | Age: 86
End: 2021-12-08
Payer: MEDICARE

## 2021-12-13 RX ORDER — TRIAMCINOLONE ACETONIDE 40 MG/ML
40 INJECTION, SUSPENSION INTRA-ARTICULAR; INTRAMUSCULAR
Status: DISCONTINUED | OUTPATIENT
Start: 2021-12-06 | End: 2021-12-13 | Stop reason: HOSPADM

## 2021-12-30 ENCOUNTER — TELEPHONE (OUTPATIENT)
Dept: HEMATOLOGY/ONCOLOGY | Facility: CLINIC | Age: 86
End: 2021-12-30
Payer: MEDICARE

## 2022-01-02 ENCOUNTER — PATIENT MESSAGE (OUTPATIENT)
Dept: PAIN MEDICINE | Facility: OTHER | Age: 87
End: 2022-01-02
Payer: MEDICARE

## 2022-01-03 ENCOUNTER — HOSPITAL ENCOUNTER (OUTPATIENT)
Facility: OTHER | Age: 87
Discharge: HOME | End: 2022-01-03
Attending: ANESTHESIOLOGY | Admitting: ANESTHESIOLOGY
Payer: MEDICARE

## 2022-01-03 VITALS
WEIGHT: 143 LBS | SYSTOLIC BLOOD PRESSURE: 195 MMHG | OXYGEN SATURATION: 94 % | RESPIRATION RATE: 12 BRPM | BODY MASS INDEX: 26.31 KG/M2 | HEIGHT: 62 IN | TEMPERATURE: 98 F | DIASTOLIC BLOOD PRESSURE: 83 MMHG | HEART RATE: 48 BPM

## 2022-01-03 DIAGNOSIS — M54.17 LUMBOSACRAL RADICULOPATHY: ICD-10-CM

## 2022-01-03 DIAGNOSIS — M51.36 DDD (DEGENERATIVE DISC DISEASE), LUMBAR: Primary | ICD-10-CM

## 2022-01-03 PROCEDURE — 99152 MOD SED SAME PHYS/QHP 5/>YRS: CPT | Mod: ,,, | Performed by: ANESTHESIOLOGY

## 2022-01-03 PROCEDURE — 64483 NJX AA&/STRD TFRM EPI L/S 1: CPT | Mod: 50 | Performed by: ANESTHESIOLOGY

## 2022-01-03 PROCEDURE — 64483 NJX AA&/STRD TFRM EPI L/S 1: CPT | Mod: 50,,, | Performed by: ANESTHESIOLOGY

## 2022-01-03 PROCEDURE — 63600175 PHARM REV CODE 636 W HCPCS: Performed by: ANESTHESIOLOGY

## 2022-01-03 PROCEDURE — 25000003 PHARM REV CODE 250: Performed by: STUDENT IN AN ORGANIZED HEALTH CARE EDUCATION/TRAINING PROGRAM

## 2022-01-03 PROCEDURE — 64483 PR EPIDURAL INJ, ANES/STEROID, TRANSFORAMINAL, LUMB/SACR, SNGL LEVL: ICD-10-PCS | Mod: 50,,, | Performed by: ANESTHESIOLOGY

## 2022-01-03 PROCEDURE — 25500020 PHARM REV CODE 255: Performed by: ANESTHESIOLOGY

## 2022-01-03 PROCEDURE — 99152 PR MOD CONSCIOUS SEDATION, SAME PHYS, 5+ YRS, FIRST 15 MIN: ICD-10-PCS | Mod: ,,, | Performed by: ANESTHESIOLOGY

## 2022-01-03 PROCEDURE — 99152 MOD SED SAME PHYS/QHP 5/>YRS: CPT | Performed by: ANESTHESIOLOGY

## 2022-01-03 PROCEDURE — 25000003 PHARM REV CODE 250: Performed by: ANESTHESIOLOGY

## 2022-01-03 RX ORDER — FENTANYL CITRATE 50 UG/ML
INJECTION, SOLUTION INTRAMUSCULAR; INTRAVENOUS
Status: DISCONTINUED | OUTPATIENT
Start: 2022-01-03 | End: 2022-01-03 | Stop reason: HOSPADM

## 2022-01-03 RX ORDER — LIDOCAINE HYDROCHLORIDE 10 MG/ML
INJECTION, SOLUTION EPIDURAL; INFILTRATION; INTRACAUDAL; PERINEURAL
Status: DISCONTINUED | OUTPATIENT
Start: 2022-01-03 | End: 2022-01-03 | Stop reason: HOSPADM

## 2022-01-03 RX ORDER — MIDAZOLAM HYDROCHLORIDE 1 MG/ML
INJECTION INTRAMUSCULAR; INTRAVENOUS
Status: DISCONTINUED | OUTPATIENT
Start: 2022-01-03 | End: 2022-01-03 | Stop reason: HOSPADM

## 2022-01-03 RX ORDER — LIDOCAINE HYDROCHLORIDE 20 MG/ML
INJECTION, SOLUTION INFILTRATION; PERINEURAL
Status: DISCONTINUED | OUTPATIENT
Start: 2022-01-03 | End: 2022-01-03 | Stop reason: HOSPADM

## 2022-01-03 RX ORDER — SODIUM CHLORIDE 9 MG/ML
INJECTION, SOLUTION INTRAVENOUS CONTINUOUS
Status: DISCONTINUED | OUTPATIENT
Start: 2022-01-03 | End: 2022-01-03 | Stop reason: HOSPADM

## 2022-01-03 RX ORDER — DEXAMETHASONE SODIUM PHOSPHATE 10 MG/ML
INJECTION INTRAMUSCULAR; INTRAVENOUS
Status: DISCONTINUED | OUTPATIENT
Start: 2022-01-03 | End: 2022-01-03 | Stop reason: HOSPADM

## 2022-01-03 NOTE — OP NOTE
Lumbar Transforaminal Epidural Steroid Injection under Fluoroscopic Guidance    The procedure, risks, benefits, and options were discussed with the patient. There are no contraindications to the procedure. The patent expressed understanding and agreed to the procedure. Informed written consent was obtained prior to the start of the procedure and can be found in the patient's chart.    PATIENT NAME: Anahi Cook   MRN: [id number]     DATE OF PROCEDURE: 01/03/2022    PROCEDURE:  Bilateral  L4/5 Lumbar Transforaminal Epidural Steroid Injection under Fluoroscopic Guidance    PRE-OP DIAGNOSIS: Lumbar radiculopathy [M54.16]    POST-OP DIAGNOSIS: Same    PHYSICIAN: Merly Wilcox MD    ASSISTANTS: Logan Cassidy MD  [employer] PM&R Resident       MEDICATIONS INJECTED: Preservative-free Decadron 10mg with 5cc of Lidocaine 1% MPF     LOCAL ANESTHETIC INJECTED: Xylocaine 2%     SEDATION:   Versed 1mg and Fentanyl 25mcg                                                                                                                                                                                     Conscious sedation ordered by M.D. Patient re-evaluation prior to administration of conscious sedation. No changes noted in patient's status from initial evaluation. The patient's vital signs were monitored by RN and patient remained hemodynamically stable throughout the procedure.    Event Time In   In Facility [id number]   In Pre-Procedure 0936   Physician Available    Anesthesia Available    Pre-Procedure Complete 0950   Out of Pre-Procedure    Anesthesia Start    Anesthesia Start Data Collection    In Room 0959   Prep Start    Procedure Prep Complete    Procedure Start 1010   Procedure Closing    Emergence    Procedure Finish 1020   Out of Room 1021   Cleanup Start    Cleanup Complete    In Recovery    Anesthesia Finish    Recovery Care Complete    Out of Recovery    In Phase II    Out of Phase II    Phase II Care Complete    Procedural Care Complete     Sedation Start 1010   Sedation End 1020       ESTIMATED BLOOD LOSS: None    COMPLICATIONS: None    TECHNIQUE: Time-out was performed to identify the patient and procedure to be performed. With the patient laying in a prone position, the surgical area was prepped and draped in the usual sterile fashion using ChloraPrep and a fenestrated drape.The levels were determined under fluoroscopy guidance. Skin anesthesia was achieved by injecting Lidocaine 2% over the injection sites. The transforaminal spaces were then approached with a 22 gauge, 3.5 inch spinal quinke needle that was introduced under fluoroscopic guidance in the AP and Lateral views. Once the needle tip was in the area of the transforaminal space, and there was no blood, CSF or paraesthesias, contrast dye Omnipaque (300mg/ml) was injected to confirm placement and there was no vascular runoff. Fluoroscopic imaging in the AP and lateral views revealed a clear outline of the spinal nerve with proximal spread of agent through the neural foramen into the epidural space. 3 mL of the medication mixture listed above was injected slowly at each site. Displacement of the radio opaque contrast after injection of the medication confirmed that the medication went into the area of the transforaminal spaces. The needles were removed and bleeding was nil. A sterile dressing was applied. No specimens collected. The patient tolerated the procedure well.       The patient was monitored after the procedure in the recovery area. They were given post-procedure and discharge instructions to follow at home. The patient was discharged in a stable condition.  PAIN BEFORE THE PROCEDURE: 9-10/10    PAIN AFTER THE PROCEDURE: 0/10   Merly Wilcox MD

## 2022-01-03 NOTE — BRIEF OP NOTE
Discharge Note  Short Stay      SUMMARY     Admit Date: 1/3/2022    Attending Physician: Merly Wilcox      Discharge Physician: Merly Wilcox      Discharge Date: 1/3/2022 10:22 AM    Procedure(s) (LRB):  INJECTION, STEROID, EPIDURAL, TRANSFORAMINAL APPROACH Bilateral L4/5 Needs consent (Bilateral)    Final Diagnosis: Lumbar radiculopathy [M54.16]    Disposition: Home or self care    Patient Instructions:   Current Discharge Medication List      CONTINUE these medications which have NOT CHANGED    Details   acetaminophen (TYLENOL) 325 MG tablet Take 650 mg by mouth every 6 (six) hours as needed for Pain.       albuterol (PROVENTIL) 2.5 mg /3 mL (0.083 %) nebulizer solution Inhale 3 mLs into the lungs as needed.       albuterol (PROVENTIL/VENTOLIN HFA) 90 mcg/actuation inhaler Inhale 1-2 puffs into the lungs every 6 (six) hours as needed for Wheezing or Shortness of Breath.      albuterol-ipratropium 2.5mg-0.5mg/3mL (DUO-NEB) 0.5 mg-3 mg(2.5 mg base)/3 mL nebulizer solution Take 3 mLs by nebulization as needed.       atorvastatin (LIPITOR) 20 MG tablet TAKE 1 TABLET(20 MG) BY MOUTH EVERY NIGHT  Qty: 90 tablet, Refills: 1    Comments: ZERO refills remain on this prescription. Your patient is requesting advance approval of refills for this medication to PREVENT ANY MISSED DOSES  Associated Diagnoses: Hyperlipidemia, unspecified hyperlipidemia type; Aortic atherosclerosis      benazepriL (LOTENSIN) 40 MG tablet Take 1 tablet (40 mg total) by mouth once daily.  Qty: 90 tablet, Refills: 1    Comments: Dose increased, cancel 20mg now.  Associated Diagnoses: Hypertensive heart disease without heart failure      calcium-vitamin D 250-100 mg-unit per tablet Take 1 tablet by mouth 2 (two) times daily.      carbamide peroxide (DEBROX) 6.5 % otic solution Place 5 drops into the left ear as needed (ear wax). Do not use q-tips, please only clean with a wash cloth after use  Qty: 15 mL, Refills: 0      CETIRIZINE HCL (ZYRTEC ORAL)  Take 10 mg by mouth nightly as needed.       citalopram (CELEXA) 20 MG tablet TAKE 1 TABLET(20 MG) BY MOUTH EVERY EVENING  Qty: 90 tablet, Refills: 1    Associated Diagnoses: Depression with anxiety      felodipine (PLENDIL) 10 MG 24 hr tablet Take 1 tablet (10 mg total) by mouth once daily.  Qty: 180 tablet, Refills: 2    Associated Diagnoses: Hypertensive heart disease without heart failure      fluticasone-vilanterol (BREO) 100-25 mcg/dose diskus inhaler Inhale 1 puff into the lungs once daily.  Qty: 1 each, Refills: 3      gabapentin (NEURONTIN) 100 MG capsule Take 1 capsule (100 mg total) by mouth every evening.  Qty: 30 capsule, Refills: 2    Associated Diagnoses: Lumbar radiculopathy      INCRUSE ELLIPTA 62.5 mcg/actuation DsDv USE 1 PUFF QD AT SAME TIME  (patient takes at night)  Refills: 2      !! ketoprofen 10 % CrPk APPLY UP TO 4.8 GRAMS (3 PUMPS) TO PAINFUL AREAS UP TO FIVE TIMES DAILY. RUB IN WELL  Refills: 99      !! ketoprofen 10 % CrPk APPLY 2 GRAMS 3-4 TIMES DAILY FOR TREATMENT OF PAIN      letrozole (FEMARA) 2.5 mg Tab TAKE 1 TABLET(2.5 MG) BY MOUTH EVERY DAY  Qty: 90 tablet, Refills: 2    Comments: ZERO refills remain on this prescription. Your patient is requesting advance approval of refills for this medication to PREVENT ANY MISSED DOSES  Associated Diagnoses: Malignant neoplasm of nipple of left breast in female, estrogen receptor positive      meclizine (ANTIVERT) 25 mg tablet Take 1 tablet (25 mg total) by mouth 3 (three) times daily as needed for Dizziness.  Qty: 20 tablet, Refills: 0      melatonin 10 mg Tab Take by mouth every evening.      montelukast (SINGULAIR) 10 mg tablet Take 10 mg by mouth every evening.      multivitamin (THERAGRAN) per tablet Take 1 tablet by mouth once daily.      torsemide (DEMADEX) 20 MG Tab Take 1 tablet (20 mg total) by mouth once daily.  Qty: 90 tablet, Refills: 2    Associated Diagnoses: Hypertensive heart disease without heart failure      traMADoL  (ULTRAM) 50 mg tablet Take 1 tablet (50 mg total) by mouth every 8 (eight) hours as needed for Pain.  Qty: 80 tablet, Refills: 2    Comments: Quantity prescribed more than 7 day supply? Yes, quantity medically necessary. Quantity increased, cancel refills for #60.  Associated Diagnoses: Primary osteoarthritis of right knee; Lumbar spondylosis      triamcinolone acetonide 0.1% (KENALOG) 0.1 % cream AAA bid.  Spot treatment as needed  Qty: 45 g, Refills: 0    Associated Diagnoses: Eczema, unspecified type      triamcinolone acetonide 0.1% (KENALOG) 0.1 % paste APPLY SPARINGLY FOUR TIMES DAILY       !! - Potential duplicate medications found. Please discuss with provider.              Discharge Diagnosis: Lumbar radiculopathy [M54.16]  Condition on Discharge: Stable with no complications to procedure   Diet on Discharge: Same as before.  Activity: as per instruction sheet.  Discharge to: Home with a responsible adult.  Follow up: 2-4 weeks       Please call my office or pager at [phone] if experienced any weakness or loss of sensation, fever > 101.5, pain uncontrolled with oral medications, persistent nausea/vomiting/or diarrhea, redness or drainage from the incisions, or any other worrisome concerns. If physician on call was not reached or could not communicate with our office for any reason please go to the nearest emergency department

## 2022-01-05 ENCOUNTER — OFFICE VISIT (OUTPATIENT)
Dept: HEMATOLOGY/ONCOLOGY | Facility: CLINIC | Age: 87
End: 2022-01-05
Payer: MEDICARE

## 2022-01-05 VITALS
TEMPERATURE: 97 F | OXYGEN SATURATION: 96 % | DIASTOLIC BLOOD PRESSURE: 74 MMHG | WEIGHT: 143.06 LBS | SYSTOLIC BLOOD PRESSURE: 175 MMHG | HEIGHT: 63 IN | HEART RATE: 61 BPM | BODY MASS INDEX: 25.35 KG/M2

## 2022-01-05 DIAGNOSIS — C50.412 MALIGNANT NEOPLASM OF UPPER-OUTER QUADRANT OF LEFT BREAST IN FEMALE, ESTROGEN RECEPTOR POSITIVE: Primary | Chronic | ICD-10-CM

## 2022-01-05 DIAGNOSIS — C50.012 MALIGNANT NEOPLASM OF NIPPLE OF LEFT BREAST IN FEMALE, ESTROGEN RECEPTOR POSITIVE: ICD-10-CM

## 2022-01-05 DIAGNOSIS — Z17.0 MALIGNANT NEOPLASM OF UPPER-OUTER QUADRANT OF LEFT BREAST IN FEMALE, ESTROGEN RECEPTOR POSITIVE: Primary | Chronic | ICD-10-CM

## 2022-01-05 DIAGNOSIS — Z17.0 MALIGNANT NEOPLASM OF NIPPLE OF LEFT BREAST IN FEMALE, ESTROGEN RECEPTOR POSITIVE: ICD-10-CM

## 2022-01-05 DIAGNOSIS — Z79.811 USE OF AROMATASE INHIBITORS: Chronic | ICD-10-CM

## 2022-01-05 PROCEDURE — 3288F FALL RISK ASSESSMENT DOCD: CPT | Mod: CPTII,S$GLB,, | Performed by: INTERNAL MEDICINE

## 2022-01-05 PROCEDURE — 1101F PR PT FALLS ASSESS DOC 0-1 FALLS W/OUT INJ PAST YR: ICD-10-PCS | Mod: CPTII,S$GLB,, | Performed by: INTERNAL MEDICINE

## 2022-01-05 PROCEDURE — 99999 PR PBB SHADOW E&M-EST. PATIENT-LVL V: ICD-10-PCS | Mod: PBBFAC,,, | Performed by: INTERNAL MEDICINE

## 2022-01-05 PROCEDURE — 99499 RISK ADDL DX/OHS AUDIT: ICD-10-PCS | Mod: S$GLB,,, | Performed by: INTERNAL MEDICINE

## 2022-01-05 PROCEDURE — 1125F PR PAIN SEVERITY QUANTIFIED, PAIN PRESENT: ICD-10-PCS | Mod: CPTII,S$GLB,, | Performed by: INTERNAL MEDICINE

## 2022-01-05 PROCEDURE — 1125F AMNT PAIN NOTED PAIN PRSNT: CPT | Mod: CPTII,S$GLB,, | Performed by: INTERNAL MEDICINE

## 2022-01-05 PROCEDURE — 99999 PR PBB SHADOW E&M-EST. PATIENT-LVL V: CPT | Mod: PBBFAC,,, | Performed by: INTERNAL MEDICINE

## 2022-01-05 PROCEDURE — 3288F PR FALLS RISK ASSESSMENT DOCUMENTED: ICD-10-PCS | Mod: CPTII,S$GLB,, | Performed by: INTERNAL MEDICINE

## 2022-01-05 PROCEDURE — 99499 UNLISTED E&M SERVICE: CPT | Mod: S$GLB,,, | Performed by: INTERNAL MEDICINE

## 2022-01-05 PROCEDURE — 1101F PT FALLS ASSESS-DOCD LE1/YR: CPT | Mod: CPTII,S$GLB,, | Performed by: INTERNAL MEDICINE

## 2022-01-05 PROCEDURE — 99213 PR OFFICE/OUTPT VISIT, EST, LEVL III, 20-29 MIN: ICD-10-PCS | Mod: S$GLB,,, | Performed by: INTERNAL MEDICINE

## 2022-01-05 PROCEDURE — 99213 OFFICE O/P EST LOW 20 MIN: CPT | Mod: S$GLB,,, | Performed by: INTERNAL MEDICINE

## 2022-01-05 PROCEDURE — 1159F MED LIST DOCD IN RCRD: CPT | Mod: CPTII,S$GLB,, | Performed by: INTERNAL MEDICINE

## 2022-01-05 PROCEDURE — 1159F PR MEDICATION LIST DOCUMENTED IN MEDICAL RECORD: ICD-10-PCS | Mod: CPTII,S$GLB,, | Performed by: INTERNAL MEDICINE

## 2022-01-05 RX ORDER — LETROZOLE 2.5 MG/1
2.5 TABLET, FILM COATED ORAL DAILY
Qty: 90 TABLET | Refills: 3 | Status: SHIPPED | OUTPATIENT
Start: 2022-01-05 | End: 2022-12-21 | Stop reason: SDUPTHER

## 2022-01-05 NOTE — PROGRESS NOTES
Subjective:       Patient ID: Anahi Cook is a 87 y.o. female.    Chief Complaint: No chief complaint on file.    HPI   Mrs. Cook returns today for follow up.   She is on letrozole, now on the adjuvant setting.    Briefly, she is an 87-year-old female who in late 2017 had initially felt a mass in her left breast.  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  She was started on letrozole and undwerwent a BSO by Dr. Chairez for an ovarian mass.  Her ovarian mass was benign. In mid July 2018 she underwent a lumpectomy and AND.  She had a 3.3 cm carcinoma and 4/8 positive nodes.  She has remained on letrozole, now in the adjuvant setting, and she returns today for follow up.   Of note, she also received chest wall XRT.      A mammogram in March 2021 was read as BIRADS I, and a one year follow up was recommended.      Review of Systems    Overall she feels OK  She has tolerated the AIs well so far.  She again complains of occasional hot flashes. Her ECOG performance status remains at 1.  She denies any anxiety, depression, easy bruising, fevers, chills, night  sweats, weight loss, nausea, vomiting, diarrhea, constipation, diplopia, blurred vision, headache, chest pain, palpitations, shortness of breath, breast pain, abdominal pain, extremity pain, or difficulty ambulating.  The remainder of the ten-point ROS, including general, skin, lymph, H/N, cardiorespiratory, GI, , Neuro, Endocrine, and psychiatric is negative.       Objective:      Physical Exam      She is alert, oriented to time, place, person, pleasant, well      nourished, in no acute physical distress.   She is accompanied by her caregiver.                                 VITAL SIGNS:  Reviewed                                      HEENT:  Normal.  There are no nasal, oral, lip, gingival, auricular, lid,    or conjunctival  lesions.  Mucosae are moist and pink, and there is no        thrush.  Pupils are equal, reactive to light and accommodation.              Extraocular muscle movements are intact.   Dentition is fair.  Maxillary teeth are missing.                                      NECK:  Supple without JVD, adenopathy, or thyromegaly.                       LUNGS:  Clear to auscultation without wheezing, rales, or rhonchi.           CARDIOVASCULAR:  Reveals an S1, S2, no murmurs, no rubs, no gallops.         ABDOMEN:  Soft, nontender, without organomegaly.  Bowel sounds are    present.    Scars from her laparoscopic DONOVAN-BSO are seen.                                                            EXTREMITIES:  No cyanosis, clubbing, or edema.                             BREASTS: She is s/p left lumpectomy with a healed periareolar incision.  She also has an incision from her axillary dissection;  It is well healed.   There are no masses in her right breast.    Both breasts are large and pendulous.    There is mild hyperpigmentation within the radiation field.                                 LYMPHATIC:  There is no cervical, axillary, or supraclavicular adenopathy.   SKIN:  Warm and moist, without petechiae, rashes, induration, or ecchymoses.           NEUROLOGIC:  DTRs are 0-1+ bilaterally, symmetrical, motor function is 5/5,  and cranial nerves are  within normal limits.      Assessment:       1. Hormone receptor positive breast cancer, left, on neoadjuvant letrozole with a significant clinical response.   2. Use of aromatase inhibitors        Plan:        I had a long discussion with her and her caregiver.  She will remain on letrozole, and see me in early April with her yearly mammogram.  I recommended that she be treated for 7 years.  She had started adjuvant letrozole in the summer of 2018.    Her multiple questions were answered to her satisfaction.

## 2022-02-01 ENCOUNTER — TELEPHONE (OUTPATIENT)
Dept: PRIMARY CARE CLINIC | Facility: CLINIC | Age: 87
End: 2022-02-01
Payer: MEDICARE

## 2022-02-01 NOTE — TELEPHONE ENCOUNTER
Pt's daughter Patience called stating pt is still taking Felodipine 10 mg BID. I advised Patience the felodipine 10 mg BID  decreased to one a day  Along with the  Benazepril  Increasing from 20 mg to 40 mg daily  pt reported her blood pressure has been staying in the 150's /70's on Jan 30th her bp was 151/73 at the clinic visit with Hem-Onc on 1/5/22 her bp was 175/74. She is scheduled for a nurse visit bp check on Thursday her follow up in May

## 2022-02-01 NOTE — TELEPHONE ENCOUNTER
----- Message from Rosa Luther sent at 2/1/2022 11:18 AM CST -----  Contact: [phone] Patience Brandt  Patient's daughter Patience would like to receive a call back in regards to a current medication the patient is on. ( felodipine (PLENDIL) 10 MG 24 hr tablet) Please call and advise.

## 2022-02-01 NOTE — TELEPHONE ENCOUNTER
Felodipine is ordered at a quantity to allow two a day, so she may continue that, and Benazepril 40mg daily until nurse visit for BP and PULSE - further recommendations to follow.

## 2022-02-03 ENCOUNTER — CLINICAL SUPPORT (OUTPATIENT)
Dept: PRIMARY CARE CLINIC | Facility: CLINIC | Age: 87
End: 2022-02-03
Payer: MEDICARE

## 2022-02-03 VITALS — HEART RATE: 58 BPM | DIASTOLIC BLOOD PRESSURE: 80 MMHG | OXYGEN SATURATION: 97 % | SYSTOLIC BLOOD PRESSURE: 150 MMHG

## 2022-02-03 DIAGNOSIS — I11.9 HYPERTENSIVE HEART DISEASE WITHOUT HEART FAILURE: Primary | ICD-10-CM

## 2022-02-03 PROCEDURE — 99999 PR PBB SHADOW E&M-EST. PATIENT-LVL II: CPT | Mod: PBBFAC,,,

## 2022-02-03 PROCEDURE — 99999 PR PBB SHADOW E&M-EST. PATIENT-LVL II: ICD-10-PCS | Mod: PBBFAC,,,

## 2022-02-03 NOTE — PROGRESS NOTES
Felodipine is ordered at a quantity to allow two a day, so she may continue that, and Benazepril 40mg daily until nurse visit for BP and PULSE - further recommendations to follow.  Pt came in for bp check , bp readings are recorded pt state she is taking her Benazepril 40 mg in the morning and Felodipine 10 mg 2 tabs at bedtime

## 2022-02-06 ENCOUNTER — TELEPHONE (OUTPATIENT)
Dept: PRIMARY CARE CLINIC | Facility: CLINIC | Age: 87
End: 2022-02-06
Payer: MEDICARE

## 2022-02-06 DIAGNOSIS — I11.9 HYPERTENSIVE HEART DISEASE WITHOUT HEART FAILURE: Primary | ICD-10-CM

## 2022-02-06 RX ORDER — HYDRALAZINE HYDROCHLORIDE 25 MG/1
25 TABLET, FILM COATED ORAL EVERY 12 HOURS
Qty: 60 TABLET | Refills: 2 | Status: SHIPPED | OUTPATIENT
Start: 2022-02-06 | End: 2022-05-04

## 2022-02-06 NOTE — TELEPHONE ENCOUNTER
For blood pressure staying above 150 systolic for the past several weeks, ADD Hydralazine 25mg one tab BID - ordered at [facility].

## 2022-02-10 ENCOUNTER — PES CALL (OUTPATIENT)
Dept: ADMINISTRATIVE | Facility: CLINIC | Age: 87
End: 2022-02-10
Payer: MEDICARE

## 2022-03-04 DIAGNOSIS — I11.9 HYPERTENSIVE HEART DISEASE WITHOUT HEART FAILURE: ICD-10-CM

## 2022-03-04 RX ORDER — BENAZEPRIL HYDROCHLORIDE 40 MG/1
TABLET ORAL
Qty: 90 TABLET | Refills: 1 | Status: SHIPPED | OUTPATIENT
Start: 2022-03-04 | End: 2022-08-11 | Stop reason: SDUPTHER

## 2022-03-04 NOTE — TELEPHONE ENCOUNTER
No new care gaps identified.  Powered by [employer]. Reference number: [id number].   3/04/2022 8:03:10 AM CST

## 2022-03-17 ENCOUNTER — TELEPHONE (OUTPATIENT)
Dept: PRIMARY CARE CLINIC | Facility: CLINIC | Age: 87
End: 2022-03-17
Payer: MEDICARE

## 2022-03-17 NOTE — TELEPHONE ENCOUNTER
Yes, she does qualify for treatment if COVID positive. Prefer for her to go to an [facility] urgent care for testing so results are on record. Also, they can order the treatment referral.

## 2022-03-17 NOTE — TELEPHONE ENCOUNTER
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 She is asking if they should get the pt tested.     Please call and advise

## 2022-03-17 NOTE — TELEPHONE ENCOUNTER
Pt's daughter called stating her mother was around her son in law who tested positive last week and Patience state she tested negative on th home testing COVID then she went to urgent care and she tested positive today. Patience state her mom had some sniffles no other symptoms Patience is asking should she bring her mom to get tested and if she is positive do she qualify for the  COVID IV infusion

## 2022-03-18 ENCOUNTER — OFFICE VISIT (OUTPATIENT)
Dept: URGENT CARE | Facility: CLINIC | Age: 87
End: 2022-03-18
Payer: MEDICARE

## 2022-03-18 VITALS
OXYGEN SATURATION: 95 % | HEIGHT: 62 IN | DIASTOLIC BLOOD PRESSURE: 68 MMHG | WEIGHT: 143 LBS | TEMPERATURE: 98 F | BODY MASS INDEX: 26.31 KG/M2 | HEART RATE: 64 BPM | RESPIRATION RATE: 18 BRPM | SYSTOLIC BLOOD PRESSURE: 152 MMHG

## 2022-03-18 DIAGNOSIS — Z11.52 ENCOUNTER FOR SCREENING FOR COVID-19: Primary | ICD-10-CM

## 2022-03-18 DIAGNOSIS — Z20.822 CLOSE EXPOSURE TO COVID-19 VIRUS: ICD-10-CM

## 2022-03-18 LAB
CTP QC/QA: YES
SARS-COV-2 RDRP RESP QL NAA+PROBE: NEGATIVE

## 2022-03-18 PROCEDURE — 1160F PR REVIEW ALL MEDS BY PRESCRIBER/CLIN PHARMACIST DOCUMENTED: ICD-10-PCS | Mod: CPTII,S$GLB,, | Performed by: FAMILY MEDICINE

## 2022-03-18 PROCEDURE — U0002: ICD-10-PCS | Mod: QW,S$GLB,, | Performed by: FAMILY MEDICINE

## 2022-03-18 PROCEDURE — U0002 COVID-19 LAB TEST NON-CDC: HCPCS | Mod: QW,S$GLB,, | Performed by: FAMILY MEDICINE

## 2022-03-18 PROCEDURE — 99499 UNLISTED E&M SERVICE: CPT | Mod: S$GLB,,, | Performed by: FAMILY MEDICINE

## 2022-03-18 PROCEDURE — 1159F PR MEDICATION LIST DOCUMENTED IN MEDICAL RECORD: ICD-10-PCS | Mod: CPTII,S$GLB,, | Performed by: FAMILY MEDICINE

## 2022-03-18 PROCEDURE — 99213 PR OFFICE/OUTPT VISIT, EST, LEVL III, 20-29 MIN: ICD-10-PCS | Mod: S$GLB,,, | Performed by: FAMILY MEDICINE

## 2022-03-18 PROCEDURE — 99499 RISK ADDL DX/OHS AUDIT: ICD-10-PCS | Mod: S$GLB,,, | Performed by: FAMILY MEDICINE

## 2022-03-18 PROCEDURE — 99213 OFFICE O/P EST LOW 20 MIN: CPT | Mod: S$GLB,,, | Performed by: FAMILY MEDICINE

## 2022-03-18 PROCEDURE — 1159F MED LIST DOCD IN RCRD: CPT | Mod: CPTII,S$GLB,, | Performed by: FAMILY MEDICINE

## 2022-03-18 PROCEDURE — 1160F RVW MEDS BY RX/DR IN RCRD: CPT | Mod: CPTII,S$GLB,, | Performed by: FAMILY MEDICINE

## 2022-03-18 NOTE — PATIENT INSTRUCTIONS
PLEASE READ YOUR DISCHARGE INSTRUCTIONS ENTIRELY AS IT CONTAINS IMPORTANT INFORMATION.    CDC Testing and Quarantine Guidelines for Exposure:        A 'close exposure' is defined as anyone who has had an exposure (masked or unmasked) to a known COVID -19 positive person within 6 feet of someone for a cumulative total of 15 minutes or more over a 24-hour period.    ·     vaccinated Have been boosted or completed the primary series of Pfizer or Moderna vaccine within the last 6 months or completed the primary series of J&J vaccine within the last 2 months and/or had a positive test within 90 days  -do NOT need to quarantine after contact with someone who had COVID-19 unless they have symptoms.  -fully vaccinated people who have not had a positive test within 90 days, should get tested 3-5 days after their exposure, even if they don't have symptoms and wear a mask indoors in public for 10 days following exposure or until their test result is negative on day 5.  -If you develop symptoms test and quarantine.         ·     Unvaccinated, or are more than six months out from their second mRNA dose (or more than 2 months after the J&J vaccine) and not yet boosted,  and/or NOT had a positive test within 90 days and meet 'close exposure' you are required by CDC guidelines to quarantine for at least 5 days from time of exposure followed by 5 days of strict masking. It is recommended, but not required to test after 5 days, unless you develop symptoms, in which case you should test at that time.  -If you do decide to test at 5 days and are asymptomatic, the risk is that if you test without symptoms on Day 5 for example) and you are positive, your 5 day isolation begins on that day, and you turned your 5 day quarantine into 10 days.            If your exposure does not meet the above definition, you can return to your normal daily activities to include social distancing, wearing a mask and frequent handwashing.    Alternatively,  if a 5-day quarantine is not feasible, it is imperative that an exposed person wear a well-fitting mask at all times when around others for 10 days after exposure.      Complete doxycyline    Please return or see your primary care doctor if you develop new or worsening symptoms.     You must understand that you have received an Urgent Care treatment only and that you may be released before all of your medical problems are known or treated.

## 2022-03-18 NOTE — PROGRESS NOTES
"Subjective:       Patient ID: Anahi Cook is a 87 y.o. female.    Vitals:  height is 5' 2" (1.575 m) and weight is 64.9 kg (143 lb). Her temperature is 97.7 °F (36.5 °C). Her blood pressure is 152/68 (abnormal) and her pulse is 64. Her respiration is 18 and oxygen saturation is 95%.     Chief Complaint: URI    Pt states she is on day 8 of 10 of doxycyline for copd exacerbation (increased sputum cough sob) from her pulmonologist,  however her daughter and son in law have covid and have been taking care of her. No fever, worsening sx, cp. Vaccinated.     URI   This is a new problem. The current episode started in the past 7 days. The problem has been waxing and waning. Associated symptoms include coughing (improved). Pertinent negatives include no congestion.       HENT: Negative for congestion.    Respiratory: Positive for cough (improved), sputum production (improved), COPD and shortness of breath (improved).        Objective:      Physical Exam   Constitutional: She is oriented to person, place, and time. She appears well-developed. She is cooperative.  Non-toxic appearance. She does not appear ill. No distress.   HENT:   Head: Normocephalic and atraumatic.   Ears:   Right Ear: Hearing, tympanic membrane, external ear and ear canal normal. Tympanic membrane is not erythematous. No middle ear effusion.   Left Ear: Hearing, tympanic membrane, external ear and ear canal normal. Tympanic membrane is not erythematous.  No middle ear effusion.   Nose: Nose normal. No mucosal edema, rhinorrhea or nasal deformity. No epistaxis. Right sinus exhibits no maxillary sinus tenderness and no frontal sinus tenderness. Left sinus exhibits no maxillary sinus tenderness and no frontal sinus tenderness.   Mouth/Throat: Uvula is midline, oropharynx is clear and moist and mucous membranes are normal. No trismus in the jaw. Normal dentition. No uvula swelling. No oropharyngeal exudate, posterior oropharyngeal edema, posterior " oropharyngeal erythema, tonsillar abscesses or cobblestoning.   Eyes: Conjunctivae and lids are normal. No scleral icterus.   Neck: Trachea normal and phonation normal. Neck supple. No edema present. No erythema present. No neck rigidity present.   Cardiovascular: Normal rate, regular rhythm, normal heart sounds and normal pulses.   Pulmonary/Chest: Effort normal and breath sounds normal. No accessory muscle usage. No tachypnea. No respiratory distress. She has no decreased breath sounds. She has no wheezes. She has no rhonchi. She has no rales.   NAD able to speak in clear complete sentences without difficulty      Comments: NAD able to speak in clear complete sentences without difficulty      Abdominal: Normal appearance.   Musculoskeletal: Normal range of motion.         General: No deformity. Normal range of motion.   Neurological: She is alert and oriented to person, place, and time. She exhibits normal muscle tone. Coordination normal.   Skin: Skin is warm, dry, intact, not diaphoretic and not pale.   Psychiatric: Her speech is normal and behavior is normal. Judgment and thought content normal.   Nursing note and vitals reviewed.        Results for orders placed or performed in visit on 03/18/22   POCT COVID-19 Rapid Screening   Result Value Ref Range    POC Rapid COVID Negative Negative     Acceptable Yes        Assessment:       1. Encounter for screening for COVID-19    2. Close exposure to COVID-19 virus          Plan:         Encounter for screening for COVID-19  -     POCT COVID-19 Rapid Screening    Close exposure to COVID-19 virus              Additional MDM:     Heart Failure Score:   COPD = Yes    Complete doxycyline, retest in 3-5 days after exposure can do drive thru testing if sx continue to improve/resolve  Can RTC if worsening    Patient Instructions   PLEASE READ YOUR DISCHARGE INSTRUCTIONS ENTIRELY AS IT CONTAINS IMPORTANT INFORMATION.    CDC Testing and Quarantine Guidelines  for Exposure:        A 'close exposure' is defined as anyone who has had an exposure (masked or unmasked) to a known COVID -19 positive person within 6 feet of someone for a cumulative total of 15 minutes or more over a 24-hour period.    ·     vaccinated Have been boosted or completed the primary series of Pfizer or Moderna vaccine within the last 6 months or completed the primary series of J&J vaccine within the last 2 months and/or had a positive test within 90 days  -do NOT need to quarantine after contact with someone who had COVID-19 unless they have symptoms.  -fully vaccinated people who have not had a positive test within 90 days, should get tested 3-5 days after their exposure, even if they don't have symptoms and wear a mask indoors in public for 10 days following exposure or until their test result is negative on day 5.  -If you develop symptoms test and quarantine.         ·     Unvaccinated, or are more than six months out from their second mRNA dose (or more than 2 months after the J&J vaccine) and not yet boosted,  and/or NOT had a positive test within 90 days and meet 'close exposure' you are required by CDC guidelines to quarantine for at least 5 days from time of exposure followed by 5 days of strict masking. It is recommended, but not required to test after 5 days, unless you develop symptoms, in which case you should test at that time.  -If you do decide to test at 5 days and are asymptomatic, the risk is that if you test without symptoms on Day 5 for example) and you are positive, your 5 day isolation begins on that day, and you turned your 5 day quarantine into 10 days.            If your exposure does not meet the above definition, you can return to your normal daily activities to include social distancing, wearing a mask and frequent handwashing.    Alternatively, if a 5-day quarantine is not feasible, it is imperative that an exposed person wear a well-fitting mask at all times when around  others for 10 days after exposure.      Complete doxycyline    Please return or see your primary care doctor if you develop new or worsening symptoms.     You must understand that you have received an Urgent Care treatment only and that you may be released before all of your medical problems are known or treated.

## 2022-03-24 ENCOUNTER — HOSPITAL ENCOUNTER (EMERGENCY)
Facility: OTHER | Age: 87
Discharge: HOME | End: 2022-03-24
Attending: EMERGENCY MEDICINE
Payer: MEDICARE

## 2022-03-24 VITALS
BODY MASS INDEX: 26.31 KG/M2 | SYSTOLIC BLOOD PRESSURE: 172 MMHG | RESPIRATION RATE: 16 BRPM | TEMPERATURE: 98 F | HEIGHT: 62 IN | HEART RATE: 59 BPM | WEIGHT: 143 LBS | DIASTOLIC BLOOD PRESSURE: 75 MMHG | OXYGEN SATURATION: 92 %
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
## 2022-03-24 DIAGNOSIS — M25.519 SHOULDER PAIN: ICD-10-CM

## 2022-03-24 DIAGNOSIS — S93.401A SPRAIN OF RIGHT ANKLE, UNSPECIFIED LIGAMENT, INITIAL ENCOUNTER: Primary | ICD-10-CM

## 2022-03-24 PROCEDURE — 99284 EMERGENCY DEPT VISIT MOD MDM: CPT | Mod: 25

## 2022-03-24 PROCEDURE — 29515 APPLICATION SHORT LEG SPLINT: CPT | Mod: RT

## 2022-03-24 PROCEDURE — 25000003 PHARM REV CODE 250: Performed by: NURSE PRACTITIONER

## 2022-03-24 RX ORDER — ACETAMINOPHEN 325 MG/1
650 TABLET ORAL
Status: COMPLETED | OUTPATIENT
Start: 2022-03-24 | End: 2022-03-24

## 2022-03-24 RX ADMIN — ACETAMINOPHEN 650 MG: 325 TABLET, FILM COATED ORAL at 04:03

## 2022-03-24 NOTE — ED PROVIDER NOTES
"Encounter Date: 3/24/2022       History     Chief Complaint   Patient presents with    Fall     Pt c.o right knee pain down to ankle onset yesterday after her knee gave out while going up steps. Pt states leg folded under her but was able to continue to hold onto rail to prevent falling all the way down . AAO x 3 nadn skin w.d - loc denies hitting head. No obvious injury noted to right leg or ankle       87-year-old female with a past medical history of COPD, tobacco use, CKD stage 2, hypertension and senile osteoporosis presents the ED with daughter at bedside complaining of a witnessed fall yesterday.  Patient was climbing up stairs in the back of the house with her daughter and when her right knee "gave out" and she fell to the ground without any loss of consciousness or head trauma.  Patient uses a walker at baseline so her daughter helped her up.  Patient reports of pain in the right lower extremity from the knee down along with right hip and right shoulder pain.  Patient denies chest pain, shortness of breath, nausea, vomiting, diarrhea, weakness, fatigue, headache, fever, chills, vision changes or urinary complaints.  No other complaints this time.    The history is provided by the patient and a relative.     Review of patient's allergies indicates:   Allergen Reactions    Levaquin [levofloxacin] Itching    Penicillins Itching     Past Medical History:   Diagnosis Date    Anxiety     Back pain     Breast cancer 1/17/2018    Breast mass 1/15/2018    Chronic kidney disease, stage 2, mildly decreased GFR     COPD (chronic obstructive pulmonary disease)     emphysema    Depression     Dysuria 1/29/2018    Family history of breast cancer 1/17/2018    GERD (gastroesophageal reflux disease)     Hypertension     Infiltrating ductal carcinoma of breast, left 7/12/2018    Osteoporosis, senile     Ovarian mass 1/15/2018    Pneumonia     S/P robotic assisted laparoscopic BSO (bilateral " salpingo-oophorectomy) 2/23/2018     Past Surgical History:   Procedure Laterality Date    AXILLARY NODE DISSECTION Left 7/12/2018    Procedure: LYMPHADENECTOMY, AXILLARY;  Surgeon: Amanda Caballero MD;  Location: [facility] OR [address] [place] [address];  Service: General;  Laterality: Left;    BREAST BIOPSY      BREAST LUMPECTOMY      CHOLECYSTECTOMY      COLONOSCOPY      ESOPHAGOGASTRODUODENOSCOPY      ESOPHAGOGASTRODUODENOSCOPY N/A 10/14/2019    Procedure: EGD (ESOPHAGOGASTRODUODENOSCOPY);  Surgeon: Davonte Thompson MD;  Location: [facility] ENDO (2ND FLR);  Service: Endoscopy;  Laterality: N/A;  hx of pulmonary hypertension-PA 50     pt requesting ASAP    EYE SURGERY      FIXATION KYPHOPLASTY N/A 11/8/2018    Procedure: Kyphoplasty   T12;  Surgeon: Merly Wilcox MD;  Location: [facility] CATH LAB;  Service: Pain Management;  Laterality: N/A;  T12  [employer] e-mailed with date and time    HYSTERECTOMY      INJECTION FOR SENTINEL NODE IDENTIFICATION Left 7/12/2018    Procedure: INJECTION, FOR SENTINEL NODE IDENTIFICATION;  Surgeon: Amanda Caballero MD;  Location: [facility] OR [address] [place] [address];  Service: General;  Laterality: Left;    INJECTION OF FACET JOINT Bilateral 10/15/2018    Procedure: INJECTION, FACET JOINT, BILATERAL LUMBAR L3-4, 4-5, 5-S1 FACET JOINT INJECTIONS;  Surgeon: Merly Wilcox MD;  Location: [facility] PAIN MGT;  Service: Pain Management;  Laterality: Bilateral;    INJECTION OF FACET JOINT Bilateral 4/1/2019    Procedure: INJECTION, FACET JOINT, L3-L4,L4-L5,L5-S1;  Surgeon: Merly Wilcox MD;  Location: [facility] PAIN MGT;  Service: Pain Management;  Laterality: Bilateral;    INJECTION OF FACET JOINT Bilateral 6/20/2019    Procedure: INJECTION, FACET JOINT, L3-L4,L4-L5,L5-S1 NEED CONSENT;  Surgeon: Merly Wilcox MD;  Location: [facility] PAIN MGT;  Service: Pain Management;  Laterality: Bilateral;    INJECTION OF FACET JOINT Bilateral 7/27/2020    Procedure: INJECTION, FACET JOINT BILATERAL L3/4, L4/5 and L5/S1;  Surgeon: Merly Wilcox MD;  Location:  [facility] PAIN MGT;  Service: Pain Management;  Laterality: Bilateral;    INJECTION OF JOINT Bilateral 7/27/2020    Procedure: INJECTION, JOINT, BILATERAL GREATER TROCHANTERIC BURSA;  Surgeon: Merly Wilcox MD;  Location: [facility] PAIN MGT;  Service: Pain Management;  Laterality: Bilateral;    INJECTION OF JOINT Bilateral 4/26/2021    Procedure: INJECTION, JOINT, HIP;  Surgeon: Merly Wilcox MD;  Location: [facility] PAIN MGT;  Service: Pain Management;  Laterality: Bilateral;  consent needed    INJECTION OF STEROID Right 11/15/2021    Procedure: INJECTION, STEROID RIGHT MIDDLE AND SAMLL FINGER;  Surgeon: Florecita Da Silva MD;  Location: [facility] OR;  Service: Orthopedics;  Laterality: Right;    MASTECTOMY, PARTIAL Left 7/12/2018    Procedure: MASTECTOMY, PARTIAL-W/SEED LOCALIZATION;  Surgeon: Amanda Caballero MD;  [facility];  Service: General;  Laterality: Left;    WI REMOVAL OF OVARY/TUBE(S)  02/23/2018    Robotic Assisted    ROTATOR CUFF REPAIR Right     rotator cuff repair right shoulder    TONSILLECTOMY      TRANSFORAMINAL EPIDURAL INJECTION OF STEROID Bilateral 6/21/2021    Procedure: INJECTION, STEROID, EPIDURAL, TRANSFORAMINAL APPROACH  bilateral L4/5 TF ASHUTOSH;  Surgeon: Merly Wilcox MD;  Location: [facility] PAIN MGT;  Service: Pain Management;  Laterality: Bilateral;  consent needed    TRANSFORAMINAL EPIDURAL INJECTION OF STEROID Bilateral 1/3/2022    Procedure: INJECTION, STEROID, EPIDURAL, TRANSFORAMINAL APPROACH Bilateral L4/5 Needs consent;  Surgeon: Merly Wilcox MD;  Location: [facility] PAIN MGT;  Service: Pain Management;  Laterality: Bilateral;    TRIGGER FINGER RELEASE Left 11/15/2021    Procedure: RELEASE, TRIGGER FINGER, LEFT MIDDLE AND RING;  Surgeon: Florecita Da Silva MD;  Location: [facility] OR;  Service: Orthopedics;  Laterality: Left;     Family History   Problem Relation Age of Onset    Heart failure Mother     Kidney failure Mother     Heart attack Father     Breast cancer Sister 66         Lump, XRT, chemo, recurrence 10 years later.    Cancer Brother         leukemia    Heart attack Brother 58    Pulmonary embolism Brother 45    Heart attack Brother 52    Breast cancer Maternal Grandmother 60        advanced at diagnosis    Breast cancer Maternal Aunt 83         at 92    Colon cancer Neg Hx     Esophageal cancer Neg Hx      Social History     Tobacco Use    Smoking status: Former Smoker     Packs/day: 1.00     Years: 40.00     Pack years: 40.00     Types: Cigarettes    Smokeless tobacco: Never Used    Tobacco comment: Smoked >1 ppd for at least 40 years, quit around    Substance Use Topics    Alcohol use: Yes     Comment: occasional glass of wine or cocktail    Drug use: No     Review of Systems   Constitutional: Negative for activity change, chills and fever.   HENT: Negative for congestion, ear pain and sore throat.    Respiratory: Negative for shortness of breath and stridor.    Cardiovascular: Negative for chest pain and palpitations.   Gastrointestinal: Negative for abdominal pain, nausea and vomiting.   Genitourinary: Negative for dysuria.   Musculoskeletal: Positive for gait problem and joint swelling. Negative for back pain.        Walker baseline.  Pain in the right lower extremity from the knee down.  Right shoulder pain and right hip pain.   Skin: Negative for rash.   Neurological: Negative for dizziness, syncope, weakness and headaches.   Hematological: Does not bruise/bleed easily.       Physical Exam     Initial Vitals [22 1437]   BP Pulse Resp Temp SpO2   (!) 148/68 82 16 98.4 °F (36.9 °C) 97 %      MAP       --         Physical Exam    Nursing note and vitals reviewed.  Constitutional: Vital signs are normal. She appears well-developed and well-nourished. She is not diaphoretic. No distress.   HENT:   Head: Normocephalic and atraumatic.   Right Ear: External ear normal.   Left Ear: External ear normal.   Neck: Trachea normal. Neck supple. No thyroid mass  present.   Cardiovascular: Normal rate, regular rhythm, normal heart sounds and intact distal pulses. Exam reveals no gallop and no friction rub.    No murmur heard.  Pulmonary/Chest: Breath sounds normal. No respiratory distress. She has no wheezes. She has no rhonchi. She has no rales.   Abdominal: Abdomen is soft. Bowel sounds are normal. She exhibits no distension. There is no abdominal tenderness. There is no rebound and no guarding.   Musculoskeletal:         General: Tenderness and edema present.      Cervical back: Neck supple.      Comments: Good range of motion of left-sided extremities.  Right shoulder, right elbow and right wrist have good range of motion.  Right hip has good range of motion.  Tenderness to palpation of the right shoulder and right hip.  Decreased range of motion of the right knee and right ankle.  Some erythema and edema present along the right ankle and right foot.     Neurological: She is alert and oriented to person, place, and time. She has normal strength. No cranial nerve deficit or sensory deficit. GCS score is 15. GCS eye subscore is 4. GCS verbal subscore is 5. GCS motor subscore is 6.   Skin: Skin is warm and dry. Capillary refill takes less than 2 seconds. No rash noted.   Psychiatric: She has a normal mood and affect.         ED Course   Procedures  Labs Reviewed - No data to display       Imaging Results          X-Ray Shoulder Complete 2 View Right (Final result)  Result time 03/24/22 17:53:07    Final result by Cassius Humphries MD (03/24/22 17:53:07)                 Impression:      No acute displaced fracture seen.      Electronically signed by: Cassius Humphries MD  Date:    03/24/2022  Time:    17:53             Narrative:    EXAMINATION:  XR SHOULDER COMPLETE 2 OR MORE VIEWS RIGHT    CLINICAL HISTORY:  Pain in unspecified shoulder    TECHNIQUE:  Three views of the right shoulder were performed.    COMPARISON:  October 2021.    FINDINGS:  Prominent degenerative changes  are seen at the glenohumeral joint with grossly similar appearance compared to previous radiographs from October 2021.  No definite acute displaced fracture or dislocation seen.                               X-Ray Hip 2 or 3 views Right (with Pelvis when performed) (Final result)  Result time 03/24/22 17:51:03    Final result by Cassius Humphries MD (03/24/22 17:51:03)                 Impression:      No acute displaced fracture seen.      Electronically signed by: Cassius Humphries MD  Date:    03/24/2022  Time:    17:51             Narrative:    EXAMINATION:  XR HIP WITH PELVIS WHEN PERFORMED, 2 OR 3  VIEWS RIGHT    CLINICAL HISTORY:  R hip pain;    TECHNIQUE:  AP view of the pelvis and frog leg lateral view of the right hip were performed.    COMPARISON:  January 2021.    FINDINGS:  No evidence of acute displaced fracture, dislocation, or osseous destructive process.  Degenerative changes and joint space narrowing are seen involving the bilateral hips.                               X-Ray Knee 3 View Right (Final result)  Result time 03/24/22 16:33:33    Final result by Vasquez Murillo MD (03/24/22 16:33:33)                 Impression:      Lateral right ankle nonspecific soft tissue swelling without acute displaced fracture-dislocation identified, which may represent sprain.    Right knee, right lower leg and foot are otherwise well aligned and intact.      Electronically signed by: Vasquez Murillo MD  Date:    03/24/2022  Time:    16:33             Narrative:    EXAMINATION:  XR ANKLE COMPLETE 3 VIEW RIGHT; XR TIBIA FIBULA 2 VIEW RIGHT; XR KNEE 3 VIEW RIGHT; XR FOOT COMPLETE 3 VIEW RIGHT    CLINICAL HISTORY:  Unspecified fall, initial encounter    TECHNIQUE:  AP, lateral, and oblique images of the right ankle and foot; three views right knee; AP and lateral views right tibia    COMPARISON:  Right knee series 11/06/2015    FINDINGS:  Generalized osteopenia.  Nonspecific soft tissue swelling about the lateral aspect of  the ankle.  No displaced fracture, dislocation or destructive osseous process.  Knee, tibia, fibula, ankle mortise and foot otherwise appear intact.  Mild hallux valgus configuration.  Mild degenerative change at the 1st MTP joint.  Minimal to mild degenerative change elsewhere of the right knee, ankle and foot.  Lisfranc articulation is congruent.  Enthesophyte at the Achilles insertion site.  No subcutaneous emphysema or radiodense retained foreign body.  Small nonspecific soft tissue calcification at the posteromedial aspect of the distal right lower leg.                               X-Ray Tibia Fibula 2 View Right (Final result)  Result time 03/24/22 16:33:33    Final result by Vasquez Murillo MD (03/24/22 16:33:33)                 Impression:      Lateral right ankle nonspecific soft tissue swelling without acute displaced fracture-dislocation identified, which may represent sprain.    Right knee, right lower leg and foot are otherwise well aligned and intact.      Electronically signed by: Vasquez Murillo MD  Date:    03/24/2022  Time:    16:33             Narrative:    EXAMINATION:  XR ANKLE COMPLETE 3 VIEW RIGHT; XR TIBIA FIBULA 2 VIEW RIGHT; XR KNEE 3 VIEW RIGHT; XR FOOT COMPLETE 3 VIEW RIGHT    CLINICAL HISTORY:  Unspecified fall, initial encounter    TECHNIQUE:  AP, lateral, and oblique images of the right ankle and foot; three views right knee; AP and lateral views right tibia    COMPARISON:  Right knee series 11/06/2015    FINDINGS:  Generalized osteopenia.  Nonspecific soft tissue swelling about the lateral aspect of the ankle.  No displaced fracture, dislocation or destructive osseous process.  Knee, tibia, fibula, ankle mortise and foot otherwise appear intact.  Mild hallux valgus configuration.  Mild degenerative change at the 1st MTP joint.  Minimal to mild degenerative change elsewhere of the right knee, ankle and foot.  Lisfranc articulation is congruent.  Enthesophyte at the Achilles insertion  site.  No subcutaneous emphysema or radiodense retained foreign body.  Small nonspecific soft tissue calcification at the posteromedial aspect of the distal right lower leg.                               X-Ray Foot Complete Right (Final result)  Result time 03/24/22 16:33:33    Final result by Vasquez Murillo MD (03/24/22 16:33:33)                 Impression:      Lateral right ankle nonspecific soft tissue swelling without acute displaced fracture-dislocation identified, which may represent sprain.    Right knee, right lower leg and foot are otherwise well aligned and intact.      Electronically signed by: Vasquez Murillo MD  Date:    03/24/2022  Time:    16:33             Narrative:    EXAMINATION:  XR ANKLE COMPLETE 3 VIEW RIGHT; XR TIBIA FIBULA 2 VIEW RIGHT; XR KNEE 3 VIEW RIGHT; XR FOOT COMPLETE 3 VIEW RIGHT    CLINICAL HISTORY:  Unspecified fall, initial encounter    TECHNIQUE:  AP, lateral, and oblique images of the right ankle and foot; three views right knee; AP and lateral views right tibia    COMPARISON:  Right knee series 11/06/2015    FINDINGS:  Generalized osteopenia.  Nonspecific soft tissue swelling about the lateral aspect of the ankle.  No displaced fracture, dislocation or destructive osseous process.  Knee, tibia, fibula, ankle mortise and foot otherwise appear intact.  Mild hallux valgus configuration.  Mild degenerative change at the 1st MTP joint.  Minimal to mild degenerative change elsewhere of the right knee, ankle and foot.  Lisfranc articulation is congruent.  Enthesophyte at the Achilles insertion site.  No subcutaneous emphysema or radiodense retained foreign body.  Small nonspecific soft tissue calcification at the posteromedial aspect of the distal right lower leg.                               X-Ray Ankle Complete Right (Final result)  Result time 03/24/22 16:33:33    Final result by Vasquez Murillo MD (03/24/22 16:33:33)                 Impression:      Lateral right ankle nonspecific  soft tissue swelling without acute displaced fracture-dislocation identified, which may represent sprain.    Right knee, right lower leg and foot are otherwise well aligned and intact.      Electronically signed by: Vasquez Murillo MD  Date:    03/24/2022  Time:    16:33             Narrative:    EXAMINATION:  XR ANKLE COMPLETE 3 VIEW RIGHT; XR TIBIA FIBULA 2 VIEW RIGHT; XR KNEE 3 VIEW RIGHT; XR FOOT COMPLETE 3 VIEW RIGHT    CLINICAL HISTORY:  Unspecified fall, initial encounter    TECHNIQUE:  AP, lateral, and oblique images of the right ankle and foot; three views right knee; AP and lateral views right tibia    COMPARISON:  Right knee series 11/06/2015    FINDINGS:  Generalized osteopenia.  Nonspecific soft tissue swelling about the lateral aspect of the ankle.  No displaced fracture, dislocation or destructive osseous process.  Knee, tibia, fibula, ankle mortise and foot otherwise appear intact.  Mild hallux valgus configuration.  Mild degenerative change at the 1st MTP joint.  Minimal to mild degenerative change elsewhere of the right knee, ankle and foot.  Lisfranc articulation is congruent.  Enthesophyte at the Achilles insertion site.  No subcutaneous emphysema or radiodense retained foreign body.  Small nonspecific soft tissue calcification at the posteromedial aspect of the distal right lower leg.                                 Medications   acetaminophen tablet 650 mg (650 mg Oral Given 3/24/22 1601)     Medical Decision Making:   Initial Assessment:   87-year-old female who appears to be in no acute distress presents the ED complaining a witnessed fall yesterday.  Patient is able to converse normally, breath sounds are equal bilaterally and distal pulses present.  Physical exam reveals some decreased range of motion the right lower extremity, tenderness to palpation of the right shoulder and right hip along with some erythema and edema of the right ankle and foot.  Differential Diagnosis:    Fracture  Sprain  Muscle strain  Dislocation  ED Management:  Lateral aspect of right ankle shows soft tissue swelling without a displaced fracture which could represent a sprain.  X-ray of the tib fib, knee and ankle show similar, but no other findings.  X-ray of the shoulder and the hip are unremarkable.  Upon reassessment patient reports no pain with active and passive range of motion of the right hip.  Patient was able to stand with some assistance - patient uses a walker at baseline.  Will discharge patient with an Ace wrap and an air splint for her right ankle.  Will also give an ambulatory referral to OT in order to help the patient with utilizing the walker and performing ADLs at home.                      Clinical Impression:   Final diagnoses:  [W19.XXXA] Fall  [M25.519] Shoulder pain  [S93.401A] Sprain of right ankle, unspecified ligament, initial encounter (Primary)          ED Disposition Condition    Discharge Stable        ED Prescriptions     None        Follow-up Information     Follow up With Specialties Details Why Contact Info    Minerva Mathias MD Internal Medicine Schedule an appointment as soon as possible for a visit in 1 week Please schedule appointment with your PCP to discuss your most recent ED visit. [address]  [facility] [address]  [phone]      [facility] Emergency Medicine Go to  Please return to the emergency department if you develop any new or worsening symptoms. [address]  [facility] [id number] [phone]    [facility] Occupational Therapy Call  Please call this number to schedule appointment with occupational therapy. [address]  [facility] [id number] [phone]           Madeleine Pendleton MD  Resident  03/24/22 0896

## 2022-03-24 NOTE — ED NOTES
Pt awake and alert; resting quietly on stretcher.  Pt remains on continuous pulse ox monitoring with non-invasive blood pressure to cycle every 30 minutes. Pt is hypertensive with a HR in the high 50's. Pt reports pain 7/10 at this time; no acute distress or discomfort reported or observed.  Pt denies restroom needs at this time; is able to reposition self on stretcher. Bed locked in lowest position; side rails up and locked x 2; call light, bedside table, and personal belongings within reach. Room assessed for safety measures and cleanliness; no action needed at this time. Plan of care discussed.  Pt instructed to alert nurse for assistance and before attempting to get out of bed; verbalizes understanding. Pt denies needs or complaints at this time; will continue to monitor. Daughter at bedside.

## 2022-03-24 NOTE — FIRST PROVIDER EVALUATION
Emergency Department TeleTriage Encounter Note      CHIEF COMPLAINT    Chief Complaint   Patient presents with    Fall     Pt c.o right knee pain down to ankle onset yesterday after her knee gave out while going up steps. Pt states leg folded under her but was able to continue to hold onto rail to prevent falling all the way down . AAO x 3 nadn skin w.d - loc denies hitting head. No obvious injury noted to right leg or ankle         VITAL SIGNS   Initial Vitals [03/24/22 1437]   BP Pulse Resp Temp SpO2   (!) 148/68 82 16 98.4 °F (36.9 °C) 97 %      MAP       --            ALLERGIES    Review of patient's allergies indicates:   Allergen Reactions    Levaquin [levofloxacin] Itching    Penicillins Itching       PROVIDER TRIAGE NOTE  This is a teletriage evaluation of a 87 y.o. female presenting to the ED with c/o pain to the right knee, tib/fib, foot, and ankle following a fall. Limited physical exam via telehealth: The patient is awake, alert, answering questions appropriately and is not in respiratory distress. Initial orders will be placed and care will be transferred to an alternate provider when patient is roomed for a full evaluation. Any additional orders and the final disposition will be determined by that provider.         ORDERS  Labs Reviewed - No data to display    ED Orders (720h ago, onward)    Start Ordered     Status Ordering Provider    03/24/22 1530 03/24/22 1522  acetaminophen tablet 650 mg  ED 1 Time         Ordered PIERCE RIVAS    03/24/22 1522 03/24/22 1522  X-Ray Knee 3 View Right  1 time imaging         Ordered PIERCE RIVAS    03/24/22 1522 03/24/22 1522  X-Ray Tibia Fibula 2 View Right  1 time imaging         Ordered PIERCE RIVAS.    03/24/22 1522 03/24/22 1522  X-Ray Ankle Complete Right  1 time imaging         Ordered PIERCE RIVAS    03/24/22 1522 03/24/22 1522  X-Ray Foot Complete Right  1 time imaging         Ordered PIERCE RIVAS            Virtual Visit Note: The  provider triage portion of this emergency department evaluation and documentation was performed via [employer], a HIPAA-compliant telemedicine application, in concert with a tele-presenter in the room. A face to face patient evaluation with one of my colleagues will occur once the patient is placed in an emergency department room.      DISCLAIMER: This note was prepared with [employer] voice recognition transcription software. Garbled syntax, mangled pronouns, and other bizarre constructions may be attributed to that software system.

## 2022-03-24 NOTE — ED TRIAGE NOTES
Pt present to the ER with complaints of pain in the right hip radiating down into the right leg, knee, ankle, and foot after her knee gave out while going up steps yesterday. Pt states leg folded under her but she was able to continue to hold onto rail to prevent falling all the way down. Pt also complaining of pain in the left shoulder that she believes is related to holding herself up to keep from falling. Pt denies loc; denies hitting head.

## 2022-03-24 NOTE — DISCHARGE INSTRUCTIONS
Please return to the emergency department if you to develops any new or worsening symptoms - this can include worsening pain, fever, increased swelling to the right foot.

## 2022-04-05 ENCOUNTER — TELEPHONE (OUTPATIENT)
Dept: PRIMARY CARE CLINIC | Facility: CLINIC | Age: 87
End: 2022-04-05
Payer: MEDICARE

## 2022-04-05 DIAGNOSIS — S93.401D MODERATE RIGHT ANKLE SPRAIN, SUBSEQUENT ENCOUNTER: Primary | ICD-10-CM

## 2022-04-05 NOTE — TELEPHONE ENCOUNTER
Pt had a fall on 3/24/22  She sprained her right ankle and c/o right knee pain as well the ER doctor ordered  outpatient Physical Therapy but the pt  would prefer Home Physical therapy.

## 2022-04-06 ENCOUNTER — TELEPHONE (OUTPATIENT)
Dept: HEMATOLOGY/ONCOLOGY | Facility: CLINIC | Age: 87
End: 2022-04-06
Payer: MEDICARE

## 2022-04-06 NOTE — TELEPHONE ENCOUNTER
Spoke with daughter.  Patient is immobile right now r/t recent fall.  R/s'd her appointments until next month.  Daughter thanked nurse.

## 2022-04-06 NOTE — TELEPHONE ENCOUNTER
I don't recommend exercise with an acute ankle sprain - follow up with Orthopedics is recommended.

## 2022-04-06 NOTE — TELEPHONE ENCOUNTER
----- Message from Shana Mcgill sent at 4/6/2022 10:06 AM CDT -----  Regarding: Pt's Daughter Patience Brandt canceled due the pt injuring her leg and being in a wheel chair  Patient Advice:    Pt's Daughter Patience Brandt canceled due the pt injuring her leg and being in a wheel chair.    Ms Morin would like a call back today and can be reached at [phone].

## 2022-04-07 NOTE — TELEPHONE ENCOUNTER
Spoke with daughter Patience and informed per Dr Mathias that exercise was not recommended with an acute ankle sprain and to follow up with Orthopedics instead.  Daughter states that she will just let wait and let her ankle heal on its own.  Understanding verbalized by daughter.  She thanked me for the phone call.

## 2022-04-30 ENCOUNTER — TELEPHONE (OUTPATIENT)
Dept: PRIMARY CARE CLINIC | Facility: CLINIC | Age: 87
End: 2022-04-30
Payer: MEDICARE

## 2022-04-30 DIAGNOSIS — I11.9 HYPERTENSIVE HEART DISEASE WITHOUT HEART FAILURE: Primary | ICD-10-CM

## 2022-04-30 DIAGNOSIS — M81.0 OSTEOPOROSIS WITHOUT CURRENT PATHOLOGICAL FRACTURE, UNSPECIFIED OSTEOPOROSIS TYPE: ICD-10-CM

## 2022-05-01 NOTE — TELEPHONE ENCOUNTER
Please schedule non-fasting labs prior to visit 5/19/22 if it's not too much trouble to come out twice.

## 2022-05-02 NOTE — TELEPHONE ENCOUNTER
Pt's daughter Patience will bring her mom next week to have labs done I advised her the mmg and follow up appt is really close in time see if she can get this done another day or earlier in the day

## 2022-05-04 DIAGNOSIS — F41.8 DEPRESSION WITH ANXIETY: ICD-10-CM

## 2022-05-04 DIAGNOSIS — I11.9 HYPERTENSIVE HEART DISEASE WITHOUT HEART FAILURE: ICD-10-CM

## 2022-05-04 RX ORDER — CITALOPRAM 20 MG/1
TABLET, FILM COATED ORAL
Qty: 90 TABLET | Refills: 1 | Status: SHIPPED | OUTPATIENT
Start: 2022-05-04 | End: 2022-12-21 | Stop reason: SDUPTHER

## 2022-05-04 RX ORDER — HYDRALAZINE HYDROCHLORIDE 25 MG/1
TABLET, FILM COATED ORAL
Qty: 180 TABLET | Refills: 1 | Status: SHIPPED | OUTPATIENT
Start: 2022-05-04 | End: 2022-12-11 | Stop reason: SDUPTHER

## 2022-05-04 NOTE — TELEPHONE ENCOUNTER
Refill Routing Note   Medication(s) are not appropriate for processing by [facility] for the following reason(s):      - Outside of protocol  - Patient has been seen in the ED/Hospital since the last PCP visit    [facility] action(s):  Route  Defer          Medication reconciliation completed: No     Appointments  past 12m or future 3m with PCP    Date Provider   Last Visit   11/22/2021 Minerva Mathias MD   Next Visit   5/19/2022 Minerva Mathias MD   ED visits in past 90 days: 1        Note composed:2:02 PM 05/04/2022

## 2022-05-04 NOTE — TELEPHONE ENCOUNTER
No new care gaps identified.  [facility] Embedded Care Gaps. Reference number: [id number]. 5/04/2022   12:56:19 PM CDT

## 2022-05-10 ENCOUNTER — LAB VISIT (OUTPATIENT)
Dept: LAB | Facility: HOSPITAL | Age: 87
End: 2022-05-10
Attending: INTERNAL MEDICINE
Payer: MEDICARE

## 2022-05-10 DIAGNOSIS — I11.9 HYPERTENSIVE HEART DISEASE WITHOUT HEART FAILURE: ICD-10-CM

## 2022-05-10 LAB
ANION GAP SERPL CALC-SCNC: 9 MMOL/L (ref 8–16)
BUN SERPL-MCNC: 13 MG/DL (ref 8–23)
CALCIUM SERPL-MCNC: 9.4 MG/DL (ref 8.7–10.5)
CHLORIDE SERPL-SCNC: 103 MMOL/L (ref 95–110)
CO2 SERPL-SCNC: 29 MMOL/L (ref 23–29)
CREAT SERPL-MCNC: 0.8 MG/DL (ref 0.5–1.4)
ERYTHROCYTE [DISTWIDTH] IN BLOOD BY AUTOMATED COUNT: 13.7 % (ref 11.5–14.5)
EST. GFR  (AFRICAN AMERICAN): >60 ML/MIN/1.73 M^2
EST. GFR  (NON AFRICAN AMERICAN): >60 ML/MIN/1.73 M^2
GLUCOSE SERPL-MCNC: 91 MG/DL (ref 70–110)
HCT VFR BLD AUTO: 43 % (ref 37–48.5)
HGB BLD-MCNC: 13.6 G/DL (ref 12–16)
MCH RBC QN AUTO: 28 PG (ref 27–31)
MCHC RBC AUTO-ENTMCNC: 31.6 G/DL (ref 32–36)
MCV RBC AUTO: 89 FL (ref 82–98)
PLATELET # BLD AUTO: 364 K/UL (ref 150–450)
PMV BLD AUTO: 11.3 FL (ref 9.2–12.9)
POTASSIUM SERPL-SCNC: 3.9 MMOL/L (ref 3.5–5.1)
RBC # BLD AUTO: 4.86 M/UL (ref 4–5.4)
SODIUM SERPL-SCNC: 141 MMOL/L (ref 136–145)
WBC # BLD AUTO: 9.27 K/UL (ref 3.9–12.7)

## 2022-05-10 PROCEDURE — 85027 COMPLETE CBC AUTOMATED: CPT | Performed by: INTERNAL MEDICINE

## 2022-05-10 PROCEDURE — 36415 COLL VENOUS BLD VENIPUNCTURE: CPT | Mod: PN | Performed by: INTERNAL MEDICINE

## 2022-05-10 PROCEDURE — 80048 BASIC METABOLIC PNL TOTAL CA: CPT | Performed by: INTERNAL MEDICINE

## 2022-05-18 ENCOUNTER — PES CALL (OUTPATIENT)
Dept: ADMINISTRATIVE | Facility: CLINIC | Age: 87
End: 2022-05-18
Payer: MEDICARE

## 2022-05-19 ENCOUNTER — HOSPITAL ENCOUNTER (OUTPATIENT)
Dept: RADIOLOGY | Facility: OTHER | Age: 87
Discharge: HOME | End: 2022-05-19
Attending: INTERNAL MEDICINE
Payer: MEDICARE

## 2022-05-19 DIAGNOSIS — Z17.0 MALIGNANT NEOPLASM OF UPPER-OUTER QUADRANT OF LEFT BREAST IN FEMALE, ESTROGEN RECEPTOR POSITIVE: ICD-10-CM

## 2022-05-19 DIAGNOSIS — C50.412 MALIGNANT NEOPLASM OF UPPER-OUTER QUADRANT OF LEFT BREAST IN FEMALE, ESTROGEN RECEPTOR POSITIVE: ICD-10-CM

## 2022-05-19 PROCEDURE — 77066 DX MAMMO INCL CAD BI: CPT | Mod: 26,,, | Performed by: RADIOLOGY

## 2022-05-19 PROCEDURE — 77062 BREAST TOMOSYNTHESIS BI: CPT | Mod: 26,,, | Performed by: RADIOLOGY

## 2022-05-19 PROCEDURE — 77066 DX MAMMO INCL CAD BI: CPT | Mod: TC

## 2022-05-19 PROCEDURE — 77062 MAMMO DIGITAL DIAGNOSTIC BILAT WITH TOMO: ICD-10-PCS | Mod: 26,,, | Performed by: RADIOLOGY

## 2022-05-19 PROCEDURE — 77066 MAMMO DIGITAL DIAGNOSTIC BILAT WITH TOMO: ICD-10-PCS | Mod: 26,,, | Performed by: RADIOLOGY

## 2022-05-23 ENCOUNTER — TELEPHONE (OUTPATIENT)
Dept: HEMATOLOGY/ONCOLOGY | Facility: CLINIC | Age: 87
End: 2022-05-23
Payer: MEDICARE

## 2022-05-23 NOTE — TELEPHONE ENCOUNTER
Spoke to daughter about rescheduling. They want to know mammo results and will take a phone call with results from a provider. Routed to NP.

## 2022-05-23 NOTE — TELEPHONE ENCOUNTER
Spoke with NP, relayed mammo results to dtr and patient, rescheduled follow up appointment. Patient and daughter relayed understanding

## 2022-05-27 ENCOUNTER — PES CALL (OUTPATIENT)
Dept: ADMINISTRATIVE | Facility: CLINIC | Age: 87
End: 2022-05-27
Payer: MEDICARE

## 2022-06-06 ENCOUNTER — OFFICE VISIT (OUTPATIENT)
Dept: HEMATOLOGY/ONCOLOGY | Facility: CLINIC | Age: 87
End: 2022-06-06
Payer: MEDICARE

## 2022-06-06 DIAGNOSIS — C50.412 MALIGNANT NEOPLASM OF UPPER-OUTER QUADRANT OF LEFT BREAST IN FEMALE, ESTROGEN RECEPTOR POSITIVE: Primary | Chronic | ICD-10-CM

## 2022-06-06 DIAGNOSIS — Z79.811 USE OF AROMATASE INHIBITORS: Chronic | ICD-10-CM

## 2022-06-06 DIAGNOSIS — Z17.0 MALIGNANT NEOPLASM OF UPPER-OUTER QUADRANT OF LEFT BREAST IN FEMALE, ESTROGEN RECEPTOR POSITIVE: Primary | Chronic | ICD-10-CM

## 2022-06-06 PROCEDURE — 1159F MED LIST DOCD IN RCRD: CPT | Mod: CPTII,S$GLB,, | Performed by: INTERNAL MEDICINE

## 2022-06-06 PROCEDURE — 99213 PR OFFICE/OUTPT VISIT, EST, LEVL III, 20-29 MIN: ICD-10-PCS | Mod: S$GLB,,, | Performed by: INTERNAL MEDICINE

## 2022-06-06 PROCEDURE — 1159F PR MEDICATION LIST DOCUMENTED IN MEDICAL RECORD: ICD-10-PCS | Mod: CPTII,S$GLB,, | Performed by: INTERNAL MEDICINE

## 2022-06-06 PROCEDURE — 99999 PR PBB SHADOW E&M-EST. PATIENT-LVL I: ICD-10-PCS | Mod: PBBFAC,,, | Performed by: INTERNAL MEDICINE

## 2022-06-06 PROCEDURE — 99999 PR PBB SHADOW E&M-EST. PATIENT-LVL I: CPT | Mod: PBBFAC,,, | Performed by: INTERNAL MEDICINE

## 2022-06-06 PROCEDURE — 1160F PR REVIEW ALL MEDS BY PRESCRIBER/CLIN PHARMACIST DOCUMENTED: ICD-10-PCS | Mod: CPTII,S$GLB,, | Performed by: INTERNAL MEDICINE

## 2022-06-06 PROCEDURE — 1160F RVW MEDS BY RX/DR IN RCRD: CPT | Mod: CPTII,S$GLB,, | Performed by: INTERNAL MEDICINE

## 2022-06-06 PROCEDURE — 99213 OFFICE O/P EST LOW 20 MIN: CPT | Mod: S$GLB,,, | Performed by: INTERNAL MEDICINE

## 2022-06-06 NOTE — PROGRESS NOTES
Subjective:       Patient ID: Anahi Cook is a 87 y.o. female.    Chief Complaint: No chief complaint on file.    HPI   Mrs. Cook returns today for follow up.   I had last seen her in January 2022.  She is on letrozole, now on the adjuvant setting.    Briefly, she is an 87-year-old female who in late 2017 had initially felt a mass in her left breast.  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  She was started on letrozole and undwerwent a BSO by Dr. Chairez for an ovarian mass.  Her ovarian mass was benign. In mid July 2018 she underwent a lumpectomy and AND.  She had a 3.3 cm carcinoma and 4/8 positive nodes.  She has remained on letrozole, now in the adjuvant setting, and she returns today for follow up.   Of note, she also received chest wall XRT.    A mammogram on May 19, 2022 was read as BIRADS II, and a one year follow up was recommended.      Review of Systems    Overall she feels OK  She has tolerated the AIs well so far.  She again complains of occasional hot flashes and hand stiffness. . Her ECOG performance status is 2.   She denies any anxiety, depression, easy bruising, fevers, chills, night  sweats, weight loss, nausea, vomiting, diarrhea, constipation, diplopia, blurred vision, headache, chest pain, palpitations, shortness of breath, breast pain, abdominal pain, extremity pain, or difficulty ambulating.  The remainder of the ten-point ROS, including general, skin, lymph, H/N, cardiorespiratory, GI, , Neuro, Endocrine, and psychiatric is negative.       Objective:      Physical Exam      She is alert, oriented to time, place, person, pleasant, well      nourished, in no acute physical distress.   She is accompanied by her caregiver.                                 VITAL SIGNS:  Reviewed                                      HEENT:  Normal.  There are no nasal, oral,  lip, gingival, auricular, lid,    or conjunctival lesions.  Mucosae are moist and pink, and there is no        thrush.  Pupils are equal, reactive to light and accommodation.              Extraocular muscle movements are intact.   Dentition is fair.  Maxillary teeth are missing.                                      NECK:  Supple without JVD, adenopathy, or thyromegaly.                       LUNGS:  Clear to auscultation without wheezing, rales, or rhonchi.           CARDIOVASCULAR:  Reveals an S1, S2, no murmurs, no rubs, no gallops.         ABDOMEN:  Soft, nontender, without organomegaly.  Bowel sounds are    present.    Scars from her laparoscopic DONOVAN-BSO are seen.                                                            EXTREMITIES:  No cyanosis, clubbing, or edema.                             BREASTS: She is s/p left lumpectomy with a healed periareolar incision.  She also has an incision from her axillary dissection;  It is well healed.   There are no masses in her left or her right breast.    Both breasts are large and pendulous.    There is mild hyperpigmentation within the radiation field.                                 LYMPHATIC:  There is no cervical, axillary, or supraclavicular adenopathy.   SKIN:  Warm and moist, without petechiae, rashes, induration, or ecchymoses.           NEUROLOGIC:  DTRs are 0-1+ bilaterally, symmetrical, motor function is 5/5,  and cranial nerves are  within normal limits.      Assessment:       1. Hormone receptor positive breast cancer, left, on neoadjuvant letrozole with a significant clinical response.   2. Use of aromatase inhibitors        Plan:        I had a long discussion with her and her caregiver.  She will remain on letrozole, and see me again in 4 months.  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  She had started adjuvant letrozole in the summer of 2018, and she will therefore  complete 7 years in the summer of 2025.   Her mammogram will be repeated in May 2023, and her DXA scan will need to be repeated prior to her next visit  Her multiple questions were answered to her satisfaction.  e

## 2022-06-12 ENCOUNTER — PATIENT MESSAGE (OUTPATIENT)
Dept: PRIMARY CARE CLINIC | Facility: CLINIC | Age: 87
End: 2022-06-12
Payer: MEDICARE

## 2022-06-15 DIAGNOSIS — I70.0 AORTIC ATHEROSCLEROSIS: ICD-10-CM

## 2022-06-15 DIAGNOSIS — E78.5 HYPERLIPIDEMIA, UNSPECIFIED HYPERLIPIDEMIA TYPE: ICD-10-CM

## 2022-06-15 RX ORDER — ATORVASTATIN CALCIUM 20 MG/1
20 TABLET, FILM COATED ORAL NIGHTLY
Qty: 90 TABLET | Refills: 1 | Status: SHIPPED | OUTPATIENT
Start: 2022-06-15 | End: 2022-08-11 | Stop reason: SDUPTHER

## 2022-06-15 NOTE — TELEPHONE ENCOUNTER
Refill Routing Note   Medication(s) are not appropriate for processing by [facility] for the following reason(s):      - Patient has been seen in the ED/Hospital since the last PCP visit    [facility] action(s):  Defer       Medication Therapy Plan: Last PCP Visit: 11/22/2021  Last ED Visit: 3/24/2022 for ankle sprain  Medication reconciliation completed: No     Appointments  past 12m or future 3m with PCP    Date Provider   Last Visit   11/22/2021 Minerva Mathias MD   Next Visit   8/11/2022 Minerva Mathias MD   ED visits in past 90 days: 1        Note composed:1:38 PM 06/15/2022

## 2022-06-15 NOTE — TELEPHONE ENCOUNTER
No new care gaps identified.  [facility] Embedded Care Gaps. Reference number: [id number]. 6/15/2022   1:28:34 PM CDT   [place] reviewed.

## 2022-06-17 ENCOUNTER — IMMUNIZATION (OUTPATIENT)
Dept: INTERNAL MEDICINE | Facility: CLINIC | Age: 87
End: 2022-06-17
Payer: MEDICARE

## 2022-06-17 DIAGNOSIS — Z23 NEED FOR VACCINATION: Primary | ICD-10-CM

## 2022-06-17 PROCEDURE — 91305 COVID-19, MRNA, LNP-S, PF, 30 MCG/0.3 ML DOSE VACCINE (PFIZER): CPT | Mod: PBBFAC | Performed by: INTERNAL MEDICINE

## 2022-06-21 ENCOUNTER — TELEPHONE (OUTPATIENT)
Dept: SPORTS MEDICINE | Facility: CLINIC | Age: 87
End: 2022-06-21
Payer: MEDICARE

## 2022-06-28 ENCOUNTER — OFFICE VISIT (OUTPATIENT)
Dept: SPORTS MEDICINE | Facility: CLINIC | Age: 87
End: 2022-06-28
Payer: MEDICARE

## 2022-06-28 ENCOUNTER — HOSPITAL ENCOUNTER (OUTPATIENT)
Dept: RADIOLOGY | Facility: HOSPITAL | Age: 87
Discharge: HOME | End: 2022-06-28
Attending: ORTHOPAEDIC SURGERY
Payer: MEDICARE

## 2022-06-28 VITALS
HEART RATE: 60 BPM | SYSTOLIC BLOOD PRESSURE: 123 MMHG | WEIGHT: 146 LBS | BODY MASS INDEX: 26.87 KG/M2 | DIASTOLIC BLOOD PRESSURE: 64 MMHG | HEIGHT: 62 IN

## 2022-06-28 DIAGNOSIS — G89.29 CHRONIC PAIN OF BOTH KNEES: ICD-10-CM

## 2022-06-28 DIAGNOSIS — M25.562 CHRONIC PAIN OF BOTH KNEES: ICD-10-CM

## 2022-06-28 DIAGNOSIS — M17.0 PRIMARY OSTEOARTHRITIS OF BOTH KNEES: Primary | ICD-10-CM

## 2022-06-28 DIAGNOSIS — M25.561 CHRONIC PAIN OF BOTH KNEES: ICD-10-CM

## 2022-06-28 PROCEDURE — 20610 LARGE JOINT ASPIRATION/INJECTION: L KNEE: ICD-10-PCS | Mod: 50,S$GLB,, | Performed by: ORTHOPAEDIC SURGERY

## 2022-06-28 PROCEDURE — 3288F FALL RISK ASSESSMENT DOCD: CPT | Mod: CPTII,S$GLB,, | Performed by: ORTHOPAEDIC SURGERY

## 2022-06-28 PROCEDURE — 99999 PR PBB SHADOW E&M-EST. PATIENT-LVL IV: ICD-10-PCS | Mod: PBBFAC,,, | Performed by: ORTHOPAEDIC SURGERY

## 2022-06-28 PROCEDURE — 1101F PT FALLS ASSESS-DOCD LE1/YR: CPT | Mod: CPTII,S$GLB,, | Performed by: ORTHOPAEDIC SURGERY

## 2022-06-28 PROCEDURE — 1159F MED LIST DOCD IN RCRD: CPT | Mod: CPTII,S$GLB,, | Performed by: ORTHOPAEDIC SURGERY

## 2022-06-28 PROCEDURE — 99999 PR PBB SHADOW E&M-EST. PATIENT-LVL IV: CPT | Mod: PBBFAC,,, | Performed by: ORTHOPAEDIC SURGERY

## 2022-06-28 PROCEDURE — 99214 PR OFFICE/OUTPT VISIT, EST, LEVL IV, 30-39 MIN: ICD-10-PCS | Mod: 25,S$GLB,, | Performed by: ORTHOPAEDIC SURGERY

## 2022-06-28 PROCEDURE — 1159F PR MEDICATION LIST DOCUMENTED IN MEDICAL RECORD: ICD-10-PCS | Mod: CPTII,S$GLB,, | Performed by: ORTHOPAEDIC SURGERY

## 2022-06-28 PROCEDURE — 1101F PR PT FALLS ASSESS DOC 0-1 FALLS W/OUT INJ PAST YR: ICD-10-PCS | Mod: CPTII,S$GLB,, | Performed by: ORTHOPAEDIC SURGERY

## 2022-06-28 PROCEDURE — 20610 DRAIN/INJ JOINT/BURSA W/O US: CPT | Mod: 50,S$GLB,, | Performed by: ORTHOPAEDIC SURGERY

## 2022-06-28 PROCEDURE — 1126F PR PAIN SEVERITY QUANTIFIED, NO PAIN PRESENT: ICD-10-PCS | Mod: CPTII,S$GLB,, | Performed by: ORTHOPAEDIC SURGERY

## 2022-06-28 PROCEDURE — 99214 OFFICE O/P EST MOD 30 MIN: CPT | Mod: 25,S$GLB,, | Performed by: ORTHOPAEDIC SURGERY

## 2022-06-28 PROCEDURE — 3288F PR FALLS RISK ASSESSMENT DOCUMENTED: ICD-10-PCS | Mod: CPTII,S$GLB,, | Performed by: ORTHOPAEDIC SURGERY

## 2022-06-28 PROCEDURE — 1126F AMNT PAIN NOTED NONE PRSNT: CPT | Mod: CPTII,S$GLB,, | Performed by: ORTHOPAEDIC SURGERY

## 2022-06-28 RX ADMIN — TRIAMCINOLONE ACETONIDE 40 MG: 40 INJECTION, SUSPENSION INTRA-ARTICULAR; INTRAMUSCULAR at 11:06

## 2022-07-09 RX ORDER — TRIAMCINOLONE ACETONIDE 40 MG/ML
40 INJECTION, SUSPENSION INTRA-ARTICULAR; INTRAMUSCULAR
Status: DISCONTINUED | OUTPATIENT
Start: 2022-06-28 | End: 2022-07-09 | Stop reason: HOSPADM

## 2022-07-09 NOTE — PROGRESS NOTES
CC:  Bilateral knee pain    87 y.o. Female who returns for both knees.  Prior exam demonstrated advanced DJD.  Prior corticosteroid injection in December of 2021 provided fairly good relief for several months.  She would like to repeat these injections.  No other changes in interval history.  Currently the pain in both knees is daily.  Activity limiting.    PMHx notable for hypertension, lumbar radiculopathy, chronic kidney disease, history of breast cancer.   Negative for tobacco.   Negative for diabetes.     Pain Score: 0-No pain    REVIEW OF SYSTEMS:   Constitution: Negative. Negative for chills, fever and night sweats.    Hematologic/Lymphatic: Negative for bleeding problem. Does not bruise/bleed easily.   Skin: Negative for dry skin, itching and rash.   Musculoskeletal: Negative for falls. Positive for right and left knee pain and muscle weakness.     All other review of symptoms were reviewed and found to be noncontributory.     PAST MEDICAL HISTORY:   Past Medical History:   Diagnosis Date    Anxiety     Back pain     Breast cancer 1/17/2018    Breast mass 1/15/2018    Chronic kidney disease, stage 2, mildly decreased GFR     COPD (chronic obstructive pulmonary disease)     emphysema    Depression     Dysuria 1/29/2018    Family history of breast cancer 1/17/2018    GERD (gastroesophageal reflux disease)     Hypertension     Infiltrating ductal carcinoma of breast, left 7/12/2018    Osteoporosis, senile     Ovarian mass 1/15/2018    Pneumonia     S/P robotic assisted laparoscopic BSO (bilateral salpingo-oophorectomy) 2/23/2018       PAST SURGICAL HISTORY:   Past Surgical History:   Procedure Laterality Date    AXILLARY NODE DISSECTION Left 7/12/2018    Procedure: LYMPHADENECTOMY, AXILLARY;  Surgeon: Amanda Caballero MD;  Location: [facility] OR [address] [place] [address];  Service: General;  Laterality: Left;    BREAST BIOPSY      BREAST LUMPECTOMY      CHOLECYSTECTOMY      COLONOSCOPY       ESOPHAGOGASTRODUODENOSCOPY      ESOPHAGOGASTRODUODENOSCOPY N/A 10/14/2019    Procedure: EGD (ESOPHAGOGASTRODUODENOSCOPY);  Surgeon: Davonte Thompson MD;  Location: [facility] (2ND FLR);  Service: Endoscopy;  Laterality: N/A;  hx of pulmonary hypertension-PA 50     pt requesting ASAP    EYE SURGERY      FIXATION KYPHOPLASTY N/A 11/8/2018    Procedure: Kyphoplasty   T12;  Surgeon: Merly Wilcox MD;  Location: [facility] CATH LAB;  Service: Pain Management;  Laterality: N/A;  T12  [email or web address] e-mailed with date and time    HYSTERECTOMY      INJECTION FOR SENTINEL NODE IDENTIFICATION Left 7/12/2018    Procedure: INJECTION, FOR SENTINEL NODE IDENTIFICATION;  Surgeon: Amanda Caballero MD;  [facility];  Service: General;  Laterality: Left;    INJECTION OF FACET JOINT Bilateral 10/15/2018    Procedure: INJECTION, FACET JOINT, BILATERAL LUMBAR L3-4, 4-5, 5-S1 FACET JOINT INJECTIONS;  Surgeon: Merly Wilcox MD;  Location: [facility] PAIN MGT;  Service: Pain Management;  Laterality: Bilateral;    INJECTION OF FACET JOINT Bilateral 4/1/2019    Procedure: INJECTION, FACET JOINT, L3-L4,L4-L5,L5-S1;  Surgeon: Merly Wilcox MD;  Location: [facility] PAIN MGT;  Service: Pain Management;  Laterality: Bilateral;    INJECTION OF FACET JOINT Bilateral 6/20/2019    Procedure: INJECTION, FACET JOINT, L3-L4,L4-L5,L5-S1 NEED CONSENT;  Surgeon: Merly Wilcox MD;  Location: [facility] PAIN MGT;  Service: Pain Management;  Laterality: Bilateral;    INJECTION OF FACET JOINT Bilateral 7/27/2020    Procedure: INJECTION, FACET JOINT BILATERAL L3/4, L4/5 and L5/S1;  Surgeon: Merly Wilcox MD;  Location: [facility] PAIN MGT;  Service: Pain Management;  Laterality: Bilateral;    INJECTION OF JOINT Bilateral 7/27/2020    Procedure: INJECTION, JOINT, BILATERAL GREATER TROCHANTERIC BURSA;  Surgeon: Merly Wilcox MD;  Location: [facility] PAIN MGT;  Service: Pain Management;  Laterality: Bilateral;    INJECTION OF JOINT Bilateral 4/26/2021    Procedure: INJECTION, JOINT,  HIP;  Surgeon: Merly Wilcox MD;  Location: [facility] PAIN MGT;  Service: Pain Management;  Laterality: Bilateral;  consent needed    INJECTION OF STEROID Right 11/15/2021    Procedure: INJECTION, STEROID RIGHT MIDDLE AND SAMLL FINGER;  Surgeon: Florecita Da Silva MD;  Location: [facility] OR;  Service: Orthopedics;  Laterality: Right;    MASTECTOMY, PARTIAL Left 2018    Procedure: MASTECTOMY, PARTIAL-W/SEED LOCALIZATION;  Surgeon: Amanda Caballero MD;  Location: [address] FLR;  Service: General;  Laterality: Left;    ID REMOVAL OF OVARY/TUBE(S)  2018    Robotic Assisted    ROTATOR CUFF REPAIR Right     rotator cuff repair right shoulder    TONSILLECTOMY      TRANSFORAMINAL EPIDURAL INJECTION OF STEROID Bilateral 2021    Procedure: INJECTION, STEROID, EPIDURAL, TRANSFORAMINAL APPROACH  bilateral L4/5 TF ASHUTOSH;  Surgeon: Merly Wilcox MD;  Location: [facility] PAIN MGT;  Service: Pain Management;  Laterality: Bilateral;  consent needed    TRANSFORAMINAL EPIDURAL INJECTION OF STEROID Bilateral 1/3/2022    Procedure: INJECTION, STEROID, EPIDURAL, TRANSFORAMINAL APPROACH Bilateral L4/5 Needs consent;  Surgeon: Merly Wilcox MD;  Location: [facility] PAIN MGT;  Service: Pain Management;  Laterality: Bilateral;    TRIGGER FINGER RELEASE Left 11/15/2021    Procedure: RELEASE, TRIGGER FINGER, LEFT MIDDLE AND RING;  Surgeon: Florecita Da Silva MD;  Location: [facility] OR;  Service: Orthopedics;  Laterality: Left;       FAMILY HISTORY:   Family History   Problem Relation Age of Onset    Heart failure Mother     Kidney failure Mother     Heart attack Father     Breast cancer Sister 66        Lump, XRT, chemo, recurrence 10 years later.    Cancer Brother         leukemia    Heart attack Brother 58    Pulmonary embolism Brother 45    Heart attack Brother 52    Breast cancer Maternal Grandmother 60        advanced at diagnosis    Breast cancer Maternal Aunt 83         at 92    Colon cancer Neg Hx     Esophageal  cancer Neg Hx        SOCIAL HISTORY:   Social History     Socioeconomic History    Marital status: Single    Number of children: 3   Occupational History    Occupation: Multiple jobs, see social documentation section     Comment: Retired   Tobacco Use    Smoking status: Former Smoker     Packs/day: 1.00     Years: 40.00     Pack years: 40.00     Types: Cigarettes    Smokeless tobacco: Never Used    Tobacco comment: Smoked >1 ppd for at least 40 years, quit around 1995   Substance and Sexual Activity    Alcohol use: Yes     Comment: occasional glass of wine or cocktail    Drug use: No    Sexual activity: Yes     Partners: Male   Social History Narrative    Worked many jobs while raising 3 children.  She was a nurses aid, worked in retail at [employer], sold insurance and was a  in the [place] office under [employer].  She was  from her first , but took care of him at the end of his life.       MEDICATIONS:     Current Outpatient Medications:     acetaminophen (TYLENOL) 325 MG tablet, Take 650 mg by mouth every 6 (six) hours as needed for Pain. , Disp: , Rfl:     albuterol (PROVENTIL) 2.5 mg /3 mL (0.083 %) nebulizer solution, Inhale 3 mLs into the lungs as needed. , Disp: , Rfl:     albuterol (PROVENTIL/VENTOLIN HFA) 90 mcg/actuation inhaler, Inhale 1-2 puffs into the lungs every 6 (six) hours as needed for Wheezing or Shortness of Breath., Disp: , Rfl:     albuterol-ipratropium 2.5mg-0.5mg/3mL (DUO-NEB) 0.5 mg-3 mg(2.5 mg base)/3 mL nebulizer solution, Take 3 mLs by nebulization as needed. , Disp: , Rfl:     atorvastatin (LIPITOR) 20 MG tablet, Take 1 tablet (20 mg total) by mouth every evening., Disp: 90 tablet, Rfl: 1    benazepriL (LOTENSIN) 40 MG tablet, TAKE 1 TABLET(40 MG) BY MOUTH EVERY DAY, Disp: 90 tablet, Rfl: 1    calcium-vitamin D 250-100 mg-unit per tablet, Take 1 tablet by mouth 2 (two) times daily., Disp: , Rfl:     carbamide peroxide (DEBROX) 6.5 %  otic solution, Place 5 drops into the left ear as needed (ear wax). Do not use q-tips, please only clean with a wash cloth after use, Disp: 15 mL, Rfl: 0    CETIRIZINE HCL (ZYRTEC ORAL), Take 10 mg by mouth nightly as needed. , Disp: , Rfl:     citalopram (CELEXA) 20 MG tablet, TAKE 1 TABLET(20 MG) BY MOUTH EVERY EVENING, Disp: 90 tablet, Rfl: 1    diphth,pertus,acell,,tetanus (BOOSTRIX TDAP) 2.5-8-5 Lf-mcg-Lf/0.5mL Syrg injection, Inject into the muscle., Disp: 0.5 mL, Rfl: 0    felodipine (PLENDIL) 10 MG 24 hr tablet, Take 1 tablet (10 mg total) by mouth 2 (two) times a day., Disp: 180 tablet, Rfl: 1    fluticasone-vilanterol (BREO) 100-25 mcg/dose diskus inhaler, Inhale 1 puff into the lungs once daily., Disp: 1 each, Rfl: 3    gabapentin (NEURONTIN) 100 MG capsule, Take 1 capsule (100 mg total) by mouth every evening., Disp: 30 capsule, Rfl: 2    hydrALAZINE (APRESOLINE) 25 MG tablet, TAKE 1 TABLET(25 MG) BY MOUTH EVERY 12 HOURS FOR BLOOD PRESSURE, Disp: 180 tablet, Rfl: 1    INCRUSE ELLIPTA 62.5 mcg/actuation DsDv, USE 1 PUFF QD AT SAME TIME  (patient takes at night), Disp: , Rfl: 2    ketoprofen 10 % CrPk, APPLY UP TO 4.8 GRAMS (3 PUMPS) TO PAINFUL AREAS UP TO FIVE TIMES DAILY. RUB IN WELL, Disp: , Rfl: 99    ketoprofen 10 % CrPk, APPLY 2 GRAMS 3-4 TIMES DAILY FOR TREATMENT OF PAIN, Disp: , Rfl:     letrozole (FEMARA) 2.5 mg Tab, Take 1 tablet (2.5 mg total) by mouth once daily., Disp: 90 tablet, Rfl: 3    meclizine (ANTIVERT) 25 mg tablet, Take 1 tablet (25 mg total) by mouth 3 (three) times daily as needed for Dizziness., Disp: 20 tablet, Rfl: 0    melatonin 10 mg Tab, Take by mouth every evening., Disp: , Rfl:     montelukast (SINGULAIR) 10 mg tablet, Take 10 mg by mouth every evening., Disp: , Rfl:     multivitamin (THERAGRAN) per tablet, Take 1 tablet by mouth once daily., Disp: , Rfl:     torsemide (DEMADEX) 20 MG Tab, Take 1 tablet (20 mg total) by mouth once daily., Disp: 90 tablet,  "Rfl: 2    traMADoL (ULTRAM) 50 mg tablet, Take 1 tablet (50 mg total) by mouth every 8 (eight) hours as needed for Pain., Disp: 80 tablet, Rfl: 1    triamcinolone acetonide 0.1% (KENALOG) 0.1 % cream, AAA bid.  Spot treatment as needed, Disp: 45 g, Rfl: 0    triamcinolone acetonide 0.1% (KENALOG) 0.1 % paste, APPLY SPARINGLY FOUR TIMES DAILY, Disp: , Rfl:     ALLERGIES:   Review of patient's allergies indicates:   Allergen Reactions    Levaquin [levofloxacin] Itching    Penicillins Itching        PHYSICAL EXAMINATION:  /64   Pulse 60   Ht 5' 2" (1.575 m)   Wt 66.2 kg (146 lb)   LMP  (LMP Unknown)   BMI 26.70 kg/m²   General: Well-developed well-nourished 87 y.o. femalein no acute distress   Cardiovascular: Regular rhythm by palpation of distal pulse, normal color and temperature, no concerning varicosities on symptomatic side   Lungs: No labored breathing or wheezing appreciated   Neuro: Alert and oriented ×3   Psychiatric: well oriented to person, place and time, demonstrates normal mood and affect   Skin: No rashes, lesions or ulcers, normal temperature, turgor, and texture on involved extremity    Ortho/SPM Exam  Examination of the right and left knee again demonstrates intact extensor mechanism. No effusion or prepatellar swelling. Central patellar tracking. No patellar apprehension. Normal patellar mobility. ~5 degrees from full extension. Flexion to 120. + pain with forced flexion or extension. Prominent tenderness along the medial and lateral joint line. + Kevin's for pain. Negative Lachman. Stable to varus/valgus stress testing at 0 and 30 deg. Negative posterior drawer. Ligamentously stable.    IMAGING:  X-rays including standing, weight bearing AP and flexion bilateral knees, RIGHT knee lateral and sunrise views ordered and images reviewed by me show:    Kellgren Kulwinder grade 4 medial compartment OA bilaterally    ASSESSMENT:    Bilateral knee arthritis    PLAN:     Repeat " corticosteroid injections given in both knees.  Continue conservative treatment.  Return to clinic as needed.    Large Joint Aspiration/Injection: R knee    Date/Time: 6/28/2022 11:30 AM  Performed by: ANSLEY Morse MD  Authorized by: ANSLEY Morse MD     Consent Done?:  Yes (Verbal)  Indications:  Pain  Site marked: the procedure site was marked    Timeout: prior to procedure the correct patient, procedure, and site was verified    Prep: patient was prepped and draped in usual sterile fashion      Local anesthesia used?: Yes    Local anesthetic:  Co-phenylcaine spray (0.2% Naropin)  Anesthetic total (ml):  4      Details:  Needle Size:  22 G  Ultrasonic Guidance for needle placement?: No    Approach:  Lateral  Location:  Knee  Site:  R knee  Medications:  40 mg triamcinolone acetonide 40 mg/mL  Patient tolerance:  Patient tolerated the procedure well with no immediate complications  Large Joint Aspiration/Injection: L knee    Date/Time: 6/28/2022 11:30 AM  Performed by: ANSLEY Morse MD  Authorized by: ANSLEY Morse MD     Consent Done?:  Yes (Verbal)  Indications:  Pain  Site marked: the procedure site was marked    Timeout: prior to procedure the correct patient, procedure, and site was verified    Prep: patient was prepped and draped in usual sterile fashion      Local anesthesia used?: Yes    Local anesthetic:  Co-phenylcaine spray (0.2% Naropin)  Anesthetic total (ml):  4      Details:  Needle Size:  22 G  Ultrasonic Guidance for needle placement?: No    Approach:  Lateral  Location:  Knee  Site:  L knee  Medications:  40 mg triamcinolone acetonide 40 mg/mL  Patient tolerance:  Patient tolerated the procedure well with no immediate complications

## 2022-07-18 ENCOUNTER — HOSPITAL ENCOUNTER (OUTPATIENT)
Dept: RADIOLOGY | Facility: HOSPITAL | Age: 87
Discharge: HOME | End: 2022-07-18
Attending: PODIATRIST
Payer: MEDICARE

## 2022-07-18 ENCOUNTER — OFFICE VISIT (OUTPATIENT)
Dept: PODIATRY | Facility: CLINIC | Age: 87
End: 2022-07-18
Payer: MEDICARE

## 2022-07-18 VITALS
HEART RATE: 56 BPM | BODY MASS INDEX: 26.86 KG/M2 | SYSTOLIC BLOOD PRESSURE: 152 MMHG | DIASTOLIC BLOOD PRESSURE: 69 MMHG | HEIGHT: 62 IN | WEIGHT: 145.94 LBS

## 2022-07-18 DIAGNOSIS — M54.16 LUMBAR RADICULOPATHY: ICD-10-CM

## 2022-07-18 DIAGNOSIS — M79.671 HEEL PAIN, BILATERAL: ICD-10-CM

## 2022-07-18 DIAGNOSIS — M79.672 FOOT PAIN, BILATERAL: ICD-10-CM

## 2022-07-18 DIAGNOSIS — M79.671 FOOT PAIN, BILATERAL: ICD-10-CM

## 2022-07-18 DIAGNOSIS — M79.671 HEEL PAIN, BILATERAL: Primary | ICD-10-CM

## 2022-07-18 DIAGNOSIS — M79.672 HEEL PAIN, BILATERAL: Primary | ICD-10-CM

## 2022-07-18 DIAGNOSIS — L60.0 INGROWN NAIL: ICD-10-CM

## 2022-07-18 DIAGNOSIS — M79.672 HEEL PAIN, BILATERAL: ICD-10-CM

## 2022-07-18 DIAGNOSIS — Z87.891 H/O TOBACCO USE, PRESENTING HAZARDS TO HEALTH: ICD-10-CM

## 2022-07-18 PROCEDURE — 1126F PR PAIN SEVERITY QUANTIFIED, NO PAIN PRESENT: ICD-10-PCS | Mod: CPTII,S$GLB,, | Performed by: PODIATRIST

## 2022-07-18 PROCEDURE — 1159F MED LIST DOCD IN RCRD: CPT | Mod: CPTII,S$GLB,, | Performed by: PODIATRIST

## 2022-07-18 PROCEDURE — 1159F PR MEDICATION LIST DOCUMENTED IN MEDICAL RECORD: ICD-10-PCS | Mod: CPTII,S$GLB,, | Performed by: PODIATRIST

## 2022-07-18 PROCEDURE — 99999 PR PBB SHADOW E&M-EST. PATIENT-LVL IV: ICD-10-PCS | Mod: PBBFAC,,, | Performed by: PODIATRIST

## 2022-07-18 PROCEDURE — 99204 PR OFFICE/OUTPT VISIT, NEW, LEVL IV, 45-59 MIN: ICD-10-PCS | Mod: S$GLB,,, | Performed by: PODIATRIST

## 2022-07-18 PROCEDURE — 73630 X-RAY EXAM OF FOOT: CPT | Mod: 26,50,, | Performed by: RADIOLOGY

## 2022-07-18 PROCEDURE — 73630 XR FOOT COMPLETE 3 VIEW BILATERAL: ICD-10-PCS | Mod: 26,50,, | Performed by: RADIOLOGY

## 2022-07-18 PROCEDURE — 73630 X-RAY EXAM OF FOOT: CPT | Mod: TC,50,PN

## 2022-07-18 PROCEDURE — 1126F AMNT PAIN NOTED NONE PRSNT: CPT | Mod: CPTII,S$GLB,, | Performed by: PODIATRIST

## 2022-07-18 PROCEDURE — 99204 OFFICE O/P NEW MOD 45 MIN: CPT | Mod: S$GLB,,, | Performed by: PODIATRIST

## 2022-07-18 PROCEDURE — 99999 PR PBB SHADOW E&M-EST. PATIENT-LVL IV: CPT | Mod: PBBFAC,,, | Performed by: PODIATRIST

## 2022-07-18 NOTE — PROGRESS NOTES
"Subjective:      Patient ID: Anahi Cook is a 87 y.o. female.    Chief Complaint:   Nail Problem and Heel Pain (Ivan feet)    Anahi is a 87 y.o. female who presents to the podiatry clinic  with complaint of  bilateral foot pain.  Patient relates she has had pain in her heels at night it is a "hot pain"  Patient relates only when she is sleeping at night it wakes her up she gets out of bed walks around feels better ; usually she was to the bathroom  Patient has multiple complaints today including heel pain at night, ingrown toenails that are chronic, painful left forefoot    Patient relates she did have a fall not sure that is measured her foot.  She has been evaluated for her tried as per orthopedic in all her lower extremities  Patient presents with daughter   Patient has a distant history of smoking none current    She is followed by pain management back for her back    She does have recent history of falls she uses wheelchair for distance walker for short distances      Past Medical History:   Diagnosis Date    Anxiety     Back pain     Breast cancer 1/17/2018    Breast mass 1/15/2018    Chronic kidney disease, stage 2, mildly decreased GFR     COPD (chronic obstructive pulmonary disease)     emphysema    Depression     Dysuria 1/29/2018    Family history of breast cancer 1/17/2018    GERD (gastroesophageal reflux disease)     Hypertension     Infiltrating ductal carcinoma of breast, left 7/12/2018    Osteoporosis, senile     Ovarian mass 1/15/2018    Pneumonia     S/P robotic assisted laparoscopic BSO (bilateral salpingo-oophorectomy) 2/23/2018     Past Surgical History:   Procedure Laterality Date    AXILLARY NODE DISSECTION Left 7/12/2018    Procedure: LYMPHADENECTOMY, AXILLARY;  Surgeon: Amanda Caballero MD;  Location: [facility] OR [address] [place] [address];  Service: General;  Laterality: Left;    BREAST BIOPSY      BREAST LUMPECTOMY      CHOLECYSTECTOMY      COLONOSCOPY      ESOPHAGOGASTRODUODENOSCOPY      " ESOPHAGOGASTRODUODENOSCOPY N/A 10/14/2019    Procedure: EGD (ESOPHAGOGASTRODUODENOSCOPY);  Surgeon: Davonte Thompson MD;  Location: [facility] (2ND FLR);  Service: Endoscopy;  Laterality: N/A;  hx of pulmonary hypertension-PA 50     pt requesting ASAP    EYE SURGERY      FIXATION KYPHOPLASTY N/A 11/8/2018    Procedure: Kyphoplasty   T12;  Surgeon: Merly Wilcox MD;  Location: [facility] CATH LAB;  Service: Pain Management;  Laterality: N/A;  T12  Anderson Reps e-mailed with date and time    HYSTERECTOMY      INJECTION FOR SENTINEL NODE IDENTIFICATION Left 7/12/2018    Procedure: INJECTION, FOR SENTINEL NODE IDENTIFICATION;  Surgeon: Amanda Caballero MD;  [facility];  Service: General;  Laterality: Left;    INJECTION OF FACET JOINT Bilateral 10/15/2018    Procedure: INJECTION, FACET JOINT, BILATERAL LUMBAR L3-4, 4-5, 5-S1 FACET JOINT INJECTIONS;  Surgeon: Merly Wilcox MD;  Location: [facility] PAIN MGT;  Service: Pain Management;  Laterality: Bilateral;    INJECTION OF FACET JOINT Bilateral 4/1/2019    Procedure: INJECTION, FACET JOINT, L3-L4,L4-L5,L5-S1;  Surgeon: Merly Wilcox MD;  Location: [facility] PAIN MGT;  Service: Pain Management;  Laterality: Bilateral;    INJECTION OF FACET JOINT Bilateral 6/20/2019    Procedure: INJECTION, FACET JOINT, L3-L4,L4-L5,L5-S1 NEED CONSENT;  Surgeon: Merly Wilcox MD;  Location: [facility] PAIN MGT;  Service: Pain Management;  Laterality: Bilateral;    INJECTION OF FACET JOINT Bilateral 7/27/2020    Procedure: INJECTION, FACET JOINT BILATERAL L3/4, L4/5 and L5/S1;  Surgeon: Merly Wilcox MD;  Location: [facility] PAIN MGT;  Service: Pain Management;  Laterality: Bilateral;    INJECTION OF JOINT Bilateral 7/27/2020    Procedure: INJECTION, JOINT, BILATERAL GREATER TROCHANTERIC BURSA;  Surgeon: Merly Wilcox MD;  Location: [facility] PAIN MGT;  Service: Pain Management;  Laterality: Bilateral;    INJECTION OF JOINT Bilateral 4/26/2021    Procedure: INJECTION, JOINT, HIP;  Surgeon: Merly Wilcox MD;   Location: [facility] [place] PAIN MGT;  Service: Pain Management;  Laterality: Bilateral;  consent needed    INJECTION OF STEROID Right 11/15/2021    Procedure: INJECTION, STEROID RIGHT MIDDLE AND SAMLL FINGER;  Surgeon: Florecita Da Silva MD;  Location: [facility] OR;  Service: Orthopedics;  Laterality: Right;    MASTECTOMY, PARTIAL Left 7/12/2018    Procedure: MASTECTOMY, PARTIAL-W/SEED LOCALIZATION;  Surgeon: Amanda Caballero MD;  [facility];  Service: General;  Laterality: Left;    ME REMOVAL OF OVARY/TUBE(S)  02/23/2018    Robotic Assisted    ROTATOR CUFF REPAIR Right     rotator cuff repair right shoulder    TONSILLECTOMY      TRANSFORAMINAL EPIDURAL INJECTION OF STEROID Bilateral 6/21/2021    Procedure: INJECTION, STEROID, EPIDURAL, TRANSFORAMINAL APPROACH  bilateral L4/5 TF ASHUTOSH;  Surgeon: Merly Wilcox MD;  Location: [facility] [place] PAIN MGT;  Service: Pain Management;  Laterality: Bilateral;  consent needed    TRANSFORAMINAL EPIDURAL INJECTION OF STEROID Bilateral 1/3/2022    Procedure: INJECTION, STEROID, EPIDURAL, TRANSFORAMINAL APPROACH Bilateral L4/5 Needs consent;  Surgeon: Merly Wilcox MD;  Location: [facility] [place] PAIN MGT;  Service: Pain Management;  Laterality: Bilateral;    TRIGGER FINGER RELEASE Left 11/15/2021    Procedure: RELEASE, TRIGGER FINGER, LEFT MIDDLE AND RING;  Surgeon: Florecita Da Silva MD;  Location: [facility] OR;  Service: Orthopedics;  Laterality: Left;     Current Outpatient Medications on File Prior to Visit   Medication Sig Dispense Refill    acetaminophen (TYLENOL) 325 MG tablet Take 650 mg by mouth every 6 (six) hours as needed for Pain.       albuterol (PROVENTIL) 2.5 mg /3 mL (0.083 %) nebulizer solution Inhale 3 mLs into the lungs as needed.       albuterol (PROVENTIL/VENTOLIN HFA) 90 mcg/actuation inhaler Inhale 1-2 puffs into the lungs every 6 (six) hours as needed for Wheezing or Shortness of Breath.      albuterol-ipratropium 2.5mg-0.5mg/3mL (DUO-NEB) 0.5 mg-3 mg(2.5 mg base)/3 mL  nebulizer solution Take 3 mLs by nebulization as needed.       atorvastatin (LIPITOR) 20 MG tablet Take 1 tablet (20 mg total) by mouth every evening. 90 tablet 1    benazepriL (LOTENSIN) 40 MG tablet TAKE 1 TABLET(40 MG) BY MOUTH EVERY DAY 90 tablet 1    calcium-vitamin D 250-100 mg-unit per tablet Take 1 tablet by mouth 2 (two) times daily.      carbamide peroxide (DEBROX) 6.5 % otic solution Place 5 drops into the left ear as needed (ear wax). Do not use q-tips, please only clean with a wash cloth after use 15 mL 0    CETIRIZINE HCL (ZYRTEC ORAL) Take 10 mg by mouth nightly as needed.       citalopram (CELEXA) 20 MG tablet TAKE 1 TABLET(20 MG) BY MOUTH EVERY EVENING 90 tablet 1    diphth,pertus,acell,,tetanus (BOOSTRIX TDAP) 2.5-8-5 Lf-mcg-Lf/0.5mL Syrg injection Inject into the muscle. 0.5 mL 0    felodipine (PLENDIL) 10 MG 24 hr tablet Take 1 tablet (10 mg total) by mouth 2 (two) times a day. 180 tablet 1    fluticasone-vilanterol (BREO) 100-25 mcg/dose diskus inhaler Inhale 1 puff into the lungs once daily. 1 each 3    gabapentin (NEURONTIN) 100 MG capsule TAKE 1 CAPSULE(100 MG) BY MOUTH EVERY EVENING 30 capsule 2    hydrALAZINE (APRESOLINE) 25 MG tablet TAKE 1 TABLET(25 MG) BY MOUTH EVERY 12 HOURS FOR BLOOD PRESSURE 180 tablet 1    INCRUSE ELLIPTA 62.5 mcg/actuation DsDv USE 1 PUFF QD AT SAME TIME  (patient takes at night)  2    ketoprofen 10 % CrPk APPLY UP TO 4.8 GRAMS (3 PUMPS) TO PAINFUL AREAS UP TO FIVE TIMES DAILY. RUB IN WELL  99    ketoprofen 10 % CrPk APPLY 2 GRAMS 3-4 TIMES DAILY FOR TREATMENT OF PAIN      letrozole (FEMARA) 2.5 mg Tab Take 1 tablet (2.5 mg total) by mouth once daily. 90 tablet 3    meclizine (ANTIVERT) 25 mg tablet Take 1 tablet (25 mg total) by mouth 3 (three) times daily as needed for Dizziness. 20 tablet 0    melatonin 10 mg Tab Take by mouth every evening.      montelukast (SINGULAIR) 10 mg tablet Take 10 mg by mouth every evening.      multivitamin  (THERAGRAN) per tablet Take 1 tablet by mouth once daily.      torsemide (DEMADEX) 20 MG Tab Take 1 tablet (20 mg total) by mouth once daily. 90 tablet 2    traMADoL (ULTRAM) 50 mg tablet Take 1 tablet (50 mg total) by mouth every 8 (eight) hours as needed for Pain. 80 tablet 1    triamcinolone acetonide 0.1% (KENALOG) 0.1 % cream AAA bid.  Spot treatment as needed 45 g 0    triamcinolone acetonide 0.1% (KENALOG) 0.1 % paste APPLY SPARINGLY FOUR TIMES DAILY       No current facility-administered medications on file prior to visit.     Review of patient's allergies indicates:   Allergen Reactions    Levaquin [levofloxacin] Itching    Penicillins Itching       Review of Systems   Constitutional: Negative for chills, decreased appetite, fever, malaise/fatigue, night sweats, weight gain and weight loss.   Cardiovascular: Negative for chest pain, claudication, dyspnea on exertion, leg swelling, palpitations and syncope.   Respiratory: Negative for cough and shortness of breath.    Endocrine: Negative for cold intolerance and heat intolerance.   Hematologic/Lymphatic: Negative for bleeding problem. Does not bruise/bleed easily.   Skin: Negative for color change, dry skin, flushing, itching, nail changes, poor wound healing, rash, skin cancer, suspicious lesions and unusual hair distribution.   Musculoskeletal: Positive for back pain and joint pain. Negative for arthritis, falls, gout, joint swelling, muscle cramps, muscle weakness, myalgias, neck pain and stiffness.   Gastrointestinal: Negative for diarrhea, nausea and vomiting.   Neurological: Positive for loss of balance, paresthesias and weakness. Negative for dizziness, focal weakness, light-headedness, numbness, tremors and vertigo.        Falls   Psychiatric/Behavioral: Negative for altered mental status and depression. The patient does not have insomnia.    Allergic/Immunologic: Negative.            Objective:       Vitals:    07/18/22 1430   BP: (!) 152/69  "  Pulse: (!) 56   Weight: 66.2 kg (145 lb 15.1 oz)   Height: 5' 2" (1.575 m)   PainSc: 0-No pain   66.2 kg (145 lb 15.1 oz)     Physical Exam  Vitals reviewed.   Constitutional:       General: She is not in acute distress.     Appearance: She is well-developed. She is not ill-appearing, toxic-appearing or diaphoretic.      Comments: Proper supportive shoegear      Cardiovascular:      Pulses:           Dorsalis pedis pulses are 1+ on the right side and 1+ on the left side.        Posterior tibial pulses are 1+ on the right side and 1+ on the left side.   Musculoskeletal:      Right lower le+ Edema present.      Left lower le+ Edema present.      Right ankle: Swelling present.      Right Achilles Tendon: No tenderness.      Left ankle: Swelling present.      Left Achilles Tendon: Normal. No tenderness.      Right foot: Decreased range of motion. Deformity and bunion present. No tenderness or bony tenderness.      Left foot: Decreased range of motion. Deformity, bunion, tenderness and bony tenderness present.      Comments: No pain on palpation to heels Achilles or ankles mild pain on palpation to left forefoot  No pain on palpation ingrown toenails   Feet:      Right foot:      Skin integrity: Dry skin present. No ulcer, blister, skin breakdown, erythema, warmth or callus.      Toenail Condition: Right toenails are abnormally thick and ingrown.      Left foot:      Skin integrity: Dry skin present. No ulcer, blister, skin breakdown, erythema, warmth or callus.      Toenail Condition: Left toenails are abnormally thick and ingrown.      Comments: Mild decrease toe [place] Loc monofilament difficult to assess    Incurvated nail short no signs of infection    Interspaces clear  Dry skin no open lesions  Skin:     General: Skin is warm.      Capillary Refill: Capillary refill takes 2 to 3 seconds.      Coloration: Skin is not pale.      Findings: No erythema or rash.   Neurological:      Mental Status: She " is alert and oriented to person, place, and time.      Gait: Gait abnormal.      Comments: Wheelchair to room   Psychiatric:         Attention and Perception: Attention normal.         Mood and Affect: Mood normal.         Speech: Speech normal.         Behavior: Behavior normal.               Assessment:       Encounter Diagnoses   Name Primary?    Heel pain, bilateral Yes    H/O tobacco use, presenting hazards to health     Ingrown nail     Lumbar radiculopathy     Foot pain, bilateral          Plan:       Anahi was seen today for nail problem and heel pain.    Diagnoses and all orders for this visit:    Heel pain, bilateral  -     X-Ray Foot Complete Bilateral; Future  -     Ankle Brachial Indices (JESSE); Future    H/O tobacco use, presenting hazards to health    Ingrown nail    Lumbar radiculopathy    Foot pain, bilateral  -     X-Ray Foot Complete Bilateral; Future  -     Ankle Brachial Indices (JESSE); Future      I counseled the patient on her conditions, their implications and medical management.      - discussed with patient heel pain is likely from lumbar radiculopathy as it seems to be positional at night.  However would like to exclude any vascular compromise as patient has a history of tobacco use and mild decreased palpable pulse on the right.  Recommend ABIs with toe pressures    - if ABIs and toe pressures return within normal limits will consider performing a ingrown toenail permanent nail procedure with phenol next visit on the right hallux    - courage patient to talk with her back pain management doctor about the heel pains at night patient is already on gabapentin patient may have another injection in her back soon    - discussed with patient the achy pain in her foot may be related to a previous fall patient has noted to have known arthritis.  Recommend x-rays to rule out any old fractures or current concerns of foreign bodies    - follow-up 2 months for the nail procedure unless otherwise JESSE  testing  contradicts    Greater than 45 minute spent with patient care and chart review        Follow up in about 2 months (around 9/18/2022).

## 2022-07-25 ENCOUNTER — PATIENT MESSAGE (OUTPATIENT)
Dept: PAIN MEDICINE | Facility: CLINIC | Age: 87
End: 2022-07-25
Payer: MEDICARE

## 2022-07-26 ENCOUNTER — HOSPITAL ENCOUNTER (OUTPATIENT)
Dept: CARDIOLOGY | Facility: HOSPITAL | Age: 87
Discharge: HOME | End: 2022-07-26
Attending: PODIATRIST
Payer: MEDICARE

## 2022-07-26 DIAGNOSIS — M79.671 FOOT PAIN, BILATERAL: ICD-10-CM

## 2022-07-26 DIAGNOSIS — M79.672 FOOT PAIN, BILATERAL: ICD-10-CM

## 2022-07-26 DIAGNOSIS — M79.672 HEEL PAIN, BILATERAL: ICD-10-CM

## 2022-07-26 DIAGNOSIS — M79.671 HEEL PAIN, BILATERAL: ICD-10-CM

## 2022-07-26 LAB
LEFT ABI: 0.88
LEFT ARM BP: 113 MMHG
LEFT DORSALIS PEDIS: 141 MMHG
LEFT POSTERIOR TIBIAL: 117 MMHG
LEFT TBI: 0.71
LEFT TOE PRESSURE: 114 MMHG
RIGHT ABI: 0.46
RIGHT ARM BP: 160 MMHG
RIGHT DORSALIS PEDIS: 73 MMHG
RIGHT TBI: 0.23
RIGHT TOE PRESSURE: 37 MMHG

## 2022-07-26 PROCEDURE — 93922 UPR/L XTREMITY ART 2 LEVELS: CPT

## 2022-07-26 PROCEDURE — 93922 ANKLE BRACHIAL INDICES (ABI): ICD-10-PCS | Mod: 26,,, | Performed by: INTERNAL MEDICINE

## 2022-07-26 PROCEDURE — 93922 UPR/L XTREMITY ART 2 LEVELS: CPT | Mod: 26,,, | Performed by: INTERNAL MEDICINE

## 2022-08-05 ENCOUNTER — TELEPHONE (OUTPATIENT)
Dept: PAIN MEDICINE | Facility: CLINIC | Age: 87
End: 2022-08-05

## 2022-08-05 ENCOUNTER — OFFICE VISIT (OUTPATIENT)
Dept: PAIN MEDICINE | Facility: CLINIC | Age: 87
End: 2022-08-05
Payer: MEDICARE

## 2022-08-05 DIAGNOSIS — M79.672 FOOT PAIN, BILATERAL: Primary | ICD-10-CM

## 2022-08-05 DIAGNOSIS — M54.17 LUMBOSACRAL RADICULOPATHY: Primary | ICD-10-CM

## 2022-08-05 DIAGNOSIS — M79.671 FOOT PAIN, BILATERAL: Primary | ICD-10-CM

## 2022-08-05 DIAGNOSIS — I73.9 PERIPHERAL VASCULAR DISEASE: ICD-10-CM

## 2022-08-05 PROCEDURE — 99213 PR OFFICE/OUTPT VISIT, EST, LEVL III, 20-29 MIN: ICD-10-PCS | Mod: 95,,, | Performed by: NURSE PRACTITIONER

## 2022-08-05 PROCEDURE — 1159F PR MEDICATION LIST DOCUMENTED IN MEDICAL RECORD: ICD-10-PCS | Mod: CPTII,95,, | Performed by: NURSE PRACTITIONER

## 2022-08-05 PROCEDURE — 1160F RVW MEDS BY RX/DR IN RCRD: CPT | Mod: CPTII,95,, | Performed by: NURSE PRACTITIONER

## 2022-08-05 PROCEDURE — 99213 OFFICE O/P EST LOW 20 MIN: CPT | Mod: 95,,, | Performed by: NURSE PRACTITIONER

## 2022-08-05 PROCEDURE — 1160F PR REVIEW ALL MEDS BY PRESCRIBER/CLIN PHARMACIST DOCUMENTED: ICD-10-PCS | Mod: CPTII,95,, | Performed by: NURSE PRACTITIONER

## 2022-08-05 PROCEDURE — 1159F MED LIST DOCD IN RCRD: CPT | Mod: CPTII,95,, | Performed by: NURSE PRACTITIONER

## 2022-08-05 NOTE — PROGRESS NOTES
Chronic Pain-Tele-Medicine-Established Note (Follow up visit)        The patient location is: Home  The chief complaint leading to consultation is: pain  Visit type: Virtual visit with synchronous audio and video  Total time spent with patient: 25 min  Each patient to whom he or she provides medical services by telemedicine is:  (1) informed of the relationship between the physician and patient and the respective role of any other health care provider with respect to management of the patient; and (2) notified that he or she may decline to receive medical services by telemedicine and may withdraw from such care at any time.      SUBJECTIVE:    Interval History 8/5/2022:  Mrs Cook presents for follow up evaluation of returning lower back pain. She has exact pain relieved prior by B L4/5 TFESIs. She has newer complaint of bilateral feet burning pains as well. She has had JESSE noting arterial issues R>L. She continues to take Tramadol and neurontin without SE.She would like to repeat ASHUTOSH as it provided 7 months of 75% relief and symptoms returning severe over last few weeks. No voicing of symptoms concerning for cauda equina.     Interval History 12/6/2021:  The patient returns to clinic today for follow up of low back pain. She reports increased low back and bilateral hip pain over the last two weeks. She reports intermittent radiating pain into her lateral thighs. She does have difficulty sleeping due to pain. Her pain is also worse with standing and walking. She continues to take Gabapentin with benefit. She also takes Tramadol per her PCP with benefit. She denies any other health changes. Her pain today is 8/10.    Interval History 7/6/2021:  The patient returns to clinic today for follow up of low back pain via virtual visit. She is s/p bilateral L4/5 TF ASHUTOSH on 6/21/2021. She reports 75% relief of her low back and leg pain. She reports low back pain, worse in the morning. She reports intermittent radiating  pain into the posterolateral aspect of both legs to her knees. She continues to take Gabapentin with benefit. She denies any weakness. She denies any other health changes. Her pain today is 2/10.    Interval History 6/8/2021:  The patient returns to clinic today for follow up of low back pain. She is here today for imaging review. She continues to report low back pain that radiates into the posterolateral aspect of both legs to below her knee. Her pain is worse with prolonged standing and walking. She does feel like she will fall with prolonged walking. She also reports relief with bending forward. She is currently taking Gabapentin at bedtime with benefit. She continues to take Tramadol per her PCP with benefit. She denies any other health changes. Her pain today is 8/10.    Interval History 5/20/2021:  The patient returns to clinic today for follow up of low back pain. She is s/p bilateral hip joint injections on 4/26/2021. She reports no relief. She reports increased low back pain that radiates into the posterior aspect of both legs to her ankles. She does report numbness to her feet. Her pain is worse with prolonged standing and walking. She feels as though her legs will give out with prolonged walking. She denies any bowel or bladder incontinence. She denies any other health changes. Her pain today is 9/10.    Interval History 3/23/2021:  The patient returns to clinic today for follow up of low back and hip pain. She reports increased bilateral hip pain. She describes this pain as deep and aching. Her pain is worse with prolonged standing and walking. She also reports low back pain that is aching in nature. She denies any radicular leg pain. Of note, she reports increased swelling and pain to her left ankle and foot. She does report a trip last week. She did see Orthopedics today and is scheduled for xray. She denies any other health changes. Her pain today is 8/10.    Interval History 12/9/2020:  She returns  for follow-up.  She is doing well since her facet injections.  Her right hip is hurting.  It interferes with her mobility and comfort while laying down.  No new symptomatology otherwise.    Interval History 8/11/2020:  The patient has a virtual visit today for follow up.  She is s/p Bilateral L3-4, L4-5 L5-S1 facet joint injections with 90% relief.  She says that her back pain is minimal at this time.  She is able to move around and walk better since the procedure as well.  She is having some left hip pain and feels as though her leg will give out sometimes when walking.  She would like a referral to ortho close to her home in [facility].  Her pain today is 1/10.  She denies any respiratory changes since procedure including fever, cough or SOB.    Interval History 12/5/2019:  The patient is here for follow up of lower back and bilateral hip pain.  Her back pain has been minimal.  She is having increased lateral hip and buttock pain recently.  She had TPIs in the past and would like to repeat this today.  Her pain today is 8/10.    Previous Encounter:  Anahi Cook presents to the clinic for a follow-up appointment for lower back and left lower extremity pain.  She had T12 kyphoplasty performed on 11/8/18 with significant improvement in symptoms.  She was admitted through the ED on 4/3/19 after suffering fractures of 6th, 8th and possible 7th ribs of the left side after a fall at home.  Thoracic imaging also showed stable slight anterior wedging at T11.  She had bilateral L3-4, L4-5 and L5-S1 facet injections on 4/1/19 with 80% relief of lower back pain.  Her current pain is minimal.  She has mild leg pain with walking.  She continues to follow up with oncology regularly.  Since the last visit, Anahi Cook states the pain has been improving.  Current pain intensity is 2/10.     Pain Medications:  OTC Tylenol    Opioid Contract: no     report:  Not applicable    Pain Procedures:   7/21/14 L4-5 IL ASHUTOSH- significant  benefit  12/1/15 Left GT bursa injection  4/26/16 Left GT bursa injection  12/15/16 L4-5 IL ASHUTOSH- significant benefit  2/15/18 L4-5 IL ASHUTOSH- 100% relief  8/21/18 L4-5 IL ASHUTOSH- significant relief  10/15/18 Bilateral L3-4, L4-5 L5-S1 facet joint injections  11/8/18 T12 kyphoplasty- significant relief  4/1/19 Bilateral L3-4, L4-5 L5-S1 facet joint injections- 80% relief  7/27/20 Bilateral L3-4, L4-5 L5-S1 facet joint injections- 90% relief  4/26/2021- Bilateral hip joint injections- no relief    6/21/2021- Bilateral L4/5 TF ASHUTOSH- 75% relief    Physical Therapy/Home Exercise: yes     Imaging:   MRI Lumbar Spine 5/29/2021:  COMPARISON:  Prior MRI from 2014 and 2018     FINDINGS:  There is a transitional lumbosacral segment and spine labeling is as on prior examinations.     Stable severe compression deformity at T12 with retropulsion.  Remaining vertebral body heights are maintained.  Grade 1 anterolisthesis appears increased at L4-5 as compared to previous.  There is no marrow replacement process.  The spinal cord is normal in signal.  Review of paravertebral structures demonstrates high T2 low T1 signal foci within kidneys.     Not included in the field of view of the axial, there is disc bulge at T10-11.  This does not appear to result in a significant degree of spinal canal narrowing on the basis of the sagittal imaging.     T12 demonstrates retropulsion with mild narrowing of the spinal canal.     T12-L1 demonstrates minor disc bulge.     L1-L2 demonstrates mild facet degenerative change.  There is no significant spinal canal stenosis.  There is mild bilateral neural foraminal narrowing.     L2-3 demonstrates facet degenerative change.  There is no significant spinal canal stenosis.  There is right foraminal protrusion.  This results in a moderate degree of right foraminal narrowing.  There is mild left foraminal narrowing.     L3-4 demonstrates facet degenerative change and ligamentum flavum thickening.  There is a  facet joint synovial cyst along the posterolateral aspect of the left facet.  There is right foraminal protrusion.  There is mild left and moderate right foraminal narrowing.     L4-5 demonstrates facet degenerative change and ligamentum flavum thickening.  There is anterolisthesis.  There is severe left and moderate right foraminal narrowing.  There is a severe degree of spinal canal narrowing.     L5-S1 is unremarkable.     Impression:     Multilevel degenerative changes as further detailed above.  Findings are most significant at the L4-5 level, noting transitional lumbosacral segment.     Stable severe compression deformity at T12.     Foci within the kidneys bilaterally likely representing cyst can be correlated with ultrasound.          Past Medical History:  Past Medical History:   Diagnosis Date    Anxiety     Back pain     Breast cancer 1/17/2018    Breast mass 1/15/2018    Chronic kidney disease, stage 2, mildly decreased GFR     COPD (chronic obstructive pulmonary disease)     emphysema    Depression     Dysuria 1/29/2018    Family history of breast cancer 1/17/2018    GERD (gastroesophageal reflux disease)     Hypertension     Infiltrating ductal carcinoma of breast, left 7/12/2018    Osteoporosis, senile     Ovarian mass 1/15/2018    Pneumonia     S/P robotic assisted laparoscopic BSO (bilateral salpingo-oophorectomy) 2/23/2018       Past Surgical History:  Past Surgical History:   Procedure Laterality Date    AXILLARY NODE DISSECTION Left 7/12/2018    Procedure: LYMPHADENECTOMY, AXILLARY;  Surgeon: Amanda Caballero MD;  Location: [facility] OR [address] [place] [address];  Service: General;  Laterality: Left;    BREAST BIOPSY      BREAST LUMPECTOMY      CHOLECYSTECTOMY      COLONOSCOPY      ESOPHAGOGASTRODUODENOSCOPY      ESOPHAGOGASTRODUODENOSCOPY N/A 10/14/2019    Procedure: EGD (ESOPHAGOGASTRODUODENOSCOPY);  Surgeon: Davonte Thompson MD;  Location: [address]);  Service: Endoscopy;  Laterality:  N/A;  hx of pulmonary hypertension-PA 50     pt requesting ASAP    EYE SURGERY      FIXATION KYPHOPLASTY N/A 11/8/2018    Procedure: Kyphoplasty   T12;  Surgeon: Merly Wilcox MD;  Location: [facility] [place] CATH LAB;  Service: Pain Management;  Laterality: N/A;  T12  Nia Reps e-mailed with date and time    HYSTERECTOMY      INJECTION FOR SENTINEL NODE IDENTIFICATION Left 7/12/2018    Procedure: INJECTION, FOR SENTINEL NODE IDENTIFICATION;  Surgeon: Amanda Caballero MD;  Location: [facility] OR [address] [place] [address];  Service: General;  Laterality: Left;    INJECTION OF FACET JOINT Bilateral 10/15/2018    Procedure: INJECTION, FACET JOINT, BILATERAL LUMBAR L3-4, 4-5, 5-S1 FACET JOINT INJECTIONS;  Surgeon: Merly Wilcox MD;  Location: [facility] [place] PAIN MGT;  Service: Pain Management;  Laterality: Bilateral;    INJECTION OF FACET JOINT Bilateral 4/1/2019    Procedure: INJECTION, FACET JOINT, L3-L4,L4-L5,L5-S1;  Surgeon: Merly Wilcox MD;  Location: [facility] [place] PAIN MGT;  Service: Pain Management;  Laterality: Bilateral;    INJECTION OF FACET JOINT Bilateral 6/20/2019    Procedure: INJECTION, FACET JOINT, L3-L4,L4-L5,L5-S1 NEED CONSENT;  Surgeon: Merly Wilcox MD;  Location: [facility] [place] PAIN MGT;  Service: Pain Management;  Laterality: Bilateral;    INJECTION OF FACET JOINT Bilateral 7/27/2020    Procedure: INJECTION, FACET JOINT BILATERAL L3/4, L4/5 and L5/S1;  Surgeon: Merly Wilcox MD;  Location: [facility] [place] PAIN MGT;  Service: Pain Management;  Laterality: Bilateral;    INJECTION OF JOINT Bilateral 7/27/2020    Procedure: INJECTION, JOINT, BILATERAL GREATER TROCHANTERIC BURSA;  Surgeon: Merly Wilcox MD;  Location: [facility] [place] PAIN MGT;  Service: Pain Management;  Laterality: Bilateral;    INJECTION OF JOINT Bilateral 4/26/2021    Procedure: INJECTION, JOINT, HIP;  Surgeon: Merly Wilcox MD;  Location: [facility] [place] PAIN MGT;  Service: Pain Management;  Laterality: Bilateral;  consent needed    INJECTION OF STEROID Right 11/15/2021    Procedure: INJECTION, STEROID RIGHT MIDDLE AND  TAMMIE MCCAIN;  Surgeon: Florecita aD Silva MD;  Location: [facility] OR;  Service: Orthopedics;  Laterality: Right;    MASTECTOMY, PARTIAL Left 2018    Procedure: MASTECTOMY, PARTIAL-W/SEED LOCALIZATION;  Surgeon: Amanda Caballero MD;  Location: [facility] OR 2ND FLR;  Service: General;  Laterality: Left;    DC REMOVAL OF OVARY/TUBE(S)  2018    Robotic Assisted    ROTATOR CUFF REPAIR Right     rotator cuff repair right shoulder    TONSILLECTOMY      TRANSFORAMINAL EPIDURAL INJECTION OF STEROID Bilateral 2021    Procedure: INJECTION, STEROID, EPIDURAL, TRANSFORAMINAL APPROACH  bilateral L4/5 TF ASHUTOSH;  Surgeon: Merly Wilcox MD;  Location: [facility] PAIN MGT;  Service: Pain Management;  Laterality: Bilateral;  consent needed    TRANSFORAMINAL EPIDURAL INJECTION OF STEROID Bilateral 1/3/2022    Procedure: INJECTION, STEROID, EPIDURAL, TRANSFORAMINAL APPROACH Bilateral L4/5 Needs consent;  Surgeon: Merly Wilcox MD;  Location: [facility] PAIN MGT;  Service: Pain Management;  Laterality: Bilateral;    TRIGGER FINGER RELEASE Left 11/15/2021    Procedure: RELEASE, TRIGGER FINGER, LEFT MIDDLE AND RING;  Surgeon: Florecita Da Silva MD;  Location: [facility] OR;  Service: Orthopedics;  Laterality: Left;       Family History:  Family History   Problem Relation Age of Onset    Heart failure Mother     Kidney failure Mother     Heart attack Father     Breast cancer Sister 66        Lump, XRT, chemo, recurrence 10 years later.    Cancer Brother         leukemia    Heart attack Brother 58    Pulmonary embolism Brother 45    Heart attack Brother 52    Breast cancer Maternal Grandmother 60        advanced at diagnosis    Breast cancer Maternal Aunt 83         at 92    Colon cancer Neg Hx     Esophageal cancer Neg Hx        Social History:  Social History     Socioeconomic History    Marital status: Single    Number of children: 3   Occupational History    Occupation: Multiple jobs, see social documentation section      Comment: Retired   Tobacco Use    Smoking status: Former Smoker     Packs/day: 1.00     Years: 40.00     Pack years: 40.00     Types: Cigarettes    Smokeless tobacco: Never Used    Tobacco comment: Smoked >1 ppd for at least 40 years, quit around 1995   Substance and Sexual Activity    Alcohol use: Yes     Comment: occasional glass of wine or cocktail    Drug use: No    Sexual activity: Yes     Partners: Male   Social History Narrative    Worked many jobs while raising 3 children.  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  She was  from her first , but took care of him at the end of his life.       REVIEW OF SYSTEMS:    GENERAL:  No weight loss, malaise or fevers.  HEENT:   No recent changes in vision or hearing  NECK:  Negative for lumps, no difficulty with swallowing.  RESPIRATORY:  Negative for cough, wheezing or shortness of breath, patient denies any recent URI. COPD.  CARDIOVASCULAR:  Negative for chest pain, leg swelling or palpitations. Hypertension.  GI:  Negative for abdominal discomfort, blood in stools or black stools or change in bowel habits.  MUSCULOSKELETAL:  See HPI.  SKIN:  Negative for lesions, rash, and itching.  PSYCH:  No mood disorder or recent psychosocial stressors.  Patients sleep is not disturbed secondary to pain.  HEMATOLOGY/LYMPHOLOGY:  Negative for prolonged bleeding, bruising easily or swollen nodes.  Patient is not currently taking any anti-coagulants  NEURO:   No history of headaches, syncope, paralysis, seizures or tremors.  All other reviewed and negative other than HPI.    OBJECTIVE:    LMP  (LMP Unknown)     PHYSICAL EXAMINATION:  Voice normal.  Normal affect without evidence of distress.      ASSESSMENT: 87 y.o. year old female with lower back and lower extremity pain, consistent with the following diagnoses:     1. Lumbosacral radiculopathy  Procedure Order  to Pain Management         PLAN:     - Previous imaging was reviewed and discussed with the patient today.    - Schedule for repeat bilateral L4/5 TF ASHUTOSH     - If symptoms of feet do not improve will refer to vascular     - Continue Gabapentin 100 mg at bedtime.     - Continue Tramadol per PCP.     - RTC 2 weeks after above procedure.       The above plan and management options were discussed at length with patient. Patient is in agreement with the above and verbalized understanding.    Yohannes Landry  08/05/2022

## 2022-08-05 NOTE — TELEPHONE ENCOUNTER
----- Message from Annmarie Landry sent at 8/5/2022  4:35 PM CDT -----  Regarding: injection procedure  Name of Who is Calling: Patience Pt daughter       What is the request in detail: Patient daughter is requesting a candace back she states she was advised today someone would call her mother to scheduled for a injection procedure to have it done next week        Can the clinic reply by [facility]: no      What Number to Call Back if not in [facility]: [phone]

## 2022-08-05 NOTE — TELEPHONE ENCOUNTER
Staff spoke with pt daughter and informed her that a procedure  with call her within 24-48 hours after the other was put in and staff also gave pt the procedure  number to call if she has any questions and staff also let her know taht her message would be routed over.

## 2022-08-05 NOTE — H&P (VIEW-ONLY)
Chronic Pain-Tele-Medicine-Established Note (Follow up visit)        The patient location is: Home  The chief complaint leading to consultation is: pain  Visit type: Virtual visit with synchronous audio and video  Total time spent with patient: 25 min  Each patient to whom he or she provides medical services by telemedicine is:  (1) informed of the relationship between the physician and patient and the respective role of any other health care provider with respect to management of the patient; and (2) notified that he or she may decline to receive medical services by telemedicine and may withdraw from such care at any time.      SUBJECTIVE:    Interval History 8/5/2022:  Mrs Cook presents for follow up evaluation of returning lower back pain. She has exact pain relieved prior by B L4/5 TFESIs. She has newer complaint of bilateral feet burning pains as well. She has had JESSE noting arterial issues R>L. She continues to take Tramadol and neurontin without SE.She would like to repeat ASHUTOSH as it provided 7 months of 75% relief and symptoms returning severe over last few weeks. No voicing of symptoms concerning for cauda equina.     Interval History 12/6/2021:  The patient returns to clinic today for follow up of low back pain. She reports increased low back and bilateral hip pain over the last two weeks. She reports intermittent radiating pain into her lateral thighs. She does have difficulty sleeping due to pain. Her pain is also worse with standing and walking. She continues to take Gabapentin with benefit. She also takes Tramadol per her PCP with benefit. She denies any other health changes. Her pain today is 8/10.    Interval History 7/6/2021:  The patient returns to clinic today for follow up of low back pain via virtual visit. She is s/p bilateral L4/5 TF ASHUTOSH on 6/21/2021. She reports 75% relief of her low back and leg pain. She reports low back pain, worse in the morning. She reports intermittent radiating  pain into the posterolateral aspect of both legs to her knees. She continues to take Gabapentin with benefit. She denies any weakness. She denies any other health changes. Her pain today is 2/10.    Interval History 6/8/2021:  The patient returns to clinic today for follow up of low back pain. She is here today for imaging review. She continues to report low back pain that radiates into the posterolateral aspect of both legs to below her knee. Her pain is worse with prolonged standing and walking. She does feel like she will fall with prolonged walking. She also reports relief with bending forward. She is currently taking Gabapentin at bedtime with benefit. She continues to take Tramadol per her PCP with benefit. She denies any other health changes. Her pain today is 8/10.    Interval History 5/20/2021:  The patient returns to clinic today for follow up of low back pain. She is s/p bilateral hip joint injections on 4/26/2021. She reports no relief. She reports increased low back pain that radiates into the posterior aspect of both legs to her ankles. She does report numbness to her feet. Her pain is worse with prolonged standing and walking. She feels as though her legs will give out with prolonged walking. She denies any bowel or bladder incontinence. She denies any other health changes. Her pain today is 9/10.    Interval History 3/23/2021:  The patient returns to clinic today for follow up of low back and hip pain. She reports increased bilateral hip pain. She describes this pain as deep and aching. Her pain is worse with prolonged standing and walking. She also reports low back pain that is aching in nature. She denies any radicular leg pain. Of note, she reports increased swelling and pain to her left ankle and foot. She does report a trip last week. She did see Orthopedics today and is scheduled for xray. She denies any other health changes. Her pain today is 8/10.    Interval History 12/9/2020:  She returns  for follow-up.  She is doing well since her facet injections.  Her right hip is hurting.  It interferes with her mobility and comfort while laying down.  No new symptomatology otherwise.    Interval History 8/11/2020:  The patient has a virtual visit today for follow up.  She is s/p Bilateral L3-4, L4-5 L5-S1 facet joint injections with 90% relief.  She says that her back pain is minimal at this time.  She is able to move around and walk better since the procedure as well.  She is having some left hip pain and feels as though her leg will give out sometimes when walking.  She would like a referral to ortho close to her home in [facility].  Her pain today is 1/10.  She denies any respiratory changes since procedure including fever, cough or SOB.    Interval History 12/5/2019:  The patient is here for follow up of lower back and bilateral hip pain.  Her back pain has been minimal.  She is having increased lateral hip and buttock pain recently.  She had TPIs in the past and would like to repeat this today.  Her pain today is 8/10.    Previous Encounter:  Anahi Cook presents to the clinic for a follow-up appointment for lower back and left lower extremity pain.  She had T12 kyphoplasty performed on 11/8/18 with significant improvement in symptoms.  She was admitted through the ED on 4/3/19 after suffering fractures of 6th, 8th and possible 7th ribs of the left side after a fall at home.  Thoracic imaging also showed stable slight anterior wedging at T11.  She had bilateral L3-4, L4-5 and L5-S1 facet injections on 4/1/19 with 80% relief of lower back pain.  Her current pain is minimal.  She has mild leg pain with walking.  She continues to follow up with oncology regularly.  Since the last visit, Anahi Cook states the pain has been improving.  Current pain intensity is 2/10.     Pain Medications:  OTC Tylenol    Opioid Contract: no     report:  Not applicable    Pain Procedures:   7/21/14 L4-5 IL ASHUTOSH- significant  benefit  12/1/15 Left GT bursa injection  4/26/16 Left GT bursa injection  12/15/16 L4-5 IL ASHUTOSH- significant benefit  2/15/18 L4-5 IL ASHUTOSH- 100% relief  8/21/18 L4-5 IL ASHUTOSH- significant relief  10/15/18 Bilateral L3-4, L4-5 L5-S1 facet joint injections  11/8/18 T12 kyphoplasty- significant relief  4/1/19 Bilateral L3-4, L4-5 L5-S1 facet joint injections- 80% relief  7/27/20 Bilateral L3-4, L4-5 L5-S1 facet joint injections- 90% relief  4/26/2021- Bilateral hip joint injections- no relief    6/21/2021- Bilateral L4/5 TF ASHUTOSH- 75% relief    Physical Therapy/Home Exercise: yes     Imaging:   MRI Lumbar Spine 5/29/2021:  COMPARISON:  Prior MRI from 2014 and 2018     FINDINGS:  There is a transitional lumbosacral segment and spine labeling is as on prior examinations.     Stable severe compression deformity at T12 with retropulsion.  Remaining vertebral body heights are maintained.  Grade 1 anterolisthesis appears increased at L4-5 as compared to previous.  There is no marrow replacement process.  The spinal cord is normal in signal.  Review of paravertebral structures demonstrates high T2 low T1 signal foci within kidneys.     Not included in the field of view of the axial, there is disc bulge at T10-11.  This does not appear to result in a significant degree of spinal canal narrowing on the basis of the sagittal imaging.     T12 demonstrates retropulsion with mild narrowing of the spinal canal.     T12-L1 demonstrates minor disc bulge.     L1-L2 demonstrates mild facet degenerative change.  There is no significant spinal canal stenosis.  There is mild bilateral neural foraminal narrowing.     L2-3 demonstrates facet degenerative change.  There is no significant spinal canal stenosis.  There is right foraminal protrusion.  This results in a moderate degree of right foraminal narrowing.  There is mild left foraminal narrowing.     L3-4 demonstrates facet degenerative change and ligamentum flavum thickening.  There is a  facet joint synovial cyst along the posterolateral aspect of the left facet.  There is right foraminal protrusion.  There is mild left and moderate right foraminal narrowing.     L4-5 demonstrates facet degenerative change and ligamentum flavum thickening.  There is anterolisthesis.  There is severe left and moderate right foraminal narrowing.  There is a severe degree of spinal canal narrowing.     L5-S1 is unremarkable.     Impression:     Multilevel degenerative changes as further detailed above.  Findings are most significant at the L4-5 level, noting transitional lumbosacral segment.     Stable severe compression deformity at T12.     Foci within the kidneys bilaterally likely representing cyst can be correlated with ultrasound.          Past Medical History:  Past Medical History:   Diagnosis Date    Anxiety     Back pain     Breast cancer 1/17/2018    Breast mass 1/15/2018    Chronic kidney disease, stage 2, mildly decreased GFR     COPD (chronic obstructive pulmonary disease)     emphysema    Depression     Dysuria 1/29/2018    Family history of breast cancer 1/17/2018    GERD (gastroesophageal reflux disease)     Hypertension     Infiltrating ductal carcinoma of breast, left 7/12/2018    Osteoporosis, senile     Ovarian mass 1/15/2018    Pneumonia     S/P robotic assisted laparoscopic BSO (bilateral salpingo-oophorectomy) 2/23/2018       Past Surgical History:  Past Surgical History:   Procedure Laterality Date    AXILLARY NODE DISSECTION Left 7/12/2018    Procedure: LYMPHADENECTOMY, AXILLARY;  Surgeon: Amanda Caballero MD;  Location: [facility] OR [address] [place] [address];  Service: General;  Laterality: Left;    BREAST BIOPSY      BREAST LUMPECTOMY      CHOLECYSTECTOMY      COLONOSCOPY      ESOPHAGOGASTRODUODENOSCOPY      ESOPHAGOGASTRODUODENOSCOPY N/A 10/14/2019    Procedure: EGD (ESOPHAGOGASTRODUODENOSCOPY);  Surgeon: Davonte Thompson MD;  Location: [address]);  Service: Endoscopy;  Laterality:  N/A;  hx of pulmonary hypertension-PA 50     pt requesting ASAP    EYE SURGERY      FIXATION KYPHOPLASTY N/A 11/8/2018    Procedure: Kyphoplasty   T12;  Surgeon: Merly Wilcox MD;  Location: [facility] CATH LAB;  Service: Pain Management;  Laterality: N/A;  T12  Nia Reps e-mailed with date and time    HYSTERECTOMY      INJECTION FOR SENTINEL NODE IDENTIFICATION Left 7/12/2018    Procedure: INJECTION, FOR SENTINEL NODE IDENTIFICATION;  Surgeon: Amanda Caballero MD;  Location: [facility] OR [address] [place] [address];  Service: General;  Laterality: Left;    INJECTION OF FACET JOINT Bilateral 10/15/2018    Procedure: INJECTION, FACET JOINT, BILATERAL LUMBAR L3-4, 4-5, 5-S1 FACET JOINT INJECTIONS;  Surgeon: Merly Wilcox MD;  Location: [facility] PAIN MGT;  Service: Pain Management;  Laterality: Bilateral;    INJECTION OF FACET JOINT Bilateral 4/1/2019    Procedure: INJECTION, FACET JOINT, L3-L4,L4-L5,L5-S1;  Surgeon: Merly Wilcox MD;  Location: [facility] PAIN MGT;  Service: Pain Management;  Laterality: Bilateral;    INJECTION OF FACET JOINT Bilateral 6/20/2019    Procedure: INJECTION, FACET JOINT, L3-L4,L4-L5,L5-S1 NEED CONSENT;  Surgeon: Merly Wilcox MD;  Location: [facility] PAIN MGT;  Service: Pain Management;  Laterality: Bilateral;    INJECTION OF FACET JOINT Bilateral 7/27/2020    Procedure: INJECTION, FACET JOINT BILATERAL L3/4, L4/5 and L5/S1;  Surgeon: Merly Wilcox MD;  Location: [facility] PAIN MGT;  Service: Pain Management;  Laterality: Bilateral;    INJECTION OF JOINT Bilateral 7/27/2020    Procedure: INJECTION, JOINT, BILATERAL GREATER TROCHANTERIC BURSA;  Surgeon: Merly Wilcox MD;  Location: [facility] PAIN MGT;  Service: Pain Management;  Laterality: Bilateral;    INJECTION OF JOINT Bilateral 4/26/2021    Procedure: INJECTION, JOINT, HIP;  Surgeon: Merly Wilcox MD;  Location: [facility] PAIN MGT;  Service: Pain Management;  Laterality: Bilateral;  consent needed    INJECTION OF STEROID Right 11/15/2021    Procedure: INJECTION, STEROID RIGHT MIDDLE AND  TAMMIE MCCAIN;  Surgeon: Florecita Da Silva MD;  Location: [facility] OR;  Service: Orthopedics;  Laterality: Right;    MASTECTOMY, PARTIAL Left 2018    Procedure: MASTECTOMY, PARTIAL-W/SEED LOCALIZATION;  Surgeon: Amanda Caballero MD;  Location: [facility] OR 2ND FLR;  Service: General;  Laterality: Left;    MT REMOVAL OF OVARY/TUBE(S)  2018    Robotic Assisted    ROTATOR CUFF REPAIR Right     rotator cuff repair right shoulder    TONSILLECTOMY      TRANSFORAMINAL EPIDURAL INJECTION OF STEROID Bilateral 2021    Procedure: INJECTION, STEROID, EPIDURAL, TRANSFORAMINAL APPROACH  bilateral L4/5 TF ASHUTOSH;  Surgeon: Merly Wilcox MD;  Location: [facility] PAIN MGT;  Service: Pain Management;  Laterality: Bilateral;  consent needed    TRANSFORAMINAL EPIDURAL INJECTION OF STEROID Bilateral 1/3/2022    Procedure: INJECTION, STEROID, EPIDURAL, TRANSFORAMINAL APPROACH Bilateral L4/5 Needs consent;  Surgeon: Merly Wilcox MD;  Location: [facility] PAIN MGT;  Service: Pain Management;  Laterality: Bilateral;    TRIGGER FINGER RELEASE Left 11/15/2021    Procedure: RELEASE, TRIGGER FINGER, LEFT MIDDLE AND RING;  Surgeon: Florecita Da Silva MD;  Location: [facility] OR;  Service: Orthopedics;  Laterality: Left;       Family History:  Family History   Problem Relation Age of Onset    Heart failure Mother     Kidney failure Mother     Heart attack Father     Breast cancer Sister 66        Lump, XRT, chemo, recurrence 10 years later.    Cancer Brother         leukemia    Heart attack Brother 58    Pulmonary embolism Brother 45    Heart attack Brother 52    Breast cancer Maternal Grandmother 60        advanced at diagnosis    Breast cancer Maternal Aunt 83         at 92    Colon cancer Neg Hx     Esophageal cancer Neg Hx        Social History:  Social History     Socioeconomic History    Marital status: Single    Number of children: 3   Occupational History    Occupation: Multiple jobs, see social documentation section      Comment: Retired   Tobacco Use    Smoking status: Former Smoker     Packs/day: 1.00     Years: 40.00     Pack years: 40.00     Types: Cigarettes    Smokeless tobacco: Never Used    Tobacco comment: Smoked >1 ppd for at least 40 years, quit around 1995   Substance and Sexual Activity    Alcohol use: Yes     Comment: occasional glass of wine or cocktail    Drug use: No    Sexual activity: Yes     Partners: Male   Social History Narrative    Worked many jobs while raising 3 children.  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  She was  from her first , but took care of him at the end of his life.       REVIEW OF SYSTEMS:    GENERAL:  No weight loss, malaise or fevers.  HEENT:   No recent changes in vision or hearing  NECK:  Negative for lumps, no difficulty with swallowing.  RESPIRATORY:  Negative for cough, wheezing or shortness of breath, patient denies any recent URI. COPD.  CARDIOVASCULAR:  Negative for chest pain, leg swelling or palpitations. Hypertension.  GI:  Negative for abdominal discomfort, blood in stools or black stools or change in bowel habits.  MUSCULOSKELETAL:  See HPI.  SKIN:  Negative for lesions, rash, and itching.  PSYCH:  No mood disorder or recent psychosocial stressors.  Patients sleep is not disturbed secondary to pain.  HEMATOLOGY/LYMPHOLOGY:  Negative for prolonged bleeding, bruising easily or swollen nodes.  Patient is not currently taking any anti-coagulants  NEURO:   No history of headaches, syncope, paralysis, seizures or tremors.  All other reviewed and negative other than HPI.    OBJECTIVE:    LMP  (LMP Unknown)     PHYSICAL EXAMINATION:  Voice normal.  Normal affect without evidence of distress.      ASSESSMENT: 87 y.o. year old female with lower back and lower extremity pain, consistent with the following diagnoses:     1. Lumbosacral radiculopathy  Procedure Order  to Pain Management         PLAN:     - Previous imaging was reviewed and discussed with the patient today.    - Schedule for repeat bilateral L4/5 TF ASHUTOSH     - If symptoms of feet do not improve will refer to vascular     - Continue Gabapentin 100 mg at bedtime.     - Continue Tramadol per PCP.     - RTC 2 weeks after above procedure.       The above plan and management options were discussed at length with patient. Patient is in agreement with the above and verbalized understanding.    Yohannes Landry  08/05/2022

## 2022-08-09 ENCOUNTER — OFFICE VISIT (OUTPATIENT)
Dept: OPTOMETRY | Facility: CLINIC | Age: 87
End: 2022-08-09
Payer: MEDICARE

## 2022-08-09 ENCOUNTER — HOSPITAL ENCOUNTER (OUTPATIENT)
Facility: OTHER | Age: 87
Discharge: HOME | End: 2022-08-09
Attending: ANESTHESIOLOGY | Admitting: ANESTHESIOLOGY
Payer: MEDICARE

## 2022-08-09 VITALS
WEIGHT: 145 LBS | TEMPERATURE: 99 F | DIASTOLIC BLOOD PRESSURE: 72 MMHG | HEART RATE: 63 BPM | SYSTOLIC BLOOD PRESSURE: 159 MMHG | HEIGHT: 62 IN | RESPIRATION RATE: 16 BRPM | OXYGEN SATURATION: 96 % | BODY MASS INDEX: 26.68 KG/M2

## 2022-08-09 DIAGNOSIS — M54.16 LUMBAR RADICULOPATHY: ICD-10-CM

## 2022-08-09 DIAGNOSIS — H35.30 AMD (AGE RELATED MACULAR DEGENERATION): ICD-10-CM

## 2022-08-09 DIAGNOSIS — H35.371 EPIRETINAL MEMBRANE (ERM) OF RIGHT EYE: Primary | ICD-10-CM

## 2022-08-09 DIAGNOSIS — I10 ESSENTIAL HYPERTENSION: Chronic | ICD-10-CM

## 2022-08-09 DIAGNOSIS — G89.29 CHRONIC PAIN: ICD-10-CM

## 2022-08-09 DIAGNOSIS — H52.203 MYOPIA WITH ASTIGMATISM AND PRESBYOPIA, BILATERAL: ICD-10-CM

## 2022-08-09 DIAGNOSIS — Z96.1 PSEUDOPHAKIA: ICD-10-CM

## 2022-08-09 DIAGNOSIS — M51.37 DDD (DEGENERATIVE DISC DISEASE), LUMBOSACRAL: Primary | ICD-10-CM

## 2022-08-09 DIAGNOSIS — H52.13 MYOPIA WITH ASTIGMATISM AND PRESBYOPIA, BILATERAL: ICD-10-CM

## 2022-08-09 DIAGNOSIS — H52.4 MYOPIA WITH ASTIGMATISM AND PRESBYOPIA, BILATERAL: ICD-10-CM

## 2022-08-09 PROCEDURE — 3288F FALL RISK ASSESSMENT DOCD: CPT | Mod: CPTII,S$GLB,, | Performed by: OPTOMETRIST

## 2022-08-09 PROCEDURE — 92015 PR REFRACTION: ICD-10-PCS | Mod: S$GLB,,, | Performed by: OPTOMETRIST

## 2022-08-09 PROCEDURE — 92015 DETERMINE REFRACTIVE STATE: CPT | Mod: S$GLB,,, | Performed by: OPTOMETRIST

## 2022-08-09 PROCEDURE — 25500020 PHARM REV CODE 255: Performed by: ANESTHESIOLOGY

## 2022-08-09 PROCEDURE — 92004 PR EYE EXAM, NEW PATIENT,COMPREHESV: ICD-10-PCS | Mod: S$GLB,,, | Performed by: OPTOMETRIST

## 2022-08-09 PROCEDURE — 63600175 PHARM REV CODE 636 W HCPCS: Performed by: ANESTHESIOLOGY

## 2022-08-09 PROCEDURE — 92004 COMPRE OPH EXAM NEW PT 1/>: CPT | Mod: S$GLB,,, | Performed by: OPTOMETRIST

## 2022-08-09 PROCEDURE — 1160F RVW MEDS BY RX/DR IN RCRD: CPT | Mod: CPTII,S$GLB,, | Performed by: OPTOMETRIST

## 2022-08-09 PROCEDURE — 99999 PR PBB SHADOW E&M-EST. PATIENT-LVL III: CPT | Mod: PBBFAC,,, | Performed by: OPTOMETRIST

## 2022-08-09 PROCEDURE — 99152 PR MOD CONSCIOUS SEDATION, SAME PHYS, 5+ YRS, FIRST 15 MIN: ICD-10-PCS | Mod: ,,, | Performed by: ANESTHESIOLOGY

## 2022-08-09 PROCEDURE — 64483 PR EPIDURAL INJ, ANES/STEROID, TRANSFORAMINAL, LUMB/SACR, SNGL LEVL: ICD-10-PCS | Mod: 50,,, | Performed by: ANESTHESIOLOGY

## 2022-08-09 PROCEDURE — 99152 MOD SED SAME PHYS/QHP 5/>YRS: CPT | Mod: ,,, | Performed by: ANESTHESIOLOGY

## 2022-08-09 PROCEDURE — 64483 NJX AA&/STRD TFRM EPI L/S 1: CPT | Mod: 50,,, | Performed by: ANESTHESIOLOGY

## 2022-08-09 PROCEDURE — 99999 PR PBB SHADOW E&M-EST. PATIENT-LVL III: ICD-10-PCS | Mod: PBBFAC,,, | Performed by: OPTOMETRIST

## 2022-08-09 PROCEDURE — 92134 POSTERIOR SEGMENT OCT RETINA (OCULAR COHERENCE TOMOGRAPHY)-BOTH EYES: ICD-10-PCS | Mod: S$GLB,,, | Performed by: OPTOMETRIST

## 2022-08-09 PROCEDURE — 1160F PR REVIEW ALL MEDS BY PRESCRIBER/CLIN PHARMACIST DOCUMENTED: ICD-10-PCS | Mod: CPTII,S$GLB,, | Performed by: OPTOMETRIST

## 2022-08-09 PROCEDURE — 1159F MED LIST DOCD IN RCRD: CPT | Mod: CPTII,S$GLB,, | Performed by: OPTOMETRIST

## 2022-08-09 PROCEDURE — 1126F PR PAIN SEVERITY QUANTIFIED, NO PAIN PRESENT: ICD-10-PCS | Mod: CPTII,S$GLB,, | Performed by: OPTOMETRIST

## 2022-08-09 PROCEDURE — 3288F PR FALLS RISK ASSESSMENT DOCUMENTED: ICD-10-PCS | Mod: CPTII,S$GLB,, | Performed by: OPTOMETRIST

## 2022-08-09 PROCEDURE — 1101F PT FALLS ASSESS-DOCD LE1/YR: CPT | Mod: CPTII,S$GLB,, | Performed by: OPTOMETRIST

## 2022-08-09 PROCEDURE — 64483 NJX AA&/STRD TFRM EPI L/S 1: CPT | Mod: 50 | Performed by: ANESTHESIOLOGY

## 2022-08-09 PROCEDURE — 1126F AMNT PAIN NOTED NONE PRSNT: CPT | Mod: CPTII,S$GLB,, | Performed by: OPTOMETRIST

## 2022-08-09 PROCEDURE — 1159F PR MEDICATION LIST DOCUMENTED IN MEDICAL RECORD: ICD-10-PCS | Mod: CPTII,S$GLB,, | Performed by: OPTOMETRIST

## 2022-08-09 PROCEDURE — 1101F PR PT FALLS ASSESS DOC 0-1 FALLS W/OUT INJ PAST YR: ICD-10-PCS | Mod: CPTII,S$GLB,, | Performed by: OPTOMETRIST

## 2022-08-09 PROCEDURE — 99152 MOD SED SAME PHYS/QHP 5/>YRS: CPT | Performed by: ANESTHESIOLOGY

## 2022-08-09 PROCEDURE — 25000003 PHARM REV CODE 250: Performed by: ANESTHESIOLOGY

## 2022-08-09 PROCEDURE — 92134 CPTRZ OPH DX IMG PST SGM RTA: CPT | Mod: S$GLB,,, | Performed by: OPTOMETRIST

## 2022-08-09 RX ORDER — SODIUM CHLORIDE 9 MG/ML
500 INJECTION, SOLUTION INTRAVENOUS CONTINUOUS
Status: CANCELLED | OUTPATIENT
Start: 2022-08-09

## 2022-08-09 RX ORDER — MIDAZOLAM HYDROCHLORIDE 1 MG/ML
INJECTION INTRAMUSCULAR; INTRAVENOUS
Status: DISCONTINUED | OUTPATIENT
Start: 2022-08-09 | End: 2022-08-09 | Stop reason: HOSPADM

## 2022-08-09 RX ORDER — SODIUM CHLORIDE 9 MG/ML
500 INJECTION, SOLUTION INTRAVENOUS CONTINUOUS
Status: DISCONTINUED | OUTPATIENT
Start: 2022-08-09 | End: 2022-08-09 | Stop reason: HOSPADM

## 2022-08-09 RX ORDER — DEXAMETHASONE SODIUM PHOSPHATE 10 MG/ML
INJECTION INTRAMUSCULAR; INTRAVENOUS
Status: DISCONTINUED | OUTPATIENT
Start: 2022-08-09 | End: 2022-08-09 | Stop reason: HOSPADM

## 2022-08-09 RX ORDER — LIDOCAINE HYDROCHLORIDE 10 MG/ML
INJECTION, SOLUTION EPIDURAL; INFILTRATION; INTRACAUDAL; PERINEURAL
Status: DISCONTINUED | OUTPATIENT
Start: 2022-08-09 | End: 2022-08-09 | Stop reason: HOSPADM

## 2022-08-09 RX ORDER — LIDOCAINE HYDROCHLORIDE 20 MG/ML
INJECTION, SOLUTION INFILTRATION; PERINEURAL
Status: DISCONTINUED | OUTPATIENT
Start: 2022-08-09 | End: 2022-08-09 | Stop reason: HOSPADM

## 2022-08-09 RX ORDER — FENTANYL CITRATE 50 UG/ML
INJECTION, SOLUTION INTRAMUSCULAR; INTRAVENOUS
Status: DISCONTINUED | OUTPATIENT
Start: 2022-08-09 | End: 2022-08-09 | Stop reason: HOSPADM

## 2022-08-09 NOTE — DISCHARGE INSTRUCTIONS
Thank you for allowing us to care for you today. You may receive a survey about the care we provided. Your feedback is valuable and helps us provide excellent care throughout the community.     Home Care Instructions for Pain Management:    1. DIET:   You may resume your normal diet today.   2. BATHING:   You may shower with luke warm water. No tub baths or anything that will soak injection sites under water for the next 24 hours.  3. DRESSING:   You may remove your bandage today.   4. ACTIVITY LEVEL:   You may resume your normal activities 24 hrs after your procedure. Nothing strenuous today.  5. MEDICATIONS:   You may resume your normal medications today. To restart blood thinners, ask your doctor.  6. DRIVING    If you have received any sedatives by mouth today, you may not drive for 12 hours.    If you have received any sedation through your IV, you may not drive for 24 hrs.   7. SPECIAL INSTRUCTIONS:   No heat to the injection site for 24 hrs including, hot bath or shower, heating pad, moist heat, or hot tubs.    Use ice pack to injection site for any pain or discomfort.  Apply ice packs for 20 minute intervals as needed.    IF you have diabetes, be sure to monitor your blood sugar more closely. IF your injection contained steroids your blood sugar levels may become higher than normal.    If you are still having pain upon discharge:  Your pain may improve over the next 48 hours. The anesthetic (numbing medication) works immediately to 48 hours. IF your injection contained a steroid (anti-inflammatory medication), it takes approximately 3 days to start feeling relief and 7-10 days to see your greatest results from the medication. It is possible you may need subsequent injections. This would be discussed at your follow up appointment with pain management or your referring doctor.    Please call the PAIN MANAGEMENT office at [phone] or ON CALL pager at [phone] if you experienced any:   -Weakness or  loss of sensation  -Fever > 101.5  -Pain uncontrolled with oral medications   -Persistent nausea, vomiting, or diarrhea  -Redness or drainage from the injection sites, or any other worrisome concerns.   If physician on call was not reached or could not communicate with our office for any reason please go to the nearest emergency department. Adult Procedural Sedation Instructions    Recovery After Procedural Sedation (Adult)  You have been given medicine by vein to make you sleep during your surgery. This may have included both a pain medicine and sleeping medicine. Most of the effects have worn off. But you may still have some drowsiness for the next 6 to 8 hours.  Home care  Follow these guidelines when you get home:  For the next 8 hours, you should be watched by a responsible adult. This person should make sure your condition is not getting worse.  Don't drink any alcohol for the next 24 hours.  Don't drive, operate dangerous machinery, or make important business or personal decisions during the next 24 hours.  Note: Your healthcare provider may tell you not to take any medicine by mouth for pain or sleep in the next 4 hours. These medicines may react with the medicines you were given in the hospital. This could cause a much stronger response than usual.  Follow-up care  Follow up with your healthcare provider if you are not alert and back to your usual level of activity within 12 hours.  When to seek medical advice  Call your healthcare provider right away if any of these occur:  Drowsiness gets worse  Weakness or dizziness gets worse  Repeated vomiting  You can't be awakened   Date Last Reviewed: 10/18/2016  © 1835-8980 [employer]. [address] [place] [address], [place] [address]. All rights reserved. This information is not intended as a substitute for professional medical care. Always follow your healthcare professional's instructions.

## 2022-08-09 NOTE — OP NOTE
Lumbar Transforaminal Epidural Steroid Injection under Fluoroscopic Guidance    The procedure, risks, benefits, and options were discussed with the patient. There are no contraindications to the procedure. The patent expressed understanding and agreed to the procedure. Informed written consent was obtained prior to the start of the procedure and can be found in the patient's chart.    PATIENT NAME: Anahi Cook   MRN: [id number]     DATE OF PROCEDURE: 08/09/2022    PROCEDURE:  Bilateral  L4/5 Lumbar Transforaminal Epidural Steroid Injection under Fluoroscopic Guidance    PRE-OP DIAGNOSIS: Lumbosacral radiculopathy [M54.17] Lumbar radiculopathy [M54.16]    POST-OP DIAGNOSIS: Same    PHYSICIAN: Merly Wilcox MD    ASSISTANTS: Shane Patricio MD [facility] & Ke Beyer MD [facility]   MEDICATIONS INJECTED: Preservative-free Decadron 10mg with 5cc of Lidocaine 1% MPF     LOCAL ANESTHETIC INJECTED: Xylocaine 2%     SEDATION:   Versed 2mg and Fentanyl 25mcg                                                                                                                                                                                     Conscious sedation ordered by M.D. Patient re-evaluation prior to administration of conscious sedation. No changes noted in patient's status from initial evaluation. The patient's vital signs were monitored by RN and patient remained hemodynamically stable throughout the procedure.    Event Time In   Sedation Start 0947   Sedation End 0959       ESTIMATED BLOOD LOSS: None    COMPLICATIONS: None    TECHNIQUE: Time-out was performed to identify the patient and procedure to be performed. With the patient laying in a prone position, the surgical area was prepped and draped in the usual sterile fashion using ChloraPrep and a fenestrated drape.The levels were determined under fluoroscopy guidance. Skin anesthesia was achieved by injecting Lidocaine 2% over the injection sites.  The transforaminal spaces were then approached with a 22 gauge, 3.5 inch spinal quinke needle that was introduced under fluoroscopic guidance in the AP and Lateral views. Once the needle tip was in the area of the transforaminal space, and there was no blood, CSF or paraesthesias, contrast dye Omnipaque (240mg/mL) was injected to confirm placement and there was no vascular runoff. Fluoroscopic imaging in the AP and lateral views revealed a clear outline of the spinal nerve with proximal spread of agent through the neural foramen into the epidural space. 3 mL of the medication mixture listed above was injected slowly at each site. Displacement of the radio opaque contrast after injection of the medication confirmed that the medication went into the area of the transforaminal spaces. The needles were removed and bleeding was nil. A sterile dressing was applied. No specimens collected. The patient tolerated the procedure well.   PAIN BEFORE THE PROCEDURE: 10/10    PAIN AFTER THE PROCEDURE: 0/10     The patient was monitored after the procedure in the recovery area. They were given post-procedure and discharge instructions to follow at home. The patient was discharged in a stable condition.        Merly Wilcox MD

## 2022-08-09 NOTE — DISCHARGE SUMMARY
Discharge Note  Short Stay      SUMMARY     Admit Date: 8/9/2022    Attending Physician: Merly Wilcox      Discharge Physician: Merly Wilcox      Discharge Date: 8/9/2022 10:01 AM    Procedure(s) (LRB):  INJECTION, STEROID, EPIDURAL, TRANSFORAMINAL APPROACH, BILATERAL L4-L5   MEDICALLY URGENT (Bilateral)    Final Diagnosis: Lumbosacral radiculopathy [M54.17]    Disposition: Home or self care    Patient Instructions:   Current Discharge Medication List      CONTINUE these medications which have NOT CHANGED    Details   acetaminophen (TYLENOL) 325 MG tablet Take 650 mg by mouth every 6 (six) hours as needed for Pain.       albuterol (PROVENTIL) 2.5 mg /3 mL (0.083 %) nebulizer solution Inhale 3 mLs into the lungs as needed.       albuterol (PROVENTIL/VENTOLIN HFA) 90 mcg/actuation inhaler Inhale 1-2 puffs into the lungs every 6 (six) hours as needed for Wheezing or Shortness of Breath.      albuterol-ipratropium 2.5mg-0.5mg/3mL (DUO-NEB) 0.5 mg-3 mg(2.5 mg base)/3 mL nebulizer solution Take 3 mLs by nebulization as needed.       atorvastatin (LIPITOR) 20 MG tablet Take 1 tablet (20 mg total) by mouth every evening.  Qty: 90 tablet, Refills: 1    Associated Diagnoses: Hyperlipidemia, unspecified hyperlipidemia type; Aortic atherosclerosis      benazepriL (LOTENSIN) 40 MG tablet TAKE 1 TABLET(40 MG) BY MOUTH EVERY DAY  Qty: 90 tablet, Refills: 1    Associated Diagnoses: Hypertensive heart disease without heart failure      calcium-vitamin D 250-100 mg-unit per tablet Take 1 tablet by mouth 2 (two) times daily.      carbamide peroxide (DEBROX) 6.5 % otic solution Place 5 drops into the left ear as needed (ear wax). Do not use q-tips, please only clean with a wash cloth after use  Qty: 15 mL, Refills: 0      CETIRIZINE HCL (ZYRTEC ORAL) Take 10 mg by mouth nightly as needed.       citalopram (CELEXA) 20 MG tablet TAKE 1 TABLET(20 MG) BY MOUTH EVERY EVENING  Qty: 90 tablet, Refills: 1    Associated Diagnoses: Depression with  anxiety      diphth,pertus,acell,,tetanus (BOOSTRIX TDAP) 2.5-8-5 Lf-mcg-Lf/0.5mL Syrg injection Inject into the muscle.  Qty: 0.5 mL, Refills: 0      felodipine (PLENDIL) 10 MG 24 hr tablet Take 1 tablet (10 mg total) by mouth 2 (two) times a day.  Qty: 180 tablet, Refills: 1    Associated Diagnoses: Hypertensive heart disease without heart failure      fluticasone-vilanterol (BREO) 100-25 mcg/dose diskus inhaler Inhale 1 puff into the lungs once daily.  Qty: 1 each, Refills: 3      gabapentin (NEURONTIN) 100 MG capsule TAKE 1 CAPSULE(100 MG) BY MOUTH EVERY EVENING  Qty: 30 capsule, Refills: 2    Associated Diagnoses: Lumbar radiculopathy      hydrALAZINE (APRESOLINE) 25 MG tablet TAKE 1 TABLET(25 MG) BY MOUTH EVERY 12 HOURS FOR BLOOD PRESSURE  Qty: 180 tablet, Refills: 1    Associated Diagnoses: Hypertensive heart disease without heart failure      INCRUSE ELLIPTA 62.5 mcg/actuation DsDv USE 1 PUFF QD AT SAME TIME  (patient takes at night)  Refills: 2      !! ketoprofen 10 % CrPk APPLY UP TO 4.8 GRAMS (3 PUMPS) TO PAINFUL AREAS UP TO FIVE TIMES DAILY. RUB IN WELL  Refills: 99      !! ketoprofen 10 % CrPk APPLY 2 GRAMS 3-4 TIMES DAILY FOR TREATMENT OF PAIN      letrozole (FEMARA) 2.5 mg Tab Take 1 tablet (2.5 mg total) by mouth once daily.  Qty: 90 tablet, Refills: 3    Comments: ZERO refills remain on this prescription. Your patient is requesting advance approval of refills for this medication to PREVENT ANY MISSED DOSES  Associated Diagnoses: Malignant neoplasm of nipple of left breast in female, estrogen receptor positive      meclizine (ANTIVERT) 25 mg tablet Take 1 tablet (25 mg total) by mouth 3 (three) times daily as needed for Dizziness.  Qty: 20 tablet, Refills: 0      melatonin 10 mg Tab Take by mouth every evening.      montelukast (SINGULAIR) 10 mg tablet Take 10 mg by mouth every evening.      multivitamin (THERAGRAN) per tablet Take 1 tablet by mouth once daily.      torsemide (DEMADEX) 20 MG Tab Take  1 tablet (20 mg total) by mouth once daily.  Qty: 90 tablet, Refills: 2    Associated Diagnoses: Hypertensive heart disease without heart failure      traMADoL (ULTRAM) 50 mg tablet Take 1 tablet (50 mg total) by mouth every 8 (eight) hours as needed for Pain.  Qty: 80 tablet, Refills: 1    Comments: Quantity prescribed more than 7 day supply? Yes, quantity medically necessary.  Associated Diagnoses: Primary osteoarthritis of right knee; Lumbar spondylosis      triamcinolone acetonide 0.1% (KENALOG) 0.1 % cream AAA bid.  Spot treatment as needed  Qty: 45 g, Refills: 0    Associated Diagnoses: Eczema, unspecified type      triamcinolone acetonide 0.1% (KENALOG) 0.1 % paste APPLY SPARINGLY FOUR TIMES DAILY       !! - Potential duplicate medications found. Please discuss with provider.              Discharge Diagnosis: Lumbosacral radiculopathy [M54.17]  Condition on Discharge: Stable with no complications to procedure   Diet on Discharge: Same as before.  Activity: as per instruction sheet.  Discharge to: Home with a responsible adult.  Follow up: 2-4 weeks       Please call my office or pager at [phone] if experienced any weakness or loss of sensation, fever > 101.5, pain uncontrolled with oral medications, persistent nausea/vomiting/or diarrhea, redness or drainage from the incisions, or any other worrisome concerns. If physician on call was not reached or could not communicate with our office for any reason please go to the nearest emergency department

## 2022-08-10 NOTE — PROGRESS NOTES
ALIX WEBB: about 1 yr. Ago elsewhere  Chief complaint (CC): Patient is here for annual eye exam today. Patient   has noticed for the past 2 or 3 months that her vision is blurred. Patient   had problems with the last glasses she had made and they never seemed   right.  Glasses? +1 yr. old  Contacts? -  H/o eye surgery, injections or laser: PC IOL OU about 10 yrs. Ago   elsewhere  H/o eye injury: -  Known eye conditions? -  Family h/o eye conditions? -  Eye gtts? AT's once or twice a day.      (-) Flashes (-)  Floaters (-) Mucous   (+)  Tearing (-) Itching (-) Burning   (-) Headaches (-) Eye Pain/discomfort (-) Irritation   (-)  Redness (-) Double vision (+) Blurry vision    Diabetic? -  A1c? -        Last edited by Claudia Walter on 8/9/2022  3:49 PM. (History)            Assessment /Plan     For exam results, see Encounter Report.    Epiretinal membrane (ERM) of right eye  -     Posterior Segment OCT Retina-Both eyes    Pseudophakia    AMD (age related macular degeneration)  -     Posterior Segment OCT Retina-Both eyes    Myopia with astigmatism and presbyopia, bilateral      1,3. OS VA decreased to 20/50. Educated pt and daughter on findings. Referral to Dr Melton.    2. Good result  4. SRx released to patient. Patient educated on lens options. Normal ocular health. RTC 1 year for routine exam.

## 2022-08-11 ENCOUNTER — OFFICE VISIT (OUTPATIENT)
Dept: PRIMARY CARE CLINIC | Facility: CLINIC | Age: 87
End: 2022-08-11
Payer: MEDICARE

## 2022-08-11 VITALS
SYSTOLIC BLOOD PRESSURE: 136 MMHG | BODY MASS INDEX: 26.57 KG/M2 | OXYGEN SATURATION: 95 % | HEIGHT: 62 IN | WEIGHT: 144.38 LBS | HEART RATE: 65 BPM | TEMPERATURE: 98 F | DIASTOLIC BLOOD PRESSURE: 70 MMHG

## 2022-08-11 DIAGNOSIS — F33.0 MAJOR DEPRESSIVE DISORDER, RECURRENT, MILD: ICD-10-CM

## 2022-08-11 DIAGNOSIS — C77.3 SECONDARY AND UNSPECIFIED MALIGNANT NEOPLASM OF AXILLA AND UPPER LIMB LYMPH NODES: ICD-10-CM

## 2022-08-11 DIAGNOSIS — R73.03 PRE-DIABETES: ICD-10-CM

## 2022-08-11 DIAGNOSIS — E78.5 HYPERLIPIDEMIA, UNSPECIFIED HYPERLIPIDEMIA TYPE: ICD-10-CM

## 2022-08-11 DIAGNOSIS — I70.0 ATHEROSCLEROSIS OF AORTA: ICD-10-CM

## 2022-08-11 DIAGNOSIS — I11.9 HYPERTENSIVE HEART DISEASE WITHOUT HEART FAILURE: Primary | ICD-10-CM

## 2022-08-11 DIAGNOSIS — N18.31 STAGE 3A CHRONIC KIDNEY DISEASE: ICD-10-CM

## 2022-08-11 DIAGNOSIS — M47.816 LUMBAR SPONDYLOSIS: ICD-10-CM

## 2022-08-11 DIAGNOSIS — J43.1 PANLOBULAR EMPHYSEMA: ICD-10-CM

## 2022-08-11 DIAGNOSIS — N39.42 URINARY INCONTINENCE WITHOUT SENSORY AWARENESS: ICD-10-CM

## 2022-08-11 DIAGNOSIS — M17.11 PRIMARY OSTEOARTHRITIS OF RIGHT KNEE: ICD-10-CM

## 2022-08-11 DIAGNOSIS — I70.0 AORTIC ATHEROSCLEROSIS: ICD-10-CM

## 2022-08-11 DIAGNOSIS — R82.90 ABNORMAL URINE ODOR: ICD-10-CM

## 2022-08-11 LAB
BILIRUB SERPL-MCNC: NORMAL MG/DL
BLOOD URINE, POC: NORMAL
CLARITY, POC UA: CLEAR
COLOR, POC UA: YELLOW
GLUCOSE UR QL STRIP: NORMAL
KETONES UR QL STRIP: NORMAL
LEUKOCYTE ESTERASE URINE, POC: NORMAL
NITRITE, POC UA: NORMAL
PH, POC UA: 5
PROTEIN, POC: NORMAL
SPECIFIC GRAVITY, POC UA: NORMAL
UROBILINOGEN, POC UA: NORMAL

## 2022-08-11 PROCEDURE — 81002 POCT URINE DIPSTICK WITHOUT MICROSCOPE: ICD-10-PCS | Mod: S$GLB,,, | Performed by: INTERNAL MEDICINE

## 2022-08-11 PROCEDURE — 99999 PR PBB SHADOW E&M-EST. PATIENT-LVL IV: ICD-10-PCS | Mod: PBBFAC,,, | Performed by: INTERNAL MEDICINE

## 2022-08-11 PROCEDURE — 1159F MED LIST DOCD IN RCRD: CPT | Mod: CPTII,S$GLB,, | Performed by: INTERNAL MEDICINE

## 2022-08-11 PROCEDURE — 3288F FALL RISK ASSESSMENT DOCD: CPT | Mod: CPTII,S$GLB,, | Performed by: INTERNAL MEDICINE

## 2022-08-11 PROCEDURE — 99999 PR PBB SHADOW E&M-EST. PATIENT-LVL IV: CPT | Mod: PBBFAC,,, | Performed by: INTERNAL MEDICINE

## 2022-08-11 PROCEDURE — 99499 RISK ADDL DX/OHS AUDIT: ICD-10-PCS | Mod: S$GLB,,, | Performed by: INTERNAL MEDICINE

## 2022-08-11 PROCEDURE — 3288F PR FALLS RISK ASSESSMENT DOCUMENTED: ICD-10-PCS | Mod: CPTII,S$GLB,, | Performed by: INTERNAL MEDICINE

## 2022-08-11 PROCEDURE — 99214 OFFICE O/P EST MOD 30 MIN: CPT | Mod: S$GLB,,, | Performed by: INTERNAL MEDICINE

## 2022-08-11 PROCEDURE — 1101F PT FALLS ASSESS-DOCD LE1/YR: CPT | Mod: CPTII,S$GLB,, | Performed by: INTERNAL MEDICINE

## 2022-08-11 PROCEDURE — 1125F AMNT PAIN NOTED PAIN PRSNT: CPT | Mod: CPTII,S$GLB,, | Performed by: INTERNAL MEDICINE

## 2022-08-11 PROCEDURE — 99214 PR OFFICE/OUTPT VISIT, EST, LEVL IV, 30-39 MIN: ICD-10-PCS | Mod: S$GLB,,, | Performed by: INTERNAL MEDICINE

## 2022-08-11 PROCEDURE — 1101F PR PT FALLS ASSESS DOC 0-1 FALLS W/OUT INJ PAST YR: ICD-10-PCS | Mod: CPTII,S$GLB,, | Performed by: INTERNAL MEDICINE

## 2022-08-11 PROCEDURE — 1160F RVW MEDS BY RX/DR IN RCRD: CPT | Mod: CPTII,S$GLB,, | Performed by: INTERNAL MEDICINE

## 2022-08-11 PROCEDURE — 99499 UNLISTED E&M SERVICE: CPT | Mod: S$GLB,,, | Performed by: INTERNAL MEDICINE

## 2022-08-11 PROCEDURE — 1125F PR PAIN SEVERITY QUANTIFIED, PAIN PRESENT: ICD-10-PCS | Mod: CPTII,S$GLB,, | Performed by: INTERNAL MEDICINE

## 2022-08-11 PROCEDURE — 1160F PR REVIEW ALL MEDS BY PRESCRIBER/CLIN PHARMACIST DOCUMENTED: ICD-10-PCS | Mod: CPTII,S$GLB,, | Performed by: INTERNAL MEDICINE

## 2022-08-11 PROCEDURE — 1159F PR MEDICATION LIST DOCUMENTED IN MEDICAL RECORD: ICD-10-PCS | Mod: CPTII,S$GLB,, | Performed by: INTERNAL MEDICINE

## 2022-08-11 PROCEDURE — 81002 URINALYSIS NONAUTO W/O SCOPE: CPT | Mod: S$GLB,,, | Performed by: INTERNAL MEDICINE

## 2022-08-11 RX ORDER — ATORVASTATIN CALCIUM 20 MG/1
20 TABLET, FILM COATED ORAL NIGHTLY
Qty: 90 TABLET | Refills: 2 | Status: SHIPPED | OUTPATIENT
Start: 2022-08-11 | End: 2022-12-13 | Stop reason: SDUPTHER

## 2022-08-11 RX ORDER — FELODIPINE 10 MG/1
10 TABLET, EXTENDED RELEASE ORAL 2 TIMES DAILY
Qty: 180 TABLET | Refills: 2 | Status: SHIPPED | OUTPATIENT
Start: 2022-08-11 | End: 2023-02-05 | Stop reason: SDUPTHER

## 2022-08-11 RX ORDER — TRAMADOL HYDROCHLORIDE 50 MG/1
50 TABLET ORAL EVERY 8 HOURS PRN
Qty: 90 TABLET | Refills: 2 | Status: SHIPPED | OUTPATIENT
Start: 2022-08-11 | End: 2023-01-05

## 2022-08-11 RX ORDER — BENAZEPRIL HYDROCHLORIDE 40 MG/1
40 TABLET ORAL DAILY
Qty: 90 TABLET | Refills: 2 | Status: SHIPPED | OUTPATIENT
Start: 2022-08-11 | End: 2023-02-05 | Stop reason: SDUPTHER

## 2022-08-12 NOTE — PROGRESS NOTES
Subjective:       Patient ID: Anahi Cook is a 87 y.o. female.    Chief Complaint: Hypertension    Last seen 9 months ago, returns for scheduled follow up chronic medical conditions. Just had an epidural injection two days ago. Has had increasingly constant low back pain radiating to legs. Using Tramadol TID with some relief. Bilateral foot pain evaluated by Podiatry, abnormal JESSE's showing impaired blood flow to both lower extremities. She is scheduled for Vascular consult soon. Recent follow up with Heme/Onc, still on Letrozole - Bone Density test is scheduled.    PMH: .  Hypertension with Concentric Remodeling and normal LV function on Echo 10/16.  Hyperlipidemia, LDL 53 .  Pre-Diabetes HbA1c 5.8% .  Aortic Atherosclerosis seen on chest and abdominal imaging.   CKD stage 3, GFR improved, >60 May '22.  COPD, not oxygen-dependent. Pulmonologist Dr. Bradford at [facility].   Breast Cancer, on Letrozole, Heme/Onc following.  Osteoporosis, Endocrinology  recommended Prolia - patient declines treatment.   Vertebral Compression Fractures.  GERD, Tortuous Esophagus on EGD 10/19, benign H pyl negative gastritis.   Lumbar Spondylosis, Pain Mgmt following.  Allergic Rhinitis.  Depression/Anxiety/Insomnia.    PSH:  2018: AXILLARY NODE DISSECTION; Left  BREAST BIOPSY  BREAST LUMPECTOMY  CHOLECYSTECTOMY  EYE SURGERY  2018: FIXATION KYPHOPLASTY; N/A  HYSTERECTOMY  10/15/2018: INJECTION OF FACET JOINT; Bilateral  2019: INJECTION OF FACET JOINT; Bilateral  2019: INJECTION OF FACET JOINT; Bilateral  2018: MASTECTOMY, PARTIAL; Left  2018: IL REMOVAL OF OVARY/TUBE(S)  ROTATOR CUFF REPAIR; Right  TONSILLECTOMY    Mammogram stable . BMD . Colonoscopy years ago, not on record. Eye exam 2020 in [facility]. Pneumovax , Prevnar 11/15, Flu shot 10/21. COVID ([employer]) , 3/21, 10/21.      Social: Former tobacco use, rare alcohol. , daughter lives locally, two sons out of  state.     FMH: Breast cancer in multiple, HTN, Heart dis, Kidney dis.     Allergies: PCN, Levofloxacin.     Medications: list reviewed and reconciled. Multivitamin and Calcium plus D daily.        Review of Systems   Constitutional: Negative for activity change, appetite change, fatigue, fever and unexpected weight change.   HENT: Negative for nasal congestion, ear pain, hearing loss, rhinorrhea, sneezing, sore throat, trouble swallowing and voice change.    Eyes: Negative for pain and visual disturbance.   Respiratory: Negative for cough, chest tightness, shortness of breath and wheezing.    Cardiovascular: Negative for chest pain, palpitations and leg swelling.   Gastrointestinal: Negative for abdominal pain, blood in stool, constipation, diarrhea, nausea and vomiting.   Genitourinary: Positive for bladder incontinence. Negative for dysuria, frequency and hematuria.        Incident of urinary incontinence without awareness today, states abnormally strong urine odor.   Musculoskeletal: Positive for arthralgias, back pain and leg pain. Negative for gait problem, joint swelling and myalgias.   Integumentary:  Negative for color change and rash.   Neurological: Negative for dizziness, syncope, facial asymmetry, speech difficulty, weakness, numbness and headaches.   Hematological: Negative for adenopathy. Does not bruise/bleed easily.   Psychiatric/Behavioral: Negative for dysphoric mood and sleep disturbance. The patient is not nervous/anxious.          Objective:    /70, Pulse 65, Temp 98.1, O2 Sat 95%, Wt 144 lbs (from 141)  Physical Exam  Vitals reviewed.   Constitutional:       General: She is not in acute distress.     Appearance: She is well-developed. She is not diaphoretic.      Comments: Appropriately groomed, in WC accompanied by daughter.   HENT:      Head: Normocephalic and atraumatic.      Nose: Nose normal. No congestion.      Mouth/Throat:      Mouth: Mucous membranes are moist.      Pharynx:  Oropharynx is clear.   Eyes:      Conjunctiva/sclera:      Right eye: Right conjunctiva is not injected.      Left eye: Left conjunctiva is not injected.   Neck:      Thyroid: No thyromegaly.      Vascular: No JVD.   Cardiovascular:      Rate and Rhythm: Normal rate and regular rhythm.      Heart sounds: Normal heart sounds.   Pulmonary:      Effort: Pulmonary effort is normal. No respiratory distress.      Breath sounds: Normal breath sounds. No wheezing, rhonchi or rales.   Abdominal:      General: Bowel sounds are normal. There is no distension.      Palpations: Abdomen is soft.      Tenderness: There is no abdominal tenderness.   Musculoskeletal:         General: No deformity. Normal range of motion.      Cervical back: Normal range of motion and neck supple.      Right lower leg: No edema.      Left lower leg: No edema.   Skin:     General: Skin is warm and dry.      Coloration: Skin is not pale.      Findings: No erythema or rash.      Nails: There is no clubbing.   Neurological:      Mental Status: She is alert and oriented to person, place, and time.      Cranial Nerves: No cranial nerve deficit.      Motor: No abnormal muscle tone.      Deep Tendon Reflexes: Reflexes are normal and symmetric.   Psychiatric:         Mood and Affect: Mood normal.         Behavior: Behavior normal.         Thought Content: Thought content normal.         Judgment: Judgment normal.         Assessment:       Problem List Items Addressed This Visit     COPD (chronic obstructive pulmonary disease) (Chronic)    Hyperlipidemia (Chronic)    Relevant Orders    Lipid Panel    Pre-diabetes    Relevant Orders    Hemoglobin A1C    Secondary and unspecified malignant neoplasm of axilla and upper limb lymph nodes    Major depressive disorder, recurrent, mild    Atherosclerosis of aorta    Stage 3a chronic kidney disease      Other Visit Diagnoses     Hypertensive heart disease without heart failure    -  Primary    Relevant Orders     Comprehensive Metabolic Panel    Abnormal urine odor        Relevant Orders    POCT URINE DIPSTICK WITHOUT MICROSCOPE (Completed)    Urinary incontinence without sensory awareness        Relevant Orders    Ambulatory referral/consult to Urogynecology          Plan:       Hypertensive heart disease without heart failure  -     Comprehensive Metabolic Panel; Future; Expected date: 08/11/2022  -     benazepriL (LOTENSIN) 40 MG tablet; Take 1 tablet (40 mg total) by mouth once daily.  Dispense: 90 tablet; Refill: 2  -     felodipine (PLENDIL) 10 MG 24 hr tablet; Take 1 tablet (10 mg total) by mouth 2 (two) times a day.  Dispense: 180 tablet; Refill: 2    Hyperlipidemia, unspecified hyperlipidemia type  -     Lipid Panel; Future; Expected date: 08/11/2022  -     atorvastatin (LIPITOR) 20 MG tablet; Take 1 tablet (20 mg total) by mouth every evening.  Dispense: 90 tablet; Refill: 2    Pre-diabetes  -     Hemoglobin A1C; Future; Expected date: 08/11/2022    Atherosclerosis of aorta        -     Continue statin therapy.     Panlobular emphysema        -      Asymptomatic.     Stage 3a chronic kidney disease        -     Stable, monitoring.    Abnormal urine odor  -     POCT URINE DIPSTICK WITHOUT MICROSCOPE - entirely clear.     Urinary incontinence without sensory awareness  -     Ambulatory referral/consult to Urogynecology; Future; Expected date: 08/18/2022    Secondary and unspecified malignant neoplasm of axilla and upper limb lymph nodes    Major depressive disorder, recurrent, mild        -     Continue Citalopram.    Aortic atherosclerosis  -     atorvastatin (LIPITOR) 20 MG tablet; Take 1 tablet (20 mg total) by mouth every evening.  Dispense: 90 tablet; Refill: 2    Primary osteoarthritis of right knee  -     traMADoL (ULTRAM) 50 mg tablet; Take 1 tablet (50 mg total) by mouth every 8 (eight) hours as needed for Pain.  Dispense: 90 tablet; Refill: 2    Lumbar spondylosis  -     traMADoL (ULTRAM) 50 mg tablet; Take  1 tablet (50 mg total) by mouth every 8 (eight) hours as needed for Pain.  Dispense: 90 tablet; Refill: 2

## 2022-08-19 ENCOUNTER — PATIENT MESSAGE (OUTPATIENT)
Dept: PRIMARY CARE CLINIC | Facility: CLINIC | Age: 87
End: 2022-08-19
Payer: MEDICARE

## 2022-08-19 ENCOUNTER — TELEPHONE (OUTPATIENT)
Dept: PAIN MEDICINE | Facility: CLINIC | Age: 87
End: 2022-08-19
Payer: MEDICARE

## 2022-08-19 NOTE — TELEPHONE ENCOUNTER
----- Message from Glenys Gallegos sent at 8/19/2022 10:17 AM CDT -----      Name of Who is Calling: SHAYY JAMIL [[id number]]      What is the request in detail: Pt called to speak with the office pt hasn't gotten any relief since procedure.Please contact to further discuss and advise.          Can the clinic reply by [facility]: N      What Number to Call Back if not in [facility]: [phone]

## 2022-08-19 NOTE — TELEPHONE ENCOUNTER
Staff spoke with patient daughter in regards to appointment on Monday being a virtual that we switched to in person for mom to be evaluated 8/22/22 @ 1:40 pm with Yohannes austin Np.

## 2022-08-22 ENCOUNTER — HOSPITAL ENCOUNTER (OUTPATIENT)
Dept: RADIOLOGY | Facility: OTHER | Age: 87
Discharge: HOME | End: 2022-08-22
Attending: NURSE PRACTITIONER
Payer: MEDICARE

## 2022-08-22 ENCOUNTER — OFFICE VISIT (OUTPATIENT)
Dept: PAIN MEDICINE | Facility: CLINIC | Age: 87
End: 2022-08-22
Payer: MEDICARE

## 2022-08-22 VITALS
SYSTOLIC BLOOD PRESSURE: 138 MMHG | TEMPERATURE: 99 F | BODY MASS INDEX: 26.37 KG/M2 | HEART RATE: 69 BPM | WEIGHT: 143.31 LBS | HEIGHT: 62 IN | DIASTOLIC BLOOD PRESSURE: 65 MMHG

## 2022-08-22 DIAGNOSIS — M25.552 LEFT HIP PAIN: ICD-10-CM

## 2022-08-22 DIAGNOSIS — M54.9 DORSALGIA, UNSPECIFIED: ICD-10-CM

## 2022-08-22 DIAGNOSIS — M25.552 LEFT HIP PAIN: Primary | ICD-10-CM

## 2022-08-22 PROCEDURE — 99999 PR PBB SHADOW E&M-EST. PATIENT-LVL V: ICD-10-PCS | Mod: PBBFAC,,, | Performed by: NURSE PRACTITIONER

## 2022-08-22 PROCEDURE — 99214 PR OFFICE/OUTPT VISIT, EST, LEVL IV, 30-39 MIN: ICD-10-PCS | Mod: 25,S$GLB,, | Performed by: NURSE PRACTITIONER

## 2022-08-22 PROCEDURE — 1125F AMNT PAIN NOTED PAIN PRSNT: CPT | Mod: CPTII,S$GLB,, | Performed by: NURSE PRACTITIONER

## 2022-08-22 PROCEDURE — 20610 DRAIN/INJ JOINT/BURSA W/O US: CPT | Mod: LT,S$GLB,, | Performed by: NURSE PRACTITIONER

## 2022-08-22 PROCEDURE — 72114 XR LUMBAR SPINE 5 VIEW WITH FLEX AND EXT: ICD-10-PCS | Mod: 26,,, | Performed by: RADIOLOGY

## 2022-08-22 PROCEDURE — 1125F PR PAIN SEVERITY QUANTIFIED, PAIN PRESENT: ICD-10-PCS | Mod: CPTII,S$GLB,, | Performed by: NURSE PRACTITIONER

## 2022-08-22 PROCEDURE — 3288F PR FALLS RISK ASSESSMENT DOCUMENTED: ICD-10-PCS | Mod: CPTII,S$GLB,, | Performed by: NURSE PRACTITIONER

## 2022-08-22 PROCEDURE — 99999 PR PBB SHADOW E&M-EST. PATIENT-LVL V: CPT | Mod: PBBFAC,,, | Performed by: NURSE PRACTITIONER

## 2022-08-22 PROCEDURE — 72114 X-RAY EXAM L-S SPINE BENDING: CPT | Mod: TC,FY

## 2022-08-22 PROCEDURE — 72114 X-RAY EXAM L-S SPINE BENDING: CPT | Mod: 26,,, | Performed by: RADIOLOGY

## 2022-08-22 PROCEDURE — 99214 OFFICE O/P EST MOD 30 MIN: CPT | Mod: 25,S$GLB,, | Performed by: NURSE PRACTITIONER

## 2022-08-22 PROCEDURE — 20610 PR DRAIN/INJECT LARGE JOINT/BURSA: ICD-10-PCS | Mod: LT,S$GLB,, | Performed by: NURSE PRACTITIONER

## 2022-08-22 PROCEDURE — 73502 X-RAY EXAM HIP UNI 2-3 VIEWS: CPT | Mod: TC,FY,LT

## 2022-08-22 PROCEDURE — 1101F PR PT FALLS ASSESS DOC 0-1 FALLS W/OUT INJ PAST YR: ICD-10-PCS | Mod: CPTII,S$GLB,, | Performed by: NURSE PRACTITIONER

## 2022-08-22 PROCEDURE — 73502 X-RAY EXAM HIP UNI 2-3 VIEWS: CPT | Mod: 26,LT,, | Performed by: RADIOLOGY

## 2022-08-22 PROCEDURE — 3288F FALL RISK ASSESSMENT DOCD: CPT | Mod: CPTII,S$GLB,, | Performed by: NURSE PRACTITIONER

## 2022-08-22 PROCEDURE — 1101F PT FALLS ASSESS-DOCD LE1/YR: CPT | Mod: CPTII,S$GLB,, | Performed by: NURSE PRACTITIONER

## 2022-08-22 PROCEDURE — 73502 XR HIP WITH PELVIS WHEN PERFORMED, 2 OR 3 VIEWS LEFT: ICD-10-PCS | Mod: 26,LT,, | Performed by: RADIOLOGY

## 2022-08-22 NOTE — PROGRESS NOTES
Chronic Pain-Established Note (Follow up visit)         SUBJECTIVE:      Interval History 8/22/2022:  Mrs Cook presents for follow up acute pain. She is s/p repeat Bilateral L4/5 TFESI with mild improvement but having severe left lateral hip pain. She also continues to have burning pain to Right heel and numbness to right lateral lower leg. She has chronic urinary continence but no new neurological issues.She continues to take tramadol by PCP with some benefit     Interval History 8/5/2022:  Mrs Cook presents for follow up evaluation of returning lower back pain. She has exact pain relieved prior by B L4/5 TFESIs. She has newer complaint of bilateral feet burning pains as well. She has had JESSE noting arterial issues R>L. She continues to take Tramadol and neurontin without SE.She would like to repeat ASHUTOSH as it provided 7 months of 75% relief and symptoms returning severe over last few weeks. No voicing of symptoms concerning for cauda equina.     Interval History 12/6/2021:  The patient returns to clinic today for follow up of low back pain. She reports increased low back and bilateral hip pain over the last two weeks. She reports intermittent radiating pain into her lateral thighs. She does have difficulty sleeping due to pain. Her pain is also worse with standing and walking. She continues to take Gabapentin with benefit. She also takes Tramadol per her PCP with benefit. She denies any other health changes. Her pain today is 8/10.    Interval History 7/6/2021:  The patient returns to clinic today for follow up of low back pain via virtual visit. She is s/p bilateral L4/5 TF ASHUTOSH on 6/21/2021. She reports 75% relief of her low back and leg pain. She reports low back pain, worse in the morning. She reports intermittent radiating pain into the posterolateral aspect of both legs to her knees. She continues to take Gabapentin with benefit. She denies any weakness. She denies any other health changes. Her pain  today is 2/10.    Interval History 6/8/2021:  The patient returns to clinic today for follow up of low back pain. She is here today for imaging review. She continues to report low back pain that radiates into the posterolateral aspect of both legs to below her knee. Her pain is worse with prolonged standing and walking. She does feel like she will fall with prolonged walking. She also reports relief with bending forward. She is currently taking Gabapentin at bedtime with benefit. She continues to take Tramadol per her PCP with benefit. She denies any other health changes. Her pain today is 8/10.    Interval History 5/20/2021:  The patient returns to clinic today for follow up of low back pain. She is s/p bilateral hip joint injections on 4/26/2021. She reports no relief. She reports increased low back pain that radiates into the posterior aspect of both legs to her ankles. She does report numbness to her feet. Her pain is worse with prolonged standing and walking. She feels as though her legs will give out with prolonged walking. She denies any bowel or bladder incontinence. She denies any other health changes. Her pain today is 9/10.    Interval History 3/23/2021:  The patient returns to clinic today for follow up of low back and hip pain. She reports increased bilateral hip pain. She describes this pain as deep and aching. Her pain is worse with prolonged standing and walking. She also reports low back pain that is aching in nature. She denies any radicular leg pain. Of note, she reports increased swelling and pain to her left ankle and foot. She does report a trip last week. She did see Orthopedics today and is scheduled for xray. She denies any other health changes. Her pain today is 8/10.    Interval History 12/9/2020:  She returns for follow-up.  She is doing well since her facet injections.  Her right hip is hurting.  It interferes with her mobility and comfort while laying down.  No new symptomatology  otherwise.    Interval History 8/11/2020:  The patient has a virtual visit today for follow up.  She is s/p Bilateral L3-4, L4-5 L5-S1 facet joint injections with 90% relief.  She says that her back pain is minimal at this time.  She is able to move around and walk better since the procedure as well.  She is having some left hip pain and feels as though her leg will give out sometimes when walking.  She would like a referral to ortho close to her home in [facility].  Her pain today is 1/10.  She denies any respiratory changes since procedure including fever, cough or SOB.    Interval History 12/5/2019:  The patient is here for follow up of lower back and bilateral hip pain.  Her back pain has been minimal.  She is having increased lateral hip and buttock pain recently.  She had TPIs in the past and would like to repeat this today.  Her pain today is 8/10.    Previous Encounter:  Anahi Cook presents to the clinic for a follow-up appointment for lower back and left lower extremity pain.  She had T12 kyphoplasty performed on 11/8/18 with significant improvement in symptoms.  She was admitted through the ED on 4/3/19 after suffering fractures of 6th, 8th and possible 7th ribs of the left side after a fall at home.  Thoracic imaging also showed stable slight anterior wedging at T11.  She had bilateral L3-4, L4-5 and L5-S1 facet injections on 4/1/19 with 80% relief of lower back pain.  Her current pain is minimal.  She has mild leg pain with walking.  She continues to follow up with oncology regularly.  Since the last visit, Anahi Cook states the pain has been improving.  Current pain intensity is 2/10.     Pain Medications:  OTC Tylenol    Opioid Contract: no     report:  Not applicable    Pain Procedures:   7/21/14 L4-5 IL ASHUTOSH- significant benefit  12/1/15 Left GT bursa injection  4/26/16 Left GT bursa injection  12/15/16 L4-5 IL ASHUOTSH- significant benefit  2/15/18 L4-5 IL ASHUTOSH- 100% relief  8/21/18 L4-5 IL ASHUTOSH-  significant relief  10/15/18 Bilateral L3-4, L4-5 L5-S1 facet joint injections  11/8/18 T12 kyphoplasty- significant relief  4/1/19 Bilateral L3-4, L4-5 L5-S1 facet joint injections- 80% relief  7/27/20 Bilateral L3-4, L4-5 L5-S1 facet joint injections- 90% relief  4/26/2021- Bilateral hip joint injections- no relief    6/21/2021- Bilateral L4/5 TF ASHUTOSH- 75% relief  8/9/2022 B L4/5 TFESI without significant benefit.     Physical Therapy/Home Exercise: yes     Imaging:   MRI Lumbar Spine 5/29/2021:  COMPARISON:  Prior MRI from 2014 and 2018     FINDINGS:  There is a transitional lumbosacral segment and spine labeling is as on prior examinations.     Stable severe compression deformity at T12 with retropulsion.  Remaining vertebral body heights are maintained.  Grade 1 anterolisthesis appears increased at L4-5 as compared to previous.  There is no marrow replacement process.  The spinal cord is normal in signal.  Review of paravertebral structures demonstrates high T2 low T1 signal foci within kidneys.     Not included in the field of view of the axial, there is disc bulge at T10-11.  This does not appear to result in a significant degree of spinal canal narrowing on the basis of the sagittal imaging.     T12 demonstrates retropulsion with mild narrowing of the spinal canal.     T12-L1 demonstrates minor disc bulge.     L1-L2 demonstrates mild facet degenerative change.  There is no significant spinal canal stenosis.  There is mild bilateral neural foraminal narrowing.     L2-3 demonstrates facet degenerative change.  There is no significant spinal canal stenosis.  There is right foraminal protrusion.  This results in a moderate degree of right foraminal narrowing.  There is mild left foraminal narrowing.     L3-4 demonstrates facet degenerative change and ligamentum flavum thickening.  There is a facet joint synovial cyst along the posterolateral aspect of the left facet.  There is right foraminal protrusion.  There  is mild left and moderate right foraminal narrowing.     L4-5 demonstrates facet degenerative change and ligamentum flavum thickening.  There is anterolisthesis.  There is severe left and moderate right foraminal narrowing.  There is a severe degree of spinal canal narrowing.     L5-S1 is unremarkable.     Impression:     Multilevel degenerative changes as further detailed above.  Findings are most significant at the L4-5 level, noting transitional lumbosacral segment.     Stable severe compression deformity at T12.     Foci within the kidneys bilaterally likely representing cyst can be correlated with ultrasound.          Past Medical History:  Past Medical History:   Diagnosis Date    Anxiety     Arthritis     Back pain     Breast cancer 1/17/2018    Breast mass 1/15/2018    Chronic kidney disease, stage 2, mildly decreased GFR     COPD (chronic obstructive pulmonary disease)     emphysema    Depression     Dysuria 1/29/2018    Exudative age-related macular degeneration of left eye with active choroidal neovascularization 8/25/2022    Family history of breast cancer 1/17/2018    GERD (gastroesophageal reflux disease)     Hypertension     Infiltrating ductal carcinoma of breast, left 7/12/2018    Osteoporosis, senile     Ovarian mass 1/15/2018    Pneumonia     S/P robotic assisted laparoscopic BSO (bilateral salpingo-oophorectomy) 2/23/2018       Past Surgical History:  Past Surgical History:   Procedure Laterality Date    AXILLARY NODE DISSECTION Left 7/12/2018    Procedure: LYMPHADENECTOMY, AXILLARY;  Surgeon: Amanda Caballero MD;  Location: [facility] OR [address] [place] [address];  Service: General;  Laterality: Left;    BREAST BIOPSY      BREAST LUMPECTOMY      CATARACT EXTRACTION      CHOLECYSTECTOMY      COLONOSCOPY      ESOPHAGOGASTRODUODENOSCOPY      ESOPHAGOGASTRODUODENOSCOPY N/A 10/14/2019    Procedure: EGD (ESOPHAGOGASTRODUODENOSCOPY);  Surgeon: Davonte Thompson MD;  Location: [address]);  Service: Endoscopy;   Laterality: N/A;  hx of pulmonary hypertension-PA 50     pt requesting ASAP    EYE SURGERY      FIXATION KYPHOPLASTY N/A 11/8/2018    Procedure: Kyphoplasty   T12;  Surgeon: Merly Wilcox MD;  Location: [facility] CATH LAB;  Service: Pain Management;  Laterality: N/A;  T12  Nia Reps e-mailed with date and time    HYSTERECTOMY      INJECTION FOR SENTINEL NODE IDENTIFICATION Left 7/12/2018    Procedure: INJECTION, FOR SENTINEL NODE IDENTIFICATION;  Surgeon: Amanda Caballero MD;  Location: [facility] OR [address] [place] [address];  Service: General;  Laterality: Left;    INJECTION OF FACET JOINT Bilateral 10/15/2018    Procedure: INJECTION, FACET JOINT, BILATERAL LUMBAR L3-4, 4-5, 5-S1 FACET JOINT INJECTIONS;  Surgeon: Merly Wilcox MD;  Location: [facility] PAIN MGT;  Service: Pain Management;  Laterality: Bilateral;    INJECTION OF FACET JOINT Bilateral 4/1/2019    Procedure: INJECTION, FACET JOINT, L3-L4,L4-L5,L5-S1;  Surgeon: Merly Wilcox MD;  Location: [facility] PAIN MGT;  Service: Pain Management;  Laterality: Bilateral;    INJECTION OF FACET JOINT Bilateral 6/20/2019    Procedure: INJECTION, FACET JOINT, L3-L4,L4-L5,L5-S1 NEED CONSENT;  Surgeon: Merly Wilcox MD;  Location: [facility] PAIN MGT;  Service: Pain Management;  Laterality: Bilateral;    INJECTION OF FACET JOINT Bilateral 7/27/2020    Procedure: INJECTION, FACET JOINT BILATERAL L3/4, L4/5 and L5/S1;  Surgeon: Merly Wilcox MD;  Location: [facility] PAIN MGT;  Service: Pain Management;  Laterality: Bilateral;    INJECTION OF JOINT Bilateral 7/27/2020    Procedure: INJECTION, JOINT, BILATERAL GREATER TROCHANTERIC BURSA;  Surgeon: Merly Wilcox MD;  Location: [facility] PAIN MGT;  Service: Pain Management;  Laterality: Bilateral;    INJECTION OF JOINT Bilateral 4/26/2021    Procedure: INJECTION, JOINT, HIP;  Surgeon: Merly Wilcox MD;  Location: [facility] PAIN MGT;  Service: Pain Management;  Laterality: Bilateral;  consent needed    INJECTION OF STEROID Right 11/15/2021    Procedure: INJECTION, STEROID RIGHT MIDDLE AND  TAMMIE MCCAIN;  Surgeon: Florecita Da Silva MD;  Location: [facility] OR;  Service: Orthopedics;  Laterality: Right;    MASTECTOMY, PARTIAL Left 2018    Procedure: MASTECTOMY, PARTIAL-W/SEED LOCALIZATION;  Surgeon: Amanda Caballero MD;  Location: [facility] OR 2ND FLR;  Service: General;  Laterality: Left;    CA REMOVAL OF OVARY/TUBE(S)  2018    Robotic Assisted    ROTATOR CUFF REPAIR Right     rotator cuff repair right shoulder    TONSILLECTOMY      TRANSFORAMINAL EPIDURAL INJECTION OF STEROID Bilateral 2021    Procedure: INJECTION, STEROID, EPIDURAL, TRANSFORAMINAL APPROACH  bilateral L4/5 TF ASHUTOSH;  Surgeon: Merly Wilcox MD;  Location: [facility] [place] PAIN MGT;  Service: Pain Management;  Laterality: Bilateral;  consent needed    TRANSFORAMINAL EPIDURAL INJECTION OF STEROID Bilateral 1/3/2022    Procedure: INJECTION, STEROID, EPIDURAL, TRANSFORAMINAL APPROACH Bilateral L4/5 Needs consent;  Surgeon: Merly Wilcox MD;  Location: [facility] [place] PAIN MGT;  Service: Pain Management;  Laterality: Bilateral;    TRANSFORAMINAL EPIDURAL INJECTION OF STEROID Bilateral 2022    Procedure: INJECTION, STEROID, EPIDURAL, TRANSFORAMINAL APPROACH, BILATERAL L4-L5   MEDICALLY URGENT;  Surgeon: Merly Wilcox MD;  Location: [facility] [place] PAIN MGT;  Service: Pain Management;  Laterality: Bilateral;    TRIGGER FINGER RELEASE Left 11/15/2021    Procedure: RELEASE, TRIGGER FINGER, LEFT MIDDLE AND RING;  Surgeon: Florecita Da Silva MD;  Location: [facility] OR;  Service: Orthopedics;  Laterality: Left;       Family History:  Family History   Problem Relation Age of Onset    Heart failure Mother     Kidney failure Mother     Heart attack Father     Breast cancer Sister 66        Lump, XRT, chemo, recurrence 10 years later.    Cancer Brother         leukemia    Heart attack Brother 58    Pulmonary embolism Brother 45    Heart attack Brother 52    Breast cancer Maternal Grandmother 60        advanced at diagnosis    Breast cancer Maternal Aunt 83         at 92     Colon cancer Neg Hx     Esophageal cancer Neg Hx        Social History:  Social History     Socioeconomic History    Marital status: Single    Number of children: 3   Occupational History    Occupation: Multiple jobs, see social documentation section     Comment: Retired   Tobacco Use    Smoking status: Former     Packs/day: 1.00     Years: 40.00     Pack years: 40.00     Types: Cigarettes    Smokeless tobacco: Never    Tobacco comments:     Smoked >1 ppd for at least 40 years, quit around 1995   Substance and Sexual Activity    Alcohol use: Yes     Comment: occasional glass of wine or cocktail    Drug use: No    Sexual activity: Yes     Partners: Male   Social History Narrative    Worked many jobs while raising 3 children.  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  She was  from her first , but took care of him at the end of his life.       REVIEW OF SYSTEMS:    GENERAL:  No weight loss, malaise or fevers.  HEENT:   No recent changes in vision or hearing  NECK:  Negative for lumps, no difficulty with swallowing.  RESPIRATORY:  Negative for cough, wheezing or shortness of breath, patient denies any recent URI. COPD.  CARDIOVASCULAR:  Negative for chest pain, leg swelling or palpitations. Hypertension.  GI:  Negative for abdominal discomfort, blood in stools or black stools or change in bowel habits.  MUSCULOSKELETAL:  See HPI.  SKIN:  Negative for lesions, rash, and itching.  PSYCH:  No mood disorder or recent psychosocial stressors.  Patients sleep is not disturbed secondary to pain.  HEMATOLOGY/LYMPHOLOGY:  Negative for prolonged bleeding, bruising easily or swollen nodes.  Patient is not currently taking any anti-coagulants  NEURO:   No history of headaches, syncope, paralysis, seizures or tremors.  All other reviewed and negative other than HPI.    OBJECTIVE:    /65   Pulse 69   Temp 98.7 °F  "(37.1 °C)   Ht 5' 2" (1.575 m)   Wt 65 kg (143 lb 4.8 oz)   LMP  (LMP Unknown)   BMI 26.21 kg/m²     PHYSICAL EXAMINATION:  Voice normal.  Normal affect without evidence of distress.      ASSESSMENT: 87 y.o. year old female with lower back and lower extremity pain, consistent with the following diagnoses:     1. Left hip pain  X-Ray Hip 2 or 3 views Left (with Pelvis when performed)      2. Dorsalgia, unspecified  X-Ray Lumbar Complete Including Flex And Ext    MRI Lumbar Spine Without Contrast            PLAN:     - Previous imaging was reviewed and discussed with the patient today.    - S/p repeat bilateral L4/5 TF ASHUTOSH and mild benefit    - Focal GTB pain today, given injection today    - Will further obtain updated imaging of L hip and Lspine with xray and MRI    - If symptoms of feet do not improve will refer to vascular     - Continue Gabapentin 100 mg at bedtime.     - Continue Tramadol per PCP.     - RTC after imaging for review       The above plan and management options were discussed at length with patient. Patient is in agreement with the above and verbalized understanding.    Yohannes Landry  09/01/2022     Injection Procedure  A time out was performed, including verification of patient ID, procedure, site and side, availability of information and equipment, review of safety issues, and agreement with consent, the procedure site was marked.     After time out was performed, the patient was prepped aseptically with chloraprep. A diagnostic and therapeutic injection of 3cc bupivicaine and  4mg decadron was given under sterile technique using a 27g x 1.5 needle from the lateral aspect from standing position.      Pt had no adverse reactions to the medication. Pain decreased. He was instructed to apply ice to the joint for 20 minutes and avoid strenuous activities for 24-36 hours following the injection. He was warned of possible blood sugar and/or blood pressure changes during that time. Following that " time, he can resume regular activities.     She was reminded to call the clinic immediately for any adverse side effects as explained in clinic today.

## 2022-08-25 ENCOUNTER — OFFICE VISIT (OUTPATIENT)
Dept: OPHTHALMOLOGY | Facility: CLINIC | Age: 87
End: 2022-08-25
Payer: MEDICARE

## 2022-08-25 DIAGNOSIS — Z96.1 PSEUDOPHAKIA OF BOTH EYES: ICD-10-CM

## 2022-08-25 DIAGNOSIS — H35.371 EPIRETINAL MEMBRANE, RIGHT EYE: ICD-10-CM

## 2022-08-25 DIAGNOSIS — H35.3221 EXUDATIVE AGE-RELATED MACULAR DEGENERATION OF LEFT EYE WITH ACTIVE CHOROIDAL NEOVASCULARIZATION: Primary | ICD-10-CM

## 2022-08-25 DIAGNOSIS — H35.3112 INTERMEDIATE STAGE DRY AGE-RELATED MACULAR DEGENERATION OF RIGHT EYE: ICD-10-CM

## 2022-08-25 PROCEDURE — 99499 UNLISTED E&M SERVICE: CPT | Mod: S$GLB,,, | Performed by: OPHTHALMOLOGY

## 2022-08-25 PROCEDURE — 92134 OCT, RETINA - OU - BOTH EYES: ICD-10-PCS | Mod: S$GLB,,, | Performed by: OPHTHALMOLOGY

## 2022-08-25 PROCEDURE — 3288F FALL RISK ASSESSMENT DOCD: CPT | Mod: CPTII,S$GLB,, | Performed by: OPHTHALMOLOGY

## 2022-08-25 PROCEDURE — 1126F PR PAIN SEVERITY QUANTIFIED, NO PAIN PRESENT: ICD-10-PCS | Mod: CPTII,S$GLB,, | Performed by: OPHTHALMOLOGY

## 2022-08-25 PROCEDURE — 99204 PR OFFICE/OUTPT VISIT, NEW, LEVL IV, 45-59 MIN: ICD-10-PCS | Mod: 25,S$GLB,, | Performed by: OPHTHALMOLOGY

## 2022-08-25 PROCEDURE — 3288F PR FALLS RISK ASSESSMENT DOCUMENTED: ICD-10-PCS | Mod: CPTII,S$GLB,, | Performed by: OPHTHALMOLOGY

## 2022-08-25 PROCEDURE — 67028 INJECTION EYE DRUG: CPT | Mod: LT,S$GLB,, | Performed by: OPHTHALMOLOGY

## 2022-08-25 PROCEDURE — 1160F RVW MEDS BY RX/DR IN RCRD: CPT | Mod: CPTII,S$GLB,, | Performed by: OPHTHALMOLOGY

## 2022-08-25 PROCEDURE — 67028 PR INJECT INTRAVITREAL PHARMCOLOGIC: ICD-10-PCS | Mod: LT,S$GLB,, | Performed by: OPHTHALMOLOGY

## 2022-08-25 PROCEDURE — 99204 OFFICE O/P NEW MOD 45 MIN: CPT | Mod: 25,S$GLB,, | Performed by: OPHTHALMOLOGY

## 2022-08-25 PROCEDURE — 92134 CPTRZ OPH DX IMG PST SGM RTA: CPT | Mod: S$GLB,,, | Performed by: OPHTHALMOLOGY

## 2022-08-25 PROCEDURE — 1101F PT FALLS ASSESS-DOCD LE1/YR: CPT | Mod: CPTII,S$GLB,, | Performed by: OPHTHALMOLOGY

## 2022-08-25 PROCEDURE — 1159F PR MEDICATION LIST DOCUMENTED IN MEDICAL RECORD: ICD-10-PCS | Mod: CPTII,S$GLB,, | Performed by: OPHTHALMOLOGY

## 2022-08-25 PROCEDURE — 1159F MED LIST DOCD IN RCRD: CPT | Mod: CPTII,S$GLB,, | Performed by: OPHTHALMOLOGY

## 2022-08-25 PROCEDURE — 99999 PR PBB SHADOW E&M-EST. PATIENT-LVL III: ICD-10-PCS | Mod: PBBFAC,,, | Performed by: OPHTHALMOLOGY

## 2022-08-25 PROCEDURE — 1160F PR REVIEW ALL MEDS BY PRESCRIBER/CLIN PHARMACIST DOCUMENTED: ICD-10-PCS | Mod: CPTII,S$GLB,, | Performed by: OPHTHALMOLOGY

## 2022-08-25 PROCEDURE — 1126F AMNT PAIN NOTED NONE PRSNT: CPT | Mod: CPTII,S$GLB,, | Performed by: OPHTHALMOLOGY

## 2022-08-25 PROCEDURE — 1101F PR PT FALLS ASSESS DOC 0-1 FALLS W/OUT INJ PAST YR: ICD-10-PCS | Mod: CPTII,S$GLB,, | Performed by: OPHTHALMOLOGY

## 2022-08-25 PROCEDURE — 99999 PR PBB SHADOW E&M-EST. PATIENT-LVL III: CPT | Mod: PBBFAC,,, | Performed by: OPHTHALMOLOGY

## 2022-08-25 PROCEDURE — 99499 RISK ADDL DX/OHS AUDIT: ICD-10-PCS | Mod: S$GLB,,, | Performed by: OPHTHALMOLOGY

## 2022-08-25 RX ADMIN — Medication 1.25 MG: at 01:08

## 2022-08-25 NOTE — PROGRESS NOTES
HPI     ERM referred from Dr. Joshua   DLS-08/09/2022 Dr. Joshua     Patient has noticed for the past 2 or 3 months that her vision is   blurred.Glasses were prescribed by Dr. Mandujano which do not improve or   help vision. Black spot in OS usually notices it but does not see it today     Denies pain     (-)Flashes (-)Floaters  (-)Photophobia  (-)Glare      H/o eye surgery, injections or laser: PC IOL OU about 10 yrs. Ago   elsewhere  Eye gtts? AT's QAM          Last edited by Yelitza Patel on 8/25/2022  1:14 PM. (History)         A/P    ICD-10-CM ICD-9-CM   1. Exudative age-related macular degeneration of left eye with active choroidal neovascularization  H35.3221 362.52     362.16   2. 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 Epiretinal membrane, right eye  H35.371 362.56   4. Pseudophakia of both eyes  Z96.1 V43.1       1. Exudative age-related macular degeneration of left eye with active choroidal neovascularization  2. 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 Joshua for retina check    OD: dry changes only, VA 20/40, mixed mechanism mostly ERM component, no IRF/SRF no heme  Plan: Observation    OS: VA 20/60, pt noticed decline iin VA about 2 mo ago, has CNVM with IRF  Plan: needs MANA OS for active CNVM  Based on todays exam, diagnostic studies, and review of records, the determination was made for treatment today.  Schedule Avastin Injection today Left Eye Patient chooses to proceed with injection R/B/A discussed include infection retinal detachment and stroke    Patient identified.  Timeout performed.    Risks, benefits, and alternatives to treatment were discussed in detail with the patient, including bleeding/infection (endophthalmitis)/etc.  The patient voiced understanding and wished to proceed with the procedure.  See separate consent form.    Injection Procedure Note:  Diagnosis: wet AMD Left  Eye    Topical Proparacaine drop placed then topical 5% Betadine and then subconjunctival lidocaine 2% injection  Sterile gloves used, and sterile lid speculum placed.  5% Betadine placed at injection site again prior to injection.  Avastin 1.25mg in 0.05cc Injected inferotemporally 3.5-4mm posterior to the limbus.  Complications: None  Va at least CF at 5 feet post injection.  Retina, ONH, IOP normal after injection.    Followup as below.  Patient should return immediately PRN.  Retinal Detachment and Endophthalmitis precautions given.       3. Epiretinal membrane, right eye  Mod ERM, VA 20/40  Pt asymptomatic, no metamorphopsia  Plan: Observation  Amsler precautions discussed     4. Pseudophakia of both eyes  Good lens position OU  Plan: Observation    RTC 1 mo DFE/OCTm OU, MANA OS      I saw and examined the patient and reviewed in detail the findings documented. The final examination findings, image interpretations, and plan as documented in the record represent my personal judgment and conclusions.    Dwight Melton MD  Vitreoretinal Surgery   [facility]

## 2022-09-01 ENCOUNTER — OFFICE VISIT (OUTPATIENT)
Dept: CARDIOLOGY | Facility: CLINIC | Age: 87
End: 2022-09-01
Payer: MEDICARE

## 2022-09-01 VITALS
HEART RATE: 60 BPM | DIASTOLIC BLOOD PRESSURE: 68 MMHG | WEIGHT: 144.81 LBS | BODY MASS INDEX: 26.65 KG/M2 | SYSTOLIC BLOOD PRESSURE: 114 MMHG | HEIGHT: 62 IN

## 2022-09-01 DIAGNOSIS — R09.89 LEFT CAROTID BRUIT: ICD-10-CM

## 2022-09-01 DIAGNOSIS — I73.9 PAD (PERIPHERAL ARTERY DISEASE): Primary | ICD-10-CM

## 2022-09-01 DIAGNOSIS — E78.2 MIXED HYPERLIPIDEMIA: Chronic | ICD-10-CM

## 2022-09-01 DIAGNOSIS — I73.9 PERIPHERAL VASCULAR DISEASE, UNSPECIFIED: ICD-10-CM

## 2022-09-01 DIAGNOSIS — I70.0 ATHEROSCLEROSIS OF AORTA: ICD-10-CM

## 2022-09-01 DIAGNOSIS — I10 ESSENTIAL HYPERTENSION: Chronic | ICD-10-CM

## 2022-09-01 PROCEDURE — 99999 PR PBB SHADOW E&M-EST. PATIENT-LVL V: CPT | Mod: PBBFAC,,, | Performed by: INTERNAL MEDICINE

## 2022-09-01 PROCEDURE — 99205 PR OFFICE/OUTPT VISIT, NEW, LEVL V, 60-74 MIN: ICD-10-PCS | Mod: S$GLB,,, | Performed by: INTERNAL MEDICINE

## 2022-09-01 PROCEDURE — 1126F AMNT PAIN NOTED NONE PRSNT: CPT | Mod: CPTII,S$GLB,, | Performed by: INTERNAL MEDICINE

## 2022-09-01 PROCEDURE — 1101F PT FALLS ASSESS-DOCD LE1/YR: CPT | Mod: CPTII,S$GLB,, | Performed by: INTERNAL MEDICINE

## 2022-09-01 PROCEDURE — 1101F PR PT FALLS ASSESS DOC 0-1 FALLS W/OUT INJ PAST YR: ICD-10-PCS | Mod: CPTII,S$GLB,, | Performed by: INTERNAL MEDICINE

## 2022-09-01 PROCEDURE — 1126F PR PAIN SEVERITY QUANTIFIED, NO PAIN PRESENT: ICD-10-PCS | Mod: CPTII,S$GLB,, | Performed by: INTERNAL MEDICINE

## 2022-09-01 PROCEDURE — 3288F PR FALLS RISK ASSESSMENT DOCUMENTED: ICD-10-PCS | Mod: CPTII,S$GLB,, | Performed by: INTERNAL MEDICINE

## 2022-09-01 PROCEDURE — 1159F MED LIST DOCD IN RCRD: CPT | Mod: CPTII,S$GLB,, | Performed by: INTERNAL MEDICINE

## 2022-09-01 PROCEDURE — 99999 PR PBB SHADOW E&M-EST. PATIENT-LVL V: ICD-10-PCS | Mod: PBBFAC,,, | Performed by: INTERNAL MEDICINE

## 2022-09-01 PROCEDURE — 99205 OFFICE O/P NEW HI 60 MIN: CPT | Mod: S$GLB,,, | Performed by: INTERNAL MEDICINE

## 2022-09-01 PROCEDURE — 1159F PR MEDICATION LIST DOCUMENTED IN MEDICAL RECORD: ICD-10-PCS | Mod: CPTII,S$GLB,, | Performed by: INTERNAL MEDICINE

## 2022-09-01 PROCEDURE — 3288F FALL RISK ASSESSMENT DOCD: CPT | Mod: CPTII,S$GLB,, | Performed by: INTERNAL MEDICINE

## 2022-09-01 NOTE — PATIENT INSTRUCTIONS
Assessment/Plan:  Anahi Cook is a 87 y.o. female with HTN, HLD, arthritis, COPD, former smoker, who presents for an initial appointment.     PAD- She reports she does not ambulate much and has no claudication, rest pain, or tissue loss.  JESSE Study on 7/26/2022 revealed the right JESSE is 0.46 with diminished PVR waveforms below the knee consistent with severe arterial insufficiency. The right TBI 0.23 with diminished digit waveforms.  The left JESSE is 0.8 with mildly abnormal PVR waveforms consistent with mild arterial insufficiency.  The left TBI is 0.71.  4th and 5th digit waveforms are abnormal.  Check toe pressure study and CTA abd/pelvis with iliofemoral runoff to determine if arterial blood flow is adequate for wound healing prior to any invasive procedures.  Start ASA 81 mg daily.  Continue atorvastatin 20 mg daily.      2. Left Carotid Bruit- Check carotid ultrasound.     3. HTN- Continue current medications.    4. HLD- Continue atorvastatin 20 mg daily.      Follow up in 3 months

## 2022-09-01 NOTE — PROGRESS NOTES
[facility]      Chief Complaint   Patient presents with    Edema       Patient ID: Anahi Cook is a 87 y.o. female with HTN, HLD, arthritis, COPD, former smoker, who presents for an initial appointment.  Pertinent history/events are as follows:     -Pt kindly referred by Dr. Lyssa Hoffman for evaluation of PAD/bilateral foot pain.    HPI:  Mrs. Cook is accompanied by her daughter.  She reports she does not ambulate much and has no claudication, rest pain, or tissue loss.  JESSE Study on 7/26/2022 revealed the right JESSE is 0.46 with diminished PVR waveforms below the knee consistent with severe arterial insufficiency. The right TBI 0.23 with diminished digit waveforms.  The left JESSE is 0.8 with mildly abnormal PVR waveforms consistent with mild arterial insufficiency.  The left TBI is 0.71.  4th and 5th digit waveforms are abnormal.  Formerly smoked a pack a day for 40 years.  Quit in her 60's.      Past Medical History:   Diagnosis Date    Anxiety     Arthritis     Back pain     Breast cancer 1/17/2018    Breast mass 1/15/2018    Chronic kidney disease, stage 2, mildly decreased GFR     COPD (chronic obstructive pulmonary disease)     emphysema    Depression     Dysuria 1/29/2018    Exudative age-related macular degeneration of left eye with active choroidal neovascularization 8/25/2022    Family history of breast cancer 1/17/2018    GERD (gastroesophageal reflux disease)     Hypertension     Infiltrating ductal carcinoma of breast, left 7/12/2018    Osteoporosis, senile     Ovarian mass 1/15/2018    Pneumonia     S/P robotic assisted laparoscopic BSO (bilateral salpingo-oophorectomy) 2/23/2018     Past Surgical History:   Procedure Laterality Date    AXILLARY NODE DISSECTION Left 7/12/2018    Procedure: LYMPHADENECTOMY, AXILLARY;  Surgeon: Amanda Caballero MD;  Location: [facility] OR [address] [place] [address];  Service: General;  Laterality: Left;    BREAST BIOPSY      BREAST LUMPECTOMY      CATARACT EXTRACTION       CHOLECYSTECTOMY      COLONOSCOPY      ESOPHAGOGASTRODUODENOSCOPY      ESOPHAGOGASTRODUODENOSCOPY N/A 10/14/2019    Procedure: EGD (ESOPHAGOGASTRODUODENOSCOPY);  Surgeon: Davonte Thompson MD;  Location: [facility] (2ND FLR);  Service: Endoscopy;  Laterality: N/A;  hx of pulmonary hypertension-PA 50     pt requesting ASAP    EYE SURGERY      FIXATION KYPHOPLASTY N/A 11/8/2018    Procedure: Kyphoplasty   T12;  Surgeon: Merly Wilcox MD;  Location: [facility] CATH LAB;  Service: Pain Management;  Laterality: N/A;  T12  LIFEmee Reps e-mailed with date and time    HYSTERECTOMY      INJECTION FOR SENTINEL NODE IDENTIFICATION Left 7/12/2018    Procedure: INJECTION, FOR SENTINEL NODE IDENTIFICATION;  Surgeon: Amanda Caballero MD;  [facility];  Service: General;  Laterality: Left;    INJECTION OF FACET JOINT Bilateral 10/15/2018    Procedure: INJECTION, FACET JOINT, BILATERAL LUMBAR L3-4, 4-5, 5-S1 FACET JOINT INJECTIONS;  Surgeon: Merly Wilcox MD;  Location: [facility] PAIN MGT;  Service: Pain Management;  Laterality: Bilateral;    INJECTION OF FACET JOINT Bilateral 4/1/2019    Procedure: INJECTION, FACET JOINT, L3-L4,L4-L5,L5-S1;  Surgeon: Merly Wilcox MD;  Location: [facility] PAIN MGT;  Service: Pain Management;  Laterality: Bilateral;    INJECTION OF FACET JOINT Bilateral 6/20/2019    Procedure: INJECTION, FACET JOINT, L3-L4,L4-L5,L5-S1 NEED CONSENT;  Surgeon: Merly Wilcox MD;  Location: [facility] PAIN MGT;  Service: Pain Management;  Laterality: Bilateral;    INJECTION OF FACET JOINT Bilateral 7/27/2020    Procedure: INJECTION, FACET JOINT BILATERAL L3/4, L4/5 and L5/S1;  Surgeon: Merly Wilcox MD;  Location: [facility] PAIN MGT;  Service: Pain Management;  Laterality: Bilateral;    INJECTION OF JOINT Bilateral 7/27/2020    Procedure: INJECTION, JOINT, BILATERAL GREATER TROCHANTERIC BURSA;  Surgeon: Merly Wilcox MD;  Location: [facility] PAIN MGT;  Service: Pain Management;  Laterality: Bilateral;    INJECTION OF JOINT Bilateral 4/26/2021     Procedure: INJECTION, JOINT, HIP;  Surgeon: Merly Wilcox MD;  Location: [facility] PAIN MGT;  Service: Pain Management;  Laterality: Bilateral;  consent needed    INJECTION OF STEROID Right 11/15/2021    Procedure: INJECTION, STEROID RIGHT MIDDLE AND SAMLL FINGER;  Surgeon: Florecita Da Silva MD;  Location: [facility] OR;  Service: Orthopedics;  Laterality: Right;    MASTECTOMY, PARTIAL Left 7/12/2018    Procedure: MASTECTOMY, PARTIAL-W/SEED LOCALIZATION;  Surgeon: Amanda Caballero MD;  Location: [facility] OR [facility] FLR;  Service: General;  Laterality: Left;    ID REMOVAL OF OVARY/TUBE(S)  02/23/2018    Robotic Assisted    ROTATOR CUFF REPAIR Right     rotator cuff repair right shoulder    TONSILLECTOMY      TRANSFORAMINAL EPIDURAL INJECTION OF STEROID Bilateral 6/21/2021    Procedure: INJECTION, STEROID, EPIDURAL, TRANSFORAMINAL APPROACH  bilateral L4/5 TF ASHUTOSH;  Surgeon: Merly Wilcox MD;  Location: [facility] PAIN MGT;  Service: Pain Management;  Laterality: Bilateral;  consent needed    TRANSFORAMINAL EPIDURAL INJECTION OF STEROID Bilateral 1/3/2022    Procedure: INJECTION, STEROID, EPIDURAL, TRANSFORAMINAL APPROACH Bilateral L4/5 Needs consent;  Surgeon: Merly Wilcox MD;  Location: [facility] PAIN MGT;  Service: Pain Management;  Laterality: Bilateral;    TRANSFORAMINAL EPIDURAL INJECTION OF STEROID Bilateral 8/9/2022    Procedure: INJECTION, STEROID, EPIDURAL, TRANSFORAMINAL APPROACH, BILATERAL L4-L5   MEDICALLY URGENT;  Surgeon: Merly Wilcox MD;  Location: [facility] PAIN MGT;  Service: Pain Management;  Laterality: Bilateral;    TRIGGER FINGER RELEASE Left 11/15/2021    Procedure: RELEASE, TRIGGER FINGER, LEFT MIDDLE AND RING;  Surgeon: Florecita Da Silva MD;  Location: [facility] OR;  Service: Orthopedics;  Laterality: Left;     Social History     Socioeconomic History    Marital status: Single    Number of children: 3   Occupational History    Occupation: Multiple jobs, see social documentation section     Comment: Retired   Tobacco Use    Smoking  status: Former     Packs/day: 1.00     Years: 40.00     Pack years: 40.00     Types: Cigarettes    Smokeless tobacco: Never    Tobacco comments:     Smoked >1 ppd for at least 40 years, quit around    Substance and Sexual Activity    Alcohol use: Yes     Comment: occasional glass of wine or cocktail    Drug use: No    Sexual activity: Yes     Partners: Male   Social History Narrative    Worked many jobs while raising 3 children.  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  She was  from her first , but took care of him at the end of his life.     Family History   Problem Relation Age of Onset    Heart failure Mother     Kidney failure Mother     Heart attack Father     Breast cancer Sister 66        Lump, XRT, chemo, recurrence 10 years later.    Cancer Brother         leukemia    Heart attack Brother 58    Pulmonary embolism Brother 45    Heart attack Brother 52    Breast cancer Maternal Grandmother 60        advanced at diagnosis    Breast cancer Maternal Aunt 83         at 92    Colon cancer Neg Hx     Esophageal cancer Neg Hx        Review of patient's allergies indicates:   Allergen Reactions    Levaquin [levofloxacin] Itching    Penicillins Itching       Medication List with Changes/Refills   Current Medications    ACETAMINOPHEN (TYLENOL) 325 MG TABLET    Take 650 mg by mouth every 6 (six) hours as needed for Pain.     ALBUTEROL (PROVENTIL) 2.5 MG /3 ML (0.083 %) NEBULIZER SOLUTION    Inhale 3 mLs into the lungs as needed.     ALBUTEROL (PROVENTIL/VENTOLIN HFA) 90 MCG/ACTUATION INHALER    Inhale 1-2 puffs into the lungs every 6 (six) hours as needed for Wheezing or Shortness of Breath.    ALBUTEROL-IPRATROPIUM 2.5MG-0.5MG/3ML (DUO-NEB) 0.5 MG-3 MG(2.5 MG BASE)/3 ML NEBULIZER SOLUTION    Take 3 mLs by nebulization as needed.     ATORVASTATIN (LIPITOR) 20 MG TABLET    Take 1 tablet (20 mg  total) by mouth every evening.    BENAZEPRIL (LOTENSIN) 40 MG TABLET    Take 1 tablet (40 mg total) by mouth once daily.    CALCIUM-VITAMIN D 250-100 MG-UNIT PER TABLET    Take 1 tablet by mouth 2 (two) times daily.    CETIRIZINE HCL (ZYRTEC ORAL)    Take 10 mg by mouth nightly as needed.     CITALOPRAM (CELEXA) 20 MG TABLET    TAKE 1 TABLET(20 MG) BY MOUTH EVERY EVENING    FELODIPINE (PLENDIL) 10 MG 24 HR TABLET    Take 1 tablet (10 mg total) by mouth 2 (two) times a day.    GABAPENTIN (NEURONTIN) 100 MG CAPSULE    TAKE 1 CAPSULE(100 MG) BY MOUTH EVERY EVENING    HYDRALAZINE (APRESOLINE) 25 MG TABLET    TAKE 1 TABLET(25 MG) BY MOUTH EVERY 12 HOURS FOR BLOOD PRESSURE    KETOPROFEN 10 % CRPK    APPLY UP TO 4.8 GRAMS (3 PUMPS) TO PAINFUL AREAS UP TO FIVE TIMES DAILY. RUB IN WELL    KETOPROFEN 10 % CRPK    APPLY 2 GRAMS 3-4 TIMES DAILY FOR TREATMENT OF PAIN    LETROZOLE (FEMARA) 2.5 MG TAB    Take 1 tablet (2.5 mg total) by mouth once daily.    MECLIZINE (ANTIVERT) 25 MG TABLET    Take 1 tablet (25 mg total) by mouth 3 (three) times daily as needed for Dizziness.    MELATONIN 10 MG TAB    Take by mouth every evening.    MULTIVITAMIN (THERAGRAN) PER TABLET    Take 1 tablet by mouth once daily.    TORSEMIDE (DEMADEX) 20 MG TAB    Take 1 tablet (20 mg total) by mouth once daily.    TRAMADOL (ULTRAM) 50 MG TABLET    Take 1 tablet (50 mg total) by mouth every 8 (eight) hours as needed for Pain.    TRIAMCINOLONE ACETONIDE 0.1% (KENALOG) 0.1 % CREAM    AAA bid.  Spot treatment as needed    TRIAMCINOLONE ACETONIDE 0.1% (KENALOG) 0.1 % PASTE    APPLY SPARINGLY FOUR TIMES DAILY       Review of Systems  Constitution: Denies chills, fever, and sweats.  HENT: Denies headaches or blurry vision.  Cardiovascular: Denies chest pain or irregular heart beat.  Respiratory: Denies cough or shortness of breath.  Gastrointestinal: Denies abdominal pain, nausea, or vomiting.  Musculoskeletal: Denies muscle cramps.  Neurological: Denies  "dizziness or focal weakness.  Psychiatric/Behavioral: Normal mental status.  Hematologic/Lymphatic: Denies bleeding problem or easy bruising/bleeding.  Skin: Denies rash or suspicious lesions    Physical Examination  /68   Pulse 60   Ht 5' 2" (1.575 m)   Wt 65.7 kg (144 lb 13.5 oz)   LMP  (LMP Unknown)   BMI 26.49 kg/m²     Constitutional: No acute distress, conversant  HEENT: Sclera anicteric, Pupils equal, round and reactive to light, extraocular motions intact, Oropharynx clear  Neck: No JVD, no carotid bruits  Cardiovascular: regular rate and rhythm, no murmur, rubs or gallops, normal S1/S2  Pulmonary: Clear to auscultation bilaterally  Abdominal: Abdomen soft, nontender, nondistended, positive bowel sounds  Extremities: No lower extremity edema,   Pulses:  Carotid pulses are 2+ on the right side, and 2+ on the left side.  Radial pulses are 2+ on the right side, and 2+ on the left side.   Femoral pulses are 2+ on the right side, and 2+ on the left side.  Popliteal pulses are 2+ on the right side, and 2+ on the left side.   Dorsalis pedis pulses are 2+ on the right side, and 2+ on the left side.   Posterior tibial pulses are 2+ on the right side, and 2+ on the left side.    Skin: No ecchymosis, erythema, or ulcers  Psych: Alert and oriented x 3, appropriate affect  Neuro: CNII-XII intact, no focal deficits    Labs:  Most Recent Data  CBC:   Lab Results   Component Value Date    WBC 9.27 05/10/2022    HGB 13.6 05/10/2022    HCT 43.0 05/10/2022     05/10/2022    MCV 89 05/10/2022    RDW 13.7 05/10/2022     BMP:   Lab Results   Component Value Date     05/10/2022    K 3.9 05/10/2022     05/10/2022    CO2 29 05/10/2022    BUN 13 05/10/2022    CREATININE 0.8 05/10/2022    GLU 91 05/10/2022    CALCIUM 9.4 05/10/2022    PHOS 3.3 08/29/2019     LFTS;   Lab Results   Component Value Date    PROT 7.3 11/22/2021    ALBUMIN 3.3 (L) 11/22/2021    BILITOT 1.0 11/22/2021    AST 20 11/22/2021    " ALKPHOS 67 11/22/2021    ALT 12 11/22/2021     COAGS: No results found for: INR, PROTIME, PTT  FLP:   Lab Results   Component Value Date    CHOL 126 11/22/2021    HDL 55 11/22/2021    LDLCALC 53.4 (L) 11/22/2021    TRIG 88 11/22/2021    CHOLHDL 43.7 11/22/2021     CARDIAC:   Lab Results   Component Value Date    TROPONINI 0.013 02/24/2021    BNP 24 10/12/2016       Imaging:    JESSE Study 7/26/2022:  The right JESSE is 0.46 with diminished PVR waveforms below the knee consistent with severe arterial insufficiency.  The right TBI 0.23 with diminished digit waveforms.  The left JSESE is 0.8 with mildly abnormal PVR waveforms consistent with mild arterial insufficiency.  The left TBI is 0.71.  4th and 5th digit waveforms are abnormal.    Assessment/Plan:  Anahi Cook is a 87 y.o. female with HTN, HLD, arthritis, COPD, former smoker, who presents for an initial appointment.     PAD- She reports she does not ambulate much and has no claudication, rest pain, or tissue loss.  JESSE Study on 7/26/2022 revealed the right JESSE is 0.46 with diminished PVR waveforms below the knee consistent with severe arterial insufficiency. The right TBI 0.23 with diminished digit waveforms.  The left JESSE is 0.8 with mildly abnormal PVR waveforms consistent with mild arterial insufficiency.  The left TBI is 0.71.  4th and 5th digit waveforms are abnormal.  Check toe pressure study and CTA abd/pelvis with iliofemoral runoff to determine if arterial blood flow is adequate for wound healing prior to any invasive procedures.  Start ASA 81 mg daily.  Continue atorvastatin 20 mg daily.      2. Left Carotid Bruit- Check carotid ultrasound.     3. HTN- Continue current medications.    4. HLD- Continue atorvastatin 20 mg daily.      Follow up in 3 months    Total duration of face to face visit time 30 minutes.  Total time spent counseling greater than fifty percent of total visit time.  Counseling included discussion regarding imaging findings, diagnosis,  possibilities, treatment options, risks and benefits.  The patient had many questions regarding the options and long-term effects.    Basil Boss MD, PhD  Interventional Cardiology

## 2022-09-12 ENCOUNTER — OFFICE VISIT (OUTPATIENT)
Dept: UROGYNECOLOGY | Facility: CLINIC | Age: 87
End: 2022-09-12
Payer: MEDICARE

## 2022-09-12 ENCOUNTER — HOSPITAL ENCOUNTER (OUTPATIENT)
Dept: RADIOLOGY | Facility: OTHER | Age: 87
Discharge: HOME | End: 2022-09-12
Attending: NURSE PRACTITIONER
Payer: MEDICARE

## 2022-09-12 VITALS
DIASTOLIC BLOOD PRESSURE: 55 MMHG | SYSTOLIC BLOOD PRESSURE: 116 MMHG | BODY MASS INDEX: 26.65 KG/M2 | HEIGHT: 62 IN | WEIGHT: 144.81 LBS

## 2022-09-12 DIAGNOSIS — N39.42 URINARY INCONTINENCE WITHOUT SENSORY AWARENESS: ICD-10-CM

## 2022-09-12 DIAGNOSIS — N95.2 POSTMENOPAUSE ATROPHIC VAGINITIS: ICD-10-CM

## 2022-09-12 DIAGNOSIS — N39.41 URGE INCONTINENCE OF URINE: Primary | ICD-10-CM

## 2022-09-12 DIAGNOSIS — N81.84 PELVIC MUSCLE WASTING: ICD-10-CM

## 2022-09-12 DIAGNOSIS — M54.9 DORSALGIA, UNSPECIFIED: ICD-10-CM

## 2022-09-12 PROCEDURE — 99999 PR PBB SHADOW E&M-EST. PATIENT-LVL IV: ICD-10-PCS | Mod: PBBFAC,,, | Performed by: OBSTETRICS & GYNECOLOGY

## 2022-09-12 PROCEDURE — 99203 PR OFFICE/OUTPT VISIT, NEW, LEVL III, 30-44 MIN: ICD-10-PCS | Mod: S$GLB,,, | Performed by: OBSTETRICS & GYNECOLOGY

## 2022-09-12 PROCEDURE — 1101F PR PT FALLS ASSESS DOC 0-1 FALLS W/OUT INJ PAST YR: ICD-10-PCS | Mod: CPTII,S$GLB,, | Performed by: OBSTETRICS & GYNECOLOGY

## 2022-09-12 PROCEDURE — 99999 PR PBB SHADOW E&M-EST. PATIENT-LVL IV: CPT | Mod: PBBFAC,,, | Performed by: OBSTETRICS & GYNECOLOGY

## 2022-09-12 PROCEDURE — 3288F FALL RISK ASSESSMENT DOCD: CPT | Mod: CPTII,S$GLB,, | Performed by: OBSTETRICS & GYNECOLOGY

## 2022-09-12 PROCEDURE — 3288F PR FALLS RISK ASSESSMENT DOCUMENTED: ICD-10-PCS | Mod: CPTII,S$GLB,, | Performed by: OBSTETRICS & GYNECOLOGY

## 2022-09-12 PROCEDURE — 72148 MRI LUMBAR SPINE W/O DYE: CPT | Mod: TC

## 2022-09-12 PROCEDURE — 1159F PR MEDICATION LIST DOCUMENTED IN MEDICAL RECORD: ICD-10-PCS | Mod: CPTII,S$GLB,, | Performed by: OBSTETRICS & GYNECOLOGY

## 2022-09-12 PROCEDURE — 1159F MED LIST DOCD IN RCRD: CPT | Mod: CPTII,S$GLB,, | Performed by: OBSTETRICS & GYNECOLOGY

## 2022-09-12 PROCEDURE — 72148 MRI LUMBAR SPINE WITHOUT CONTRAST: ICD-10-PCS | Mod: 26,,, | Performed by: RADIOLOGY

## 2022-09-12 PROCEDURE — 99203 OFFICE O/P NEW LOW 30 MIN: CPT | Mod: S$GLB,,, | Performed by: OBSTETRICS & GYNECOLOGY

## 2022-09-12 PROCEDURE — 72148 MRI LUMBAR SPINE W/O DYE: CPT | Mod: 26,,, | Performed by: RADIOLOGY

## 2022-09-12 PROCEDURE — 1101F PT FALLS ASSESS-DOCD LE1/YR: CPT | Mod: CPTII,S$GLB,, | Performed by: OBSTETRICS & GYNECOLOGY

## 2022-09-12 RX ORDER — MIRABEGRON 25 MG/1
25 TABLET, FILM COATED, EXTENDED RELEASE ORAL DAILY
Qty: 30 TABLET | Refills: 11 | Status: SHIPPED | OUTPATIENT
Start: 2022-09-12 | End: 2023-02-02

## 2022-09-12 NOTE — PROGRESS NOTES
Subjective:      Patient ID: Anahi Cook is a 88 y.o. female.    Chief Complaint:  Consult and Urinary Incontinence      History of Present Illness  88-year-old multipara history of hysterectomy unclear etiology breast cancer survivor.    Several weeks ago had a sudden uncontrolled incontinent episode with no situation or sensation.  This has been an area of concerned that wanted to understand make sure there is nothing more nefarious or anything else giving an organic cause to this.    There is no issues of overall urgency frequency or form on incontinence          Past Medical History:   Diagnosis Date    Anxiety     Arthritis     Back pain     Breast cancer 1/17/2018    Breast mass 1/15/2018    Chronic kidney disease, stage 2, mildly decreased GFR     COPD (chronic obstructive pulmonary disease)     emphysema    Depression     Dysuria 1/29/2018    Exudative age-related macular degeneration of left eye with active choroidal neovascularization 8/25/2022    Family history of breast cancer 1/17/2018    GERD (gastroesophageal reflux disease)     Hypertension     Infiltrating ductal carcinoma of breast, left 7/12/2018    Osteoporosis, senile     Ovarian mass 1/15/2018    Pneumonia     S/P robotic assisted laparoscopic BSO (bilateral salpingo-oophorectomy) 2/23/2018       Past Surgical History:   Procedure Laterality Date    AXILLARY NODE DISSECTION Left 7/12/2018    Procedure: LYMPHADENECTOMY, AXILLARY;  Surgeon: Amanda Caballero MD;  Location: [facility] OR [address] [place] [address];  Service: General;  Laterality: Left;    BREAST BIOPSY      BREAST LUMPECTOMY      CATARACT EXTRACTION      CHOLECYSTECTOMY      COLONOSCOPY      ESOPHAGOGASTRODUODENOSCOPY      ESOPHAGOGASTRODUODENOSCOPY N/A 10/14/2019    Procedure: EGD (ESOPHAGOGASTRODUODENOSCOPY);  Surgeon: Davonte Thompson MD;  Location: [address]);  Service: Endoscopy;  Laterality: N/A;  hx of pulmonary hypertension-PA 50     pt requesting ASAP    EYE SURGERY      FIXATION  KYPHOPLASTY N/A 11/8/2018    Procedure: Kyphoplasty   T12;  Surgeon: Merly Wilcox MD;  Location: [facility] CATH LAB;  Service: Pain Management;  Laterality: N/A;  T12  Nia Reps e-mailed with date and time    HYSTERECTOMY      INJECTION FOR SENTINEL NODE IDENTIFICATION Left 7/12/2018    Procedure: INJECTION, FOR SENTINEL NODE IDENTIFICATION;  Surgeon: Amanda Caballero MD;  Location: [address];  Service: General;  Laterality: Left;    INJECTION OF FACET JOINT Bilateral 10/15/2018    Procedure: INJECTION, FACET JOINT, BILATERAL LUMBAR L3-4, 4-5, 5-S1 FACET JOINT INJECTIONS;  Surgeon: Merly Wilcox MD;  Location: [facility] PAIN MGT;  Service: Pain Management;  Laterality: Bilateral;    INJECTION OF FACET JOINT Bilateral 4/1/2019    Procedure: INJECTION, FACET JOINT, L3-L4,L4-L5,L5-S1;  Surgeon: Merly Wilcox MD;  Location: [facility] PAIN MGT;  Service: Pain Management;  Laterality: Bilateral;    INJECTION OF FACET JOINT Bilateral 6/20/2019    Procedure: INJECTION, FACET JOINT, L3-L4,L4-L5,L5-S1 NEED CONSENT;  Surgeon: Merly Wilcox MD;  Location: [facility] PAIN MGT;  Service: Pain Management;  Laterality: Bilateral;    INJECTION OF FACET JOINT Bilateral 7/27/2020    Procedure: INJECTION, FACET JOINT BILATERAL L3/4, L4/5 and L5/S1;  Surgeon: Merly Wilcox MD;  Location: [facility] PAIN MGT;  Service: Pain Management;  Laterality: Bilateral;    INJECTION OF JOINT Bilateral 7/27/2020    Procedure: INJECTION, JOINT, BILATERAL GREATER TROCHANTERIC BURSA;  Surgeon: Merly Wilcox MD;  Location: [facility] PAIN MGT;  Service: Pain Management;  Laterality: Bilateral;    INJECTION OF JOINT Bilateral 4/26/2021    Procedure: INJECTION, JOINT, HIP;  Surgeon: Merly Wilcox MD;  Location: [facility] PAIN MGT;  Service: Pain Management;  Laterality: Bilateral;  consent needed    INJECTION OF STEROID Right 11/15/2021    Procedure: INJECTION, STEROID RIGHT MIDDLE AND SAMLL FINGER;  Surgeon: Florecita Da Silva MD;  Location: [facility] OR;  Service: Orthopedics;  Laterality:  Right;    MASTECTOMY, PARTIAL Left 2018    Procedure: MASTECTOMY, PARTIAL-W/SEED LOCALIZATION;  Surgeon: Amanda Caballero MD;  [facility];  Service: General;  Laterality: Left;    [place] REMOVAL OF OVARY/TUBE(S)  2018    Robotic Assisted    ROTATOR CUFF REPAIR Right     rotator cuff repair right shoulder    TONSILLECTOMY      TRANSFORAMINAL EPIDURAL INJECTION OF STEROID Bilateral 2021    Procedure: INJECTION, STEROID, EPIDURAL, TRANSFORAMINAL APPROACH  bilateral L4/5 TF ASHUTOSH;  Surgeon: Merly Wilcox MD;  Location: [facility] PAIN MGT;  Service: Pain Management;  Laterality: Bilateral;  consent needed    TRANSFORAMINAL EPIDURAL INJECTION OF STEROID Bilateral 1/3/2022    Procedure: INJECTION, STEROID, EPIDURAL, TRANSFORAMINAL APPROACH Bilateral L4/5 Needs consent;  Surgeon: Merly Wilcox MD;  Location: [facility] PAIN MGT;  Service: Pain Management;  Laterality: Bilateral;    TRANSFORAMINAL EPIDURAL INJECTION OF STEROID Bilateral 2022    Procedure: INJECTION, STEROID, EPIDURAL, TRANSFORAMINAL APPROACH, BILATERAL L4-L5   MEDICALLY URGENT;  Surgeon: Merly Wilcox MD;  Location: [facility] PAIN MGT;  Service: Pain Management;  Laterality: Bilateral;    TRIGGER FINGER RELEASE Left 11/15/2021    Procedure: RELEASE, TRIGGER FINGER, LEFT MIDDLE AND RING;  Surgeon: Florecita Da Silva MD;  Location: [facility] OR;  Service: Orthopedics;  Laterality: Left;       GYN & OB History  No LMP recorded (lmp unknown). Patient has had a hysterectomy.   Date of Last Pap: No result found    OB History    Para Term  AB Living   3 3 3         SAB IAB Ectopic Multiple Live Births                  # Outcome Date GA Lbr Charles/2nd Weight Sex Delivery Anes PTL Lv   3 Term            2 Term            1 Term                Health Maintenance         Date Due Completion Date    Influenza Vaccine (1) 2022 10/1/2021    Lipid Panel 2026    TETANUS VACCINE 2032 2/3/2022            Family History   Problem  "Relation Age of Onset    Heart failure Mother     Kidney failure Mother     Heart attack Father     Breast cancer Sister 66        Lump, XRT, chemo, recurrence 10 years later.    Cancer Brother         leukemia    Heart attack Brother 58    Pulmonary embolism Brother 45    Heart attack Brother 52    Breast cancer Maternal Grandmother 60        advanced at diagnosis    Breast cancer Maternal Aunt 83         at 92    Colon cancer Neg Hx     Esophageal cancer Neg Hx        Social History     Socioeconomic History    Marital status: Single    Number of children: 3   Occupational History    Occupation: Multiple jobs, see social documentation section     Comment: Retired   Tobacco Use    Smoking status: Former     Packs/day: 1.00     Years: 40.00     Pack years: 40.00     Types: Cigarettes    Smokeless tobacco: Never    Tobacco comments:     Smoked >1 ppd for at least 40 years, quit around    Substance and Sexual Activity    Alcohol use: Yes     Comment: occasional glass of wine or cocktail    Drug use: No    Sexual activity: Yes     Partners: Male   Social History Narrative    Worked many jobs while raising 3 children.  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  She was  from her first , but took care of him at the end of his life.       Review of Systems  Review of Systems  Unintended sudden decompression of bladder several weeks ago     Objective:   BP (!) 116/55   Ht 5' 2.01" (1.575 m)   Wt 65.7 kg (144 lb 13.5 oz)   LMP  (LMP Unknown)   BMI 26.49 kg/m²     Physical Exam   Extremely atrophic vaginal vault essentially normal anatomy urethral caruncle pelvic floor strength is 0/5  Assessment:     1. Urge incontinence of urine    2. Urinary incontinence without sensory awareness    3. Postmenopause atrophic vaginitis    4. Pelvic muscle wasting            Plan:     1. Urge incontinence of urine  "   2. Urinary incontinence without sensory awareness    3. Postmenopause atrophic vaginitis    4. Pelvic muscle wasting      I brought patient and patient's daughter into my office.    We did talk about  1. Post menopausal significant atrophy of urogenital tissue with the presence of urethral caruncle.  I am recommending hormone supplementation transvaginal low-dose.  However they have stated that they will take this up with her oncologist before embarking on that I do not see any issues with that whatsoever.      2. Significant pelvic for muscle wasting is most likely contributing to this issue.  I would recommend a course of pelvic floor physical therapy to improve coordination.  At this time patient will take that under advisement along with that of her daughter.    3. In terms of his son decompression of bladder we are going to move forward with prescribing were bed trick at 25 mg patient will let me know how this affects and if need be we will go to 50 mg after periods of 4 weeks or so.  All questions that were asked were addressed I verified the pharmacy patient patient's daughter appreciative of the time spent today  There are no Patient Instructions on file for this visit.

## 2022-09-13 ENCOUNTER — PATIENT OUTREACH (OUTPATIENT)
Dept: ADMINISTRATIVE | Facility: HOSPITAL | Age: 87
End: 2022-09-13
Payer: MEDICARE

## 2022-09-13 ENCOUNTER — IMMUNIZATION (OUTPATIENT)
Dept: PHARMACY | Facility: CLINIC | Age: 87
End: 2022-09-13
Payer: MEDICARE

## 2022-09-22 ENCOUNTER — PROCEDURE VISIT (OUTPATIENT)
Dept: OPHTHALMOLOGY | Facility: CLINIC | Age: 87
End: 2022-09-22
Payer: MEDICARE

## 2022-09-22 DIAGNOSIS — H35.3221 EXUDATIVE AGE-RELATED MACULAR DEGENERATION OF LEFT EYE WITH ACTIVE CHOROIDAL NEOVASCULARIZATION: Primary | ICD-10-CM

## 2022-09-22 DIAGNOSIS — H35.371 EPIRETINAL MEMBRANE, RIGHT EYE: ICD-10-CM

## 2022-09-22 DIAGNOSIS — Z96.1 PSEUDOPHAKIA OF BOTH EYES: ICD-10-CM

## 2022-09-22 DIAGNOSIS — H35.3112 INTERMEDIATE STAGE DRY AGE-RELATED MACULAR DEGENERATION OF RIGHT EYE: ICD-10-CM

## 2022-09-22 PROCEDURE — 99499 RISK ADDL DX/OHS AUDIT: ICD-10-PCS | Mod: S$GLB,,, | Performed by: OPHTHALMOLOGY

## 2022-09-22 PROCEDURE — 92134 CPTRZ OPH DX IMG PST SGM RTA: CPT | Mod: S$GLB,,, | Performed by: OPHTHALMOLOGY

## 2022-09-22 PROCEDURE — 99214 PR OFFICE/OUTPT VISIT, EST, LEVL IV, 30-39 MIN: ICD-10-PCS | Mod: 25,S$GLB,, | Performed by: OPHTHALMOLOGY

## 2022-09-22 PROCEDURE — 99499 UNLISTED E&M SERVICE: CPT | Mod: S$GLB,,, | Performed by: OPHTHALMOLOGY

## 2022-09-22 PROCEDURE — 67028 INJECTION EYE DRUG: CPT | Mod: LT,S$GLB,, | Performed by: OPHTHALMOLOGY

## 2022-09-22 PROCEDURE — 92134 OCT, RETINA - OU - BOTH EYES: ICD-10-PCS | Mod: S$GLB,,, | Performed by: OPHTHALMOLOGY

## 2022-09-22 PROCEDURE — 67028 PR INJECT INTRAVITREAL PHARMCOLOGIC: ICD-10-PCS | Mod: LT,S$GLB,, | Performed by: OPHTHALMOLOGY

## 2022-09-22 PROCEDURE — 99214 OFFICE O/P EST MOD 30 MIN: CPT | Mod: 25,S$GLB,, | Performed by: OPHTHALMOLOGY

## 2022-09-22 RX ORDER — MONTELUKAST SODIUM 10 MG/1
10 TABLET ORAL NIGHTLY
COMMUNITY
Start: 2022-09-10

## 2022-09-22 RX ADMIN — Medication 1.25 MG: at 03:09

## 2022-09-22 NOTE — PROGRESS NOTES
HPI     Macular Degeneration     Additional comments: 1 mon amd chk           Comments    1. Exudative age-related macular degeneration of left eye with active   choroidal neovascularization    S/p Avastin OS  2. 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 Epiretinal membrane, right eye    4. Pseudophakia of both eyes                  Last edited by Pepito Garcia on 9/22/2022  2:28 PM.         A/P    ICD-10-CM ICD-9-CM   1. Exudative age-related macular degeneration of left eye with active choroidal neovascularization  H35.3221 362.52     362.16   2. 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 Epiretinal membrane, right eye  H35.371 362.56   4. Pseudophakia of both eyes  Z96.1 V43.1         1. Exudative age-related macular degeneration of left eye with active choroidal neovascularization  2. 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  Schedule Avastin Injection today Left Eye Patient chooses to proceed with injection R/B/A discussed include infection retinal detachment and stroke    Patient identified.  Timeout performed.    Risks, benefits, and alternatives to treatment were discussed in detail with the patient, including bleeding/infection (endophthalmitis)/etc.  The patient voiced understanding and wished to proceed with the procedure.  See separate consent form.    Injection Procedure Note:  Diagnosis: wet AMD Left Eye    Topical Proparacaine drop placed then topical 5% Betadine and then subconjunctival lidocaine 2% injection  Sterile gloves used, and sterile lid speculum placed.  5%  Betadine placed at injection site again prior to injection.  Avastin 1.25mg in 0.05cc Injected inferotemporally 3.5-4mm posterior to the limbus.  Complications: None  Va at least CF at 5 feet post injection.  Retina, ONH, IOP normal after injection.    Followup as below.  Patient should return immediately PRN.  Retinal Detachment and Endophthalmitis precautions given.       3. Epiretinal membrane, right eye  Mod ERM, VA 20/40 stable  Pt asymptomatic, no metamorphopsia  Plan: Observation  Amsler precautions discussed     4. Pseudophakia of both eyes  Good lens position OU  Plan: Observation      RTC 1 mo DFE/OCTm OU, MANA OS      I saw and examined the patient and reviewed in detail the findings documented. The final examination findings, image interpretations, and plan as documented in the record represent my personal judgment and conclusions.    Dwight Mleton MD  Vitreoretinal Surgery   [facility]

## 2022-09-27 ENCOUNTER — TELEPHONE (OUTPATIENT)
Dept: PAIN MEDICINE | Facility: CLINIC | Age: 87
End: 2022-09-27
Payer: MEDICARE

## 2022-09-27 NOTE — TELEPHONE ENCOUNTER
Staff called and spoke with pt for confirming appt for 9/28. Pt wanted to r/s appt to 10/05 with Provider Yohannes. Pt verbalized understanding and confirmed. SG

## 2022-09-28 ENCOUNTER — OFFICE VISIT (OUTPATIENT)
Dept: HEMATOLOGY/ONCOLOGY | Facility: CLINIC | Age: 87
End: 2022-09-28
Payer: MEDICARE

## 2022-09-28 VITALS
OXYGEN SATURATION: 99 % | RESPIRATION RATE: 18 BRPM | DIASTOLIC BLOOD PRESSURE: 79 MMHG | WEIGHT: 144.81 LBS | TEMPERATURE: 98 F | BODY MASS INDEX: 24.72 KG/M2 | SYSTOLIC BLOOD PRESSURE: 191 MMHG | HEIGHT: 64 IN | HEART RATE: 69 BPM

## 2022-09-28 DIAGNOSIS — C50.412 MALIGNANT NEOPLASM OF UPPER-OUTER QUADRANT OF LEFT BREAST IN FEMALE, ESTROGEN RECEPTOR POSITIVE: Primary | Chronic | ICD-10-CM

## 2022-09-28 DIAGNOSIS — Z17.0 MALIGNANT NEOPLASM OF UPPER-OUTER QUADRANT OF LEFT BREAST IN FEMALE, ESTROGEN RECEPTOR POSITIVE: Primary | Chronic | ICD-10-CM

## 2022-09-28 DIAGNOSIS — Z79.811 USE OF AROMATASE INHIBITORS: Chronic | ICD-10-CM

## 2022-09-28 PROCEDURE — 1160F PR REVIEW ALL MEDS BY PRESCRIBER/CLIN PHARMACIST DOCUMENTED: ICD-10-PCS | Mod: CPTII,S$GLB,, | Performed by: INTERNAL MEDICINE

## 2022-09-28 PROCEDURE — 1159F MED LIST DOCD IN RCRD: CPT | Mod: CPTII,S$GLB,, | Performed by: INTERNAL MEDICINE

## 2022-09-28 PROCEDURE — 99999 PR PBB SHADOW E&M-EST. PATIENT-LVL V: ICD-10-PCS | Mod: PBBFAC,,, | Performed by: INTERNAL MEDICINE

## 2022-09-28 PROCEDURE — 1101F PT FALLS ASSESS-DOCD LE1/YR: CPT | Mod: CPTII,S$GLB,, | Performed by: INTERNAL MEDICINE

## 2022-09-28 PROCEDURE — 99213 PR OFFICE/OUTPT VISIT, EST, LEVL III, 20-29 MIN: ICD-10-PCS | Mod: S$GLB,,, | Performed by: INTERNAL MEDICINE

## 2022-09-28 PROCEDURE — 1160F RVW MEDS BY RX/DR IN RCRD: CPT | Mod: CPTII,S$GLB,, | Performed by: INTERNAL MEDICINE

## 2022-09-28 PROCEDURE — 99213 OFFICE O/P EST LOW 20 MIN: CPT | Mod: S$GLB,,, | Performed by: INTERNAL MEDICINE

## 2022-09-28 PROCEDURE — 3288F PR FALLS RISK ASSESSMENT DOCUMENTED: ICD-10-PCS | Mod: CPTII,S$GLB,, | Performed by: INTERNAL MEDICINE

## 2022-09-28 PROCEDURE — 99999 PR PBB SHADOW E&M-EST. PATIENT-LVL V: CPT | Mod: PBBFAC,,, | Performed by: INTERNAL MEDICINE

## 2022-09-28 PROCEDURE — 1101F PR PT FALLS ASSESS DOC 0-1 FALLS W/OUT INJ PAST YR: ICD-10-PCS | Mod: CPTII,S$GLB,, | Performed by: INTERNAL MEDICINE

## 2022-09-28 PROCEDURE — 1126F AMNT PAIN NOTED NONE PRSNT: CPT | Mod: CPTII,S$GLB,, | Performed by: INTERNAL MEDICINE

## 2022-09-28 PROCEDURE — 3288F FALL RISK ASSESSMENT DOCD: CPT | Mod: CPTII,S$GLB,, | Performed by: INTERNAL MEDICINE

## 2022-09-28 PROCEDURE — 1126F PR PAIN SEVERITY QUANTIFIED, NO PAIN PRESENT: ICD-10-PCS | Mod: CPTII,S$GLB,, | Performed by: INTERNAL MEDICINE

## 2022-09-28 PROCEDURE — 1159F PR MEDICATION LIST DOCUMENTED IN MEDICAL RECORD: ICD-10-PCS | Mod: CPTII,S$GLB,, | Performed by: INTERNAL MEDICINE

## 2022-09-28 NOTE — PROGRESS NOTES
Route Chart for Scheduling    Med Onc Chart Routing      Follow up with physician 4 months. Needs to see me with a DEXA scan in 4 months please   Follow up with TIM    Infusion scheduling note    Injection scheduling note    Labs    Imaging DXA scan      Pharmacy appointment    Other referrals         Subjective:       Patient ID: Anahi Cook is a 88 y.o. female.    Chief Complaint: No chief complaint on file.    HPI   Mrs. Cook returns today for follow up.   I had last seen her in June 2022.  She is on letrozole, now on the adjuvant setting.    Briefly, she is an 88-year-old female who in late 2017 had initially felt a mass in her left breast.  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  She was started on letrozole and undwerwent a BSO by Dr. Chairez for an ovarian mass.  Her ovarian mass was benign. In mid July 2018 she underwent a lumpectomy and AND.  She had a 3.3 cm carcinoma and 4/8 positive nodes.  She has remained on letrozole, now in the adjuvant setting, and she returns today for follow up.   Of note, she also received chest wall XRT.    A mammogram on May 19, 2022 was read as BIRADS II, and a one year follow up was recommended.      Review of Systems    Overall she feels OK  She has tolerated the AIs well so far.  She again complains of occasional hot flashes and hand stiffness, and also of occasional numbness in the proximal left arm . Her ECOG performance status is 2, and she is here in a wheelchair.   She denies any anxiety, depression, easy bruising, fevers, chills, night  sweats, weight loss, nausea, vomiting, diarrhea, constipation, diplopia, blurred vision, headache, chest pain, palpitations, shortness of breath, breast pain, abdominal pain, extremity pain, or difficulty ambulating.  The remainder of the ten-point ROS, including general, skin, lymph, H/N,  cardiorespiratory, GI, , Neuro, Endocrine, and psychiatric is negative.       Objective:      Physical Exam      She is alert, oriented to time, place, person, pleasant, well      nourished, in no acute physical distress.                                    VITAL SIGNS:  Reviewed                                      HEENT:  Normal.  There are no nasal, oral, lip, gingival, auricular, lid,    or conjunctival lesions.  Mucosae are moist and pink, and there is no        thrush.  Pupils are equal, reactive to light and accommodation.              Extraocular muscle movements are intact.   Dentition is fair.  Maxillary teeth are missing.                                      NECK:  Supple without JVD, adenopathy, or thyromegaly.                       LUNGS:  Clear to auscultation without wheezing, rales, or rhonchi.           CARDIOVASCULAR:  Reveals an S1, S2, no murmurs, no rubs, no gallops.         ABDOMEN:  Soft, nontender, without organomegaly.  Bowel sounds are    present.    Scars from her laparoscopic DONOVAN-BSO are seen.                                                            EXTREMITIES:  No cyanosis, clubbing, or edema.                             BREASTS: She is s/p left lumpectomy with a healed periareolar incision.  She also has an incision from her axillary dissection;  It is well healed.   There are no masses in her left or her right breast.    Both breasts are large and pendulous.    There is mild hyperpigmentation within the radiation field.                                 LYMPHATIC:  There is no cervical, axillary, or supraclavicular adenopathy.   SKIN:  Warm and moist, without petechiae, rashes, induration, or ecchymoses.           NEUROLOGIC:  DTRs are 0-1+ bilaterally, symmetrical, motor function is 5/5,  and cranial nerves are  within normal limits.      Assessment:       1. Hormone receptor positive breast cancer, left, on neoadjuvant letrozole with a significant clinical response.   2. Use of  aromatase inhibitors        Plan:        I had a long discussion with her and her caregiver.  She will remain on letrozole, and see me again in 4 months.  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  She had started adjuvant letrozole in the summer of 2018, and she will therefore complete 7 years in the summer of 2025.   Her mammogram will be repeated in May 2023, and her DXA scan will need to be repeated prior to her next visit  Her multiple questions were answered to her satisfaction.

## 2022-10-05 ENCOUNTER — PATIENT MESSAGE (OUTPATIENT)
Dept: PAIN MEDICINE | Facility: CLINIC | Age: 87
End: 2022-10-05
Payer: MEDICARE

## 2022-10-06 ENCOUNTER — OFFICE VISIT (OUTPATIENT)
Dept: PAIN MEDICINE | Facility: CLINIC | Age: 87
End: 2022-10-06
Payer: MEDICARE

## 2022-10-06 DIAGNOSIS — S32.030A CLOSED COMPRESSION FRACTURE OF L3 VERTEBRA, INITIAL ENCOUNTER: ICD-10-CM

## 2022-10-06 DIAGNOSIS — M70.62 TROCHANTERIC BURSITIS OF LEFT HIP: ICD-10-CM

## 2022-10-06 DIAGNOSIS — M54.9 DORSALGIA, UNSPECIFIED: Primary | ICD-10-CM

## 2022-10-06 PROCEDURE — 1159F MED LIST DOCD IN RCRD: CPT | Mod: CPTII,95,, | Performed by: NURSE PRACTITIONER

## 2022-10-06 PROCEDURE — 1160F RVW MEDS BY RX/DR IN RCRD: CPT | Mod: CPTII,95,, | Performed by: NURSE PRACTITIONER

## 2022-10-06 PROCEDURE — 99214 OFFICE O/P EST MOD 30 MIN: CPT | Mod: 95,,, | Performed by: NURSE PRACTITIONER

## 2022-10-06 PROCEDURE — 1160F PR REVIEW ALL MEDS BY PRESCRIBER/CLIN PHARMACIST DOCUMENTED: ICD-10-PCS | Mod: CPTII,95,, | Performed by: NURSE PRACTITIONER

## 2022-10-06 PROCEDURE — 1159F PR MEDICATION LIST DOCUMENTED IN MEDICAL RECORD: ICD-10-PCS | Mod: CPTII,95,, | Performed by: NURSE PRACTITIONER

## 2022-10-06 PROCEDURE — 99214 PR OFFICE/OUTPT VISIT, EST, LEVL IV, 30-39 MIN: ICD-10-PCS | Mod: 95,,, | Performed by: NURSE PRACTITIONER

## 2022-10-06 NOTE — PROGRESS NOTES
Chronic Pain-Tele-Medicine-Established Note (Follow up visit)        The patient location is: Home  The chief complaint leading to consultation is: pain  Visit type: Virtual visit with synchronous audio and video  Total time spent with patient: 25 min  Each patient to whom he or she provides medical services by telemedicine is:  (1) informed of the relationship between the physician and patient and the respective role of any other health care provider with respect to management of the patient; and (2) notified that he or she may decline to receive medical services by telemedicine and may withdraw from such care at any time.        SUBJECTIVE:    Interval History 10/6/2022:   Mrs Cook presents for follow up virtually. At prior visit she had Left GTB injection with significant benefit in office. Her lower back and hip pain were further evaluated and noted newer L3 compression Fx. While she visually appears to have significant improvement of pain to me she does voice focal upper/mid lumbar pain persists. Discussed treatment options and Kyphoplasty recommended by Dr Wilcox but there was hesitation regarding further escalation to kyphoplasty. We did discusses prior osteoporosis diagnosis and considering treatment options with PCP. She does not voice any s/s concerning for cauda equina.      Interval History 8/22/2022:  Mrs Cook presents for follow up acute pain. She is s/p repeat Bilateral L4/5 TFESI with mild improvement but having severe left lateral hip pain. She also continues to have burning pain to Right heel and numbness to right lateral lower leg. She has chronic urinary continence but no new neurological issues.She continues to take tramadol by PCP with some benefit     Interval History 8/5/2022:  Mrs Cook presents for follow up evaluation of returning lower back pain. She has exact pain relieved prior by B L4/5 TFESIs. She has newer complaint of bilateral feet burning pains as well. She has had JESSE  noting arterial issues R>L. She continues to take Tramadol and neurontin without SE.She would like to repeat ASHUTOSH as it provided 7 months of 75% relief and symptoms returning severe over last few weeks. No voicing of symptoms concerning for cauda equina.     Interval History 12/6/2021:  The patient returns to clinic today for follow up of low back pain. She reports increased low back and bilateral hip pain over the last two weeks. She reports intermittent radiating pain into her lateral thighs. She does have difficulty sleeping due to pain. Her pain is also worse with standing and walking. She continues to take Gabapentin with benefit. She also takes Tramadol per her PCP with benefit. She denies any other health changes. Her pain today is 8/10.    Interval History 7/6/2021:  The patient returns to clinic today for follow up of low back pain via virtual visit. She is s/p bilateral L4/5 TF ASHUTOSH on 6/21/2021. She reports 75% relief of her low back and leg pain. She reports low back pain, worse in the morning. She reports intermittent radiating pain into the posterolateral aspect of both legs to her knees. She continues to take Gabapentin with benefit. She denies any weakness. She denies any other health changes. Her pain today is 2/10.    Interval History 6/8/2021:  The patient returns to clinic today for follow up of low back pain. She is here today for imaging review. She continues to report low back pain that radiates into the posterolateral aspect of both legs to below her knee. Her pain is worse with prolonged standing and walking. She does feel like she will fall with prolonged walking. She also reports relief with bending forward. She is currently taking Gabapentin at bedtime with benefit. She continues to take Tramadol per her PCP with benefit. She denies any other health changes. Her pain today is 8/10.    Interval History 5/20/2021:  The patient returns to clinic today for follow up of low back pain. She is s/p  bilateral hip joint injections on 4/26/2021. She reports no relief. She reports increased low back pain that radiates into the posterior aspect of both legs to her ankles. She does report numbness to her feet. Her pain is worse with prolonged standing and walking. She feels as though her legs will give out with prolonged walking. She denies any bowel or bladder incontinence. She denies any other health changes. Her pain today is 9/10.    Interval History 3/23/2021:  The patient returns to clinic today for follow up of low back and hip pain. She reports increased bilateral hip pain. She describes this pain as deep and aching. Her pain is worse with prolonged standing and walking. She also reports low back pain that is aching in nature. She denies any radicular leg pain. Of note, she reports increased swelling and pain to her left ankle and foot. She does report a trip last week. She did see Orthopedics today and is scheduled for xray. She denies any other health changes. Her pain today is 8/10.    Interval History 12/9/2020:  She returns for follow-up.  She is doing well since her facet injections.  Her right hip is hurting.  It interferes with her mobility and comfort while laying down.  No new symptomatology otherwise.    Interval History 8/11/2020:  The patient has a virtual visit today for follow up.  She is s/p Bilateral L3-4, L4-5 L5-S1 facet joint injections with 90% relief.  She says that her back pain is minimal at this time.  She is able to move around and walk better since the procedure as well.  She is having some left hip pain and feels as though her leg will give out sometimes when walking.  She would like a referral to ortho close to her home in [facility].  Her pain today is 1/10.  She denies any respiratory changes since procedure including fever, cough or SOB.    Interval History 12/5/2019:  The patient is here for follow up of lower back and bilateral hip pain.  Her back pain has been minimal.  She  is having increased lateral hip and buttock pain recently.  She had TPIs in the past and would like to repeat this today.  Her pain today is 8/10.    Previous Encounter:  Anahi Cook presents to the clinic for a follow-up appointment for lower back and left lower extremity pain.  She had T12 kyphoplasty performed on 11/8/18 with significant improvement in symptoms.  She was admitted through the ED on 4/3/19 after suffering fractures of 6th, 8th and possible 7th ribs of the left side after a fall at home.  Thoracic imaging also showed stable slight anterior wedging at T11.  She had bilateral L3-4, L4-5 and L5-S1 facet injections on 4/1/19 with 80% relief of lower back pain.  Her current pain is minimal.  She has mild leg pain with walking.  She continues to follow up with oncology regularly.  Since the last visit, Anahi Cook states the pain has been improving.  Current pain intensity is 2/10.     Pain Medications:  OTC Tylenol    Opioid Contract: no     report:  Not applicable    Pain Procedures:   7/21/14 L4-5 IL ASHUTOSH- significant benefit  12/1/15 Left GT bursa injection  4/26/16 Left GT bursa injection  12/15/16 L4-5 IL ASHUTOSH- significant benefit  2/15/18 L4-5 IL ASHUTOSH- 100% relief  8/21/18 L4-5 IL ASHUTOSH- significant relief  10/15/18 Bilateral L3-4, L4-5 L5-S1 facet joint injections  11/8/18 T12 kyphoplasty- significant relief  4/1/19 Bilateral L3-4, L4-5 L5-S1 facet joint injections- 80% relief  7/27/20 Bilateral L3-4, L4-5 L5-S1 facet joint injections- 90% relief  4/26/2021- Bilateral hip joint injections- no relief    6/21/2021- Bilateral L4/5 TF ASHUTOSH- 75% relief  8/9/2022 B L4/5 TFESI without significant benefit.     Physical Therapy/Home Exercise: yes     Imaging:     MRI LUMBAR SPINE WITHOUT CONTRAST 9/12/2022     CLINICAL HISTORY:  Low back pain, symptoms persist with > 6wks conservative treatment; Dorsalgia, unspecified     TECHNIQUE:  Multiplanar, multisequence MR images were acquired from the  thoracolumbar junction to the sacrum without the administration of contrast.     COMPARISON:  5/29/2021     FINDINGS:  Chronic vertebral plana T12.  Extension of the posterosuperior cortex into the canal by approximately 6 mm.     There is now height loss at L3, approximately 30%.  Associated STIR signal hyperintensity.  Retropulsion of the posterior cortex into the canal approximately 5 mm.     Remaining vertebral body heights are maintained.     Mild disc space narrowing L4-5.     Grade 1 retrolisthesis L1-2.  Grade 1 anterolisthesis L4-5     Conus terminates appropriately at L1.     Multilevel degenerative change as diesel below:     T11-12: Retropulsion of the posterosuperior cortex of T12 into the canal with mild canal narrowing.     T12-L1: Posterior circumferential disc bulge, asymmetric to the left.  Mild left neural foraminal narrowing.     L1-2: Facet and ligamentum flavum hypertrophic changes with mild left neural foraminal narrowing.     L2-3: Posterior circumferential disc bulge and ligamentum flavum hypertrophy retropulsion of the posterior cortex of L3 into the canal.  Findings result in mild canal narrowing.  Severe right and moderate left neural foraminal narrowing.     L3-4: Posterior circumferential disc bulge.  Facet and ligamentum flavum hypertrophic changes.  Mild canal and moderate bilateral neural foraminal narrowing.     L4-5: Grade 1 anterolisthesis of L4-5 with uncovering of the disc.  Facet and ligamentum flavum hypertrophic changes.  Combinations contribute to severe canal stenosis.  Severe left and moderate right neural foraminal narrowing.     L5-S1: Central canal and neural foramina are well maintained.     Multiple T2 hyperintense foci in both kidneys, including a thinly septated T2 hyperintense focus on the left measuring 15 mm.     Impression:     Acute fracture along the superior endplate of L3 with approximately 30% height loss.  Stable chronic height loss T12.     Multilevel  degenerative change, worst at L4-5 where severe canal stenosis results.     Multilevel neural foraminal narrowing, severe L2-3 and L4-5 as above.     Bilateral renal cysts, including a thinly septated cyst on the left.  Findings can be further evaluated with renal ultrasound.     This report was flagged in Epic as abnormal.    XR LUMBAR SPINE 5 VIEW WITH FLEX AND EXT 8/22/2022     CLINICAL HISTORY:  Dorsalgia, unspecified     TECHNIQUE:  AP, lateral, and oblique images are performed through the lumbar spine.     COMPARISON:  Lumbar spine MRI 05/29/2021     FINDINGS:  Previous vertebroplasty T12.     Height loss along the superior endplate of L3 is new compared to that exam.  Height loss is approximately 40%.  There may be mild height loss at L2.     Disc space narrowing and endplate changes L4-5.  Facet arthropathy L4-5.     Grade 1 anterolisthesis L4-5 with no significant change with flexion or extension.     Aortoiliac atherosclerosis.     Impression:     Height loss along the superior endplate of L3, approximately 40%, is new compared to May 2021.  By today's radiographs, there appears to be mild sclerosis along the superior endplate which can be seen with a subacute or chronic fracture.  Correlation with MRI advised if there is recent trauma or acute back pain to exclude the possibility of an acute fracture.     Degenerative change as above.     This report was flagged in Epic as abnormal.    XR HIP WITH PELVIS WHEN PERFORMED, 2 OR 3 VIEWS LEFT 8/22/2022     CLINICAL HISTORY:  Pain in left hip     TECHNIQUE:  AP view of the pelvis and frog leg lateral view of the left hip were performed.     COMPARISON:  03/24/2022 and 09/09/2020     FINDINGS:  The femoral head is well located with respect to the acetabulum. No acute fracture seen.  Osteophyte formation, subchondral sclerosis, with moderate narrowing femoroacetabular joint bilaterally.     No significant soft tissue edema or radiopaque retained foreign body.   Aortoiliac atherosclerosis.     Impression:     No acute fracture or dislocation seen.  Moderate osteoarthritis and joint space narrowing.    MRI Lumbar Spine 5/29/2021:  COMPARISON:  Prior MRI from 2014 and 2018     FINDINGS:  There is a transitional lumbosacral segment and spine labeling is as on prior examinations.     Stable severe compression deformity at T12 with retropulsion.  Remaining vertebral body heights are maintained.  Grade 1 anterolisthesis appears increased at L4-5 as compared to previous.  There is no marrow replacement process.  The spinal cord is normal in signal.  Review of paravertebral structures demonstrates high T2 low T1 signal foci within kidneys.     Not included in the field of view of the axial, there is disc bulge at T10-11.  This does not appear to result in a significant degree of spinal canal narrowing on the basis of the sagittal imaging.     T12 demonstrates retropulsion with mild narrowing of the spinal canal.     T12-L1 demonstrates minor disc bulge.     L1-L2 demonstrates mild facet degenerative change.  There is no significant spinal canal stenosis.  There is mild bilateral neural foraminal narrowing.     L2-3 demonstrates facet degenerative change.  There is no significant spinal canal stenosis.  There is right foraminal protrusion.  This results in a moderate degree of right foraminal narrowing.  There is mild left foraminal narrowing.     L3-4 demonstrates facet degenerative change and ligamentum flavum thickening.  There is a facet joint synovial cyst along the posterolateral aspect of the left facet.  There is right foraminal protrusion.  There is mild left and moderate right foraminal narrowing.     L4-5 demonstrates facet degenerative change and ligamentum flavum thickening.  There is anterolisthesis.  There is severe left and moderate right foraminal narrowing.  There is a severe degree of spinal canal narrowing.     L5-S1 is unremarkable.     Impression:      Multilevel degenerative changes as further detailed above.  Findings are most significant at the L4-5 level, noting transitional lumbosacral segment.     Stable severe compression deformity at T12.     Foci within the kidneys bilaterally likely representing cyst can be correlated with ultrasound.          Past Medical History:  Past Medical History:   Diagnosis Date    Anxiety     Arthritis     Back pain     Breast cancer 1/17/2018    Breast mass 1/15/2018    Chronic kidney disease, stage 2, mildly decreased GFR     COPD (chronic obstructive pulmonary disease)     emphysema    Depression     Dysuria 1/29/2018    Exudative age-related macular degeneration of left eye with active choroidal neovascularization 8/25/2022    Family history of breast cancer 1/17/2018    GERD (gastroesophageal reflux disease)     Hypertension     Infiltrating ductal carcinoma of breast, left 7/12/2018    Osteoporosis, senile     Ovarian mass 1/15/2018    Pneumonia     S/P robotic assisted laparoscopic BSO (bilateral salpingo-oophorectomy) 2/23/2018       Past Surgical History:  Past Surgical History:   Procedure Laterality Date    AXILLARY NODE DISSECTION Left 7/12/2018    Procedure: LYMPHADENECTOMY, AXILLARY;  Surgeon: Amanda Caballero MD;  Location: [facility] OR [address] [place] [address];  Service: General;  Laterality: Left;    BREAST BIOPSY      BREAST LUMPECTOMY      CATARACT EXTRACTION      CHOLECYSTECTOMY      COLONOSCOPY      ESOPHAGOGASTRODUODENOSCOPY      ESOPHAGOGASTRODUODENOSCOPY N/A 10/14/2019    Procedure: EGD (ESOPHAGOGASTRODUODENOSCOPY);  Surgeon: Davonte Thompson MD;  Location: [address]);  Service: Endoscopy;  Laterality: N/A;  hx of pulmonary hypertension-PA 50     pt requesting ASAP    EYE SURGERY      FIXATION KYPHOPLASTY N/A 11/8/2018    Procedure: Kyphoplasty   T12;  Surgeon: Merly Wilcox MD;  Location: [facility] CATH LAB;  Service: Pain Management;  Laterality: N/A;  T12  [employer] e-mailed with date and time    HYSTERECTOMY       INJECTION FOR SENTINEL NODE IDENTIFICATION Left 7/12/2018    Procedure: INJECTION, FOR SENTINEL NODE IDENTIFICATION;  Surgeon: Amanda Caballero MD;  Location: [facility] OR 2ND FLR;  Service: General;  Laterality: Left;    INJECTION OF FACET JOINT Bilateral 10/15/2018    Procedure: INJECTION, FACET JOINT, BILATERAL LUMBAR L3-4, 4-5, 5-S1 FACET JOINT INJECTIONS;  Surgeon: Merly Wilcox MD;  Location: [facility] PAIN MGT;  Service: Pain Management;  Laterality: Bilateral;    INJECTION OF FACET JOINT Bilateral 4/1/2019    Procedure: INJECTION, FACET JOINT, L3-L4,L4-L5,L5-S1;  Surgeon: Merly Wilcox MD;  Location: [facility]IN [facility]GT;  Service: Pain Management;  Laterality: Bilateral;    INJECTION OF FACET JOINT Bilateral 6/20/2019    Procedure: INJECTION, FACET JOINT, L3-L4,L4-L5,L5-S1 NEED CONSENT;  Surgeon: Merly Wilcox MD;  Location: [facility] PAIN MGT;  Service: Pain Management;  Laterality: Bilateral;    INJECTION OF FACET JOINT Bilateral 7/27/2020    Procedure: INJECTION, FACET JOINT BILATERAL L3/4, L4/5 and L5/S1;  Surgeon: Merly Wilcox MD;  Location: [facility];  Service: Pain Management;  Laterality: Bilateral;    INJECTION OF JOINT Bilateral 7/27/2020    Procedure: INJECTION, JOINT, BILATERAL GREATER TROCHANTERIC BURSA;  Surgeon: Merly Wilcox MD;  Location: [facility];  Service: Pain Management;  Laterality: Bilateral;    INJECTION OF JOINT Bilateral 4/26/2021    Procedure: INJECTION, JOINT, HIP;  Surgeon: Merly Wilcox MD;  Location: [facility] PAIN MGT;  Service: Pain Management;  Laterality: Bilateral;  consent needed    INJECTION OF STEROID Right 11/15/2021    Procedure: INJECTION, STEROID RIGHT MIDDLE AND SAMLL FINGER;  Surgeon: Florecita Da Silva MD;  Location: [facility] OR;  Service: Orthopedics;  Laterality: Right;    MASTECTOMY, PARTIAL Left 7/12/2018    Procedure: MASTECTOMY, PARTIAL-W/SEED LOCALIZATION;  Surgeon: Amanda Caballero MD;  Location: [facility] OR 2ND FLR;  Service: General;  Laterality: Left;    NM REMOVAL OF OVARY/TUBE(S)   2018    Robotic Assisted    ROTATOR CUFF REPAIR Right     rotator cuff repair right shoulder    TONSILLECTOMY      TRANSFORAMINAL EPIDURAL INJECTION OF STEROID Bilateral 2021    Procedure: INJECTION, STEROID, EPIDURAL, TRANSFORAMINAL APPROACH  bilateral L4/5 TF ASHTUOSH;  Surgeon: Merly Wilcox MD;  Location: [facility] PAIN MGT;  Service: Pain Management;  Laterality: Bilateral;  consent needed    TRANSFORAMINAL EPIDURAL INJECTION OF STEROID Bilateral 1/3/2022    Procedure: INJECTION, STEROID, EPIDURAL, TRANSFORAMINAL APPROACH Bilateral L4/5 Needs consent;  Surgeon: Merly Wilcox MD;  Location: [facility] PAIN MGT;  Service: Pain Management;  Laterality: Bilateral;    TRANSFORAMINAL EPIDURAL INJECTION OF STEROID Bilateral 2022    Procedure: INJECTION, STEROID, EPIDURAL, TRANSFORAMINAL APPROACH, BILATERAL L4-L5   MEDICALLY URGENT;  Surgeon: Merly Wilcox MD;  Location: [facility] PAIN MGT;  Service: Pain Management;  Laterality: Bilateral;    TRIGGER FINGER RELEASE Left 11/15/2021    Procedure: RELEASE, TRIGGER FINGER, LEFT MIDDLE AND RING;  Surgeon: Florecita Da Silva MD;  Location: [facility] OR;  Service: Orthopedics;  Laterality: Left;       Family History:  Family History   Problem Relation Age of Onset    Heart failure Mother     Kidney failure Mother     Heart attack Father     Breast cancer Sister 66        Lump, XRT, chemo, recurrence 10 years later.    Cancer Brother         leukemia    Heart attack Brother 58    Pulmonary embolism Brother 45    Heart attack Brother 52    Breast cancer Maternal Grandmother 60        advanced at diagnosis    Breast cancer Maternal Aunt 83         at 92    Colon cancer Neg Hx     Esophageal cancer Neg Hx        Social History:  Social History     Socioeconomic History    Marital status: Single    Number of children: 3   Occupational History    Occupation: Multiple jobs, see social documentation section     Comment: Retired   Tobacco Use    Smoking status: Former     Packs/day: 1.00      Years: 40.00     Pack years: 40.00     Types: Cigarettes    Smokeless tobacco: Never    Tobacco comments:     Smoked >1 ppd for at least 40 years, quit around 1995   Substance and Sexual Activity    Alcohol use: Yes     Comment: occasional glass of wine or cocktail    Drug use: No    Sexual activity: Yes     Partners: Male   Social History Narrative    Worked many jobs while raising 3 children.  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  She was  from her first , but took care of him at the end of his life.       REVIEW OF SYSTEMS:    GENERAL:  No weight loss, malaise or fevers.  HEENT:   No recent changes in vision or hearing  NECK:  Negative for lumps, no difficulty with swallowing.  RESPIRATORY:  Negative for cough, wheezing or shortness of breath, patient denies any recent URI. COPD.  CARDIOVASCULAR:  Negative for chest pain, leg swelling or palpitations. Hypertension.  GI:  Negative for abdominal discomfort, blood in stools or black stools or change in bowel habits.  MUSCULOSKELETAL:  See HPI.  SKIN:  Negative for lesions, rash, and itching.  PSYCH:  No mood disorder or recent psychosocial stressors.  Patients sleep is not disturbed secondary to pain.  HEMATOLOGY/LYMPHOLOGY:  Negative for prolonged bleeding, bruising easily or swollen nodes.  Patient is not currently taking any anti-coagulants  NEURO:   No history of headaches, syncope, paralysis, seizures or tremors.  All other reviewed and negative other than HPI.    OBJECTIVE:    LMP  (LMP Unknown)     PHYSICAL EXAMINATION:  Voice normal.  Normal affect without evidence of distress.      ASSESSMENT: 88 y.o. year old female with lower back and lower extremity pain, consistent with the following diagnoses:     1. Dorsalgia, unspecified        2. Trochanteric bursitis of left hip        3. Closed compression fracture of L3 vertebra, initial  encounter                PLAN:     - Previous imaging was reviewed and discussed with the patient today.    - S/p repeat bilateral L4/5 TF ASHUTOSH and mild benefit    - Most recently s/p L GTB injection with significant improvement    - L3 compression Fx was discussed. She has had some easing of symptoms since prior visit but her, nor caregiver eager to proceed with Kyphopasty recommended by Dr Wilcox via internal messaging him.      - Also discussed reaching out to PCP regarding further osteoporosis treatment options.     - Continue Gabapentin 100 mg at bedtime.     - Continue Tramadol per PCP.     - RTC due to hesitancy of further interventions discussed will have her FU with Dr Wilcox to further discuss care.       The above plan and management options were discussed at length with patient. Patient is in agreement with the above and verbalized understanding.    Yohannes Landry  10/06/2022

## 2022-10-07 ENCOUNTER — PES CALL (OUTPATIENT)
Dept: ADMINISTRATIVE | Facility: CLINIC | Age: 87
End: 2022-10-07
Payer: MEDICARE

## 2022-10-19 ENCOUNTER — TELEPHONE (OUTPATIENT)
Dept: PAIN MEDICINE | Facility: CLINIC | Age: 87
End: 2022-10-19
Payer: MEDICARE

## 2022-10-19 NOTE — TELEPHONE ENCOUNTER
This is an appointment reminder about your schedule Virtual Visit with Pain Management Provider Brenden Gamboa.   Your appointment is for 10/20, 2022 at 1:15pm.     Please log into your [facility] Portal 15 min's prior to your appointment time to e-precheck, verify all corresponding pages, and troubleshoot any problems that may occur. Once your device has been verify as compatible, and you receive the green check,  you must hit/ select the start visit button, This will notify your provider that you are ready to start the Virtual Video Visit.     As [facility] is a teaching hospital, your visit may be started with a resident or a fellow that is rounding in clinic, then proceeded by your physician.    Once logged on, please allow the provider 20 -30 mins to check-in, in case running behind.       If you experience any technical issue please contact [phone]        Thank You for entrusting [facility] to handle your care

## 2022-10-20 ENCOUNTER — TELEPHONE (OUTPATIENT)
Dept: PAIN MEDICINE | Facility: CLINIC | Age: 87
End: 2022-10-20
Payer: MEDICARE

## 2022-10-20 ENCOUNTER — OFFICE VISIT (OUTPATIENT)
Dept: PAIN MEDICINE | Facility: CLINIC | Age: 87
End: 2022-10-20
Attending: ANESTHESIOLOGY
Payer: MEDICARE

## 2022-10-20 DIAGNOSIS — M51.37 DDD (DEGENERATIVE DISC DISEASE), LUMBOSACRAL: ICD-10-CM

## 2022-10-20 DIAGNOSIS — M80.88XA OSTEOPOROTIC COMPRESSION FRACTURE OF VERTEBRA, INITIAL ENCOUNTER: Primary | ICD-10-CM

## 2022-10-20 DIAGNOSIS — M54.16 LUMBAR RADICULOPATHY: ICD-10-CM

## 2022-10-20 PROCEDURE — 1160F PR REVIEW ALL MEDS BY PRESCRIBER/CLIN PHARMACIST DOCUMENTED: ICD-10-PCS | Mod: CPTII,95,, | Performed by: ANESTHESIOLOGY

## 2022-10-20 PROCEDURE — 99213 PR OFFICE/OUTPT VISIT, EST, LEVL III, 20-29 MIN: ICD-10-PCS | Mod: 25,95,GC, | Performed by: ANESTHESIOLOGY

## 2022-10-20 PROCEDURE — 1159F MED LIST DOCD IN RCRD: CPT | Mod: CPTII,95,, | Performed by: ANESTHESIOLOGY

## 2022-10-20 PROCEDURE — 99213 OFFICE O/P EST LOW 20 MIN: CPT | Mod: 25,95,GC, | Performed by: ANESTHESIOLOGY

## 2022-10-20 PROCEDURE — 1159F PR MEDICATION LIST DOCUMENTED IN MEDICAL RECORD: ICD-10-PCS | Mod: CPTII,95,, | Performed by: ANESTHESIOLOGY

## 2022-10-20 PROCEDURE — 1160F RVW MEDS BY RX/DR IN RCRD: CPT | Mod: CPTII,95,, | Performed by: ANESTHESIOLOGY

## 2022-10-20 NOTE — PROGRESS NOTES
Chronic Pain-Tele-Medicine-Established Note (Follow up visit)        The patient location is: [place], [place]  The chief complaint leading to consultation is: pain  Visit type: Virtual visit with synchronous audio and video  Total time spent with patient: 25 min  Each patient to whom he or she provides medical services by telemedicine is:  (1) informed of the relationship between the physician and patient and the respective role of any other health care provider with respect to management of the patient; and (2) notified that he or she may decline to receive medical services by telemedicine and may withdraw from such care at any time.        SUBJECTIVE:    Interval History 10/20/22  Ms. Cook complains of worsening left low back pain radiating down her lateral thigh to the knee. The pain is only when she walks/moves, she in pain free when sitting. She is s/p bilateral TESI L4/5 2 months ago, which she had relief from, but acutely worsened when she bent over to pick something off the ground. She states the pain is similar to her pain prior to this. She denies numbness, tingling, bladder/bowel problems.     Interval History 10/6/2022:   Mrs Cook presents for follow up virtually. At prior visit she had Left GTB injection with significant benefit in office. Her lower back and hip pain were further evaluated and noted newer L3 compression Fx. While she visually appears to have significant improvement of pain to me she does voice focal upper/mid lumbar pain persists. Discussed treatment options and Kyphoplasty recommended by Dr Wilcox but there was hesitation regarding further escalation to kyphoplasty. We did discusses prior osteoporosis diagnosis and considering treatment options with PCP. She does not voice any s/s concerning for cauda equina.      Interval History 8/22/2022:  Mrs Cook presents for follow up acute pain. She is s/p repeat Bilateral L4/5 TFESI with mild improvement but having severe left  lateral hip pain. She also continues to have burning pain to Right heel and numbness to right lateral lower leg. She has chronic urinary continence but no new neurological issues.She continues to take tramadol by PCP with some benefit     Interval History 8/5/2022:  Mrs Cook presents for follow up evaluation of returning lower back pain. She has exact pain relieved prior by B L4/5 TFESIs. She has newer complaint of bilateral feet burning pains as well. She has had JESSE noting arterial issues R>L. She continues to take Tramadol and neurontin without SE.She would like to repeat ASHUTOSH as it provided 7 months of 75% relief and symptoms returning severe over last few weeks. No voicing of symptoms concerning for cauda equina.     Interval History 12/6/2021:  The patient returns to clinic today for follow up of low back pain. She reports increased low back and bilateral hip pain over the last two weeks. She reports intermittent radiating pain into her lateral thighs. She does have difficulty sleeping due to pain. Her pain is also worse with standing and walking. She continues to take Gabapentin with benefit. She also takes Tramadol per her PCP with benefit. She denies any other health changes. Her pain today is 8/10.    Interval History 7/6/2021:  The patient returns to clinic today for follow up of low back pain via virtual visit. She is s/p bilateral L4/5 TF ASHUTOSH on 6/21/2021. She reports 75% relief of her low back and leg pain. She reports low back pain, worse in the morning. She reports intermittent radiating pain into the posterolateral aspect of both legs to her knees. She continues to take Gabapentin with benefit. She denies any weakness. She denies any other health changes. Her pain today is 2/10.    Interval History 6/8/2021:  The patient returns to clinic today for follow up of low back pain. She is here today for imaging review. She continues to report low back pain that radiates into the posterolateral aspect of  both legs to below her knee. Her pain is worse with prolonged standing and walking. She does feel like she will fall with prolonged walking. She also reports relief with bending forward. She is currently taking Gabapentin at bedtime with benefit. She continues to take Tramadol per her PCP with benefit. She denies any other health changes. Her pain today is 8/10.    Interval History 5/20/2021:  The patient returns to clinic today for follow up of low back pain. She is s/p bilateral hip joint injections on 4/26/2021. She reports no relief. She reports increased low back pain that radiates into the posterior aspect of both legs to her ankles. She does report numbness to her feet. Her pain is worse with prolonged standing and walking. She feels as though her legs will give out with prolonged walking. She denies any bowel or bladder incontinence. She denies any other health changes. Her pain today is 9/10.    Interval History 3/23/2021:  The patient returns to clinic today for follow up of low back and hip pain. She reports increased bilateral hip pain. She describes this pain as deep and aching. Her pain is worse with prolonged standing and walking. She also reports low back pain that is aching in nature. She denies any radicular leg pain. Of note, she reports increased swelling and pain to her left ankle and foot. She does report a trip last week. She did see Orthopedics today and is scheduled for xray. She denies any other health changes. Her pain today is 8/10.    Interval History 12/9/2020:  She returns for follow-up.  She is doing well since her facet injections.  Her right hip is hurting.  It interferes with her mobility and comfort while laying down.  No new symptomatology otherwise.    Interval History 8/11/2020:  The patient has a virtual visit today for follow up.  She is s/p Bilateral L3-4, L4-5 L5-S1 facet joint injections with 90% relief.  She says that her back pain is minimal at this time.  She is able to  move around and walk better since the procedure as well.  She is having some left hip pain and feels as though her leg will give out sometimes when walking.  She would like a referral to ortho close to her home in [facility].  Her pain today is 1/10.  She denies any respiratory changes since procedure including fever, cough or SOB.    Interval History 12/5/2019:  The patient is here for follow up of lower back and bilateral hip pain.  Her back pain has been minimal.  She is having increased lateral hip and buttock pain recently.  She had TPIs in the past and would like to repeat this today.  Her pain today is 8/10.    Previous Encounter:  Anahi Cook presents to the clinic for a follow-up appointment for lower back and left lower extremity pain.  She had T12 kyphoplasty performed on 11/8/18 with significant improvement in symptoms.  She was admitted through the ED on 4/3/19 after suffering fractures of 6th, 8th and possible 7th ribs of the left side after a fall at home.  Thoracic imaging also showed stable slight anterior wedging at T11.  She had bilateral L3-4, L4-5 and L5-S1 facet injections on 4/1/19 with 80% relief of lower back pain.  Her current pain is minimal.  She has mild leg pain with walking.  She continues to follow up with oncology regularly.  Since the last visit, Anahi Cook states the pain has been improving.  Current pain intensity is 2/10.     Pain Medications:  OTC Tylenol    Opioid Contract: no     report:  Not applicable    Pain Procedures:   7/21/14 L4-5 IL ASHUTOSH- significant benefit  12/1/15 Left GT bursa injection  4/26/16 Left GT bursa injection  12/15/16 L4-5 IL ASHUTOSH- significant benefit  2/15/18 L4-5 IL ASHUTOSH- 100% relief  8/21/18 L4-5 IL ASHUTOSH- significant relief  10/15/18 Bilateral L3-4, L4-5 L5-S1 facet joint injections  11/8/18 T12 kyphoplasty- significant relief  4/1/19 Bilateral L3-4, L4-5 L5-S1 facet joint injections- 80% relief  7/27/20 Bilateral L3-4, L4-5 L5-S1 facet joint  injections- 90% relief  4/26/2021- Bilateral hip joint injections- no relief    6/21/2021- Bilateral L4/5 TF ASHUTOSH- 75% relief  8/9/2022 B L4/5 TFESI with significant benefit.     Physical Therapy/Home Exercise: yes     Imaging:     MRI LUMBAR SPINE WITHOUT CONTRAST 9/12/2022     CLINICAL HISTORY:  Low back pain, symptoms persist with > 6wks conservative treatment; Dorsalgia, unspecified     TECHNIQUE:  Multiplanar, multisequence MR images were acquired from the thoracolumbar junction to the sacrum without the administration of contrast.     COMPARISON:  5/29/2021     FINDINGS:  Chronic vertebral plana T12.  Extension of the posterosuperior cortex into the canal by approximately 6 mm.     There is now height loss at L3, approximately 30%.  Associated STIR signal hyperintensity.  Retropulsion of the posterior cortex into the canal approximately 5 mm.     Remaining vertebral body heights are maintained.     Mild disc space narrowing L4-5.     Grade 1 retrolisthesis L1-2.  Grade 1 anterolisthesis L4-5     Conus terminates appropriately at L1.     Multilevel degenerative change as diesel below:     T11-12: Retropulsion of the posterosuperior cortex of T12 into the canal with mild canal narrowing.     T12-L1: Posterior circumferential disc bulge, asymmetric to the left.  Mild left neural foraminal narrowing.     L1-2: Facet and ligamentum flavum hypertrophic changes with mild left neural foraminal narrowing.     L2-3: Posterior circumferential disc bulge and ligamentum flavum hypertrophy retropulsion of the posterior cortex of L3 into the canal.  Findings result in mild canal narrowing.  Severe right and moderate left neural foraminal narrowing.     L3-4: Posterior circumferential disc bulge.  Facet and ligamentum flavum hypertrophic changes.  Mild canal and moderate bilateral neural foraminal narrowing.     L4-5: Grade 1 anterolisthesis of L4-5 with uncovering of the disc.  Facet and ligamentum flavum hypertrophic  changes.  Combinations contribute to severe canal stenosis.  Severe left and moderate right neural foraminal narrowing.     L5-S1: Central canal and neural foramina are well maintained.     Multiple T2 hyperintense foci in both kidneys, including a thinly septated T2 hyperintense focus on the left measuring 15 mm.     Impression:     Acute fracture along the superior endplate of L3 with approximately 30% height loss.  Stable chronic height loss T12.     Multilevel degenerative change, worst at L4-5 where severe canal stenosis results.     Multilevel neural foraminal narrowing, severe L2-3 and L4-5 as above.     Bilateral renal cysts, including a thinly septated cyst on the left.  Findings can be further evaluated with renal ultrasound.     This report was flagged in Epic as abnormal.    XR LUMBAR SPINE 5 VIEW WITH FLEX AND EXT 8/22/2022     CLINICAL HISTORY:  Dorsalgia, unspecified     TECHNIQUE:  AP, lateral, and oblique images are performed through the lumbar spine.     COMPARISON:  Lumbar spine MRI 05/29/2021     FINDINGS:  Previous vertebroplasty T12.     Height loss along the superior endplate of L3 is new compared to that exam.  Height loss is approximately 40%.  There may be mild height loss at L2.     Disc space narrowing and endplate changes L4-5.  Facet arthropathy L4-5.     Grade 1 anterolisthesis L4-5 with no significant change with flexion or extension.     Aortoiliac atherosclerosis.     Impression:     Height loss along the superior endplate of L3, approximately 40%, is new compared to May 2021.  By today's radiographs, there appears to be mild sclerosis along the superior endplate which can be seen with a subacute or chronic fracture.  Correlation with MRI advised if there is recent trauma or acute back pain to exclude the possibility of an acute fracture.     Degenerative change as above.     This report was flagged in Epic as abnormal.    XR HIP WITH PELVIS WHEN PERFORMED, 2 OR 3 VIEWS LEFT  8/22/2022     CLINICAL HISTORY:  Pain in left hip     TECHNIQUE:  AP view of the pelvis and frog leg lateral view of the left hip were performed.     COMPARISON:  03/24/2022 and 09/09/2020     FINDINGS:  The femoral head is well located with respect to the acetabulum. No acute fracture seen.  Osteophyte formation, subchondral sclerosis, with moderate narrowing femoroacetabular joint bilaterally.     No significant soft tissue edema or radiopaque retained foreign body.  Aortoiliac atherosclerosis.     Impression:     No acute fracture or dislocation seen.  Moderate osteoarthritis and joint space narrowing.    MRI Lumbar Spine 5/29/2021:  COMPARISON:  Prior MRI from 2014 and 2018     FINDINGS:  There is a transitional lumbosacral segment and spine labeling is as on prior examinations.     Stable severe compression deformity at T12 with retropulsion.  Remaining vertebral body heights are maintained.  Grade 1 anterolisthesis appears increased at L4-5 as compared to previous.  There is no marrow replacement process.  The spinal cord is normal in signal.  Review of paravertebral structures demonstrates high T2 low T1 signal foci within kidneys.     Not included in the field of view of the axial, there is disc bulge at T10-11.  This does not appear to result in a significant degree of spinal canal narrowing on the basis of the sagittal imaging.     T12 demonstrates retropulsion with mild narrowing of the spinal canal.     T12-L1 demonstrates minor disc bulge.     L1-L2 demonstrates mild facet degenerative change.  There is no significant spinal canal stenosis.  There is mild bilateral neural foraminal narrowing.     L2-3 demonstrates facet degenerative change.  There is no significant spinal canal stenosis.  There is right foraminal protrusion.  This results in a moderate degree of right foraminal narrowing.  There is mild left foraminal narrowing.     L3-4 demonstrates facet degenerative change and ligamentum flavum  thickening.  There is a facet joint synovial cyst along the posterolateral aspect of the left facet.  There is right foraminal protrusion.  There is mild left and moderate right foraminal narrowing.     L4-5 demonstrates facet degenerative change and ligamentum flavum thickening.  There is anterolisthesis.  There is severe left and moderate right foraminal narrowing.  There is a severe degree of spinal canal narrowing.     L5-S1 is unremarkable.     Impression:     Multilevel degenerative changes as further detailed above.  Findings are most significant at the L4-5 level, noting transitional lumbosacral segment.     Stable severe compression deformity at T12.     Foci within the kidneys bilaterally likely representing cyst can be correlated with ultrasound.          Past Medical History:  Past Medical History:   Diagnosis Date    Anxiety     Arthritis     Back pain     Breast cancer 1/17/2018    Breast mass 1/15/2018    Chronic kidney disease, stage 2, mildly decreased GFR     COPD (chronic obstructive pulmonary disease)     emphysema    Depression     Dysuria 1/29/2018    Exudative age-related macular degeneration of left eye with active choroidal neovascularization 8/25/2022    Family history of breast cancer 1/17/2018    GERD (gastroesophageal reflux disease)     Hypertension     Infiltrating ductal carcinoma of breast, left 7/12/2018    Osteoporosis, senile     Ovarian mass 1/15/2018    Pneumonia     S/P robotic assisted laparoscopic BSO (bilateral salpingo-oophorectomy) 2/23/2018       Past Surgical History:  Past Surgical History:   Procedure Laterality Date    AXILLARY NODE DISSECTION Left 7/12/2018    Procedure: LYMPHADENECTOMY, AXILLARY;  Surgeon: Amanda Caballero MD;  Location: [facility] OR [address] [place] [address];  Service: General;  Laterality: Left;    BREAST BIOPSY      BREAST LUMPECTOMY      CATARACT EXTRACTION      CHOLECYSTECTOMY      COLONOSCOPY      ESOPHAGOGASTRODUODENOSCOPY      ESOPHAGOGASTRODUODENOSCOPY N/A  10/14/2019    Procedure: EGD (ESOPHAGOGASTRODUODENOSCOPY);  Surgeon: Davonte Thompson MD;  Location: [facility] ENDO (2ND FLR);  Service: Endoscopy;  Laterality: N/A;  hx of pulmonary hypertension-PA 50     pt requesting ASAP    EYE SURGERY      FIXATION KYPHOPLASTY N/A 11/8/2018    Procedure: Kyphoplasty   T12;  Surgeon: Merly Wilcox MD;  Location: [facility] CATH LAB;  Service: Pain Management;  Laterality: N/A;  T12  [employer] e-mailed with date and time    HYSTERECTOMY      INJECTION FOR SENTINEL NODE IDENTIFICATION Left 7/12/2018    Procedure: INJECTION, FOR SENTINEL NODE IDENTIFICATION;  Surgeon: Amanda Caballero MD;  Location: [facility] OR 2ND FLR;  Service: General;  Laterality: Left;    INJECTION OF FACET JOINT Bilateral 10/15/2018    Procedure: INJECTION, FACET JOINT, BILATERAL LUMBAR L3-4, 4-5, 5-S1 FACET JOINT INJECTIONS;  Surgeon: Merly Wilcox MD;  Location: [facility] PAIN MGT;  Service: Pain Management;  Laterality: Bilateral;    INJECTION OF FACET JOINT Bilateral 4/1/2019    Procedure: INJECTION, FACET JOINT, L3-L4,L4-L5,L5-S1;  Surgeon: Merly Wilcox MD;  Location: [facility] PAIN MGT;  Service: Pain Management;  Laterality: Bilateral;    INJECTION OF FACET JOINT Bilateral 6/20/2019    Procedure: INJECTION, FACET JOINT, L3-L4,L4-L5,L5-S1 NEED CONSENT;  Surgeon: Merly Wilcox MD;  Location: [facility] PAIN MGT;  Service: Pain Management;  Laterality: Bilateral;    INJECTION OF FACET JOINT Bilateral 7/27/2020    Procedure: INJECTION, FACET JOINT BILATERAL L3/4, L4/5 and L5/S1;  Surgeon: Merly Wilcox MD;  Location: [facility] PAIN MGT;  Service: Pain Management;  Laterality: Bilateral;    INJECTION OF JOINT Bilateral 7/27/2020    Procedure: INJECTION, JOINT, BILATERAL GREATER TROCHANTERIC BURSA;  Surgeon: Merly Wilcox MD;  Location: [facility] PAIN MGT;  Service: Pain Management;  Laterality: Bilateral;    INJECTION OF JOINT Bilateral 4/26/2021    Procedure: INJECTION, JOINT, HIP;  Surgeon: Merly Wilcox MD;  Location: [facility] PAIN MGT;  Service: Pain  Management;  Laterality: Bilateral;  consent needed    INJECTION OF STEROID Right 11/15/2021    Procedure: INJECTION, STEROID RIGHT MIDDLE AND SAMLL FINGER;  Surgeon: Florecita Da Silva MD;  Location: [facility] OR;  Service: Orthopedics;  Laterality: Right;    MASTECTOMY, PARTIAL Left 7/12/2018    Procedure: MASTECTOMY, PARTIAL-W/SEED LOCALIZATION;  Surgeon: Amanda Caballero MD;  Location: [facility] OR [facility] FLR;  Service: General;  Laterality: Left;    AL REMOVAL OF OVARY/TUBE(S)  02/23/2018    Robotic Assisted    ROTATOR CUFF REPAIR Right     rotator cuff repair right shoulder    TONSILLECTOMY      TRANSFORAMINAL EPIDURAL INJECTION OF STEROID Bilateral 6/21/2021    Procedure: INJECTION, STEROID, EPIDURAL, TRANSFORAMINAL APPROACH  bilateral L4/5 TF ASHUTOSH;  Surgeon: Merly Wilcox MD;  Location: [facility] PAIN MGT;  Service: Pain Management;  Laterality: Bilateral;  consent needed    TRANSFORAMINAL EPIDURAL INJECTION OF STEROID Bilateral 1/3/2022    Procedure: INJECTION, STEROID, EPIDURAL, TRANSFORAMINAL APPROACH Bilateral L4/5 Needs consent;  Surgeon: Merly Wilcox MD;  Location: [facility] PAIN MGT;  Service: Pain Management;  Laterality: Bilateral;    TRANSFORAMINAL EPIDURAL INJECTION OF STEROID Bilateral 8/9/2022    Procedure: INJECTION, STEROID, EPIDURAL, TRANSFORAMINAL APPROACH, BILATERAL L4-L5   MEDICALLY URGENT;  Surgeon: Merly Wilcox MD;  Location: [facility] PAIN MGT;  Service: Pain Management;  Laterality: Bilateral;    TRIGGER FINGER RELEASE Left 11/15/2021    Procedure: RELEASE, TRIGGER FINGER, LEFT MIDDLE AND RING;  Surgeon: Florecita Da Silva MD;  Location: [facility] OR;  Service: Orthopedics;  Laterality: Left;       Family History:  Family History   Problem Relation Age of Onset    Heart failure Mother     Kidney failure Mother     Heart attack Father     Breast cancer Sister 66        Lump, XRT, chemo, recurrence 10 years later.    Cancer Brother         leukemia    Heart attack Brother 58    Pulmonary embolism Brother 45    Heart  attack Brother 52    Breast cancer Maternal Grandmother 60        advanced at diagnosis    Breast cancer Maternal Aunt 83         at 92    Colon cancer Neg Hx     Esophageal cancer Neg Hx        Social History:  Social History     Socioeconomic History    Marital status: Single    Number of children: 3   Occupational History    Occupation: Multiple jobs, see social documentation section     Comment: Retired   Tobacco Use    Smoking status: Former     Packs/day: 1.00     Years: 40.00     Pack years: 40.00     Types: Cigarettes    Smokeless tobacco: Never    Tobacco comments:     Smoked >1 ppd for at least 40 years, quit around    Substance and Sexual Activity    Alcohol use: Yes     Comment: occasional glass of wine or cocktail    Drug use: No    Sexual activity: Yes     Partners: Male   Social History Narrative    Worked many jobs while raising 3 children.  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  She was  from her first , but took care of him at the end of his life.       REVIEW OF SYSTEMS:    GENERAL:  No weight loss, malaise or fevers.  HEENT:   No recent changes in vision or hearing  NECK:  Negative for lumps, no difficulty with swallowing.  RESPIRATORY:  Negative for cough, wheezing or shortness of breath, patient denies any recent URI. COPD.  CARDIOVASCULAR:  Negative for chest pain, leg swelling or palpitations. Hypertension.  GI:  Negative for abdominal discomfort, blood in stools or black stools or change in bowel habits.  MUSCULOSKELETAL:  See HPI.  SKIN:  Negative for lesions, rash, and itching.  PSYCH:  No mood disorder or recent psychosocial stressors.  Patients sleep is not disturbed secondary to pain.  HEMATOLOGY/LYMPHOLOGY:  Negative for prolonged bleeding, bruising easily or swollen nodes.  Patient is not currently taking any anti-coagulants  NEURO:   No history of headaches,  syncope, paralysis, seizures or tremors.  All other reviewed and negative other than HPI.    OBJECTIVE:    LMP  (LMP Unknown)     PHYSICAL EXAMINATION:  Voice normal.  Normal affect without evidence of distress.  Lower back: no pain with flexion, pain with left flexion, positive facet loading on the left, no pain right right flexion, no facet loading on the right    ASSESSMENT: 88 y.o. year old female with lower back and lower extremity pain, consistent with the following diagnoses:     1. Osteoporotic compression fracture of vertebra, initial encounter        2. Lumbar radiculopathy  Procedure Order to Pain Management      3. DDD (degenerative disc disease), lumbosacral  Procedure Order to Pain Management              PLAN:   We discussed with the patient the assessment and recommendations. The following is the plan we agreed on:   - schedule for L L3/4 and L4/5 TESI  - if no benefit from epidural, will consider MBB or kyphoplasty  - RTC in person 2 weeks after procedure    The above plan and management options were discussed at length with patient. Patient is in agreement with the above and verbalized understanding.       Zeny Medeiros MD [facility] Anesthesia Resident

## 2022-10-20 NOTE — TELEPHONE ENCOUNTER
----- Message from Zeny Medeiros MD sent at 10/20/2022  1:43 PM CDT -----  Regarding: TESI  Please schedule L L3/4 and L4/5 TESI with follow up in clinic 2 weeks after.    Thanks!!

## 2022-10-20 NOTE — H&P (VIEW-ONLY)
Chronic Pain-Tele-Medicine-Established Note (Follow up visit)        The patient location is: [place], [place]  The chief complaint leading to consultation is: pain  Visit type: Virtual visit with synchronous audio and video  Total time spent with patient: 25 min  Each patient to whom he or she provides medical services by telemedicine is:  (1) informed of the relationship between the physician and patient and the respective role of any other health care provider with respect to management of the patient; and (2) notified that he or she may decline to receive medical services by telemedicine and may withdraw from such care at any time.        SUBJECTIVE:    Interval History 10/20/22  Ms. Cook complains of worsening left low back pain radiating down her lateral thigh to the knee. The pain is only when she walks/moves, she in pain free when sitting. She is s/p bilateral TESI L4/5 2 months ago, which she had relief from, but acutely worsened when she bent over to pick something off the ground. She states the pain is similar to her pain prior to this. She denies numbness, tingling, bladder/bowel problems.     Interval History 10/6/2022:   Mrs Cook presents for follow up virtually. At prior visit she had Left GTB injection with significant benefit in office. Her lower back and hip pain were further evaluated and noted newer L3 compression Fx. While she visually appears to have significant improvement of pain to me she does voice focal upper/mid lumbar pain persists. Discussed treatment options and Kyphoplasty recommended by Dr Wilcox but there was hesitation regarding further escalation to kyphoplasty. We did discusses prior osteoporosis diagnosis and considering treatment options with PCP. She does not voice any s/s concerning for cauda equina.      Interval History 8/22/2022:  Mrs Cook presents for follow up acute pain. She is s/p repeat Bilateral L4/5 TFESI with mild improvement but having severe left  lateral hip pain. She also continues to have burning pain to Right heel and numbness to right lateral lower leg. She has chronic urinary continence but no new neurological issues.She continues to take tramadol by PCP with some benefit     Interval History 8/5/2022:  Mrs Cook presents for follow up evaluation of returning lower back pain. She has exact pain relieved prior by B L4/5 TFESIs. She has newer complaint of bilateral feet burning pains as well. She has had JESSE noting arterial issues R>L. She continues to take Tramadol and neurontin without SE.She would like to repeat ASHUTOSH as it provided 7 months of 75% relief and symptoms returning severe over last few weeks. No voicing of symptoms concerning for cauda equina.     Interval History 12/6/2021:  The patient returns to clinic today for follow up of low back pain. She reports increased low back and bilateral hip pain over the last two weeks. She reports intermittent radiating pain into her lateral thighs. She does have difficulty sleeping due to pain. Her pain is also worse with standing and walking. She continues to take Gabapentin with benefit. She also takes Tramadol per her PCP with benefit. She denies any other health changes. Her pain today is 8/10.    Interval History 7/6/2021:  The patient returns to clinic today for follow up of low back pain via virtual visit. She is s/p bilateral L4/5 TF ASHUTOSH on 6/21/2021. She reports 75% relief of her low back and leg pain. She reports low back pain, worse in the morning. She reports intermittent radiating pain into the posterolateral aspect of both legs to her knees. She continues to take Gabapentin with benefit. She denies any weakness. She denies any other health changes. Her pain today is 2/10.    Interval History 6/8/2021:  The patient returns to clinic today for follow up of low back pain. She is here today for imaging review. She continues to report low back pain that radiates into the posterolateral aspect of  both legs to below her knee. Her pain is worse with prolonged standing and walking. She does feel like she will fall with prolonged walking. She also reports relief with bending forward. She is currently taking Gabapentin at bedtime with benefit. She continues to take Tramadol per her PCP with benefit. She denies any other health changes. Her pain today is 8/10.    Interval History 5/20/2021:  The patient returns to clinic today for follow up of low back pain. She is s/p bilateral hip joint injections on 4/26/2021. She reports no relief. She reports increased low back pain that radiates into the posterior aspect of both legs to her ankles. She does report numbness to her feet. Her pain is worse with prolonged standing and walking. She feels as though her legs will give out with prolonged walking. She denies any bowel or bladder incontinence. She denies any other health changes. Her pain today is 9/10.    Interval History 3/23/2021:  The patient returns to clinic today for follow up of low back and hip pain. She reports increased bilateral hip pain. She describes this pain as deep and aching. Her pain is worse with prolonged standing and walking. She also reports low back pain that is aching in nature. She denies any radicular leg pain. Of note, she reports increased swelling and pain to her left ankle and foot. She does report a trip last week. She did see Orthopedics today and is scheduled for xray. She denies any other health changes. Her pain today is 8/10.    Interval History 12/9/2020:  She returns for follow-up.  She is doing well since her facet injections.  Her right hip is hurting.  It interferes with her mobility and comfort while laying down.  No new symptomatology otherwise.    Interval History 8/11/2020:  The patient has a virtual visit today for follow up.  She is s/p Bilateral L3-4, L4-5 L5-S1 facet joint injections with 90% relief.  She says that her back pain is minimal at this time.  She is able to  move around and walk better since the procedure as well.  She is having some left hip pain and feels as though her leg will give out sometimes when walking.  She would like a referral to ortho close to her home in [facility].  Her pain today is 1/10.  She denies any respiratory changes since procedure including fever, cough or SOB.    Interval History 12/5/2019:  The patient is here for follow up of lower back and bilateral hip pain.  Her back pain has been minimal.  She is having increased lateral hip and buttock pain recently.  She had TPIs in the past and would like to repeat this today.  Her pain today is 8/10.    Previous Encounter:  Anahi Cook presents to the clinic for a follow-up appointment for lower back and left lower extremity pain.  She had T12 kyphoplasty performed on 11/8/18 with significant improvement in symptoms.  She was admitted through the ED on 4/3/19 after suffering fractures of 6th, 8th and possible 7th ribs of the left side after a fall at home.  Thoracic imaging also showed stable slight anterior wedging at T11.  She had bilateral L3-4, L4-5 and L5-S1 facet injections on 4/1/19 with 80% relief of lower back pain.  Her current pain is minimal.  She has mild leg pain with walking.  She continues to follow up with oncology regularly.  Since the last visit, Anahi Cook states the pain has been improving.  Current pain intensity is 2/10.     Pain Medications:  OTC Tylenol    Opioid Contract: no     report:  Not applicable    Pain Procedures:   7/21/14 L4-5 IL ASHUTOSH- significant benefit  12/1/15 Left GT bursa injection  4/26/16 Left GT bursa injection  12/15/16 L4-5 IL ASHUTOSH- significant benefit  2/15/18 L4-5 IL ASHUTOSH- 100% relief  8/21/18 L4-5 IL ASHUTOSH- significant relief  10/15/18 Bilateral L3-4, L4-5 L5-S1 facet joint injections  11/8/18 T12 kyphoplasty- significant relief  4/1/19 Bilateral L3-4, L4-5 L5-S1 facet joint injections- 80% relief  7/27/20 Bilateral L3-4, L4-5 L5-S1 facet joint  injections- 90% relief  4/26/2021- Bilateral hip joint injections- no relief    6/21/2021- Bilateral L4/5 TF ASHUTOSH- 75% relief  8/9/2022 B L4/5 TFESI with significant benefit.     Physical Therapy/Home Exercise: yes     Imaging:     MRI LUMBAR SPINE WITHOUT CONTRAST 9/12/2022     CLINICAL HISTORY:  Low back pain, symptoms persist with > 6wks conservative treatment; Dorsalgia, unspecified     TECHNIQUE:  Multiplanar, multisequence MR images were acquired from the thoracolumbar junction to the sacrum without the administration of contrast.     COMPARISON:  5/29/2021     FINDINGS:  Chronic vertebral plana T12.  Extension of the posterosuperior cortex into the canal by approximately 6 mm.     There is now height loss at L3, approximately 30%.  Associated STIR signal hyperintensity.  Retropulsion of the posterior cortex into the canal approximately 5 mm.     Remaining vertebral body heights are maintained.     Mild disc space narrowing L4-5.     Grade 1 retrolisthesis L1-2.  Grade 1 anterolisthesis L4-5     Conus terminates appropriately at L1.     Multilevel degenerative change as diesel below:     T11-12: Retropulsion of the posterosuperior cortex of T12 into the canal with mild canal narrowing.     T12-L1: Posterior circumferential disc bulge, asymmetric to the left.  Mild left neural foraminal narrowing.     L1-2: Facet and ligamentum flavum hypertrophic changes with mild left neural foraminal narrowing.     L2-3: Posterior circumferential disc bulge and ligamentum flavum hypertrophy retropulsion of the posterior cortex of L3 into the canal.  Findings result in mild canal narrowing.  Severe right and moderate left neural foraminal narrowing.     L3-4: Posterior circumferential disc bulge.  Facet and ligamentum flavum hypertrophic changes.  Mild canal and moderate bilateral neural foraminal narrowing.     L4-5: Grade 1 anterolisthesis of L4-5 with uncovering of the disc.  Facet and ligamentum flavum hypertrophic  changes.  Combinations contribute to severe canal stenosis.  Severe left and moderate right neural foraminal narrowing.     L5-S1: Central canal and neural foramina are well maintained.     Multiple T2 hyperintense foci in both kidneys, including a thinly septated T2 hyperintense focus on the left measuring 15 mm.     Impression:     Acute fracture along the superior endplate of L3 with approximately 30% height loss.  Stable chronic height loss T12.     Multilevel degenerative change, worst at L4-5 where severe canal stenosis results.     Multilevel neural foraminal narrowing, severe L2-3 and L4-5 as above.     Bilateral renal cysts, including a thinly septated cyst on the left.  Findings can be further evaluated with renal ultrasound.     This report was flagged in Epic as abnormal.    XR LUMBAR SPINE 5 VIEW WITH FLEX AND EXT 8/22/2022     CLINICAL HISTORY:  Dorsalgia, unspecified     TECHNIQUE:  AP, lateral, and oblique images are performed through the lumbar spine.     COMPARISON:  Lumbar spine MRI 05/29/2021     FINDINGS:  Previous vertebroplasty T12.     Height loss along the superior endplate of L3 is new compared to that exam.  Height loss is approximately 40%.  There may be mild height loss at L2.     Disc space narrowing and endplate changes L4-5.  Facet arthropathy L4-5.     Grade 1 anterolisthesis L4-5 with no significant change with flexion or extension.     Aortoiliac atherosclerosis.     Impression:     Height loss along the superior endplate of L3, approximately 40%, is new compared to May 2021.  By today's radiographs, there appears to be mild sclerosis along the superior endplate which can be seen with a subacute or chronic fracture.  Correlation with MRI advised if there is recent trauma or acute back pain to exclude the possibility of an acute fracture.     Degenerative change as above.     This report was flagged in Epic as abnormal.    XR HIP WITH PELVIS WHEN PERFORMED, 2 OR 3 VIEWS LEFT  8/22/2022     CLINICAL HISTORY:  Pain in left hip     TECHNIQUE:  AP view of the pelvis and frog leg lateral view of the left hip were performed.     COMPARISON:  03/24/2022 and 09/09/2020     FINDINGS:  The femoral head is well located with respect to the acetabulum. No acute fracture seen.  Osteophyte formation, subchondral sclerosis, with moderate narrowing femoroacetabular joint bilaterally.     No significant soft tissue edema or radiopaque retained foreign body.  Aortoiliac atherosclerosis.     Impression:     No acute fracture or dislocation seen.  Moderate osteoarthritis and joint space narrowing.    MRI Lumbar Spine 5/29/2021:  COMPARISON:  Prior MRI from 2014 and 2018     FINDINGS:  There is a transitional lumbosacral segment and spine labeling is as on prior examinations.     Stable severe compression deformity at T12 with retropulsion.  Remaining vertebral body heights are maintained.  Grade 1 anterolisthesis appears increased at L4-5 as compared to previous.  There is no marrow replacement process.  The spinal cord is normal in signal.  Review of paravertebral structures demonstrates high T2 low T1 signal foci within kidneys.     Not included in the field of view of the axial, there is disc bulge at T10-11.  This does not appear to result in a significant degree of spinal canal narrowing on the basis of the sagittal imaging.     T12 demonstrates retropulsion with mild narrowing of the spinal canal.     T12-L1 demonstrates minor disc bulge.     L1-L2 demonstrates mild facet degenerative change.  There is no significant spinal canal stenosis.  There is mild bilateral neural foraminal narrowing.     L2-3 demonstrates facet degenerative change.  There is no significant spinal canal stenosis.  There is right foraminal protrusion.  This results in a moderate degree of right foraminal narrowing.  There is mild left foraminal narrowing.     L3-4 demonstrates facet degenerative change and ligamentum flavum  thickening.  There is a facet joint synovial cyst along the posterolateral aspect of the left facet.  There is right foraminal protrusion.  There is mild left and moderate right foraminal narrowing.     L4-5 demonstrates facet degenerative change and ligamentum flavum thickening.  There is anterolisthesis.  There is severe left and moderate right foraminal narrowing.  There is a severe degree of spinal canal narrowing.     L5-S1 is unremarkable.     Impression:     Multilevel degenerative changes as further detailed above.  Findings are most significant at the L4-5 level, noting transitional lumbosacral segment.     Stable severe compression deformity at T12.     Foci within the kidneys bilaterally likely representing cyst can be correlated with ultrasound.          Past Medical History:  Past Medical History:   Diagnosis Date    Anxiety     Arthritis     Back pain     Breast cancer 1/17/2018    Breast mass 1/15/2018    Chronic kidney disease, stage 2, mildly decreased GFR     COPD (chronic obstructive pulmonary disease)     emphysema    Depression     Dysuria 1/29/2018    Exudative age-related macular degeneration of left eye with active choroidal neovascularization 8/25/2022    Family history of breast cancer 1/17/2018    GERD (gastroesophageal reflux disease)     Hypertension     Infiltrating ductal carcinoma of breast, left 7/12/2018    Osteoporosis, senile     Ovarian mass 1/15/2018    Pneumonia     S/P robotic assisted laparoscopic BSO (bilateral salpingo-oophorectomy) 2/23/2018       Past Surgical History:  Past Surgical History:   Procedure Laterality Date    AXILLARY NODE DISSECTION Left 7/12/2018    Procedure: LYMPHADENECTOMY, AXILLARY;  Surgeon: Amanda Caballero MD;  Location: [facility] OR [address] [place] [address];  Service: General;  Laterality: Left;    BREAST BIOPSY      BREAST LUMPECTOMY      CATARACT EXTRACTION      CHOLECYSTECTOMY      COLONOSCOPY      ESOPHAGOGASTRODUODENOSCOPY      ESOPHAGOGASTRODUODENOSCOPY N/A  10/14/2019    Procedure: EGD (ESOPHAGOGASTRODUODENOSCOPY);  Surgeon: Davonte Thompson MD;  Location: [facility] ENDO (2ND FLR);  Service: Endoscopy;  Laterality: N/A;  hx of pulmonary hypertension-PA 50     pt requesting ASAP    EYE SURGERY      FIXATION KYPHOPLASTY N/A 11/8/2018    Procedure: Kyphoplasty   T12;  Surgeon: Merly Wilcox MD;  Location: [facility] [place] CATH LAB;  Service: Pain Management;  Laterality: N/A;  T12  [employer] e-mailed with date and time    HYSTERECTOMY      INJECTION FOR SENTINEL NODE IDENTIFICATION Left 7/12/2018    Procedure: INJECTION, FOR SENTINEL NODE IDENTIFICATION;  Surgeon: Amanda Caballero MD;  Location: [facility] OR 2ND FLR;  Service: General;  Laterality: Left;    INJECTION OF FACET JOINT Bilateral 10/15/2018    Procedure: INJECTION, FACET JOINT, BILATERAL LUMBAR L3-4, 4-5, 5-S1 FACET JOINT INJECTIONS;  Surgeon: Merly Wilcox MD;  Location: [facility] [place] PAIN MGT;  Service: Pain Management;  Laterality: Bilateral;    INJECTION OF FACET JOINT Bilateral 4/1/2019    Procedure: INJECTION, FACET JOINT, L3-L4,L4-L5,L5-S1;  Surgeon: Merly Wilcox MD;  Location: [facility] [place] PAIN MGT;  Service: Pain Management;  Laterality: Bilateral;    INJECTION OF FACET JOINT Bilateral 6/20/2019    Procedure: INJECTION, FACET JOINT, L3-L4,L4-L5,L5-S1 NEED CONSENT;  Surgeon: Merly Wilcox MD;  Location: [facility] [place] PAIN MGT;  Service: Pain Management;  Laterality: Bilateral;    INJECTION OF FACET JOINT Bilateral 7/27/2020    Procedure: INJECTION, FACET JOINT BILATERAL L3/4, L4/5 and L5/S1;  Surgeon: Merly Wilcox MD;  Location: [facility] [place] PAIN MGT;  Service: Pain Management;  Laterality: Bilateral;    INJECTION OF JOINT Bilateral 7/27/2020    Procedure: INJECTION, JOINT, BILATERAL GREATER TROCHANTERIC BURSA;  Surgeon: Merly Wilcox MD;  Location: [facility] [place] PAIN MGT;  Service: Pain Management;  Laterality: Bilateral;    INJECTION OF JOINT Bilateral 4/26/2021    Procedure: INJECTION, JOINT, HIP;  Surgeon: Merly Wilcox MD;  Location: [facility] [place] PAIN MGT;  Service: Pain  Management;  Laterality: Bilateral;  consent needed    INJECTION OF STEROID Right 11/15/2021    Procedure: INJECTION, STEROID RIGHT MIDDLE AND SAMLL FINGER;  Surgeon: Florecita Da Silva MD;  Location: [facility] OR;  Service: Orthopedics;  Laterality: Right;    MASTECTOMY, PARTIAL Left 7/12/2018    Procedure: MASTECTOMY, PARTIAL-W/SEED LOCALIZATION;  Surgeon: Amanda Caballero MD;  Location: [facility] OR [facility] FLR;  Service: General;  Laterality: Left;    MN REMOVAL OF OVARY/TUBE(S)  02/23/2018    Robotic Assisted    ROTATOR CUFF REPAIR Right     rotator cuff repair right shoulder    TONSILLECTOMY      TRANSFORAMINAL EPIDURAL INJECTION OF STEROID Bilateral 6/21/2021    Procedure: INJECTION, STEROID, EPIDURAL, TRANSFORAMINAL APPROACH  bilateral L4/5 TF ASHUTOSH;  Surgeon: Merly Wilcox MD;  Location: [facility] PAIN MGT;  Service: Pain Management;  Laterality: Bilateral;  consent needed    TRANSFORAMINAL EPIDURAL INJECTION OF STEROID Bilateral 1/3/2022    Procedure: INJECTION, STEROID, EPIDURAL, TRANSFORAMINAL APPROACH Bilateral L4/5 Needs consent;  Surgeon: Merly Wilcox MD;  Location: [facility] PAIN MGT;  Service: Pain Management;  Laterality: Bilateral;    TRANSFORAMINAL EPIDURAL INJECTION OF STEROID Bilateral 8/9/2022    Procedure: INJECTION, STEROID, EPIDURAL, TRANSFORAMINAL APPROACH, BILATERAL L4-L5   MEDICALLY URGENT;  Surgeon: Merly Wilcox MD;  Location: [facility] PAIN MGT;  Service: Pain Management;  Laterality: Bilateral;    TRIGGER FINGER RELEASE Left 11/15/2021    Procedure: RELEASE, TRIGGER FINGER, LEFT MIDDLE AND RING;  Surgeon: Florecita Da Silva MD;  Location: [facility] OR;  Service: Orthopedics;  Laterality: Left;       Family History:  Family History   Problem Relation Age of Onset    Heart failure Mother     Kidney failure Mother     Heart attack Father     Breast cancer Sister 66        Lump, XRT, chemo, recurrence 10 years later.    Cancer Brother         leukemia    Heart attack Brother 58    Pulmonary embolism Brother 45    Heart  attack Brother 52    Breast cancer Maternal Grandmother 60        advanced at diagnosis    Breast cancer Maternal Aunt 83         at 92    Colon cancer Neg Hx     Esophageal cancer Neg Hx        Social History:  Social History     Socioeconomic History    Marital status: Single    Number of children: 3   Occupational History    Occupation: Multiple jobs, see social documentation section     Comment: Retired   Tobacco Use    Smoking status: Former     Packs/day: 1.00     Years: 40.00     Pack years: 40.00     Types: Cigarettes    Smokeless tobacco: Never    Tobacco comments:     Smoked >1 ppd for at least 40 years, quit around    Substance and Sexual Activity    Alcohol use: Yes     Comment: occasional glass of wine or cocktail    Drug use: No    Sexual activity: Yes     Partners: Male   Social History Narrative    Worked many jobs while raising 3 children.  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  She was  from her first , but took care of him at the end of his life.       REVIEW OF SYSTEMS:    GENERAL:  No weight loss, malaise or fevers.  HEENT:   No recent changes in vision or hearing  NECK:  Negative for lumps, no difficulty with swallowing.  RESPIRATORY:  Negative for cough, wheezing or shortness of breath, patient denies any recent URI. COPD.  CARDIOVASCULAR:  Negative for chest pain, leg swelling or palpitations. Hypertension.  GI:  Negative for abdominal discomfort, blood in stools or black stools or change in bowel habits.  MUSCULOSKELETAL:  See HPI.  SKIN:  Negative for lesions, rash, and itching.  PSYCH:  No mood disorder or recent psychosocial stressors.  Patients sleep is not disturbed secondary to pain.  HEMATOLOGY/LYMPHOLOGY:  Negative for prolonged bleeding, bruising easily or swollen nodes.  Patient is not currently taking any anti-coagulants  NEURO:   No history of headaches,  syncope, paralysis, seizures or tremors.  All other reviewed and negative other than HPI.    OBJECTIVE:    LMP  (LMP Unknown)     PHYSICAL EXAMINATION:  Voice normal.  Normal affect without evidence of distress.  Lower back: no pain with flexion, pain with left flexion, positive facet loading on the left, no pain right right flexion, no facet loading on the right    ASSESSMENT: 88 y.o. year old female with lower back and lower extremity pain, consistent with the following diagnoses:     1. Osteoporotic compression fracture of vertebra, initial encounter        2. Lumbar radiculopathy  Procedure Order to Pain Management      3. DDD (degenerative disc disease), lumbosacral  Procedure Order to Pain Management              PLAN:   We discussed with the patient the assessment and recommendations. The following is the plan we agreed on:   - schedule for L L3/4 and L4/5 TESI  - if no benefit from epidural, will consider MBB or kyphoplasty  - RTC in person 2 weeks after procedure    The above plan and management options were discussed at length with patient. Patient is in agreement with the above and verbalized understanding.       Zeny Medeiros MD [facility] Anesthesia Resident

## 2022-10-24 ENCOUNTER — TELEPHONE (OUTPATIENT)
Dept: ADMINISTRATIVE | Facility: CLINIC | Age: 87
End: 2022-10-24
Payer: MEDICARE

## 2022-10-27 ENCOUNTER — TELEPHONE (OUTPATIENT)
Dept: PAIN MEDICINE | Facility: OTHER | Age: 87
End: 2022-10-27
Payer: MEDICARE

## 2022-10-27 NOTE — TELEPHONE ENCOUNTER
----- Message from Pelon Olivia sent at 10/27/2022 11:44 AM CDT -----  Regarding: Reschedule  Contact: Patience (daughter)  Name of Who is Calling: Patience (daughter)      What is the request in detail: Would like to speak with staff in regards to rescheduling procedure due to being on steroids for respiratory infection. Please advise      Can the clinic reply by [facility]: yes      What Number to Call Back if not in [facility]: [phone]

## 2022-10-28 ENCOUNTER — PES CALL (OUTPATIENT)
Dept: ADMINISTRATIVE | Facility: CLINIC | Age: 87
End: 2022-10-28
Payer: MEDICARE

## 2022-11-08 ENCOUNTER — PROCEDURE VISIT (OUTPATIENT)
Dept: OPHTHALMOLOGY | Facility: CLINIC | Age: 87
End: 2022-11-08
Payer: MEDICARE

## 2022-11-08 DIAGNOSIS — H35.371 EPIRETINAL MEMBRANE, RIGHT EYE: ICD-10-CM

## 2022-11-08 DIAGNOSIS — H35.3112 INTERMEDIATE STAGE DRY AGE-RELATED MACULAR DEGENERATION OF RIGHT EYE: ICD-10-CM

## 2022-11-08 DIAGNOSIS — Z96.1 PSEUDOPHAKIA OF BOTH EYES: ICD-10-CM

## 2022-11-08 DIAGNOSIS — H35.3221 EXUDATIVE AGE-RELATED MACULAR DEGENERATION OF LEFT EYE WITH ACTIVE CHOROIDAL NEOVASCULARIZATION: Primary | ICD-10-CM

## 2022-11-08 PROCEDURE — 67028 PR INJECT INTRAVITREAL PHARMCOLOGIC: ICD-10-PCS | Mod: LT,S$GLB,, | Performed by: OPHTHALMOLOGY

## 2022-11-08 PROCEDURE — 92134 OCT, RETINA - OU - BOTH EYES: ICD-10-PCS | Mod: S$GLB,,, | Performed by: OPHTHALMOLOGY

## 2022-11-08 PROCEDURE — 67028 INJECTION EYE DRUG: CPT | Mod: LT,S$GLB,, | Performed by: OPHTHALMOLOGY

## 2022-11-08 PROCEDURE — 99214 OFFICE O/P EST MOD 30 MIN: CPT | Mod: 25,S$GLB,, | Performed by: OPHTHALMOLOGY

## 2022-11-08 PROCEDURE — 99214 PR OFFICE/OUTPT VISIT, EST, LEVL IV, 30-39 MIN: ICD-10-PCS | Mod: 25,S$GLB,, | Performed by: OPHTHALMOLOGY

## 2022-11-08 PROCEDURE — 92134 CPTRZ OPH DX IMG PST SGM RTA: CPT | Mod: S$GLB,,, | Performed by: OPHTHALMOLOGY

## 2022-11-08 RX ADMIN — Medication 1.25 MG: at 03:11

## 2022-11-08 NOTE — PROGRESS NOTES
HPI    1 MTH DFE / OCT OU    CC: pt states Va seems to be improving - but cant see out of glasses. No   flashes. +floaters - stable. No eye pain. +photophobia, mild. No glare    +tearing OS>>OD.    GTTS: SYSTANE PRN OU   Last edited by Thais Casillas on 11/8/2022  2:29 PM.            A/P    ICD-10-CM ICD-9-CM   1. Exudative age-related macular degeneration of left eye with active choroidal neovascularization  H35.3221 362.52     362.16   2. 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 Epiretinal membrane, right eye  H35.371 362.56   4. Pseudophakia of both eyes  Z96.1 V43.1       1. Exudative age-related macular degeneration of left eye with active choroidal neovascularization  2. 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  Schedule Avastin Injection today Left Eye Patient chooses to proceed with injection R/B/A discussed include infection retinal detachment and stroke    Patient identified.  Timeout performed.    Risks, benefits, and alternatives to treatment were discussed in detail with the patient, including bleeding/infection (endophthalmitis)/etc.  The patient voiced understanding and wished to proceed with the procedure.  See separate consent form.    Injection Procedure Note:  Diagnosis: wet AMD Left Eye    Topical Proparacaine drop placed then topical 5% Betadine and then subconjunctival lidocaine 2% injection  Sterile gloves used, and sterile lid speculum placed.  5% Betadine placed at injection site again prior to injection.  Avastin 1.25mg in 0.05cc Injected  inferotemporally 3.5-4mm posterior to the limbus.  Complications: None  Va at least CF at 5 feet post injection.  Retina, ONH, IOP normal after injection.    Followup as below.  Patient should return immediately PRN.  Retinal Detachment and Endophthalmitis precautions given.       3. Epiretinal membrane, right eye  Mod ERM, VA 20/40 stable no changes   Pt asymptomatic, no metamorphopsia  Plan: Observation  Amsler precautions discussed     4. Pseudophakia of both eyes  Good lens position OU  Plan: Observation      RTC 6 weeks DFE/OCTm OU, MANA OS (T&E)      I saw and examined the patient and reviewed in detail the findings documented. The final examination findings, image interpretations, and plan as documented in the record represent my personal judgment and conclusions.    Dwight Melton MD  Vitreoretinal Surgery   [facility]

## 2022-11-11 DIAGNOSIS — I11.9 HYPERTENSIVE HEART DISEASE WITHOUT HEART FAILURE: ICD-10-CM

## 2022-11-11 RX ORDER — TORSEMIDE 20 MG/1
20 TABLET ORAL DAILY
Qty: 90 TABLET | Refills: 1 | Status: CANCELLED | OUTPATIENT
Start: 2022-11-11

## 2022-11-11 NOTE — TELEPHONE ENCOUNTER
No new care gaps identified.  [facility] Embedded Care Gaps. Reference number: [id number]. 11/11/2022   5:10:00 PM CST

## 2022-11-17 ENCOUNTER — HOSPITAL ENCOUNTER (OUTPATIENT)
Facility: OTHER | Age: 87
Discharge: HOME | End: 2022-11-17
Attending: ANESTHESIOLOGY | Admitting: ANESTHESIOLOGY
Payer: MEDICARE

## 2022-11-17 VITALS
DIASTOLIC BLOOD PRESSURE: 80 MMHG | RESPIRATION RATE: 18 BRPM | TEMPERATURE: 98 F | OXYGEN SATURATION: 95 % | HEIGHT: 64 IN | HEART RATE: 60 BPM | SYSTOLIC BLOOD PRESSURE: 172 MMHG | BODY MASS INDEX: 25.1 KG/M2 | WEIGHT: 147 LBS

## 2022-11-17 DIAGNOSIS — M54.16 LUMBAR RADICULOPATHY: Primary | ICD-10-CM

## 2022-11-17 DIAGNOSIS — G89.29 CHRONIC PAIN: ICD-10-CM

## 2022-11-17 PROCEDURE — 25000003 PHARM REV CODE 250: Performed by: STUDENT IN AN ORGANIZED HEALTH CARE EDUCATION/TRAINING PROGRAM

## 2022-11-17 PROCEDURE — 63600175 PHARM REV CODE 636 W HCPCS: Performed by: ANESTHESIOLOGY

## 2022-11-17 PROCEDURE — 64483 PR EPIDURAL INJ, ANES/STEROID, TRANSFORAMINAL, LUMB/SACR, SNGL LEVL: ICD-10-PCS | Mod: LT,,, | Performed by: ANESTHESIOLOGY

## 2022-11-17 PROCEDURE — 64483 NJX AA&/STRD TFRM EPI L/S 1: CPT | Mod: LT,,, | Performed by: ANESTHESIOLOGY

## 2022-11-17 PROCEDURE — 64484 NJX AA&/STRD TFRM EPI L/S EA: CPT | Mod: LT,,, | Performed by: ANESTHESIOLOGY

## 2022-11-17 PROCEDURE — 25000003 PHARM REV CODE 250: Performed by: ANESTHESIOLOGY

## 2022-11-17 PROCEDURE — 64484 NJX AA&/STRD TFRM EPI L/S EA: CPT | Mod: LT | Performed by: ANESTHESIOLOGY

## 2022-11-17 PROCEDURE — 25500020 PHARM REV CODE 255: Performed by: ANESTHESIOLOGY

## 2022-11-17 PROCEDURE — 64483 NJX AA&/STRD TFRM EPI L/S 1: CPT | Mod: LT | Performed by: ANESTHESIOLOGY

## 2022-11-17 PROCEDURE — 64484 PRA INJECT ANES/STEROID FORAMEN LUMBAR/SACRAL W IMG GUIDE ,EA ADD LEVEL: ICD-10-PCS | Mod: LT,,, | Performed by: ANESTHESIOLOGY

## 2022-11-17 RX ORDER — LIDOCAINE HYDROCHLORIDE 20 MG/ML
INJECTION, SOLUTION INFILTRATION; PERINEURAL
Status: DISCONTINUED | OUTPATIENT
Start: 2022-11-17 | End: 2022-11-17 | Stop reason: HOSPADM

## 2022-11-17 RX ORDER — LIDOCAINE HYDROCHLORIDE 10 MG/ML
INJECTION, SOLUTION EPIDURAL; INFILTRATION; INTRACAUDAL; PERINEURAL
Status: DISCONTINUED | OUTPATIENT
Start: 2022-11-17 | End: 2022-11-17 | Stop reason: HOSPADM

## 2022-11-17 RX ORDER — MIDAZOLAM HYDROCHLORIDE 1 MG/ML
INJECTION INTRAMUSCULAR; INTRAVENOUS
Status: DISCONTINUED | OUTPATIENT
Start: 2022-11-17 | End: 2022-11-17 | Stop reason: HOSPADM

## 2022-11-17 RX ORDER — SODIUM CHLORIDE 9 MG/ML
500 INJECTION, SOLUTION INTRAVENOUS CONTINUOUS
Status: DISCONTINUED | OUTPATIENT
Start: 2022-11-17 | End: 2022-11-17 | Stop reason: HOSPADM

## 2022-11-17 RX ORDER — FENTANYL CITRATE 50 UG/ML
INJECTION, SOLUTION INTRAMUSCULAR; INTRAVENOUS
Status: DISCONTINUED | OUTPATIENT
Start: 2022-11-17 | End: 2022-11-17 | Stop reason: HOSPADM

## 2022-11-17 RX ORDER — DEXAMETHASONE SODIUM PHOSPHATE 10 MG/ML
INJECTION INTRAMUSCULAR; INTRAVENOUS
Status: DISCONTINUED | OUTPATIENT
Start: 2022-11-17 | End: 2022-11-17 | Stop reason: HOSPADM

## 2022-11-17 NOTE — OP NOTE
Lumbar Transforaminal Epidural Steroid Injection under Fluoroscopic Guidance    The procedure, risks, benefits, and options were discussed with the patient. There are no contraindications to the procedure. The patent expressed understanding and agreed to the procedure. Informed written consent was obtained prior to the start of the procedure and can be found in the patient's chart.    PATIENT NAME: Anahi Cook   MRN: [id number]     DATE OF PROCEDURE: 11/17/2022    PROCEDURE:  Left  L3/4 and L4/5 Lumbar Transforaminal Epidural Steroid Injection under Fluoroscopic Guidance    PRE-OP DIAGNOSIS: Lumbar radiculopathy [M54.16]  DDD (degenerative disc disease), lumbosacral [M51.37] Lumbar radiculopathy [M54.16]    POST-OP DIAGNOSIS: Same    PHYSICIAN: Merly Wilcox MD    ASSISTANTS: Chad lopez MD; Elan Mederos MD     MEDICATIONS INJECTED: Preservative-free Decadron 10mg with 5cc of Lidocaine 1% MPF     LOCAL ANESTHETIC INJECTED: Xylocaine 2%     SEDATION: Versed 2mg and Fentanyl 25mcg                                                                                                                                                                                     Conscious sedation ordered by M.D. Patient re-evaluation prior to administration of conscious sedation. No changes noted in patient's status from initial evaluation. The patient's vital signs were monitored by RN and patient remained hemodynamically stable throughout the procedure.    Event Time In   Sedation Start 1135       ESTIMATED BLOOD LOSS: None    COMPLICATIONS: None    TECHNIQUE: Time-out was performed to identify the patient and procedure to be performed. With the patient laying in a prone position, the surgical area was prepped and draped in the usual sterile fashion using ChloraPrep and a fenestrated drape.The levels were determined under fluoroscopy guidance. Skin anesthesia was achieved by injecting Lidocaine 2% over the injection sites. The  transforaminal spaces were then approached with a 22 gauge, 3.5 inch spinal quinke needle that was introduced under fluoroscopic guidance in the AP and Lateral views. Once the needle tip was in the area of the transforaminal space, and there was no blood, CSF or paraesthesias, contrast dye Omnipaque (240mg/mL) was injected to confirm placement and there was no vascular runoff. Fluoroscopic imaging in the AP and lateral views revealed a clear outline of the spinal nerve with proximal spread of agent through the neural foramen into the epidural space. 3 mL of the medication mixture listed above was injected slowly at each site. Displacement of the radio opaque contrast after injection of the medication confirmed that the medication went into the area of the transforaminal spaces. The needles were removed and bleeding was nil. A sterile dressing was applied. No specimens collected. The patient tolerated the procedure well.       The patient was monitored after the procedure in the recovery area. They were given post-procedure and discharge instructions to follow at home. The patient was discharged in a stable condition.    PAIN BEFORE THE PROCEDURE: 8/10    PAIN AFTER THE PROCEDURE: 0/10      Merly Wilcox MD

## 2022-11-17 NOTE — DISCHARGE SUMMARY
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       albuterol (PROVENTIL) 2.5 mg /3 mL (0.083 %) nebulizer solution Inhale 3 mLs into the lungs as needed.       albuterol (PROVENTIL/VENTOLIN HFA) 90 mcg/actuation inhaler Inhale 1-2 puffs into the lungs every 6 (six) hours as needed for Wheezing or Shortness of Breath.      albuterol-ipratropium 2.5mg-0.5mg/3mL (DUO-NEB) 0.5 mg-3 mg(2.5 mg base)/3 mL nebulizer solution Take 3 mLs by nebulization as needed.       atorvastatin (LIPITOR) 20 MG tablet Take 1 tablet (20 mg total) by mouth every evening.  Qty: 90 tablet, Refills: 2    Associated Diagnoses: Hyperlipidemia, unspecified hyperlipidemia type; Aortic atherosclerosis      benazepriL (LOTENSIN) 40 MG tablet Take 1 tablet (40 mg total) by mouth once daily.  Qty: 90 tablet, Refills: 2    Associated Diagnoses: Hypertensive heart disease without heart failure      calcium-vitamin D 250-100 mg-unit per tablet Take 1 tablet by mouth 2 (two) times daily.      CETIRIZINE HCL (ZYRTEC ORAL) Take 10 mg by mouth nightly as needed.       citalopram (CELEXA) 20 MG tablet TAKE 1 TABLET(20 MG) BY MOUTH EVERY EVENING  Qty: 90 tablet, Refills: 1    Associated Diagnoses: Depression with anxiety      felodipine (PLENDIL) 10 MG 24 hr tablet Take 1 tablet (10 mg total) by mouth 2 (two) times a day.  Qty: 180 tablet, Refills: 2     Associated Diagnoses: Hypertensive heart disease without heart failure      gabapentin (NEURONTIN) 100 MG capsule Take 1 capsule (100 mg total) by mouth every evening.  Qty: 30 capsule, Refills: 2    Associated Diagnoses: Lumbar radiculopathy      hydrALAZINE (APRESOLINE) 25 MG tablet TAKE 1 TABLET(25 MG) BY MOUTH EVERY 12 HOURS FOR BLOOD PRESSURE  Qty: 180 tablet, Refills: 1    Associated Diagnoses: Hypertensive heart disease without heart failure      !! ketoprofen 10 % CrPk APPLY UP TO 4.8 GRAMS (3 PUMPS) TO PAINFUL AREAS UP TO FIVE TIMES DAILY. RUB IN WELL  Refills: 99      !! ketoprofen 10 % CrPk APPLY 2 GRAMS 3-4 TIMES DAILY FOR TREATMENT OF PAIN      letrozole (FEMARA) 2.5 mg Tab Take 1 tablet (2.5 mg total) by mouth once daily.  Qty: 90 tablet, Refills: 3    Comments: ZERO refills remain on this prescription. Your patient is requesting advance approval of refills for this medication to PREVENT ANY MISSED DOSES  Associated Diagnoses: Malignant neoplasm of nipple of left breast in female, estrogen receptor positive      meclizine (ANTIVERT) 25 mg tablet Take 1 tablet (25 mg total) by mouth 3 (three) times daily as needed for Dizziness.  Qty: 20 tablet, Refills: 0      melatonin 10 mg Tab Take by mouth every evening.      mirabegron (MYRBETRIQ) 25 mg Tb24 ER tablet Take 1 tablet (25 mg total) by mouth once daily.  Qty: 30 tablet, Refills: 11      montelukast (SINGULAIR) 10 mg tablet Take 10 mg by mouth every evening.      multivitamin (THERAGRAN) per tablet Take 1 tablet by mouth once daily.      torsemide (DEMADEX) 20 MG Tab TAKE 1 TABLET(20 MG) BY MOUTH EVERY DAY  Qty: 90 tablet, Refills: 1    Associated Diagnoses: Hypertensive heart disease without heart failure      traMADoL (ULTRAM) 50 mg tablet Take 1 tablet (50 mg total) by mouth every 8 (eight) hours as needed for Pain.  Qty: 90 tablet, Refills: 2    Comments: Quantity prescribed more than 7 day supply? Yes, quantity medically necessary.  Associated  Diagnoses: Primary osteoarthritis of right knee; Lumbar spondylosis      triamcinolone acetonide 0.1% (KENALOG) 0.1 % cream AAA bid.  Spot treatment as needed  Qty: 45 g, Refills: 0    Associated Diagnoses: Eczema, unspecified type      triamcinolone acetonide 0.1% (KENALOG) 0.1 % paste APPLY SPARINGLY FOUR TIMES DAILY       !! - Potential duplicate medications found. Please discuss with provider.              Discharge Diagnosis: Lumbar radiculopathy [M54.16]  DDD (degenerative disc disease), lumbosacral [M51.37]  Condition on Discharge: Stable with no complications to procedure   Diet on Discharge: Same as before.  Activity: as per instruction sheet.  Discharge to: Home with a responsible adult.  Follow up: 2-4 weeks       Please call my office or pager at [phone] if experienced any weakness or loss of sensation, fever > 101.5, pain uncontrolled with oral medications, persistent nausea/vomiting/or diarrhea, redness or drainage from the incisions, or any other worrisome concerns. If physician on call was not reached or could not communicate with our office for any reason please go to the nearest emergency department

## 2022-11-17 NOTE — DISCHARGE INSTRUCTIONS

## 2022-12-08 ENCOUNTER — TELEPHONE (OUTPATIENT)
Dept: PAIN MEDICINE | Facility: CLINIC | Age: 87
End: 2022-12-08
Payer: MEDICARE

## 2022-12-08 NOTE — TELEPHONE ENCOUNTER
Good Afternoon Ms Cook,      This is an appointment reminder about your schedule Virtual Visit with Pain Management Provider LORAINE Long.   Your appointment is for 12 09, 2022 at 2:40 pm.     Please log into your [facility] 15 min's prior to your appointment time to e-precheck, verify all corresponding pages, and troubleshoot any problems that may occur. Once your device has been verify as compatible, and you receive the green check,  you must hit/ select the start visit button, This will notify your provider that you are ready to start the Virtual Video Visit.     As [facility] is a teaching [facility], your visit may be started with a resident or a fellow that is rounding in clinic, then proceeded by your physician.    Once logged on, please allow the provider 20 -30 mins to check-in, in case running behind.       If you experience any technical issue please contact [phone]        Thank You for entrusting [facility] to handle your care

## 2022-12-09 ENCOUNTER — OFFICE VISIT (OUTPATIENT)
Dept: PAIN MEDICINE | Facility: CLINIC | Age: 87
End: 2022-12-09
Payer: MEDICARE

## 2022-12-09 DIAGNOSIS — M47.816 LUMBAR SPONDYLOSIS: Primary | ICD-10-CM

## 2022-12-09 DIAGNOSIS — M51.36 DDD (DEGENERATIVE DISC DISEASE), LUMBAR: ICD-10-CM

## 2022-12-09 DIAGNOSIS — M54.16 LUMBAR RADICULOPATHY: ICD-10-CM

## 2022-12-09 PROCEDURE — 1159F PR MEDICATION LIST DOCUMENTED IN MEDICAL RECORD: ICD-10-PCS | Mod: CPTII,95,, | Performed by: NURSE PRACTITIONER

## 2022-12-09 PROCEDURE — 99213 PR OFFICE/OUTPT VISIT, EST, LEVL III, 20-29 MIN: ICD-10-PCS | Mod: 95,,, | Performed by: NURSE PRACTITIONER

## 2022-12-09 PROCEDURE — 1159F MED LIST DOCD IN RCRD: CPT | Mod: CPTII,95,, | Performed by: NURSE PRACTITIONER

## 2022-12-09 PROCEDURE — 1160F RVW MEDS BY RX/DR IN RCRD: CPT | Mod: CPTII,95,, | Performed by: NURSE PRACTITIONER

## 2022-12-09 PROCEDURE — 1160F PR REVIEW ALL MEDS BY PRESCRIBER/CLIN PHARMACIST DOCUMENTED: ICD-10-PCS | Mod: CPTII,95,, | Performed by: NURSE PRACTITIONER

## 2022-12-09 PROCEDURE — 99213 OFFICE O/P EST LOW 20 MIN: CPT | Mod: 95,,, | Performed by: NURSE PRACTITIONER

## 2022-12-09 RX ORDER — GABAPENTIN 100 MG/1
100 CAPSULE ORAL NIGHTLY
Qty: 30 CAPSULE | Refills: 2 | Status: SHIPPED | OUTPATIENT
Start: 2022-12-09 | End: 2023-03-09 | Stop reason: SDUPTHER

## 2022-12-09 NOTE — PROGRESS NOTES
Chronic Pain-Tele-Medicine-Established Note (Follow up visit)        The patient location is: Home  The chief complaint leading to consultation is: pain  Visit type: Virtual visit with synchronous audio and video  Total time spent with patient: 25 min  Each patient to whom he or she provides medical services by telemedicine is:  (1) informed of the relationship between the physician and patient and the respective role of any other health care provider with respect to management of the patient; and (2) notified that he or she may decline to receive medical services by telemedicine and may withdraw from such care at any time.        SUBJECTIVE:    Interval History 12/9/2022:  The patient returns to clinic today for follow up of low back pain via virtual visit. She is s/p left L3/4 and L4/5 TF ASHUOTSH on 11/17/2022. She reports 60% relief of her pain. She continues to report intermittent low back pain. She denies any radicular leg pain. She does report bilateral foot pain, described as burning in nature. Her pain is tolerable at this time. She continues to take Gabapentin. She also takes Tramadol per her PCP. She denies any other health changes.     Interval History 10/20/22  Ms. Cook complains of worsening left low back pain radiating down her lateral thigh to the knee. The pain is only when she walks/moves, she in pain free when sitting. She is s/p bilateral TESI L4/5 2 months ago, which she had relief from, but acutely worsened when she bent over to pick something off the ground. She states the pain is similar to her pain prior to this. She denies numbness, tingling, bladder/bowel problems.    Interval History 10/6/2022:   Mrs Cook presents for follow up virtually. At prior visit she had Left GTB injection with significant benefit in office. Her lower back and hip pain were further evaluated and noted newer L3 compression Fx. While she visually appears to have significant improvement of pain to me she does voice  focal upper/mid lumbar pain persists. Discussed treatment options and Kyphoplasty recommended by Dr Wilcox but there was hesitation regarding further escalation to kyphoplasty. We did discusses prior osteoporosis diagnosis and considering treatment options with PCP. She does not voice any s/s concerning for cauda equina.      Interval History 8/22/2022:  Mrs Cook presents for follow up acute pain. She is s/p repeat Bilateral L4/5 TFESI with mild improvement but having severe left lateral hip pain. She also continues to have burning pain to Right heel and numbness to right lateral lower leg. She has chronic urinary continence but no new neurological issues.She continues to take tramadol by PCP with some benefit     Interval History 8/5/2022:  Mrs Cook presents for follow up evaluation of returning lower back pain. She has exact pain relieved prior by B L4/5 TFESIs. She has newer complaint of bilateral feet burning pains as well. She has had JESSE noting arterial issues R>L. She continues to take Tramadol and neurontin without SE.She would like to repeat ASHUTOSH as it provided 7 months of 75% relief and symptoms returning severe over last few weeks. No voicing of symptoms concerning for cauda equina.     Interval History 12/6/2021:  The patient returns to clinic today for follow up of low back pain. She reports increased low back and bilateral hip pain over the last two weeks. She reports intermittent radiating pain into her lateral thighs. She does have difficulty sleeping due to pain. Her pain is also worse with standing and walking. She continues to take Gabapentin with benefit. She also takes Tramadol per her PCP with benefit. She denies any other health changes. Her pain today is 8/10.    Interval History 7/6/2021:  The patient returns to clinic today for follow up of low back pain via virtual visit. She is s/p bilateral L4/5 TF ASHUTOSH on 6/21/2021. She reports 75% relief of her low back and leg pain. She reports low  back pain, worse in the morning. She reports intermittent radiating pain into the posterolateral aspect of both legs to her knees. She continues to take Gabapentin with benefit. She denies any weakness. She denies any other health changes. Her pain today is 2/10.    Interval History 6/8/2021:  The patient returns to clinic today for follow up of low back pain. She is here today for imaging review. She continues to report low back pain that radiates into the posterolateral aspect of both legs to below her knee. Her pain is worse with prolonged standing and walking. She does feel like she will fall with prolonged walking. She also reports relief with bending forward. She is currently taking Gabapentin at bedtime with benefit. She continues to take Tramadol per her PCP with benefit. She denies any other health changes. Her pain today is 8/10.    Interval History 5/20/2021:  The patient returns to clinic today for follow up of low back pain. She is s/p bilateral hip joint injections on 4/26/2021. She reports no relief. She reports increased low back pain that radiates into the posterior aspect of both legs to her ankles. She does report numbness to her feet. Her pain is worse with prolonged standing and walking. She feels as though her legs will give out with prolonged walking. She denies any bowel or bladder incontinence. She denies any other health changes. Her pain today is 9/10.    Interval History 3/23/2021:  The patient returns to clinic today for follow up of low back and hip pain. She reports increased bilateral hip pain. She describes this pain as deep and aching. Her pain is worse with prolonged standing and walking. She also reports low back pain that is aching in nature. She denies any radicular leg pain. Of note, she reports increased swelling and pain to her left ankle and foot. She does report a trip last week. She did see Orthopedics today and is scheduled for xray. She denies any other health changes.  Her pain today is 8/10.    Interval History 12/9/2020:  She returns for follow-up.  She is doing well since her facet injections.  Her right hip is hurting.  It interferes with her mobility and comfort while laying down.  No new symptomatology otherwise.    Interval History 8/11/2020:  The patient has a virtual visit today for follow up.  She is s/p Bilateral L3-4, L4-5 L5-S1 facet joint injections with 90% relief.  She says that her back pain is minimal at this time.  She is able to move around and walk better since the procedure as well.  She is having some left hip pain and feels as though her leg will give out sometimes when walking.  She would like a referral to ortho close to her home in [facility].  Her pain today is 1/10.  She denies any respiratory changes since procedure including fever, cough or SOB.    Interval History 12/5/2019:  The patient is here for follow up of lower back and bilateral hip pain.  Her back pain has been minimal.  She is having increased lateral hip and buttock pain recently.  She had TPIs in the past and would like to repeat this today.  Her pain today is 8/10.    Previous Encounter:  Anahi Cook presents to the clinic for a follow-up appointment for lower back and left lower extremity pain.  She had T12 kyphoplasty performed on 11/8/18 with significant improvement in symptoms.  She was admitted through the ED on 4/3/19 after suffering fractures of 6th, 8th and possible 7th ribs of the left side after a fall at home.  Thoracic imaging also showed stable slight anterior wedging at T11.  She had bilateral L3-4, L4-5 and L5-S1 facet injections on 4/1/19 with 80% relief of lower back pain.  Her current pain is minimal.  She has mild leg pain with walking.  She continues to follow up with oncology regularly.  Since the last visit, Anahi Cook states the pain has been improving.  Current pain intensity is 2/10.     Pain Medications:  OTC Tylenol    Opioid Contract: no     report:   Not applicable    Pain Procedures:   7/21/14 L4-5 IL ASHUTOSH- significant benefit  12/1/15 Left GT bursa injection  4/26/16 Left GT bursa injection  12/15/16 L4-5 IL ASHUTOSH- significant benefit  2/15/18 L4-5 IL ASHUTOSH- 100% relief  8/21/18 L4-5 IL ASHUTOSH- significant relief  10/15/18 Bilateral L3-4, L4-5 L5-S1 facet joint injections  11/8/18 T12 kyphoplasty- significant relief  4/1/19 Bilateral L3-4, L4-5 L5-S1 facet joint injections- 80% relief  7/27/20 Bilateral L3-4, L4-5 L5-S1 facet joint injections- 90% relief  4/26/2021- Bilateral hip joint injections- no relief    6/21/2021- Bilateral L4/5 TF ASHUTOSH- 75% relief  8/9/2022 B L4/5 TFESI without significant benefit.   11/17/2022- Left L3/4 and L4/5 TF ASHUTOSH    Physical Therapy/Home Exercise: yes     Imaging:     MRI LUMBAR SPINE WITHOUT CONTRAST 9/12/2022  COMPARISON:  5/29/2021     FINDINGS:  Chronic vertebral plana T12.  Extension of the posterosuperior cortex into the canal by approximately 6 mm.     There is now height loss at L3, approximately 30%.  Associated STIR signal hyperintensity.  Retropulsion of the posterior cortex into the canal approximately 5 mm.     Remaining vertebral body heights are maintained.     Mild disc space narrowing L4-5.     Grade 1 retrolisthesis L1-2.  Grade 1 anterolisthesis L4-5     Conus terminates appropriately at L1.     Multilevel degenerative change as diesel below:     T11-12: Retropulsion of the posterosuperior cortex of T12 into the canal with mild canal narrowing.     T12-L1: Posterior circumferential disc bulge, asymmetric to the left.  Mild left neural foraminal narrowing.     L1-2: Facet and ligamentum flavum hypertrophic changes with mild left neural foraminal narrowing.     L2-3: Posterior circumferential disc bulge and ligamentum flavum hypertrophy retropulsion of the posterior cortex of L3 into the canal.  Findings result in mild canal narrowing.  Severe right and moderate left neural foraminal narrowing.     L3-4: Posterior  circumferential disc bulge.  Facet and ligamentum flavum hypertrophic changes.  Mild canal and moderate bilateral neural foraminal narrowing.     L4-5: Grade 1 anterolisthesis of L4-5 with uncovering of the disc.  Facet and ligamentum flavum hypertrophic changes.  Combinations contribute to severe canal stenosis.  Severe left and moderate right neural foraminal narrowing.     L5-S1: Central canal and neural foramina are well maintained.     Multiple T2 hyperintense foci in both kidneys, including a thinly septated T2 hyperintense focus on the left measuring 15 mm.     Impression:     Acute fracture along the superior endplate of L3 with approximately 30% height loss.  Stable chronic height loss T12.     Multilevel degenerative change, worst at L4-5 where severe canal stenosis results.     Multilevel neural foraminal narrowing, severe L2-3 and L4-5 as above.     Bilateral renal cysts, including a thinly septated cyst on the left.  Findings can be further evaluated with renal ultrasound.     This report was flagged in Epic as abnormal.    XR LUMBAR SPINE 5 VIEW WITH FLEX AND EXT 8/22/2022  COMPARISON:  Lumbar spine MRI 05/29/2021     FINDINGS:  Previous vertebroplasty T12.     Height loss along the superior endplate of L3 is new compared to that exam.  Height loss is approximately 40%.  There may be mild height loss at L2.     Disc space narrowing and endplate changes L4-5.  Facet arthropathy L4-5.     Grade 1 anterolisthesis L4-5 with no significant change with flexion or extension.     Aortoiliac atherosclerosis.     Impression:     Height loss along the superior endplate of L3, approximately 40%, is new compared to May 2021.  By today's radiographs, there appears to be mild sclerosis along the superior endplate which can be seen with a subacute or chronic fracture.  Correlation with MRI advised if there is recent trauma or acute back pain to exclude the possibility of an acute fracture.     Degenerative change as  above.     This report was flagged in Epic as abnormal.    XR HIP WITH PELVIS WHEN PERFORMED, 2 OR 3 VIEWS LEFT 8/22/2022  COMPARISON:  03/24/2022 and 09/09/2020     FINDINGS:  The femoral head is well located with respect to the acetabulum. No acute fracture seen.  Osteophyte formation, subchondral sclerosis, with moderate narrowing femoroacetabular joint bilaterally.     No significant soft tissue edema or radiopaque retained foreign body.  Aortoiliac atherosclerosis.     Impression:     No acute fracture or dislocation seen.  Moderate osteoarthritis and joint space narrowing.    MRI Lumbar Spine 5/29/2021:  COMPARISON:  Prior MRI from 2014 and 2018     FINDINGS:  There is a transitional lumbosacral segment and spine labeling is as on prior examinations.     Stable severe compression deformity at T12 with retropulsion.  Remaining vertebral body heights are maintained.  Grade 1 anterolisthesis appears increased at L4-5 as compared to previous.  There is no marrow replacement process.  The spinal cord is normal in signal.  Review of paravertebral structures demonstrates high T2 low T1 signal foci within kidneys.     Not included in the field of view of the axial, there is disc bulge at T10-11.  This does not appear to result in a significant degree of spinal canal narrowing on the basis of the sagittal imaging.     T12 demonstrates retropulsion with mild narrowing of the spinal canal.     T12-L1 demonstrates minor disc bulge.     L1-L2 demonstrates mild facet degenerative change.  There is no significant spinal canal stenosis.  There is mild bilateral neural foraminal narrowing.     L2-3 demonstrates facet degenerative change.  There is no significant spinal canal stenosis.  There is right foraminal protrusion.  This results in a moderate degree of right foraminal narrowing.  There is mild left foraminal narrowing.     L3-4 demonstrates facet degenerative change and ligamentum flavum thickening.  There is a facet  joint synovial cyst along the posterolateral aspect of the left facet.  There is right foraminal protrusion.  There is mild left and moderate right foraminal narrowing.     L4-5 demonstrates facet degenerative change and ligamentum flavum thickening.  There is anterolisthesis.  There is severe left and moderate right foraminal narrowing.  There is a severe degree of spinal canal narrowing.     L5-S1 is unremarkable.     Impression:     Multilevel degenerative changes as further detailed above.  Findings are most significant at the L4-5 level, noting transitional lumbosacral segment.     Stable severe compression deformity at T12.     Foci within the kidneys bilaterally likely representing cyst can be correlated with ultrasound.          Past Medical History:  Past Medical History:   Diagnosis Date    Anxiety     Arthritis     Back pain     Breast cancer 1/17/2018    Breast mass 1/15/2018    Chronic kidney disease, stage 2, mildly decreased GFR     COPD (chronic obstructive pulmonary disease)     emphysema    Depression     Dysuria 1/29/2018    Exudative age-related macular degeneration of left eye with active choroidal neovascularization 8/25/2022    Family history of breast cancer 1/17/2018    GERD (gastroesophageal reflux disease)     Hypertension     Infiltrating ductal carcinoma of breast, left 7/12/2018    Osteoporosis, senile     Ovarian mass 1/15/2018    Pneumonia     S/P robotic assisted laparoscopic BSO (bilateral salpingo-oophorectomy) 2/23/2018       Past Surgical History:  Past Surgical History:   Procedure Laterality Date    AXILLARY NODE DISSECTION Left 7/12/2018    Procedure: LYMPHADENECTOMY, AXILLARY;  Surgeon: Amanda Caballero MD;  Location: [facility] OR [address] [place] [address];  Service: General;  Laterality: Left;    BREAST BIOPSY      BREAST LUMPECTOMY      CATARACT EXTRACTION      CHOLECYSTECTOMY      COLONOSCOPY      ESOPHAGOGASTRODUODENOSCOPY      ESOPHAGOGASTRODUODENOSCOPY N/A 10/14/2019    Procedure: EGD  (ESOPHAGOGASTRODUODENOSCOPY);  Surgeon: Davonte Thompson MD;  Location: [facility] ENDO (2ND FLR);  Service: Endoscopy;  Laterality: N/A;  hx of pulmonary hypertension-PA 50     pt requesting ASAP    EYE SURGERY      FIXATION KYPHOPLASTY N/A 11/8/2018    Procedure: Kyphoplasty   T12;  Surgeon: Merly Wilcox MD;  Location: [facility] CATH LAB;  Service: Pain Management;  Laterality: N/A;  T12  [employer] e-mailed with date and time    HYSTERECTOMY      INJECTION FOR SENTINEL NODE IDENTIFICATION Left 7/12/2018    Procedure: INJECTION, FOR SENTINEL NODE IDENTIFICATION;  Surgeon: Amanda Caballero MD;  Location: [facility] OR 2ND FLR;  Service: General;  Laterality: Left;    INJECTION OF FACET JOINT Bilateral 10/15/2018    Procedure: INJECTION, FACET JOINT, BILATERAL LUMBAR L3-4, 4-5, 5-S1 FACET JOINT INJECTIONS;  Surgeon: Merly Wilcox MD;  Location: [facility] PAIN MGT;  Service: Pain Management;  Laterality: Bilateral;    INJECTION OF FACET JOINT Bilateral 4/1/2019    Procedure: INJECTION, FACET JOINT, L3-L4,L4-L5,L5-S1;  Surgeon: Merly Wilcox MD;  Location: [facility] PAIN MGT;  Service: Pain Management;  Laterality: Bilateral;    INJECTION OF FACET JOINT Bilateral 6/20/2019    Procedure: INJECTION, FACET JOINT, L3-L4,L4-L5,L5-S1 NEED CONSENT;  Surgeon: Merly Wilcox MD;  Location: [facility] PAIN MGT;  Service: Pain Management;  Laterality: Bilateral;    INJECTION OF FACET JOINT Bilateral 7/27/2020    Procedure: INJECTION, FACET JOINT BILATERAL L3/4, L4/5 and L5/S1;  Surgeon: Merly Wilcox MD;  Location: [facility] PAIN MGT;  Service: Pain Management;  Laterality: Bilateral;    INJECTION OF JOINT Bilateral 7/27/2020    Procedure: INJECTION, JOINT, BILATERAL GREATER TROCHANTERIC BURSA;  Surgeon: Merly Wilcox MD;  Location: [facility] PAIN MGT;  Service: Pain Management;  Laterality: Bilateral;    INJECTION OF JOINT Bilateral 4/26/2021    Procedure: INJECTION, JOINT, HIP;  Surgeon: Merly Wilcox MD;  Location: [facility] PAIN MGT;  Service: Pain Management;  Laterality:  Bilateral;  consent needed    INJECTION OF STEROID Right 11/15/2021    Procedure: INJECTION, STEROID RIGHT MIDDLE AND SAMLL FINGER;  Surgeon: Florecita Da Silva MD;  Location: [facility] OR;  Service: Orthopedics;  Laterality: Right;    MASTECTOMY, PARTIAL Left 7/12/2018    Procedure: MASTECTOMY, PARTIAL-W/SEED LOCALIZATION;  Surgeon: Amanda Caballero MD;  [facility];  Service: General;  Laterality: Left;    MD REMOVAL OF OVARY/TUBE(S)  02/23/2018    Robotic Assisted    ROTATOR CUFF REPAIR Right     rotator cuff repair right shoulder    TONSILLECTOMY      TRANSFORAMINAL EPIDURAL INJECTION OF STEROID Bilateral 6/21/2021    Procedure: INJECTION, STEROID, EPIDURAL, TRANSFORAMINAL APPROACH  bilateral L4/5 TF ASHUTOSH;  Surgeon: Merly Wilcox MD;  Location: [facility] PAIN MGT;  Service: Pain Management;  Laterality: Bilateral;  consent needed    TRANSFORAMINAL EPIDURAL INJECTION OF STEROID Bilateral 1/3/2022    Procedure: INJECTION, STEROID, EPIDURAL, TRANSFORAMINAL APPROACH Bilateral L4/5 Needs consent;  Surgeon: Merly Wilcox MD;  Location: [facility] PAIN MGT;  Service: Pain Management;  Laterality: Bilateral;    TRANSFORAMINAL EPIDURAL INJECTION OF STEROID Bilateral 8/9/2022    Procedure: INJECTION, STEROID, EPIDURAL, TRANSFORAMINAL APPROACH, BILATERAL L4-L5   MEDICALLY URGENT;  Surgeon: Merly Wilcox MD;  Location: [facility] PAIN MGT;  Service: Pain Management;  Laterality: Bilateral;    TRANSFORAMINAL EPIDURAL INJECTION OF STEROID Left 11/17/2022    Procedure: INJECTION, STEROID, EPIDURAL, TRANSFORAMINAL APPROACH LEFT L3-L4 AND L4-L5 CONTRAST;  Surgeon: Merly Wilcox MD;  Location: [facility] PAIN MGT;  Service: Pain Management;  Laterality: Left;    TRIGGER FINGER RELEASE Left 11/15/2021    Procedure: RELEASE, TRIGGER FINGER, LEFT MIDDLE AND RING;  Surgeon: Florecita Da Silva MD;  Location: [facility] OR;  Service: Orthopedics;  Laterality: Left;       Family History:  Family History   Problem Relation Age of Onset    Heart failure  Mother     Kidney failure Mother     Heart attack Father     Breast cancer Sister 66        Lump, XRT, chemo, recurrence 10 years later.    Cancer Brother         leukemia    Heart attack Brother 58    Pulmonary embolism Brother 45    Heart attack Brother 52    Breast cancer Maternal Grandmother 60        advanced at diagnosis    Breast cancer Maternal Aunt 83         at 92    Colon cancer Neg Hx     Esophageal cancer Neg Hx        Social History:  Social History     Socioeconomic History    Marital status: Single    Number of children: 3   Occupational History    Occupation: Multiple jobs, see social documentation section     Comment: Retired   Tobacco Use    Smoking status: Former     Packs/day: 1.00     Years: 40.00     Pack years: 40.00     Types: Cigarettes    Smokeless tobacco: Never    Tobacco comments:     Smoked >1 ppd for at least 40 years, quit around    Substance and Sexual Activity    Alcohol use: Yes     Comment: occasional glass of wine or cocktail    Drug use: No    Sexual activity: Yes     Partners: Male   Social History Narrative    Worked many jobs while raising 3 children.  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  She was  from her first , but took care of him at the end of his life.       REVIEW OF SYSTEMS:    GENERAL:  No weight loss, malaise or fevers.  HEENT:   No recent changes in vision or hearing  NECK:  Negative for lumps, no difficulty with swallowing.  RESPIRATORY:  Negative for cough, wheezing or shortness of breath, patient denies any recent URI. COPD.  CARDIOVASCULAR:  Negative for chest pain, leg swelling or palpitations. Hypertension.  GI:  Negative for abdominal discomfort, blood in stools or black stools or change in bowel habits.  MUSCULOSKELETAL:  See HPI.  SKIN:  Negative for lesions, rash, and itching.  PSYCH:  No mood disorder or recent psychosocial  stressors.  Patients sleep is not disturbed secondary to pain.  HEMATOLOGY/LYMPHOLOGY:  Negative for prolonged bleeding, bruising easily or swollen nodes.  Patient is not currently taking any anti-coagulants  NEURO:   No history of headaches, syncope, paralysis, seizures or tremors.  All other reviewed and negative other than HPI.    OBJECTIVE:    Exam limited due to virtual visit:  GENERAL: Well appearing, in no acute distress, alert and oriented x3.  PSYCH:  Mood and affect appropriate.    Previous physical exam:  LMP  (LMP Unknown)     PHYSICAL EXAMINATION:     GENERAL: Well appearing, in no acute distress, alert and oriented x3.  PSYCH:  Mood and affect appropriate.  SKIN: Skin color, texture, turgor normal, no rashes or lesions.  HEAD/FACE:  Normocephalic, atraumatic. Cranial nerves grossly intact.  CV: RRR with palpation of the radial artery.  PULM: No evidence of respiratory difficulty, symmetric chest rise.  BACK: Straight leg raising in the sitting position is negative to radicular pain bilaterally.  There is pain with palpation over lumbar facet joints bilaterally.  Limited ROM with pain on flexion and  extension.  Positive facet loading bilaterally.  EXTREMITIES:  Good capillary refill.  MUSCULOSKELETAL: There is mild pain with internal and external rotation of both hips. There is pain with palpation over bilateral SI joints. FABERs is positive bilaterally.  No atrophy or tone abnormalities are noted.  NEURO: Bilateral lower extremity coordination and muscle stretch reflexes are physiologic and symmetric.  Plantar response are downgoing. No clonus.  Normal sensation.  GAIT: Antalgic- ambulates with rolling walker.      ASSESSMENT: 88 y.o. year old female with lower back and lower extremity pain, consistent with the following diagnoses:     1. Lumbar spondylosis        2. Lumbar radiculopathy  gabapentin (NEURONTIN) 100 MG capsule    Ambulatory referral/consult to Physical/Occupational Therapy      3. DDD  (degenerative disc disease), lumbar                  PLAN:     - Previous imaging was reviewed and discussed with the patient today.    - She is s/p left L3/4 and L4/5 TF ASHUTOSH with benefit. We can repeat this as needed.     - Consult physical therapy.     - Continue Gabapentin 100 mg at bedtime.     - Continue Tramadol per PCP.     - RTC in 3 months or sooner if needed.     The above plan and management options were discussed at length with patient. Patient is in agreement with the above and verbalized understanding.    Amanda Garcia  12/09/2022

## 2022-12-22 ENCOUNTER — OFFICE VISIT (OUTPATIENT)
Dept: OPHTHALMOLOGY | Facility: CLINIC | Age: 87
End: 2022-12-22
Payer: MEDICARE

## 2022-12-22 DIAGNOSIS — Z96.1 PSEUDOPHAKIA OF BOTH EYES: ICD-10-CM

## 2022-12-22 DIAGNOSIS — H35.371 EPIRETINAL MEMBRANE, RIGHT EYE: ICD-10-CM

## 2022-12-22 DIAGNOSIS — H35.3221 EXUDATIVE AGE-RELATED MACULAR DEGENERATION OF LEFT EYE WITH ACTIVE CHOROIDAL NEOVASCULARIZATION: Primary | ICD-10-CM

## 2022-12-22 DIAGNOSIS — H35.3112 INTERMEDIATE STAGE DRY AGE-RELATED MACULAR DEGENERATION OF RIGHT EYE: ICD-10-CM

## 2022-12-22 PROCEDURE — 1126F PR PAIN SEVERITY QUANTIFIED, NO PAIN PRESENT: ICD-10-PCS | Mod: CPTII,S$GLB,, | Performed by: OPHTHALMOLOGY

## 2022-12-22 PROCEDURE — 1159F PR MEDICATION LIST DOCUMENTED IN MEDICAL RECORD: ICD-10-PCS | Mod: CPTII,S$GLB,, | Performed by: OPHTHALMOLOGY

## 2022-12-22 PROCEDURE — 1101F PR PT FALLS ASSESS DOC 0-1 FALLS W/OUT INJ PAST YR: ICD-10-PCS | Mod: CPTII,S$GLB,, | Performed by: OPHTHALMOLOGY

## 2022-12-22 PROCEDURE — 3288F FALL RISK ASSESSMENT DOCD: CPT | Mod: CPTII,S$GLB,, | Performed by: OPHTHALMOLOGY

## 2022-12-22 PROCEDURE — 67028 INJECTION EYE DRUG: CPT | Mod: LT,S$GLB,, | Performed by: OPHTHALMOLOGY

## 2022-12-22 PROCEDURE — 1126F AMNT PAIN NOTED NONE PRSNT: CPT | Mod: CPTII,S$GLB,, | Performed by: OPHTHALMOLOGY

## 2022-12-22 PROCEDURE — 1160F RVW MEDS BY RX/DR IN RCRD: CPT | Mod: CPTII,S$GLB,, | Performed by: OPHTHALMOLOGY

## 2022-12-22 PROCEDURE — 99214 PR OFFICE/OUTPT VISIT, EST, LEVL IV, 30-39 MIN: ICD-10-PCS | Mod: 25,S$GLB,, | Performed by: OPHTHALMOLOGY

## 2022-12-22 PROCEDURE — 1160F PR REVIEW ALL MEDS BY PRESCRIBER/CLIN PHARMACIST DOCUMENTED: ICD-10-PCS | Mod: CPTII,S$GLB,, | Performed by: OPHTHALMOLOGY

## 2022-12-22 PROCEDURE — 67028 PR INJECT INTRAVITREAL PHARMCOLOGIC: ICD-10-PCS | Mod: LT,S$GLB,, | Performed by: OPHTHALMOLOGY

## 2022-12-22 PROCEDURE — 1101F PT FALLS ASSESS-DOCD LE1/YR: CPT | Mod: CPTII,S$GLB,, | Performed by: OPHTHALMOLOGY

## 2022-12-22 PROCEDURE — 99999 PR PBB SHADOW E&M-EST. PATIENT-LVL III: ICD-10-PCS | Mod: PBBFAC,,, | Performed by: OPHTHALMOLOGY

## 2022-12-22 PROCEDURE — 3288F PR FALLS RISK ASSESSMENT DOCUMENTED: ICD-10-PCS | Mod: CPTII,S$GLB,, | Performed by: OPHTHALMOLOGY

## 2022-12-22 PROCEDURE — 92134 CPTRZ OPH DX IMG PST SGM RTA: CPT | Mod: S$GLB,,, | Performed by: OPHTHALMOLOGY

## 2022-12-22 PROCEDURE — 99999 PR PBB SHADOW E&M-EST. PATIENT-LVL III: CPT | Mod: PBBFAC,,, | Performed by: OPHTHALMOLOGY

## 2022-12-22 PROCEDURE — 92134 OCT, RETINA - OU - BOTH EYES: ICD-10-PCS | Mod: S$GLB,,, | Performed by: OPHTHALMOLOGY

## 2022-12-22 PROCEDURE — 1159F MED LIST DOCD IN RCRD: CPT | Mod: CPTII,S$GLB,, | Performed by: OPHTHALMOLOGY

## 2022-12-22 PROCEDURE — 99214 OFFICE O/P EST MOD 30 MIN: CPT | Mod: 25,S$GLB,, | Performed by: OPHTHALMOLOGY

## 2022-12-22 RX ADMIN — Medication 1.25 MG: at 03:12

## 2022-12-22 NOTE — PROGRESS NOTES
HPI    6 WK DFE / OCT OU +MANA OS (T&E)    CC: Va NO CHANGES    GTTS: AT's PRN OU    Last edited by Thais Casillas on 12/22/2022  2:44 PM.            A/P    ICD-10-CM ICD-9-CM   1. Exudative age-related macular degeneration of left eye with active choroidal neovascularization  H35.3221 362.52     362.16   2. 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 Epiretinal membrane, right eye  H35.371 362.56   4. Pseudophakia of both eyes  Z96.1 V43.1       1. Exudative age-related macular degeneration of left eye with active choroidal neovascularization  2. 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  Schedule Avastin Injection today Left Eye Patient chooses to proceed with injection R/B/A discussed include infection retinal detachment and stroke    Patient identified.  Timeout performed.    Risks, benefits, and alternatives to treatment were discussed in detail with the patient, including bleeding/infection (endophthalmitis)/etc.  The patient voiced understanding and wished to proceed with the procedure.  See separate consent form.    Injection Procedure Note:  Diagnosis: wet AMD Left Eye    Topical Proparacaine drop placed then topical 5% Betadine and then subconjunctival lidocaine 2% injection  Sterile gloves used, and sterile lid speculum placed.  5% Betadine placed at injection site again prior to injection.  Avastin 1.25mg in 0.05cc Injected inferotemporally 3.5-4mm posterior to the limbus.  Complications: None  Va at least CF at 5 feet post injection.  Retina, ONH, IOP normal after injection.    Followup as below.   Patient should return immediately PRN.  Retinal Detachment and Endophthalmitis precautions given.       3. Epiretinal membrane, right eye  Mod ERM, VA 20/30 stable no changes   Pt asymptomatic, no metamorphopsia  Plan: Observation  Amsler precautions discussed     4. Pseudophakia of both eyes  Good lens position OU  Plan: Observation      RTC 8 weeks DFE/OCTm OU, MANA OS (T&E)      I saw and examined the patient and reviewed in detail the findings documented. The final examination findings, image interpretations, and plan as documented in the record represent my personal judgment and conclusions.    Dwight Melton MD  Vitreoretinal Surgery   [facility]

## 2022-12-23 NOTE — PATIENT INSTRUCTIONS

## 2023-01-03 DIAGNOSIS — M17.11 PRIMARY OSTEOARTHRITIS OF RIGHT KNEE: ICD-10-CM

## 2023-01-03 DIAGNOSIS — M47.816 LUMBAR SPONDYLOSIS: ICD-10-CM

## 2023-01-03 RX ORDER — TRAMADOL HYDROCHLORIDE 50 MG/1
50 TABLET ORAL EVERY 8 HOURS PRN
Qty: 90 TABLET | Refills: 2 | OUTPATIENT
Start: 2023-01-03

## 2023-01-03 NOTE — TELEPHONE ENCOUNTER
My chart message sent to patient your refill request for tramadol is too soon you should have available refills at your [facility] pharmacy contact the pharmacy for further refills.

## 2023-01-03 NOTE — TELEPHONE ENCOUNTER
No new care gaps identified.  [facility] Embedded Care Gaps. Reference number: [id number]. 1/03/2023   12:38:55 PM CST

## 2023-01-05 ENCOUNTER — PATIENT MESSAGE (OUTPATIENT)
Dept: PRIMARY CARE CLINIC | Facility: CLINIC | Age: 88
End: 2023-01-05
Payer: MEDICARE

## 2023-01-10 ENCOUNTER — HOSPITAL ENCOUNTER (OUTPATIENT)
Dept: RADIOLOGY | Facility: CLINIC | Age: 88
Discharge: HOME | End: 2023-01-10
Attending: INTERNAL MEDICINE
Payer: MEDICARE

## 2023-01-10 ENCOUNTER — IMMUNIZATION (OUTPATIENT)
Dept: INTERNAL MEDICINE | Facility: CLINIC | Age: 88
End: 2023-01-10
Payer: MEDICARE

## 2023-01-10 DIAGNOSIS — Z23 NEED FOR VACCINATION: Primary | ICD-10-CM

## 2023-01-10 DIAGNOSIS — Z17.0 MALIGNANT NEOPLASM OF UPPER-OUTER QUADRANT OF LEFT BREAST IN FEMALE, ESTROGEN RECEPTOR POSITIVE: Chronic | ICD-10-CM

## 2023-01-10 DIAGNOSIS — Z79.811 USE OF AROMATASE INHIBITORS: Chronic | ICD-10-CM

## 2023-01-10 DIAGNOSIS — C50.412 MALIGNANT NEOPLASM OF UPPER-OUTER QUADRANT OF LEFT BREAST IN FEMALE, ESTROGEN RECEPTOR POSITIVE: Chronic | ICD-10-CM

## 2023-01-10 PROCEDURE — 77080 DXA BONE DENSITY AXIAL: CPT | Mod: TC

## 2023-01-10 PROCEDURE — 77080 DEXA BONE DENSITY SPINE HIP: ICD-10-PCS | Mod: 26,,, | Performed by: INTERNAL MEDICINE

## 2023-01-10 PROCEDURE — 91312 COVID-19, MRNA, LNP-S, BIVALENT BOOSTER, PF, 30 MCG/0.3 ML DOSE: CPT | Mod: S$GLB,,, | Performed by: INTERNAL MEDICINE

## 2023-01-10 PROCEDURE — 91312 COVID-19, MRNA, LNP-S, BIVALENT BOOSTER, PF, 30 MCG/0.3 ML DOSE: ICD-10-PCS | Mod: S$GLB,,, | Performed by: INTERNAL MEDICINE

## 2023-01-10 PROCEDURE — 77080 DXA BONE DENSITY AXIAL: CPT | Mod: 26,,, | Performed by: INTERNAL MEDICINE

## 2023-01-10 PROCEDURE — 0124A COVID-19, MRNA, LNP-S, BIVALENT BOOSTER, PF, 30 MCG/0.3 ML DOSE: CPT | Mod: CV19,PBBFAC | Performed by: INTERNAL MEDICINE

## 2023-01-18 ENCOUNTER — HOSPITAL ENCOUNTER (OUTPATIENT)
Dept: CARDIOLOGY | Facility: HOSPITAL | Age: 88
Discharge: HOME | End: 2023-01-18
Attending: INTERNAL MEDICINE
Payer: MEDICARE

## 2023-01-18 DIAGNOSIS — R09.89 LEFT CAROTID BRUIT: ICD-10-CM

## 2023-01-18 DIAGNOSIS — I73.9 PERIPHERAL VASCULAR DISEASE, UNSPECIFIED: ICD-10-CM

## 2023-01-18 LAB
LEFT ARM BP: 114 MMHG
LEFT CBA DIAS: 5 CM/S
LEFT CBA SYS: 69 CM/S
LEFT CCA DIST DIAS: 7 CM/S
LEFT CCA DIST SYS: 65 CM/S
LEFT CCA MID DIAS: 10 CM/S
LEFT CCA MID SYS: 73 CM/S
LEFT CCA PROX DIAS: 10 CM/S
LEFT CCA PROX SYS: 84 CM/S
LEFT ECA DIAS: 0 CM/S
LEFT ECA SYS: 104 CM/S
LEFT ICA DIST DIAS: 10 CM/S
LEFT ICA DIST SYS: 64 CM/S
LEFT ICA MID DIAS: 9 CM/S
LEFT ICA MID SYS: 61 CM/S
LEFT ICA PROX DIAS: 15 CM/S
LEFT ICA PROX SYS: 104 CM/S
LEFT TBI: 0.66
LEFT TOE PRESSURE: 97 MMHG
LEFT VERTEBRAL SYS: 92 CM/S
OHS CV CAROTID RIGHT ICA EDV HIGHEST: 10
OHS CV CAROTID ULTRASOUND LEFT ICA/CCA RATIO: 1.6
OHS CV CAROTID ULTRASOUND RIGHT ICA/CCA RATIO: 0.94
OHS CV PV CAROTID LEFT HIGHEST CCA: 84
OHS CV PV CAROTID LEFT HIGHEST ICA: 104
OHS CV PV CAROTID RIGHT HIGHEST CCA: 71
OHS CV PV CAROTID RIGHT HIGHEST ICA: 50
OHS CV US CAROTID LEFT HIGHEST EDV: 15
RIGHT ARM BP: 146 MMHG
RIGHT ARM DIASTOLIC BLOOD PRESSURE: 86 MMHG
RIGHT ARM SYSTOLIC BLOOD PRESSURE: 158 MMHG
RIGHT CBA DIAS: 10 CM/S
RIGHT CBA SYS: 48 CM/S
RIGHT CCA DIST DIAS: 8 CM/S
RIGHT CCA DIST SYS: 53 CM/S
RIGHT CCA MID DIAS: 11 CM/S
RIGHT CCA MID SYS: 67 CM/S
RIGHT CCA PROX DIAS: 6 CM/S
RIGHT CCA PROX SYS: 71 CM/S
RIGHT ECA DIAS: 10 CM/S
RIGHT ECA SYS: 140 CM/S
RIGHT ICA DIST DIAS: 6 CM/S
RIGHT ICA DIST SYS: 46 CM/S
RIGHT ICA MID DIAS: 10 CM/S
RIGHT ICA MID SYS: 50 CM/S
RIGHT ICA PROX DIAS: 8 CM/S
RIGHT ICA PROX SYS: 39 CM/S
RIGHT TBI: 0.3
RIGHT TOE PRESSURE: 44 MMHG
RIGHT VERTEBRAL DIAS: 10 CM/S
RIGHT VERTEBRAL SYS: 72 CM/S

## 2023-01-18 PROCEDURE — 93998 UNLISTD NONINVAS VASC DX STD: CPT | Mod: ,,, | Performed by: INTERNAL MEDICINE

## 2023-01-18 PROCEDURE — 93998 UNLISTD NONINVAS VASC DX STD: CPT

## 2023-01-18 PROCEDURE — 93880 EXTRACRANIAL BILAT STUDY: CPT | Mod: 26,,, | Performed by: INTERNAL MEDICINE

## 2023-01-18 PROCEDURE — 93880 CV US DOPPLER CAROTID (CUPID ONLY): ICD-10-PCS | Mod: 26,,, | Performed by: INTERNAL MEDICINE

## 2023-01-18 PROCEDURE — 93880 EXTRACRANIAL BILAT STUDY: CPT

## 2023-01-18 PROCEDURE — 93998 CV US TOE PRESSURES ONLY (CUPID ONLY): ICD-10-PCS | Mod: ,,, | Performed by: INTERNAL MEDICINE

## 2023-01-20 ENCOUNTER — LAB VISIT (OUTPATIENT)
Dept: LAB | Facility: HOSPITAL | Age: 88
End: 2023-01-20
Attending: INTERNAL MEDICINE
Payer: MEDICARE

## 2023-01-20 DIAGNOSIS — I11.9 HYPERTENSIVE HEART DISEASE WITHOUT HEART FAILURE: ICD-10-CM

## 2023-01-20 DIAGNOSIS — R73.03 PRE-DIABETES: ICD-10-CM

## 2023-01-20 DIAGNOSIS — E78.5 HYPERLIPIDEMIA, UNSPECIFIED HYPERLIPIDEMIA TYPE: ICD-10-CM

## 2023-01-20 LAB
ALBUMIN SERPL BCP-MCNC: 3.5 G/DL (ref 3.5–5.2)
ALP SERPL-CCNC: 77 U/L (ref 55–135)
ALT SERPL W/O P-5'-P-CCNC: 13 U/L (ref 10–44)
ANION GAP SERPL CALC-SCNC: 8 MMOL/L (ref 8–16)
AST SERPL-CCNC: 19 U/L (ref 10–40)
BILIRUB SERPL-MCNC: 0.7 MG/DL (ref 0.1–1)
BUN SERPL-MCNC: 13 MG/DL (ref 8–23)
CALCIUM SERPL-MCNC: 9.8 MG/DL (ref 8.7–10.5)
CHLORIDE SERPL-SCNC: 103 MMOL/L (ref 95–110)
CHOLEST SERPL-MCNC: 126 MG/DL (ref 120–199)
CHOLEST/HDLC SERPL: 2.2 {RATIO} (ref 2–5)
CO2 SERPL-SCNC: 31 MMOL/L (ref 23–29)
CREAT SERPL-MCNC: 0.8 MG/DL (ref 0.5–1.4)
EST. GFR  (NO RACE VARIABLE): >60 ML/MIN/1.73 M^2
ESTIMATED AVG GLUCOSE: 120 MG/DL (ref 68–131)
GLUCOSE SERPL-MCNC: 98 MG/DL (ref 70–110)
HBA1C MFR BLD: 5.8 % (ref 4–5.6)
HDLC SERPL-MCNC: 57 MG/DL (ref 40–75)
HDLC SERPL: 45.2 % (ref 20–50)
LDLC SERPL CALC-MCNC: 51.8 MG/DL (ref 63–159)
NONHDLC SERPL-MCNC: 69 MG/DL
POTASSIUM SERPL-SCNC: 3.5 MMOL/L (ref 3.5–5.1)
PROT SERPL-MCNC: 6.8 G/DL (ref 6–8.4)
SODIUM SERPL-SCNC: 142 MMOL/L (ref 136–145)
TRIGL SERPL-MCNC: 86 MG/DL (ref 30–150)

## 2023-01-20 PROCEDURE — 83036 HEMOGLOBIN GLYCOSYLATED A1C: CPT | Performed by: INTERNAL MEDICINE

## 2023-01-20 PROCEDURE — 36415 COLL VENOUS BLD VENIPUNCTURE: CPT | Mod: PN | Performed by: INTERNAL MEDICINE

## 2023-01-20 PROCEDURE — 80053 COMPREHEN METABOLIC PANEL: CPT | Performed by: INTERNAL MEDICINE

## 2023-01-20 PROCEDURE — 80061 LIPID PANEL: CPT | Performed by: INTERNAL MEDICINE

## 2023-01-23 ENCOUNTER — OFFICE VISIT (OUTPATIENT)
Dept: HEMATOLOGY/ONCOLOGY | Facility: CLINIC | Age: 88
End: 2023-01-23
Payer: MEDICARE

## 2023-01-23 VITALS
SYSTOLIC BLOOD PRESSURE: 145 MMHG | WEIGHT: 148.38 LBS | TEMPERATURE: 98 F | OXYGEN SATURATION: 95 % | BODY MASS INDEX: 25.47 KG/M2 | RESPIRATION RATE: 18 BRPM | HEART RATE: 62 BPM | DIASTOLIC BLOOD PRESSURE: 66 MMHG

## 2023-01-23 DIAGNOSIS — Z12.31 ENCOUNTER FOR SCREENING MAMMOGRAM FOR MALIGNANT NEOPLASM OF BREAST: ICD-10-CM

## 2023-01-23 DIAGNOSIS — Z79.811 USE OF AROMATASE INHIBITORS: Primary | Chronic | ICD-10-CM

## 2023-01-23 PROCEDURE — 99214 PR OFFICE/OUTPT VISIT, EST, LEVL IV, 30-39 MIN: ICD-10-PCS | Mod: S$GLB,,, | Performed by: INTERNAL MEDICINE

## 2023-01-23 PROCEDURE — 1159F MED LIST DOCD IN RCRD: CPT | Mod: CPTII,S$GLB,, | Performed by: INTERNAL MEDICINE

## 2023-01-23 PROCEDURE — 99499 UNLISTED E&M SERVICE: CPT | Mod: S$GLB,,, | Performed by: INTERNAL MEDICINE

## 2023-01-23 PROCEDURE — 1101F PT FALLS ASSESS-DOCD LE1/YR: CPT | Mod: CPTII,S$GLB,, | Performed by: INTERNAL MEDICINE

## 2023-01-23 PROCEDURE — 3288F FALL RISK ASSESSMENT DOCD: CPT | Mod: CPTII,S$GLB,, | Performed by: INTERNAL MEDICINE

## 2023-01-23 PROCEDURE — 1101F PR PT FALLS ASSESS DOC 0-1 FALLS W/OUT INJ PAST YR: ICD-10-PCS | Mod: CPTII,S$GLB,, | Performed by: INTERNAL MEDICINE

## 2023-01-23 PROCEDURE — 99214 OFFICE O/P EST MOD 30 MIN: CPT | Mod: S$GLB,,, | Performed by: INTERNAL MEDICINE

## 2023-01-23 PROCEDURE — 99499 RISK ADDL DX/OHS AUDIT: ICD-10-PCS | Mod: S$GLB,,, | Performed by: INTERNAL MEDICINE

## 2023-01-23 PROCEDURE — 1126F PR PAIN SEVERITY QUANTIFIED, NO PAIN PRESENT: ICD-10-PCS | Mod: CPTII,S$GLB,, | Performed by: INTERNAL MEDICINE

## 2023-01-23 PROCEDURE — 99999 PR PBB SHADOW E&M-EST. PATIENT-LVL V: ICD-10-PCS | Mod: PBBFAC,,, | Performed by: INTERNAL MEDICINE

## 2023-01-23 PROCEDURE — 3288F PR FALLS RISK ASSESSMENT DOCUMENTED: ICD-10-PCS | Mod: CPTII,S$GLB,, | Performed by: INTERNAL MEDICINE

## 2023-01-23 PROCEDURE — 1159F PR MEDICATION LIST DOCUMENTED IN MEDICAL RECORD: ICD-10-PCS | Mod: CPTII,S$GLB,, | Performed by: INTERNAL MEDICINE

## 2023-01-23 PROCEDURE — 99999 PR PBB SHADOW E&M-EST. PATIENT-LVL V: CPT | Mod: PBBFAC,,, | Performed by: INTERNAL MEDICINE

## 2023-01-23 PROCEDURE — 1126F AMNT PAIN NOTED NONE PRSNT: CPT | Mod: CPTII,S$GLB,, | Performed by: INTERNAL MEDICINE

## 2023-01-23 NOTE — PROGRESS NOTES
Route Chart for Scheduling    Med Onc Chart Routing      Follow up with physician 4 months.   Follow up with TIM    Infusion scheduling note    Injection scheduling note    Labs    Imaging Mammogram      Pharmacy appointment    Other referrals         Subjective:       Patient ID: Anahi Cook is a 88 y.o. female.    Chief Complaint: No chief complaint on file.      HPI   Mrs. Cook returns today for follow up.   I had last seen her in late September 2022.  She is on letrozole, now on the adjuvant setting.    Briefly, she is an 88-year-old female who in late 2017 had initially felt a mass in her left breast.  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  She was started on letrozole and undwerwent a BSO by Dr. Chairez for an ovarian mass.  Her ovarian mass was benign. In mid July 2018 she underwent a lumpectomy and AND.  She had a 3.3 cm carcinoma and 4/8 positive nodes.  She has remained on letrozole, now in the adjuvant setting, and she returns today for follow up.   Of note, she also received chest wall XRT.    A mammogram on May 19, 2022 was read as BIRADS II, and a one year follow up was recommended.  Her DEXA scan from earlier this month shows osteoporosis.    Review of Systems    Overall she feels OK  She has tolerated the AIs well so far.  She again complains of occasional hot flashes and hand stiffness, and also of occasional numbness in the proximal left arm.  Her ECOG performance status is 3, and she is here in a wheelchair.   She denies any anxiety, depression, easy bruising, fevers, chills, night  sweats, weight loss, nausea, vomiting, diarrhea, constipation, diplopia, blurred vision, headache, chest pain, palpitations, shortness of breath, breast pain, abdominal pain, extremity pain, or difficulty ambulating.  The remainder of the ten-point ROS, including general, skin,  lymph, H/N, cardiorespiratory, GI, , Neuro, Endocrine, and psychiatric is negative.       Objective:      Physical Exam      She is alert, oriented to time, place, person, pleasant, well      nourished, in no acute physical distress.                                    VITAL SIGNS:  Reviewed                                      HEENT:  Normal.  There are no nasal, oral, lip, gingival, auricular, lid,    or conjunctival lesions.  Mucosae are moist and pink, and there is no        thrush.  Pupils are equal, reactive to light and accommodation.              Extraocular muscle movements are intact.   Dentition is fair.  Maxillary teeth are missing.                                      NECK:  Supple without JVD, adenopathy, or thyromegaly.                       LUNGS:  Clear to auscultation without wheezing, rales, or rhonchi.           CARDIOVASCULAR:  Reveals an S1, S2, no murmurs, no rubs, no gallops.         ABDOMEN:  Soft, nontender, without organomegaly.  Bowel sounds are    present.    Scars from her laparoscopic DONOVAN-BSO are seen.                                                            EXTREMITIES:  No cyanosis, clubbing, or edema.                             BREASTS: She is s/p left lumpectomy with a healed periareolar incision.  She also has an incision from her axillary dissection;  It is well healed.   There are no masses in her left or her right breast.    Both breasts are large and pendulous.    There is mild hyperpigmentation within the radiation field.                                 LYMPHATIC:  There is no cervical, axillary, or supraclavicular adenopathy.   SKIN:  Warm and moist, without petechiae, rashes, induration, or ecchymoses.           NEUROLOGIC:  DTRs are 0-1+ bilaterally, symmetrical, motor function is 5/5,  and cranial nerves are  within normal limits.      Assessment:       1. Hormone receptor positive breast cancer, left, on neoadjuvant letrozole with a significant clinical response.    2. Use of aromatase inhibitors      3.   Osteoporosis  Plan:        I had a long discussion with her and her caregiver.  She will remain on letrozole, and see me again in 4 months with her yearly mammogram.  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  She had started adjuvant letrozole in the summer of 2018, and she will therefore complete 7 years in the summer of 2025.   Her mammogram will be repeated in May 2023.  Finally, in regards to the osteoporosis I have emailed her primary care physician, Dr. Mathias.  She appears to have GERD and I would recommend that she be offered Prolia.  Her multiple questions were answered to her satisfaction.

## 2023-01-25 ENCOUNTER — PATIENT MESSAGE (OUTPATIENT)
Dept: PRIMARY CARE CLINIC | Facility: CLINIC | Age: 88
End: 2023-01-25
Payer: MEDICARE

## 2023-01-26 ENCOUNTER — OFFICE VISIT (OUTPATIENT)
Dept: CARDIOLOGY | Facility: CLINIC | Age: 88
End: 2023-01-26
Payer: MEDICARE

## 2023-01-26 VITALS
WEIGHT: 148.56 LBS | HEART RATE: 58 BPM | HEIGHT: 64 IN | DIASTOLIC BLOOD PRESSURE: 68 MMHG | SYSTOLIC BLOOD PRESSURE: 157 MMHG | BODY MASS INDEX: 25.36 KG/M2

## 2023-01-26 DIAGNOSIS — I73.9 PAD (PERIPHERAL ARTERY DISEASE): ICD-10-CM

## 2023-01-26 DIAGNOSIS — N18.2 CKD (CHRONIC KIDNEY DISEASE) STAGE 2, GFR 60-89 ML/MIN: Chronic | ICD-10-CM

## 2023-01-26 DIAGNOSIS — E78.2 MIXED HYPERLIPIDEMIA: Chronic | ICD-10-CM

## 2023-01-26 DIAGNOSIS — I10 ESSENTIAL HYPERTENSION: Primary | Chronic | ICD-10-CM

## 2023-01-26 DIAGNOSIS — R09.89 LEFT CAROTID BRUIT: ICD-10-CM

## 2023-01-26 DIAGNOSIS — I70.0 ATHEROSCLEROSIS OF AORTA: ICD-10-CM

## 2023-01-26 PROCEDURE — 1126F AMNT PAIN NOTED NONE PRSNT: CPT | Mod: CPTII,S$GLB,, | Performed by: INTERNAL MEDICINE

## 2023-01-26 PROCEDURE — 99999 PR PBB SHADOW E&M-EST. PATIENT-LVL IV: CPT | Mod: PBBFAC,,, | Performed by: INTERNAL MEDICINE

## 2023-01-26 PROCEDURE — 1101F PR PT FALLS ASSESS DOC 0-1 FALLS W/OUT INJ PAST YR: ICD-10-PCS | Mod: CPTII,S$GLB,, | Performed by: INTERNAL MEDICINE

## 2023-01-26 PROCEDURE — 1101F PT FALLS ASSESS-DOCD LE1/YR: CPT | Mod: CPTII,S$GLB,, | Performed by: INTERNAL MEDICINE

## 2023-01-26 PROCEDURE — 1159F MED LIST DOCD IN RCRD: CPT | Mod: CPTII,S$GLB,, | Performed by: INTERNAL MEDICINE

## 2023-01-26 PROCEDURE — 99499 UNLISTED E&M SERVICE: CPT | Mod: S$GLB,,, | Performed by: INTERNAL MEDICINE

## 2023-01-26 PROCEDURE — 3288F FALL RISK ASSESSMENT DOCD: CPT | Mod: CPTII,S$GLB,, | Performed by: INTERNAL MEDICINE

## 2023-01-26 PROCEDURE — 99215 OFFICE O/P EST HI 40 MIN: CPT | Mod: S$GLB,,, | Performed by: INTERNAL MEDICINE

## 2023-01-26 PROCEDURE — 99499 RISK ADDL DX/OHS AUDIT: ICD-10-PCS | Mod: S$GLB,,, | Performed by: INTERNAL MEDICINE

## 2023-01-26 PROCEDURE — 99215 PR OFFICE/OUTPT VISIT, EST, LEVL V, 40-54 MIN: ICD-10-PCS | Mod: S$GLB,,, | Performed by: INTERNAL MEDICINE

## 2023-01-26 PROCEDURE — 99999 PR PBB SHADOW E&M-EST. PATIENT-LVL IV: ICD-10-PCS | Mod: PBBFAC,,, | Performed by: INTERNAL MEDICINE

## 2023-01-26 PROCEDURE — 1159F PR MEDICATION LIST DOCUMENTED IN MEDICAL RECORD: ICD-10-PCS | Mod: CPTII,S$GLB,, | Performed by: INTERNAL MEDICINE

## 2023-01-26 PROCEDURE — 1126F PR PAIN SEVERITY QUANTIFIED, NO PAIN PRESENT: ICD-10-PCS | Mod: CPTII,S$GLB,, | Performed by: INTERNAL MEDICINE

## 2023-01-26 PROCEDURE — 3288F PR FALLS RISK ASSESSMENT DOCUMENTED: ICD-10-PCS | Mod: CPTII,S$GLB,, | Performed by: INTERNAL MEDICINE

## 2023-01-26 RX ORDER — FLUTICASONE FUROATE AND VILANTEROL TRIFENATATE 100; 25 UG/1; UG/1
1 POWDER RESPIRATORY (INHALATION) DAILY
COMMUNITY
Start: 2023-01-17

## 2023-01-26 NOTE — PROGRESS NOTES
[facility]      Chief Complaint   Patient presents with    Hypertension    Hyperlipidemia    Peripheral Arterial Disease       Patient ID: Anahi Cook is a 87 y.o. female with PAD, HTN, HLD, arthritis, COPD, former smoker, who presents for a follow up appointment.  Pertinent history/events are as follows:     -Pt kindly referred by Dr. Lyssa Hoffman for evaluation of PAD/bilateral foot pain.    -At our initial clinic visit on 9/1/2022, Mrs. Cook was accompanied by her daughter.  She reported she does not ambulate much and has no claudication, rest pain, or tissue loss.  JESSE Study on 7/26/2022 revealed the right JESSE is 0.46 with diminished PVR waveforms below the knee consistent with severe arterial insufficiency. The right TBI 0.23 with diminished digit waveforms.  The left JESSE is 0.8 with mildly abnormal PVR waveforms consistent with mild arterial insufficiency.  The left TBI is 0.71.  4th and 5th digit waveforms are abnormal.  Formerly smoked a pack a day for 40 years.  Quit in her 60's  Plan:   PAD- She reports she does not ambulate much and has no claudication, rest pain, or tissue loss.  JESSE Study on 7/26/2022 revealed the right JESSE is 0.46 with diminished PVR waveforms below the knee consistent with severe arterial insufficiency. The right TBI 0.23 with diminished digit waveforms.  The left JESSE is 0.8 with mildly abnormal PVR waveforms consistent with mild arterial insufficiency.  The left TBI is 0.71.  4th and 5th digit waveforms are abnormal.  Check toe pressure study and CTA abd/pelvis with iliofemoral runoff to determine if arterial blood flow is adequate for wound healing prior to any invasive procedures.  Start ASA 81 mg daily.  Continue atorvastatin 20 mg daily.    Left Carotid Bruit- Check carotid ultrasound.   HTN- Continue current medications.  HLD- Continue atorvastatin 20 mg daily.     HPI:  Mrs. Cook is accompanied by her daughter.  She reports no claudication, rest pain, or  tissue loss.  Toe Pressure Study on 1/18/2023 revealed right TBI is reduced consistent with severe PAD.  Left TBI is reduced consistent with moderate PAD.  PVR's are biphasic bilaterally.  Right Toe Pressure 44 mmHg.  Left Toe Pressure 97 mmHg.  Carotid Ultrasound on 1/18/2023 demonstrated 0-19% bilateral Internal Carotid Stenosis.      Past Medical History:   Diagnosis Date    Anxiety     Arthritis     Back pain     Breast cancer 1/17/2018    Breast mass 1/15/2018    Chronic kidney disease, stage 2, mildly decreased GFR     COPD (chronic obstructive pulmonary disease)     emphysema    Depression     Dysuria 1/29/2018    Exudative age-related macular degeneration of left eye with active choroidal neovascularization 8/25/2022    Family history of breast cancer 1/17/2018    GERD (gastroesophageal reflux disease)     Hypertension     Infiltrating ductal carcinoma of breast, left 7/12/2018    Osteoporosis, senile     Ovarian mass 1/15/2018    Pneumonia     S/P robotic assisted laparoscopic BSO (bilateral salpingo-oophorectomy) 2/23/2018     Past Surgical History:   Procedure Laterality Date    AXILLARY NODE DISSECTION Left 7/12/2018    Procedure: LYMPHADENECTOMY, AXILLARY;  Surgeon: Amanda Caballero MD;  Location: [facility] OR [address] [place] [address];  Service: General;  Laterality: Left;    BREAST BIOPSY      BREAST LUMPECTOMY      CATARACT EXTRACTION      CHOLECYSTECTOMY      COLONOSCOPY      ESOPHAGOGASTRODUODENOSCOPY      ESOPHAGOGASTRODUODENOSCOPY N/A 10/14/2019    Procedure: EGD (ESOPHAGOGASTRODUODENOSCOPY);  Surgeon: Davonte Thompson MD;  Location: [address]);  Service: Endoscopy;  Laterality: N/A;  hx of pulmonary hypertension-PA 50     pt requesting ASAP    EYE SURGERY      FIXATION KYPHOPLASTY N/A 11/8/2018    Procedure: Kyphoplasty   T12;  Surgeon: Merly Wilcox MD;  Location: [facility] CATH LAB;  Service: Pain Management;  Laterality: N/A;  T12  [employer] Reps e-mailed with date and time    HYSTERECTOMY      INJECTION FOR  SENTINEL NODE IDENTIFICATION Left 7/12/2018    Procedure: INJECTION, FOR SENTINEL NODE IDENTIFICATION;  Surgeon: Amanda Caballero MD;  Location: [facility] OR 2ND FLR;  Service: General;  Laterality: Left;    INJECTION OF FACET JOINT Bilateral 10/15/2018    Procedure: INJECTION, FACET JOINT, BILATERAL LUMBAR L3-4, 4-5, 5-S1 FACET JOINT INJECTIONS;  Surgeon: Merly Wilcox MD;  Location: [facility] PAIN MGT;  Service: Pain Management;  Laterality: Bilateral;    INJECTION OF FACET JOINT Bilateral 4/1/2019    Procedure: INJECTION, FACET JOINT, L3-L4,L4-L5,L5-S1;  Surgeon: Merly Wilcox MD;  Location: [facility] PAIN MGT;  Service: Pain Management;  Laterality: Bilateral;    INJECTION OF FACET JOINT Bilateral 6/20/2019    Procedure: INJECTION, FACET JOINT, L3-L4,L4-L5,L5-S1 NEED CONSENT;  Surgeon: Merly Wilcox MD;  Location: [facility]IN [facility]GT;  Service: Pain Management;  Laterality: Bilateral;    INJECTION OF FACET JOINT Bilateral 7/27/2020    Procedure: INJECTION, FACET JOINT BILATERAL L3/4, L4/5 and L5/S1;  Surgeon: Merly Wilcox MD;  Location: [facility]IN [facility];  Service: Pain Management;  Laterality: Bilateral;    INJECTION OF JOINT Bilateral 7/27/2020    Procedure: INJECTION, JOINT, BILATERAL GREATER TROCHANTERIC BURSA;  Surgeon: Merly Wilcox MD;  Location: [facility];  Service: Pain Management;  Laterality: Bilateral;    INJECTION OF JOINT Bilateral 4/26/2021    Procedure: INJECTION, JOINT, HIP;  Surgeon: Merly Wilcox MD;  Location: [facility] PAIN MGT;  Service: Pain Management;  Laterality: Bilateral;  consent needed    INJECTION OF STEROID Right 11/15/2021    Procedure: INJECTION, STEROID RIGHT MIDDLE AND SAMLL FINGER;  Surgeon: Florecita Da Silva MD;  Location: [facility] OR;  Service: Orthopedics;  Laterality: Right;    MASTECTOMY, PARTIAL Left 7/12/2018    Procedure: MASTECTOMY, PARTIAL-W/SEED LOCALIZATION;  Surgeon: Amanda Caballero MD;  Location: [facility] OR 2ND FLR;  Service: General;  Laterality: Left;    [place] REMOVAL OF OVARY/TUBE(S)  02/23/2018     Robotic Assisted    ROTATOR CUFF REPAIR Right     rotator cuff repair right shoulder    TONSILLECTOMY      TRANSFORAMINAL EPIDURAL INJECTION OF STEROID Bilateral 6/21/2021    Procedure: INJECTION, STEROID, EPIDURAL, TRANSFORAMINAL APPROACH  bilateral L4/5 TF ASHUTOSH;  Surgeon: Merly Wilcox MD;  Location: [facility] PAIN MGT;  Service: Pain Management;  Laterality: Bilateral;  consent needed    TRANSFORAMINAL EPIDURAL INJECTION OF STEROID Bilateral 1/3/2022    Procedure: INJECTION, STEROID, EPIDURAL, TRANSFORAMINAL APPROACH Bilateral L4/5 Needs consent;  Surgeon: Merly Wilcox MD;  Location: [facility] PAIN MGT;  Service: Pain Management;  Laterality: Bilateral;    TRANSFORAMINAL EPIDURAL INJECTION OF STEROID Bilateral 8/9/2022    Procedure: INJECTION, STEROID, EPIDURAL, TRANSFORAMINAL APPROACH, BILATERAL L4-L5   MEDICALLY URGENT;  Surgeon: Merly Wilcox MD;  Location: [facility] PAIN MGT;  Service: Pain Management;  Laterality: Bilateral;    TRANSFORAMINAL EPIDURAL INJECTION OF STEROID Left 11/17/2022    Procedure: INJECTION, STEROID, EPIDURAL, TRANSFORAMINAL APPROACH LEFT L3-L4 AND L4-L5 CONTRAST;  Surgeon: Merly Wilcox MD;  Location: [facility] PAIN MGT;  Service: Pain Management;  Laterality: Left;    TRIGGER FINGER RELEASE Left 11/15/2021    Procedure: RELEASE, TRIGGER FINGER, LEFT MIDDLE AND RING;  Surgeon: Florecita Da Silva MD;  Location: [facility] OR;  Service: Orthopedics;  Laterality: Left;     Social History     Socioeconomic History    Marital status: Single    Number of children: 3   Occupational History    Occupation: Multiple jobs, see social documentation section     Comment: Retired   Tobacco Use    Smoking status: Former     Packs/day: 1.00     Years: 40.00     Pack years: 40.00     Types: Cigarettes    Smokeless tobacco: Never    Tobacco comments:     Smoked >1 ppd for at least 40 years, quit around 1995   Substance and Sexual Activity    Alcohol use: Yes     Comment: occasional glass of wine or cocktail    Drug use: No     Sexual activity: Yes     Partners: Male   Social History Narrative    Worked many jobs while raising 3 children.  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  She was  from her first , but took care of him at the end of his life.     Family History   Problem Relation Age of Onset    Heart failure Mother     Kidney failure Mother     Heart attack Father     Breast cancer Sister 66        Lump, XRT, chemo, recurrence 10 years later.    Cancer Brother         leukemia    Heart attack Brother 58    Pulmonary embolism Brother 45    Heart attack Brother 52    Breast cancer Maternal Grandmother 60        advanced at diagnosis    Breast cancer Maternal Aunt 83         at 92    Colon cancer Neg Hx     Esophageal cancer Neg Hx        Review of patient's allergies indicates:   Allergen Reactions    Levaquin [levofloxacin] Itching    Penicillins Itching       Medication List with Changes/Refills   Current Medications    ACETAMINOPHEN (TYLENOL) 325 MG TABLET    Take 650 mg by mouth every 6 (six) hours as needed for Pain.     ATORVASTATIN (LIPITOR) 20 MG TABLET    Take 1 tablet (20 mg total) by mouth every evening.    BENAZEPRIL (LOTENSIN) 40 MG TABLET    Take 1 tablet (40 mg total) by mouth once daily.    BREO ELLIPTA 100-25 MCG/DOSE DISKUS INHALER    Inhale 1 puff into the lungs.    CALCIUM-VITAMIN D 250-100 MG-UNIT PER TABLET    Take 1 tablet by mouth 2 (two) times daily.    CETIRIZINE HCL (ZYRTEC ORAL)    Take 10 mg by mouth nightly as needed.     CITALOPRAM (CELEXA) 20 MG TABLET    Take 1 tablet (20 mg total) by mouth every evening.    FELODIPINE (PLENDIL) 10 MG 24 HR TABLET    Take 1 tablet (10 mg total) by mouth 2 (two) times a day.    GABAPENTIN (NEURONTIN) 100 MG CAPSULE    Take 1 capsule (100 mg total) by mouth every evening.    HYDRALAZINE (APRESOLINE) 25 MG TABLET    Take 1 tablet (25 mg total) by mouth  every 12 (twelve) hours.    KETOPROFEN 10 % CRPK    APPLY UP TO 4.8 GRAMS (3 PUMPS) TO PAINFUL AREAS UP TO FIVE TIMES DAILY. RUB IN WELL    LETROZOLE (FEMARA) 2.5 MG TAB    Take 1 tablet (2.5 mg total) by mouth once daily.    MECLIZINE (ANTIVERT) 25 MG TABLET    Take 1 tablet (25 mg total) by mouth 3 (three) times daily as needed for Dizziness.    MELATONIN 10 MG TAB    Take by mouth every evening.    MIRABEGRON (MYRBETRIQ) 25 MG TB24 ER TABLET    Take 1 tablet (25 mg total) by mouth once daily.    MONTELUKAST (SINGULAIR) 10 MG TABLET    Take 10 mg by mouth every evening.    MULTIVITAMIN (THERAGRAN) PER TABLET    Take 1 tablet by mouth once daily.    TORSEMIDE (DEMADEX) 20 MG TAB    TAKE 1 TABLET(20 MG) BY MOUTH EVERY DAY    TRAMADOL (ULTRAM) 50 MG TABLET    TAKE 1 TABLET(50 MG) BY MOUTH EVERY 8 HOURS AS NEEDED FOR PAIN    TRIAMCINOLONE ACETONIDE 0.1% (KENALOG) 0.1 % CREAM    AAA bid.  Spot treatment as needed    TRIAMCINOLONE ACETONIDE 0.1% (KENALOG) 0.1 % PASTE    APPLY SPARINGLY FOUR TIMES DAILY   Discontinued Medications    ALBUTEROL (PROVENTIL) 2.5 MG /3 ML (0.083 %) NEBULIZER SOLUTION    Inhale 3 mLs into the lungs as needed.     ALBUTEROL (PROVENTIL/VENTOLIN HFA) 90 MCG/ACTUATION INHALER    Inhale 1-2 puffs into the lungs every 6 (six) hours as needed for Wheezing or Shortness of Breath.    ALBUTEROL-IPRATROPIUM 2.5MG-0.5MG/3ML (DUO-NEB) 0.5 MG-3 MG(2.5 MG BASE)/3 ML NEBULIZER SOLUTION    Take 3 mLs by nebulization as needed.     KETOPROFEN 10 % CRPK    APPLY 2 GRAMS 3-4 TIMES DAILY FOR TREATMENT OF PAIN       Review of Systems  Constitution: Denies chills, fever, and sweats.  HENT: Denies headaches or blurry vision.  Cardiovascular: Denies chest pain or irregular heart beat.  Respiratory: Denies cough or shortness of breath.  Gastrointestinal: Denies abdominal pain, nausea, or vomiting.  Musculoskeletal: Denies muscle cramps.  Neurological: Denies dizziness or focal weakness.  Psychiatric/Behavioral: Normal  "mental status.  Hematologic/Lymphatic: Denies bleeding problem or easy bruising/bleeding.  Skin: Denies rash or suspicious lesions    Physical Examination  BP (!) 157/68   Pulse (!) 58   Ht 5' 4" (1.626 m)   Wt 67.4 kg (148 lb 9.4 oz)   LMP  (LMP Unknown)   BMI 25.51 kg/m²     Constitutional: No acute distress, conversant  HEENT: Sclera anicteric, Pupils equal, round and reactive to light, extraocular motions intact, Oropharynx clear  Neck: No JVD, no carotid bruits  Cardiovascular: regular rate and rhythm, no murmur, rubs or gallops, normal S1/S2  Pulmonary: Clear to auscultation bilaterally  Abdominal: Abdomen soft, nontender, nondistended, positive bowel sounds  Extremities: No lower extremity edema,   Pulses:  Carotid pulses are 2+ on the right side, and 2+ on the left side.  Radial pulses are 2+ on the right side, and 2+ on the left side.   Femoral pulses are 2+ on the right side, and 2+ on the left side.  Popliteal pulses are 2+ on the right side, and 2+ on the left side.   Dorsalis pedis pulses are 2+ on the right side, and 2+ on the left side.   Posterior tibial pulses are 2+ on the right side, and 2+ on the left side.    Skin: No ecchymosis, erythema, or ulcers  Psych: Alert and oriented x 3, appropriate affect  Neuro: CNII-XII intact, no focal deficits    Labs:  Most Recent Data  CBC:   Lab Results   Component Value Date    WBC 9.27 05/10/2022    HGB 13.6 05/10/2022    HCT 43.0 05/10/2022     05/10/2022    MCV 89 05/10/2022    RDW 13.7 05/10/2022     BMP:   Lab Results   Component Value Date     01/20/2023    K 3.5 01/20/2023     01/20/2023    CO2 31 (H) 01/20/2023    BUN 13 01/20/2023    CREATININE 0.8 01/20/2023    GLU 98 01/20/2023    CALCIUM 9.8 01/20/2023    PHOS 3.3 08/29/2019     LFTS;   Lab Results   Component Value Date    PROT 6.8 01/20/2023    ALBUMIN 3.5 01/20/2023    BILITOT 0.7 01/20/2023    AST 19 01/20/2023    ALKPHOS 77 01/20/2023    ALT 13 01/20/2023     COAGS: No " results found for: INR, PROTIME, PTT  FLP:   Lab Results   Component Value Date    CHOL 126 01/20/2023    HDL 57 01/20/2023    LDLCALC 51.8 (L) 01/20/2023    TRIG 86 01/20/2023    CHOLHDL 45.2 01/20/2023     CARDIAC:   Lab Results   Component Value Date    TROPONINI 0.013 02/24/2021    BNP 24 10/12/2016       Imaging:    Toe Pressure Study 1/18/2023:  Right TBI is reduced consistent with severe PAD.  Left TBI is reduced consistent with moderate PAD.  PVR's are biphasic bilaterally.  Right Toe Pressure 44 mmHg  Left Toe Pressure 97 mmHg    Carotid Ultrasound 1/18/2023:  There is 0-19% right Internal Carotid Stenosis.  There is 0-19% left Internal Carotid Stenosis.    JESSE Study 7/26/2022:  The right JESSE is 0.46 with diminished PVR waveforms below the knee consistent with severe arterial insufficiency.  The right TBI 0.23 with diminished digit waveforms.  The left JESSE is 0.8 with mildly abnormal PVR waveforms consistent with mild arterial insufficiency.  The left TBI is 0.71.  4th and 5th digit waveforms are abnormal.    Assessment/Plan:  Anahi Cook is a 87 y.o. female with PAD, HTN, HLD, arthritis, COPD, former smoker, who presents for a follow up appointment.     PAD- She reports she does not ambulate much and has no claudication, rest pain, or tissue loss.  Toe Pressure Study on 1/18/2023 revealed right TBI is reduced consistent with severe PAD.  Left TBI is reduced consistent with moderate PAD.  PVR's are biphasic bilaterally.  Right Toe Pressure 44 mmHg.  Left Toe Pressure 97 mmHg. JESSE Study on 7/26/2022 revealed the right JESSE is 0.46 with diminished PVR waveforms below the knee consistent with severe arterial insufficiency. The right TBI 0.23 with diminished digit waveforms.  The left JESSE is 0.8 with mildly abnormal PVR waveforms consistent with mild arterial insufficiency.  The left TBI is 0.71.  4th and 5th digit waveforms are abnormal.  Pt did not have CTA abd/pelvis with iliofemoral runoff done.  Continue  ASA 81 mg daily and atorvastatin 20 mg daily.      2. Left Carotid Bruit- Carotid Ultrasound on 1/18/2023 demonstrated 0-19% bilateral Internal Carotid Stenosis.    Continue ASA 81 mg daily and atorvastatin 20 mg daily.    3. HTN- Continue current medications.    4. HLD- Continue atorvastatin 20 mg daily.      Follow up in 6 months    Total duration of face to face visit time 30 minutes.  Total time spent counseling greater than fifty percent of total visit time.  Counseling included discussion regarding imaging findings, diagnosis, possibilities, treatment options, risks and benefits.  The patient had many questions regarding the options and long-term effects.    Basil Boss MD, PhD  Interventional Cardiology

## 2023-01-26 NOTE — PATIENT INSTRUCTIONS
Assessment/Plan:  Anahi Cook is a 87 y.o. female with PAD, HTN, HLD, arthritis, COPD, former smoker, who presents for a follow up appointment.     PAD- She reports she does not ambulate much and has no claudication, rest pain, or tissue loss.  Toe Pressure Study on 1/18/2023 revealed right TBI is reduced consistent with severe PAD.  Left TBI is reduced consistent with moderate PAD.  PVR's are biphasic bilaterally.  Right Toe Pressure 44 mmHg.  Left Toe Pressure 97 mmHg. JESSE Study on 7/26/2022 revealed the right JESSE is 0.46 with diminished PVR waveforms below the knee consistent with severe arterial insufficiency. The right TBI 0.23 with diminished digit waveforms.  The left JESSE is 0.8 with mildly abnormal PVR waveforms consistent with mild arterial insufficiency.  The left TBI is 0.71.  4th and 5th digit waveforms are abnormal.  Pt did not have CTA abd/pelvis with iliofemoral runoff done.  Continue ASA 81 mg daily and atorvastatin 20 mg daily.      2. Left Carotid Bruit- Carotid Ultrasound on 1/18/2023 demonstrated 0-19% bilateral Internal Carotid Stenosis.    Continue ASA 81 mg daily and atorvastatin 20 mg daily.    3. HTN- Continue current medications.    4. HLD- Continue atorvastatin 20 mg daily.      Follow up in 6 months

## 2023-02-02 ENCOUNTER — OFFICE VISIT (OUTPATIENT)
Dept: PRIMARY CARE CLINIC | Facility: CLINIC | Age: 88
End: 2023-02-02
Payer: MEDICARE

## 2023-02-02 VITALS
WEIGHT: 149.88 LBS | BODY MASS INDEX: 25.59 KG/M2 | HEIGHT: 64 IN | OXYGEN SATURATION: 99 % | HEART RATE: 62 BPM | SYSTOLIC BLOOD PRESSURE: 136 MMHG | TEMPERATURE: 99 F | DIASTOLIC BLOOD PRESSURE: 60 MMHG

## 2023-02-02 DIAGNOSIS — F33.0 MAJOR DEPRESSIVE DISORDER, RECURRENT, MILD: ICD-10-CM

## 2023-02-02 DIAGNOSIS — J43.1 PANLOBULAR EMPHYSEMA: ICD-10-CM

## 2023-02-02 DIAGNOSIS — C77.3 SECONDARY AND UNSPECIFIED MALIGNANT NEOPLASM OF AXILLA AND UPPER LIMB LYMPH NODES: ICD-10-CM

## 2023-02-02 DIAGNOSIS — N18.31 STAGE 3A CHRONIC KIDNEY DISEASE: ICD-10-CM

## 2023-02-02 DIAGNOSIS — I70.0 ATHEROSCLEROSIS OF AORTA: ICD-10-CM

## 2023-02-02 DIAGNOSIS — M17.11 PRIMARY OSTEOARTHRITIS OF RIGHT KNEE: ICD-10-CM

## 2023-02-02 DIAGNOSIS — H35.3221 EXUDATIVE AGE-RELATED MACULAR DEGENERATION OF LEFT EYE WITH ACTIVE CHOROIDAL NEOVASCULARIZATION: ICD-10-CM

## 2023-02-02 DIAGNOSIS — I11.9 HYPERTENSIVE HEART DISEASE WITHOUT HEART FAILURE: Primary | ICD-10-CM

## 2023-02-02 DIAGNOSIS — M47.816 LUMBAR SPONDYLOSIS: ICD-10-CM

## 2023-02-02 DIAGNOSIS — M81.0 OSTEOPOROSIS WITHOUT CURRENT PATHOLOGICAL FRACTURE, UNSPECIFIED OSTEOPOROSIS TYPE: ICD-10-CM

## 2023-02-02 DIAGNOSIS — R73.03 PRE-DIABETES: ICD-10-CM

## 2023-02-02 DIAGNOSIS — E78.5 HYPERLIPIDEMIA, UNSPECIFIED HYPERLIPIDEMIA TYPE: ICD-10-CM

## 2023-02-02 PROCEDURE — 1159F PR MEDICATION LIST DOCUMENTED IN MEDICAL RECORD: ICD-10-PCS | Mod: CPTII,S$GLB,, | Performed by: INTERNAL MEDICINE

## 2023-02-02 PROCEDURE — 1126F PR PAIN SEVERITY QUANTIFIED, NO PAIN PRESENT: ICD-10-PCS | Mod: CPTII,S$GLB,, | Performed by: INTERNAL MEDICINE

## 2023-02-02 PROCEDURE — 99999 PR PBB SHADOW E&M-EST. PATIENT-LVL V: CPT | Mod: PBBFAC,,, | Performed by: INTERNAL MEDICINE

## 2023-02-02 PROCEDURE — 1160F PR REVIEW ALL MEDS BY PRESCRIBER/CLIN PHARMACIST DOCUMENTED: ICD-10-PCS | Mod: CPTII,S$GLB,, | Performed by: INTERNAL MEDICINE

## 2023-02-02 PROCEDURE — 1126F AMNT PAIN NOTED NONE PRSNT: CPT | Mod: CPTII,S$GLB,, | Performed by: INTERNAL MEDICINE

## 2023-02-02 PROCEDURE — 3288F FALL RISK ASSESSMENT DOCD: CPT | Mod: CPTII,S$GLB,, | Performed by: INTERNAL MEDICINE

## 2023-02-02 PROCEDURE — 99215 PR OFFICE/OUTPT VISIT, EST, LEVL V, 40-54 MIN: ICD-10-PCS | Mod: S$GLB,,, | Performed by: INTERNAL MEDICINE

## 2023-02-02 PROCEDURE — 1101F PR PT FALLS ASSESS DOC 0-1 FALLS W/OUT INJ PAST YR: ICD-10-PCS | Mod: CPTII,S$GLB,, | Performed by: INTERNAL MEDICINE

## 2023-02-02 PROCEDURE — 1159F MED LIST DOCD IN RCRD: CPT | Mod: CPTII,S$GLB,, | Performed by: INTERNAL MEDICINE

## 2023-02-02 PROCEDURE — 1160F RVW MEDS BY RX/DR IN RCRD: CPT | Mod: CPTII,S$GLB,, | Performed by: INTERNAL MEDICINE

## 2023-02-02 PROCEDURE — 99999 PR PBB SHADOW E&M-EST. PATIENT-LVL V: ICD-10-PCS | Mod: PBBFAC,,, | Performed by: INTERNAL MEDICINE

## 2023-02-02 PROCEDURE — 99215 OFFICE O/P EST HI 40 MIN: CPT | Mod: S$GLB,,, | Performed by: INTERNAL MEDICINE

## 2023-02-02 PROCEDURE — 3288F PR FALLS RISK ASSESSMENT DOCUMENTED: ICD-10-PCS | Mod: CPTII,S$GLB,, | Performed by: INTERNAL MEDICINE

## 2023-02-02 PROCEDURE — 1101F PT FALLS ASSESS-DOCD LE1/YR: CPT | Mod: CPTII,S$GLB,, | Performed by: INTERNAL MEDICINE

## 2023-02-02 RX ORDER — UMECLIDINIUM 62.5 UG/1
1 AEROSOL, POWDER ORAL
COMMUNITY
Start: 2023-01-23 | End: 2023-07-11 | Stop reason: DRUGHIGH

## 2023-02-02 RX ORDER — ASPIRIN 81 MG/1
81 TABLET ORAL DAILY
COMMUNITY

## 2023-02-02 RX ORDER — TRAMADOL HYDROCHLORIDE 50 MG/1
50 TABLET ORAL EVERY 8 HOURS PRN
Qty: 90 TABLET | Refills: 2 | Status: SHIPPED | OUTPATIENT
Start: 2023-02-02 | End: 2023-05-22 | Stop reason: SDUPTHER

## 2023-02-02 RX ORDER — HYDRALAZINE HYDROCHLORIDE 25 MG/1
25 TABLET, FILM COATED ORAL
COMMUNITY
Start: 2022-05-04 | End: 2023-02-05 | Stop reason: SDUPTHER

## 2023-02-02 RX ORDER — GABAPENTIN 100 MG/1
100 CAPSULE ORAL
COMMUNITY
Start: 2022-10-20 | End: 2023-02-05 | Stop reason: SDUPTHER

## 2023-02-02 RX ORDER — IPRATROPIUM BROMIDE AND ALBUTEROL 20; 100 UG/1; UG/1
1 SPRAY, METERED RESPIRATORY (INHALATION)
COMMUNITY
Start: 2022-10-26

## 2023-02-02 RX ORDER — IPRATROPIUM BROMIDE AND ALBUTEROL 20; 100 UG/1; UG/1
SPRAY, METERED RESPIRATORY (INHALATION)
COMMUNITY
Start: 2022-12-30 | End: 2023-02-05 | Stop reason: SDUPTHER

## 2023-02-02 RX ORDER — ATORVASTATIN CALCIUM 20 MG/1
20 TABLET, FILM COATED ORAL
COMMUNITY
Start: 2022-08-11 | End: 2023-02-05 | Stop reason: SDUPTHER

## 2023-02-02 RX ORDER — UMECLIDINIUM 62.5 UG/1
AEROSOL, POWDER ORAL DAILY
COMMUNITY
Start: 2023-01-23

## 2023-02-02 RX ORDER — CITALOPRAM 20 MG/1
20 TABLET, FILM COATED ORAL
COMMUNITY
Start: 2022-05-04 | End: 2023-02-05 | Stop reason: SDUPTHER

## 2023-02-02 RX ORDER — DEXTROMETHORPHAN HYDROBROMIDE, GUAIFENESIN 5; 100 MG/5ML; MG/5ML
650 LIQUID ORAL EVERY 8 HOURS PRN
COMMUNITY

## 2023-02-02 RX ORDER — TRAMADOL HYDROCHLORIDE 50 MG/1
50 TABLET ORAL
COMMUNITY
Start: 2022-10-06 | End: 2023-02-02

## 2023-02-05 PROBLEM — M81.0 OSTEOPOROSIS WITHOUT CURRENT PATHOLOGICAL FRACTURE: Status: ACTIVE | Noted: 2023-02-05

## 2023-02-05 RX ORDER — ATORVASTATIN CALCIUM 20 MG/1
20 TABLET, FILM COATED ORAL NIGHTLY
Qty: 90 TABLET | Refills: 1 | Status: SHIPPED | OUTPATIENT
Start: 2023-02-05 | End: 2023-08-08 | Stop reason: SDUPTHER

## 2023-02-05 RX ORDER — BENAZEPRIL HYDROCHLORIDE 40 MG/1
40 TABLET ORAL DAILY
Qty: 90 TABLET | Refills: 1 | Status: SHIPPED | OUTPATIENT
Start: 2023-02-05 | End: 2023-08-08 | Stop reason: SDUPTHER

## 2023-02-05 RX ORDER — FELODIPINE 10 MG/1
10 TABLET, EXTENDED RELEASE ORAL 2 TIMES DAILY
Qty: 180 TABLET | Refills: 1 | Status: SHIPPED | OUTPATIENT
Start: 2023-02-05 | End: 2023-08-08 | Stop reason: SDUPTHER

## 2023-02-05 NOTE — PROGRESS NOTES
Subjective:       Patient ID: Anahi Cook is a 88 y.o. female.    Chief Complaint: Follow-up    Last seen 6 months ago, returns for scheduled follow up chronic medical conditions. No new complaints.     PMH: .  Hypertension with Concentric Remodeling and normal LV function on Echo 10/16.  Hyperlipidemia.  Pre-Diabetes.  Aortic Atherosclerosis seen on chest and abdominal imaging.   CKD stage 3a.  COPD, not oxygen-dependent. Pulmonologist Dr. Bradford at [facility].   Breast Cancer, on Letrozole, Heme/Onc following.  Osteoporosis, Endocrinology  recommended Prolia - patient declined treatment.   Vertebral Compression Fractures.  GERD, Tortuous Esophagus on EGD 10/19, benign H pyl negative gastritis.   Lumbar Spondylosis, Pain Mgmt following.  Allergic Rhinitis.  Depression/Anxiety/Insomnia.    PSH:  2018: AXILLARY NODE DISSECTION; Left  BREAST BIOPSY  BREAST LUMPECTOMY  CHOLECYSTECTOMY  EYE SURGERY  2018: FIXATION KYPHOPLASTY; N/A  HYSTERECTOMY  10/15/2018: INJECTION OF FACET JOINT; Bilateral  2019: INJECTION OF FACET JOINT; Bilateral  2019: INJECTION OF FACET JOINT; Bilateral  2018: MASTECTOMY, PARTIAL; Left  2018: [place] REMOVAL OF OVARY/TUBE(S)  ROTATOR CUFF REPAIR; Right  TONSILLECTOMY    Mammogram stable . BMD . Colonoscopy years ago, not on record. Eye exam . Vaccines reviewed, up to date.    Social: Former tobacco use, rare alcohol. , daughter lives locally, two sons out of state.     FMH: Breast cancer in multiple, HTN, Heart dis, Kidney dis.     Allergies: PCN, Levofloxacin.     Medications: list reviewed and reconciled. Multivitamin and Calcium plus D daily.        Review of Systems   Constitutional:  Negative for activity change, appetite change, chills, diaphoresis and fever.   Respiratory:  Negative for cough and shortness of breath.    Cardiovascular:  Negative for chest pain, palpitations and leg swelling.   Gastrointestinal:  Negative for abdominal  "pain, diarrhea, nausea and vomiting.   Genitourinary:  Negative for dysuria, frequency and hematuria.   Musculoskeletal:  Positive for arthralgias and back pain.   Neurological:  Negative for dizziness, seizures, syncope, facial asymmetry, speech difficulty, weakness and headaches.   Psychiatric/Behavioral:  Negative for dysphoric mood. The patient is not nervous/anxious.         Mood is stable on current dose Citalopram.       Objective:      Vitals:    02/02/23 1505   BP: 136/60   Pulse: 62   Temp: 98.9 °F (37.2 °C)   SpO2: 99%   Weight: 68 kg (149 lb 14.4 oz)   Height: 5' 4" (1.626 m)   BMI=25.7  Physical Exam  Constitutional:       General: She is not in acute distress.     Appearance: She is not ill-appearing.   HENT:      Head: Normocephalic and atraumatic.      Nose: Nose normal. No congestion.      Mouth/Throat:      Mouth: Mucous membranes are moist.      Pharynx: Oropharynx is clear.   Eyes:      Extraocular Movements: Extraocular movements intact.      Conjunctiva/sclera: Conjunctivae normal.   Cardiovascular:      Rate and Rhythm: Normal rate and regular rhythm.   Pulmonary:      Effort: Pulmonary effort is normal. No respiratory distress.      Breath sounds: Normal breath sounds. No wheezing, rhonchi or rales.   Abdominal:      General: Bowel sounds are normal. There is no distension.      Palpations: Abdomen is soft.      Tenderness: There is no abdominal tenderness.   Musculoskeletal:         General: Normal range of motion.      Right lower leg: No edema.      Left lower leg: No edema.   Skin:     General: Skin is warm and dry.   Neurological:      Mental Status: She is alert and oriented to person, place, and time.      Cranial Nerves: No cranial nerve deficit.   Psychiatric:         Mood and Affect: Mood normal.         Behavior: Behavior normal.         Thought Content: Thought content normal.       Lab Visit on 01/20/2023   Component Date Value    Sodium 01/20/2023 142     Potassium 01/20/2023 " 3.5     Chloride 01/20/2023 103     CO2 01/20/2023 31 (H)     Glucose 01/20/2023 98     BUN 01/20/2023 13     Creatinine 01/20/2023 0.8     Calcium 01/20/2023 9.8     Total Protein 01/20/2023 6.8     Albumin 01/20/2023 3.5     Total Bilirubin 01/20/2023 0.7     Alkaline Phosphatase 01/20/2023 77     AST 01/20/2023 19     ALT 01/20/2023 13     Anion Gap 01/20/2023 8     eGFR 01/20/2023 >60.0     Cholesterol 01/20/2023 126     Triglycerides 01/20/2023 86     HDL 01/20/2023 57     LDL Cholesterol 01/20/2023 51.8 (L)     HDL/Cholesterol Ratio 01/20/2023 45.2     Total Cholesterol/HDL Ra* 01/20/2023 2.2     Non-HDL Cholesterol 01/20/2023 69     Hemoglobin A1C 01/20/2023 5.8 (H)     Estimated Avg Glucose 01/20/2023 120    Hospital Outpatient Visit on 01/18/2023   Component Date Value    Left ICA/CCA ratio 01/18/2023 1.60     Right ICA/CCA ratio 01/18/2023 0.94     Left Highest ICA 01/18/2023 104.00     Left Highest CCA 01/18/2023 84     Right Highest ICA 01/18/2023 50.00     Right Highest CCA 01/18/2023 71     Right Highest EDV 01/18/2023 10.00     LT Highest EDV 01/18/2023 15.00     Right CCA prox sys 01/18/2023 71     Right CCA prox jamison 01/18/2023 6     Right CCA dist sys 01/18/2023 53     Right CCA dist jamison 01/18/2023 8     Right ICA prox sys 01/18/2023 39     Right ICA prox jamison 01/18/2023 8     Right ICA mid sys 01/18/2023 50     Right ICA mid jamison 01/18/2023 10     Right ICA dist sys 01/18/2023 46     Right ICA dist jamison 01/18/2023 6     Right ECA sys 01/18/2023 140     Right vertebral sys 01/18/2023 72     Left CCA prox sys 01/18/2023 84     Left CCA prox jamison 01/18/2023 10     Left CCA dist sys 01/18/2023 65     Left CCA dist jamison 01/18/2023 7     Left ICA prox sys 01/18/2023 104     Left ICA prox jamison 01/18/2023 15     Left ICA mid sys 01/18/2023 61     Left ICA mid jamison 01/18/2023 9     Left ICA dist sys 01/18/2023 64     Left ICA dist jamison 01/18/2023 10     Left ECA sys 01/18/2023 104     Left vertebral sys  01/18/2023 92     Right arm systolic blood* 01/18/2023 158     Right arm diastolic bloo* 01/18/2023 86     Left CCA mid sys 01/18/2023 73     Left CCA mid jamison 01/18/2023 10     Right CCA mid sys 01/18/2023 67     Right CCA mid jamison 01/18/2023 11     Right vertebral jamison 01/18/2023 10     Right ECA jamison 01/18/2023 10     Left ECA jamison 01/18/2023 0     Left CBA sys 01/18/2023 69     Left CBA jamison 01/18/2023 5     Rigth CBA sys 01/18/2023 48     Right CBA jamison 01/18/2023 10    Hospital Outpatient Visit on 01/18/2023   Component Date Value    Left arm BP 01/18/2023 114     Right arm BP 01/18/2023 146     Left toe pressure 01/18/2023 97     Right toe pressure 01/18/2023 44     Left TBI 01/18/2023 0.66     Right TBI 01/18/2023 0.30      Assessment:       Problem List Items Addressed This Visit       COPD (chronic obstructive pulmonary disease) (Chronic)    Lumbar spondylosis (Chronic)    Relevant Medications    traMADoL (ULTRAM) 50 mg tablet    Hyperlipidemia (Chronic)    Relevant Medications    atorvastatin (LIPITOR) 20 MG tablet    Pre-diabetes    Secondary and unspecified malignant neoplasm of axilla and upper limb lymph nodes    Major depressive disorder, recurrent, mild    Atherosclerosis of aorta    Relevant Medications    atorvastatin (LIPITOR) 20 MG tablet    Stage 3a chronic kidney disease    Exudative age-related macular degeneration of left eye with active choroidal neovascularization     Other Visit Diagnoses       Hypertensive heart disease without heart failure    -  Primary    Relevant Medications    felodipine (PLENDIL) 10 MG 24 hr tablet    benazepriL (LOTENSIN) 40 MG tablet    Primary osteoarthritis of right knee        Relevant Medications    traMADoL (ULTRAM) 50 mg tablet    Osteoporosis without current pathological fracture, unspecified osteoporosis type                  Plan:       Hypertensive heart disease without heart failure - controlled.   -     felodipine (PLENDIL) 10 MG 24 hr tablet; Take 1  tablet (10 mg total) by mouth 2 (two) times a day.  Dispense: 180 tablet; Refill: 1  -     benazepriL (LOTENSIN) 40 MG tablet; Take 1 tablet (40 mg total) by mouth once daily.  Dispense: 90 tablet; Refill: 1    Hyperlipidemia, unspecified hyperlipidemia type - controlled.  -     atorvastatin (LIPITOR) 20 MG tablet; Take 1 tablet (20 mg total) by mouth every evening.  Dispense: 90 tablet; Refill: 1    Pre-diabetes - stable, continue low sugar diet.     Atherosclerosis of aorta  -     atorvastatin (LIPITOR) 20 MG tablet; Take 1 tablet (20 mg total) by mouth every evening.  Dispense: 90 tablet; Refill: 1    Stage 3a chronic kidney disease - stable, avoiding NSAIDS and bisphosphonates.    Panlobular emphysema - stable on current management prescribed by Rakesh Bradford Pulmonologist.    Primary osteoarthritis of right knee  -     traMADoL (ULTRAM) 50 mg tablet; Take 1 tablet (50 mg total) by mouth every 8 (eight) hours as needed for Pain.  Dispense: 90 tablet; Refill: 2    Lumbar spondylosis  -     traMADoL (ULTRAM) 50 mg tablet; Take 1 tablet (50 mg total) by mouth every 8 (eight) hours as needed for Pain.  Dispense: 90 tablet; Refill: 2    Secondary and unspecified malignant neoplasm of axilla and upper limb lymph nodes    Osteoporosis without current pathological fracture, unspecified osteoporosis type        -     she now agrees to treatment with Prolia since it has been endorsed by Oncology due to Letrozole, Therapy Plan ordered.     Major depressive disorder, recurrent, mild - stable on Citalopram recently refilled.    Exudative age-related macular degeneration of left eye with active choroidal neovascularization - followed closely by [facility].

## 2023-02-08 ENCOUNTER — TELEPHONE (OUTPATIENT)
Dept: INFECTIOUS DISEASES | Facility: HOSPITAL | Age: 88
End: 2023-02-08
Payer: MEDICARE

## 2023-02-15 ENCOUNTER — PATIENT MESSAGE (OUTPATIENT)
Dept: INFECTIOUS DISEASES | Facility: HOSPITAL | Age: 88
End: 2023-02-15
Payer: MEDICARE

## 2023-02-16 ENCOUNTER — PROCEDURE VISIT (OUTPATIENT)
Dept: OPHTHALMOLOGY | Facility: CLINIC | Age: 88
End: 2023-02-16
Payer: MEDICARE

## 2023-02-16 DIAGNOSIS — H35.371 EPIRETINAL MEMBRANE, RIGHT EYE: ICD-10-CM

## 2023-02-16 DIAGNOSIS — H35.3221 EXUDATIVE AGE-RELATED MACULAR DEGENERATION OF LEFT EYE WITH ACTIVE CHOROIDAL NEOVASCULARIZATION: Primary | ICD-10-CM

## 2023-02-16 DIAGNOSIS — Z96.1 PSEUDOPHAKIA OF BOTH EYES: ICD-10-CM

## 2023-02-16 DIAGNOSIS — H35.3112 INTERMEDIATE STAGE DRY AGE-RELATED MACULAR DEGENERATION OF RIGHT EYE: ICD-10-CM

## 2023-02-16 PROCEDURE — 92134 OCT, RETINA - OU - BOTH EYES: ICD-10-PCS | Mod: S$GLB,,, | Performed by: OPHTHALMOLOGY

## 2023-02-16 PROCEDURE — 99214 OFFICE O/P EST MOD 30 MIN: CPT | Mod: 25,S$GLB,, | Performed by: OPHTHALMOLOGY

## 2023-02-16 PROCEDURE — 67028 INJECTION EYE DRUG: CPT | Mod: LT,S$GLB,, | Performed by: OPHTHALMOLOGY

## 2023-02-16 PROCEDURE — 92134 CPTRZ OPH DX IMG PST SGM RTA: CPT | Mod: S$GLB,,, | Performed by: OPHTHALMOLOGY

## 2023-02-16 PROCEDURE — 99214 PR OFFICE/OUTPT VISIT, EST, LEVL IV, 30-39 MIN: ICD-10-PCS | Mod: 25,S$GLB,, | Performed by: OPHTHALMOLOGY

## 2023-02-16 PROCEDURE — 67028 PR INJECT INTRAVITREAL PHARMCOLOGIC: ICD-10-PCS | Mod: LT,S$GLB,, | Performed by: OPHTHALMOLOGY

## 2023-02-16 RX ADMIN — Medication 1.25 MG: at 03:02

## 2023-02-16 NOTE — PROGRESS NOTES
HPI    8 wk OCT/TO OS    Pt states her va is improved since last visit OS. Very occasional   floaters. No new symptoms or concerns today.    No flashes  Floaters OS  No pain  Gtts: AT's TID OU  Last edited by Bonita Wilson on 2/16/2023  2:47 PM.         A/P    ICD-10-CM ICD-9-CM   1. Exudative age-related macular degeneration of left eye with active choroidal neovascularization  H35.3221 362.52     362.16   2. 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 Epiretinal membrane, right eye  H35.371 362.56   4. Pseudophakia of both eyes  Z96.1 V43.1         1. Exudative age-related macular degeneration of left eye with active choroidal neovascularization  2. 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  Schedule Avastin Injection today Left Eye Patient chooses to proceed with injection R/B/A discussed include infection retinal detachment and stroke    Patient identified.  Timeout performed.    Risks, benefits, and alternatives to treatment were discussed in detail with the patient, including bleeding/infection (endophthalmitis)/etc.  The patient voiced understanding and wished to proceed with the procedure.  See separate consent form.    Injection Procedure Note:  Diagnosis: wet AMD Left Eye    Topical Proparacaine drop placed then topical 5% Betadine and then subconjunctival lidocaine 2% injection  Sterile gloves used, and sterile lid speculum placed.  5% Betadine placed at injection site again prior to injection.  Avastin 1.25mg in 0.05cc Injected inferotemporally  3.5-4mm posterior to the limbus.  Complications: None  Va at least CF at 5 feet post injection.  Retina, ONH, IOP normal after injection.    Followup as below.  Patient should return immediately PRN.  Retinal Detachment and Endophthalmitis precautions given.       3. Epiretinal membrane, right eye  Mod ERM, VA 20/30 stable no changes   Pt asymptomatic, no metamorphopsia  Plan: Observation  Amsler precautions discussed     4. Pseudophakia of both eyes  Good lens position OU  Plan: Observation      RTC 6 weeks DFE/OCTm OU, MANA OS        I saw and examined the patient and reviewed in detail the findings documented. The final examination findings, image interpretations, and plan as documented in the record represent my personal judgment and conclusions.    Dwight Melton MD  Vitreoretinal Surgery   [facility]

## 2023-02-17 NOTE — PATIENT INSTRUCTIONS

## 2023-03-08 ENCOUNTER — TELEPHONE (OUTPATIENT)
Dept: PAIN MEDICINE | Facility: OTHER | Age: 88
End: 2023-03-08
Payer: MEDICARE

## 2023-03-09 ENCOUNTER — OFFICE VISIT (OUTPATIENT)
Dept: PAIN MEDICINE | Facility: CLINIC | Age: 88
End: 2023-03-09
Payer: MEDICARE

## 2023-03-09 DIAGNOSIS — M54.16 LUMBAR RADICULOPATHY: Primary | ICD-10-CM

## 2023-03-09 DIAGNOSIS — M47.816 LUMBAR SPONDYLOSIS: ICD-10-CM

## 2023-03-09 DIAGNOSIS — M51.36 DDD (DEGENERATIVE DISC DISEASE), LUMBAR: ICD-10-CM

## 2023-03-09 PROCEDURE — 1160F PR REVIEW ALL MEDS BY PRESCRIBER/CLIN PHARMACIST DOCUMENTED: ICD-10-PCS | Mod: CPTII,95,, | Performed by: NURSE PRACTITIONER

## 2023-03-09 PROCEDURE — 1160F RVW MEDS BY RX/DR IN RCRD: CPT | Mod: CPTII,95,, | Performed by: NURSE PRACTITIONER

## 2023-03-09 PROCEDURE — 99213 OFFICE O/P EST LOW 20 MIN: CPT | Mod: 95,,, | Performed by: NURSE PRACTITIONER

## 2023-03-09 PROCEDURE — 1159F MED LIST DOCD IN RCRD: CPT | Mod: CPTII,95,, | Performed by: NURSE PRACTITIONER

## 2023-03-09 PROCEDURE — 99213 PR OFFICE/OUTPT VISIT, EST, LEVL III, 20-29 MIN: ICD-10-PCS | Mod: 95,,, | Performed by: NURSE PRACTITIONER

## 2023-03-09 PROCEDURE — 1159F PR MEDICATION LIST DOCUMENTED IN MEDICAL RECORD: ICD-10-PCS | Mod: CPTII,95,, | Performed by: NURSE PRACTITIONER

## 2023-03-09 RX ORDER — GABAPENTIN 100 MG/1
100 CAPSULE ORAL NIGHTLY
Qty: 90 CAPSULE | Refills: 1 | Status: SHIPPED | OUTPATIENT
Start: 2023-03-09 | End: 2023-08-16 | Stop reason: SDUPTHER

## 2023-03-09 NOTE — PROGRESS NOTES
Chronic Pain-Tele-Medicine-Established Note (Follow up visit)        The patient location is: Home  The chief complaint leading to consultation is: pain  Visit type: Virtual visit with synchronous audio and video  Total time spent with patient: 25 min  Each patient to whom he or she provides medical services by telemedicine is:  (1) informed of the relationship between the physician and patient and the respective role of any other health care provider with respect to management of the patient; and (2) notified that he or she may decline to receive medical services by telemedicine and may withdraw from such care at any time.        SUBJECTIVE:    Interval History 3/9/2023:  The patient returns to clinic today for follow up of back pain via virtual visit. She reports intermittent low back pain. She denies any radicular leg pain. She is using an acupressure knee brace with benefit. Her pain is tolerable at this time. She continues to take Gabapentin with benefit. She denies any other health changes.     Interval History 12/9/2022:  The patient returns to clinic today for follow up of low back pain via virtual visit. She is s/p left L3/4 and L4/5 TF ASHUTOSH on 11/17/2022. She reports 60% relief of her pain. She continues to report intermittent low back pain. She denies any radicular leg pain. She does report bilateral foot pain, described as burning in nature. Her pain is tolerable at this time. She continues to take Gabapentin. She also takes Tramadol per her PCP. She denies any other health changes.     Interval History 10/20/22  Ms. Cook complains of worsening left low back pain radiating down her lateral thigh to the knee. The pain is only when she walks/moves, she in pain free when sitting. She is s/p bilateral TESI L4/5 2 months ago, which she had relief from, but acutely worsened when she bent over to pick something off the ground. She states the pain is similar to her pain prior to this. She denies numbness,  tingling, bladder/bowel problems.    Interval History 10/6/2022:   Mrs Cook presents for follow up virtually. At prior visit she had Left GTB injection with significant benefit in office. Her lower back and hip pain were further evaluated and noted newer L3 compression Fx. While she visually appears to have significant improvement of pain to me she does voice focal upper/mid lumbar pain persists. Discussed treatment options and Kyphoplasty recommended by Dr Wilcox but there was hesitation regarding further escalation to kyphoplasty. We did discusses prior osteoporosis diagnosis and considering treatment options with PCP. She does not voice any s/s concerning for cauda equina.      Interval History 8/22/2022:  Mrs Cook presents for follow up acute pain. She is s/p repeat Bilateral L4/5 TFESI with mild improvement but having severe left lateral hip pain. She also continues to have burning pain to Right heel and numbness to right lateral lower leg. She has chronic urinary continence but no new neurological issues.She continues to take tramadol by PCP with some benefit     Interval History 8/5/2022:  Mrs Cook presents for follow up evaluation of returning lower back pain. She has exact pain relieved prior by B L4/5 TFESIs. She has newer complaint of bilateral feet burning pains as well. She has had JESSE noting arterial issues R>L. She continues to take Tramadol and neurontin without SE.She would like to repeat ASHUTOSH as it provided 7 months of 75% relief and symptoms returning severe over last few weeks. No voicing of symptoms concerning for cauda equina.     Interval History 12/6/2021:  The patient returns to clinic today for follow up of low back pain. She reports increased low back and bilateral hip pain over the last two weeks. She reports intermittent radiating pain into her lateral thighs. She does have difficulty sleeping due to pain. Her pain is also worse with standing and walking. She continues to take  Gabapentin with benefit. She also takes Tramadol per her PCP with benefit. She denies any other health changes. Her pain today is 8/10.    Interval History 7/6/2021:  The patient returns to clinic today for follow up of low back pain via virtual visit. She is s/p bilateral L4/5 TF ASHUTOSH on 6/21/2021. She reports 75% relief of her low back and leg pain. She reports low back pain, worse in the morning. She reports intermittent radiating pain into the posterolateral aspect of both legs to her knees. She continues to take Gabapentin with benefit. She denies any weakness. She denies any other health changes. Her pain today is 2/10.    Interval History 6/8/2021:  The patient returns to clinic today for follow up of low back pain. She is here today for imaging review. She continues to report low back pain that radiates into the posterolateral aspect of both legs to below her knee. Her pain is worse with prolonged standing and walking. She does feel like she will fall with prolonged walking. She also reports relief with bending forward. She is currently taking Gabapentin at bedtime with benefit. She continues to take Tramadol per her PCP with benefit. She denies any other health changes. Her pain today is 8/10.    Interval History 5/20/2021:  The patient returns to clinic today for follow up of low back pain. She is s/p bilateral hip joint injections on 4/26/2021. She reports no relief. She reports increased low back pain that radiates into the posterior aspect of both legs to her ankles. She does report numbness to her feet. Her pain is worse with prolonged standing and walking. She feels as though her legs will give out with prolonged walking. She denies any bowel or bladder incontinence. She denies any other health changes. Her pain today is 9/10.    Interval History 3/23/2021:  The patient returns to clinic today for follow up of low back and hip pain. She reports increased bilateral hip pain. She describes this pain as  deep and aching. Her pain is worse with prolonged standing and walking. She also reports low back pain that is aching in nature. She denies any radicular leg pain. Of note, she reports increased swelling and pain to her left ankle and foot. She does report a trip last week. She did see Orthopedics today and is scheduled for xray. She denies any other health changes. Her pain today is 8/10.    Interval History 12/9/2020:  She returns for follow-up.  She is doing well since her facet injections.  Her right hip is hurting.  It interferes with her mobility and comfort while laying down.  No new symptomatology otherwise.    Interval History 8/11/2020:  The patient has a virtual visit today for follow up.  She is s/p Bilateral L3-4, L4-5 L5-S1 facet joint injections with 90% relief.  She says that her back pain is minimal at this time.  She is able to move around and walk better since the procedure as well.  She is having some left hip pain and feels as though her leg will give out sometimes when walking.  She would like a referral to ortho close to her home in [facility].  Her pain today is 1/10.  She denies any respiratory changes since procedure including fever, cough or SOB.    Interval History 12/5/2019:  The patient is here for follow up of lower back and bilateral hip pain.  Her back pain has been minimal.  She is having increased lateral hip and buttock pain recently.  She had TPIs in the past and would like to repeat this today.  Her pain today is 8/10.    Previous Encounter:  Anahi Cook presents to the clinic for a follow-up appointment for lower back and left lower extremity pain.  She had T12 kyphoplasty performed on 11/8/18 with significant improvement in symptoms.  She was admitted through the ED on 4/3/19 after suffering fractures of 6th, 8th and possible 7th ribs of the left side after a fall at home.  Thoracic imaging also showed stable slight anterior wedging at T11.  She had bilateral L3-4, L4-5 and  L5-S1 facet injections on 4/1/19 with 80% relief of lower back pain.  Her current pain is minimal.  She has mild leg pain with walking.  She continues to follow up with oncology regularly.  Since the last visit, Anahi Cook states the pain has been improving.  Current pain intensity is 2/10.     Pain Medications:  OTC Tylenol    Opioid Contract: no     report:  Not applicable    Pain Procedures:   7/21/14 L4-5 IL ASHUTOSH- significant benefit  12/1/15 Left GT bursa injection  4/26/16 Left GT bursa injection  12/15/16 L4-5 IL ASHUTOSH- significant benefit  2/15/18 L4-5 IL ASHUTOSH- 100% relief  8/21/18 L4-5 IL ASHUTOSH- significant relief  10/15/18 Bilateral L3-4, L4-5 L5-S1 facet joint injections  11/8/18 T12 kyphoplasty- significant relief  4/1/19 Bilateral L3-4, L4-5 L5-S1 facet joint injections- 80% relief  7/27/20 Bilateral L3-4, L4-5 L5-S1 facet joint injections- 90% relief  4/26/2021- Bilateral hip joint injections- no relief    6/21/2021- Bilateral L4/5 TF ASHUTOSH- 75% relief  8/9/2022 B L4/5 TFESI without significant benefit.   11/17/2022- Left L3/4 and L4/5 TF ASHUTOSH    Physical Therapy/Home Exercise: yes     Imaging:     MRI LUMBAR SPINE WITHOUT CONTRAST 9/12/2022  COMPARISON:  5/29/2021     FINDINGS:  Chronic vertebral plana T12.  Extension of the posterosuperior cortex into the canal by approximately 6 mm.     There is now height loss at L3, approximately 30%.  Associated STIR signal hyperintensity.  Retropulsion of the posterior cortex into the canal approximately 5 mm.     Remaining vertebral body heights are maintained.     Mild disc space narrowing L4-5.     Grade 1 retrolisthesis L1-2.  Grade 1 anterolisthesis L4-5     Conus terminates appropriately at L1.     Multilevel degenerative change as diesel below:     T11-12: Retropulsion of the posterosuperior cortex of T12 into the canal with mild canal narrowing.     T12-L1: Posterior circumferential disc bulge, asymmetric to the left.  Mild left neural foraminal  narrowing.     L1-2: Facet and ligamentum flavum hypertrophic changes with mild left neural foraminal narrowing.     L2-3: Posterior circumferential disc bulge and ligamentum flavum hypertrophy retropulsion of the posterior cortex of L3 into the canal.  Findings result in mild canal narrowing.  Severe right and moderate left neural foraminal narrowing.     L3-4: Posterior circumferential disc bulge.  Facet and ligamentum flavum hypertrophic changes.  Mild canal and moderate bilateral neural foraminal narrowing.     L4-5: Grade 1 anterolisthesis of L4-5 with uncovering of the disc.  Facet and ligamentum flavum hypertrophic changes.  Combinations contribute to severe canal stenosis.  Severe left and moderate right neural foraminal narrowing.     L5-S1: Central canal and neural foramina are well maintained.     Multiple T2 hyperintense foci in both kidneys, including a thinly septated T2 hyperintense focus on the left measuring 15 mm.     Impression:     Acute fracture along the superior endplate of L3 with approximately 30% height loss.  Stable chronic height loss T12.     Multilevel degenerative change, worst at L4-5 where severe canal stenosis results.     Multilevel neural foraminal narrowing, severe L2-3 and L4-5 as above.     Bilateral renal cysts, including a thinly septated cyst on the left.  Findings can be further evaluated with renal ultrasound.     This report was flagged in Epic as abnormal.    XR LUMBAR SPINE 5 VIEW WITH FLEX AND EXT 8/22/2022  COMPARISON:  Lumbar spine MRI 05/29/2021     FINDINGS:  Previous vertebroplasty T12.     Height loss along the superior endplate of L3 is new compared to that exam.  Height loss is approximately 40%.  There may be mild height loss at L2.     Disc space narrowing and endplate changes L4-5.  Facet arthropathy L4-5.     Grade 1 anterolisthesis L4-5 with no significant change with flexion or extension.     Aortoiliac atherosclerosis.     Impression:     Height loss  along the superior endplate of L3, approximately 40%, is new compared to May 2021.  By today's radiographs, there appears to be mild sclerosis along the superior endplate which can be seen with a subacute or chronic fracture.  Correlation with MRI advised if there is recent trauma or acute back pain to exclude the possibility of an acute fracture.     Degenerative change as above.     This report was flagged in Epic as abnormal.    XR HIP WITH PELVIS WHEN PERFORMED, 2 OR 3 VIEWS LEFT 8/22/2022  COMPARISON:  03/24/2022 and 09/09/2020     FINDINGS:  The femoral head is well located with respect to the acetabulum. No acute fracture seen.  Osteophyte formation, subchondral sclerosis, with moderate narrowing femoroacetabular joint bilaterally.     No significant soft tissue edema or radiopaque retained foreign body.  Aortoiliac atherosclerosis.     Impression:     No acute fracture or dislocation seen.  Moderate osteoarthritis and joint space narrowing.    MRI Lumbar Spine 5/29/2021:  COMPARISON:  Prior MRI from 2014 and 2018     FINDINGS:  There is a transitional lumbosacral segment and spine labeling is as on prior examinations.     Stable severe compression deformity at T12 with retropulsion.  Remaining vertebral body heights are maintained.  Grade 1 anterolisthesis appears increased at L4-5 as compared to previous.  There is no marrow replacement process.  The spinal cord is normal in signal.  Review of paravertebral structures demonstrates high T2 low T1 signal foci within kidneys.     Not included in the field of view of the axial, there is disc bulge at T10-11.  This does not appear to result in a significant degree of spinal canal narrowing on the basis of the sagittal imaging.     T12 demonstrates retropulsion with mild narrowing of the spinal canal.     T12-L1 demonstrates minor disc bulge.     L1-L2 demonstrates mild facet degenerative change.  There is no significant spinal canal stenosis.  There is mild  bilateral neural foraminal narrowing.     L2-3 demonstrates facet degenerative change.  There is no significant spinal canal stenosis.  There is right foraminal protrusion.  This results in a moderate degree of right foraminal narrowing.  There is mild left foraminal narrowing.     L3-4 demonstrates facet degenerative change and ligamentum flavum thickening.  There is a facet joint synovial cyst along the posterolateral aspect of the left facet.  There is right foraminal protrusion.  There is mild left and moderate right foraminal narrowing.     L4-5 demonstrates facet degenerative change and ligamentum flavum thickening.  There is anterolisthesis.  There is severe left and moderate right foraminal narrowing.  There is a severe degree of spinal canal narrowing.     L5-S1 is unremarkable.     Impression:     Multilevel degenerative changes as further detailed above.  Findings are most significant at the L4-5 level, noting transitional lumbosacral segment.     Stable severe compression deformity at T12.     Foci within the kidneys bilaterally likely representing cyst can be correlated with ultrasound.          Past Medical History:  Past Medical History:   Diagnosis Date    Anxiety     Arthritis     Back pain     Breast cancer 1/17/2018    Breast mass 1/15/2018    Chronic kidney disease, stage 2, mildly decreased GFR     COPD (chronic obstructive pulmonary disease)     emphysema    Depression     Dysuria 1/29/2018    Exudative age-related macular degeneration of left eye with active choroidal neovascularization 8/25/2022    Family history of breast cancer 1/17/2018    GERD (gastroesophageal reflux disease)     Hypertension     Infiltrating ductal carcinoma of breast, left 7/12/2018    Osteoporosis, senile     Ovarian mass 1/15/2018    Pneumonia     S/P robotic assisted laparoscopic BSO (bilateral salpingo-oophorectomy) 2/23/2018       Past Surgical History:  Past Surgical History:   Procedure Laterality Date     AXILLARY NODE DISSECTION Left 7/12/2018    Procedure: LYMPHADENECTOMY, AXILLARY;  Surgeon: Amanda Caballero MD;  [facility];  Service: General;  Laterality: Left;    BREAST BIOPSY      BREAST LUMPECTOMY      CATARACT EXTRACTION      CHOLECYSTECTOMY      COLONOSCOPY      ESOPHAGOGASTRODUODENOSCOPY      ESOPHAGOGASTRODUODENOSCOPY N/A 10/14/2019    Procedure: EGD (ESOPHAGOGASTRODUODENOSCOPY);  Surgeon: Davonte Thompson MD;  Location: [facility] ENDO (2ND FLR);  Service: Endoscopy;  Laterality: N/A;  hx of pulmonary hypertension-PA 50     pt requesting ASAP    EYE SURGERY      FIXATION KYPHOPLASTY N/A 11/8/2018    Procedure: Kyphoplasty   T12;  Surgeon: Merly Wilcox MD;  Location: [facility] CATH LAB;  Service: Pain Management;  Laterality: N/A;  T12  Nia Reps e-mailed with date and time    HYSTERECTOMY      INJECTION FOR SENTINEL NODE IDENTIFICATION Left 7/12/2018    Procedure: INJECTION, FOR SENTINEL NODE IDENTIFICATION;  Surgeon: Amanda Caballero MD;  [facility];  Service: General;  Laterality: Left;    INJECTION OF FACET JOINT Bilateral 10/15/2018    Procedure: INJECTION, FACET JOINT, BILATERAL LUMBAR L3-4, 4-5, 5-S1 FACET JOINT INJECTIONS;  Surgeon: Merly Wilcox MD;  Location: [facility] PAIN MGT;  Service: Pain Management;  Laterality: Bilateral;    INJECTION OF FACET JOINT Bilateral 4/1/2019    Procedure: INJECTION, FACET JOINT, L3-L4,L4-L5,L5-S1;  Surgeon: Merly Wilcox MD;  Location: [facility] PAIN MGT;  Service: Pain Management;  Laterality: Bilateral;    INJECTION OF FACET JOINT Bilateral 6/20/2019    Procedure: INJECTION, FACET JOINT, L3-L4,L4-L5,L5-S1 NEED CONSENT;  Surgeon: Merly Wilcox MD;  Location: [facility] PAIN MGT;  Service: Pain Management;  Laterality: Bilateral;    INJECTION OF FACET JOINT Bilateral 7/27/2020    Procedure: INJECTION, FACET JOINT BILATERAL L3/4, L4/5 and L5/S1;  Surgeon: Merly Wilcox MD;  Location: [facility] PAIN MGT;  Service: Pain Management;  Laterality: Bilateral;    INJECTION OF  JOINT Bilateral 7/27/2020    Procedure: INJECTION, JOINT, BILATERAL GREATER TROCHANTERIC BURSA;  Surgeon: Merly Wilcox MD;  Location: [facility] PAIN MGT;  Service: Pain Management;  Laterality: Bilateral;    INJECTION OF JOINT Bilateral 4/26/2021    Procedure: INJECTION, JOINT, HIP;  Surgeon: Merly Wilcox MD;  Location: [facility] PAIN MGT;  Service: Pain Management;  Laterality: Bilateral;  consent needed    INJECTION OF STEROID Right 11/15/2021    Procedure: INJECTION, STEROID RIGHT MIDDLE AND SAMLL FINGER;  Surgeon: Florecita Da Silva MD;  Location: [facility] OR;  Service: Orthopedics;  Laterality: Right;    MASTECTOMY, PARTIAL Left 7/12/2018    Procedure: MASTECTOMY, PARTIAL-W/SEED LOCALIZATION;  Surgeon: Amanda Caballero MD;  [facility];  Service: General;  Laterality: Left;    CT REMOVAL OF OVARY/TUBE(S)  02/23/2018    Robotic Assisted    ROTATOR CUFF REPAIR Right     rotator cuff repair right shoulder    TONSILLECTOMY      TRANSFORAMINAL EPIDURAL INJECTION OF STEROID Bilateral 6/21/2021    Procedure: INJECTION, STEROID, EPIDURAL, TRANSFORAMINAL APPROACH  bilateral L4/5 TF ASHUTOSH;  Surgeon: Merly Wilcox MD;  Location: [facility] PAIN MGT;  Service: Pain Management;  Laterality: Bilateral;  consent needed    TRANSFORAMINAL EPIDURAL INJECTION OF STEROID Bilateral 1/3/2022    Procedure: INJECTION, STEROID, EPIDURAL, TRANSFORAMINAL APPROACH Bilateral L4/5 Needs consent;  Surgeon: Merly Wilcox MD;  Location: [facility] PAIN MGT;  Service: Pain Management;  Laterality: Bilateral;    TRANSFORAMINAL EPIDURAL INJECTION OF STEROID Bilateral 8/9/2022    Procedure: INJECTION, STEROID, EPIDURAL, TRANSFORAMINAL APPROACH, BILATERAL L4-L5   MEDICALLY URGENT;  Surgeon: Merly Wilcox MD;  Location: [facility] PAIN MGT;  Service: Pain Management;  Laterality: Bilateral;    TRANSFORAMINAL EPIDURAL INJECTION OF STEROID Left 11/17/2022    Procedure: INJECTION, STEROID, EPIDURAL, TRANSFORAMINAL APPROACH LEFT L3-L4 AND L4-L5 CONTRAST;  Surgeon: Merly  MD Brenden;  Location: [facility] PAIN MGT;  Service: Pain Management;  Laterality: Left;    TRIGGER FINGER RELEASE Left 11/15/2021    Procedure: RELEASE, TRIGGER FINGER, LEFT MIDDLE AND RING;  Surgeon: Florecita Da Silva MD;  Location: [facility] OR;  Service: Orthopedics;  Laterality: Left;       Family History:  Family History   Problem Relation Age of Onset    Heart failure Mother     Kidney failure Mother     Heart attack Father     Breast cancer Sister 66        Lump, XRT, chemo, recurrence 10 years later.    Cancer Brother         leukemia    Heart attack Brother 58    Pulmonary embolism Brother 45    Heart attack Brother 52    Breast cancer Maternal Grandmother 60        advanced at diagnosis    Breast cancer Maternal Aunt 83         at 92    Colon cancer Neg Hx     Esophageal cancer Neg Hx        Social History:  Social History     Socioeconomic History    Marital status: Single    Number of children: 3   Occupational History    Occupation: Multiple jobs, see social documentation section     Comment: Retired   Tobacco Use    Smoking status: Former     Packs/day: 1.00     Years: 40.00     Pack years: 40.00     Types: Cigarettes    Smokeless tobacco: Never    Tobacco comments:     Smoked >1 ppd for at least 40 years, quit around    Substance and Sexual Activity    Alcohol use: Yes     Comment: occasional glass of wine or cocktail    Drug use: No    Sexual activity: Yes     Partners: Male   Social History Narrative    Worked many jobs while raising 3 children.  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  She was  from her first , but took care of him at the end of his life.       REVIEW OF SYSTEMS:    GENERAL:  No weight loss, malaise or fevers.  HEENT:   No recent changes in vision or hearing  NECK:  Negative for lumps, no difficulty with swallowing.  RESPIRATORY:  Negative for cough, wheezing or  shortness of breath, patient denies any recent URI. COPD.  CARDIOVASCULAR:  Negative for chest pain, leg swelling or palpitations. Hypertension.  GI:  Negative for abdominal discomfort, blood in stools or black stools or change in bowel habits.  MUSCULOSKELETAL:  See HPI.  SKIN:  Negative for lesions, rash, and itching.  PSYCH:  No mood disorder or recent psychosocial stressors.  Patients sleep is not disturbed secondary to pain.  HEMATOLOGY/LYMPHOLOGY:  Negative for prolonged bleeding, bruising easily or swollen nodes.  Patient is not currently taking any anti-coagulants  NEURO:   No history of headaches, syncope, paralysis, seizures or tremors.  All other reviewed and negative other than HPI.    OBJECTIVE:    Exam limited due to virtual visit:  GENERAL: Well appearing, in no acute distress, alert and oriented x3.  PSYCH:  Mood and affect appropriate.    Previous physical exam:  LMP  (LMP Unknown)     PHYSICAL EXAMINATION:     GENERAL: Well appearing, in no acute distress, alert and oriented x3.  PSYCH:  Mood and affect appropriate.  SKIN: Skin color, texture, turgor normal, no rashes or lesions.  HEAD/FACE:  Normocephalic, atraumatic. Cranial nerves grossly intact.  CV: RRR with palpation of the radial artery.  PULM: No evidence of respiratory difficulty, symmetric chest rise.  BACK: Straight leg raising in the sitting position is negative to radicular pain bilaterally.  There is pain with palpation over lumbar facet joints bilaterally.  Limited ROM with pain on flexion and  extension.  Positive facet loading bilaterally.  EXTREMITIES:  Good capillary refill.  MUSCULOSKELETAL: There is mild pain with internal and external rotation of both hips. There is pain with palpation over bilateral SI joints. FABERs is positive bilaterally.  No atrophy or tone abnormalities are noted.  NEURO: Bilateral lower extremity coordination and muscle stretch reflexes are physiologic and symmetric.  Plantar response are downgoing. No  clonus.  Normal sensation.  GAIT: Antalgic- ambulates with rolling walker.      ASSESSMENT: 88 y.o. year old female with lower back and lower extremity pain, consistent with the following diagnoses:     1. Lumbar radiculopathy  gabapentin (NEURONTIN) 100 MG capsule      2. Lumbar spondylosis        3. DDD (degenerative disc disease), lumbar                    PLAN:     - Previous imaging reviewed today.    - We can repeat left L3/4 and L4/5 TF ASHUTOSH as needed.     - Continue Gabapentin 100 mg at bedtime. Refill provided.     - Continue Tramadol per PCP.     - RTC in 3 months or sooner if needed.     The above plan and management options were discussed at length with patient. Patient is in agreement with the above and verbalized understanding.    Amanda Garcia  03/09/2023

## 2023-03-29 ENCOUNTER — PES CALL (OUTPATIENT)
Dept: ADMINISTRATIVE | Facility: CLINIC | Age: 88
End: 2023-03-29
Payer: MEDICARE

## 2023-03-30 ENCOUNTER — PROCEDURE VISIT (OUTPATIENT)
Dept: OPHTHALMOLOGY | Facility: CLINIC | Age: 88
End: 2023-03-30
Payer: MEDICARE

## 2023-03-30 DIAGNOSIS — H35.371 EPIRETINAL MEMBRANE, RIGHT EYE: ICD-10-CM

## 2023-03-30 DIAGNOSIS — H35.3221 EXUDATIVE AGE-RELATED MACULAR DEGENERATION OF LEFT EYE WITH ACTIVE CHOROIDAL NEOVASCULARIZATION: Primary | ICD-10-CM

## 2023-03-30 DIAGNOSIS — H35.3112 INTERMEDIATE STAGE DRY AGE-RELATED MACULAR DEGENERATION OF RIGHT EYE: ICD-10-CM

## 2023-03-30 PROCEDURE — 92134 OCT, RETINA - OU - BOTH EYES: ICD-10-PCS | Mod: S$GLB,,, | Performed by: OPHTHALMOLOGY

## 2023-03-30 PROCEDURE — 67028 INJECTION EYE DRUG: CPT | Mod: LT,S$GLB,, | Performed by: OPHTHALMOLOGY

## 2023-03-30 PROCEDURE — 99214 OFFICE O/P EST MOD 30 MIN: CPT | Mod: 25,S$GLB,, | Performed by: OPHTHALMOLOGY

## 2023-03-30 PROCEDURE — 99214 PR OFFICE/OUTPT VISIT, EST, LEVL IV, 30-39 MIN: ICD-10-PCS | Mod: 25,S$GLB,, | Performed by: OPHTHALMOLOGY

## 2023-03-30 PROCEDURE — 67028 PR INJECT INTRAVITREAL PHARMCOLOGIC: ICD-10-PCS | Mod: LT,S$GLB,, | Performed by: OPHTHALMOLOGY

## 2023-03-30 PROCEDURE — 92134 CPTRZ OPH DX IMG PST SGM RTA: CPT | Mod: S$GLB,,, | Performed by: OPHTHALMOLOGY

## 2023-03-30 RX ADMIN — Medication 1.25 MG: at 03:03

## 2023-03-30 NOTE — PROGRESS NOTES
HPI    Here for AMD f/u, last Avastin about 6 weeks ago  Feels vision better    Last edited by Dwight Melton MD on 3/30/2023  3:30 PM.         A/P    ICD-10-CM ICD-9-CM   1. Exudative age-related macular degeneration of left eye with active choroidal neovascularization  H35.3221 362.52     362.16   2. 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 Epiretinal membrane, right eye  H35.371 362.56           1. Exudative age-related macular degeneration of left eye with active choroidal neovascularization  2. 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  Schedule Avastin Injection today Left Eye Patient chooses to proceed with injection R/B/A discussed include infection retinal detachment and stroke    Patient identified.  Timeout performed.    Risks, benefits, and alternatives to treatment were discussed in detail with the patient, including bleeding/infection (endophthalmitis)/etc.  The patient voiced understanding and wished to proceed with the procedure.  See separate consent form.    Injection Procedure Note:  Diagnosis: wet AMD Left Eye    Topical Proparacaine drop placed then topical 5% Betadine and then subconjunctival lidocaine 2% injection  Sterile gloves used, and sterile lid speculum placed.  5% Betadine placed at injection site again prior to injection.  Avastin 1.25mg in 0.05cc Injected inferotemporally 3.5-4mm posterior to the limbus.  Complications: None  Va at least CF at 5 feet post injection.  Retina, ONH, IOP normal after  injection.    Followup as below.  Patient should return immediately PRN.  Retinal Detachment and Endophthalmitis precautions given.       3. Epiretinal membrane, right eye  Mod ERM, VA 20/30 stable no changes   Pt asymptomatic, no metamorphopsia  Plan: Observation  Amsler precautions discussed     4. Pseudophakia of both eyes  Good lens position OU  Plan: Observation      RTC 6 weeks DFE/OCTm OU, get auth for Eylea OS      I saw and examined the patient and reviewed in detail the findings documented. The final examination findings, image interpretations, and plan as documented in the record represent my personal judgment and conclusions.    Dwight Melton MD  Vitreoretinal Surgery   [facility]

## 2023-04-25 ENCOUNTER — PES CALL (OUTPATIENT)
Dept: ADMINISTRATIVE | Facility: CLINIC | Age: 88
End: 2023-04-25
Payer: MEDICARE

## 2023-05-12 DIAGNOSIS — I11.9 HYPERTENSIVE HEART DISEASE WITHOUT HEART FAILURE: ICD-10-CM

## 2023-05-12 RX ORDER — TORSEMIDE 20 MG/1
TABLET ORAL
Qty: 90 TABLET | Refills: 2 | Status: SHIPPED | OUTPATIENT
Start: 2023-05-12 | End: 2023-11-19 | Stop reason: SDUPTHER

## 2023-05-12 NOTE — TELEPHONE ENCOUNTER
Refill Routing Note   Medication(s) are not appropriate for processing by [facility] for the following reason(s):      Drug-disease interaction: torsemide and Hypokalemia    [facility] action(s):  Defer None identified        Pharmacist review requested: Yes     Appointments  past 12m or future 3m with PCP    Date Provider   Last Visit   2/2/2023 Minerva Mathias MD   Next Visit   8/3/2023 Minerva Mathias MD   ED visits in past 90 days: 0        Note composed:10:43 AM 05/12/2023

## 2023-05-12 NOTE — TELEPHONE ENCOUNTER
Refill Decision Note   Anahi Cook  is requesting a refill authorization.  Brief Assessment and Rationale for Refill:  Approve     Medication Therapy Plan:         Alert overridden per protocol: Yes   Pharmacist review requested: Yes   Comments:     No Care Gaps recommended.     Note composed:10:50 AM 05/12/2023

## 2023-05-12 NOTE — TELEPHONE ENCOUNTER
No care due was identified.  [facility] Embedded Care Due Messages. Reference number: [id number].   5/12/2023 3:38:36 AM CDT

## 2023-05-16 ENCOUNTER — PROCEDURE VISIT (OUTPATIENT)
Dept: OPHTHALMOLOGY | Facility: CLINIC | Age: 88
End: 2023-05-16
Payer: MEDICARE

## 2023-05-16 DIAGNOSIS — H35.371 EPIRETINAL MEMBRANE, RIGHT EYE: ICD-10-CM

## 2023-05-16 DIAGNOSIS — H35.3112 INTERMEDIATE STAGE DRY AGE-RELATED MACULAR DEGENERATION OF RIGHT EYE: ICD-10-CM

## 2023-05-16 DIAGNOSIS — H35.3221 EXUDATIVE AGE-RELATED MACULAR DEGENERATION OF LEFT EYE WITH ACTIVE CHOROIDAL NEOVASCULARIZATION: Primary | ICD-10-CM

## 2023-05-16 DIAGNOSIS — Z96.1 PSEUDOPHAKIA OF BOTH EYES: ICD-10-CM

## 2023-05-16 PROCEDURE — 67028 PR INJECT INTRAVITREAL PHARMCOLOGIC: ICD-10-PCS | Mod: LT,S$GLB,, | Performed by: OPHTHALMOLOGY

## 2023-05-16 PROCEDURE — 92134 CPTRZ OPH DX IMG PST SGM RTA: CPT | Mod: S$GLB,,, | Performed by: OPHTHALMOLOGY

## 2023-05-16 PROCEDURE — 99214 OFFICE O/P EST MOD 30 MIN: CPT | Mod: 25,S$GLB,, | Performed by: OPHTHALMOLOGY

## 2023-05-16 PROCEDURE — 92134 OCT, RETINA - OU - BOTH EYES: ICD-10-PCS | Mod: S$GLB,,, | Performed by: OPHTHALMOLOGY

## 2023-05-16 PROCEDURE — 99214 PR OFFICE/OUTPT VISIT, EST, LEVL IV, 30-39 MIN: ICD-10-PCS | Mod: 25,S$GLB,, | Performed by: OPHTHALMOLOGY

## 2023-05-16 PROCEDURE — 67028 INJECTION EYE DRUG: CPT | Mod: LT,S$GLB,, | Performed by: OPHTHALMOLOGY

## 2023-05-16 NOTE — PROGRESS NOTES
HPI    VA OU no changes since last visit. VA OU better without glasses. Forgot   glasses today  Last edited by Chasidy Bradford on 5/16/2023  3:29 PM.           A/P    ICD-10-CM ICD-9-CM   1. Exudative age-related macular degeneration of left eye with active choroidal neovascularization  H35.3221 362.52     362.16   2. 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 Epiretinal membrane, right eye  H35.371 362.56   4. Pseudophakia of both eyes  Z96.1 V43.1          1. Exudative age-related macular degeneration of left eye with active choroidal neovascularization  2. 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  Schedule Eylea Injection Left Eye today Patient chooses to proceed with injection R/B/A discussed include infection retinal detachment and stroke    Patient identified.  Timeout performed.    Risks, benefits, and alternatives to treatment were discussed in detail with the patient, including bleeding/infection (endophthalmitis)/etc.  The patient voiced understanding and wished to proceed with the procedure.  See separate consent form.    Injection Procedure Note:  Diagnosis: CNVM Left Eye    Topical Proparacaine drop placed then topical 5% Betadine, then subcojunctival lidocaine 2% injection  Sterile gloves used, and sterile lid speculum placed.  5% Betadine placed at injection site again prior to injection.  Eylea 2mg in 0.05cc Injected inferotemporally 3.5-4mm posterior to the limbus.  Complications: None  Va at least CF at 5 feet post injection.  Retina, ONH, IOP normal after injection.    Followup as below.  Patient  should return immediately PRN.  Retinal Detachment and Endophthalmitis precautions given        3. Epiretinal membrane, right eye  Mod ERM, VA 20/30 stable    Pt asymptomatic, no metamorphopsia  Plan: Observation  Amsler precautions discussed     4. Pseudophakia of both eyes  Good lens position OU  Plan: Observation      RTC 4 weeks DFE/OCTm OU, POSSIBLE Eylea OS      I saw and examined the patient and reviewed in detail the findings documented. The final examination findings, image interpretations, and plan as documented in the record represent my personal judgment and conclusions.    Dwight Melton MD  Vitreoretinal Surgery   [facility]

## 2023-05-22 ENCOUNTER — HOSPITAL ENCOUNTER (OUTPATIENT)
Dept: RADIOLOGY | Facility: OTHER | Age: 88
Discharge: HOME | End: 2023-05-22
Attending: INTERNAL MEDICINE
Payer: MEDICARE

## 2023-05-22 DIAGNOSIS — M47.816 LUMBAR SPONDYLOSIS: ICD-10-CM

## 2023-05-22 DIAGNOSIS — Z79.811 USE OF AROMATASE INHIBITORS: ICD-10-CM

## 2023-05-22 DIAGNOSIS — Z12.31 ENCOUNTER FOR SCREENING MAMMOGRAM FOR MALIGNANT NEOPLASM OF BREAST: ICD-10-CM

## 2023-05-22 DIAGNOSIS — M17.11 PRIMARY OSTEOARTHRITIS OF RIGHT KNEE: ICD-10-CM

## 2023-05-22 PROCEDURE — 77063 MAMMO DIGITAL SCREENING BILAT WITH TOMO: ICD-10-PCS | Mod: 26,,, | Performed by: RADIOLOGY

## 2023-05-22 PROCEDURE — 77067 SCR MAMMO BI INCL CAD: CPT | Mod: TC

## 2023-05-22 PROCEDURE — 77067 SCR MAMMO BI INCL CAD: CPT | Mod: 26,,, | Performed by: RADIOLOGY

## 2023-05-22 PROCEDURE — 77063 BREAST TOMOSYNTHESIS BI: CPT | Mod: 26,,, | Performed by: RADIOLOGY

## 2023-05-22 PROCEDURE — 77067 MAMMO DIGITAL SCREENING BILAT WITH TOMO: ICD-10-PCS | Mod: 26,,, | Performed by: RADIOLOGY

## 2023-05-23 RX ORDER — TRAMADOL HYDROCHLORIDE 50 MG/1
50 TABLET ORAL EVERY 8 HOURS PRN
Qty: 90 TABLET | Refills: 0 | Status: SHIPPED | OUTPATIENT
Start: 2023-05-23 | End: 2023-06-20 | Stop reason: SDUPTHER

## 2023-05-23 NOTE — TELEPHONE ENCOUNTER
No care due was identified.  [facility] Embedded Care Due Messages. Reference number: [id number].   5/22/2023 10:13:55 PM CDT

## 2023-05-24 ENCOUNTER — OFFICE VISIT (OUTPATIENT)
Dept: HEMATOLOGY/ONCOLOGY | Facility: CLINIC | Age: 88
End: 2023-05-24
Payer: MEDICARE

## 2023-05-24 VITALS
HEART RATE: 70 BPM | HEIGHT: 64 IN | TEMPERATURE: 97 F | WEIGHT: 149.69 LBS | BODY MASS INDEX: 25.56 KG/M2 | DIASTOLIC BLOOD PRESSURE: 77 MMHG | RESPIRATION RATE: 17 BRPM | SYSTOLIC BLOOD PRESSURE: 183 MMHG | OXYGEN SATURATION: 93 %

## 2023-05-24 DIAGNOSIS — C50.012 MALIGNANT NEOPLASM OF NIPPLE OF LEFT BREAST IN FEMALE, ESTROGEN RECEPTOR POSITIVE: ICD-10-CM

## 2023-05-24 DIAGNOSIS — Z17.0 MALIGNANT NEOPLASM OF NIPPLE OF LEFT BREAST IN FEMALE, ESTROGEN RECEPTOR POSITIVE: ICD-10-CM

## 2023-05-24 DIAGNOSIS — C77.3 SECONDARY AND UNSPECIFIED MALIGNANT NEOPLASM OF AXILLA AND UPPER LIMB LYMPH NODES: Primary | ICD-10-CM

## 2023-05-24 PROCEDURE — 3288F FALL RISK ASSESSMENT DOCD: CPT | Mod: CPTII,S$GLB,, | Performed by: INTERNAL MEDICINE

## 2023-05-24 PROCEDURE — 1159F MED LIST DOCD IN RCRD: CPT | Mod: CPTII,S$GLB,, | Performed by: INTERNAL MEDICINE

## 2023-05-24 PROCEDURE — 1101F PT FALLS ASSESS-DOCD LE1/YR: CPT | Mod: CPTII,S$GLB,, | Performed by: INTERNAL MEDICINE

## 2023-05-24 PROCEDURE — 1126F PR PAIN SEVERITY QUANTIFIED, NO PAIN PRESENT: ICD-10-PCS | Mod: CPTII,S$GLB,, | Performed by: INTERNAL MEDICINE

## 2023-05-24 PROCEDURE — 99213 OFFICE O/P EST LOW 20 MIN: CPT | Mod: S$GLB,,, | Performed by: INTERNAL MEDICINE

## 2023-05-24 PROCEDURE — 3288F PR FALLS RISK ASSESSMENT DOCUMENTED: ICD-10-PCS | Mod: CPTII,S$GLB,, | Performed by: INTERNAL MEDICINE

## 2023-05-24 PROCEDURE — 99999 PR PBB SHADOW E&M-EST. PATIENT-LVL V: ICD-10-PCS | Mod: PBBFAC,,, | Performed by: INTERNAL MEDICINE

## 2023-05-24 PROCEDURE — 1159F PR MEDICATION LIST DOCUMENTED IN MEDICAL RECORD: ICD-10-PCS | Mod: CPTII,S$GLB,, | Performed by: INTERNAL MEDICINE

## 2023-05-24 PROCEDURE — 99999 PR PBB SHADOW E&M-EST. PATIENT-LVL V: CPT | Mod: PBBFAC,,, | Performed by: INTERNAL MEDICINE

## 2023-05-24 PROCEDURE — 1101F PR PT FALLS ASSESS DOC 0-1 FALLS W/OUT INJ PAST YR: ICD-10-PCS | Mod: CPTII,S$GLB,, | Performed by: INTERNAL MEDICINE

## 2023-05-24 PROCEDURE — 1126F AMNT PAIN NOTED NONE PRSNT: CPT | Mod: CPTII,S$GLB,, | Performed by: INTERNAL MEDICINE

## 2023-05-24 PROCEDURE — 99213 PR OFFICE/OUTPT VISIT, EST, LEVL III, 20-29 MIN: ICD-10-PCS | Mod: S$GLB,,, | Performed by: INTERNAL MEDICINE

## 2023-05-24 RX ORDER — LETROZOLE 2.5 MG/1
2.5 TABLET, FILM COATED ORAL DAILY
Qty: 90 TABLET | Refills: 3 | Status: SHIPPED | OUTPATIENT
Start: 2023-05-24

## 2023-05-24 NOTE — PROGRESS NOTES
Route Chart for Scheduling    Med Onc Chart Routing      Follow up with physician Other. See me in early October   Follow up with TIM    Infusion scheduling note    Injection scheduling note    Labs    Imaging    Pharmacy appointment    Other referrals           Therapy Plan Information  Medications  denosumab (PROLIA) injection 60 mg  60 mg, Subcutaneous, Every 26 weeks  Subjective:       Patient ID: Anahi Cook is a 88 y.o. female.    Chief Complaint: No chief complaint on file.      HPI   Mrs. Cook returns today for follow up.   I had last seen her in late September 2022.  She is on letrozole, now on the adjuvant setting.    Briefly, she is an 88-year-old female who in late 2017 had initially felt a mass in her left breast.  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  She was started on letrozole and undwerwent a BSO by Dr. Chairez for an ovarian mass.  Her ovarian mass was benign. In mid July 2018 she underwent a lumpectomy and AND.  She had a 3.3 cm carcinoma and 4/8 positive nodes.  She has remained on letrozole, now in the adjuvant setting, and she returns today for follow up.   Of note, she also received chest wall XRT.    A mammogram was done yesterday, however, results are not available for me to review.  Review of Systems    Overall she feels OK.   She has tolerated the AIs well so far.  She again complains of occasional hot flashes and hand stiffness, and also of shortness of breath with exertion.  Her ECOG performance status is 2.   She denies any anxiety, depression, easy bruising, fevers, chills, night  sweats, weight loss, nausea, vomiting, diarrhea, constipation, diplopia, blurred vision, headache, chest pain, palpitations, shortness of breath, breast pain, abdominal pain, extremity pain, or difficulty ambulating.  The remainder of the ten-point ROS, including  general, skin, lymph, H/N, cardiorespiratory, GI, , Neuro, Endocrine, and psychiatric is negative.       Objective:      Physical Exam      She is alert, oriented to time, place, person, pleasant, well      nourished, in no acute physical distress.                                    VITAL SIGNS:  Reviewed                                      HEENT:  Normal.  There are no nasal, oral, lip, gingival, auricular, lid,    or conjunctival lesions.  Mucosae are moist and pink, and there is no        thrush.  Pupils are equal, reactive to light and accommodation.              Extraocular muscle movements are intact.   Dentition is fair.  Maxillary teeth are missing.                                      NECK:  Supple without JVD, adenopathy, or thyromegaly.                       LUNGS:  Clear to auscultation without wheezing, rales, or rhonchi.           CARDIOVASCULAR:  Reveals an S1, S2, a grade II KRISHNA c/w AS, no rubs, no gallops.         ABDOMEN:  Soft, nontender, without organomegaly.  Bowel sounds are    present.    Scars from her laparoscopic DONOVAN-BSO are seen.                                                            EXTREMITIES:  No cyanosis, clubbing, or edema.                             BREASTS: She is s/p left lumpectomy with a healed periareolar incision.  She also has an incision from her axillary dissection;  It is well healed.   There are no masses in her left or her right breast.    Both breasts are large and pendulous.    There is mild hyperpigmentation within the radiation field.                                 LYMPHATIC:  There is no cervical, axillary, or supraclavicular adenopathy.   SKIN:  Warm and moist, without petechiae, rashes, induration, or ecchymoses.           NEUROLOGIC:  DTRs are 0-1+ bilaterally, symmetrical, motor function is 5/5,  and cranial nerves are  within normal limits.      Assessment:       1. Hormone receptor positive breast cancer, left, on neoadjuvant letrozole with a  significant clinical response.   2. Use of aromatase inhibitors        Plan:        I had a long discussion with her and her caregiver.  She will remain on letrozole, and see me again in October. .  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  She had started adjuvant letrozole in the summer of 2018, and she will therefore complete 7 years in the summer of 2025.     Her multiple questions were answered to her satisfaction.

## 2023-05-26 ENCOUNTER — PES CALL (OUTPATIENT)
Dept: ADMINISTRATIVE | Facility: CLINIC | Age: 88
End: 2023-05-26
Payer: MEDICARE

## 2023-06-08 ENCOUNTER — OFFICE VISIT (OUTPATIENT)
Dept: OPHTHALMOLOGY | Facility: CLINIC | Age: 88
End: 2023-06-08
Payer: MEDICARE

## 2023-06-08 DIAGNOSIS — H35.371 EPIRETINAL MEMBRANE, RIGHT EYE: ICD-10-CM

## 2023-06-08 DIAGNOSIS — H35.3221 EXUDATIVE AGE-RELATED MACULAR DEGENERATION OF LEFT EYE WITH ACTIVE CHOROIDAL NEOVASCULARIZATION: Primary | ICD-10-CM

## 2023-06-08 DIAGNOSIS — Z96.1 PSEUDOPHAKIA OF BOTH EYES: ICD-10-CM

## 2023-06-08 DIAGNOSIS — H35.3112 INTERMEDIATE STAGE DRY AGE-RELATED MACULAR DEGENERATION OF RIGHT EYE: ICD-10-CM

## 2023-06-08 PROCEDURE — 67028 INJECTION EYE DRUG: CPT | Mod: LT,S$GLB,, | Performed by: OPHTHALMOLOGY

## 2023-06-08 PROCEDURE — 1160F PR REVIEW ALL MEDS BY PRESCRIBER/CLIN PHARMACIST DOCUMENTED: ICD-10-PCS | Mod: CPTII,S$GLB,, | Performed by: OPHTHALMOLOGY

## 2023-06-08 PROCEDURE — 1160F RVW MEDS BY RX/DR IN RCRD: CPT | Mod: CPTII,S$GLB,, | Performed by: OPHTHALMOLOGY

## 2023-06-08 PROCEDURE — 99999 PR PBB SHADOW E&M-EST. PATIENT-LVL III: ICD-10-PCS | Mod: PBBFAC,,, | Performed by: OPHTHALMOLOGY

## 2023-06-08 PROCEDURE — 1101F PR PT FALLS ASSESS DOC 0-1 FALLS W/OUT INJ PAST YR: ICD-10-PCS | Mod: CPTII,S$GLB,, | Performed by: OPHTHALMOLOGY

## 2023-06-08 PROCEDURE — 1159F PR MEDICATION LIST DOCUMENTED IN MEDICAL RECORD: ICD-10-PCS | Mod: CPTII,S$GLB,, | Performed by: OPHTHALMOLOGY

## 2023-06-08 PROCEDURE — 99214 OFFICE O/P EST MOD 30 MIN: CPT | Mod: 25,S$GLB,, | Performed by: OPHTHALMOLOGY

## 2023-06-08 PROCEDURE — 99214 PR OFFICE/OUTPT VISIT, EST, LEVL IV, 30-39 MIN: ICD-10-PCS | Mod: 25,S$GLB,, | Performed by: OPHTHALMOLOGY

## 2023-06-08 PROCEDURE — 92134 OCT, RETINA - OU - BOTH EYES: ICD-10-PCS | Mod: S$GLB,,, | Performed by: OPHTHALMOLOGY

## 2023-06-08 PROCEDURE — 3288F PR FALLS RISK ASSESSMENT DOCUMENTED: ICD-10-PCS | Mod: CPTII,S$GLB,, | Performed by: OPHTHALMOLOGY

## 2023-06-08 PROCEDURE — 92134 CPTRZ OPH DX IMG PST SGM RTA: CPT | Mod: S$GLB,,, | Performed by: OPHTHALMOLOGY

## 2023-06-08 PROCEDURE — 1126F AMNT PAIN NOTED NONE PRSNT: CPT | Mod: CPTII,S$GLB,, | Performed by: OPHTHALMOLOGY

## 2023-06-08 PROCEDURE — 1126F PR PAIN SEVERITY QUANTIFIED, NO PAIN PRESENT: ICD-10-PCS | Mod: CPTII,S$GLB,, | Performed by: OPHTHALMOLOGY

## 2023-06-08 PROCEDURE — 1159F MED LIST DOCD IN RCRD: CPT | Mod: CPTII,S$GLB,, | Performed by: OPHTHALMOLOGY

## 2023-06-08 PROCEDURE — 67028 PR INJECT INTRAVITREAL PHARMCOLOGIC: ICD-10-PCS | Mod: LT,S$GLB,, | Performed by: OPHTHALMOLOGY

## 2023-06-08 PROCEDURE — 3288F FALL RISK ASSESSMENT DOCD: CPT | Mod: CPTII,S$GLB,, | Performed by: OPHTHALMOLOGY

## 2023-06-08 PROCEDURE — 99999 PR PBB SHADOW E&M-EST. PATIENT-LVL III: CPT | Mod: PBBFAC,,, | Performed by: OPHTHALMOLOGY

## 2023-06-08 PROCEDURE — 1101F PT FALLS ASSESS-DOCD LE1/YR: CPT | Mod: CPTII,S$GLB,, | Performed by: OPHTHALMOLOGY

## 2023-06-08 NOTE — PROGRESS NOTES
HPI    4 wk / DFE/ OCT OU Eylea   DLS-05/16/2023 Dr. Melton     Pt [facility] vision stable, denies pain.     (-)Flashes (-)Floaters  (-)Photophobia  (-)Glare    AT's PRN     Last edited by Yelitza Patel on 6/8/2023  2:35 PM.         A/P    ICD-10-CM ICD-9-CM   1. Exudative age-related macular degeneration of left eye with active choroidal neovascularization  H35.3221 362.52     362.16   2. 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 Epiretinal membrane, right eye  H35.371 362.56       1. Exudative age-related macular degeneration of left eye with active choroidal neovascularization  2. 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  Schedule Eylea Injection Left Eye today Patient chooses to proceed with injection R/B/A discussed include infection retinal detachment and stroke    Patient identified.  Timeout performed.    Risks, benefits, and alternatives to treatment were discussed in detail with the patient, including bleeding/infection (endophthalmitis)/etc.  The patient voiced understanding and wished to proceed with the procedure.  See separate consent form.    Injection Procedure Note:  Diagnosis: CNVM Left Eye    Topical Proparacaine drop placed then topical 5% Betadine, then subcojunctival lidocaine 2% injection  Sterile gloves used, and sterile lid speculum placed.  5% Betadine placed at injection site again prior to injection.  Eylea 2mg in 0.05cc Injected inferotemporally 3.5-4mm posterior to the limbus.  Complications: None  Va at least CF at 5 feet post injection.  Retina, ONH, IOP normal after  injection.    Followup as below.  Patient should return immediately PRN.  Retinal Detachment and Endophthalmitis precautions given        3. Epiretinal membrane, right eye  Mod ERM, VA 20/40 stable    Pt asymptomatic, no metamorphopsia  Plan: Observation  Amsler precautions discussed     4. Pseudophakia of both eyes  Good lens position OU  Plan: Observation      RTC 4 weeks DFE/OCTm OU, possible Eylea OS      I saw and examined the patient and reviewed in detail the findings documented. The final examination findings, image interpretations, and plan as documented in the record represent my personal judgment and conclusions.    Dwight Melton MD  Vitreoretinal Surgery   [facility]

## 2023-06-09 ENCOUNTER — OFFICE VISIT (OUTPATIENT)
Dept: PAIN MEDICINE | Facility: CLINIC | Age: 88
End: 2023-06-09
Payer: MEDICARE

## 2023-06-09 DIAGNOSIS — M54.16 LUMBAR RADICULOPATHY: Primary | ICD-10-CM

## 2023-06-09 DIAGNOSIS — M51.36 DDD (DEGENERATIVE DISC DISEASE), LUMBAR: ICD-10-CM

## 2023-06-09 DIAGNOSIS — I11.9 HYPERTENSIVE HEART DISEASE WITHOUT HEART FAILURE: ICD-10-CM

## 2023-06-09 DIAGNOSIS — M47.816 LUMBAR SPONDYLOSIS: ICD-10-CM

## 2023-06-09 PROCEDURE — 1160F PR REVIEW ALL MEDS BY PRESCRIBER/CLIN PHARMACIST DOCUMENTED: ICD-10-PCS | Mod: CPTII,95,, | Performed by: NURSE PRACTITIONER

## 2023-06-09 PROCEDURE — 99213 OFFICE O/P EST LOW 20 MIN: CPT | Mod: 95,,, | Performed by: NURSE PRACTITIONER

## 2023-06-09 PROCEDURE — 1159F PR MEDICATION LIST DOCUMENTED IN MEDICAL RECORD: ICD-10-PCS | Mod: CPTII,95,, | Performed by: NURSE PRACTITIONER

## 2023-06-09 PROCEDURE — 99213 PR OFFICE/OUTPT VISIT, EST, LEVL III, 20-29 MIN: ICD-10-PCS | Mod: 95,,, | Performed by: NURSE PRACTITIONER

## 2023-06-09 PROCEDURE — 1159F MED LIST DOCD IN RCRD: CPT | Mod: CPTII,95,, | Performed by: NURSE PRACTITIONER

## 2023-06-09 PROCEDURE — 1160F RVW MEDS BY RX/DR IN RCRD: CPT | Mod: CPTII,95,, | Performed by: NURSE PRACTITIONER

## 2023-06-09 NOTE — PROGRESS NOTES
Chronic Pain-Tele-Medicine-Established Note (Follow up visit)        The patient location is: Home  The chief complaint leading to consultation is: pain  Visit type: Virtual visit with synchronous audio and video  Total time spent with patient: 25 min  Each patient to whom he or she provides medical services by telemedicine is:  (1) informed of the relationship between the physician and patient and the respective role of any other health care provider with respect to management of the patient; and (2) notified that he or she may decline to receive medical services by telemedicine and may withdraw from such care at any time.        SUBJECTIVE:    Interval History 6/9/2023:  The patient returns to clinic today for follow up of back pain via virtual visit. She reports increased low back pain that radiates into anterior and posterior legs to her ankles. Her pain is worse with standing and walking. She is taking Gabapentin at bedtime. She also takes Tylenol for pain. She denies any other health changes.     Interval History 3/9/2023:  The patient returns to clinic today for follow up of back pain via virtual visit. She reports intermittent low back pain. She denies any radicular leg pain. She is using an acupressure knee brace with benefit. Her pain is tolerable at this time. She continues to take Gabapentin with benefit. She denies any other health changes.     Interval History 12/9/2022:  The patient returns to clinic today for follow up of low back pain via virtual visit. She is s/p left L3/4 and L4/5 TF ASHUTOSH on 11/17/2022. She reports 60% relief of her pain. She continues to report intermittent low back pain. She denies any radicular leg pain. She does report bilateral foot pain, described as burning in nature. Her pain is tolerable at this time. She continues to take Gabapentin. She also takes Tramadol per her PCP. She denies any other health changes.     Interval History 10/20/22  Ms. Cook complains of  worsening left low back pain radiating down her lateral thigh to the knee. The pain is only when she walks/moves, she in pain free when sitting. She is s/p bilateral TESI L4/5 2 months ago, which she had relief from, but acutely worsened when she bent over to pick something off the ground. She states the pain is similar to her pain prior to this. She denies numbness, tingling, bladder/bowel problems.    Interval History 10/6/2022:   Mrs Cook presents for follow up virtually. At prior visit she had Left GTB injection with significant benefit in office. Her lower back and hip pain were further evaluated and noted newer L3 compression Fx. While she visually appears to have significant improvement of pain to me she does voice focal upper/mid lumbar pain persists. Discussed treatment options and Kyphoplasty recommended by Dr Wilcox but there was hesitation regarding further escalation to kyphoplasty. We did discusses prior osteoporosis diagnosis and considering treatment options with PCP. She does not voice any s/s concerning for cauda equina.      Interval History 8/22/2022:  Mrs Cook presents for follow up acute pain. She is s/p repeat Bilateral L4/5 TFESI with mild improvement but having severe left lateral hip pain. She also continues to have burning pain to Right heel and numbness to right lateral lower leg. She has chronic urinary continence but no new neurological issues.She continues to take tramadol by PCP with some benefit     Interval History 8/5/2022:  Mrs Cook presents for follow up evaluation of returning lower back pain. She has exact pain relieved prior by B L4/5 TFESIs. She has newer complaint of bilateral feet burning pains as well. She has had JESSE noting arterial issues R>L. She continues to take Tramadol and neurontin without SE.She would like to repeat ASHUTOSH as it provided 7 months of 75% relief and symptoms returning severe over last few weeks. No voicing of symptoms concerning for cauda equina.      Interval History 12/6/2021:  The patient returns to clinic today for follow up of low back pain. She reports increased low back and bilateral hip pain over the last two weeks. She reports intermittent radiating pain into her lateral thighs. She does have difficulty sleeping due to pain. Her pain is also worse with standing and walking. She continues to take Gabapentin with benefit. She also takes Tramadol per her PCP with benefit. She denies any other health changes. Her pain today is 8/10.    Interval History 7/6/2021:  The patient returns to clinic today for follow up of low back pain via virtual visit. She is s/p bilateral L4/5 TF ASHUTOSH on 6/21/2021. She reports 75% relief of her low back and leg pain. She reports low back pain, worse in the morning. She reports intermittent radiating pain into the posterolateral aspect of both legs to her knees. She continues to take Gabapentin with benefit. She denies any weakness. She denies any other health changes. Her pain today is 2/10.    Interval History 6/8/2021:  The patient returns to clinic today for follow up of low back pain. She is here today for imaging review. She continues to report low back pain that radiates into the posterolateral aspect of both legs to below her knee. Her pain is worse with prolonged standing and walking. She does feel like she will fall with prolonged walking. She also reports relief with bending forward. She is currently taking Gabapentin at bedtime with benefit. She continues to take Tramadol per her PCP with benefit. She denies any other health changes. Her pain today is 8/10.    Interval History 5/20/2021:  The patient returns to clinic today for follow up of low back pain. She is s/p bilateral hip joint injections on 4/26/2021. She reports no relief. She reports increased low back pain that radiates into the posterior aspect of both legs to her ankles. She does report numbness to her feet. Her pain is worse with prolonged standing  and walking. She feels as though her legs will give out with prolonged walking. She denies any bowel or bladder incontinence. She denies any other health changes. Her pain today is 9/10.    Interval History 3/23/2021:  The patient returns to clinic today for follow up of low back and hip pain. She reports increased bilateral hip pain. She describes this pain as deep and aching. Her pain is worse with prolonged standing and walking. She also reports low back pain that is aching in nature. She denies any radicular leg pain. Of note, she reports increased swelling and pain to her left ankle and foot. She does report a trip last week. She did see Orthopedics today and is scheduled for xray. She denies any other health changes. Her pain today is 8/10.    Interval History 12/9/2020:  She returns for follow-up.  She is doing well since her facet injections.  Her right hip is hurting.  It interferes with her mobility and comfort while laying down.  No new symptomatology otherwise.    Interval History 8/11/2020:  The patient has a virtual visit today for follow up.  She is s/p Bilateral L3-4, L4-5 L5-S1 facet joint injections with 90% relief.  She says that her back pain is minimal at this time.  She is able to move around and walk better since the procedure as well.  She is having some left hip pain and feels as though her leg will give out sometimes when walking.  She would like a referral to ortho close to her [facility] in [facility].  Her pain today is 1/10.  She denies any respiratory changes since procedure including fever, cough or SOB.    Interval History 12/5/2019:  The patient is here for follow up of lower back and bilateral hip pain.  Her back pain has been minimal.  She is having increased lateral hip and buttock pain recently.  She had TPIs in the past and would like to repeat this today.  Her pain today is 8/10.    Previous Encounter:  Anahi Cook presents to the clinic for a follow-up appointment for lower back  and left lower extremity pain.  She had T12 kyphoplasty performed on 11/8/18 with significant improvement in symptoms.  She was admitted through the ED on 4/3/19 after suffering fractures of 6th, 8th and possible 7th ribs of the left side after a fall at home.  Thoracic imaging also showed stable slight anterior wedging at T11.  She had bilateral L3-4, L4-5 and L5-S1 facet injections on 4/1/19 with 80% relief of lower back pain.  Her current pain is minimal.  She has mild leg pain with walking.  She continues to follow up with oncology regularly.  Since the last visit, Anahi Cook states the pain has been improving.  Current pain intensity is 2/10.     Pain Medications:  OTC Tylenol    Opioid Contract: no     report:  Not applicable    Pain Procedures:   7/21/14 L4-5 IL ASHUTOSH- significant benefit  12/1/15 Left GT bursa injection  4/26/16 Left GT bursa injection  12/15/16 L4-5 IL ASHUTOSH- significant benefit  2/15/18 L4-5 IL ASHUTOSH- 100% relief  8/21/18 L4-5 IL ASHUTOSH- significant relief  10/15/18 Bilateral L3-4, L4-5 L5-S1 facet joint injections  11/8/18 T12 kyphoplasty- significant relief  4/1/19 Bilateral L3-4, L4-5 L5-S1 facet joint injections- 80% relief  7/27/20 Bilateral L3-4, L4-5 L5-S1 facet joint injections- 90% relief  4/26/2021- Bilateral hip joint injections- no relief    6/21/2021- Bilateral L4/5 TF ASHUTOSH- 75% relief  8/9/2022 B L4/5 TFESI without significant benefit.   11/17/2022- Left L3/4 and L4/5 TF ASHUTOSH    Physical Therapy/Home Exercise: yes     Imaging:     MRI LUMBAR SPINE WITHOUT CONTRAST 9/12/2022  COMPARISON:  5/29/2021     FINDINGS:  Chronic vertebral plana T12.  Extension of the posterosuperior cortex into the canal by approximately 6 mm.     There is now height loss at L3, approximately 30%.  Associated STIR signal hyperintensity.  Retropulsion of the posterior cortex into the canal approximately 5 mm.     Remaining vertebral body heights are maintained.     Mild disc space narrowing L4-5.     Grade  1 retrolisthesis L1-2.  Grade 1 anterolisthesis L4-5     Conus terminates appropriately at L1.     Multilevel degenerative change as diesel below:     T11-12: Retropulsion of the posterosuperior cortex of T12 into the canal with mild canal narrowing.     T12-L1: Posterior circumferential disc bulge, asymmetric to the left.  Mild left neural foraminal narrowing.     L1-2: Facet and ligamentum flavum hypertrophic changes with mild left neural foraminal narrowing.     L2-3: Posterior circumferential disc bulge and ligamentum flavum hypertrophy retropulsion of the posterior cortex of L3 into the canal.  Findings result in mild canal narrowing.  Severe right and moderate left neural foraminal narrowing.     L3-4: Posterior circumferential disc bulge.  Facet and ligamentum flavum hypertrophic changes.  Mild canal and moderate bilateral neural foraminal narrowing.     L4-5: Grade 1 anterolisthesis of L4-5 with uncovering of the disc.  Facet and ligamentum flavum hypertrophic changes.  Combinations contribute to severe canal stenosis.  Severe left and moderate right neural foraminal narrowing.     L5-S1: Central canal and neural foramina are well maintained.     Multiple T2 hyperintense foci in both kidneys, including a thinly septated T2 hyperintense focus on the left measuring 15 mm.     Impression:     Acute fracture along the superior endplate of L3 with approximately 30% height loss.  Stable chronic height loss T12.     Multilevel degenerative change, worst at L4-5 where severe canal stenosis results.     Multilevel neural foraminal narrowing, severe L2-3 and L4-5 as above.     Bilateral renal cysts, including a thinly septated cyst on the left.  Findings can be further evaluated with renal ultrasound.     This report was flagged in Epic as abnormal.    XR LUMBAR SPINE 5 VIEW WITH FLEX AND EXT 8/22/2022  COMPARISON:  Lumbar spine MRI 05/29/2021     FINDINGS:  Previous vertebroplasty T12.     Height loss along the  superior endplate of L3 is new compared to that exam.  Height loss is approximately 40%.  There may be mild height loss at L2.     Disc space narrowing and endplate changes L4-5.  Facet arthropathy L4-5.     Grade 1 anterolisthesis L4-5 with no significant change with flexion or extension.     Aortoiliac atherosclerosis.     Impression:     Height loss along the superior endplate of L3, approximately 40%, is new compared to May 2021.  By today's radiographs, there appears to be mild sclerosis along the superior endplate which can be seen with a subacute or chronic fracture.  Correlation with MRI advised if there is recent trauma or acute back pain to exclude the possibility of an acute fracture.     Degenerative change as above.     This report was flagged in Epic as abnormal.    XR HIP WITH PELVIS WHEN PERFORMED, 2 OR 3 VIEWS LEFT 8/22/2022  COMPARISON:  03/24/2022 and 09/09/2020     FINDINGS:  The femoral head is well located with respect to the acetabulum. No acute fracture seen.  Osteophyte formation, subchondral sclerosis, with moderate narrowing femoroacetabular joint bilaterally.     No significant soft tissue edema or radiopaque retained foreign body.  Aortoiliac atherosclerosis.     Impression:     No acute fracture or dislocation seen.  Moderate osteoarthritis and joint space narrowing.    MRI Lumbar Spine 5/29/2021:  COMPARISON:  Prior MRI from 2014 and 2018     FINDINGS:  There is a transitional lumbosacral segment and spine labeling is as on prior examinations.     Stable severe compression deformity at T12 with retropulsion.  Remaining vertebral body heights are maintained.  Grade 1 anterolisthesis appears increased at L4-5 as compared to previous.  There is no marrow replacement process.  The spinal cord is normal in signal.  Review of paravertebral structures demonstrates high T2 low T1 signal foci within kidneys.     Not included in the field of view of the axial, there is disc bulge at T10-11.   This does not appear to result in a significant degree of spinal canal narrowing on the basis of the sagittal imaging.     T12 demonstrates retropulsion with mild narrowing of the spinal canal.     T12-L1 demonstrates minor disc bulge.     L1-L2 demonstrates mild facet degenerative change.  There is no significant spinal canal stenosis.  There is mild bilateral neural foraminal narrowing.     L2-3 demonstrates facet degenerative change.  There is no significant spinal canal stenosis.  There is right foraminal protrusion.  This results in a moderate degree of right foraminal narrowing.  There is mild left foraminal narrowing.     L3-4 demonstrates facet degenerative change and ligamentum flavum thickening.  There is a facet joint synovial cyst along the posterolateral aspect of the left facet.  There is right foraminal protrusion.  There is mild left and moderate right foraminal narrowing.     L4-5 demonstrates facet degenerative change and ligamentum flavum thickening.  There is anterolisthesis.  There is severe left and moderate right foraminal narrowing.  There is a severe degree of spinal canal narrowing.     L5-S1 is unremarkable.     Impression:     Multilevel degenerative changes as further detailed above.  Findings are most significant at the L4-5 level, noting transitional lumbosacral segment.     Stable severe compression deformity at T12.     Foci within the kidneys bilaterally likely representing cyst can be correlated with ultrasound.          Past Medical History:  Past Medical History:   Diagnosis Date    Anxiety     Arthritis     Back pain     Breast cancer 1/17/2018    Breast mass 1/15/2018    Chronic kidney disease, stage 2, mildly decreased GFR     COPD (chronic obstructive pulmonary disease)     emphysema    Depression     Dysuria 1/29/2018    Exudative age-related macular degeneration of left eye with active choroidal neovascularization 8/25/2022    Family history of breast cancer 1/17/2018     GERD (gastroesophageal reflux disease)     Hypertension     Infiltrating ductal carcinoma of breast, left 7/12/2018    Osteoporosis, senile     Ovarian mass 1/15/2018    Pneumonia     S/P robotic assisted laparoscopic BSO (bilateral salpingo-oophorectomy) 2/23/2018       Past Surgical History:  Past Surgical History:   Procedure Laterality Date    AXILLARY NODE DISSECTION Left 7/12/2018    Procedure: LYMPHADENECTOMY, AXILLARY;  Surgeon: Amanda Caballero MD;  Location: [facility] OR [address] [place] [address];  Service: General;  Laterality: Left;    BREAST BIOPSY      BREAST LUMPECTOMY      CATARACT EXTRACTION      CHOLECYSTECTOMY      COLONOSCOPY      ESOPHAGOGASTRODUODENOSCOPY      ESOPHAGOGASTRODUODENOSCOPY N/A 10/14/2019    Procedure: EGD (ESOPHAGOGASTRODUODENOSCOPY);  Surgeon: Davonte Thompson MD;  Location: [facility] ENDO ([address] [place] [address]);  Service: Endoscopy;  Laterality: N/A;  hx of pulmonary hypertension-PA 50     pt requesting ASAP    EYE SURGERY      FIXATION KYPHOPLASTY N/A 11/8/2018    Procedure: Kyphoplasty   T12;  Surgeon: Merly Wilcox MD;  Location: [facility] CATH LAB;  Service: Pain Management;  Laterality: N/A;  T12  [employer] e-mailed with date and time    HYSTERECTOMY      INJECTION FOR SENTINEL NODE IDENTIFICATION Left 7/12/2018    Procedure: INJECTION, FOR SENTINEL NODE IDENTIFICATION;  Surgeon: Amanda Caballero MD;  Location: [facility] OR [address] [place] [address];  Service: General;  Laterality: Left;    INJECTION OF FACET JOINT Bilateral 10/15/2018    Procedure: INJECTION, FACET JOINT, BILATERAL LUMBAR L3-4, 4-5, 5-S1 FACET JOINT INJECTIONS;  Surgeon: Merly Wilcox MD;  Location: [facility] PAIN MGT;  Service: Pain Management;  Laterality: Bilateral;    INJECTION OF FACET JOINT Bilateral 4/1/2019    Procedure: INJECTION, FACET JOINT, L3-L4,L4-L5,L5-S1;  Surgeon: Merly Wilcox MD;  Location: [facility] PAIN MGT;  Service: Pain Management;  Laterality: Bilateral;    INJECTION OF FACET JOINT Bilateral 6/20/2019    Procedure: INJECTION, FACET JOINT, L3-L4,L4-L5,L5-S1 NEED  CONSENT;  Surgeon: Merly Wilcox MD;  Location: [facility] PAIN MGT;  Service: Pain Management;  Laterality: Bilateral;    INJECTION OF FACET JOINT Bilateral 7/27/2020    Procedure: INJECTION, FACET JOINT BILATERAL L3/4, L4/5 and L5/S1;  Surgeon: Merly Wilcox MD;  Location: [facility] PAIN MGT;  Service: Pain Management;  Laterality: Bilateral;    INJECTION OF JOINT Bilateral 7/27/2020    Procedure: INJECTION, JOINT, BILATERAL GREATER TROCHANTERIC BURSA;  Surgeon: Merly Wilcox MD;  Location: [facility] PAIN MGT;  Service: Pain Management;  Laterality: Bilateral;    INJECTION OF JOINT Bilateral 4/26/2021    Procedure: INJECTION, JOINT, HIP;  Surgeon: Merly Wilcox MD;  Location: [facility] PAIN [facility]GT;  Service: Pain Management;  Laterality: Bilateral;  consent needed    INJECTION OF STEROID Right 11/15/2021    Procedure: INJECTION, STEROID RIGHT MIDDLE AND SAMLL FINGER;  Surgeon: Florecita Da Silva MD;  Location: [facility] OR;  Service: Orthopedics;  Laterality: Right;    MASTECTOMY, PARTIAL Left 7/12/2018    Procedure: MASTECTOMY, PARTIAL-W/SEED LOCALIZATION;  Surgeon: Amanda Caballero MD;  [facility];  Service: General;  Laterality: Left;    MN REMOVAL OF OVARY/TUBE(S)  02/23/2018    Robotic Assisted    ROTATOR CUFF REPAIR Right     rotator cuff repair right shoulder    TONSILLECTOMY      TRANSFORAMINAL EPIDURAL INJECTION OF STEROID Bilateral 6/21/2021    Procedure: INJECTION, STEROID, EPIDURAL, TRANSFORAMINAL APPROACH  bilateral L4/5 TF ASHUTOSH;  Surgeon: Merly Wilcox MD;  Location: [facility] PAIN MGT;  Service: Pain Management;  Laterality: Bilateral;  consent needed    TRANSFORAMINAL EPIDURAL INJECTION OF STEROID Bilateral 1/3/2022    Procedure: INJECTION, STEROID, EPIDURAL, TRANSFORAMINAL APPROACH Bilateral L4/5 Needs consent;  Surgeon: Merly Wilcox MD;  Location: [facility]IN [facility]GT;  Service: Pain Management;  Laterality: Bilateral;    TRANSFORAMINAL EPIDURAL INJECTION OF STEROID Bilateral 8/9/2022    Procedure: INJECTION, STEROID,  EPIDURAL, TRANSFORAMINAL APPROACH, BILATERAL L4-L5   MEDICALLY URGENT;  Surgeon: Merly Wilcox MD;  Location: [facility] PAIN MGT;  Service: Pain Management;  Laterality: Bilateral;    TRANSFORAMINAL EPIDURAL INJECTION OF STEROID Left 2022    Procedure: INJECTION, STEROID, EPIDURAL, TRANSFORAMINAL APPROACH LEFT L3-L4 AND L4-L5 CONTRAST;  Surgeon: Merly Wilcox MD;  Location: [facility] PAIN MGT;  Service: Pain Management;  Laterality: Left;    TRIGGER FINGER RELEASE Left 11/15/2021    Procedure: RELEASE, TRIGGER FINGER, LEFT MIDDLE AND RING;  Surgeon: Florecita Da Silva MD;  Location: [facility] OR;  Service: Orthopedics;  Laterality: Left;       Family History:  Family History   Problem Relation Age of Onset    Heart failure Mother     Kidney failure Mother     Heart attack Father     Breast cancer Sister 66        Lump, XRT, chemo, recurrence 10 years later.    Cancer Brother         leukemia    Heart attack Brother 58    Pulmonary embolism Brother 45    Heart attack Brother 52    Breast cancer Maternal Grandmother 60        advanced at diagnosis    Breast cancer Maternal Aunt 83         at 92    Colon cancer Neg Hx     Esophageal cancer Neg Hx        Social History:  Social History     Socioeconomic History    Marital status: Single    Number of children: 3   Occupational History    Occupation: Multiple jobs, see social documentation section     Comment: Retired   Tobacco Use    Smoking status: Former     Packs/day: 1.00     Years: 40.00     Pack years: 40.00     Types: Cigarettes    Smokeless tobacco: Never    Tobacco comments:     Smoked >1 ppd for at least 40 years, quit around    Substance and Sexual Activity    Alcohol use: Yes     Comment: occasional glass of wine or cocktail    Drug use: No    Sexual activity: Yes     Partners: Male   Social History Narrative    Worked many jobs while raising 3 children.  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  She was  from her first , but took care of him at the end of his life.       REVIEW OF SYSTEMS:    GENERAL:  No weight loss, malaise or fevers.  HEENT:   No recent changes in vision or hearing  NECK:  Negative for lumps, no difficulty with swallowing.  RESPIRATORY:  Negative for cough, wheezing or shortness of breath, patient denies any recent URI. COPD.  CARDIOVASCULAR:  Negative for chest pain, leg swelling or palpitations. Hypertension.  GI:  Negative for abdominal discomfort, blood in stools or black stools or change in bowel habits.  MUSCULOSKELETAL:  See HPI.  SKIN:  Negative for lesions, rash, and itching.  PSYCH:  No mood disorder or recent psychosocial stressors.  Patients sleep is not disturbed secondary to pain.  HEMATOLOGY/LYMPHOLOGY:  Negative for prolonged bleeding, bruising easily or swollen nodes.  Patient is not currently taking any anti-coagulants  NEURO:   No history of headaches, syncope, paralysis, seizures or tremors.  All other reviewed and negative other than HPI.    OBJECTIVE:    Exam limited due to virtual visit:  GENERAL: Well appearing, in no acute distress, alert and oriented x3.  PSYCH:  Mood and affect appropriate.    Previous physical exam:  LMP  (LMP Unknown)     PHYSICAL EXAMINATION:     GENERAL: Well appearing, in no acute distress, alert and oriented x3.  PSYCH:  Mood and affect appropriate.  SKIN: Skin color, texture, turgor normal, no rashes or lesions.  HEAD/FACE:  Normocephalic, atraumatic. Cranial nerves grossly intact.  CV: RRR with palpation of the radial artery.  PULM: No evidence of respiratory difficulty, symmetric chest rise.  BACK: Straight leg raising in the sitting position is negative to radicular pain bilaterally.  There is pain with palpation over lumbar facet joints bilaterally.  Limited ROM with pain on flexion and  extension.  Positive facet loading bilaterally.  EXTREMITIES:  Good capillary  refill.  MUSCULOSKELETAL: There is mild pain with internal and external rotation of both hips. There is pain with palpation over bilateral SI joints. FABERs is positive bilaterally.  No atrophy or tone abnormalities are noted.  NEURO: Bilateral lower extremity coordination and muscle stretch reflexes are physiologic and symmetric.  Plantar response are downgoing. No clonus.  Normal sensation.  GAIT: Antalgic- ambulates with rolling walker.      ASSESSMENT: 88 y.o. year old female with lower back and lower extremity pain, consistent with the following diagnoses:     1. Lumbar radiculopathy        2. Lumbar spondylosis        3. DDD (degenerative disc disease), lumbar            PLAN:     - Previous imaging reviewed today.    - Schedule for bilateral L5/S1 TF ASHUTOSH.     - Increase Gabapentin to 200 mg at bedtime.      - Continue Tramadol per PCP.     - RTC 2 weeks after above procedure.     The above plan and management options were discussed at length with patient. Patient is in agreement with the above and verbalized understanding.    Amanda Garcia  06/09/2023

## 2023-06-10 RX ORDER — HYDRALAZINE HYDROCHLORIDE 25 MG/1
25 TABLET, FILM COATED ORAL EVERY 12 HOURS
Qty: 180 TABLET | Refills: 1 | Status: SHIPPED | OUTPATIENT
Start: 2023-06-10 | End: 2023-11-19 | Stop reason: SDUPTHER

## 2023-06-12 ENCOUNTER — PATIENT MESSAGE (OUTPATIENT)
Dept: PAIN MEDICINE | Facility: OTHER | Age: 88
End: 2023-06-12
Payer: MEDICARE

## 2023-06-12 DIAGNOSIS — F41.8 DEPRESSION WITH ANXIETY: ICD-10-CM

## 2023-06-12 RX ORDER — CITALOPRAM 20 MG/1
20 TABLET, FILM COATED ORAL NIGHTLY
Qty: 90 TABLET | Refills: 0 | Status: SHIPPED | OUTPATIENT
Start: 2023-06-12 | End: 2023-09-02 | Stop reason: SDUPTHER

## 2023-06-12 NOTE — TELEPHONE ENCOUNTER
No care due was identified.  [facility] Embedded Care Due Messages. Reference number: [id number].   6/12/2023 10:44:49 AM CDT

## 2023-06-20 DIAGNOSIS — M47.816 LUMBAR SPONDYLOSIS: ICD-10-CM

## 2023-06-20 DIAGNOSIS — M17.11 PRIMARY OSTEOARTHRITIS OF RIGHT KNEE: ICD-10-CM

## 2023-06-20 NOTE — TELEPHONE ENCOUNTER
No care due was identified.  [facility] Embedded Care Due Messages. Reference number: [id number].   6/20/2023 1:06:32 PM CDT

## 2023-06-21 RX ORDER — TRAMADOL HYDROCHLORIDE 50 MG/1
50 TABLET ORAL EVERY 8 HOURS PRN
Qty: 90 TABLET | Refills: 0 | Status: SHIPPED | OUTPATIENT
Start: 2023-06-22 | End: 2023-07-22 | Stop reason: SDUPTHER

## 2023-06-23 NOTE — PROGRESS NOTES
06/23/23 0037   Admission   Initial VN Admission Questions Complete   Shift   Pain Management Interventions pain management plan reviewed with patient/caregiver   Virtual Nurse - Patient Verbalized Approval Of Camera Use;VN Rounding   Safety/Activity   Patient Rounds bed in low position;call light in patient/parent reach;clutter free environment maintained;visualized patient;placement of personal items at bedside   Safety Promotion/Fall Prevention assistive device/personal item within reach;commode/urinal/bedpan at bedside;Fall Risk reviewed with patient/family;side rails raised x 2   Positioning   Body Position side-lying;left   Head of Bed (HOB) Positioning HOB at 30-45 degrees   Pain/Comfort/Sleep   Comfort/Acceptable Pain Level 7   VN cued in to pt's room with permission. Admission questions completed. Plan of care reviewed with pt. Pt denies any needs at this time. Call bell w/in reach. Instructed to call for needs/assist oob.     Subjective:       Patient ID: Anahi Cook is a 85 y.o. female.    Chief Complaint: Annual Exam and Establish Care    She is new to Primary Care. Previously followed by Dr. Theodore for over 20 yrs in [facility].. He is retiring. Last visit there about three months ago. Presents for transfer of care. History obtained from Epic records generated by various specialists she has seen here. Accompanied by daughter today. No acute complaints.     PMH: .  Hypertension with Concentric Remodeling and normal LV function on Echo 10/16.  Hyperlipidemia, no labs on record.   Aortic Atherosclerosis seen on chest and abdominal imaging.   CKD stage 3, GFR 47.  COPD, not oxygen-dependent. Pulmonologist Dr. Bradford at [facility] .   Breast Cancer, on Letrozole, Heme/Onc , Breast Surg , Rad/Onc .  Osteoporosis, Endocrinology  recommended Prolia - patient declines.   Vertebral Compression Fractures.  GERD, Tortuous Esophagus on EGD 10/19, benign H pyl negative gastritis.   Lumbar Spondylosis, [facility] , Pain Management .   Allergic Rhinitis.  Depression/Anxiety/Insomnia.    PSH:  2018: AXILLARY NODE DISSECTION; Left  BREAST BIOPSY  BREAST LUMPECTOMY  CHOLECYSTECTOMY  EYE SURGERY  2018: FIXATION KYPHOPLASTY; N/A  HYSTERECTOMY  10/15/2018: INJECTION OF FACET JOINT; Bilateral  2019: INJECTION OF FACET JOINT; Bilateral  2019: INJECTION OF FACET JOINT; Bilateral  2018: MASTECTOMY, PARTIAL; Left  2018: [place] REMOVAL OF OVARY/TUBE(S)  ROTATOR CUFF REPAIR; Right  TONSILLECTOMY    Mammogram stable . BMD . Colonoscopy years ago, not on record. Eye exam 2019 in [facility]. Pneumovax , Prevnar 11/15, Flu shot 10/19. Vaccines given by PCP are not on record here. Labs 10/19: CBC normal, CMP normal except Glucose 112, K 3.3, Albumin 3.3, GFR 47.6. TSH 2.036, Vit D 69 Aug. '19.    Social: Former tobacco use, rare alcohol. , daughter lives locally, two sons out of state.  "    FMH: Breast cancer in multiple, HTN, Heart dis, Kidney dis.     Allergies: PCN, Levofloxacin.     Medications: list reviewed, difficult to reconcile - she did not bring bottles, and most are not ordered here. Uses Tramadol once every morning, Tylenol nightly. OTC Calcium plus D one BID.         Review of Systems   Constitutional: Negative for activity change, appetite change, fatigue, fever and unexpected weight change.   HENT: Negative for congestion, ear pain, hearing loss, rhinorrhea, sneezing, sore throat, trouble swallowing and voice change.    Eyes: Negative for pain and visual disturbance.   Respiratory: Negative for cough, chest tightness, shortness of breath and wheezing.    Cardiovascular: Negative for chest pain, palpitations and leg swelling.        Has had edema in the past.   Gastrointestinal: Negative for abdominal pain, blood in stool, constipation, diarrhea, nausea and vomiting.   Genitourinary: Negative for difficulty urinating, dysuria, flank pain, frequency, hematuria and urgency.   Musculoskeletal: Positive for back pain. Negative for arthralgias, joint swelling, myalgias and neck pain.   Skin: Negative for color change and rash.   Neurological: Negative for dizziness, syncope, facial asymmetry, speech difficulty, weakness, numbness and headaches.   Hematological: Negative for adenopathy. Does not bruise/bleed easily.   Psychiatric/Behavioral: Negative for agitation, dysphoric mood and sleep disturbance. The patient is not nervous/anxious.        Objective:    /65, Pulse 74, Temp 98, O2 Sat 98%, Ht 5' 2", Wt 153.4 lbs, BMI=28  Physical Exam   Constitutional: She is oriented to person, place, and time. She appears well-developed and well-nourished. No distress.   Well groomed, ambulatory with walker.    HENT:   Head: Normocephalic and atraumatic.   Right Ear: External ear normal.   Left Ear: External ear normal.   Nose: Nose normal.   Mouth/Throat: Oropharynx is clear and moist. No " oropharyngeal exudate.   Eyes: Pupils are equal, round, and reactive to light. Conjunctivae and EOM are normal. Right conjunctiva is not injected. Left conjunctiva is not injected. No scleral icterus.   Neck: Normal range of motion. Neck supple. No JVD present. Carotid bruit is not present. No thyromegaly present.   Cardiovascular: Normal rate, regular rhythm, normal heart sounds and intact distal pulses. Exam reveals no gallop and no friction rub.   No murmur heard.  Pulmonary/Chest: Effort normal and breath sounds normal. No respiratory distress. She has no wheezes. She has no rhonchi. She has no rales.   Abdominal: Soft. Bowel sounds are normal. She exhibits no distension and no mass. There is no hepatosplenomegaly. There is no tenderness. There is no CVA tenderness.   Musculoskeletal: Normal range of motion. She exhibits no edema or deformity.   Tender over upper lumbar/low thoracic spine. Mild thoracic kyphosis.   Lymphadenopathy:     She has no cervical adenopathy.   Neurological: She is alert and oriented to person, place, and time. She has normal strength and normal reflexes. No cranial nerve deficit. She exhibits normal muscle tone. Coordination and gait normal.   Skin: Skin is warm and dry. No lesion and no rash noted. She is not diaphoretic. No cyanosis or erythema. No pallor. Nails show no clubbing.   Psychiatric: She has a normal mood and affect. Her behavior is normal. Judgment and thought content normal.   Vitals reviewed.      Assessment:       1. Hypertensive heart disease without heart failure    2. Hyperlipidemia, unspecified hyperlipidemia type    3. Aortic atherosclerosis    4. CKD (chronic kidney disease) stage 3, GFR 30-59 ml/min    5. Chronic obstructive pulmonary disease, unspecified COPD type    6. Malignant neoplasm of upper-outer quadrant of left breast in female, estrogen receptor positive    7. Osteoporosis without current pathological fracture, unspecified osteoporosis type    8.  Lumbar spondylosis    9. Gastroesophageal reflux disease without esophagitis    10. Depression with anxiety        Plan:       Hypertensive heart disease without heart failure - clinically stable, BP controlled.  -     torsemide (DEMADEX) 20 MG Tab; Take 1 tablet (20 mg total) by mouth once daily.  Dispense: 90 tablet; Refill: 1  -     felodipine (PLENDIL) 10 MG 24 hr tablet; Take 2 tablets (20 mg total) by mouth once daily.  Dispense: 180 tablet; Refill: 1  -     benazepriL (LOTENSIN) 20 MG tablet; Take 1 tablet (20 mg total) by mouth once daily.  Dispense: 90 tablet; Refill: 1    Hyperlipidemia, unspecified hyperlipidemia type  -     atorvastatin (LIPITOR) 20 MG tablet; Take 1 tablet (20 mg total) by mouth nightly.  Dispense: 90 tablet; Refill: 1  -     Consent for release of info signed to obtain recent labs from PCP, or will order labs soon.    Aortic atherosclerosis  -     atorvastatin (LIPITOR) 20 MG tablet; Take 1 tablet (20 mg total) by mouth nightly.  Dispense: 90 tablet; Refill: 1    CKD (chronic kidney disease) stage 3, GFR 30-59 ml/min    Chronic obstructive pulmonary disease, unspecified COPD type - stable on current meds, she will f/u with Pulmonology.     Malignant neoplasm of upper-outer quadrant of left breast in female, estrogen receptor positive - on Letrozole, seeing Heme/Onc soon.     Osteoporosis without current pathological fracture, unspecified osteoporosis type - discussed at length, she and daughter decline treatment.     Lumbar spondylosis - may continue Tramadol.    Gastroesophageal reflux disease without esophagitis - continue Omeprazole.     Depression with anxiety  -     citalopram (CELEXA) 20 MG tablet; Take 1 tablet (20 mg total) by mouth nightly.  Dispense: 90 tablet; Refill: 1    She believes she had a recent Pneumonia shot and Shingles vaccine with PCP, requesting records.

## 2023-06-27 ENCOUNTER — PATIENT MESSAGE (OUTPATIENT)
Dept: PODIATRY | Facility: CLINIC | Age: 88
End: 2023-06-27
Payer: MEDICARE

## 2023-06-27 ENCOUNTER — TELEPHONE (OUTPATIENT)
Dept: PODIATRY | Facility: CLINIC | Age: 88
End: 2023-06-27
Payer: MEDICARE

## 2023-06-27 ENCOUNTER — OFFICE VISIT (OUTPATIENT)
Dept: URGENT CARE | Facility: CLINIC | Age: 88
End: 2023-06-27
Payer: MEDICARE

## 2023-06-27 VITALS
TEMPERATURE: 99 F | DIASTOLIC BLOOD PRESSURE: 78 MMHG | HEART RATE: 78 BPM | BODY MASS INDEX: 25.56 KG/M2 | HEIGHT: 64 IN | RESPIRATION RATE: 18 BRPM | WEIGHT: 149.69 LBS | SYSTOLIC BLOOD PRESSURE: 126 MMHG | OXYGEN SATURATION: 93 %

## 2023-06-27 DIAGNOSIS — M79.672 LEFT FOOT PAIN: ICD-10-CM
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
## 2023-06-27 DIAGNOSIS — T14.90XA INJURY: ICD-10-CM

## 2023-06-27 DIAGNOSIS — M89.8X1 PAIN OF LEFT CLAVICLE: ICD-10-CM

## 2023-06-27 PROCEDURE — 73610 X-RAY EXAM OF ANKLE: CPT | Mod: LT,S$GLB,, | Performed by: RADIOLOGY

## 2023-06-27 PROCEDURE — 99214 OFFICE O/P EST MOD 30 MIN: CPT | Mod: S$GLB,,, | Performed by: NURSE PRACTITIONER

## 2023-06-27 PROCEDURE — 73000 X-RAY EXAM OF COLLAR BONE: CPT | Mod: LT,S$GLB,, | Performed by: RADIOLOGY

## 2023-06-27 PROCEDURE — 73000 XR CLAVICLE LEFT: ICD-10-PCS | Mod: LT,S$GLB,, | Performed by: RADIOLOGY

## 2023-06-27 PROCEDURE — 99214 PR OFFICE/OUTPT VISIT, EST, LEVL IV, 30-39 MIN: ICD-10-PCS | Mod: S$GLB,,, | Performed by: NURSE PRACTITIONER

## 2023-06-27 PROCEDURE — 73610 XR ANKLE COMPLETE 3 VIEW LEFT: ICD-10-PCS | Mod: LT,S$GLB,, | Performed by: RADIOLOGY

## 2023-06-27 RX ORDER — MIRABEGRON 25 MG/1
25 TABLET, FILM COATED, EXTENDED RELEASE ORAL
COMMUNITY
Start: 2023-04-29 | End: 2023-07-11 | Stop reason: DRUGHIGH

## 2023-06-27 NOTE — PROGRESS NOTES
"Subjective:      Patient ID: Anahi Cook is a 88 y.o. female.    Vitals:  height is 5' 4" (1.626 m) and weight is 67.9 kg (149 lb 11.1 oz). Her oral temperature is 98.7 °F (37.1 °C). Her blood pressure is 126/78 and her pulse is 78. Her respiration is 18 and oxygen saturation is 93% (abnormal).     Chief Complaint: Fall    Pt is an 89 yo female w/ c/o L clavicle pain and L foot pain since a fall on 5 days ago. Pt states she got up in the middle of the night and tripped over the stool next to her bed and fell. She is unsure how she landed. L ankle pain is worse when weightbearing. She also reports the swelling did not start until yesterday. Clavicle pain described as soreness is worse w/ palpation and ROM of L shoulder. She has been using ice, Aspercreme, and ketoprofen topical for her symptoms with little improvement. Pt w/ PMH of COPD and has needed her rescue inhaler today.     Fall  The accident occurred 3 to 5 days ago. The fall occurred from a bed. She landed on Hard floor. The point of impact was the left shoulder and left foot. The pain is present in the left foot. The pain is at a severity of 7/10. The pain is moderate. The symptoms are aggravated by pressure on injury, standing, use of injured limb, rotation, extension and movement. Pertinent negatives include no numbness. Treatments tried: ice, Aspercream, and ketoprofen. The treatment provided mild relief.     Neurological:  Negative for numbness.    Objective:     Physical Exam   Constitutional: She is oriented to person, place, and time. She appears well-developed. She is cooperative. No distress.   HENT:   Head: Normocephalic and atraumatic.   Ears:   Right Ear: External ear normal.   Left Ear: External ear normal.   Nose: Nose normal.   Eyes: Conjunctivae and lids are normal.   Neck: Trachea normal and phonation normal. Neck supple.   Cardiovascular: Normal rate and normal pulses.   Pulses:       Dorsalis pedis pulses are 2+ on the left side.        " Posterior tibial pulses are 2+ on the left side.   Pulmonary/Chest: Effort normal. No accessory muscle usage. No tachypnea. No respiratory distress.   Abdominal: Normal appearance.   Musculoskeletal:         General: No deformity.        Arms:         Feet:    Neurological: She is alert and oriented to person, place, and time. She has normal strength and normal reflexes. No sensory deficit.   Skin: Skin is warm, dry, intact and not diaphoretic.   Psychiatric: Her speech is normal and behavior is normal. Judgment and thought content normal.   Nursing note and vitals reviewed.    XR CLAVICLE LEFT    Result Date: 6/27/2023  EXAMINATION: XR CLAVICLE LEFT CLINICAL HISTORY: Injury, unspecified, initial encounter TECHNIQUE: Two views of the left clavicle. COMPARISON: None FINDINGS: There is osteopenia.  There is no acute fracture or dislocation.  Alignment is normal.  There is mild joint space narrowing and osteophytes of the acromioclavicular joint and the glenohumeral joint.  There are surgical clips.     No acute osseous abnormality. Electronically signed by: Garcia Ponce Date:    06/27/2023 Time:    17:21    XR ANKLE COMPLETE 3 VIEW LEFT    Result Date: 6/27/2023  EXAMINATION: XR ANKLE COMPLETE 3 VIEW LEFT CLINICAL HISTORY: Injury, unspecified, initial encounter TECHNIQUE: AP, lateral and oblique views of the left ankle were performed. COMPARISON: 07/18/2022, 03/23/2021. FINDINGS: There is osteopenia.  There is a lucency of the superolateral aspect of the talar dome which may represent osteochondral defect.  No acute fracture or dislocation. Alignment is normal. The ankle mortise is congruent. The talar dome is intact. Joint spaces are preserved. No effusion.  There are plantar and Achilles calcaneal enthesophytes.  There is soft tissue edema.     1. Possible osteochondral defect of the talar dome as above. 2.  Otherwise no acute osseous abnormality. 3. Soft tissue edema. Electronically signed by: Garcia Ponce  Date:    06/27/2023 Time:    17:20      Assessment:     1. Fall from bed, initial encounter    2. Injury    3. Left foot pain    4. Pain of left clavicle        Plan:       Fall from bed, initial encounter    Injury  -     XR CLAVICLE LEFT; Future; Expected date: 06/27/2023  -     XR ANKLE COMPLETE 3 VIEW LEFT; Future; Expected date: 06/27/2023    Left foot pain    Pain of left clavicle      Patient Instructions   - You must understand that you have received an Urgent Care treatment only and that you may be released before all of your medical problems are known or treated.   - You, the patient, will arrange for follow up care as instructed.   - If your condition worsens or fails to improve we recommend that you receive another evaluation at the ER immediately or contact your PCP to discuss your concerns.   - You can call [phone] or [phone] to help schedule an appointment with the appropriate provider.      Rest as much as possible  Ice and elevate the affected area 3-4 times per day for 15 minutes  Continue Aspercreme patches, ketoprofen cream, and tramadol as prescribed   Monitor your oxygen saturations. If your oxygen levels are below your baseline, or if you develop shortness of breath, chest pain, change in mental status, seek medical care.

## 2023-06-27 NOTE — PATIENT INSTRUCTIONS
- You must understand that you have received an Urgent Care treatment only and that you may be released before all of your medical problems are known or treated.   - You, the patient, will arrange for follow up care as instructed.   - If your condition worsens or fails to improve we recommend that you receive another evaluation at the ER immediately or contact your PCP to discuss your concerns.   - You can call [phone] or [phone] to help schedule an appointment with the appropriate provider.      Rest as much as possible  Ice and elevate the affected area 3-4 times per day for 15 minutes  Continue Aspercreme patches, ketoprofen cream, and tramadol as prescribed   Monitor your oxygen saturations. If your oxygen levels are below your baseline, or if you develop shortness of breath, chest pain, change in mental status, seek medical care.

## 2023-06-27 NOTE — TELEPHONE ENCOUNTER
Please review message and photo patient daughter is requesting virtual visit for her mother who is not mobile . I know you do not have virtual visit daughter ask if you look at photo and advise or I can give her  Podiatrist that does at home visit information to schedule with them.

## 2023-06-27 NOTE — TELEPHONE ENCOUNTER
Based on the photo this could likely be the psoriasis as she suggest or it could be fungus however  a nail sample would be needed confirm a diagnosis and this would require an in person visit.     I recommend starting with either Vaseline or Aquaphor if this is just dystrophic nail and not fungus     An option would be yes to have a home visit from one of the outside non [facility] podiatrist or possibly Dermatology would be willing to do a virtual visit       Message given to Ms Morin with Dr. Renee Sanchez number

## 2023-06-27 NOTE — TELEPHONE ENCOUNTER
Pt daughter Patience is calling to speak with someone in provider office in regards to scheduling pt for a virtual nail care appt with the provider Patience stated she thinks the pt may just need OTC cream for her toes and would like to have a virtual appt with Dr Hoffman Patience  is asking for a return call please call her at  [phone]         I called patient daughter Patience she stated her mom is not mobile requesting Virtual visit .  I asked if she could upload photo for Dr. Hoffman to view and Doctor Dalton will make the decision or can provide her with the at Podiatrist information. I am awaiting on photo from Ms. Morin. Before message is sent.

## 2023-06-30 ENCOUNTER — PES CALL (OUTPATIENT)
Dept: ADMINISTRATIVE | Facility: CLINIC | Age: 88
End: 2023-06-30
Payer: MEDICARE

## 2023-07-03 ENCOUNTER — PES CALL (OUTPATIENT)
Dept: ADMINISTRATIVE | Facility: CLINIC | Age: 88
End: 2023-07-03
Payer: MEDICARE

## 2023-07-03 ENCOUNTER — TELEPHONE (OUTPATIENT)
Dept: INFECTIOUS DISEASES | Facility: HOSPITAL | Age: 88
End: 2023-07-03
Payer: MEDICARE

## 2023-07-05 ENCOUNTER — OFFICE VISIT (OUTPATIENT)
Dept: PAIN MEDICINE | Facility: CLINIC | Age: 88
End: 2023-07-05
Attending: ANESTHESIOLOGY
Payer: MEDICARE

## 2023-07-05 ENCOUNTER — OFFICE VISIT (OUTPATIENT)
Dept: PAIN MEDICINE | Facility: CLINIC | Age: 88
End: 2023-07-05
Payer: MEDICARE

## 2023-07-05 VITALS
RESPIRATION RATE: 18 BRPM | DIASTOLIC BLOOD PRESSURE: 76 MMHG | WEIGHT: 149.69 LBS | SYSTOLIC BLOOD PRESSURE: 174 MMHG | OXYGEN SATURATION: 100 % | WEIGHT: 149.69 LBS | HEART RATE: 63 BPM | BODY MASS INDEX: 25.56 KG/M2 | SYSTOLIC BLOOD PRESSURE: 174 MMHG | HEIGHT: 64 IN | DIASTOLIC BLOOD PRESSURE: 76 MMHG | OXYGEN SATURATION: 100 % | BODY MASS INDEX: 25.56 KG/M2 | HEIGHT: 64 IN | HEART RATE: 63 BPM | RESPIRATION RATE: 18 BRPM

## 2023-07-05 DIAGNOSIS — G89.29 CHRONIC PAIN OF BOTH SHOULDERS: ICD-10-CM

## 2023-07-05 DIAGNOSIS — M25.512 CHRONIC PAIN OF BOTH SHOULDERS: ICD-10-CM

## 2023-07-05 DIAGNOSIS — M75.52 SUBACROMIAL BURSITIS OF LEFT SHOULDER JOINT: ICD-10-CM

## 2023-07-05 DIAGNOSIS — M19.012 ARTHRITIS OF LEFT ACROMIOCLAVICULAR JOINT: Primary | ICD-10-CM

## 2023-07-05 DIAGNOSIS — M25.511 CHRONIC PAIN OF BOTH SHOULDERS: ICD-10-CM

## 2023-07-05 DIAGNOSIS — R53.81 DEBILITY: ICD-10-CM

## 2023-07-05 DIAGNOSIS — G89.4 CHRONIC PAIN DISORDER: Primary | ICD-10-CM

## 2023-07-05 PROCEDURE — 99213 OFFICE O/P EST LOW 20 MIN: CPT | Mod: 25,S$GLB,, | Performed by: ANESTHESIOLOGY

## 2023-07-05 PROCEDURE — 1160F RVW MEDS BY RX/DR IN RCRD: CPT | Mod: CPTII,S$GLB,, | Performed by: ANESTHESIOLOGY

## 2023-07-05 PROCEDURE — 20611 PR DRAIN/ASP/INJECT MAJOR JOINT/BURSA W/US GUIDANCE: ICD-10-PCS | Mod: LT,GC,S$GLB, | Performed by: ANESTHESIOLOGY

## 2023-07-05 PROCEDURE — 99999 PR PBB SHADOW E&M-EST. PATIENT-LVL V: CPT | Mod: PBBFAC,,, | Performed by: ANESTHESIOLOGY

## 2023-07-05 PROCEDURE — 99999 PR PBB SHADOW E&M-EST. PATIENT-LVL V: ICD-10-PCS | Mod: PBBFAC,,, | Performed by: ANESTHESIOLOGY

## 2023-07-05 PROCEDURE — 1159F PR MEDICATION LIST DOCUMENTED IN MEDICAL RECORD: ICD-10-PCS | Mod: CPTII,S$GLB,, | Performed by: ANESTHESIOLOGY

## 2023-07-05 PROCEDURE — 1125F AMNT PAIN NOTED PAIN PRSNT: CPT | Mod: CPTII,S$GLB,, | Performed by: ANESTHESIOLOGY

## 2023-07-05 PROCEDURE — 99213 PR OFFICE/OUTPT VISIT, EST, LEVL III, 20-29 MIN: ICD-10-PCS | Mod: 25,S$GLB,, | Performed by: ANESTHESIOLOGY

## 2023-07-05 PROCEDURE — 20611 DRAIN/INJ JOINT/BURSA W/US: CPT | Mod: LT,GC,S$GLB, | Performed by: ANESTHESIOLOGY

## 2023-07-05 PROCEDURE — 1125F PR PAIN SEVERITY QUANTIFIED, PAIN PRESENT: ICD-10-PCS | Mod: CPTII,S$GLB,, | Performed by: ANESTHESIOLOGY

## 2023-07-05 PROCEDURE — 99499 UNLISTED E&M SERVICE: CPT | Mod: S$GLB,,, | Performed by: NURSE PRACTITIONER

## 2023-07-05 PROCEDURE — 1159F MED LIST DOCD IN RCRD: CPT | Mod: CPTII,S$GLB,, | Performed by: ANESTHESIOLOGY

## 2023-07-05 PROCEDURE — 1160F PR REVIEW ALL MEDS BY PRESCRIBER/CLIN PHARMACIST DOCUMENTED: ICD-10-PCS | Mod: CPTII,S$GLB,, | Performed by: ANESTHESIOLOGY

## 2023-07-05 PROCEDURE — 99499 NO LOS: ICD-10-PCS | Mod: S$GLB,,, | Performed by: NURSE PRACTITIONER

## 2023-07-05 RX ORDER — TRIAMCINOLONE ACETONIDE 40 MG/ML
40 INJECTION, SUSPENSION INTRA-ARTICULAR; INTRAMUSCULAR
Status: COMPLETED | OUTPATIENT
Start: 2023-07-05 | End: 2023-07-05

## 2023-07-05 RX ADMIN — TRIAMCINOLONE ACETONIDE 40 MG: 40 INJECTION, SUSPENSION INTRA-ARTICULAR; INTRAMUSCULAR at 12:07

## 2023-07-05 NOTE — PROGRESS NOTES
Chronic Pain- Established Visit      SUBJECTIVE:    Interval History 7/5/2023:  The patient returns to clinic today for follow up of low back pain. She had a fall two weeks ago. She did go to Urgent Care where xrays are negative for acute injury. She reports increased left shoulder and clavicle pain. This pain is worse with activity. She is having trouble dressing herself due to pain. She has increased her Tylenol intake. She is using a topical pain cream. She is using ice and heat. She is taking Gabapentin. She continues to report low back pain, she is scheduled for ASHUTOSH at the end of the month. Her pain today is 9/10.    Interval History 6/9/2023:  The patient returns to clinic today for follow up of back pain via virtual visit. She reports increased low back pain that radiates into anterior and posterior legs to her ankles. Her pain is worse with standing and walking. She is taking Gabapentin at bedtime. She also takes Tylenol for pain. She denies any other health changes.     Interval History 3/9/2023:  The patient returns to clinic today for follow up of back pain via virtual visit. She reports intermittent low back pain. She denies any radicular leg pain. She is using an acupressure knee brace with benefit. Her pain is tolerable at this time. She continues to take Gabapentin with benefit. She denies any other health changes.     Interval History 12/9/2022:  The patient returns to clinic today for follow up of low back pain via virtual visit. She is s/p left L3/4 and L4/5 TF ASHUTOSH on 11/17/2022. She reports 60% relief of her pain. She continues to report intermittent low back pain. She denies any radicular leg pain. She does report bilateral foot pain, described as burning in nature. Her pain is tolerable at this time. She continues to take Gabapentin. She also takes Tramadol per her PCP. She denies any other health changes.     Interval History 10/20/22  Ms. Cook complains of worsening left low back pain  radiating down her lateral thigh to the knee. The pain is only when she walks/moves, she in pain free when sitting. She is s/p bilateral TESI L4/5 2 months ago, which she had relief from, but acutely worsened when she bent over to pick something off the ground. She states the pain is similar to her pain prior to this. She denies numbness, tingling, bladder/bowel problems.    Interval History 10/6/2022:   Mrs Cook presents for follow up virtually. At prior visit she had Left GTB injection with significant benefit in office. Her lower back and hip pain were further evaluated and noted newer L3 compression Fx. While she visually appears to have significant improvement of pain to me she does voice focal upper/mid lumbar pain persists. Discussed treatment options and Kyphoplasty recommended by Dr Wilcox but there was hesitation regarding further escalation to kyphoplasty. We did discusses prior osteoporosis diagnosis and considering treatment options with PCP. She does not voice any s/s concerning for cauda equina.      Interval History 8/22/2022:  Mrs Cook presents for follow up acute pain. She is s/p repeat Bilateral L4/5 TFESI with mild improvement but having severe left lateral hip pain. She also continues to have burning pain to Right heel and numbness to right lateral lower leg. She has chronic urinary continence but no new neurological issues.She continues to take tramadol by PCP with some benefit     Interval History 8/5/2022:  Mrs Cook presents for follow up evaluation of returning lower back pain. She has exact pain relieved prior by B L4/5 TFESIs. She has newer complaint of bilateral feet burning pains as well. She has had JESSE noting arterial issues R>L. She continues to take Tramadol and neurontin without SE.She would like to repeat ASHUTOSH as it provided 7 months of 75% relief and symptoms returning severe over last few weeks. No voicing of symptoms concerning for cauda equina.     Interval History  12/6/2021:  The patient returns to clinic today for follow up of low back pain. She reports increased low back and bilateral hip pain over the last two weeks. She reports intermittent radiating pain into her lateral thighs. She does have difficulty sleeping due to pain. Her pain is also worse with standing and walking. She continues to take Gabapentin with benefit. She also takes Tramadol per her PCP with benefit. She denies any other health changes. Her pain today is 8/10.    Interval History 7/6/2021:  The patient returns to clinic today for follow up of low back pain via virtual visit. She is s/p bilateral L4/5 TF ASHUTOSH on 6/21/2021. She reports 75% relief of her low back and leg pain. She reports low back pain, worse in the morning. She reports intermittent radiating pain into the posterolateral aspect of both legs to her knees. She continues to take Gabapentin with benefit. She denies any weakness. She denies any other health changes. Her pain today is 2/10.    Interval History 6/8/2021:  The patient returns to clinic today for follow up of low back pain. She is here today for imaging review. She continues to report low back pain that radiates into the posterolateral aspect of both legs to below her knee. Her pain is worse with prolonged standing and walking. She does feel like she will fall with prolonged walking. She also reports relief with bending forward. She is currently taking Gabapentin at bedtime with benefit. She continues to take Tramadol per her PCP with benefit. She denies any other health changes. Her pain today is 8/10.    Interval History 5/20/2021:  The patient returns to clinic today for follow up of low back pain. She is s/p bilateral hip joint injections on 4/26/2021. She reports no relief. She reports increased low back pain that radiates into the posterior aspect of both legs to her ankles. She does report numbness to her feet. Her pain is worse with prolonged standing and walking. She feels  as though her legs will give out with prolonged walking. She denies any bowel or bladder incontinence. She denies any other health changes. Her pain today is 9/10.    Interval History 3/23/2021:  The patient returns to clinic today for follow up of low back and hip pain. She reports increased bilateral hip pain. She describes this pain as deep and aching. Her pain is worse with prolonged standing and walking. She also reports low back pain that is aching in nature. She denies any radicular leg pain. Of note, she reports increased swelling and pain to her left ankle and foot. She does report a trip last week. She did see Orthopedics today and is scheduled for xray. She denies any other health changes. Her pain today is 8/10.    Interval History 12/9/2020:  She returns for follow-up.  She is doing well since her facet injections.  Her right hip is hurting.  It interferes with her mobility and comfort while laying down.  No new symptomatology otherwise.    Interval History 8/11/2020:  The patient has a virtual visit today for follow up.  She is s/p Bilateral L3-4, L4-5 L5-S1 facet joint injections with 90% relief.  She says that her back pain is minimal at this time.  She is able to move around and walk better since the procedure as well.  She is having some left hip pain and feels as though her leg will give out sometimes when walking.  She would like a referral to ortho close to her home in [facility].  Her pain today is 1/10.  She denies any respiratory changes since procedure including fever, cough or SOB.    Interval History 12/5/2019:  The patient is here for follow up of lower back and bilateral hip pain.  Her back pain has been minimal.  She is having increased lateral hip and buttock pain recently.  She had TPIs in the past and would like to repeat this today.  Her pain today is 8/10.    Previous Encounter:  Anahi Cook presents to the clinic for a follow-up appointment for lower back and left lower extremity  pain.  She had T12 kyphoplasty performed on 11/8/18 with significant improvement in symptoms.  She was admitted through the ED on 4/3/19 after suffering fractures of 6th, 8th and possible 7th ribs of the left side after a fall at home.  Thoracic imaging also showed stable slight anterior wedging at T11.  She had bilateral L3-4, L4-5 and L5-S1 facet injections on 4/1/19 with 80% relief of lower back pain.  Her current pain is minimal.  She has mild leg pain with walking.  She continues to follow up with oncology regularly.  Since the last visit, Anahi Cook states the pain has been improving.  Current pain intensity is 2/10.     Pain Medications:  OTC Tylenol    Opioid Contract: no     report:  Not applicable    Pain Procedures:   7/21/14 L4-5 IL ASHUTOSH- significant benefit  12/1/15 Left GT bursa injection  4/26/16 Left GT bursa injection  12/15/16 L4-5 IL ASHUTOSH- significant benefit  2/15/18 L4-5 IL ASHUTOSH- 100% relief  8/21/18 L4-5 IL ASHUTOSH- significant relief  10/15/18 Bilateral L3-4, L4-5 L5-S1 facet joint injections  11/8/18 T12 kyphoplasty- significant relief  4/1/19 Bilateral L3-4, L4-5 L5-S1 facet joint injections- 80% relief  7/27/20 Bilateral L3-4, L4-5 L5-S1 facet joint injections- 90% relief  4/26/2021- Bilateral hip joint injections- no relief    6/21/2021- Bilateral L4/5 TF ASHUTOSH- 75% relief  8/9/2022 B L4/5 TFESI without significant benefit.   11/17/2022- Left L3/4 and L4/5 TF ASHUTOSH    Physical Therapy/Home Exercise: yes     Imaging:     MRI LUMBAR SPINE WITHOUT CONTRAST 9/12/2022  COMPARISON:  5/29/2021     FINDINGS:  Chronic vertebral plana T12.  Extension of the posterosuperior cortex into the canal by approximately 6 mm.     There is now height loss at L3, approximately 30%.  Associated STIR signal hyperintensity.  Retropulsion of the posterior cortex into the canal approximately 5 mm.     Remaining vertebral body heights are maintained.     Mild disc space narrowing L4-5.     Grade 1 retrolisthesis L1-2.   Grade 1 anterolisthesis L4-5     Conus terminates appropriately at L1.     Multilevel degenerative change as diesel below:     T11-12: Retropulsion of the posterosuperior cortex of T12 into the canal with mild canal narrowing.     T12-L1: Posterior circumferential disc bulge, asymmetric to the left.  Mild left neural foraminal narrowing.     L1-2: Facet and ligamentum flavum hypertrophic changes with mild left neural foraminal narrowing.     L2-3: Posterior circumferential disc bulge and ligamentum flavum hypertrophy retropulsion of the posterior cortex of L3 into the canal.  Findings result in mild canal narrowing.  Severe right and moderate left neural foraminal narrowing.     L3-4: Posterior circumferential disc bulge.  Facet and ligamentum flavum hypertrophic changes.  Mild canal and moderate bilateral neural foraminal narrowing.     L4-5: Grade 1 anterolisthesis of L4-5 with uncovering of the disc.  Facet and ligamentum flavum hypertrophic changes.  Combinations contribute to severe canal stenosis.  Severe left and moderate right neural foraminal narrowing.     L5-S1: Central canal and neural foramina are well maintained.     Multiple T2 hyperintense foci in both kidneys, including a thinly septated T2 hyperintense focus on the left measuring 15 mm.     Impression:     Acute fracture along the superior endplate of L3 with approximately 30% height loss.  Stable chronic height loss T12.     Multilevel degenerative change, worst at L4-5 where severe canal stenosis results.     Multilevel neural foraminal narrowing, severe L2-3 and L4-5 as above.     Bilateral renal cysts, including a thinly septated cyst on the left.  Findings can be further evaluated with renal ultrasound.     This report was flagged in Epic as abnormal.    XR LUMBAR SPINE 5 VIEW WITH FLEX AND EXT 8/22/2022  COMPARISON:  Lumbar spine MRI 05/29/2021     FINDINGS:  Previous vertebroplasty T12.     Height loss along the superior endplate of L3 is  new compared to that exam.  Height loss is approximately 40%.  There may be mild height loss at L2.     Disc space narrowing and endplate changes L4-5.  Facet arthropathy L4-5.     Grade 1 anterolisthesis L4-5 with no significant change with flexion or extension.     Aortoiliac atherosclerosis.     Impression:     Height loss along the superior endplate of L3, approximately 40%, is new compared to May 2021.  By today's radiographs, there appears to be mild sclerosis along the superior endplate which can be seen with a subacute or chronic fracture.  Correlation with MRI advised if there is recent trauma or acute back pain to exclude the possibility of an acute fracture.     Degenerative change as above.     This report was flagged in Epic as abnormal.    XR HIP WITH PELVIS WHEN PERFORMED, 2 OR 3 VIEWS LEFT 8/22/2022  COMPARISON:  03/24/2022 and 09/09/2020     FINDINGS:  The femoral head is well located with respect to the acetabulum. No acute fracture seen.  Osteophyte formation, subchondral sclerosis, with moderate narrowing femoroacetabular joint bilaterally.     No significant soft tissue edema or radiopaque retained foreign body.  Aortoiliac atherosclerosis.     Impression:     No acute fracture or dislocation seen.  Moderate osteoarthritis and joint space narrowing.    MRI Lumbar Spine 5/29/2021:  COMPARISON:  Prior MRI from 2014 and 2018     FINDINGS:  There is a transitional lumbosacral segment and spine labeling is as on prior examinations.     Stable severe compression deformity at T12 with retropulsion.  Remaining vertebral body heights are maintained.  Grade 1 anterolisthesis appears increased at L4-5 as compared to previous.  There is no marrow replacement process.  The spinal cord is normal in signal.  Review of paravertebral structures demonstrates high T2 low T1 signal foci within kidneys.     Not included in the field of view of the axial, there is disc bulge at T10-11.  This does not appear to result  in a significant degree of spinal canal narrowing on the basis of the sagittal imaging.     T12 demonstrates retropulsion with mild narrowing of the spinal canal.     T12-L1 demonstrates minor disc bulge.     L1-L2 demonstrates mild facet degenerative change.  There is no significant spinal canal stenosis.  There is mild bilateral neural foraminal narrowing.     L2-3 demonstrates facet degenerative change.  There is no significant spinal canal stenosis.  There is right foraminal protrusion.  This results in a moderate degree of right foraminal narrowing.  There is mild left foraminal narrowing.     L3-4 demonstrates facet degenerative change and ligamentum flavum thickening.  There is a facet joint synovial cyst along the posterolateral aspect of the left facet.  There is right foraminal protrusion.  There is mild left and moderate right foraminal narrowing.     L4-5 demonstrates facet degenerative change and ligamentum flavum thickening.  There is anterolisthesis.  There is severe left and moderate right foraminal narrowing.  There is a severe degree of spinal canal narrowing.     L5-S1 is unremarkable.     Impression:     Multilevel degenerative changes as further detailed above.  Findings are most significant at the L4-5 level, noting transitional lumbosacral segment.     Stable severe compression deformity at T12.     Foci within the kidneys bilaterally likely representing cyst can be correlated with ultrasound.          Past Medical History:  Past Medical History:   Diagnosis Date    Anxiety     Arthritis     Back pain     Breast cancer 1/17/2018    Breast mass 1/15/2018    Chronic kidney disease, stage 2, mildly decreased GFR     COPD (chronic obstructive pulmonary disease)     emphysema    Depression     Dysuria 1/29/2018    Exudative age-related macular degeneration of left eye with active choroidal neovascularization 8/25/2022    Family history of breast cancer 1/17/2018    GERD (gastroesophageal reflux  disease)     Hypertension     Infiltrating ductal carcinoma of breast, left 7/12/2018    Osteoporosis, senile     Ovarian mass 1/15/2018    Pneumonia     S/P robotic assisted laparoscopic BSO (bilateral salpingo-oophorectomy) 2/23/2018       Past Surgical History:  Past Surgical History:   Procedure Laterality Date    AXILLARY NODE DISSECTION Left 7/12/2018    Procedure: LYMPHADENECTOMY, AXILLARY;  Surgeon: Amanda Caballero MD;  Location: [facility] OR [address] [place] [address];  Service: General;  Laterality: Left;    BREAST BIOPSY      BREAST LUMPECTOMY      CATARACT EXTRACTION      CHOLECYSTECTOMY      COLONOSCOPY      ESOPHAGOGASTRODUODENOSCOPY      ESOPHAGOGASTRODUODENOSCOPY N/A 10/14/2019    Procedure: EGD (ESOPHAGOGASTRODUODENOSCOPY);  Surgeon: Davonte Thompson MD;  Location: [facility] ENDO ([address] [place] [address]);  Service: Endoscopy;  Laterality: N/A;  hx of pulmonary hypertension-PA 50     pt requesting ASAP    EYE SURGERY      FIXATION KYPHOPLASTY N/A 11/8/2018    Procedure: Kyphoplasty   T12;  Surgeon: Merly Wilcox MD;  Location: [facility] CATH LAB;  Service: Pain Management;  Laterality: N/A;  T12  Argyle Reps e-mailed with date and time    HYSTERECTOMY      INJECTION FOR SENTINEL NODE IDENTIFICATION Left 7/12/2018    Procedure: INJECTION, FOR SENTINEL NODE IDENTIFICATION;  Surgeon: Amanda Caballero MD;  Location: [facility] OR [address] [place] [address];  Service: General;  Laterality: Left;    INJECTION OF FACET JOINT Bilateral 10/15/2018    Procedure: INJECTION, FACET JOINT, BILATERAL LUMBAR L3-4, 4-5, 5-S1 FACET JOINT INJECTIONS;  Surgeon: Merly Wilcox MD;  Location: [facility] PAIN MGT;  Service: Pain Management;  Laterality: Bilateral;    INJECTION OF FACET JOINT Bilateral 4/1/2019    Procedure: INJECTION, FACET JOINT, L3-L4,L4-L5,L5-S1;  Surgeon: Merly Wilcox MD;  Location: [facility] PAIN MGT;  Service: Pain Management;  Laterality: Bilateral;    INJECTION OF FACET JOINT Bilateral 6/20/2019    Procedure: INJECTION, FACET JOINT, L3-L4,L4-L5,L5-S1 NEED CONSENT;  Surgeon: Merly  MD Brenden;  Location: [facility] PAIN MGT;  Service: Pain Management;  Laterality: Bilateral;    INJECTION OF FACET JOINT Bilateral 7/27/2020    Procedure: INJECTION, FACET JOINT BILATERAL L3/4, L4/5 and L5/S1;  Surgeon: Merly Wilcox MD;  Location: [facility] PAIN MGT;  Service: Pain Management;  Laterality: Bilateral;    INJECTION OF JOINT Bilateral 7/27/2020    Procedure: INJECTION, JOINT, BILATERAL GREATER TROCHANTERIC BURSA;  Surgeon: Merly Wilcox MD;  Location: [facility] PAIN MGT;  Service: Pain Management;  Laterality: Bilateral;    INJECTION OF JOINT Bilateral 4/26/2021    Procedure: INJECTION, JOINT, HIP;  Surgeon: Merly Wilcox MD;  Location: [facility] PAIN MGT;  Service: Pain Management;  Laterality: Bilateral;  consent needed    INJECTION OF STEROID Right 11/15/2021    Procedure: INJECTION, STEROID RIGHT MIDDLE AND SAMLL FINGER;  Surgeon: Florecita Da Silva MD;  Location: [facility] OR;  Service: Orthopedics;  Laterality: Right;    MASTECTOMY, PARTIAL Left 7/12/2018    Procedure: MASTECTOMY, PARTIAL-W/SEED LOCALIZATION;  Surgeon: Amanda Caballero MD;  [facility];  Service: General;  Laterality: Left;    AL REMOVAL OF OVARY/TUBE(S)  02/23/2018    Robotic Assisted    ROTATOR CUFF REPAIR Right     rotator cuff repair right shoulder    TONSILLECTOMY      TRANSFORAMINAL EPIDURAL INJECTION OF STEROID Bilateral 6/21/2021    Procedure: INJECTION, STEROID, EPIDURAL, TRANSFORAMINAL APPROACH  bilateral L4/5 TF ASHUTOSH;  Surgeon: Merly Wilcox MD;  Location: [facility] PAIN MGT;  Service: Pain Management;  Laterality: Bilateral;  consent needed    TRANSFORAMINAL EPIDURAL INJECTION OF STEROID Bilateral 1/3/2022    Procedure: INJECTION, STEROID, EPIDURAL, TRANSFORAMINAL APPROACH Bilateral L4/5 Needs consent;  Surgeon: Merly Wilcox MD;  Location: [facility] PAIN [facility]GT;  Service: Pain Management;  Laterality: Bilateral;    TRANSFORAMINAL EPIDURAL INJECTION OF STEROID Bilateral 8/9/2022    Procedure: INJECTION, STEROID, EPIDURAL, TRANSFORAMINAL  APPROACH, BILATERAL L4-L5   MEDICALLY URGENT;  Surgeon: Merly Wilcox MD;  Location: [facility] PAIN MGT;  Service: Pain Management;  Laterality: Bilateral;    TRANSFORAMINAL EPIDURAL INJECTION OF STEROID Left 2022    Procedure: INJECTION, STEROID, EPIDURAL, TRANSFORAMINAL APPROACH LEFT L3-L4 AND L4-L5 CONTRAST;  Surgeon: Merly Wilcox MD;  Location: [facility] PAIN MGT;  Service: Pain Management;  Laterality: Left;    TRIGGER FINGER RELEASE Left 11/15/2021    Procedure: RELEASE, TRIGGER FINGER, LEFT MIDDLE AND RING;  Surgeon: Florecita Da Silva MD;  Location: [facility] OR;  Service: Orthopedics;  Laterality: Left;       Family History:  Family History   Problem Relation Age of Onset    Heart failure Mother     Kidney failure Mother     Heart attack Father     Breast cancer Sister 66        Lump, XRT, chemo, recurrence 10 years later.    Cancer Brother         leukemia    Heart attack Brother 58    Pulmonary embolism Brother 45    Heart attack Brother 52    Breast cancer Maternal Grandmother 60        advanced at diagnosis    Breast cancer Maternal Aunt 83         at 92    Colon cancer Neg Hx     Esophageal cancer Neg Hx        Social History:  Social History     Socioeconomic History    Marital status: Single    Number of children: 3   Occupational History    Occupation: Multiple jobs, see social documentation section     Comment: Retired   Tobacco Use    Smoking status: Former     Packs/day: 1.00     Years: 40.00     Pack years: 40.00     Types: Cigarettes    Smokeless tobacco: Never    Tobacco comments:     Smoked >1 ppd for at least 40 years, quit around    Substance and Sexual Activity    Alcohol use: Yes     Comment: occasional glass of wine or cocktail    Drug use: No    Sexual activity: Yes     Partners: Male   Social History Narrative    Worked many jobs while raising 3 children.  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  She was  from her first , but took care of him at the end of his life.       REVIEW OF SYSTEMS:    GENERAL:  No weight loss, malaise or fevers.  HEENT:   No recent changes in vision or hearing  NECK:  Negative for lumps, no difficulty with swallowing.  RESPIRATORY:  Negative for cough, wheezing or shortness of breath, patient denies any recent URI. COPD.  CARDIOVASCULAR:  Negative for chest pain, leg swelling or palpitations. Hypertension.  GI:  Negative for abdominal discomfort, blood in stools or black stools or change in bowel habits.  MUSCULOSKELETAL:  See HPI.  SKIN:  Negative for lesions, rash, and itching.  PSYCH:  No mood disorder or recent psychosocial stressors.  Patients sleep is not disturbed secondary to pain.  HEMATOLOGY/LYMPHOLOGY:  Negative for prolonged bleeding, bruising easily or swollen nodes.  Patient is not currently taking any anti-coagulants  NEURO:   No history of headaches, syncope, paralysis, seizures or tremors.  All other reviewed and negative other than HPI.    OBJECTIVE:    BP (!) 174/76   Pulse 63   Resp 18   Ht 5' 4" (1.626 m)   Wt 67.9 kg (149 lb 11.1 oz)   LMP  (LMP Unknown)   SpO2 100%   BMI 25.69 kg/m²     PHYSICAL EXAMINATION:     GENERAL: Well appearing, in no acute distress, alert and oriented x3.  PSYCH:  Mood and affect appropriate.  SKIN: Skin color, texture, turgor normal, no rashes or lesions.  HEAD/FACE:  Normocephalic, atraumatic. Cranial nerves grossly intact.  CV: RRR with palpation of the radial artery.  PULM: No evidence of respiratory difficulty, symmetric chest rise.  EXTREMITIES:  Good capillary refill.  MUSCULOSKELETAL: There is pain with palpation over left AC joint. Limited ROM with pain on internal rotation of bilateral shoulders. Positive cross arm test on the left. No atrophy or tone abnormalities are noted.  NEURO: Bilateral lower extremity coordination and muscle stretch reflexes are physiologic and symmetric.  Plantar " response are downgoing. No clonus.  Normal sensation.  GAIT: Antalgic- ambulates with rolling walker.      ASSESSMENT: 88 y.o. year old female with pain, consistent with the following diagnoses:     1. Arthritis of left acromioclavicular joint        2. Subacromial bursitis of left shoulder joint        3. Chronic pain of both shoulders              PLAN:   We discussed with the patient the assessment and recommendations. The following is the plan we agreed on:   - Previous imaging was reviewed and discussed with the patient today. Urgent Care notes reviewed.     - Left AC joint injection in office today.     - She will keep bilateral L5/S1 TF ASHUTOSH scheduled on 7/31/2023.    - Continue Gabapentin to 200 mg at bedtime.      - Continue Tramadol per PCP.     - Consult home PT.     - RTC 2 weeks after above procedure.     The above plan and management options were discussed at length with patient. Patient is in agreement with the above and verbalized understanding.    Amanda KLEIN Radha  07/05/2023       I have personally taken the history and examined this patient and agree with the NP's note as stated above.   Shoulder Injection Procedure Note      Time-out taken to identify patient and procedure side prior to starting the procedure.   I attest that I have reviewed the patient's home medications prior to the procedure and no contraindication have been identified. I  re-evaluated the patient after the patient was positioned for the procedure in the procedure room immediately before the procedural time-out. The vital signs are current and represent the current state of the patient which has not significantly changed since the preprocedure assessment.     Date of Service: 07/05/2023    PCP: Minerva Mathias MD                 PROCEDURE:  Left  injection of:  1. AC joint      REASON FOR PROCEDURE:  * No surgery found *  1. Arthritis of left acromioclavicular joint    2. Subacromial bursitis of left shoulder joint    3.  Chronic pain of both shoulders    4. Debility      POSTOP DIAGNOSIS: * No surgery found *  1. Arthritis of left acromioclavicular joint    2. Subacromial bursitis of left shoulder joint    3. Chronic pain of both shoulders    4. Debility      PHYSICIAN: Merly Wilcox MD  ASSISTANTS: Jhonny Kumar MD  [employer] Pain Fellow       MEDICATIONS INJECTED: 0.5 mL of Triamcinolone 40mg/ml X with 10 mL of bupivacaine 0.25%    SEDATION MEDICATIONS: None    ESTIMATED BLOOD LOSS:  None.    COMPLICATIONS:  None.    TECHNIQUE:   The patient was brought to the procedure room and placed on the exam table in a comfortable position.    After sterile skin preparation with chloraprep, a 27 gauge, 1.5 inch needle was used introduced. Under ultrasound guidance, and after negative aspiration, the left AC joint was injected. 3ml . The needle was removed and a bandaid was placed. The patient tolerated the procedure well.      Merly Wilcox

## 2023-07-05 NOTE — PROGRESS NOTES
Chronic Pain- Established Visit      SUBJECTIVE:    Interval History 7/5/2023:  The patient returns to clinic today for follow up of low back pain. She had a fall two weeks ago. She did go to Urgent Care where xrays are negative for acute injury. She reports increased left shoulder pain.     She has increased her Tylenol intake. She is using a topical pain cream. She is using ice and heat.     Interval History 6/9/2023:  The patient returns to clinic today for follow up of back pain via virtual visit. She reports increased low back pain that radiates into anterior and posterior legs to her ankles. Her pain is worse with standing and walking. She is taking Gabapentin at bedtime. She also takes Tylenol for pain. She denies any other health changes.     Interval History 3/9/2023:  The patient returns to clinic today for follow up of back pain via virtual visit. She reports intermittent low back pain. She denies any radicular leg pain. She is using an acupressure knee brace with benefit. Her pain is tolerable at this time. She continues to take Gabapentin with benefit. She denies any other health changes.     Interval History 12/9/2022:  The patient returns to clinic today for follow up of low back pain via virtual visit. She is s/p left L3/4 and L4/5 TF ASHUTOSH on 11/17/2022. She reports 60% relief of her pain. She continues to report intermittent low back pain. She denies any radicular leg pain. She does report bilateral foot pain, described as burning in nature. Her pain is tolerable at this time. She continues to take Gabapentin. She also takes Tramadol per her PCP. She denies any other health changes.     Interval History 10/20/22  Ms. Cook complains of worsening left low back pain radiating down her lateral thigh to the knee. The pain is only when she walks/moves, she in pain free when sitting. She is s/p bilateral TESI L4/5 2 months ago, which she had relief from, but acutely worsened when she bent over to pick  something off the ground. She states the pain is similar to her pain prior to this. She denies numbness, tingling, bladder/bowel problems.    Interval History 10/6/2022:   Mrs Cook presents for follow up virtually. At prior visit she had Left GTB injection with significant benefit in office. Her lower back and hip pain were further evaluated and noted newer L3 compression Fx. While she visually appears to have significant improvement of pain to me she does voice focal upper/mid lumbar pain persists. Discussed treatment options and Kyphoplasty recommended by Dr Wilcox but there was hesitation regarding further escalation to kyphoplasty. We did discusses prior osteoporosis diagnosis and considering treatment options with PCP. She does not voice any s/s concerning for cauda equina.      Interval History 8/22/2022:  Mrs Cook presents for follow up acute pain. She is s/p repeat Bilateral L4/5 TFESI with mild improvement but having severe left lateral hip pain. She also continues to have burning pain to Right heel and numbness to right lateral lower leg. She has chronic urinary continence but no new neurological issues.She continues to take tramadol by PCP with some benefit     Interval History 8/5/2022:  Mrs Cook presents for follow up evaluation of returning lower back pain. She has exact pain relieved prior by B L4/5 TFESIs. She has newer complaint of bilateral feet burning pains as well. She has had JESSE noting arterial issues R>L. She continues to take Tramadol and neurontin without SE.She would like to repeat ASHUTOSH as it provided 7 months of 75% relief and symptoms returning severe over last few weeks. No voicing of symptoms concerning for cauda equina.     Interval History 12/6/2021:  The patient returns to clinic today for follow up of low back pain. She reports increased low back and bilateral hip pain over the last two weeks. She reports intermittent radiating pain into her lateral thighs. She does have  difficulty sleeping due to pain. Her pain is also worse with standing and walking. She continues to take Gabapentin with benefit. She also takes Tramadol per her PCP with benefit. She denies any other health changes. Her pain today is 8/10.    Interval History 7/6/2021:  The patient returns to clinic today for follow up of low back pain via virtual visit. She is s/p bilateral L4/5 TF ASHUTOSH on 6/21/2021. She reports 75% relief of her low back and leg pain. She reports low back pain, worse in the morning. She reports intermittent radiating pain into the posterolateral aspect of both legs to her knees. She continues to take Gabapentin with benefit. She denies any weakness. She denies any other health changes. Her pain today is 2/10.    Interval History 6/8/2021:  The patient returns to clinic today for follow up of low back pain. She is here today for imaging review. She continues to report low back pain that radiates into the posterolateral aspect of both legs to below her knee. Her pain is worse with prolonged standing and walking. She does feel like she will fall with prolonged walking. She also reports relief with bending forward. She is currently taking Gabapentin at bedtime with benefit. She continues to take Tramadol per her PCP with benefit. She denies any other health changes. Her pain today is 8/10.    Interval History 5/20/2021:  The patient returns to clinic today for follow up of low back pain. She is s/p bilateral hip joint injections on 4/26/2021. She reports no relief. She reports increased low back pain that radiates into the posterior aspect of both legs to her ankles. She does report numbness to her feet. Her pain is worse with prolonged standing and walking. She feels as though her legs will give out with prolonged walking. She denies any bowel or bladder incontinence. She denies any other health changes. Her pain today is 9/10.    Interval History 3/23/2021:  The patient returns to clinic today for  follow up of low back and hip pain. She reports increased bilateral hip pain. She describes this pain as deep and aching. Her pain is worse with prolonged standing and walking. She also reports low back pain that is aching in nature. She denies any radicular leg pain. Of note, she reports increased swelling and pain to her left ankle and foot. She does report a trip last week. She did see Orthopedics today and is scheduled for xray. She denies any other health changes. Her pain today is 8/10.    Interval History 12/9/2020:  She returns for follow-up.  She is doing well since her facet injections.  Her right hip is hurting.  It interferes with her mobility and comfort while laying down.  No new symptomatology otherwise.    Interval History 8/11/2020:  The patient has a virtual visit today for follow up.  She is s/p Bilateral L3-4, L4-5 L5-S1 facet joint injections with 90% relief.  She says that her back pain is minimal at this time.  She is able to move around and walk better since the procedure as well.  She is having some left hip pain and feels as though her leg will give out sometimes when walking.  She would like a referral to ortho close to her home in [facility].  Her pain today is 1/10.  She denies any respiratory changes since procedure including fever, cough or SOB.    Interval History 12/5/2019:  The patient is here for follow up of lower back and bilateral hip pain.  Her back pain has been minimal.  She is having increased lateral hip and buttock pain recently.  She had TPIs in the past and would like to repeat this today.  Her pain today is 8/10.    Previous Encounter:  Anahi Cook presents to the clinic for a follow-up appointment for lower back and left lower extremity pain.  She had T12 kyphoplasty performed on 11/8/18 with significant improvement in symptoms.  She was admitted through the ED on 4/3/19 after suffering fractures of 6th, 8th and possible 7th ribs of the left side after a fall at home.   Thoracic imaging also showed stable slight anterior wedging at T11.  She had bilateral L3-4, L4-5 and L5-S1 facet injections on 4/1/19 with 80% relief of lower back pain.  Her current pain is minimal.  She has mild leg pain with walking.  She continues to follow up with oncology regularly.  Since the last visit, Anahi SIENNA Ludwigon states the pain has been improving.  Current pain intensity is 2/10.     Pain Medications:  OTC Tylenol    Opioid Contract: no     report:  Not applicable    Pain Procedures:   7/21/14 L4-5 IL ASHUTOSH- significant benefit  12/1/15 Left GT bursa injection  4/26/16 Left GT bursa injection  12/15/16 L4-5 IL ASHUTOSH- significant benefit  2/15/18 L4-5 IL ASHUTOSH- 100% relief  8/21/18 L4-5 IL ASHUTOSH- significant relief  10/15/18 Bilateral L3-4, L4-5 L5-S1 facet joint injections  11/8/18 T12 kyphoplasty- significant relief  4/1/19 Bilateral L3-4, L4-5 L5-S1 facet joint injections- 80% relief  7/27/20 Bilateral L3-4, L4-5 L5-S1 facet joint injections- 90% relief  4/26/2021- Bilateral hip joint injections- no relief    6/21/2021- Bilateral L4/5 TF ASHUTOSH- 75% relief  8/9/2022 B L4/5 TFESI without significant benefit.   11/17/2022- Left L3/4 and L4/5 TF ASHUTOSH    Physical Therapy/Home Exercise: yes     Imaging:     MRI LUMBAR SPINE WITHOUT CONTRAST 9/12/2022  COMPARISON:  5/29/2021     FINDINGS:  Chronic vertebral plana T12.  Extension of the posterosuperior cortex into the canal by approximately 6 mm.     There is now height loss at L3, approximately 30%.  Associated STIR signal hyperintensity.  Retropulsion of the posterior cortex into the canal approximately 5 mm.     Remaining vertebral body heights are maintained.     Mild disc space narrowing L4-5.     Grade 1 retrolisthesis L1-2.  Grade 1 anterolisthesis L4-5     Conus terminates appropriately at L1.     Multilevel degenerative change as diesel below:     T11-12: Retropulsion of the posterosuperior cortex of T12 into the canal with mild canal narrowing.     T12-L1:  Posterior circumferential disc bulge, asymmetric to the left.  Mild left neural foraminal narrowing.     L1-2: Facet and ligamentum flavum hypertrophic changes with mild left neural foraminal narrowing.     L2-3: Posterior circumferential disc bulge and ligamentum flavum hypertrophy retropulsion of the posterior cortex of L3 into the canal.  Findings result in mild canal narrowing.  Severe right and moderate left neural foraminal narrowing.     L3-4: Posterior circumferential disc bulge.  Facet and ligamentum flavum hypertrophic changes.  Mild canal and moderate bilateral neural foraminal narrowing.     L4-5: Grade 1 anterolisthesis of L4-5 with uncovering of the disc.  Facet and ligamentum flavum hypertrophic changes.  Combinations contribute to severe canal stenosis.  Severe left and moderate right neural foraminal narrowing.     L5-S1: Central canal and neural foramina are well maintained.     Multiple T2 hyperintense foci in both kidneys, including a thinly septated T2 hyperintense focus on the left measuring 15 mm.     Impression:     Acute fracture along the superior endplate of L3 with approximately 30% height loss.  Stable chronic height loss T12.     Multilevel degenerative change, worst at L4-5 where severe canal stenosis results.     Multilevel neural foraminal narrowing, severe L2-3 and L4-5 as above.     Bilateral renal cysts, including a thinly septated cyst on the left.  Findings can be further evaluated with renal ultrasound.     This report was flagged in Epic as abnormal.    XR LUMBAR SPINE 5 VIEW WITH FLEX AND EXT 8/22/2022  COMPARISON:  Lumbar spine MRI 05/29/2021     FINDINGS:  Previous vertebroplasty T12.     Height loss along the superior endplate of L3 is new compared to that exam.  Height loss is approximately 40%.  There may be mild height loss at L2.     Disc space narrowing and endplate changes L4-5.  Facet arthropathy L4-5.     Grade 1 anterolisthesis L4-5 with no significant change with  flexion or extension.     Aortoiliac atherosclerosis.     Impression:     Height loss along the superior endplate of L3, approximately 40%, is new compared to May 2021.  By today's radiographs, there appears to be mild sclerosis along the superior endplate which can be seen with a subacute or chronic fracture.  Correlation with MRI advised if there is recent trauma or acute back pain to exclude the possibility of an acute fracture.     Degenerative change as above.     This report was flagged in Epic as abnormal.    XR HIP WITH PELVIS WHEN PERFORMED, 2 OR 3 VIEWS LEFT 8/22/2022  COMPARISON:  03/24/2022 and 09/09/2020     FINDINGS:  The femoral head is well located with respect to the acetabulum. No acute fracture seen.  Osteophyte formation, subchondral sclerosis, with moderate narrowing femoroacetabular joint bilaterally.     No significant soft tissue edema or radiopaque retained foreign body.  Aortoiliac atherosclerosis.     Impression:     No acute fracture or dislocation seen.  Moderate osteoarthritis and joint space narrowing.    MRI Lumbar Spine 5/29/2021:  COMPARISON:  Prior MRI from 2014 and 2018     FINDINGS:  There is a transitional lumbosacral segment and spine labeling is as on prior examinations.     Stable severe compression deformity at T12 with retropulsion.  Remaining vertebral body heights are maintained.  Grade 1 anterolisthesis appears increased at L4-5 as compared to previous.  There is no marrow replacement process.  The spinal cord is normal in signal.  Review of paravertebral structures demonstrates high T2 low T1 signal foci within kidneys.     Not included in the field of view of the axial, there is disc bulge at T10-11.  This does not appear to result in a significant degree of spinal canal narrowing on the basis of the sagittal imaging.     T12 demonstrates retropulsion with mild narrowing of the spinal canal.     T12-L1 demonstrates minor disc bulge.     L1-L2 demonstrates mild facet  degenerative change.  There is no significant spinal canal stenosis.  There is mild bilateral neural foraminal narrowing.     L2-3 demonstrates facet degenerative change.  There is no significant spinal canal stenosis.  There is right foraminal protrusion.  This results in a moderate degree of right foraminal narrowing.  There is mild left foraminal narrowing.     L3-4 demonstrates facet degenerative change and ligamentum flavum thickening.  There is a facet joint synovial cyst along the posterolateral aspect of the left facet.  There is right foraminal protrusion.  There is mild left and moderate right foraminal narrowing.     L4-5 demonstrates facet degenerative change and ligamentum flavum thickening.  There is anterolisthesis.  There is severe left and moderate right foraminal narrowing.  There is a severe degree of spinal canal narrowing.     L5-S1 is unremarkable.     Impression:     Multilevel degenerative changes as further detailed above.  Findings are most significant at the L4-5 level, noting transitional lumbosacral segment.     Stable severe compression deformity at T12.     Foci within the kidneys bilaterally likely representing cyst can be correlated with ultrasound.          Past Medical History:  Past Medical History:   Diagnosis Date    Anxiety     Arthritis     Back pain     Breast cancer 1/17/2018    Breast mass 1/15/2018    Chronic kidney disease, stage 2, mildly decreased GFR     COPD (chronic obstructive pulmonary disease)     emphysema    Depression     Dysuria 1/29/2018    Exudative age-related macular degeneration of left eye with active choroidal neovascularization 8/25/2022    Family history of breast cancer 1/17/2018    GERD (gastroesophageal reflux disease)     Hypertension     Infiltrating ductal carcinoma of breast, left 7/12/2018    Osteoporosis, senile     Ovarian mass 1/15/2018    Pneumonia     S/P robotic assisted laparoscopic BSO (bilateral salpingo-oophorectomy) 2/23/2018        Past Surgical History:  Past Surgical History:   Procedure Laterality Date    AXILLARY NODE DISSECTION Left 7/12/2018    Procedure: LYMPHADENECTOMY, AXILLARY;  Surgeon: Amanda Caballero MD;  Location: [facility] OR [address] [place] [address];  Service: General;  Laterality: Left;    BREAST BIOPSY      BREAST LUMPECTOMY      CATARACT EXTRACTION      CHOLECYSTECTOMY      COLONOSCOPY      ESOPHAGOGASTRODUODENOSCOPY      ESOPHAGOGASTRODUODENOSCOPY N/A 10/14/2019    Procedure: EGD (ESOPHAGOGASTRODUODENOSCOPY);  Surgeon: Davonte Thompson MD;  [facility]  Service: Endoscopy;  Laterality: N/A;  hx of pulmonary hypertension-PA 50     pt requesting ASAP    EYE SURGERY      FIXATION KYPHOPLASTY N/A 11/8/2018    Procedure: Kyphoplasty   T12;  Surgeon: Merly Wilcox MD;  Location: [facility] CATH LAB;  Service: Pain Management;  Laterality: N/A;  T12  [employer] e-mailed with date and time    HYSTERECTOMY      INJECTION FOR SENTINEL NODE IDENTIFICATION Left 7/12/2018    Procedure: INJECTION, FOR SENTINEL NODE IDENTIFICATION;  Surgeon: Amanda Caballero MD;  Location: [facility] OR [address] [place] [address];  Service: General;  Laterality: Left;    INJECTION OF FACET JOINT Bilateral 10/15/2018    Procedure: INJECTION, FACET JOINT, BILATERAL LUMBAR L3-4, 4-5, 5-S1 FACET JOINT INJECTIONS;  Surgeon: Merly Wilcox MD;  Location: [facility] PAIN MGT;  Service: Pain Management;  Laterality: Bilateral;    INJECTION OF FACET JOINT Bilateral 4/1/2019    Procedure: INJECTION, FACET JOINT, L3-L4,L4-L5,L5-S1;  Surgeon: Merly Wilcox MD;  Location: [facility] PAIN MGT;  Service: Pain Management;  Laterality: Bilateral;    INJECTION OF FACET JOINT Bilateral 6/20/2019    Procedure: INJECTION, FACET JOINT, L3-L4,L4-L5,L5-S1 NEED CONSENT;  Surgeon: Merly Wilcox MD;  Location: [facility] PAIN MGT;  Service: Pain Management;  Laterality: Bilateral;    INJECTION OF FACET JOINT Bilateral 7/27/2020    Procedure: INJECTION, FACET JOINT BILATERAL L3/4, L4/5 and L5/S1;  Surgeon: Merly Wilcox MD;   Location: [facility] PAIN MGT;  Service: Pain Management;  Laterality: Bilateral;    INJECTION OF JOINT Bilateral 7/27/2020    Procedure: INJECTION, JOINT, BILATERAL GREATER TROCHANTERIC BURSA;  Surgeon: Merly Wilcox MD;  Location: [facility] PAIN MGT;  Service: Pain Management;  Laterality: Bilateral;    INJECTION OF JOINT Bilateral 4/26/2021    Procedure: INJECTION, JOINT, HIP;  Surgeon: Merly Wilcox MD;  Location: [facility] PAIN MGT;  Service: Pain Management;  Laterality: Bilateral;  consent needed    INJECTION OF STEROID Right 11/15/2021    Procedure: INJECTION, STEROID RIGHT MIDDLE AND SAMLL FINGER;  Surgeon: Florecita Da Silva MD;  Location: [facility] OR;  Service: Orthopedics;  Laterality: Right;    MASTECTOMY, PARTIAL Left 7/12/2018    Procedure: MASTECTOMY, PARTIAL-W/SEED LOCALIZATION;  Surgeon: Amanda Caballero MD;  [facility];  Service: General;  Laterality: Left;    CA REMOVAL OF OVARY/TUBE(S)  02/23/2018    Robotic Assisted    ROTATOR CUFF REPAIR Right     rotator cuff repair right shoulder    TONSILLECTOMY      TRANSFORAMINAL EPIDURAL INJECTION OF STEROID Bilateral 6/21/2021    Procedure: INJECTION, STEROID, EPIDURAL, TRANSFORAMINAL APPROACH  bilateral L4/5 TF ASHUTOSH;  Surgeon: Merly Wilcox MD;  Location: [facility] PAIN MGT;  Service: Pain Management;  Laterality: Bilateral;  consent needed    TRANSFORAMINAL EPIDURAL INJECTION OF STEROID Bilateral 1/3/2022    Procedure: INJECTION, STEROID, EPIDURAL, TRANSFORAMINAL APPROACH Bilateral L4/5 Needs consent;  Surgeon: Merly Wilcox MD;  Location: [facility] PAIN MGT;  Service: Pain Management;  Laterality: Bilateral;    TRANSFORAMINAL EPIDURAL INJECTION OF STEROID Bilateral 8/9/2022    Procedure: INJECTION, STEROID, EPIDURAL, TRANSFORAMINAL APPROACH, BILATERAL L4-L5   MEDICALLY URGENT;  Surgeon: Merly Wilcox MD;  Location: [facility] PAIN MGT;  Service: Pain Management;  Laterality: Bilateral;    TRANSFORAMINAL EPIDURAL INJECTION OF STEROID Left 11/17/2022    Procedure: INJECTION,  STEROID, EPIDURAL, TRANSFORAMINAL APPROACH LEFT L3-L4 AND L4-L5 CONTRAST;  Surgeon: Merly Wilcox MD;  Location: [facility] PAIN MGT;  Service: Pain Management;  Laterality: Left;    TRIGGER FINGER RELEASE Left 11/15/2021    Procedure: RELEASE, TRIGGER FINGER, LEFT MIDDLE AND RING;  Surgeon: Florecita Da Silva MD;  Location: [facility] OR;  Service: Orthopedics;  Laterality: Left;       Family History:  Family History   Problem Relation Age of Onset    Heart failure Mother     Kidney failure Mother     Heart attack Father     Breast cancer Sister 66        Lump, XRT, chemo, recurrence 10 years later.    Cancer Brother         leukemia    Heart attack Brother 58    Pulmonary embolism Brother 45    Heart attack Brother 52    Breast cancer Maternal Grandmother 60        advanced at diagnosis    Breast cancer Maternal Aunt 83         at 92    Colon cancer Neg Hx     Esophageal cancer Neg Hx        Social History:  Social History     Socioeconomic History    Marital status: Single    Number of children: 3   Occupational History    Occupation: Multiple jobs, see social documentation section     Comment: Retired   Tobacco Use    Smoking status: Former     Packs/day: 1.00     Years: 40.00     Pack years: 40.00     Types: Cigarettes    Smokeless tobacco: Never    Tobacco comments:     Smoked >1 ppd for at least 40 years, quit around    Substance and Sexual Activity    Alcohol use: Yes     Comment: occasional glass of wine or cocktail    Drug use: No    Sexual activity: Yes     Partners: Male   Social History Narrative    Worked many jobs while raising 3 children.  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  She was  from her first , but took care of him at the end of his life.       REVIEW OF SYSTEMS:    GENERAL:  No weight loss, malaise or fevers.  HEENT:   No recent changes in vision or hearing  NECK:  Negative  "for lumps, no difficulty with swallowing.  RESPIRATORY:  Negative for cough, wheezing or shortness of breath, patient denies any recent URI. COPD.  CARDIOVASCULAR:  Negative for chest pain, leg swelling or palpitations. Hypertension.  GI:  Negative for abdominal discomfort, blood in stools or black stools or change in bowel habits.  MUSCULOSKELETAL:  See HPI.  SKIN:  Negative for lesions, rash, and itching.  PSYCH:  No mood disorder or recent psychosocial stressors.  Patients sleep is not disturbed secondary to pain.  HEMATOLOGY/LYMPHOLOGY:  Negative for prolonged bleeding, bruising easily or swollen nodes.  Patient is not currently taking any anti-coagulants  NEURO:   No history of headaches, syncope, paralysis, seizures or tremors.  All other reviewed and negative other than HPI.    OBJECTIVE:    Exam limited due to virtual visit:  GENERAL: Well appearing, in no acute distress, alert and oriented x3.  PSYCH:  Mood and affect appropriate.    Previous physical exam:  BP (!) 174/76 (BP Location: Right arm, Patient Position: Sitting, BP Method: Small (Automatic))   Pulse 63   Resp 18   Ht 5' 4" (1.626 m)   Wt 67.9 kg (149 lb 11.1 oz)   LMP  (LMP Unknown)   SpO2 100%   BMI 25.69 kg/m²     PHYSICAL EXAMINATION:     GENERAL: Well appearing, in no acute distress, alert and oriented x3.  PSYCH:  Mood and affect appropriate.  SKIN: Skin color, texture, turgor normal, no rashes or lesions.  HEAD/FACE:  Normocephalic, atraumatic. Cranial nerves grossly intact.  CV: RRR with palpation of the radial artery.  PULM: No evidence of respiratory difficulty, symmetric chest rise.  BACK: Straight leg raising in the sitting position is negative to radicular pain bilaterally.  There is pain with palpation over lumbar facet joints bilaterally.  Limited ROM with pain on flexion and  extension.  Positive facet loading bilaterally.  EXTREMITIES:  Good capillary refill.  MUSCULOSKELETAL: There is mild pain with internal and external " rotation of both hips. There is pain with palpation over bilateral SI joints. FABERs is positive bilaterally.  No atrophy or tone abnormalities are noted.  NEURO: Bilateral lower extremity coordination and muscle stretch reflexes are physiologic and symmetric.  Plantar response are downgoing. No clonus.  Normal sensation.  GAIT: Antalgic- ambulates with rolling walker.      ASSESSMENT: 88 y.o. year old female with lower back and lower extremity pain, consistent with the following diagnoses:     No diagnosis found.      PLAN:     - Previous imaging reviewed today.    - Schedule for bilateral L5/S1 TF ASHUTOSH.     - Increase Gabapentin to 200 mg at bedtime.      - Continue Tramadol per PCP.     - RTC 2 weeks after above procedure.     The above plan and management options were discussed at length with patient. Patient is in agreement with the above and verbalized understanding.    Amanda Garcia  07/05/2023

## 2023-07-11 ENCOUNTER — PROCEDURE VISIT (OUTPATIENT)
Dept: OPHTHALMOLOGY | Facility: CLINIC | Age: 88
End: 2023-07-11
Payer: MEDICARE

## 2023-07-11 DIAGNOSIS — H35.371 EPIRETINAL MEMBRANE, RIGHT EYE: ICD-10-CM

## 2023-07-11 DIAGNOSIS — H35.3221 EXUDATIVE AGE-RELATED MACULAR DEGENERATION OF LEFT EYE WITH ACTIVE CHOROIDAL NEOVASCULARIZATION: Primary | ICD-10-CM

## 2023-07-11 DIAGNOSIS — Z96.1 PSEUDOPHAKIA OF BOTH EYES: ICD-10-CM

## 2023-07-11 DIAGNOSIS — H35.3112 INTERMEDIATE STAGE DRY AGE-RELATED MACULAR DEGENERATION OF RIGHT EYE: ICD-10-CM

## 2023-07-11 PROCEDURE — 67028 INJECTION EYE DRUG: CPT | Mod: LT,S$GLB,, | Performed by: OPHTHALMOLOGY

## 2023-07-11 PROCEDURE — 92134 OCT, RETINA - OU - BOTH EYES: ICD-10-PCS | Mod: S$GLB,,, | Performed by: OPHTHALMOLOGY

## 2023-07-11 PROCEDURE — 92134 CPTRZ OPH DX IMG PST SGM RTA: CPT | Mod: S$GLB,,, | Performed by: OPHTHALMOLOGY

## 2023-07-11 PROCEDURE — 99214 PR OFFICE/OUTPT VISIT, EST, LEVL IV, 30-39 MIN: ICD-10-PCS | Mod: 25,S$GLB,, | Performed by: OPHTHALMOLOGY

## 2023-07-11 PROCEDURE — 99214 OFFICE O/P EST MOD 30 MIN: CPT | Mod: 25,S$GLB,, | Performed by: OPHTHALMOLOGY

## 2023-07-11 PROCEDURE — 67028 PR INJECT INTRAVITREAL PHARMCOLOGIC: ICD-10-PCS | Mod: LT,S$GLB,, | Performed by: OPHTHALMOLOGY

## 2023-07-11 RX ORDER — PREDNISONE 10 MG/1
10 TABLET ORAL 3 TIMES DAILY
COMMUNITY
Start: 2023-07-06 | End: 2023-11-19

## 2023-07-11 NOTE — PROGRESS NOTES
HPI    Here for AMD f/u, feels vision stable   Last edited by Dwight Melton MD on 7/11/2023  3:50 PM.             A/P    ICD-10-CM ICD-9-CM   1. Exudative age-related macular degeneration of left eye with active choroidal neovascularization  H35.3221 362.52     362.16   2. 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 Epiretinal membrane, right eye  H35.371 362.56   4. Pseudophakia of both eyes  Z96.1 V43.1         1. Exudative age-related macular degeneration of left eye with active choroidal neovascularization  2. 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  Schedule Eylea Injection Left Eye today Patient chooses to proceed with injection R/B/A discussed include infection retinal detachment and stroke    Patient identified.  Timeout performed.    Risks, benefits, and alternatives to treatment were discussed in detail with the patient, including bleeding/infection (endophthalmitis)/etc.  The patient voiced understanding and wished to proceed with the procedure.  See separate consent form.    Injection Procedure Note:  Diagnosis: CNVM Left Eye    Topical Proparacaine drop placed then topical 5% Betadine, then subcojunctival lidocaine 2% injection  Sterile gloves used, and sterile lid speculum placed.  5% Betadine placed at injection site again prior to injection.  Eylea 2mg in 0.05cc Injected inferotemporally 3.5-4mm posterior to the limbus.  Complications: None  Va at least CF at 5 feet post injection.  Retina, ONH, IOP normal after injection.    Followup as below.  Patient should return immediately  PRN.  Retinal Detachment and Endophthalmitis precautions given        3. Epiretinal membrane, right eye  Mod ERM, VA 20/40 stable    Pt asymptomatic, no metamorphopsia  Plan: Observation  Amsler precautions discussed     4. Pseudophakia of both eyes  Good lens position OU  Plan: Observation      RTC 6 weeks DFE/OCTm OU, possible Eylea OS      I saw and examined the patient and reviewed in detail the findings documented. The final examination findings, image interpretations, and plan as documented in the record represent my personal judgment and conclusions.    Dwight Melton MD  Vitreoretinal Surgery   [facility]

## 2023-07-22 DIAGNOSIS — M47.816 LUMBAR SPONDYLOSIS: ICD-10-CM

## 2023-07-22 DIAGNOSIS — M17.11 PRIMARY OSTEOARTHRITIS OF RIGHT KNEE: ICD-10-CM

## 2023-07-24 ENCOUNTER — PATIENT MESSAGE (OUTPATIENT)
Dept: ADMINISTRATIVE | Facility: OTHER | Age: 88
End: 2023-07-24
Payer: MEDICARE

## 2023-07-24 RX ORDER — TRAMADOL HYDROCHLORIDE 50 MG/1
50 TABLET ORAL EVERY 8 HOURS PRN
Qty: 90 TABLET | Refills: 0 | Status: SHIPPED | OUTPATIENT
Start: 2023-07-24 | End: 2023-08-22 | Stop reason: SDUPTHER

## 2023-07-24 NOTE — TELEPHONE ENCOUNTER
No care due was identified.  [facility] Embedded Care Due Messages. Reference number: [id number].   7/24/2023 3:01:41 PM CDT

## 2023-07-25 ENCOUNTER — PATIENT MESSAGE (OUTPATIENT)
Dept: ADMINISTRATIVE | Facility: OTHER | Age: 88
End: 2023-07-25
Payer: MEDICARE

## 2023-07-27 ENCOUNTER — PATIENT MESSAGE (OUTPATIENT)
Dept: ADMINISTRATIVE | Facility: OTHER | Age: 88
End: 2023-07-27
Payer: MEDICARE

## 2023-07-28 ENCOUNTER — PATIENT MESSAGE (OUTPATIENT)
Dept: PAIN MEDICINE | Facility: OTHER | Age: 88
End: 2023-07-28
Payer: MEDICARE

## 2023-07-31 ENCOUNTER — HOSPITAL ENCOUNTER (OUTPATIENT)
Facility: OTHER | Age: 88
Discharge: HOME | End: 2023-07-31
Attending: ANESTHESIOLOGY | Admitting: ANESTHESIOLOGY
Payer: MEDICARE

## 2023-07-31 VITALS
HEART RATE: 63 BPM | DIASTOLIC BLOOD PRESSURE: 77 MMHG | OXYGEN SATURATION: 94 % | SYSTOLIC BLOOD PRESSURE: 178 MMHG | RESPIRATION RATE: 16 BRPM

## 2023-07-31 DIAGNOSIS — G89.29 CHRONIC PAIN: ICD-10-CM

## 2023-07-31 DIAGNOSIS — M51.36 DDD (DEGENERATIVE DISC DISEASE), LUMBAR: ICD-10-CM

## 2023-07-31 DIAGNOSIS — M54.16 LUMBAR RADICULOPATHY: Primary | ICD-10-CM

## 2023-07-31 PROCEDURE — 25500020 PHARM REV CODE 255: Performed by: ANESTHESIOLOGY

## 2023-07-31 PROCEDURE — 25000003 PHARM REV CODE 250: Performed by: ANESTHESIOLOGY

## 2023-07-31 PROCEDURE — 63600175 PHARM REV CODE 636 W HCPCS: Performed by: ANESTHESIOLOGY

## 2023-07-31 PROCEDURE — 64483 NJX AA&/STRD TFRM EPI L/S 1: CPT | Mod: 50,,, | Performed by: ANESTHESIOLOGY

## 2023-07-31 PROCEDURE — 64483 PR EPIDURAL INJ, ANES/STEROID, TRANSFORAMINAL, LUMB/SACR, SNGL LEVL: ICD-10-PCS | Mod: 50,,, | Performed by: ANESTHESIOLOGY

## 2023-07-31 PROCEDURE — 64483 NJX AA&/STRD TFRM EPI L/S 1: CPT | Mod: RT | Performed by: ANESTHESIOLOGY

## 2023-07-31 RX ORDER — LIDOCAINE HYDROCHLORIDE 10 MG/ML
INJECTION, SOLUTION EPIDURAL; INFILTRATION; INTRACAUDAL; PERINEURAL
Status: DISCONTINUED | OUTPATIENT
Start: 2023-07-31 | End: 2023-07-31 | Stop reason: HOSPADM

## 2023-07-31 RX ORDER — LIDOCAINE HYDROCHLORIDE 20 MG/ML
INJECTION, SOLUTION INFILTRATION; PERINEURAL
Status: DISCONTINUED | OUTPATIENT
Start: 2023-07-31 | End: 2023-07-31 | Stop reason: HOSPADM

## 2023-07-31 RX ORDER — DEXAMETHASONE SODIUM PHOSPHATE 10 MG/ML
INJECTION INTRAMUSCULAR; INTRAVENOUS
Status: DISCONTINUED | OUTPATIENT
Start: 2023-07-31 | End: 2023-07-31 | Stop reason: HOSPADM

## 2023-07-31 RX ORDER — ALPRAZOLAM 0.5 MG/1
0.5 TABLET ORAL ONCE
Status: COMPLETED | OUTPATIENT
Start: 2023-07-31 | End: 2023-07-31

## 2023-07-31 RX ORDER — SODIUM CHLORIDE 9 MG/ML
INJECTION, SOLUTION INTRAVENOUS CONTINUOUS
Status: DISCONTINUED | OUTPATIENT
Start: 2023-07-31 | End: 2023-07-31 | Stop reason: HOSPADM

## 2023-07-31 RX ADMIN — ALPRAZOLAM 0.5 MG: 0.5 TABLET ORAL at 12:07

## 2023-07-31 NOTE — DISCHARGE INSTRUCTIONS

## 2023-07-31 NOTE — H&P
HPI  Patient presenting for Procedure(s) (LRB):  INJECTION, STEROID, EPIDURAL, TRANSFORAMINAL APPROACH, BILATERAL L5/S1  sooner date (Bilateral)     Patient on Anti-coagulation No    No health changes since previous encounter    Past Medical History:   Diagnosis Date    Anxiety     Arthritis     Back pain     Breast cancer 1/17/2018    Breast mass 1/15/2018    Chronic kidney disease, stage 2, mildly decreased GFR     COPD (chronic obstructive pulmonary disease)     emphysema    Depression     Dysuria 1/29/2018    Exudative age-related macular degeneration of left eye with active choroidal neovascularization 8/25/2022    Family history of breast cancer 1/17/2018    GERD (gastroesophageal reflux disease)     Hypertension     Infiltrating ductal carcinoma of breast, left 7/12/2018    Osteoporosis, senile     Ovarian mass 1/15/2018    Pneumonia     S/P robotic assisted laparoscopic BSO (bilateral salpingo-oophorectomy) 2/23/2018     Past Surgical History:   Procedure Laterality Date    AXILLARY NODE DISSECTION Left 7/12/2018    Procedure: LYMPHADENECTOMY, AXILLARY;  Surgeon: Amanda Caballero MD;  Location: [facility] OR [address] [place] [address];  Service: General;  Laterality: Left;    BREAST BIOPSY      BREAST LUMPECTOMY      CATARACT EXTRACTION      CHOLECYSTECTOMY      COLONOSCOPY      ESOPHAGOGASTRODUODENOSCOPY      ESOPHAGOGASTRODUODENOSCOPY N/A 10/14/2019    Procedure: EGD (ESOPHAGOGASTRODUODENOSCOPY);  Surgeon: Davonte Thompson MD;  Location: [address]);  Service: Endoscopy;  Laterality: N/A;  hx of pulmonary hypertension-PA 50     [facility]    EYE SURGERY      FIXATION KYPHOPLASTY N/A 11/8/2018    Procedure: Kyphoplasty   T12;  Surgeon: Merly Wilcox MD;  Location: [facility] CATH LAB;  Service: Pain Management;  Laterality: N/A;  T12  Doss Reps e-mailed with date and time    HYSTERECTOMY      INJECTION FOR SENTINEL NODE IDENTIFICATION Left 7/12/2018    Procedure: INJECTION, FOR SENTINEL NODE IDENTIFICATION;  Surgeon: Amanda  OBI Caballero MD;  Location: [facility] OR 2ND FLR;  Service: General;  Laterality: Left;    INJECTION OF FACET JOINT Bilateral 10/15/2018    Procedure: INJECTION, FACET JOINT, BILATERAL LUMBAR L3-4, 4-5, 5-S1 FACET JOINT INJECTIONS;  Surgeon: Merly Wilcox MD;  Location: [facility] PAIN MGT;  Service: Pain Management;  Laterality: Bilateral;    INJECTION OF FACET JOINT Bilateral 4/1/2019    Procedure: INJECTION, FACET JOINT, L3-L4,L4-L5,L5-S1;  Surgeon: Merly Wilcox MD;  Location: [facility] PAIN MGT;  Service: Pain Management;  Laterality: Bilateral;    INJECTION OF FACET JOINT Bilateral 6/20/2019    Procedure: INJECTION, FACET JOINT, L3-L4,L4-L5,L5-S1 NEED CONSENT;  Surgeon: Merly Wilcox MD;  Location: [facility] PAIN MGT;  Service: Pain Management;  Laterality: Bilateral;    INJECTION OF FACET JOINT Bilateral 7/27/2020    Procedure: INJECTION, FACET JOINT BILATERAL L3/4, L4/5 and L5/S1;  Surgeon: Merly Wilcox MD;  Location: [facility] PAIN MGT;  Service: Pain Management;  Laterality: Bilateral;    INJECTION OF JOINT Bilateral 7/27/2020    Procedure: INJECTION, JOINT, BILATERAL GREATER TROCHANTERIC BURSA;  Surgeon: Merly Wilcox MD;  Location: [facility] PAIN MGT;  Service: Pain Management;  Laterality: Bilateral;    INJECTION OF JOINT Bilateral 4/26/2021    Procedure: INJECTION, JOINT, HIP;  Surgeon: Merly Wilcox MD;  Location: [facility] PAIN MGT;  Service: Pain Management;  Laterality: Bilateral;  consent needed    INJECTION OF STEROID Right 11/15/2021    Procedure: INJECTION, STEROID RIGHT MIDDLE AND SAMLL FINGER;  Surgeon: Florecita Da Silva MD;  Location: [facility];  Service: Orthopedics;  Laterality: Right;    MASTECTOMY, PARTIAL Left 7/12/2018    Procedure: MASTECTOMY, PARTIAL-W/SEED LOCALIZATION;  Surgeon: Amanda Caballero MD;  Location: [facility] OR 2ND FLR;  Service: General;  Laterality: Left;    [place] REMOVAL OF OVARY/TUBE(S)  02/23/2018    Robotic Assisted    ROTATOR CUFF REPAIR Right     rotator cuff repair right shoulder    TONSILLECTOMY       TRANSFORAMINAL EPIDURAL INJECTION OF STEROID Bilateral 6/21/2021    Procedure: INJECTION, STEROID, EPIDURAL, TRANSFORAMINAL APPROACH  bilateral L4/5 TF ASHUTOSH;  Surgeon: Merly Wilcox MD;  Location: [facility] PAIN MGT;  Service: Pain Management;  Laterality: Bilateral;  consent needed    TRANSFORAMINAL EPIDURAL INJECTION OF STEROID Bilateral 1/3/2022    Procedure: INJECTION, STEROID, EPIDURAL, TRANSFORAMINAL APPROACH Bilateral L4/5 Needs consent;  Surgeon: Merly Wilcox MD;  Location: [facility] PAIN MGT;  Service: Pain Management;  Laterality: Bilateral;    TRANSFORAMINAL EPIDURAL INJECTION OF STEROID Bilateral 8/9/2022    Procedure: INJECTION, STEROID, EPIDURAL, TRANSFORAMINAL APPROACH, BILATERAL L4-L5   MEDICALLY URGENT;  Surgeon: Merly Wilcox MD;  Location: [facility] PAIN MGT;  Service: Pain Management;  Laterality: Bilateral;    TRANSFORAMINAL EPIDURAL INJECTION OF STEROID Left 11/17/2022    Procedure: INJECTION, STEROID, EPIDURAL, TRANSFORAMINAL APPROACH LEFT L3-L4 AND L4-L5 CONTRAST;  Surgeon: Merly Wilcox MD;  Location: [facility] PAIN MGT;  Service: Pain Management;  Laterality: Left;    TRIGGER FINGER RELEASE Left 11/15/2021    Procedure: RELEASE, TRIGGER FINGER, LEFT MIDDLE AND RING;  Surgeon: Florecita Da Silva MD;  Location: [facility] OR;  Service: Orthopedics;  Laterality: Left;     Review of patient's allergies indicates:   Allergen Reactions    Levaquin [levofloxacin] Itching    Penicillins Itching      Current Facility-Administered Medications   Medication    0.9%  NaCl infusion       PMHx, PSHx, Allergies, Medications reviewed in epic    ROS negative except pain complaints in HPI    OBJECTIVE:    BP (!) 163/73 (BP Location: Right arm, Patient Position: Lying)   Pulse 60   Resp 18   LMP  (LMP Unknown)   SpO2 (!) 90%   Breastfeeding No     PHYSICAL EXAMINATION:    GENERAL: Well appearing, in no acute distress, alert and oriented x3.  PSYCH:  Mood and affect appropriate.  SKIN: Skin color, texture, turgor normal, no rashes  or lesions which will impact the procedure.  CV: RRR with palpation of the radial artery.  PULM: No evidence of respiratory difficulty, symmetric chest rise. Clear to auscultation.  NEURO: Cranial nerves grossly intact.    Plan:    Proceed with procedure as planned Procedure(s) (LRB):  INJECTION, STEROID, EPIDURAL, TRANSFORAMINAL APPROACH, BILATERAL L5/S1  sooner date (Bilateral)    Ramy Kennedi  07/31/2023

## 2023-07-31 NOTE — OP NOTE
Lumbar Transforaminal Epidural Steroid Injection under Fluoroscopic Guidance    The procedure, risks, benefits, and options were discussed with the patient. There are no contraindications to the procedure. The patent expressed understanding and agreed to the procedure. Informed written consent was obtained prior to the start of the procedure and can be found in the patient's chart.    PATIENT NAME: Anahi Cook   MRN: [id number]     DATE OF PROCEDURE: 07/31/2023    PROCEDURE:  Bilateral  L5/S1 Lumbar Transforaminal Epidural Steroid Injection under Fluoroscopic Guidance    PRE-OP DIAGNOSIS: Lumbar radiculopathy [M54.16] Lumbar radiculopathy [M54.16]    POST-OP DIAGNOSIS: Same    PHYSICIAN: Merly Wilcox MD    ASSISTANTS: Jhonny Kumar MD  [employer] Pain Fellow     MEDICATIONS INJECTED: Preservative-free Decadron 10mg with 5cc of Lidocaine 1% MPF     LOCAL ANESTHETIC INJECTED: Xylocaine 2%     SEDATION: Xanax po    ESTIMATED BLOOD LOSS: None    COMPLICATIONS: None    TECHNIQUE: Time-out was performed to identify the patient and procedure to be performed. With the patient laying in a prone position, the surgical area was prepped and draped in the usual sterile fashion using ChloraPrep and a fenestrated drape.The levels were determined under fluoroscopy guidance. Skin anesthesia was achieved by injecting Lidocaine 2% over the injection sites. The transforaminal spaces were then approached with a 22 gauge, 3.5 inch spinal quinke needle that was introduced under fluoroscopic guidance in the AP and Lateral views. Once the needle tip was in the area of the transforaminal space, and there was no blood, CSF or paraesthesias, contrast dye Omnipaque (300mg/mL) was injected to confirm placement and there was no vascular runoff. Fluoroscopic imaging in the AP and lateral views revealed a clear outline of the spinal nerve with proximal spread of agent through the neural foramen into the epidural space. 3 mL of the medication mixture listed  above was injected slowly at each site. Displacement of the radio opaque contrast after injection of the medication confirmed that the medication went into the area of the transforaminal spaces. The needles were removed and bleeding was nil. A sterile dressing was applied. No specimens collected. The patient tolerated the procedure well.       The patient was monitored after the procedure in the recovery area. They were given post-procedure and discharge instructions to follow at home. The patient was discharged in a stable condition.    PAIN BEFORE THE PROCEDURE: 8/10    PAIN AFTER THE PROCEDURE: 7/10    I reviewed and edited the fellow's note. I conducted my own interview and physical examination. I agree with the findings. I was present and supervising all critical portions of the procedure.    Ramy Kolb MD

## 2023-07-31 NOTE — DISCHARGE SUMMARY
Discharge Note  Short Stay      SUMMARY     Admit Date: 7/31/2023    Attending Physician: MD Brenden      Discharge Physician: Ramy Kolb      Discharge Date: 7/31/2023 1:40 PM    Procedure(s) (LRB):  INJECTION, STEROID, EPIDURAL, TRANSFORAMINAL APPROACH, BILATERAL L5/S1  sooner date (Bilateral)    Final Diagnosis: Lumbar radiculopathy [M54.16]    Disposition: Home or self care    Patient Instructions:   Current Discharge Medication List        CONTINUE these medications which have NOT CHANGED    Details   aspirin (ECOTRIN) 81 MG EC tablet Take 81 mg by mouth once daily.      benazepriL (LOTENSIN) 40 MG tablet Take 1 tablet (40 mg total) by mouth once daily.  Qty: 90 tablet, Refills: 1    Associated Diagnoses: Hypertensive heart disease without heart failure      hydrALAZINE (APRESOLINE) 25 MG tablet Take 1 tablet (25 mg total) by mouth every 12 (twelve) hours.  Qty: 180 tablet, Refills: 1    Comments: .  Associated Diagnoses: Hypertensive heart disease without heart failure      acetaminophen (TYLENOL) 650 MG TbSR Take 650 mg by mouth every 8 (eight) hours as needed.      alpha-D-galactosidase (BEANO ORAL) Take by mouth.      atorvastatin (LIPITOR) 20 MG tablet Take 1 tablet (20 mg total) by mouth every evening.  Qty: 90 tablet, Refills: 1    Associated Diagnoses: Hyperlipidemia, unspecified hyperlipidemia type; Atherosclerosis of aorta      BREO ELLIPTA 100-25 mcg/dose diskus inhaler Inhale 1 puff into the lungs.      calcium-vitamin D 250-100 mg-unit per tablet Take 1 tablet by mouth 2 (two) times daily.      CETIRIZINE HCL (ZYRTEC ORAL) Take 10 mg by mouth nightly as needed.       citalopram (CELEXA) 20 MG tablet Take 1 tablet (20 mg total) by mouth every evening.  Qty: 90 tablet, Refills: 0    Comments: Ordered for a YEAR 3/25/23.  Associated Diagnoses: Depression with anxiety      felodipine (PLENDIL) 10 MG 24 hr tablet Take 1 tablet (10 mg total) by mouth 2 (two) times a day.  Qty: 180 tablet, Refills: 1     Associated Diagnoses: Hypertensive heart disease without heart failure      gabapentin (NEURONTIN) 100 MG capsule Take 1 capsule (100 mg total) by mouth every evening.  Qty: 90 capsule, Refills: 1    Associated Diagnoses: Lumbar radiculopathy      INCRUSE ELLIPTA 62.5 mcg/actuation inhalation capsule Inhale into the lungs.      ipratropium-albuteroL (COMBIVENT RESPIMAT)  mcg/actuation inhaler Inhale 1 puff into the lungs.      ketoprofen 10 % CrPk Apply topically.      letrozole (FEMARA) 2.5 mg Tab Take 1 tablet (2.5 mg total) by mouth once daily.  Qty: 90 tablet, Refills: 3    Comments: ZERO refills remain on this prescription. Your patient is requesting advance approval of refills for this medication to PREVENT ANY MISSED DOSES  Associated Diagnoses: Malignant neoplasm of nipple of left breast in female, estrogen receptor positive      melatonin 10 mg Tab Take by mouth every evening.      montelukast (SINGULAIR) 10 mg tablet Take 10 mg by mouth every evening.      multivitamin (THERAGRAN) per tablet Take 1 tablet by mouth once daily.      polyethylene glycol 3350 (MIRALAX ORAL) Take by mouth once daily.      predniSONE (DELTASONE) 10 MG tablet Take 10 tablets by mouth 3 (three) times daily.      torsemide (DEMADEX) 20 MG Tab TAKE 1 TABLET(20 MG) BY MOUTH EVERY DAY  Qty: 90 tablet, Refills: 2    Associated Diagnoses: Hypertensive heart disease without heart failure      traMADoL (ULTRAM) 50 mg tablet Take 1 tablet (50 mg total) by mouth every 8 (eight) hours as needed for Pain.  Qty: 90 tablet, Refills: 0    Comments: Quantity prescribed more than 7 day supply? Yes, quantity medically necessary.  Associated Diagnoses: Primary osteoarthritis of right knee; Lumbar spondylosis      triamcinolone acetonide 0.1% (KENALOG) 0.1 % cream AAA bid.  Spot treatment as needed  Qty: 45 g, Refills: 0    Associated Diagnoses: Eczema, unspecified type      triamcinolone acetonide 0.1% (KENALOG) 0.1 % paste APPLY SPARINGLY  FOUR TIMES DAILY                 Discharge Diagnosis: Lumbar radiculopathy [M54.16]  Condition on Discharge: Stable with no complications to procedure   Diet on Discharge: Same as before.  Activity: as per instruction sheet.  Discharge to: Home with a responsible adult.  Follow up: 2-4 weeks

## 2023-08-03 ENCOUNTER — OFFICE VISIT (OUTPATIENT)
Dept: PRIMARY CARE CLINIC | Facility: CLINIC | Age: 88
End: 2023-08-03
Payer: MEDICARE

## 2023-08-03 VITALS
HEART RATE: 57 BPM | DIASTOLIC BLOOD PRESSURE: 66 MMHG | HEIGHT: 64 IN | WEIGHT: 147.69 LBS | TEMPERATURE: 98 F | SYSTOLIC BLOOD PRESSURE: 154 MMHG | BODY MASS INDEX: 25.21 KG/M2 | OXYGEN SATURATION: 99 %

## 2023-08-03 DIAGNOSIS — F33.0 MAJOR DEPRESSIVE DISORDER, RECURRENT, MILD: ICD-10-CM

## 2023-08-03 DIAGNOSIS — I70.0 ATHEROSCLEROSIS OF AORTA: ICD-10-CM

## 2023-08-03 DIAGNOSIS — E78.5 HYPERLIPIDEMIA, UNSPECIFIED HYPERLIPIDEMIA TYPE: ICD-10-CM

## 2023-08-03 DIAGNOSIS — I11.9 HYPERTENSIVE HEART DISEASE WITHOUT HEART FAILURE: Primary | ICD-10-CM

## 2023-08-03 DIAGNOSIS — M47.816 LUMBAR SPONDYLOSIS: ICD-10-CM

## 2023-08-03 DIAGNOSIS — N18.31 STAGE 3A CHRONIC KIDNEY DISEASE: ICD-10-CM

## 2023-08-03 DIAGNOSIS — R73.03 PRE-DIABETES: ICD-10-CM

## 2023-08-03 DIAGNOSIS — J44.9 CHRONIC OBSTRUCTIVE PULMONARY DISEASE, UNSPECIFIED COPD TYPE: ICD-10-CM

## 2023-08-03 PROCEDURE — 1159F PR MEDICATION LIST DOCUMENTED IN MEDICAL RECORD: ICD-10-PCS | Mod: CPTII,S$GLB,, | Performed by: INTERNAL MEDICINE

## 2023-08-03 PROCEDURE — 1101F PR PT FALLS ASSESS DOC 0-1 FALLS W/OUT INJ PAST YR: ICD-10-PCS | Mod: CPTII,S$GLB,, | Performed by: INTERNAL MEDICINE

## 2023-08-03 PROCEDURE — 1160F PR REVIEW ALL MEDS BY PRESCRIBER/CLIN PHARMACIST DOCUMENTED: ICD-10-PCS | Mod: CPTII,S$GLB,, | Performed by: INTERNAL MEDICINE

## 2023-08-03 PROCEDURE — 1159F MED LIST DOCD IN RCRD: CPT | Mod: CPTII,S$GLB,, | Performed by: INTERNAL MEDICINE

## 2023-08-03 PROCEDURE — 99214 PR OFFICE/OUTPT VISIT, EST, LEVL IV, 30-39 MIN: ICD-10-PCS | Mod: S$GLB,,, | Performed by: INTERNAL MEDICINE

## 2023-08-03 PROCEDURE — 1126F AMNT PAIN NOTED NONE PRSNT: CPT | Mod: CPTII,S$GLB,, | Performed by: INTERNAL MEDICINE

## 2023-08-03 PROCEDURE — 99999 PR PBB SHADOW E&M-EST. PATIENT-LVL V: ICD-10-PCS | Mod: PBBFAC,,, | Performed by: INTERNAL MEDICINE

## 2023-08-03 PROCEDURE — 99214 OFFICE O/P EST MOD 30 MIN: CPT | Mod: S$GLB,,, | Performed by: INTERNAL MEDICINE

## 2023-08-03 PROCEDURE — 1126F PR PAIN SEVERITY QUANTIFIED, NO PAIN PRESENT: ICD-10-PCS | Mod: CPTII,S$GLB,, | Performed by: INTERNAL MEDICINE

## 2023-08-03 PROCEDURE — 99999 PR PBB SHADOW E&M-EST. PATIENT-LVL V: CPT | Mod: PBBFAC,,, | Performed by: INTERNAL MEDICINE

## 2023-08-03 PROCEDURE — 3288F PR FALLS RISK ASSESSMENT DOCUMENTED: ICD-10-PCS | Mod: CPTII,S$GLB,, | Performed by: INTERNAL MEDICINE

## 2023-08-03 PROCEDURE — 3288F FALL RISK ASSESSMENT DOCD: CPT | Mod: CPTII,S$GLB,, | Performed by: INTERNAL MEDICINE

## 2023-08-03 PROCEDURE — 1160F RVW MEDS BY RX/DR IN RCRD: CPT | Mod: CPTII,S$GLB,, | Performed by: INTERNAL MEDICINE

## 2023-08-03 PROCEDURE — 1101F PT FALLS ASSESS-DOCD LE1/YR: CPT | Mod: CPTII,S$GLB,, | Performed by: INTERNAL MEDICINE

## 2023-08-08 ENCOUNTER — TELEPHONE (OUTPATIENT)
Dept: BEHAVIORAL HEALTH | Facility: CLINIC | Age: 88
End: 2023-08-08
Payer: MEDICARE

## 2023-08-08 RX ORDER — BENAZEPRIL HYDROCHLORIDE 40 MG/1
40 TABLET ORAL DAILY
Qty: 90 TABLET | Refills: 1 | Status: SHIPPED | OUTPATIENT
Start: 2023-08-08 | End: 2023-11-19 | Stop reason: SDUPTHER

## 2023-08-08 RX ORDER — FELODIPINE 10 MG/1
10 TABLET, EXTENDED RELEASE ORAL 2 TIMES DAILY
Qty: 180 TABLET | Refills: 1 | Status: SHIPPED | OUTPATIENT
Start: 2023-08-08 | End: 2023-11-19 | Stop reason: SDUPTHER

## 2023-08-08 RX ORDER — ATORVASTATIN CALCIUM 20 MG/1
20 TABLET, FILM COATED ORAL NIGHTLY
Qty: 90 TABLET | Refills: 1 | Status: SHIPPED | OUTPATIENT
Start: 2023-08-08 | End: 2023-11-19 | Stop reason: SDUPTHER

## 2023-08-08 NOTE — PROGRESS NOTES
Subjective     Patient ID: Anahi Cook is a 88 y.o. female.    Chief Complaint: Hypertension    Last seen 6 months ago, returns for scheduled follow up chronic medical conditions. Had ASHUTOSH for chronic back pain three days ago. Daughter is requesting home PT for progressive weakness and debility. Also c/o depressed mood, requesting Counseling. Compliant with daily Citalopram.     PMH: .  Hypertension with Concentric Remodeling and normal LV function on Echo 10/16.  Hyperlipidemia.  Pre-Diabetes.  Aortic Atherosclerosis seen on chest and abdominal imaging.   CKD stage 3a.  COPD, not oxygen-dependent. Pulmonologist Dr. Rakesh Bradford at [facility] .  Breast Cancer, on Letrozole, Heme/Onc following.  Osteoporosis, Endocrinology  recommended Prolia - patient declined treatment.   Vertebral Compression Fractures.  GERD, Tortuous Esophagus on EGD 10/19, benign H pyl negative gastritis.   Lumbar Spondylosis, Pain Mgmt following.  Allergic Rhinitis.  Depression/Anxiety/Insomnia.    PSH:  2018: AXILLARY NODE DISSECTION; Left  BREAST BIOPSY  BREAST LUMPECTOMY  CHOLECYSTECTOMY  EYE SURGERY  2018: FIXATION KYPHOPLASTY; N/A  HYSTERECTOMY  10/15/2018: INJECTION OF FACET JOINT; Bilateral  2019: INJECTION OF FACET JOINT; Bilateral  2019: INJECTION OF FACET JOINT; Bilateral  2018: MASTECTOMY, PARTIAL; Left  2018: [place] REMOVAL OF OVARY/TUBE(S)  ROTATOR CUFF REPAIR; Right  TONSILLECTOMY    Mammogram stable . BMD . Colonoscopy years ago, not on record. Eye exam . Vaccines reviewed.    Social: Former tobacco use, rare alcohol. , daughter lives locally, two sons out of state.     FMH: Breast cancer in multiple, HTN, Heart dis, Kidney dis.     Allergies: PCN, Levofloxacin.     Medications: list reviewed and reconciled. Multivitamin and Calcium plus D daily.          Review of Systems   Constitutional:  Negative for appetite change, chills, fever and unexpected weight change.   HENT:   "Negative for trouble swallowing.    Respiratory:  Negative for cough and shortness of breath.    Cardiovascular:  Negative for chest pain and palpitations.        Has had some swelling of left foot.   Gastrointestinal:  Negative for abdominal pain, diarrhea, nausea and vomiting.   Genitourinary:  Negative for dysuria and frequency.   Musculoskeletal:  Positive for arthralgias and back pain.   Integumentary:  Negative for rash and wound.   Neurological:  Negative for seizures and syncope.   Psychiatric/Behavioral:  Positive for dysphoric mood. Negative for agitation, hallucinations and suicidal ideas.           Objective   Vitals:    08/03/23 1058   BP: (!) 154/66   Pulse: (!) 57   Temp: 98.3 °F (36.8 °C)   SpO2: 99%   Weight: 67 kg (147 lb 11.2 oz)   Height: 5' 4" (1.626 m)      Physical Exam  Constitutional:       General: She is not in acute distress.     Comments: Well groomed, in WC, accompanied by her daughter.   HENT:      Head: Normocephalic and atraumatic.      Mouth/Throat:      Mouth: Mucous membranes are moist.      Pharynx: Oropharynx is clear.   Eyes:      Extraocular Movements: Extraocular movements intact.      Conjunctiva/sclera: Conjunctivae normal.   Cardiovascular:      Rate and Rhythm: Normal rate and regular rhythm.   Pulmonary:      Effort: Pulmonary effort is normal. No respiratory distress.      Breath sounds: Normal breath sounds. No stridor. No wheezing, rhonchi or rales.   Abdominal:      General: Bowel sounds are normal. There is no distension.      Palpations: Abdomen is soft.      Tenderness: There is no abdominal tenderness.   Musculoskeletal:         General: Normal range of motion.      Comments: No pitting edema in either lower extremity today.    Skin:     General: Skin is warm and dry.      Findings: No rash.   Neurological:      Mental Status: She is alert.      Cranial Nerves: No cranial nerve deficit.   Psychiatric:         Mood and Affect: Mood normal.         Behavior: " Behavior normal.         Thought Content: Thought content normal.          Assessment and Plan     1. Hypertensive heart disease without heart failure  -     felodipine (PLENDIL) 10 MG 24 hr tablet; Take 1 tablet (10 mg total) by mouth 2 (two) times a day.  Dispense: 180 tablet; Refill: 1  -     benazepriL (LOTENSIN) 40 MG tablet; Take 1 tablet (40 mg total) by mouth once daily.  Dispense: 90 tablet; Refill: 1  -     Ambulatory referral/consult to Home Health; Future; Expected date: 08/22/2023    2. Hyperlipidemia, unspecified hyperlipidemia type  -     atorvastatin (LIPITOR) 20 MG tablet; Take 1 tablet (20 mg total) by mouth every evening.  Dispense: 90 tablet; Refill: 1    3. Pre-diabetes - counseled on diet.    4. Atherosclerosis of aorta  -     atorvastatin (LIPITOR) 20 MG tablet; Take 1 tablet (20 mg total) by mouth every evening.  Dispense: 90 tablet; Refill: 1    5. Stage 3a chronic kidney disease        -     orders for CBC and CMP given to Home Health.    6. Chronic obstructive pulmonary disease, unspecified COPD type  -     Ambulatory referral/consult to Home Health; Future; Expected date: 08/22/2023    7. Lumbar spondylosis  -     Ambulatory referral/consult to Home Health; Future; Expected date: 08/22/2023  -     Tramadol recently refilled.    8. Major depressive disorder, recurrent, mild  -     Ambulatory referral/consult to Primary Care Behavioral Health (Non-Opioids); Future; Expected date: 08/15/2023  -     continue Citalopram 20mg daily.    May take a second dose of the Bivalent COVID booster.   Flu shot when available.        No follow-ups on file.
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
## 2023-08-10 ENCOUNTER — TELEPHONE (OUTPATIENT)
Dept: BEHAVIORAL HEALTH | Facility: CLINIC | Age: 88
End: 2023-08-10
Payer: MEDICARE

## 2023-08-10 NOTE — PROGRESS NOTES
CHW reached out to pt to remind her of virtual appointment with Gibson Santiago LPC tomorrow. No answer, LVM of the appointment.

## 2023-08-11 ENCOUNTER — CLINICAL SUPPORT (OUTPATIENT)
Dept: BEHAVIORAL HEALTH | Facility: CLINIC | Age: 88
End: 2023-08-11
Payer: COMMERCIAL

## 2023-08-11 ENCOUNTER — PATIENT MESSAGE (OUTPATIENT)
Dept: BEHAVIORAL HEALTH | Facility: CLINIC | Age: 88
End: 2023-08-11
Payer: MEDICARE

## 2023-08-11 DIAGNOSIS — F43.29 ADJUSTMENT DISORDER WITH MIXED EMOTIONAL FEATURES: ICD-10-CM

## 2023-08-11 DIAGNOSIS — F33.0 MAJOR DEPRESSIVE DISORDER, RECURRENT, MILD: Primary | ICD-10-CM

## 2023-08-11 PROCEDURE — 90791 PR PSYCHIATRIC DIAGNOSTIC EVALUATION: ICD-10-PCS | Mod: 95,,, | Performed by: COUNSELOR

## 2023-08-11 PROCEDURE — 90791 PSYCH DIAGNOSTIC EVALUATION: CPT | Mod: 95,,, | Performed by: COUNSELOR
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
## 2023-08-16 ENCOUNTER — OFFICE VISIT (OUTPATIENT)
Dept: PAIN MEDICINE | Facility: CLINIC | Age: 88
End: 2023-08-16
Payer: MEDICARE

## 2023-08-16 DIAGNOSIS — M25.512 CHRONIC PAIN OF BOTH SHOULDERS: ICD-10-CM

## 2023-08-16 DIAGNOSIS — M19.012 ARTHRITIS OF LEFT ACROMIOCLAVICULAR JOINT: ICD-10-CM

## 2023-08-16 DIAGNOSIS — M51.36 DDD (DEGENERATIVE DISC DISEASE), LUMBAR: ICD-10-CM

## 2023-08-16 DIAGNOSIS — M47.816 LUMBAR SPONDYLOSIS: ICD-10-CM

## 2023-08-16 DIAGNOSIS — M54.16 LUMBAR RADICULOPATHY: Primary | ICD-10-CM

## 2023-08-16 DIAGNOSIS — M75.52 SUBACROMIAL BURSITIS OF LEFT SHOULDER JOINT: ICD-10-CM

## 2023-08-16 DIAGNOSIS — R53.81 DEBILITY: ICD-10-CM

## 2023-08-16 DIAGNOSIS — G89.29 CHRONIC PAIN OF BOTH SHOULDERS: ICD-10-CM

## 2023-08-16 DIAGNOSIS — G89.4 CHRONIC PAIN DISORDER: ICD-10-CM

## 2023-08-16 DIAGNOSIS — M25.511 CHRONIC PAIN OF BOTH SHOULDERS: ICD-10-CM

## 2023-08-16 PROCEDURE — 99213 OFFICE O/P EST LOW 20 MIN: CPT | Mod: 95,,, | Performed by: NURSE PRACTITIONER

## 2023-08-16 PROCEDURE — 1159F MED LIST DOCD IN RCRD: CPT | Mod: 95,CPTII,, | Performed by: NURSE PRACTITIONER

## 2023-08-16 PROCEDURE — 99213 PR OFFICE/OUTPT VISIT, EST, LEVL III, 20-29 MIN: ICD-10-PCS | Mod: 95,,, | Performed by: NURSE PRACTITIONER

## 2023-08-16 PROCEDURE — 1160F PR REVIEW ALL MEDS BY PRESCRIBER/CLIN PHARMACIST DOCUMENTED: ICD-10-PCS | Mod: 95,CPTII,, | Performed by: NURSE PRACTITIONER

## 2023-08-16 PROCEDURE — 1160F RVW MEDS BY RX/DR IN RCRD: CPT | Mod: 95,CPTII,, | Performed by: NURSE PRACTITIONER

## 2023-08-16 PROCEDURE — 1159F PR MEDICATION LIST DOCUMENTED IN MEDICAL RECORD: ICD-10-PCS | Mod: 95,CPTII,, | Performed by: NURSE PRACTITIONER

## 2023-08-16 RX ORDER — GABAPENTIN 100 MG/1
100 CAPSULE ORAL NIGHTLY
Qty: 90 CAPSULE | Refills: 1 | Status: SHIPPED | OUTPATIENT
Start: 2023-08-16

## 2023-08-16 NOTE — PROGRESS NOTES
Chronic Pain- Established Visit  Chronic Pain-Tele-Medicine-Established Note (Follow up visit)        The patient location is: Home  The chief complaint leading to consultation is: pain  Visit type: Virtual visit with synchronous audio and video  Total time spent with patient: 25 min  Each patient to whom he or she provides medical services by telemedicine is:  (1) informed of the relationship between the physician and patient and the respective role of any other health care provider with respect to management of the patient; and (2) notified that he or she may decline to receive medical services by telemedicine and may withdraw from such care at any time.        SUBJECTIVE:    Interval History 8/16/2023:  The patient returns to clinic today for follow up of back pain via virtual visit. She is s/p left AC joint injection on 7/5/2023. She is s/p bilateral L5/S1 TF ASHUTOSH on 7/31/2023. She reports 75% relief of her low back pain. She also reports 75% relief of her shoulder pain. She continues to report intermittent low back pain. She reports burning pain into her bilateral heels. She is using a topical pain cream. She takes Gabapentin with benefit. She denies any other health changes.     Interval History 7/5/2023:  The patient returns to clinic today for follow up of low back pain. She had a fall two weeks ago. She did go to Urgent Care where xrays are negative for acute injury. She reports increased left shoulder and clavicle pain. This pain is worse with activity. She is having trouble dressing herself due to pain. She has increased her Tylenol intake. She is using a topical pain cream. She is using ice and heat. She is taking Gabapentin. She continues to report low back pain, she is scheduled for ASHUTOSH at the end of the month. Her pain today is 9/10.    Interval History 6/9/2023:  The patient returns to clinic today for follow up of back pain via virtual visit. She reports increased low back pain that radiates into  anterior and posterior legs to her ankles. Her pain is worse with standing and walking. She is taking Gabapentin at bedtime. She also takes Tylenol for pain. She denies any other health changes.     Interval History 3/9/2023:  The patient returns to clinic today for follow up of back pain via virtual visit. She reports intermittent low back pain. She denies any radicular leg pain. She is using an acupressure knee brace with benefit. Her pain is tolerable at this time. She continues to take Gabapentin with benefit. She denies any other health changes.     Interval History 12/9/2022:  The patient returns to clinic today for follow up of low back pain via virtual visit. She is s/p left L3/4 and L4/5 TF ASHUTOSH on 11/17/2022. She reports 60% relief of her pain. She continues to report intermittent low back pain. She denies any radicular leg pain. She does report bilateral foot pain, described as burning in nature. Her pain is tolerable at this time. She continues to take Gabapentin. She also takes Tramadol per her PCP. She denies any other health changes.     Interval History 10/20/22  Ms. Cook complains of worsening left low back pain radiating down her lateral thigh to the knee. The pain is only when she walks/moves, she in pain free when sitting. She is s/p bilateral TESI L4/5 2 months ago, which she had relief from, but acutely worsened when she bent over to pick something off the ground. She states the pain is similar to her pain prior to this. She denies numbness, tingling, bladder/bowel problems.    Interval History 10/6/2022:   Mrs Cook presents for follow up virtually. At prior visit she had Left GTB injection with significant benefit in office. Her lower back and hip pain were further evaluated and noted newer L3 compression Fx. While she visually appears to have significant improvement of pain to me she does voice focal upper/mid lumbar pain persists. Discussed treatment options and Kyphoplasty recommended by  Dr Wilcox but there was hesitation regarding further escalation to kyphoplasty. We did discusses prior osteoporosis diagnosis and considering treatment options with PCP. She does not voice any s/s concerning for cauda equina.      Interval History 8/22/2022:  Mrs Cook presents for follow up acute pain. She is s/p repeat Bilateral L4/5 TFESI with mild improvement but having severe left lateral hip pain. She also continues to have burning pain to Right heel and numbness to right lateral lower leg. She has chronic urinary continence but no new neurological issues.She continues to take tramadol by PCP with some benefit     Interval History 8/5/2022:  Mrs Cook presents for follow up evaluation of returning lower back pain. She has exact pain relieved prior by B L4/5 TFESIs. She has newer complaint of bilateral feet burning pains as well. She has had JESSE noting arterial issues R>L. She continues to take Tramadol and neurontin without SE.She would like to repeat ASHUTOSH as it provided 7 months of 75% relief and symptoms returning severe over last few weeks. No voicing of symptoms concerning for cauda equina.     Interval History 12/6/2021:  The patient returns to clinic today for follow up of low back pain. She reports increased low back and bilateral hip pain over the last two weeks. She reports intermittent radiating pain into her lateral thighs. She does have difficulty sleeping due to pain. Her pain is also worse with standing and walking. She continues to take Gabapentin with benefit. She also takes Tramadol per her PCP with benefit. She denies any other health changes. Her pain today is 8/10.    Interval History 7/6/2021:  The patient returns to clinic today for follow up of low back pain via virtual visit. She is s/p bilateral L4/5 TF ASHUTOSH on 6/21/2021. She reports 75% relief of her low back and leg pain. She reports low back pain, worse in the morning. She reports intermittent radiating pain into the posterolateral  aspect of both legs to her knees. She continues to take Gabapentin with benefit. She denies any weakness. She denies any other health changes. Her pain today is 2/10.    Interval History 6/8/2021:  The patient returns to clinic today for follow up of low back pain. She is here today for imaging review. She continues to report low back pain that radiates into the posterolateral aspect of both legs to below her knee. Her pain is worse with prolonged standing and walking. She does feel like she will fall with prolonged walking. She also reports relief with bending forward. She is currently taking Gabapentin at bedtime with benefit. She continues to take Tramadol per her PCP with benefit. She denies any other health changes. Her pain today is 8/10.    Interval History 5/20/2021:  The patient returns to clinic today for follow up of low back pain. She is s/p bilateral hip joint injections on 4/26/2021. She reports no relief. She reports increased low back pain that radiates into the posterior aspect of both legs to her ankles. She does report numbness to her feet. Her pain is worse with prolonged standing and walking. She feels as though her legs will give out with prolonged walking. She denies any bowel or bladder incontinence. She denies any other health changes. Her pain today is 9/10.    Interval History 3/23/2021:  The patient returns to clinic today for follow up of low back and hip pain. She reports increased bilateral hip pain. She describes this pain as deep and aching. Her pain is worse with prolonged standing and walking. She also reports low back pain that is aching in nature. She denies any radicular leg pain. Of note, she reports increased swelling and pain to her left ankle and foot. She does report a trip last week. She did see Orthopedics today and is scheduled for xray. She denies any other health changes. Her pain today is 8/10.    Interval History 12/9/2020:  She returns for follow-up.  She is doing  well since her facet injections.  Her right hip is hurting.  It interferes with her mobility and comfort while laying down.  No new symptomatology otherwise.    Interval History 8/11/2020:  The patient has a virtual visit today for follow up.  She is s/p Bilateral L3-4, L4-5 L5-S1 facet joint injections with 90% relief.  She says that her back pain is minimal at this time.  She is able to move around and walk better since the procedure as well.  She is having some left hip pain and feels as though her leg will give out sometimes when walking.  She would like a referral to ortho close to her home in [facility].  Her pain today is 1/10.  She denies any respiratory changes since procedure including fever, cough or SOB.    Interval History 12/5/2019:  The patient is here for follow up of lower back and bilateral hip pain.  Her back pain has been minimal.  She is having increased lateral hip and buttock pain recently.  She had TPIs in the past and would like to repeat this today.  Her pain today is 8/10.    Previous Encounter:  Anahi Cook presents to the clinic for a follow-up appointment for lower back and left lower extremity pain.  She had T12 kyphoplasty performed on 11/8/18 with significant improvement in symptoms.  She was admitted through the ED on 4/3/19 after suffering fractures of 6th, 8th and possible 7th ribs of the left side after a fall at home.  Thoracic imaging also showed stable slight anterior wedging at T11.  She had bilateral L3-4, L4-5 and L5-S1 facet injections on 4/1/19 with 80% relief of lower back pain.  Her current pain is minimal.  She has mild leg pain with walking.  She continues to follow up with oncology regularly.  Since the last visit, Anahi Cook states the pain has been improving.  Current pain intensity is 2/10.     Pain Medications:  OTC Tylenol    Opioid Contract: no     report:  Not applicable    Pain Procedures:   7/21/14 L4-5 IL ASHUTOSH- significant benefit  12/1/15 Left GT  bursa injection  4/26/16 Left GT bursa injection  12/15/16 L4-5 IL ASHUTOSH- significant benefit  2/15/18 L4-5 IL ASHUTOSH- 100% relief  8/21/18 L4-5 IL ASHUTOSH- significant relief  10/15/18 Bilateral L3-4, L4-5 L5-S1 facet joint injections  11/8/18 T12 kyphoplasty- significant relief  4/1/19 Bilateral L3-4, L4-5 L5-S1 facet joint injections- 80% relief  7/27/20 Bilateral L3-4, L4-5 L5-S1 facet joint injections- 90% relief  4/26/2021- Bilateral hip joint injections- no relief    6/21/2021- Bilateral L4/5 TF ASHUTOSH- 75% relief  8/9/2022 B L4/5 TFESI without significant benefit.   11/17/2022- Left L3/4 and L4/5 TF ASHUTOSH  7/5/2023- Left AC joint injection-75% relief  7/31/2023- Bilateral L5/S1 TF ASHUTOSH- 75% relief    Physical Therapy/Home Exercise: yes     Imaging:   MRI LUMBAR SPINE WITHOUT CONTRAST 9/12/2022  COMPARISON:  5/29/2021     FINDINGS:  Chronic vertebral plana T12.  Extension of the posterosuperior cortex into the canal by approximately 6 mm.     There is now height loss at L3, approximately 30%.  Associated STIR signal hyperintensity.  Retropulsion of the posterior cortex into the canal approximately 5 mm.     Remaining vertebral body heights are maintained.     Mild disc space narrowing L4-5.     Grade 1 retrolisthesis L1-2.  Grade 1 anterolisthesis L4-5     Conus terminates appropriately at L1.     Multilevel degenerative change as diesel below:     T11-12: Retropulsion of the posterosuperior cortex of T12 into the canal with mild canal narrowing.     T12-L1: Posterior circumferential disc bulge, asymmetric to the left.  Mild left neural foraminal narrowing.     L1-2: Facet and ligamentum flavum hypertrophic changes with mild left neural foraminal narrowing.     L2-3: Posterior circumferential disc bulge and ligamentum flavum hypertrophy retropulsion of the posterior cortex of L3 into the canal.  Findings result in mild canal narrowing.  Severe right and moderate left neural foraminal narrowing.     L3-4: Posterior  circumferential disc bulge.  Facet and ligamentum flavum hypertrophic changes.  Mild canal and moderate bilateral neural foraminal narrowing.     L4-5: Grade 1 anterolisthesis of L4-5 with uncovering of the disc.  Facet and ligamentum flavum hypertrophic changes.  Combinations contribute to severe canal stenosis.  Severe left and moderate right neural foraminal narrowing.     L5-S1: Central canal and neural foramina are well maintained.     Multiple T2 hyperintense foci in both kidneys, including a thinly septated T2 hyperintense focus on the left measuring 15 mm.     Impression:     Acute fracture along the superior endplate of L3 with approximately 30% height loss.  Stable chronic height loss T12.     Multilevel degenerative change, worst at L4-5 where severe canal stenosis results.     Multilevel neural foraminal narrowing, severe L2-3 and L4-5 as above.     Bilateral renal cysts, including a thinly septated cyst on the left.  Findings can be further evaluated with renal ultrasound.     This report was flagged in Epic as abnormal.    XR LUMBAR SPINE 5 VIEW WITH FLEX AND EXT 8/22/2022  COMPARISON:  Lumbar spine MRI 05/29/2021     FINDINGS:  Previous vertebroplasty T12.     Height loss along the superior endplate of L3 is new compared to that exam.  Height loss is approximately 40%.  There may be mild height loss at L2.     Disc space narrowing and endplate changes L4-5.  Facet arthropathy L4-5.     Grade 1 anterolisthesis L4-5 with no significant change with flexion or extension.     Aortoiliac atherosclerosis.     Impression:     Height loss along the superior endplate of L3, approximately 40%, is new compared to May 2021.  By today's radiographs, there appears to be mild sclerosis along the superior endplate which can be seen with a subacute or chronic fracture.  Correlation with MRI advised if there is recent trauma or acute back pain to exclude the possibility of an acute fracture.     Degenerative change as  above.     This report was flagged in Epic as abnormal.    XR HIP WITH PELVIS WHEN PERFORMED, 2 OR 3 VIEWS LEFT 8/22/2022  COMPARISON:  03/24/2022 and 09/09/2020     FINDINGS:  The femoral head is well located with respect to the acetabulum. No acute fracture seen.  Osteophyte formation, subchondral sclerosis, with moderate narrowing femoroacetabular joint bilaterally.     No significant soft tissue edema or radiopaque retained foreign body.  Aortoiliac atherosclerosis.     Impression:     No acute fracture or dislocation seen.  Moderate osteoarthritis and joint space narrowing.    MRI Lumbar Spine 5/29/2021:  COMPARISON:  Prior MRI from 2014 and 2018     FINDINGS:  There is a transitional lumbosacral segment and spine labeling is as on prior examinations.     Stable severe compression deformity at T12 with retropulsion.  Remaining vertebral body heights are maintained.  Grade 1 anterolisthesis appears increased at L4-5 as compared to previous.  There is no marrow replacement process.  The spinal cord is normal in signal.  Review of paravertebral structures demonstrates high T2 low T1 signal foci within kidneys.     Not included in the field of view of the axial, there is disc bulge at T10-11.  This does not appear to result in a significant degree of spinal canal narrowing on the basis of the sagittal imaging.     T12 demonstrates retropulsion with mild narrowing of the spinal canal.     T12-L1 demonstrates minor disc bulge.     L1-L2 demonstrates mild facet degenerative change.  There is no significant spinal canal stenosis.  There is mild bilateral neural foraminal narrowing.     L2-3 demonstrates facet degenerative change.  There is no significant spinal canal stenosis.  There is right foraminal protrusion.  This results in a moderate degree of right foraminal narrowing.  There is mild left foraminal narrowing.     L3-4 demonstrates facet degenerative change and ligamentum flavum thickening.  There is a facet  joint synovial cyst along the posterolateral aspect of the left facet.  There is right foraminal protrusion.  There is mild left and moderate right foraminal narrowing.     L4-5 demonstrates facet degenerative change and ligamentum flavum thickening.  There is anterolisthesis.  There is severe left and moderate right foraminal narrowing.  There is a severe degree of spinal canal narrowing.     L5-S1 is unremarkable.     Impression:     Multilevel degenerative changes as further detailed above.  Findings are most significant at the L4-5 level, noting transitional lumbosacral segment.     Stable severe compression deformity at T12.     Foci within the kidneys bilaterally likely representing cyst can be correlated with ultrasound.          Past Medical History:  Past Medical History:   Diagnosis Date    Anxiety     Arthritis     Back pain     Breast cancer 1/17/2018    Breast mass 1/15/2018    Chronic kidney disease, stage 2, mildly decreased GFR     COPD (chronic obstructive pulmonary disease)     emphysema    Depression     Dysuria 1/29/2018    Exudative age-related macular degeneration of left eye with active choroidal neovascularization 8/25/2022    Family history of breast cancer 1/17/2018    GERD (gastroesophageal reflux disease)     Hypertension     Infiltrating ductal carcinoma of breast, left 7/12/2018    Osteoporosis, senile     Ovarian mass 1/15/2018    Pneumonia     S/P robotic assisted laparoscopic BSO (bilateral salpingo-oophorectomy) 2/23/2018       Past Surgical History:  Past Surgical History:   Procedure Laterality Date    AXILLARY NODE DISSECTION Left 7/12/2018    Procedure: LYMPHADENECTOMY, AXILLARY;  Surgeon: Amanda Caballero MD;  Location: [facility] OR [address] [place] [address];  Service: General;  Laterality: Left;    BREAST BIOPSY      BREAST LUMPECTOMY      CATARACT EXTRACTION      CHOLECYSTECTOMY      COLONOSCOPY      ESOPHAGOGASTRODUODENOSCOPY      ESOPHAGOGASTRODUODENOSCOPY N/A 10/14/2019    Procedure: EGD  (ESOPHAGOGASTRODUODENOSCOPY);  Surgeon: Davonte Thompson MD;  Location: [facility] ENDO (2ND FLR);  Service: Endoscopy;  Laterality: N/A;  hx of pulmonary hypertension-PA 50     pt requesting ASAP    EYE SURGERY      FIXATION KYPHOPLASTY N/A 11/8/2018    Procedure: Kyphoplasty   T12;  Surgeon: Merly Wilcox MD;  Location: [facility] CATH LAB;  Service: Pain Management;  Laterality: N/A;  T12  [employer] e-mailed with date and time    HYSTERECTOMY      INJECTION FOR SENTINEL NODE IDENTIFICATION Left 7/12/2018    Procedure: INJECTION, FOR SENTINEL NODE IDENTIFICATION;  Surgeon: Amanda Caballero MD;  Location: [facility] OR 2ND FLR;  Service: General;  Laterality: Left;    INJECTION OF FACET JOINT Bilateral 10/15/2018    Procedure: INJECTION, FACET JOINT, BILATERAL LUMBAR L3-4, 4-5, 5-S1 FACET JOINT INJECTIONS;  Surgeon: Merly Wilcox MD;  Location: [facility] PAIN MGT;  Service: Pain Management;  Laterality: Bilateral;    INJECTION OF FACET JOINT Bilateral 4/1/2019    Procedure: INJECTION, FACET JOINT, L3-L4,L4-L5,L5-S1;  Surgeon: Merly Wilcox MD;  Location: [facility] PAIN MGT;  Service: Pain Management;  Laterality: Bilateral;    INJECTION OF FACET JOINT Bilateral 6/20/2019    Procedure: INJECTION, FACET JOINT, L3-L4,L4-L5,L5-S1 NEED CONSENT;  Surgeon: Merly Wilcox MD;  Location: [facility] PAIN MGT;  Service: Pain Management;  Laterality: Bilateral;    INJECTION OF FACET JOINT Bilateral 7/27/2020    Procedure: INJECTION, FACET JOINT BILATERAL L3/4, L4/5 and L5/S1;  Surgeon: Merly Wilcox MD;  Location: [facility] PAIN MGT;  Service: Pain Management;  Laterality: Bilateral;    INJECTION OF JOINT Bilateral 7/27/2020    Procedure: INJECTION, JOINT, BILATERAL GREATER TROCHANTERIC BURSA;  Surgeon: Merly Wilcox MD;  Location: [facility] PAIN MGT;  Service: Pain Management;  Laterality: Bilateral;    INJECTION OF JOINT Bilateral 4/26/2021    Procedure: INJECTION, JOINT, HIP;  Surgeon: Merly Wilcox MD;  Location: [facility] PAIN MGT;  Service: Pain Management;  Laterality:  Bilateral;  consent needed    INJECTION OF STEROID Right 11/15/2021    Procedure: INJECTION, STEROID RIGHT MIDDLE AND SAMLL FINGER;  Surgeon: Florecita Da Silva MD;  Location: [facility] OR;  Service: Orthopedics;  Laterality: Right;    MASTECTOMY, PARTIAL Left 7/12/2018    Procedure: MASTECTOMY, PARTIAL-W/SEED LOCALIZATION;  Surgeon: Amanda Caballero MD;  Location: [facility] OR [facility] FLR;  Service: General;  Laterality: Left;    WI REMOVAL OF OVARY/TUBE(S)  02/23/2018    Robotic Assisted    ROTATOR CUFF REPAIR Right     rotator cuff repair right shoulder    TONSILLECTOMY      TRANSFORAMINAL EPIDURAL INJECTION OF STEROID Bilateral 6/21/2021    Procedure: INJECTION, STEROID, EPIDURAL, TRANSFORAMINAL APPROACH  bilateral L4/5 TF ASHUTOSH;  Surgeon: Merly Wilcox MD;  Location: [facility] PAIN MGT;  Service: Pain Management;  Laterality: Bilateral;  consent needed    TRANSFORAMINAL EPIDURAL INJECTION OF STEROID Bilateral 1/3/2022    Procedure: INJECTION, STEROID, EPIDURAL, TRANSFORAMINAL APPROACH Bilateral L4/5 Needs consent;  Surgeon: Merly Wilcox MD;  Location: [facility] PAIN MGT;  Service: Pain Management;  Laterality: Bilateral;    TRANSFORAMINAL EPIDURAL INJECTION OF STEROID Bilateral 8/9/2022    Procedure: INJECTION, STEROID, EPIDURAL, TRANSFORAMINAL APPROACH, BILATERAL L4-L5   MEDICALLY URGENT;  Surgeon: Merly Wilcox MD;  Location: [facility] PAIN MGT;  Service: Pain Management;  Laterality: Bilateral;    TRANSFORAMINAL EPIDURAL INJECTION OF STEROID Left 11/17/2022    Procedure: INJECTION, STEROID, EPIDURAL, TRANSFORAMINAL APPROACH LEFT L3-L4 AND L4-L5 CONTRAST;  Surgeon: Merly Wilcox MD;  Location: [facility] PAIN MGT;  Service: Pain Management;  Laterality: Left;    TRANSFORAMINAL EPIDURAL INJECTION OF STEROID Bilateral 7/31/2023    Procedure: INJECTION, STEROID, EPIDURAL, TRANSFORAMINAL APPROACH, BILATERAL L5/S1  sooner date;  Surgeon: Merly Wilcox MD;  Location: [facility] PAIN MGT;  Service: Pain Management;  Laterality: Bilateral;    TRIGGER FINGER  RELEASE Left 11/15/2021    Procedure: RELEASE, TRIGGER FINGER, LEFT MIDDLE AND RING;  Surgeon: Florecita Da Silva MD;  Location: [facility];  Service: Orthopedics;  Laterality: Left;       Family History:  Family History   Problem Relation Age of Onset    Heart failure Mother     Kidney failure Mother     Heart attack Father     Breast cancer Sister 66        Lump, XRT, chemo, recurrence 10 years later.    Cancer Brother         leukemia    Heart attack Brother 58    Pulmonary embolism Brother 45    Heart attack Brother 52    Breast cancer Maternal Grandmother 60        advanced at diagnosis    Breast cancer Maternal Aunt 83         at 92    Colon cancer Neg Hx     Esophageal cancer Neg Hx        Social History:  Social History     Socioeconomic History    Marital status: Single    Number of children: 3   Occupational History    Occupation: Multiple jobs, see social documentation section     Comment: Retired   Tobacco Use    Smoking status: Former     Current packs/day: 1.00     Average packs/day: 1 pack/day for 40.0 years (40.0 ttl pk-yrs)     Types: Cigarettes    Smokeless tobacco: Never    Tobacco comments:     Smoked >1 ppd for at least 40 years, quit around    Substance and Sexual Activity    Alcohol use: Yes     Comment: occasional glass of wine or cocktail    Drug use: No    Sexual activity: Yes     Partners: Male   Social History Narrative    Worked many jobs while raising 3 children.  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  She was  from her first , but took care of him at the end of his life.       REVIEW OF SYSTEMS:    GENERAL:  No weight loss, malaise or fevers.  HEENT:   No recent changes in vision or hearing  NECK:  Negative for lumps, no difficulty with swallowing.  RESPIRATORY:  Negative for cough, wheezing or shortness of breath, patient denies any recent URI.  COPD.  CARDIOVASCULAR:  Negative for chest pain, leg swelling or palpitations. Hypertension.  GI:  Negative for abdominal discomfort, blood in stools or black stools or change in bowel habits.  MUSCULOSKELETAL:  See HPI.  SKIN:  Negative for lesions, rash, and itching.  PSYCH:  No mood disorder or recent psychosocial stressors.  Patients sleep is not disturbed secondary to pain.  HEMATOLOGY/LYMPHOLOGY:  Negative for prolonged bleeding, bruising easily or swollen nodes.  Patient is not currently taking any anti-coagulants  NEURO:   No history of headaches, syncope, paralysis, seizures or tremors.  All other reviewed and negative other than HPI.    OBJECTIVE:    Exam limited due to virtual visit:  GENERAL: Well appearing, in no acute distress, alert and oriented x3.  PSYCH:  Mood and affect appropriate.    Previous physical exam:  LMP  (LMP Unknown)     PHYSICAL EXAMINATION:     GENERAL: Well appearing, in no acute distress, alert and oriented x3.  PSYCH:  Mood and affect appropriate.  SKIN: Skin color, texture, turgor normal, no rashes or lesions.  HEAD/FACE:  Normocephalic, atraumatic. Cranial nerves grossly intact.  CV: RRR with palpation of the radial artery.  PULM: No evidence of respiratory difficulty, symmetric chest rise.  EXTREMITIES:  Good capillary refill.  MUSCULOSKELETAL: There is pain with palpation over left AC joint. Limited ROM with pain on internal rotation of bilateral shoulders. Positive cross arm test on the left. No atrophy or tone abnormalities are noted.  NEURO: Bilateral lower extremity coordination and muscle stretch reflexes are physiologic and symmetric.  Plantar response are downgoing. No clonus.  Normal sensation.  GAIT: Antalgic- ambulates with rolling walker.      ASSESSMENT: 88 y.o. year old female with pain, consistent with the following diagnoses:     1. Lumbar radiculopathy  gabapentin (NEURONTIN) 100 MG capsule      2. Lumbar spondylosis        3. DDD (degenerative disc disease),  lumbar        4. Chronic pain of both shoulders        5. Arthritis of left acromioclavicular joint        6. Subacromial bursitis of left shoulder joint        7. Debility        8. Chronic pain disorder              PLAN:     - Previous imaging reviewed.     - She is s/p left AC joint injection with benefit. We can repeat this as needed.     - She is s/p bilateral L5/S1 TF ASHUTOSH with 75% relief.     - Continue Gabapentin 100 mg at bedtime. Refill provided.       - Continue Tramadol per PCP.      - RTC in 3 months or sooner if needed.      The above plan and management options were discussed at length with patient. Patient is in agreement with the above and verbalized understanding.    Amanda Garcia  08/16/2023

## 2023-08-18 ENCOUNTER — TELEPHONE (OUTPATIENT)
Dept: BEHAVIORAL HEALTH | Facility: CLINIC | Age: 88
End: 2023-08-18
Payer: MEDICARE

## 2023-08-18 NOTE — PROGRESS NOTES
CHW received call from pt daughter who is requested a fillable version of homework sheet for her mother, Ms. Cook to complete prior to next visit with Gibson Santiago LPC. Note to provider of request.

## 2023-08-21 NOTE — PROGRESS NOTES
"Primary Care Behavioral Health Integration: Follow-up  Date:  08/24/2023  Patient Name: Anahi Cook  Referral Source:  Minerva Mathias MD  Type of Visit:  Video Session  Site:  [facility]    Visit type: Virtual visit with synchronous audio and video  The patient location is:  home  The patient location Parish is:  [facility]  The patient phone number is: [phone]    Each patient to whom he or she provides medical services by telemedicine is:  (1) informed of the relationship between the physician and patient and the respective role of any other health care provider with respect to management of the patient; and (2) notified that he or she may decline to receive medical services by telemedicine and may withdraw from such care at any time.    History of Present Illness:  Anahi Cook, a 88 y.o.  female with history of Adjustment disorders; with mixed emotional features [F43.23] and Major Depressive Disorder, Recurrent, Mild (F33.0) referred by Minerva Mathias MD.  Patient was seen, examined and chart was reviewed.    Met with patient.       Patient began this session by stating she and daughter, Patience, engage in verbal altercations about what patient should and shouldn't do or can and can't do.  "When my daughter and I are around other family members, she will give me this look that says to me not to say the wrong thing in front of them."  Patient also stated, "My daughter does not allow me to do the things she knows I am capable of doing.  Whenever I am brushing my teeth, she is hovering over me.  I know she does most things for me because she has a good heart, but there are times when I want to do what I know I can do."  LPC and patient reviewed her completed Tool 1 - Identification of Triggers to Depression worksheet.  Patient defined depression as a state of being sad because she cannot do the things she wants.  The last time she felt depressed was when her daughter began " ""hovering" over her while brushing her teeth.  Themes of times patient gets depressed include when patient feels as if she is a burden to her daughter and when patient cannot do things on her own.  Triggers to her depression include:  when her daughter does not ask patient what her opinion is about a particular person or situation; fear patient will say the wrong things to her daughter, and certain expressions her daughter makes which trigger patient to react.  LPC encouraged patient to think about the possibility of receiving family counseling with her and her daughter as participants.  LPC and patient will revisit this recommendation at some point during a future session.  LPC sent patient the Tool 2 - Feelings Identification worksheet and the Consider Alternate Viewpoints worksheet.  Will review these worksheets during follow-up appointment.        Results of the PHQ8 8/8/2023 8/8/2023   1. Little interest or pleasure in doing things Several days Several days   2. Feeling down, depressed, or hopeless More than half the days Several days   3. Trouble falling or staying asleep, or sleeping too much Not at all Not at all   4. Feeling tired or having little energy Nearly every day Several days   5. poor appetite or overeating Not at all Not at all   6. Feeling bad about yourself - or that you are a failure or have let yourself or your family down Nearly every day Nearly every day   7. Trouble concentrating on things, such as reading the newspaper or watching television Not at all Not at all   8. Moving or speaking so slowly that other people could have noticed? Or the opposite - being so fidgety or restless that you have been moving around a lot more than usual Not at all Not at all   Total Score  9 6       GAD7 8/8/2023   1. Feeling nervous, anxious, or on edge? 1   2. Not being able to stop or control worrying? 3   3. Worrying too much about different things? 3   4. Trouble relaxing? 1   5. Being so restless that " it is hard to sit still? 0   6. Becoming easily annoyed or irritable? 3   7. Feeling afraid as if something awful might happen? 1   FIDELIA-7 Score 12       Mental Status Exam  General Appearance:  unremarkable, age appropriate     Speech: normal tone, normal rate, normal pitch, normal volume         Level of Cooperation: cooperative        Thought Processes: normal and logical      Mood: steady        Thought Content: normal, no suicidality, no homicidality, delusions, or paranoia     Affect: congruent and appropriate    Orientation: Oriented x3     Memory: recent >  intact, remote >  intact     Attention Span & Concentration: intact     Fund of General Knowledge: intact and appropriate to age and level of education   Abstract Reasoning: interpretation of similarities was concrete   Judgment & Insight: fair       Language:  intact       Treatment plan:  Target symptoms: depression, anxiety , adjustment  Why chosen therapy is appropriate versus another modality: relevant to diagnosis  Outcome monitoring methods: self-report  Therapeutic intervention type: insight oriented psychotherapy, behavior modifying psychotherapy, supportive psychotherapy    Risk parameters:  Patient reports no suicidal ideation  Patient reports no homicidal ideation  Patient reports no self-injurious behavior  Patient reports no violent behavior    Verbal deficits: None    Patient's response to intervention:  The patient's response to intervention is accepting.    Progress toward goals and other mental status changes:  The patient's progress toward goals is limited.    Patient advised to call [phone] or present the the nearest ED if they experience suicidal or homicidal ideation, plan or intent.       Impression:  My diagnostic impression is continues to experience depressed mood, feelings of being isolated, and irritability.  These symptoms are making it difficult for patient to function effectively.     Diagnosis:   Adjustment disorders; with  mixed emotional features [F43.23] and Major Depressive Disorder, Recurrent, Mild (F33.0)    Treatment Goals and Plan: Pt plans to continue CBT, Problem-solving Therapy, and Solution-focused Therapy    Future treatment will utilize CBT, Problem-solving Therapy, and Solution-focused Therapy    Return to Clinic: 3 weeks    Plan to discuss case with [facility] consulting psychiatrist and further recommendations to be potentially be made at that time.  Refer to psychiatry    Length of Appointment:  41 minutes spent face-to-face and 14 minutes spent in non face-to-face clinical care.

## 2023-08-22 ENCOUNTER — PROCEDURE VISIT (OUTPATIENT)
Dept: OPHTHALMOLOGY | Facility: CLINIC | Age: 88
End: 2023-08-22
Payer: MEDICARE

## 2023-08-22 DIAGNOSIS — H35.371 EPIRETINAL MEMBRANE, RIGHT EYE: ICD-10-CM

## 2023-08-22 DIAGNOSIS — M47.816 LUMBAR SPONDYLOSIS: ICD-10-CM

## 2023-08-22 DIAGNOSIS — H35.3221 EXUDATIVE AGE-RELATED MACULAR DEGENERATION OF LEFT EYE WITH ACTIVE CHOROIDAL NEOVASCULARIZATION: Primary | ICD-10-CM

## 2023-08-22 DIAGNOSIS — Z96.1 PSEUDOPHAKIA OF BOTH EYES: ICD-10-CM

## 2023-08-22 DIAGNOSIS — H35.3112 INTERMEDIATE STAGE DRY AGE-RELATED MACULAR DEGENERATION OF RIGHT EYE: ICD-10-CM

## 2023-08-22 DIAGNOSIS — M17.11 PRIMARY OSTEOARTHRITIS OF RIGHT KNEE: ICD-10-CM

## 2023-08-22 PROCEDURE — 92134 CPTRZ OPH DX IMG PST SGM RTA: CPT | Mod: S$GLB,,, | Performed by: OPHTHALMOLOGY

## 2023-08-22 PROCEDURE — 99214 PR OFFICE/OUTPT VISIT, EST, LEVL IV, 30-39 MIN: ICD-10-PCS | Mod: 25,S$GLB,, | Performed by: OPHTHALMOLOGY

## 2023-08-22 PROCEDURE — 92134 OCT, RETINA - OU - BOTH EYES: ICD-10-PCS | Mod: S$GLB,,, | Performed by: OPHTHALMOLOGY

## 2023-08-22 PROCEDURE — 99214 OFFICE O/P EST MOD 30 MIN: CPT | Mod: 25,S$GLB,, | Performed by: OPHTHALMOLOGY

## 2023-08-22 PROCEDURE — 67028 PR INJECT INTRAVITREAL PHARMCOLOGIC: ICD-10-PCS | Mod: LT,S$GLB,, | Performed by: OPHTHALMOLOGY

## 2023-08-22 PROCEDURE — 67028 INJECTION EYE DRUG: CPT | Mod: LT,S$GLB,, | Performed by: OPHTHALMOLOGY

## 2023-08-22 RX ORDER — TRAMADOL HYDROCHLORIDE 50 MG/1
50 TABLET ORAL EVERY 8 HOURS PRN
Qty: 90 TABLET | Refills: 1 | Status: SHIPPED | OUTPATIENT
Start: 2023-08-22 | End: 2023-10-23 | Stop reason: SDUPTHER

## 2023-08-22 NOTE — TELEPHONE ENCOUNTER
No care due was identified.  [facility] Embedded Care Due Messages. Reference number: [id number].   8/22/2023 11:10:29 AM CDT

## 2023-08-22 NOTE — PROGRESS NOTES
HPI    4 wk DFE OU / OCTM OU/ Eylea OS (injection)     DLS 06/08/2023 by Dr. 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 Epiretinal membrane, right eye    4. Pseudophakia of both eyes          Last edited by Marzena Sheppard MA on 8/22/2023  3:16 PM.              A/P    ICD-10-CM ICD-9-CM   1. Exudative age-related macular degeneration of left eye with active choroidal neovascularization  H35.3221 362.52     362.16   2. 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 Epiretinal membrane, right eye  H35.371 362.56   4. Pseudophakia of both eyes  Z96.1 V43.1       1. Exudative age-related macular degeneration of left eye with active choroidal neovascularization  2. 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  Schedule Eylea Injection Left Eye today Patient chooses to proceed with injection R/B/A discussed include infection retinal detachment and stroke    Patient identified.  Timeout performed.    Risks, benefits, and alternatives to treatment were discussed in detail with the patient, including bleeding/infection (endophthalmitis)/etc.  The patient voiced understanding and wished to proceed with the procedure.  See separate consent form.    Injection Procedure  Note:  Diagnosis: CNVM Left Eye    Topical Proparacaine drop placed then topical 5% Betadine, then subcojunctival lidocaine 2% injection  Sterile gloves used, and sterile lid speculum placed.  5% Betadine placed at injection site again prior to injection.  Eylea 2mg in 0.05cc Injected inferotemporally 3.5-4mm posterior to the limbus.  Complications: None  Va at least CF at 5 feet post injection.  Retina, ONH, IOP normal after injection.    Followup as below.  Patient should return immediately PRN.  Retinal Detachment and Endophthalmitis precautions given        3. Epiretinal membrane, right eye  Mod ERM, VA 20/50      Pt asymptomatic, no metamorphopsia  Plan: Observation  Amsler precautions discussed     4. Pseudophakia of both eyes  Good lens position OU  Plan: Observation      RTC 8 weeks DFE/OCTm OU, possible Eylea OS      I saw and examined the patient and reviewed in detail the findings documented. The final examination findings, image interpretations, and plan as documented in the record represent my personal judgment and conclusions.    Dwight Melton MD  Vitreoretinal Surgery   [facility]

## 2023-08-23 ENCOUNTER — NURSE TRIAGE (OUTPATIENT)
Dept: ADMINISTRATIVE | Facility: CLINIC | Age: 88
End: 2023-08-23
Payer: MEDICARE

## 2023-08-23 ENCOUNTER — PATIENT MESSAGE (OUTPATIENT)
Dept: PRIMARY CARE CLINIC | Facility: CLINIC | Age: 88
End: 2023-08-23
Payer: MEDICARE

## 2023-08-23 ENCOUNTER — TELEPHONE (OUTPATIENT)
Dept: BEHAVIORAL HEALTH | Facility: CLINIC | Age: 88
End: 2023-08-23
Payer: MEDICARE

## 2023-08-23 NOTE — PROGRESS NOTES
CHW reached out to pt to remind her of virtual appointment with Gibson Santiago LPC tomorrow. Pt's daughter confirmed the appointment.  She has been much more difficult to deal with.  She continues to lash out in anger and is resistant about tomorrow's follow-up appointment. Nothing the family does for her is good enough.  Patient is said to have always been this way.

## 2023-08-23 NOTE — PROGRESS NOTES
[facility] Note    Chief Complaint      Chief Complaint   Patient presents with    Hemorrhoids       History of Present Illness      Anahi Cook is a 88 y.o. female who presents today via virtual visit for   Chief Complaint   Patient presents with    Hemorrhoids         Patient is patient is present with caretaker in the room for this virtual visit.  She denies any constipation.  She does have a hemorrhoids which bled a bit while wiping after a BM.  Otherwise she is feeling well.         Review of Systems   Skin:         + hemorrhoids   All 12 systems otherwise negative.       Family History:  family history includes Breast cancer (age of onset: 60) in her maternal grandmother; Breast cancer (age of onset: 66) in her sister; Breast cancer (age of onset: 83) in her maternal aunt; Cancer in her brother; Heart attack in her father; Heart attack (age of onset: 52) in her brother; Heart attack (age of onset: 58) in her brother; Heart failure in her mother; Kidney failure in her mother; Pulmonary embolism (age of onset: 45) in her brother.   Family history was reviewed with patient.     Medications:  Outpatient Encounter Medications as of 8/24/2023   Medication Sig Dispense Refill    acetaminophen (TYLENOL) 650 MG TbSR Take 650 mg by mouth every 8 (eight) hours as needed.      alpha-D-galactosidase (BEANO ORAL) Take by mouth.      aspirin (ECOTRIN) 81 MG EC tablet Take 81 mg by mouth once daily.      atorvastatin (LIPITOR) 20 MG tablet Take 1 tablet (20 mg total) by mouth every evening. 90 tablet 1    benazepriL (LOTENSIN) 40 MG tablet Take 1 tablet (40 mg total) by mouth once daily. 90 tablet 1    BREO ELLIPTA 100-25 mcg/dose diskus inhaler Inhale 1 puff into the lungs.      calcium-vitamin D 250-100 mg-unit per tablet Take 1 tablet by mouth 2 (two) times daily.      CETIRIZINE HCL (ZYRTEC ORAL) Take 10 mg by mouth nightly as needed.       citalopram (CELEXA) 20 MG tablet Take 1 tablet (20 mg total) by  mouth every evening. 90 tablet 0    felodipine (PLENDIL) 10 MG 24 hr tablet Take 1 tablet (10 mg total) by mouth 2 (two) times a day. 180 tablet 1    gabapentin (NEURONTIN) 100 MG capsule Take 1 capsule (100 mg total) by mouth every evening. 90 capsule 1    hydrALAZINE (APRESOLINE) 25 MG tablet Take 1 tablet (25 mg total) by mouth every 12 (twelve) hours. 180 tablet 1    hydrocortisone 2.5 % cream Apply topically 2 (two) times daily. 1 each 0    INCRUSE ELLIPTA 62.5 mcg/actuation inhalation capsule Inhale into the lungs.      ipratropium-albuteroL (COMBIVENT RESPIMAT)  mcg/actuation inhaler Inhale 1 puff into the lungs.      ketoprofen 10 % CrPk Apply topically.      letrozole (FEMARA) 2.5 mg Tab Take 1 tablet (2.5 mg total) by mouth once daily. 90 tablet 3    melatonin 10 mg Tab Take by mouth every evening.      montelukast (SINGULAIR) 10 mg tablet Take 10 mg by mouth every evening.      multivitamin (THERAGRAN) per tablet Take 1 tablet by mouth once daily.      polyethylene glycol 3350 (MIRALAX ORAL) Take by mouth once daily.      predniSONE (DELTASONE) 10 MG tablet Take 10 tablets by mouth 3 (three) times daily.      torsemide (DEMADEX) 20 MG Tab TAKE 1 TABLET(20 MG) BY MOUTH EVERY DAY 90 tablet 2    traMADoL (ULTRAM) 50 mg tablet Take 1 tablet (50 mg total) by mouth every 8 (eight) hours as needed for Pain. 90 tablet 1    triamcinolone acetonide 0.1% (KENALOG) 0.1 % cream AAA bid.  Spot treatment as needed 45 g 0    triamcinolone acetonide 0.1% (KENALOG) 0.1 % paste APPLY SPARINGLY FOUR TIMES DAILY       No facility-administered encounter medications on file as of 8/24/2023.       Allergies:  Review of patient's allergies indicates:   Allergen Reactions    Levaquin [levofloxacin] Itching    Penicillins Itching       Health Maintenance:  Health Maintenance   Topic Date Due    DEXA Scan  01/10/2025    Lipid Panel  01/20/2028    TETANUS VACCINE  02/03/2032    Shingles Vaccine  Completed     Health Maintenance  Topics with due status: Not Due       Topic Last Completion Date    TETANUS VACCINE 02/03/2022    Influenza Vaccine 09/12/2022    DEXA Scan 01/10/2023    Hemoglobin A1c (Prediabetes) 01/20/2023    Lipid Panel 01/20/2023       Physical Exam       ]        Assessment/Plan     Anahi Cook is a 88 y.o.female with:    Hemorrhoids, unspecified hemorrhoid type  -     hydrocortisone 2.5 % cream; Apply topically 2 (two) times daily.  Dispense: 1 each; Refill: 0        As above, continue current medications and maintain follow up with specialists.  Return to clinic as needed.    Greater than 50% of this time was spent face to face via virtual visit with patient.  All questions were answered to patient's satisfaction.    The patient location is: home  The chief complaint leading to consultation is: hemorrhoids    Visit type: audiovisual    Face to Face time with patient:   5 minutes of total time spent on the encounter, which includes face to face time and non-face to face time preparing to see the patient (eg, review of tests), Obtaining and/or reviewing separately obtained history, Documenting clinical information in the electronic or other health record, Independently interpreting results (not separately reported) and communicating results to the patient/family/caregiver, or Care coordination (not separately reported).         Each patient to whom he or she provides medical services by telemedicine is:  (1) informed of the relationship between the physician and patient and the respective role of any other health care provider with respect to management of the patient; and (2) notified that he or she may decline to receive medical services by telemedicine and may withdraw from such care at any time.       Karen L Spencer, NP-C [facility]    Answers submitted by the patient for this visit:  Review of Systems Questionnaire (Submitted on 8/23/2023)  activity change: No  unexpected weight change: No  neck pain:  No  hearing loss: No  rhinorrhea: No  trouble swallowing: No  eye discharge: No  visual disturbance: No  chest tightness: No  wheezing: No  chest pain: No  palpitations: No  blood in stool: No  constipation: No  vomiting: No  diarrhea: No  polydipsia: No  polyuria: No  difficulty urinating: No  hematuria: No  menstrual problem: No  dysuria: No  joint swelling: Yes  arthralgias: Yes  headaches: No  weakness: No  confusion: No  dysphoric mood: Yes

## 2023-08-23 NOTE — TELEPHONE ENCOUNTER
[place]    PCP:  Dr. Minerva Mathias    Spoke with Dtr, Patience Brandt, on pts behalf.  C/O painful hemorrhoids, raw/irritated, and burning.  Denies bleeding, abdominal, fever, vomiting, and constipation.  She is not able to push them back in.  She reports not able to use Preparation H.  Per protocol, care advised is see in office today or tomorrow.  Appt scheduled with PCP Dept within timeframe.  Pt/Dtr VU.  Advised to call for worsening/questions/concerns.  VU.     Reason for Disposition   Home treatment > 3 days for rectal pain and not improved    Additional Information   Negative: Sounds like a life-threatening emergency to the triager   Negative: Injury to rectum   Negative: Patient sounds very sick or weak to the triager   Negative: Severe rectal pain   Negative: Rectal pain or redness and fever > 100.4 F (38.0 C)   Negative: Acute onset rectal pain and constipation (straining with rectal pressure or fullness), which is not relieved by Sitz bath or suppository   Negative: MODERATE-SEVERE rectal pain (i.e., interferes with school, work, or sleep)   Negative: MODERATE-SEVERE rectal itching (i.e., interferes with school, work, or sleep)   Negative: Last bowel movement (BM) > 4 days ago   Negative: Rectal area looks infected (e.g., draining sore, spreading redness)   Negative: Rash of rectal area (e.g., open sore, painful tiny water blisters, unexplained bumps)   Negative: Patient is worried they have a sexually transmitted infection (STI)    Protocols used: Rectal Symptoms-A-[place]

## 2023-08-24 ENCOUNTER — CLINICAL SUPPORT (OUTPATIENT)
Dept: BEHAVIORAL HEALTH | Facility: CLINIC | Age: 88
End: 2023-08-24
Payer: MEDICARE

## 2023-08-24 ENCOUNTER — OFFICE VISIT (OUTPATIENT)
Dept: PRIMARY CARE CLINIC | Facility: CLINIC | Age: 88
End: 2023-08-24
Payer: MEDICARE

## 2023-08-24 ENCOUNTER — PATIENT MESSAGE (OUTPATIENT)
Dept: BEHAVIORAL HEALTH | Facility: CLINIC | Age: 88
End: 2023-08-24

## 2023-08-24 DIAGNOSIS — F33.0 MAJOR DEPRESSIVE DISORDER, RECURRENT, MILD: ICD-10-CM

## 2023-08-24 DIAGNOSIS — F43.29 ADJUSTMENT DISORDER WITH MIXED EMOTIONAL FEATURES: Primary | ICD-10-CM

## 2023-08-24 DIAGNOSIS — K64.9 HEMORRHOIDS, UNSPECIFIED HEMORRHOID TYPE: Primary | ICD-10-CM

## 2023-08-24 PROCEDURE — 1160F RVW MEDS BY RX/DR IN RCRD: CPT | Mod: CPTII,95,, | Performed by: NURSE PRACTITIONER

## 2023-08-24 PROCEDURE — 90834 PSYTX W PT 45 MINUTES: CPT | Mod: 95,,, | Performed by: COUNSELOR

## 2023-08-24 PROCEDURE — 90834 PR PSYCHOTHERAPY W/PATIENT, 45 MIN: ICD-10-PCS | Mod: 95,,, | Performed by: COUNSELOR

## 2023-08-24 PROCEDURE — 1159F PR MEDICATION LIST DOCUMENTED IN MEDICAL RECORD: ICD-10-PCS | Mod: CPTII,95,, | Performed by: NURSE PRACTITIONER

## 2023-08-24 PROCEDURE — 1159F MED LIST DOCD IN RCRD: CPT | Mod: CPTII,95,, | Performed by: NURSE PRACTITIONER

## 2023-08-24 PROCEDURE — 99213 PR OFFICE/OUTPT VISIT, EST, LEVL III, 20-29 MIN: ICD-10-PCS | Mod: 95,,, | Performed by: NURSE PRACTITIONER

## 2023-08-24 PROCEDURE — 99213 OFFICE O/P EST LOW 20 MIN: CPT | Mod: 95,,, | Performed by: NURSE PRACTITIONER

## 2023-08-24 PROCEDURE — 1160F PR REVIEW ALL MEDS BY PRESCRIBER/CLIN PHARMACIST DOCUMENTED: ICD-10-PCS | Mod: CPTII,95,, | Performed by: NURSE PRACTITIONER

## 2023-08-24 RX ORDER — HYDROCORTISONE 25 MG/G
CREAM TOPICAL 2 TIMES DAILY
Qty: 1 EACH | Refills: 0 | Status: SHIPPED | OUTPATIENT
Start: 2023-08-24

## 2023-08-25 ENCOUNTER — TELEPHONE (OUTPATIENT)
Dept: BEHAVIORAL HEALTH | Facility: CLINIC | Age: 88
End: 2023-08-25
Payer: MEDICARE

## 2023-08-25 NOTE — PROGRESS NOTES
CHW reached out to pt to verify when follow-up virtual appointment with Gibson Santiago LPC was scheduled, pt was scheduled on Tuesday, 9/12/2023 at 1:30pm.

## 2023-09-02 DIAGNOSIS — F41.8 DEPRESSION WITH ANXIETY: ICD-10-CM

## 2023-09-02 NOTE — TELEPHONE ENCOUNTER
No care due was identified.  [facility] Embedded Care Due Messages. Reference number: [id number].   9/02/2023 5:43:58 PM CDT

## 2023-09-05 RX ORDER — CITALOPRAM 20 MG/1
20 TABLET, FILM COATED ORAL NIGHTLY
Qty: 90 TABLET | Refills: 0 | Status: SHIPPED | OUTPATIENT
Start: 2023-09-05 | End: 2023-11-19 | Stop reason: SDUPTHER

## 2023-09-11 NOTE — PROGRESS NOTES
"Primary Care Behavioral Health Integration: Follow-up  Date:  09/12/2023  Patient Name: Anahi Cook  Referral Source:  Minerva Mathias MD  Type of Visit:  Video Session  Site:  [facility]    Visit type: Virtual visit with synchronous audio and video  The patient location is:  home ([address] [place] [address])  The patient location [place] is:  [facility]  The patient phone number is: [phone]    Each patient to whom he or she provides medical services by telemedicine is:  (1) informed of the relationship between the physician and patient and the respective role of any other health care provider with respect to management of the patient; and (2) notified that he or she may decline to receive medical services by telemedicine and may withdraw from such care at any time.    History of Present Illness:  Anahi Cook, a 89 y.o.  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  Patient was seen, examined and chart was reviewed.    Met with patient.       Patient began this session by stating she feels she has been reacting less to her daughter and other family members.  Stated she realizes the negativity she caused when she has reacted in the past.  LPC and patient reviewed her completed Tool 2 - Feelings Identification worksheet.  Patient stated her depression-related feelings include:  sadness, especially when she feels she is not allowed to do some of the things she knows she is capable of doing; anger; and hopelessness.  Stated she wants to be happy and she wants everyone around her to be happy.  Acknowledged it has been difficult for her accept aging, and she realizes others are not responsible for her aging.  Acknowledged she "has always been a volatile person, and it has been difficult for her to stand back when she is confronted.  Also acknowledged it has been difficult for her to accept that she " "has to give up her independence.  Stated other family members have told her she is not thankful enough. LPC and patient reviewed her completed "Consider Alternate" worksheet.  Patient was appropriately able to distinguish between a distressing interpretation and a constructive, alternate interpretation of a given scenario.  BERNADINE sent patient the Thinking Errors sheet, the Tool 3 - Identification of Distorted Thoughts worksheet, and the Thoughts/Feelings Awareness Log of the CBT Toolbox workbook.  Will review this worksheet during follow-up appointment on September 26, 2023.              8/23/2023    11:06 AM 8/8/2023     2:53 PM 8/8/2023    12:59 PM   Results of the PHQ8   Little interest or pleasure in doing things Several days Several days Several days   Feeling down, depressed, or hopeless Several days Several days More than half the days   Trouble falling or staying asleep, or sleeping too much Not at all Not at all Not at all   Feeling tired or having little energy Nearly every day Several days Nearly every day   Poor appetite or overeating Not at all Not at all Not at all   Feeling bad about yourself - or that you are a failure or have let yourself or your family down Nearly every day Nearly every day Nearly every day   Trouble concentrating on things, such as reading the newspaper or watching television Not at all Not at all Not at all   Moving or speaking so slowly that other people could have noticed. Or the opposite - being so fidgety or restless that you have been moving around a lot more than usual Not at all Not at all Not at all   Total Score  8 6 9           8/23/2023    11:05 AM 8/8/2023    12:57 PM   GAD7   1. Feeling nervous, anxious, or on edge? 3 1   2. Not being able to stop or control worrying? 1 3   3. Worrying too much about different things? 1 3   4. Trouble relaxing? 0 1   5. Being so restless that it is hard to sit still? 0 0   6. Becoming easily annoyed or irritable? 3 3   7. Feeling " afraid as if something awful might happen? 1 1   FIDELIA-7 Score 9 12       Mental Status Exam  General Appearance:  unremarkable, age appropriate     Speech: normal tone, normal rate, normal pitch, normal volume         Level of Cooperation: cooperative        Thought Processes: normal and logical      Mood: steady        Thought Content: normal, no suicidality, no homicidality, delusions, or paranoia     Affect: congruent and appropriate    Orientation: Oriented x3     Memory: recent >  intact, remote >  intact     Attention Span & Concentration: intact     Fund of General Knowledge: intact and appropriate to age and level of education   Abstract Reasoning: interpretation of similarities was concrete   Judgment & Insight: fair       Language:  intact       Treatment plan:  Target symptoms: depression, anxiety , mood swings  Why chosen therapy is appropriate versus another modality: relevant to diagnosis  Outcome monitoring methods: self-report  Therapeutic intervention type: insight oriented psychotherapy, behavior modifying psychotherapy, supportive psychotherapy    Risk parameters:  Patient reports no suicidal ideation  Patient reports no homicidal ideation  Patient reports no self-injurious behavior  Patient reports no violent behavior    Verbal deficits: None    Patient's response to intervention:  The patient's response to intervention is accepting.    Progress toward goals and other mental status changes:  The patient's progress toward goals is fair .    Patient advised to call [phone] or present the the nearest ED if they experience suicidal or homicidal ideation, plan or intent.       Impression:  My diagnostic impression is patient continues to experience excessive worrying, difficulty relaxing, depressed mood, and feeling on edge.  These symptoms are making it difficult for patient to function effectively.    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  Refer to psychiatry    Length of Appointment:  31 minutes spent face-to-face and 14 minutes spent in non face-to-face clinical care.

## 2023-09-12 ENCOUNTER — PATIENT MESSAGE (OUTPATIENT)
Dept: BEHAVIORAL HEALTH | Facility: CLINIC | Age: 88
End: 2023-09-12

## 2023-09-12 ENCOUNTER — CLINICAL SUPPORT (OUTPATIENT)
Dept: BEHAVIORAL HEALTH | Facility: CLINIC | Age: 88
End: 2023-09-12
Payer: COMMERCIAL

## 2023-09-12 DIAGNOSIS — F43.29 ADJUSTMENT DISORDER WITH MIXED EMOTIONAL FEATURES: Primary | ICD-10-CM

## 2023-09-12 PROCEDURE — 90832 PR PSYCHOTHERAPY W/PATIENT, 30 MIN: ICD-10-PCS | Mod: 95,,, | Performed by: COUNSELOR

## 2023-09-12 PROCEDURE — 90832 PSYTX W PT 30 MINUTES: CPT | Mod: 95,,, | Performed by: COUNSELOR

## 2023-09-21 ENCOUNTER — OFFICE VISIT (OUTPATIENT)
Dept: HOME HEALTH SERVICES | Facility: CLINIC | Age: 88
End: 2023-09-21
Payer: MEDICARE

## 2023-09-21 VITALS
DIASTOLIC BLOOD PRESSURE: 80 MMHG | TEMPERATURE: 97 F | HEART RATE: 81 BPM | OXYGEN SATURATION: 92 % | WEIGHT: 149 LBS | RESPIRATION RATE: 16 BRPM | BODY MASS INDEX: 25.44 KG/M2 | HEIGHT: 64 IN | SYSTOLIC BLOOD PRESSURE: 102 MMHG

## 2023-09-21 DIAGNOSIS — I73.9 PAD (PERIPHERAL ARTERY DISEASE): ICD-10-CM

## 2023-09-21 DIAGNOSIS — C50.412 MALIGNANT NEOPLASM OF UPPER-OUTER QUADRANT OF LEFT BREAST IN FEMALE, ESTROGEN RECEPTOR POSITIVE: Chronic | ICD-10-CM

## 2023-09-21 DIAGNOSIS — Z99.89 DEPENDENCE ON OTHER ENABLING MACHINES AND DEVICES: ICD-10-CM

## 2023-09-21 DIAGNOSIS — M81.0 AGE-RELATED OSTEOPOROSIS WITHOUT CURRENT PATHOLOGICAL FRACTURE: ICD-10-CM

## 2023-09-21 DIAGNOSIS — I70.0 ATHEROSCLEROSIS OF AORTA: ICD-10-CM

## 2023-09-21 DIAGNOSIS — R73.03 PRE-DIABETES: ICD-10-CM

## 2023-09-21 DIAGNOSIS — J44.9 CHRONIC OBSTRUCTIVE PULMONARY DISEASE, UNSPECIFIED COPD TYPE: Chronic | ICD-10-CM

## 2023-09-21 DIAGNOSIS — Z17.0 MALIGNANT NEOPLASM OF UPPER-OUTER QUADRANT OF LEFT BREAST IN FEMALE, ESTROGEN RECEPTOR POSITIVE: Chronic | ICD-10-CM

## 2023-09-21 DIAGNOSIS — G89.29 OTHER CHRONIC PAIN: Chronic | ICD-10-CM

## 2023-09-21 DIAGNOSIS — H35.3221 EXUDATIVE AGE-RELATED MACULAR DEGENERATION OF LEFT EYE WITH ACTIVE CHOROIDAL NEOVASCULARIZATION: ICD-10-CM

## 2023-09-21 DIAGNOSIS — C77.3 SECONDARY AND UNSPECIFIED MALIGNANT NEOPLASM OF AXILLA AND UPPER LIMB LYMPH NODES: ICD-10-CM

## 2023-09-21 DIAGNOSIS — R26.9 ABNORMALITY OF GAIT AND MOBILITY: ICD-10-CM

## 2023-09-21 DIAGNOSIS — E78.2 MIXED HYPERLIPIDEMIA: Chronic | ICD-10-CM

## 2023-09-21 DIAGNOSIS — I10 ESSENTIAL HYPERTENSION: Chronic | ICD-10-CM

## 2023-09-21 DIAGNOSIS — F33.0 MAJOR DEPRESSIVE DISORDER, RECURRENT, MILD: ICD-10-CM

## 2023-09-21 DIAGNOSIS — Z00.00 ENCOUNTER FOR PREVENTIVE HEALTH EXAMINATION: Primary | ICD-10-CM

## 2023-09-21 PROBLEM — N18.31 STAGE 3A CHRONIC KIDNEY DISEASE: Status: RESOLVED | Noted: 2022-08-11 | Resolved: 2023-09-21

## 2023-09-21 PROBLEM — S22.42XA MULTIPLE CLOSED FRACTURES OF RIBS OF LEFT SIDE: Status: RESOLVED | Noted: 2019-04-03 | Resolved: 2023-09-21

## 2023-09-21 PROCEDURE — G0439 PPPS, SUBSEQ VISIT: HCPCS | Mod: S$GLB,,,

## 2023-09-21 PROCEDURE — 1101F PR PT FALLS ASSESS DOC 0-1 FALLS W/OUT INJ PAST YR: ICD-10-PCS | Mod: CPTII,S$GLB,,

## 2023-09-21 PROCEDURE — 1126F PR PAIN SEVERITY QUANTIFIED, NO PAIN PRESENT: ICD-10-PCS | Mod: CPTII,S$GLB,,

## 2023-09-21 PROCEDURE — G0439 PR MEDICARE ANNUAL WELLNESS SUBSEQUENT VISIT: ICD-10-PCS | Mod: S$GLB,,,

## 2023-09-21 PROCEDURE — 1101F PT FALLS ASSESS-DOCD LE1/YR: CPT | Mod: CPTII,S$GLB,,

## 2023-09-21 PROCEDURE — 1126F AMNT PAIN NOTED NONE PRSNT: CPT | Mod: CPTII,S$GLB,,

## 2023-09-21 PROCEDURE — 3288F PR FALLS RISK ASSESSMENT DOCUMENTED: ICD-10-PCS | Mod: CPTII,S$GLB,,

## 2023-09-21 PROCEDURE — 1160F PR REVIEW ALL MEDS BY PRESCRIBER/CLIN PHARMACIST DOCUMENTED: ICD-10-PCS | Mod: CPTII,S$GLB,,

## 2023-09-21 PROCEDURE — 1170F FXNL STATUS ASSESSED: CPT | Mod: CPTII,S$GLB,,

## 2023-09-21 PROCEDURE — 1159F PR MEDICATION LIST DOCUMENTED IN MEDICAL RECORD: ICD-10-PCS | Mod: CPTII,S$GLB,,

## 2023-09-21 PROCEDURE — 1170F PR FUNCTIONAL STATUS ASSESSED: ICD-10-PCS | Mod: CPTII,S$GLB,,

## 2023-09-21 PROCEDURE — 3288F FALL RISK ASSESSMENT DOCD: CPT | Mod: CPTII,S$GLB,,

## 2023-09-21 PROCEDURE — 1160F RVW MEDS BY RX/DR IN RCRD: CPT | Mod: CPTII,S$GLB,,

## 2023-09-21 PROCEDURE — 1159F MED LIST DOCD IN RCRD: CPT | Mod: CPTII,S$GLB,,

## 2023-09-21 NOTE — PATIENT INSTRUCTIONS
Counseling and Referral of Other Preventative  (Italic type indicates deductible and co-insurance are waived)    Patient Name: Anhai Cook  Today's Date: 9/21/2023    Health Maintenance       Date Due Completion Date    COVID-19 Vaccine (6 - Pfizer series) 05/10/2023 1/10/2023    Influenza Vaccine (1) 09/01/2023 9/12/2022    Override on 9/12/2022: Declined ([facility] hope msg received on 9.12.2022 from Dr. Isaac's office requesting flu be declined)    Hemoglobin A1c (Prediabetes) 01/20/2024 1/20/2023    DEXA Scan 01/10/2025 1/10/2023    Lipid Panel 01/20/2028 1/20/2023    TETANUS VACCINE 02/03/2032 2/3/2022        No orders of the defined types were placed in this encounter.    The following information is provided to all patients.  This information is to help you find resources for any of the problems found today that may be affecting your health:                Living healthy guide: www.[facility].[place].gov      Understanding Diabetes: www.diabetes.org      Eating healthy: www.cdc.gov/healthyweight      CDC home safety checklist: www.cdc.gov/steadi/patient.html      Agency on Aging: www.goea.[place].gov      Alcoholics anonymous (AA): www.aa.org      Physical Activity: www.deonte.nih.gov/mp1oldc      Tobacco use: www.quitwithusla.org

## 2023-09-21 NOTE — PROGRESS NOTES
"Anahi Cook presented for a  Medicare AWV and comprehensive Health Risk Assessment today. The following components were reviewed and updated:    Medical history  Family History  Social history  Allergies and Current Medications  Health Risk Assessment  Health Maintenance  Care Team         ** See Completed Assessments for Annual Wellness Visit within the encounter summary.**         The following assessments were completed:  Living Situation  CAGE  Depression Screening  Timed Get Up and Go: Deferred, impaired mobility  Whisper Test  Cognitive Function Screening    Nutrition Screening  ADL Screening  PAQ Screening        Vitals:    09/21/23 1042   BP: 102/80   BP Location: Left arm   Patient Position: Sitting   Pulse: 81   Resp: 16   Temp: 97 °F (36.1 °C)   SpO2: (!) 92%   Weight: 67.6 kg (149 lb)   Height: 5' 4" (1.626 m)     Body mass index is 25.58 kg/m².    Physical Exam  Vitals reviewed.   Constitutional:       General: She is not in acute distress.     Appearance: Normal appearance.   Cardiovascular:      Rate and Rhythm: Normal rate and regular rhythm.      Pulses: Normal pulses.      Heart sounds: No murmur heard.  Pulmonary:      Effort: Pulmonary effort is normal. No respiratory distress.      Breath sounds: Normal breath sounds.   Abdominal:      General: Bowel sounds are normal.   Musculoskeletal:      Right lower leg: No edema.      Left lower leg: No edema.   Neurological:      General: No focal deficit present.      Mental Status: She is alert and oriented to person, place, and time.      Gait: Gait abnormal.   Psychiatric:         Mood and Affect: Mood normal.         Behavior: Behavior normal.               Diagnoses and health risks identified today and associated recommendations/orders:    1. Encounter for preventive health examination  Assessments completed.  HM recommendations reviewed.  F/u with PCP as instructed.     Patient on chronic opioids, followed by Minerva Mathias MD.   Risk " factors reviewed for any potential opioid use disorder   Pain evaluated during visit.  Current treatment plan documented.  Will refer to specialist, as appropriate.      2. Exudative age-related macular degeneration of left eye with active choroidal neovascularization  Chronic, stable on current regimen. Followed by Ophthalmology.     3. Chronic obstructive pulmonary disease, unspecified COPD type  Chronic, stable on current Combivent, Incruse Ellipta, Breo Ellipta regimen. Followed by Pulmonology.     4. Atherosclerosis of aorta  Chronic, stable on current statin regimen. Followed by PCP.     5. PAD (peripheral artery disease)  Chronic, stable on current regimen. Followed by PCP.     6. Major depressive disorder, recurrent, mild  Chronic, stable on current Celexa regimen. Followed by PCP.     7. Malignant neoplasm of upper-outer quadrant of left breast in female, estrogen receptor positive  Chronic, stable on current Femara regimen. Followed by Hem/Onc.     8. Secondary and unspecified malignant neoplasm of axilla and upper limb lymph nodes  Chronic, stable on current Femara regimen. Followed by Hem/Onc.     9. Essential hypertension  Chronic, stable on current Benazepril regimen. Followed by PCP.     10. Mixed hyperlipidemia  Chronic, stable on current statin regimen. Followed by PCP.     11. Age-related osteoporosis without current pathological fracture  Chronic, stable on current Calcium/Vit D regimen. Followed by PCP.     12. Pre-diabetes  Chronic, stable on current diet regimen. Followed by PCP.     13. Other chronic pain  Chronic, stable on current Tramadol regimen. Followed by PCP.     14. Abnormality of gait and mobility  Unable to walk safely without assistance.     15. Dependence on other enabling machines and devices  Uses wheeled walker.       Provided Anahi with a 5-10 year written screening schedule and personal prevention plan. Recommendations were developed using the USPSTF age appropriate  recommendations. Education, counseling, and referrals were provided as needed. After Visit Summary printed and given to patient which includes a list of additional screenings\tests needed.    Follow up in about 1 year (around 9/21/2024) for Medicare AWV.    Shelli Ghosh NP    I offered to discuss advanced care planning, including how to pick a person who would make decisions for you if you were unable to make them for yourself, called a health care power of , and what kind of decisions you might make such as use of life sustaining treatments such as ventilators and tube feeding when faced with a life limiting illness recorded on a living will that they will need to know. (How you want to be cared for as you near the end of your natural life)     X Patient is interested in learning more about how to make advanced directives.  I provided them paperwork and offered to discuss this with them.

## 2023-09-22 NOTE — PROGRESS NOTES
"Primary Care Behavioral Health Integration: Follow-up  Date:  09/26/2023  Patient Name: Anahi Cook  Referral Source:  Minerva Mathias MD  Type of Visit:  Video Session  Site:  [facility]    Visit type: Virtual visit with synchronous audio and video  The patient location is:  home ([address] [place] [address])  The patient location [place] is:  [facility]  The patient phone number is: [phone]    Each patient to whom he or she provides medical services by telemedicine is:  (1) informed of the relationship between the physician and patient and the respective role of any other health care provider with respect to management of the patient; and (2) notified that he or she may decline to receive medical services by telemedicine and may withdraw from such care at any time.    History of Present Illness:  Anahi Cook, a 89 y.o.  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  Patient was seen, examined and chart was reviewed.    Met with patient.       Patient began this session by stating she was able to review the Thinking Errors sheet and the Tool 3 - Identification of Distorted Thoughts worksheet.  Patient stated she has engaged in the distorted thoughts of fortune-telling, mind-reading, blowing things up, and using "should" statements.  She provided the example of her granddaughter attending college and cautioning her granddaughter to be aware of the people around her based off of granddaughter's skin color.  Patient noted that most of her life was negative.  She stated her mother never trusted her, and this has led to her not trusting her own daughter.  An example of a "should" statement she provided was, "I should have prepared myself better both financially and emotionally for when I got older, especially now that I am 89 years old.  I did not think I would live as long as I have because most " "of my family members  much earlier."  LPC and patient reviewed her completed Tool 3 worksheet.  Patient identified the trigger of her daughter not trusting her to make her own health decisions.  She stated the resulting feeling was anger and feeling as if she is being treated like a child.  The resulting thought is that patient feels she knows what is best for her and her daughter is trying to control every aspect of her life.  Another resulting thought patient stated she has is that she feels as if she is a burden to her daughter which in turn makes her feel as if she is a failure because she did not make provisions for herself when she got old.  At this point during the session LPC requested permission from patient to speak to patient's daughter while patient was present.  Patient granted LPC permission to speak to daughter while patient was present.  LPC discussed the importance of having a conversation about certain trust issues that exist between patient and daughter.  Discussed daughter's concern about patient viewing her as controlling all aspects of her life.  LPC encouraged both patient and daughter to come to a mutual understanding that daughter is simply trying to help her mother, and she has her mother's best interest in mind.  Patient  stated she would try to be more aware of when she is exhibiting both negative thoughts and negative behaviors.  LPC sent patient the Tool 4 - Generate A List of Unhealthy Go-To Coping Skills worksheet of the CBT Toolbox workbook.  Will review patient's completed worksheet during follow-up appointment.             2023     1:20 PM 2023    11:06 AM 2023     2:53 PM 2023    12:59 PM   Results of the PHQ8   Little interest or pleasure in doing things Several days Several days Several days Several days   Feeling down, depressed, or hopeless Several days Several days Several days More than half the days   Trouble falling or staying asleep, or sleeping " too much Not at all Not at all Not at all Not at all   Feeling tired or having little energy Several days Nearly every day Several days Nearly every day   Poor appetite or overeating Not at all Not at all Not at all Not at all   Feeling bad about yourself - or that you are a failure or have let yourself or your family down Several days Nearly every day Nearly every day Nearly every day   Trouble concentrating on things, such as reading the newspaper or watching television Not at all Not at all Not at all Not at all   Moving or speaking so slowly that other people could have noticed. Or the opposite - being so fidgety or restless that you have been moving around a lot more than usual Not at all Not at all Not at all Not at all   Total Score  4 8 6 9           9/12/2023     1:19 PM 8/23/2023    11:05 AM 8/8/2023    12:57 PM   GAD7   1. Feeling nervous, anxious, or on edge? 1 3 1   2. Not being able to stop or control worrying? 1 1 3   3. Worrying too much about different things? 1 1 3   4. Trouble relaxing? 0 0 1   5. Being so restless that it is hard to sit still? 0 0 0   6. Becoming easily annoyed or irritable? 1 3 3   7. Feeling afraid as if something awful might happen? 1 1 1   FIDELIA-7 Score 5 9 12       Mental Status Exam  General Appearance:  unremarkable, age appropriate     Speech: normal tone, normal rate, normal pitch, normal volume         Level of Cooperation: cooperative        Thought Processes: normal and logical      Mood: steady        Thought Content: normal, no suicidality, no homicidality, delusions, or paranoia     Affect: congruent and appropriate    Orientation: Oriented x3     Memory: recent >  intact, remote >  intact     Attention Span & Concentration: intact     Fund of General Knowledge: intact and appropriate to age and level of education   Abstract Reasoning: interpretation of similarities was concrete   Judgment & Insight: fair       Language:  intact       Treatment plan:  Target  symptoms: depression, anxiety   Why chosen therapy is appropriate versus another modality: relevant to diagnosis  Outcome monitoring methods: self-report  Therapeutic intervention type: insight oriented psychotherapy, behavior modifying psychotherapy, supportive psychotherapy    Risk parameters:  Patient reports no suicidal ideation  Patient reports no homicidal ideation  Patient reports no self-injurious behavior  Patient reports no violent behavior    Verbal deficits: None    Patient's response to intervention:  The patient's response to intervention is accepting.    Progress toward goals and other mental status changes:  The patient's progress toward goals is fair .    Patient advised to call [phone] or present the the nearest ED if they experience suicidal or homicidal ideation, plan or intent.       Impression:  My diagnostic impression is patient continues to experience excessive worrying, difficulty relaxing, feeling on edge, irritability, and depressed mood.  These symptoms are making it difficult for patient to function effectively.    Diagnosis:   Adjustment disorders; with mixed emotional features [F43.23]    Treatment Goals and Plan: Pt plans to continue CBT, Problem-solving Therapy, and Solution-focused Therapy  Future treatment will utilize CBT, Problem-solving Therapy, and Solution-focused Therapy    Return to Clinic: 2 weeks    Plan to discuss case with [facility] consulting psychiatrist and further recommendations to be potentially be made at that time.  Refer to psychiatry    Length of Appointment:  45 minutes spent face-to-face and 14 minutes spent in non face-to-face clinical care.

## 2023-09-25 ENCOUNTER — PATIENT MESSAGE (OUTPATIENT)
Dept: BEHAVIORAL HEALTH | Facility: CLINIC | Age: 88
End: 2023-09-25

## 2023-09-25 ENCOUNTER — TELEPHONE (OUTPATIENT)
Dept: BEHAVIORAL HEALTH | Facility: CLINIC | Age: 88
End: 2023-09-25

## 2023-09-25 NOTE — PROGRESS NOTES
CHW reached out to pt to remind her of virtual appointment with Gibson Santiago LPC tomorrow. No answer, LVM of appointment.  Sent patient portal reminder.

## 2023-09-26 ENCOUNTER — CLINICAL SUPPORT (OUTPATIENT)
Dept: BEHAVIORAL HEALTH | Facility: CLINIC | Age: 88
End: 2023-09-26
Payer: MEDICARE

## 2023-09-26 ENCOUNTER — PATIENT MESSAGE (OUTPATIENT)
Dept: INTERNAL MEDICINE | Facility: CLINIC | Age: 88
End: 2023-09-26

## 2023-09-26 ENCOUNTER — PATIENT MESSAGE (OUTPATIENT)
Dept: BEHAVIORAL HEALTH | Facility: CLINIC | Age: 88
End: 2023-09-26

## 2023-09-26 DIAGNOSIS — F43.29 ADJUSTMENT DISORDER WITH MIXED EMOTIONAL FEATURES: Primary | ICD-10-CM

## 2023-09-26 PROCEDURE — 90834 PSYTX W PT 45 MINUTES: CPT | Mod: 95,,, | Performed by: COUNSELOR

## 2023-09-26 PROCEDURE — 90834 PR PSYCHOTHERAPY W/PATIENT, 45 MIN: ICD-10-PCS | Mod: 95,,, | Performed by: COUNSELOR

## 2023-09-28 ENCOUNTER — TELEPHONE (OUTPATIENT)
Dept: BEHAVIORAL HEALTH | Facility: CLINIC | Age: 88
End: 2023-09-28

## 2023-09-28 NOTE — PROGRESS NOTES
CHW returned call to pt's daughter, Patience Brandt over the concern that a letter was received that provider is not covered by pt insurance provider. I gave the phone number for [facility] Billing Dept to see if the bills has been paid or not at [phone] and pls call her insurance customer service dept next.

## 2023-10-09 ENCOUNTER — TELEPHONE (OUTPATIENT)
Dept: BEHAVIORAL HEALTH | Facility: CLINIC | Age: 88
End: 2023-10-09

## 2023-10-09 NOTE — PROGRESS NOTES
CHW reached out to pt's daughter, Mrs. Patience Brandt. Mrs. Brandt is in [place] now for a wedding and returns late tonight.  Unable to rescheduled virtual appointment with Gibson Santiago LPC from Wednesday to Tuesday and needs time to assist mother with her homework after her return from [place], [place].

## 2023-10-13 ENCOUNTER — TELEPHONE (OUTPATIENT)
Dept: BEHAVIORAL HEALTH | Facility: CLINIC | Age: 88
End: 2023-10-13

## 2023-10-13 NOTE — PROGRESS NOTES
CHW reached out to pt, pt was rescheduled for virtual appointment on Wednesday, 10/25/2023 at 1:30pm with Gibson Santiago LPC. Last appt cancelled since daughter who assist patient was ill.

## 2023-10-17 ENCOUNTER — OFFICE VISIT (OUTPATIENT)
Dept: OPHTHALMOLOGY | Facility: CLINIC | Age: 88
End: 2023-10-17
Payer: MEDICARE

## 2023-10-17 DIAGNOSIS — H35.371 EPIRETINAL MEMBRANE, RIGHT EYE: ICD-10-CM

## 2023-10-17 DIAGNOSIS — Z96.1 PSEUDOPHAKIA OF BOTH EYES: ICD-10-CM

## 2023-10-17 DIAGNOSIS — H35.3221 EXUDATIVE AGE-RELATED MACULAR DEGENERATION OF LEFT EYE WITH ACTIVE CHOROIDAL NEOVASCULARIZATION: Primary | ICD-10-CM

## 2023-10-17 DIAGNOSIS — H35.3112 INTERMEDIATE STAGE DRY AGE-RELATED MACULAR DEGENERATION OF RIGHT EYE: ICD-10-CM

## 2023-10-17 PROCEDURE — 67028 INJECTION EYE DRUG: CPT | Mod: LT,S$GLB,, | Performed by: OPHTHALMOLOGY

## 2023-10-17 PROCEDURE — 99999 PR PBB SHADOW E&M-EST. PATIENT-LVL III: CPT | Mod: PBBFAC,,, | Performed by: OPHTHALMOLOGY

## 2023-10-17 PROCEDURE — 92134 CPTRZ OPH DX IMG PST SGM RTA: CPT | Mod: S$GLB,,, | Performed by: OPHTHALMOLOGY

## 2023-10-17 PROCEDURE — 1160F RVW MEDS BY RX/DR IN RCRD: CPT | Mod: CPTII,S$GLB,, | Performed by: OPHTHALMOLOGY

## 2023-10-17 PROCEDURE — 1160F PR REVIEW ALL MEDS BY PRESCRIBER/CLIN PHARMACIST DOCUMENTED: ICD-10-PCS | Mod: CPTII,S$GLB,, | Performed by: OPHTHALMOLOGY

## 2023-10-17 PROCEDURE — 1159F PR MEDICATION LIST DOCUMENTED IN MEDICAL RECORD: ICD-10-PCS | Mod: CPTII,S$GLB,, | Performed by: OPHTHALMOLOGY

## 2023-10-17 PROCEDURE — 1101F PR PT FALLS ASSESS DOC 0-1 FALLS W/OUT INJ PAST YR: ICD-10-PCS | Mod: CPTII,S$GLB,, | Performed by: OPHTHALMOLOGY

## 2023-10-17 PROCEDURE — 1101F PT FALLS ASSESS-DOCD LE1/YR: CPT | Mod: CPTII,S$GLB,, | Performed by: OPHTHALMOLOGY

## 2023-10-17 PROCEDURE — 3288F FALL RISK ASSESSMENT DOCD: CPT | Mod: CPTII,S$GLB,, | Performed by: OPHTHALMOLOGY

## 2023-10-17 PROCEDURE — 92134 OCT, RETINA - OU - BOTH EYES: ICD-10-PCS | Mod: S$GLB,,, | Performed by: OPHTHALMOLOGY

## 2023-10-17 PROCEDURE — 67028 PR INJECT INTRAVITREAL PHARMCOLOGIC: ICD-10-PCS | Mod: LT,S$GLB,, | Performed by: OPHTHALMOLOGY

## 2023-10-17 PROCEDURE — 99999 PR PBB SHADOW E&M-EST. PATIENT-LVL III: ICD-10-PCS | Mod: PBBFAC,,, | Performed by: OPHTHALMOLOGY

## 2023-10-17 PROCEDURE — 99214 OFFICE O/P EST MOD 30 MIN: CPT | Mod: 25,S$GLB,, | Performed by: OPHTHALMOLOGY

## 2023-10-17 PROCEDURE — 1126F PR PAIN SEVERITY QUANTIFIED, NO PAIN PRESENT: ICD-10-PCS | Mod: CPTII,S$GLB,, | Performed by: OPHTHALMOLOGY

## 2023-10-17 PROCEDURE — 99214 PR OFFICE/OUTPT VISIT, EST, LEVL IV, 30-39 MIN: ICD-10-PCS | Mod: 25,S$GLB,, | Performed by: OPHTHALMOLOGY

## 2023-10-17 PROCEDURE — 3288F PR FALLS RISK ASSESSMENT DOCUMENTED: ICD-10-PCS | Mod: CPTII,S$GLB,, | Performed by: OPHTHALMOLOGY

## 2023-10-17 PROCEDURE — 1159F MED LIST DOCD IN RCRD: CPT | Mod: CPTII,S$GLB,, | Performed by: OPHTHALMOLOGY

## 2023-10-17 PROCEDURE — 1126F AMNT PAIN NOTED NONE PRSNT: CPT | Mod: CPTII,S$GLB,, | Performed by: OPHTHALMOLOGY

## 2023-10-17 RX ORDER — DOXYCYCLINE HYCLATE 100 MG
100 TABLET ORAL 2 TIMES DAILY
Status: ON HOLD | COMMUNITY
Start: 2023-09-12 | End: 2023-11-10 | Stop reason: HOSPADM

## 2023-10-17 NOTE — PROGRESS NOTES
HPI    8 wk OCT/DFE/poss Eylea OS    Pt states va is improved OS since Eylea inj last visit. No new symptoms or   concerns today.    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 Epiretinal membrane, right eye   4. Pseudophakia of both eyes     Last edited by Bonita Wilson on 10/17/2023  2:07 PM.                A/P    ICD-10-CM ICD-9-CM   1. Exudative age-related macular degeneration of left eye with active choroidal neovascularization  H35.3221 362.52     362.16   2. 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 Epiretinal membrane, right eye  H35.371 362.56   4. Pseudophakia of both eyes  Z96.1 V43.1         1. Exudative age-related macular degeneration of left eye with active choroidal neovascularization  2. 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  Schedule Eylea Injection Left Eye today Patient chooses to proceed with injection R/B/A discussed include infection retinal detachment and stroke    Patient identified.  Timeout performed.    Risks, benefits, and alternatives to treatment were discussed in detail with the patient, including bleeding/infection (endophthalmitis)/etc.  The patient voiced understanding and wished to proceed with the procedure.  See separate  consent form.    Injection Procedure Note:  Diagnosis: CNVM Left Eye    Topical Proparacaine drop placed then topical 5% Betadine, then subcojunctival lidocaine 2% injection  Sterile gloves used, and sterile lid speculum placed.  5% Betadine placed at injection site again prior to injection.  Eylea 2mg in 0.05cc Injected inferotemporally 3.5-4mm posterior to the limbus.  Complications: None  Va at least CF at 5 feet post injection.  Retina, ONH, IOP normal after injection.    Followup as below.  Patient should return immediately PRN.  Retinal Detachment and Endophthalmitis precautions given        3. Epiretinal membrane, right eye  Mod ERM, VA 20/50      Pt asymptomatic, no metamorphopsia  Plan: Observation, may need PPV/MP if having incr symptoms distortion but obs for now as pt not bothered   Amsler precautions discussed     4. Pseudophakia of both eyes  Good lens position OU  Plan: Observation      RTC 12 weeks DFE/OCTm OU, possible Eylea OS      I saw and examined the patient and reviewed in detail the findings documented. The final examination findings, image interpretations, and plan as documented in the record represent my personal judgment and conclusions.    Dwight Melton MD  Vitreoretinal Surgery   [facility]

## 2023-10-20 ENCOUNTER — NURSE TRIAGE (OUTPATIENT)
Dept: ADMINISTRATIVE | Facility: CLINIC | Age: 88
End: 2023-10-20

## 2023-10-21 NOTE — TELEPHONE ENCOUNTER
Patient daughter calling on behalf of patient. Daughter states she thinks she forgot to give the patient her morning dose of medications. Reports night dose given at 2030. Daughter reports /66. Denies any new confusion, chest pain, weakness, numbness or difficulty breathing. Advised patient of dispo to see PCP within 3 days. Verbalized understanding. Advised to call back if symptoms become worse or with further questions.      Reason for Disposition   Systolic BP  >= 160 OR Diastolic >= 100    Additional Information   Negative: Difficult to awaken or acting confused (e.g., disoriented, slurred speech)   Negative: SEVERE difficulty breathing (e.g., struggling for each breath, speaks in single words)   Negative: [1] Weakness of the face, arm or leg on one side of the body AND [2] new-onset   Negative: [1] Numbness (i.e., loss of sensation) of the face, arm or leg on one side of the body AND [2] new-onset   Negative: [1] Chest pain lasts > 5 minutes AND [2] history of heart disease (i.e., heart attack, bypass surgery, angina, angioplasty, CHF)   Negative: [1] Chest pain AND [2] took nitrogylcerin AND [3] pain was not relieved   Negative: Sounds like a life-threatening emergency to the triager   Negative: [1] Systolic BP  >= 160 OR Diastolic >= 100 AND [2] cardiac (e.g., breathing difficulty, chest pain) or neurologic symptoms (e.g., new-onset blurred or double vision, unsteady gait)   Negative: [1] Pregnant 20 or more weeks (or postpartum < 6 weeks) AND [2] new hand or face swelling   Negative: [1] Pregnant 20 or more weeks (or postpartum < 6 weeks) AND [2] Systolic BP >= 160 OR Diastolic >= 110   Negative: [1] Systolic BP  >= 200 OR Diastolic >= 120 AND [2] having NO cardiac or neurologic symptoms   Negative: [1] Pregnant 20 or more weeks (or postpartum < 6 weeks) AND [2] Systolic BP  >= 140 OR Diastolic >= 90   Negative: [1] Systolic BP  >= 180 OR Diastolic >= 110 AND [2] missed most recent dose of blood  pressure medication   Negative: Systolic BP  >= 180 OR Diastolic >= 110   Negative: Ran out of BP medications    Protocols used: Blood Pressure - High-A-AH

## 2023-10-23 DIAGNOSIS — M47.816 LUMBAR SPONDYLOSIS: ICD-10-CM

## 2023-10-23 DIAGNOSIS — M17.11 PRIMARY OSTEOARTHRITIS OF RIGHT KNEE: ICD-10-CM

## 2023-10-23 NOTE — PROGRESS NOTES
"Primary Care Behavioral Health Integration: Follow-up  Date:  10/25/2023  Patient Name: Anahi Cook  Referral Source:  Minerva Mathias MD  Type of Visit:  Video Session  Site:  [facility]    Visit type: Virtual visit with synchronous audio and video  The patient location is:  (Home) [place] [address]  The patient location [place] is:  [facility]  The patient phone number is: [phone]    Each patient to whom he or she provides medical services by telemedicine is:  (1) informed of the relationship between the physician and patient and the respective role of any other health care provider with respect to management of the patient; and (2) notified that he or she may decline to receive medical services by telemedicine and may withdraw from such care at any time.    History of Present Illness:  Anahi Cook, a 89 y.o.  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  Patient was seen, examined and chart was reviewed.    Met with patient and daughter.      Patient began this session by stating things have been going well.  Noted she has been thinking before she reacts, and she has been "showing more gratitude toward my daughter."  LPC and patient reviewed her completed Tool 4 - Generating A List of Unhealthy Go-To Coping Skills of the CBT model.  Stated her unhealthy go-to coping skills including shutting down, not expressing herself, and complete avoidance of situations.  We reviewed the importance of being aware of both her tone and delivery when addressing her daughter.  LPC sent patient the Tool 5 - Recognition of Consequences worksheet of the CBT Toolbox workbook.  Will review patient's completed worksheet during follow-up appointment on November 8. 2023.              9/25/2023    10:53 AM 9/12/2023     1:20 PM 8/23/2023    11:06 AM 8/8/2023     2:53 PM 8/8/2023    12:59 PM   Results of " the PHQ8   Little interest or pleasure in doing things Not at all Several days Several days Several days Several days   Feeling down, depressed, or hopeless Not at all Several days Several days Several days More than half the days   Trouble falling or staying asleep, or sleeping too much Not at all Not at all Not at all Not at all Not at all   Feeling tired or having little energy Several days Several days Nearly every day Several days Nearly every day   Poor appetite or overeating Not at all Not at all Not at all Not at all Not at all   Feeling bad about yourself - or that you are a failure or have let yourself or your family down Several days Several days Nearly every day Nearly every day Nearly every day   Trouble concentrating on things, such as reading the newspaper or watching television Not at all Not at all Not at all Not at all Not at all   Moving or speaking so slowly that other people could have noticed. Or the opposite - being so fidgety or restless that you have been moving around a lot more than usual Not at all Not at all Not at all Not at all Not at all   Total Score  2 4 8 6 9           9/25/2023    10:52 AM 9/12/2023     1:19 PM 8/23/2023    11:05 AM   GAD7   1. Feeling nervous, anxious, or on edge? 1 1 3   2. Not being able to stop or control worrying? 1 1 1   3. Worrying too much about different things? 1 1 1   4. Trouble relaxing? 0 0 0   5. Being so restless that it is hard to sit still? 0 0 0   6. Becoming easily annoyed or irritable? 1 1 3   7. Feeling afraid as if something awful might happen? 0 1 1   FIDELIA-7 Score 4 5 9       Mental Status Exam  General Appearance:  unremarkable, age appropriate     Speech: normal tone, normal rate, normal pitch, normal volume         Level of Cooperation: cooperative        Thought Processes: normal and logical      Mood: steady        Thought Content: normal, no suicidality, no homicidality, delusions, or paranoia     Affect: congruent and appropriate     Orientation: Oriented x3     Memory: recent >  intact, remote >  intact     Attention Span & Concentration: intact     Fund of General Knowledge: intact and appropriate to age and level of education   Abstract Reasoning: interpretation of similarities was concrete   Judgment & Insight: fair       Language:  intact       Treatment plan:  Target symptoms: depression, anxiety , adjustment  Why chosen therapy is appropriate versus another modality: relevant to diagnosis  Outcome monitoring methods: self-report  Therapeutic intervention type: insight oriented psychotherapy, behavior modifying psychotherapy, supportive psychotherapy    Risk parameters:  Patient reports no suicidal ideation  Patient reports no homicidal ideation  Patient reports no self-injurious behavior  Patient reports no violent behavior    Verbal deficits: None    Patient's response to intervention:  The patient's response to intervention is accepting.    Progress toward goals and other mental status changes:  The patient's progress toward goals is good.    Patient advised to call [phone] or present the the nearest ED if they experience suicidal or homicidal ideation, plan or intent.       Impression:  My diagnostic impression is patient experiences worrying, feeling on edge, and occasional depressed mood as evidenced by fear of uncertainty, racing and intrusive thoughts, and irritability.  These symptoms are making it difficult for patient to function effectively.      Diagnosis:   Adjustment disorders; with mixed emotional features [F43.23]    Treatment Goals and Plan: Pt plans to continue CBT, Problem-solving Therapy, and Solution-focused Therapy    Future treatment will utilize CBT, Problem-solving Therapy, and Solution-focused Therapy    Return to Clinic: 2 weeks    Plan to discuss case with [facility] consulting psychiatrist and further recommendations to be potentially be made at that time.  Refer to psychiatry    Length of Appointment:  31  minutes spent face-to-face and 13 minutes spent in non face-to-face clinical care.

## 2023-10-24 ENCOUNTER — TELEPHONE (OUTPATIENT)
Dept: BEHAVIORAL HEALTH | Facility: CLINIC | Age: 88
End: 2023-10-24

## 2023-10-24 NOTE — PROGRESS NOTES
CHW reached out to pt's daughter, Patience Brandt, to remind her of virtual appointment with Gibson Santiago LPC tomorrow. Pt 's daughter confirmed the appointment.

## 2023-10-24 NOTE — TELEPHONE ENCOUNTER
Care Due:                  Date            Visit Type   Department     Provider  --------------------------------------------------------------------------------                                EP -                              PRIMARY      [facility] PRIMARY   Minerva Jack  Last Visit: 08-      CARE ([facility])   CARE           Ste[facility]  Next Visit: None Scheduled  None         None Found                                                            Last  Test          Frequency    Reason                     Performed    Due Date  --------------------------------------------------------------------------------    CBC.........  12 months..  hydrALAZINE..............  05-   05-    CMP.........  12 months..  atorvastatin, benazepriL,   01- 01-                             torsemide................    Lipid Panel.  12 months..  atorvastatin.............  01- 01-    Health system Embedded Care Due Messages. Reference number: [id number].   10/23/2023 7:06:40 PM CDT

## 2023-10-25 ENCOUNTER — PATIENT MESSAGE (OUTPATIENT)
Dept: BEHAVIORAL HEALTH | Facility: CLINIC | Age: 88
End: 2023-10-25

## 2023-10-25 ENCOUNTER — CLINICAL SUPPORT (OUTPATIENT)
Dept: BEHAVIORAL HEALTH | Facility: CLINIC | Age: 88
End: 2023-10-25
Payer: MEDICARE

## 2023-10-25 DIAGNOSIS — F43.29 ADJUSTMENT DISORDER WITH MIXED EMOTIONAL FEATURES: Primary | ICD-10-CM

## 2023-10-25 PROCEDURE — 90785 PSYTX COMPLEX INTERACTIVE: CPT | Mod: 95,,, | Performed by: COUNSELOR

## 2023-10-25 PROCEDURE — 90785 PR INTERACTIVE COMPLEXITY: ICD-10-PCS | Mod: 95,,, | Performed by: COUNSELOR

## 2023-10-25 PROCEDURE — 90832 PSYTX W PT 30 MINUTES: CPT | Mod: 95,,, | Performed by: COUNSELOR

## 2023-10-25 PROCEDURE — 90832 PR PSYCHOTHERAPY W/PATIENT, 30 MIN: ICD-10-PCS | Mod: 95,,, | Performed by: COUNSELOR

## 2023-10-25 RX ORDER — TRAMADOL HYDROCHLORIDE 50 MG/1
50 TABLET ORAL EVERY 8 HOURS PRN
Qty: 90 TABLET | Refills: 2 | Status: SHIPPED | OUTPATIENT
Start: 2023-10-25 | End: 2024-01-24

## 2023-11-08 ENCOUNTER — HOSPITAL ENCOUNTER (INPATIENT)
Facility: HOSPITAL | Age: 88
LOS: 1 days | Discharge: HOME | DRG: 189 | End: 2023-11-10
Attending: EMERGENCY MEDICINE | Admitting: INTERNAL MEDICINE
Payer: MEDICARE

## 2023-11-08 DIAGNOSIS — J18.9 PNA (PNEUMONIA): ICD-10-CM

## 2023-11-08 DIAGNOSIS — R06.02 SHORTNESS OF BREATH: ICD-10-CM

## 2023-11-08 DIAGNOSIS — J96.01 ACUTE HYPOXEMIC RESPIRATORY FAILURE: ICD-10-CM

## 2023-11-08 DIAGNOSIS — J44.1 CHRONIC OBSTRUCTIVE PULMONARY DISEASE WITH ACUTE EXACERBATION: ICD-10-CM

## 2023-11-08 DIAGNOSIS — I10 ESSENTIAL HYPERTENSION: Chronic | ICD-10-CM

## 2023-11-08 DIAGNOSIS — R07.9 CHEST PAIN: ICD-10-CM

## 2023-11-08 DIAGNOSIS — I11.9 HYPERTENSIVE HEART DISEASE WITHOUT HEART FAILURE: ICD-10-CM

## 2023-11-08 DIAGNOSIS — N28.9 RENAL LESION: Primary | ICD-10-CM

## 2023-11-08 LAB
ALBUMIN SERPL BCP-MCNC: 3.3 G/DL (ref 3.5–5.2)
ALLENS TEST: ABNORMAL
ALP SERPL-CCNC: 69 U/L (ref 55–135)
ALT SERPL W/O P-5'-P-CCNC: 17 U/L (ref 10–44)
ANION GAP SERPL CALC-SCNC: 13 MMOL/L (ref 8–16)
AST SERPL-CCNC: 24 U/L (ref 10–40)
BASOPHILS # BLD AUTO: 0.07 K/UL (ref 0–0.2)
BASOPHILS NFR BLD: 0.4 % (ref 0–1.9)
BILIRUB SERPL-MCNC: 0.9 MG/DL (ref 0.1–1)
BNP SERPL-MCNC: 131 PG/ML (ref 0–99)
BUN SERPL-MCNC: 12 MG/DL (ref 8–23)
CALCIUM SERPL-MCNC: 9.8 MG/DL (ref 8.7–10.5)
CHLORIDE SERPL-SCNC: 103 MMOL/L (ref 95–110)
CO2 SERPL-SCNC: 26 MMOL/L (ref 23–29)
CREAT SERPL-MCNC: 1 MG/DL (ref 0.5–1.4)
D DIMER PPP IA.FEU-MCNC: 1.09 MG/L FEU
DIFFERENTIAL METHOD: ABNORMAL
EOSINOPHIL # BLD AUTO: 0.1 K/UL (ref 0–0.5)
EOSINOPHIL NFR BLD: 0.4 % (ref 0–8)
ERYTHROCYTE [DISTWIDTH] IN BLOOD BY AUTOMATED COUNT: 14.7 % (ref 11.5–14.5)
EST. GFR  (NO RACE VARIABLE): 53.9 ML/MIN/1.73 M^2
GLUCOSE SERPL-MCNC: 97 MG/DL (ref 70–110)
HCO3 UR-SCNC: 28.5 MMOL/L (ref 24–28)
HCT VFR BLD AUTO: 41.1 % (ref 37–48.5)
HCV AB SERPL QL IA: NORMAL
HGB BLD-MCNC: 13.2 G/DL (ref 12–16)
HIV 1+2 AB+HIV1 P24 AG SERPL QL IA: NORMAL
IMM GRANULOCYTES # BLD AUTO: 0.05 K/UL (ref 0–0.04)
IMM GRANULOCYTES NFR BLD AUTO: 0.3 % (ref 0–0.5)
INFLUENZA A, MOLECULAR: NOT DETECTED
INFLUENZA B, MOLECULAR: NOT DETECTED
LYMPHOCYTES # BLD AUTO: 1.7 K/UL (ref 1–4.8)
LYMPHOCYTES NFR BLD: 10.4 % (ref 18–48)
MCH RBC QN AUTO: 28.9 PG (ref 27–31)
MCHC RBC AUTO-ENTMCNC: 32.1 G/DL (ref 32–36)
MCV RBC AUTO: 90 FL (ref 82–98)
MONOCYTES # BLD AUTO: 1.5 K/UL (ref 0.3–1)
MONOCYTES NFR BLD: 9.1 % (ref 4–15)
NEUTROPHILS # BLD AUTO: 12.6 K/UL (ref 1.8–7.7)
NEUTROPHILS NFR BLD: 79.4 % (ref 38–73)
NRBC BLD-RTO: 0 /100 WBC
PCO2 BLDA: 45.3 MMHG (ref 35–45)
PH SMN: 7.41 [PH] (ref 7.35–7.45)
PLATELET # BLD AUTO: 265 K/UL (ref 150–450)
PMV BLD AUTO: 11.6 FL (ref 9.2–12.9)
PO2 BLDA: 30 MMHG (ref 40–60)
POC BE: 4 MMOL/L
POC SATURATED O2: 56 % (ref 95–100)
POC TCO2: 30 MMOL/L (ref 24–29)
POTASSIUM SERPL-SCNC: 3.5 MMOL/L (ref 3.5–5.1)
PROT SERPL-MCNC: 7.2 G/DL (ref 6–8.4)
RBC # BLD AUTO: 4.57 M/UL (ref 4–5.4)
RSV AG BY MOLECULAR METHOD: NOT DETECTED
SAMPLE: ABNORMAL
SARS-COV-2 RNA RESP QL NAA+PROBE: NOT DETECTED
SITE: ABNORMAL
SODIUM SERPL-SCNC: 142 MMOL/L (ref 136–145)
TROPONIN I SERPL DL<=0.01 NG/ML-MCNC: 0.03 NG/ML (ref 0–0.03)
WBC # BLD AUTO: 15.88 K/UL (ref 3.9–12.7)

## 2023-11-08 PROCEDURE — 82803 BLOOD GASES ANY COMBINATION: CPT

## 2023-11-08 PROCEDURE — 86803 HEPATITIS C AB TEST: CPT | Performed by: PHYSICIAN ASSISTANT

## 2023-11-08 PROCEDURE — 93005 ELECTROCARDIOGRAM TRACING: CPT

## 2023-11-08 PROCEDURE — 25000242 PHARM REV CODE 250 ALT 637 W/ HCPCS

## 2023-11-08 PROCEDURE — G0378 HOSPITAL OBSERVATION PER HR: HCPCS

## 2023-11-08 PROCEDURE — 93010 EKG 12-LEAD: ICD-10-PCS | Mod: ,,, | Performed by: INTERNAL MEDICINE

## 2023-11-08 PROCEDURE — 93010 ELECTROCARDIOGRAM REPORT: CPT | Mod: ,,, | Performed by: INTERNAL MEDICINE

## 2023-11-08 PROCEDURE — 0241U SARS-COV2 (COVID) WITH FLU/RSV BY PCR: CPT

## 2023-11-08 PROCEDURE — 94640 AIRWAY INHALATION TREATMENT: CPT | Mod: XB

## 2023-11-08 PROCEDURE — 63600175 PHARM REV CODE 636 W HCPCS

## 2023-11-08 PROCEDURE — 85379 FIBRIN DEGRADATION QUANT: CPT

## 2023-11-08 PROCEDURE — 27000221 HC OXYGEN, UP TO 24 HOURS

## 2023-11-08 PROCEDURE — 25500020 PHARM REV CODE 255: Performed by: EMERGENCY MEDICINE

## 2023-11-08 PROCEDURE — 83880 ASSAY OF NATRIURETIC PEPTIDE: CPT

## 2023-11-08 PROCEDURE — 80053 COMPREHEN METABOLIC PANEL: CPT

## 2023-11-08 PROCEDURE — 85025 COMPLETE CBC W/AUTO DIFF WBC: CPT

## 2023-11-08 PROCEDURE — 87389 HIV-1 AG W/HIV-1&-2 AB AG IA: CPT | Performed by: PHYSICIAN ASSISTANT

## 2023-11-08 PROCEDURE — 25000003 PHARM REV CODE 250

## 2023-11-08 PROCEDURE — 84484 ASSAY OF TROPONIN QUANT: CPT

## 2023-11-08 PROCEDURE — 99285 EMERGENCY DEPT VISIT HI MDM: CPT | Mod: 25

## 2023-11-08 PROCEDURE — 94761 N-INVAS EAR/PLS OXIMETRY MLT: CPT

## 2023-11-08 PROCEDURE — 99900035 HC TECH TIME PER 15 MIN (STAT)

## 2023-11-08 RX ORDER — ONDANSETRON 8 MG/1
8 TABLET, ORALLY DISINTEGRATING ORAL EVERY 8 HOURS PRN
Status: DISCONTINUED | OUTPATIENT
Start: 2023-11-08 | End: 2023-11-10 | Stop reason: HOSPADM

## 2023-11-08 RX ORDER — DOXYCYCLINE HYCLATE 100 MG
100 TABLET ORAL
Status: COMPLETED | OUTPATIENT
Start: 2023-11-08 | End: 2023-11-08

## 2023-11-08 RX ORDER — FELODIPINE 10 MG/1
10 TABLET, EXTENDED RELEASE ORAL 2 TIMES DAILY
Qty: 180 TABLET | Refills: 1 | OUTPATIENT
Start: 2023-11-08

## 2023-11-08 RX ORDER — IPRATROPIUM BROMIDE AND ALBUTEROL SULFATE 2.5; .5 MG/3ML; MG/3ML
3 SOLUTION RESPIRATORY (INHALATION)
Status: COMPLETED | OUTPATIENT
Start: 2023-11-08 | End: 2023-11-08

## 2023-11-08 RX ORDER — TALC
6 POWDER (GRAM) TOPICAL NIGHTLY PRN
Status: DISCONTINUED | OUTPATIENT
Start: 2023-11-08 | End: 2023-11-10 | Stop reason: HOSPADM

## 2023-11-08 RX ORDER — POLYETHYLENE GLYCOL 3350 17 G/17G
17 POWDER, FOR SOLUTION ORAL DAILY PRN
Status: DISCONTINUED | OUTPATIENT
Start: 2023-11-08 | End: 2023-11-10 | Stop reason: HOSPADM

## 2023-11-08 RX ORDER — IBUPROFEN 200 MG
16 TABLET ORAL
Status: DISCONTINUED | OUTPATIENT
Start: 2023-11-08 | End: 2023-11-10 | Stop reason: HOSPADM

## 2023-11-08 RX ORDER — BISACODYL 10 MG
10 SUPPOSITORY, RECTAL RECTAL DAILY PRN
Status: DISCONTINUED | OUTPATIENT
Start: 2023-11-08 | End: 2023-11-10 | Stop reason: HOSPADM

## 2023-11-08 RX ORDER — ACETAMINOPHEN 325 MG/1
650 TABLET ORAL EVERY 4 HOURS PRN
Status: DISCONTINUED | OUTPATIENT
Start: 2023-11-08 | End: 2023-11-09

## 2023-11-08 RX ORDER — AZITHROMYCIN 250 MG/1
250 TABLET, FILM COATED ORAL DAILY
Status: DISCONTINUED | OUTPATIENT
Start: 2023-11-10 | End: 2023-11-10 | Stop reason: HOSPADM

## 2023-11-08 RX ORDER — PROMETHAZINE HYDROCHLORIDE 25 MG/1
25 TABLET ORAL EVERY 6 HOURS PRN
Status: DISCONTINUED | OUTPATIENT
Start: 2023-11-08 | End: 2023-11-10 | Stop reason: HOSPADM

## 2023-11-08 RX ORDER — SODIUM CHLORIDE 0.9 % (FLUSH) 0.9 %
10 SYRINGE (ML) INJECTION
Status: DISCONTINUED | OUTPATIENT
Start: 2023-11-08 | End: 2023-11-10 | Stop reason: HOSPADM

## 2023-11-08 RX ORDER — AZITHROMYCIN 250 MG/1
500 TABLET, FILM COATED ORAL ONCE
Status: COMPLETED | OUTPATIENT
Start: 2023-11-09 | End: 2023-11-09

## 2023-11-08 RX ORDER — IPRATROPIUM BROMIDE AND ALBUTEROL SULFATE 2.5; .5 MG/3ML; MG/3ML
3 SOLUTION RESPIRATORY (INHALATION)
Status: DISCONTINUED | OUTPATIENT
Start: 2023-11-09 | End: 2023-11-10 | Stop reason: HOSPADM

## 2023-11-08 RX ORDER — GLUCAGON 1 MG
1 KIT INJECTION
Status: DISCONTINUED | OUTPATIENT
Start: 2023-11-08 | End: 2023-11-10 | Stop reason: HOSPADM

## 2023-11-08 RX ORDER — AZITHROMYCIN 250 MG/1
500 TABLET, FILM COATED ORAL EVERY 24 HOURS
Status: DISCONTINUED | OUTPATIENT
Start: 2023-11-09 | End: 2023-11-08

## 2023-11-08 RX ORDER — HEPARIN SODIUM 5000 [USP'U]/ML
5000 INJECTION, SOLUTION INTRAVENOUS; SUBCUTANEOUS EVERY 8 HOURS
Status: DISCONTINUED | OUTPATIENT
Start: 2023-11-09 | End: 2023-11-10 | Stop reason: HOSPADM

## 2023-11-08 RX ORDER — PREDNISONE 20 MG/1
60 TABLET ORAL
Status: COMPLETED | OUTPATIENT
Start: 2023-11-08 | End: 2023-11-08

## 2023-11-08 RX ORDER — IBUPROFEN 200 MG
24 TABLET ORAL
Status: DISCONTINUED | OUTPATIENT
Start: 2023-11-08 | End: 2023-11-10 | Stop reason: HOSPADM

## 2023-11-08 RX ORDER — PREDNISONE 20 MG/1
40 TABLET ORAL DAILY
Status: DISCONTINUED | OUTPATIENT
Start: 2023-11-09 | End: 2023-11-10 | Stop reason: HOSPADM

## 2023-11-08 RX ORDER — SODIUM CHLORIDE 0.9 % (FLUSH) 0.9 %
3 SYRINGE (ML) INJECTION
Status: DISCONTINUED | OUTPATIENT
Start: 2023-11-09 | End: 2023-11-10 | Stop reason: HOSPADM

## 2023-11-08 RX ADMIN — IPRATROPIUM BROMIDE AND ALBUTEROL SULFATE 3 ML: .5; 3 SOLUTION RESPIRATORY (INHALATION) at 05:11

## 2023-11-08 RX ADMIN — PREDNISONE 60 MG: 20 TABLET ORAL at 05:11

## 2023-11-08 RX ADMIN — IOHEXOL 100 ML: 350 INJECTION, SOLUTION INTRAVENOUS at 08:11

## 2023-11-08 RX ADMIN — DOXYCYCLINE HYCLATE 100 MG: 100 TABLET ORAL at 09:11

## 2023-11-08 NOTE — TELEPHONE ENCOUNTER
No care due was identified.  [facility] Embedded Care Due Messages. Reference number: [id number].   11/08/2023 3:26:34 AM CST

## 2023-11-08 NOTE — TELEPHONE ENCOUNTER
Refill Decision Note   Anahi Cook  is requesting a refill authorization.  Brief Assessment and Rationale for Refill:  Quick Discontinue     Medication Therapy Plan:  Patient has requested refill too soon (my chart message sent to patient)      Comments:     Note composed:11:52 AM 11/08/2023

## 2023-11-08 NOTE — ED PROVIDER NOTES
Encounter Date: 11/8/2023       History     Chief Complaint   Patient presents with    Abdominal Pain     Nausea and vomiting. Fatigued.      89-year-old female with a past medical history of COPD, depression, arthritis, breast cancer, pneumonia, GERD and hypertension presents with a chief complaint of shortness of breath.  Patient says that she had some shortness of breath and vomiting on Monday evening.  She was supposed to go to a pulmonology appointment yesterday, was feeling too unwell to go.  Family says that they did a tele visit with pulmonology, she was prescribed antibiotics for pneumonia but not steroids.  They said that the patient has a nebulizer at home but they did not have any albuterol and we are going to have to wait 24 hours for refill and so they brought her in today because she was having desaturations into the 80s.  They say that she does not normally wear oxygen at home, also does not wear CPAP or BiPAP at night.  The patient is denying any acute pain or nausea.  Family says that she had a normal bowel movement today and has been urinating, but say that it has been slightly decreased.  The patient is denying any any current chest pain or dysuria.    The history is provided by the patient and a relative. No  was used.     Review of patient's allergies indicates:   Allergen Reactions    Levaquin [levofloxacin] Itching    Penicillins Itching     Past Medical History:   Diagnosis Date    Anxiety     Arthritis     Back pain     Breast cancer 1/17/2018    Breast mass 1/15/2018    Chronic kidney disease, stage 2, mildly decreased GFR     COPD (chronic obstructive pulmonary disease)     emphysema    Depression     Dysuria 1/29/2018    Exudative age-related macular degeneration of left eye with active choroidal neovascularization 8/25/2022    Family history of breast cancer 1/17/2018    GERD (gastroesophageal reflux disease)     Hypertension     Infiltrating ductal carcinoma of  breast, left 7/12/2018    Multiple closed fractures of ribs of left side 4/3/2019    Osteoporosis, senile     Ovarian mass 1/15/2018    Pneumonia     S/P robotic assisted laparoscopic BSO (bilateral salpingo-oophorectomy) 2/23/2018     Past Surgical History:   Procedure Laterality Date    AXILLARY NODE DISSECTION Left 7/12/2018    Procedure: LYMPHADENECTOMY, AXILLARY;  Surgeon: Amanda Caballero MD;  Location: [facility] OR [address] [place] [address];  Service: General;  Laterality: Left;    BREAST BIOPSY      BREAST LUMPECTOMY      CATARACT EXTRACTION      CHOLECYSTECTOMY      COLONOSCOPY      ESOPHAGOGASTRODUODENOSCOPY      ESOPHAGOGASTRODUODENOSCOPY N/A 10/14/2019    Procedure: EGD (ESOPHAGOGASTRODUODENOSCOPY);  Surgeon: Davonte Thompson MD;  Location: [facility] ENDO (2ND FLR);  Service: Endoscopy;  Laterality: N/A;  hx of pulmonary hypertension-PA 50     pt requesting ASAP    EYE SURGERY      FIXATION KYPHOPLASTY N/A 11/8/2018    Procedure: Kyphoplasty   T12;  Surgeon: Merly Wilcox MD;  Location: [facility] CATH LAB;  Service: Pain Management;  Laterality: N/A;  T12  [email or web address] e-mailed with date and time    HYSTERECTOMY      INJECTION FOR SENTINEL NODE IDENTIFICATION Left 7/12/2018    Procedure: INJECTION, FOR SENTINEL NODE IDENTIFICATION;  Surgeon: Amnada Caballero MD;  [facility];  Service: General;  Laterality: Left;    INJECTION OF FACET JOINT Bilateral 10/15/2018    Procedure: INJECTION, FACET JOINT, BILATERAL LUMBAR L3-4, 4-5, 5-S1 FACET JOINT INJECTIONS;  Surgeon: Merly Wilcox MD;  Location: [facility] PAIN MGT;  Service: Pain Management;  Laterality: Bilateral;    INJECTION OF FACET JOINT Bilateral 4/1/2019    Procedure: INJECTION, FACET JOINT, L3-L4,L4-L5,L5-S1;  Surgeon: Merly Wilcox MD;  Location: [facility] PAIN MGT;  Service: Pain Management;  Laterality: Bilateral;    INJECTION OF FACET JOINT Bilateral 6/20/2019    Procedure: INJECTION, FACET JOINT, L3-L4,L4-L5,L5-S1 NEED CONSENT;  Surgeon: Merly Wilcox MD;  Location: [facility]  MGT;  Service: Pain Management;  Laterality: Bilateral;    INJECTION OF FACET JOINT Bilateral 7/27/2020    Procedure: INJECTION, FACET JOINT BILATERAL L3/4, L4/5 and L5/S1;  Surgeon: Merly Wilcox MD;  Location: [facility] PAIN MGT;  Service: Pain Management;  Laterality: Bilateral;    INJECTION OF JOINT Bilateral 7/27/2020    Procedure: INJECTION, JOINT, BILATERAL GREATER TROCHANTERIC BURSA;  Surgeon: Merly Wilcox MD;  Location: [facility] PAIN MGT;  Service: Pain Management;  Laterality: Bilateral;    INJECTION OF JOINT Bilateral 4/26/2021    Procedure: INJECTION, JOINT, HIP;  Surgeon: Merly Wilcox MD;  Location: [facility] PAIN MGT;  Service: Pain Management;  Laterality: Bilateral;  consent needed    INJECTION OF STEROID Right 11/15/2021    Procedure: INJECTION, STEROID RIGHT MIDDLE AND SAMLL FINGER;  Surgeon: Florecita Da Silva MD;  Location: [facility] OR;  Service: Orthopedics;  Laterality: Right;    MASTECTOMY, PARTIAL Left 7/12/2018    Procedure: MASTECTOMY, PARTIAL-W/SEED LOCALIZATION;  Surgeon: Amanda Caballero MD;  Location: [facility] OR [address] [place] [address];  Service: General;  Laterality: Left;    ID REMOVAL OF OVARY/TUBE(S)  02/23/2018    Robotic Assisted    ROTATOR CUFF REPAIR Right     rotator cuff repair right shoulder    TONSILLECTOMY      TRANSFORAMINAL EPIDURAL INJECTION OF STEROID Bilateral 6/21/2021    Procedure: INJECTION, STEROID, EPIDURAL, TRANSFORAMINAL APPROACH  bilateral L4/5 TF ASHUTOSH;  Surgeon: Merly Wilcox MD;  Location: [facility] PAIN MGT;  Service: Pain Management;  Laterality: Bilateral;  consent needed    TRANSFORAMINAL EPIDURAL INJECTION OF STEROID Bilateral 1/3/2022    Procedure: INJECTION, STEROID, EPIDURAL, TRANSFORAMINAL APPROACH Bilateral L4/5 Needs consent;  Surgeon: Merly Wilcox MD;  Location: [facility] PAIN MGT;  Service: Pain Management;  Laterality: Bilateral;    TRANSFORAMINAL EPIDURAL INJECTION OF STEROID Bilateral 8/9/2022    Procedure: INJECTION, STEROID, EPIDURAL, TRANSFORAMINAL APPROACH, BILATERAL L4-L5   MEDICALLY  URGENT;  Surgeon: Merly Wilcox MD;  Location: [facility] PAIN MGT;  Service: Pain Management;  Laterality: Bilateral;    TRANSFORAMINAL EPIDURAL INJECTION OF STEROID Left 2022    Procedure: INJECTION, STEROID, EPIDURAL, TRANSFORAMINAL APPROACH LEFT L3-L4 AND L4-L5 CONTRAST;  Surgeon: Merly Wilcox MD;  Location: [facility] PAIN MGT;  Service: Pain Management;  Laterality: Left;    TRANSFORAMINAL EPIDURAL INJECTION OF STEROID Bilateral 2023    Procedure: INJECTION, STEROID, EPIDURAL, TRANSFORAMINAL APPROACH, BILATERAL L5/S1  sooner date;  Surgeon: Merly Wilcox MD;  Location: [facility] PAIN MGT;  Service: Pain Management;  Laterality: Bilateral;    TRIGGER FINGER RELEASE Left 11/15/2021    Procedure: RELEASE, TRIGGER FINGER, LEFT MIDDLE AND RING;  Surgeon: Floercita Da Silva MD;  Location: [facility] OR;  Service: Orthopedics;  Laterality: Left;     Family History   Problem Relation Age of Onset    Heart failure Mother     Kidney failure Mother     Heart attack Father     Breast cancer Sister 66        Lump, XRT, chemo, recurrence 10 years later.    Cancer Brother         leukemia    Heart attack Brother 58    Pulmonary embolism Brother 45    Heart attack Brother 52    Breast cancer Maternal Grandmother 60        advanced at diagnosis    Breast cancer Maternal Aunt 83         at 92    Colon cancer Neg Hx     Esophageal cancer Neg Hx      Social History     Tobacco Use    Smoking status: Former     Current packs/day: 1.00     Average packs/day: 1 pack/day for 40.0 years (40.0 ttl pk-yrs)     Types: Cigarettes    Smokeless tobacco: Never    Tobacco comments:     Smoked >1 ppd for at least 40 years, quit around    Substance Use Topics    Alcohol use: Yes     Comment: occasional glass of wine or cocktail    Drug use: No     Review of Systems    Physical Exam     Initial Vitals [23 1533]   BP Pulse Resp Temp SpO2   (!) 109/59 77 18 99.1 °F (37.3 °C) (S) (!) 80 %      MAP       --         Physical Exam    Nursing note  and vitals reviewed.  Constitutional: She appears well-developed and well-nourished. She is not diaphoretic. No distress.   HENT:   Head: Normocephalic and atraumatic.   Eyes: Pupils are equal, round, and reactive to light. Right eye exhibits no discharge. Left eye exhibits no discharge.   Neck: Neck supple.   Cardiovascular:  Normal rate, regular rhythm, normal heart sounds and intact distal pulses.           Pulmonary/Chest: Breath sounds normal. She has no wheezes. She has no rhonchi. She has no rales. She exhibits no tenderness.   Abdominal: Abdomen is soft. There is no abdominal tenderness. There is no rebound and no guarding.   Musculoskeletal:         General: No tenderness or edema. Normal range of motion.      Cervical back: Neck supple.     Lymphadenopathy:     She has no cervical adenopathy.   Neurological: She is alert and oriented to person, place, and time. She has normal strength.   Skin: Skin is warm and dry. Capillary refill takes less than 2 seconds. No rash noted. No erythema.   Psychiatric: She has a normal mood and affect. Her behavior is normal. Judgment and thought content normal.         ED Course   Procedures  Labs Reviewed   CBC W/ AUTO DIFFERENTIAL - Abnormal; Notable for the following components:       Result Value    WBC 15.88 (*)     RDW 14.7 (*)     Gran # (ANC) 12.6 (*)     Immature Grans (Abs) 0.05 (*)     Mono # 1.5 (*)     Gran % 79.4 (*)     Lymph % 10.4 (*)     All other components within normal limits   COMPREHENSIVE METABOLIC PANEL - Abnormal; Notable for the following components:    Albumin 3.3 (*)     eGFR 53.9 (*)     All other components within normal limits   B-TYPE NATRIURETIC PEPTIDE - Abnormal; Notable for the following components:     (*)     All other components within normal limits   D DIMER, QUANTITATIVE - Abnormal; Notable for the following components:    D-Dimer 1.09 (*)     All other components within normal limits   ISTAT PROCEDURE - Abnormal; Notable for  the following components:    POC PCO2 45.3 (*)     POC PO2 30 (*)     POC HCO3 28.5 (*)     POC BE 4 (*)     POC TCO2 30 (*)     All other components within normal limits   HIV 1 / 2 ANTIBODY    Narrative:     Release to patient->Immediate   HEPATITIS C ANTIBODY    Narrative:     Release to patient->Immediate   TROPONIN I   SARS-COV2 (COVID) WITH FLU/RSV BY PCR   URINALYSIS, REFLEX TO URINE CULTURE        ECG Results              EKG 12-lead (Final result)  Result time 11/08/23 17:14:59      Final result by Interface, Lab In [facility] (11/08/23 17:14:59)                   Narrative:    Test Reason : R06.02,    Vent. Rate : 067 BPM     Atrial Rate : 067 BPM     P-R Int : 148 ms          QRS Dur : 094 ms      QT Int : 432 ms       P-R-T Axes : 054 -18 052 degrees     QTc Int : 456 ms    Normal sinus rhythm  Incomplete right bundle branch block  Abnormal ECG  When compared with ECG of 24-FEB-2021 12:38,  No significant change was found  Confirmed by Gabriel Gregg MD ([id number]) on 11/8/2023 5:14:54 PM    Referred By: [email or web address]   SELF           Confirmed By:Gabriel Gregg MD                                  Imaging Results               CTA Chest Non-Coronary (PE Studies) (Final result)  Result time 11/08/23 22:24:36      Final result by Farhad Gamez MD (11/08/23 22:24:36)                   Impression:      Motion and artifact limited study.    No convincing evidence of sizable acute pulmonary embolus on this limited quality study.  Further evaluation/follow-up as warranted clinically.    Pulmonary emphysema and possible mild fibrotic changes with superimposed interstitial opacities in the bilateral lower lobes.  Suggest correlation for pneumonitis or other infectious/inflammatory process.  Probable retained secretions in the right mainstem bronchus and lower trachea.    Possible subcentimeter solid mass or complex cyst in the upper pole of the right kidney.  Underlying renal cell carcinoma is not excluded on this  limited quality exam.  Short-term imaging follow-up could be considered if clinically appropriate.    Multiple additional findings as above.    This report was flagged in Epic as abnormal.    Electronically signed by resident: Re Keenan  Date:    11/08/2023  Time:    20:18    Electronically signed by: Farhad Gamez MD  Date:    11/08/2023  Time:    22:24               Narrative:    EXAMINATION:  CT ABDOMEN PELVIS WITH IV CONTRAST; CTA CHEST NON CORONARY (PE STUDIES)    CLINICAL HISTORY:  Abdominal pain, acute, nonlocalized;; Pulmonary embolism (PE) suspected, positive D-dimer;    TECHNIQUE:  Low dose axial images, sagittal and coronal reformations were obtained from the thoracic inlet to the pubic symphysis following the IV administration of 100 mL of Omnipaque 350 .  Oral contrast was not given. CTA chest obtained utilizing PE protocol with MIP reformats. CT abdomen pelvis obtained in the portal venous phase as a separate acquisition.    COMPARISON:  CT chest abdomen pelvis 01/23/2018.    FINDINGS:  CTA CHEST:    Evaluation is limited by extensive streak artifact due to the patient's arms overlying the field of view.  Exam quality also limited by motion.    Thoracic soft tissues: Left thyroid gland 1.7 cm heterogeneous nodule with coarse calcification.    Heart: Significant coronary artery calcification.  Tortuous thoracic aorta with moderate to advanced atherosclerotic calcification.  Aberrant right subclavian artery.    Pulmonary vasculature: No filling defect of central pulmonary arteries.  Evaluation of segmental/subsegmental arteries is limited due to beam hardening artifact, extensive motion, and suboptimal contrast bolus timing.    Mediastinum/hilum: Unremarkable.    Airway/esophagus: Probable retained secretions in the lower trachea extending into the right mainstem bronchus.  Evaluation of smaller airways significantly limited by motion.    Lungs: Evaluation limited due to motion artifact.  Panlobular  emphysema more prominent compared to prior.  Trace pleural effusions.  Possible mild fibrotic changes in the lungs most notable in the anterior aspect of the left upper lobe and posteroinferior left lower lobe.  Radiation changes included in the differential.  Interstitial opacities in the superior aspect of the left lower lobe.  Minimal interstitial densities in the right lower lobe.  No pneumothorax.    CT ABDOMEN PELVIS WITH CONTRAST:    Liver: Similar in size and contour when compared with the prior study.  No worrisome liver mass identified on this single phase study..    Gallbladder: Cholecystectomy.    Bile Ducts: Intrahepatic ductal dilatation more prominent compared to prior.  Torturous prominent common bile duct.  Distal common bile duct measures up to 1.2 cm in diameter, mildly worse.  This may represent an incidental finding in this patient with normal serum bilirubin level post cholecystectomy.    Spleen: No acute abnormality.    Pancreas: Atrophy with no acute abnormality.    Adrenals: Unremarkable.    Genitourinary: Multiple lateral renal cysts.  Detailed evaluation of bilateral renal lesions limited by extensive beam hardening artifact to the patient's arms overlying the field of view on this single phase study.  However, there is a subcentimeter probable enhancing lesion in the upper pole of the right kidney (series 3, image 48 and series 604, image 60).  Punctate right kidney nonobstructing calculi.  Bilateral ureters are normal in course and caliber.  No hydroureteronephrosis.  Bladder demonstrates smooth contours without bladder wall thickening.    Reproductive organs: Uterus is surgically absent..    GI tract/Mesentery: Stomach is not significantly distended.  No small bowel obstruction.  Extensive colonic diverticulosis without convincing evidence of acute diverticulitis.  Appendix is normal.  Nonspecific hyperdense stool in the right hemicolon.    Peritoneal Space: No abdominopelvic ascites or  intraperitoneal free air.    Retroperitoneum: No significant adenopathy.    Vasculature: Normal caliber of abdominal aorta with significant calcified atherosclerosis.  Aberrant right subclavian artery.  Portal vein is patent.  No portal venous gas.    Thoracic/Abdominal wall: Nodular densities in the left breast with overlying asymmetric skin thickening, similar when compared with prior study dated 01/23/2018.  Surgical clips in the left axillary region and chest wall.  Findings could be related to patient's history of prior breast malignancy though correlation with physical exam and continued follow-up recommended as clinically appropriate.    Bones: Severe chronic T12 compression fracture with retropulsion into the central canal and probable kyphoplasty changes.  Chronic compression fracture deformity of the L3 vertebral body.  Grade 2 anterolisthesis of L4 with respect to L5, potentially related to facet arthropathy.  Degenerative changes throughout the spine.  Transitional lumbosacral vertebral body at L5 with pseudoarticulation of the bilateral transverse processes with the sacrum.  Significant degenerative changes in both hips.  Degenerative changes in the right shoulder.  Old bilateral rib fractures.  No acute osseous abnormality.                                        CT Abdomen Pelvis With IV Contrast (Final result)  Result time 11/08/23 22:24:36      Final result by Farhad Gamez MD (11/08/23 22:24:36)                   Impression:      Motion and artifact limited study.    No convincing evidence of sizable acute pulmonary embolus on this limited quality study.  Further evaluation/follow-up as warranted clinically.    Pulmonary emphysema and possible mild fibrotic changes with superimposed interstitial opacities in the bilateral lower lobes.  Suggest correlation for pneumonitis or other infectious/inflammatory process.  Probable retained secretions in the right mainstem bronchus and lower  trachea.    Possible subcentimeter solid mass or complex cyst in the upper pole of the right kidney.  Underlying renal cell carcinoma is not excluded on this limited quality exam.  Short-term imaging follow-up could be considered if clinically appropriate.    Multiple additional findings as above.    This report was flagged in Epic as abnormal.    Electronically signed by resident: Re Keenan  Date:    11/08/2023  Time:    20:18    Electronically signed by: Farhad Gamez MD  Date:    11/08/2023  Time:    22:24               Narrative:    EXAMINATION:  CT ABDOMEN PELVIS WITH IV CONTRAST; CTA CHEST NON CORONARY (PE STUDIES)    CLINICAL HISTORY:  Abdominal pain, acute, nonlocalized;; Pulmonary embolism (PE) suspected, positive D-dimer;    TECHNIQUE:  Low dose axial images, sagittal and coronal reformations were obtained from the thoracic inlet to the pubic symphysis following the IV administration of 100 mL of Omnipaque 350 .  Oral contrast was not given. CTA chest obtained utilizing PE protocol with MIP reformats. CT abdomen pelvis obtained in the portal venous phase as a separate acquisition.    COMPARISON:  CT chest abdomen pelvis 01/23/2018.    FINDINGS:  CTA CHEST:    Evaluation is limited by extensive streak artifact due to the patient's arms overlying the field of view.  Exam quality also limited by motion.    Thoracic soft tissues: Left thyroid gland 1.7 cm heterogeneous nodule with coarse calcification.    Heart: Significant coronary artery calcification.  Tortuous thoracic aorta with moderate to advanced atherosclerotic calcification.  Aberrant right subclavian artery.    Pulmonary vasculature: No filling defect of central pulmonary arteries.  Evaluation of segmental/subsegmental arteries is limited due to beam hardening artifact, extensive motion, and suboptimal contrast bolus timing.    Mediastinum/hilum: Unremarkable.    Airway/esophagus: Probable retained secretions in the lower trachea extending into the  right mainstem bronchus.  Evaluation of smaller airways significantly limited by motion.    Lungs: Evaluation limited due to motion artifact.  Panlobular emphysema more prominent compared to prior.  Trace pleural effusions.  Possible mild fibrotic changes in the lungs most notable in the anterior aspect of the left upper lobe and posteroinferior left lower lobe.  Radiation changes included in the differential.  Interstitial opacities in the superior aspect of the left lower lobe.  Minimal interstitial densities in the right lower lobe.  No pneumothorax.    CT ABDOMEN PELVIS WITH CONTRAST:    Liver: Similar in size and contour when compared with the prior study.  No worrisome liver mass identified on this single phase study..    Gallbladder: Cholecystectomy.    Bile Ducts: Intrahepatic ductal dilatation more prominent compared to prior.  Torturous prominent common bile duct.  Distal common bile duct measures up to 1.2 cm in diameter, mildly worse.  This may represent an incidental finding in this patient with normal serum bilirubin level post cholecystectomy.    Spleen: No acute abnormality.    Pancreas: Atrophy with no acute abnormality.    Adrenals: Unremarkable.    Genitourinary: Multiple lateral renal cysts.  Detailed evaluation of bilateral renal lesions limited by extensive beam hardening artifact to the patient's arms overlying the field of view on this single phase study.  However, there is a subcentimeter probable enhancing lesion in the upper pole of the right kidney (series 3, image 48 and series 604, image 60).  Punctate right kidney nonobstructing calculi.  Bilateral ureters are normal in course and caliber.  No hydroureteronephrosis.  Bladder demonstrates smooth contours without bladder wall thickening.    Reproductive organs: Uterus is surgically absent..    GI tract/Mesentery: Stomach is not significantly distended.  No small bowel obstruction.  Extensive colonic diverticulosis without convincing  evidence of acute diverticulitis.  Appendix is normal.  Nonspecific hyperdense stool in the right hemicolon.    Peritoneal Space: No abdominopelvic ascites or intraperitoneal free air.    Retroperitoneum: No significant adenopathy.    Vasculature: Normal caliber of abdominal aorta with significant calcified atherosclerosis.  Aberrant right subclavian artery.  Portal vein is patent.  No portal venous gas.    Thoracic/Abdominal wall: Nodular densities in the left breast with overlying asymmetric skin thickening, similar when compared with prior study dated 01/23/2018.  Surgical clips in the left axillary region and chest wall.  Findings could be related to patient's history of prior breast malignancy though correlation with physical exam and continued follow-up recommended as clinically appropriate.    Bones: Severe chronic T12 compression fracture with retropulsion into the central canal and probable kyphoplasty changes.  Chronic compression fracture deformity of the L3 vertebral body.  Grade 2 anterolisthesis of L4 with respect to L5, potentially related to facet arthropathy.  Degenerative changes throughout the spine.  Transitional lumbosacral vertebral body at L5 with pseudoarticulation of the bilateral transverse processes with the sacrum.  Significant degenerative changes in both hips.  Degenerative changes in the right shoulder.  Old bilateral rib fractures.  No acute osseous abnormality.                                       X-Ray Chest 1 View (Final result)  Result time 11/08/23 17:37:57      Final result by Libia Lara MD (11/08/23 17:37:57)                   Impression:      Interstitial prominence increased in the lung bases bilaterally raising question of nonspecific pneumonitis superimposed over chronic changes.      Electronically signed by: Libia Lara  Date:    11/08/2023  Time:    17:37               Narrative:    EXAMINATION:  XR CHEST 1 VIEW    CLINICAL HISTORY:  shortness of  breath;    TECHNIQUE:  Single frontal view of the chest was performed.    COMPARISON:  10/12/2016 chest x-ray    FINDINGS:  Cardiac silhouette is stable and nonenlarged.  There is aortic atherosclerosis and tortuosity.  Emphysematous changes noted in the upper lungs.  Interstitial prominence bilaterally is also noted increased since prior exam raising question of pneumonitis with underlying chronic changes suspected.  No pleural effusion is seen or pneumothorax.  Osseous structures appear stable with degenerative changes of the shoulders and spine and remote left rib fracture deformities.                                       Medications   sodium chloride 0.9% flush 10 mL (has no administration in time range)   melatonin tablet 6 mg (has no administration in time range)   sodium chloride 0.9% flush 10 mL (has no administration in time range)   ondansetron disintegrating tablet 8 mg (has no administration in time range)   promethazine tablet 25 mg (has no administration in time range)   polyethylene glycol packet 17 g (has no administration in time range)   bisacodyL suppository 10 mg (has no administration in time range)   acetaminophen tablet 650 mg (has no administration in time range)   glucose chewable tablet 16 g (has no administration in time range)   glucose chewable tablet 24 g (has no administration in time range)   glucagon (human recombinant) injection 1 mg (has no administration in time range)   heparin (porcine) injection 5,000 Units (has no administration in time range)   albuterol-ipratropium 2.5 mg-0.5 mg/3 mL nebulizer solution 3 mL (has no administration in time range)   dextrose 10% bolus 125 mL 125 mL (has no administration in time range)   dextrose 10% bolus 250 mL 250 mL (has no administration in time range)   sodium chloride 0.9% flush 3 mL (has no administration in time range)   predniSONE tablet 40 mg (has no administration in time range)   azithromycin tablet 500 mg (has no administration  in time range)     Followed by   azithromycin tablet 250 mg (has no administration in time range)   cefTRIAXone (ROCEPHIN) 1 g in dextrose 5 % in water (D5W) 100 mL IVPB (MB+) (has no administration in time range)   predniSONE tablet 60 mg (60 mg Oral Given 11/8/23 1713)   albuterol-ipratropium 2.5 mg-0.5 mg/3 mL nebulizer solution 3 mL (3 mLs Nebulization Given 11/8/23 1710)   doxycycline tablet 100 mg (100 mg Oral Given 11/8/23 2133)   iohexoL (OMNIPAQUE 350) injection 100 mL (100 mLs Intravenous Given 11/8/23 2017)     Medical Decision Making  ED course for remainder of care    Amount and/or Complexity of Data Reviewed  Independent Historian: caregiver     Details: Daughters at bedside  Labs: ordered. Decision-making details documented in ED Course.  Radiology: ordered. Decision-making details documented in ED Course.  ECG/medicine tests:  Decision-making details documented in ED Course.    Risk  OTC drugs.  Prescription drug management.              Attending Attestation:   Physician Attestation Statement for Resident:  As the supervising MD   Physician Attestation Statement: I have personally seen and examined this patient.   I agree with the above history.  -:   As the supervising MD I agree with the above PE.     As the supervising MD I agree with the above treatment, course, plan, and disposition.                    ED Course as of 11/08/23 2349   Wed Nov 08, 2023   1713 89-year-old female in no acute distress.  Patient had O2 sat of 80% in triage, came back on 3 L nasal cannula now satting 98%.  Differential includes but is not limited to pneumonia versus COPD exacerbation CHF exacerbation, doubt PE, [BP]   1721 POC PH: 7.406 [BP]   1721 POC PCO2(!): 45.3  No respiratory acidosis [BP]   1721 EKG 12-lead  Sinus rhythm at 67 beats per minute all intervals within normal limits, no STEMI [BP]   1746 WBC(!): 15.88  Leukocytosis [BP]   1751 X-Ray Chest 1 View  No obvious focal consolidation, nonspecific  pneumonitis per radiologist no clear explanation for acute hypoxia, will obtain D-dimer and possibly CTA to rule out PE if positive [BP]   1805 D-Dimer(!): 1.09 [BP]   1825 SARS-CoV2 (COVID-19) Qualitative PCR: Not Detected [BP]   1825 Influenza A, Molecular: Not Detected [BP]   1825 Influenza B, Molecular: Not Detected [BP]   1904 Troponin I: 0.025 [BP]   2347 CTA Chest Non-Coronary (PE Studies)(!)  Negative for any significant PE, nonspecific pneumonitis, possible multifocal pneumonia.  Doxycycline ordered. [BP]   2347 CT Abdomen Pelvis With IV Contrast(!)  No appendicitis, no evidence of acute cholecystitis, right renal cystic mass, no clear etiology for the patient's abdominal pain.  Findings were discussed with the patient and family at bedside with recommendations to follow-up right renal mass.   [BP]   2348 Patient was admitted to Hospital Medicine for management of acute hypoxemic respiratory very low secondary to presumed pneumonia.  Family and patient were agreeable to admission. [BP]      ED Course User Index  [BP] Gustavo Suarez MD                    Clinical Impression:   Final diagnoses:  [R06.02] Shortness of breath  [J18.9] PNA (pneumonia)        ED Disposition Condition    Observation Stable                Gustavo Suarez MD  Resident  11/08/23 4711       Lesvia Rowe MD  11/09/23 0291    Critical care time spent on the evaluation and treatment of severe organ dysfunction, review of pertinent labs and imaging studies, discussions with consulting providers and discussions with patient/family: 35 minutes.         Lesvia Rowe MD  12/25/23 4219

## 2023-11-09 PROBLEM — N28.9 RENAL LESION: Status: ACTIVE | Noted: 2023-11-09

## 2023-11-09 PROBLEM — J96.01 ACUTE HYPOXEMIC RESPIRATORY FAILURE: Status: ACTIVE | Noted: 2023-11-09

## 2023-11-09 LAB
ALBUMIN SERPL BCP-MCNC: 2.8 G/DL (ref 3.5–5.2)
ALP SERPL-CCNC: 64 U/L (ref 55–135)
ALT SERPL W/O P-5'-P-CCNC: 13 U/L (ref 10–44)
ANION GAP SERPL CALC-SCNC: 9 MMOL/L (ref 8–16)
ANISOCYTOSIS BLD QL SMEAR: SLIGHT
AST SERPL-CCNC: 17 U/L (ref 10–40)
BASOPHILS # BLD AUTO: 0.01 K/UL (ref 0–0.2)
BASOPHILS NFR BLD: 0.1 % (ref 0–1.9)
BILIRUB SERPL-MCNC: 0.5 MG/DL (ref 0.1–1)
BUN SERPL-MCNC: 19 MG/DL (ref 8–23)
CALCIUM SERPL-MCNC: 8.8 MG/DL (ref 8.7–10.5)
CHLORIDE SERPL-SCNC: 103 MMOL/L (ref 95–110)
CO2 SERPL-SCNC: 25 MMOL/L (ref 23–29)
CREAT SERPL-MCNC: 1.1 MG/DL (ref 0.5–1.4)
DIFFERENTIAL METHOD: ABNORMAL
EOSINOPHIL # BLD AUTO: 0 K/UL (ref 0–0.5)
EOSINOPHIL NFR BLD: 0 % (ref 0–8)
ERYTHROCYTE [DISTWIDTH] IN BLOOD BY AUTOMATED COUNT: 14.6 % (ref 11.5–14.5)
EST. GFR  (NO RACE VARIABLE): 48 ML/MIN/1.73 M^2
GLUCOSE SERPL-MCNC: 173 MG/DL (ref 70–110)
HCT VFR BLD AUTO: 37.9 % (ref 37–48.5)
HGB BLD-MCNC: 12.1 G/DL (ref 12–16)
IMM GRANULOCYTES # BLD AUTO: 0.03 K/UL (ref 0–0.04)
IMM GRANULOCYTES NFR BLD AUTO: 0.3 % (ref 0–0.5)
LYMPHOCYTES # BLD AUTO: 0.4 K/UL (ref 1–4.8)
LYMPHOCYTES NFR BLD: 4.5 % (ref 18–48)
MAGNESIUM SERPL-MCNC: 1.9 MG/DL (ref 1.6–2.6)
MCH RBC QN AUTO: 28.6 PG (ref 27–31)
MCHC RBC AUTO-ENTMCNC: 31.9 G/DL (ref 32–36)
MCV RBC AUTO: 90 FL (ref 82–98)
MONOCYTES # BLD AUTO: 0.2 K/UL (ref 0.3–1)
MONOCYTES NFR BLD: 2.2 % (ref 4–15)
NEUTROPHILS # BLD AUTO: 8.9 K/UL (ref 1.8–7.7)
NEUTROPHILS NFR BLD: 92.9 % (ref 38–73)
NRBC BLD-RTO: 0 /100 WBC
PHOSPHATE SERPL-MCNC: 4.6 MG/DL (ref 2.7–4.5)
PLATELET # BLD AUTO: 228 K/UL (ref 150–450)
PLATELET BLD QL SMEAR: ABNORMAL
PMV BLD AUTO: 11.3 FL (ref 9.2–12.9)
POIKILOCYTOSIS BLD QL SMEAR: SLIGHT
POTASSIUM SERPL-SCNC: 3.6 MMOL/L (ref 3.5–5.1)
PROT SERPL-MCNC: 6.2 G/DL (ref 6–8.4)
RBC # BLD AUTO: 4.23 M/UL (ref 4–5.4)
SODIUM SERPL-SCNC: 137 MMOL/L (ref 136–145)
WBC # BLD AUTO: 9.61 K/UL (ref 3.9–12.7)

## 2023-11-09 PROCEDURE — 63700000 PHARM REV CODE 250 ALT 637 W/O HCPCS

## 2023-11-09 PROCEDURE — 83735 ASSAY OF MAGNESIUM: CPT

## 2023-11-09 PROCEDURE — 94761 N-INVAS EAR/PLS OXIMETRY MLT: CPT

## 2023-11-09 PROCEDURE — 25000003 PHARM REV CODE 250

## 2023-11-09 PROCEDURE — 27000221 HC OXYGEN, UP TO 24 HOURS

## 2023-11-09 PROCEDURE — 94640 AIRWAY INHALATION TREATMENT: CPT

## 2023-11-09 PROCEDURE — 99900035 HC TECH TIME PER 15 MIN (STAT)

## 2023-11-09 PROCEDURE — 25000242 PHARM REV CODE 250 ALT 637 W/ HCPCS

## 2023-11-09 PROCEDURE — 84100 ASSAY OF PHOSPHORUS: CPT

## 2023-11-09 PROCEDURE — 80053 COMPREHEN METABOLIC PANEL: CPT

## 2023-11-09 PROCEDURE — 21400001 HC TELEMETRY ROOM

## 2023-11-09 PROCEDURE — 85025 COMPLETE CBC W/AUTO DIFF WBC: CPT

## 2023-11-09 PROCEDURE — 94799 UNLISTED PULMONARY SVC/PX: CPT | Mod: XB

## 2023-11-09 PROCEDURE — 36415 COLL VENOUS BLD VENIPUNCTURE: CPT

## 2023-11-09 PROCEDURE — 63600175 PHARM REV CODE 636 W HCPCS

## 2023-11-09 RX ORDER — MONTELUKAST SODIUM 10 MG/1
10 TABLET ORAL NIGHTLY
Status: DISCONTINUED | OUTPATIENT
Start: 2023-11-09 | End: 2023-11-10 | Stop reason: HOSPADM

## 2023-11-09 RX ORDER — TRAMADOL HYDROCHLORIDE 50 MG/1
50 TABLET ORAL EVERY 8 HOURS PRN
Status: DISCONTINUED | OUTPATIENT
Start: 2023-11-09 | End: 2023-11-09

## 2023-11-09 RX ORDER — GABAPENTIN 100 MG/1
100 CAPSULE ORAL NIGHTLY
Status: DISCONTINUED | OUTPATIENT
Start: 2023-11-09 | End: 2023-11-10 | Stop reason: HOSPADM

## 2023-11-09 RX ORDER — CITALOPRAM 20 MG/1
20 TABLET, FILM COATED ORAL NIGHTLY
Status: DISCONTINUED | OUTPATIENT
Start: 2023-11-09 | End: 2023-11-10 | Stop reason: HOSPADM

## 2023-11-09 RX ORDER — FLUTICASONE FUROATE AND VILANTEROL 100; 25 UG/1; UG/1
1 POWDER RESPIRATORY (INHALATION) DAILY
Status: DISCONTINUED | OUTPATIENT
Start: 2023-11-09 | End: 2023-11-10 | Stop reason: HOSPADM

## 2023-11-09 RX ORDER — ATORVASTATIN CALCIUM 20 MG/1
20 TABLET, FILM COATED ORAL NIGHTLY
Status: DISCONTINUED | OUTPATIENT
Start: 2023-11-09 | End: 2023-11-10 | Stop reason: HOSPADM

## 2023-11-09 RX ORDER — HYDRALAZINE HYDROCHLORIDE 25 MG/1
25 TABLET, FILM COATED ORAL EVERY 12 HOURS
Status: DISCONTINUED | OUTPATIENT
Start: 2023-11-09 | End: 2023-11-10 | Stop reason: HOSPADM

## 2023-11-09 RX ORDER — ACETAMINOPHEN 500 MG
1000 TABLET ORAL EVERY 8 HOURS
Status: DISCONTINUED | OUTPATIENT
Start: 2023-11-09 | End: 2023-11-10 | Stop reason: HOSPADM

## 2023-11-09 RX ORDER — AMLODIPINE BESYLATE 10 MG/1
10 TABLET ORAL DAILY
Status: DISCONTINUED | OUTPATIENT
Start: 2023-11-09 | End: 2023-11-10 | Stop reason: HOSPADM

## 2023-11-09 RX ORDER — ASPIRIN 81 MG/1
81 TABLET ORAL DAILY
Status: DISCONTINUED | OUTPATIENT
Start: 2023-11-09 | End: 2023-11-10 | Stop reason: HOSPADM

## 2023-11-09 RX ORDER — IPRATROPIUM BROMIDE AND ALBUTEROL SULFATE 2.5; .5 MG/3ML; MG/3ML
3 SOLUTION RESPIRATORY (INHALATION) ONCE
Status: COMPLETED | OUTPATIENT
Start: 2023-11-09 | End: 2023-11-09

## 2023-11-09 RX ORDER — LISINOPRIL 20 MG/1
40 TABLET ORAL DAILY
Status: DISCONTINUED | OUTPATIENT
Start: 2023-11-09 | End: 2023-11-10

## 2023-11-09 RX ORDER — TORSEMIDE 20 MG/1
20 TABLET ORAL DAILY
Status: DISCONTINUED | OUTPATIENT
Start: 2023-11-09 | End: 2023-11-10

## 2023-11-09 RX ORDER — TRAMADOL HYDROCHLORIDE 50 MG/1
50 TABLET ORAL EVERY 8 HOURS
Status: DISCONTINUED | OUTPATIENT
Start: 2023-11-09 | End: 2023-11-10 | Stop reason: HOSPADM

## 2023-11-09 RX ADMIN — PREDNISONE 40 MG: 20 TABLET ORAL at 10:11

## 2023-11-09 RX ADMIN — ATORVASTATIN CALCIUM 20 MG: 20 TABLET, FILM COATED ORAL at 09:11

## 2023-11-09 RX ADMIN — HEPARIN SODIUM 5000 UNITS: 5000 INJECTION INTRAVENOUS; SUBCUTANEOUS at 09:11

## 2023-11-09 RX ADMIN — MONTELUKAST 10 MG: 10 TABLET, FILM COATED ORAL at 09:11

## 2023-11-09 RX ADMIN — HYDRALAZINE HYDROCHLORIDE 25 MG: 25 TABLET, FILM COATED ORAL at 10:11

## 2023-11-09 RX ADMIN — CITALOPRAM HYDROBROMIDE 20 MG: 20 TABLET ORAL at 09:11

## 2023-11-09 RX ADMIN — CEFTRIAXONE 1 G: 1 INJECTION, POWDER, FOR SOLUTION INTRAMUSCULAR; INTRAVENOUS at 09:11

## 2023-11-09 RX ADMIN — AMLODIPINE BESYLATE 10 MG: 10 TABLET ORAL at 10:11

## 2023-11-09 RX ADMIN — HEPARIN SODIUM 5000 UNITS: 5000 INJECTION INTRAVENOUS; SUBCUTANEOUS at 02:11

## 2023-11-09 RX ADMIN — HYDRALAZINE HYDROCHLORIDE 25 MG: 25 TABLET, FILM COATED ORAL at 09:11

## 2023-11-09 RX ADMIN — IPRATROPIUM BROMIDE AND ALBUTEROL SULFATE 3 ML: .5; 3 SOLUTION RESPIRATORY (INHALATION) at 04:11

## 2023-11-09 RX ADMIN — TRAMADOL HYDROCHLORIDE 50 MG: 50 TABLET, COATED ORAL at 02:11

## 2023-11-09 RX ADMIN — IPRATROPIUM BROMIDE AND ALBUTEROL SULFATE 3 ML: .5; 3 SOLUTION RESPIRATORY (INHALATION) at 11:11

## 2023-11-09 RX ADMIN — TRAMADOL HYDROCHLORIDE 50 MG: 50 TABLET, COATED ORAL at 09:11

## 2023-11-09 RX ADMIN — ASPIRIN 81 MG: 81 TABLET, COATED ORAL at 10:11

## 2023-11-09 RX ADMIN — TORSEMIDE 20 MG: 20 TABLET ORAL at 10:11

## 2023-11-09 RX ADMIN — FLUTICASONE FUROATE AND VILANTEROL TRIFENATATE 1 PUFF: 100; 25 POWDER RESPIRATORY (INHALATION) at 08:11

## 2023-11-09 RX ADMIN — ACETAMINOPHEN 1000 MG: 500 TABLET ORAL at 09:11

## 2023-11-09 RX ADMIN — IPRATROPIUM BROMIDE AND ALBUTEROL SULFATE 3 ML: .5; 3 SOLUTION RESPIRATORY (INHALATION) at 01:11

## 2023-11-09 RX ADMIN — IPRATROPIUM BROMIDE AND ALBUTEROL SULFATE 3 ML: .5; 3 SOLUTION RESPIRATORY (INHALATION) at 09:11

## 2023-11-09 RX ADMIN — AZITHROMYCIN 500 MG: 250 TABLET, FILM COATED ORAL at 10:11

## 2023-11-09 RX ADMIN — IPRATROPIUM BROMIDE AND ALBUTEROL SULFATE 3 ML: .5; 3 SOLUTION RESPIRATORY (INHALATION) at 08:11

## 2023-11-09 RX ADMIN — HEPARIN SODIUM 5000 UNITS: 5000 INJECTION INTRAVENOUS; SUBCUTANEOUS at 06:11

## 2023-11-09 RX ADMIN — GABAPENTIN 100 MG: 100 CAPSULE ORAL at 09:11

## 2023-11-09 RX ADMIN — ACETAMINOPHEN 1000 MG: 500 TABLET ORAL at 02:11

## 2023-11-09 RX ADMIN — LISINOPRIL 40 MG: 20 TABLET ORAL at 10:11

## 2023-11-09 NOTE — NURSING
Nurses Note -- 4 Eyes      11/9/2023   3:15 AM      Skin assessed during: Admit      [x] No Altered Skin Integrity Present    [x]Prevention Measures Documented      [] Yes- Altered Skin Integrity Present or Discovered   [] LDA Added if Not in Epic (Describe Wound)   [] New Altered Skin Integrity was Present on Admit and Documented in LDA   [] Wound Image Taken    Wound Care Consulted?     Attending Nurse Vadim Melgar RN/Staff Member:  ERNIE Arndt RN

## 2023-11-09 NOTE — ASSESSMENT & PLAN NOTE
Creatine stable for now. BMP reviewed- noted Estimated Creatinine Clearance: 36.1 mL/min (based on SCr of 1 mg/dL). according to latest data. Based on current GFR, CKD stage is stage 3 - GFR 30-59.  Monitor UOP and serial BMP and adjust therapy as needed. Renally dose meds. Avoid nephrotoxic medications and procedures.  - monitor daily labs  - avoid nephrotoxic medications

## 2023-11-09 NOTE — ED NOTES
Telemetry Verification   Patient placed on Telemetry Box  Verified with [employer]  Tech Cat   Box # [id number]   Rate 78   Rhythm Normal Sinus

## 2023-11-09 NOTE — PLAN OF CARE
11/09/23 0016   Post-Acute Status   Post-Acute Authorization Other   Other Status Community Services   Hospital Resources/Appts/Education Provided Community resources provided   Discharge Delays None known at this time   Discharge Plan   Discharge Plan A Home with family   Discharge Plan B Home         Sw met pt and pt's granddaughter Ann at bedside.  Pt lives with her daughter, and has a large support system. Ann requested resources to assist pt with  pt's adl's. Sw provided pt with resources for [facility] and [facility].   Pt has no additional case management needs at this time.         Raul Leblanc LMSW  Case Management  Emergency Department  [phone]

## 2023-11-09 NOTE — HOSPITAL COURSE
Anahi Cook was placed in  observation for management of acute hypoxia 2/2 COPD exacerbation. Increased sputum production, green in color, with hypoxia reruiring 3L NC on admission. Responding well to antibiotics, prednisone, duo-nebs. Unable to wean oxygen despite significant improvement in respiratory symptoms and lung exam. Patient failed 6 MWT - discharged with supply of home oxygen. Instruction for use provided to patient and daughter. Refer to COPD clinic for hospital follow up,  services. Incidental R renal complex cyst v mass on CTA chest. Discussed with radiology - follow up outpatient MRI ordered in 3 mo. Discharge plan discussed and patient expressed understanding. All questions answered.

## 2023-11-09 NOTE — NURSING
Patient arrived on unit via stretcher with daughters. Admitted to room 1102. POC reviewed with daughter and patient. Verbalized understanding. VSS. Tele maintained. No acute distress noted. Call light in reach.

## 2023-11-09 NOTE — H&P
Lukas gwendolyn - Emergency Dept  [facility] Medicine  History & Physical    Patient Name: Anahi Cook  MRN: [id number]  Patient Class: OP- Observation  Admission Date: 11/8/2023  Attending Physician: Smi Ramirez MD   Primary Care Provider: Minerva Mathias MD         Patient information was obtained from patient, relative(s) and ER records.     Subjective:     Principal Problem:Chronic obstructive pulmonary disease with acute exacerbation    Chief Complaint:   Chief Complaint   Patient presents with    Abdominal Pain     Nausea and vomiting. Fatigued.         HPI: Anahi Cook is an 88 y/o female w/ PMHx HTN, COPD, GERD, PAD, depression, arthritis, and breast cancer who presents to the ED for worsening shortness of breath. Patient's daughter and granddaughter at bedside providing help with history. Patient reports constant SOB but has worsened since Monday evening. Saw her pulmonologist via telehealth yesterday to where patient was prescribed cefdinir and duo nebs. Albuterol was out of stock at the pharmacy and O2 sats were dropping to the 80s (usually > 90%), so the daughter decided to bring her to the ED. Currently on 3L NC with improved symptoms. Denies home O2 use. Patient reports nausea and vomiting clear fluid yesterday, but denies any hematemesis. Reports a nonproductive cough and chest congestion that has worsened and sometimes wakes her up at night. Has decreased appetite and mild abdominal pain in the left upper quadrant since Monday. Denied any diarrhea, constipation, chest pain, fever, chills, palpitations, leg swelling or syncope.     In the ED, WBC 15.88. , D dimer 1.09. Troponin negative. eGFR 53.9 and albumin 3.3. VBG did not show hypercapnia. CTA showed no evidence of pulmonary embolism. Pulmonary emphysema and possible mild fibrotic changes with superimposed interstitial opacities in the bilateral lower lobes. CT abd/pelvis showed possible subcentimeter solid mass or complex cyst in the  upper pole of the right kidney. CXR showed interstitial prominence increased in the lung bases bilaterally raising question of nonspecific pneumonitis superimposed over chronic changes. EKG sinus rhythm with incomplete RBBB (seen previously). Viral panel negative. HIV and hep C negative. Given duo nebs x3, prednisone 60 mg x1, doxycycline 100 mg x1, and omnipaque x1.       Past Medical History:   Diagnosis Date    Anxiety     Arthritis     Back pain     Breast cancer 1/17/2018    Breast mass 1/15/2018    Chronic kidney disease, stage 2, mildly decreased GFR     COPD (chronic obstructive pulmonary disease)     emphysema    Depression     Dysuria 1/29/2018    Exudative age-related macular degeneration of left eye with active choroidal neovascularization 8/25/2022    Family history of breast cancer 1/17/2018    GERD (gastroesophageal reflux disease)     Hypertension     Infiltrating ductal carcinoma of breast, left 7/12/2018    Multiple closed fractures of ribs of left side 4/3/2019    Osteoporosis, senile     Ovarian mass 1/15/2018    Pneumonia     S/P robotic assisted laparoscopic BSO (bilateral salpingo-oophorectomy) 2/23/2018       Past Surgical History:   Procedure Laterality Date    AXILLARY NODE DISSECTION Left 7/12/2018    Procedure: LYMPHADENECTOMY, AXILLARY;  Surgeon: Amanda Caballero MD;  Location: [facility] OR [address] [place] [address];  Service: General;  Laterality: Left;    BREAST BIOPSY      BREAST LUMPECTOMY      CATARACT EXTRACTION      CHOLECYSTECTOMY      COLONOSCOPY      ESOPHAGOGASTRODUODENOSCOPY      ESOPHAGOGASTRODUODENOSCOPY N/A 10/14/2019    Procedure: EGD (ESOPHAGOGASTRODUODENOSCOPY);  Surgeon: Davonte Thompson MD;  Location: [facility] ([address] [place] [address]);  Service: Endoscopy;  Laterality: N/A;  hx of pulmonary hypertension-PA 50     pt requesting ASAP    EYE SURGERY      FIXATION KYPHOPLASTY N/A 11/8/2018    Procedure: Kyphoplasty   T12;  Surgeon: Merly Wilcox MD;  Location: [facility] CATH LAB;   Service: Pain Management;  Laterality: N/A;  T12  [place] [employer] e-mailed with date and time    HYSTERECTOMY      INJECTION FOR SENTINEL NODE IDENTIFICATION Left 7/12/2018    Procedure: INJECTION, FOR SENTINEL NODE IDENTIFICATION;  Surgeon: Amanda Cablalero MD;  Location: [facility] OR [address] [place] [address];  Service: General;  Laterality: Left;    INJECTION OF FACET JOINT Bilateral 10/15/2018    Procedure: INJECTION, FACET JOINT, BILATERAL LUMBAR L3-4, 4-5, 5-S1 FACET JOINT INJECTIONS;  Surgeon: Merly Wilcox MD;  Location: [facility] PAIN MGT;  Service: Pain Management;  Laterality: Bilateral;    INJECTION OF FACET JOINT Bilateral 4/1/2019    Procedure: INJECTION, FACET JOINT, L3-L4,L4-L5,L5-S1;  Surgeon: Merly Wilcox MD;  Location: [facility] PAIN MGT;  Service: Pain Management;  Laterality: Bilateral;    INJECTION OF FACET JOINT Bilateral 6/20/2019    Procedure: INJECTION, FACET JOINT, L3-L4,L4-L5,L5-S1 NEED CONSENT;  Surgeon: Merly Wilcox MD;  Location: [facility] PAIN MGT;  Service: Pain Management;  Laterality: Bilateral;    INJECTION OF FACET JOINT Bilateral 7/27/2020    Procedure: INJECTION, FACET JOINT BILATERAL L3/4, L4/5 and L5/S1;  Surgeon: Merly Wilcox MD;  Location: [facility] PAIN MGT;  Service: Pain Management;  Laterality: Bilateral;    INJECTION OF JOINT Bilateral 7/27/2020    Procedure: INJECTION, JOINT, BILATERAL GREATER TROCHANTERIC BURSA;  Surgeon: Merly Wilcox MD;  Location: [facility] PAIN MGT;  Service: Pain Management;  Laterality: Bilateral;    INJECTION OF JOINT Bilateral 4/26/2021    Procedure: INJECTION, JOINT, HIP;  Surgeon: Merly Wilcox MD;  Location: [facility] PAIN MGT;  Service: Pain Management;  Laterality: Bilateral;  consent needed    INJECTION OF STEROID Right 11/15/2021    Procedure: INJECTION, STEROID RIGHT MIDDLE AND SAMLL FINGER;  Surgeon: Florecita Da Silva MD;  Location: [facility];  Service: Orthopedics;  Laterality: Right;    MASTECTOMY, PARTIAL Left 7/12/2018    Procedure: MASTECTOMY, PARTIAL-W/SEED LOCALIZATION;   Surgeon: Amanda Caballero MD;  Location: [facility] OR 2ND FLR;  Service: General;  Laterality: Left;    CT REMOVAL OF OVARY/TUBE(S)  02/23/2018    Robotic Assisted    ROTATOR CUFF REPAIR Right     rotator cuff repair right shoulder    TONSILLECTOMY      TRANSFORAMINAL EPIDURAL INJECTION OF STEROID Bilateral 6/21/2021    Procedure: INJECTION, STEROID, EPIDURAL, TRANSFORAMINAL APPROACH  bilateral L4/5 TF ASHUTOSH;  Surgeon: Merly Wilcox MD;  Location: [facility] PAIN MGT;  Service: Pain Management;  Laterality: Bilateral;  consent needed    TRANSFORAMINAL EPIDURAL INJECTION OF STEROID Bilateral 1/3/2022    Procedure: INJECTION, STEROID, EPIDURAL, TRANSFORAMINAL APPROACH Bilateral L4/5 Needs consent;  Surgeon: Merly Wilcox MD;  Location: [facility] PAIN MGT;  Service: Pain Management;  Laterality: Bilateral;    TRANSFORAMINAL EPIDURAL INJECTION OF STEROID Bilateral 8/9/2022    Procedure: INJECTION, STEROID, EPIDURAL, TRANSFORAMINAL APPROACH, BILATERAL L4-L5   MEDICALLY URGENT;  Surgeon: Merly Wilcox MD;  Location: [facility] PAIN MGT;  Service: Pain Management;  Laterality: Bilateral;    TRANSFORAMINAL EPIDURAL INJECTION OF STEROID Left 11/17/2022    Procedure: INJECTION, STEROID, EPIDURAL, TRANSFORAMINAL APPROACH LEFT L3-L4 AND L4-L5 CONTRAST;  Surgeon: Merly Wilcox MD;  Location: [facility] PAIN MGT;  Service: Pain Management;  Laterality: Left;    TRANSFORAMINAL EPIDURAL INJECTION OF STEROID Bilateral 7/31/2023    Procedure: INJECTION, STEROID, EPIDURAL, TRANSFORAMINAL APPROACH, BILATERAL L5/S1  sooner date;  Surgeon: Merly Wilcox MD;  Location: [facility] PAIN MGT;  Service: Pain Management;  Laterality: Bilateral;    TRIGGER FINGER RELEASE Left 11/15/2021    Procedure: RELEASE, TRIGGER FINGER, LEFT MIDDLE AND RING;  Surgeon: Florecita Da Silva MD;  Location: [facility] OR;  Service: Orthopedics;  Laterality: Left;       Review of patient's allergies indicates:   Allergen Reactions    Levaquin [levofloxacin] Itching    Penicillins Itching       No  current facility-administered medications on file prior to encounter.     Current Outpatient Medications on File Prior to Encounter   Medication Sig    acetaminophen (TYLENOL) 650 MG TbSR Take 650 mg by mouth every 8 (eight) hours as needed.    alpha-D-galactosidase (BEANO ORAL) Take by mouth.    aspirin (ECOTRIN) 81 MG EC tablet Take 81 mg by mouth once daily.    atorvastatin (LIPITOR) 20 MG tablet Take 1 tablet (20 mg total) by mouth every evening.    benazepriL (LOTENSIN) 40 MG tablet Take 1 tablet (40 mg total) by mouth once daily.    BREO ELLIPTA 100-25 mcg/dose diskus inhaler Inhale 1 puff into the lungs.    calcium-vitamin D 250-100 mg-unit per tablet Take 1 tablet by mouth 2 (two) times daily.    CETIRIZINE HCL (ZYRTEC ORAL) Take 10 mg by mouth nightly as needed.     citalopram (CELEXA) 20 MG tablet Take 1 tablet (20 mg total) by mouth every evening.    doxycycline (VIBRA-TABS) 100 MG tablet Take 100 mg by mouth 2 (two) times daily.    felodipine (PLENDIL) 10 MG 24 hr tablet Take 1 tablet (10 mg total) by mouth 2 (two) times a day.    gabapentin (NEURONTIN) 100 MG capsule Take 1 capsule (100 mg total) by mouth every evening.    hydrALAZINE (APRESOLINE) 25 MG tablet Take 1 tablet (25 mg total) by mouth every 12 (twelve) hours.    hydrocortisone 2.5 % cream Apply topically 2 (two) times daily.    INCRUSE ELLIPTA 62.5 mcg/actuation inhalation capsule Inhale into the lungs.    ipratropium-albuteroL (COMBIVENT RESPIMAT)  mcg/actuation inhaler Inhale 1 puff into the lungs.    ketoprofen 10 % CrPk Apply topically.    letrozole (FEMARA) 2.5 mg Tab Take 1 tablet (2.5 mg total) by mouth once daily.    melatonin 10 mg Tab Take by mouth every evening.    montelukast (SINGULAIR) 10 mg tablet Take 10 mg by mouth every evening.    multivitamin (THERAGRAN) per tablet Take 1 tablet by mouth once daily.    polyethylene glycol 3350 (MIRALAX ORAL) Take by mouth once daily.    predniSONE (DELTASONE)  10 MG tablet Take 10 tablets by mouth 3 (three) times daily.    torsemide (DEMADEX) 20 MG Tab TAKE 1 TABLET(20 MG) BY MOUTH EVERY DAY    traMADoL (ULTRAM) 50 mg tablet Take 1 tablet (50 mg total) by mouth every 8 (eight) hours as needed for Pain.    triamcinolone acetonide 0.1% (KENALOG) 0.1 % cream AAA bid.  Spot treatment as needed    triamcinolone acetonide 0.1% (KENALOG) 0.1 % paste APPLY SPARINGLY FOUR TIMES DAILY     Family History       Problem Relation (Age of Onset)    Breast cancer Sister (66), Maternal Grandmother (60), Maternal Aunt (83)    Cancer Brother    Heart attack Father, Brother (58), Brother (52)    Heart failure Mother    Kidney failure Mother    Pulmonary embolism Brother (45)          Tobacco Use    Smoking status: Former     Current packs/day: 1.00     Average packs/day: 1 pack/day for 40.0 years (40.0 ttl pk-yrs)     Types: Cigarettes    Smokeless tobacco: Never    Tobacco comments:     Smoked >1 ppd for at least 40 years, quit around 1995   Substance and Sexual Activity    Alcohol use: Yes     Comment: occasional glass of wine or cocktail    Drug use: No    Sexual activity: Yes     Partners: Male     Review of Systems   Constitutional:  Positive for fatigue. Negative for chills and fever.   Respiratory:  Positive for cough (nonproductive) and shortness of breath. Negative for chest tightness.         Chest congestion   Cardiovascular:  Negative for chest pain and leg swelling.   Gastrointestinal:  Positive for nausea. Negative for abdominal pain.   Genitourinary:  Negative for dysuria.   Neurological:  Negative for dizziness and weakness.     Objective:     Vital Signs (Most Recent):  Temp: 99.1 °F (37.3 °C) (11/08/23 1647)  Pulse: 73 (11/09/23 0017)  Resp: 20 (11/09/23 0017)  BP: 129/75 (11/09/23 0017)  SpO2: (!) 94 % (11/09/23 0017) Vital Signs (24h Range):  Temp:  [99.1 °F (37.3 °C)] 99.1 °F (37.3 °C)  Pulse:  [64-86] 73  Resp:  [15-27] 20  SpO2:  [80 %-98 %] 94 %  BP:  (109-179)/(48-77) 129/75     Weight: 67.6 kg (149 lb)  Body mass index is 25.58 kg/m².     Physical Exam  Vitals and nursing note reviewed.   Constitutional:       General: She is not in acute distress.     Appearance: She is well-developed.      Comments: No acute distress, speaking in complete sentences   HENT:      Head: Normocephalic and atraumatic.      Nose:      Comments: NC in place  Eyes:      Pupils: Pupils are equal, round, and reactive to light.   Cardiovascular:      Rate and Rhythm: Normal rate and regular rhythm.   Pulmonary:      Effort: Pulmonary effort is normal. No respiratory distress.      Breath sounds: Wheezing (expiratory) present.      Comments: Decreased breath sounds throughout  Abdominal:      Palpations: Abdomen is soft.      Tenderness: There is no abdominal tenderness.   Musculoskeletal:         General: No tenderness.   Skin:     General: Skin is warm and dry.   Neurological:      Mental Status: She is alert and oriented to person, place, and time.   Psychiatric:         Behavior: Behavior normal.              CRANIAL NERVES     CN III, IV, VI   Pupils are equal, round, and reactive to light.       Significant Labs: All pertinent labs within the past 24 hours have been reviewed.  BMP:   Recent Labs   Lab 11/08/23  1705   GLU 97      K 3.5      CO2 26   BUN 12   CREATININE 1.0   CALCIUM 9.8     CBC:   Recent Labs   Lab 11/08/23  1705   WBC 15.88*   HGB 13.2   HCT 41.1          Significant Imaging: I have reviewed all pertinent imaging results/findings within the past 24 hours.    Imaging Results               CTA Chest Non-Coronary (PE Studies) (Final result)  Result time 11/08/23 22:24:36      Final result by Farhad Gamez MD (11/08/23 22:24:36)                   Impression:      Motion and artifact limited study.    No convincing evidence of sizable acute pulmonary embolus on this limited quality study.  Further evaluation/follow-up as warranted  clinically.    Pulmonary emphysema and possible mild fibrotic changes with superimposed interstitial opacities in the bilateral lower lobes.  Suggest correlation for pneumonitis or other infectious/inflammatory process.  Probable retained secretions in the right mainstem bronchus and lower trachea.    Possible subcentimeter solid mass or complex cyst in the upper pole of the right kidney.  Underlying renal cell carcinoma is not excluded on this limited quality exam.  Short-term imaging follow-up could be considered if clinically appropriate.    Multiple additional findings as above.    This report was flagged in Epic as abnormal.    Electronically signed by resident: Re Keenan  Date:    11/08/2023  Time:    20:18    Electronically signed by: Farhad Gamez MD  Date:    11/08/2023  Time:    22:24               Narrative:    EXAMINATION:  CT ABDOMEN PELVIS WITH IV CONTRAST; CTA CHEST NON CORONARY (PE STUDIES)    CLINICAL HISTORY:  Abdominal pain, acute, nonlocalized;; Pulmonary embolism (PE) suspected, positive D-dimer;    TECHNIQUE:  Low dose axial images, sagittal and coronal reformations were obtained from the thoracic inlet to the pubic symphysis following the IV administration of 100 mL of Omnipaque 350 .  Oral contrast was not given. CTA chest obtained utilizing PE protocol with MIP reformats. CT abdomen pelvis obtained in the portal venous phase as a separate acquisition.    COMPARISON:  CT chest abdomen pelvis 01/23/2018.    FINDINGS:  CTA CHEST:    Evaluation is limited by extensive streak artifact due to the patient's arms overlying the field of view.  Exam quality also limited by motion.    Thoracic soft tissues: Left thyroid gland 1.7 cm heterogeneous nodule with coarse calcification.    Heart: Significant coronary artery calcification.  Tortuous thoracic aorta with moderate to advanced atherosclerotic calcification.  Aberrant right subclavian artery.    Pulmonary vasculature: No filling defect of central  pulmonary arteries.  Evaluation of segmental/subsegmental arteries is limited due to beam hardening artifact, extensive motion, and suboptimal contrast bolus timing.    Mediastinum/hilum: Unremarkable.    Airway/esophagus: Probable retained secretions in the lower trachea extending into the right mainstem bronchus.  Evaluation of smaller airways significantly limited by motion.    Lungs: Evaluation limited due to motion artifact.  Panlobular emphysema more prominent compared to prior.  Trace pleural effusions.  Possible mild fibrotic changes in the lungs most notable in the anterior aspect of the left upper lobe and posteroinferior left lower lobe.  Radiation changes included in the differential.  Interstitial opacities in the superior aspect of the left lower lobe.  Minimal interstitial densities in the right lower lobe.  No pneumothorax.    CT ABDOMEN PELVIS WITH CONTRAST:    Liver: Similar in size and contour when compared with the prior study.  No worrisome liver mass identified on this single phase study..    Gallbladder: Cholecystectomy.    Bile Ducts: Intrahepatic ductal dilatation more prominent compared to prior.  Torturous prominent common bile duct.  Distal common bile duct measures up to 1.2 cm in diameter, mildly worse.  This may represent an incidental finding in this patient with normal serum bilirubin level post cholecystectomy.    Spleen: No acute abnormality.    Pancreas: Atrophy with no acute abnormality.    Adrenals: Unremarkable.    Genitourinary: Multiple lateral renal cysts.  Detailed evaluation of bilateral renal lesions limited by extensive beam hardening artifact to the patient's arms overlying the field of view on this single phase study.  However, there is a subcentimeter probable enhancing lesion in the upper pole of the right kidney (series 3, image 48 and series 604, image 60).  Punctate right kidney nonobstructing calculi.  Bilateral ureters are normal in course and caliber.  No  hydroureteronephrosis.  Bladder demonstrates smooth contours without bladder wall thickening.    Reproductive organs: Uterus is surgically absent..    GI tract/Mesentery: Stomach is not significantly distended.  No small bowel obstruction.  Extensive colonic diverticulosis without convincing evidence of acute diverticulitis.  Appendix is normal.  Nonspecific hyperdense stool in the right hemicolon.    Peritoneal Space: No abdominopelvic ascites or intraperitoneal free air.    Retroperitoneum: No significant adenopathy.    Vasculature: Normal caliber of abdominal aorta with significant calcified atherosclerosis.  Aberrant right subclavian artery.  Portal vein is patent.  No portal venous gas.    Thoracic/Abdominal wall: Nodular densities in the left breast with overlying asymmetric skin thickening, similar when compared with prior study dated 01/23/2018.  Surgical clips in the left axillary region and chest wall.  Findings could be related to patient's history of prior breast malignancy though correlation with physical exam and continued follow-up recommended as clinically appropriate.    Bones: Severe chronic T12 compression fracture with retropulsion into the central canal and probable kyphoplasty changes.  Chronic compression fracture deformity of the L3 vertebral body.  Grade 2 anterolisthesis of L4 with respect to L5, potentially related to facet arthropathy.  Degenerative changes throughout the spine.  Transitional lumbosacral vertebral body at L5 with pseudoarticulation of the bilateral transverse processes with the sacrum.  Significant degenerative changes in both hips.  Degenerative changes in the right shoulder.  Old bilateral rib fractures.  No acute osseous abnormality.                                        CT Abdomen Pelvis With IV Contrast (Final result)  Result time 11/08/23 22:24:36      Final result by Farhad Gamez MD (11/08/23 22:24:36)                   Impression:      Motion and artifact  limited study.    No convincing evidence of sizable acute pulmonary embolus on this limited quality study.  Further evaluation/follow-up as warranted clinically.    Pulmonary emphysema and possible mild fibrotic changes with superimposed interstitial opacities in the bilateral lower lobes.  Suggest correlation for pneumonitis or other infectious/inflammatory process.  Probable retained secretions in the right mainstem bronchus and lower trachea.    Possible subcentimeter solid mass or complex cyst in the upper pole of the right kidney.  Underlying renal cell carcinoma is not excluded on this limited quality exam.  Short-term imaging follow-up could be considered if clinically appropriate.    Multiple additional findings as above.    This report was flagged in Epic as abnormal.    Electronically signed by resident: Re Keenan  Date:    11/08/2023  Time:    20:18    Electronically signed by: Farhad Gamez MD  Date:    11/08/2023  Time:    22:24               Narrative:    EXAMINATION:  CT ABDOMEN PELVIS WITH IV CONTRAST; CTA CHEST NON CORONARY (PE STUDIES)    CLINICAL HISTORY:  Abdominal pain, acute, nonlocalized;; Pulmonary embolism (PE) suspected, positive D-dimer;    TECHNIQUE:  Low dose axial images, sagittal and coronal reformations were obtained from the thoracic inlet to the pubic symphysis following the IV administration of 100 mL of Omnipaque 350 .  Oral contrast was not given. CTA chest obtained utilizing PE protocol with MIP reformats. CT abdomen pelvis obtained in the portal venous phase as a separate acquisition.    COMPARISON:  CT chest abdomen pelvis 01/23/2018.    FINDINGS:  CTA CHEST:    Evaluation is limited by extensive streak artifact due to the patient's arms overlying the field of view.  Exam quality also limited by motion.    Thoracic soft tissues: Left thyroid gland 1.7 cm heterogeneous nodule with coarse calcification.    Heart: Significant coronary artery calcification.  Tortuous thoracic aorta  with moderate to advanced atherosclerotic calcification.  Aberrant right subclavian artery.    Pulmonary vasculature: No filling defect of central pulmonary arteries.  Evaluation of segmental/subsegmental arteries is limited due to beam hardening artifact, extensive motion, and suboptimal contrast bolus timing.    Mediastinum/hilum: Unremarkable.    Airway/esophagus: Probable retained secretions in the lower trachea extending into the right mainstem bronchus.  Evaluation of smaller airways significantly limited by motion.    Lungs: Evaluation limited due to motion artifact.  Panlobular emphysema more prominent compared to prior.  Trace pleural effusions.  Possible mild fibrotic changes in the lungs most notable in the anterior aspect of the left upper lobe and posteroinferior left lower lobe.  Radiation changes included in the differential.  Interstitial opacities in the superior aspect of the left lower lobe.  Minimal interstitial densities in the right lower lobe.  No pneumothorax.    CT ABDOMEN PELVIS WITH CONTRAST:    Liver: Similar in size and contour when compared with the prior study.  No worrisome liver mass identified on this single phase study..    Gallbladder: Cholecystectomy.    Bile Ducts: Intrahepatic ductal dilatation more prominent compared to prior.  Torturous prominent common bile duct.  Distal common bile duct measures up to 1.2 cm in diameter, mildly worse.  This may represent an incidental finding in this patient with normal serum bilirubin level post cholecystectomy.    Spleen: No acute abnormality.    Pancreas: Atrophy with no acute abnormality.    Adrenals: Unremarkable.    Genitourinary: Multiple lateral renal cysts.  Detailed evaluation of bilateral renal lesions limited by extensive beam hardening artifact to the patient's arms overlying the field of view on this single phase study.  However, there is a subcentimeter probable enhancing lesion in the upper pole of the right kidney (series  3, image 48 and series 604, image 60).  Punctate right kidney nonobstructing calculi.  Bilateral ureters are normal in course and caliber.  No hydroureteronephrosis.  Bladder demonstrates smooth contours without bladder wall thickening.    Reproductive organs: Uterus is surgically absent..    GI tract/Mesentery: Stomach is not significantly distended.  No small bowel obstruction.  Extensive colonic diverticulosis without convincing evidence of acute diverticulitis.  Appendix is normal.  Nonspecific hyperdense stool in the right hemicolon.    Peritoneal Space: No abdominopelvic ascites or intraperitoneal free air.    Retroperitoneum: No significant adenopathy.    Vasculature: Normal caliber of abdominal aorta with significant calcified atherosclerosis.  Aberrant right subclavian artery.  Portal vein is patent.  No portal venous gas.    Thoracic/Abdominal wall: Nodular densities in the left breast with overlying asymmetric skin thickening, similar when compared with prior study dated 01/23/2018.  Surgical clips in the left axillary region and chest wall.  Findings could be related to patient's history of prior breast malignancy though correlation with physical exam and continued follow-up recommended as clinically appropriate.    Bones: Severe chronic T12 compression fracture with retropulsion into the central canal and probable kyphoplasty changes.  Chronic compression fracture deformity of the L3 vertebral body.  Grade 2 anterolisthesis of L4 with respect to L5, potentially related to facet arthropathy.  Degenerative changes throughout the spine.  Transitional lumbosacral vertebral body at L5 with pseudoarticulation of the bilateral transverse processes with the sacrum.  Significant degenerative changes in both hips.  Degenerative changes in the right shoulder.  Old bilateral rib fractures.  No acute osseous abnormality.                                       X-Ray Chest 1 View (Final result)  Result time 11/08/23  17:37:57      Final result by Libia Lara MD (11/08/23 17:37:57)                   Impression:      Interstitial prominence increased in the lung bases bilaterally raising question of nonspecific pneumonitis superimposed over chronic changes.      Electronically signed by: Libia Lara  Date:    11/08/2023  Time:    17:37               Narrative:    EXAMINATION:  XR CHEST 1 VIEW    CLINICAL HISTORY:  shortness of breath;    TECHNIQUE:  Single frontal view of the chest was performed.    COMPARISON:  10/12/2016 chest x-ray    FINDINGS:  Cardiac silhouette is stable and nonenlarged.  There is aortic atherosclerosis and tortuosity.  Emphysematous changes noted in the upper lungs.  Interstitial prominence bilaterally is also noted increased since prior exam raising question of pneumonitis with underlying chronic changes suspected.  No pleural effusion is seen or pneumothorax.  Osseous structures appear stable with degenerative changes of the shoulders and spine and remote left rib fracture deformities.                                        Assessment/Plan:     * Chronic obstructive pulmonary disease with acute exacerbation  Acute hypoxemic respiratory failure  Patient's COPD is with exacerbation noted by continued dyspnea and worsening of baseline hypoxia currently.  Patient is currently on COPD Pathway. Continue scheduled inhalers Steroids, Antibiotics and Supplemental oxygen and monitor respiratory status closely.   - AF, VSS  - WBC 15K  - D-dimer 1.09 (age adjusted very unlikely for PE)  - Satting 93-97% on 3L, no oxygen use at home  - CTA Chest showed pulmonary emphysema and possible mild fibrotic changes with superimposed interstitial opacities in the bilateral lower lobes.  Suggest correlation for pneumonitis or other infectious/inflammatory process.  Probable retained secretions in the right mainstem bronchus and lower trachea.  - s/p prednisone 60 mg in ED, will continue Prednisone 40mg PO daily to  complete a 5 day course  - will begin azithromycin and rocephin  - Continue home inhalers  - Duonebs q4h while awake and prn with IS  - supportive care  - wean O2 as tolerated, maintain O2 sats 88-92%    Recent Labs   Lab 11/08/23  1712   PH 7.406   PCO2 45.3*   PO2 30*   HCO3 28.5*   POCSATURATED 56   BE 4*       PAD (peripheral artery disease)  - continue home ASA    Major depressive disorder, recurrent, mild  Patient has recurrent depression which is mild and is currently controlled. Will Continue anti-depressant medications. We will not consult psychiatry at this time. Patient does not display psychosis at this time. Continue to monitor closely and adjust plan of care as needed.  - continue home celexa    Hyperlipidemia  - continue home statin    CKD (chronic kidney disease) stage 2, GFR 60-89 ml/min  Creatine stable for now. BMP reviewed- noted Estimated Creatinine Clearance: 36.1 mL/min (based on SCr of 1 mg/dL). according to latest data. Based on current GFR, CKD stage is stage 3 - GFR 30-59.  Monitor UOP and serial BMP and adjust therapy as needed. Renally dose meds. Avoid nephrotoxic medications and procedures.  - monitor daily labs  - avoid nephrotoxic medications    Malignant neoplasm of upper-outer quadrant of left breast in female, estrogen receptor positive  - hx noted    Chronic pain  - continue home gabapentin and tramadol PRN    Essential hypertension  Chronic  - continue home hydralazine, amlodipine (felodipine at home), and lisinopril ( benazepril not on formulary) titrate as needed  - continue to monitor vitals q4      VTE Risk Mitigation (From admission, onward)         Ordered     heparin (porcine) injection 5,000 Units  Every 8 hours         11/08/23 2252     IP VTE HIGH RISK PATIENT  Once         11/08/23 2252     Place sequential compression device  Until discontinued         11/08/23 2252     IP VTE HIGH RISK PATIENT  Once         11/08/23 2252                     On 11/09/2023, patient  should be placed in hospital observation services under my care in collaboration with Dr. Vasquez Capps.          Sandy Carmona PA-C  Department of Hospital Medicine  [facility]

## 2023-11-09 NOTE — ASSESSMENT & PLAN NOTE
Patient has recurrent depression which is mild and is currently controlled. Will Continue anti-depressant medications. We will not consult psychiatry at this time. Patient does not display psychosis at this time. Continue to monitor closely and adjust plan of care as needed.  - continue home celexa

## 2023-11-09 NOTE — SUBJECTIVE & OBJECTIVE
Past Medical History:   Diagnosis Date    Anxiety     Arthritis     Back pain     Breast cancer 1/17/2018    Breast mass 1/15/2018    Chronic kidney disease, stage 2, mildly decreased GFR     COPD (chronic obstructive pulmonary disease)     emphysema    Depression     Dysuria 1/29/2018    Exudative age-related macular degeneration of left eye with active choroidal neovascularization 8/25/2022    Family history of breast cancer 1/17/2018    GERD (gastroesophageal reflux disease)     Hypertension     Infiltrating ductal carcinoma of breast, left 7/12/2018    Multiple closed fractures of ribs of left side 4/3/2019    Osteoporosis, senile     Ovarian mass 1/15/2018    Pneumonia     S/P robotic assisted laparoscopic BSO (bilateral salpingo-oophorectomy) 2/23/2018       Past Surgical History:   Procedure Laterality Date    AXILLARY NODE DISSECTION Left 7/12/2018    Procedure: LYMPHADENECTOMY, AXILLARY;  Surgeon: Amanda Caballero MD;  Location: [facility] OR [address] [place] [address];  Service: General;  Laterality: Left;    BREAST BIOPSY      BREAST LUMPECTOMY      CATARACT EXTRACTION      CHOLECYSTECTOMY      COLONOSCOPY      ESOPHAGOGASTRODUODENOSCOPY      ESOPHAGOGASTRODUODENOSCOPY N/A 10/14/2019    Procedure: EGD (ESOPHAGOGASTRODUODENOSCOPY);  Surgeon: Davonte Thompson MD;  Location: [address]);  Service: Endoscopy;  Laterality: N/A;  hx of pulmonary hypertension-PA 50     pt requesting ASAP    EYE SURGERY      FIXATION KYPHOPLASTY N/A 11/8/2018    Procedure: Kyphoplasty   T12;  Surgeon: Merly Wilcox MD;  Location: [facility] CATH LAB;  Service: Pain Management;  Laterality: N/A;  T12  Nia Reps e-mailed with date and time    HYSTERECTOMY      INJECTION FOR SENTINEL NODE IDENTIFICATION Left 7/12/2018    Procedure: INJECTION, FOR SENTINEL NODE IDENTIFICATION;  Surgeon: Amanda Caballero MD;  Location: [facility] OR [address] [place] [address];  Service: General;  Laterality: Left;    INJECTION OF FACET JOINT Bilateral 10/15/2018    Procedure: INJECTION, FACET JOINT,  BILATERAL LUMBAR L3-4, 4-5, 5-S1 FACET JOINT INJECTIONS;  Surgeon: Merly Wilcox MD;  Location: [facility]IN [facility]GT;  Service: Pain Management;  Laterality: Bilateral;    INJECTION OF FACET JOINT Bilateral 4/1/2019    Procedure: INJECTION, FACET JOINT, L3-L4,L4-L5,L5-S1;  Surgeon: Merly Wilcox MD;  Location: [facility];  Service: Pain Management;  Laterality: Bilateral;    INJECTION OF FACET JOINT Bilateral 6/20/2019    Procedure: INJECTION, FACET JOINT, L3-L4,L4-L5,L5-S1 NEED CONSENT;  Surgeon: Merly Wilcox MD;  Location: [facility];  Service: Pain Management;  Laterality: Bilateral;    INJECTION OF FACET JOINT Bilateral 7/27/2020    Procedure: INJECTION, FACET JOINT BILATERAL L3/4, L4/5 and L5/S1;  Surgeon: Merly Wilcox MD;  Location: [facility];  Service: Pain Management;  Laterality: Bilateral;    INJECTION OF JOINT Bilateral 7/27/2020    Procedure: INJECTION, JOINT, BILATERAL GREATER TROCHANTERIC BURSA;  Surgeon: Merly Wilcox MD;  Location: [facility];  Service: Pain Management;  Laterality: Bilateral;    INJECTION OF JOINT Bilateral 4/26/2021    Procedure: INJECTION, JOINT, HIP;  Surgeon: Merly Wilcox MD;  Location: [facility];  Service: Pain Management;  Laterality: Bilateral;  consent needed    INJECTION OF STEROID Right 11/15/2021    Procedure: INJECTION, STEROID RIGHT MIDDLE AND SAMLL FINGER;  Surgeon: Florecita Da Silva MD;  Location: [facility] OR;  Service: Orthopedics;  Laterality: Right;    MASTECTOMY, PARTIAL Left 7/12/2018    Procedure: MASTECTOMY, PARTIAL-W/SEED LOCALIZATION;  Surgeon: Amanda Caballero MD;  [facility];  Service: General;  Laterality: Left;    PA REMOVAL OF OVARY/TUBE(S)  02/23/2018    Robotic Assisted    ROTATOR CUFF REPAIR Right     rotator cuff repair right shoulder    TONSILLECTOMY      TRANSFORAMINAL EPIDURAL INJECTION OF STEROID Bilateral 6/21/2021    Procedure: INJECTION, STEROID, EPIDURAL, TRANSFORAMINAL APPROACH  bilateral L4/5 TF ASHUTOSH;  Surgeon: Merly  MD Brenden;  Location: [facility]IN [facility]GT;  Service: Pain Management;  Laterality: Bilateral;  consent needed    TRANSFORAMINAL EPIDURAL INJECTION OF STEROID Bilateral 1/3/2022    Procedure: INJECTION, STEROID, EPIDURAL, TRANSFORAMINAL APPROACH Bilateral L4/5 Needs consent;  Surgeon: Merly Wilcox MD;  Location: [facility]IN [facility]GT;  Service: Pain Management;  Laterality: Bilateral;    TRANSFORAMINAL EPIDURAL INJECTION OF STEROID Bilateral 8/9/2022    Procedure: INJECTION, STEROID, EPIDURAL, TRANSFORAMINAL APPROACH, BILATERAL L4-L5   MEDICALLY URGENT;  Surgeon: Merly Wilcox MD;  Location: [facility];  Service: Pain Management;  Laterality: Bilateral;    TRANSFORAMINAL EPIDURAL INJECTION OF STEROID Left 11/17/2022    Procedure: INJECTION, STEROID, EPIDURAL, TRANSFORAMINAL APPROACH LEFT L3-L4 AND L4-L5 CONTRAST;  Surgeon: Merly Wilcox MD;  Location: [facility];  Service: Pain Management;  Laterality: Left;    TRANSFORAMINAL EPIDURAL INJECTION OF STEROID Bilateral 7/31/2023    Procedure: INJECTION, STEROID, EPIDURAL, TRANSFORAMINAL APPROACH, BILATERAL L5/S1  sooner date;  Surgeon: Merly Wilcox MD;  Location: [facility];  Service: Pain Management;  Laterality: Bilateral;    TRIGGER FINGER RELEASE Left 11/15/2021    Procedure: RELEASE, TRIGGER FINGER, LEFT MIDDLE AND RING;  Surgeon: Florecita Da Silva MD;  Location: [facility] OR;  Service: Orthopedics;  Laterality: Left;       Review of patient's allergies indicates:   Allergen Reactions    Levaquin [levofloxacin] Itching    Penicillins Itching       No current facility-administered medications on file prior to encounter.     Current Outpatient Medications on File Prior to Encounter   Medication Sig    acetaminophen (TYLENOL) 650 MG TbSR Take 650 mg by mouth every 8 (eight) hours as needed.    alpha-D-galactosidase (BEANO ORAL) Take by mouth.    aspirin (ECOTRIN) 81 MG EC tablet Take 81 mg by mouth once daily.    atorvastatin (LIPITOR) 20 MG tablet Take 1 tablet (20 mg total)  by mouth every evening.    benazepriL (LOTENSIN) 40 MG tablet Take 1 tablet (40 mg total) by mouth once daily.    BREO ELLIPTA 100-25 mcg/dose diskus inhaler Inhale 1 puff into the lungs.    calcium-vitamin D 250-100 mg-unit per tablet Take 1 tablet by mouth 2 (two) times daily.    CETIRIZINE HCL (ZYRTEC ORAL) Take 10 mg by mouth nightly as needed.     citalopram (CELEXA) 20 MG tablet Take 1 tablet (20 mg total) by mouth every evening.    doxycycline (VIBRA-TABS) 100 MG tablet Take 100 mg by mouth 2 (two) times daily.    felodipine (PLENDIL) 10 MG 24 hr tablet Take 1 tablet (10 mg total) by mouth 2 (two) times a day.    gabapentin (NEURONTIN) 100 MG capsule Take 1 capsule (100 mg total) by mouth every evening.    hydrALAZINE (APRESOLINE) 25 MG tablet Take 1 tablet (25 mg total) by mouth every 12 (twelve) hours.    hydrocortisone 2.5 % cream Apply topically 2 (two) times daily.    INCRUSE ELLIPTA 62.5 mcg/actuation inhalation capsule Inhale into the lungs.    ipratropium-albuteroL (COMBIVENT RESPIMAT)  mcg/actuation inhaler Inhale 1 puff into the lungs.    ketoprofen 10 % CrPk Apply topically.    letrozole (FEMARA) 2.5 mg Tab Take 1 tablet (2.5 mg total) by mouth once daily.    melatonin 10 mg Tab Take by mouth every evening.    montelukast (SINGULAIR) 10 mg tablet Take 10 mg by mouth every evening.    multivitamin (THERAGRAN) per tablet Take 1 tablet by mouth once daily.    polyethylene glycol 3350 (MIRALAX ORAL) Take by mouth once daily.    predniSONE (DELTASONE) 10 MG tablet Take 10 tablets by mouth 3 (three) times daily.    torsemide (DEMADEX) 20 MG Tab TAKE 1 TABLET(20 MG) BY MOUTH EVERY DAY    traMADoL (ULTRAM) 50 mg tablet Take 1 tablet (50 mg total) by mouth every 8 (eight) hours as needed for Pain.    triamcinolone acetonide 0.1% (KENALOG) 0.1 % cream AAA bid.  Spot treatment as needed    triamcinolone acetonide 0.1% (KENALOG) 0.1 % paste APPLY SPARINGLY FOUR TIMES DAILY     Family History        Problem Relation (Age of Onset)    Breast cancer Sister (66), Maternal Grandmother (60), Maternal Aunt (83)    Cancer Brother    Heart attack Father, Brother (58), Brother (52)    Heart failure Mother    Kidney failure Mother    Pulmonary embolism Brother (45)          Tobacco Use    Smoking status: Former     Current packs/day: 1.00     Average packs/day: 1 pack/day for 40.0 years (40.0 ttl pk-yrs)     Types: Cigarettes    Smokeless tobacco: Never    Tobacco comments:     Smoked >1 ppd for at least 40 years, quit around 1995   Substance and Sexual Activity    Alcohol use: Yes     Comment: occasional glass of wine or cocktail    Drug use: No    Sexual activity: Yes     Partners: Male     Review of Systems   Constitutional:  Positive for fatigue. Negative for chills and fever.   Respiratory:  Positive for cough (nonproductive) and shortness of breath. Negative for chest tightness.         Chest congestion   Cardiovascular:  Negative for chest pain and leg swelling.   Gastrointestinal:  Positive for nausea. Negative for abdominal pain.   Genitourinary:  Negative for dysuria.   Neurological:  Negative for dizziness and weakness.     Objective:     Vital Signs (Most Recent):  Temp: 99.1 °F (37.3 °C) (11/08/23 1647)  Pulse: 73 (11/09/23 0017)  Resp: 20 (11/09/23 0017)  BP: 129/75 (11/09/23 0017)  SpO2: (!) 94 % (11/09/23 0017) Vital Signs (24h Range):  Temp:  [99.1 °F (37.3 °C)] 99.1 °F (37.3 °C)  Pulse:  [64-86] 73  Resp:  [15-27] 20  SpO2:  [80 %-98 %] 94 %  BP: (109-179)/(48-77) 129/75     Weight: 67.6 kg (149 lb)  Body mass index is 25.58 kg/m².     Physical Exam  Vitals and nursing note reviewed.   Constitutional:       General: She is not in acute distress.     Appearance: She is well-developed.      Comments: No acute distress, speaking in complete sentences   HENT:      Head: Normocephalic and atraumatic.      Nose:      Comments: NC in place  Eyes:      Pupils: Pupils are equal, round, and reactive to light.    Cardiovascular:      Rate and Rhythm: Normal rate and regular rhythm.   Pulmonary:      Effort: Pulmonary effort is normal. No respiratory distress.      Breath sounds: Wheezing (expiratory) present.      Comments: Decreased breath sounds throughout  Abdominal:      Palpations: Abdomen is soft.      Tenderness: There is no abdominal tenderness.   Musculoskeletal:         General: No tenderness.   Skin:     General: Skin is warm and dry.   Neurological:      Mental Status: She is alert and oriented to person, place, and time.   Psychiatric:         Behavior: Behavior normal.              CRANIAL NERVES     CN III, IV, VI   Pupils are equal, round, and reactive to light.       Significant Labs: All pertinent labs within the past 24 hours have been reviewed.  BMP:   Recent Labs   Lab 11/08/23  1705   GLU 97      K 3.5      CO2 26   BUN 12   CREATININE 1.0   CALCIUM 9.8     CBC:   Recent Labs   Lab 11/08/23  1705   WBC 15.88*   HGB 13.2   HCT 41.1          Significant Imaging: I have reviewed all pertinent imaging results/findings within the past 24 hours.    Imaging Results               CTA Chest Non-Coronary (PE Studies) (Final result)  Result time 11/08/23 22:24:36      Final result by Farhad Gamez MD (11/08/23 22:24:36)                   Impression:      Motion and artifact limited study.    No convincing evidence of sizable acute pulmonary embolus on this limited quality study.  Further evaluation/follow-up as warranted clinically.    Pulmonary emphysema and possible mild fibrotic changes with superimposed interstitial opacities in the bilateral lower lobes.  Suggest correlation for pneumonitis or other infectious/inflammatory process.  Probable retained secretions in the right mainstem bronchus and lower trachea.    Possible subcentimeter solid mass or complex cyst in the upper pole of the right kidney.  Underlying renal cell carcinoma is not excluded on this limited quality exam.   Short-term imaging follow-up could be considered if clinically appropriate.    Multiple additional findings as above.    This report was flagged in Epic as abnormal.    Electronically signed by resident: Re Keenan  Date:    11/08/2023  Time:    20:18    Electronically signed by: Farhad Gamez MD  Date:    11/08/2023  Time:    22:24               Narrative:    EXAMINATION:  CT ABDOMEN PELVIS WITH IV CONTRAST; CTA CHEST NON CORONARY (PE STUDIES)    CLINICAL HISTORY:  Abdominal pain, acute, nonlocalized;; Pulmonary embolism (PE) suspected, positive D-dimer;    TECHNIQUE:  Low dose axial images, sagittal and coronal reformations were obtained from the thoracic inlet to the pubic symphysis following the IV administration of 100 mL of Omnipaque 350 .  Oral contrast was not given. CTA chest obtained utilizing PE protocol with MIP reformats. CT abdomen pelvis obtained in the portal venous phase as a separate acquisition.    COMPARISON:  CT chest abdomen pelvis 01/23/2018.    FINDINGS:  CTA CHEST:    Evaluation is limited by extensive streak artifact due to the patient's arms overlying the field of view.  Exam quality also limited by motion.    Thoracic soft tissues: Left thyroid gland 1.7 cm heterogeneous nodule with coarse calcification.    Heart: Significant coronary artery calcification.  Tortuous thoracic aorta with moderate to advanced atherosclerotic calcification.  Aberrant right subclavian artery.    Pulmonary vasculature: No filling defect of central pulmonary arteries.  Evaluation of segmental/subsegmental arteries is limited due to beam hardening artifact, extensive motion, and suboptimal contrast bolus timing.    Mediastinum/hilum: Unremarkable.    Airway/esophagus: Probable retained secretions in the lower trachea extending into the right mainstem bronchus.  Evaluation of smaller airways significantly limited by motion.    Lungs: Evaluation limited due to motion artifact.  Panlobular emphysema more prominent  compared to prior.  Trace pleural effusions.  Possible mild fibrotic changes in the lungs most notable in the anterior aspect of the left upper lobe and posteroinferior left lower lobe.  Radiation changes included in the differential.  Interstitial opacities in the superior aspect of the left lower lobe.  Minimal interstitial densities in the right lower lobe.  No pneumothorax.    CT ABDOMEN PELVIS WITH CONTRAST:    Liver: Similar in size and contour when compared with the prior study.  No worrisome liver mass identified on this single phase study..    Gallbladder: Cholecystectomy.    Bile Ducts: Intrahepatic ductal dilatation more prominent compared to prior.  Torturous prominent common bile duct.  Distal common bile duct measures up to 1.2 cm in diameter, mildly worse.  This may represent an incidental finding in this patient with normal serum bilirubin level post cholecystectomy.    Spleen: No acute abnormality.    Pancreas: Atrophy with no acute abnormality.    Adrenals: Unremarkable.    Genitourinary: Multiple lateral renal cysts.  Detailed evaluation of bilateral renal lesions limited by extensive beam hardening artifact to the patient's arms overlying the field of view on this single phase study.  However, there is a subcentimeter probable enhancing lesion in the upper pole of the right kidney (series 3, image 48 and series 604, image 60).  Punctate right kidney nonobstructing calculi.  Bilateral ureters are normal in course and caliber.  No hydroureteronephrosis.  Bladder demonstrates smooth contours without bladder wall thickening.    Reproductive organs: Uterus is surgically absent..    GI tract/Mesentery: Stomach is not significantly distended.  No small bowel obstruction.  Extensive colonic diverticulosis without convincing evidence of acute diverticulitis.  Appendix is normal.  Nonspecific hyperdense stool in the right hemicolon.    Peritoneal Space: No abdominopelvic ascites or intraperitoneal free  air.    Retroperitoneum: No significant adenopathy.    Vasculature: Normal caliber of abdominal aorta with significant calcified atherosclerosis.  Aberrant right subclavian artery.  Portal vein is patent.  No portal venous gas.    Thoracic/Abdominal wall: Nodular densities in the left breast with overlying asymmetric skin thickening, similar when compared with prior study dated 01/23/2018.  Surgical clips in the left axillary region and chest wall.  Findings could be related to patient's history of prior breast malignancy though correlation with physical exam and continued follow-up recommended as clinically appropriate.    Bones: Severe chronic T12 compression fracture with retropulsion into the central canal and probable kyphoplasty changes.  Chronic compression fracture deformity of the L3 vertebral body.  Grade 2 anterolisthesis of L4 with respect to L5, potentially related to facet arthropathy.  Degenerative changes throughout the spine.  Transitional lumbosacral vertebral body at L5 with pseudoarticulation of the bilateral transverse processes with the sacrum.  Significant degenerative changes in both hips.  Degenerative changes in the right shoulder.  Old bilateral rib fractures.  No acute osseous abnormality.                                        CT Abdomen Pelvis With IV Contrast (Final result)  Result time 11/08/23 22:24:36      Final result by Farhad Gamez MD (11/08/23 22:24:36)                   Impression:      Motion and artifact limited study.    No convincing evidence of sizable acute pulmonary embolus on this limited quality study.  Further evaluation/follow-up as warranted clinically.    Pulmonary emphysema and possible mild fibrotic changes with superimposed interstitial opacities in the bilateral lower lobes.  Suggest correlation for pneumonitis or other infectious/inflammatory process.  Probable retained secretions in the right mainstem bronchus and lower trachea.    Possible  subcentimeter solid mass or complex cyst in the upper pole of the right kidney.  Underlying renal cell carcinoma is not excluded on this limited quality exam.  Short-term imaging follow-up could be considered if clinically appropriate.    Multiple additional findings as above.    This report was flagged in Epic as abnormal.    Electronically signed by resident: Re Keenan  Date:    11/08/2023  Time:    20:18    Electronically signed by: Farhad Gamez MD  Date:    11/08/2023  Time:    22:24               Narrative:    EXAMINATION:  CT ABDOMEN PELVIS WITH IV CONTRAST; CTA CHEST NON CORONARY (PE STUDIES)    CLINICAL HISTORY:  Abdominal pain, acute, nonlocalized;; Pulmonary embolism (PE) suspected, positive D-dimer;    TECHNIQUE:  Low dose axial images, sagittal and coronal reformations were obtained from the thoracic inlet to the pubic symphysis following the IV administration of 100 mL of Omnipaque 350 .  Oral contrast was not given. CTA chest obtained utilizing PE protocol with MIP reformats. CT abdomen pelvis obtained in the portal venous phase as a separate acquisition.    COMPARISON:  CT chest abdomen pelvis 01/23/2018.    FINDINGS:  CTA CHEST:    Evaluation is limited by extensive streak artifact due to the patient's arms overlying the field of view.  Exam quality also limited by motion.    Thoracic soft tissues: Left thyroid gland 1.7 cm heterogeneous nodule with coarse calcification.    Heart: Significant coronary artery calcification.  Tortuous thoracic aorta with moderate to advanced atherosclerotic calcification.  Aberrant right subclavian artery.    Pulmonary vasculature: No filling defect of central pulmonary arteries.  Evaluation of segmental/subsegmental arteries is limited due to beam hardening artifact, extensive motion, and suboptimal contrast bolus timing.    Mediastinum/hilum: Unremarkable.    Airway/esophagus: Probable retained secretions in the lower trachea extending into the right mainstem  bronchus.  Evaluation of smaller airways significantly limited by motion.    Lungs: Evaluation limited due to motion artifact.  Panlobular emphysema more prominent compared to prior.  Trace pleural effusions.  Possible mild fibrotic changes in the lungs most notable in the anterior aspect of the left upper lobe and posteroinferior left lower lobe.  Radiation changes included in the differential.  Interstitial opacities in the superior aspect of the left lower lobe.  Minimal interstitial densities in the right lower lobe.  No pneumothorax.    CT ABDOMEN PELVIS WITH CONTRAST:    Liver: Similar in size and contour when compared with the prior study.  No worrisome liver mass identified on this single phase study..    Gallbladder: Cholecystectomy.    Bile Ducts: Intrahepatic ductal dilatation more prominent compared to prior.  Torturous prominent common bile duct.  Distal common bile duct measures up to 1.2 cm in diameter, mildly worse.  This may represent an incidental finding in this patient with normal serum bilirubin level post cholecystectomy.    Spleen: No acute abnormality.    Pancreas: Atrophy with no acute abnormality.    Adrenals: Unremarkable.    Genitourinary: Multiple lateral renal cysts.  Detailed evaluation of bilateral renal lesions limited by extensive beam hardening artifact to the patient's arms overlying the field of view on this single phase study.  However, there is a subcentimeter probable enhancing lesion in the upper pole of the right kidney (series 3, image 48 and series 604, image 60).  Punctate right kidney nonobstructing calculi.  Bilateral ureters are normal in course and caliber.  No hydroureteronephrosis.  Bladder demonstrates smooth contours without bladder wall thickening.    Reproductive organs: Uterus is surgically absent..    GI tract/Mesentery: Stomach is not significantly distended.  No small bowel obstruction.  Extensive colonic diverticulosis without convincing evidence of acute  diverticulitis.  Appendix is normal.  Nonspecific hyperdense stool in the right hemicolon.    Peritoneal Space: No abdominopelvic ascites or intraperitoneal free air.    Retroperitoneum: No significant adenopathy.    Vasculature: Normal caliber of abdominal aorta with significant calcified atherosclerosis.  Aberrant right subclavian artery.  Portal vein is patent.  No portal venous gas.    Thoracic/Abdominal wall: Nodular densities in the left breast with overlying asymmetric skin thickening, similar when compared with prior study dated 01/23/2018.  Surgical clips in the left axillary region and chest wall.  Findings could be related to patient's history of prior breast malignancy though correlation with physical exam and continued follow-up recommended as clinically appropriate.    Bones: Severe chronic T12 compression fracture with retropulsion into the central canal and probable kyphoplasty changes.  Chronic compression fracture deformity of the L3 vertebral body.  Grade 2 anterolisthesis of L4 with respect to L5, potentially related to facet arthropathy.  Degenerative changes throughout the spine.  Transitional lumbosacral vertebral body at L5 with pseudoarticulation of the bilateral transverse processes with the sacrum.  Significant degenerative changes in both hips.  Degenerative changes in the right shoulder.  Old bilateral rib fractures.  No acute osseous abnormality.                                       X-Ray Chest 1 View (Final result)  Result time 11/08/23 17:37:57      Final result by Libia Lara MD (11/08/23 17:37:57)                   Impression:      Interstitial prominence increased in the lung bases bilaterally raising question of nonspecific pneumonitis superimposed over chronic changes.      Electronically signed by: Libia Lara  Date:    11/08/2023  Time:    17:37               Narrative:    EXAMINATION:  XR CHEST 1 VIEW    CLINICAL HISTORY:  shortness of  breath;    TECHNIQUE:  Single frontal view of the chest was performed.    COMPARISON:  10/12/2016 chest x-ray    FINDINGS:  Cardiac silhouette is stable and nonenlarged.  There is aortic atherosclerosis and tortuosity.  Emphysematous changes noted in the upper lungs.  Interstitial prominence bilaterally is also noted increased since prior exam raising question of pneumonitis with underlying chronic changes suspected.  No pleural effusion is seen or pneumothorax.  Osseous structures appear stable with degenerative changes of the shoulders and spine and remote left rib fracture deformities.

## 2023-11-09 NOTE — ASSESSMENT & PLAN NOTE
Acute hypoxemic respiratory failure  Patient's COPD is with exacerbation noted by continued dyspnea and worsening of baseline hypoxia currently.  Patient is currently on COPD Pathway. Continue scheduled inhalers Steroids, Antibiotics and Supplemental oxygen and monitor respiratory status closely.   - AF, VSS  - WBC 15K  - D-dimer 1.09 (age adjusted very unlikely for PE)  - Satting 93-97% on 3L, no oxygen use at home  - CTA Chest showed pulmonary emphysema and possible mild fibrotic changes with superimposed interstitial opacities in the bilateral lower lobes.  Suggest correlation for pneumonitis or other infectious/inflammatory process.  Probable retained secretions in the right mainstem bronchus and lower trachea.  - s/p prednisone 60 mg in ED, will continue Prednisone 40mg PO daily to complete a 5 day course  - will begin azithromycin and rocephin  - Continue home inhalers  - Duonebs q4h while awake and prn with IS  - supportive care  - wean O2 as tolerated, maintain O2 sats 88-92%    Recent Labs   Lab 11/08/23  1712   PH 7.406   PCO2 45.3*   PO2 30*   HCO3 28.5*   POCSATURATED 56   BE 4*

## 2023-11-09 NOTE — HPI
Anahi Cook is an 89 y/o female w/ PMHx HTN, COPD, GERD, PAD, depression, arthritis, and breast cancer who presents to the ED for worsening shortness of breath. Patient's daughter and granddaughter at bedside providing help with history. Patient reports constant SOB but has worsened since Monday evening. Saw her pulmonologist via telehealth yesterday to where patient was prescribed cefdinir and duo nebs. Albuterol was out of stock at the pharmacy and O2 sats were dropping to the 80s (usually > 90%), so the daughter decided to bring her to the ED. Currently on 3L NC with improved symptoms. Denies home O2 use. Patient reports nausea and vomiting clear fluid yesterday, but denies any hematemesis. Reports a nonproductive cough and chest congestion that has worsened and sometimes wakes her up at night. Has decreased appetite and mild abdominal pain in the left upper quadrant since Monday. Denied any diarrhea, constipation, chest pain, fever, chills, palpitations, leg swelling or syncope.     In the ED, WBC 15.88. , D dimer 1.09. Troponin negative. eGFR 53.9 and albumin 3.3. VBG did not show hypercapnia. CTA showed no evidence of pulmonary embolism. Pulmonary emphysema and possible mild fibrotic changes with superimposed interstitial opacities in the bilateral lower lobes. CT abd/pelvis showed possible subcentimeter solid mass or complex cyst in the upper pole of the right kidney. CXR showed interstitial prominence increased in the lung bases bilaterally raising question of nonspecific pneumonitis superimposed over chronic changes. EKG sinus rhythm with incomplete RBBB (seen previously). Viral panel negative. HIV and hep C negative. Given duo nebs x3, prednisone 60 mg x1, doxycycline 100 mg x1, and omnipaque x1.

## 2023-11-09 NOTE — PLAN OF CARE
[facility] - Emergency Dept  Initial Discharge Assessment       Primary Care Provider: Minerva Mathias MD    Admission Diagnosis: PNA (pneumonia) [J18.9]    Admission Date: 11/8/2023  Expected Discharge Date: 11-      Sw met pt and pt's granddaughter Ann at bedside.  Pt lives with her daughter, and has a large support system. Ann requested resources to assist pt with  pt's adl's. Sw provided pt with resources for [facility] and [facility].   Pt has no additional case management needs at this time.     Transition of Care Barriers: (P) None    Payor: [employer] MEDICARE / Plan: [employer] 65 / Product Type: Medicare Advantage /     Extended Emergency Contact Information  Primary Emergency Contact: Patience Brandt  Address: [address] [place] [address] [place]  Mobile Phone: [phone]  Relation: Daughter  Secondary Emergency Contact: Anastasia Brandt   [place]  Mobile Phone: [phone]  Relation: Grandchild    Discharge Plan A: (P) Home with family  Discharge Plan B: (P) Home      [employer] #[id number] [address] [place] [address]  [address]  [facility] [address]  Phone: [phone] Fax: [phone]      Initial Assessment (most recent)       Adult Discharge Assessment - 11/09/23 0019          Discharge Assessment    Assessment Type Discharge Planning Assessment (P)      Confirmed/corrected address, phone number and insurance Yes (P)      Confirmed Demographics Correct on Facesheet (P)      Source of Information patient (P)      Communicated ASIM with patient/caregiver Date not available/Unable to determine (P)      People in Home child(dom), dependent (P)      Do you expect to return to your current living situation? Yes (P)      Do you have help at home or someone to help you manage your care at home? Yes (P)      Who are your  caregiver(s) and their phone number(s)? Patience Brandt   and Anastasia Brandt  [phone] (P)      Prior to hospitilization cognitive status: Alert/Oriented (P)      Current cognitive status: Alert/Oriented (P)      Walking or Climbing Stairs ambulation difficulty, requires equipment (P)      Mobility Management walker and raised commode (P)      Dressing/Bathing -- (P)    none    Home Accessibility wheelchair accessible (P)      Home Layout Able to live on 1st floor (P)      Equipment Currently Used at Home walker, standard;commode (P)      Readmission within 30 days? No (P)      Patient currently being followed by outpatient case management? No (P)      Do you currently have service(s) that help you manage your care at home? No (P)      Is the pt/caregiver preference to resume services with current agency Yes (P)      Do you take prescription medications? Yes (P)      Do you have prescription coverage? Yes (P)      Do you have any problems affording any of your prescribed medications? No (P)      Is the patient taking medications as prescribed? yes (P)      How do you get to doctors appointments? car, drives self (P)      Are you on dialysis? No (P)      Do you take coumadin? No (P)      DME Needed Upon Discharge  commode (P)      Discharge Plan discussed with: Patient (P)      Transition of Care Barriers None (P)      Discharge Plan A Home with family (P)      Discharge Plan B Home (P)         Physical Activity    On average, how many days per week do you engage in moderate to strenuous exercise (like a brisk walk)? 0 days (P)      On average, how many minutes do you engage in exercise at this level? 0 min (P)         Financial Resource Strain    How hard is it for you to pay for the very basics like food, housing, medical care, and heating? Not hard at all (P)         Housing Stability    In the last 12 months, was there a time when you were not able to pay the mortgage or rent on time? No (P)      In  the last 12 months, how many places have you lived? 1 (P)      In the last 12 months, was there a time when you did not have a steady place to sleep or slept in a shelter (including now)? No (P)         Transportation Needs    In the past 12 months, has lack of transportation kept you from medical appointments or from getting medications? No (P)      In the past 12 months, has lack of transportation kept you from meetings, work, or from getting things needed for daily living? No (P)         Food Insecurity    Within the past 12 months, you worried that your food would run out before you got the money to buy more. Never true (P)      Within the past 12 months, the food you bought just didn't last and you didn't have money to get more. Never true (P)         Stress    Do you feel stress - tense, restless, nervous, or anxious, or unable to sleep at night because your mind is troubled all the time - these days? Not at all (P)         Social Connections    In a typical week, how many times do you talk on the phone with family, friends, or neighbors? More than three times a week (P)      How often do you get together with friends or relatives? More than three times a week (P)      How often do you attend Jainism or Zoroastrianism services? Never (P)      Do you belong to any clubs or organizations such as Jainism groups, unions, fraternal or athletic groups, or school groups? No (P)      How often do you attend meetings of the clubs or organizations you belong to? Never (P)      Are you , , , , never , or living with a partner?  (P)         Alcohol Use    Q1: How often do you have a drink containing alcohol? Never (P)      Q2: How many drinks containing alcohol do you have on a typical day when you are drinking? Patient does not drink (P)      Q3: How often do you have six or more drinks on one occasion? Never (P)                       Raul Leblanc LMSW  Case Management  Emergency  [facility]  [phone]

## 2023-11-09 NOTE — PLAN OF CARE
Problem: Infection  Goal: Absence of Infection Signs and Symptoms  Outcome: Ongoing, Progressing     Problem: Fall Injury Risk  Goal: Absence of Fall and Fall-Related Injury  Outcome: Ongoing, Progressing     Problem: Skin Injury Risk Increased  Goal: Skin Health and Integrity  Outcome: Ongoing, Progressing     Problem: Hypertension Acute  Goal: Blood Pressure Within Desired Range  Outcome: Ongoing, Progressing     Problem: Heart Failure Comorbidity  Goal: Maintenance of Heart Failure Symptom Control  Outcome: Ongoing, Progressing

## 2023-11-09 NOTE — ASSESSMENT & PLAN NOTE
Chronic  - continue home hydralazine, amlodipine (felodipine at home), and lisinopril ( benazepril not on formulary) titrate as needed  - continue to monitor vitals q4

## 2023-11-09 NOTE — PLAN OF CARE
Problem: Adult Inpatient Plan of Care  Goal: Plan of Care Review  Outcome: Ongoing, Progressing  Goal: Patient-Specific Goal (Individualized)  Outcome: Ongoing, Progressing  Goal: Absence of Hospital-Acquired Illness or Injury  Outcome: Ongoing, Progressing  Goal: Optimal Comfort and Wellbeing  Outcome: Ongoing, Progressing  Goal: Readiness for Transition of Care  Outcome: Ongoing, Progressing     Problem: Adjustment to Illness COPD (Chronic Obstructive Pulmonary Disease)  Goal: Optimal Chronic Illness Coping  Outcome: Ongoing, Progressing     Problem: Functional Ability Impaired COPD (Chronic Obstructive Pulmonary Disease)  Goal: Optimal Level of Functional [place]  Outcome: Ongoing, Progressing     Problem: Infection COPD (Chronic Obstructive Pulmonary Disease)  Goal: Absence of Infection Signs and Symptoms  Outcome: Ongoing, Progressing     Problem: Oral Intake Inadequate COPD (Chronic Obstructive Pulmonary Disease)  Goal: Improved Nutrition Intake  Outcome: Ongoing, Progressing     Problem: Respiratory Compromise COPD (Chronic Obstructive Pulmonary Disease)  Goal: Effective Oxygenation and Ventilation  Outcome: Ongoing, Progressing     Problem: Infection  Goal: Absence of Infection Signs and Symptoms  Outcome: Ongoing, Progressing     Problem: Fall Injury Risk  Goal: Absence of Fall and Fall-Related Injury  Outcome: Ongoing, Progressing     Problem: Skin Injury Risk Increased  Goal: Skin Health and Integrity  Outcome: Ongoing, Progressing     Problem: Hypertension Acute  Goal: Blood Pressure Within Desired Range  Outcome: Ongoing, Progressing     Problem: Heart Failure Comorbidity  Goal: Maintenance of Heart Failure Symptom Control  Outcome: Ongoing, Progressing

## 2023-11-10 ENCOUNTER — TELEPHONE (OUTPATIENT)
Dept: PULMONOLOGY | Facility: CLINIC | Age: 88
End: 2023-11-10

## 2023-11-10 VITALS
HEART RATE: 81 BPM | BODY MASS INDEX: 27.89 KG/M2 | RESPIRATION RATE: 18 BRPM | HEIGHT: 64 IN | TEMPERATURE: 98 F | DIASTOLIC BLOOD PRESSURE: 58 MMHG | OXYGEN SATURATION: 95 % | SYSTOLIC BLOOD PRESSURE: 122 MMHG | WEIGHT: 163.38 LBS

## 2023-11-10 LAB
ALBUMIN SERPL BCP-MCNC: 3 G/DL (ref 3.5–5.2)
ALP SERPL-CCNC: 63 U/L (ref 55–135)
ALT SERPL W/O P-5'-P-CCNC: 15 U/L (ref 10–44)
ANION GAP SERPL CALC-SCNC: 13 MMOL/L (ref 8–16)
ANION GAP SERPL CALC-SCNC: 15 MMOL/L (ref 8–16)
AST SERPL-CCNC: 19 U/L (ref 10–40)
BASOPHILS # BLD AUTO: 0 K/UL (ref 0–0.2)
BASOPHILS NFR BLD: 0 % (ref 0–1.9)
BILIRUB SERPL-MCNC: 0.3 MG/DL (ref 0.1–1)
BUN SERPL-MCNC: 35 MG/DL (ref 8–23)
BUN SERPL-MCNC: 36 MG/DL (ref 8–23)
CALCIUM SERPL-MCNC: 8.2 MG/DL (ref 8.7–10.5)
CALCIUM SERPL-MCNC: 8.3 MG/DL (ref 8.7–10.5)
CHLORIDE SERPL-SCNC: 97 MMOL/L (ref 95–110)
CHLORIDE SERPL-SCNC: 98 MMOL/L (ref 95–110)
CO2 SERPL-SCNC: 23 MMOL/L (ref 23–29)
CO2 SERPL-SCNC: 25 MMOL/L (ref 23–29)
CREAT SERPL-MCNC: 1.8 MG/DL (ref 0.5–1.4)
CREAT SERPL-MCNC: 1.9 MG/DL (ref 0.5–1.4)
DIFFERENTIAL METHOD: ABNORMAL
EOSINOPHIL # BLD AUTO: 0 K/UL (ref 0–0.5)
EOSINOPHIL NFR BLD: 0 % (ref 0–8)
ERYTHROCYTE [DISTWIDTH] IN BLOOD BY AUTOMATED COUNT: 14.5 % (ref 11.5–14.5)
EST. GFR  (NO RACE VARIABLE): 24.9 ML/MIN/1.73 M^2
EST. GFR  (NO RACE VARIABLE): 26.6 ML/MIN/1.73 M^2
GLUCOSE SERPL-MCNC: 132 MG/DL (ref 70–110)
GLUCOSE SERPL-MCNC: 152 MG/DL (ref 70–110)
HCT VFR BLD AUTO: 37.4 % (ref 37–48.5)
HGB BLD-MCNC: 12 G/DL (ref 12–16)
IMM GRANULOCYTES # BLD AUTO: 0.04 K/UL (ref 0–0.04)
IMM GRANULOCYTES NFR BLD AUTO: 0.4 % (ref 0–0.5)
LYMPHOCYTES # BLD AUTO: 0.6 K/UL (ref 1–4.8)
LYMPHOCYTES NFR BLD: 6 % (ref 18–48)
MAGNESIUM SERPL-MCNC: 2.1 MG/DL (ref 1.6–2.6)
MCH RBC QN AUTO: 28.6 PG (ref 27–31)
MCHC RBC AUTO-ENTMCNC: 32.1 G/DL (ref 32–36)
MCV RBC AUTO: 89 FL (ref 82–98)
MONOCYTES # BLD AUTO: 0.8 K/UL (ref 0.3–1)
MONOCYTES NFR BLD: 7.5 % (ref 4–15)
NEUTROPHILS # BLD AUTO: 8.8 K/UL (ref 1.8–7.7)
NEUTROPHILS NFR BLD: 86.1 % (ref 38–73)
NRBC BLD-RTO: 0 /100 WBC
PHOSPHATE SERPL-MCNC: 5.2 MG/DL (ref 2.7–4.5)
PLATELET # BLD AUTO: 260 K/UL (ref 150–450)
PMV BLD AUTO: 11.6 FL (ref 9.2–12.9)
POTASSIUM SERPL-SCNC: 3.6 MMOL/L (ref 3.5–5.1)
POTASSIUM SERPL-SCNC: 3.7 MMOL/L (ref 3.5–5.1)
PROT SERPL-MCNC: 6.3 G/DL (ref 6–8.4)
RBC # BLD AUTO: 4.2 M/UL (ref 4–5.4)
SODIUM SERPL-SCNC: 135 MMOL/L (ref 136–145)
SODIUM SERPL-SCNC: 136 MMOL/L (ref 136–145)
WBC # BLD AUTO: 10.25 K/UL (ref 3.9–12.7)

## 2023-11-10 PROCEDURE — 85025 COMPLETE CBC W/AUTO DIFF WBC: CPT

## 2023-11-10 PROCEDURE — 80048 BASIC METABOLIC PNL TOTAL CA: CPT | Mod: XB

## 2023-11-10 PROCEDURE — 36415 COLL VENOUS BLD VENIPUNCTURE: CPT

## 2023-11-10 PROCEDURE — 84100 ASSAY OF PHOSPHORUS: CPT

## 2023-11-10 PROCEDURE — 25000003 PHARM REV CODE 250

## 2023-11-10 PROCEDURE — 25000242 PHARM REV CODE 250 ALT 637 W/ HCPCS

## 2023-11-10 PROCEDURE — 94640 AIRWAY INHALATION TREATMENT: CPT

## 2023-11-10 PROCEDURE — 94664 DEMO&/EVAL PT USE INHALER: CPT

## 2023-11-10 PROCEDURE — 27000221 HC OXYGEN, UP TO 24 HOURS

## 2023-11-10 PROCEDURE — 80053 COMPREHEN METABOLIC PANEL: CPT

## 2023-11-10 PROCEDURE — 27000646 HC AEROBIKA DEVICE

## 2023-11-10 PROCEDURE — 94761 N-INVAS EAR/PLS OXIMETRY MLT: CPT

## 2023-11-10 PROCEDURE — 99900035 HC TECH TIME PER 15 MIN (STAT)

## 2023-11-10 PROCEDURE — 63600175 PHARM REV CODE 636 W HCPCS

## 2023-11-10 PROCEDURE — 63700000 PHARM REV CODE 250 ALT 637 W/O HCPCS

## 2023-11-10 PROCEDURE — 83735 ASSAY OF MAGNESIUM: CPT

## 2023-11-10 RX ORDER — IPRATROPIUM BROMIDE AND ALBUTEROL SULFATE 2.5; .5 MG/3ML; MG/3ML
3 SOLUTION RESPIRATORY (INHALATION) EVERY 6 HOURS PRN
Qty: 75 ML | Refills: 0 | Status: SHIPPED | OUTPATIENT
Start: 2023-11-10 | End: 2024-11-09

## 2023-11-10 RX ORDER — CEFDINIR 300 MG/1
300 CAPSULE ORAL 2 TIMES DAILY
Qty: 6 CAPSULE | Refills: 0 | Status: SHIPPED | OUTPATIENT
Start: 2023-11-11 | End: 2023-11-19 | Stop reason: ALTCHOICE

## 2023-11-10 RX ORDER — AZITHROMYCIN 250 MG/1
250 TABLET, FILM COATED ORAL DAILY
Qty: 3 TABLET | Refills: 0 | Status: SHIPPED | OUTPATIENT
Start: 2023-11-11 | End: 2023-11-19 | Stop reason: ALTCHOICE

## 2023-11-10 RX ORDER — PREDNISONE 20 MG/1
40 TABLET ORAL DAILY
Qty: 4 TABLET | Refills: 0 | Status: SHIPPED | OUTPATIENT
Start: 2023-11-11 | End: 2023-11-19 | Stop reason: ALTCHOICE

## 2023-11-10 RX ADMIN — PREDNISONE 40 MG: 20 TABLET ORAL at 08:11

## 2023-11-10 RX ADMIN — IPRATROPIUM BROMIDE AND ALBUTEROL SULFATE 3 ML: .5; 3 SOLUTION RESPIRATORY (INHALATION) at 07:11

## 2023-11-10 RX ADMIN — CEFTRIAXONE 1 G: 1 INJECTION, POWDER, FOR SOLUTION INTRAMUSCULAR; INTRAVENOUS at 08:11

## 2023-11-10 RX ADMIN — HYDRALAZINE HYDROCHLORIDE 25 MG: 25 TABLET, FILM COATED ORAL at 08:11

## 2023-11-10 RX ADMIN — IPRATROPIUM BROMIDE AND ALBUTEROL SULFATE 3 ML: .5; 3 SOLUTION RESPIRATORY (INHALATION) at 11:11

## 2023-11-10 RX ADMIN — ASPIRIN 81 MG: 81 TABLET, COATED ORAL at 08:11

## 2023-11-10 RX ADMIN — TORSEMIDE 20 MG: 20 TABLET ORAL at 08:11

## 2023-11-10 RX ADMIN — ACETAMINOPHEN 1000 MG: 500 TABLET ORAL at 03:11

## 2023-11-10 RX ADMIN — ACETAMINOPHEN 1000 MG: 500 TABLET ORAL at 05:11

## 2023-11-10 RX ADMIN — FLUTICASONE FUROATE AND VILANTEROL TRIFENATATE 1 PUFF: 100; 25 POWDER RESPIRATORY (INHALATION) at 07:11

## 2023-11-10 RX ADMIN — LISINOPRIL 40 MG: 20 TABLET ORAL at 08:11

## 2023-11-10 RX ADMIN — TRAMADOL HYDROCHLORIDE 50 MG: 50 TABLET, COATED ORAL at 05:11

## 2023-11-10 RX ADMIN — AZITHROMYCIN 250 MG: 250 TABLET, FILM COATED ORAL at 08:11

## 2023-11-10 RX ADMIN — AMLODIPINE BESYLATE 10 MG: 10 TABLET ORAL at 08:11

## 2023-11-10 RX ADMIN — TRAMADOL HYDROCHLORIDE 50 MG: 50 TABLET, COATED ORAL at 03:11

## 2023-11-10 RX ADMIN — HEPARIN SODIUM 5000 UNITS: 5000 INJECTION INTRAVENOUS; SUBCUTANEOUS at 05:11

## 2023-11-10 RX ADMIN — IPRATROPIUM BROMIDE AND ALBUTEROL SULFATE 3 ML: .5; 3 SOLUTION RESPIRATORY (INHALATION) at 03:11

## 2023-11-10 RX ADMIN — HEPARIN SODIUM 5000 UNITS: 5000 INJECTION INTRAVENOUS; SUBCUTANEOUS at 03:11

## 2023-11-10 RX ADMIN — SODIUM CHLORIDE 1000 ML: 0.9 INJECTION, SOLUTION INTRAVENOUS at 09:11

## 2023-11-10 NOTE — TELEPHONE ENCOUNTER
Spoke with pt daughter, informed her that I can schedule patient appointment with Dr Farias on 12/1/23 for 4:00pm. Pt verbalized that she understand and accepted the appointment.

## 2023-11-10 NOTE — NURSING
Pt  is discharged in stable condition, No sob noted at this time. Tolerates  medications well. IV and  tele removed. Pt to go home with oxygen. Discharge  instructions read and understood.

## 2023-11-10 NOTE — NURSING
INTERNAL MEDICINE        PROGRESS NOTE    Yuridia Mcfarlane is a 60 year old female with RA, admitted with COVID pneumonia and acute hypoxemic respiratory failure.    In ICU, intubated,   Pressors; Sedation  Tube feeds    Patient Active Problem List   Diagnosis   • Pneumonia due to COVID-19 virus   • RA (rheumatoid arthritis) (CMS/HCC)   • Acute respiratory failure with hypoxia (CMS/HCC)   • Hypernatremia   • Acute metabolic encephalopathy     Past Medical History:   Diagnosis Date   • RA (rheumatoid arthritis) (CMS/HCC)      Current Facility-Administered Medications   Medication Dose Route Frequency Provider Last Rate Last Admin   • acetaminophen (TYLENOL) 160 MG/5ML solution 650 mg  650 mg Oral Q4H PRN Talya Lomas MD       • propofol (DIPRIVAN) infusion  0-50 mcg/kg/min (Dosing Weight) Intravenous Continuous Talya Lomas MD 26.9 mL/hr at 12/30/21 0423 50 mcg/kg/min at 12/30/21 0423   • MIDAZolam (VERSED) 100 mg/100 mL in saline infusion  0-10 mg/hr Intravenous Continuous Crissy Aden MD 4 mL/hr at 12/30/21 0300 4 mg/hr at 12/30/21 0300   • piperacillin-tazobactam (ZOSYN) 3.375 g in sodium chloride 0.9 % 100 mL IVPB  3.375 g Intravenous 3 times per day Talya Lomas MD 25 mL/hr at 12/30/21 0541 3.375 g at 12/30/21 0541   • MIDAZolam (VERSED) injection 2 mg  2 mg Intravenous Q1H PRN Talya Lomas MD   2 mg at 12/29/21 1229   • HYDROmorphone 50 mg/100 mL in sodium chloride 0.9 % infusion  0-6 mg/hr Intravenous Continuous Talya Lomas MD 8 mL/hr at 12/30/21 0539 4 mg/hr at 12/30/21 0539   • HYDROmorphone (DILAUDID) bolus from bag 0.5 mg  0.5 mg Intravenous Q2H PRN Talya Lomas MD       • insulin glargine (LANTUS) injection 20 Units  20 Units Subcutaneous 2 times per day Pricila Bess MD   20 Units at 12/29/21 2043   • docusate sodium-sennosides (SENOKOT S) 50-8.6 MG 2 tablet  2 tablet Per NG tube BID Ambika Jack CNP   2 tablet at 12/29/21 2044   • famotidine (PEPCID) tablet 20 mg  20 mg Per NG   Home Oxygen Evaluation    Date Performed: 11/10/2023    1) Patient's Home O2 Sat on room air, while at rest: 80        If O2 sats on room air at rest are 88% or below, patient qualifies. No additional testing needed. Document N/A in steps 2 and 3. If 89% or above, complete steps 2.      2) Patient's O2 Sat on room air while exercisin        If O2 sats on room air while exercising remain 89% or above patient does not qualify, no further testing needed Document N/A in step 3. If O2 sats on room air while exercising are 88% or below, continue to step 3.      3) Patient's O2 Sat while exercising on O2: 92 at 2 LPM         (Must show improvement from #2 for patients to qualify)    If O2 sats improve on oxygen, patient qualifies for portable oxygen. If not, the patient does not qualify.         tube 2 times per day Pricila Bess MD   20 mg at 12/29/21 2044   • NORepinephrine (LEVOPHED) 8 mg/250 mL in sodium chloride 0.9 % infusion  0-30 mcg/min Intravenous Continuous Crissy Aden MD   Completed at 12/29/21 0555   • Potassium Standard Replacement Protocol   Does not apply See Admin Instructions Crissy Aden MD       • Phosphorus Standard Replacement Protocol   Does not apply See Admin Instructions Crissy Aden MD       • insulin regular (human) (HumuLIN R, NovoLIN R) Correction Dose   Subcutaneous 4 times per day Keeley Ambriz NP   9 Units at 12/28/21 1744   • enoxaparin (LOVENOX) injection 40 mg  40 mg Subcutaneous Q24H Pricila Bess MD   40 mg at 12/29/21 0812   • chlorhexidine gluconate (PERIDEX) 0.12 % solution 15 mL  15 mL Swish & Spit 2 times per day Jania Hurtado NP   15 mL at 12/29/21 2044    And   • chlorhexidine gluconate (PERIDEX) 0.12 % solution 15 mL  15 mL Swish & Spit PRN Jania Hurtado NP       • petrolatum (white)-mineral oil (LUBRIFRESH PM) ophthalmic ointment 1 application  1 application Both Eyes 6 times per day Jania Hurtado NP   1 application at 12/30/21 0429   • acyclovir (ZOVIRAX) 200 MG/5ML suspension 400 mg  400 mg OG Tube Q12H Pricila Bess MD   400 mg at 12/30/21 0415   • sodium chloride 0.9 % flush bag 25 mL  25 mL Intravenous PRN Anastasia Taylor MD       • sodium chloride (PF) 0.9 % injection 2 mL  2 mL Intracatheter 2 times per day Anastasia Taylor MD   2 mL at 12/29/21 2044   • dextrose 50 % injection 25 g  25 g Intravenous PRN Shweta Garcia CNP       • dextrose 50 % injection 12.5 g  12.5 g Intravenous PRN Shweta Garcia CNP       • glucagon (GLUCAGEN) injection 1 mg  1 mg Intramuscular PRN Shweta Garcia CNP       • dextrose (GLUTOSE) 40 % gel 15 g  15 g Oral PRN Shweta Garcia CNP       • dextrose (GLUTOSE) 40 % gel 30 g  30 g Oral PRN Shweta Garcia CNP       • ipratropium-albuterol (DUONEB) 0.5-2.5 (3) MG/3ML nebulizer solution 3 mL  3 mL  Nebulization Q4H Resp PRN Talya Lomas MD         ALLERGIES:   Allergen Reactions   • Codeine Other (See Comments)     Experience convulsions with codeine     Principal Problem:    Pneumonia due to COVID-19 virus  Active Problems:    RA (rheumatoid arthritis) (CMS/HCC)    Acute respiratory failure with hypoxia (CMS/HCC)    Hypernatremia    Acute metabolic encephalopathy    Blood pressure 113/53, pulse 85, temperature (!) 101.3 °F (38.5 °C), resp. rate (!) 29, height 5' 4\" (1.626 m), weight 101.6 kg (223 lb 15.8 oz), SpO2 93 %.    Subjective:  Symptoms:  Worsening.    Pain:  She reports pain is unchanged.      Objective:  General Appearance:  Ill-appearing (intubated, sedated).    Vital signs: (most recent): Blood pressure 113/53, pulse 85, temperature (!) 101.3 °F (38.5 °C), resp. rate (!) 29, height 5' 4\" (1.626 m), weight 101.6 kg (223 lb 15.8 oz), SpO2 93 %.    Output: Producing urine.    Lungs:  Tachypnea and increased effort.  There are wheezes and rhonchi.  No rales.    Heart: Normal rate.  Regular rhythm.  S1 normal and S2 normal.  No gallop or friction rub.   Abdomen: Abdomen is soft and flat.  There is no abdominal tenderness.     Extremities: Decreased range of motion.    Pulses: Distal pulses are intact.    Skin:  Warm.        ASSESSMENT & PLAN:  Covid-19 pneumonia (pt is unvaccinated)  Acute hypoxic resp failure  Hypotension  Elevated cytokines  Hx RA, Sjogren's  Immunosuppressed status: several yrs on Enbrel  UTI (POA)- on anitbiotics  HTN  Hyperglycemia  OA- hx of bilateral TKA     -Pressors, Sedation  -Isolation, ICU, Vent,Tube feeds  -MDI, O2, Nebs  -s/p RDV & Toci, Dex completed  -Acyclovir for 20 days  -Consults: ID, Rocephin- completed, Started Zosyn  -Follow labs- WBCs increasing, febrile  -Follow glucose, insulin regimen  -Villal[place]os  -GI/VTE Prophylaxis: Pepcid, Lovenox  Poor prognosis, no improvement in condition    Code Status  Full code    Primary Care Physician  Wilbert Walters MD  FACP  [facility]  [phone]

## 2023-11-10 NOTE — TELEPHONE ENCOUNTER
----- Message from Tommie KLEIN Route sent at 11/10/2023  4:21 PM CST -----  Regarding: Hospital DFollow Up  Contact: pt. [phone]  [facility]  is calling in ref to an appt from [t's referral following hospital stay. Pt is told to follow up by 11/17. Patient Requesting Call Back @ [phone]

## 2023-11-10 NOTE — PLAN OF CARE
Problem: Adult Inpatient Plan of Care  Goal: Plan of Care Review  Outcome: Ongoing, Progressing  Goal: Patient-Specific Goal (Individualized)  Outcome: Ongoing, Progressing  Goal: Absence of Hospital-Acquired Illness or Injury  Outcome: Ongoing, Progressing  Goal: Optimal Comfort and Wellbeing  Outcome: Ongoing, Progressing  Goal: Readiness for Transition of Care  Outcome: Ongoing, Progressing     Problem: Adjustment to Illness COPD (Chronic Obstructive Pulmonary Disease)  Goal: Optimal Chronic Illness Coping  Outcome: Ongoing, Progressing     Problem: Functional Ability Impaired COPD (Chronic Obstructive Pulmonary Disease)  Goal: Optimal Level of Functional [place]  Outcome: Ongoing, Progressing     Problem: Infection COPD (Chronic Obstructive Pulmonary Disease)  Goal: Absence of Infection Signs and Symptoms  Outcome: Ongoing, Progressing     Problem: Oral Intake Inadequate COPD (Chronic Obstructive Pulmonary Disease)  Goal: Improved Nutrition Intake  Outcome: Ongoing, Progressing     Problem: Respiratory Compromise COPD (Chronic Obstructive Pulmonary Disease)  Goal: Effective Oxygenation and Ventilation  Outcome: Ongoing, Progressing     Problem: Infection  Goal: Absence of Infection Signs and Symptoms  Outcome: Ongoing, Progressing     Problem: Fall Injury Risk  Goal: Absence of Fall and Fall-Related Injury  Outcome: Ongoing, Progressing     Problem: Skin Injury Risk Increased  Goal: Skin Health and Integrity  Outcome: Ongoing, Progressing     Problem: Hypertension Acute  Goal: Blood Pressure Within Desired Range  Outcome: Ongoing, Progressing     Problem: Heart Failure Comorbidity  Goal: Maintenance of Heart Failure Symptom Control  Outcome: Ongoing, Progressing

## 2023-11-10 NOTE — NURSING
Pt is resting in bed, family present at the bedside. No sob noted during the shift. O2 titrated to 2 l nc. Sat 96%.Tolerates medications well, No adverse reactions noted due to ABX therapy. Bed in lowest position call light within reach. Wctm

## 2023-11-10 NOTE — PLAN OF CARE
Problem: Adult Inpatient Plan of Care  Goal: Plan of Care Review  11/10/2023 1639 by Jalen Alex LPN  Outcome: Met  11/10/2023 1604 by Jalen Alex LPN  Outcome: Ongoing, Progressing  Goal: Patient-Specific Goal (Individualized)  11/10/2023 1639 by Jalen Alex LPN  Outcome: Met  11/10/2023 1604 by Jalen Alex LPN  Outcome: Ongoing, Progressing  Goal: Absence of Hospital-Acquired Illness or Injury  11/10/2023 1639 by Jalen Alex LPN  Outcome: Met  11/10/2023 1604 by Jalen Alex LPN  Outcome: Ongoing, Progressing  Goal: Optimal Comfort and Wellbeing  11/10/2023 1639 by Jalen Alex LPN  Outcome: Met  11/10/2023 1604 by Jalen Alex LPN  Outcome: Ongoing, Progressing  Goal: Readiness for Transition of Care  11/10/2023 1639 by Jalen Alex LPN  Outcome: Met  11/10/2023 1604 by Jalen Alex LPN  Outcome: Ongoing, Progressing     Problem: Adjustment to Illness COPD (Chronic Obstructive Pulmonary Disease)  Goal: Optimal Chronic Illness Coping  11/10/2023 1639 by Jalen Alex LPN  Outcome: Met  11/10/2023 1604 by Jalen Alex LPN  Outcome: Ongoing, Progressing     Problem: Functional Ability Impaired COPD (Chronic Obstructive Pulmonary Disease)  Goal: Optimal Level of Functional [place]  11/10/2023 1639 by Jalen Alex LPN  Outcome: Met  11/10/2023 1604 by Jalen Alex LPN  Outcome: Ongoing, Progressing     Problem: Infection COPD (Chronic Obstructive Pulmonary Disease)  Goal: Absence of Infection Signs and Symptoms  11/10/2023 1639 by Jalen Alex LPN  Outcome: Met  11/10/2023 1604 by Jalen Alex LPN  Outcome: Ongoing, Progressing     Problem: Oral Intake Inadequate COPD (Chronic Obstructive Pulmonary Disease)  Goal: Improved Nutrition Intake  11/10/2023 1639 by Jalen Alex LPN  Outcome: Met  11/10/2023 1604 by Jalen Alex LPN  Outcome: Ongoing, Progressing     Problem: Respiratory Compromise COPD (Chronic Obstructive Pulmonary Disease)  Goal: Effective  Oxygenation and Ventilation  11/10/2023 1639 by Jalen Alex LPN  Outcome: Met  11/10/2023 1604 by Jalen Alex LPN  Outcome: Ongoing, Progressing     Problem: Infection  Goal: Absence of Infection Signs and Symptoms  11/10/2023 1639 by Jalen Alex LPN  Outcome: Met  11/10/2023 1604 by Jalen Alex LPN  Outcome: Ongoing, Progressing     Problem: Fall Injury Risk  Goal: Absence of Fall and Fall-Related Injury  11/10/2023 1639 by Jalen Alex LPN  Outcome: Met  11/10/2023 1604 by Jalen Alex LPN  Outcome: Ongoing, Progressing     Problem: Skin Injury Risk Increased  Goal: Skin Health and Integrity  11/10/2023 1639 by Jalen Alex LPN  Outcome: Met  11/10/2023 1604 by Jalen Alex LPN  Outcome: Ongoing, Progressing     Problem: Hypertension Acute  Goal: Blood Pressure Within Desired Range  11/10/2023 1639 by Jlaen Alex LPN  Outcome: Met  11/10/2023 1604 by Jalen Alex LPN  Outcome: Ongoing, Progressing     Problem: Heart Failure Comorbidity  Goal: Maintenance of Heart Failure Symptom Control  11/10/2023 1639 by Jalen Alex LPN  Outcome: Met  11/10/2023 1604 by Jalen Alex LPN  Outcome: Ongoing, Progressing

## 2023-11-13 NOTE — PLAN OF CARE
[facility]  Discharge Final Note    Primary Care Provider: Minerva Mathias MD    Expected Discharge Date: 11/10/2023    Patient discharged to home via personal transportation.     Patient's bedside nurse and patient notified of the above.    Discharge Plan A and Plan B have been determined by review of patient's clinical status, future medical and therapeutic needs, and coverage/benefits for post-acute care in coordination with multidisciplinary team members.        Final Discharge Note (most recent)       Final Note - 11/13/23 0905          Final Note    Assessment Type Final Discharge Note (P)      Anticipated Discharge Disposition Home or Self Care (P)         Post-Acute Status    Post-Acute Authorization Other (P)      Other Status No Post-Acute Service Needs (P)                      Important Message from Medicare             Contact Info       [facility] 9th Fl   Specialty: Pulmonology    [address]  [facility] [address]   Phone: [phone]       Next Steps: Follow up    Instructions: Message sent for nurse to call and schedule follow up appointment; however, if you do not hear from someone within 48 hours contact the number listed.            Future Appointments   Date Time Provider Department Center   11/16/2023  2:40 PM Amanda Garcia, YANE [facility]   11/17/2023 10:00 AM Minerva Mathias MD [facility]   12/1/2023  4:00 PM Pavel Farias MD [facility]   1/11/2024  2:15 PM Dwight Melton MD [facility]        scheduled post-discharge follow-up appointment and information added to AVS.     Zoila Pate, LMSW [facility]  Ext. [phone]

## 2023-11-14 ENCOUNTER — PATIENT MESSAGE (OUTPATIENT)
Dept: BEHAVIORAL HEALTH | Facility: CLINIC | Age: 88
End: 2023-11-14

## 2023-11-14 ENCOUNTER — PATIENT OUTREACH (OUTPATIENT)
Dept: ADMINISTRATIVE | Facility: CLINIC | Age: 88
End: 2023-11-14

## 2023-11-14 NOTE — DISCHARGE SUMMARY
[facility] [address] [place] [address] Medicine  Discharge Summary      Patient Name: Anahi Cook  MRN: [id number]  RAUL: [id number]  Patient Class: IP- Inpatient  Admission Date: 11/8/2023  Hospital Length of Stay: 1 days  Discharge Date and Time: 11/10/2023  6:17 PM  Attending Physician: Suzie att. providers found   Discharging Provider: Pascale Dexter PA-C  Primary Care Provider: Minerva Mathias MD  [facility] Medicine Team: [facility] MED E Pascale Dexter PA-C  Primary Care Team: [facility] MED E    HPI:   Anahi Cook is an 88 y/o female w/ PMHx HTN, COPD, GERD, PAD, depression, arthritis, and breast cancer who presents to the ED for worsening shortness of breath. Patient's daughter and granddaughter at bedside providing help with history. Patient reports constant SOB but has worsened since Monday evening. Saw her pulmonologist via telehealth yesterday to where patient was prescribed cefdinir and duo nebs. Albuterol was out of stock at the pharmacy and O2 sats were dropping to the 80s (usually > 90%), so the daughter decided to bring her to the ED. Currently on 3L NC with improved symptoms. Denies home O2 use. Patient reports nausea and vomiting clear fluid yesterday, but denies any hematemesis. Reports a nonproductive cough and chest congestion that has worsened and sometimes wakes her up at night. Has decreased appetite and mild abdominal pain in the left upper quadrant since Monday. Denied any diarrhea, constipation, chest pain, fever, chills, palpitations, leg swelling or syncope.     In the ED, WBC 15.88. , D dimer 1.09. Troponin negative. eGFR 53.9 and albumin 3.3. VBG did not show hypercapnia. CTA showed no evidence of pulmonary embolism. Pulmonary emphysema and possible mild fibrotic changes with superimposed interstitial opacities in the bilateral lower lobes. CT abd/pelvis showed possible subcentimeter solid mass or complex cyst in the upper pole of the right kidney. CXR showed interstitial  prominence increased in the lung bases bilaterally raising question of nonspecific pneumonitis superimposed over chronic changes. EKG sinus rhythm with incomplete RBBB (seen previously). Viral panel negative. HIV and hep C negative. Given duo nebs x3, prednisone 60 mg x1, doxycycline 100 mg x1, and omnipaque x1.     * No surgery found *      Hospital Course:   Anahi Cook was placed in  observation for management of acute hypoxia 2/2 COPD exacerbation. Increased sputum production, green in color, with hypoxia reruiring 3L NC on admission. Responding well to antibiotics, prednisone, duo-nebs. Unable to wean oxygen despite significant improvement in respiratory symptoms and lung exam. Patient failed 6 MWT - discharged with supply of home oxygen. Instruction for use provided to patient and daughter. Refer to COPD clinic for hospital follow up,  services. Incidental R renal complex cyst v mass on CTA chest. Discussed with radiology - follow up outpatient MRI ordered in 3 mo. Discharge plan discussed and patient expressed understanding. All questions answered.        Goals of Care Treatment Preferences:  Code Status: Full Code      Consults:     Pulmonary  * Chronic obstructive pulmonary disease with acute exacerbation  Acute hypoxemic respiratory failure  Patient's COPD is with exacerbation noted by continued dyspnea and worsening of baseline hypoxia currently.  Patient is currently on COPD Pathway. Continue scheduled inhalers Steroids, Antibiotics and Supplemental oxygen and monitor respiratory status closely.   - AF, VSS  - WBC 15K  - D-dimer 1.09 (age adjusted very unlikely for PE)  - Satting 93-97% on 3L, no oxygen use at home  - CTA Chest showed pulmonary emphysema and possible mild fibrotic changes with superimposed interstitial opacities in the bilateral lower lobes.  Suggest correlation for pneumonitis or other infectious/inflammatory process.  Probable retained secretions in the right mainstem bronchus  and lower trachea.  - s/p prednisone 60 mg in ED, will continue Prednisone 40mg PO daily to complete a 5 day course  - will begin azithromycin and rocephin  - Continue home inhalers  - Duonebs q4h while awake and prn with IS  - supportive care  - wean O2 as tolerated, maintain O2 sats 88-92%  --> unable to wean O2. Desat to 74% with activity on room air. Will require chronic use of supplemental O2. Provided at [place]. Appreciate CM assistance.         Final Active Diagnoses:    Diagnosis Date Noted POA    PRINCIPAL PROBLEM:  Chronic obstructive pulmonary disease with acute exacerbation [J44.1] 01/17/2016 Yes    Acute hypoxemic respiratory failure [J96.01] 11/09/2023 Yes    Renal lesion [N28.9] 11/09/2023 Yes    PAD (peripheral artery disease) [I73.9] 09/01/2022 Yes    Major depressive disorder, recurrent, mild [F33.0] 08/11/2022 Yes    CKD (chronic kidney disease) stage 2, GFR 60-89 ml/min [N18.2] 04/03/2019 Yes     Chronic    Hyperlipidemia [E78.5] 04/03/2019 Yes     Chronic    Malignant neoplasm of upper-outer quadrant of left breast in female, estrogen receptor positive [C50.412, Z17.0] 07/29/2018 Not Applicable     Chronic    Chronic pain [G89.29] 02/15/2018 Yes     Chronic    Essential hypertension [I10] 01/18/2016 Yes     Chronic      Problems Resolved During this Admission:       Discharged Condition: good    Disposition: Home or Self Care    Follow Up:   Follow-up Information       [facility] 9th Fl Follow up.    Specialty: Pulmonology  Why: Message sent for nurse to call and schedule follow up appointment; however, if you do not hear from someone within 48 hours contact the number listed.  Contact information:  [address]  [facility] [address] [phone]  Additional information:  Main Building, 9th Floor   Please park in [facility] and use Clinic elevator                         Patient Instructions:      OXYGEN FOR HOME USE     Order Specific Question Answer Comments  "  Liter Flow 3    Duration Continuous    Qualifying Test Performed at: Activity    Oxygen saturation at rest 80    Oxygen saturation with activity 74    Oxygen saturation with activity on oxygen 92    Portable mode: pulse dose acceptable    Mode: Portable concentrator    Route nasal cannula    Device: home concentrator with portable concentrator    Length of need (in months): 99 mos    Patient condition with qualifying saturation COPD    Height: 5' 4" (1.626 m)    Weight: 74.1 kg (163 lb 5.8 oz)    Alternative treatment measures have been tried or considered and deemed clinically ineffective. Yes      MRI Abdomen W WO Contrast   Standing Status: Future Standing Exp. Date: 11/09/24     Order Specific Question Answer Comments   Does the patient have or ever had a pacemaker or a defibrillator (Note: Some facilities may not be able to schedule an MRI for patients with pacemakers and defibrillators. You should contact your local radiology dept to determine if this is the case.)? No    Does the patient have an aneurysm or surgical clip, pump, nerve/brain stimulator, middle/inner ear prosthesis, or other metal implant or foreign object (bullet, shrapnel)? If they have a card related to their implant, ask them to bring it. Issues related to the implant may cause the MRI to be delayed. No    Will the patient require sedation? No    May the Radiologist modify the order per protocol to meet the clinical needs of the patient? Yes    Does the patient have on a skin patch for medication with aluminized backing? No      Ambulatory referral/consult to COPD Discharge Clinic   Standing Status: Future   Referral Priority: Urgent Referral Type: Consultation   Referral Reason: Specialty Services Required   Requested Specialty: Pulmonary Disease   Number of Visits Requested: 1     Ambulatory referral/consult to Pulmonary Rehab   Standing Status: Future   Referral Priority: Routine Referral Type: Consultation   Referral Reason: Specialty " Services Required   Requested Specialty: Pulmonary Disease   Number of Visits Requested: 1     Ambulatory referral/consult to COPD Discharge Clinic   Standing Status: Future   Referral Priority: Urgent Referral Type: Consultation   Referral Reason: Specialty Services Required   Requested Specialty: Pulmonary Disease   Number of Visits Requested: 1     Notify your health care provider if you experience any of the following:  temperature >100.4     Notify your health care provider if you experience any of the following:  difficulty breathing or increased cough     Notify your health care provider if you experience any of the following:  severe persistent headache     Notify your health care provider if you experience any of the following:  persistent dizziness, light-headedness, or visual disturbances     Notify your health care provider if you experience any of the following:  increased confusion or weakness     Notify your health care provider if you experience any of the following:   Order Comments: To notify provider: Call [phone], ask for Pascale MCKEON with Hospital Medicine Department     Activity as tolerated       Significant Diagnostic Studies: N/A    Pending Diagnostic Studies:       None           Medications:  Reconciled Home Medications:      Medication List        START taking these medications      azithromycin 250 MG tablet  Commonly known as: Z-GILL  Take 1 tablet (250 mg total) by mouth once daily. for 3 days     cefdinir 300 MG capsule  Commonly known as: OMNICEF  Take 1 capsule (300 mg total) by mouth 2 (two) times daily. for 3 days            CHANGE how you take these medications      * CombiVENT RESPIMAT  mcg/actuation inhaler  Generic drug: ipratropium-albuteroL  Inhale 1 puff into the lungs.  What changed: Another medication with the same name was added. Make sure you understand how and when to take each.     * albuterol-ipratropium 2.5 mg-0.5 mg/3 mL nebulizer solution  Commonly  known as: DUO-NEB  Take 3 mLs by nebulization every 6 (six) hours as needed for Wheezing. Rescue  What changed: You were already taking a medication with the same name, and this prescription was added. Make sure you understand how and when to take each.           * This list has 2 medication(s) that are the same as other medications prescribed for you. Read the directions carefully, and ask your doctor or other care provider to review them with you.                CONTINUE taking these medications      acetaminophen 650 MG Tbsr  Commonly known as: TYLENOL  Take 650 mg by mouth every 8 (eight) hours as needed.     aspirin 81 MG EC tablet  Commonly known as: ECOTRIN  Take 81 mg by mouth once daily.     atorvastatin 20 MG tablet  Commonly known as: LIPITOR  Take 1 tablet (20 mg total) by mouth every evening.     BEANO ORAL  Take by mouth.     benazepriL 40 MG tablet  Commonly known as: LOTENSIN  Take 1 tablet (40 mg total) by mouth once daily.     BREO ELLIPTA 100-25 mcg/dose diskus inhaler  Generic drug: fluticasone furoate-vilanteroL  Inhale 1 puff into the lungs.     calcium-vitamin D 250 mg-2.5 mcg (100 unit) per tablet  Take 1 tablet by mouth 2 (two) times daily.     citalopram 20 MG tablet  Commonly known as: CeleXA  Take 1 tablet (20 mg total) by mouth every evening.     felodipine 10 MG 24 hr tablet  Commonly known as: PLENDIL  Take 1 tablet (10 mg total) by mouth 2 (two) times a day.     gabapentin 100 MG capsule  Commonly known as: NEURONTIN  Take 1 capsule (100 mg total) by mouth every evening.     hydrALAZINE 25 MG tablet  Commonly known as: APRESOLINE  Take 1 tablet (25 mg total) by mouth every 12 (twelve) hours.     hydrocortisone 2.5 % cream  Apply topically 2 (two) times daily.     INCRUSE ELLIPTA 62.5 mcg/actuation inhalation capsule  Generic drug: umeclidinium  Inhale into the lungs.     ketoprofen 10 % Crpk  Apply topically.     letrozole 2.5 mg Tab  Commonly known as: FEMARA  Take 1 tablet (2.5 mg  total) by mouth once daily.     melatonin 10 mg Tab  Take by mouth every evening.     MIRALAX ORAL  Take by mouth once daily.     montelukast 10 mg tablet  Commonly known as: SINGULAIR  Take 10 mg by mouth every evening.     multivitamin per tablet  Commonly known as: THERAGRAN  Take 1 tablet by mouth once daily.     * predniSONE 10 MG tablet  Commonly known as: DELTASONE  Take 10 tablets by mouth 3 (three) times daily.     torsemide 20 MG Tab  Commonly known as: DEMADEX  TAKE 1 TABLET(20 MG) BY MOUTH EVERY DAY     traMADoL 50 mg tablet  Commonly known as: ULTRAM  Take 1 tablet (50 mg total) by mouth every 8 (eight) hours as needed for Pain.     * triamcinolone acetonide 0.1% 0.1 % paste  Commonly known as: KENALOG  APPLY SPARINGLY FOUR TIMES DAILY     * triamcinolone acetonide 0.1% 0.1 % cream  Commonly known as: KENALOG  AAA bid.  Spot treatment as needed     ZYRTEC ORAL  Take 10 mg by mouth nightly as needed.           * This list has 3 medication(s) that are the same as other medications prescribed for you. Read the directions carefully, and ask your doctor or other care provider to review them with you.                STOP taking these medications      doxycycline 100 MG tablet  Commonly known as: VIBRA-TABS            ASK your doctor about these medications      * predniSONE 20 MG tablet  Commonly known as: DELTASONE  Take 2 tablets (40 mg total) by mouth once daily. for 2 days  Ask about: Should I take this medication?           * This list has 1 medication(s) that are the same as other medications prescribed for you. Read the directions carefully, and ask your doctor or other care provider to review them with you.                  Indwelling Lines/Drains at time of discharge:   Lines/Drains/Airways       None                   Time spent on the discharge of patient: 36 minutes         Pascale Dexter PA-C  Department of Hospital Medicine  [facility]

## 2023-11-14 NOTE — ASSESSMENT & PLAN NOTE
Acute hypoxemic respiratory failure  Patient's COPD is with exacerbation noted by continued dyspnea and worsening of baseline hypoxia currently.  Patient is currently on COPD Pathway. Continue scheduled inhalers Steroids, Antibiotics and Supplemental oxygen and monitor respiratory status closely.   - AF, VSS  - WBC 15K  - D-dimer 1.09 (age adjusted very unlikely for PE)  - Satting 93-97% on 3L, no oxygen use at home  - CTA Chest showed pulmonary emphysema and possible mild fibrotic changes with superimposed interstitial opacities in the bilateral lower lobes.  Suggest correlation for pneumonitis or other infectious/inflammatory process.  Probable retained secretions in the right mainstem bronchus and lower trachea.  - s/p prednisone 60 mg in ED, will continue Prednisone 40mg PO daily to complete a 5 day course  - will begin azithromycin and rocephin  - Continue home inhalers  - Duonebs q4h while awake and prn with IS  - supportive care  - wean O2 as tolerated, maintain O2 sats 88-92%  --> unable to wean O2. Desat to 74% with activity on room air. Will require chronic use of supplemental O2. Provided at [place]. Appreciate  assistance.

## 2023-11-14 NOTE — PROGRESS NOTES
C3 nurse spoke with Anahi Cook for a TCC post hospital discharge follow up call. The patient has a scheduled HOSFU appointment with Minerva Mathias MD   on 11.17.23 @ 10am.

## 2023-11-16 ENCOUNTER — TELEPHONE (OUTPATIENT)
Dept: HEPATOLOGY | Facility: CLINIC | Age: 88
End: 2023-11-16

## 2023-11-16 NOTE — TELEPHONE ENCOUNTER
----- Message from Tulio Landry sent at 11/15/2023  2:44 PM CST -----  Regarding: RE:alexis Dexter  Contact: Patience Brandt,Daughter/[phone]  RE:alexis Dexter    [email or web address]     Patient is calling for Medical Advice regarding: daughter would like a f/u call to discuss her mom discharge information and medication

## 2023-11-16 NOTE — TELEPHONE ENCOUNTER
Returned call. Patient has a appointment with PCP tomorrow morning at 9am.  I encouraged them to keep that appointment and discuss concerns then.  Daughter sated mother was doing better today so will follow-up at appointment tomorrow.

## 2023-11-17 ENCOUNTER — LAB VISIT (OUTPATIENT)
Dept: LAB | Facility: HOSPITAL | Age: 88
End: 2023-11-17
Attending: INTERNAL MEDICINE
Payer: MEDICARE

## 2023-11-17 ENCOUNTER — OFFICE VISIT (OUTPATIENT)
Dept: PRIMARY CARE CLINIC | Facility: CLINIC | Age: 88
End: 2023-11-17
Payer: MEDICARE

## 2023-11-17 ENCOUNTER — PATIENT MESSAGE (OUTPATIENT)
Dept: PRIMARY CARE CLINIC | Facility: CLINIC | Age: 88
End: 2023-11-17

## 2023-11-17 ENCOUNTER — TELEPHONE (OUTPATIENT)
Dept: PRIMARY CARE CLINIC | Facility: CLINIC | Age: 88
End: 2023-11-17

## 2023-11-17 VITALS
DIASTOLIC BLOOD PRESSURE: 58 MMHG | HEART RATE: 67 BPM | WEIGHT: 147.69 LBS | SYSTOLIC BLOOD PRESSURE: 142 MMHG | TEMPERATURE: 98 F | OXYGEN SATURATION: 90 % | HEIGHT: 64 IN | BODY MASS INDEX: 25.21 KG/M2

## 2023-11-17 DIAGNOSIS — N17.9 ACUTE KIDNEY INJURY (NONTRAUMATIC): ICD-10-CM

## 2023-11-17 DIAGNOSIS — E83.39 HYPERPHOSPHATEMIA: ICD-10-CM

## 2023-11-17 DIAGNOSIS — I70.0 ATHEROSCLEROSIS OF AORTA: ICD-10-CM

## 2023-11-17 DIAGNOSIS — E78.5 HYPERLIPIDEMIA, UNSPECIFIED HYPERLIPIDEMIA TYPE: ICD-10-CM

## 2023-11-17 DIAGNOSIS — Z23 INFLUENZA VACCINE NEEDED: ICD-10-CM

## 2023-11-17 DIAGNOSIS — J96.01 ACUTE HYPOXEMIC RESPIRATORY FAILURE: Primary | ICD-10-CM

## 2023-11-17 DIAGNOSIS — F41.8 DEPRESSION WITH ANXIETY: ICD-10-CM

## 2023-11-17 DIAGNOSIS — N28.89 RENAL MASS, RIGHT: ICD-10-CM

## 2023-11-17 DIAGNOSIS — R35.89 POLYURIA: ICD-10-CM

## 2023-11-17 DIAGNOSIS — R73.03 PRE-DIABETES: ICD-10-CM

## 2023-11-17 DIAGNOSIS — I11.9 HYPERTENSIVE HEART DISEASE WITHOUT HEART FAILURE: ICD-10-CM

## 2023-11-17 DIAGNOSIS — H61.20 HEARING LOSS DUE TO CERUMEN IMPACTION, UNSPECIFIED LATERALITY: ICD-10-CM

## 2023-11-17 LAB
ALBUMIN SERPL BCP-MCNC: 3.2 G/DL (ref 3.5–5.2)
ANION GAP SERPL CALC-SCNC: 10 MMOL/L (ref 8–16)
BACTERIA #/AREA URNS AUTO: NORMAL /HPF
BILIRUB UR QL STRIP: NEGATIVE
BUN SERPL-MCNC: 12 MG/DL (ref 8–23)
CALCIUM SERPL-MCNC: 8.9 MG/DL (ref 8.7–10.5)
CHLORIDE SERPL-SCNC: 95 MMOL/L (ref 95–110)
CLARITY UR REFRACT.AUTO: CLEAR
CO2 SERPL-SCNC: 35 MMOL/L (ref 23–29)
COLOR UR AUTO: YELLOW
CREAT SERPL-MCNC: 0.9 MG/DL (ref 0.5–1.4)
EST. GFR  (NO RACE VARIABLE): >60 ML/MIN/1.73 M^2
ESTIMATED AVG GLUCOSE: 117 MG/DL (ref 68–131)
GLUCOSE SERPL-MCNC: 104 MG/DL (ref 70–110)
GLUCOSE UR QL STRIP: NEGATIVE
HBA1C MFR BLD: 5.7 % (ref 4–5.6)
HGB UR QL STRIP: NEGATIVE
KETONES UR QL STRIP: NEGATIVE
LEUKOCYTE ESTERASE UR QL STRIP: ABNORMAL
MICROSCOPIC COMMENT: NORMAL
NITRITE UR QL STRIP: NEGATIVE
PH UR STRIP: 6 [PH] (ref 5–8)
PHOSPHATE SERPL-MCNC: 3.6 MG/DL (ref 2.7–4.5)
PHOSPHATE SERPL-MCNC: 3.6 MG/DL (ref 2.7–4.5)
POTASSIUM SERPL-SCNC: 3.5 MMOL/L (ref 3.5–5.1)
PROT UR QL STRIP: NEGATIVE
RBC #/AREA URNS AUTO: 1 /HPF (ref 0–4)
SODIUM SERPL-SCNC: 140 MMOL/L (ref 136–145)
SP GR UR STRIP: 1.01 (ref 1–1.03)
SQUAMOUS #/AREA URNS AUTO: 1 /HPF
URN SPEC COLLECT METH UR: ABNORMAL
WBC #/AREA URNS AUTO: 4 /HPF (ref 0–5)

## 2023-11-17 PROCEDURE — 80069 RENAL FUNCTION PANEL: CPT | Performed by: INTERNAL MEDICINE

## 2023-11-17 PROCEDURE — 1101F PR PT FALLS ASSESS DOC 0-1 FALLS W/OUT INJ PAST YR: ICD-10-PCS | Mod: CPTII,S$GLB,, | Performed by: INTERNAL MEDICINE

## 2023-11-17 PROCEDURE — 3288F PR FALLS RISK ASSESSMENT DOCUMENTED: ICD-10-PCS | Mod: CPTII,S$GLB,, | Performed by: INTERNAL MEDICINE

## 2023-11-17 PROCEDURE — 99999 PR PBB SHADOW E&M-EST. PATIENT-LVL V: ICD-10-PCS | Mod: PBBFAC,,, | Performed by: INTERNAL MEDICINE

## 2023-11-17 PROCEDURE — 1126F AMNT PAIN NOTED NONE PRSNT: CPT | Mod: CPTII,S$GLB,, | Performed by: INTERNAL MEDICINE

## 2023-11-17 PROCEDURE — 3288F FALL RISK ASSESSMENT DOCD: CPT | Mod: CPTII,S$GLB,, | Performed by: INTERNAL MEDICINE

## 2023-11-17 PROCEDURE — 1126F PR PAIN SEVERITY QUANTIFIED, NO PAIN PRESENT: ICD-10-PCS | Mod: CPTII,S$GLB,, | Performed by: INTERNAL MEDICINE

## 2023-11-17 PROCEDURE — 99214 OFFICE O/P EST MOD 30 MIN: CPT | Mod: S$GLB,,, | Performed by: INTERNAL MEDICINE

## 2023-11-17 PROCEDURE — 1160F PR REVIEW ALL MEDS BY PRESCRIBER/CLIN PHARMACIST DOCUMENTED: ICD-10-PCS | Mod: CPTII,S$GLB,, | Performed by: INTERNAL MEDICINE

## 2023-11-17 PROCEDURE — 99214 PR OFFICE/OUTPT VISIT, EST, LEVL IV, 30-39 MIN: ICD-10-PCS | Mod: S$GLB,,, | Performed by: INTERNAL MEDICINE

## 2023-11-17 PROCEDURE — 99999 PR PBB SHADOW E&M-EST. PATIENT-LVL V: CPT | Mod: PBBFAC,,, | Performed by: INTERNAL MEDICINE

## 2023-11-17 PROCEDURE — 1159F MED LIST DOCD IN RCRD: CPT | Mod: CPTII,S$GLB,, | Performed by: INTERNAL MEDICINE

## 2023-11-17 PROCEDURE — 83036 HEMOGLOBIN GLYCOSYLATED A1C: CPT | Performed by: INTERNAL MEDICINE

## 2023-11-17 PROCEDURE — 1111F PR DISCHARGE MEDS RECONCILED W/ CURRENT OUTPATIENT MED LIST: ICD-10-PCS | Mod: CPTII,S$GLB,, | Performed by: INTERNAL MEDICINE

## 2023-11-17 PROCEDURE — 1111F DSCHRG MED/CURRENT MED MERGE: CPT | Mod: CPTII,S$GLB,, | Performed by: INTERNAL MEDICINE

## 2023-11-17 PROCEDURE — 1159F PR MEDICATION LIST DOCUMENTED IN MEDICAL RECORD: ICD-10-PCS | Mod: CPTII,S$GLB,, | Performed by: INTERNAL MEDICINE

## 2023-11-17 PROCEDURE — 1101F PT FALLS ASSESS-DOCD LE1/YR: CPT | Mod: CPTII,S$GLB,, | Performed by: INTERNAL MEDICINE

## 2023-11-17 PROCEDURE — 1160F RVW MEDS BY RX/DR IN RCRD: CPT | Mod: CPTII,S$GLB,, | Performed by: INTERNAL MEDICINE

## 2023-11-17 PROCEDURE — 81001 URINALYSIS AUTO W/SCOPE: CPT | Performed by: INTERNAL MEDICINE

## 2023-11-17 PROCEDURE — 36415 COLL VENOUS BLD VENIPUNCTURE: CPT | Mod: PN | Performed by: INTERNAL MEDICINE

## 2023-11-17 RX ORDER — CEFDINIR 300 MG/1
300 CAPSULE ORAL 2 TIMES DAILY
COMMUNITY
End: 2023-11-19

## 2023-11-17 NOTE — TELEPHONE ENCOUNTER
----- Message from Madhuri Alonso sent at 11/17/2023 11:43 AM CST -----  Pt is wanting a referral to see ENT.    Thanks

## 2023-11-19 ENCOUNTER — PATIENT MESSAGE (OUTPATIENT)
Dept: PRIMARY CARE CLINIC | Facility: CLINIC | Age: 88
End: 2023-11-19

## 2023-11-19 PROBLEM — N28.89 RENAL MASS, RIGHT: Status: ACTIVE | Noted: 2023-11-19

## 2023-11-19 RX ORDER — CITALOPRAM 20 MG/1
20 TABLET, FILM COATED ORAL NIGHTLY
Qty: 90 TABLET | Refills: 1 | Status: SHIPPED | OUTPATIENT
Start: 2023-11-19

## 2023-11-19 RX ORDER — BENAZEPRIL HYDROCHLORIDE 40 MG/1
40 TABLET ORAL DAILY
Qty: 90 TABLET | Refills: 1 | Status: SHIPPED | OUTPATIENT
Start: 2023-11-19

## 2023-11-19 RX ORDER — HYDRALAZINE HYDROCHLORIDE 25 MG/1
25 TABLET, FILM COATED ORAL EVERY 12 HOURS
Qty: 180 TABLET | Refills: 1 | Status: SHIPPED | OUTPATIENT
Start: 2023-11-19

## 2023-11-19 RX ORDER — TORSEMIDE 20 MG/1
20 TABLET ORAL DAILY
Qty: 90 TABLET | Refills: 0 | Status: SHIPPED | OUTPATIENT
Start: 2023-11-19 | End: 2024-02-05 | Stop reason: SDUPTHER

## 2023-11-19 RX ORDER — FELODIPINE 10 MG/1
10 TABLET, EXTENDED RELEASE ORAL 2 TIMES DAILY
Qty: 180 TABLET | Refills: 1 | Status: SHIPPED | OUTPATIENT
Start: 2023-11-19

## 2023-11-19 RX ORDER — ATORVASTATIN CALCIUM 20 MG/1
20 TABLET, FILM COATED ORAL NIGHTLY
Qty: 90 TABLET | Refills: 1 | Status: SHIPPED | OUTPATIENT
Start: 2023-11-19

## 2023-11-20 NOTE — PROGRESS NOTES
Subjective     Patient ID: Anahi Cook is a 89 y.o. female.    Chief Complaint: Hospital Follow Up    Last seen 3 months ago. Returns for f/u hospital admit  to 11/10 upon presenting to the ER with SOB and cough of acute onset x few days. Viral testing negative. She had decreased O2 Sat in the 80's with activity, requiring O2 supplement at 3 LPM. WBC elevated to 16 on arrival. CTA of the Chest with multiple changes, air space disease, negative for PE. BNP mildly elevated at 130. Treated with antibiotics and nebulizer treatments with gradual improvement, but could not be weaned off the oxygen.   She also complained of abdominal pain - CT of the abdomen showed no acute changes, sub-cm right renal mass at upper pole noted - Abdominal MRI has been ordered, recommended follow up in 3 months. Abnormal labs requiring follow up include acute kidney failure and hyperphosphatemia.   Here today having completed her outpatient antibiotics. Using oxygen, but did not have it on upon arrival here. C/O increase in urination without dysuria, hematuria. And recent onset hearing loss. Accompanied by daughter and grand daughter today. She does not have home health - they are requesting this assistance, including PT. Medication list reconciled, bottles are not present to verify.      PMH: .  Hypertension with Concentric Remodeling and normal LV function on Echo 10/16.  Hyperlipidemia.  Pre-Diabetes.  Aortic Atherosclerosis seen on chest and abdominal imaging.   CKD stage 3a.  COPD, not oxygen-dependent. Pulmonologist Dr. Rakesh Bradford at [facility].  Breast Cancer, on Letrozole, Heme/Onc following.  Osteoporosis, Endocrinology  recommended Prolia - patient declined treatment.   Vertebral Compression Fractures.  GERD, Tortuous Esophagus on EGD 10/19, benign H pyl negative gastritis.   Lumbar Spondylosis, Pain Mgmt following.  Allergic Rhinitis.  Depression/Anxiety/Insomnia.    PSH:  2018: AXILLARY NODE DISSECTION;  "Left  BREAST BIOPSY  BREAST LUMPECTOMY  CHOLECYSTECTOMY  EYE SURGERY  11/8/2018: FIXATION KYPHOPLASTY; N/A  HYSTERECTOMY  10/15/2018: INJECTION OF FACET JOINT; Bilateral  4/1/2019: INJECTION OF FACET JOINT; Bilateral  6/20/2019: INJECTION OF FACET JOINT; Bilateral  7/12/2018: MASTECTOMY, PARTIAL; Left  02/23/2018: WA REMOVAL OF OVARY/TUBE(S)  ROTATOR CUFF REPAIR; Right  TONSILLECTOMY    Mammogram stable 5/23. BMD 1/23. Colonoscopy years ago, not on record. Eye exam 10/23. Vaccines reviewed.    Social: Former tobacco use, rare alcohol. , daughter lives locally, two sons out of state.     FMH: Breast cancer in multiple, HTN, Heart dis, Kidney dis.     Allergies: PCN, Levofloxacin.     Medications: list reviewed and reconciled.           Review of Systems   Constitutional:  Positive for activity change. Negative for appetite change, chills, diaphoresis and fever.   HENT:  Positive for hearing loss and voice change. Negative for nasal congestion, ear pain, rhinorrhea and sore throat.    Respiratory:  Positive for shortness of breath. Negative for cough, choking, chest tightness and wheezing.    Cardiovascular:  Negative for chest pain, palpitations and leg swelling.   Gastrointestinal:  Negative for abdominal pain, blood in stool, diarrhea, nausea and vomiting.   Endocrine: Positive for polyuria.   Genitourinary:  Negative for dysuria and hematuria.   Musculoskeletal:  Positive for back pain.   Neurological:  Negative for syncope.   Psychiatric/Behavioral:  Negative for agitation, confusion and hallucinations.           Objective   Vitals:    11/17/23 1009   BP: (!) 142/58   Pulse: 67   Temp: 98 °F (36.7 °C)   SpO2: (!) 90%                             On Room Air   Weight: 67 kg (147 lb 11.2 oz)   Height: 5' 4" (1.626 m)      Physical Exam  Constitutional:       General: She is not in acute distress.  HENT:      Left Ear: There is impacted cerumen.      Ears:      Comments: Moderate amount of cerumen on right, " impacted on left.     Nose: Nose normal. No congestion.      Mouth/Throat:      Mouth: Mucous membranes are moist.      Pharynx: Oropharynx is clear.   Cardiovascular:      Rate and Rhythm: Normal rate and regular rhythm.   Pulmonary:      Effort: Pulmonary effort is normal.      Breath sounds: No wheezing or rhonchi.      Comments: Decreased breath sounds both bases.   Abdominal:      General: Bowel sounds are normal. There is no distension.      Palpations: Abdomen is soft.      Tenderness: There is no abdominal tenderness.   Musculoskeletal:         General: Normal range of motion.      Right lower leg: No edema.      Left lower leg: No edema.   Skin:     General: Skin is warm and dry.   Neurological:      Mental Status: She is alert.   Psychiatric:         Mood and Affect: Mood normal.         Behavior: Behavior normal.          Assessment and Plan     1. Acute hypoxemic respiratory failure        -     stable after completion of antibiotics, but requires oxygen still.        -     follow up with Pulmonology as scheduled.         -     Home Health orders to follow, need agency identified, insurance not accepted by [facility].    2. Acute kidney injury (nontraumatic)  -     Renal Function Panel; Future; Expected date: 11/17/2023    3. Renal mass, right        -     Abdominal MRI to be scheduled for 3 month follow up.     4. Hyperphosphatemia  -     PHOSPHORUS; Future; Expected date: 11/17/2023    5. Polyuria  -     Urinalysis, Reflex to Urine Culture Urine, Clean Catch    6. Hearing loss due to cerumen impaction, unspecified laterality  -     Ambulatory referral/consult to ENT; Future; Expected date: 11/24/2023    7. Hypertensive heart disease without heart failure - stable.   -     felodipine (PLENDIL) 10 MG 24 hr tablet; Take 1 tablet (10 mg total) by mouth 2 (two) times a day.  Dispense: 180 tablet; Refill: 1  -     hydrALAZINE (APRESOLINE) 25 MG tablet; Take 1 tablet (25 mg total) by mouth every 12 (twelve) hours.   Dispense: 180 tablet; Refill: 1  -     benazepriL (LOTENSIN) 40 MG tablet; Take 1 tablet (40 mg total) by mouth once daily.  Dispense: 90 tablet; Refill: 1  -     torsemide (DEMADEX) 20 MG Tab; Take 1 tablet (20 mg total) by mouth once daily.  Dispense: 90 tablet; Refill: 0    8. Pre-diabetes  -     Hemoglobin A1C; Future; Expected date: 11/17/2023    9. Hyperlipidemia, unspecified hyperlipidemia type  -     atorvastatin (LIPITOR) 20 MG tablet; Take 1 tablet (20 mg total) by mouth every evening.  Dispense: 90 tablet; Refill: 1    10. Atherosclerosis of aorta  -     atorvastatin (LIPITOR) 20 MG tablet; Take 1 tablet (20 mg total) by mouth every evening.  Dispense: 90 tablet; Refill: 1    11. Depression with anxiety  -     citalopram (CELEXA) 20 MG tablet; Take 1 tablet (20 mg total) by mouth every evening.  Dispense: 90 tablet; Refill: 1    12. Influenza vaccine needed - Flu shot today.    Other orders  -     Urinalysis Microscopic       Follow up in about 3 months (around 2/17/2024).

## 2023-11-27 ENCOUNTER — TELEPHONE (OUTPATIENT)
Dept: OTOLARYNGOLOGY | Facility: CLINIC | Age: 88
End: 2023-11-27

## 2023-11-30 ENCOUNTER — TELEPHONE (OUTPATIENT)
Dept: PULMONOLOGY | Facility: CLINIC | Age: 88
End: 2023-11-30

## 2023-11-30 DIAGNOSIS — J18.9 PNEUMONIA DUE TO INFECTIOUS ORGANISM, UNSPECIFIED LATERALITY, UNSPECIFIED PART OF LUNG: ICD-10-CM

## 2023-11-30 DIAGNOSIS — J96.01 ACUTE HYPOXEMIC RESPIRATORY FAILURE: Primary | ICD-10-CM

## 2023-11-30 NOTE — TELEPHONE ENCOUNTER
I called Mrs Cook and spoke to her daughter Patience about tomorrow's appointment with Dr Farias. Mrs Cook will have a chest xray at 315 pm on the 2nd floor and will come to the 9th floor to see the Doctor. Miss Morin said they will be here and she is glad her Mother is having the chest xray to check that the pneumonia is resolved. Sofia Melgar LPN

## 2023-12-01 ENCOUNTER — OFFICE VISIT (OUTPATIENT)
Dept: PULMONOLOGY | Facility: CLINIC | Age: 88
End: 2023-12-01
Payer: MEDICARE

## 2023-12-01 ENCOUNTER — OFFICE VISIT (OUTPATIENT)
Dept: CARDIOLOGY | Facility: CLINIC | Age: 88
End: 2023-12-01
Payer: MEDICARE

## 2023-12-01 ENCOUNTER — HOSPITAL ENCOUNTER (OUTPATIENT)
Dept: RADIOLOGY | Facility: HOSPITAL | Age: 88
Discharge: HOME | End: 2023-12-01
Attending: STUDENT IN AN ORGANIZED HEALTH CARE EDUCATION/TRAINING PROGRAM
Payer: MEDICARE

## 2023-12-01 VITALS
WEIGHT: 147.06 LBS | OXYGEN SATURATION: 97 % | DIASTOLIC BLOOD PRESSURE: 68 MMHG | BODY MASS INDEX: 25.11 KG/M2 | HEART RATE: 64 BPM | SYSTOLIC BLOOD PRESSURE: 140 MMHG | HEIGHT: 64 IN

## 2023-12-01 VITALS
WEIGHT: 147 LBS | HEART RATE: 62 BPM | SYSTOLIC BLOOD PRESSURE: 155 MMHG | DIASTOLIC BLOOD PRESSURE: 63 MMHG | HEIGHT: 64 IN | BODY MASS INDEX: 25.1 KG/M2

## 2023-12-01 DIAGNOSIS — J18.9 PNEUMONIA DUE TO INFECTIOUS ORGANISM, UNSPECIFIED LATERALITY, UNSPECIFIED PART OF LUNG: ICD-10-CM

## 2023-12-01 DIAGNOSIS — I70.0 ATHEROSCLEROSIS OF AORTA: ICD-10-CM

## 2023-12-01 DIAGNOSIS — J44.1 CHRONIC OBSTRUCTIVE PULMONARY DISEASE WITH ACUTE EXACERBATION: Primary | ICD-10-CM

## 2023-12-01 DIAGNOSIS — R60.0 EDEMA OF BOTH LOWER EXTREMITIES: Primary | ICD-10-CM

## 2023-12-01 DIAGNOSIS — E78.2 MIXED HYPERLIPIDEMIA: Chronic | ICD-10-CM

## 2023-12-01 DIAGNOSIS — I73.9 PAD (PERIPHERAL ARTERY DISEASE): ICD-10-CM

## 2023-12-01 DIAGNOSIS — R09.89 LEFT CAROTID BRUIT: ICD-10-CM

## 2023-12-01 DIAGNOSIS — J96.01 ACUTE HYPOXEMIC RESPIRATORY FAILURE: ICD-10-CM

## 2023-12-01 DIAGNOSIS — J18.1 LOBAR PNEUMONIA: ICD-10-CM

## 2023-12-01 DIAGNOSIS — I10 ESSENTIAL HYPERTENSION: Chronic | ICD-10-CM

## 2023-12-01 PROCEDURE — 99215 OFFICE O/P EST HI 40 MIN: CPT | Mod: S$GLB,,, | Performed by: INTERNAL MEDICINE

## 2023-12-01 PROCEDURE — 3288F PR FALLS RISK ASSESSMENT DOCUMENTED: ICD-10-PCS | Mod: CPTII,GC,S$GLB, | Performed by: INTERNAL MEDICINE

## 2023-12-01 PROCEDURE — 1111F DSCHRG MED/CURRENT MED MERGE: CPT | Mod: CPTII,GC,S$GLB, | Performed by: INTERNAL MEDICINE

## 2023-12-01 PROCEDURE — G0008 FLU VACCINE - HIGH DOSE (65+) PRESERVATIVE FREE IM: ICD-10-PCS | Mod: S$GLB,,, | Performed by: INTERNAL MEDICINE

## 2023-12-01 PROCEDURE — 1159F PR MEDICATION LIST DOCUMENTED IN MEDICAL RECORD: ICD-10-PCS | Mod: CPTII,S$GLB,, | Performed by: INTERNAL MEDICINE

## 2023-12-01 PROCEDURE — 1101F PT FALLS ASSESS-DOCD LE1/YR: CPT | Mod: CPTII,GC,S$GLB, | Performed by: INTERNAL MEDICINE

## 2023-12-01 PROCEDURE — 1101F PR PT FALLS ASSESS DOC 0-1 FALLS W/OUT INJ PAST YR: ICD-10-PCS | Mod: CPTII,S$GLB,, | Performed by: INTERNAL MEDICINE

## 2023-12-01 PROCEDURE — 3288F FALL RISK ASSESSMENT DOCD: CPT | Mod: CPTII,GC,S$GLB, | Performed by: INTERNAL MEDICINE

## 2023-12-01 PROCEDURE — 1101F PR PT FALLS ASSESS DOC 0-1 FALLS W/OUT INJ PAST YR: ICD-10-PCS | Mod: CPTII,GC,S$GLB, | Performed by: INTERNAL MEDICINE

## 2023-12-01 PROCEDURE — 99204 OFFICE O/P NEW MOD 45 MIN: CPT | Mod: 25,GC,S$GLB, | Performed by: INTERNAL MEDICINE

## 2023-12-01 PROCEDURE — 1111F PR DISCHARGE MEDS RECONCILED W/ CURRENT OUTPATIENT MED LIST: ICD-10-PCS | Mod: CPTII,S$GLB,, | Performed by: INTERNAL MEDICINE

## 2023-12-01 PROCEDURE — 1126F AMNT PAIN NOTED NONE PRSNT: CPT | Mod: CPTII,S$GLB,, | Performed by: INTERNAL MEDICINE

## 2023-12-01 PROCEDURE — 99999 PR PBB SHADOW E&M-EST. PATIENT-LVL IV: ICD-10-PCS | Mod: PBBFAC,,, | Performed by: INTERNAL MEDICINE

## 2023-12-01 PROCEDURE — 3288F FALL RISK ASSESSMENT DOCD: CPT | Mod: CPTII,S$GLB,, | Performed by: INTERNAL MEDICINE

## 2023-12-01 PROCEDURE — 71046 X-RAY EXAM CHEST 2 VIEWS: CPT | Mod: TC,FY

## 2023-12-01 PROCEDURE — 90662 IIV NO PRSV INCREASED AG IM: CPT | Mod: S$GLB,,, | Performed by: INTERNAL MEDICINE

## 2023-12-01 PROCEDURE — 1111F DSCHRG MED/CURRENT MED MERGE: CPT | Mod: CPTII,S$GLB,, | Performed by: INTERNAL MEDICINE

## 2023-12-01 PROCEDURE — 71046 X-RAY EXAM CHEST 2 VIEWS: CPT | Mod: 26,,, | Performed by: RADIOLOGY

## 2023-12-01 PROCEDURE — 1111F PR DISCHARGE MEDS RECONCILED W/ CURRENT OUTPATIENT MED LIST: ICD-10-PCS | Mod: CPTII,GC,S$GLB, | Performed by: INTERNAL MEDICINE

## 2023-12-01 PROCEDURE — 99999 PR PBB SHADOW E&M-EST. PATIENT-LVL V: ICD-10-PCS | Mod: PBBFAC,GC,, | Performed by: STUDENT IN AN ORGANIZED HEALTH CARE EDUCATION/TRAINING PROGRAM

## 2023-12-01 PROCEDURE — 1159F MED LIST DOCD IN RCRD: CPT | Mod: CPTII,S$GLB,, | Performed by: INTERNAL MEDICINE

## 2023-12-01 PROCEDURE — G0008 ADMIN INFLUENZA VIRUS VAC: HCPCS | Mod: S$GLB,,, | Performed by: INTERNAL MEDICINE

## 2023-12-01 PROCEDURE — 71046 XR CHEST PA AND LATERAL: ICD-10-PCS | Mod: 26,,, | Performed by: RADIOLOGY

## 2023-12-01 PROCEDURE — 99204 PR OFFICE/OUTPT VISIT, NEW, LEVL IV, 45-59 MIN: ICD-10-PCS | Mod: 25,GC,S$GLB, | Performed by: INTERNAL MEDICINE

## 2023-12-01 PROCEDURE — 1126F PR PAIN SEVERITY QUANTIFIED, NO PAIN PRESENT: ICD-10-PCS | Mod: CPTII,S$GLB,, | Performed by: INTERNAL MEDICINE

## 2023-12-01 PROCEDURE — 99215 PR OFFICE/OUTPT VISIT, EST, LEVL V, 40-54 MIN: ICD-10-PCS | Mod: S$GLB,,, | Performed by: INTERNAL MEDICINE

## 2023-12-01 PROCEDURE — 1101F PT FALLS ASSESS-DOCD LE1/YR: CPT | Mod: CPTII,S$GLB,, | Performed by: INTERNAL MEDICINE

## 2023-12-01 PROCEDURE — 99999 PR PBB SHADOW E&M-EST. PATIENT-LVL IV: CPT | Mod: PBBFAC,,, | Performed by: INTERNAL MEDICINE

## 2023-12-01 PROCEDURE — 99999 PR PBB SHADOW E&M-EST. PATIENT-LVL V: CPT | Mod: PBBFAC,GC,, | Performed by: STUDENT IN AN ORGANIZED HEALTH CARE EDUCATION/TRAINING PROGRAM

## 2023-12-01 PROCEDURE — 3288F PR FALLS RISK ASSESSMENT DOCUMENTED: ICD-10-PCS | Mod: CPTII,S$GLB,, | Performed by: INTERNAL MEDICINE

## 2023-12-01 PROCEDURE — 90662 FLU VACCINE - HIGH DOSE (65+) PRESERVATIVE FREE IM: ICD-10-PCS | Mod: S$GLB,,, | Performed by: INTERNAL MEDICINE

## 2023-12-01 RX ORDER — NYSTATIN 100000 [USP'U]/ML
4 SUSPENSION ORAL 4 TIMES DAILY
Qty: 160 ML | Refills: 0 | Status: SHIPPED | OUTPATIENT
Start: 2023-12-01 | End: 2023-12-11

## 2023-12-01 NOTE — PROGRESS NOTES
I have seen the patient, reviewed all pertinent data in Epic, and reviewed the fellow's history and physical, assessment, and plan. I agree with the findings and recommendations as documented.

## 2023-12-01 NOTE — PROGRESS NOTES
Subjective:      Patient ID: Anahi Cook is a 89 y.o. female.    Chief Complaint: Pneumonia, Shortness of Breath, and Cough    89-year-old woman with history of hypertension, COPD, GERD, PAD, depression, arthritis, and breast cancer with recent admission for hypoxemia and COPD exacerbation that was treated with antibiotics, Prednisone, and Duonebs. Patient was discharged on supplemental oxygen. Patient referred to our clinic for hospital follow up. Patient presented with daughter and grand daughter.    Patient discloses shortness of breath with activities (walking, moving around), 2 pillows orthopnea, and bilateral lower extremities swelling.  Patient discloses no cough, fever, weight loss.  Patient discloses episode of hoarse voice with oral pain.    Tobacco/vape: Patient used to smoke (stopped 30 years ago), 2 PPD for about 30 years  Occupation: [employer]  Exertional capacity: As above  Prior lung disease: As above  Sputum production: No  Allergies/sinusitis: Narrow nasal septum  GERD/Aspiration: Yes  Pets: No  Travel/TB: No  Respiratory regimen: On Breo and Incruse, as needed albuterol (using less frequent, can go 1 week without using)  Prior cancer: History of breast cancer, status post surgery and radiation  Prior hospitalization/intubation: As above  Snoring/apnea:  Snore, discloses no apnea  Vaccinations: Upto date except for Flu vaccine      Pneumonia  She complains of shortness of breath. There is no cough. Associated symptoms include orthopnea. Pertinent negatives include no chest pain, fever or postnasal drip.   Shortness of Breath  Associated symptoms include leg swelling. Pertinent negatives include no chest pain or fever.   Cough  Associated symptoms include shortness of breath. Pertinent negatives include no chest pain, chills, fever or postnasal drip.     Review of Systems   Constitutional:  Negative for fever, chills, fatigue and weakness.   HENT:  Negative for postnasal drip and congestion.     Respiratory:  Positive for snoring, shortness of breath, orthopnea and dyspnea on extertion. Negative for cough.    Cardiovascular:  Positive for leg swelling. Negative for chest pain and palpitations.   Neurological:  Negative for dizziness.   Psychiatric/Behavioral:  Negative for confusion. The patient is not nervous/anxious.      Objective:     Physical Exam   Constitutional: She is oriented to person, place, and time. No distress. She is not obese.   HENT:   Head: Normocephalic.   Mouth/Throat: Mallampati Score: III.   Scant oral thrush    Cardiovascular: Normal rate, regular rhythm and normal heart sounds.   No murmur heard.  Pulmonary/Chest: Normal expansion, symmetric chest wall expansion, effort normal and breath sounds normal. No stridor. She has no wheezes. She has no rhonchi.   Abdominal: Soft. She exhibits no distension. There is no abdominal tenderness.   Musculoskeletal:         General: Edema present.   Neurological: She is alert and oriented to person, place, and time.   Skin: She is not diaphoretic.   Psychiatric: She has a normal mood and affect. Her behavior is normal.     Personal Diagnostic Review        Investigations:  Intermittent elevated bicarbonate    Latest Reference Range & Units 11/08/23 17:12   POC PH 7.35 - 7.45  7.406   POC PCO2 35 - 45 mmHg 45.3 (H)   POC PO2 40 - 60 mmHg 30 (LL)   POC HCO3 24 - 28 mmol/L 28.5 (H)   POC SATURATED O2 95 - 100 % 56   Sample  VENOUS   POC TCO2 24 - 29 mmol/L 30 (H)   POC BE -2 to 2 mmol/L 4 (H)   Site  Other   (LL): Data is critically low  (H): Data is abnormally high    CTA chest of 11/08/2023:  Pulmonary vasculature: No filling defect of central pulmonary arteries. Evaluation of segmental/subsegmental arteries is limited due to beam hardening artifact, extensive motion, and suboptimal contrast bolus timing.     Mediastinum/hilum: Unremarkable.     Airway/esophagus: Probable retained secretions in the lower trachea extending into the right mainstem  "bronchus. Evaluation of smaller airways significantly limited by motion.     Lungs: Evaluation limited due to motion artifact. Panlobular emphysema more prominent compared to prior. Trace pleural effusions. Possible mild fibrotic changes in the lungs most notable in the anterior aspect of the left upper lobe and posteroinferior left lower lobe. Radiation changes included in the differential. Interstitial opacities in the superior aspect of the left lower lobe. Minimal interstitial densities in the right lower lobe. No pneumothorax.    CT chest of 01/23/2018:  Finding:Chest: Emphysematous change of the lungs bilaterally. There are a few superimposed subpleural and intraparenchymal lung cysts. There is no pleural effusion or pneumothorax. No lung consolidation. Diffuse interlobular septal regions of thickening lung apices and lung bases suggestive for scarring or atelectasis. There is a small right Bochdalek hernia containing fat only    TTE, no recent TTE      12/1/2023     3:45 PM 12/1/2023     9:11 AM 11/17/2023    10:09 AM 11/10/2023     4:21 PM 11/10/2023     3:31 PM 11/10/2023    11:54 AM 11/10/2023    10:45 AM   Pulmonary Function Tests   SpO2 97 %  90 % 95 % 94 % 93 % 92 %   Height 5' 4" (1.626 m) 5' 4" (1.626 m) 5' 4" (1.626 m)       Weight 66.7 kg (147 lb 0.8 oz) 66.7 kg (147 lb) 67 kg (147 lb 11.2 oz)       BMI (Calculated) 25.2 25.2 25.3            Assessment:     1. PNA (pneumonia)    2. Acute hypoxemic respiratory failure    3. Chronic obstructive pulmonary disease with acute exacerbation         Outpatient Encounter Medications as of 12/1/2023   Medication Sig Dispense Refill    acetaminophen (TYLENOL) 650 MG TbSR Take 650 mg by mouth every 8 (eight) hours as needed.      albuterol-ipratropium (DUO-NEB) 2.5 mg-0.5 mg/3 mL nebulizer solution Take 3 mLs by nebulization every 6 (six) hours as needed for Wheezing. Rescue 75 mL 0    alpha-D-galactosidase (BEANO ORAL) Take 1 capsule by mouth 2 (two) times a " day.      aspirin (ECOTRIN) 81 MG EC tablet Take 81 mg by mouth once daily.      atorvastatin (LIPITOR) 20 MG tablet Take 1 tablet (20 mg total) by mouth every evening. 90 tablet 1    benazepriL (LOTENSIN) 40 MG tablet Take 1 tablet (40 mg total) by mouth once daily. 90 tablet 1    BREO ELLIPTA 100-25 mcg/dose diskus inhaler Inhale 1 puff into the lungs once daily.      calcium-vitamin D 250-100 mg-unit per tablet Take 1 tablet by mouth 2 (two) times daily.      CETIRIZINE HCL (ZYRTEC ORAL) Take 10 mg by mouth nightly as needed.       citalopram (CELEXA) 20 MG tablet Take 1 tablet (20 mg total) by mouth every evening. 90 tablet 1    felodipine (PLENDIL) 10 MG 24 hr tablet Take 1 tablet (10 mg total) by mouth 2 (two) times a day. 180 tablet 1    gabapentin (NEURONTIN) 100 MG capsule Take 1 capsule (100 mg total) by mouth every evening. 90 capsule 1    hydrALAZINE (APRESOLINE) 25 MG tablet Take 1 tablet (25 mg total) by mouth every 12 (twelve) hours. 180 tablet 1    hydrocortisone 2.5 % cream Apply topically 2 (two) times daily. (Patient taking differently: Apply topically as needed.) 1 each 0    INCRUSE ELLIPTA 62.5 mcg/actuation inhalation capsule Inhale into the lungs once daily.      ipratropium-albuteroL (COMBIVENT RESPIMAT)  mcg/actuation inhaler Inhale 1 puff into the lungs.      ketoprofen 10 % CrPk Apply topically.      letrozole (FEMARA) 2.5 mg Tab Take 1 tablet (2.5 mg total) by mouth once daily. (Patient taking differently: Take 2.5 mg by mouth as needed.) 90 tablet 3    melatonin 10 mg Tab Take by mouth every evening.      montelukast (SINGULAIR) 10 mg tablet Take 10 mg by mouth every evening.      multivitamin (THERAGRAN) per tablet Take 1 tablet by mouth once daily.      polyethylene glycol 3350 (MIRALAX ORAL) Take by mouth once daily.      torsemide (DEMADEX) 20 MG Tab Take 1 tablet (20 mg total) by mouth once daily. 90 tablet 0    traMADoL (ULTRAM) 50 mg tablet Take 1 tablet (50 mg total) by  mouth every 8 (eight) hours as needed for Pain. 90 tablet 2    triamcinolone acetonide 0.1% (KENALOG) 0.1 % cream AAA bid.  Spot treatment as needed 45 g 0    triamcinolone acetonide 0.1% (KENALOG) 0.1 % paste as needed.      [] flu vac  [id number], (FLUAD QUAD -24,65Y UP,,PF,) 60 mcg (15 mcg x 4)/0.5 mL Syrg Inject 0.5 mLs into the muscle once. for 1 dose 0.5 mL 0    [DISCONTINUED] atorvastatin (LIPITOR) 20 MG tablet Take 1 tablet (20 mg total) by mouth every evening. 90 tablet 1    [DISCONTINUED] azithromycin (Z-GILL) 250 MG tablet Take 1 tablet (250 mg total) by mouth once daily. for 3 days 3 tablet 0    [DISCONTINUED] benazepriL (LOTENSIN) 40 MG tablet Take 1 tablet (40 mg total) by mouth once daily. 90 tablet 1    [DISCONTINUED] cefdinir (OMNICEF) 300 MG capsule Take 1 capsule (300 mg total) by mouth 2 (two) times daily. for 3 days 6 capsule 0    [DISCONTINUED] cefdinir (OMNICEF) 300 MG capsule Take 300 mg by mouth 2 (two) times daily.      [DISCONTINUED] citalopram (CELEXA) 20 MG tablet Take 1 tablet (20 mg total) by mouth every evening. 90 tablet 0    [DISCONTINUED] felodipine (PLENDIL) 10 MG 24 hr tablet Take 1 tablet (10 mg total) by mouth 2 (two) times a day. 180 tablet 1    [DISCONTINUED] hydrALAZINE (APRESOLINE) 25 MG tablet Take 1 tablet (25 mg total) by mouth every 12 (twelve) hours. 180 tablet 1    [DISCONTINUED] predniSONE (DELTASONE) 10 MG tablet Take 10 tablets by mouth 3 (three) times daily.      [DISCONTINUED] predniSONE (DELTASONE) 20 MG tablet Take 2 tablets (40 mg total) by mouth once daily. for 2 days 4 tablet 0    [DISCONTINUED] torsemide (DEMADEX) 20 MG Tab TAKE 1 TABLET(20 MG) BY MOUTH EVERY DAY 90 tablet 2     No facility-administered encounter medications on file as of 2023.     No orders of the defined types were placed in this encounter.    .  1. Chronic obstructive pulmonary disease with acute exacerbation  Ambulatory referral/consult to COPD Discharge Clinic     Ambulatory referral/consult to COPD Discharge Clinic    Complete PFT with bronchodilator    Six Minute Walk Test to qualify for Home Oxygen      2. PNA (pneumonia)  Ambulatory referral/consult to COPD Discharge Clinic      3. Acute hypoxemic respiratory failure  Ambulatory referral/consult to COPD Discharge Clinic    Ambulatory referral/consult to COPD Discharge Clinic    Complete PFT with bronchodilator    Six Minute Walk Test to qualify for Home Oxygen         Plan:     89-year-old woman with history of hypertension, COPD, GERD, PAD, depression, arthritis, and breast cancer with recent admission for hypoxemia and COPD exacerbation that was treated with antibiotics, Prednisone, and Duonebs. Patient was discharged on supplemental oxygen. Patient referred to our clinic for hospital follow up. Patient discloses shortness of breath with activities (walking, moving around), 2 pillows orthopnea, and bilateral lower extremities swelling. Examination today remarkable for lower extremity swelling. Recent CTA showed emphysematous changes, trace pleural effusions, possible mild fibrotic changes in the lungs most notable in the anterior aspect of the left upper lobe and posteroinferior left lower lobe. There is no previous TTE or PFT. Repeat CXR today showed interval decrease in bilateral lower lobe interstitial prominence noted on prior x-ray 11/08/2023, no pleural effusion or pneumothorax.   Presentation consistent with HFpEF and COPD. Patient was seen by cardiology today, diuretic dose was adjusted. TTE was also ordered. Will follow up TTE ordered by the cardiology team.   PFT ordered. Continue oxygen therapy.  Patient wants order for portable oxygen, will order 6MWT.  Will continue triple inhaler therapy (Breo and Incruse) and as needed bronchodilator. Nystatin ordered for oral candidiasis.   Flu vaccine was given today.  Follow up in 3 months.    Patient was seen with the attending physician Dr. Junior Segura.    Post clinic  walk      Patient qualifies for oxygen at rest. Patient is ambulatory and will benefit from portable oxygen concentrator at 2L NC/min.    12/06/2023---------Distance:   ( )       O2 Sat % Supplemental Oxygen Heart Rate Blood Pressure Fara Scale   Pre-exercise  (Resting) 86 % Room Air 66 bpm 143/65 mmHg 3      Oxygen Qualification:       O2 Sat % Supplemental Oxygen Heart Rate Blood Pressure Fara Scale   Pre-exercise  (Resting) 94 % 2 L/M  67 bpm  150/69 mmHg 2

## 2023-12-01 NOTE — PATIENT INSTRUCTIONS
Assessment/Plan:  Anahi Cook is a 89 y.o. female with PAD, HTN, HLD, arthritis, COPD, former smoker, who presents for a follow up appointment.     BLE edema- Likely due to recent high dose steroids, sodium intake and dependent edema from sitting all the time.  Check BLE venous reflux study.  Increase torsemide to 20 mg bid for 5 days, then reduce to 20 mg daily thereafter.  Check cmp in 1 week. Check echo.  Limit sodium intake to 2000 mg daily.  Limit volume intake to 1.5 L daily.  Elevate legs when resting.    2. PAD- She reports she does not ambulate much and has no claudication, rest pain, or tissue loss.  Toe Pressure Study on 1/18/2023 revealed right TBI is reduced consistent with severe PAD.  Left TBI is reduced consistent with moderate PAD.  PVR's are biphasic bilaterally.  Right Toe Pressure 44 mmHg.  Left Toe Pressure 97 mmHg. JESSE Study on 7/26/2022 revealed the right JESSE is 0.46 with diminished PVR waveforms below the knee consistent with severe arterial insufficiency. The right TBI 0.23 with diminished digit waveforms.  The left JESSE is 0.8 with mildly abnormal PVR waveforms consistent with mild arterial insufficiency.  The left TBI is 0.71.  4th and 5th digit waveforms are abnormal.  Pt did not have CTA abd/pelvis with iliofemoral runoff done.  Continue ASA 81 mg daily and atorvastatin 20 mg daily.      3. Left Carotid Bruit- Carotid Ultrasound on 1/18/2023 demonstrated 0-19% bilateral Internal Carotid Stenosis.    Continue ASA 81 mg daily and atorvastatin 20 mg daily.    4. HTN- Continue current medications.    5. HLD- Continue atorvastatin 20 mg daily.      Follow up in 1 month with virtual visit

## 2023-12-01 NOTE — PROGRESS NOTES
[facility]      Chief Complaint   Patient presents with    Essential hypertension    PAD (peripheral artery disease)       Patient ID: Anahi Cook is a 89 y.o. female with PAD, HTN, HLD, arthritis, COPD, former smoker, who presents for a follow up appointment.  Pertinent history/events are as follows:     -Pt kindly referred by Dr. Lyssa Hoffman for evaluation of PAD/bilateral foot pain.    -At our initial clinic visit on 9/1/2022, Mrs. Cook was accompanied by her daughter.  She reported she does not ambulate much and has no claudication, rest pain, or tissue loss.  JESSE Study on 7/26/2022 revealed the right JESSE is 0.46 with diminished PVR waveforms below the knee consistent with severe arterial insufficiency. The right TBI 0.23 with diminished digit waveforms.  The left JESSE is 0.8 with mildly abnormal PVR waveforms consistent with mild arterial insufficiency.  The left TBI is 0.71.  4th and 5th digit waveforms are abnormal.  Formerly smoked a pack a day for 40 years.  Quit in her 60's  Plan:   PAD- She reports she does not ambulate much and has no claudication, rest pain, or tissue loss.  JESSE Study on 7/26/2022 revealed the right JESSE is 0.46 with diminished PVR waveforms below the knee consistent with severe arterial insufficiency. The right TBI 0.23 with diminished digit waveforms.  The left JESSE is 0.8 with mildly abnormal PVR waveforms consistent with mild arterial insufficiency.  The left TBI is 0.71.  4th and 5th digit waveforms are abnormal.  Check toe pressure study and CTA abd/pelvis with iliofemoral runoff to determine if arterial blood flow is adequate for wound healing prior to any invasive procedures.  Start ASA 81 mg daily.  Continue atorvastatin 20 mg daily.    Left Carotid Bruit- Check carotid ultrasound.   HTN- Continue current medications.  HLD- Continue atorvastatin 20 mg daily.     -At follow up clinic visit on 1/26/2023, Mrs. Cook was accompanied by her daughter.  She reports  no claudication, rest pain, or tissue loss.  Toe Pressure Study on 1/18/2023 revealed right TBI is reduced consistent with severe PAD.  Left TBI is reduced consistent with moderate PAD.  PVR's are biphasic bilaterally.  Right Toe Pressure 44 mmHg.  Left Toe Pressure 97 mmHg.  Carotid Ultrasound on 1/18/2023 demonstrated 0-19% bilateral Internal Carotid Stenosis.  Plan:   PAD- She reports she does not ambulate much and has no claudication, rest pain, or tissue loss.  Toe Pressure Study on 1/18/2023 revealed right TBI is reduced consistent with severe PAD.  Left TBI is reduced consistent with moderate PAD.  PVR's are biphasic bilaterally.  Right Toe Pressure 44 mmHg.  Left Toe Pressure 97 mmHg. JESSE Study on 7/26/2022 revealed the right JESSE is 0.46 with diminished PVR waveforms below the knee consistent with severe arterial insufficiency. The right TBI 0.23 with diminished digit waveforms.  The left JESSE is 0.8 with mildly abnormal PVR waveforms consistent with mild arterial insufficiency.  The left TBI is 0.71.  4th and 5th digit waveforms are abnormal.  Pt did not have CTA abd/pelvis with iliofemoral runoff done.  Continue ASA 81 mg daily and atorvastatin 20 mg daily.    Left Carotid Bruit- Carotid Ultrasound on 1/18/2023 demonstrated 0-19% bilateral Internal Carotid Stenosis.    Continue ASA 81 mg daily and atorvastatin 20 mg daily.  HTN- Continue current medications.  HLD- Continue atorvastatin 20 mg daily.      HPI:  Mrs. Cook is accompanied by her daughter.  She reports worsening leg swelling since hospital discharge for COPD exacerbation.  Of note, pt received prednisone 60 mg in ED, and was continued on Prednisone 40mg PO daily to complete a 5 day course.  She has no chest pain or chest discomfort.     Past Medical History:   Diagnosis Date    Anxiety     Arthritis     Back pain     Breast cancer 1/17/2018    Breast mass 1/15/2018    Chronic kidney disease, stage 2, mildly decreased GFR     COPD (chronic  obstructive pulmonary disease)     emphysema    Depression     Dysuria 1/29/2018    Exudative age-related macular degeneration of left eye with active choroidal neovascularization 8/25/2022    Family history of breast cancer 1/17/2018    GERD (gastroesophageal reflux disease)     Hypertension     Infiltrating ductal carcinoma of breast, left 7/12/2018    Multiple closed fractures of ribs of left side 4/3/2019    Osteoporosis, senile     Ovarian mass 1/15/2018    Pneumonia     S/P robotic assisted laparoscopic BSO (bilateral salpingo-oophorectomy) 2/23/2018     Past Surgical History:   Procedure Laterality Date    AXILLARY NODE DISSECTION Left 7/12/2018    Procedure: LYMPHADENECTOMY, AXILLARY;  Surgeon: Amanda Caballero MD;  Location: [facility] OR [address] [place] [address];  Service: General;  Laterality: Left;    BREAST BIOPSY      BREAST LUMPECTOMY      CATARACT EXTRACTION      CHOLECYSTECTOMY      COLONOSCOPY      ESOPHAGOGASTRODUODENOSCOPY      ESOPHAGOGASTRODUODENOSCOPY N/A 10/14/2019    Procedure: EGD (ESOPHAGOGASTRODUODENOSCOPY);  Surgeon: Davonte Thompson MD;  Location: [facility] ENDO ([address] [place] [address]);  Service: Endoscopy;  Laterality: N/A;  hx of pulmonary hypertension-PA 50     pt requesting ASAP    EYE SURGERY      FIXATION KYPHOPLASTY N/A 11/8/2018    Procedure: Kyphoplasty   T12;  Surgeon: Melry Wilcox MD;  Location: [facility] CATH LAB;  Service: Pain Management;  Laterality: N/A;  T12  Onfan Reps e-mailed with date and time    HYSTERECTOMY      INJECTION FOR SENTINEL NODE IDENTIFICATION Left 7/12/2018    Procedure: INJECTION, FOR SENTINEL NODE IDENTIFICATION;  Surgeon: Amanda Caballero MD;  Location: [facility] OR [address] [place] [address];  Service: General;  Laterality: Left;    INJECTION OF FACET JOINT Bilateral 10/15/2018    Procedure: INJECTION, FACET JOINT, BILATERAL LUMBAR L3-4, 4-5, 5-S1 FACET JOINT INJECTIONS;  Surgeon: Merly Wilcox MD;  Location: [facility] PAIN MGT;  Service: Pain Management;  Laterality: Bilateral;    INJECTION OF FACET JOINT Bilateral 4/1/2019     Procedure: INJECTION, FACET JOINT, L3-L4,L4-L5,L5-S1;  Surgeon: Merly Wilcox MD;  Location: [facility] PAIN MGT;  Service: Pain Management;  Laterality: Bilateral;    INJECTION OF FACET JOINT Bilateral 6/20/2019    Procedure: INJECTION, FACET JOINT, L3-L4,L4-L5,L5-S1 NEED CONSENT;  Surgeon: Merly Wilcox MD;  Location: [facility] PAIN MGT;  Service: Pain Management;  Laterality: Bilateral;    INJECTION OF FACET JOINT Bilateral 7/27/2020    Procedure: INJECTION, FACET JOINT BILATERAL L3/4, L4/5 and L5/S1;  Surgeon: Merly Wilcox MD;  Location: [facility] PAIN MGT;  Service: Pain Management;  Laterality: Bilateral;    INJECTION OF JOINT Bilateral 7/27/2020    Procedure: INJECTION, JOINT, BILATERAL GREATER TROCHANTERIC BURSA;  Surgeon: Merly Wilcox MD;  Location: [facility] PAIN MGT;  Service: Pain Management;  Laterality: Bilateral;    INJECTION OF JOINT Bilateral 4/26/2021    Procedure: INJECTION, JOINT, HIP;  Surgeon: Merly Wilcox MD;  Location: [facility] PAIN MGT;  Service: Pain Management;  Laterality: Bilateral;  consent needed    INJECTION OF STEROID Right 11/15/2021    Procedure: INJECTION, STEROID RIGHT MIDDLE AND SAMLL FINGER;  Surgeon: Florecita Da Silva MD;  Location[facility] OR;  Service: Orthopedics;  Laterality: Right;    MASTECTOMY, PARTIAL Left 7/12/2018    Procedure: MASTECTOMY, PARTIAL-W/SEED LOCALIZATION;  Surgeon: Amanda Caballero MD;  [facility];  Service: General;  Laterality: Left;    NH REMOVAL OF OVARY/TUBE(S)  02/23/2018    Robotic Assisted    ROTATOR CUFF REPAIR Right     rotator cuff repair right shoulder    TONSILLECTOMY      TRANSFORAMINAL EPIDURAL INJECTION OF STEROID Bilateral 6/21/2021    Procedure: INJECTION, STEROID, EPIDURAL, TRANSFORAMINAL APPROACH  bilateral L4/5 TF ASHUTOSH;  Surgeon: Merly Wilcox MD;  Location: [facility] PAIN MGT;  Service: Pain Management;  Laterality: Bilateral;  consent needed    TRANSFORAMINAL EPIDURAL INJECTION OF STEROID Bilateral 1/3/2022    Procedure: INJECTION, STEROID,  EPIDURAL, TRANSFORAMINAL APPROACH Bilateral L4/5 Needs consent;  Surgeon: Merly Wilcox MD;  Location: [facility] PAIN MGT;  Service: Pain Management;  Laterality: Bilateral;    TRANSFORAMINAL EPIDURAL INJECTION OF STEROID Bilateral 8/9/2022    Procedure: INJECTION, STEROID, EPIDURAL, TRANSFORAMINAL APPROACH, BILATERAL L4-L5   MEDICALLY URGENT;  Surgeon: Merly Wilcox MD;  Location: [facility] PAIN MGT;  Service: Pain Management;  Laterality: Bilateral;    TRANSFORAMINAL EPIDURAL INJECTION OF STEROID Left 11/17/2022    Procedure: INJECTION, STEROID, EPIDURAL, TRANSFORAMINAL APPROACH LEFT L3-L4 AND L4-L5 CONTRAST;  Surgeon: Merly Wilcox MD;  Location: [facility] PAIN MGT;  Service: Pain Management;  Laterality: Left;    TRANSFORAMINAL EPIDURAL INJECTION OF STEROID Bilateral 7/31/2023    Procedure: INJECTION, STEROID, EPIDURAL, TRANSFORAMINAL APPROACH, BILATERAL L5/S1  sooner date;  Surgeon: Merly Wilcox MD;  Location: [facility] PAIN MGT;  Service: Pain Management;  Laterality: Bilateral;    TRIGGER FINGER RELEASE Left 11/15/2021    Procedure: RELEASE, TRIGGER FINGER, LEFT MIDDLE AND RING;  Surgeon: Florecita Da Silva MD;  Location: [facility] OR;  Service: Orthopedics;  Laterality: Left;     Social History     Socioeconomic History    Marital status: Single    Number of children: 3   Occupational History    Occupation: Multiple jobs, see social documentation section     Comment: Retired   Tobacco Use    Smoking status: Former     Current packs/day: 1.00     Average packs/day: 1 pack/day for 40.0 years (40.0 ttl pk-yrs)     Types: Cigarettes    Smokeless tobacco: Never    Tobacco comments:     Smoked >1 ppd for at least 40 years, quit around 1995   Substance and Sexual Activity    Alcohol use: Yes     Comment: occasional glass of wine or cocktail    Drug use: No    Sexual activity: Yes     Partners: Male   Social History Narrative    Worked many jobs while raising 3 children.  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  She was  from her first , but took care of him at the end of his life.     Social Determinants of Health     Financial Resource Strain: Low Risk  (11/9/2023)    Overall Financial Resource Strain (CARDIA)     Difficulty of Paying Living Expenses: Not hard at all   Food Insecurity: No Food Insecurity (11/9/2023)    Hunger Vital Sign     Worried About Running Out of Food in the Last Year: Never true     Ran Out of Food in the Last Year: Never true   Transportation Needs: No Transportation Needs (11/9/2023)    PRAPARE - Transportation     Lack of Transportation (Medical): No     Lack of Transportation (Non-Medical): No   Physical Activity: Inactive (11/9/2023)    Exercise Vital Sign     Days of Exercise per Week: 0 days     Minutes of Exercise per Session: 0 min   Stress: No Stress Concern Present (11/9/2023)    [employer] - Occupational Stress Questionnaire     Feeling of Stress : Not at all   Social Connections: Socially Isolated (11/9/2023)    Social Connection and Isolation Panel [NHANES]     Frequency of Communication with Friends and Family: More than three times a week     Frequency of Social Gatherings with Friends and Family: More than three times a week     Attends Voodoo Services: Never     Active Member of Clubs or Organizations: No     Attends Club or Organization Meetings: Never     Marital Status:    Housing Stability: Low Risk  (11/9/2023)    Housing Stability Vital Sign     Unable to Pay for Housing in the Last Year: No     Number of Places Lived in the Last Year: 1     Unstable Housing in the Last Year: No     Family History   Problem Relation Age of Onset    Heart failure Mother     Kidney failure Mother     Heart attack Father     Breast cancer Sister 66        Lump, XRT, chemo, recurrence 10 years later.    Cancer Brother         leukemia    Heart attack Brother 58     Pulmonary embolism Brother 45    Heart attack Brother 52    Breast cancer Maternal Grandmother 60        advanced at diagnosis    Breast cancer Maternal Aunt 83         at 92    Colon cancer Neg Hx     Esophageal cancer Neg Hx        Review of patient's allergies indicates:   Allergen Reactions    Levaquin [levofloxacin] Itching    Penicillins Itching       Medication List with Changes/Refills   Current Medications    ACETAMINOPHEN (TYLENOL) 650 MG TBSR    Take 650 mg by mouth every 8 (eight) hours as needed.    ALBUTEROL-IPRATROPIUM (DUO-NEB) 2.5 MG-0.5 MG/3 ML NEBULIZER SOLUTION    Take 3 mLs by nebulization every 6 (six) hours as needed for Wheezing. Rescue    ALPHA-D-GALACTOSIDASE (BEANO ORAL)    Take 1 capsule by mouth 2 (two) times a day.    ASPIRIN (ECOTRIN) 81 MG EC TABLET    Take 81 mg by mouth once daily.    ATORVASTATIN (LIPITOR) 20 MG TABLET    Take 1 tablet (20 mg total) by mouth every evening.    BENAZEPRIL (LOTENSIN) 40 MG TABLET    Take 1 tablet (40 mg total) by mouth once daily.    BREO ELLIPTA 100-25 MCG/DOSE DISKUS INHALER    Inhale 1 puff into the lungs once daily.    CALCIUM-VITAMIN D 250-100 MG-UNIT PER TABLET    Take 1 tablet by mouth 2 (two) times daily.    CETIRIZINE HCL (ZYRTEC ORAL)    Take 10 mg by mouth nightly as needed.     CITALOPRAM (CELEXA) 20 MG TABLET    Take 1 tablet (20 mg total) by mouth every evening.    FELODIPINE (PLENDIL) 10 MG 24 HR TABLET    Take 1 tablet (10 mg total) by mouth 2 (two) times a day.    GABAPENTIN (NEURONTIN) 100 MG CAPSULE    Take 1 capsule (100 mg total) by mouth every evening.    HYDRALAZINE (APRESOLINE) 25 MG TABLET    Take 1 tablet (25 mg total) by mouth every 12 (twelve) hours.    HYDROCORTISONE 2.5 % CREAM    Apply topically 2 (two) times daily.    INCRUSE ELLIPTA 62.5 MCG/ACTUATION INHALATION CAPSULE    Inhale into the lungs once daily.    IPRATROPIUM-ALBUTEROL (COMBIVENT RESPIMAT)  MCG/ACTUATION INHALER    Inhale 1 puff into the lungs.     "KETOPROFEN 10 % CRPK    Apply topically.    LETROZOLE (FEMARA) 2.5 MG TAB    Take 1 tablet (2.5 mg total) by mouth once daily.    MELATONIN 10 MG TAB    Take by mouth every evening.    MONTELUKAST (SINGULAIR) 10 MG TABLET    Take 10 mg by mouth every evening.    MULTIVITAMIN (THERAGRAN) PER TABLET    Take 1 tablet by mouth once daily.    POLYETHYLENE GLYCOL 3350 (MIRALAX ORAL)    Take by mouth once daily.    TORSEMIDE (DEMADEX) 20 MG TAB    Take 1 tablet (20 mg total) by mouth once daily.    TRAMADOL (ULTRAM) 50 MG TABLET    Take 1 tablet (50 mg total) by mouth every 8 (eight) hours as needed for Pain.    TRIAMCINOLONE ACETONIDE 0.1% (KENALOG) 0.1 % CREAM    AAA bid.  Spot treatment as needed    TRIAMCINOLONE ACETONIDE 0.1% (KENALOG) 0.1 % PASTE    as needed.       Review of Systems  Constitution: Denies chills, fever, and sweats.  HENT: Denies headaches or blurry vision.  Cardiovascular: Denies chest pain or irregular heart beat.  Respiratory: Denies cough or shortness of breath.  Gastrointestinal: Denies abdominal pain, nausea, or vomiting.  Musculoskeletal: Denies muscle cramps.  Neurological: Denies dizziness or focal weakness.  Psychiatric/Behavioral: Normal mental status.  Hematologic/Lymphatic: Denies bleeding problem or easy bruising/bleeding.  Skin: Denies rash or suspicious lesions    Physical Examination  BP (!) 155/63   Pulse 62   Ht 5' 4" (1.626 m)   Wt 66.7 kg (147 lb)   LMP  (LMP Unknown)   BMI 25.23 kg/m²     Constitutional: No acute distress, conversant  HEENT: Sclera anicteric, Pupils equal, round and reactive to light, extraocular motions intact, Oropharynx clear  Neck: No JVD, no carotid bruits  Cardiovascular: regular rate and rhythm, no murmur, rubs or gallops, normal S1/S2  Pulmonary: Clear to auscultation bilaterally  Abdominal: Abdomen soft, nontender, nondistended, positive bowel sounds  Extremities: No lower extremity edema,   Pulses:  Carotid pulses are 2+ on the right side, and 2+ " "on the left side.  Radial pulses are 2+ on the right side, and 2+ on the left side.   Femoral pulses are 2+ on the right side, and 2+ on the left side.  Popliteal pulses are 2+ on the right side, and 2+ on the left side.   Dorsalis pedis pulses are 2+ on the right side, and 2+ on the left side.   Posterior tibial pulses are 2+ on the right side, and 2+ on the left side.    Skin: No ecchymosis, erythema, or ulcers  Psych: Alert and oriented x 3, appropriate affect  Neuro: CNII-XII intact, no focal deficits    Labs:  Most Recent Data  CBC:   Lab Results   Component Value Date    WBC 10.25 11/10/2023    HGB 12.0 11/10/2023    HCT 37.4 11/10/2023     11/10/2023    MCV 89 11/10/2023    RDW 14.5 11/10/2023     BMP:   Lab Results   Component Value Date     11/17/2023    K 3.5 11/17/2023    CL 95 11/17/2023    CO2 35 (H) 11/17/2023    BUN 12 11/17/2023    CREATININE 0.9 11/17/2023     11/17/2023    CALCIUM 8.9 11/17/2023    MG 2.1 11/10/2023    PHOS 3.6 11/17/2023    PHOS 3.6 11/17/2023     LFTS;   Lab Results   Component Value Date    PROT 6.3 11/10/2023    ALBUMIN 3.2 (L) 11/17/2023    BILITOT 0.3 11/10/2023    AST 19 11/10/2023    ALKPHOS 63 11/10/2023    ALT 15 11/10/2023     COAGS: No results found for: "INR", "PROTIME", "PTT"  FLP:   Lab Results   Component Value Date    CHOL 126 01/20/2023    HDL 57 01/20/2023    LDLCALC 51.8 (L) 01/20/2023    TRIG 86 01/20/2023    CHOLHDL 45.2 01/20/2023     CARDIAC:   Lab Results   Component Value Date    TROPONINI 0.025 11/08/2023     (H) 11/08/2023       Imaging:    Toe Pressure Study 1/18/2023:  Right TBI is reduced consistent with severe PAD.  Left TBI is reduced consistent with moderate PAD.  PVR's are biphasic bilaterally.  Right Toe Pressure 44 mmHg  Left Toe Pressure 97 mmHg    Carotid Ultrasound 1/18/2023:  There is 0-19% right Internal Carotid Stenosis.  There is 0-19% left Internal Carotid Stenosis.    JESSE Study 7/26/2022:  The right JESSE is " 0.46 with diminished PVR waveforms below the knee consistent with severe arterial insufficiency.  The right TBI 0.23 with diminished digit waveforms.  The left JESSE is 0.8 with mildly abnormal PVR waveforms consistent with mild arterial insufficiency.  The left TBI is 0.71.  4th and 5th digit waveforms are abnormal.    Assessment/Plan:  Anahi Cook is a 89 y.o. female with PAD, HTN, HLD, arthritis, COPD, former smoker, who presents for a follow up appointment.     BLE edema- Likely due to recent high dose steroids, sodium intake and dependent edema from sitting all the time.  Check BLE venous reflux study.  Increase torsemide to 20 mg bid for 5 days, then reduce to 20 mg daily thereafter.  Check cmp in 1 week. Check echo.  Limit sodium intake to 2000 mg daily.  Limit volume intake to 1.5 L daily.  Elevate legs when resting.    2. PAD- She reports she does not ambulate much and has no claudication, rest pain, or tissue loss.  Toe Pressure Study on 1/18/2023 revealed right TBI is reduced consistent with severe PAD.  Left TBI is reduced consistent with moderate PAD.  PVR's are biphasic bilaterally.  Right Toe Pressure 44 mmHg.  Left Toe Pressure 97 mmHg. JESSE Study on 7/26/2022 revealed the right JESSE is 0.46 with diminished PVR waveforms below the knee consistent with severe arterial insufficiency. The right TBI 0.23 with diminished digit waveforms.  The left JESSE is 0.8 with mildly abnormal PVR waveforms consistent with mild arterial insufficiency.  The left TBI is 0.71.  4th and 5th digit waveforms are abnormal.  Pt did not have CTA abd/pelvis with iliofemoral runoff done.  Continue ASA 81 mg daily and atorvastatin 20 mg daily.      3. Left Carotid Bruit- Carotid Ultrasound on 1/18/2023 demonstrated 0-19% bilateral Internal Carotid Stenosis.    Continue ASA 81 mg daily and atorvastatin 20 mg daily.    4. HTN- Continue current medications.    5. HLD- Continue atorvastatin 20 mg daily.      Follow up in 1 month with  virtual visit    Total duration of face to face visit time 30 minutes.  Total time spent counseling greater than fifty percent of total visit time.  Counseling included discussion regarding imaging findings, diagnosis, possibilities, treatment options, risks and benefits.  The patient had many questions regarding the options and long-term effects.    Basil Boss MD, PhD  Interventional Cardiology

## 2023-12-05 ENCOUNTER — TELEPHONE (OUTPATIENT)
Dept: PAIN MEDICINE | Facility: CLINIC | Age: 88
End: 2023-12-05

## 2023-12-05 ENCOUNTER — OFFICE VISIT (OUTPATIENT)
Dept: PAIN MEDICINE | Facility: CLINIC | Age: 88
End: 2023-12-05
Payer: MEDICARE

## 2023-12-05 DIAGNOSIS — M25.561 CHRONIC PAIN OF BOTH KNEES: Primary | ICD-10-CM

## 2023-12-05 DIAGNOSIS — M25.562 CHRONIC PAIN OF BOTH KNEES: Primary | ICD-10-CM

## 2023-12-05 DIAGNOSIS — M17.0 PRIMARY OSTEOARTHRITIS OF BOTH KNEES: ICD-10-CM

## 2023-12-05 DIAGNOSIS — M47.816 LUMBAR SPONDYLOSIS: ICD-10-CM

## 2023-12-05 DIAGNOSIS — M54.16 LUMBAR RADICULOPATHY: ICD-10-CM

## 2023-12-05 DIAGNOSIS — M51.36 DDD (DEGENERATIVE DISC DISEASE), LUMBAR: ICD-10-CM

## 2023-12-05 DIAGNOSIS — G89.29 CHRONIC PAIN OF BOTH KNEES: Primary | ICD-10-CM

## 2023-12-05 DIAGNOSIS — G89.4 CHRONIC PAIN DISORDER: ICD-10-CM

## 2023-12-05 PROCEDURE — 1111F DSCHRG MED/CURRENT MED MERGE: CPT | Mod: CPTII,95,, | Performed by: NURSE PRACTITIONER

## 2023-12-05 PROCEDURE — 1159F MED LIST DOCD IN RCRD: CPT | Mod: CPTII,95,, | Performed by: NURSE PRACTITIONER

## 2023-12-05 PROCEDURE — 99213 OFFICE O/P EST LOW 20 MIN: CPT | Mod: 95,,, | Performed by: NURSE PRACTITIONER

## 2023-12-05 PROCEDURE — 1111F PR DISCHARGE MEDS RECONCILED W/ CURRENT OUTPATIENT MED LIST: ICD-10-PCS | Mod: CPTII,95,, | Performed by: NURSE PRACTITIONER

## 2023-12-05 PROCEDURE — 99213 PR OFFICE/OUTPT VISIT, EST, LEVL III, 20-29 MIN: ICD-10-PCS | Mod: 95,,, | Performed by: NURSE PRACTITIONER

## 2023-12-05 PROCEDURE — 1160F RVW MEDS BY RX/DR IN RCRD: CPT | Mod: CPTII,95,, | Performed by: NURSE PRACTITIONER

## 2023-12-05 PROCEDURE — 1160F PR REVIEW ALL MEDS BY PRESCRIBER/CLIN PHARMACIST DOCUMENTED: ICD-10-PCS | Mod: CPTII,95,, | Performed by: NURSE PRACTITIONER

## 2023-12-05 PROCEDURE — 1159F PR MEDICATION LIST DOCUMENTED IN MEDICAL RECORD: ICD-10-PCS | Mod: CPTII,95,, | Performed by: NURSE PRACTITIONER

## 2023-12-05 NOTE — PROGRESS NOTES
Chronic Pain- Established Visit  Chronic Pain-Tele-Medicine-Established Note (Follow up visit)        The patient location is: Home  The chief complaint leading to consultation is: pain  Visit type: Virtual visit with synchronous audio and video  Total time spent with patient: 25 min  Each patient to whom he or she provides medical services by telemedicine is:  (1) informed of the relationship between the physician and patient and the respective role of any other health care provider with respect to management of the patient; and (2) notified that he or she may decline to receive medical services by telemedicine and may withdraw from such care at any time.        SUBJECTIVE:    Interval History 12/5/2023:  The patient returns to clinic today for follow up of pain via virtual visit. Since last visit, she was admitted for pneumonia. She is currently on oxygen. She did see Pulmonology last week and has upcoming tests. She reports increased bilateral knee pain. This is worse with standing and walking. She also reports intermittent low back pain. She does have burning pain into her heels. She is having some increased lower extremity edema. She is taking Gabapentin but concerned that this may be contributing to swelling. She denies any other health changes.     Interval History 8/16/2023:  The patient returns to clinic today for follow up of back pain via virtual visit. She is s/p left AC joint injection on 7/5/2023. She is s/p bilateral L5/S1 TF ASHUTOSH on 7/31/2023. She reports 75% relief of her low back pain. She also reports 75% relief of her shoulder pain. She continues to report intermittent low back pain. She reports burning pain into her bilateral heels. She is using a topical pain cream. She takes Gabapentin with benefit. She denies any other health changes.     Interval History 7/5/2023:  The patient returns to clinic today for follow up of low back pain. She had a fall two weeks ago. She did go to Urgent Care where  xrays are negative for acute injury. She reports increased left shoulder and clavicle pain. This pain is worse with activity. She is having trouble dressing herself due to pain. She has increased her Tylenol intake. She is using a topical pain cream. She is using ice and heat. She is taking Gabapentin. She continues to report low back pain, she is scheduled for ASHUTOSH at the end of the month. Her pain today is 9/10.    Interval History 6/9/2023:  The patient returns to clinic today for follow up of back pain via virtual visit. She reports increased low back pain that radiates into anterior and posterior legs to her ankles. Her pain is worse with standing and walking. She is taking Gabapentin at bedtime. She also takes Tylenol for pain. She denies any other health changes.     Interval History 3/9/2023:  The patient returns to clinic today for follow up of back pain via virtual visit. She reports intermittent low back pain. She denies any radicular leg pain. She is using an acupressure knee brace with benefit. Her pain is tolerable at this time. She continues to take Gabapentin with benefit. She denies any other health changes.     Interval History 12/9/2022:  The patient returns to clinic today for follow up of low back pain via virtual visit. She is s/p left L3/4 and L4/5 TF ASHUTOSH on 11/17/2022. She reports 60% relief of her pain. She continues to report intermittent low back pain. She denies any radicular leg pain. She does report bilateral foot pain, described as burning in nature. Her pain is tolerable at this time. She continues to take Gabapentin. She also takes Tramadol per her PCP. She denies any other health changes.     Interval History 10/20/22  Ms. Cook complains of worsening left low back pain radiating down her lateral thigh to the knee. The pain is only when she walks/moves, she in pain free when sitting. She is s/p bilateral TESI L4/5 2 months ago, which she had relief from, but acutely worsened when  she bent over to pick something off the ground. She states the pain is similar to her pain prior to this. She denies numbness, tingling, bladder/bowel problems.    Interval History 10/6/2022:   Mrs Cook presents for follow up virtually. At prior visit she had Left GTB injection with significant benefit in office. Her lower back and hip pain were further evaluated and noted newer L3 compression Fx. While she visually appears to have significant improvement of pain to me she does voice focal upper/mid lumbar pain persists. Discussed treatment options and Kyphoplasty recommended by Dr Wilcox but there was hesitation regarding further escalation to kyphoplasty. We did discusses prior osteoporosis diagnosis and considering treatment options with PCP. She does not voice any s/s concerning for cauda equina.      Interval History 8/22/2022:  Mrs Cook presents for follow up acute pain. She is s/p repeat Bilateral L4/5 TFESI with mild improvement but having severe left lateral hip pain. She also continues to have burning pain to Right heel and numbness to right lateral lower leg. She has chronic urinary continence but no new neurological issues.She continues to take tramadol by PCP with some benefit     Interval History 8/5/2022:  Mrs Cook presents for follow up evaluation of returning lower back pain. She has exact pain relieved prior by B L4/5 TFESIs. She has newer complaint of bilateral feet burning pains as well. She has had JESSE noting arterial issues R>L. She continues to take Tramadol and neurontin without SE.She would like to repeat ASHUTOSH as it provided 7 months of 75% relief and symptoms returning severe over last few weeks. No voicing of symptoms concerning for cauda equina.     Interval History 12/6/2021:  The patient returns to clinic today for follow up of low back pain. She reports increased low back and bilateral hip pain over the last two weeks. She reports intermittent radiating pain into her lateral  thighs. She does have difficulty sleeping due to pain. Her pain is also worse with standing and walking. She continues to take Gabapentin with benefit. She also takes Tramadol per her PCP with benefit. She denies any other health changes. Her pain today is 8/10.    Interval History 7/6/2021:  The patient returns to clinic today for follow up of low back pain via virtual visit. She is s/p bilateral L4/5 TF ASHUTOSH on 6/21/2021. She reports 75% relief of her low back and leg pain. She reports low back pain, worse in the morning. She reports intermittent radiating pain into the posterolateral aspect of both legs to her knees. She continues to take Gabapentin with benefit. She denies any weakness. She denies any other health changes. Her pain today is 2/10.    Interval History 6/8/2021:  The patient returns to clinic today for follow up of low back pain. She is here today for imaging review. She continues to report low back pain that radiates into the posterolateral aspect of both legs to below her knee. Her pain is worse with prolonged standing and walking. She does feel like she will fall with prolonged walking. She also reports relief with bending forward. She is currently taking Gabapentin at bedtime with benefit. She continues to take Tramadol per her PCP with benefit. She denies any other health changes. Her pain today is 8/10.    Interval History 5/20/2021:  The patient returns to clinic today for follow up of low back pain. She is s/p bilateral hip joint injections on 4/26/2021. She reports no relief. She reports increased low back pain that radiates into the posterior aspect of both legs to her ankles. She does report numbness to her feet. Her pain is worse with prolonged standing and walking. She feels as though her legs will give out with prolonged walking. She denies any bowel or bladder incontinence. She denies any other health changes. Her pain today is 9/10.    Interval History 3/23/2021:  The patient returns  to clinic today for follow up of low back and hip pain. She reports increased bilateral hip pain. She describes this pain as deep and aching. Her pain is worse with prolonged standing and walking. She also reports low back pain that is aching in nature. She denies any radicular leg pain. Of note, she reports increased swelling and pain to her left ankle and foot. She does report a trip last week. She did see Orthopedics today and is scheduled for xray. She denies any other health changes. Her pain today is 8/10.    Interval History 12/9/2020:  She returns for follow-up.  She is doing well since her facet injections.  Her right hip is hurting.  It interferes with her mobility and comfort while laying down.  No new symptomatology otherwise.    Interval History 8/11/2020:  The patient has a virtual visit today for follow up.  She is s/p Bilateral L3-4, L4-5 L5-S1 facet joint injections with 90% relief.  She says that her back pain is minimal at this time.  She is able to move around and walk better since the procedure as well.  She is having some left hip pain and feels as though her leg will give out sometimes when walking.  She would like a referral to ortho close to her home in [facility].  Her pain today is 1/10.  She denies any respiratory changes since procedure including fever, cough or SOB.    Interval History 12/5/2019:  The patient is here for follow up of lower back and bilateral hip pain.  Her back pain has been minimal.  She is having increased lateral hip and buttock pain recently.  She had TPIs in the past and would like to repeat this today.  Her pain today is 8/10.    Previous Encounter:  Anahi Cook presents to the clinic for a follow-up appointment for lower back and left lower extremity pain.  She had T12 kyphoplasty performed on 11/8/18 with significant improvement in symptoms.  She was admitted through the ED on 4/3/19 after suffering fractures of 6th, 8th and possible 7th ribs of the left side  after a fall at home.  Thoracic imaging also showed stable slight anterior wedging at T11.  She had bilateral L3-4, L4-5 and L5-S1 facet injections on 4/1/19 with 80% relief of lower back pain.  Her current pain is minimal.  She has mild leg pain with walking.  She continues to follow up with oncology regularly.  Since the last visit, Anahi Ludwigon states the pain has been improving.  Current pain intensity is 2/10.     Pain Medications:  OTC Tylenol    Opioid Contract: no     report:  Not applicable    Pain Procedures:   7/21/14 L4-5 IL ASHUTOSH- significant benefit  12/1/15 Left GT bursa injection  4/26/16 Left GT bursa injection  12/15/16 L4-5 IL ASHUTOSH- significant benefit  2/15/18 L4-5 IL ASHUTOSH- 100% relief  8/21/18 L4-5 IL ASHUTOSH- significant relief  10/15/18 Bilateral L3-4, L4-5 L5-S1 facet joint injections  11/8/18 T12 kyphoplasty- significant relief  4/1/19 Bilateral L3-4, L4-5 L5-S1 facet joint injections- 80% relief  7/27/20 Bilateral L3-4, L4-5 L5-S1 facet joint injections- 90% relief  4/26/2021- Bilateral hip joint injections- no relief    6/21/2021- Bilateral L4/5 TF ASHUTOSH- 75% relief  8/9/2022- B L4/5 TFESI without significant benefit.   11/17/2022- Left L3/4 and L4/5 TF ASHUTOSH  7/5/2023- Left AC joint injection-75% relief  7/31/2023- Bilateral L5/S1 TF ASHUTOSH- 75% relief    Physical Therapy/Home Exercise: yes     Imaging:   MRI LUMBAR SPINE WITHOUT CONTRAST 9/12/2022  COMPARISON:  5/29/2021     FINDINGS:  Chronic vertebral plana T12.  Extension of the posterosuperior cortex into the canal by approximately 6 mm.     There is now height loss at L3, approximately 30%.  Associated STIR signal hyperintensity.  Retropulsion of the posterior cortex into the canal approximately 5 mm.     Remaining vertebral body heights are maintained.     Mild disc space narrowing L4-5.     Grade 1 retrolisthesis L1-2.  Grade 1 anterolisthesis L4-5     Conus terminates appropriately at L1.     Multilevel degenerative change as diesel  below:     T11-12: Retropulsion of the posterosuperior cortex of T12 into the canal with mild canal narrowing.     T12-L1: Posterior circumferential disc bulge, asymmetric to the left.  Mild left neural foraminal narrowing.     L1-2: Facet and ligamentum flavum hypertrophic changes with mild left neural foraminal narrowing.     L2-3: Posterior circumferential disc bulge and ligamentum flavum hypertrophy retropulsion of the posterior cortex of L3 into the canal.  Findings result in mild canal narrowing.  Severe right and moderate left neural foraminal narrowing.     L3-4: Posterior circumferential disc bulge.  Facet and ligamentum flavum hypertrophic changes.  Mild canal and moderate bilateral neural foraminal narrowing.     L4-5: Grade 1 anterolisthesis of L4-5 with uncovering of the disc.  Facet and ligamentum flavum hypertrophic changes.  Combinations contribute to severe canal stenosis.  Severe left and moderate right neural foraminal narrowing.     L5-S1: Central canal and neural foramina are well maintained.     Multiple T2 hyperintense foci in both kidneys, including a thinly septated T2 hyperintense focus on the left measuring 15 mm.     Impression:     Acute fracture along the superior endplate of L3 with approximately 30% height loss.  Stable chronic height loss T12.     Multilevel degenerative change, worst at L4-5 where severe canal stenosis results.     Multilevel neural foraminal narrowing, severe L2-3 and L4-5 as above.     Bilateral renal cysts, including a thinly septated cyst on the left.  Findings can be further evaluated with renal ultrasound.     This report was flagged in Epic as abnormal.    XR LUMBAR SPINE 5 VIEW WITH FLEX AND EXT 8/22/2022  COMPARISON:  Lumbar spine MRI 05/29/2021     FINDINGS:  Previous vertebroplasty T12.     Height loss along the superior endplate of L3 is new compared to that exam.  Height loss is approximately 40%.  There may be mild height loss at L2.     Disc space  narrowing and endplate changes L4-5.  Facet arthropathy L4-5.     Grade 1 anterolisthesis L4-5 with no significant change with flexion or extension.     Aortoiliac atherosclerosis.     Impression:     Height loss along the superior endplate of L3, approximately 40%, is new compared to May 2021.  By today's radiographs, there appears to be mild sclerosis along the superior endplate which can be seen with a subacute or chronic fracture.  Correlation with MRI advised if there is recent trauma or acute back pain to exclude the possibility of an acute fracture.     Degenerative change as above.     This report was flagged in Epic as abnormal.    XR HIP WITH PELVIS WHEN PERFORMED, 2 OR 3 VIEWS LEFT 8/22/2022  COMPARISON:  03/24/2022 and 09/09/2020     FINDINGS:  The femoral head is well located with respect to the acetabulum. No acute fracture seen.  Osteophyte formation, subchondral sclerosis, with moderate narrowing femoroacetabular joint bilaterally.     No significant soft tissue edema or radiopaque retained foreign body.  Aortoiliac atherosclerosis.     Impression:     No acute fracture or dislocation seen.  Moderate osteoarthritis and joint space narrowing.    MRI Lumbar Spine 5/29/2021:  COMPARISON:  Prior MRI from 2014 and 2018     FINDINGS:  There is a transitional lumbosacral segment and spine labeling is as on prior examinations.     Stable severe compression deformity at T12 with retropulsion.  Remaining vertebral body heights are maintained.  Grade 1 anterolisthesis appears increased at L4-5 as compared to previous.  There is no marrow replacement process.  The spinal cord is normal in signal.  Review of paravertebral structures demonstrates high T2 low T1 signal foci within kidneys.     Not included in the field of view of the axial, there is disc bulge at T10-11.  This does not appear to result in a significant degree of spinal canal narrowing on the basis of the sagittal imaging.     T12 demonstrates  retropulsion with mild narrowing of the spinal canal.     T12-L1 demonstrates minor disc bulge.     L1-L2 demonstrates mild facet degenerative change.  There is no significant spinal canal stenosis.  There is mild bilateral neural foraminal narrowing.     L2-3 demonstrates facet degenerative change.  There is no significant spinal canal stenosis.  There is right foraminal protrusion.  This results in a moderate degree of right foraminal narrowing.  There is mild left foraminal narrowing.     L3-4 demonstrates facet degenerative change and ligamentum flavum thickening.  There is a facet joint synovial cyst along the posterolateral aspect of the left facet.  There is right foraminal protrusion.  There is mild left and moderate right foraminal narrowing.     L4-5 demonstrates facet degenerative change and ligamentum flavum thickening.  There is anterolisthesis.  There is severe left and moderate right foraminal narrowing.  There is a severe degree of spinal canal narrowing.     L5-S1 is unremarkable.     Impression:     Multilevel degenerative changes as further detailed above.  Findings are most significant at the L4-5 level, noting transitional lumbosacral segment.     Stable severe compression deformity at T12.     Foci within the kidneys bilaterally likely representing cyst can be correlated with ultrasound.          Past Medical History:  Past Medical History:   Diagnosis Date    Anxiety     Arthritis     Back pain     Breast cancer 1/17/2018    Breast mass 1/15/2018    Chronic kidney disease, stage 2, mildly decreased GFR     COPD (chronic obstructive pulmonary disease)     emphysema    Depression     Dysuria 1/29/2018    Exudative age-related macular degeneration of left eye with active choroidal neovascularization 8/25/2022    Family history of breast cancer 1/17/2018    GERD (gastroesophageal reflux disease)     Hypertension     Infiltrating ductal carcinoma of breast, left 7/12/2018    Multiple closed  fractures of ribs of left side 4/3/2019    Osteoporosis, senile     Ovarian mass 1/15/2018    Pneumonia     S/P robotic assisted laparoscopic BSO (bilateral salpingo-oophorectomy) 2/23/2018       Past Surgical History:  Past Surgical History:   Procedure Laterality Date    AXILLARY NODE DISSECTION Left 7/12/2018    Procedure: LYMPHADENECTOMY, AXILLARY;  Surgeon: Amanda Caballero MD;  Location: [facility] OR [address] [place] [address];  Service: General;  Laterality: Left;    BREAST BIOPSY      BREAST LUMPECTOMY      CATARACT EXTRACTION      CHOLECYSTECTOMY      COLONOSCOPY      ESOPHAGOGASTRODUODENOSCOPY      ESOPHAGOGASTRODUODENOSCOPY N/A 10/14/2019    Procedure: EGD (ESOPHAGOGASTRODUODENOSCOPY);  Surgeon: Davonte Thompson MD;  Location: [facility] ENDO ([facility]);  Service: Endoscopy;  Laterality: N/A;  hx of pulmonary hypertension-PA 50     pt requesting ASAP    EYE SURGERY      FIXATION KYPHOPLASTY N/A 11/8/2018    Procedure: Kyphoplasty   T12;  Surgeon: Merly Wilcox MD;  Location: [facility] CATH LAB;  Service: Pain Management;  Laterality: N/A;  T12  Nia Reps e-mailed with date and time    HYSTERECTOMY      INJECTION FOR SENTINEL NODE IDENTIFICATION Left 7/12/2018    Procedure: INJECTION, FOR SENTINEL NODE IDENTIFICATION;  Surgeon: Amanda Caballero MD;  Location: [facility] OR [address] [place] [address];  Service: General;  Laterality: Left;    INJECTION OF FACET JOINT Bilateral 10/15/2018    Procedure: INJECTION, FACET JOINT, BILATERAL LUMBAR L3-4, 4-5, 5-S1 FACET JOINT INJECTIONS;  Surgeon: Merly Wilcox MD;  Location: [facility] PAIN MGT;  Service: Pain Management;  Laterality: Bilateral;    INJECTION OF FACET JOINT Bilateral 4/1/2019    Procedure: INJECTION, FACET JOINT, L3-L4,L4-L5,L5-S1;  Surgeon: Merly Wilcox MD;  Location: [facility] PAIN MGT;  Service: Pain Management;  Laterality: Bilateral;    INJECTION OF FACET JOINT Bilateral 6/20/2019    Procedure: INJECTION, FACET JOINT, L3-L4,L4-L5,L5-S1 NEED CONSENT;  Surgeon: Merly Wilcox MD;  Location: [facility] PAIN MGT;  Service: Pain  Management;  Laterality: Bilateral;    INJECTION OF FACET JOINT Bilateral 7/27/2020    Procedure: INJECTION, FACET JOINT BILATERAL L3/4, L4/5 and L5/S1;  Surgeon: Merly Wilcox MD;  Location: [facility] PAIN MGT;  Service: Pain Management;  Laterality: Bilateral;    INJECTION OF JOINT Bilateral 7/27/2020    Procedure: INJECTION, JOINT, BILATERAL GREATER TROCHANTERIC BURSA;  Surgeon: Merly Wilcox MD;  Location: [facility] PAIN MGT;  Service: Pain Management;  Laterality: Bilateral;    INJECTION OF JOINT Bilateral 4/26/2021    Procedure: INJECTION, JOINT, HIP;  Surgeon: Merly Wilcox MD;  Location: [facility] PAIN MGT;  Service: Pain Management;  Laterality: Bilateral;  consent needed    INJECTION OF STEROID Right 11/15/2021    Procedure: INJECTION, STEROID RIGHT MIDDLE AND SAMLL FINGER;  Surgeon: Florecita Da Silva MD;  Location: [facility] OR;  Service: Orthopedics;  Laterality: Right;    MASTECTOMY, PARTIAL Left 7/12/2018    Procedure: MASTECTOMY, PARTIAL-W/SEED LOCALIZATION;  Surgeon: Amanda Caballero MD;  Location: [facility] OR [address] [place] [address];  Service: General;  Laterality: Left;    ME REMOVAL OF OVARY/TUBE(S)  02/23/2018    Robotic Assisted    ROTATOR CUFF REPAIR Right     rotator cuff repair right shoulder    TONSILLECTOMY      TRANSFORAMINAL EPIDURAL INJECTION OF STEROID Bilateral 6/21/2021    Procedure: INJECTION, STEROID, EPIDURAL, TRANSFORAMINAL APPROACH  bilateral L4/5 TF ASHUTOSH;  Surgeon: Merly Wilcox MD;  Location: [facility] PAIN MGT;  Service: Pain Management;  Laterality: Bilateral;  consent needed    TRANSFORAMINAL EPIDURAL INJECTION OF STEROID Bilateral 1/3/2022    Procedure: INJECTION, STEROID, EPIDURAL, TRANSFORAMINAL APPROACH Bilateral L4/5 Needs consent;  Surgeon: Merly Wilcox MD;  Location: [facility] PAIN MGT;  Service: Pain Management;  Laterality: Bilateral;    TRANSFORAMINAL EPIDURAL INJECTION OF STEROID Bilateral 8/9/2022    Procedure: INJECTION, STEROID, EPIDURAL, TRANSFORAMINAL APPROACH, BILATERAL L4-L5   MEDICALLY URGENT;  Surgeon:  Merly Wilcox MD;  Location: [facility] PAIN MGT;  Service: Pain Management;  Laterality: Bilateral;    TRANSFORAMINAL EPIDURAL INJECTION OF STEROID Left 2022    Procedure: INJECTION, STEROID, EPIDURAL, TRANSFORAMINAL APPROACH LEFT L3-L4 AND L4-L5 CONTRAST;  Surgeon: Merly Wilcox MD;  Location: [facility] PAIN MGT;  Service: Pain Management;  Laterality: Left;    TRANSFORAMINAL EPIDURAL INJECTION OF STEROID Bilateral 2023    Procedure: INJECTION, STEROID, EPIDURAL, TRANSFORAMINAL APPROACH, BILATERAL L5/S1  sooner date;  Surgeon: Merly Wilcox MD;  Location: [facility] PAIN MGT;  Service: Pain Management;  Laterality: Bilateral;    TRIGGER FINGER RELEASE Left 11/15/2021    Procedure: RELEASE, TRIGGER FINGER, LEFT MIDDLE AND RING;  Surgeon: Florecita Da Silva MD;  Location: [facility] OR;  Service: Orthopedics;  Laterality: Left;       Family History:  Family History   Problem Relation Age of Onset    Heart failure Mother     Kidney failure Mother     Heart attack Father     Breast cancer Sister 66        Lump, XRT, chemo, recurrence 10 years later.    Cancer Brother         leukemia    Heart attack Brother 58    Pulmonary embolism Brother 45    Heart attack Brother 52    Breast cancer Maternal Grandmother 60        advanced at diagnosis    Breast cancer Maternal Aunt 83         at 92    Colon cancer Neg Hx     Esophageal cancer Neg Hx        Social History:  Social History     Socioeconomic History    Marital status: Single    Number of children: 3   Occupational History    Occupation: Multiple jobs, see social documentation section     Comment: Retired   Tobacco Use    Smoking status: Former     Current packs/day: 1.00     Average packs/day: 1 pack/day for 40.0 years (40.0 ttl pk-yrs)     Types: Cigarettes    Smokeless tobacco: Never    Tobacco comments:     Smoked >1 ppd for at least 40 years, quit around    Substance and Sexual Activity    Alcohol use: Yes     Comment: occasional glass of wine or cocktail    Drug use:  No    Sexual activity: Yes     Partners: Male   Social History Narrative    Worked many jobs while raising 3 children.  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  She was  from her first , but took care of him at the end of his life.     Social Determinants of Health     Financial Resource Strain: Low Risk  (11/9/2023)    Overall Financial Resource Strain (CARDIA)     Difficulty of Paying Living Expenses: Not hard at all   Food Insecurity: No Food Insecurity (11/9/2023)    Hunger Vital Sign     Worried About Running Out of Food in the Last Year: Never true     Ran Out of Food in the Last Year: Never true   Transportation Needs: No Transportation Needs (11/9/2023)    PRAPARE - Transportation     Lack of Transportation (Medical): No     Lack of Transportation (Non-Medical): No   Physical Activity: Inactive (11/9/2023)    Exercise Vital Sign     Days of Exercise per Week: 0 days     Minutes of Exercise per Session: 0 min   Stress: No Stress Concern Present (11/9/2023)    [employer] [place] [employer] - Occupational Stress Questionnaire     Feeling of Stress : Not at all   Social Connections: Socially Isolated (11/9/2023)    Social Connection and Isolation Panel [NHANES]     Frequency of Communication with Friends and Family: More than three times a week     Frequency of Social Gatherings with Friends and Family: More than three times a week     Attends Roman [employer] Services: Never     Active Member of Clubs or Organizations: No     Attends Club or Organization Meetings: Never     Marital Status:    Housing Stability: Low Risk  (11/9/2023)    Housing Stability Vital Sign     Unable to Pay for Housing in the Last Year: No     Number of Places Lived in the Last Year: 1     Unstable Housing in the Last Year: No       REVIEW OF SYSTEMS:    GENERAL:  No weight loss, malaise or fevers.  HEENT:   No  recent changes in vision or hearing  NECK:  Negative for lumps, no difficulty with swallowing.  RESPIRATORY:  Negative for cough, wheezing or shortness of breath, patient denies any recent URI. COPD.  CARDIOVASCULAR:  Negative for chest pain, leg swelling or palpitations. Hypertension.  GI:  Negative for abdominal discomfort, blood in stools or black stools or change in bowel habits.  MUSCULOSKELETAL:  See HPI.  SKIN:  Negative for lesions, rash, and itching.  PSYCH:  No mood disorder or recent psychosocial stressors.  Patients sleep is not disturbed secondary to pain.  HEMATOLOGY/LYMPHOLOGY:  Negative for prolonged bleeding, bruising easily or swollen nodes.  Patient is not currently taking any anti-coagulants  NEURO:   No history of headaches, syncope, paralysis, seizures or tremors.  All other reviewed and negative other than HPI.    OBJECTIVE:    Exam limited due to virtual visit:  GENERAL: Well appearing, in no acute distress, alert and oriented x3.  PSYCH:  Mood and affect appropriate.    Previous physical exam:  LMP  (LMP Unknown)     PHYSICAL EXAMINATION:     GENERAL: Well appearing, in no acute distress, alert and oriented x3.  PSYCH:  Mood and affect appropriate.  SKIN: Skin color, texture, turgor normal, no rashes or lesions.  HEAD/FACE:  Normocephalic, atraumatic. Cranial nerves grossly intact.  CV: RRR with palpation of the radial artery.  PULM: No evidence of respiratory difficulty, symmetric chest rise.  EXTREMITIES:  Good capillary refill.  MUSCULOSKELETAL: There is pain with palpation over left AC joint. Limited ROM with pain on internal rotation of bilateral shoulders. Positive cross arm test on the left. No atrophy or tone abnormalities are noted.  NEURO: Bilateral lower extremity coordination and muscle stretch reflexes are physiologic and symmetric.  Plantar response are downgoing. No clonus.  Normal sensation.  GAIT: Antalgic- ambulates with rolling walker.      ASSESSMENT: 89 y.o. year old  female with pain, consistent with the following diagnoses:     1. Chronic pain of both knees        2. Primary osteoarthritis of both knees        3. Lumbar radiculopathy        4. Lumbar spondylosis        5. DDD (degenerative disc disease), lumbar        6. Chronic pain disorder                PLAN:     - Previous imaging reviewed.     - Schedule for bilateral knee steroid injections in office.     - We can repeat bilateral L5/S1 TF ASHUTOSH as needed.     - Decrease Gabapentin to every other day for one week then discontinue.     - Continue Tramadol per PCP.      - RTC 2 weeks after above procedure.       The above plan and management options were discussed at length with patient. Patient is in agreement with the above and verbalized understanding.    Amanda Garcia  12/05/2023

## 2023-12-06 ENCOUNTER — HOSPITAL ENCOUNTER (OUTPATIENT)
Dept: PULMONOLOGY | Facility: CLINIC | Age: 88
Discharge: HOME | End: 2023-12-06
Payer: MEDICARE

## 2023-12-06 ENCOUNTER — TELEPHONE (OUTPATIENT)
Dept: OTOLARYNGOLOGY | Facility: CLINIC | Age: 88
End: 2023-12-06

## 2023-12-06 ENCOUNTER — PATIENT MESSAGE (OUTPATIENT)
Dept: PRIMARY CARE CLINIC | Facility: CLINIC | Age: 88
End: 2023-12-06

## 2023-12-06 ENCOUNTER — LAB VISIT (OUTPATIENT)
Dept: LAB | Facility: HOSPITAL | Age: 88
End: 2023-12-06
Attending: INTERNAL MEDICINE
Payer: MEDICARE

## 2023-12-06 VITALS — BODY MASS INDEX: 25.1 KG/M2 | WEIGHT: 147 LBS | HEIGHT: 64 IN

## 2023-12-06 DIAGNOSIS — J44.1 CHRONIC OBSTRUCTIVE PULMONARY DISEASE WITH ACUTE EXACERBATION: ICD-10-CM

## 2023-12-06 DIAGNOSIS — J96.01 ACUTE HYPOXEMIC RESPIRATORY FAILURE: ICD-10-CM

## 2023-12-06 DIAGNOSIS — R60.0 EDEMA OF BOTH LOWER EXTREMITIES: ICD-10-CM

## 2023-12-06 LAB
ALBUMIN SERPL BCP-MCNC: 3.5 G/DL (ref 3.5–5.2)
ALP SERPL-CCNC: 65 U/L (ref 55–135)
ALT SERPL W/O P-5'-P-CCNC: 14 U/L (ref 10–44)
ANION GAP SERPL CALC-SCNC: 11 MMOL/L (ref 8–16)
AST SERPL-CCNC: 18 U/L (ref 10–40)
BILIRUB SERPL-MCNC: 0.4 MG/DL (ref 0.1–1)
BUN SERPL-MCNC: 16 MG/DL (ref 8–23)
CALCIUM SERPL-MCNC: 9.2 MG/DL (ref 8.7–10.5)
CHLORIDE SERPL-SCNC: 95 MMOL/L (ref 95–110)
CO2 SERPL-SCNC: 32 MMOL/L (ref 23–29)
CREAT SERPL-MCNC: 1.1 MG/DL (ref 0.5–1.4)
DLCO ADJ PRE: 4.36 ML/(MIN*MMHG) (ref 12.77–24.24)
DLCO SINGLE BREATH LLN: 12.77
DLCO SINGLE BREATH PRE REF: 22.5 %
DLCO SINGLE BREATH REF: 18.5
DLCOC SBVA LLN: 2.36
DLCOC SBVA PRE REF: 41.9 %
DLCOC SBVA REF: 3.75
DLCOC SINGLE BREATH LLN: 12.77
DLCOC SINGLE BREATH PRE REF: 23.5 %
DLCOC SINGLE BREATH REF: 18.5
DLCOCSBVAULN: 5.13
DLCOCSINGLEBREATHULN: 24.24
DLCOSINGLEBREATHULN: 24.24
DLCOVA LLN: 2.36
DLCOVA PRE REF: 40 %
DLCOVA PRE: 1.5 ML/(MIN*MMHG*L) (ref 2.36–5.13)
DLCOVA REF: 3.75
DLCOVAULN: 5.13
DLVAADJ PRE: 1.57 ML/(MIN*MMHG*L) (ref 2.36–5.13)
EST. GFR  (NO RACE VARIABLE): 48 ML/MIN/1.73 M^2
FEF 25 75 LLN: 0.34
FEF 25 75 PRE REF: 22.6 %
FEF 25 75 REF: 1.17
FET100 CHG: 17.1 %
FEV05 LLN: 0.58
FEV05 REF: 1.44
FEV1 CHG: 0.3 %
FEV1 FVC LLN: 61
FEV1 FVC PRE REF: 68 %
FEV1 FVC REF: 77
FEV1 LLN: 0.95
FEV1 PRE REF: 49 %
FEV1 REF: 1.48
FEV1 VOL CHG: 0
FVC CHG: 5.3 %
FVC LLN: 1.28
FVC PRE REF: 70.8 %
FVC REF: 1.97
FVC VOL CHG: 0.07
GLUCOSE SERPL-MCNC: 99 MG/DL (ref 70–110)
IVC PRE: 1.48 L (ref 1.28–2.72)
IVC SINGLE BREATH LLN: 1.28
IVC SINGLE BREATH PRE REF: 74.9 %
IVC SINGLE BREATH REF: 1.97
IVCSINGLEBREATHULN: 2.72
PEF LLN: 0.94
PEF PRE REF: 79.5 %
PEF REF: 2.95
PHYSICIAN COMMENT: ABNORMAL
POST FEF 25 75: 0.26 L/S (ref 0.34–2.63)
POST FET 100: 8.46 SEC
POST FEV1 FVC: 49.57 % (ref 61.07–90.14)
POST FEV1: 0.73 L (ref 0.95–1.99)
POST FEV5: 0.53 L (ref 0.58–2.29)
POST FVC: 1.47 L (ref 1.28–2.72)
POST PEF: 1.97 L/S (ref 0.94–4.95)
POTASSIUM SERPL-SCNC: 4.1 MMOL/L (ref 3.5–5.1)
PRE DLCO: 4.16 ML/(MIN*MMHG) (ref 12.77–24.24)
PRE FEF 25 75: 0.26 L/S (ref 0.34–2.63)
PRE FET 100: 7.23 SEC
PRE FEV05 REF: 37 %
PRE FEV1 FVC: 52.05 % (ref 61.07–90.14)
PRE FEV1: 0.73 L (ref 0.95–1.99)
PRE FEV5: 0.53 L (ref 0.58–2.29)
PRE FVC: 1.4 L (ref 1.28–2.72)
PRE PEF: 2.34 L/S (ref 0.94–4.95)
PROT SERPL-MCNC: 6.8 G/DL (ref 6–8.4)
SODIUM SERPL-SCNC: 138 MMOL/L (ref 136–145)
VA PRE: 2.77 L (ref 4.79–4.79)
VA SINGLE BREATH LLN: 4.79
VA SINGLE BREATH PRE REF: 57.9 %
VA SINGLE BREATH REF: 4.79
VASINGLEBREATHULN: 4.79

## 2023-12-06 PROCEDURE — 36415 COLL VENOUS BLD VENIPUNCTURE: CPT | Performed by: INTERNAL MEDICINE

## 2023-12-06 PROCEDURE — 80053 COMPREHEN METABOLIC PANEL: CPT | Performed by: INTERNAL MEDICINE

## 2023-12-06 NOTE — PROCEDURES
Anahi Cook is a 89 y.o.  female patient, who presents for a 6 minute walk test ordered by MD Jarrett.  The diagnosis is Qualify for Oxygen; COPD.  The patient's BMI is 25.2 kg/m2. Predicted distance (lower limit of normal) is 201.88 meters.    Test Results:    The test was not attempted.  The patient reported being unable to walk due to lower extremity edema.     12/06/2023---------Distance:   ( )     O2 Sat % Supplemental Oxygen Heart Rate Blood Pressure Fara Scale   Pre-exercise  (Resting) 86 % Room Air 66 bpm 143/65 mmHg 3     Oxygen Qualification:     O2 Sat % Supplemental Oxygen Heart Rate Blood Pressure Fara Scale   Pre-exercise  (Resting) 94 % 2 L/M  67 bpm  150/69 mmHg 2      Performing nurse/tech: Teresa [email or web address]      PREVIOUS STUDY:   The patient has not had a previous study.      CLINICAL INTERPRETATION:  Exercise was not attempted due to concern for patient safety.   Patient reported moderate dyspnea at rest.  At rest, there was significant desaturation while breathing room air.  The patient may benefit from using supplemental oxygen.   Oxygen saturation did improve while breathing supplemental oxygen.

## 2023-12-06 NOTE — TELEPHONE ENCOUNTER
Spoke to pt daughter she stated they wouldn't be able to make it. Due to she was still in her appt. She will come in tomorrow at 10:30

## 2023-12-07 ENCOUNTER — OFFICE VISIT (OUTPATIENT)
Dept: OTOLARYNGOLOGY | Facility: CLINIC | Age: 88
End: 2023-12-07
Payer: MEDICARE

## 2023-12-07 ENCOUNTER — TELEPHONE (OUTPATIENT)
Dept: PULMONOLOGY | Facility: CLINIC | Age: 88
End: 2023-12-07

## 2023-12-07 DIAGNOSIS — H65.92 MIDDLE EAR EFFUSION, LEFT: ICD-10-CM

## 2023-12-07 DIAGNOSIS — J44.1 CHRONIC OBSTRUCTIVE PULMONARY DISEASE WITH ACUTE EXACERBATION: Primary | ICD-10-CM

## 2023-12-07 DIAGNOSIS — J96.11 CHRONIC HYPOXEMIC RESPIRATORY FAILURE: ICD-10-CM

## 2023-12-07 DIAGNOSIS — Z87.01 HISTORY OF PNEUMONIA: ICD-10-CM

## 2023-12-07 DIAGNOSIS — Z87.898 HISTORY OF NECK SWELLING: ICD-10-CM

## 2023-12-07 DIAGNOSIS — R09.81 SINUS CONGESTION: ICD-10-CM

## 2023-12-07 DIAGNOSIS — H61.23 BILATERAL IMPACTED CERUMEN: Primary | ICD-10-CM

## 2023-12-07 DIAGNOSIS — J34.2 DEVIATED SEPTUM: ICD-10-CM

## 2023-12-07 PROCEDURE — 1111F DSCHRG MED/CURRENT MED MERGE: CPT | Mod: CPTII,S$GLB,,

## 2023-12-07 PROCEDURE — 1160F RVW MEDS BY RX/DR IN RCRD: CPT | Mod: CPTII,S$GLB,,

## 2023-12-07 PROCEDURE — 99213 PR OFFICE/OUTPT VISIT, EST, LEVL III, 20-29 MIN: ICD-10-PCS | Mod: 25,S$GLB,,

## 2023-12-07 PROCEDURE — 1160F PR REVIEW ALL MEDS BY PRESCRIBER/CLIN PHARMACIST DOCUMENTED: ICD-10-PCS | Mod: CPTII,S$GLB,,

## 2023-12-07 PROCEDURE — 99999 PR PBB SHADOW E&M-EST. PATIENT-LVL I: CPT | Mod: PBBFAC,,,

## 2023-12-07 PROCEDURE — 99999 PR PBB SHADOW E&M-EST. PATIENT-LVL I: ICD-10-PCS | Mod: PBBFAC,,,

## 2023-12-07 PROCEDURE — 69210 PR REMOVAL IMPACTED CERUMEN REQUIRING INSTRUMENTATION, UNILATERAL: ICD-10-PCS | Mod: S$GLB,,,

## 2023-12-07 PROCEDURE — 99213 OFFICE O/P EST LOW 20 MIN: CPT | Mod: 25,S$GLB,,

## 2023-12-07 PROCEDURE — 69210 REMOVE IMPACTED EAR WAX UNI: CPT | Mod: S$GLB,,,

## 2023-12-07 PROCEDURE — 1111F PR DISCHARGE MEDS RECONCILED W/ CURRENT OUTPATIENT MED LIST: ICD-10-PCS | Mod: CPTII,S$GLB,,

## 2023-12-07 PROCEDURE — 1159F MED LIST DOCD IN RCRD: CPT | Mod: CPTII,S$GLB,,

## 2023-12-07 PROCEDURE — 1159F PR MEDICATION LIST DOCUMENTED IN MEDICAL RECORD: ICD-10-PCS | Mod: CPTII,S$GLB,,

## 2023-12-07 RX ORDER — FLUTICASONE PROPIONATE 50 MCG
2 SPRAY, SUSPENSION (ML) NASAL DAILY
Qty: 16 G | Refills: 2 | Status: SHIPPED | OUTPATIENT
Start: 2023-12-07 | End: 2024-03-06

## 2023-12-07 NOTE — PROGRESS NOTES
Subjective:       Patient ID: Anahi Cook is a 89 y.o. female.    Chief Complaint: No chief complaint on file.    HPI  Ms Cook is a 89 year old female here today with complaints of bilateral ear cerumen impaction . Here with her daughter. On portable nasal canula. Denies any otalgia or drainage tinnitus or vertigo. Admits to hearing loss.  Daughter reports she has been having some nasal dryness and sinus congestion. Reports having to breathe through mouth. Denies any discolored mucopurulence. Using petroleum jelly inside nostrils for dryness and Zyrtec/Singular with minimal relief. Reports recent admit for pneumonia.     Past Medical History:   Diagnosis Date    Anxiety     Arthritis     Back pain     Breast cancer 1/17/2018    Breast mass 1/15/2018    Chronic kidney disease, stage 2, mildly decreased GFR     COPD (chronic obstructive pulmonary disease)     emphysema    Depression     Dysuria 1/29/2018    Exudative age-related macular degeneration of left eye with active choroidal neovascularization 8/25/2022    Family history of breast cancer 1/17/2018    GERD (gastroesophageal reflux disease)     Hypertension     Infiltrating ductal carcinoma of breast, left 7/12/2018    Multiple closed fractures of ribs of left side 4/3/2019    Osteoporosis, senile     Ovarian mass 1/15/2018    Pneumonia     S/P robotic assisted laparoscopic BSO (bilateral salpingo-oophorectomy) 2/23/2018     Past Surgical History:   Procedure Laterality Date    AXILLARY NODE DISSECTION Left 7/12/2018    Procedure: LYMPHADENECTOMY, AXILLARY;  Surgeon: Amanda Caballero MD;  Location: [facility] OR [address] [place] [address];  Service: General;  Laterality: Left;    BREAST BIOPSY      BREAST LUMPECTOMY      CATARACT EXTRACTION      CHOLECYSTECTOMY      COLONOSCOPY      ESOPHAGOGASTRODUODENOSCOPY      ESOPHAGOGASTRODUODENOSCOPY N/A 10/14/2019    Procedure: EGD (ESOPHAGOGASTRODUODENOSCOPY);  Surgeon: Davonte Thompson MD;  Location: [address]);  Service:  Endoscopy;  Laterality: N/A;  hx of pulmonary hypertension-PA 50     pt requesting ASAP    EYE SURGERY      FIXATION KYPHOPLASTY N/A 11/8/2018    Procedure: Kyphoplasty   T12;  Surgeon: Merly Wilcox MD;  Location: [facility] CATH LAB;  Service: Pain Management;  Laterality: N/A;  T12  Mellott Reps e-mailed with date and time    HYSTERECTOMY      INJECTION FOR SENTINEL NODE IDENTIFICATION Left 7/12/2018    Procedure: INJECTION, FOR SENTINEL NODE IDENTIFICATION;  Surgeon: Amanda Caballero MD;  Location: [facility] OR [address] [place] [address];  Service: General;  Laterality: Left;    INJECTION OF FACET JOINT Bilateral 10/15/2018    Procedure: INJECTION, FACET JOINT, BILATERAL LUMBAR L3-4, 4-5, 5-S1 FACET JOINT INJECTIONS;  Surgeon: Merly Wilcox MD;  Location: [facility] PAIN MGT;  Service: Pain Management;  Laterality: Bilateral;    INJECTION OF FACET JOINT Bilateral 4/1/2019    Procedure: INJECTION, FACET JOINT, L3-L4,L4-L5,L5-S1;  Surgeon: Merly Wilcox MD;  Location: [facility] PAIN MGT;  Service: Pain Management;  Laterality: Bilateral;    INJECTION OF FACET JOINT Bilateral 6/20/2019    Procedure: INJECTION, FACET JOINT, L3-L4,L4-L5,L5-S1 NEED CONSENT;  Surgeon: Merly Wilcox MD;  Location: [facility] PAIN MGT;  Service: Pain Management;  Laterality: Bilateral;    INJECTION OF FACET JOINT Bilateral 7/27/2020    Procedure: INJECTION, FACET JOINT BILATERAL L3/4, L4/5 and L5/S1;  Surgeon: Merly Wilcox MD;  Location: [facility] PAIN MGT;  Service: Pain Management;  Laterality: Bilateral;    INJECTION OF JOINT Bilateral 7/27/2020    Procedure: INJECTION, JOINT, BILATERAL GREATER TROCHANTERIC BURSA;  Surgeon: Merly Wilcox MD;  Location: [facility] PAIN MGT;  Service: Pain Management;  Laterality: Bilateral;    INJECTION OF JOINT Bilateral 4/26/2021    Procedure: INJECTION, JOINT, HIP;  Surgeon: Merly Wilcox MD;  Location: [facility] PAIN MGT;  Service: Pain Management;  Laterality: Bilateral;  consent needed    INJECTION OF STEROID Right 11/15/2021    Procedure: INJECTION, STEROID RIGHT  MIDDLE AND SAMLL FINGER;  Surgeon: Florecita Da Silva MD;  Location: [facility] OR;  Service: Orthopedics;  Laterality: Right;    MASTECTOMY, PARTIAL Left 7/12/2018    Procedure: MASTECTOMY, PARTIAL-W/SEED LOCALIZATION;  Surgeon: Amanda Caballero MD;  Location: [facility] OR 2ND FLR;  Service: General;  Laterality: Left;    UT REMOVAL OF OVARY/TUBE(S)  02/23/2018    Robotic Assisted    ROTATOR CUFF REPAIR Right     rotator cuff repair right shoulder    TONSILLECTOMY      TRANSFORAMINAL EPIDURAL INJECTION OF STEROID Bilateral 6/21/2021    Procedure: INJECTION, STEROID, EPIDURAL, TRANSFORAMINAL APPROACH  bilateral L4/5 TF ASHUTOSH;  Surgeon: Merly Wilcox MD;  Location: [facility] PAIN MGT;  Service: Pain Management;  Laterality: Bilateral;  consent needed    TRANSFORAMINAL EPIDURAL INJECTION OF STEROID Bilateral 1/3/2022    Procedure: INJECTION, STEROID, EPIDURAL, TRANSFORAMINAL APPROACH Bilateral L4/5 Needs consent;  Surgeon: Merly Wilcox MD;  Location: [facility] PAIN MGT;  Service: Pain Management;  Laterality: Bilateral;    TRANSFORAMINAL EPIDURAL INJECTION OF STEROID Bilateral 8/9/2022    Procedure: INJECTION, STEROID, EPIDURAL, TRANSFORAMINAL APPROACH, BILATERAL L4-L5   MEDICALLY URGENT;  Surgeon: Merly Wilcox MD;  Location: [facility] PAIN MGT;  Service: Pain Management;  Laterality: Bilateral;    TRANSFORAMINAL EPIDURAL INJECTION OF STEROID Left 11/17/2022    Procedure: INJECTION, STEROID, EPIDURAL, TRANSFORAMINAL APPROACH LEFT L3-L4 AND L4-L5 CONTRAST;  Surgeon: Merly Wilcox MD;  Location: [facility] PAIN MGT;  Service: Pain Management;  Laterality: Left;    TRANSFORAMINAL EPIDURAL INJECTION OF STEROID Bilateral 7/31/2023    Procedure: INJECTION, STEROID, EPIDURAL, TRANSFORAMINAL APPROACH, BILATERAL L5/S1  sooner date;  Surgeon: Merly Wilcox MD;  Location: [facility] PAIN MGT;  Service: Pain Management;  Laterality: Bilateral;    TRIGGER FINGER RELEASE Left 11/15/2021    Procedure: RELEASE, TRIGGER FINGER, LEFT MIDDLE AND RING;  Surgeon: Florecita CROCKER  MD Rekha;  Location: [facility];  Service: Orthopedics;  Laterality: Left;     Family History   Problem Relation Age of Onset    Heart failure Mother     Kidney failure Mother     Heart attack Father     Breast cancer Sister 66        Lump, XRT, chemo, recurrence 10 years later.    Cancer Brother         leukemia    Heart attack Brother 58    Pulmonary embolism Brother 45    Heart attack Brother 52    Breast cancer Maternal Grandmother 60        advanced at diagnosis    Breast cancer Maternal Aunt 83         at 92    Colon cancer Neg Hx     Esophageal cancer Neg Hx      Social History  Social History     Tobacco Use    Smoking status: Former     Current packs/day: 1.00     Average packs/day: 1 pack/day for 40.0 years (40.0 ttl pk-yrs)     Types: Cigarettes    Smokeless tobacco: Never    Tobacco comments:     Smoked >1 ppd for at least 40 years, quit around    Substance Use Topics    Alcohol use: Yes     Comment: occasional glass of wine or cocktail    Drug use: No       Review of Systems     Constitutional: Positive for chills and fatigue.      HENT: Positive for hearing loss, sore throat, dental problems, trouble swallowing and voice change.      Eyes:  Negative for change in eyesight, eye drainage, eye itching and photophobia.     Respiratory:  Positive for shortness of breath, snoring and wheezing.      Cardiovascular:  Positive for foot swelling.     Gastrointestinal:  Positive for acid reflux and heartburn.     Genitourinary: Negative for difficulty urinating, sexual problems and frequent urination.     Musc: Positive for aching muscles, back pain and neck pain.     Skin: Negative for rash.     Allergy: Positive for seasonal allergies.     Endocrine: Positive for cold intolerance.     Neurological: Negative for dizziness, headaches, light-headedness, seizures and tremors.      Hematologic: Positive for bruises/bleeds easily.     Psychiatric: Positive for decreased concentration and depression.                Objective:      Physical Exam  Vitals reviewed.   Constitutional:       General: She is not in acute distress.     Appearance: Normal appearance. She is not ill-appearing.   HENT:      Head: Normocephalic and atraumatic.      Jaw: No trismus, tenderness, swelling, pain on movement or malocclusion.      Salivary Glands: Right salivary gland is not diffusely enlarged or tender. Left salivary gland is not diffusely enlarged or tender.      Right Ear: Hearing, tympanic membrane, ear canal and external ear normal. No decreased hearing noted. No laceration, drainage, swelling or tenderness. No middle ear effusion. There is impacted cerumen. No foreign body. No mastoid tenderness. No PE tube. No hemotympanum. Tympanic membrane is not injected, scarred, perforated, erythematous, retracted or bulging. Tympanic membrane has normal mobility.      Left Ear: Hearing, ear canal and external ear normal. No decreased hearing noted. No laceration, drainage, swelling or tenderness. A middle ear effusion is present. There is impacted cerumen. No foreign body. No mastoid tenderness. No PE tube. No hemotympanum. Tympanic membrane is not injected, scarred, perforated, erythematous, retracted or bulging. Tympanic membrane has normal mobility.      Nose: Septal deviation present. No nasal deformity, signs of injury, congestion or rhinorrhea.      Right Nostril: No epistaxis or occlusion.      Left Nostril: No epistaxis or occlusion.      Right Turbinates: Enlarged (2+). Not swollen or pale.      Left Turbinates: Enlarged (2+). Not swollen or pale.      Right Sinus: No maxillary sinus tenderness or frontal sinus tenderness.      Left Sinus: No maxillary sinus tenderness or frontal sinus tenderness.        Comments: Nasal sinus dry. (Patient is on NC)     Mouth/Throat:      Lips: Pink.      Mouth: Mucous membranes are moist.      Pharynx: Oropharynx is clear. Uvula midline. No pharyngeal swelling, oropharyngeal exudate,  posterior oropharyngeal erythema or uvula swelling.   Pulmonary:      Effort: Pulmonary effort is normal. No respiratory distress.   Lymphadenopathy:      Cervical: No cervical adenopathy.   Neurological:      Mental Status: She is alert and oriented to person, place, and time.   Psychiatric:         Attention and Perception: Attention normal.         Mood and Affect: Mood normal.         Speech: Speech normal.         Behavior: Behavior normal. Behavior is cooperative.         Assessment:       1. Bilateral impacted cerumen    2. Middle ear effusion, left    3. Sinus congestion    4. Deviated septum    5. History of neck swelling    6. History of pneumonia        Plan:       Diagnoses and all orders for this visit:    Bilateral impacted cerumen  Bilateral cerumen impaction removed under microscopy.  Patient tolerated procedure well and noted improvement upon impaction removal.  Otoscopic exam benign.  Education about normal ear hygiene and the avoidance of Q-tips was reviewed.     Middle ear effusion, left  -     fluticasone propionate (FLONASE) 50 mcg/actuation nasal spray; 2 sprays (100 mcg total) by Each Nostril route once daily.  Return in 2-3 months with audiogram to assess hearing/tympanogram.   Deviated septum  Sinus congestion  -     fluticasone propionate (FLONASE) 50 mcg/actuation nasal spray; 2 sprays (100 mcg total) by Each Nostril route once daily.  Nasal saline spray TID  Aquaphor to nasal septum.    History of neck swelling  None noted today. FU if symptoms return  History of pneumonia    Questions answers.

## 2023-12-07 NOTE — TELEPHONE ENCOUNTER
Patient qualifies for oxygen at rest. Patient is ambulatory and will benefit from portable oxygen concentrator at 2L NC/min.    12/06/2023---------Distance:   ( )       O2 Sat % Supplemental Oxygen Heart Rate Blood Pressure Fara Scale   Pre-exercise  (Resting) 86 % Room Air 66 bpm 143/65 mmHg 3      Oxygen Qualification:       O2 Sat % Supplemental Oxygen Heart Rate Blood Pressure Fara Scale   Pre-exercise  (Resting) 94 % 2 L/M  67 bpm  150/69 mmHg 2

## 2023-12-08 NOTE — PROGRESS NOTES
Route Chart for Scheduling    Med Onc Chart Routing      Follow up with physician . See me after May 23rd with a mammogram   Follow up with TIM    Infusion scheduling note    Injection scheduling note    Labs    Imaging Mammogram      Pharmacy appointment    Other referrals             Subjective:       Patient ID: Anahi Cook is a 89 y.o. female.    Chief Complaint: No chief complaint on file.      HPI   Mrs. Cook returns today for follow up.   I had last seen her on May 24, 2023.  She is on letrozole, now on the adjuvant setting.    Briefly, she is an 89-year-old female who in late 2017 had initially felt a mass in her left breast.  A biopsy that was performed on 01/11/2018 showed an infiltrating ductal carcinoma that was ER strongly positive, IA strongly positive and HER-2 negative by immunohistochemistry.  She was started on letrozole and undwerwent a BSO by Dr. Chairez for an ovarian mass.  Her ovarian mass was benign. In mid July 2018 she underwent a lumpectomy and AND.  She had a 3.3 cm carcinoma and 4/8 positive nodes.  She has remained on letrozole, now in the adjuvant setting, and she returns today for follow up.   Of note, she also received chest wall XRT.    A mammogram was done on May 22, 2023 and was read as BI-RADS 1.    Review of Systems    Overall she feels fair.  She recently had pneumonia and as a result she has been O2 dependent.  In regards to her breast cancer treatment, she has tolerated the AIs well so far.  She again complains of occasional hot flashes and hand stiffness, and also of shortness of breath with exertion.  Her ECOG performance status remains at 2.  She denies any anxiety, depression, easy bruising, fevers, chills, night  sweats, weight loss, nausea, vomiting, diarrhea, constipation, diplopia, blurred vision, headache, chest pain, palpitations, shortness of breath, breast pain, abdominal pain, extremity pain, or difficulty ambulating.  The remainder of the ten-point ROS,  including general, skin, lymph, H/N, cardiorespiratory, GI, , Neuro, Endocrine, and psychiatric is negative.       Objective:      Physical Exam      She is alert, oriented to time, place, person, pleasant, well      nourished, in no acute physical distress.     She is accompanied by her daughter.                               VITAL SIGNS:  Reviewed                                      HEENT:  Normal.  There are no nasal, oral, lip, gingival, auricular, lid,    or conjunctival lesions.  Mucosae are moist and pink, and there is no        thrush.  Pupils are equal, reactive to light and accommodation.              Extraocular muscle movements are intact.   Dentition is fair.  Maxillary teeth are missing.                                      NECK:  Supple without JVD, adenopathy, or thyromegaly.                       LUNGS:  Clear to auscultation without wheezing, rales, or rhonchi.           CARDIOVASCULAR:  Reveals an S1, S2, a grade II KRISHNA c/w AS, no rubs, no gallops.         ABDOMEN:  Soft, nontender, without organomegaly.  Bowel sounds are    present.    Scars from her laparoscopic DONOVAN-BSO are seen.                                                            EXTREMITIES:  No cyanosis, clubbing, or edema.                             BREASTS: She is s/p left lumpectomy with a healed periareolar incision.  She also has an incision from her axillary dissection;  It is well healed.   There are no masses in her left or her right breast.    Both breasts are large and pendulous.    There is mild hyperpigmentation within the radiation field.                                 LYMPHATIC:  There is no cervical, axillary, or supraclavicular adenopathy.   SKIN:  Warm and moist, without petechiae, rashes, induration, or ecchymoses.           NEUROLOGIC:  DTRs are 0-1+ bilaterally, symmetrical, motor function is 5/5,  and cranial nerves are  within normal limits.      Assessment:       1. Hormone receptor positive breast cancer,  left, on neoadjuvant letrozole with a significant clinical response.   2. Use of aromatase inhibitors        Plan:        I had a long discussion with her and her daughter.  She will remain on letrozole, and see me again in late May with a repeat mammogram.   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  She had started adjuvant letrozole in the summer of 2018, and she will therefore complete 7 years in the summer of 2025.     Her multiple questions were answered to her satisfaction.

## 2023-12-11 ENCOUNTER — HOSPITAL ENCOUNTER (OUTPATIENT)
Dept: CARDIOLOGY | Facility: HOSPITAL | Age: 88
Discharge: HOME | End: 2023-12-11
Attending: INTERNAL MEDICINE
Payer: MEDICARE

## 2023-12-11 ENCOUNTER — PATIENT MESSAGE (OUTPATIENT)
Dept: PULMONOLOGY | Facility: CLINIC | Age: 88
End: 2023-12-11

## 2023-12-11 VITALS
HEIGHT: 64 IN | BODY MASS INDEX: 25.1 KG/M2 | WEIGHT: 147 LBS | DIASTOLIC BLOOD PRESSURE: 73 MMHG | SYSTOLIC BLOOD PRESSURE: 103 MMHG | HEART RATE: 54 BPM

## 2023-12-11 DIAGNOSIS — R60.0 EDEMA OF BOTH LOWER EXTREMITIES: ICD-10-CM

## 2023-12-11 LAB
ASCENDING AORTA: 3.01 CM
AV INDEX (PROSTH): 0.78
AV MEAN GRADIENT: 8 MMHG
AV PEAK GRADIENT: 14 MMHG
AV VALVE AREA BY VELOCITY RATIO: 2.56 CM²
AV VALVE AREA: 2.69 CM²
AV VELOCITY RATIO: 0.74
BSA FOR ECHO PROCEDURE: 1.74 M2
CV ECHO LV RWT: 0.4 CM
DOP CALC AO PEAK VEL: 1.89 M/S
DOP CALC AO VTI: 42.15 CM
DOP CALC LVOT AREA: 3.5 CM2
DOP CALC LVOT DIAMETER: 2.1 CM
DOP CALC LVOT PEAK VEL: 1.4 M/S
DOP CALC LVOT STROKE VOLUME: 113.55 CM3
DOP CALC RVOT PEAK VEL: 0.79 M/S
DOP CALC RVOT VTI: 19.6 CM
DOP CALCLVOT PEAK VEL VTI: 32.8 CM
E WAVE DECELERATION TIME: 169.73 MSEC
E/A RATIO: 0.71
E/E' RATIO: 12.13 M/S
ECHO LV POSTERIOR WALL: 0.89 CM (ref 0.6–1.1)
EJECTION FRACTION: 65 %
FRACTIONAL SHORTENING: 34 % (ref 28–44)
INTERVENTRICULAR SEPTUM: 0.94 CM (ref 0.6–1.1)
LA MAJOR: 4.61 CM
LA MINOR: 4.55 CM
LA WIDTH: 3.56 CM
LEFT ATRIUM SIZE: 3.82 CM
LEFT ATRIUM VOLUME INDEX MOD: 22.1 ML/M2
LEFT ATRIUM VOLUME INDEX: 30.8 ML/M2
LEFT ATRIUM VOLUME MOD: 38.07 CM3
LEFT ATRIUM VOLUME: 52.94 CM3
LEFT GREAT SAPHENOUS DISTAL THIGH REFLUX: 0.22 MS
LEFT GREAT SAPHENOUS JUNCTION REFLUX: 0.69 MS
LEFT GREAT SAPHENOUS KNEE REFLUX: 0.15 MS
LEFT GREAT SAPHENOUS MIDDLE THIGH REFLUX: 0.29 MS
LEFT GREAT SAPHENOUS PROXIMAL CALF REFLUX: 0.07 MS
LEFT INTERNAL DIMENSION IN SYSTOLE: 2.96 CM (ref 2.1–4)
LEFT SMALL SAPHENOUS KNEE REFLUX: 0.27 MS
LEFT VENTRICLE DIASTOLIC VOLUME INDEX: 53.72 ML/M2
LEFT VENTRICLE DIASTOLIC VOLUME: 92.39 ML
LEFT VENTRICLE MASS INDEX: 79 G/M2
LEFT VENTRICLE SYSTOLIC VOLUME INDEX: 19.6 ML/M2
LEFT VENTRICLE SYSTOLIC VOLUME: 33.75 ML
LEFT VENTRICULAR INTERNAL DIMENSION IN DIASTOLE: 4.5 CM (ref 3.5–6)
LEFT VENTRICULAR MASS: 135.81 G
LV LATERAL E/E' RATIO: 13 M/S
LV SEPTAL E/E' RATIO: 11.38 M/S
MV A" WAVE DURATION": 11.42 MSEC
MV PEAK A VEL: 1.29 M/S
MV PEAK E VEL: 0.91 M/S
MV STENOSIS PRESSURE HALF TIME: 49.22 MS
MV VALVE AREA P 1/2 METHOD: 4.47 CM2
PISA TR MAX VEL: 3.5 M/S
PULM VEIN S/D RATIO: 2.02
PV MEAN GRADIENT: 1 MMHG
PV PEAK D VEL: 0.41 M/S
PV PEAK S VEL: 0.83 M/S
RA MAJOR: 4.65 CM
RA PRESSURE ESTIMATED: 8 MMHG
RA WIDTH: 3.37 CM
RIGHT GREAT SAPHENOUS DISTAL THIGH REFLUX: 0.11 MS
RIGHT GREAT SAPHENOUS JUNCTION REFLUX: 0.6 MS
RIGHT GREAT SAPHENOUS KNEE REFLUX: 0.13 MS
RIGHT GREAT SAPHENOUS MIDDLE THIGH REFLUX: 0.36 MS
RIGHT GREAT SAPHENOUS PROXIMAL CALF REFLUX: 0.15 MS
RIGHT SMALL SAPHENOUS KNEE REFLUX: 0.28 MS
RIGHT SMALL SAPHENOUS SPJ REFLUX: 0.17 MS
RIGHT VENTRICULAR END-DIASTOLIC DIMENSION: 3.45 CM
RV TB RVSP: 12 MMHG
SINUS: 2.95 CM
STJ: 2.42 CM
TDI LATERAL: 0.07 M/S
TDI SEPTAL: 0.08 M/S
TDI: 0.08 M/S
TR MAX PG: 49 MMHG
TRICUSPID ANNULAR PLANE SYSTOLIC EXCURSION: 2.05 CM
TV REST PULMONARY ARTERY PRESSURE: 57 MMHG
Z-SCORE OF LEFT VENTRICULAR DIMENSION IN END DIASTOLE: -0.58
Z-SCORE OF LEFT VENTRICULAR DIMENSION IN END SYSTOLE: 0.02

## 2023-12-11 PROCEDURE — 93970 EXTREMITY STUDY: CPT | Mod: 26,,, | Performed by: INTERNAL MEDICINE

## 2023-12-11 PROCEDURE — 93970 CV US LOWER VENOUS INSUFFICIENCY BILATERAL (CUPID ONLY): ICD-10-PCS | Mod: 26,,, | Performed by: INTERNAL MEDICINE

## 2023-12-11 PROCEDURE — 93970 EXTREMITY STUDY: CPT | Mod: TC

## 2023-12-11 PROCEDURE — 93306 TTE W/DOPPLER COMPLETE: CPT | Mod: 26,,, | Performed by: INTERNAL MEDICINE

## 2023-12-11 PROCEDURE — 93306 ECHO (CUPID ONLY): ICD-10-PCS | Mod: 26,,, | Performed by: INTERNAL MEDICINE

## 2023-12-11 PROCEDURE — 93306 TTE W/DOPPLER COMPLETE: CPT

## 2023-12-13 ENCOUNTER — OFFICE VISIT (OUTPATIENT)
Dept: HEMATOLOGY/ONCOLOGY | Facility: CLINIC | Age: 88
End: 2023-12-13
Payer: MEDICARE

## 2023-12-13 VITALS
OXYGEN SATURATION: 95 % | TEMPERATURE: 98 F | WEIGHT: 147 LBS | HEART RATE: 67 BPM | DIASTOLIC BLOOD PRESSURE: 71 MMHG | SYSTOLIC BLOOD PRESSURE: 173 MMHG | HEIGHT: 64 IN | BODY MASS INDEX: 25.1 KG/M2 | RESPIRATION RATE: 18 BRPM

## 2023-12-13 DIAGNOSIS — C50.412 MALIGNANT NEOPLASM OF UPPER-OUTER QUADRANT OF LEFT BREAST IN FEMALE, ESTROGEN RECEPTOR POSITIVE: Primary | Chronic | ICD-10-CM

## 2023-12-13 DIAGNOSIS — Z79.811 USE OF AROMATASE INHIBITORS: Chronic | ICD-10-CM

## 2023-12-13 DIAGNOSIS — Z17.0 MALIGNANT NEOPLASM OF UPPER-OUTER QUADRANT OF LEFT BREAST IN FEMALE, ESTROGEN RECEPTOR POSITIVE: Primary | Chronic | ICD-10-CM

## 2023-12-13 DIAGNOSIS — Z12.31 ENCOUNTER FOR SCREENING MAMMOGRAM FOR MALIGNANT NEOPLASM OF BREAST: ICD-10-CM

## 2023-12-13 PROCEDURE — 3288F FALL RISK ASSESSMENT DOCD: CPT | Mod: CPTII,S$GLB,, | Performed by: INTERNAL MEDICINE

## 2023-12-13 PROCEDURE — 1159F MED LIST DOCD IN RCRD: CPT | Mod: CPTII,S$GLB,, | Performed by: INTERNAL MEDICINE

## 2023-12-13 PROCEDURE — 3288F PR FALLS RISK ASSESSMENT DOCUMENTED: ICD-10-PCS | Mod: CPTII,S$GLB,, | Performed by: INTERNAL MEDICINE

## 2023-12-13 PROCEDURE — 99999 PR PBB SHADOW E&M-EST. PATIENT-LVL V: ICD-10-PCS | Mod: PBBFAC,,, | Performed by: INTERNAL MEDICINE

## 2023-12-13 PROCEDURE — 99213 OFFICE O/P EST LOW 20 MIN: CPT | Mod: S$GLB,,, | Performed by: INTERNAL MEDICINE

## 2023-12-13 PROCEDURE — 1126F PR PAIN SEVERITY QUANTIFIED, NO PAIN PRESENT: ICD-10-PCS | Mod: CPTII,S$GLB,, | Performed by: INTERNAL MEDICINE

## 2023-12-13 PROCEDURE — 1159F PR MEDICATION LIST DOCUMENTED IN MEDICAL RECORD: ICD-10-PCS | Mod: CPTII,S$GLB,, | Performed by: INTERNAL MEDICINE

## 2023-12-13 PROCEDURE — 1101F PR PT FALLS ASSESS DOC 0-1 FALLS W/OUT INJ PAST YR: ICD-10-PCS | Mod: CPTII,S$GLB,, | Performed by: INTERNAL MEDICINE

## 2023-12-13 PROCEDURE — 99999 PR PBB SHADOW E&M-EST. PATIENT-LVL V: CPT | Mod: PBBFAC,,, | Performed by: INTERNAL MEDICINE

## 2023-12-13 PROCEDURE — 99213 PR OFFICE/OUTPT VISIT, EST, LEVL III, 20-29 MIN: ICD-10-PCS | Mod: S$GLB,,, | Performed by: INTERNAL MEDICINE

## 2023-12-13 PROCEDURE — 1126F AMNT PAIN NOTED NONE PRSNT: CPT | Mod: CPTII,S$GLB,, | Performed by: INTERNAL MEDICINE

## 2023-12-13 PROCEDURE — 1101F PT FALLS ASSESS-DOCD LE1/YR: CPT | Mod: CPTII,S$GLB,, | Performed by: INTERNAL MEDICINE

## 2024-01-05 ENCOUNTER — PATIENT MESSAGE (OUTPATIENT)
Dept: OTOLARYNGOLOGY | Facility: CLINIC | Age: 89
End: 2024-01-05
Payer: MEDICARE

## 2024-01-11 ENCOUNTER — OFFICE VISIT (OUTPATIENT)
Dept: OPHTHALMOLOGY | Facility: CLINIC | Age: 89
End: 2024-01-11
Payer: MEDICARE

## 2024-01-11 DIAGNOSIS — Z96.1 PSEUDOPHAKIA OF BOTH EYES: ICD-10-CM

## 2024-01-11 DIAGNOSIS — H35.3112 INTERMEDIATE STAGE DRY AGE-RELATED MACULAR DEGENERATION OF RIGHT EYE: ICD-10-CM

## 2024-01-11 DIAGNOSIS — H35.371 EPIRETINAL MEMBRANE, RIGHT EYE: ICD-10-CM

## 2024-01-11 DIAGNOSIS — H35.3221 EXUDATIVE AGE-RELATED MACULAR DEGENERATION OF LEFT EYE WITH ACTIVE CHOROIDAL NEOVASCULARIZATION: Primary | ICD-10-CM

## 2024-01-11 DIAGNOSIS — H35.033 HYPERTENSIVE RETINOPATHY OF BOTH EYES: ICD-10-CM

## 2024-01-11 PROCEDURE — 92134 CPTRZ OPH DX IMG PST SGM RTA: CPT | Mod: S$GLB,,, | Performed by: OPHTHALMOLOGY

## 2024-01-11 PROCEDURE — 67028 INJECTION EYE DRUG: CPT | Mod: LT,S$GLB,, | Performed by: OPHTHALMOLOGY

## 2024-01-11 PROCEDURE — 99999 PR PBB SHADOW E&M-EST. PATIENT-LVL III: CPT | Mod: PBBFAC,,, | Performed by: OPHTHALMOLOGY

## 2024-01-11 PROCEDURE — 99214 OFFICE O/P EST MOD 30 MIN: CPT | Mod: 25,S$GLB,, | Performed by: OPHTHALMOLOGY

## 2024-01-11 NOTE — PROGRESS NOTES
HPI    VA OS decreased 1 week ago @near.  Eye meds: Systane PRN OU  Last edited by Chasidy Bradford on 1/11/2024  2:39 PM.          A/P    ICD-10-CM ICD-9-CM   1. Exudative age-related macular degeneration of left eye with active choroidal neovascularization  H35.3221 362.52     362.16   2. 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 Epiretinal membrane, right eye  H35.371 362.56   4. Pseudophakia of both eyes  Z96.1 V43.1   5. Hypertensive retinopathy of both eyes  H35.033 362.11       1. Exudative age-related macular degeneration of left eye with active choroidal neovascularization  2. 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  Schedule Eylea Injection Left Eye today Patient chooses to proceed with injection R/B/A discussed include infection retinal detachment and stroke    Patient identified.  Timeout performed.    Risks, benefits, and alternatives to treatment were discussed in detail with the patient, including bleeding/infection (endophthalmitis)/etc.  The patient voiced understanding and wished to proceed with the procedure.  See separate consent form.    Injection Procedure Note:  Diagnosis: CNVM Left Eye    Topical Proparacaine drop placed then topical 5% Betadine, then subcojunctival lidocaine 2% injection  Sterile gloves used, and sterile lid speculum placed.  5% Betadine placed at injection site again prior to injection.  Eylea 2mg in 0.05cc Injected  inferotemporally 3.5-4mm posterior to the limbus.  Complications: None  Va at least CF at 5 feet post injection.  Retina, ONH, IOP normal after injection.    Followup as below.  Patient should return immediately PRN.  Retinal Detachment and Endophthalmitis precautions given        3. Epiretinal membrane, right eye  Mod ERM, VA 20/40 (was 20/50)      No metamorphopsia  Plan: Observation, may need PPV/MP if having incr symptoms   Amsler precautions discussed     4. Pseudophakia of both eyes  Good lens position OU  Plan: Observation      5. Hypertensive retinopathy of both eyes  Mod HTN changes  PCP Minerva Mathias MD - Recent notes reviewed  11/17/2023  5.7  A1C   Plan: Observation HTN changes for now  Recommend good blood pressure control, tight blood glucose control, and good cholesterol control          RTC 12 weeks DFE/OCTm OU, possible Eylea OS      I saw and examined the patient and reviewed in detail the findings documented. The final examination findings, image interpretations, and plan as documented in the record represent my personal judgment and conclusions.    Dwight Melton MD  Vitreoretinal Surgery   [facility]

## 2024-01-19 ENCOUNTER — OFFICE VISIT (OUTPATIENT)
Dept: CARDIOLOGY | Facility: CLINIC | Age: 89
End: 2024-01-19
Payer: MEDICARE

## 2024-01-19 VITALS
HEART RATE: 62 BPM | HEIGHT: 64 IN | BODY MASS INDEX: 25.1 KG/M2 | DIASTOLIC BLOOD PRESSURE: 70 MMHG | SYSTOLIC BLOOD PRESSURE: 155 MMHG | WEIGHT: 147 LBS

## 2024-01-19 DIAGNOSIS — I27.20 PULMONARY HYPERTENSION: ICD-10-CM

## 2024-01-19 DIAGNOSIS — R09.89 LEFT CAROTID BRUIT: ICD-10-CM

## 2024-01-19 DIAGNOSIS — I73.9 PAD (PERIPHERAL ARTERY DISEASE): ICD-10-CM

## 2024-01-19 DIAGNOSIS — J44.1 CHRONIC OBSTRUCTIVE PULMONARY DISEASE WITH ACUTE EXACERBATION: Primary | ICD-10-CM

## 2024-01-19 DIAGNOSIS — I10 ESSENTIAL HYPERTENSION: Chronic | ICD-10-CM

## 2024-01-19 DIAGNOSIS — E78.2 MIXED HYPERLIPIDEMIA: Chronic | ICD-10-CM

## 2024-01-19 DIAGNOSIS — R60.0 EDEMA OF BOTH LOWER EXTREMITIES: ICD-10-CM

## 2024-01-19 PROCEDURE — 99215 OFFICE O/P EST HI 40 MIN: CPT | Mod: 95,,, | Performed by: INTERNAL MEDICINE

## 2024-01-19 NOTE — PATIENT INSTRUCTIONS
Assessment/Plan:  Anahi Cook is a 89 y.o. female with PAD, HTN, HLD, arthritis, COPD, former smoker, who presents for a follow up appointment.     BLE edema- Likely due to recent high dose steroids, sodium intake and dependent edema from sitting all the time.  BLE Venous Ultrasound 12/11/2023 showed  No Lower Extremity DVT. No reflux. Continue torsemide 20 mg daily.  Limit sodium intake to 2000 mg daily.  Limit volume intake to 1.5 L daily.  Elevate legs when resting.    2. PAD- She reports she does not ambulate much and has no claudication, rest pain, or tissue loss.  Toe Pressure Study on 1/18/2023 revealed right TBI is reduced consistent with severe PAD.  Left TBI is reduced consistent with moderate PAD.  PVR's are biphasic bilaterally.  Right Toe Pressure 44 mmHg.  Left Toe Pressure 97 mmHg. JESSE Study on 7/26/2022 revealed the right JESSE is 0.46 with diminished PVR waveforms below the knee consistent with severe arterial insufficiency. The right TBI 0.23 with diminished digit waveforms.  The left JESSE is 0.8 with mildly abnormal PVR waveforms consistent with mild arterial insufficiency.  The left TBI is 0.71.  4th and 5th digit waveforms are abnormal.  Pt did not have CTA abd/pelvis with iliofemoral runoff done.  Continue ASA 81 mg daily and atorvastatin 20 mg daily.      3. Left Carotid Bruit- Carotid Ultrasound on 1/18/2023 demonstrated 0-19% bilateral Internal Carotid Stenosis.    Continue ASA 81 mg daily and atorvastatin 20 mg daily.    4. HTN- Continue current medications.    5. HLD- Continue atorvastatin 20 mg daily.      6. Pulmonary Hypertension - Likely secondary to COPD. Echo TTE 12/11/2023 revealed left ventricle Ejection fraction 65%. There is normal diastolic function. Pulmonary artery systolic pressure is 57 mmHg. Echo TTE 10/13/2016 demonstrated concentric remodeling. Global left ventricular ejection fraction is 60-65%. PA systolic pressure is greater than 49 mmHg. Continue torsemide 20 mg daily.      Follow up in 6 months

## 2024-01-19 NOTE — PROGRESS NOTES
[facility]      Chief Complaint   Patient presents with    Edema of both lower extremities    Essential hypertension       Patient ID: Anahi Cook is a 89 y.o. female with PAD, HTN, HLD, arthritis, COPD, former smoker, who presents for a follow up appointment.  Pertinent history/events are as follows:     -Pt kindly referred by Dr. Lyssa Hoffman for evaluation of PAD/bilateral foot pain.    -At our initial clinic visit on 9/1/2022, Mrs. Cook was accompanied by her daughter.  She reported she does not ambulate much and has no claudication, rest pain, or tissue loss.  JESSE Study on 7/26/2022 revealed the right JESSE is 0.46 with diminished PVR waveforms below the knee consistent with severe arterial insufficiency. The right TBI 0.23 with diminished digit waveforms.  The left JESSE is 0.8 with mildly abnormal PVR waveforms consistent with mild arterial insufficiency.  The left TBI is 0.71.  4th and 5th digit waveforms are abnormal.  Formerly smoked a pack a day for 40 years.  Quit in her 60's  Plan:   PAD- She reports she does not ambulate much and has no claudication, rest pain, or tissue loss.  JESSE Study on 7/26/2022 revealed the right JESSE is 0.46 with diminished PVR waveforms below the knee consistent with severe arterial insufficiency. The right TBI 0.23 with diminished digit waveforms.  The left JESSE is 0.8 with mildly abnormal PVR waveforms consistent with mild arterial insufficiency.  The left TBI is 0.71.  4th and 5th digit waveforms are abnormal.  Check toe pressure study and CTA abd/pelvis with iliofemoral runoff to determine if arterial blood flow is adequate for wound healing prior to any invasive procedures.  Start ASA 81 mg daily.  Continue atorvastatin 20 mg daily.    Left Carotid Bruit- Check carotid ultrasound.   HTN- Continue current medications.  HLD- Continue atorvastatin 20 mg daily.     -At follow up clinic visit on 1/26/2023, Mrs. Cook was accompanied by her daughter.  She reports  no claudication, rest pain, or tissue loss.  Toe Pressure Study on 1/18/2023 revealed right TBI is reduced consistent with severe PAD.  Left TBI is reduced consistent with moderate PAD.  PVR's are biphasic bilaterally.  Right Toe Pressure 44 mmHg.  Left Toe Pressure 97 mmHg.  Carotid Ultrasound on 1/18/2023 demonstrated 0-19% bilateral Internal Carotid Stenosis.  Plan:   PAD- She reports she does not ambulate much and has no claudication, rest pain, or tissue loss.  Toe Pressure Study on 1/18/2023 revealed right TBI is reduced consistent with severe PAD.  Left TBI is reduced consistent with moderate PAD.  PVR's are biphasic bilaterally.  Right Toe Pressure 44 mmHg.  Left Toe Pressure 97 mmHg. JESSE Study on 7/26/2022 revealed the right JESSE is 0.46 with diminished PVR waveforms below the knee consistent with severe arterial insufficiency. The right TBI 0.23 with diminished digit waveforms.  The left JESSE is 0.8 with mildly abnormal PVR waveforms consistent with mild arterial insufficiency.  The left TBI is 0.71.  4th and 5th digit waveforms are abnormal.  Pt did not have CTA abd/pelvis with iliofemoral runoff done.  Continue ASA 81 mg daily and atorvastatin 20 mg daily.    Left Carotid Bruit- Carotid Ultrasound on 1/18/2023 demonstrated 0-19% bilateral Internal Carotid Stenosis.    Continue ASA 81 mg daily and atorvastatin 20 mg daily.  HTN- Continue current medications.  HLD- Continue atorvastatin 20 mg daily.      - At clinic visit on 12/01/2023 Mrs. Cook is accompanied by her daughter.  She reports worsening leg swelling since hospital discharge for COPD exacerbation.  Of note, pt received prednisone 60 mg in ED, and was continued on Prednisone 40mg PO daily to complete a 5 day course.  She has no chest pain or chest discomfort.  Plan:   BLE edema- Likely due to recent high dose steroids, sodium intake and dependent edema from sitting all the time.  Check BLE venous reflux study.  Increase torsemide to 20 mg bid  for 5 days, then reduce to 20 mg daily thereafter.  Check cmp in 1 week. Check echo.  Limit sodium intake to 2000 mg daily.  Limit volume intake to 1.5 L daily.  Elevate legs when resting.  PAD- She reports she does not ambulate much and has no claudication, rest pain, or tissue loss.  Toe Pressure Study on 1/18/2023 revealed right TBI is reduced consistent with severe PAD.  Left TBI is reduced consistent with moderate PAD.  PVR's are biphasic bilaterally.  Right Toe Pressure 44 mmHg.  Left Toe Pressure 97 mmHg. JESSE Study on 7/26/2022 revealed the right JESSE is 0.46 with diminished PVR waveforms below the knee consistent with severe arterial insufficiency. The right TBI 0.23 with diminished digit waveforms.  The left JESSE is 0.8 with mildly abnormal PVR waveforms consistent with mild arterial insufficiency.  The left TBI is 0.71.  4th and 5th digit waveforms are abnormal.  Pt did not have CTA abd/pelvis with iliofemoral runoff done.  Continue ASA 81 mg daily and atorvastatin 20 mg daily.    Left Carotid Bruit- Carotid Ultrasound on 1/18/2023 demonstrated 0-19% bilateral Internal Carotid Stenosis.    Continue ASA 81 mg daily and atorvastatin 20 mg daily.  HTN- Continue current medications.  HLD- Continue atorvastatin 20 mg daily.      HPI:  Mrs. Cook is accompanied by her daughter in a video visit.  She reports significant improvement in leg swelling. She reports no shortness of breath, chest pain, palpitations or lightheadedness.  BLE Venous Ultrasound 12/11/2023 showed  No Lower Extremity DVT. No reflux. Echo TTE 12/11/2023 revealed left ventricle Ejection fraction 65%. There is normal diastolic function. Pulmonary artery systolic pressure is 57 mmHg. Echo TTE 10/13/2016 demonstrated concentric remodeling. Global left ventricular ejection fraction is 60-65%. PA systolic pressure is greater than 49 mmHg.     Past Medical History:   Diagnosis Date    Anxiety     Arthritis     Back pain     Breast cancer 1/17/2018     Breast mass 1/15/2018    Chronic kidney disease, stage 2, mildly decreased GFR     COPD (chronic obstructive pulmonary disease)     emphysema    Depression     Dysuria 1/29/2018    Exudative age-related macular degeneration of left eye with active choroidal neovascularization 8/25/2022    Family history of breast cancer 1/17/2018    GERD (gastroesophageal reflux disease)     Hypertension     Infiltrating ductal carcinoma of breast, left 7/12/2018    Multiple closed fractures of ribs of left side 4/3/2019    Osteoporosis, senile     Ovarian mass 1/15/2018    Pneumonia     S/P robotic assisted laparoscopic BSO (bilateral salpingo-oophorectomy) 2/23/2018     Past Surgical History:   Procedure Laterality Date    AXILLARY NODE DISSECTION Left 7/12/2018    Procedure: LYMPHADENECTOMY, AXILLARY;  Surgeon: Amanda Caballero MD;  [place] [facility] [place] [address] [place] [address];  Service: General;  Laterality: Left;    BREAST BIOPSY      BREAST LUMPECTOMY      CATARACT EXTRACTION      CHOLECYSTECTOMY      COLONOSCOPY      ESOPHAGOGASTRODUODENOSCOPY      ESOPHAGOGASTRODUODENOSCOPY N/A 10/14/2019    Procedure: EGD (ESOPHAGOGASTRODUODENOSCOPY);  Surgeon: Davonte Thompson MD;  Location: [facility] ([address] [place] [address]);  Service: Endoscopy;  Laterality: N/A;  hx of pulmonary hypertension-PA 50     pt requesting ASAP    EYE SURGERY      FIXATION KYPHOPLASTY N/A 11/8/2018    Procedure: Kyphoplasty   T12;  Surgeon: Merly Wilcox MD;  Location: [facility] CATH LAB;  Service: Pain Management;  Laterality: N/A;  T12  [place] [employer] e-mailed with date and time    HYSTERECTOMY      INJECTION FOR SENTINEL NODE IDENTIFICATION Left 7/12/2018    Procedure: INJECTION, FOR SENTINEL NODE IDENTIFICATION;  Surgeon: Amanda Caballero MD;  [place] [facility] [place] [address] [place] [address];  Service: General;  Laterality: Left;    INJECTION OF FACET JOINT Bilateral 10/15/2018    Procedure: INJECTION, FACET JOINT, BILATERAL LUMBAR L3-4, 4-5, 5-S1 FACET JOINT INJECTIONS;  Surgeon: Merly Wilcox MD;  Location: [facility] PAIN MGT;   Service: Pain Management;  Laterality: Bilateral;    INJECTION OF FACET JOINT Bilateral 4/1/2019    Procedure: INJECTION, FACET JOINT, L3-L4,L4-L5,L5-S1;  Surgeon: Merly Wilcox MD;  Location: [facility] PAIN MGT;  Service: Pain Management;  Laterality: Bilateral;    INJECTION OF FACET JOINT Bilateral 6/20/2019    Procedure: INJECTION, FACET JOINT, L3-L4,L4-L5,L5-S1 NEED CONSENT;  Surgeon: Merly Wilcox MD;  Location: [facility] PAIN MGT;  Service: Pain Management;  Laterality: Bilateral;    INJECTION OF FACET JOINT Bilateral 7/27/2020    Procedure: INJECTION, FACET JOINT BILATERAL L3/4, L4/5 and L5/S1;  Surgeon: Merly Wilcox MD;  Location: [facility] PAIN MGT;  Service: Pain Management;  Laterality: Bilateral;    INJECTION OF JOINT Bilateral 7/27/2020    Procedure: INJECTION, JOINT, BILATERAL GREATER TROCHANTERIC BURSA;  Surgeon: Merly Wilcox MD;  Location: [facility] PAIN MGT;  Service: Pain Management;  Laterality: Bilateral;    INJECTION OF JOINT Bilateral 4/26/2021    Procedure: INJECTION, JOINT, HIP;  Surgeon: Merly Wilcox MD;  Location: [facility]IN [facility]GT;  Service: Pain Management;  Laterality: Bilateral;  consent needed    INJECTION OF STEROID Right 11/15/2021    Procedure: INJECTION, STEROID RIGHT MIDDLE AND SAMLL FINGER;  Surgeon: Florecita Da Silva MD;  Location: [facility] OR;  Service: Orthopedics;  Laterality: Right;    MASTECTOMY, PARTIAL Left 7/12/2018    Procedure: MASTECTOMY, PARTIAL-W/SEED LOCALIZATION;  Surgeon: Amanda Caballero MD;  Location: [facility] OR [address] [place] [address];  Service: General;  Laterality: Left;    TX REMOVAL OF OVARY/TUBE(S)  02/23/2018    Robotic Assisted    ROTATOR CUFF REPAIR Right     rotator cuff repair right shoulder    TONSILLECTOMY      TRANSFORAMINAL EPIDURAL INJECTION OF STEROID Bilateral 6/21/2021    Procedure: INJECTION, STEROID, EPIDURAL, TRANSFORAMINAL APPROACH  bilateral L4/5 TF ASHUTOSH;  Surgeon: Merly Wilcox MD;  Location: [facility]IN [facility]GT;  Service: Pain Management;  Laterality: Bilateral;  consent needed     TRANSFORAMINAL EPIDURAL INJECTION OF STEROID Bilateral 1/3/2022    Procedure: INJECTION, STEROID, EPIDURAL, TRANSFORAMINAL APPROACH Bilateral L4/5 Needs consent;  Surgeon: Merly Wilcox MD;  Location: [facility] PAIN MGT;  Service: Pain Management;  Laterality: Bilateral;    TRANSFORAMINAL EPIDURAL INJECTION OF STEROID Bilateral 8/9/2022    Procedure: INJECTION, STEROID, EPIDURAL, TRANSFORAMINAL APPROACH, BILATERAL L4-L5   MEDICALLY URGENT;  Surgeon: Merly Wilcox MD;  Location: [facility] PAIN MGT;  Service: Pain Management;  Laterality: Bilateral;    TRANSFORAMINAL EPIDURAL INJECTION OF STEROID Left 11/17/2022    Procedure: INJECTION, STEROID, EPIDURAL, TRANSFORAMINAL APPROACH LEFT L3-L4 AND L4-L5 CONTRAST;  Surgeon: Merly Wilcox MD;  Location: [facility] PAIN MGT;  Service: Pain Management;  Laterality: Left;    TRANSFORAMINAL EPIDURAL INJECTION OF STEROID Bilateral 7/31/2023    Procedure: INJECTION, STEROID, EPIDURAL, TRANSFORAMINAL APPROACH, BILATERAL L5/S1  sooner date;  Surgeon: Merly Wilcox MD;  Location: [facility] PAIN MGT;  Service: Pain Management;  Laterality: Bilateral;    TRIGGER FINGER RELEASE Left 11/15/2021    Procedure: RELEASE, TRIGGER FINGER, LEFT MIDDLE AND RING;  Surgeon: Florecita Da Silva MD;  Location: [facility] OR;  Service: Orthopedics;  Laterality: Left;     Social History     Socioeconomic History    Marital status: Single    Number of children: 3   Occupational History    Occupation: Multiple jobs, see social documentation section     Comment: Retired   Tobacco Use    Smoking status: Former     Current packs/day: 1.00     Average packs/day: 1 pack/day for 40.0 years (40.0 ttl pk-yrs)     Types: Cigarettes    Smokeless tobacco: Never    Tobacco comments:     Smoked >1 ppd for at least 40 years, quit around 1995   Substance and Sexual Activity    Alcohol use: Yes     Comment: occasional glass of wine or cocktail    Drug use: No    Sexual activity: Yes     Partners: Male   Social History Narrative    Worked many  jobs while raising 3 children.  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  She was  from her first , but took care of him at the end of his life.     Social Determinants of Health     Financial Resource Strain: Low Risk  (1/16/2024)    Overall Financial Resource Strain (CARDIA)     Difficulty of Paying Living Expenses: Not hard at all   Food Insecurity: No Food Insecurity (1/16/2024)    Hunger Vital Sign     Worried About Running Out of Food in the Last Year: Never true     Ran Out of Food in the Last Year: Never true   Transportation Needs: No Transportation Needs (1/16/2024)    PRAPARE - Transportation     Lack of Transportation (Medical): No     Lack of Transportation (Non-Medical): No   Physical Activity: Inactive (1/16/2024)    Exercise Vital Sign     Days of Exercise per Week: 0 days     Minutes of Exercise per Session: 0 min   Stress: No Stress Concern Present (1/16/2024)    [employer] - Occupational Stress Questionnaire     Feeling of Stress : Only a little   Social Connections: Socially Isolated (1/16/2024)    Social Connection and Isolation Panel [NHANES]     Frequency of Communication with Friends and Family: More than three times a week     Frequency of Social Gatherings with Friends and Family: More than three times a week     Attends Christianity Services: Never     Active Member of Clubs or Organizations: No     Attends Club or Organization Meetings: Never     Marital Status:    Housing Stability: Low Risk  (1/16/2024)    Housing Stability Vital Sign     Unable to Pay for Housing in the Last Year: No     Number of Places Lived in the Last Year: 1     Unstable Housing in the Last Year: No     Family History   Problem Relation Age of Onset    Heart failure Mother     Kidney failure Mother     Heart attack Father     Breast cancer Sister 66        Lump, XRT,  chemo, recurrence 10 years later.    Cancer Brother         leukemia    Heart attack Brother 58    Pulmonary embolism Brother 45    Heart attack Brother 52    Breast cancer Maternal Grandmother 60        advanced at diagnosis    Breast cancer Maternal Aunt 83         at 92    Colon cancer Neg Hx     Esophageal cancer Neg Hx        Review of patient's allergies indicates:   Allergen Reactions    Levaquin [levofloxacin] Itching    Penicillins Itching       Medication List with Changes/Refills   Current Medications    ACETAMINOPHEN (TYLENOL) 650 MG TBSR    Take 650 mg by mouth every 8 (eight) hours as needed.    ALBUTEROL-IPRATROPIUM (DUO-NEB) 2.5 MG-0.5 MG/3 ML NEBULIZER SOLUTION    Take 3 mLs by nebulization every 6 (six) hours as needed for Wheezing. Rescue    ALPHA-D-GALACTOSIDASE (BEANO ORAL)    Take 1 capsule by mouth 2 (two) times a day.    ASPIRIN (ECOTRIN) 81 MG EC TABLET    Take 81 mg by mouth once daily.    ATORVASTATIN (LIPITOR) 20 MG TABLET    Take 1 tablet (20 mg total) by mouth every evening.    BENAZEPRIL (LOTENSIN) 40 MG TABLET    Take 1 tablet (40 mg total) by mouth once daily.    BREO ELLIPTA 100-25 MCG/DOSE DISKUS INHALER    Inhale 1 puff into the lungs once daily.    CALCIUM-VITAMIN D 250-100 MG-UNIT PER TABLET    Take 1 tablet by mouth 2 (two) times daily.    CETIRIZINE HCL (ZYRTEC ORAL)    Take 10 mg by mouth nightly as needed.     CITALOPRAM (CELEXA) 20 MG TABLET    Take 1 tablet (20 mg total) by mouth every evening.    FELODIPINE (PLENDIL) 10 MG 24 HR TABLET    Take 1 tablet (10 mg total) by mouth 2 (two) times a day.    FLUTICASONE PROPIONATE (FLONASE) 50 MCG/ACTUATION NASAL SPRAY    2 sprays (100 mcg total) by Each Nostril route once daily.    GABAPENTIN (NEURONTIN) 100 MG CAPSULE    Take 1 capsule (100 mg total) by mouth every evening.    HYDRALAZINE (APRESOLINE) 25 MG TABLET    Take 1 tablet (25 mg total) by mouth every 12 (twelve) hours.    HYDROCORTISONE 2.5 % CREAM    Apply topically  "2 (two) times daily.    INCRUSE ELLIPTA 62.5 MCG/ACTUATION INHALATION CAPSULE    Inhale into the lungs once daily.    IPRATROPIUM-ALBUTEROL (COMBIVENT RESPIMAT)  MCG/ACTUATION INHALER    Inhale 1 puff into the lungs.    KETOPROFEN 10 % CRPK    Apply topically.    LETROZOLE (FEMARA) 2.5 MG TAB    Take 1 tablet (2.5 mg total) by mouth once daily.    MELATONIN 10 MG TAB    Take by mouth every evening.    MONTELUKAST (SINGULAIR) 10 MG TABLET    Take 10 mg by mouth every evening.    MULTIVITAMIN (THERAGRAN) PER TABLET    Take 1 tablet by mouth once daily.    POLYETHYLENE GLYCOL 3350 (MIRALAX ORAL)    Take by mouth once daily.    TORSEMIDE (DEMADEX) 20 MG TAB    Take 1 tablet (20 mg total) by mouth once daily.    TRAMADOL (ULTRAM) 50 MG TABLET    Take 1 tablet (50 mg total) by mouth every 8 (eight) hours as needed for Pain.    TRIAMCINOLONE ACETONIDE 0.1% (KENALOG) 0.1 % CREAM    AAA bid.  Spot treatment as needed    TRIAMCINOLONE ACETONIDE 0.1% (KENALOG) 0.1 % PASTE    as needed.       Review of Systems  Constitution: Denies chills, fever, and sweats.  HENT: Denies headaches or blurry vision.  Cardiovascular: Denies chest pain or irregular heart beat.  Respiratory: Denies cough or shortness of breath.  Gastrointestinal: Denies abdominal pain, nausea, or vomiting.  Musculoskeletal: Denies muscle cramps.  Neurological: Denies dizziness or focal weakness.  Psychiatric/Behavioral: Normal mental status.  Hematologic/Lymphatic: Denies bleeding problem or easy bruising/bleeding.  Skin: Denies rash or suspicious lesions    Physical Examination  BP (!) 155/70   Pulse 62   Ht 5' 4" (1.626 m)   Wt 66.7 kg (147 lb)   LMP  (LMP Unknown)   BMI 25.23 kg/m²     Constitutional: No acute distress, conversant  Limited due to virtual visit    Labs:  Most Recent Data  CBC:   Lab Results   Component Value Date    WBC 10.25 11/10/2023    HGB 12.0 11/10/2023    HCT 37.4 11/10/2023     11/10/2023    MCV 89 11/10/2023    RDW " "14.5 11/10/2023     BMP:   Lab Results   Component Value Date     12/06/2023    K 4.1 12/06/2023    CL 95 12/06/2023    CO2 32 (H) 12/06/2023    BUN 16 12/06/2023    CREATININE 1.1 12/06/2023    GLU 99 12/06/2023    CALCIUM 9.2 12/06/2023    MG 2.1 11/10/2023    PHOS 3.6 11/17/2023    PHOS 3.6 11/17/2023     LFTS;   Lab Results   Component Value Date    PROT 6.8 12/06/2023    ALBUMIN 3.5 12/06/2023    BILITOT 0.4 12/06/2023    AST 18 12/06/2023    ALKPHOS 65 12/06/2023    ALT 14 12/06/2023     COAGS: No results found for: "INR", "PROTIME", "PTT"  FLP:   Lab Results   Component Value Date    CHOL 126 01/20/2023    HDL 57 01/20/2023    LDLCALC 51.8 (L) 01/20/2023    TRIG 86 01/20/2023    CHOLHDL 45.2 01/20/2023     CARDIAC:   Lab Results   Component Value Date    TROPONINI 0.025 11/08/2023     (H) 11/08/2023       Imaging:    BLE Venous Ultrasound 12/11/2023    No Lower Extremity DVT.    No reflux.    Echo TTE 12/11/2023    Left Ventricle: The left ventricle is normal in size. Normal wall thickness. Normal wall motion. There is normal systolic function. Ejection fraction by visual approximation is 65%. There is normal diastolic function.    Right Ventricle: Normal right ventricular cavity size. Wall thickness is normal. Right ventricle wall motion  is normal. Systolic function is normal.    Tricuspid Valve: There is mild to moderate regurgitation.    Pulmonary Artery: There is pulmonary hypertension. The estimated pulmonary artery systolic pressure is 57 mmHg.    IVC/SVC: Intermediate venous pressure at 8 mmHg.    Echo TTE 10/13/2016  Aorta: The aortic root is normal in size, measuring 2.7 cm at sinotubular junction and 2.5 cm at Sinuses of Valsalva. The proximal ascending aorta is normal in size, measuring 3.0 cm across.   Left Atrium: The left atrial volume index is normal, measuring 10.78 cc/m2.   Left Ventricle: The left ventricle is normal in size, with an end-diastolic diameter of 4.4 cm, and an " end-systolic diameter of 2.9 cm. LV wall thickness is normal, with the septum measuring 0.9 cm and the posterior wall measuring 1.1 cm across. Relative  wall thickness was increased at 0.50, and the LV mass index was 99.1 g/m2 consistent with concentric remodeling. Global left ventricular systolic function appears normal. Visually estimated ejection fraction is 60-65%. The LV Doppler derived stroke volume equals 129.0 ccs. The E/e'(lat) is 10, consistent with normal diastolic function.   Right Atrium: The right atrium is normal in size, measuring 3.7 cm in length and 2.1 cm in width in the apical view.   Right Ventricle: The right ventricle is normal in size measuring 3.1 cm at the base in the apical right ventricle-focused view. Global right ventricular systolic function appears normal. The estimated PA systolic pressure is greater than 49 mmHg.   Aortic Valve:  The aortic valve is mildly sclerotic.   Mitral Valve:  The pressure half time is 73 msec. The calculated mitral valve area is 3.01 cm2.   Tricuspid Valve:  There is mild to moderate tricuspid regurgitation.   Intracavitary: There is no evidence of pericardial effusion, intracavity mass, thrombi, or vegetation.     Toe Pressure Study 1/18/2023:  Right TBI is reduced consistent with severe PAD.  Left TBI is reduced consistent with moderate PAD.  PVR's are biphasic bilaterally.  Right Toe Pressure 44 mmHg  Left Toe Pressure 97 mmHg    Carotid Ultrasound 1/18/2023:  There is 0-19% right Internal Carotid Stenosis.  There is 0-19% left Internal Carotid Stenosis.    JESSE Study 7/26/2022:  The right JESSE is 0.46 with diminished PVR waveforms below the knee consistent with severe arterial insufficiency.  The right TBI 0.23 with diminished digit waveforms.  The left JESSE is 0.8 with mildly abnormal PVR waveforms consistent with mild arterial insufficiency.  The left TBI is 0.71.  4th and 5th digit waveforms are abnormal.    Assessment/Plan:  Anahi Cook is a 89 y.o.  female with PAD, HTN, HLD, arthritis, COPD, former smoker, who presents for a follow up appointment.     BLE edema- Likely due to recent high dose steroids, sodium intake and dependent edema from sitting all the time.  BLE Venous Ultrasound 12/11/2023 showed  No Lower Extremity DVT. No reflux. Continue torsemide 20 mg daily.  Limit sodium intake to 2000 mg daily.  Limit volume intake to 1.5 L daily.  Elevate legs when resting.    2. PAD- She reports she does not ambulate much and has no claudication, rest pain, or tissue loss.  Toe Pressure Study on 1/18/2023 revealed right TBI is reduced consistent with severe PAD.  Left TBI is reduced consistent with moderate PAD.  PVR's are biphasic bilaterally.  Right Toe Pressure 44 mmHg.  Left Toe Pressure 97 mmHg. JESSE Study on 7/26/2022 revealed the right JESSE is 0.46 with diminished PVR waveforms below the knee consistent with severe arterial insufficiency. The right TBI 0.23 with diminished digit waveforms.  The left JESSE is 0.8 with mildly abnormal PVR waveforms consistent with mild arterial insufficiency.  The left TBI is 0.71.  4th and 5th digit waveforms are abnormal.  Pt did not have CTA abd/pelvis with iliofemoral runoff done.  Continue ASA 81 mg daily and atorvastatin 20 mg daily.      3. Left Carotid Bruit- Carotid Ultrasound on 1/18/2023 demonstrated 0-19% bilateral Internal Carotid Stenosis.    Continue ASA 81 mg daily and atorvastatin 20 mg daily.    4. HTN- Continue current medications.    5. HLD- Continue atorvastatin 20 mg daily.      6. Pulmonary Hypertension - Likely secondary to COPD. Echo TTE 12/11/2023 revealed left ventricle Ejection fraction 65%. There is normal diastolic function. Pulmonary artery systolic pressure is 57 mmHg. Echo TTE 10/13/2016 demonstrated concentric remodeling. Global left ventricular ejection fraction is 60-65%. PA systolic pressure is greater than 49 mmHg. Continue torsemide 20 mg daily.     Follow up in 6 months    Total duration  of face to face visit time 30 minutes.  Total time spent counseling greater than fifty percent of total visit time.  Counseling included discussion regarding imaging findings, diagnosis, possibilities, treatment options, risks and benefits.  The patient had many questions regarding the options and long-term effects.    Patient seen and discussed with Dr. Boss. Further recommendations per the attending addendum.    Shady Saravia MD  PGY IV - Vascular Medicine Fellow   [facility]

## 2024-01-23 DIAGNOSIS — M17.11 PRIMARY OSTEOARTHRITIS OF RIGHT KNEE: ICD-10-CM

## 2024-01-23 DIAGNOSIS — M47.816 LUMBAR SPONDYLOSIS: ICD-10-CM

## 2024-01-23 NOTE — TELEPHONE ENCOUNTER
Care Due:                  Date            Visit Type   Department     Provider  --------------------------------------------------------------------------------                                [facility]     [facility] PRIMARY   Minerva Hodgesjennifer  Last Visit: 11-      FOLLOW UP    CARE           Stew                              EP -                              PRIMARY      [facility] PRIMARY   Minerva Traciejennifer  Next Visit: 02-      CARE ([facility])   CARE           Stew                                                            Last  Test          Frequency    Reason                     Performed    Due Date  --------------------------------------------------------------------------------    Lipid Panel.  12 months..  atorvastatin.............  01- 01-    Health [facility] Embedded Care Due Messages. Reference number: [id number].   1/23/2024 11:36:28 AM CST

## 2024-01-24 RX ORDER — TRAMADOL HYDROCHLORIDE 50 MG/1
50 TABLET ORAL EVERY 8 HOURS PRN
Qty: 90 TABLET | Refills: 2 | Status: SHIPPED | OUTPATIENT
Start: 2024-01-24 | End: 2024-04-26

## 2024-01-31 ENCOUNTER — NURSE TRIAGE (OUTPATIENT)
Dept: ADMINISTRATIVE | Facility: CLINIC | Age: 89
End: 2024-01-31
Payer: MEDICARE

## 2024-02-01 NOTE — TELEPHONE ENCOUNTER
Covid+. Patient asymptomatic. Per protocol advised caller to contact PCP within 24 hours. Advised caller to call back with any further questions or if symptoms worsen.        Reason for Disposition   [1] HIGH RISK patient (e.g., weak immune system, age > 64 years, obesity with BMI 30 or higher, pregnant, chronic lung disease or other chronic medical condition) AND [2] COVID symptoms (e.g., cough, fever)  (Exceptions: Already seen by PCP and no new or worsening symptoms.)    Additional Information   Negative: SEVERE difficulty breathing (e.g., struggling for each breath, speaks in single words)   Negative: Difficult to awaken or acting confused (e.g., disoriented, slurred speech)   Negative: Bluish (or gray) lips or face now   Negative: Shock suspected (e.g., cold/pale/clammy skin, too weak to stand, low BP, rapid pulse)   Negative: Sounds like a life-threatening emergency to the triager   Negative: SEVERE or constant chest pain or pressure  (Exception: Mild central chest pain, present only when coughing.)   Negative: MODERATE difficulty breathing (e.g., speaks in phrases, SOB even at rest, pulse 100-120)   Negative: [1] Headache AND [2] stiff neck (can't touch chin to chest)   Negative: Oxygen level (e.g., pulse oximetry) 90 percent or lower   Negative: Chest pain or pressure  (Exception: MILD central chest pain, present only when coughing.)   Negative: [1] Drinking very little AND [2] dehydration suspected (e.g., no urine > 12 hours, very dry mouth, very lightheaded)   Negative: Patient sounds very sick or weak to the triager   Negative: MILD difficulty breathing (e.g., minimal/no SOB at rest, SOB with walking, pulse <100)   Negative: Fever > 103 F (39.4 C)   Negative: [1] Fever > 101 F (38.3 C) AND [2] age > 60 years   Negative: [1] Fever > 100.0 F (37.8 C) AND [2] bedridden (e.g., CVA, chronic illness, recovering from surgery)   Negative: Oxygen level (e.g., pulse oximetry) 91 to 94 percent    Protocols used:  Coronavirus (COVID-19) Diagnosed or [id number]-XS

## 2024-02-05 DIAGNOSIS — I11.9 HYPERTENSIVE HEART DISEASE WITHOUT HEART FAILURE: ICD-10-CM

## 2024-02-06 NOTE — TELEPHONE ENCOUNTER
No care due was identified.  [facility] Embedded Care Due Messages. Reference number: [id number].   2/05/2024 11:23:57 PM CST

## 2024-02-07 RX ORDER — TORSEMIDE 20 MG/1
20 TABLET ORAL DAILY
Qty: 90 TABLET | Refills: 1 | Status: SHIPPED | OUTPATIENT
Start: 2024-02-07

## 2024-02-08 ENCOUNTER — TELEPHONE (OUTPATIENT)
Dept: PAIN MEDICINE | Facility: CLINIC | Age: 89
End: 2024-02-08
Payer: MEDICARE

## 2024-02-08 DIAGNOSIS — M25.562 CHRONIC PAIN OF BOTH KNEES: Primary | ICD-10-CM

## 2024-02-08 DIAGNOSIS — G89.29 CHRONIC PAIN OF BOTH SHOULDERS: ICD-10-CM

## 2024-02-08 DIAGNOSIS — G89.29 CHRONIC PAIN OF BOTH KNEES: Primary | ICD-10-CM

## 2024-02-08 DIAGNOSIS — M25.511 CHRONIC PAIN OF BOTH SHOULDERS: ICD-10-CM

## 2024-02-08 DIAGNOSIS — M25.561 CHRONIC PAIN OF BOTH KNEES: Primary | ICD-10-CM

## 2024-02-08 DIAGNOSIS — M25.512 CHRONIC PAIN OF BOTH SHOULDERS: ICD-10-CM

## 2024-02-08 NOTE — TELEPHONE ENCOUNTER
----- Message from Kareem Lara sent at 2/7/2024 11:06 AM CST -----  Name of Who is Calling: SHAYY JAMIL [[id number]]            What is the request in detail: Patient is requesting call back reschedule procedure              Can the clinic reply by [facility]: no              What Number to Call Back if not in [facility]: [phone]

## 2024-02-08 NOTE — TELEPHONE ENCOUNTER
Pt has been rescheduled and BCN has been renewed for upcoming B/L Knee Injection in the ;office.    ----- Message from Kareem Lara sent at 2/7/2024 11:06 AM CST -----  Name of Who is Calling: SHAYY JAMIL [[id number]]            What is the request in detail: Patient is requesting call back reschedule procedure              Can the clinic reply by [facility]: no              What Number to Call Back if not in [facility]: [phone]

## 2024-02-12 ENCOUNTER — TELEPHONE (OUTPATIENT)
Dept: PRIMARY CARE CLINIC | Facility: CLINIC | Age: 89
End: 2024-02-12
Payer: MEDICARE

## 2024-02-12 PROBLEM — J96.01 ACUTE HYPOXEMIC RESPIRATORY FAILURE: Status: RESOLVED | Noted: 2023-11-09 | Resolved: 2024-02-12

## 2024-02-12 NOTE — TELEPHONE ENCOUNTER
Spoke with patient's daughter Patience, and advised that I can schedule her mom for Monday 2/26/24 at 2:00 pm.  She stats that's even better because her mom still has a URI and she should be better by then.

## 2024-02-12 NOTE — TELEPHONE ENCOUNTER
----- Message from Fallon Stewart sent at 2/12/2024  1:00 PM CST -----  Contact: [phone]  Patient called, stated that by mistake cancel appointment for 02/19/24 if is possible to get back. (Next available on file until 06/17/24 ). Please call and advise. Thank you.

## 2024-02-26 ENCOUNTER — OFFICE VISIT (OUTPATIENT)
Dept: PRIMARY CARE CLINIC | Facility: CLINIC | Age: 89
End: 2024-02-26
Payer: MEDICARE

## 2024-02-26 VITALS
DIASTOLIC BLOOD PRESSURE: 70 MMHG | WEIGHT: 147.25 LBS | SYSTOLIC BLOOD PRESSURE: 138 MMHG | HEART RATE: 66 BPM | BODY MASS INDEX: 25.14 KG/M2 | TEMPERATURE: 98 F | OXYGEN SATURATION: 96 % | RESPIRATION RATE: 18 BRPM | HEIGHT: 64 IN

## 2024-02-26 DIAGNOSIS — F33.0 MAJOR DEPRESSIVE DISORDER, RECURRENT, MILD: ICD-10-CM

## 2024-02-26 DIAGNOSIS — N18.31 STAGE 3A CHRONIC KIDNEY DISEASE: ICD-10-CM

## 2024-02-26 DIAGNOSIS — Z29.11 NEED FOR RSV VACCINATION: ICD-10-CM

## 2024-02-26 DIAGNOSIS — I11.9 HYPERTENSIVE HEART DISEASE WITHOUT HEART FAILURE: Primary | ICD-10-CM

## 2024-02-26 DIAGNOSIS — J44.9 CHRONIC OBSTRUCTIVE PULMONARY DISEASE, UNSPECIFIED COPD TYPE: ICD-10-CM

## 2024-02-26 DIAGNOSIS — I70.0 ATHEROSCLEROSIS OF AORTA: ICD-10-CM

## 2024-02-26 DIAGNOSIS — E78.5 HYPERLIPIDEMIA, UNSPECIFIED HYPERLIPIDEMIA TYPE: ICD-10-CM

## 2024-02-26 DIAGNOSIS — R73.03 PRE-DIABETES: ICD-10-CM

## 2024-02-26 DIAGNOSIS — M47.816 LUMBAR SPONDYLOSIS: ICD-10-CM

## 2024-02-26 PROCEDURE — 99999 PR PBB SHADOW E&M-EST. PATIENT-LVL V: CPT | Mod: PBBFAC,,, | Performed by: INTERNAL MEDICINE

## 2024-02-26 PROCEDURE — 99214 OFFICE O/P EST MOD 30 MIN: CPT | Mod: S$GLB,,, | Performed by: INTERNAL MEDICINE

## 2024-02-27 ENCOUNTER — TELEPHONE (OUTPATIENT)
Dept: PAIN MEDICINE | Facility: CLINIC | Age: 89
End: 2024-02-27
Payer: MEDICARE

## 2024-02-27 NOTE — TELEPHONE ENCOUNTER
----- Message from Yanira Bowens sent at 2/26/2024  4:04 PM CST -----  Regarding: call back  Type: Patient Call Back    Who called: vaibhav Morin     What is the request in detail: requesting to cancel upcoming procedure     Can the clinic reply by [facility]?no    Would the patient rather a call back or a response via [facility]? call    Best call back number: [phone]     Additional Information:

## 2024-03-03 NOTE — PROGRESS NOTES
Subjective     Patient ID: Anahi Cook is a 89 y.o. female.    Chief Complaint: Follow-up    Last seen 3 months ago for hospital follow up. Returns for previously scheduled f/u chronic medical conditions. Patient has no new complaints. Dtr has concerns about her not wanting to eat her daily banana while on Torsemide. Potassium level was 4.1 on last labs.     PMH: .  Hypertension with Concentric Remodeling and normal LV function on Echo 10/16.  Hyperlipidemia.  Pre-Diabetes.  Aortic Atherosclerosis seen on chest and abdominal imaging.   CKD stage 3a.  COPD, not oxygen-dependent. Pulmonologist Dr. Rakesh Bradford at [facility].  Breast Cancer, on Letrozole, Heme/Onc following.  Osteoporosis, Endocrinology  recommended Prolia - patient declined treatment.   Vertebral Compression Fractures.  GERD, Tortuous Esophagus on EGD 10/19, benign H pyl negative gastritis.   Lumbar Spondylosis, Pain Mgmt following.  Allergic Rhinitis.  Depression/Anxiety/Insomnia.    PSH:  2018: AXILLARY NODE DISSECTION; Left  BREAST BIOPSY  BREAST LUMPECTOMY  CHOLECYSTECTOMY  EYE SURGERY  2018: FIXATION KYPHOPLASTY; N/A  HYSTERECTOMY  10/15/2018: INJECTION OF FACET JOINT; Bilateral  2019: INJECTION OF FACET JOINT; Bilateral  2019: INJECTION OF FACET JOINT; Bilateral  2018: MASTECTOMY, PARTIAL; Left  2018: [place] REMOVAL OF OVARY/TUBE(S)  ROTATOR CUFF REPAIR; Right  TONSILLECTOMY    Mammogram stable . BMD . Colonoscopy years ago, not on record. Eye exam 10/23. Vaccines reviewed.    Social: Former tobacco use, rare alcohol. , daughter lives locally, two sons out of state.     FMH: Breast cancer in multiple, HTN, Heart dis, Kidney dis.     Allergies: PCN, Levofloxacin.     Medications: list reviewed and reconciled.           Review of Systems   Constitutional:  Negative for chills and fever.   Respiratory:  Negative for cough and shortness of breath.    Cardiovascular:  Negative for chest pain,  "palpitations and leg swelling.   Gastrointestinal:  Negative for abdominal pain, constipation, diarrhea, nausea and vomiting.   Genitourinary:  Negative for dysuria and frequency.   Neurological:  Negative for dizziness, syncope and headaches.   Psychiatric/Behavioral:  Negative for agitation and confusion.           Objective   Vitals:    02/26/24 1405 02/26/24 1502   BP: (!) 144/72 138/70   BP Location: Right arm Right arm   Patient Position: Sitting Sitting   Pulse: 66    Resp: 18    Temp: 98.3 °F (36.8 °C)    TempSrc: Oral    SpO2: 96%    Weight: 66.8 kg (147 lb 4.3 oz)    Height: 5' 4" (1.626 m)       Physical Exam  Constitutional:       General: She is not in acute distress.  Cardiovascular:      Rate and Rhythm: Normal rate and regular rhythm.   Pulmonary:      Effort: Pulmonary effort is normal. No respiratory distress.      Breath sounds: Normal breath sounds. No wheezing or rales.   Abdominal:      General: Bowel sounds are normal. There is no distension.      Palpations: Abdomen is soft.      Tenderness: There is no abdominal tenderness.   Musculoskeletal:         General: Normal range of motion.      Right lower leg: No edema.      Left lower leg: No edema.   Skin:     General: Skin is warm and dry.   Neurological:      Mental Status: She is alert. Mental status is at baseline.      Cranial Nerves: No cranial nerve deficit.   Psychiatric:         Mood and Affect: Mood normal.         Behavior: Behavior normal.          Assessment and Plan     1. Hypertensive heart disease without heart failure        -     adequate control on Benazepril 40, Felodipine 10 BID, Hydralazine 25 BID - refills available.    2. Hyperlipidemia, unspecified hyperlipidemia type  -     Lipid Panel; Future; Expected date: 02/26/2024    3. Pre-diabetes - stable on diet.     4. Atherosclerosis of aorta  -     Lipid Panel; Future; Expected date: 02/26/2024  -     continue Atorvastatin 20 mg daily.     5. Stage 3a chronic kidney " disease  -     Basic Metabolic Panel; Future; Expected date: 02/26/2024    6. Chronic obstructive pulmonary disease, unspecified COPD type        -     stable on Breo Ellipta.     7. Major depressive disorder, recurrent, mild        -     stable, continue Citalopram 20 mg daily.    8. Lumbar spondylosis        -     stable, Tramadol recently refilled.     9. Need for RSV vaccination - RSV vaccine today.        Follow up in about 6 months (around 8/26/2024).

## 2024-03-11 ENCOUNTER — LAB VISIT (OUTPATIENT)
Dept: LAB | Facility: HOSPITAL | Age: 89
End: 2024-03-11
Attending: INTERNAL MEDICINE
Payer: MEDICARE

## 2024-03-11 DIAGNOSIS — N18.31 STAGE 3A CHRONIC KIDNEY DISEASE: ICD-10-CM

## 2024-03-11 DIAGNOSIS — I70.0 ATHEROSCLEROSIS OF AORTA: ICD-10-CM

## 2024-03-11 DIAGNOSIS — E78.5 HYPERLIPIDEMIA, UNSPECIFIED HYPERLIPIDEMIA TYPE: ICD-10-CM

## 2024-03-11 LAB
ANION GAP SERPL CALC-SCNC: 9 MMOL/L (ref 8–16)
BUN SERPL-MCNC: 10 MG/DL (ref 8–23)
CALCIUM SERPL-MCNC: 9.1 MG/DL (ref 8.7–10.5)
CHLORIDE SERPL-SCNC: 100 MMOL/L (ref 95–110)
CHOLEST SERPL-MCNC: 130 MG/DL (ref 120–199)
CHOLEST/HDLC SERPL: 2.4 {RATIO} (ref 2–5)
CO2 SERPL-SCNC: 31 MMOL/L (ref 23–29)
CREAT SERPL-MCNC: 0.8 MG/DL (ref 0.5–1.4)
EST. GFR  (NO RACE VARIABLE): >60 ML/MIN/1.73 M^2
GLUCOSE SERPL-MCNC: 85 MG/DL (ref 70–110)
HDLC SERPL-MCNC: 54 MG/DL (ref 40–75)
HDLC SERPL: 41.5 % (ref 20–50)
LDLC SERPL CALC-MCNC: 61.2 MG/DL (ref 63–159)
NONHDLC SERPL-MCNC: 76 MG/DL
POTASSIUM SERPL-SCNC: 3.4 MMOL/L (ref 3.5–5.1)
SODIUM SERPL-SCNC: 140 MMOL/L (ref 136–145)
TRIGL SERPL-MCNC: 74 MG/DL (ref 30–150)

## 2024-03-11 PROCEDURE — 80061 LIPID PANEL: CPT | Performed by: INTERNAL MEDICINE

## 2024-03-11 PROCEDURE — 80048 BASIC METABOLIC PNL TOTAL CA: CPT | Performed by: INTERNAL MEDICINE

## 2024-03-11 PROCEDURE — 36415 COLL VENOUS BLD VENIPUNCTURE: CPT | Mod: PN | Performed by: INTERNAL MEDICINE

## 2024-03-17 ENCOUNTER — PATIENT MESSAGE (OUTPATIENT)
Dept: PRIMARY CARE CLINIC | Facility: CLINIC | Age: 89
End: 2024-03-17
Payer: MEDICARE

## 2024-03-17 DIAGNOSIS — E87.6 DIURETIC-INDUCED HYPOKALEMIA: Primary | ICD-10-CM

## 2024-03-17 DIAGNOSIS — T50.2X5A DIURETIC-INDUCED HYPOKALEMIA: Primary | ICD-10-CM

## 2024-03-27 RX ORDER — POTASSIUM CHLORIDE 750 MG/1
10 CAPSULE, EXTENDED RELEASE ORAL DAILY
Qty: 90 CAPSULE | Refills: 0 | Status: SHIPPED | OUTPATIENT
Start: 2024-03-27 | End: 2024-06-12 | Stop reason: SDUPTHER

## 2024-04-11 ENCOUNTER — OFFICE VISIT (OUTPATIENT)
Dept: OPHTHALMOLOGY | Facility: CLINIC | Age: 89
End: 2024-04-11
Payer: MEDICARE

## 2024-04-11 DIAGNOSIS — H35.3112 INTERMEDIATE STAGE DRY AGE-RELATED MACULAR DEGENERATION OF RIGHT EYE: ICD-10-CM

## 2024-04-11 DIAGNOSIS — H35.371 EPIRETINAL MEMBRANE, RIGHT EYE: ICD-10-CM

## 2024-04-11 DIAGNOSIS — H35.3221 EXUDATIVE AGE-RELATED MACULAR DEGENERATION OF LEFT EYE WITH ACTIVE CHOROIDAL NEOVASCULARIZATION: Primary | ICD-10-CM

## 2024-04-11 DIAGNOSIS — Z96.1 PSEUDOPHAKIA OF BOTH EYES: ICD-10-CM

## 2024-04-11 DIAGNOSIS — H35.033 HYPERTENSIVE RETINOPATHY OF BOTH EYES: ICD-10-CM

## 2024-04-11 PROCEDURE — 1101F PT FALLS ASSESS-DOCD LE1/YR: CPT | Mod: CPTII,S$GLB,, | Performed by: OPHTHALMOLOGY

## 2024-04-11 PROCEDURE — 3288F FALL RISK ASSESSMENT DOCD: CPT | Mod: CPTII,S$GLB,, | Performed by: OPHTHALMOLOGY

## 2024-04-11 PROCEDURE — 92134 CPTRZ OPH DX IMG PST SGM RTA: CPT | Mod: S$GLB,,, | Performed by: OPHTHALMOLOGY

## 2024-04-11 PROCEDURE — 1160F RVW MEDS BY RX/DR IN RCRD: CPT | Mod: CPTII,S$GLB,, | Performed by: OPHTHALMOLOGY

## 2024-04-11 PROCEDURE — 67028 INJECTION EYE DRUG: CPT | Mod: LT,S$GLB,, | Performed by: OPHTHALMOLOGY

## 2024-04-11 PROCEDURE — 99999 PR PBB SHADOW E&M-EST. PATIENT-LVL III: CPT | Mod: PBBFAC,,, | Performed by: OPHTHALMOLOGY

## 2024-04-11 PROCEDURE — 99214 OFFICE O/P EST MOD 30 MIN: CPT | Mod: 25,S$GLB,, | Performed by: OPHTHALMOLOGY

## 2024-04-11 PROCEDURE — 1159F MED LIST DOCD IN RCRD: CPT | Mod: CPTII,S$GLB,, | Performed by: OPHTHALMOLOGY

## 2024-04-11 PROCEDURE — 1126F AMNT PAIN NOTED NONE PRSNT: CPT | Mod: CPTII,S$GLB,, | Performed by: OPHTHALMOLOGY

## 2024-04-11 RX ORDER — NIRMATRELVIR AND RITONAVIR 300-100 MG
KIT ORAL
COMMUNITY
Start: 2024-02-01

## 2024-04-11 RX ORDER — DOXYCYCLINE HYCLATE 100 MG
TABLET ORAL
COMMUNITY
Start: 2024-02-05

## 2024-04-11 NOTE — PROGRESS NOTES
HPI    12 wk OCT/DFE/poss Eylea OS    Pt states va is stable since last visit. Pt c/o watery eyes OU, does use   Systane BID OU. No new concerns today.    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 Epiretinal membrane, right eye   4. Pseudophakia of both eyes     Last edited by Bonita Wilson on 4/11/2024  2:40 PM.           A/P    ICD-10-CM ICD-9-CM   1. Exudative age-related macular degeneration of left eye with active choroidal neovascularization  H35.3221 362.52     362.16   2. 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 Epiretinal membrane, right eye  H35.371 362.56   4. Pseudophakia of both eyes  Z96.1 V43.1   5. Hypertensive retinopathy of both eyes  H35.033 362.11       1. Exudative age-related macular degeneration of left eye with active choroidal neovascularization  2. 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  Schedule Eylea Injection Left Eye today Patient chooses to proceed with injection R/B/A discussed include infection retinal detachment and stroke    Patient identified.  Timeout performed.    Risks, benefits, and alternatives to treatment were discussed in detail with the patient, including bleeding/infection (endophthalmitis)/etc.  The patient voiced understanding and  wished to proceed with the procedure.  See separate consent form.    Injection Procedure Note:  Diagnosis: CNVM Left Eye    Topical Proparacaine drop placed then topical 5% Betadine, then subcojunctival lidocaine 2% injection  Sterile gloves used, and sterile lid speculum placed.  5% Betadine placed at injection site again prior to injection.  Eylea 2mg in 0.05cc Injected inferotemporally 3.5-4mm posterior to the limbus.  Complications: None  Va at least CF at 5 feet post injection.  Retina, ONH, IOP normal after injection.    Followup as below.  Patient should return immediately PRN.  Retinal Detachment and Endophthalmitis precautions given        3. Epiretinal membrane, right eye  Mod ERM, VA 20/40 (stable)  No metamorphopsia  Plan: Observation, may need PPV/MP if having incr symptoms   Amsler precautions discussed     4. Pseudophakia of both eyes  Good lens position OU  Plan: Observation      5. Hypertensive retinopathy of both eyes  Mod HTN changes  PCP Minerva Mathias MD - Recent notes reviewed  11/17/2023  5.7  A1C   Plan: Observation HTN changes for now  Recommend good blood pressure control, tight blood glucose control, and good cholesterol control          RTC 12 weeks DFE/OCTm OU, possible Eylea OS      I saw and examined the patient and reviewed in detail the findings documented. The final examination findings, image interpretations, and plan as documented in the record represent my personal judgment and conclusions.    Dwight Melton MD  Vitreoretinal Surgery   [facility]

## 2024-04-16 ENCOUNTER — TELEPHONE (OUTPATIENT)
Dept: HEMATOLOGY/ONCOLOGY | Facility: CLINIC | Age: 89
End: 2024-04-16
Payer: MEDICARE

## 2024-04-16 NOTE — TELEPHONE ENCOUNTER
Spoke with patient's daughter. She is calling to state her mom has a fleshy dense elongated mass under left breast---noticed on Sunday. The mass is painful. It is not hard.     Daughter notes it is hard to get her mom around---so if mammo or other testing is needed--she would like to do that on the same day as seeing MD for provider exam.      Message routed to dr choudhary

## 2024-04-16 NOTE — TELEPHONE ENCOUNTER
----- Message from Sofia Rosen sent at 4/16/2024 10:57 AM CDT -----  Type:  Needs Medical Advice    Who Called: Pt   Symptoms (please be specific): Fluids in lymph nodes, pain when applying pressure   How long has patient had these symptoms: Sunday   Would the patient rather a call back or a response via [facility]? Call back   Best Call Back Number: [phone]  Additional Information: Please be advised, caller states that she's not sure if there is something going on w/ pt's lymph nodes, she is not sure if appt w/ dr and mammo needs to be sooner and if so, if both can be rescheduled for sooner date

## 2024-04-24 DIAGNOSIS — M17.11 PRIMARY OSTEOARTHRITIS OF RIGHT KNEE: ICD-10-CM

## 2024-04-24 DIAGNOSIS — M47.816 LUMBAR SPONDYLOSIS: ICD-10-CM

## 2024-04-25 NOTE — TELEPHONE ENCOUNTER
No care due was identified.  [facility] Embedded Care Due Messages. Reference number: [id number].   4/24/2024 9:34:05 PM CDT

## 2024-04-26 RX ORDER — TRAMADOL HYDROCHLORIDE 50 MG/1
50 TABLET ORAL EVERY 8 HOURS PRN
Qty: 90 TABLET | Refills: 2 | Status: SHIPPED | OUTPATIENT
Start: 2024-04-26

## 2024-05-10 DIAGNOSIS — E78.5 HYPERLIPIDEMIA, UNSPECIFIED HYPERLIPIDEMIA TYPE: ICD-10-CM

## 2024-05-10 DIAGNOSIS — F41.8 DEPRESSION WITH ANXIETY: ICD-10-CM

## 2024-05-10 DIAGNOSIS — I70.0 ATHEROSCLEROSIS OF AORTA: ICD-10-CM

## 2024-05-10 NOTE — TELEPHONE ENCOUNTER
No care due was identified.  Health [facility] Embedded Care Due Messages. Reference number: [id number].   5/10/2024 4:42:10 PM CDT

## 2024-05-10 NOTE — TELEPHONE ENCOUNTER
No care due was identified.  [facility] Embedded Care Due Messages. Reference number: [id number].   5/10/2024 4:36:45 PM CDT

## 2024-05-13 RX ORDER — ATORVASTATIN CALCIUM 20 MG/1
20 TABLET, FILM COATED ORAL NIGHTLY
Qty: 90 TABLET | Refills: 1 | Status: SHIPPED | OUTPATIENT
Start: 2024-05-13

## 2024-05-13 RX ORDER — CITALOPRAM 20 MG/1
20 TABLET, FILM COATED ORAL NIGHTLY
Qty: 90 TABLET | Refills: 1 | Status: SHIPPED | OUTPATIENT
Start: 2024-05-13

## 2024-05-27 ENCOUNTER — HOSPITAL ENCOUNTER (OUTPATIENT)
Dept: RADIOLOGY | Facility: HOSPITAL | Age: 89
Discharge: HOME | End: 2024-05-27
Attending: INTERNAL MEDICINE
Payer: MEDICARE

## 2024-05-27 DIAGNOSIS — C50.412 MALIGNANT NEOPLASM OF UPPER-OUTER QUADRANT OF LEFT BREAST IN FEMALE, ESTROGEN RECEPTOR POSITIVE: Chronic | ICD-10-CM

## 2024-05-27 DIAGNOSIS — Z17.0 MALIGNANT NEOPLASM OF UPPER-OUTER QUADRANT OF LEFT BREAST IN FEMALE, ESTROGEN RECEPTOR POSITIVE: Chronic | ICD-10-CM

## 2024-05-27 PROCEDURE — 77066 DX MAMMO INCL CAD BI: CPT | Mod: 26,,, | Performed by: RADIOLOGY

## 2024-05-27 PROCEDURE — 77062 BREAST TOMOSYNTHESIS BI: CPT | Mod: TC

## 2024-05-27 PROCEDURE — 77062 BREAST TOMOSYNTHESIS BI: CPT | Mod: 26,,, | Performed by: RADIOLOGY

## 2024-05-28 ENCOUNTER — OFFICE VISIT (OUTPATIENT)
Dept: HEMATOLOGY/ONCOLOGY | Facility: CLINIC | Age: 89
End: 2024-05-28
Payer: MEDICARE

## 2024-05-28 VITALS
TEMPERATURE: 99 F | HEIGHT: 64 IN | DIASTOLIC BLOOD PRESSURE: 69 MMHG | RESPIRATION RATE: 16 BRPM | WEIGHT: 149.5 LBS | OXYGEN SATURATION: 95 % | BODY MASS INDEX: 25.52 KG/M2 | SYSTOLIC BLOOD PRESSURE: 158 MMHG | HEART RATE: 62 BPM

## 2024-05-28 DIAGNOSIS — C50.412 MALIGNANT NEOPLASM OF UPPER-OUTER QUADRANT OF LEFT BREAST IN FEMALE, ESTROGEN RECEPTOR POSITIVE: Primary | Chronic | ICD-10-CM

## 2024-05-28 DIAGNOSIS — Z79.811 USE OF AROMATASE INHIBITORS: Chronic | ICD-10-CM

## 2024-05-28 DIAGNOSIS — Z17.0 MALIGNANT NEOPLASM OF UPPER-OUTER QUADRANT OF LEFT BREAST IN FEMALE, ESTROGEN RECEPTOR POSITIVE: Primary | Chronic | ICD-10-CM

## 2024-05-28 DIAGNOSIS — C77.3 SECONDARY AND UNSPECIFIED MALIGNANT NEOPLASM OF AXILLA AND UPPER LIMB LYMPH NODES: ICD-10-CM

## 2024-05-28 PROCEDURE — 99214 OFFICE O/P EST MOD 30 MIN: CPT | Mod: S$GLB,,, | Performed by: INTERNAL MEDICINE

## 2024-05-28 PROCEDURE — 1159F MED LIST DOCD IN RCRD: CPT | Mod: CPTII,S$GLB,, | Performed by: INTERNAL MEDICINE

## 2024-05-28 PROCEDURE — 1101F PT FALLS ASSESS-DOCD LE1/YR: CPT | Mod: CPTII,S$GLB,, | Performed by: INTERNAL MEDICINE

## 2024-05-28 PROCEDURE — 1160F RVW MEDS BY RX/DR IN RCRD: CPT | Mod: CPTII,S$GLB,, | Performed by: INTERNAL MEDICINE

## 2024-05-28 PROCEDURE — 99999 PR PBB SHADOW E&M-EST. PATIENT-LVL V: CPT | Mod: PBBFAC,,, | Performed by: INTERNAL MEDICINE

## 2024-05-28 PROCEDURE — 1126F AMNT PAIN NOTED NONE PRSNT: CPT | Mod: CPTII,S$GLB,, | Performed by: INTERNAL MEDICINE

## 2024-05-28 PROCEDURE — 3288F FALL RISK ASSESSMENT DOCD: CPT | Mod: CPTII,S$GLB,, | Performed by: INTERNAL MEDICINE

## 2024-05-28 RX ORDER — KETOCONAZOLE 20 MG/G
CREAM TOPICAL
Qty: 30 G | Refills: 1 | Status: SHIPPED | OUTPATIENT
Start: 2024-05-28

## 2024-05-28 NOTE — PROGRESS NOTES
Route Chart for Scheduling    Med Onc Chart Routing      Follow up with physician    Follow up with TIM 6 months.   Infusion scheduling note    Injection scheduling note    Labs    Imaging    Pharmacy appointment    Other referrals             Subjective:       Patient ID: Anahi Cook is a 89 y.o. female.    Chief Complaint: Malignant neoplasm of upper-outer quadrant of left breast i      HPI   Mrs. Cook returns today for follow up.   I had last seen her in December 2023.  She is on letrozole, now on the adjuvant setting.    Briefly, she is an 89-year-old female who in late 2017 had initially felt a mass in her left breast.  A biopsy that was performed on 01/11/2018 showed an infiltrating ductal carcinoma that was ER strongly positive, IA strongly positive and HER-2 negative by immunohistochemistry.  She was started on letrozole and undwerwent a BSO by Dr. Chairez for an ovarian mass.  Her ovarian mass was benign. In mid July 2018 she underwent a lumpectomy and AND.  She had a 3.3 cm carcinoma and 4/8 positive nodes.  She has remained on letrozole, now in the adjuvant setting, and she returns today for follow up.   Of note, she also received chest wall XRT.    A mammogram yesterday was read as BI-RADS 1.    Review of Systems    Overall she feels fair.  She recently had pneumonia and as a result she has been O2 dependent.  In regards to her breast cancer treatment, she has tolerated the AIs well so far.  She again complains of hot flashes and hand stiffness, and also of shortness of breath with exertion.  Her ECOG performance status remains at 2.  She denies any anxiety, depression, easy bruising, fevers, chills, night  sweats, weight loss, nausea, vomiting, diarrhea, constipation, diplopia, blurred vision, headache, chest pain, palpitations, shortness of breath, breast pain, abdominal pain, extremity pain, or difficulty ambulating.  The remainder of the ten-point ROS, including general, skin, lymph, H/N,  cardiorespiratory, GI, , Neuro, Endocrine, and psychiatric is negative.       Objective:      Physical Exam      She is alert, oriented to time, place, person, pleasant, well      nourished, in no acute physical distress.     She is accompanied by her daughter.                               VITAL SIGNS:  Reviewed                                      HEENT:  Normal.  There are no nasal, oral, lip, gingival, auricular, lid,    or conjunctival lesions.  Mucosae are moist and pink, and there is no        thrush.  Pupils are equal, reactive to light and accommodation.              Extraocular muscle movements are intact.   Dentition is fair.  Maxillary teeth are missing.                                      NECK:  Supple without JVD, adenopathy, or thyromegaly.                       LUNGS:  Clear to auscultation without wheezing, rales, or rhonchi.           CARDIOVASCULAR:  Reveals an S1, S2, a grade II KRISHNA c/w AS, no rubs, no gallops.         ABDOMEN:  Soft, nontender, without organomegaly.  Bowel sounds are    present.    Scars from her laparoscopic DONOVAN-BSO are seen.   there is intertrigo on the lower abdominal wall                                                          EXTREMITIES:  No cyanosis, clubbing, or edema.                             BREASTS: She is s/p left lumpectomy with a healed periareolar incision.  She also has an incision from her axillary dissection;  It is well healed.   There are no masses in her left or her right breast.    Both breasts are large and pendulous.  there is intertrigo  in the right  inframammary fold  There is mild hyperpigmentation within the radiation field.                                 LYMPHATIC:  There is no cervical, axillary, or supraclavicular adenopathy.   SKIN:  Warm and moist, without petechiae, rashes, induration, or ecchymoses.           NEUROLOGIC:  DTRs are 0-1+ bilaterally, symmetrical, motor function is 5/5,  and cranial nerves are  within normal  limits.      Assessment:       1. Hormone receptor positive breast cancer, left, on neoadjuvant letrozole with a significant clinical response.   2. Use of aromatase inhibitors      3.   Intertrigo  Plan:        I had a long discussion with her and her daughter.  She will remain on letrozole, and see me again in 6 months   Given her the fact that she had N2 disease, I would recommend that she be treated for 7 years with an aromatase inhibitor.  She had started adjuvant letrozole in the summer of 2018, and she will therefore complete 7 years in the summer of 2025.    A prescription for ketoconazole cream was given to her daughter.  Her multiple questions were answered to her satisfaction.

## 2024-05-29 DIAGNOSIS — Z17.0 MALIGNANT NEOPLASM OF NIPPLE OF LEFT BREAST IN FEMALE, ESTROGEN RECEPTOR POSITIVE: ICD-10-CM

## 2024-05-29 DIAGNOSIS — C50.012 MALIGNANT NEOPLASM OF NIPPLE OF LEFT BREAST IN FEMALE, ESTROGEN RECEPTOR POSITIVE: ICD-10-CM

## 2024-05-29 RX ORDER — LETROZOLE 2.5 MG/1
2.5 TABLET, FILM COATED ORAL
Qty: 90 TABLET | Refills: 3 | Status: SHIPPED | OUTPATIENT
Start: 2024-05-29

## 2024-06-02 DIAGNOSIS — I11.9 HYPERTENSIVE HEART DISEASE WITHOUT HEART FAILURE: ICD-10-CM

## 2024-06-02 NOTE — TELEPHONE ENCOUNTER
No care due was identified.  Health [facility] Embedded Care Due Messages. Reference number: [id number].   6/02/2024 6:24:23 PM CDT

## 2024-06-03 RX ORDER — HYDRALAZINE HYDROCHLORIDE 25 MG/1
25 TABLET, FILM COATED ORAL EVERY 12 HOURS
Qty: 180 TABLET | Refills: 1 | Status: SHIPPED | OUTPATIENT
Start: 2024-06-03

## 2024-06-12 DIAGNOSIS — E87.6 DIURETIC-INDUCED HYPOKALEMIA: ICD-10-CM

## 2024-06-12 DIAGNOSIS — T50.2X5A DIURETIC-INDUCED HYPOKALEMIA: ICD-10-CM

## 2024-06-12 RX ORDER — POTASSIUM CHLORIDE 750 MG/1
10 CAPSULE, EXTENDED RELEASE ORAL DAILY
Qty: 90 CAPSULE | Refills: 0 | Status: SHIPPED | OUTPATIENT
Start: 2024-06-12

## 2024-06-12 NOTE — TELEPHONE ENCOUNTER
No care due was identified.  [facility] Embedded Care Due Messages. Reference number: [id number].   6/12/2024 11:29:50 AM CDT

## 2024-07-01 ENCOUNTER — TELEPHONE (OUTPATIENT)
Dept: SPINE | Facility: CLINIC | Age: 89
End: 2024-07-01
Payer: MEDICARE

## 2024-07-03 ENCOUNTER — OFFICE VISIT (OUTPATIENT)
Dept: SPINE | Facility: CLINIC | Age: 89
End: 2024-07-03
Payer: MEDICARE

## 2024-07-03 DIAGNOSIS — M70.61 GREATER TROCHANTERIC BURSITIS OF BOTH HIPS: Primary | ICD-10-CM

## 2024-07-03 DIAGNOSIS — M47.816 LUMBAR SPONDYLOSIS: ICD-10-CM

## 2024-07-03 DIAGNOSIS — M17.0 PRIMARY OSTEOARTHRITIS OF BOTH KNEES: ICD-10-CM

## 2024-07-03 DIAGNOSIS — M54.16 LUMBAR RADICULOPATHY: ICD-10-CM

## 2024-07-03 DIAGNOSIS — M70.62 GREATER TROCHANTERIC BURSITIS OF BOTH HIPS: Primary | ICD-10-CM

## 2024-07-03 DIAGNOSIS — M51.36 DDD (DEGENERATIVE DISC DISEASE), LUMBAR: ICD-10-CM

## 2024-07-03 PROCEDURE — 1160F RVW MEDS BY RX/DR IN RCRD: CPT | Mod: CPTII,95,, | Performed by: NURSE PRACTITIONER

## 2024-07-03 PROCEDURE — 1159F MED LIST DOCD IN RCRD: CPT | Mod: CPTII,95,, | Performed by: NURSE PRACTITIONER

## 2024-07-03 PROCEDURE — 99213 OFFICE O/P EST LOW 20 MIN: CPT | Mod: 95,,, | Performed by: NURSE PRACTITIONER

## 2024-07-03 NOTE — PROGRESS NOTES
Chronic Pain- Established Visit  Chronic Pain-Tele-Medicine-Established Note (Follow up visit)        The patient location is: Home  The chief complaint leading to consultation is: pain  Visit type: Virtual visit with synchronous audio and video  Total time spent with patient: 25 min  Each patient to whom he or she provides medical services by telemedicine is:  (1) informed of the relationship between the physician and patient and the respective role of any other health care provider with respect to management of the patient; and (2) notified that he or she may decline to receive medical services by telemedicine and may withdraw from such care at any time.        SUBJECTIVE:    Interval History 7/3/2024:  The patient returns to clinic today for follow up of pain via virtual visit. She reports increased bilateral hip and knee pain. Her hip pain is worse with laying on her sides. Her knee pain is worse with standing and walking. She also notes low back pain. She continues to report burning pain into her feet. She weaned off the Gabapentin and her swelling has improved. She also notes bilateral shoulder pain. She does take Tramadol per her PCP as needed. She denies any other health changes.     Interval History 12/5/2023:  The patient returns to clinic today for follow up of pain via virtual visit. Since last visit, she was admitted for pneumonia. She is currently on oxygen. She did see Pulmonology last week and has upcoming tests. She reports increased bilateral knee pain. This is worse with standing and walking. She also reports intermittent low back pain. She does have burning pain into her heels. She is having some increased lower extremity edema. She is taking Gabapentin but concerned that this may be contributing to swelling. She denies any other health changes.     Interval History 8/16/2023:  The patient returns to clinic today for follow up of back pain via virtual visit. She is s/p left AC joint injection on  7/5/2023. She is s/p bilateral L5/S1 TF ASHUTOSH on 7/31/2023. She reports 75% relief of her low back pain. She also reports 75% relief of her shoulder pain. She continues to report intermittent low back pain. She reports burning pain into her bilateral heels. She is using a topical pain cream. She takes Gabapentin with benefit. She denies any other health changes.     Interval History 7/5/2023:  The patient returns to clinic today for follow up of low back pain. She had a fall two weeks ago. She did go to Urgent Care where xrays are negative for acute injury. She reports increased left shoulder and clavicle pain. This pain is worse with activity. She is having trouble dressing herself due to pain. She has increased her Tylenol intake. She is using a topical pain cream. She is using ice and heat. She is taking Gabapentin. She continues to report low back pain, she is scheduled for ASHUTOSH at the end of the month. Her pain today is 9/10.    Interval History 6/9/2023:  The patient returns to clinic today for follow up of back pain via virtual visit. She reports increased low back pain that radiates into anterior and posterior legs to her ankles. Her pain is worse with standing and walking. She is taking Gabapentin at bedtime. She also takes Tylenol for pain. She denies any other health changes.     Interval History 3/9/2023:  The patient returns to clinic today for follow up of back pain via virtual visit. She reports intermittent low back pain. She denies any radicular leg pain. She is using an acupressure knee brace with benefit. Her pain is tolerable at this time. She continues to take Gabapentin with benefit. She denies any other health changes.     Interval History 12/9/2022:  The patient returns to clinic today for follow up of low back pain via virtual visit. She is s/p left L3/4 and L4/5 TF ASHUTOSH on 11/17/2022. She reports 60% relief of her pain. She continues to report intermittent low back pain. She denies any  radicular leg pain. She does report bilateral foot pain, described as burning in nature. Her pain is tolerable at this time. She continues to take Gabapentin. She also takes Tramadol per her PCP. She denies any other health changes.     Interval History 10/20/22  Ms. Cook complains of worsening left low back pain radiating down her lateral thigh to the knee. The pain is only when she walks/moves, she in pain free when sitting. She is s/p bilateral TESI L4/5 2 months ago, which she had relief from, but acutely worsened when she bent over to pick something off the ground. She states the pain is similar to her pain prior to this. She denies numbness, tingling, bladder/bowel problems.    Interval History 10/6/2022:   Mrs Cook presents for follow up virtually. At prior visit she had Left GTB injection with significant benefit in office. Her lower back and hip pain were further evaluated and noted newer L3 compression Fx. While she visually appears to have significant improvement of pain to me she does voice focal upper/mid lumbar pain persists. Discussed treatment options and Kyphoplasty recommended by Dr Wilcox but there was hesitation regarding further escalation to kyphoplasty. We did discusses prior osteoporosis diagnosis and considering treatment options with PCP. She does not voice any s/s concerning for cauda equina.      Interval History 8/22/2022:  Mrs Cook presents for follow up acute pain. She is s/p repeat Bilateral L4/5 TFESI with mild improvement but having severe left lateral hip pain. She also continues to have burning pain to Right heel and numbness to right lateral lower leg. She has chronic urinary continence but no new neurological issues.She continues to take tramadol by PCP with some benefit     Interval History 8/5/2022:  Mrs Cook presents for follow up evaluation of returning lower back pain. She has exact pain relieved prior by B L4/5 TFESIs. She has newer complaint of bilateral feet  burning pains as well. She has had JESSE noting arterial issues R>L. She continues to take Tramadol and neurontin without SE.She would like to repeat ASHUTOSH as it provided 7 months of 75% relief and symptoms returning severe over last few weeks. No voicing of symptoms concerning for cauda equina.     Interval History 12/6/2021:  The patient returns to clinic today for follow up of low back pain. She reports increased low back and bilateral hip pain over the last two weeks. She reports intermittent radiating pain into her lateral thighs. She does have difficulty sleeping due to pain. Her pain is also worse with standing and walking. She continues to take Gabapentin with benefit. She also takes Tramadol per her PCP with benefit. She denies any other health changes. Her pain today is 8/10.    Interval History 7/6/2021:  The patient returns to clinic today for follow up of low back pain via virtual visit. She is s/p bilateral L4/5 TF ASHUTOSH on 6/21/2021. She reports 75% relief of her low back and leg pain. She reports low back pain, worse in the morning. She reports intermittent radiating pain into the posterolateral aspect of both legs to her knees. She continues to take Gabapentin with benefit. She denies any weakness. She denies any other health changes. Her pain today is 2/10.    Interval History 6/8/2021:  The patient returns to clinic today for follow up of low back pain. She is here today for imaging review. She continues to report low back pain that radiates into the posterolateral aspect of both legs to below her knee. Her pain is worse with prolonged standing and walking. She does feel like she will fall with prolonged walking. She also reports relief with bending forward. She is currently taking Gabapentin at bedtime with benefit. She continues to take Tramadol per her PCP with benefit. She denies any other health changes. Her pain today is 8/10.    Interval History 5/20/2021:  The patient returns to clinic today for  follow up of low back pain. She is s/p bilateral hip joint injections on 4/26/2021. She reports no relief. She reports increased low back pain that radiates into the posterior aspect of both legs to her ankles. She does report numbness to her feet. Her pain is worse with prolonged standing and walking. She feels as though her legs will give out with prolonged walking. She denies any bowel or bladder incontinence. She denies any other health changes. Her pain today is 9/10.    Interval History 3/23/2021:  The patient returns to clinic today for follow up of low back and hip pain. She reports increased bilateral hip pain. She describes this pain as deep and aching. Her pain is worse with prolonged standing and walking. She also reports low back pain that is aching in nature. She denies any radicular leg pain. Of note, she reports increased swelling and pain to her left ankle and foot. She does report a trip last week. She did see Orthopedics today and is scheduled for xray. She denies any other health changes. Her pain today is 8/10.    Interval History 12/9/2020:  She returns for follow-up.  She is doing well since her facet injections.  Her right hip is hurting.  It interferes with her mobility and comfort while laying down.  No new symptomatology otherwise.    Interval History 8/11/2020:  The patient has a virtual visit today for follow up.  She is s/p Bilateral L3-4, L4-5 L5-S1 facet joint injections with 90% relief.  She says that her back pain is minimal at this time.  She is able to move around and walk better since the procedure as well.  She is having some left hip pain and feels as though her leg will give out sometimes when walking.  She would like a referral to ortho close to her home in [facility].  Her pain today is 1/10.  She denies any respiratory changes since procedure including fever, cough or SOB.    Interval History 12/5/2019:  The patient is here for follow up of lower back and bilateral hip pain.   Her back pain has been minimal.  She is having increased lateral hip and buttock pain recently.  She had TPIs in the past and would like to repeat this today.  Her pain today is 8/10.    Previous Encounter:  Anahi Cook presents to the clinic for a follow-up appointment for lower back and left lower extremity pain.  She had T12 kyphoplasty performed on 11/8/18 with significant improvement in symptoms.  She was admitted through the ED on 4/3/19 after suffering fractures of 6th, 8th and possible 7th ribs of the left side after a fall at home.  Thoracic imaging also showed stable slight anterior wedging at T11.  She had bilateral L3-4, L4-5 and L5-S1 facet injections on 4/1/19 with 80% relief of lower back pain.  Her current pain is minimal.  She has mild leg pain with walking.  She continues to follow up with oncology regularly.  Since the last visit, Anahi Cook states the pain has been improving.  Current pain intensity is 2/10.     Pain Medications:  OTC Tylenol    Opioid Contract: no     report:  Not applicable    Pain Procedures:   7/21/14 L4-5 IL ASHUTOSH- significant benefit  12/1/15 Left GT bursa injection  4/26/16 Left GT bursa injection  12/15/16 L4-5 IL ASHUTOSH- significant benefit  2/15/18 L4-5 IL ASHUTOSH- 100% relief  8/21/18 L4-5 IL ASHUTOSH- significant relief  10/15/18 Bilateral L3-4, L4-5 L5-S1 facet joint injections  11/8/18 T12 kyphoplasty- significant relief  4/1/19 Bilateral L3-4, L4-5 L5-S1 facet joint injections- 80% relief  7/27/20 Bilateral L3-4, L4-5 L5-S1 facet joint injections- 90% relief  4/26/2021- Bilateral hip joint injections- no relief    6/21/2021- Bilateral L4/5 TF ASHUTOSH- 75% relief  8/9/2022- B L4/5 TFESI without significant benefit.   11/17/2022- Left L3/4 and L4/5 TF ASHUTOSH  7/5/2023- Left AC joint injection-75% relief  7/31/2023- Bilateral L5/S1 TF ASHUTOSH- 75% relief    Physical Therapy/Home Exercise: yes     Imaging:   MRI LUMBAR SPINE WITHOUT CONTRAST 9/12/2022  COMPARISON:  5/29/2021      FINDINGS:  Chronic vertebral plana T12.  Extension of the posterosuperior cortex into the canal by approximately 6 mm.     There is now height loss at L3, approximately 30%.  Associated STIR signal hyperintensity.  Retropulsion of the posterior cortex into the canal approximately 5 mm.     Remaining vertebral body heights are maintained.     Mild disc space narrowing L4-5.     Grade 1 retrolisthesis L1-2.  Grade 1 anterolisthesis L4-5     Conus terminates appropriately at L1.     Multilevel degenerative change as diesel below:     T11-12: Retropulsion of the posterosuperior cortex of T12 into the canal with mild canal narrowing.     T12-L1: Posterior circumferential disc bulge, asymmetric to the left.  Mild left neural foraminal narrowing.     L1-2: Facet and ligamentum flavum hypertrophic changes with mild left neural foraminal narrowing.     L2-3: Posterior circumferential disc bulge and ligamentum flavum hypertrophy retropulsion of the posterior cortex of L3 into the canal.  Findings result in mild canal narrowing.  Severe right and moderate left neural foraminal narrowing.     L3-4: Posterior circumferential disc bulge.  Facet and ligamentum flavum hypertrophic changes.  Mild canal and moderate bilateral neural foraminal narrowing.     L4-5: Grade 1 anterolisthesis of L4-5 with uncovering of the disc.  Facet and ligamentum flavum hypertrophic changes.  Combinations contribute to severe canal stenosis.  Severe left and moderate right neural foraminal narrowing.     L5-S1: Central canal and neural foramina are well maintained.     Multiple T2 hyperintense foci in both kidneys, including a thinly septated T2 hyperintense focus on the left measuring 15 mm.     Impression:     Acute fracture along the superior endplate of L3 with approximately 30% height loss.  Stable chronic height loss T12.     Multilevel degenerative change, worst at L4-5 where severe canal stenosis results.     Multilevel neural foraminal  narrowing, severe L2-3 and L4-5 as above.     Bilateral renal cysts, including a thinly septated cyst on the left.  Findings can be further evaluated with renal ultrasound.     This report was flagged in Epic as abnormal.    XR LUMBAR SPINE 5 VIEW WITH FLEX AND EXT 8/22/2022  COMPARISON:  Lumbar spine MRI 05/29/2021     FINDINGS:  Previous vertebroplasty T12.     Height loss along the superior endplate of L3 is new compared to that exam.  Height loss is approximately 40%.  There may be mild height loss at L2.     Disc space narrowing and endplate changes L4-5.  Facet arthropathy L4-5.     Grade 1 anterolisthesis L4-5 with no significant change with flexion or extension.     Aortoiliac atherosclerosis.     Impression:     Height loss along the superior endplate of L3, approximately 40%, is new compared to May 2021.  By today's radiographs, there appears to be mild sclerosis along the superior endplate which can be seen with a subacute or chronic fracture.  Correlation with MRI advised if there is recent trauma or acute back pain to exclude the possibility of an acute fracture.     Degenerative change as above.     This report was flagged in Epic as abnormal.    XR HIP WITH PELVIS WHEN PERFORMED, 2 OR 3 VIEWS LEFT 8/22/2022  COMPARISON:  03/24/2022 and 09/09/2020     FINDINGS:  The femoral head is well located with respect to the acetabulum. No acute fracture seen.  Osteophyte formation, subchondral sclerosis, with moderate narrowing femoroacetabular joint bilaterally.     No significant soft tissue edema or radiopaque retained foreign body.  Aortoiliac atherosclerosis.     Impression:     No acute fracture or dislocation seen.  Moderate osteoarthritis and joint space narrowing.    MRI Lumbar Spine 5/29/2021:  COMPARISON:  Prior MRI from 2014 and 2018     FINDINGS:  There is a transitional lumbosacral segment and spine labeling is as on prior examinations.     Stable severe compression deformity at T12 with  retropulsion.  Remaining vertebral body heights are maintained.  Grade 1 anterolisthesis appears increased at L4-5 as compared to previous.  There is no marrow replacement process.  The spinal cord is normal in signal.  Review of paravertebral structures demonstrates high T2 low T1 signal foci within kidneys.     Not included in the field of view of the axial, there is disc bulge at T10-11.  This does not appear to result in a significant degree of spinal canal narrowing on the basis of the sagittal imaging.     T12 demonstrates retropulsion with mild narrowing of the spinal canal.     T12-L1 demonstrates minor disc bulge.     L1-L2 demonstrates mild facet degenerative change.  There is no significant spinal canal stenosis.  There is mild bilateral neural foraminal narrowing.     L2-3 demonstrates facet degenerative change.  There is no significant spinal canal stenosis.  There is right foraminal protrusion.  This results in a moderate degree of right foraminal narrowing.  There is mild left foraminal narrowing.     L3-4 demonstrates facet degenerative change and ligamentum flavum thickening.  There is a facet joint synovial cyst along the posterolateral aspect of the left facet.  There is right foraminal protrusion.  There is mild left and moderate right foraminal narrowing.     L4-5 demonstrates facet degenerative change and ligamentum flavum thickening.  There is anterolisthesis.  There is severe left and moderate right foraminal narrowing.  There is a severe degree of spinal canal narrowing.     L5-S1 is unremarkable.     Impression:     Multilevel degenerative changes as further detailed above.  Findings are most significant at the L4-5 level, noting transitional lumbosacral segment.     Stable severe compression deformity at T12.     Foci within the kidneys bilaterally likely representing cyst can be correlated with ultrasound.          Past Medical History:  Past Medical History:   Diagnosis Date    Anxiety      Arthritis     Back pain     Breast cancer 1/17/2018    Breast mass 1/15/2018    Chronic kidney disease, stage 2, mildly decreased GFR     COPD (chronic obstructive pulmonary disease)     emphysema    Depression     Dysuria 1/29/2018    Exudative age-related macular degeneration of left eye with active choroidal neovascularization 8/25/2022    Family history of breast cancer 1/17/2018    GERD (gastroesophageal reflux disease)     Hypertension     Infiltrating ductal carcinoma of breast, left 7/12/2018    Multiple closed fractures of ribs of left side 4/3/2019    Osteoporosis, senile     Ovarian mass 1/15/2018    Pneumonia     S/P robotic assisted laparoscopic BSO (bilateral salpingo-oophorectomy) 2/23/2018       Past Surgical History:  Past Surgical History:   Procedure Laterality Date    AXILLARY NODE DISSECTION Left 7/12/2018    Procedure: LYMPHADENECTOMY, AXILLARY;  Surgeon: Amanda Caballero MD;  Location: [facility] OR [address] [place] [address];  Service: General;  Laterality: Left;    BREAST BIOPSY      BREAST LUMPECTOMY      CATARACT EXTRACTION      CHOLECYSTECTOMY      COLONOSCOPY      ESOPHAGOGASTRODUODENOSCOPY      ESOPHAGOGASTRODUODENOSCOPY N/A 10/14/2019    Procedure: EGD (ESOPHAGOGASTRODUODENOSCOPY);  Surgeon: Davonte Thompson MD;  Location: [address]);  Service: Endoscopy;  Laterality: N/A;  hx of pulmonary hypertension-PA 50     pt requesting ASAP    EYE SURGERY      FIXATION KYPHOPLASTY N/A 11/8/2018    Procedure: Kyphoplasty   T12;  Surgeon: Merly Wilcox MD;  Location: [facility] CATH LAB;  Service: Pain Management;  Laterality: N/A;  T12  Nia Reps e-mailed with date and time    HYSTERECTOMY      INJECTION FOR SENTINEL NODE IDENTIFICATION Left 7/12/2018    Procedure: INJECTION, FOR SENTINEL NODE IDENTIFICATION;  Surgeon: Amanda Caballero MD;  Location: [facility] OR [address] [place] [address];  Service: General;  Laterality: Left;    INJECTION OF FACET JOINT Bilateral 10/15/2018    Procedure: INJECTION, FACET JOINT, BILATERAL LUMBAR L3-4,  4-5, 5-S1 FACET JOINT INJECTIONS;  Surgeon: Merly Wilcox MD;  Location: [facility] PAIN MGT;  Service: Pain Management;  Laterality: Bilateral;    INJECTION OF FACET JOINT Bilateral 4/1/2019    Procedure: INJECTION, FACET JOINT, L3-L4,L4-L5,L5-S1;  Surgeon: Merly Wilcox MD;  Location: [facility] PAIN MGT;  Service: Pain Management;  Laterality: Bilateral;    INJECTION OF FACET JOINT Bilateral 6/20/2019    Procedure: INJECTION, FACET JOINT, L3-L4,L4-L5,L5-S1 NEED CONSENT;  Surgeon: Merly Wilcox MD;  Location: [facility] PAIN MGT;  Service: Pain Management;  Laterality: Bilateral;    INJECTION OF FACET JOINT Bilateral 7/27/2020    Procedure: INJECTION, FACET JOINT BILATERAL L3/4, L4/5 and L5/S1;  Surgeon: Merly Wilcox MD;  Location: [facility] PAIN MGT;  Service: Pain Management;  Laterality: Bilateral;    INJECTION OF JOINT Bilateral 7/27/2020    Procedure: INJECTION, JOINT, BILATERAL GREATER TROCHANTERIC BURSA;  Surgeon: Merly Wilcox MD;  Location: [facility] PAIN MGT;  Service: Pain Management;  Laterality: Bilateral;    INJECTION OF JOINT Bilateral 4/26/2021    Procedure: INJECTION, JOINT, HIP;  Surgeon: Merly Wilcox MD;  Location: [facility] PAIN MGT;  Service: Pain Management;  Laterality: Bilateral;  consent needed    INJECTION OF STEROID Right 11/15/2021    Procedure: INJECTION, STEROID RIGHT MIDDLE AND SAMLL FINGER;  Surgeon: Florecita Da Silva MD;  Location: [facility] OR;  Service: Orthopedics;  Laterality: Right;    MASTECTOMY, PARTIAL Left 7/12/2018    Procedure: MASTECTOMY, PARTIAL-W/SEED LOCALIZATION;  Surgeon: Amanda Caballero MD;  [facility];  Service: General;  Laterality: Left;    AK REMOVAL OF OVARY/TUBE(S)  02/23/2018    Robotic Assisted    ROTATOR CUFF REPAIR Right     rotator cuff repair right shoulder    TONSILLECTOMY      TRANSFORAMINAL EPIDURAL INJECTION OF STEROID Bilateral 6/21/2021    Procedure: INJECTION, STEROID, EPIDURAL, TRANSFORAMINAL APPROACH  bilateral L4/5 TF ASHUTOSH;  Surgeon: Merly Wilcox MD;  Location: [facility]  PAIN MGT;  Service: Pain Management;  Laterality: Bilateral;  consent needed    TRANSFORAMINAL EPIDURAL INJECTION OF STEROID Bilateral 1/3/2022    Procedure: INJECTION, STEROID, EPIDURAL, TRANSFORAMINAL APPROACH Bilateral L4/5 Needs consent;  Surgeon: Merly Wilcox MD;  Location: [facility] PAIN MGT;  Service: Pain Management;  Laterality: Bilateral;    TRANSFORAMINAL EPIDURAL INJECTION OF STEROID Bilateral 2022    Procedure: INJECTION, STEROID, EPIDURAL, TRANSFORAMINAL APPROACH, BILATERAL L4-L5   MEDICALLY URGENT;  Surgeon: Merly Wilcox MD;  Location: [facility] PAIN MGT;  Service: Pain Management;  Laterality: Bilateral;    TRANSFORAMINAL EPIDURAL INJECTION OF STEROID Left 2022    Procedure: INJECTION, STEROID, EPIDURAL, TRANSFORAMINAL APPROACH LEFT L3-L4 AND L4-L5 CONTRAST;  Surgeon: Merly Wilcox MD;  Location: [facility] PAIN MGT;  Service: Pain Management;  Laterality: Left;    TRANSFORAMINAL EPIDURAL INJECTION OF STEROID Bilateral 2023    Procedure: INJECTION, STEROID, EPIDURAL, TRANSFORAMINAL APPROACH, BILATERAL L5/S1  sooner date;  Surgeon: Merly Wilcox MD;  Location: [facility] PAIN MGT;  Service: Pain Management;  Laterality: Bilateral;    TRIGGER FINGER RELEASE Left 11/15/2021    Procedure: RELEASE, TRIGGER FINGER, LEFT MIDDLE AND RING;  Surgeon: Florecita Da Silva MD;  Location: [facility];  Service: Orthopedics;  Laterality: Left;       Family History:  Family History   Problem Relation Name Age of Onset    Heart failure Mother      Kidney failure Mother      Heart attack Father      Breast cancer Sister  66        Lump, XRT, chemo, recurrence 10 years later.    Cancer Brother          leukemia    Heart attack Brother  58    Pulmonary embolism Brother  45    Heart attack Brother  52    Breast cancer Maternal Grandmother  60        advanced at diagnosis    Breast cancer Maternal Aunt  83         at 92    Colon cancer Neg Hx      Esophageal cancer Neg Hx         Social History:  Social History     Socioeconomic  History    Marital status: Single    Number of children: 3   Occupational History    Occupation: Multiple jobs, see social documentation section     Comment: Retired   Tobacco Use    Smoking status: Former     Current packs/day: 1.00     Average packs/day: 1 pack/day for 40.0 years (40.0 ttl pk-yrs)     Types: Cigarettes    Smokeless tobacco: Never    Tobacco comments:     Smoked >1 ppd for at least 40 years, quit around 1995   Substance and Sexual Activity    Alcohol use: Yes     Comment: occasional glass of wine or cocktail    Drug use: No    Sexual activity: Yes     Partners: Male   Social History Narrative    Worked many jobs while raising 3 children.  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  She was  from her first , but took care of him at the end of his life.     Social Determinants of Health     Financial Resource Strain: Low Risk  (1/16/2024)    Overall Financial Resource Strain (CARDIA)     Difficulty of Paying Living Expenses: Not hard at all   Food Insecurity: No Food Insecurity (1/16/2024)    Hunger Vital Sign     Worried About Running Out of Food in the Last Year: Never true     Ran Out of Food in the Last Year: Never true   Transportation Needs: No Transportation Needs (1/16/2024)    PRAPARE - Transportation     Lack of Transportation (Medical): No     Lack of Transportation (Non-Medical): No   Physical Activity: Inactive (1/16/2024)    Exercise Vital Sign     Days of Exercise per Week: 0 days     Minutes of Exercise per Session: 0 min   Stress: No Stress Concern Present (1/16/2024)    [employer] [place] [employer] - Occupational Stress Questionnaire     Feeling of Stress : Only a little   Housing Stability: Low Risk  (1/16/2024)    Housing Stability Vital Sign     Unable to Pay for Housing in the Last Year: No     Number of Places Lived in the Last Year: 1     Unstable  Housing in the Last Year: No       REVIEW OF SYSTEMS:    GENERAL:  No weight loss, malaise or fevers.  HEENT:   No recent changes in vision or hearing  NECK:  Negative for lumps, no difficulty with swallowing.  RESPIRATORY:  Negative for cough, wheezing or shortness of breath, patient denies any recent URI. COPD.  CARDIOVASCULAR:  Negative for chest pain, leg swelling or palpitations. Hypertension.  GI:  Negative for abdominal discomfort, blood in stools or black stools or change in bowel habits.  MUSCULOSKELETAL:  See HPI.  SKIN:  Negative for lesions, rash, and itching.  PSYCH:  No mood disorder or recent psychosocial stressors.  Patients sleep is not disturbed secondary to pain.  HEMATOLOGY/LYMPHOLOGY:  Negative for prolonged bleeding, bruising easily or swollen nodes.  Patient is not currently taking any anti-coagulants  NEURO:   No history of headaches, syncope, paralysis, seizures or tremors.  All other reviewed and negative other than HPI.    OBJECTIVE:    Exam limited due to virtual visit:  GENERAL: Well appearing, in no acute distress, alert and oriented x3.  PSYCH:  Mood and affect appropriate.    Previous physical exam:  LMP  (LMP Unknown)     PHYSICAL EXAMINATION:     GENERAL: Well appearing, in no acute distress, alert and oriented x3.  PSYCH:  Mood and affect appropriate.  SKIN: Skin color, texture, turgor normal, no rashes or lesions.  HEAD/FACE:  Normocephalic, atraumatic. Cranial nerves grossly intact.  CV: RRR with palpation of the radial artery.  PULM: No evidence of respiratory difficulty, symmetric chest rise.  EXTREMITIES:  Good capillary refill.  MUSCULOSKELETAL: There is pain with palpation over left AC joint. Limited ROM with pain on internal rotation of bilateral shoulders. Positive cross arm test on the left. No atrophy or tone abnormalities are noted.  NEURO: Bilateral lower extremity coordination and muscle stretch reflexes are physiologic and symmetric.  Plantar response are downgoing.  No clonus.  Normal sensation.  GAIT: Antalgic- ambulates with rolling walker.      ASSESSMENT: 89 y.o. year old female with pain, consistent with the following diagnoses:     1. Greater trochanteric bursitis of both hips        2. Primary osteoarthritis of both knees        3. Lumbar radiculopathy        4. Lumbar spondylosis        5. DDD (degenerative disc disease), lumbar            PLAN:     - Previous imaging reviewed.     - Schedule for bilateral knee steroid and GTB injections in office.     - We can repeat bilateral L5/S1 TF ASHUTOSH as needed.     - Trial Lyrica 25 mg daily.     - Continue Tramadol per PCP.      - RTC 2 weeks after above procedure.       The above plan and management options were discussed at length with patient. Patient is in agreement with the above and verbalized understanding.    Amanda Garcia  07/03/2024

## 2024-07-05 ENCOUNTER — TELEPHONE (OUTPATIENT)
Dept: SPINE | Facility: CLINIC | Age: 89
End: 2024-07-05
Payer: MEDICARE

## 2024-07-05 RX ORDER — PREGABALIN 25 MG/1
25 CAPSULE ORAL 2 TIMES DAILY
Qty: 60 CAPSULE | Refills: 6 | OUTPATIENT
Start: 2024-07-05 | End: 2025-01-03

## 2024-07-05 NOTE — TELEPHONE ENCOUNTER
----- Message from Ian Tripp sent at 7/5/2024 12:40 PM CDT -----   Name of Who is Calling:     What is the request in detail:  patient request call back in reference to alternative  medication  / want to know if will send to pharmacy  Please contact to further discuss and advise      Can the clinic reply by [facility]:     What Number to Call Back if not in [facility]:   [phone] . Patience / daughter

## 2024-07-05 NOTE — TELEPHONE ENCOUNTER
Staff spoke with patient daughter, staff told pt that we will get this escalated over to daniel to see what medication is required and what's the next steps.

## 2024-07-05 NOTE — TELEPHONE ENCOUNTER
----- Message from Ian Tripp sent at 7/5/2024 12:40 PM CDT -----   Name of Who is Calling:     What is the request in detail:  patient request call back in reference to alternative  medication  / want to know if will send to pharmacy  Please contact to further discuss and advise      Can the clinic reply by [facility]:     What Number to Call Back if not in [facility]:   [phone] . Patience / daughter

## 2024-07-08 NOTE — PROGRESS NOTES
HPI    12 wk OCT/DFE/poss Eylea OS    Pt states va is stable since last visit. Pt c/o watery eyes OU, does use   Systane BID OU. No new concerns today.      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 Epiretinal membrane, right eye   4. Pseudophakia of both eyes     Last edited by Marzena Sheppard MA on 7/9/2024  3:07 PM.            A/P    ICD-10-CM ICD-9-CM   1. Exudative age-related macular degeneration of left eye with active choroidal neovascularization  H35.3221 362.52     362.16   2. 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 Epiretinal membrane, right eye  H35.371 362.56   4. Pseudophakia of both eyes  Z96.1 V43.1   5. Hypertensive retinopathy of both eyes  H35.033 362.11         1. Exudative age-related macular degeneration of left eye with active choroidal neovascularization  2. 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  Schedule Eylea Injection Left Eye today Patient chooses to proceed with injection R/B/A discussed include infection retinal detachment and stroke    Patient identified.  Timeout performed.    Risks, benefits, and alternatives to treatment were discussed in detail with the patient, including bleeding/infection (endophthalmitis)/etc.  The patient voiced  understanding and wished to proceed with the procedure.  See separate consent form.    Injection Procedure Note:  Diagnosis: CNVM Left Eye    Topical Proparacaine drop placed then topical 5% Betadine, then subcojunctival lidocaine 2% injection  Sterile gloves used, and sterile lid speculum placed.  5% Betadine placed at injection site again prior to injection.  Eylea 2mg in 0.05cc Injected inferotemporally 3.5-4mm posterior to the limbus.  Complications: None  Va at least CF at 5 feet post injection.  Retina, ONH, IOP normal after injection.    Followup as below.  Patient should return immediately PRN.  Retinal Detachment and Endophthalmitis precautions given        3. Epiretinal membrane, right eye  Mod ERM, VA 20/40 (stable)  No metamorphopsia  Plan: Observation, may need PPV/MP if having incr symptoms   Amsler precautions discussed     4. Pseudophakia of both eyes  Good lens position OU  Plan: Observation      5. Hypertensive retinopathy of both eyes  Mod HTN changes  PCP Minerva Mathias MD - Recent notes reviewed  11/17/2023  5.7  A1C   Plan: Observation HTN changes for now  Recommend good blood pressure control, tight blood glucose control, and good cholesterol control          RTC 16 weeks DFE/OCTm OU, possible Eylea OS      I saw and examined the patient and reviewed in detail the findings documented. The final examination findings, image interpretations, and plan as documented in the record represent my personal judgment and conclusions.    Dwight Melton MD  Vitreoretinal Surgery   [facility]

## 2024-07-09 ENCOUNTER — CLINICAL SUPPORT (OUTPATIENT)
Dept: OPHTHALMOLOGY | Facility: CLINIC | Age: 89
End: 2024-07-09
Attending: OPHTHALMOLOGY
Payer: MEDICARE

## 2024-07-09 ENCOUNTER — OFFICE VISIT (OUTPATIENT)
Dept: OPHTHALMOLOGY | Facility: CLINIC | Age: 89
End: 2024-07-09
Payer: MEDICARE

## 2024-07-09 DIAGNOSIS — Z96.1 PSEUDOPHAKIA OF BOTH EYES: ICD-10-CM

## 2024-07-09 DIAGNOSIS — H35.371 EPIRETINAL MEMBRANE, RIGHT EYE: ICD-10-CM

## 2024-07-09 DIAGNOSIS — H35.3221 EXUDATIVE AGE-RELATED MACULAR DEGENERATION OF LEFT EYE WITH ACTIVE CHOROIDAL NEOVASCULARIZATION: Primary | ICD-10-CM

## 2024-07-09 DIAGNOSIS — H35.3112 INTERMEDIATE STAGE DRY AGE-RELATED MACULAR DEGENERATION OF RIGHT EYE: ICD-10-CM

## 2024-07-09 DIAGNOSIS — H35.033 HYPERTENSIVE RETINOPATHY OF BOTH EYES: ICD-10-CM

## 2024-07-09 DIAGNOSIS — H35.3221 EXUDATIVE AGE-RELATED MACULAR DEGENERATION OF LEFT EYE WITH ACTIVE CHOROIDAL NEOVASCULARIZATION: ICD-10-CM

## 2024-07-09 PROCEDURE — 92134 CPTRZ OPH DX IMG PST SGM RTA: CPT | Mod: S$GLB,,, | Performed by: OPHTHALMOLOGY

## 2024-07-09 PROCEDURE — 1126F AMNT PAIN NOTED NONE PRSNT: CPT | Mod: CPTII,S$GLB,, | Performed by: OPHTHALMOLOGY

## 2024-07-09 PROCEDURE — 1159F MED LIST DOCD IN RCRD: CPT | Mod: CPTII,S$GLB,, | Performed by: OPHTHALMOLOGY

## 2024-07-09 PROCEDURE — 67028 INJECTION EYE DRUG: CPT | Mod: LT,S$GLB,, | Performed by: OPHTHALMOLOGY

## 2024-07-09 PROCEDURE — 3288F FALL RISK ASSESSMENT DOCD: CPT | Mod: CPTII,S$GLB,, | Performed by: OPHTHALMOLOGY

## 2024-07-09 PROCEDURE — 99214 OFFICE O/P EST MOD 30 MIN: CPT | Mod: 25,S$GLB,, | Performed by: OPHTHALMOLOGY

## 2024-07-09 PROCEDURE — 1160F RVW MEDS BY RX/DR IN RCRD: CPT | Mod: CPTII,S$GLB,, | Performed by: OPHTHALMOLOGY

## 2024-07-09 PROCEDURE — 99999 PR PBB SHADOW E&M-EST. PATIENT-LVL III: CPT | Mod: PBBFAC,,, | Performed by: OPHTHALMOLOGY

## 2024-07-09 PROCEDURE — 1101F PT FALLS ASSESS-DOCD LE1/YR: CPT | Mod: CPTII,S$GLB,, | Performed by: OPHTHALMOLOGY

## 2024-07-12 ENCOUNTER — TELEPHONE (OUTPATIENT)
Dept: PAIN MEDICINE | Facility: CLINIC | Age: 89
End: 2024-07-12
Payer: MEDICARE

## 2024-07-12 NOTE — TELEPHONE ENCOUNTER
----- Message from January Almodovar sent at 7/12/2024  3:13 PM CDT -----  Name of Who is Calling:SHAYY JAMIL [[id number]]                   What is the request in detail:PT just missed your call and wants a call back please assist                    Can the clinic reply by [facility]: No                   What Number to Call Back if not in [facility]: [phone]

## 2024-07-12 NOTE — TELEPHONE ENCOUNTER
Staff tried reaching out to patient to inform her that Dr. Wilcox been out and we're going to ask him Monday can he do both procedures at the same time. Staff left voicemail.

## 2024-07-12 NOTE — TELEPHONE ENCOUNTER
----- Message from Jimmy Coker sent at 7/12/2024 10:31 AM CDT -----  Regarding: Procedure  Name of Who is Calling:  Patient Daughter Patience          What is the request in detail:  Please contact patient daughter Patience  she would like to know when her mom can have a procedure            Can the clinic reply by [facility]: No            What Number to Call Back if not in [facility]: [phone]

## 2024-07-12 NOTE — TELEPHONE ENCOUNTER
Staff spoke with patients daughter and informed her that we'll discuss procedures with Dr. Wilcox on Monday when he return and we'll give her a call to get her mom scheduled. Patients daughter verbalized understanding.

## 2024-07-22 DIAGNOSIS — M17.11 PRIMARY OSTEOARTHRITIS OF RIGHT KNEE: ICD-10-CM

## 2024-07-22 DIAGNOSIS — M47.816 LUMBAR SPONDYLOSIS: ICD-10-CM

## 2024-07-22 NOTE — TELEPHONE ENCOUNTER
No care due was identified.  Health [facility] Embedded Care Due Messages. Reference number: [id number].   7/22/2024 11:31:45 AM CDT

## 2024-07-23 RX ORDER — TRAMADOL HYDROCHLORIDE 50 MG/1
50 TABLET ORAL EVERY 8 HOURS PRN
Qty: 90 TABLET | Refills: 0 | Status: SHIPPED | OUTPATIENT
Start: 2024-07-23

## 2024-08-02 ENCOUNTER — NURSE TRIAGE (OUTPATIENT)
Dept: ADMINISTRATIVE | Facility: CLINIC | Age: 89
End: 2024-08-02
Payer: MEDICARE

## 2024-08-02 ENCOUNTER — OFFICE VISIT (OUTPATIENT)
Dept: INTERNAL MEDICINE | Facility: CLINIC | Age: 89
End: 2024-08-02
Payer: MEDICARE

## 2024-08-02 ENCOUNTER — HOSPITAL ENCOUNTER (OUTPATIENT)
Dept: RADIOLOGY | Facility: HOSPITAL | Age: 89
Discharge: HOME | End: 2024-08-02
Attending: NURSE PRACTITIONER
Payer: MEDICARE

## 2024-08-02 ENCOUNTER — TELEPHONE (OUTPATIENT)
Dept: INTERNAL MEDICINE | Facility: CLINIC | Age: 89
End: 2024-08-02

## 2024-08-02 VITALS
HEIGHT: 64 IN | BODY MASS INDEX: 25.66 KG/M2 | SYSTOLIC BLOOD PRESSURE: 126 MMHG | DIASTOLIC BLOOD PRESSURE: 60 MMHG | OXYGEN SATURATION: 94 % | HEART RATE: 72 BPM
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
## 2024-08-02 DIAGNOSIS — J44.9 CHRONIC OBSTRUCTIVE PULMONARY DISEASE, UNSPECIFIED COPD TYPE: ICD-10-CM

## 2024-08-02 DIAGNOSIS — R07.81 RIB PAIN ON RIGHT SIDE: ICD-10-CM
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
## 2024-08-02 PROCEDURE — 71100 X-RAY EXAM RIBS UNI 2 VIEWS: CPT | Mod: 26,RT,, | Performed by: INTERNAL MEDICINE

## 2024-08-02 PROCEDURE — 99999 PR PBB SHADOW E&M-EST. PATIENT-LVL V: CPT | Mod: PBBFAC,,, | Performed by: NURSE PRACTITIONER

## 2024-08-02 PROCEDURE — 71100 X-RAY EXAM RIBS UNI 2 VIEWS: CPT | Mod: TC,RT

## 2024-08-02 NOTE — TELEPHONE ENCOUNTER
Pt's daughter called and she said that her mom took a fall on Monday and had bruising of her feet but today she started c/o right rib pain worse when trying to take a deep breath. Pt triaged and care advice to go to office today and appt made. Pts daughter told to call back if any other questions or concerns SOB or worsening               Reason for Disposition   Patient wants to be seen    Additional Information   Negative: Major injury from dangerous force or speed (e.g., MVA, fall > 10 feet or 3 meters)   Negative: Bullet wound, knife wound or other penetrating object   Negative: Puncture wound that sounds life-threatening to the triager   Negative: SEVERE difficulty breathing (e.g., struggling for each breath, speaks in single words)   Negative: Major bleeding (actively dripping or spurting) that can't be stopped   Negative: Open wound of the chest with sound of moving air (sucking wound) or visible air bubbles   Negative: Shock suspected (e.g., cold/pale/clammy skin, too weak to stand, low BP, rapid pulse)   Negative: Coughing or spitting up blood   Negative: Bluish (or gray) lips or face   Negative: Unconscious or was unconscious   Negative: Sounds like a life-threatening emergency to the triager   Negative: SEVERE chest pain   Negative: Difficulty breathing and not severe   Negative: Skin is split open or gaping   Negative: Bleeding won't stop after 10 minutes of direct pressure (using correct technique)   Negative: Sounds like a serious injury to the triager   Negative: Can't take a deep breath but no respiratory distress (e.g., hurts to take a deep breath)   Negative: Shallow puncture wound   Negative: Collarbone is painful and difficulty raising arm   Negative: Patient is confused or is an unreliable provider of information (e.g., dementia, severe intellectual disability, alcohol intoxication)   Negative: No prior tetanus shots (or is not fully vaccinated) and any wound (e.g., cut or scrape)   Negative:  HIV positive or severe immunodeficiency (severely weak immune system) and DIRTY cut or scrape    Protocols used: Chest Injury-A-[place]

## 2024-08-02 NOTE — PROGRESS NOTES
"Subjective     Patient ID: Anahi Cook is a 89 y.o. female.  /60 (BP Location: Right arm, Patient Position: Sitting)   Pulse 72   Ht 5' 4" (1.626 m)   LMP  (LMP Unknown)   SpO2 (!) 94%   BMI 25.66 kg/m²      Chief Complaint: R rib pain.    Presenting with concern for R rib pain s/p fall that occurred Monday morning while at home. Patient tangled her foot in the wheel of her walker and fell on her R side. She did not hit her head and she did not loose consciousness. She was only down for a couple of minutes before her family  her. She is not on blood thinners. Today she complains of R rib pain and R foot pain. Bruising noted to bottom of R foot, but no bony tenderness. She is followed by pain management for other ongoing issues. Of note, she has diagnosis of COPD and she has found it difficult to take a deep breath. She was accompanied by her daughter during today's visit.       Patient Active Problem List   Diagnosis    Lumbar radiculopathy    Chronic obstructive pulmonary disease with acute exacerbation    Essential hypertension    Ovarian mass    Breast mass    Family history of breast cancer    Chronic pain    Use of aromatase inhibitors    Malignant neoplasm of upper-outer quadrant of left breast in female, estrogen receptor positive    Weakness    Lumbar spondylosis    CKD (chronic kidney disease) stage 2, GFR 60-89 ml/min    Hyperlipidemia    Hypokalemia    Nausea & vomiting    Pre-diabetes    Trigger middle finger of left hand    Secondary and unspecified malignant neoplasm of axilla and upper limb lymph nodes    Major depressive disorder, recurrent, mild    Atherosclerosis of aorta    Epiretinal membrane, right eye    Pseudophakia of both eyes    Exudative age-related macular degeneration of left eye with active choroidal neovascularization    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      albuterol-ipratropium (DUO-NEB) 2.5 mg-0.5 mg/3 mL nebulizer solution Take 3 mLs by nebulization every 6 (six) hours as needed for Wheezing. Rescue 75 mL 0    alpha-D-galactosidase (BEANO ORAL) Take 1 capsule by mouth 2 (two) times a day.      aspirin (ECOTRIN) 81 MG EC tablet Take 81 mg by mouth once daily.      atorvastatin (LIPITOR) 20 MG tablet Take 1 tablet (20 mg total) by mouth every evening. 90 tablet 1    benazepriL (LOTENSIN) 40 MG tablet Take 1 tablet (40 mg total) by mouth once daily. 90 tablet 1    BREO ELLIPTA 100-25 mcg/dose diskus inhaler Inhale 1 puff into the lungs once daily.      calcium-vitamin D 250-100 mg-unit per tablet Take 1 tablet by mouth 2 (two) times daily.      CETIRIZINE HCL (ZYRTEC ORAL) Take 10 mg by mouth nightly as needed.       citalopram (CELEXA) 20 MG tablet Take 1 tablet (20 mg total) by mouth every evening. 90 tablet 1    doxycycline (VIBRA-TABS) 100 MG tablet Take by mouth.      felodipine (PLENDIL) 10 MG 24 hr tablet Take 1 tablet (10 mg total) by mouth 2 (two) times a day. 180 tablet 1    hydrALAZINE (APRESOLINE) 25 MG tablet Take 1 tablet (25 mg total) by mouth every 12 (twelve) hours. 180 tablet 1    hydrocortisone 2.5 % cream Apply topically 2 (two) times daily. (Patient taking differently: Apply topically as needed.) 1 each 0    INCRUSE ELLIPTA 62.5 mcg/actuation inhalation capsule Inhale into the lungs once daily.      ipratropium-albuteroL (COMBIVENT RESPIMAT)  mcg/actuation inhaler Inhale 1 puff into the lungs.      ketoconazole (NIZORAL) 2 % cream Apply twice a day to affected area 30 g 1    ketoprofen 10 % [facility]  Apply topically.      letrozole (FEMARA) 2.5 mg Tab TAKE 1 TABLET(2.5 MG) BY MOUTH EVERY DAY 90 tablet 3    melatonin 10 mg Tab Take by mouth every evening.      montelukast (SINGULAIR) 10 mg tablet Take 10 mg by mouth every evening.      multivitamin (THERAGRAN) per tablet Take 1 tablet by mouth once daily.      PAXLOVID 300 mg (150 mg x 2)-100 mg copackaged tablets (EUA) Take by mouth.      polyethylene glycol 3350 (MIRALAX ORAL) Take by mouth once daily.      potassium chloride (MICRO-K) 10 MEQ CpSR Take 1 capsule (10 mEq total) by mouth once daily. 90 capsule 0    pregabalin (LYRICA) 25 MG capsule Take 1 capsule (25 mg total) by mouth once daily. 30 capsule 1    RSVPreF3 antigen-AS01E, PF, (AREXVY) 120 mcg/0.5 mL SusR vaccine Inject 0.5mLs into the muscle. 0.5 mL 0    torsemide (DEMADEX) 20 MG Tab Take 1 tablet (20 mg total) by mouth once daily. 90 tablet 1    traMADoL (ULTRAM) 50 mg tablet Take 1 tablet (50 mg total) by mouth every 8 (eight) hours as needed for Pain. 90 tablet 0    triamcinolone acetonide 0.1% (KENALOG) 0.1 % cream AAA bid.  Spot treatment as needed 45 g 0    triamcinolone acetonide 0.1% (KENALOG) 0.1 % paste as needed.       No current facility-administered medications for this visit.        Review of Systems   Constitutional:  Negative for fever.   Respiratory:  Negative for cough and shortness of breath.    Cardiovascular:  Negative for chest pain.   Musculoskeletal:  Positive for arthralgias, back pain and gait problem.        R rib pain    Integumentary:         Bruising to bottom of R foot, and bruising to top of L foot           Objective     Physical Exam  Constitutional:       General: She is not in acute distress.     Appearance: Normal appearance. She is not ill-appearing, toxic-appearing or diaphoretic.      Comments: Frail elderly woman in wheelchair with portable O2    HENT:      Head: Normocephalic and atraumatic.   Cardiovascular:      Rate and Rhythm: Normal rate and regular  rhythm.   Pulmonary:      Effort: Pulmonary effort is normal.      Breath sounds: Normal breath sounds.   Abdominal:      General: Abdomen is flat.      Palpations: Abdomen is soft.   Musculoskeletal:         General: Tenderness (R rib cage) present.   Skin:     General: Skin is warm and dry.      Findings: Bruising (Bottom of R foot, top of L foot) present.   Neurological:      General: No focal deficit present.      Mental Status: She is alert and oriented to person, place, and time.   Psychiatric:         Mood and Affect: Mood normal.         Behavior: Behavior normal.          Assessment and Plan     1. Fall, initial encounter; mechanical fall that resulted in R rib pain, will image to evaluate further   -     X-Ray Ribs 2 View Right; Future; Expected date: 08/02/2024    2. Rib pain on right side; mechanical fall that resulted in R rib pain, will image to evaluate further. Tylenol for pain  -     X-Ray Ribs 2 View Right; Future; Expected date: 08/02/2024    3. Chronic obstructive pulmonary disease, unspecified COPD type; discussed use of incentive spirometer to prevent pneumonia since she is at increased risk, given that she has COPD. IS provided in office/taught patient how to use properly/patient was able to demonstrate proper use. Instructed her to use during commercials while watching TV.          Miguel Ángel Keating NP   Internal Medicine           Follow up if symptoms worsen or fail to improve.

## 2024-08-03 DIAGNOSIS — I11.9 HYPERTENSIVE HEART DISEASE WITHOUT HEART FAILURE: ICD-10-CM

## 2024-08-05 RX ORDER — FELODIPINE 10 MG/1
10 TABLET, EXTENDED RELEASE ORAL 2 TIMES DAILY
Qty: 180 TABLET | Refills: 2 | Status: SHIPPED | OUTPATIENT
Start: 2024-08-05

## 2024-08-05 RX ORDER — TORSEMIDE 20 MG/1
20 TABLET ORAL DAILY
Qty: 90 TABLET | Refills: 2 | Status: SHIPPED | OUTPATIENT
Start: 2024-08-05

## 2024-08-05 RX ORDER — BENAZEPRIL HYDROCHLORIDE 40 MG/1
40 TABLET ORAL DAILY
Qty: 90 TABLET | Refills: 2 | Status: SHIPPED | OUTPATIENT
Start: 2024-08-05

## 2024-08-09 ENCOUNTER — TELEPHONE (OUTPATIENT)
Dept: PRIMARY CARE CLINIC | Facility: CLINIC | Age: 89
End: 2024-08-09
Payer: MEDICARE

## 2024-08-09 ENCOUNTER — OFFICE VISIT (OUTPATIENT)
Dept: PRIMARY CARE CLINIC | Facility: CLINIC | Age: 89
End: 2024-08-09
Payer: MEDICARE

## 2024-08-09 VITALS
HEART RATE: 77 BPM | BODY MASS INDEX: 25.66 KG/M2 | OXYGEN SATURATION: 91 % | HEIGHT: 64 IN | DIASTOLIC BLOOD PRESSURE: 68 MMHG | SYSTOLIC BLOOD PRESSURE: 156 MMHG | TEMPERATURE: 99 F

## 2024-08-09 DIAGNOSIS — J20.9 ACUTE BRONCHITIS, UNSPECIFIED ORGANISM: Primary | ICD-10-CM

## 2024-08-09 DIAGNOSIS — R68.83 CHILLS: ICD-10-CM

## 2024-08-09 LAB
CTP QC/QA: YES
SARS-COV-2 RDRP RESP QL NAA+PROBE: NEGATIVE

## 2024-08-09 PROCEDURE — 99999 PR PBB SHADOW E&M-EST. PATIENT-LVL IV: CPT | Mod: PBBFAC,,, | Performed by: INTERNAL MEDICINE

## 2024-08-09 RX ORDER — DOXYCYCLINE 100 MG/1
100 CAPSULE ORAL EVERY 12 HOURS
Qty: 14 CAPSULE | Refills: 0 | Status: SHIPPED | OUTPATIENT
Start: 2024-08-09 | End: 2024-08-16

## 2024-08-16 DIAGNOSIS — F41.8 DEPRESSION WITH ANXIETY: ICD-10-CM

## 2024-08-16 DIAGNOSIS — E78.5 HYPERLIPIDEMIA, UNSPECIFIED HYPERLIPIDEMIA TYPE: ICD-10-CM

## 2024-08-16 DIAGNOSIS — I70.0 ATHEROSCLEROSIS OF AORTA: ICD-10-CM

## 2024-08-16 NOTE — TELEPHONE ENCOUNTER
Care Due:                  Date            Visit Type   Department     Provider  --------------------------------------------------------------------------------                                SAME DAY -                              ESTABLISHED   [facility]   Minerva Guero  Last Visit: 08-      PATIENT      CARE           [facility]                              EP -                              PRIMARY      [facility]   Minerva Jack  Next Visit: 08-      CARE ([facility])   CARE           Stew                                                            Last  Test          Frequency    Reason                     Performed    Due Date  --------------------------------------------------------------------------------    CBC.........  12 months..  hydrALAZINE..............  11-   11-    Health [facility] Embedded Care Due Messages. Reference number: [id number].   8/16/2024 12:58:02 PM CDT

## 2024-08-17 RX ORDER — ATORVASTATIN CALCIUM 20 MG/1
20 TABLET, FILM COATED ORAL NIGHTLY
Qty: 90 TABLET | Refills: 1 | Status: SHIPPED | OUTPATIENT
Start: 2024-08-17

## 2024-08-17 NOTE — TELEPHONE ENCOUNTER
Refill Routing Note   Medication(s) are not appropriate for processing by [facility] for the following reason(s):        Drug-disease interaction  Drug-Disease: citalopram and Hypokalemia    [facility] action(s):  Defer  Approve     Requires labs : Yes             Appointments  past 12m or future 3m with PCP    Date Provider   Last Visit   8/9/2024 Minerva Mathias MD   Next Visit   8/26/2024 Minerva Mathias MD   ED visits in past 90 days: 0        Note composed:9:29 AM 08/17/2024

## 2024-08-18 RX ORDER — CITALOPRAM 20 MG/1
20 TABLET, FILM COATED ORAL NIGHTLY
Qty: 90 TABLET | Refills: 1 | Status: SHIPPED | OUTPATIENT
Start: 2024-08-18

## 2024-08-26 DIAGNOSIS — M47.816 LUMBAR SPONDYLOSIS: ICD-10-CM

## 2024-08-26 DIAGNOSIS — M17.11 PRIMARY OSTEOARTHRITIS OF RIGHT KNEE: ICD-10-CM

## 2024-08-26 RX ORDER — TRAMADOL HYDROCHLORIDE 50 MG/1
50 TABLET ORAL EVERY 8 HOURS PRN
Qty: 90 TABLET | Refills: 1 | Status: SHIPPED | OUTPATIENT
Start: 2024-08-26

## 2024-08-26 NOTE — TELEPHONE ENCOUNTER
No care due was identified.  [facility] Embedded Care Due Messages. Reference number: [id number].   8/26/2024 12:08:11 PM CDT

## 2024-08-30 DIAGNOSIS — I11.9 HYPERTENSIVE HEART DISEASE WITHOUT HEART FAILURE: ICD-10-CM

## 2024-08-30 RX ORDER — HYDRALAZINE HYDROCHLORIDE 25 MG/1
25 TABLET, FILM COATED ORAL EVERY 12 HOURS
Qty: 180 TABLET | Refills: 0 | Status: SHIPPED | OUTPATIENT
Start: 2024-08-30

## 2024-08-30 NOTE — TELEPHONE ENCOUNTER
No care due was identified.  [facility] Embedded Care Due Messages. Reference number: [id number].   8/30/2024 8:06:31 AM CDT

## 2024-08-30 NOTE — TELEPHONE ENCOUNTER
LOV 8/9/24    Requesting advance approval of refills for this medication to prevent any missed dosages.

## 2024-09-12 ENCOUNTER — HOSPITAL ENCOUNTER (EMERGENCY)
Facility: HOSPITAL | Age: 89
Discharge: HOME | End: 2024-09-12
Attending: STUDENT IN AN ORGANIZED HEALTH CARE EDUCATION/TRAINING PROGRAM
Payer: MEDICARE

## 2024-09-12 VITALS
RESPIRATION RATE: 20 BRPM | OXYGEN SATURATION: 96 % | HEART RATE: 60 BPM | HEIGHT: 63 IN | WEIGHT: 145 LBS | TEMPERATURE: 98 F | DIASTOLIC BLOOD PRESSURE: 76 MMHG | BODY MASS INDEX: 25.69 KG/M2 | SYSTOLIC BLOOD PRESSURE: 175 MMHG

## 2024-09-12 DIAGNOSIS — J22 LOWER RESPIRATORY TRACT INFECTION: Primary | ICD-10-CM

## 2024-09-12 LAB
ALBUMIN SERPL BCP-MCNC: 3.3 G/DL (ref 3.5–5.2)
ALLENS TEST: NORMAL
ALP SERPL-CCNC: 75 U/L (ref 55–135)
ALT SERPL W/O P-5'-P-CCNC: 9 U/L (ref 10–44)
ANION GAP SERPL CALC-SCNC: 10 MMOL/L (ref 8–16)
AST SERPL-CCNC: 15 U/L (ref 10–40)
BACTERIA #/AREA URNS AUTO: ABNORMAL /HPF
BASOPHILS # BLD AUTO: 0.06 K/UL (ref 0–0.2)
BASOPHILS NFR BLD: 0.7 % (ref 0–1.9)
BILIRUB SERPL-MCNC: 0.5 MG/DL (ref 0.1–1)
BILIRUB UR QL STRIP: NEGATIVE
BUN SERPL-MCNC: 15 MG/DL (ref 8–23)
CALCIUM SERPL-MCNC: 9.5 MG/DL (ref 8.7–10.5)
CHLORIDE SERPL-SCNC: 101 MMOL/L (ref 95–110)
CLARITY UR REFRACT.AUTO: CLEAR
CO2 SERPL-SCNC: 29 MMOL/L (ref 23–29)
COLOR UR AUTO: YELLOW
CREAT SERPL-MCNC: 0.9 MG/DL (ref 0.5–1.4)
DIFFERENTIAL METHOD BLD: ABNORMAL
EOSINOPHIL # BLD AUTO: 0.3 K/UL (ref 0–0.5)
EOSINOPHIL NFR BLD: 4 % (ref 0–8)
ERYTHROCYTE [DISTWIDTH] IN BLOOD BY AUTOMATED COUNT: 13.6 % (ref 11.5–14.5)
EST. GFR  (NO RACE VARIABLE): >60 ML/MIN/1.73 M^2
GLUCOSE SERPL-MCNC: 117 MG/DL (ref 70–110)
GLUCOSE UR QL STRIP: NEGATIVE
HCT VFR BLD AUTO: 39.2 % (ref 37–48.5)
HGB BLD-MCNC: 12.6 G/DL (ref 12–16)
HGB UR QL STRIP: NEGATIVE
HYALINE CASTS UR QL AUTO: 13 /LPF
IMM GRANULOCYTES # BLD AUTO: 0.02 K/UL (ref 0–0.04)
IMM GRANULOCYTES NFR BLD AUTO: 0.2 % (ref 0–0.5)
INFLUENZA A, MOLECULAR: NEGATIVE
INFLUENZA B, MOLECULAR: NEGATIVE
KETONES UR QL STRIP: NEGATIVE
LDH SERPL L TO P-CCNC: 1.04 MMOL/L (ref 0.5–2.2)
LEUKOCYTE ESTERASE UR QL STRIP: ABNORMAL
LYMPHOCYTES # BLD AUTO: 1.1 K/UL (ref 1–4.8)
LYMPHOCYTES NFR BLD: 12.7 % (ref 18–48)
MCH RBC QN AUTO: 28.5 PG (ref 27–31)
MCHC RBC AUTO-ENTMCNC: 32.1 G/DL (ref 32–36)
MCV RBC AUTO: 89 FL (ref 82–98)
MICROSCOPIC COMMENT: ABNORMAL
MONOCYTES # BLD AUTO: 0.6 K/UL (ref 0.3–1)
MONOCYTES NFR BLD: 6.7 % (ref 4–15)
NEUTROPHILS # BLD AUTO: 6.4 K/UL (ref 1.8–7.7)
NEUTROPHILS NFR BLD: 75.7 % (ref 38–73)
NITRITE UR QL STRIP: NEGATIVE
NRBC BLD-RTO: 0 /100 WBC
OHS QRS DURATION: 100 MS
OHS QRS DURATION: 102 MS
OHS QTC CALCULATION: 457 MS
OHS QTC CALCULATION: 460 MS
PH UR STRIP: 7 [PH] (ref 5–8)
PLATELET # BLD AUTO: 317 K/UL (ref 150–450)
PMV BLD AUTO: 10.5 FL (ref 9.2–12.9)
POTASSIUM SERPL-SCNC: 3.8 MMOL/L (ref 3.5–5.1)
PROT SERPL-MCNC: 7 G/DL (ref 6–8.4)
PROT UR QL STRIP: NEGATIVE
RBC # BLD AUTO: 4.42 M/UL (ref 4–5.4)
RBC #/AREA URNS AUTO: 2 /HPF (ref 0–4)
SAMPLE: NORMAL
SARS-COV-2 RDRP RESP QL NAA+PROBE: NEGATIVE
SITE: NORMAL
SODIUM SERPL-SCNC: 140 MMOL/L (ref 136–145)
SP GR UR STRIP: 1.01 (ref 1–1.03)
SPECIMEN SOURCE: NORMAL
SQUAMOUS #/AREA URNS AUTO: 1 /HPF
URN SPEC COLLECT METH UR: ABNORMAL
WBC # BLD AUTO: 8.51 K/UL (ref 3.9–12.7)
WBC #/AREA URNS AUTO: 1 /HPF (ref 0–5)

## 2024-09-12 PROCEDURE — 83605 ASSAY OF LACTIC ACID: CPT

## 2024-09-12 PROCEDURE — 87040 BLOOD CULTURE FOR BACTERIA: CPT | Performed by: STUDENT IN AN ORGANIZED HEALTH CARE EDUCATION/TRAINING PROGRAM

## 2024-09-12 PROCEDURE — 93010 ELECTROCARDIOGRAM REPORT: CPT | Mod: ,,, | Performed by: INTERNAL MEDICINE

## 2024-09-12 PROCEDURE — 96367 TX/PROPH/DG ADDL SEQ IV INF: CPT

## 2024-09-12 PROCEDURE — 81001 URINALYSIS AUTO W/SCOPE: CPT | Performed by: STUDENT IN AN ORGANIZED HEALTH CARE EDUCATION/TRAINING PROGRAM

## 2024-09-12 PROCEDURE — 80053 COMPREHEN METABOLIC PANEL: CPT | Performed by: STUDENT IN AN ORGANIZED HEALTH CARE EDUCATION/TRAINING PROGRAM

## 2024-09-12 PROCEDURE — 99285 EMERGENCY DEPT VISIT HI MDM: CPT | Mod: 25

## 2024-09-12 PROCEDURE — 63600175 PHARM REV CODE 636 W HCPCS: Performed by: STUDENT IN AN ORGANIZED HEALTH CARE EDUCATION/TRAINING PROGRAM

## 2024-09-12 PROCEDURE — 93005 ELECTROCARDIOGRAM TRACING: CPT

## 2024-09-12 PROCEDURE — 87502 INFLUENZA DNA AMP PROBE: CPT | Performed by: STUDENT IN AN ORGANIZED HEALTH CARE EDUCATION/TRAINING PROGRAM

## 2024-09-12 PROCEDURE — 85025 COMPLETE CBC W/AUTO DIFF WBC: CPT | Performed by: STUDENT IN AN ORGANIZED HEALTH CARE EDUCATION/TRAINING PROGRAM

## 2024-09-12 PROCEDURE — 25000003 PHARM REV CODE 250: Performed by: STUDENT IN AN ORGANIZED HEALTH CARE EDUCATION/TRAINING PROGRAM

## 2024-09-12 PROCEDURE — U0002 COVID-19 LAB TEST NON-CDC: HCPCS | Performed by: STUDENT IN AN ORGANIZED HEALTH CARE EDUCATION/TRAINING PROGRAM

## 2024-09-12 PROCEDURE — 96365 THER/PROPH/DIAG IV INF INIT: CPT

## 2024-09-12 PROCEDURE — 99900035 HC TECH TIME PER 15 MIN (STAT)

## 2024-09-12 RX ORDER — CEFPODOXIME PROXETIL 100 MG/1
200 TABLET, FILM COATED ORAL 2 TIMES DAILY
Qty: 28 TABLET | Refills: 0 | Status: SHIPPED | OUTPATIENT
Start: 2024-09-12 | End: 2024-09-19

## 2024-09-12 RX ORDER — AZITHROMYCIN 250 MG/1
250 TABLET, FILM COATED ORAL DAILY
Qty: 6 TABLET | Refills: 0 | Status: SHIPPED | OUTPATIENT
Start: 2024-09-12

## 2024-09-12 RX ADMIN — CEFTRIAXONE 1 G: 1 INJECTION, POWDER, FOR SOLUTION INTRAMUSCULAR; INTRAVENOUS at 04:09

## 2024-09-12 RX ADMIN — AZITHROMYCIN MONOHYDRATE 500 MG: 500 INJECTION, POWDER, LYOPHILIZED, FOR SOLUTION INTRAVENOUS at 05:09

## 2024-09-12 NOTE — DISCHARGE INSTRUCTIONS
You are being treated clinically for pneumonia given that you are having increased cough with yellow sputum production.  You were given a dose of antibiotics here in the emergency department.  I have sent in 2 antibiotics to the [facility] pharmacy for you to  at discharge.  If you develop any worsening weakness, confusion, lethargy, cough, increased sputum production, shortness of breath, chest pain, abdominal pain, fever, chills or any other new infectious symptoms or new worsening symptoms or concerns, please immediately return to the emergency department for re-evaluation.  Please follow-up with your primary care doctor within the next 2 days.

## 2024-09-12 NOTE — ED TRIAGE NOTES
Pt arriving to ED c/o AMS And cough onset yesterday, family also reports patient more confusion and lethargy onset yesterday before 12p

## 2024-09-12 NOTE — ED PROVIDER NOTES
Encounter Date: 9/12/2024       History     Chief Complaint   Patient presents with    Chills     And cough onset yesterday, family also reports patient more confusion and lethargy onset yesterday before 12p     HPI  Patient is a 89-year-old female with history of hypertension, GERD, COPD, CKD, osteoporosis, breast cancer who presents for chills, generalized weakness and fatigue, increased confusion.  The patient lives with her daughter who is at bedside.  I obtained both history from the patient and her daughter.  Her daughter states that over the last 2 days, the patient has been more generally weak and not acting like her usual self.  She has been more fatigued.  She also reports some mild intermittent confusion.  She reports that the patient has had chills but no fever.  She states that the patient has also had a productive cough with yellow sputum.  She was on 2 L oxygen at baseline for her COPD.  They state that the lowest her oxygen has been on her 2 L is 93%.  The patient denies shortness of breath, chest pain.  They do state that they have been giving her nebulizer treatments at home but they have not noted any wheezing, just the productive cough.  Her family also reports that her urine has been darker in color and malodorous.  No hematuria.  Patient denies dysuria and abdominal pain.  No vomiting or diarrhea.    Review of patient's allergies indicates:   Allergen Reactions    Levaquin [levofloxacin] Itching    Penicillins Itching     Past Medical History:   Diagnosis Date    Anxiety     Arthritis     Back pain     Breast cancer 1/17/2018    Breast mass 1/15/2018    Chronic kidney disease, stage 2, mildly decreased GFR     COPD (chronic obstructive pulmonary disease)     emphysema    Depression     Dysuria 1/29/2018    Exudative age-related macular degeneration of left eye with active choroidal neovascularization 8/25/2022    Family history of breast cancer 1/17/2018    GERD (gastroesophageal reflux  disease)     Hypertension     Infiltrating ductal carcinoma of breast, left 7/12/2018    Multiple closed fractures of ribs of left side 4/3/2019    Osteoporosis, senile     Ovarian mass 1/15/2018    Pneumonia     S/P robotic assisted laparoscopic BSO (bilateral salpingo-oophorectomy) 2/23/2018     Past Surgical History:   Procedure Laterality Date    AXILLARY NODE DISSECTION Left 7/12/2018    Procedure: LYMPHADENECTOMY, AXILLARY;  Surgeon: Amanda Caballero MD;  Location: [facility] OR [address] [place] [address];  Service: General;  Laterality: Left;    BREAST BIOPSY      BREAST LUMPECTOMY      CATARACT EXTRACTION      CHOLECYSTECTOMY      COLONOSCOPY      ESOPHAGOGASTRODUODENOSCOPY      ESOPHAGOGASTRODUODENOSCOPY N/A 10/14/2019    Procedure: EGD (ESOPHAGOGASTRODUODENOSCOPY);  Surgeon: Davonte Thompson MD;  [facility]  Service: Endoscopy;  Laterality: N/A;  hx of pulmonary hypertension-PA 50     pt requesting ASAP    EYE SURGERY      FIXATION KYPHOPLASTY N/A 11/8/2018    Procedure: Kyphoplasty   T12;  Surgeon: Merly Wilcox MD;  Location: [facility] CATH LAB;  Service: Pain Management;  Laterality: N/A;  T12  Nia Reps e-mailed with date and time    HYSTERECTOMY      INJECTION FOR SENTINEL NODE IDENTIFICATION Left 7/12/2018    Procedure: INJECTION, FOR SENTINEL NODE IDENTIFICATION;  Surgeon: Amanda Caballero MD;  Location: [facility] OR [address] [place] [address];  Service: General;  Laterality: Left;    INJECTION OF FACET JOINT Bilateral 10/15/2018    Procedure: INJECTION, FACET JOINT, BILATERAL LUMBAR L3-4, 4-5, 5-S1 FACET JOINT INJECTIONS;  Surgeon: Merly Wilcox MD;  Location: [facility] PAIN MGT;  Service: Pain Management;  Laterality: Bilateral;    INJECTION OF FACET JOINT Bilateral 4/1/2019    Procedure: INJECTION, FACET JOINT, L3-L4,L4-L5,L5-S1;  Surgeon: Merly Wilcox MD;  Location: [facility] PAIN MGT;  Service: Pain Management;  Laterality: Bilateral;    INJECTION OF FACET JOINT Bilateral 6/20/2019    Procedure: INJECTION, FACET JOINT, L3-L4,L4-L5,L5-S1  NEED CONSENT;  Surgeon: Merly Wilcox MD;  Location: [facility] PAIN MGT;  Service: Pain Management;  Laterality: Bilateral;    INJECTION OF FACET JOINT Bilateral 7/27/2020    Procedure: INJECTION, FACET JOINT BILATERAL L3/4, L4/5 and L5/S1;  Surgeon: Merly Wilcox MD;  Location: [facility] PAIN MGT;  Service: Pain Management;  Laterality: Bilateral;    INJECTION OF JOINT Bilateral 7/27/2020    Procedure: INJECTION, JOINT, BILATERAL GREATER TROCHANTERIC BURSA;  Surgeon: Merly Wilcox MD;  Location: [facility] PAIN MGT;  Service: Pain Management;  Laterality: Bilateral;    INJECTION OF JOINT Bilateral 4/26/2021    Procedure: INJECTION, JOINT, HIP;  Surgeon: Merly Wilcox MD;  Location: [facility] PAIN MGT;  Service: Pain Management;  Laterality: Bilateral;  consent needed    INJECTION OF STEROID Right 11/15/2021    Procedure: INJECTION, STEROID RIGHT MIDDLE AND SAMLL FINGER;  Surgeon: Florecita Da Silva MD;  Location: [facility] OR;  Service: Orthopedics;  Laterality: Right;    MASTECTOMY, PARTIAL Left 7/12/2018    Procedure: MASTECTOMY, PARTIAL-W/SEED LOCALIZATION;  Surgeon: Amanda Caballero MD;  [facility];  Service: General;  Laterality: Left;    MA REMOVAL OF OVARY/TUBE(S)  02/23/2018    Robotic Assisted    ROTATOR CUFF REPAIR Right     rotator cuff repair right shoulder    TONSILLECTOMY      TRANSFORAMINAL EPIDURAL INJECTION OF STEROID Bilateral 6/21/2021    Procedure: INJECTION, STEROID, EPIDURAL, TRANSFORAMINAL APPROACH  bilateral L4/5 TF ASHUTOSH;  Surgeon: Merly Wilcox MD;  Location: [facility] PAIN MGT;  Service: Pain Management;  Laterality: Bilateral;  consent needed    TRANSFORAMINAL EPIDURAL INJECTION OF STEROID Bilateral 1/3/2022    Procedure: INJECTION, STEROID, EPIDURAL, TRANSFORAMINAL APPROACH Bilateral L4/5 Needs consent;  Surgeon: Merly Wilcox MD;  Location: [facility] PAIN MGT;  Service: Pain Management;  Laterality: Bilateral;    TRANSFORAMINAL EPIDURAL INJECTION OF STEROID Bilateral 8/9/2022    Procedure: INJECTION, STEROID,  EPIDURAL, TRANSFORAMINAL APPROACH, BILATERAL L4-L5   MEDICALLY URGENT;  Surgeon: Merly Wilcox MD;  Location: [facility] PAIN MGT;  Service: Pain Management;  Laterality: Bilateral;    TRANSFORAMINAL EPIDURAL INJECTION OF STEROID Left 2022    Procedure: INJECTION, STEROID, EPIDURAL, TRANSFORAMINAL APPROACH LEFT L3-L4 AND L4-L5 CONTRAST;  Surgeon: Merly Wilcox MD;  Location: [facility] PAIN MGT;  Service: Pain Management;  Laterality: Left;    TRANSFORAMINAL EPIDURAL INJECTION OF STEROID Bilateral 2023    Procedure: INJECTION, STEROID, EPIDURAL, TRANSFORAMINAL APPROACH, BILATERAL L5/S1  sooner date;  Surgeon: Merly Wilcox MD;  Location: [facility] PAIN MGT;  Service: Pain Management;  Laterality: Bilateral;    TRIGGER FINGER RELEASE Left 11/15/2021    Procedure: RELEASE, TRIGGER FINGER, LEFT MIDDLE AND RING;  Surgeon: Florecita Da Silva MD;  Location: [facility] OR;  Service: Orthopedics;  Laterality: Left;     Family History   Problem Relation Name Age of Onset    Heart failure Mother      Kidney failure Mother      Heart attack Father      Breast cancer Sister  66        Lump, XRT, chemo, recurrence 10 years later.    Cancer Brother          leukemia    Heart attack Brother  58    Pulmonary embolism Brother  45    Heart attack Brother  52    Breast cancer Maternal Grandmother  60        advanced at diagnosis    Breast cancer Maternal Aunt  83         at 92    Colon cancer Neg Hx      Esophageal cancer Neg Hx       Social History     Tobacco Use    Smoking status: Former     Current packs/day: 0.00     Average packs/day: 1 pack/day for 40.0 years (40.0 ttl pk-yrs)     Types: Cigarettes     Quit date: 6/15/1994     Years since quittin.2    Smokeless tobacco: Never    Tobacco comments:     Smoked >1 ppd for at least 40 years, quit around    Substance Use Topics    Alcohol use: Yes     Comment: occasional glass of wine or cocktail    Drug use: No     Review of Systems  A full ROS was obtained, see HPI for pertinent  positives.     Physical Exam     Initial Vitals [09/12/24 1208]   BP Pulse Resp Temp SpO2   (!) 166/72 67 18 98.1 °F (36.7 °C) 95 %      MAP       --         Physical Exam  Constitutional: No acute distress, nontoxic  Respiratory: Non-labored, lungs clear, on 2 L supplemental oxygen  Cardiovascular: Well perfused, normal rate, regular rhythm  Gastrointestinal: Soft, non-tender, non-distended  Integumentary: Warm and dry, no rash  Musculoskeletal: No deformity, moving all extremities well  Neurological: Awake and alert, oriented to person place time but does seem pleasantly confused with some questioning, normal motor and sensation throughout, cranial nerves 2-12 grossly intact   Psychiatric: Cooperative     ED Course   Procedures  Labs Reviewed   CBC W/ AUTO DIFFERENTIAL - Abnormal       Result Value    WBC 8.51      RBC 4.42      Hemoglobin 12.6      Hematocrit 39.2      MCV 89      MCH 28.5      MCHC 32.1      RDW 13.6      Platelets 317      MPV 10.5      Immature Granulocytes 0.2      Gran # (ANC) 6.4      Immature Grans (Abs) 0.02      Lymph # 1.1      Mono # 0.6      Eos # 0.3      Baso # 0.06      nRBC 0      Gran % 75.7 (*)     Lymph % 12.7 (*)     Mono % 6.7      Eosinophil % 4.0      Basophil % 0.7      Differential Method Automated     COMPREHENSIVE METABOLIC PANEL - Abnormal    Sodium 140      Potassium 3.8      Chloride 101      CO2 29      Glucose 117 (*)     BUN 15      Creatinine 0.9      Calcium 9.5      Total Protein 7.0      Albumin 3.3 (*)     Total Bilirubin 0.5      Alkaline Phosphatase 75      AST 15      ALT 9 (*)     eGFR >60.0      Anion Gap 10     URINALYSIS, REFLEX TO URINE CULTURE - Abnormal    Specimen UA Urine, Clean Catch      Color, UA Yellow      Appearance, UA Clear      pH, UA 7.0      Specific Gravity, UA 1.010      Protein, UA Negative      Glucose, UA Negative      Ketones, UA Negative      Bilirubin (UA) Negative      Occult Blood UA Negative      Nitrite, UA Negative       Leukocytes, UA 2+ (*)     Narrative:     Specimen Source->Urine   URINALYSIS MICROSCOPIC - Abnormal    RBC, UA 2      WBC, UA 1      Bacteria Rare      Squam Epithel, UA 1      Hyaline Casts, UA 13 (*)     Microscopic Comment SEE COMMENT      Narrative:     Specimen Source->Urine   INFLUENZA A & B BY MOLECULAR    Influenza A, Molecular Negative      Influenza B, Molecular Negative      Flu A & B Source Nasal swab     CULTURE, BLOOD   CULTURE, BLOOD   SARS-COV-2 RNA AMPLIFICATION, QUAL    SARS-CoV-2 RNA, Amplification, Qual Negative     ISTAT LACTATE    POC Lactate 1.04      Sample VENOUS      Site Other      Allens Test N/A          ECG Results              EKG 12-lead (Final result)        Collection Time Result Time QRS Duration OHS QTC Calculation    09/12/24 13:06:19 09/12/24 14:13:28 102 460                     Final result by Interface, Lab In [facility] (09/12/24 14:13:39)                   Narrative:    Test Reason : R41.82,    Vent. Rate : 062 BPM     Atrial Rate : 062 BPM     P-R Int : 154 ms          QRS Dur : 102 ms      QT Int : 454 ms       P-R-T Axes : 062 -10 041 degrees     QTc Int : 460 ms    Normal sinus rhythm  Incomplete right bundle branch block  Borderline Abnormal ECG  When compared with ECG of 12-SEP-2024 12:22,  No significant change was found  Confirmed by MATTI NIETO MD ([id number]) on 9/12/2024 2:13:26 PM    Referred By: [email or web address]   SELF           Confirmed By:MATTI NIETO MD                                     EKG 12-lead (Final result)        Collection Time Result Time QRS Duration OHS QTC Calculation    09/12/24 12:22:21 09/12/24 14:13:52 100 457                     Final result by Interface, Lab In [facility] (09/12/24 14:14:01)                   Narrative:    Test Reason :     Vent. Rate : 065 BPM     Atrial Rate : 065 BPM     P-R Int : 142 ms          QRS Dur : 100 ms      QT Int : 440 ms       P-R-T Axes : 052 -09 075 degrees     QTc Int : 457 ms    Normal sinus rhythm  Incomplete right  bundle branch block  Borderline Abnormal ECG  When compared with ECG of 08-NOV-2023 17:01,  Nonspecific T wave abnormality, improved in Anterior-lateral leads  Confirmed by MATTI NIETO MD ([id number]) on 9/12/2024 2:13:47 PM    Referred By: BRENTON   SELF           Confirmed By:MATTI NIETO MD                                  Imaging Results              CT Head Without Contrast (Final result)  Result time 09/12/24 13:52:40      Final result by Gabriel Corrales MD (09/12/24 13:52:40)                   Impression:      No evidence of acute hemorrhage or major vascular distribution infarct.    Advanced chronic small vessel ischemic change.    Ventriculomegaly out of proportion to the degree of sulcal enlargement. While this could relate to cerebral volume loss, the configuration does raise the possibility of normal pressure hydrocephalus. Clinical correlation required.    Chronic left sphenoid sinusitis.      Electronically signed by: Gabriel Corrales MD  Date:    09/12/2024  Time:    13:52               Narrative:    EXAMINATION:  CT HEAD WITHOUT CONTRAST    CLINICAL HISTORY:  Mental status change, unknown cause;    TECHNIQUE:  Low dose axial CT images obtained throughout the head without the use of intravenous contrast.  Axial, sagittal and coronal reconstructions were performed.    COMPARISON:  MRI brain 02/24/2021    FINDINGS:  Intracranial compartment:    Persistent prominence of the ventricles.  Third ventricle diameter measuring up to 0.9 cm.  Ventricular enlargement out of proportion to the degree of sulcal enlargement, with some crowding of the sulci near the vertex.    Confluent hypoattenuation throughout the supratentorial white matter, nonspecific but most likely reflecting chronic small vessel ischemic changes. No recent or remote major vascular distribution infarct. No acute hemorrhage.  No mass effect or midline shift.    No new extra-axial blood or fluid collections.    Skull/extracranial contents  (limited evaluation):    No displaced calvarial fracture.    Chronic left sphenoid sinusitis.    Bilateral pseudophakia.                                       X-Ray Chest AP Portable (Final result)  Result time 09/12/24 13:46:57      Final result by Pepito Ramirez MD (09/12/24 13:46:57)                   Impression:      No significant intrathoracic abnormality.  No significant detrimental interval change in the appearance of the chest since 12/01/2023 is appreciated.      Electronically signed by: Pepito Ramirez MD  Date:    09/12/2024  Time:    13:46               Narrative:    EXAMINATION:  XR CHEST AP PORTABLE    CLINICAL HISTORY:  Sepsis;    TECHNIQUE:  One view    COMPARISON:  Comparison is made to 12/01/2023.  Clinical information of cough and chills.    FINDINGS:  Heart is at the upper limit of normal to minimally enlarged, with the appearance of the cardiomediastinal silhouette unchanged since the examination referenced above.  Pulmonary vascularity is within normal limits, and there are no findings indicating current cardiac decompensation.  Lung zones are stable since the prior exam and are free of significant airspace consolidation or volume loss.  No pleural fluid.  Prominent tortuosity of the thoracic aorta is again observed.  No pneumothorax.  Incidental observations include calcification in the wall of the aortic arch and surgical clips in the left axillary soft tissues and in the right upper abdominal quadrant.  The chronic compression fracture of T12, previously treated by vertebroplasty, is better demonstrated on prior examinations.                                       Medications   azithromycin (ZITHROMAX) 500 mg in D5W 250 mL  IVPB (admixture device) (500 mg Intravenous New Bag 9/12/24 1759)   cefTRIAXone (Rocephin) 1 g in D5W 100 mL IVPB (MB+) (0 g Intravenous Stopped 9/12/24 1734)     Medical Decision Making  The patient is a 89-year-old female who presents for generalized weakness and fatigue  with some mild intermittent confusion.  Family reports that she has been chilled at home but they deny any fevers.  She was afebrile here with overall reassuring vitals.  She does seem fatigued but is easily arousable.  She was no focal neurologic deficits.  Family reports that she was had darker, malodorous urine in his also had a productive cough.  Infectious workup ordered.  Dispo pending workup and re-evaluation.    Labs and imaging reviewed.  Patient's workup is overall reassuring.  She has no evidence of pneumonia however clinically with the increased sputum production, cough and chills at home, will treat her clinically for pneumonia.  I did offer admission to the patient and her family however they declined.  Patient reports feeling much better.  She was at her baseline currently.  They would prefer to take her home and follow up closely with her primary care doctor.  They were given very strict return precautions prior to discharge.  Patient was treated with antibiotics here and sent home with antibiotics for presumed community-acquired pneumonia.  Patient was stable at time of discharge.    Amount and/or Complexity of Data Reviewed  Labs: ordered.  Radiology: ordered.  ECG/medicine tests: ordered and independent interpretation performed. Decision-making details documented in ED Course.    Risk  Prescription drug management.               ED Course as of 09/12/24 1840   Thu Sep 12, 2024   1355 EKG with normal sinus rhythm, rate 62, incomplete right bundle-branch block but seen on prior, no STEMI [NN]   1420 Impression:     No evidence of acute hemorrhage or major vascular distribution infarct.     Advanced chronic small vessel ischemic change.     Ventriculomegaly out of proportion to the degree of sulcal enlargement. While this could relate to cerebral volume loss, the configuration does raise the possibility of normal pressure hydrocephalus. Clinical correlation required.     Chronic left sphenoid  sinusitis.   [NN]   1706 CT head reviewed.  Do not feel that the patient's clinical picture fits with normal pressure hydrocephalus.  On re-evaluation, patient resting comfortably.  She was not altered.  She does state that she feels better.  She has not been incontinent of urine or stool and she is not having any issues with ambulation.  Family states that although she has been fatigued she was not altered currently. [NN]   1707 Although patient has no focal consolidation on her chest x-ray, she has had increased cough with yellow sputum production so I will treat her clinically for pneumonia.  I did offer admission to the patient and her family at bedside however they decline and feel that they can care for her well at home.  I do feel that her family is very reliable.  The patient overall is in good spirits and feels well.  We will go ahead and give her a dose of antibiotics here and send her home with oral antibiotics.  They were given very strict return precautions and advised to follow up closely with their primary care doctor within the next 2-3 days. [NN]   1839 Patient stable on re-evaluation after antibiotics.  Family was again provided return precautions and advised to follow up closely with primary care within the next 2-3 days. [NN]      ED Course User Index  [NN] Luzma King MD                             Clinical Impression:  Final diagnoses:  [J22] Lower respiratory tract infection (Primary)          ED Disposition Condition    Discharge Stable          ED Prescriptions       Medication Sig Dispense Start Date End Date Auth. Provider    cefpodoxime (VANTIN) 100 MG tablet Take 2 tablets (200 mg total) by mouth 2 (two) times daily. for 7 days 28 tablet 9/12/2024 9/19/2024 Luzma King MD    azithromycin (Z-GILL) 250 MG tablet Take 1 tablet (250 mg total) by mouth once daily. Take first 2 tablets together, then 1 every day until finished. 6 tablet 9/12/2024 -- Luzma King MD           Follow-up Information       Follow up With Specialties Details Why Contact Info    Minerva Mathias MD Internal Medicine Schedule an appointment as soon as possible for a visit   [address]  [place] [address]  [phone]      [facility] - Emergency Dept Emergency Medicine  As needed, If symptoms worsen [address] [facility] [id number] [phone]             Luzma King MD  09/12/24 5459

## 2024-09-12 NOTE — ED NOTES
Patient identifiers verified and correct for Anahi Cook   LOC: The patient is awake, alert and aware of environment with an appropriate affect, the patient is oriented x 3 and speaking appropriately.   APPEARANCE: Patient appears comfortable and in no acute distress, patient is clean and well groomed.  SKIN: The skin is warm and dry, color consistent with ethnicity, patient has normal skin turgor and moist mucus membranes  MUSCULOSKELETAL: Patient moving all extremities spontaneously  RESPIRATORY: Airway is open and patent, respirations are spontaneous, patient has a normal effort and rate, no accessory muscle use noted, pt placed on continuous pulse ox with O2 sats noted at 99% on room air. + cough  CARDIAC: Pt placed on cardiac monitor. Patient has a normal rate, no edema noted, capillary refill < 3 seconds.   GASTRO: Soft and non tender to palpation, no distention noted,   : Pt denies any pain or frequency with urination.  NEURO: Pt opens eyes spontaneously, behavior appropriate to situation, follows commands, facial expression symmetrical, bilateral hand grasp equal and even, purposeful motor response noted, normal sensation in all extremities when touched with a finger. + fatigue, +chills

## 2024-09-13 ENCOUNTER — PATIENT OUTREACH (OUTPATIENT)
Dept: EMERGENCY MEDICINE | Facility: HOSPITAL | Age: 89
End: 2024-09-13
Payer: MEDICARE

## 2024-09-13 DIAGNOSIS — E87.6 DIURETIC-INDUCED HYPOKALEMIA: ICD-10-CM

## 2024-09-13 DIAGNOSIS — T50.2X5A DIURETIC-INDUCED HYPOKALEMIA: ICD-10-CM

## 2024-09-13 RX ORDER — POTASSIUM CHLORIDE 750 MG/1
10 CAPSULE, EXTENDED RELEASE ORAL DAILY
Qty: 90 CAPSULE | Refills: 1 | Status: SHIPPED | OUTPATIENT
Start: 2024-09-13

## 2024-09-13 NOTE — TELEPHONE ENCOUNTER
No care due was identified.  [facility] Embedded Care Due Messages. Reference number: [id number].   9/13/2024 12:02:03 PM CDT

## 2024-09-13 NOTE — ED NOTES
Report received from LYSSA Reynolds. Assume care of pt. Pt resting comfortably and independently repositioned in stretcher with bed locked in lowest position for safety. NAD noted at this time. Respirations even and unlabored and visible chest rise noted.  Patient offered bathroom assistance and denies need at this time. Pt instructed to call if assistance is needed. Pt on continuous cardiac, BP, and O2 monitoring. Call light within reach. Family at bedside. No needs at this time. Will continue to monitor.

## 2024-09-17 LAB
BACTERIA BLD CULT: NORMAL
BACTERIA BLD CULT: NORMAL

## 2024-10-07 ENCOUNTER — PATIENT MESSAGE (OUTPATIENT)
Dept: SPINE | Facility: CLINIC | Age: 89
End: 2024-10-07
Payer: MEDICARE

## 2024-10-07 DIAGNOSIS — M54.16 LUMBAR RADICULOPATHY: Primary | ICD-10-CM

## 2024-10-09 ENCOUNTER — TELEPHONE (OUTPATIENT)
Dept: PAIN MEDICINE | Facility: CLINIC | Age: 89
End: 2024-10-09
Payer: MEDICARE

## 2024-10-09 DIAGNOSIS — M25.562 CHRONIC PAIN OF BOTH KNEES: Primary | ICD-10-CM

## 2024-10-09 DIAGNOSIS — M25.561 CHRONIC PAIN OF BOTH KNEES: Primary | ICD-10-CM

## 2024-10-09 DIAGNOSIS — G89.29 CHRONIC PAIN OF BOTH KNEES: Primary | ICD-10-CM

## 2024-10-10 NOTE — TELEPHONE ENCOUNTER
Phoned patient's daughter and discussed patient's pain. She is having worsening low back pain as well. Will schedule ASHUTOSH in November.

## 2024-10-11 ENCOUNTER — PATIENT MESSAGE (OUTPATIENT)
Dept: PAIN MEDICINE | Facility: OTHER | Age: 89
End: 2024-10-11
Payer: MEDICARE

## 2024-10-11 DIAGNOSIS — M54.16 LUMBAR RADICULOPATHY: Primary | ICD-10-CM

## 2024-10-15 ENCOUNTER — PATIENT MESSAGE (OUTPATIENT)
Dept: PRIMARY CARE CLINIC | Facility: CLINIC | Age: 89
End: 2024-10-15
Payer: MEDICARE

## 2024-10-24 DIAGNOSIS — M47.816 LUMBAR SPONDYLOSIS: ICD-10-CM

## 2024-10-24 DIAGNOSIS — M17.11 PRIMARY OSTEOARTHRITIS OF RIGHT KNEE: ICD-10-CM

## 2024-10-25 RX ORDER — TRAMADOL HYDROCHLORIDE 50 MG/1
50 TABLET ORAL EVERY 8 HOURS PRN
Qty: 90 TABLET | Refills: 2 | Status: SHIPPED | OUTPATIENT
Start: 2024-10-30

## 2024-10-25 NOTE — TELEPHONE ENCOUNTER
No care due was identified.  [facility] Embedded Care Due Messages. Reference number: [id number].   10/25/2024 11:19:53 AM CDT

## 2024-10-28 ENCOUNTER — TELEPHONE (OUTPATIENT)
Dept: PAIN MEDICINE | Facility: CLINIC | Age: 89
End: 2024-10-28
Payer: MEDICARE

## 2024-10-29 ENCOUNTER — TELEPHONE (OUTPATIENT)
Dept: PAIN MEDICINE | Facility: CLINIC | Age: 89
End: 2024-10-29
Payer: MEDICARE

## 2024-10-30 ENCOUNTER — PROCEDURE VISIT (OUTPATIENT)
Dept: PAIN MEDICINE | Facility: CLINIC | Age: 89
End: 2024-10-30
Payer: MEDICARE

## 2024-10-30 VITALS
WEIGHT: 145.06 LBS | TEMPERATURE: 98 F | BODY MASS INDEX: 25.7 KG/M2 | HEART RATE: 70 BPM | DIASTOLIC BLOOD PRESSURE: 66 MMHG | SYSTOLIC BLOOD PRESSURE: 139 MMHG

## 2024-10-30 DIAGNOSIS — M70.61 TROCHANTERIC BURSITIS OF RIGHT HIP: ICD-10-CM

## 2024-10-30 DIAGNOSIS — M17.0 PRIMARY OSTEOARTHRITIS OF BOTH KNEES: Primary | ICD-10-CM

## 2024-10-30 PROCEDURE — 99499 UNLISTED E&M SERVICE: CPT | Mod: S$GLB,,, | Performed by: ANESTHESIOLOGY

## 2024-10-31 ENCOUNTER — PATIENT MESSAGE (OUTPATIENT)
Dept: ADMINISTRATIVE | Facility: OTHER | Age: 89
End: 2024-10-31
Payer: MEDICARE

## 2024-11-02 ENCOUNTER — PATIENT MESSAGE (OUTPATIENT)
Dept: ADMINISTRATIVE | Facility: OTHER | Age: 89
End: 2024-11-02
Payer: MEDICARE

## 2024-11-05 ENCOUNTER — PATIENT MESSAGE (OUTPATIENT)
Dept: PAIN MEDICINE | Facility: OTHER | Age: 89
End: 2024-11-05
Payer: MEDICARE

## 2024-11-05 PROBLEM — N63.0 BREAST MASS: Status: RESOLVED | Noted: 2018-01-15 | Resolved: 2024-11-05

## 2024-11-05 PROBLEM — E87.6 HYPOKALEMIA: Status: RESOLVED | Noted: 2019-04-03 | Resolved: 2024-11-05

## 2024-11-05 NOTE — PROGRESS NOTES
Subjective:       Patient ID: Anahi Cook is a 89 y.o. female.    Chief Complaint: Malignant neoplasm of upper-outer quadrant of left breast i      HPI   Mrs. Cook returns today for follow up.   She is a patient of Dr. Harris's    Overall she is doing well. She is on letrozole.   Appetite and bowel movements are good. She is on 2 liter of oxygen.   She is on letrozole, now on the adjuvant setting.    She takes calcium and vitamin D.   She has arthritis that predates letrozole. Toenails are discolored and brittle on letrozole.     Per Dr. Camarillo's previous note: Briefly, she is an 89-year-old female who in late 2017 had initially felt a mass in her left breast.  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  She was started on letrozole and undwerwent a BSO by Dr. Chairez for an ovarian mass.  Her ovarian mass was benign. In mid July 2018 she underwent a lumpectomy and AND.  She had a 3.3 cm carcinoma and 4/8 positive nodes.  She has remained on letrozole, now in the adjuvant setting, and she returns today for follow up.   Of note, she also received chest wall XRT.    A mammogram was negative from May.     Review of Systems   Constitutional:  Negative for activity change, appetite change, chills, diaphoresis, fatigue, fever and unexpected weight change.   HENT:  Negative for nosebleeds.    Respiratory:  Negative for cough and shortness of breath.    Cardiovascular:  Negative for chest pain, palpitations and leg swelling.   Gastrointestinal:  Negative for abdominal distention, abdominal pain, blood in stool, constipation, diarrhea, nausea and vomiting.   Genitourinary:  Negative for hematuria and vaginal bleeding.   Musculoskeletal:  Negative for arthralgias, back pain and myalgias.   Skin:  Negative for pallor and rash.        Brittle and discolored toenails     Allergic/Immunologic: Negative for immunocompromised state.   Neurological:  Negative for dizziness, weakness, light-headedness, numbness and headaches.   Hematological:  Negative for adenopathy. Does not bruise/bleed easily.   Psychiatric/Behavioral:  Negative for confusion. The patient is not nervous/anxious.          Objective:      Physical Exam  Vitals and nursing note reviewed.   Constitutional:       General: She is not in acute distress.     Appearance: Normal appearance. She is well-developed.      Interventions: Nasal cannula in place.      Comments: Presents in wheelchair  Presents with daughter  2 L O2   HENT:      Head: Normocephalic.      Mouth/Throat:      Pharynx: No oropharyngeal exudate.   Eyes:      Pupils: Pupils are equal, round, and reactive to light.   Cardiovascular:      Rate and Rhythm: Normal rate and regular rhythm.      Heart sounds: Normal heart sounds.   Pulmonary:      Effort: Pulmonary effort is normal.      Breath sounds: Normal breath sounds.   Chest:   Breasts:     Right: Normal.      Left: Normal.   Abdominal:      General: Bowel sounds are normal. There is no distension.      Palpations: Abdomen is soft.      Tenderness: There is no abdominal tenderness.   Musculoskeletal:      Cervical back: Normal range of motion.   Lymphadenopathy:      Cervical: No cervical adenopathy.      Upper Body:      Right upper body: No supraclavicular or axillary adenopathy.      Left upper body: No supraclavicular or axillary adenopathy.   Skin:     General: Skin is warm and dry.      Findings: No rash.   Neurological:      Mental Status: She is alert and oriented to person, place, and time.   Psychiatric:         Behavior: Behavior normal.             Assessment:       1. Malignant neoplasm of upper-outer quadrant of left breast in female, estrogen receptor positive        2. Secondary and unspecified malignant neoplasm of axilla and upper limb lymph nodes        3. Use of aromatase inhibitors         4. CKD (chronic kidney disease) stage 2, GFR 60-89 ml/min        5. Essential hypertension        6. Mixed hyperlipidemia        7. Atherosclerosis of aorta        8. Family history of breast cancer        9. Age-related osteoporosis without current pathological fracture           Plan:        I had a long discussion with her and her daughter.  She will remain on letrozole, and see me again in 6 months   Given her the fact that she had N2 disease, Dr. Harris would recommend that she be treated for 7 years with an aromatase inhibitor.  She had started adjuvant letrozole in the summer of 2018, and she will therefore complete 7 years in the summer of 2025.    A prescription for ketoconazole cream was given to her daughter, as needed for rash under the breast  Her multiple questions were answered to her satisfaction.    Return to clinic in 6 months with TIM appointment and imaging.     Patient is in agreement with the proposed treatment plan. All questions were answered to the patient's satisfaction. Patient knows to call clinic for any new or worsening symptoms and if anything is needed before the next clinic visit.          Lashawn Araujo, FNP-C  Hematology & Medical Oncology   [address] [place] [id number]  ph. [phone]  Fax. [phone]    Collaborating physician, Dr. Camarillo.    Approximately 12 minutes were spent face-to-face with the patient.  Approximately 15 minutes in total were spent on this encounter, which includes face-to-face time and non-face-to-face time preparing to see the patient (e.g., review of tests), obtaining and/or reviewing separately obtained history, documenting clinical information in the electronic or other health record, independently interpreting results (not separately reported) and communicating results to the patient/family/caregiver, or care coordination (not separately reported).       Route Chart for Scheduling    Med Onc Chart Routing      Follow up  with physician    Follow up with TIM 6 months.   Infusion scheduling note    Injection scheduling note    Labs    Imaging Mammogram   due in May   Pharmacy appointment    Other referrals

## 2024-11-07 ENCOUNTER — HOSPITAL ENCOUNTER (OUTPATIENT)
Facility: OTHER | Age: 89
Discharge: HOME | End: 2024-11-07
Attending: ANESTHESIOLOGY | Admitting: ANESTHESIOLOGY
Payer: MEDICARE

## 2024-11-07 VITALS
DIASTOLIC BLOOD PRESSURE: 77 MMHG | OXYGEN SATURATION: 91 % | WEIGHT: 147 LBS | BODY MASS INDEX: 26.05 KG/M2 | RESPIRATION RATE: 16 BRPM | HEIGHT: 63 IN | HEART RATE: 68 BPM | TEMPERATURE: 99 F | SYSTOLIC BLOOD PRESSURE: 194 MMHG

## 2024-11-07 DIAGNOSIS — G89.29 CHRONIC PAIN: ICD-10-CM

## 2024-11-07 DIAGNOSIS — M54.16 LUMBAR RADICULOPATHY: Primary | ICD-10-CM

## 2024-11-07 PROCEDURE — 25000003 PHARM REV CODE 250: Performed by: ANESTHESIOLOGY

## 2024-11-07 PROCEDURE — 64483 NJX AA&/STRD TFRM EPI L/S 1: CPT | Mod: 50,,, | Performed by: ANESTHESIOLOGY

## 2024-11-07 PROCEDURE — 25500020 PHARM REV CODE 255: Performed by: ANESTHESIOLOGY

## 2024-11-07 PROCEDURE — 63600175 PHARM REV CODE 636 W HCPCS: Performed by: ANESTHESIOLOGY

## 2024-11-07 PROCEDURE — 64483 NJX AA&/STRD TFRM EPI L/S 1: CPT | Mod: 50 | Performed by: ANESTHESIOLOGY

## 2024-11-07 RX ORDER — SODIUM CHLORIDE 9 MG/ML
INJECTION, SOLUTION INTRAVENOUS CONTINUOUS
Status: DISCONTINUED | OUTPATIENT
Start: 2024-11-07 | End: 2024-11-07 | Stop reason: HOSPADM

## 2024-11-07 RX ORDER — DEXAMETHASONE SODIUM PHOSPHATE 10 MG/ML
INJECTION INTRAMUSCULAR; INTRAVENOUS
Status: DISCONTINUED | OUTPATIENT
Start: 2024-11-07 | End: 2024-11-07 | Stop reason: HOSPADM

## 2024-11-07 RX ORDER — ALPRAZOLAM 0.5 MG/1
0.5 TABLET, ORALLY DISINTEGRATING ORAL ONCE
Status: COMPLETED | OUTPATIENT
Start: 2024-11-07 | End: 2024-11-07

## 2024-11-07 RX ORDER — LIDOCAINE HYDROCHLORIDE 20 MG/ML
INJECTION, SOLUTION INFILTRATION; PERINEURAL
Status: DISCONTINUED | OUTPATIENT
Start: 2024-11-07 | End: 2024-11-07 | Stop reason: HOSPADM

## 2024-11-07 RX ORDER — LIDOCAINE HYDROCHLORIDE 10 MG/ML
INJECTION, SOLUTION EPIDURAL; INFILTRATION; INTRACAUDAL; PERINEURAL
Status: DISCONTINUED | OUTPATIENT
Start: 2024-11-07 | End: 2024-11-07 | Stop reason: HOSPADM

## 2024-11-07 RX ADMIN — ALPRAZOLAM 0.5 MG: 0.5 TABLET, ORALLY DISINTEGRATING ORAL at 11:11

## 2024-11-07 NOTE — H&P
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 choroidal neovascularization 8/25/2022    Family history of breast cancer 1/17/2018    GERD (gastroesophageal reflux disease)     Hypertension     Infiltrating ductal carcinoma of breast, left 7/12/2018    Multiple closed fractures of ribs of left side 4/3/2019    Osteoporosis, senile     Ovarian mass 1/15/2018    Pneumonia     S/P robotic assisted laparoscopic BSO (bilateral salpingo-oophorectomy) 2/23/2018     Past Surgical History:   Procedure Laterality Date    AXILLARY NODE DISSECTION Left 7/12/2018    Procedure: LYMPHADENECTOMY, AXILLARY;  Surgeon: Amanda Caballero MD;  Location: [facility] OR [address] [place] [address];  Service: General;  Laterality: Left;    BREAST BIOPSY      BREAST LUMPECTOMY      CATARACT EXTRACTION      CHOLECYSTECTOMY      COLONOSCOPY      ESOPHAGOGASTRODUODENOSCOPY      ESOPHAGOGASTRODUODENOSCOPY N/A 10/14/2019    Procedure: EGD (ESOPHAGOGASTRODUODENOSCOPY);  Surgeon: Davonte Thompson MD;  Location: [address]);  Service: Endoscopy;  Laterality: N/A;  hx of pulmonary hypertension-PA 50     pt requesting ASAP    EYE SURGERY      FIXATION KYPHOPLASTY N/A 11/8/2018    Procedure: Kyphoplasty   T12;  Surgeon: Merly Wilcox MD;  Location: [facility] CATH LAB;  Service: Pain Management;  Laterality: N/A;  T12  [place] Reps e-mailed with date and time    HYSTERECTOMY      INJECTION FOR SENTINEL NODE IDENTIFICATION Left 7/12/2018    Procedure: INJECTION, FOR  SENTINEL NODE IDENTIFICATION;  Surgeon: Amanda Caballero MD;  Location: [facility] OR 2ND FLR;  Service: General;  Laterality: Left;    INJECTION OF FACET JOINT Bilateral 10/15/2018    Procedure: INJECTION, FACET JOINT, BILATERAL LUMBAR L3-4, 4-5, 5-S1 FACET JOINT INJECTIONS;  Surgeon: Merly Wilcox MD;  Location: [facility] PAIN MGT;  Service: Pain Management;  Laterality: Bilateral;    INJECTION OF FACET JOINT Bilateral 4/1/2019    Procedure: INJECTION, FACET JOINT, L3-L4,L4-L5,L5-S1;  Surgeon: Merly Wilcox MD;  Location: [facility] PAIN MGT;  Service: Pain Management;  Laterality: Bilateral;    INJECTION OF FACET JOINT Bilateral 6/20/2019    Procedure: INJECTION, FACET JOINT, L3-L4,L4-L5,L5-S1 NEED CONSENT;  Surgeon: Merly Wilcox MD;  Location: [facility] PAIN MGT;  Service: Pain Management;  Laterality: Bilateral;    INJECTION OF FACET JOINT Bilateral 7/27/2020    Procedure: INJECTION, FACET JOINT BILATERAL L3/4, L4/5 and L5/S1;  Surgeon: Merly Wilcox MD;  Location: [facility] PAIN MGT;  Service: Pain Management;  Laterality: Bilateral;    INJECTION OF JOINT Bilateral 7/27/2020    Procedure: INJECTION, JOINT, BILATERAL GREATER TROCHANTERIC BURSA;  Surgeon: Merly Wilcox MD;  Location: [facility]IN [facility]GT;  Service: Pain Management;  Laterality: Bilateral;    INJECTION OF JOINT Bilateral 4/26/2021    Procedure: INJECTION, JOINT, HIP;  Surgeon: Merly Wilcox MD;  Location: [facility] PAIN MGT;  Service: Pain Management;  Laterality: Bilateral;  consent needed    INJECTION OF STEROID Right 11/15/2021    Procedure: INJECTION, STEROID RIGHT MIDDLE AND SAMLL FINGER;  Surgeon: Florecita Da Silva MD;  Location: [facility] OR;  Service: Orthopedics;  Laterality: Right;    MASTECTOMY, PARTIAL Left 7/12/2018    Procedure: MASTECTOMY, PARTIAL-W/SEED LOCALIZATION;  Surgeon: Amanda Caballero MD;  Location: [facility] OR 2ND FLR;  Service: General;  Laterality: Left;    IL REMOVAL OF OVARY/TUBE(S)  02/23/2018    Robotic Assisted    ROTATOR CUFF REPAIR Right     rotator cuff repair  right shoulder    TONSILLECTOMY      TRANSFORAMINAL EPIDURAL INJECTION OF STEROID Bilateral 6/21/2021    Procedure: INJECTION, STEROID, EPIDURAL, TRANSFORAMINAL APPROACH  bilateral L4/5 TF ASHUTOSH;  Surgeon: Merly Wilcox MD;  Location: [facility] PAIN MGT;  Service: Pain Management;  Laterality: Bilateral;  consent needed    TRANSFORAMINAL EPIDURAL INJECTION OF STEROID Bilateral 1/3/2022    Procedure: INJECTION, STEROID, EPIDURAL, TRANSFORAMINAL APPROACH Bilateral L4/5 Needs consent;  Surgeon: Merly Wilcox MD;  Location: [facility] PAIN MGT;  Service: Pain Management;  Laterality: Bilateral;    TRANSFORAMINAL EPIDURAL INJECTION OF STEROID Bilateral 8/9/2022    Procedure: INJECTION, STEROID, EPIDURAL, TRANSFORAMINAL APPROACH, BILATERAL L4-L5   MEDICALLY URGENT;  Surgeon: Merly Wilcox MD;  Location: [facility] PAIN MGT;  Service: Pain Management;  Laterality: Bilateral;    TRANSFORAMINAL EPIDURAL INJECTION OF STEROID Left 11/17/2022    Procedure: INJECTION, STEROID, EPIDURAL, TRANSFORAMINAL APPROACH LEFT L3-L4 AND L4-L5 CONTRAST;  Surgeon: Merly Wilcox MD;  Location: [facility] PAIN MGT;  Service: Pain Management;  Laterality: Left;    TRANSFORAMINAL EPIDURAL INJECTION OF STEROID Bilateral 7/31/2023    Procedure: INJECTION, STEROID, EPIDURAL, TRANSFORAMINAL APPROACH, BILATERAL L5/S1  sooner date;  Surgeon: Merly Wilcox MD;  Location: [facility] PAIN MGT;  Service: Pain Management;  Laterality: Bilateral;    TRIGGER FINGER RELEASE Left 11/15/2021    Procedure: RELEASE, TRIGGER FINGER, LEFT MIDDLE AND RING;  Surgeon: Florecita Da Silva MD;  Location: [facility] OR;  Service: Orthopedics;  Laterality: Left;     Review of patient's allergies indicates:   Allergen Reactions    Levaquin [levofloxacin] Itching    Penicillins Itching      Current Facility-Administered Medications   Medication    0.9%  NaCl infusion    alprazolam ODT dissolvable tablet 0.5 mg       PMHx, PSHx, Allergies, Medications reviewed in epic    ROS negative except pain complaints in  HPI    OBJECTIVE:    LMP  (LMP Unknown)   Breastfeeding No     PHYSICAL EXAMINATION:    GENERAL: Well appearing, in no acute distress, alert and oriented x3.  PSYCH:  Mood and affect appropriate.  SKIN: Skin color, texture, turgor normal, no rashes or lesions which will impact the procedure.  CV: RRR with palpation of the radial artery.  PULM: No evidence of respiratory difficulty, symmetric chest rise. Clear to auscultation.  NEURO: Cranial nerves grossly intact.    Plan:    Proceed with procedure as planned Procedure(s) (LRB):  LUMBAR TRANSFORAMINAL BILATERAL L5/S1 *ASPIRIN OTC* HOLD FOR 5 DAYS (Bilateral)    Merly Wilcox  11/07/2024

## 2024-11-07 NOTE — OP NOTE
Lumbar Transforaminal Epidural Steroid Injection under Fluoroscopic Guidance    The procedure, risks, benefits, and options were discussed with the patient. There are no contraindications to the procedure. The patent expressed understanding and agreed to the procedure. Informed written consent was obtained prior to the start of the procedure and can be found in the patient's chart.    PATIENT NAME: Anahi Cook   MRN: [id number]     DATE OF PROCEDURE: 11/07/2024    PROCEDURE:  Bilateral  L5/S1 Lumbar Transforaminal Epidural Steroid Injection under Fluoroscopic Guidance    PRE-OP DIAGNOSIS: Lumbar radiculopathy [M54.16] Lumbar radiculopathy [M54.16]    POST-OP DIAGNOSIS: Same    PHYSICIAN: Merly Wilcox MD    ASSISTANTS: Josh Christina MD  [facility] Fellow       MEDICATIONS INJECTED: Preservative-free Decadron 10mg with 5cc of Lidocaine 1% MPF     LOCAL ANESTHETIC INJECTED: Xylocaine 2%     SEDATION: None    ESTIMATED BLOOD LOSS: None    COMPLICATIONS: None    TECHNIQUE: Time-out was performed to identify the patient and procedure to be performed. With the patient laying in a prone position, the surgical area was prepped and draped in the usual sterile fashion using ChloraPrep and a fenestrated drape.The levels were determined under fluoroscopy guidance. Skin anesthesia was achieved by injecting Lidocaine 2% over the injection sites. The transforaminal spaces were then approached with a 22 gauge, 3.5 inch spinal quinke needle that was introduced under fluoroscopic guidance in the AP and Lateral views. Once the needle tip was in the area of the transforaminal space, and there was no blood, CSF or paraesthesias, contrast dye Omnipaque (300mg/mL) was injected to confirm placement and there was no vascular runoff. Fluoroscopic imaging in the AP and lateral views revealed a clear outline of the spinal nerve with proximal spread of agent through the neural foramen into the epidural space. 3 mL of the medication mixture  listed above was injected slowly at each site. Displacement of the radio opaque contrast after injection of the medication confirmed that the medication went into the area of the transforaminal spaces. The needles were removed and bleeding was nil. A sterile dressing was applied. No specimens collected. The patient tolerated the procedure well.     PRE-PROCEDURE PAIN SCORE: 9/10    POST-PROCEDURE PAIN SCORE: 0/10    The patient was monitored after the procedure in the recovery area. They were given post-procedure and discharge instructions to follow at home. The patient was discharged in a stable condition.    I reviewed and edited the fellow's note. I conducted my own interview and physical examination. I agree with the findings. I was present and supervising all critical portions of the procedure.    Josh Christina MD

## 2024-11-07 NOTE — DISCHARGE SUMMARY
Discharge Note  Short Stay      SUMMARY     Admit Date: 11/7/2024    Attending Physician: Merly Wilcox MD    Discharge Physician: Merly Wilcox MD      Discharge Date: 11/7/2024 11:38 AM    Procedure(s) (LRB):  LUMBAR TRANSFORAMINAL BILATERAL L5/S1 *ASPIRIN OTC* HOLD FOR 5 DAYS (Bilateral)    Final Diagnosis: Lumbar radiculopathy [M54.16]    Disposition: Home or self care    Patient Instructions:   Current Discharge Medication List        CONTINUE these medications which have NOT CHANGED    Details   acetaminophen (TYLENOL) 650 MG TbSR Take 650 mg by mouth every 8 (eight) hours as needed.      albuterol-ipratropium (DUO-NEB) 2.5 mg-0.5 mg/3 mL nebulizer solution Take 3 mLs by nebulization every 6 (six) hours as needed for Wheezing. Rescue  Qty: 75 mL, Refills: 0      alpha-D-galactosidase (BEANO ORAL) Take 1 capsule by mouth 2 (two) times a day.      aspirin (ECOTRIN) 81 MG EC tablet Take 81 mg by mouth once daily.      atorvastatin (LIPITOR) 20 MG tablet TAKE 1 TABLET(20 MG) BY MOUTH EVERY EVENING  Qty: 90 tablet, Refills: 1    Associated Diagnoses: Hyperlipidemia, unspecified hyperlipidemia type; Atherosclerosis of aorta      azithromycin (Z-GILL) 250 MG tablet Take 2 tablets by mouth Day 1 then 1 tab every day until finished.  Qty: 6 tablet, Refills: 0      benazepriL (LOTENSIN) 40 MG tablet Take 1 tablet (40 mg total) by mouth once daily.  Qty: 90 tablet, Refills: 2    Associated Diagnoses: Hypertensive heart disease without heart failure      BREO ELLIPTA 100-25 mcg/dose diskus inhaler Inhale 1 puff into the lungs once daily.      calcium-vitamin D 250-100 mg-unit per tablet Take 1 tablet by mouth 2 (two) times daily.      CETIRIZINE HCL (ZYRTEC ORAL) Take 10 mg by mouth nightly as needed.       citalopram (CELEXA) 20 MG tablet TAKE 1 TABLET(20 MG) BY MOUTH EVERY EVENING  Qty: 90 tablet, Refills: 1    Associated Diagnoses: Depression with anxiety      felodipine (PLENDIL) 10 MG 24 hr tablet Take 1 tablet (10 mg  total) by mouth 2 (two) times a day.  Qty: 180 tablet, Refills: 2    Comments: .  Associated Diagnoses: Hypertensive heart disease without heart failure      hydrALAZINE (APRESOLINE) 25 MG tablet TAKE 1 TABLET(25 MG) BY MOUTH EVERY 12 HOURS  Qty: 180 tablet, Refills: 0    Associated Diagnoses: Hypertensive heart disease without heart failure      hydrocortisone 2.5 % cream Apply topically 2 (two) times daily.  Qty: 1 each, Refills: 0    Associated Diagnoses: Hemorrhoids, unspecified hemorrhoid type      INCRUSE ELLIPTA 62.5 mcg/actuation inhalation capsule Inhale into the lungs once daily.      ipratropium-albuteroL (COMBIVENT RESPIMAT)  mcg/actuation inhaler Inhale 1 puff into the lungs.      ketoconazole (NIZORAL) 2 % cream Apply twice a day to affected area  Qty: 30 g, Refills: 1    Associated Diagnoses: Malignant neoplasm of upper-outer quadrant of left breast in female, estrogen receptor positive; Secondary and unspecified malignant neoplasm of axilla and upper limb lymph nodes; Use of aromatase inhibitors      ketoprofen 10 % CrPk Apply topically.      letrozole (FEMARA) 2.5 mg Tab TAKE 1 TABLET(2.5 MG) BY MOUTH EVERY DAY  Qty: 90 tablet, Refills: 3    Associated Diagnoses: Malignant neoplasm of nipple of left breast in female, estrogen receptor positive      melatonin 10 mg Tab Take by mouth every evening.      montelukast (SINGULAIR) 10 mg tablet Take 10 mg by mouth every evening.      multivitamin (THERAGRAN) per tablet Take 1 tablet by mouth once daily.      polyethylene glycol 3350 (MIRALAX ORAL) Take by mouth once daily.      potassium chloride (MICRO-K) 10 MEQ CpSR Take 1 capsule (10 mEq total) by mouth once daily.  Qty: 90 capsule, Refills: 1    Associated Diagnoses: Diuretic-induced hypokalemia      pregabalin (LYRICA) 25 MG capsule Take 1 capsule (25 mg total) by mouth once daily.  Qty: 30 capsule, Refills: 1    Associated Diagnoses: Lumbar radiculopathy      torsemide (DEMADEX) 20 MG Tab Take  1 tablet (20 mg total) by mouth once daily.  Qty: 90 tablet, Refills: 2    Associated Diagnoses: Hypertensive heart disease without heart failure      traMADoL (ULTRAM) 50 mg tablet Take 1 tablet (50 mg total) by mouth every 8 (eight) hours as needed for Pain.  Qty: 90 tablet, Refills: 2    Comments: Quantity prescribed more than 7 day supply? Yes, quantity medically necessary.  Associated Diagnoses: Primary osteoarthritis of right knee; Lumbar spondylosis      triamcinolone acetonide 0.1% (KENALOG) 0.1 % cream AAA bid.  Spot treatment as needed  Qty: 45 g, Refills: 0    Associated Diagnoses: Eczema, unspecified type      triamcinolone acetonide 0.1% (KENALOG) 0.1 % paste as needed.                 Discharge Diagnosis: Lumbar radiculopathy [M54.16]  Condition on Discharge: Stable with no complications to procedure   Diet on Discharge: Same as before.  Activity: as per instruction sheet.  Discharge to: Home with a responsible adult.  Follow up: 2-4 weeks       Please call my office or pager at [phone] if experienced any weakness or loss of sensation, fever > 101.5, pain uncontrolled with oral medications, persistent nausea/vomiting/or diarrhea, redness or drainage from the incisions, or any other worrisome concerns. If physician on call was not reached or could not communicate with our office for any reason please go to the nearest emergency department     Josh Christina MD

## 2024-11-07 NOTE — DISCHARGE INSTRUCTIONS

## 2024-11-09 ENCOUNTER — PATIENT MESSAGE (OUTPATIENT)
Dept: ADMINISTRATIVE | Facility: OTHER | Age: 89
End: 2024-11-09
Payer: MEDICARE

## 2024-11-19 ENCOUNTER — OFFICE VISIT (OUTPATIENT)
Dept: HEMATOLOGY/ONCOLOGY | Facility: CLINIC | Age: 89
End: 2024-11-19
Payer: MEDICARE

## 2024-11-19 VITALS
HEART RATE: 64 BPM | DIASTOLIC BLOOD PRESSURE: 63 MMHG | BODY MASS INDEX: 26.05 KG/M2 | SYSTOLIC BLOOD PRESSURE: 137 MMHG | HEIGHT: 63 IN | WEIGHT: 147 LBS | OXYGEN SATURATION: 96 % | TEMPERATURE: 98 F

## 2024-11-19 DIAGNOSIS — N18.2 CKD (CHRONIC KIDNEY DISEASE) STAGE 2, GFR 60-89 ML/MIN: Chronic | ICD-10-CM

## 2024-11-19 DIAGNOSIS — M81.0 AGE-RELATED OSTEOPOROSIS WITHOUT CURRENT PATHOLOGICAL FRACTURE: ICD-10-CM

## 2024-11-19 DIAGNOSIS — E78.2 MIXED HYPERLIPIDEMIA: Chronic | ICD-10-CM

## 2024-11-19 DIAGNOSIS — C50.412 MALIGNANT NEOPLASM OF UPPER-OUTER QUADRANT OF LEFT BREAST IN FEMALE, ESTROGEN RECEPTOR POSITIVE: Primary | Chronic | ICD-10-CM

## 2024-11-19 DIAGNOSIS — C77.3 SECONDARY AND UNSPECIFIED MALIGNANT NEOPLASM OF AXILLA AND UPPER LIMB LYMPH NODES: ICD-10-CM

## 2024-11-19 DIAGNOSIS — I70.0 ATHEROSCLEROSIS OF AORTA: ICD-10-CM

## 2024-11-19 DIAGNOSIS — Z80.3 FAMILY HISTORY OF BREAST CANCER: Chronic | ICD-10-CM

## 2024-11-19 DIAGNOSIS — Z12.31 ENCOUNTER FOR SCREENING MAMMOGRAM FOR MALIGNANT NEOPLASM OF BREAST: ICD-10-CM

## 2024-11-19 DIAGNOSIS — Z17.0 MALIGNANT NEOPLASM OF UPPER-OUTER QUADRANT OF LEFT BREAST IN FEMALE, ESTROGEN RECEPTOR POSITIVE: Primary | Chronic | ICD-10-CM

## 2024-11-19 DIAGNOSIS — Z79.811 USE OF AROMATASE INHIBITORS: Chronic | ICD-10-CM

## 2024-11-19 DIAGNOSIS — I10 ESSENTIAL HYPERTENSION: Chronic | ICD-10-CM

## 2024-11-19 PROCEDURE — 3288F FALL RISK ASSESSMENT DOCD: CPT | Mod: CPTII,S$GLB,, | Performed by: NURSE PRACTITIONER

## 2024-11-19 PROCEDURE — 1126F AMNT PAIN NOTED NONE PRSNT: CPT | Mod: CPTII,S$GLB,, | Performed by: NURSE PRACTITIONER

## 2024-11-19 PROCEDURE — 1160F RVW MEDS BY RX/DR IN RCRD: CPT | Mod: CPTII,S$GLB,, | Performed by: NURSE PRACTITIONER

## 2024-11-19 PROCEDURE — 1101F PT FALLS ASSESS-DOCD LE1/YR: CPT | Mod: CPTII,S$GLB,, | Performed by: NURSE PRACTITIONER

## 2024-11-19 PROCEDURE — 1159F MED LIST DOCD IN RCRD: CPT | Mod: CPTII,S$GLB,, | Performed by: NURSE PRACTITIONER

## 2024-11-19 PROCEDURE — 99214 OFFICE O/P EST MOD 30 MIN: CPT | Mod: S$GLB,,, | Performed by: NURSE PRACTITIONER

## 2024-11-19 PROCEDURE — 99999 PR PBB SHADOW E&M-EST. PATIENT-LVL III: CPT | Mod: PBBFAC,,, | Performed by: NURSE PRACTITIONER

## 2024-11-19 PROCEDURE — G2211 COMPLEX E/M VISIT ADD ON: HCPCS | Mod: S$GLB,,, | Performed by: NURSE PRACTITIONER

## 2024-11-20 ENCOUNTER — OFFICE VISIT (OUTPATIENT)
Dept: SPINE | Facility: CLINIC | Age: 89
End: 2024-11-20
Payer: MEDICARE

## 2024-11-20 DIAGNOSIS — M54.16 LUMBAR RADICULOPATHY: Primary | ICD-10-CM

## 2024-11-20 DIAGNOSIS — M47.816 LUMBAR SPONDYLOSIS: ICD-10-CM

## 2024-11-20 DIAGNOSIS — M51.362 DEGENERATION OF INTERVERTEBRAL DISC OF LUMBAR REGION WITH DISCOGENIC BACK PAIN AND LOWER EXTREMITY PAIN: ICD-10-CM

## 2024-11-20 PROCEDURE — 1160F RVW MEDS BY RX/DR IN RCRD: CPT | Mod: CPTII,95,, | Performed by: NURSE PRACTITIONER

## 2024-11-20 PROCEDURE — 1159F MED LIST DOCD IN RCRD: CPT | Mod: CPTII,95,, | Performed by: NURSE PRACTITIONER

## 2024-11-20 PROCEDURE — 99213 OFFICE O/P EST LOW 20 MIN: CPT | Mod: 95,,, | Performed by: NURSE PRACTITIONER

## 2024-11-20 NOTE — PROGRESS NOTES
Chronic Pain- Established Visit  Chronic Pain-Tele-Medicine-Established Note (Follow up visit)        The patient location is: Home  The chief complaint leading to consultation is: pain  Visit type: Virtual visit with synchronous audio and video  Total time spent with patient: 25 min  Each patient to whom he or she provides medical services by telemedicine is:  (1) informed of the relationship between the physician and patient and the respective role of any other health care provider with respect to management of the patient; and (2) notified that he or she may decline to receive medical services by telemedicine and may withdraw from such care at any time.        SUBJECTIVE:    Interval History 11/20/2024:  The patient returns to clinic today for follow up of back pain via virtual visit. She is s/p bilateral L5/S1 TF ASHUTOSH on 11/7/2024. She reports 50% relief of her pain. She reports intermittent low back and leg pain. This is worse in the morning. She is performing home exercise. She denies any other health changes.     Interval History 7/3/2024:  The patient returns to clinic today for follow up of pain via virtual visit. She reports increased bilateral hip and knee pain. Her hip pain is worse with laying on her sides. Her knee pain is worse with standing and walking. She also notes low back pain. She continues to report burning pain into her feet. She weaned off the Gabapentin and her swelling has improved. She also notes bilateral shoulder pain. She does take Tramadol per her PCP as needed. She denies any other health changes.     Interval History 12/5/2023:  The patient returns to clinic today for follow up of pain via virtual visit. Since last visit, she was admitted for pneumonia. She is currently on oxygen. She did see Pulmonology last week and has upcoming tests. She reports increased bilateral knee pain. This is worse with standing and walking. She also reports intermittent low back pain. She does have  burning pain into her heels. She is having some increased lower extremity edema. She is taking Gabapentin but concerned that this may be contributing to swelling. She denies any other health changes.     Interval History 8/16/2023:  The patient returns to clinic today for follow up of back pain via virtual visit. She is s/p left AC joint injection on 7/5/2023. She is s/p bilateral L5/S1 TF ASHUTOSH on 7/31/2023. She reports 75% relief of her low back pain. She also reports 75% relief of her shoulder pain. She continues to report intermittent low back pain. She reports burning pain into her bilateral heels. She is using a topical pain cream. She takes Gabapentin with benefit. She denies any other health changes.     Interval History 7/5/2023:  The patient returns to clinic today for follow up of low back pain. She had a fall two weeks ago. She did go to Urgent Care where xrays are negative for acute injury. She reports increased left shoulder and clavicle pain. This pain is worse with activity. She is having trouble dressing herself due to pain. She has increased her Tylenol intake. She is using a topical pain cream. She is using ice and heat. She is taking Gabapentin. She continues to report low back pain, she is scheduled for ASHUTOSH at the end of the month. Her pain today is 9/10.    Interval History 6/9/2023:  The patient returns to clinic today for follow up of back pain via virtual visit. She reports increased low back pain that radiates into anterior and posterior legs to her ankles. Her pain is worse with standing and walking. She is taking Gabapentin at bedtime. She also takes Tylenol for pain. She denies any other health changes.     Interval History 3/9/2023:  The patient returns to clinic today for follow up of back pain via virtual visit. She reports intermittent low back pain. She denies any radicular leg pain. She is using an acupressure knee brace with benefit. Her pain is tolerable at this time. She continues  to take Gabapentin with benefit. She denies any other health changes.     Interval History 12/9/2022:  The patient returns to clinic today for follow up of low back pain via virtual visit. She is s/p left L3/4 and L4/5 TF ASHUTOSH on 11/17/2022. She reports 60% relief of her pain. She continues to report intermittent low back pain. She denies any radicular leg pain. She does report bilateral foot pain, described as burning in nature. Her pain is tolerable at this time. She continues to take Gabapentin. She also takes Tramadol per her PCP. She denies any other health changes.     Interval History 10/20/22  Ms. Cook complains of worsening left low back pain radiating down her lateral thigh to the knee. The pain is only when she walks/moves, she in pain free when sitting. She is s/p bilateral TESI L4/5 2 months ago, which she had relief from, but acutely worsened when she bent over to pick something off the ground. She states the pain is similar to her pain prior to this. She denies numbness, tingling, bladder/bowel problems.    Interval History 10/6/2022:   Mrs Cook presents for follow up virtually. At prior visit she had Left GTB injection with significant benefit in office. Her lower back and hip pain were further evaluated and noted newer L3 compression Fx. While she visually appears to have significant improvement of pain to me she does voice focal upper/mid lumbar pain persists. Discussed treatment options and Kyphoplasty recommended by Dr Wilcox but there was hesitation regarding further escalation to kyphoplasty. We did discusses prior osteoporosis diagnosis and considering treatment options with PCP. She does not voice any s/s concerning for cauda equina.      Interval History 8/22/2022:  Mrs Cook presents for follow up acute pain. She is s/p repeat Bilateral L4/5 TFESI with mild improvement but having severe left lateral hip pain. She also continues to have burning pain to Right heel and numbness to right  lateral lower leg. She has chronic urinary continence but no new neurological issues.She continues to take tramadol by PCP with some benefit     Interval History 8/5/2022:  Mrs Cook presents for follow up evaluation of returning lower back pain. She has exact pain relieved prior by B L4/5 TFESIs. She has newer complaint of bilateral feet burning pains as well. She has had JESSE noting arterial issues R>L. She continues to take Tramadol and neurontin without SE.She would like to repeat ASHUTOSH as it provided 7 months of 75% relief and symptoms returning severe over last few weeks. No voicing of symptoms concerning for cauda equina.     Interval History 12/6/2021:  The patient returns to clinic today for follow up of low back pain. She reports increased low back and bilateral hip pain over the last two weeks. She reports intermittent radiating pain into her lateral thighs. She does have difficulty sleeping due to pain. Her pain is also worse with standing and walking. She continues to take Gabapentin with benefit. She also takes Tramadol per her PCP with benefit. She denies any other health changes. Her pain today is 8/10.    Interval History 7/6/2021:  The patient returns to clinic today for follow up of low back pain via virtual visit. She is s/p bilateral L4/5 TF ASHUTOSH on 6/21/2021. She reports 75% relief of her low back and leg pain. She reports low back pain, worse in the morning. She reports intermittent radiating pain into the posterolateral aspect of both legs to her knees. She continues to take Gabapentin with benefit. She denies any weakness. She denies any other health changes. Her pain today is 2/10.    Interval History 6/8/2021:  The patient returns to clinic today for follow up of low back pain. She is here today for imaging review. She continues to report low back pain that radiates into the posterolateral aspect of both legs to below her knee. Her pain is worse with prolonged standing and walking. She does  feel like she will fall with prolonged walking. She also reports relief with bending forward. She is currently taking Gabapentin at bedtime with benefit. She continues to take Tramadol per her PCP with benefit. She denies any other health changes. Her pain today is 8/10.    Interval History 5/20/2021:  The patient returns to clinic today for follow up of low back pain. She is s/p bilateral hip joint injections on 4/26/2021. She reports no relief. She reports increased low back pain that radiates into the posterior aspect of both legs to her ankles. She does report numbness to her feet. Her pain is worse with prolonged standing and walking. She feels as though her legs will give out with prolonged walking. She denies any bowel or bladder incontinence. She denies any other health changes. Her pain today is 9/10.    Interval History 3/23/2021:  The patient returns to clinic today for follow up of low back and hip pain. She reports increased bilateral hip pain. She describes this pain as deep and aching. Her pain is worse with prolonged standing and walking. She also reports low back pain that is aching in nature. She denies any radicular leg pain. Of note, she reports increased swelling and pain to her left ankle and foot. She does report a trip last week. She did see Orthopedics today and is scheduled for xray. She denies any other health changes. Her pain today is 8/10.    Interval History 12/9/2020:  She returns for follow-up.  She is doing well since her facet injections.  Her right hip is hurting.  It interferes with her mobility and comfort while laying down.  No new symptomatology otherwise.    Interval History 8/11/2020:  The patient has a virtual visit today for follow up.  She is s/p Bilateral L3-4, L4-5 L5-S1 facet joint injections with 90% relief.  She says that her back pain is minimal at this time.  She is able to move around and walk better since the procedure as well.  She is having some left hip pain  and feels as though her leg will give out sometimes when walking.  She would like a referral to ortho close to her home in [facility].  Her pain today is 1/10.  She denies any respiratory changes since procedure including fever, cough or SOB.    Interval History 12/5/2019:  The patient is here for follow up of lower back and bilateral hip pain.  Her back pain has been minimal.  She is having increased lateral hip and buttock pain recently.  She had TPIs in the past and would like to repeat this today.  Her pain today is 8/10.    Previous Encounter:  Anahi Cook presents to the clinic for a follow-up appointment for lower back and left lower extremity pain.  She had T12 kyphoplasty performed on 11/8/18 with significant improvement in symptoms.  She was admitted through the ED on 4/3/19 after suffering fractures of 6th, 8th and possible 7th ribs of the left side after a fall at home.  Thoracic imaging also showed stable slight anterior wedging at T11.  She had bilateral L3-4, L4-5 and L5-S1 facet injections on 4/1/19 with 80% relief of lower back pain.  Her current pain is minimal.  She has mild leg pain with walking.  She continues to follow up with oncology regularly.  Since the last visit, Anahi Cook states the pain has been improving.  Current pain intensity is 2/10.     Pain Medications:  OTC Tylenol    Opioid Contract: no     report:  Not applicable    Pain Procedures:   7/21/14 L4-5 IL ASHUTOSH- significant benefit  12/1/15 Left GT bursa injection  4/26/16 Left GT bursa injection  12/15/16 L4-5 IL ASHUTOSH- significant benefit  2/15/18 L4-5 IL ASHUTOSH- 100% relief  8/21/18 L4-5 IL ASHUTOSH- significant relief  10/15/18 Bilateral L3-4, L4-5 L5-S1 facet joint injections  11/8/18 T12 kyphoplasty- significant relief  4/1/19 Bilateral L3-4, L4-5 L5-S1 facet joint injections- 80% relief  7/27/20 Bilateral L3-4, L4-5 L5-S1 facet joint injections- 90% relief  4/26/2021- Bilateral hip joint injections- no relief    6/21/2021-  Bilateral L4/5 TF ASHUTOSH- 75% relief  8/9/2022- B L4/5 TFESI without significant benefit.   11/17/2022- Left L3/4 and L4/5 TF ASHUTOSH  7/5/2023- Left AC joint injection-75% relief  7/31/2023- Bilateral L5/S1 TF ASHUTOSH- 75% relief  11/7/2024- Bilateral L5/S1 TF ASHUTOSH- 50% relief     Physical Therapy/Home Exercise: yes     Imaging:   MRI LUMBAR SPINE WITHOUT CONTRAST 9/12/2022  COMPARISON:  5/29/2021     FINDINGS:  Chronic vertebral plana T12.  Extension of the posterosuperior cortex into the canal by approximately 6 mm.     There is now height loss at L3, approximately 30%.  Associated STIR signal hyperintensity.  Retropulsion of the posterior cortex into the canal approximately 5 mm.     Remaining vertebral body heights are maintained.     Mild disc space narrowing L4-5.     Grade 1 retrolisthesis L1-2.  Grade 1 anterolisthesis L4-5     Conus terminates appropriately at L1.     Multilevel degenerative change as diesel below:     T11-12: Retropulsion of the posterosuperior cortex of T12 into the canal with mild canal narrowing.     T12-L1: Posterior circumferential disc bulge, asymmetric to the left.  Mild left neural foraminal narrowing.     L1-2: Facet and ligamentum flavum hypertrophic changes with mild left neural foraminal narrowing.     L2-3: Posterior circumferential disc bulge and ligamentum flavum hypertrophy retropulsion of the posterior cortex of L3 into the canal.  Findings result in mild canal narrowing.  Severe right and moderate left neural foraminal narrowing.     L3-4: Posterior circumferential disc bulge.  Facet and ligamentum flavum hypertrophic changes.  Mild canal and moderate bilateral neural foraminal narrowing.     L4-5: Grade 1 anterolisthesis of L4-5 with uncovering of the disc.  Facet and ligamentum flavum hypertrophic changes.  Combinations contribute to severe canal stenosis.  Severe left and moderate right neural foraminal narrowing.     L5-S1: Central canal and neural foramina are well  maintained.     Multiple T2 hyperintense foci in both kidneys, including a thinly septated T2 hyperintense focus on the left measuring 15 mm.     Impression:     Acute fracture along the superior endplate of L3 with approximately 30% height loss.  Stable chronic height loss T12.     Multilevel degenerative change, worst at L4-5 where severe canal stenosis results.     Multilevel neural foraminal narrowing, severe L2-3 and L4-5 as above.     Bilateral renal cysts, including a thinly septated cyst on the left.  Findings can be further evaluated with renal ultrasound.     This report was flagged in Epic as abnormal.    XR LUMBAR SPINE 5 VIEW WITH FLEX AND EXT 8/22/2022  COMPARISON:  Lumbar spine MRI 05/29/2021     FINDINGS:  Previous vertebroplasty T12.     Height loss along the superior endplate of L3 is new compared to that exam.  Height loss is approximately 40%.  There may be mild height loss at L2.     Disc space narrowing and endplate changes L4-5.  Facet arthropathy L4-5.     Grade 1 anterolisthesis L4-5 with no significant change with flexion or extension.     Aortoiliac atherosclerosis.     Impression:     Height loss along the superior endplate of L3, approximately 40%, is new compared to May 2021.  By today's radiographs, there appears to be mild sclerosis along the superior endplate which can be seen with a subacute or chronic fracture.  Correlation with MRI advised if there is recent trauma or acute back pain to exclude the possibility of an acute fracture.     Degenerative change as above.     This report was flagged in Epic as abnormal.    XR HIP WITH PELVIS WHEN PERFORMED, 2 OR 3 VIEWS LEFT 8/22/2022  COMPARISON:  03/24/2022 and 09/09/2020     FINDINGS:  The femoral head is well located with respect to the acetabulum. No acute fracture seen.  Osteophyte formation, subchondral sclerosis, with moderate narrowing femoroacetabular joint bilaterally.     No significant soft tissue edema or radiopaque  retained foreign body.  Aortoiliac atherosclerosis.     Impression:     No acute fracture or dislocation seen.  Moderate osteoarthritis and joint space narrowing.    MRI Lumbar Spine 5/29/2021:  COMPARISON:  Prior MRI from 2014 and 2018     FINDINGS:  There is a transitional lumbosacral segment and spine labeling is as on prior examinations.     Stable severe compression deformity at T12 with retropulsion.  Remaining vertebral body heights are maintained.  Grade 1 anterolisthesis appears increased at L4-5 as compared to previous.  There is no marrow replacement process.  The spinal cord is normal in signal.  Review of paravertebral structures demonstrates high T2 low T1 signal foci within kidneys.     Not included in the field of view of the axial, there is disc bulge at T10-11.  This does not appear to result in a significant degree of spinal canal narrowing on the basis of the sagittal imaging.     T12 demonstrates retropulsion with mild narrowing of the spinal canal.     T12-L1 demonstrates minor disc bulge.     L1-L2 demonstrates mild facet degenerative change.  There is no significant spinal canal stenosis.  There is mild bilateral neural foraminal narrowing.     L2-3 demonstrates facet degenerative change.  There is no significant spinal canal stenosis.  There is right foraminal protrusion.  This results in a moderate degree of right foraminal narrowing.  There is mild left foraminal narrowing.     L3-4 demonstrates facet degenerative change and ligamentum flavum thickening.  There is a facet joint synovial cyst along the posterolateral aspect of the left facet.  There is right foraminal protrusion.  There is mild left and moderate right foraminal narrowing.     L4-5 demonstrates facet degenerative change and ligamentum flavum thickening.  There is anterolisthesis.  There is severe left and moderate right foraminal narrowing.  There is a severe degree of spinal canal narrowing.     L5-S1 is unremarkable.      Impression:     Multilevel degenerative changes as further detailed above.  Findings are most significant at the L4-5 level, noting transitional lumbosacral segment.     Stable severe compression deformity at T12.     Foci within the kidneys bilaterally likely representing cyst can be correlated with ultrasound.          Past Medical History:  Past Medical History:   Diagnosis Date    Anxiety     Arthritis     Back pain     Breast cancer 1/17/2018    Breast mass 1/15/2018    Chronic kidney disease, stage 2, mildly decreased GFR     COPD (chronic obstructive pulmonary disease)     emphysema    Depression     Dysuria 1/29/2018    Exudative age-related macular degeneration of left eye with active choroidal neovascularization 8/25/2022    Family history of breast cancer 1/17/2018    GERD (gastroesophageal reflux disease)     Hypertension     Infiltrating ductal carcinoma of breast, left 7/12/2018    Multiple closed fractures of ribs of left side 4/3/2019    Osteoporosis, senile     Ovarian mass 1/15/2018    Pneumonia     S/P robotic assisted laparoscopic BSO (bilateral salpingo-oophorectomy) 2/23/2018       Past Surgical History:  Past Surgical History:   Procedure Laterality Date    AXILLARY NODE DISSECTION Left 7/12/2018    Procedure: LYMPHADENECTOMY, AXILLARY;  Surgeon: Amanda Caballero MD;  Location: [facility] OR [address] [place] [address];  Service: General;  Laterality: Left;    BREAST BIOPSY      BREAST LUMPECTOMY      CATARACT EXTRACTION      CHOLECYSTECTOMY      COLONOSCOPY      ESOPHAGOGASTRODUODENOSCOPY      ESOPHAGOGASTRODUODENOSCOPY N/A 10/14/2019    Procedure: EGD (ESOPHAGOGASTRODUODENOSCOPY);  Surgeon: Davonte Thompson MD;  Location: [address]);  Service: Endoscopy;  Laterality: N/A;  hx of pulmonary hypertension-PA 50     pt requesting ASAP    EYE SURGERY      FIXATION KYPHOPLASTY N/A 11/8/2018    Procedure: Kyphoplasty   T12;  Surgeon: Merly Wilcox MD;  Location: [facility] CATH LAB;  Service: Pain Management;  Laterality:  N/A;  T12  Nia Reps e-mailed with date and time    HYSTERECTOMY      INJECTION FOR SENTINEL NODE IDENTIFICATION Left 7/12/2018    Procedure: INJECTION, FOR SENTINEL NODE IDENTIFICATION;  Surgeon: Amanda Caballero MD;  Location: [facility] OR [address] [place] [address];  Service: General;  Laterality: Left;    INJECTION OF FACET JOINT Bilateral 10/15/2018    Procedure: INJECTION, FACET JOINT, BILATERAL LUMBAR L3-4, 4-5, 5-S1 FACET JOINT INJECTIONS;  Surgeon: Merly Wilcox MD;  Location: [facility] PAIN MGT;  Service: Pain Management;  Laterality: Bilateral;    INJECTION OF FACET JOINT Bilateral 4/1/2019    Procedure: INJECTION, FACET JOINT, L3-L4,L4-L5,L5-S1;  Surgeon: Merly Wilcox MD;  Location: [facility] PAIN MGT;  Service: Pain Management;  Laterality: Bilateral;    INJECTION OF FACET JOINT Bilateral 6/20/2019    Procedure: INJECTION, FACET JOINT, L3-L4,L4-L5,L5-S1 NEED CONSENT;  Surgeon: Merly Wilcox MD;  Location: [facility] PAIN MGT;  Service: Pain Management;  Laterality: Bilateral;    INJECTION OF FACET JOINT Bilateral 7/27/2020    Procedure: INJECTION, FACET JOINT BILATERAL L3/4, L4/5 and L5/S1;  Surgeon: Merly Wilcox MD;  Location: [facility] PAIN MGT;  Service: Pain Management;  Laterality: Bilateral;    INJECTION OF JOINT Bilateral 7/27/2020    Procedure: INJECTION, JOINT, BILATERAL GREATER TROCHANTERIC BURSA;  Surgeon: Merly Wilcox MD;  Location: [facility] PAIN MGT;  Service: Pain Management;  Laterality: Bilateral;    INJECTION OF JOINT Bilateral 4/26/2021    Procedure: INJECTION, JOINT, HIP;  Surgeon: Merly Wilcox MD;  Location: [facility] PAIN MGT;  Service: Pain Management;  Laterality: Bilateral;  consent needed    INJECTION OF STEROID Right 11/15/2021    Procedure: INJECTION, STEROID RIGHT MIDDLE AND SAMLL FINGER;  Surgeon: Florecita Da Silva MD;  Location: [facility] OR;  Service: Orthopedics;  Laterality: Right;    MASTECTOMY, PARTIAL Left 7/12/2018    Procedure: MASTECTOMY, PARTIAL-W/SEED LOCALIZATION;  Surgeon: Amanda Caballero MD;  Location: [address]  [facility];  Service: General;  Laterality: Left;    IA REMOVAL OF OVARY/TUBE(S)  02/23/2018    Robotic Assisted    ROTATOR CUFF REPAIR Right     rotator cuff repair right shoulder    TONSILLECTOMY      TRANSFORAMINAL EPIDURAL INJECTION OF STEROID Bilateral 6/21/2021    Procedure: INJECTION, STEROID, EPIDURAL, TRANSFORAMINAL APPROACH  bilateral L4/5 TF ASHUTOSH;  Surgeon: Merly Wilcox MD;  Location: [facility] PAIN MGT;  Service: Pain Management;  Laterality: Bilateral;  consent needed    TRANSFORAMINAL EPIDURAL INJECTION OF STEROID Bilateral 1/3/2022    Procedure: INJECTION, STEROID, EPIDURAL, TRANSFORAMINAL APPROACH Bilateral L4/5 Needs consent;  Surgeon: Merly Wilcox MD;  Location: [facility] PAIN MGT;  Service: Pain Management;  Laterality: Bilateral;    TRANSFORAMINAL EPIDURAL INJECTION OF STEROID Bilateral 8/9/2022    Procedure: INJECTION, STEROID, EPIDURAL, TRANSFORAMINAL APPROACH, BILATERAL L4-L5   MEDICALLY URGENT;  Surgeon: Merly Wilcox MD;  Location: [facility] PAIN MGT;  Service: Pain Management;  Laterality: Bilateral;    TRANSFORAMINAL EPIDURAL INJECTION OF STEROID Left 11/17/2022    Procedure: INJECTION, STEROID, EPIDURAL, TRANSFORAMINAL APPROACH LEFT L3-L4 AND L4-L5 CONTRAST;  Surgeon: Merly Wilcox MD;  Location: [facility] PAIN MGT;  Service: Pain Management;  Laterality: Left;    TRANSFORAMINAL EPIDURAL INJECTION OF STEROID Bilateral 7/31/2023    Procedure: INJECTION, STEROID, EPIDURAL, TRANSFORAMINAL APPROACH, BILATERAL L5/S1  sooner date;  Surgeon: Merly Wilcox MD;  Location: [facility] PAIN MGT;  Service: Pain Management;  Laterality: Bilateral;    TRANSFORAMINAL EPIDURAL INJECTION OF STEROID Bilateral 11/7/2024    Procedure: LUMBAR TRANSFORAMINAL BILATERAL L5/S1 *ASPIRIN OTC* HOLD FOR 5 DAYS;  Surgeon: Merly Wilcox MD;  Location: [facility] PAIN MGT;  Service: Pain Management;  Laterality: Bilateral;  [phone]  2 WK F/U MAGDA    TRIGGER FINGER RELEASE Left 11/15/2021    Procedure: RELEASE, TRIGGER FINGER, LEFT MIDDLE AND RING;  Surgeon:  Florecita Da Silva MD;  Location: [facility];  Service: Orthopedics;  Laterality: Left;       Family History:  Family History   Problem Relation Name Age of Onset    Heart failure Mother      Kidney failure Mother      Heart attack Father      Breast cancer Sister  66        Lump, XRT, chemo, recurrence 10 years later.    Cancer Brother          leukemia    Heart attack Brother  58    Pulmonary embolism Brother  45    Heart attack Brother  52    Breast cancer Maternal Grandmother  60        advanced at diagnosis    Breast cancer Maternal Aunt  83         at 92    Colon cancer Neg Hx      Esophageal cancer Neg Hx         Social History:  Social History     Socioeconomic History    Marital status: Single    Number of children: 3   Occupational History    Occupation: Multiple jobs, see social documentation section     Comment: Retired   Tobacco Use    Smoking status: Former     Current packs/day: 0.00     Average packs/day: 1 pack/day for 40.0 years (40.0 ttl pk-yrs)     Types: Cigarettes     Quit date: 6/15/1994     Years since quittin.4    Smokeless tobacco: Never    Tobacco comments:     Smoked >1 ppd for at least 40 years, quit around    Substance and Sexual Activity    Alcohol use: Yes     Comment: occasional glass of wine or cocktail    Drug use: No    Sexual activity: Yes     Partners: Male   Social History Narrative    Worked many jobs while raising 3 children.  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  She was  from her first , but took care of him at the end of his life.     Social Drivers of Health     Financial Resource Strain: Low Risk  (2024)    Overall Financial Resource Strain (CARDIA)     Difficulty of Paying Living Expenses: Not hard at all   Food Insecurity: No Food Insecurity (2024)    Hunger Vital Sign     Worried About Running Out of Food in the Last Year: Never  true     Ran Out of Food in the Last Year: Never true   Transportation Needs: No Transportation Needs (1/16/2024)    PRAPARE - Transportation     Lack of Transportation (Medical): No     Lack of Transportation (Non-Medical): No   Physical Activity: Unknown (1/16/2024)    Exercise Vital Sign     Days of Exercise per Week: 0 days   Recent Concern: Physical Activity - Inactive (1/16/2024)    Exercise Vital Sign     Days of Exercise per Week: 0 days     Minutes of Exercise per Session: 0 min   Stress: No Stress Concern Present (1/16/2024)    [employer] [place] [employer] - Occupational Stress Questionnaire     Feeling of Stress : Only a little   Housing Stability: Low Risk  (1/16/2024)    Housing Stability Vital Sign     Unable to Pay for Housing in the Last Year: No     Number of Places Lived in the Last Year: 1     Unstable Housing in the Last Year: No       REVIEW OF SYSTEMS:    GENERAL:  No weight loss, malaise or fevers.  HEENT:   No recent changes in vision or hearing  NECK:  Negative for lumps, no difficulty with swallowing.  RESPIRATORY:  Negative for cough, wheezing or shortness of breath, patient denies any recent URI. COPD.  CARDIOVASCULAR:  Negative for chest pain, leg swelling or palpitations. Hypertension.  GI:  Negative for abdominal discomfort, blood in stools or black stools or change in bowel habits.  MUSCULOSKELETAL:  See HPI.  SKIN:  Negative for lesions, rash, and itching.  PSYCH:  No mood disorder or recent psychosocial stressors.  Patients sleep is not disturbed secondary to pain.  HEMATOLOGY/LYMPHOLOGY:  Negative for prolonged bleeding, bruising easily or swollen nodes.  Patient is not currently taking any anti-coagulants  NEURO:   No history of headaches, syncope, paralysis, seizures or tremors.  All other reviewed and negative other than HPI.    OBJECTIVE:    Exam limited due to virtual visit:  GENERAL: Well appearing, in no acute distress, alert and oriented x3.  PSYCH:  Mood and  affect appropriate.    Previous physical exam:  LMP  (LMP Unknown)     PHYSICAL EXAMINATION:     GENERAL: Well appearing, in no acute distress, alert and oriented x3.  PSYCH:  Mood and affect appropriate.  SKIN: Skin color, texture, turgor normal, no rashes or lesions.  HEAD/FACE:  Normocephalic, atraumatic. Cranial nerves grossly intact.  CV: RRR with palpation of the radial artery.  PULM: No evidence of respiratory difficulty, symmetric chest rise.  EXTREMITIES:  Good capillary refill.  MUSCULOSKELETAL: There is pain with palpation over left AC joint. Limited ROM with pain on internal rotation of bilateral shoulders. Positive cross arm test on the left. No atrophy or tone abnormalities are noted.  NEURO: Bilateral lower extremity coordination and muscle stretch reflexes are physiologic and symmetric.  Plantar response are downgoing. No clonus.  Normal sensation.  GAIT: Antalgic- ambulates with rolling walker.      ASSESSMENT: 89 y.o. year old female with pain, consistent with the following diagnoses:     1. Lumbar radiculopathy        2. Lumbar spondylosis        3. Degeneration of intervertebral disc of lumbar region with discogenic back pain and lower extremity pain              PLAN:     - Previous imaging reviewed.     - She is s/p bilateral L5/S1 TF ASHUTOSH with benefit. We can repeat as needed.     - Continue Tramadol per PCP.      - RTC PRN.       The above plan and management options were discussed at length with patient. Patient is in agreement with the above and verbalized understanding.    Amanda Garcia  11/20/2024

## 2024-11-26 ENCOUNTER — OFFICE VISIT (OUTPATIENT)
Dept: OPHTHALMOLOGY | Facility: CLINIC | Age: 89
End: 2024-11-26
Payer: MEDICARE

## 2024-11-26 ENCOUNTER — CLINICAL SUPPORT (OUTPATIENT)
Dept: OPHTHALMOLOGY | Facility: CLINIC | Age: 89
End: 2024-11-26
Payer: MEDICARE

## 2024-11-26 DIAGNOSIS — H35.033 HYPERTENSIVE RETINOPATHY OF BOTH EYES: ICD-10-CM

## 2024-11-26 DIAGNOSIS — H35.3221 EXUDATIVE AGE-RELATED MACULAR DEGENERATION OF LEFT EYE WITH ACTIVE CHOROIDAL NEOVASCULARIZATION: Primary | ICD-10-CM

## 2024-11-26 DIAGNOSIS — H35.371 EPIRETINAL MEMBRANE, RIGHT EYE: ICD-10-CM

## 2024-11-26 DIAGNOSIS — Z96.1 PSEUDOPHAKIA OF BOTH EYES: ICD-10-CM

## 2024-11-26 DIAGNOSIS — H35.3112 INTERMEDIATE STAGE DRY AGE-RELATED MACULAR DEGENERATION OF RIGHT EYE: ICD-10-CM

## 2024-11-26 PROCEDURE — 99999 PR PBB SHADOW E&M-EST. PATIENT-LVL II: CPT | Mod: PBBFAC,,, | Performed by: OPHTHALMOLOGY

## 2024-11-26 RX ORDER — FERROUS SULFATE, DRIED 160(50) MG
1 TABLET, EXTENDED RELEASE ORAL 2 TIMES DAILY WITH MEALS
COMMUNITY

## 2024-11-26 NOTE — PROGRESS NOTES
HPI    16 wk OCT/DFE/Poss Eylea OS    Pt states va seem stable since last visit. Pt has noticed the last 1-2   weeks she is a little blurry OS. Stable occasional floaters.    No flashes  Floaters  No pain  Gtts: Systane PRN OU  Ocuvite   Last edited by Bonita Wilson on 11/26/2024  1:47 PM.             A/P    ICD-10-CM ICD-9-CM   1. Exudative age-related macular degeneration of left eye with active choroidal neovascularization  H35.3221 362.52     362.16   2. 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 Epiretinal membrane, right eye  H35.371 362.56   4. Pseudophakia of both eyes  Z96.1 V43.1   5. Hypertensive retinopathy of both eyes  H35.033 362.11       1. Exudative age-related macular degeneration of left eye with active choroidal neovascularization  2. 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  Schedule Eylea Injection Left Eye today Patient chooses to proceed with injection R/B/A discussed include infection retinal detachment and stroke    Patient identified.  Timeout performed.    Risks, benefits, and alternatives to treatment were discussed in detail with the patient, including bleeding/infection (endophthalmitis)/etc.  The patient voiced understanding and wished to proceed with the procedure.  See separate consent form.    Injection Procedure Note:  Diagnosis: CNVM Left Eye    Topical Proparacaine drop placed then topical 5% Betadine, then subcojunctival lidocaine 2% injection  Sterile gloves used, and  sterile lid speculum placed.  5% Betadine placed at injection site again prior to injection.  Eylea 2mg in 0.05cc Injected inferotemporally 3.5-4mm posterior to the limbus.  Complications: None  Va at least CF at 5 feet post injection.  Retina, ONH, IOP normal after injection.    Followup as below.  Patient should return immediately PRN.  Retinal Detachment and Endophthalmitis precautions given        3. Epiretinal membrane, right eye  Mod ERM, VA 20/40 (stable)  No metamorphopsia stable   Plan: Observation, may need PPV/MP if having incr symptoms   Amsler precautions discussed     4. Pseudophakia of both eyes  Good lens position OU  Plan: Observation      5. Hypertensive retinopathy of both eyes  Mod HTN changes  PCP Minerva Mathias MD - Recent notes reviewed  11/17/2023  5.7  A1C   Plan: Observation HTN changes   Recommend good blood pressure control, tight blood glucose control, and good cholesterol control          RTC 16 weeks DFE/OCTm OU, possible Eylea OS      I saw and examined the patient and reviewed in detail the findings documented. The final examination findings, image interpretations, and plan as documented in the record represent my personal judgment and conclusions.    Dwight Melton MD  Vitreoretinal Surgery   [facility]

## 2024-12-01 DIAGNOSIS — I70.0 ATHEROSCLEROSIS OF AORTA: ICD-10-CM

## 2024-12-01 DIAGNOSIS — E78.5 HYPERLIPIDEMIA, UNSPECIFIED HYPERLIPIDEMIA TYPE: ICD-10-CM

## 2024-12-01 DIAGNOSIS — F41.8 DEPRESSION WITH ANXIETY: ICD-10-CM

## 2024-12-01 DIAGNOSIS — I11.9 HYPERTENSIVE HEART DISEASE WITHOUT HEART FAILURE: ICD-10-CM

## 2024-12-02 RX ORDER — CITALOPRAM 20 MG/1
20 TABLET, FILM COATED ORAL NIGHTLY
Qty: 90 TABLET | Refills: 1 | Status: SHIPPED | OUTPATIENT
Start: 2024-12-02

## 2024-12-02 RX ORDER — ATORVASTATIN CALCIUM 20 MG/1
20 TABLET, FILM COATED ORAL NIGHTLY
Qty: 90 TABLET | Refills: 1 | Status: SHIPPED | OUTPATIENT
Start: 2024-12-02

## 2024-12-02 RX ORDER — HYDRALAZINE HYDROCHLORIDE 25 MG/1
25 TABLET, FILM COATED ORAL EVERY 12 HOURS
Qty: 180 TABLET | Refills: 1 | Status: SHIPPED | OUTPATIENT
Start: 2024-12-02

## 2024-12-02 NOTE — TELEPHONE ENCOUNTER
No care due was identified.  Health [facility] Embedded Care Due Messages. Reference number: [id number].   12/01/2024 7:55:17 PM CST

## 2025-01-06 ENCOUNTER — OFFICE VISIT (OUTPATIENT)
Dept: PRIMARY CARE CLINIC | Facility: CLINIC | Age: OVER 89
End: 2025-01-06
Payer: MEDICARE

## 2025-01-06 ENCOUNTER — LAB VISIT (OUTPATIENT)
Dept: LAB | Facility: HOSPITAL | Age: OVER 89
End: 2025-01-06
Attending: INTERNAL MEDICINE
Payer: MEDICARE

## 2025-01-06 VITALS
BODY MASS INDEX: 26.68 KG/M2 | SYSTOLIC BLOOD PRESSURE: 130 MMHG | HEART RATE: 67 BPM | RESPIRATION RATE: 19 BRPM | OXYGEN SATURATION: 96 % | DIASTOLIC BLOOD PRESSURE: 62 MMHG | HEIGHT: 63 IN | WEIGHT: 150.56 LBS

## 2025-01-06 DIAGNOSIS — R73.03 PRE-DIABETES: ICD-10-CM

## 2025-01-06 DIAGNOSIS — E87.6 DIURETIC-INDUCED HYPOKALEMIA: ICD-10-CM

## 2025-01-06 DIAGNOSIS — C77.3 SECONDARY AND UNSPECIFIED MALIGNANT NEOPLASM OF AXILLA AND UPPER LIMB LYMPH NODES: ICD-10-CM

## 2025-01-06 DIAGNOSIS — T50.2X5A DIURETIC-INDUCED HYPOKALEMIA: ICD-10-CM

## 2025-01-06 DIAGNOSIS — F33.0 MAJOR DEPRESSIVE DISORDER, RECURRENT, MILD: ICD-10-CM

## 2025-01-06 DIAGNOSIS — M81.0 OSTEOPOROSIS WITHOUT CURRENT PATHOLOGICAL FRACTURE, UNSPECIFIED OSTEOPOROSIS TYPE: ICD-10-CM

## 2025-01-06 DIAGNOSIS — E78.5 HYPERLIPIDEMIA, UNSPECIFIED HYPERLIPIDEMIA TYPE: ICD-10-CM

## 2025-01-06 DIAGNOSIS — M47.816 LUMBAR SPONDYLOSIS: ICD-10-CM

## 2025-01-06 DIAGNOSIS — Z17.0 MALIGNANT NEOPLASM OF UPPER-OUTER QUADRANT OF LEFT BREAST IN FEMALE, ESTROGEN RECEPTOR POSITIVE: Chronic | ICD-10-CM

## 2025-01-06 DIAGNOSIS — H35.3221 EXUDATIVE AGE-RELATED MACULAR DEGENERATION OF LEFT EYE WITH ACTIVE CHOROIDAL NEOVASCULARIZATION: ICD-10-CM

## 2025-01-06 DIAGNOSIS — N76.0 ACUTE VAGINITIS: ICD-10-CM

## 2025-01-06 DIAGNOSIS — Z79.811 LONG TERM CURRENT USE OF AROMATASE INHIBITOR: ICD-10-CM

## 2025-01-06 DIAGNOSIS — J44.9 CHRONIC OBSTRUCTIVE PULMONARY DISEASE, UNSPECIFIED COPD TYPE: ICD-10-CM

## 2025-01-06 DIAGNOSIS — N18.31 STAGE 3A CHRONIC KIDNEY DISEASE: ICD-10-CM

## 2025-01-06 DIAGNOSIS — I11.9 HYPERTENSIVE HEART DISEASE WITHOUT HEART FAILURE: ICD-10-CM

## 2025-01-06 DIAGNOSIS — I11.9 HYPERTENSIVE HEART DISEASE WITHOUT HEART FAILURE: Primary | ICD-10-CM

## 2025-01-06 DIAGNOSIS — M17.11 PRIMARY OSTEOARTHRITIS OF RIGHT KNEE: ICD-10-CM

## 2025-01-06 DIAGNOSIS — I70.0 ATHEROSCLEROSIS OF AORTA: ICD-10-CM

## 2025-01-06 DIAGNOSIS — C50.412 MALIGNANT NEOPLASM OF UPPER-OUTER QUADRANT OF LEFT BREAST IN FEMALE, ESTROGEN RECEPTOR POSITIVE: Chronic | ICD-10-CM

## 2025-01-06 DIAGNOSIS — I27.20 PULMONARY HYPERTENSION: ICD-10-CM

## 2025-01-06 LAB
25(OH)D3+25(OH)D2 SERPL-MCNC: 56 NG/ML (ref 30–96)
ALBUMIN SERPL BCP-MCNC: 3.5 G/DL (ref 3.5–5.2)
ALP SERPL-CCNC: 61 U/L (ref 40–150)
ALT SERPL W/O P-5'-P-CCNC: 11 U/L (ref 10–44)
ANION GAP SERPL CALC-SCNC: 12 MMOL/L (ref 8–16)
AST SERPL-CCNC: 15 U/L (ref 10–40)
BASOPHILS # BLD AUTO: 0.07 K/UL (ref 0–0.2)
BASOPHILS NFR BLD: 0.7 % (ref 0–1.9)
BILIRUB SERPL-MCNC: 0.5 MG/DL (ref 0.1–1)
BUN SERPL-MCNC: 13 MG/DL (ref 8–23)
CALCIUM SERPL-MCNC: 9.7 MG/DL (ref 8.7–10.5)
CHLORIDE SERPL-SCNC: 100 MMOL/L (ref 95–110)
CHOLEST SERPL-MCNC: 130 MG/DL (ref 120–199)
CHOLEST/HDLC SERPL: 2.2 {RATIO} (ref 2–5)
CO2 SERPL-SCNC: 30 MMOL/L (ref 23–29)
CREAT SERPL-MCNC: 0.7 MG/DL (ref 0.5–1.4)
DIFFERENTIAL METHOD BLD: ABNORMAL
EOSINOPHIL # BLD AUTO: 0.2 K/UL (ref 0–0.5)
EOSINOPHIL NFR BLD: 2.2 % (ref 0–8)
ERYTHROCYTE [DISTWIDTH] IN BLOOD BY AUTOMATED COUNT: 14.6 % (ref 11.5–14.5)
EST. GFR  (NO RACE VARIABLE): >60 ML/MIN/1.73 M^2
ESTIMATED AVG GLUCOSE: 108 MG/DL (ref 68–131)
GLUCOSE SERPL-MCNC: 102 MG/DL (ref 70–110)
HBA1C MFR BLD: 5.4 % (ref 4–5.6)
HCT VFR BLD AUTO: 41.8 % (ref 37–48.5)
HDLC SERPL-MCNC: 59 MG/DL (ref 40–75)
HDLC SERPL: 45.4 % (ref 20–50)
HGB BLD-MCNC: 12.8 G/DL (ref 12–16)
IMM GRANULOCYTES # BLD AUTO: 0.04 K/UL (ref 0–0.04)
IMM GRANULOCYTES NFR BLD AUTO: 0.4 % (ref 0–0.5)
LDLC SERPL CALC-MCNC: 52.8 MG/DL (ref 63–159)
LYMPHOCYTES # BLD AUTO: 1.2 K/UL (ref 1–4.8)
LYMPHOCYTES NFR BLD: 11.1 % (ref 18–48)
MAGNESIUM SERPL-MCNC: 1.9 MG/DL (ref 1.6–2.6)
MCH RBC QN AUTO: 28.6 PG (ref 27–31)
MCHC RBC AUTO-ENTMCNC: 30.6 G/DL (ref 32–36)
MCV RBC AUTO: 94 FL (ref 82–98)
MONOCYTES # BLD AUTO: 0.6 K/UL (ref 0.3–1)
MONOCYTES NFR BLD: 5.7 % (ref 4–15)
NEUTROPHILS # BLD AUTO: 8.4 K/UL (ref 1.8–7.7)
NEUTROPHILS NFR BLD: 79.9 % (ref 38–73)
NONHDLC SERPL-MCNC: 71 MG/DL
NRBC BLD-RTO: 0 /100 WBC
PLATELET # BLD AUTO: 324 K/UL (ref 150–450)
PMV BLD AUTO: 11.5 FL (ref 9.2–12.9)
POTASSIUM SERPL-SCNC: 3.6 MMOL/L (ref 3.5–5.1)
PROT SERPL-MCNC: 7 G/DL (ref 6–8.4)
RBC # BLD AUTO: 4.47 M/UL (ref 4–5.4)
SODIUM SERPL-SCNC: 142 MMOL/L (ref 136–145)
TRIGL SERPL-MCNC: 91 MG/DL (ref 30–150)
WBC # BLD AUTO: 10.5 K/UL (ref 3.9–12.7)

## 2025-01-06 PROCEDURE — 99999 PR PBB SHADOW E&M-EST. PATIENT-LVL V: CPT | Mod: PBBFAC,,, | Performed by: INTERNAL MEDICINE

## 2025-01-06 PROCEDURE — 82306 VITAMIN D 25 HYDROXY: CPT | Performed by: INTERNAL MEDICINE

## 2025-01-06 PROCEDURE — 1101F PT FALLS ASSESS-DOCD LE1/YR: CPT | Mod: CPTII,S$GLB,, | Performed by: INTERNAL MEDICINE

## 2025-01-06 PROCEDURE — 1159F MED LIST DOCD IN RCRD: CPT | Mod: CPTII,S$GLB,, | Performed by: INTERNAL MEDICINE

## 2025-01-06 PROCEDURE — 80053 COMPREHEN METABOLIC PANEL: CPT | Performed by: INTERNAL MEDICINE

## 2025-01-06 PROCEDURE — 99215 OFFICE O/P EST HI 40 MIN: CPT | Mod: S$GLB,,, | Performed by: INTERNAL MEDICINE

## 2025-01-06 PROCEDURE — 85025 COMPLETE CBC W/AUTO DIFF WBC: CPT | Performed by: INTERNAL MEDICINE

## 2025-01-06 PROCEDURE — 3288F FALL RISK ASSESSMENT DOCD: CPT | Mod: CPTII,S$GLB,, | Performed by: INTERNAL MEDICINE

## 2025-01-06 PROCEDURE — 83735 ASSAY OF MAGNESIUM: CPT | Performed by: INTERNAL MEDICINE

## 2025-01-06 PROCEDURE — 80061 LIPID PANEL: CPT | Performed by: INTERNAL MEDICINE

## 2025-01-06 PROCEDURE — 83036 HEMOGLOBIN GLYCOSYLATED A1C: CPT | Performed by: INTERNAL MEDICINE

## 2025-01-06 RX ORDER — BENAZEPRIL HYDROCHLORIDE 40 MG/1
40 TABLET ORAL DAILY
Qty: 90 TABLET | Refills: 1 | Status: SHIPPED | OUTPATIENT
Start: 2025-01-06

## 2025-01-06 RX ORDER — DOXYCYCLINE HYCLATE 100 MG
100 TABLET ORAL 2 TIMES DAILY
COMMUNITY
Start: 2024-12-23 | End: 2025-01-06 | Stop reason: ALTCHOICE

## 2025-01-06 RX ORDER — FELODIPINE 10 MG/1
10 TABLET, EXTENDED RELEASE ORAL 2 TIMES DAILY
Qty: 180 TABLET | Refills: 1 | Status: SHIPPED | OUTPATIENT
Start: 2025-01-06

## 2025-01-06 RX ORDER — TRAMADOL HYDROCHLORIDE 50 MG/1
50 TABLET ORAL EVERY 8 HOURS PRN
Qty: 90 TABLET | Refills: 2 | Status: SHIPPED | OUTPATIENT
Start: 2025-01-30

## 2025-01-06 RX ORDER — FLUCONAZOLE 150 MG/1
150 TABLET ORAL ONCE
Qty: 1 TABLET | Refills: 0 | Status: SHIPPED | OUTPATIENT
Start: 2025-01-06 | End: 2025-01-06

## 2025-01-06 RX ORDER — TORSEMIDE 20 MG/1
20 TABLET ORAL DAILY
Qty: 90 TABLET | Refills: 1 | Status: SHIPPED | OUTPATIENT
Start: 2025-01-06

## 2025-01-06 NOTE — PROGRESS NOTES
Subjective     Patient ID: Anahi Cook is a 89 y.o. female.    Chief Complaint: Annual Exam    Last seen 5 months ago for acute bronchitis. Last general check up 11 months ago. Returns for annual physical accompanied by dtr Patience. Recently completed a seven day course of Doxycycline prescribed by her Pulmonologist for productive cough, no steroids. She is feeling better. Compliant with continuous Oxygen. Reports increasing mobility impairment due to arthritis of spine and knees. No falls. Uses Tramadol TID and Tylenol for breakthrough pain. Relief from injections doesn't last as long as it used to. Was able to enjoy the holidays with family.     PMH: .  Hypertension with Concentric Remodeling and normal LV function on Echo 10/16.  Hyperlipidemia.  Pre-Diabetes.  Aortic Atherosclerosis seen on chest and abdominal imaging.   CKD stage 3a.  COPD, oxygen-dependent. Pulmonologist Dr. Rakesh [facility] at [facility].  Breast Cancer, on Letrozole, Heme/Onc following.  Osteoporosis, Endocrinology  recommended Prolia - patient declined treatment.   Vertebral Compression Fractures.  GERD, Tortuous Esophagus on EGD 10/19, benign H pyl negative gastritis.   Lumbar Spondylosis, Pain Mgmt following.  Allergic Rhinitis.  Depression/Anxiety/Insomnia.    PSH:  2018: AXILLARY NODE DISSECTION; Left  BREAST BIOPSY  BREAST LUMPECTOMY  CHOLECYSTECTOMY  EYE SURGERY  2018: FIXATION KYPHOPLASTY; N/A  HYSTERECTOMY  10/15/2018: INJECTION OF FACET JOINT; Bilateral  2019: INJECTION OF FACET JOINT; Bilateral  2019: INJECTION OF FACET JOINT; Bilateral  2018: MASTECTOMY, PARTIAL; Left  2018: MI REMOVAL OF OVARY/TUBE(S)  ROTATOR CUFF REPAIR; Right  TONSILLECTOMY    Mammogram stable . BMD . Colonoscopy years ago, not on record. Eye exam . Vaccines reviewed.    Social: Former tobacco use, rare alcohol. , daughter lives locally, two sons out of state.     FMH: Breast cancer in multiple, HTN, Heart  "dis, Kidney dis.     Allergies: PCN, Levofloxacin.     Medications: list reviewed and reconciled.           Review of Systems   Constitutional:  Negative for appetite change, diaphoresis, fatigue, fever and unexpected weight change.   HENT:  Negative for nasal congestion, rhinorrhea, sneezing, sore throat, trouble swallowing and voice change.    Eyes:  Negative for pain and visual disturbance.   Respiratory:  Negative for chest tightness and wheezing.    Cardiovascular:  Negative for chest pain, palpitations and leg swelling.   Gastrointestinal:  Negative for abdominal pain, blood in stool, constipation, diarrhea, nausea and vomiting.   Genitourinary:  Negative for dysuria, frequency, pelvic pain and vaginal bleeding.        Vulvar itching.   Musculoskeletal:  Negative for arthralgias, gait problem, joint swelling and myalgias.   Integumentary:  Negative for color change and rash.   Neurological:  Negative for dizziness, syncope, facial asymmetry, speech difficulty, weakness, numbness and headaches.   Hematological:  Negative for adenopathy. Does not bruise/bleed easily.   Psychiatric/Behavioral:  Negative for agitation, confusion, dysphoric mood and sleep disturbance. The patient is not nervous/anxious.         Mood stable on current dose of citalopram.          Objective   Vitals:    01/06/25 1053   BP: 130/62   BP Location: Right arm   Patient Position: Sitting   Pulse: 67   Resp: 19   SpO2: 96%   Weight: 68.3 kg (150 lb 9.2 oz)     Stable.    Height: 5' 3" (1.6 m)   BMI=26.7  Physical Exam  Vitals reviewed.   Constitutional:       General: She is not in acute distress.     Appearance: She is well-developed. She is not diaphoretic.   HENT:      Head: Normocephalic and atraumatic.      Nose: Nose normal. No congestion.      Mouth/Throat:      Mouth: Mucous membranes are moist.      Pharynx: Oropharynx is clear.   Eyes:      General: No scleral icterus.     Extraocular Movements: Extraocular movements intact.      " Conjunctiva/sclera: Conjunctivae normal.      Right eye: Right conjunctiva is not injected.      Left eye: Left conjunctiva is not injected.      Pupils: Pupils are equal, round, and reactive to light.   Neck:      Thyroid: No thyromegaly.      Vascular: No JVD.   Cardiovascular:      Rate and Rhythm: Normal rate and regular rhythm.      Pulses: Normal pulses.   Pulmonary:      Effort: Pulmonary effort is normal. No tachypnea or respiratory distress.      Breath sounds: No decreased breath sounds, wheezing or rhonchi.      Comments: Slight bibasilar rales.  Abdominal:      General: Bowel sounds are normal. There is no distension.      Palpations: Abdomen is soft. There is no mass.      Tenderness: There is no abdominal tenderness.   Musculoskeletal:         General: No tenderness. Normal range of motion.      Cervical back: Normal range of motion and neck supple.      Right lower leg: No edema.      Left lower leg: No edema.   Skin:     General: Skin is warm and dry.      Coloration: Skin is not pale.      Findings: No erythema or rash.      Nails: There is no clubbing.   Neurological:      General: No focal deficit present.      Mental Status: She is alert and oriented to person, place, and time.      Cranial Nerves: No cranial nerve deficit.      Motor: No weakness or abnormal muscle tone.      Coordination: Coordination normal.      Gait: Gait normal.   Psychiatric:         Mood and Affect: Mood and affect normal.         Speech: Speech normal.         Behavior: Behavior normal.         Thought Content: Thought content normal.         Judgment: Judgment normal.     Labs in ER 9/24: CBC and CMP normal, U/A clear.     Assessment and Plan     1. Hypertensive heart disease without heart failure controlled.  -     CBC Auto Differential; Future; Expected date: 01/06/2025  -     Comprehensive Metabolic Panel; Future; Expected date: 01/06/2025  -     felodipine (PLENDIL) 10 MG 24 hr tablet; Take 1 tablet (10 mg total) by  mouth 2 (two) times a day.  Dispense: 180 tablet; Refill: 1  -     torsemide (DEMADEX) 20 MG Tab; Take 1 tablet (20 mg total) by mouth once daily.  Dispense: 90 tablet; Refill: 1  -     benazepriL (LOTENSIN) 40 MG tablet; Take 1 tablet (40 mg total) by mouth once daily.  Dispense: 90 tablet; Refill: 1  -     hydralazine recently refilled.    2. Hyperlipidemia, unspecified hyperlipidemia type  -     Lipid Panel; Future; Expected date: 01/06/2025  -     continue Atorvastatin.     3. Pre-diabetes  -     Hemoglobin A1C; Future; Expected date: 01/06/2025    4. Atherosclerosis of aorta - continue statin therapy.     5. Stage 3a chronic kidney disease - has been stable.     6. Diuretic-induced hypokalemia  -     Magnesium; Future; Expected date: 01/06/2025    7. Chronic obstructive pulmonary disease, unspecified COPD type        -     stable on inhalers and home oxygen.     8. Pulmonary hypertension    9. Malignant neoplasm of upper-outer quadrant of left breast in female, estrogen receptor positive        -     mammogram is ordered and scheduled in May.     10. Secondary and unspecified malignant neoplasm of axilla and upper limb lymph nodes        -     continue Letrozole.     11. Long term current use of aromatase inhibitor    12. Osteoporosis without current pathological fracture, unspecified osteoporosis type  -     DXA Bone Density Axial Skeleton 1 or more sites; Future; Expected date: 01/06/2025  -     Vitamin D; Future; Expected date: 01/06/2025    13. Lumbar spondylosis  -     traMADoL (ULTRAM) 50 mg tablet; Take 1 tablet (50 mg total) by mouth every 8 (eight) hours as needed for Pain.  Dispense: 90 tablet; Refill: 2    14. Primary osteoarthritis of right knee  -     traMADoL (ULTRAM) 50 mg tablet; Take 1 tablet (50 mg total) by mouth every 8 (eight) hours as needed for Pain.  Dispense: 90 tablet; Refill: 2    15. Major depressive disorder, recurrent, mild        -     stable on Citalopram.    16. Exudative  age-related macular degeneration of left eye with active choroidal neovascularization         -     eye care up to date, discussed with daughter need for mobility assistance, avoid accidents.     17. Acute vaginitis  -     fluconazole (DIFLUCAN) 150 MG Tab; Take 1 tablet (150 mg total) by mouth once. for 1 dose  Dispense: 1 tablet; Refill: 0       Follow up in about 6 months (around 7/6/2025).

## 2025-01-08 ENCOUNTER — OFFICE VISIT (OUTPATIENT)
Dept: SPINE | Facility: CLINIC | Age: OVER 89
End: 2025-01-08
Payer: MEDICARE

## 2025-01-08 DIAGNOSIS — M70.61 GREATER TROCHANTERIC BURSITIS OF BOTH HIPS: Primary | ICD-10-CM

## 2025-01-08 DIAGNOSIS — M70.62 GREATER TROCHANTERIC BURSITIS OF BOTH HIPS: Primary | ICD-10-CM

## 2025-01-08 DIAGNOSIS — M51.362 DEGENERATION OF INTERVERTEBRAL DISC OF LUMBAR REGION WITH DISCOGENIC BACK PAIN AND LOWER EXTREMITY PAIN: ICD-10-CM

## 2025-01-08 DIAGNOSIS — M54.16 LUMBAR RADICULOPATHY: ICD-10-CM

## 2025-01-08 DIAGNOSIS — M47.816 LUMBAR SPONDYLOSIS: ICD-10-CM

## 2025-01-08 NOTE — PROGRESS NOTES
Chronic Pain- Established Visit  Chronic Pain-Tele-Medicine-Established Note (Follow up visit)        The patient location is: Home  The chief complaint leading to consultation is: pain  Visit type: Virtual visit with synchronous audio and video  Total time spent with patient: 25 min  Each patient to whom he or she provides medical services by telemedicine is:  (1) informed of the relationship between the physician and patient and the respective role of any other health care provider with respect to management of the patient; and (2) notified that he or she may decline to receive medical services by telemedicine and may withdraw from such care at any time.        SUBJECTIVE:    Interval History 1/8/2025:  The patient returns to clinic today for follow up of back and hip pain via virtual visit. She reports increased low back and bilateral hip pain. She reports increased bilateral hip pain, right side greater than left. This is worse with laying on her sides. She also reports low back pain that radiates into the her legs. She has pain with standing and walking. She is taking Tylenol. She also takes Tramadol per her PCP. She denies any other health changes.     Interval History 11/20/2024:  The patient returns to clinic today for follow up of back pain via virtual visit. She is s/p bilateral L5/S1 TF ASHUTOSH on 11/7/2024. She reports 50% relief of her pain. She reports intermittent low back and leg pain. This is worse in the morning. She is performing home exercise. She denies any other health changes.     Interval History 7/3/2024:  The patient returns to clinic today for follow up of pain via virtual visit. She reports increased bilateral hip and knee pain. Her hip pain is worse with laying on her sides. Her knee pain is worse with standing and walking. She also notes low back pain. She continues to report burning pain into her feet. She weaned off the Gabapentin and her swelling has improved. She also notes bilateral  shoulder pain. She does take Tramadol per her PCP as needed. She denies any other health changes.     Interval History 12/5/2023:  The patient returns to clinic today for follow up of pain via virtual visit. Since last visit, she was admitted for pneumonia. She is currently on oxygen. She did see Pulmonology last week and has upcoming tests. She reports increased bilateral knee pain. This is worse with standing and walking. She also reports intermittent low back pain. She does have burning pain into her heels. She is having some increased lower extremity edema. She is taking Gabapentin but concerned that this may be contributing to swelling. She denies any other health changes.     Interval History 8/16/2023:  The patient returns to clinic today for follow up of back pain via virtual visit. She is s/p left AC joint injection on 7/5/2023. She is s/p bilateral L5/S1 TF ASHUTOSH on 7/31/2023. She reports 75% relief of her low back pain. She also reports 75% relief of her shoulder pain. She continues to report intermittent low back pain. She reports burning pain into her bilateral heels. She is using a topical pain cream. She takes Gabapentin with benefit. She denies any other health changes.     Interval History 7/5/2023:  The patient returns to clinic today for follow up of low back pain. She had a fall two weeks ago. She did go to Urgent Care where xrays are negative for acute injury. She reports increased left shoulder and clavicle pain. This pain is worse with activity. She is having trouble dressing herself due to pain. She has increased her Tylenol intake. She is using a topical pain cream. She is using ice and heat. She is taking Gabapentin. She continues to report low back pain, she is scheduled for ASHUTOSH at the end of the month. Her pain today is 9/10.    Interval History 6/9/2023:  The patient returns to clinic today for follow up of back pain via virtual visit. She reports increased low back pain that radiates into  anterior and posterior legs to her ankles. Her pain is worse with standing and walking. She is taking Gabapentin at bedtime. She also takes Tylenol for pain. She denies any other health changes.     Interval History 3/9/2023:  The patient returns to clinic today for follow up of back pain via virtual visit. She reports intermittent low back pain. She denies any radicular leg pain. She is using an acupressure knee brace with benefit. Her pain is tolerable at this time. She continues to take Gabapentin with benefit. She denies any other health changes.     Interval History 12/9/2022:  The patient returns to clinic today for follow up of low back pain via virtual visit. She is s/p left L3/4 and L4/5 TF ASHUTOSH on 11/17/2022. She reports 60% relief of her pain. She continues to report intermittent low back pain. She denies any radicular leg pain. She does report bilateral foot pain, described as burning in nature. Her pain is tolerable at this time. She continues to take Gabapentin. She also takes Tramadol per her PCP. She denies any other health changes.     Interval History 10/20/22  Ms. Cook complains of worsening left low back pain radiating down her lateral thigh to the knee. The pain is only when she walks/moves, she in pain free when sitting. She is s/p bilateral TESI L4/5 2 months ago, which she had relief from, but acutely worsened when she bent over to pick something off the ground. She states the pain is similar to her pain prior to this. She denies numbness, tingling, bladder/bowel problems.    Interval History 10/6/2022:   Mrs Cook presents for follow up virtually. At prior visit she had Left GTB injection with significant benefit in office. Her lower back and hip pain were further evaluated and noted newer L3 compression Fx. While she visually appears to have significant improvement of pain to me she does voice focal upper/mid lumbar pain persists. Discussed treatment options and Kyphoplasty recommended by  Dr Wilcox but there was hesitation regarding further escalation to kyphoplasty. We did discusses prior osteoporosis diagnosis and considering treatment options with PCP. She does not voice any s/s concerning for cauda equina.      Interval History 8/22/2022:  Mrs Cook presents for follow up acute pain. She is s/p repeat Bilateral L4/5 TFESI with mild improvement but having severe left lateral hip pain. She also continues to have burning pain to Right heel and numbness to right lateral lower leg. She has chronic urinary continence but no new neurological issues.She continues to take tramadol by PCP with some benefit     Interval History 8/5/2022:  Mrs Cook presents for follow up evaluation of returning lower back pain. She has exact pain relieved prior by B L4/5 TFESIs. She has newer complaint of bilateral feet burning pains as well. She has had JESSE noting arterial issues R>L. She continues to take Tramadol and neurontin without SE.She would like to repeat ASHUTOSH as it provided 7 months of 75% relief and symptoms returning severe over last few weeks. No voicing of symptoms concerning for cauda equina.     Interval History 12/6/2021:  The patient returns to clinic today for follow up of low back pain. She reports increased low back and bilateral hip pain over the last two weeks. She reports intermittent radiating pain into her lateral thighs. She does have difficulty sleeping due to pain. Her pain is also worse with standing and walking. She continues to take Gabapentin with benefit. She also takes Tramadol per her PCP with benefit. She denies any other health changes. Her pain today is 8/10.    Interval History 7/6/2021:  The patient returns to clinic today for follow up of low back pain via virtual visit. She is s/p bilateral L4/5 TF ASHUTOSH on 6/21/2021. She reports 75% relief of her low back and leg pain. She reports low back pain, worse in the morning. She reports intermittent radiating pain into the posterolateral  aspect of both legs to her knees. She continues to take Gabapentin with benefit. She denies any weakness. She denies any other health changes. Her pain today is 2/10.    Interval History 6/8/2021:  The patient returns to clinic today for follow up of low back pain. She is here today for imaging review. She continues to report low back pain that radiates into the posterolateral aspect of both legs to below her knee. Her pain is worse with prolonged standing and walking. She does feel like she will fall with prolonged walking. She also reports relief with bending forward. She is currently taking Gabapentin at bedtime with benefit. She continues to take Tramadol per her PCP with benefit. She denies any other health changes. Her pain today is 8/10.    Interval History 5/20/2021:  The patient returns to clinic today for follow up of low back pain. She is s/p bilateral hip joint injections on 4/26/2021. She reports no relief. She reports increased low back pain that radiates into the posterior aspect of both legs to her ankles. She does report numbness to her feet. Her pain is worse with prolonged standing and walking. She feels as though her legs will give out with prolonged walking. She denies any bowel or bladder incontinence. She denies any other health changes. Her pain today is 9/10.    Interval History 3/23/2021:  The patient returns to clinic today for follow up of low back and hip pain. She reports increased bilateral hip pain. She describes this pain as deep and aching. Her pain is worse with prolonged standing and walking. She also reports low back pain that is aching in nature. She denies any radicular leg pain. Of note, she reports increased swelling and pain to her left ankle and foot. She does report a trip last week. She did see Orthopedics today and is scheduled for xray. She denies any other health changes. Her pain today is 8/10.    Interval History 12/9/2020:  She returns for follow-up.  She is doing  well since her facet injections.  Her right hip is hurting.  It interferes with her mobility and comfort while laying down.  No new symptomatology otherwise.    Interval History 8/11/2020:  The patient has a virtual visit today for follow up.  She is s/p Bilateral L3-4, L4-5 L5-S1 facet joint injections with 90% relief.  She says that her back pain is minimal at this time.  She is able to move around and walk better since the procedure as well.  She is having some left hip pain and feels as though her leg will give out sometimes when walking.  She would like a referral to ortho close to her home in [facility].  Her pain today is 1/10.  She denies any respiratory changes since procedure including fever, cough or SOB.    Interval History 12/5/2019:  The patient is here for follow up of lower back and bilateral hip pain.  Her back pain has been minimal.  She is having increased lateral hip and buttock pain recently.  She had TPIs in the past and would like to repeat this today.  Her pain today is 8/10.    Previous Encounter:  Anahi Cook presents to the clinic for a follow-up appointment for lower back and left lower extremity pain.  She had T12 kyphoplasty performed on 11/8/18 with significant improvement in symptoms.  She was admitted through the ED on 4/3/19 after suffering fractures of 6th, 8th and possible 7th ribs of the left side after a fall at home.  Thoracic imaging also showed stable slight anterior wedging at T11.  She had bilateral L3-4, L4-5 and L5-S1 facet injections on 4/1/19 with 80% relief of lower back pain.  Her current pain is minimal.  She has mild leg pain with walking.  She continues to follow up with oncology regularly.  Since the last visit, Anahi Cook states the pain has been improving.  Current pain intensity is 2/10.     Pain Medications:  OTC Tylenol    Opioid Contract: no     report:  Not applicable    Pain Procedures:   7/21/14 L4-5 IL ASHUTOSH- significant benefit  12/1/15 Left GT  bursa injection  4/26/16 Left GT bursa injection  12/15/16 L4-5 IL ASHUTOSH- significant benefit  2/15/18 L4-5 IL ASHUTOSH- 100% relief  8/21/18 L4-5 IL ASHUTOSH- significant relief  10/15/18 Bilateral L3-4, L4-5 L5-S1 facet joint injections  11/8/18 T12 kyphoplasty- significant relief  4/1/19 Bilateral L3-4, L4-5 L5-S1 facet joint injections- 80% relief  7/27/20 Bilateral L3-4, L4-5 L5-S1 facet joint injections- 90% relief  4/26/2021- Bilateral hip joint injections- no relief    6/21/2021- Bilateral L4/5 TF ASHUTOSH- 75% relief  8/9/2022- B L4/5 TFESI without significant benefit.   11/17/2022- Left L3/4 and L4/5 TF ASHUTOSH  7/5/2023- Left AC joint injection-75% relief  7/31/2023- Bilateral L5/S1 TF ASHUTOSH- 75% relief  11/7/2024- Bilateral L5/S1 TF ASHUTOSH- 50% relief     Physical Therapy/Home Exercise: yes     Imaging:   MRI LUMBAR SPINE WITHOUT CONTRAST 9/12/2022  COMPARISON:  5/29/2021     FINDINGS:  Chronic vertebral plana T12.  Extension of the posterosuperior cortex into the canal by approximately 6 mm.     There is now height loss at L3, approximately 30%.  Associated STIR signal hyperintensity.  Retropulsion of the posterior cortex into the canal approximately 5 mm.     Remaining vertebral body heights are maintained.     Mild disc space narrowing L4-5.     Grade 1 retrolisthesis L1-2.  Grade 1 anterolisthesis L4-5     Conus terminates appropriately at L1.     Multilevel degenerative change as diesel below:     T11-12: Retropulsion of the posterosuperior cortex of T12 into the canal with mild canal narrowing.     T12-L1: Posterior circumferential disc bulge, asymmetric to the left.  Mild left neural foraminal narrowing.     L1-2: Facet and ligamentum flavum hypertrophic changes with mild left neural foraminal narrowing.     L2-3: Posterior circumferential disc bulge and ligamentum flavum hypertrophy retropulsion of the posterior cortex of L3 into the canal.  Findings result in mild canal narrowing.  Severe right and moderate left neural  foraminal narrowing.     L3-4: Posterior circumferential disc bulge.  Facet and ligamentum flavum hypertrophic changes.  Mild canal and moderate bilateral neural foraminal narrowing.     L4-5: Grade 1 anterolisthesis of L4-5 with uncovering of the disc.  Facet and ligamentum flavum hypertrophic changes.  Combinations contribute to severe canal stenosis.  Severe left and moderate right neural foraminal narrowing.     L5-S1: Central canal and neural foramina are well maintained.     Multiple T2 hyperintense foci in both kidneys, including a thinly septated T2 hyperintense focus on the left measuring 15 mm.     Impression:     Acute fracture along the superior endplate of L3 with approximately 30% height loss.  Stable chronic height loss T12.     Multilevel degenerative change, worst at L4-5 where severe canal stenosis results.     Multilevel neural foraminal narrowing, severe L2-3 and L4-5 as above.     Bilateral renal cysts, including a thinly septated cyst on the left.  Findings can be further evaluated with renal ultrasound.     This report was flagged in Epic as abnormal.    XR LUMBAR SPINE 5 VIEW WITH FLEX AND EXT 8/22/2022  COMPARISON:  Lumbar spine MRI 05/29/2021     FINDINGS:  Previous vertebroplasty T12.     Height loss along the superior endplate of L3 is new compared to that exam.  Height loss is approximately 40%.  There may be mild height loss at L2.     Disc space narrowing and endplate changes L4-5.  Facet arthropathy L4-5.     Grade 1 anterolisthesis L4-5 with no significant change with flexion or extension.     Aortoiliac atherosclerosis.     Impression:     Height loss along the superior endplate of L3, approximately 40%, is new compared to May 2021.  By today's radiographs, there appears to be mild sclerosis along the superior endplate which can be seen with a subacute or chronic fracture.  Correlation with MRI advised if there is recent trauma or acute back pain to exclude the possibility of an  acute fracture.     Degenerative change as above.     This report was flagged in Epic as abnormal.    XR HIP WITH PELVIS WHEN PERFORMED, 2 OR 3 VIEWS LEFT 8/22/2022  COMPARISON:  03/24/2022 and 09/09/2020     FINDINGS:  The femoral head is well located with respect to the acetabulum. No acute fracture seen.  Osteophyte formation, subchondral sclerosis, with moderate narrowing femoroacetabular joint bilaterally.     No significant soft tissue edema or radiopaque retained foreign body.  Aortoiliac atherosclerosis.     Impression:     No acute fracture or dislocation seen.  Moderate osteoarthritis and joint space narrowing.    MRI Lumbar Spine 5/29/2021:  COMPARISON:  Prior MRI from 2014 and 2018     FINDINGS:  There is a transitional lumbosacral segment and spine labeling is as on prior examinations.     Stable severe compression deformity at T12 with retropulsion.  Remaining vertebral body heights are maintained.  Grade 1 anterolisthesis appears increased at L4-5 as compared to previous.  There is no marrow replacement process.  The spinal cord is normal in signal.  Review of paravertebral structures demonstrates high T2 low T1 signal foci within kidneys.     Not included in the field of view of the axial, there is disc bulge at T10-11.  This does not appear to result in a significant degree of spinal canal narrowing on the basis of the sagittal imaging.     T12 demonstrates retropulsion with mild narrowing of the spinal canal.     T12-L1 demonstrates minor disc bulge.     L1-L2 demonstrates mild facet degenerative change.  There is no significant spinal canal stenosis.  There is mild bilateral neural foraminal narrowing.     L2-3 demonstrates facet degenerative change.  There is no significant spinal canal stenosis.  There is right foraminal protrusion.  This results in a moderate degree of right foraminal narrowing.  There is mild left foraminal narrowing.     L3-4 demonstrates facet degenerative change and  ligamentum flavum thickening.  There is a facet joint synovial cyst along the posterolateral aspect of the left facet.  There is right foraminal protrusion.  There is mild left and moderate right foraminal narrowing.     L4-5 demonstrates facet degenerative change and ligamentum flavum thickening.  There is anterolisthesis.  There is severe left and moderate right foraminal narrowing.  There is a severe degree of spinal canal narrowing.     L5-S1 is unremarkable.     Impression:     Multilevel degenerative changes as further detailed above.  Findings are most significant at the L4-5 level, noting transitional lumbosacral segment.     Stable severe compression deformity at T12.     Foci within the kidneys bilaterally likely representing cyst can be correlated with ultrasound.          Past Medical History:  Past Medical History:   Diagnosis Date    Anxiety     Arthritis     Back pain     Breast cancer 1/17/2018    Breast mass 1/15/2018    Chronic kidney disease, stage 2, mildly decreased GFR     COPD (chronic obstructive pulmonary disease)     emphysema    Depression     Dysuria 1/29/2018    Exudative age-related macular degeneration of left eye with active choroidal neovascularization 8/25/2022    Family history of breast cancer 1/17/2018    GERD (gastroesophageal reflux disease)     Hypertension     Infiltrating ductal carcinoma of breast, left 7/12/2018    Multiple closed fractures of ribs of left side 4/3/2019    Osteoporosis, senile     Ovarian mass 1/15/2018    Pneumonia     S/P robotic assisted laparoscopic BSO (bilateral salpingo-oophorectomy) 2/23/2018       Past Surgical History:  Past Surgical History:   Procedure Laterality Date    AXILLARY NODE DISSECTION Left 7/12/2018    Procedure: LYMPHADENECTOMY, AXILLARY;  Surgeon: Amanda Caballero MD;  Location: [facility] OR [address] [place] [address];  Service: General;  Laterality: Left;    BREAST BIOPSY      BREAST LUMPECTOMY      CATARACT EXTRACTION      CHOLECYSTECTOMY      COLONOSCOPY       ESOPHAGOGASTRODUODENOSCOPY      ESOPHAGOGASTRODUODENOSCOPY N/A 10/14/2019    Procedure: EGD (ESOPHAGOGASTRODUODENOSCOPY);  Surgeon: Davonte Thompson MD;  Location: [facility] (2ND FLR);  Service: Endoscopy;  Laterality: N/A;  hx of pulmonary hypertension-PA 50     pt requesting ASAP    EYE SURGERY      FIXATION KYPHOPLASTY N/A 11/8/2018    Procedure: Kyphoplasty   T12;  Surgeon: Merly Wilcox MD;  Location: [facility] CATH LAB;  Service: Pain Management;  Laterality: N/A;  T12  [employer] e-mailed with date and time    HYSTERECTOMY      INJECTION FOR SENTINEL NODE IDENTIFICATION Left 7/12/2018    Procedure: INJECTION, FOR SENTINEL NODE IDENTIFICATION;  Surgeon: Amanda Caballero MD;  [facility];  Service: General;  Laterality: Left;    INJECTION OF FACET JOINT Bilateral 10/15/2018    Procedure: INJECTION, FACET JOINT, BILATERAL LUMBAR L3-4, 4-5, 5-S1 FACET JOINT INJECTIONS;  Surgeon: Merly Wilcox MD;  Location: [facility] PAIN MGT;  Service: Pain Management;  Laterality: Bilateral;    INJECTION OF FACET JOINT Bilateral 4/1/2019    Procedure: INJECTION, FACET JOINT, L3-L4,L4-L5,L5-S1;  Surgeon: Merly Wilcox MD;  Location: [facility] PAIN MGT;  Service: Pain Management;  Laterality: Bilateral;    INJECTION OF FACET JOINT Bilateral 6/20/2019    Procedure: INJECTION, FACET JOINT, L3-L4,L4-L5,L5-S1 NEED CONSENT;  Surgeon: Merly Wilcox MD;  Location: [facility] PAIN MGT;  Service: Pain Management;  Laterality: Bilateral;    INJECTION OF FACET JOINT Bilateral 7/27/2020    Procedure: INJECTION, FACET JOINT BILATERAL L3/4, L4/5 and L5/S1;  Surgeon: Merly Wilcox MD;  Location: [facility] PAIN MGT;  Service: Pain Management;  Laterality: Bilateral;    INJECTION OF JOINT Bilateral 7/27/2020    Procedure: INJECTION, JOINT, BILATERAL GREATER TROCHANTERIC BURSA;  Surgeon: Merly Wilcox MD;  Location: [facility] PAIN MGT;  Service: Pain Management;  Laterality: Bilateral;    INJECTION OF JOINT Bilateral 4/26/2021    Procedure: INJECTION, JOINT, HIP;   Surgeon: Merly Wilcox MD;  Location: [facility] PAIN MGT;  Service: Pain Management;  Laterality: Bilateral;  consent needed    INJECTION OF STEROID Right 11/15/2021    Procedure: INJECTION, STEROID RIGHT MIDDLE AND SAMLL FINGER;  Surgeon: Florecita Da Silva MD;  Location: [facility] OR;  Service: Orthopedics;  Laterality: Right;    MASTECTOMY, PARTIAL Left 7/12/2018    Procedure: MASTECTOMY, PARTIAL-W/SEED LOCALIZATION;  Surgeon: Amanda Caballero MD;  [facility];  Service: General;  Laterality: Left;    NH REMOVAL OF OVARY/TUBE(S)  02/23/2018    Robotic Assisted    ROTATOR CUFF REPAIR Right     rotator cuff repair right shoulder    TONSILLECTOMY      TRANSFORAMINAL EPIDURAL INJECTION OF STEROID Bilateral 6/21/2021    Procedure: INJECTION, STEROID, EPIDURAL, TRANSFORAMINAL APPROACH  bilateral L4/5 TF ASHUTOSH;  Surgeon: Merly Wilcox MD;  Location: [facility] PAIN MGT;  Service: Pain Management;  Laterality: Bilateral;  consent needed    TRANSFORAMINAL EPIDURAL INJECTION OF STEROID Bilateral 1/3/2022    Procedure: INJECTION, STEROID, EPIDURAL, TRANSFORAMINAL APPROACH Bilateral L4/5 Needs consent;  Surgeon: Merly Wilcox MD;  Location: [facility] PAIN MGT;  Service: Pain Management;  Laterality: Bilateral;    TRANSFORAMINAL EPIDURAL INJECTION OF STEROID Bilateral 8/9/2022    Procedure: INJECTION, STEROID, EPIDURAL, TRANSFORAMINAL APPROACH, BILATERAL L4-L5   MEDICALLY URGENT;  Surgeon: Merly Wilcox MD;  Location: [facility] PAIN MGT;  Service: Pain Management;  Laterality: Bilateral;    TRANSFORAMINAL EPIDURAL INJECTION OF STEROID Left 11/17/2022    Procedure: INJECTION, STEROID, EPIDURAL, TRANSFORAMINAL APPROACH LEFT L3-L4 AND L4-L5 CONTRAST;  Surgeon: Merly Wilcox MD;  Location: [facility] PAIN MGT;  Service: Pain Management;  Laterality: Left;    TRANSFORAMINAL EPIDURAL INJECTION OF STEROID Bilateral 7/31/2023    Procedure: INJECTION, STEROID, EPIDURAL, TRANSFORAMINAL APPROACH, BILATERAL L5/S1  sooner date;  Surgeon: Merly Wilcox MD;   Location: [facility] PAIN MGT;  Service: Pain Management;  Laterality: Bilateral;    TRANSFORAMINAL EPIDURAL INJECTION OF STEROID Bilateral 2024    Procedure: LUMBAR TRANSFORAMINAL BILATERAL L5/S1 *ASPIRIN OTC* HOLD FOR 5 DAYS;  Surgeon: Merly Wilcox MD;  Location: [facility];  Service: Pain Management;  Laterality: Bilateral;  [phone]  2 WK F/U MAGDA    TRIGGER FINGER RELEASE Left 11/15/2021    Procedure: RELEASE, TRIGGER FINGER, LEFT MIDDLE AND RING;  Surgeon: Florecita Da Silva MD;  Location: [facility] OR;  Service: Orthopedics;  Laterality: Left;       Family History:  Family History   Problem Relation Name Age of Onset    Heart failure Mother      Kidney failure Mother      Heart attack Father      Breast cancer Sister  66        Lump, XRT, chemo, recurrence 10 years later.    Cancer Brother          leukemia    Heart attack Brother  58    Pulmonary embolism Brother  45    Heart attack Brother  52    Breast cancer Maternal Grandmother  60        advanced at diagnosis    Breast cancer Maternal Aunt  83         at 92    Colon cancer Neg Hx      Esophageal cancer Neg Hx         Social History:  Social History     Socioeconomic History    Marital status: Single    Number of children: 3   Occupational History    Occupation: Multiple jobs, see social documentation section     Comment: Retired   Tobacco Use    Smoking status: Former     Current packs/day: 0.00     Average packs/day: 1 pack/day for 40.0 years (40.0 ttl pk-yrs)     Types: Cigarettes     Quit date: 6/15/1994     Years since quittin.5    Smokeless tobacco: Never    Tobacco comments:     Smoked >1 ppd for at least 40 years, quit around    Substance and Sexual Activity    Alcohol use: Yes     Comment: occasional glass of wine or cocktail    Drug use: No    Sexual activity: Yes     Partners: Male   Social History Narrative    Worked many jobs while raising 3 children.  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  She was  from her first , but took care of him at the end of his life.     Social Drivers of Health     Financial Resource Strain: Low Risk  (1/16/2024)    Overall Financial Resource Strain (CARDIA)     Difficulty of Paying Living Expenses: Not hard at all   Food Insecurity: No Food Insecurity (1/16/2024)    Hunger Vital Sign     Worried About Running Out of Food in the Last Year: Never true     Ran Out of Food in the Last Year: Never true   Transportation Needs: No Transportation Needs (1/16/2024)    PRAPARE - Transportation     Lack of Transportation (Medical): No     Lack of Transportation (Non-Medical): No   Physical Activity: Unknown (1/16/2024)    Exercise Vital Sign     Days of Exercise per Week: 0 days   Recent Concern: Physical Activity - Inactive (1/16/2024)    Exercise Vital Sign     Days of Exercise per Week: 0 days     Minutes of Exercise per Session: 0 min   Stress: No Stress Concern Present (1/16/2024)    [place] [employer] - Occupational Stress Questionnaire     Feeling of Stress : Only a little   Housing Stability: Low Risk  (1/16/2024)    Housing Stability Vital Sign     Unable to Pay for Housing in the Last Year: No     Number of Places Lived in the Last Year: 1     Unstable Housing in the Last Year: No       REVIEW OF SYSTEMS:    GENERAL:  No weight loss, malaise or fevers.  HEENT:   No recent changes in vision or hearing  NECK:  Negative for lumps, no difficulty with swallowing.  RESPIRATORY:  Negative for cough, wheezing or shortness of breath, patient denies any recent URI. COPD.  CARDIOVASCULAR:  Negative for chest pain, leg swelling or palpitations. Hypertension.  GI:  Negative for abdominal discomfort, blood in stools or black stools or change in bowel habits.  MUSCULOSKELETAL:  See HPI.  SKIN:  Negative for lesions, rash, and itching.  PSYCH:  No mood disorder or recent psychosocial  stressors.  Patients sleep is not disturbed secondary to pain.  HEMATOLOGY/LYMPHOLOGY:  Negative for prolonged bleeding, bruising easily or swollen nodes.  Patient is not currently taking any anti-coagulants  NEURO:   No history of headaches, syncope, paralysis, seizures or tremors.  All other reviewed and negative other than HPI.    OBJECTIVE:    Exam limited due to virtual visit:  GENERAL: Well appearing, in no acute distress, alert and oriented x3.  PSYCH:  Mood and affect appropriate.    Previous physical exam:  LMP  (LMP Unknown)     PHYSICAL EXAMINATION:     GENERAL: Well appearing, in no acute distress, alert and oriented x3.  PSYCH:  Mood and affect appropriate.  SKIN: Skin color, texture, turgor normal, no rashes or lesions.  HEAD/FACE:  Normocephalic, atraumatic. Cranial nerves grossly intact.  CV: RRR with palpation of the radial artery.  PULM: No evidence of respiratory difficulty, symmetric chest rise.  EXTREMITIES:  Good capillary refill.  MUSCULOSKELETAL: There is pain with palpation over left AC joint. Limited ROM with pain on internal rotation of bilateral shoulders. Positive cross arm test on the left. No atrophy or tone abnormalities are noted.  NEURO: Bilateral lower extremity coordination and muscle stretch reflexes are physiologic and symmetric.  Plantar response are downgoing. No clonus.  Normal sensation.  GAIT: Antalgic- ambulates with rolling walker.      ASSESSMENT: 89 y.o. year old female with pain, consistent with the following diagnoses:     1. Greater trochanteric bursitis of both hips        2. Lumbar radiculopathy        3. Lumbar spondylosis        4. Degeneration of intervertebral disc of lumbar region with discogenic back pain and lower extremity pain                PLAN:     - Previous imaging reviewed.     - Schedule for bilateral GTB injections in office.     - Consider repeat bilateral L5/S1 TF ASHUTOSH vs caudal ASHUTOSH in the future.     - Continue Tramadol per PCP.      - RTC 2  weeks after above procedure.       The above plan and management options were discussed at length with patient. Patient is in agreement with the above and verbalized understanding.    Amanda Garcia  01/08/2025

## 2025-01-16 ENCOUNTER — TELEPHONE (OUTPATIENT)
Dept: PAIN MEDICINE | Facility: CLINIC | Age: OVER 89
End: 2025-01-16
Payer: MEDICARE

## 2025-01-16 DIAGNOSIS — G89.4 CHRONIC PAIN SYNDROME: Primary | ICD-10-CM

## 2025-02-06 ENCOUNTER — PROCEDURE VISIT (OUTPATIENT)
Dept: PAIN MEDICINE | Facility: CLINIC | Age: OVER 89
End: 2025-02-06
Attending: ANESTHESIOLOGY
Payer: MEDICARE

## 2025-02-06 VITALS
TEMPERATURE: 98 F | SYSTOLIC BLOOD PRESSURE: 142 MMHG | WEIGHT: 150.56 LBS | BODY MASS INDEX: 26.67 KG/M2 | DIASTOLIC BLOOD PRESSURE: 72 MMHG | HEART RATE: 57 BPM

## 2025-02-06 DIAGNOSIS — M70.61 TROCHANTERIC BURSITIS OF RIGHT HIP: Primary | ICD-10-CM

## 2025-02-06 DIAGNOSIS — M79.18 MYOFASCIAL PAIN: ICD-10-CM

## 2025-02-06 PROCEDURE — 20552 NJX 1/MLT TRIGGER POINT 1/2: CPT | Mod: GC,S$GLB,, | Performed by: ANESTHESIOLOGY

## 2025-02-06 PROCEDURE — 20611 DRAIN/INJ JOINT/BURSA W/US: CPT | Mod: 50,59,GC,S$GLB | Performed by: ANESTHESIOLOGY

## 2025-02-06 RX ORDER — TRIAMCINOLONE ACETONIDE 40 MG/ML
40 INJECTION, SUSPENSION INTRA-ARTICULAR; INTRAMUSCULAR
Status: COMPLETED | OUTPATIENT
Start: 2025-02-06 | End: 2025-02-06

## 2025-02-06 RX ADMIN — TRIAMCINOLONE ACETONIDE 40 MG: 40 INJECTION, SUSPENSION INTRA-ARTICULAR; INTRAMUSCULAR at 03:02

## 2025-02-06 NOTE — PROCEDURES
GTB/tendon sheath patient reports she is Injection Procedure Note      Time-out taken to identify patient and procedure side prior to starting the procedure.   I attest that I have reviewed the patient's home medications prior to the procedure and no contraindication have been identified. I  re-evaluated the patient after the patient was positioned for the procedure in the procedure room immediately before the procedural time-out. The vital signs are current and represent the current state of the patient which has not significantly changed since the preprocedure assessment.     Date of Service: 02/06/2025    PCP: Minerva Mathias MD                 PROCEDURE:  Bilateral  injection of:  Greater trochanteric bursa  Muscle tendon sheath (gluteus medius/ITB)      REASON FOR PROCEDURE:  * No surgery found *  1. Trochanteric bursitis of right hip    2. Myofascial pain      POSTOP DIAGNOSIS: * No surgery found *  1. Trochanteric bursitis of right hip    2. Myofascial pain      PHYSICIAN: Merly Wilcox MD  ASSISTANTS: Bill Stahl MD  [facility] pain fellow       MEDICATIONS INJECTED: 0.5 mL of Triamcinolone 40mg/ml X with 10 mL of bupivacaine 0.25%    SEDATION MEDICATIONS: None    ESTIMATED BLOOD LOSS:  None.    COMPLICATIONS:  None.    TECHNIQUE:   The patient was brought to the procedure room and placed on exam table in comfortable position.  Patient was in a lateral position contralateral to the side being done.  The trochanter was identified.  We prepped the area with ChloraPrep.  A 26 gauge spinal needle was used to reach the greater trochanter and adjusted to the bursa under ultrasound guidance.  We injected 2-5 mL of the medication mixture in this area.  The needle was retracted adjusted to tendon sheath and the same other medication was injected in that area.  This was repeated on the contralateral side.  The patient tolerated procedure well.  There were given discharge instructions and a follow-up appointment.       Hazem Eissa

## 2025-03-14 DIAGNOSIS — Z17.0 MALIGNANT NEOPLASM OF NIPPLE OF LEFT BREAST IN FEMALE, ESTROGEN RECEPTOR POSITIVE: ICD-10-CM

## 2025-03-14 DIAGNOSIS — C50.012 MALIGNANT NEOPLASM OF NIPPLE OF LEFT BREAST IN FEMALE, ESTROGEN RECEPTOR POSITIVE: ICD-10-CM

## 2025-03-16 ENCOUNTER — PATIENT MESSAGE (OUTPATIENT)
Dept: PRIMARY CARE CLINIC | Facility: CLINIC | Age: OVER 89
End: 2025-03-16
Payer: MEDICARE

## 2025-03-16 DIAGNOSIS — T50.2X5A DIURETIC-INDUCED HYPOKALEMIA: ICD-10-CM

## 2025-03-16 DIAGNOSIS — E87.6 DIURETIC-INDUCED HYPOKALEMIA: ICD-10-CM

## 2025-03-17 RX ORDER — LETROZOLE 2.5 MG/1
2.5 TABLET, FILM COATED ORAL
Qty: 90 TABLET | Refills: 3 | Status: SHIPPED | OUTPATIENT
Start: 2025-03-17

## 2025-03-17 RX ORDER — POTASSIUM CHLORIDE 750 MG/1
CAPSULE, EXTENDED RELEASE ORAL
Qty: 90 CAPSULE | Refills: 3 | Status: SHIPPED | OUTPATIENT
Start: 2025-03-17

## 2025-03-17 NOTE — TELEPHONE ENCOUNTER
No care due was identified.  [facility] Embedded Care Due Messages. Reference number: [id number].   3/16/2025 9:13:52 PM CDT

## 2025-03-17 NOTE — TELEPHONE ENCOUNTER
Refill Decision Note   Anahi Cook  is requesting a refill authorization.  Brief Assessment and Rationale for Refill:  Approve     Medication Therapy Plan:         Comments:     Note composed:3:28 PM 03/17/2025

## 2025-03-24 DIAGNOSIS — Z00.00 ENCOUNTER FOR MEDICARE ANNUAL WELLNESS EXAM: ICD-10-CM

## 2025-03-27 ENCOUNTER — CLINICAL SUPPORT (OUTPATIENT)
Dept: OPHTHALMOLOGY | Facility: CLINIC | Age: OVER 89
End: 2025-03-27
Payer: MEDICARE

## 2025-03-27 ENCOUNTER — OFFICE VISIT (OUTPATIENT)
Dept: OPHTHALMOLOGY | Facility: CLINIC | Age: OVER 89
End: 2025-03-27
Payer: MEDICARE

## 2025-03-27 DIAGNOSIS — H35.3112 INTERMEDIATE STAGE DRY AGE-RELATED MACULAR DEGENERATION OF RIGHT EYE: ICD-10-CM

## 2025-03-27 DIAGNOSIS — H35.033 HYPERTENSIVE RETINOPATHY OF BOTH EYES: ICD-10-CM

## 2025-03-27 DIAGNOSIS — H35.3221 EXUDATIVE AGE-RELATED MACULAR DEGENERATION OF LEFT EYE WITH ACTIVE CHOROIDAL NEOVASCULARIZATION: Primary | ICD-10-CM

## 2025-03-27 DIAGNOSIS — H35.371 EPIRETINAL MEMBRANE, RIGHT EYE: ICD-10-CM

## 2025-03-27 DIAGNOSIS — Z96.1 PSEUDOPHAKIA OF BOTH EYES: ICD-10-CM

## 2025-03-27 PROCEDURE — 99999 PR PBB SHADOW E&M-EST. PATIENT-LVL III: CPT | Mod: PBBFAC,,, | Performed by: OPHTHALMOLOGY

## 2025-03-27 RX ADMIN — Medication 1.25 MG: at 02:03

## 2025-03-27 NOTE — PROGRESS NOTES
HPI     Macular Degeneration     Additional comments: 4 mon amd chk/dfe/oct           Comments    DLS: 11/26/2024    Patient states that vision seems about the same as last visit in both   eyes.    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 Epiretinal membrane, right eye   4. Pseudophakia of both eyes             Last edited by Pepito Garcia on 3/27/2025  1:49 PM.              A/P    ICD-10-CM ICD-9-CM   1. Exudative age-related macular degeneration of left eye with active choroidal neovascularization  H35.3221 362.52     362.16   2. 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 Epiretinal membrane, right eye  H35.371 362.56   4. Pseudophakia of both eyes  Z96.1 V43.1   5. Hypertensive retinopathy of both eyes  H35.033 362.11         1. Exudative age-related macular degeneration of left eye with active choroidal neovascularization  2. 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 Will give Avastin today and decr interval to 3 mo     Based on todays exam, diagnostic studies, and review of records, the determination was made for treatment today.  Schedule Avastin Injection Left Eye today Patient chooses to proceed with injection R/B/A discussed include infection retinal detachment and stroke    Patient identified.  Timeout performed.    Risks, benefits, and alternatives to treatment were discussed in detail with the patient, including bleeding/infection (endophthalmitis)/etc.  The patient voiced  understanding and wished to proceed with the procedure.  See separate consent form.    Injection Procedure Note:  Diagnosis: CNVM Left Eye    Topical Proparacaine drop placed then topical 5% Betadine, then subcojunctival lidocaine 2% injection  Sterile gloves used, and sterile lid speculum placed.  5% Betadine placed at injection site again prior to injection.  Avastin  1.25mg in 0.05cc Injected inferotemporally 3.5-4mm posterior to the limbus.  Complications: None  Va at least CF at 5 feet post injection.  Retina, ONH, IOP normal after injection.    Followup as below.  Patient should return immediately PRN.  Retinal Detachment and Endophthalmitis precautions given        3. Epiretinal membrane, right eye  Mod ERM, VA 20/40 (stable)  No metamorphopsia stable   Plan: Observation, may need PPV/MP if having incr symptoms   Amsler precautions discussed     4. Pseudophakia of both eyes  Good lens position OU  Plan: Observation      5. Hypertensive retinopathy of both eyes  Mod HTN changes  PCP Minerva Mathias MD - Recent notes reviewed  01/06/2025  5.4  A1C   Plan: Observation HTN changes   Recommend good blood pressure control, tight blood glucose control, and good cholesterol control          RTC 3 mo DFE/OCTm OU, possible Eylea OS      I saw and examined the patient and reviewed in detail the findings documented. The final examination findings, image interpretations, and plan as documented in the record represent my personal judgment and conclusions.    Dwight Melton MD  Vitreoretinal Surgery   [facility]

## 2025-04-11 ENCOUNTER — OFFICE VISIT (OUTPATIENT)
Dept: PAIN MEDICINE | Facility: CLINIC | Age: OVER 89
End: 2025-04-11
Payer: MEDICARE

## 2025-04-11 DIAGNOSIS — M54.16 LUMBAR RADICULOPATHY: ICD-10-CM

## 2025-04-11 DIAGNOSIS — M25.511 CHRONIC PAIN OF BOTH SHOULDERS: ICD-10-CM

## 2025-04-11 DIAGNOSIS — G89.29 CHRONIC PAIN OF BOTH SHOULDERS: ICD-10-CM

## 2025-04-11 DIAGNOSIS — G89.29 CHRONIC PAIN OF BOTH KNEES: ICD-10-CM

## 2025-04-11 DIAGNOSIS — M25.562 CHRONIC PAIN OF BOTH KNEES: ICD-10-CM

## 2025-04-11 DIAGNOSIS — M70.61 TROCHANTERIC BURSITIS OF RIGHT HIP: ICD-10-CM

## 2025-04-11 DIAGNOSIS — G89.4 CHRONIC PAIN SYNDROME: Primary | ICD-10-CM

## 2025-04-11 DIAGNOSIS — M47.816 LUMBAR SPONDYLOSIS: ICD-10-CM

## 2025-04-11 DIAGNOSIS — M25.512 CHRONIC PAIN OF BOTH SHOULDERS: ICD-10-CM

## 2025-04-11 DIAGNOSIS — M51.362 DEGENERATION OF INTERVERTEBRAL DISC OF LUMBAR REGION WITH DISCOGENIC BACK PAIN AND LOWER EXTREMITY PAIN: ICD-10-CM

## 2025-04-11 DIAGNOSIS — M17.0 PRIMARY OSTEOARTHRITIS OF BOTH KNEES: ICD-10-CM

## 2025-04-11 DIAGNOSIS — M25.561 CHRONIC PAIN OF BOTH KNEES: ICD-10-CM

## 2025-04-11 NOTE — PROGRESS NOTES
Chronic Pain- Established Visit  Chronic Pain-Tele-Medicine-Established Note (Follow up visit)        The patient location is: Home  The chief complaint leading to consultation is: pain  Visit type: Virtual visit with synchronous audio and video  Total time spent with patient: 25 min  Each patient to whom he or she provides medical services by telemedicine is:  (1) informed of the relationship between the physician and patient and the respective role of any other health care provider with respect to management of the patient; and (2) notified that he or she may decline to receive medical services by telemedicine and may withdraw from such care at any time.        SUBJECTIVE:    Interval History 4/11/2025:  The patient returns to clinic today for follow up of pain via virtual visit. She is s/p bilateral GTB injections on 2/6/2025. She reports 60% relief. She reports increased pain into her shoulders. This is worse with activity. She also reports worsened bilateral knee pain, right greater than left. She feels like her right knee will give out on her. She is taking Tylenol. She does take Tramadol for severe pain. She denies any other health changes.     Interval History 1/8/2025:  The patient returns to clinic today for follow up of back and hip pain via virtual visit. She reports increased low back and bilateral hip pain. She reports increased bilateral hip pain, right side greater than left. This is worse with laying on her sides. She also reports low back pain that radiates into the her legs. She has pain with standing and walking. She is taking Tylenol. She also takes Tramadol per her PCP. She denies any other health changes.     Interval History 11/20/2024:  The patient returns to clinic today for follow up of back pain via virtual visit. She is s/p bilateral L5/S1 TF ASHUTOSH on 11/7/2024. She reports 50% relief of her pain. She reports intermittent low back and leg pain. This is worse in the morning. She is  performing home exercise. She denies any other health changes.     Interval History 7/3/2024:  The patient returns to clinic today for follow up of pain via virtual visit. She reports increased bilateral hip and knee pain. Her hip pain is worse with laying on her sides. Her knee pain is worse with standing and walking. She also notes low back pain. She continues to report burning pain into her feet. She weaned off the Gabapentin and her swelling has improved. She also notes bilateral shoulder pain. She does take Tramadol per her PCP as needed. She denies any other health changes.     Interval History 12/5/2023:  The patient returns to clinic today for follow up of pain via virtual visit. Since last visit, she was admitted for pneumonia. She is currently on oxygen. She did see Pulmonology last week and has upcoming tests. She reports increased bilateral knee pain. This is worse with standing and walking. She also reports intermittent low back pain. She does have burning pain into her heels. She is having some increased lower extremity edema. She is taking Gabapentin but concerned that this may be contributing to swelling. She denies any other health changes.     Interval History 8/16/2023:  The patient returns to clinic today for follow up of back pain via virtual visit. She is s/p left AC joint injection on 7/5/2023. She is s/p bilateral L5/S1 TF ASHUTOSH on 7/31/2023. She reports 75% relief of her low back pain. She also reports 75% relief of her shoulder pain. She continues to report intermittent low back pain. She reports burning pain into her bilateral heels. She is using a topical pain cream. She takes Gabapentin with benefit. She denies any other health changes.     Interval History 7/5/2023:  The patient returns to clinic today for follow up of low back pain. She had a fall two weeks ago. She did go to Urgent Care where xrays are negative for acute injury. She reports increased left shoulder and clavicle pain.  This pain is worse with activity. She is having trouble dressing herself due to pain. She has increased her Tylenol intake. She is using a topical pain cream. She is using ice and heat. She is taking Gabapentin. She continues to report low back pain, she is scheduled for ASHUTOSH at the end of the month. Her pain today is 9/10.    Interval History 6/9/2023:  The patient returns to clinic today for follow up of back pain via virtual visit. She reports increased low back pain that radiates into anterior and posterior legs to her ankles. Her pain is worse with standing and walking. She is taking Gabapentin at bedtime. She also takes Tylenol for pain. She denies any other health changes.     Interval History 3/9/2023:  The patient returns to clinic today for follow up of back pain via virtual visit. She reports intermittent low back pain. She denies any radicular leg pain. She is using an acupressure knee brace with benefit. Her pain is tolerable at this time. She continues to take Gabapentin with benefit. She denies any other health changes.     Interval History 12/9/2022:  The patient returns to clinic today for follow up of low back pain via virtual visit. She is s/p left L3/4 and L4/5 TF ASHUTOSH on 11/17/2022. She reports 60% relief of her pain. She continues to report intermittent low back pain. She denies any radicular leg pain. She does report bilateral foot pain, described as burning in nature. Her pain is tolerable at this time. She continues to take Gabapentin. She also takes Tramadol per her PCP. She denies any other health changes.     Interval History 10/20/22  Ms. Cook complains of worsening left low back pain radiating down her lateral thigh to the knee. The pain is only when she walks/moves, she in pain free when sitting. She is s/p bilateral TESI L4/5 2 months ago, which she had relief from, but acutely worsened when she bent over to pick something off the ground. She states the pain is similar to her pain  prior to this. She denies numbness, tingling, bladder/bowel problems.    Interval History 10/6/2022:   Mrs Cook presents for follow up virtually. At prior visit she had Left GTB injection with significant benefit in office. Her lower back and hip pain were further evaluated and noted newer L3 compression Fx. While she visually appears to have significant improvement of pain to me she does voice focal upper/mid lumbar pain persists. Discussed treatment options and Kyphoplasty recommended by Dr Wilcox but there was hesitation regarding further escalation to kyphoplasty. We did discusses prior osteoporosis diagnosis and considering treatment options with PCP. She does not voice any s/s concerning for cauda equina.      Interval History 8/22/2022:  Mrs Cook presents for follow up acute pain. She is s/p repeat Bilateral L4/5 TFESI with mild improvement but having severe left lateral hip pain. She also continues to have burning pain to Right heel and numbness to right lateral lower leg. She has chronic urinary continence but no new neurological issues.She continues to take tramadol by PCP with some benefit     Interval History 8/5/2022:  Mrs Cook presents for follow up evaluation of returning lower back pain. She has exact pain relieved prior by B L4/5 TFESIs. She has newer complaint of bilateral feet burning pains as well. She has had JESSE noting arterial issues R>L. She continues to take Tramadol and neurontin without SE.She would like to repeat ASHUTOSH as it provided 7 months of 75% relief and symptoms returning severe over last few weeks. No voicing of symptoms concerning for cauda equina.     Interval History 12/6/2021:  The patient returns to clinic today for follow up of low back pain. She reports increased low back and bilateral hip pain over the last two weeks. She reports intermittent radiating pain into her lateral thighs. She does have difficulty sleeping due to pain. Her pain is also worse with standing and  walking. She continues to take Gabapentin with benefit. She also takes Tramadol per her PCP with benefit. She denies any other health changes. Her pain today is 8/10.    Interval History 7/6/2021:  The patient returns to clinic today for follow up of low back pain via virtual visit. She is s/p bilateral L4/5 TF ASHUTOSH on 6/21/2021. She reports 75% relief of her low back and leg pain. She reports low back pain, worse in the morning. She reports intermittent radiating pain into the posterolateral aspect of both legs to her knees. She continues to take Gabapentin with benefit. She denies any weakness. She denies any other health changes. Her pain today is 2/10.    Interval History 6/8/2021:  The patient returns to clinic today for follow up of low back pain. She is here today for imaging review. She continues to report low back pain that radiates into the posterolateral aspect of both legs to below her knee. Her pain is worse with prolonged standing and walking. She does feel like she will fall with prolonged walking. She also reports relief with bending forward. She is currently taking Gabapentin at bedtime with benefit. She continues to take Tramadol per her PCP with benefit. She denies any other health changes. Her pain today is 8/10.    Interval History 5/20/2021:  The patient returns to clinic today for follow up of low back pain. She is s/p bilateral hip joint injections on 4/26/2021. She reports no relief. She reports increased low back pain that radiates into the posterior aspect of both legs to her ankles. She does report numbness to her feet. Her pain is worse with prolonged standing and walking. She feels as though her legs will give out with prolonged walking. She denies any bowel or bladder incontinence. She denies any other health changes. Her pain today is 9/10.    Interval History 3/23/2021:  The patient returns to clinic today for follow up of low back and hip pain. She reports increased bilateral hip  pain. She describes this pain as deep and aching. Her pain is worse with prolonged standing and walking. She also reports low back pain that is aching in nature. She denies any radicular leg pain. Of note, she reports increased swelling and pain to her left ankle and foot. She does report a trip last week. She did see Orthopedics today and is scheduled for xray. She denies any other health changes. Her pain today is 8/10.    Interval History 12/9/2020:  She returns for follow-up.  She is doing well since her facet injections.  Her right hip is hurting.  It interferes with her mobility and comfort while laying down.  No new symptomatology otherwise.    Interval History 8/11/2020:  The patient has a virtual visit today for follow up.  She is s/p Bilateral L3-4, L4-5 L5-S1 facet joint injections with 90% relief.  She says that her back pain is minimal at this time.  She is able to move around and walk better since the procedure as well.  She is having some left hip pain and feels as though her leg will give out sometimes when walking.  She would like a referral to ortho close to her home in [facility].  Her pain today is 1/10.  She denies any respiratory changes since procedure including fever, cough or SOB.    Interval History 12/5/2019:  The patient is here for follow up of lower back and bilateral hip pain.  Her back pain has been minimal.  She is having increased lateral hip and buttock pain recently.  She had TPIs in the past and would like to repeat this today.  Her pain today is 8/10.    Previous Encounter:  Anahi Cook presents to the clinic for a follow-up appointment for lower back and left lower extremity pain.  She had T12 kyphoplasty performed on 11/8/18 with significant improvement in symptoms.  She was admitted through the ED on 4/3/19 after suffering fractures of 6th, 8th and possible 7th ribs of the left side after a fall at home.  Thoracic imaging also showed stable slight anterior wedging at T11.   She had bilateral L3-4, L4-5 and L5-S1 facet injections on 4/1/19 with 80% relief of lower back pain.  Her current pain is minimal.  She has mild leg pain with walking.  She continues to follow up with oncology regularly.  Since the last visit, Anahi Cook states the pain has been improving.  Current pain intensity is 2/10.     Pain Medications:  OTC Tylenol    Opioid Contract: no     report:  Not applicable    Pain Procedures:   7/21/14 L4-5 IL ASHUTOSH- significant benefit  12/1/15 Left GT bursa injection  4/26/16 Left GT bursa injection  12/15/16 L4-5 IL ASHUTOSH- significant benefit  2/15/18 L4-5 IL ASHUTOSH- 100% relief  8/21/18 L4-5 IL ASHUTOSH- significant relief  10/15/18 Bilateral L3-4, L4-5 L5-S1 facet joint injections  11/8/18 T12 kyphoplasty- significant relief  4/1/19 Bilateral L3-4, L4-5 L5-S1 facet joint injections- 80% relief  7/27/20 Bilateral L3-4, L4-5 L5-S1 facet joint injections- 90% relief  4/26/2021- Bilateral hip joint injections- no relief    6/21/2021- Bilateral L4/5 TF ASHUTOSH- 75% relief  8/9/2022- B L4/5 TFESI without significant benefit.   11/17/2022- Left L3/4 and L4/5 TF ASHUTOSH  7/5/2023- Left AC joint injection-75% relief  7/31/2023- Bilateral L5/S1 TF ASHUTOSH- 75% relief  10/30/2024- Bilateral knee and GTB injection  11/7/2024- Bilateral L5/S1 TF ASHUTOSH- 50% relief   2/6/2025- Bilateral GTB injections    Physical Therapy/Home Exercise: yes     Imaging:   MRI LUMBAR SPINE WITHOUT CONTRAST 9/12/2022  COMPARISON:  5/29/2021     FINDINGS:  Chronic vertebral plana T12.  Extension of the posterosuperior cortex into the canal by approximately 6 mm.     There is now height loss at L3, approximately 30%.  Associated STIR signal hyperintensity.  Retropulsion of the posterior cortex into the canal approximately 5 mm.     Remaining vertebral body heights are maintained.     Mild disc space narrowing L4-5.     Grade 1 retrolisthesis L1-2.  Grade 1 anterolisthesis L4-5     Conus terminates appropriately at L1.     Multilevel  degenerative change as diesel below:     T11-12: Retropulsion of the posterosuperior cortex of T12 into the canal with mild canal narrowing.     T12-L1: Posterior circumferential disc bulge, asymmetric to the left.  Mild left neural foraminal narrowing.     L1-2: Facet and ligamentum flavum hypertrophic changes with mild left neural foraminal narrowing.     L2-3: Posterior circumferential disc bulge and ligamentum flavum hypertrophy retropulsion of the posterior cortex of L3 into the canal.  Findings result in mild canal narrowing.  Severe right and moderate left neural foraminal narrowing.     L3-4: Posterior circumferential disc bulge.  Facet and ligamentum flavum hypertrophic changes.  Mild canal and moderate bilateral neural foraminal narrowing.     L4-5: Grade 1 anterolisthesis of L4-5 with uncovering of the disc.  Facet and ligamentum flavum hypertrophic changes.  Combinations contribute to severe canal stenosis.  Severe left and moderate right neural foraminal narrowing.     L5-S1: Central canal and neural foramina are well maintained.     Multiple T2 hyperintense foci in both kidneys, including a thinly septated T2 hyperintense focus on the left measuring 15 mm.     Impression:     Acute fracture along the superior endplate of L3 with approximately 30% height loss.  Stable chronic height loss T12.     Multilevel degenerative change, worst at L4-5 where severe canal stenosis results.     Multilevel neural foraminal narrowing, severe L2-3 and L4-5 as above.     Bilateral renal cysts, including a thinly septated cyst on the left.  Findings can be further evaluated with renal ultrasound.     This report was flagged in Epic as abnormal.    XR LUMBAR SPINE 5 VIEW WITH FLEX AND EXT 8/22/2022  COMPARISON:  Lumbar spine MRI 05/29/2021     FINDINGS:  Previous vertebroplasty T12.     Height loss along the superior endplate of L3 is new compared to that exam.  Height loss is approximately 40%.  There may be mild height  loss at L2.     Disc space narrowing and endplate changes L4-5.  Facet arthropathy L4-5.     Grade 1 anterolisthesis L4-5 with no significant change with flexion or extension.     Aortoiliac atherosclerosis.     Impression:     Height loss along the superior endplate of L3, approximately 40%, is new compared to May 2021.  By today's radiographs, there appears to be mild sclerosis along the superior endplate which can be seen with a subacute or chronic fracture.  Correlation with MRI advised if there is recent trauma or acute back pain to exclude the possibility of an acute fracture.     Degenerative change as above.     This report was flagged in Epic as abnormal.    XR HIP WITH PELVIS WHEN PERFORMED, 2 OR 3 VIEWS LEFT 8/22/2022  COMPARISON:  03/24/2022 and 09/09/2020     FINDINGS:  The femoral head is well located with respect to the acetabulum. No acute fracture seen.  Osteophyte formation, subchondral sclerosis, with moderate narrowing femoroacetabular joint bilaterally.     No significant soft tissue edema or radiopaque retained foreign body.  Aortoiliac atherosclerosis.     Impression:     No acute fracture or dislocation seen.  Moderate osteoarthritis and joint space narrowing.    MRI Lumbar Spine 5/29/2021:  COMPARISON:  Prior MRI from 2014 and 2018     FINDINGS:  There is a transitional lumbosacral segment and spine labeling is as on prior examinations.     Stable severe compression deformity at T12 with retropulsion.  Remaining vertebral body heights are maintained.  Grade 1 anterolisthesis appears increased at L4-5 as compared to previous.  There is no marrow replacement process.  The spinal cord is normal in signal.  Review of paravertebral structures demonstrates high T2 low T1 signal foci within kidneys.     Not included in the field of view of the axial, there is disc bulge at T10-11.  This does not appear to result in a significant degree of spinal canal narrowing on the basis of the sagittal  imaging.     T12 demonstrates retropulsion with mild narrowing of the spinal canal.     T12-L1 demonstrates minor disc bulge.     L1-L2 demonstrates mild facet degenerative change.  There is no significant spinal canal stenosis.  There is mild bilateral neural foraminal narrowing.     L2-3 demonstrates facet degenerative change.  There is no significant spinal canal stenosis.  There is right foraminal protrusion.  This results in a moderate degree of right foraminal narrowing.  There is mild left foraminal narrowing.     L3-4 demonstrates facet degenerative change and ligamentum flavum thickening.  There is a facet joint synovial cyst along the posterolateral aspect of the left facet.  There is right foraminal protrusion.  There is mild left and moderate right foraminal narrowing.     L4-5 demonstrates facet degenerative change and ligamentum flavum thickening.  There is anterolisthesis.  There is severe left and moderate right foraminal narrowing.  There is a severe degree of spinal canal narrowing.     L5-S1 is unremarkable.     Impression:     Multilevel degenerative changes as further detailed above.  Findings are most significant at the L4-5 level, noting transitional lumbosacral segment.     Stable severe compression deformity at T12.     Foci within the kidneys bilaterally likely representing cyst can be correlated with ultrasound.          Past Medical History:  Past Medical History:   Diagnosis Date    Anxiety     Arthritis     Back pain     Breast cancer 1/17/2018    Breast mass 1/15/2018    Chronic kidney disease, stage 2, mildly decreased GFR     COPD (chronic obstructive pulmonary disease)     emphysema    Depression     Dysuria 1/29/2018    Exudative age-related macular degeneration of left eye with active choroidal neovascularization 8/25/2022    Family history of breast cancer 1/17/2018    GERD (gastroesophageal reflux disease)     Hypertension     Infiltrating ductal carcinoma of breast, left  7/12/2018    Multiple closed fractures of ribs of left side 4/3/2019    Osteoporosis, senile     Ovarian mass 1/15/2018    Pneumonia     S/P robotic assisted laparoscopic BSO (bilateral salpingo-oophorectomy) 2/23/2018       Past Surgical History:  Past Surgical History:   Procedure Laterality Date    AXILLARY NODE DISSECTION Left 7/12/2018    Procedure: LYMPHADENECTOMY, AXILLARY;  Surgeon: Amanda Caballero MD;  Location: [facility] OR [address] [place] [address];  Service: General;  Laterality: Left;    BREAST BIOPSY      BREAST LUMPECTOMY      CATARACT EXTRACTION      CHOLECYSTECTOMY      COLONOSCOPY      ESOPHAGOGASTRODUODENOSCOPY      ESOPHAGOGASTRODUODENOSCOPY N/A 10/14/2019    Procedure: EGD (ESOPHAGOGASTRODUODENOSCOPY);  Surgeon: Davonte Thompson MD;  [facility]  Service: Endoscopy;  Laterality: N/A;  hx of pulmonary hypertension-PA 50     pt requesting ASAP    EYE SURGERY      FIXATION KYPHOPLASTY N/A 11/8/2018    Procedure: Kyphoplasty   T12;  Surgeon: Merly Wilcox MD;  Location: [facility] CATH LAB;  Service: Pain Management;  Laterality: N/A;  T12  [employer] e-mailed with date and time    HYSTERECTOMY      INJECTION FOR SENTINEL NODE IDENTIFICATION Left 7/12/2018    Procedure: INJECTION, FOR SENTINEL NODE IDENTIFICATION;  Surgeon: Amanda Caballero MD;  Location: [facility] OR [address] [place] [address];  Service: General;  Laterality: Left;    INJECTION OF FACET JOINT Bilateral 10/15/2018    Procedure: INJECTION, FACET JOINT, BILATERAL LUMBAR L3-4, 4-5, 5-S1 FACET JOINT INJECTIONS;  Surgeon: Merly Wilcox MD;  Location: [facility] PAIN MGT;  Service: Pain Management;  Laterality: Bilateral;    INJECTION OF FACET JOINT Bilateral 4/1/2019    Procedure: INJECTION, FACET JOINT, L3-L4,L4-L5,L5-S1;  Surgeon: Merly Wilcox MD;  Location: [facility] PAIN MGT;  Service: Pain Management;  Laterality: Bilateral;    INJECTION OF FACET JOINT Bilateral 6/20/2019    Procedure: INJECTION, FACET JOINT, L3-L4,L4-L5,L5-S1 NEED CONSENT;  Surgeon: Merly Wilcox MD;  Location:  [facility] PAIN MGT;  Service: Pain Management;  Laterality: Bilateral;    INJECTION OF FACET JOINT Bilateral 7/27/2020    Procedure: INJECTION, FACET JOINT BILATERAL L3/4, L4/5 and L5/S1;  Surgeon: Merly Wilcox MD;  Location: [facility] PAIN MGT;  Service: Pain Management;  Laterality: Bilateral;    INJECTION OF JOINT Bilateral 7/27/2020    Procedure: INJECTION, JOINT, BILATERAL GREATER TROCHANTERIC BURSA;  Surgeon: Merly Wilcox MD;  Location: [facility] PAIN MGT;  Service: Pain Management;  Laterality: Bilateral;    INJECTION OF JOINT Bilateral 4/26/2021    Procedure: INJECTION, JOINT, HIP;  Surgeon: Merly Wilcox MD;  Location: [facility] PAIN MGT;  Service: Pain Management;  Laterality: Bilateral;  consent needed    INJECTION OF STEROID Right 11/15/2021    Procedure: INJECTION, STEROID RIGHT MIDDLE AND SAMLL FINGER;  Surgeon: Florecita Da Silva MD;  Location: [facility] OR;  Service: Orthopedics;  Laterality: Right;    MASTECTOMY, PARTIAL Left 7/12/2018    Procedure: MASTECTOMY, PARTIAL-W/SEED LOCALIZATION;  Surgeon: Amanda Caballero MD;  [facility];  Service: General;  Laterality: Left;    AK REMOVAL OF OVARY/TUBE(S)  02/23/2018    Robotic Assisted    ROTATOR CUFF REPAIR Right     rotator cuff repair right shoulder    TONSILLECTOMY      TRANSFORAMINAL EPIDURAL INJECTION OF STEROID Bilateral 6/21/2021    Procedure: INJECTION, STEROID, EPIDURAL, TRANSFORAMINAL APPROACH  bilateral L4/5 TF ASHUTOSH;  Surgeon: Merly Wilcox MD;  Location: [facility] PAIN MGT;  Service: Pain Management;  Laterality: Bilateral;  consent needed    TRANSFORAMINAL EPIDURAL INJECTION OF STEROID Bilateral 1/3/2022    Procedure: INJECTION, STEROID, EPIDURAL, TRANSFORAMINAL APPROACH Bilateral L4/5 Needs consent;  Surgeon: Merly Wilcox MD;  Location: [facility] PAIN MGT;  Service: Pain Management;  Laterality: Bilateral;    TRANSFORAMINAL EPIDURAL INJECTION OF STEROID Bilateral 8/9/2022    Procedure: INJECTION, STEROID, EPIDURAL, TRANSFORAMINAL APPROACH, BILATERAL L4-L5    MEDICALLY URGENT;  Surgeon: Merly Wilcox MD;  Location: [facility] PAIN MGT;  Service: Pain Management;  Laterality: Bilateral;    TRANSFORAMINAL EPIDURAL INJECTION OF STEROID Left 2022    Procedure: INJECTION, STEROID, EPIDURAL, TRANSFORAMINAL APPROACH LEFT L3-L4 AND L4-L5 CONTRAST;  Surgeon: Merly Wilcox MD;  Location: [facility];  Service: Pain Management;  Laterality: Left;    TRANSFORAMINAL EPIDURAL INJECTION OF STEROID Bilateral 2023    Procedure: INJECTION, STEROID, EPIDURAL, TRANSFORAMINAL APPROACH, BILATERAL L5/S1  sooner date;  Surgeon: Merly Wilcox MD;  Location: [facility];  Service: Pain Management;  Laterality: Bilateral;    TRANSFORAMINAL EPIDURAL INJECTION OF STEROID Bilateral 2024    Procedure: LUMBAR TRANSFORAMINAL BILATERAL L5/S1 *ASPIRIN OTC* HOLD FOR 5 DAYS;  Surgeon: Merly Wilcox MD;  Location: [facility];  Service: Pain Management;  Laterality: Bilateral;  [phone]  2 WK F/U MAGDA    TRIGGER FINGER RELEASE Left 11/15/2021    Procedure: RELEASE, TRIGGER FINGER, LEFT MIDDLE AND RING;  Surgeon: Florecita Da Silva MD;  Location: [facility];  Service: Orthopedics;  Laterality: Left;       Family History:  Family History   Problem Relation Name Age of Onset    Heart failure Mother      Kidney failure Mother      Heart attack Father      Breast cancer Sister  66        Lump, XRT, chemo, recurrence 10 years later.    Cancer Brother          leukemia    Heart attack Brother  58    Pulmonary embolism Brother  45    Heart attack Brother  52    Breast cancer Maternal Grandmother  60        advanced at diagnosis    Breast cancer Maternal Aunt  83         at 92    Colon cancer Neg Hx      Esophageal cancer Neg Hx         Social History:  Social History     Socioeconomic History    Marital status: Single    Number of children: 3   Occupational History    Occupation: Multiple jobs, see social documentation section     Comment: Retired   Tobacco Use    Smoking status: Former      Current packs/day: 0.00     Average packs/day: 1 pack/day for 40.0 years (40.0 ttl pk-yrs)     Types: Cigarettes     Quit date: 6/15/1994     Years since quittin.8    Smokeless tobacco: Never    Tobacco comments:     Smoked >1 ppd for at least 40 years, quit around    Substance and Sexual Activity    Alcohol use: Yes     Comment: occasional glass of wine or cocktail    Drug use: No    Sexual activity: Yes     Partners: Male   Social History Narrative    Worked many jobs while raising 3 children.  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  She was  from her first , but took care of him at the end of his life.     Social Drivers of Health     Financial Resource Strain: Low Risk  (2024)    Overall Financial Resource Strain (CARDIA)     Difficulty of Paying Living Expenses: Not hard at all   Food Insecurity: No Food Insecurity (2024)    Hunger Vital Sign     Worried About Running Out of Food in the Last Year: Never true     Ran Out of Food in the Last Year: Never true   Transportation Needs: No Transportation Needs (2024)    PRAPARE - Transportation     Lack of Transportation (Medical): No     Lack of Transportation (Non-Medical): No   Physical Activity: Unknown (2024)    Exercise Vital Sign     Days of Exercise per Week: 0 days   Recent Concern: Physical Activity - Inactive (2024)    Exercise Vital Sign     Days of Exercise per Week: 0 days     Minutes of Exercise per Session: 0 min   Stress: No Stress Concern Present (2024)    [employer] - Occupational Stress Questionnaire     Feeling of Stress : Only a little   Housing Stability: Low Risk  (2024)    Housing Stability Vital Sign     Unable to Pay for Housing in the Last Year: No     Number of Places Lived in the Last Year: 1     Unstable Housing in the Last Year: No       REVIEW OF  SYSTEMS:    GENERAL:  No weight loss, malaise or fevers.  HEENT:   No recent changes in vision or hearing  NECK:  Negative for lumps, no difficulty with swallowing.  RESPIRATORY:  Negative for cough, wheezing or shortness of breath, patient denies any recent URI. COPD.  CARDIOVASCULAR:  Negative for chest pain, leg swelling or palpitations. Hypertension.  GI:  Negative for abdominal discomfort, blood in stools or black stools or change in bowel habits.  MUSCULOSKELETAL:  See HPI.  SKIN:  Negative for lesions, rash, and itching.  PSYCH:  No mood disorder or recent psychosocial stressors.  Patients sleep is not disturbed secondary to pain.  HEMATOLOGY/LYMPHOLOGY:  Negative for prolonged bleeding, bruising easily or swollen nodes.  Patient is not currently taking any anti-coagulants  NEURO:   No history of headaches, syncope, paralysis, seizures or tremors.  All other reviewed and negative other than HPI.    OBJECTIVE:    Exam limited due to virtual visit:  GENERAL: Well appearing, in no acute distress, alert and oriented x3.  PSYCH:  Mood and affect appropriate.    Previous physical exam:  LMP  (LMP Unknown)     PHYSICAL EXAMINATION:     GENERAL: Well appearing, in no acute distress, alert and oriented x3.  PSYCH:  Mood and affect appropriate.  SKIN: Skin color, texture, turgor normal, no rashes or lesions.  HEAD/FACE:  Normocephalic, atraumatic. Cranial nerves grossly intact.  CV: RRR with palpation of the radial artery.  PULM: No evidence of respiratory difficulty, symmetric chest rise.  EXTREMITIES:  Good capillary refill.  MUSCULOSKELETAL: There is pain with palpation over left AC joint. Limited ROM with pain on internal rotation of bilateral shoulders. Positive cross arm test on the left. No atrophy or tone abnormalities are noted.  NEURO: Bilateral lower extremity coordination and muscle stretch reflexes are physiologic and symmetric.  Plantar response are downgoing. No clonus.  Normal sensation.  GAIT:  Antalgic- ambulates with rolling walker.      ASSESSMENT: 89 y.o. year old female with pain, consistent with the following diagnoses:     1. Chronic pain syndrome        2. Primary osteoarthritis of both knees        3. Chronic pain of both knees        4. Chronic pain of both shoulders        5. Trochanteric bursitis of right hip        6. Lumbar radiculopathy        7. Lumbar spondylosis        8. Degeneration of intervertebral disc of lumbar region with discogenic back pain and lower extremity pain                  PLAN:     - Previous imaging reviewed.     - She is s/p bilateral GTB injections with benefit.     - Schedule for bilateral AC joint and knee steroid injections in office.     - Consider repeat bilateral L5/S1 TF ASHUTOSH vs caudal ASHUTOSH in the future.     - Continue Tramadol per PCP.      - RTC 2 weeks after above procedure.       The above plan and management options were discussed at length with patient. Patient is in agreement with the above and verbalized understanding.    Amanda Garcia  04/11/2025

## 2025-04-21 ENCOUNTER — TELEPHONE (OUTPATIENT)
Dept: PAIN MEDICINE | Facility: CLINIC | Age: OVER 89
End: 2025-04-21
Payer: MEDICARE

## 2025-04-21 DIAGNOSIS — M25.512 ACROMIOCLAVICULAR JOINT PAIN, BILATERAL: ICD-10-CM

## 2025-04-21 DIAGNOSIS — M17.0 PRIMARY OSTEOARTHRITIS OF BOTH KNEES: Primary | ICD-10-CM

## 2025-04-21 DIAGNOSIS — M25.511 ACROMIOCLAVICULAR JOINT PAIN, BILATERAL: ICD-10-CM

## 2025-04-21 NOTE — TELEPHONE ENCOUNTER
----- Message from OsvaldoSparql Citymiguel sent at 4/21/2025  1:07 PM CDT -----   Name of Who is Calling: What is the request in detail:  request call back in reference to  knee and shoulder injection Please contact to further discuss and advise  Can the clinic reply by [facility]: What [facility] Back [facility]  [phone] cristina  / vaibhav

## 2025-04-21 NOTE — TELEPHONE ENCOUNTER
----- Message from OsvaldoStyleSeatmiguel sent at 4/21/2025  1:07 PM CDT -----   Name of Who is Calling: What is the request in detail:  request call back in reference to  knee and shoulder injection Please contact to further discuss and advise  Can the clinic reply by [facility]: What Number to Call Back [facility] not [facility]:  [phone] cristina  / vaibhav

## 2025-04-30 DIAGNOSIS — M17.11 PRIMARY OSTEOARTHRITIS OF RIGHT KNEE: ICD-10-CM

## 2025-04-30 DIAGNOSIS — M47.816 LUMBAR SPONDYLOSIS: ICD-10-CM

## 2025-04-30 NOTE — TELEPHONE ENCOUNTER
No care due was identified.  [facility] Embedded Care Due Messages. Reference number: [id number].   4/30/2025 5:46:19 PM CDT

## 2025-05-01 ENCOUNTER — PROCEDURE VISIT (OUTPATIENT)
Dept: PAIN MEDICINE | Facility: CLINIC | Age: OVER 89
End: 2025-05-01
Attending: ANESTHESIOLOGY
Payer: MEDICARE

## 2025-05-01 VITALS
SYSTOLIC BLOOD PRESSURE: 158 MMHG | RESPIRATION RATE: 15 BRPM | OXYGEN SATURATION: 100 % | BODY MASS INDEX: 26.67 KG/M2 | WEIGHT: 150.56 LBS | TEMPERATURE: 96 F | HEART RATE: 67 BPM | DIASTOLIC BLOOD PRESSURE: 69 MMHG

## 2025-05-01 DIAGNOSIS — M25.511 CHRONIC PAIN OF BOTH SHOULDERS: ICD-10-CM

## 2025-05-01 DIAGNOSIS — M25.511 ACROMIOCLAVICULAR JOINT PAIN, BILATERAL: ICD-10-CM

## 2025-05-01 DIAGNOSIS — G89.29 CHRONIC PAIN OF BOTH SHOULDERS: ICD-10-CM

## 2025-05-01 DIAGNOSIS — M25.512 ACROMIOCLAVICULAR JOINT PAIN, BILATERAL: ICD-10-CM

## 2025-05-01 DIAGNOSIS — M25.512 CHRONIC PAIN OF BOTH SHOULDERS: ICD-10-CM

## 2025-05-01 DIAGNOSIS — M19.012 ARTHRITIS OF LEFT ACROMIOCLAVICULAR JOINT: ICD-10-CM

## 2025-05-01 DIAGNOSIS — M17.0 PRIMARY OSTEOARTHRITIS OF BOTH KNEES: Primary | ICD-10-CM

## 2025-05-01 RX ORDER — TRAMADOL HYDROCHLORIDE 50 MG/1
50 TABLET ORAL EVERY 8 HOURS PRN
Qty: 90 TABLET | Refills: 2 | Status: SHIPPED | OUTPATIENT
Start: 2025-05-01

## 2025-05-01 NOTE — PROCEDURES
"Patient Name: Anahi Cook  MRN: [id number]    INFORMED CONSENT: The procedure, risks, benefits and options were discussed with patient. There are no contraindications to the procedure. The patient expressed understanding and agreed to proceed. The personnel performing the procedure was discussed. I verify that I personally obtained Anahi's consent prior to the start of the procedure and the signed consent can be found on the patient's chart.    Procedure Date: 05/02/2025    Anesthesia: Topical    Pre Procedure diagnosis:   1. Primary osteoarthritis of both knees    2. Acromioclavicular joint pain, bilateral    3. Chronic pain of both shoulders    4. Arthritis of left acromioclavicular joint      Post-Procedure diagnosis: same    Sedation: None    PROCEDURE: BILATERALLY KNEE INTRAARTICULAR INJECTION under ultrasound guide    Patient in the supine position with right knee bending 45 degrees, the area of the bilaterally knee was prepped with chlorhexidine .  After performing time out and palpating the superior-lateral pattellar aspect A high-frequency linear transducer ultrasound probe is placed in the superolateral corner of the patella, directed medially toward the patellofemoral joint space  .  A 27 Gauge 1.5 inch needle was slowly advance Using an in-plane approach, and directed into the suprapatellar joint space towards the knee joint. After negative aspiration 4 cc bupivicaine 0.25% and 40 mg kenalog was injected in the Knee joint.      PROCEDURE: Bilateral AC Joint INJECTION under ultrasound  DESCRIPTION OF PROCEDURE: The patient was brought to the procedure room. . The patient was positioned sitting on table.The skin overlying the bilateral shoulder area was prepped with chlorhexidene and draped in a sterile fashion.  A  27 gauge 4" simplex needle was slowly advanced through under ultrasound guidance into the subacromial joint avoiding suprascapular artery in the spinoglenoid notch and the labrum . When the needle " tip is clearly visualized in the joint space. Negative aspiration was confirmed. . A combination of 5 cc of Bupivacaine 0.25% and 40 mg depomedrol was easily injected. The needle was removed and bleeding was nil. A sterile dressing was applied.    Blood Loss: Nill  Specimen: None    Josh Christina MD  [facility] Pain Fellow   I have personally taken the history and examined this patient and agree with the fellow's note as stated above.

## 2025-05-02 RX ORDER — TRIAMCINOLONE ACETONIDE 40 MG/ML
40 INJECTION, SUSPENSION INTRA-ARTICULAR; INTRAMUSCULAR
Status: COMPLETED | OUTPATIENT
Start: 2025-05-02 | End: 2025-05-02

## 2025-05-02 RX ADMIN — TRIAMCINOLONE ACETONIDE 40 MG: 40 INJECTION, SUSPENSION INTRA-ARTICULAR; INTRAMUSCULAR at 03:05

## 2025-05-02 NOTE — PROCEDURES
"Patient Name: Anahi Cook  MRN: [id number]    INFORMED CONSENT: The procedure, risks, benefits and options were discussed with patient. There are no contraindications to the procedure. The patient expressed understanding and agreed to proceed. The personnel performing the procedure was discussed. I verify that I personally obtained Anahi's consent prior to the start of the procedure and the signed consent can be found on the patient's chart.    Procedure Date: 05/02/2025    Anesthesia: Topical    Pre Procedure diagnosis:   1. Primary osteoarthritis of both knees    2. Acromioclavicular joint pain, bilateral    3. Chronic pain of both shoulders    4. Arthritis of left acromioclavicular joint      Post-Procedure diagnosis: same    Sedation: None    PROCEDURE: BILATERALLY KNEE INTRAARTICULAR INJECTION under ultrasound guide    Patient in the supine position with right knee bending 45 degrees, the area of the bilaterally knee was prepped with chlorhexidine .  After performing time out and palpating the superior-lateral pattellar aspect A high-frequency linear transducer ultrasound probe is placed in the superolateral corner of the patella, directed medially toward the patellofemoral joint space  .  A 27 Gauge 1.5 inch needle was slowly advance Using an in-plane approach, and directed into the suprapatellar joint space towards the knee joint. After negative aspiration 4 cc bupivicaine 0.25% and 40 mg kenalog was injected in the Knee joint.      PROCEDURE: Bilateral AC Joint INJECTION under ultrasound  DESCRIPTION OF PROCEDURE: The patient was brought to the procedure room. . The patient was positioned sitting on table.The skin overlying the bilateral shoulder area was prepped with chlorhexidene and draped in a sterile fashion.  A  27 gauge 4" simplex needle was slowly advanced through under ultrasound guidance into the subacromial joint avoiding suprascapular artery in the spinoglenoid notch and the labrum . When the needle " tip is clearly visualized in the joint space. Negative aspiration was confirmed. . A combination of 5 cc of Bupivacaine 0.25% and 40 mg depomedrol was easily injected. The needle was removed and bleeding was nil. A sterile dressing was applied.    Blood Loss: Nill  Specimen: None    Josh Christina MD  [facility] Pain Fellow   I have personally taken the history and examined this patient and agree with the fellow's note as stated above.

## 2025-05-05 DIAGNOSIS — I11.9 HYPERTENSIVE HEART DISEASE WITHOUT HEART FAILURE: ICD-10-CM

## 2025-05-05 NOTE — TELEPHONE ENCOUNTER
No care due was identified.  [facility] Embedded Care Due Messages. Reference number: [id number].   5/05/2025 3:36:11 PM CDT

## 2025-05-06 RX ORDER — TORSEMIDE 20 MG/1
20 TABLET ORAL DAILY
Qty: 90 TABLET | Refills: 1 | Status: SHIPPED | OUTPATIENT
Start: 2025-05-06

## 2025-05-13 ENCOUNTER — TELEPHONE (OUTPATIENT)
Dept: HEMATOLOGY/ONCOLOGY | Facility: CLINIC | Age: OVER 89
End: 2025-05-13
Payer: MEDICARE

## 2025-05-13 ENCOUNTER — PATIENT MESSAGE (OUTPATIENT)
Dept: HEMATOLOGY/ONCOLOGY | Facility: CLINIC | Age: OVER 89
End: 2025-05-13
Payer: MEDICARE

## 2025-05-31 DIAGNOSIS — I11.9 HYPERTENSIVE HEART DISEASE WITHOUT HEART FAILURE: ICD-10-CM

## 2025-05-31 DIAGNOSIS — E78.5 HYPERLIPIDEMIA, UNSPECIFIED HYPERLIPIDEMIA TYPE: ICD-10-CM

## 2025-05-31 DIAGNOSIS — I70.0 ATHEROSCLEROSIS OF AORTA: ICD-10-CM

## 2025-05-31 DIAGNOSIS — F41.8 DEPRESSION WITH ANXIETY: ICD-10-CM

## 2025-06-02 RX ORDER — CITALOPRAM 20 MG/1
20 TABLET ORAL NIGHTLY
Qty: 90 TABLET | Refills: 1 | Status: SHIPPED | OUTPATIENT
Start: 2025-06-02

## 2025-06-02 RX ORDER — HYDRALAZINE HYDROCHLORIDE 25 MG/1
25 TABLET, FILM COATED ORAL EVERY 12 HOURS
Qty: 180 TABLET | Refills: 1 | Status: SHIPPED | OUTPATIENT
Start: 2025-06-02

## 2025-06-02 RX ORDER — ATORVASTATIN CALCIUM 20 MG/1
20 TABLET, FILM COATED ORAL NIGHTLY
Qty: 90 TABLET | Refills: 1 | Status: SHIPPED | OUTPATIENT
Start: 2025-06-02

## 2025-06-02 RX ORDER — FELODIPINE 10 MG/1
10 TABLET, EXTENDED RELEASE ORAL 2 TIMES DAILY
Qty: 180 TABLET | Refills: 1 | Status: SHIPPED | OUTPATIENT
Start: 2025-06-02

## 2025-06-02 RX ORDER — BENAZEPRIL HYDROCHLORIDE 40 MG/1
40 TABLET ORAL DAILY
Qty: 90 TABLET | Refills: 1 | Status: SHIPPED | OUTPATIENT
Start: 2025-06-02

## 2025-06-26 ENCOUNTER — CLINICAL SUPPORT (OUTPATIENT)
Dept: OPHTHALMOLOGY | Facility: CLINIC | Age: OVER 89
End: 2025-06-26
Payer: MEDICARE

## 2025-06-26 ENCOUNTER — OFFICE VISIT (OUTPATIENT)
Dept: OPHTHALMOLOGY | Facility: CLINIC | Age: OVER 89
End: 2025-06-26
Payer: MEDICARE

## 2025-06-26 ENCOUNTER — HOSPITAL ENCOUNTER (OUTPATIENT)
Dept: RADIOLOGY | Facility: HOSPITAL | Age: OVER 89
Discharge: HOME | End: 2025-06-26
Attending: NURSE PRACTITIONER
Payer: MEDICARE

## 2025-06-26 DIAGNOSIS — H35.3221 EXUDATIVE AGE-RELATED MACULAR DEGENERATION OF LEFT EYE WITH ACTIVE CHOROIDAL NEOVASCULARIZATION: ICD-10-CM

## 2025-06-26 DIAGNOSIS — Z96.1 PSEUDOPHAKIA OF BOTH EYES: ICD-10-CM

## 2025-06-26 DIAGNOSIS — C50.412 MALIGNANT NEOPLASM OF UPPER-OUTER QUADRANT OF LEFT BREAST IN FEMALE, ESTROGEN RECEPTOR POSITIVE: Chronic | ICD-10-CM

## 2025-06-26 DIAGNOSIS — H35.3112 INTERMEDIATE STAGE DRY AGE-RELATED MACULAR DEGENERATION OF RIGHT EYE: ICD-10-CM

## 2025-06-26 DIAGNOSIS — H35.033 HYPERTENSIVE RETINOPATHY OF BOTH EYES: ICD-10-CM

## 2025-06-26 DIAGNOSIS — C77.3 SECONDARY AND UNSPECIFIED MALIGNANT NEOPLASM OF AXILLA AND UPPER LIMB LYMPH NODES: ICD-10-CM

## 2025-06-26 DIAGNOSIS — H35.3221 EXUDATIVE AGE-RELATED MACULAR DEGENERATION OF LEFT EYE WITH ACTIVE CHOROIDAL NEOVASCULARIZATION: Primary | ICD-10-CM

## 2025-06-26 DIAGNOSIS — Z12.31 ENCOUNTER FOR SCREENING MAMMOGRAM FOR MALIGNANT NEOPLASM OF BREAST: ICD-10-CM

## 2025-06-26 DIAGNOSIS — Z17.0 MALIGNANT NEOPLASM OF UPPER-OUTER QUADRANT OF LEFT BREAST IN FEMALE, ESTROGEN RECEPTOR POSITIVE: Chronic | ICD-10-CM

## 2025-06-26 DIAGNOSIS — H35.371 EPIRETINAL MEMBRANE, RIGHT EYE: ICD-10-CM

## 2025-06-26 PROCEDURE — 1101F PT FALLS ASSESS-DOCD LE1/YR: CPT | Mod: CPTII,S$GLB,, | Performed by: OPHTHALMOLOGY

## 2025-06-26 PROCEDURE — 99999 PR PBB SHADOW E&M-EST. PATIENT-LVL III: CPT | Mod: PBBFAC,,, | Performed by: OPHTHALMOLOGY

## 2025-06-26 PROCEDURE — 1160F RVW MEDS BY RX/DR IN RCRD: CPT | Mod: CPTII,S$GLB,, | Performed by: OPHTHALMOLOGY

## 2025-06-26 PROCEDURE — 77067 SCR MAMMO BI INCL CAD: CPT | Mod: TC

## 2025-06-26 PROCEDURE — 67028 INJECTION EYE DRUG: CPT | Mod: LT,S$GLB,, | Performed by: OPHTHALMOLOGY

## 2025-06-26 PROCEDURE — 1126F AMNT PAIN NOTED NONE PRSNT: CPT | Mod: CPTII,S$GLB,, | Performed by: OPHTHALMOLOGY

## 2025-06-26 PROCEDURE — 1159F MED LIST DOCD IN RCRD: CPT | Mod: CPTII,S$GLB,, | Performed by: OPHTHALMOLOGY

## 2025-06-26 PROCEDURE — 3288F FALL RISK ASSESSMENT DOCD: CPT | Mod: CPTII,S$GLB,, | Performed by: OPHTHALMOLOGY

## 2025-06-26 PROCEDURE — 99214 OFFICE O/P EST MOD 30 MIN: CPT | Mod: 25,S$GLB,, | Performed by: OPHTHALMOLOGY

## 2025-06-26 RX ADMIN — Medication 1.25 MG: at 04:06

## 2025-06-26 NOTE — PROGRESS NOTES
HPI    Pt is here for a 3 month DFE/OCT    Pt states no eye pain or discomfort. No flashes or floaters. States that   her near vision has been a little blurry lately, b ut overall vision seem   stable. No other complaints.  Last edited by Elan Cavanaugh MA on 6/26/2025  2:33 PM.               A/P    ICD-10-CM ICD-9-CM   1. Exudative age-related macular degeneration of left eye with active choroidal neovascularization  H35.3221 362.52     362.16   2. 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 Epiretinal membrane, right eye  H35.371 362.56   4. Pseudophakia of both eyes  Z96.1 V43.1   5. Hypertensive retinopathy of both eyes  H35.033 362.11       1. Exudative age-related macular degeneration of left eye with active choroidal neovascularization  2. 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 Will give Avastin today and keep interval to 3 mo     Based on todays exam, diagnostic studies, and review of records, the determination was made for treatment today.  Schedule Avastin Injection Left Eye today Patient chooses to proceed with injection R/B/A discussed include infection retinal detachment and stroke    Patient identified.  Timeout performed.    Risks, benefits, and alternatives to treatment were discussed in detail with the patient, including bleeding/infection (endophthalmitis)/etc.  The patient voiced understanding and wished to proceed with the procedure.  See separate consent form.    Injection Procedure Note:  Diagnosis: CNVM Left Eye    Topical Proparacaine drop placed then topical 5% Betadine, then subcojunctival lidocaine 2% injection  Sterile gloves used, and  sterile lid speculum placed.  5% Betadine placed at injection site again prior to injection.  Avastin  1.25mg in 0.05cc Injected inferotemporally 3.5-4mm posterior to the limbus.  Complications: None  Va at least CF at 5 feet post injection.  Retina, ONH, IOP normal after injection.    Followup as below.  Patient should return immediately PRN.  Retinal Detachment and Endophthalmitis precautions given        3. Epiretinal membrane, right eye  Mod ERM, VA 20/60 (was 20/40)  No metamorphopsia stable   Plan: Observation, may need PPV/MP if having incr symptoms   Amsler precautions discussed     4. Pseudophakia of both eyes  Good lens position OU  Plan: Observation      5. Hypertensive retinopathy of both eyes  Mod HTN changes  PCP Minerva Mathias MD - Recent notes reviewed  01/06/2025  5.4  A1C   Plan: Observation HTN changes   Recommend good blood pressure control, tight blood glucose control, and good cholesterol control          RTC 3 mo DFE/OCTm OU, possible Avastin OS      I saw and examined the patient and reviewed in detail the findings documented. The final examination findings, image interpretations, and plan as documented in the record represent my personal judgment and conclusions.    Dwight Melton MD  Vitreoretinal Surgery   [facility]

## 2025-07-03 ENCOUNTER — OFFICE VISIT (OUTPATIENT)
Dept: HEMATOLOGY/ONCOLOGY | Facility: CLINIC | Age: OVER 89
End: 2025-07-03
Payer: MEDICARE

## 2025-07-03 VITALS
SYSTOLIC BLOOD PRESSURE: 160 MMHG | HEIGHT: 63 IN | RESPIRATION RATE: 18 BRPM | HEART RATE: 66 BPM | BODY MASS INDEX: 26.67 KG/M2 | TEMPERATURE: 98 F | DIASTOLIC BLOOD PRESSURE: 72 MMHG | OXYGEN SATURATION: 95 %

## 2025-07-03 DIAGNOSIS — C50.012 MALIGNANT NEOPLASM OF NIPPLE OF LEFT BREAST IN FEMALE, ESTROGEN RECEPTOR POSITIVE: ICD-10-CM

## 2025-07-03 DIAGNOSIS — Z17.0 MALIGNANT NEOPLASM OF UPPER-OUTER QUADRANT OF LEFT BREAST IN FEMALE, ESTROGEN RECEPTOR POSITIVE: Primary | Chronic | ICD-10-CM

## 2025-07-03 DIAGNOSIS — C50.412 MALIGNANT NEOPLASM OF UPPER-OUTER QUADRANT OF LEFT BREAST IN FEMALE, ESTROGEN RECEPTOR POSITIVE: Primary | Chronic | ICD-10-CM

## 2025-07-03 DIAGNOSIS — Z17.0 MALIGNANT NEOPLASM OF NIPPLE OF LEFT BREAST IN FEMALE, ESTROGEN RECEPTOR POSITIVE: ICD-10-CM

## 2025-07-03 DIAGNOSIS — Z79.811 USE OF AROMATASE INHIBITORS: Chronic | ICD-10-CM

## 2025-07-03 DIAGNOSIS — Z12.31 ENCOUNTER FOR SCREENING MAMMOGRAM FOR MALIGNANT NEOPLASM OF BREAST: ICD-10-CM

## 2025-07-03 PROCEDURE — 99999 PR PBB SHADOW E&M-EST. PATIENT-LVL V: CPT | Mod: PBBFAC,,, | Performed by: INTERNAL MEDICINE

## 2025-07-03 PROCEDURE — 3288F FALL RISK ASSESSMENT DOCD: CPT | Mod: CPTII,S$GLB,, | Performed by: INTERNAL MEDICINE

## 2025-07-03 PROCEDURE — 1159F MED LIST DOCD IN RCRD: CPT | Mod: CPTII,S$GLB,, | Performed by: INTERNAL MEDICINE

## 2025-07-03 PROCEDURE — 1100F PTFALLS ASSESS-DOCD GE2>/YR: CPT | Mod: CPTII,S$GLB,, | Performed by: INTERNAL MEDICINE

## 2025-07-03 PROCEDURE — 1126F AMNT PAIN NOTED NONE PRSNT: CPT | Mod: CPTII,S$GLB,, | Performed by: INTERNAL MEDICINE

## 2025-07-03 PROCEDURE — G2211 COMPLEX E/M VISIT ADD ON: HCPCS | Mod: S$GLB,,, | Performed by: INTERNAL MEDICINE

## 2025-07-03 PROCEDURE — 99214 OFFICE O/P EST MOD 30 MIN: CPT | Mod: S$GLB,,, | Performed by: INTERNAL MEDICINE

## 2025-07-03 RX ORDER — LETROZOLE 2.5 MG/1
2.5 TABLET, FILM COATED ORAL DAILY
Qty: 30 TABLET | Refills: 0 | Status: SHIPPED | OUTPATIENT
Start: 2025-07-03

## 2025-07-03 NOTE — PROGRESS NOTES
Route Chart for Scheduling    Med Onc Chart Routing      Follow up with physician 1 year. with mammogram a few days prior please   Follow up with TIM    Infusion scheduling note    Injection scheduling note    Labs    Imaging    Pharmacy appointment    Other referrals             Subjective:       Patient ID: Anahi Cook is a 89 y.o. female.    Chief Complaint: Malignant neoplasm of upper-outer quadrant of left breast i      HPI   Mrs. Cook returns today for follow up.   I had last seen her in May 2024.  She is on letrozole, now on the adjuvant setting.    Briefly, she is a 89-year-old female who in late 2017 had initially felt a mass in her left breast.  A biopsy that was performed on 01/11/2018 showed an infiltrating ductal carcinoma that was ER strongly positive, NE strongly positive and HER-2 negative by immunohistochemistry.  She was started on letrozole and undwerwent a BSO by Dr. Chairez for an ovarian mass.  Her ovarian mass was benign. In mid July 2018 she underwent a lumpectomy and AND.  She had a 3.3 cm carcinoma and 4/8 positive nodes.  She has remained on letrozole, now in the adjuvant setting, and she returns today for follow up.   Of note, she also received chest wall XRT.    A mammogram was performed on 06/25/2025, however, it has not been officially reviewed yet    Review of Systems    Overall she feels fair.  She recently had pneumonia and as a result she has been O2 dependent.  In regards to her breast cancer treatment, she has tolerated the AIs well so far.  She again complains of hot flashes and hand stiffness, and also of shortness of breath with exertion.  Her ECOG performance status remains at 2.  She denies any anxiety, depression, easy bruising, fevers, chills, night  sweats, weight loss, nausea, vomiting, diarrhea, constipation, diplopia, blurred vision, headache, chest pain, palpitations, shortness of breath, breast pain, abdominal pain, extremity pain, or difficulty ambulating.  The  remainder of the ten-point ROS, including general, skin, lymph, H/N, cardiorespiratory, GI, , Neuro, Endocrine, and psychiatric is negative.       Objective:      Physical Exam      She is alert, oriented to time, place, person, pleasant, well      nourished, in no acute physical distress.     She is accompanied by her daughter.                               VITAL SIGNS:  Reviewed                                      HEENT:  Normal.  There are no nasal, oral, lip, gingival, auricular, lid,    or conjunctival lesions.  Mucosae are moist and pink, and there is no        thrush.  Pupils are equal, reactive to light and accommodation.              Extraocular muscle movements are intact.   Dentition is fair.  Maxillary teeth are missing.                                      NECK:  Supple without JVD, adenopathy, or thyromegaly.                       LUNGS:  Clear to auscultation without wheezing, rales, or rhonchi.           CARDIOVASCULAR:  Reveals an S1, S2, a grade II KRISHNA c/w AS, no rubs, no gallops.         ABDOMEN:  Soft, nontender, without organomegaly.  Bowel sounds are    present.    Scars from her laparoscopic DONOVAN-BSO are seen.   there is intertrigo on the lower abdominal wall                                                          EXTREMITIES:  No cyanosis, clubbing, or edema.                             BREASTS: She is s/p left lumpectomy with a healed periareolar incision.  She also has an incision from her axillary dissection;  It is also well healed.   There are no masses in her left or her right breast.    Both breasts are large and pendulous.  There is mild hyperpigmentation within the radiation field.                                 LYMPHATIC:  There is no cervical, axillary, or supraclavicular adenopathy.   SKIN:  Warm and moist, without petechiae, rashes, induration, or ecchymoses.           NEUROLOGIC:  DTRs are 0-1+ bilaterally, symmetrical, motor function is 5/5,  and cranial nerves are  within  normal limits.      Assessment:       1. Hormone receptor positive breast cancer, left, on neoadjuvant letrozole with a significant clinical response.   2. Use of aromatase inhibitors      Plan:        I had a long discussion with her and her daughter.  She will remain on letrozole through the end of July to complete 7 years of adjuvant hormonal therapy.  I will see her in a year.  We discussed the fact that mammograms are optional at her age; her daughter stated that she would prefer to continue obtaining mammograms for the next few years.  We will arrange.  Her multiple questions were answered to her satisfaction.  I spent 35 minutes reviewing the available records and evaluating the patient, out of which over 50% of the time was spent face to face with the patient in counseling and coordinating this patient's care.

## 2025-07-07 ENCOUNTER — LAB VISIT (OUTPATIENT)
Dept: LAB | Facility: HOSPITAL | Age: OVER 89
End: 2025-07-07
Attending: INTERNAL MEDICINE
Payer: MEDICARE

## 2025-07-07 ENCOUNTER — OFFICE VISIT (OUTPATIENT)
Dept: PRIMARY CARE CLINIC | Facility: CLINIC | Age: OVER 89
End: 2025-07-07
Payer: MEDICARE

## 2025-07-07 VITALS
TEMPERATURE: 98 F | SYSTOLIC BLOOD PRESSURE: 134 MMHG | WEIGHT: 150.56 LBS | HEART RATE: 66 BPM | OXYGEN SATURATION: 96 % | DIASTOLIC BLOOD PRESSURE: 60 MMHG | BODY MASS INDEX: 26.68 KG/M2 | HEIGHT: 63 IN

## 2025-07-07 DIAGNOSIS — E78.5 HYPERLIPIDEMIA, UNSPECIFIED HYPERLIPIDEMIA TYPE: ICD-10-CM

## 2025-07-07 DIAGNOSIS — I11.9 HYPERTENSIVE HEART DISEASE WITHOUT HEART FAILURE: Primary | ICD-10-CM

## 2025-07-07 DIAGNOSIS — K64.9 HEMORRHOIDS, UNSPECIFIED HEMORRHOID TYPE: ICD-10-CM

## 2025-07-07 DIAGNOSIS — J44.9 CHRONIC OBSTRUCTIVE PULMONARY DISEASE, UNSPECIFIED COPD TYPE: ICD-10-CM

## 2025-07-07 DIAGNOSIS — I11.9 HYPERTENSIVE HEART DISEASE WITHOUT HEART FAILURE: ICD-10-CM

## 2025-07-07 DIAGNOSIS — N18.31 STAGE 3A CHRONIC KIDNEY DISEASE: ICD-10-CM

## 2025-07-07 DIAGNOSIS — F41.8 DEPRESSION WITH ANXIETY: ICD-10-CM

## 2025-07-07 DIAGNOSIS — M47.816 LUMBAR SPONDYLOSIS: ICD-10-CM

## 2025-07-07 DIAGNOSIS — R73.03 PRE-DIABETES: ICD-10-CM

## 2025-07-07 DIAGNOSIS — M17.11 PRIMARY OSTEOARTHRITIS OF RIGHT KNEE: ICD-10-CM

## 2025-07-07 LAB
ANION GAP (OHS): 9 MMOL/L (ref 8–16)
BUN SERPL-MCNC: 10 MG/DL (ref 8–23)
CALCIUM SERPL-MCNC: 9 MG/DL (ref 8.7–10.5)
CHLORIDE SERPL-SCNC: 101 MMOL/L (ref 95–110)
CO2 SERPL-SCNC: 30 MMOL/L (ref 23–29)
CREAT SERPL-MCNC: 0.7 MG/DL (ref 0.5–1.4)
GFR SERPLBLD CREATININE-BSD FMLA CKD-EPI: >60 ML/MIN/1.73/M2
GLUCOSE SERPL-MCNC: 97 MG/DL (ref 70–110)
POTASSIUM SERPL-SCNC: 3.6 MMOL/L (ref 3.5–5.1)
SODIUM SERPL-SCNC: 140 MMOL/L (ref 136–145)

## 2025-07-07 PROCEDURE — 80048 BASIC METABOLIC PNL TOTAL CA: CPT

## 2025-07-07 PROCEDURE — 3288F FALL RISK ASSESSMENT DOCD: CPT | Mod: CPTII,S$GLB,, | Performed by: INTERNAL MEDICINE

## 2025-07-07 PROCEDURE — 99999 PR PBB SHADOW E&M-EST. PATIENT-LVL V: CPT | Mod: PBBFAC,,, | Performed by: INTERNAL MEDICINE

## 2025-07-07 PROCEDURE — 36415 COLL VENOUS BLD VENIPUNCTURE: CPT | Mod: PN

## 2025-07-07 PROCEDURE — 99214 OFFICE O/P EST MOD 30 MIN: CPT | Mod: S$GLB,,, | Performed by: INTERNAL MEDICINE

## 2025-07-07 PROCEDURE — 1159F MED LIST DOCD IN RCRD: CPT | Mod: CPTII,S$GLB,, | Performed by: INTERNAL MEDICINE

## 2025-07-07 PROCEDURE — G2211 COMPLEX E/M VISIT ADD ON: HCPCS | Mod: S$GLB,,, | Performed by: INTERNAL MEDICINE

## 2025-07-07 PROCEDURE — 1101F PT FALLS ASSESS-DOCD LE1/YR: CPT | Mod: CPTII,S$GLB,, | Performed by: INTERNAL MEDICINE

## 2025-07-07 PROCEDURE — 1160F RVW MEDS BY RX/DR IN RCRD: CPT | Mod: CPTII,S$GLB,, | Performed by: INTERNAL MEDICINE

## 2025-07-07 PROCEDURE — 1126F AMNT PAIN NOTED NONE PRSNT: CPT | Mod: CPTII,S$GLB,, | Performed by: INTERNAL MEDICINE

## 2025-07-07 RX ORDER — TRAMADOL HYDROCHLORIDE 50 MG/1
50 TABLET, FILM COATED ORAL EVERY 8 HOURS PRN
Qty: 90 TABLET | Refills: 2 | Status: SHIPPED | OUTPATIENT
Start: 2025-07-31

## 2025-07-07 RX ORDER — HYDROCORTISONE 25 MG/G
CREAM TOPICAL 2 TIMES DAILY PRN
Qty: 28 G | Refills: 2 | Status: SHIPPED | OUTPATIENT
Start: 2025-07-07

## 2025-07-07 RX ORDER — CEFDINIR 300 MG/1
300 CAPSULE ORAL EVERY 12 HOURS
COMMUNITY
Start: 2025-06-10 | End: 2025-07-07 | Stop reason: ALTCHOICE

## 2025-07-07 NOTE — PROGRESS NOTES
Subjective     Patient ID: Anahi Cook is a 89 y.o. female.    Chief Complaint: Follow-up and Medication Refill    Last seen 6 months ago for annual physical. Returns for scheduled f/u chronic medical conditions accompanied by her daughter who is primary caregiver. Compliant with daily meds as prescribed, requesting refill of hemorrhoid cream. COPD managed by outside Pulmonologist is stable on continuous home O2, noted recent antibiotic. Pain controlled on current regimen.     PMH: .  Hypertension with Concentric Remodeling and normal LV function on Echo 10/16.  Hyperlipidemia.  Pre-Diabetes.  Aortic Atherosclerosis seen on chest and abdominal imaging.   CKD stage 3a.  COPD, oxygen-dependent. Pulmonologist Dr. Rakesh Bradford at [facility].  Breast Cancer, on Letrozole, Heme/Onc .  Osteoporosis, Endocrinology  recommended Prolia - patient declined treatment.   Vertebral Compression Fractures.  GERD, Tortuous Esophagus on EGD 10/19, benign H pyl negative gastritis.   Lumbar Spondylosis, Pain Mgmt following.  Allergic Rhinitis.  Depression/Anxiety/Insomnia.    PSH:  2018: AXILLARY NODE DISSECTION; Left  BREAST BIOPSY  BREAST LUMPECTOMY  CHOLECYSTECTOMY  EYE SURGERY  2018: FIXATION KYPHOPLASTY; N/A  HYSTERECTOMY  10/15/2018: INJECTION OF FACET JOINT; Bilateral  2019: INJECTION OF FACET JOINT; Bilateral  2019: INJECTION OF FACET JOINT; Bilateral  2018: MASTECTOMY, PARTIAL; Left  2018: [place] REMOVAL OF OVARY/TUBE(S)  ROTATOR CUFF REPAIR; Right  TONSILLECTOMY    Mammogram stable . BMD . Colonoscopy years ago, not on record. Eye exam . Vaccines reviewed.    Social: Former tobacco use, rare alcohol. , daughter lives locally, two sons out of state.     FMH: Breast cancer in multiple, HTN, Heart dis, Kidney dis.     Allergies: PCN, Levofloxacin.     Medications: list reviewed and reconciled.           Review of Systems   Constitutional:  Negative for appetite change and  "fever.        Ambulates few steps with walker at home.   HENT:  Positive for mouth dryness. Negative for trouble swallowing.    Respiratory:  Negative for cough and shortness of breath.    Cardiovascular:  Negative for chest pain, palpitations and leg swelling.   Gastrointestinal:  Negative for abdominal pain, constipation, diarrhea, nausea and vomiting.   Genitourinary:  Negative for dysuria and frequency.   Musculoskeletal:  Positive for arthralgias and back pain.   Integumentary:  Negative for rash and wound.   Neurological:  Negative for dizziness, seizures, syncope, facial asymmetry, speech difficulty and headaches.   Psychiatric/Behavioral:  Negative for agitation, confusion, depressed mood and hallucinations.           Objective   Vitals:    07/07/25 1030   BP: 134/60   Pulse: 66   Temp: 98.4 °F (36.9 °C)   TempSrc: Oral   SpO2: 96%   Weight: 68.3 kg (150 lb 9.2 oz)   Per history, did not weigh today.   Height: 5' 3" (1.6 m)      Physical Exam  Constitutional:       General: She is not in acute distress.     Comments: Appropriately groomed, in WC wearing portable O2 by nasal cannula, accompanied by daughter.    HENT:      Head: Normocephalic and atraumatic.      Nose: Nose normal. No congestion.      Mouth/Throat:      Mouth: Mucous membranes are dry.      Pharynx: Oropharynx is clear.   Eyes:      Extraocular Movements: Extraocular movements intact.   Cardiovascular:      Rate and Rhythm: Normal rate and regular rhythm.   Pulmonary:      Effort: Pulmonary effort is normal. No respiratory distress.      Breath sounds: Normal breath sounds. No decreased breath sounds, wheezing or rales.   Abdominal:      General: Bowel sounds are normal. There is no distension.      Palpations: Abdomen is soft.      Tenderness: There is no abdominal tenderness.   Musculoskeletal:         General: Normal range of motion.      Cervical back: Normal range of motion.      Right lower leg: No edema.      Left lower leg: No edema. "   Lymphadenopathy:      Cervical: No cervical adenopathy.   Skin:     General: Skin is warm and dry.   Neurological:      Mental Status: She is alert.      Cranial Nerves: No cranial nerve deficit.   Psychiatric:         Mood and Affect: Mood normal.         Behavior: Behavior normal.         Thought Content: Thought content normal.       No visits with results within 3 Week(s) from this visit.   Latest known visit with results is:   Lab Visit on 01/06/2025   Component Date Value    WBC 01/06/2025 10.50     RBC 01/06/2025 4.47     Hemoglobin 01/06/2025 12.8     Hematocrit 01/06/2025 41.8     MCV 01/06/2025 94     MCH 01/06/2025 28.6     MCHC 01/06/2025 30.6 (L)     RDW 01/06/2025 14.6 (H)     Platelets 01/06/2025 324     MPV 01/06/2025 11.5     Immature Granulocytes 01/06/2025 0.4     Gran # (ANC) 01/06/2025 8.4 (H)     Immature Grans (Abs) 01/06/2025 0.04     Lymph # 01/06/2025 1.2     Mono # 01/06/2025 0.6     Eos # 01/06/2025 0.2     Baso # 01/06/2025 0.07     nRBC 01/06/2025 0     Gran % 01/06/2025 79.9 (H)     Lymph % 01/06/2025 11.1 (L)     Mono % 01/06/2025 5.7     Eosinophil % 01/06/2025 2.2     Basophil % 01/06/2025 0.7     Differential Method 01/06/2025 Automated     Sodium 01/06/2025 142     Potassium 01/06/2025 3.6     Chloride 01/06/2025 100     CO2 01/06/2025 30 (H)     Glucose 01/06/2025 102     BUN 01/06/2025 13     Creatinine 01/06/2025 0.7     Calcium 01/06/2025 9.7     Total Protein 01/06/2025 7.0     Albumin 01/06/2025 3.5     Total Bilirubin 01/06/2025 0.5     Alkaline Phosphatase 01/06/2025 61     AST 01/06/2025 15     ALT 01/06/2025 11     eGFR 01/06/2025 >60.0     Anion Gap 01/06/2025 12     Cholesterol 01/06/2025 130     Triglycerides 01/06/2025 91     HDL 01/06/2025 59     LDL Cholesterol 01/06/2025 52.8 (L)     HDL/Cholesterol Ratio 01/06/2025 45.4     Total Cholesterol/HDL Ra* 01/06/2025 2.2     Non-HDL Cholesterol 01/06/2025 71     Hemoglobin A1C 01/06/2025 5.4     Estimated Avg Glucose  01/06/2025 108     Magnesium 01/06/2025 1.9     Vit D, 25-Hydroxy 01/06/2025 56         Assessment and Plan     1. Hypertensive heart disease without heart failure  -     Basic Metabolic Panel; Future; Expected date: 07/07/2025  -     controlled on Benazepril, Hydralazine and Felodipine, recently refilled; euvolemic on Torsemide.    2. Hyperlipidemia, unspecified hyperlipidemia type        -     at goal on Atorvastatin.    3. Pre-diabetes        -     stable (improved).     4. Stage 3a chronic kidney disease        -     stable (improved).    5. Chronic obstructive pulmonary disease, unspecified COPD type        -     stable, continue Oxygen supplement.     6. Depression with anxiety        -    stable on Citalopram, recently refilled.    7. Lumbar spondylosis  -     traMADoL (ULTRAM) 50 mg tablet; Take 1 tablet (50 mg total) by mouth every 8 (eight) hours as needed for Pain.  Dispense: 90 tablet; Refill: 2    8. Primary osteoarthritis of right knee  -     traMADoL (ULTRAM) 50 mg tablet; Take 1 tablet (50 mg total) by mouth every 8 (eight) hours as needed for Pain.  Dispense: 90 tablet; Refill: 2    9. Hemorrhoids, unspecified hemorrhoid type  -     hydrocortisone 2.5 % cream; Apply topically 2 (two) times daily as needed (Hemorrhoid pain.).  Dispense: 28 g; Refill: 2    Flu shot and COVID booster in the fall.       Follow up in about 6 months (around 1/7/2026) for Annual Physical..

## 2025-07-14 ENCOUNTER — TELEPHONE (OUTPATIENT)
Dept: HOME HEALTH SERVICES | Facility: CLINIC | Age: OVER 89
End: 2025-07-14
Payer: MEDICARE

## 2025-07-16 ENCOUNTER — OFFICE VISIT (OUTPATIENT)
Dept: HOME HEALTH SERVICES | Facility: CLINIC | Age: OVER 89
End: 2025-07-16
Payer: MEDICARE

## 2025-07-16 VITALS
OXYGEN SATURATION: 94 % | BODY MASS INDEX: 26.68 KG/M2 | HEIGHT: 63 IN | DIASTOLIC BLOOD PRESSURE: 58 MMHG | TEMPERATURE: 98 F | HEART RATE: 84 BPM | RESPIRATION RATE: 16 BRPM | WEIGHT: 150.56 LBS | SYSTOLIC BLOOD PRESSURE: 128 MMHG

## 2025-07-16 DIAGNOSIS — N18.2 CKD (CHRONIC KIDNEY DISEASE) STAGE 2, GFR 60-89 ML/MIN: Chronic | ICD-10-CM

## 2025-07-16 DIAGNOSIS — E78.2 MIXED HYPERLIPIDEMIA: Chronic | ICD-10-CM

## 2025-07-16 DIAGNOSIS — M81.0 AGE-RELATED OSTEOPOROSIS WITHOUT CURRENT PATHOLOGICAL FRACTURE: ICD-10-CM

## 2025-07-16 DIAGNOSIS — J44.1 CHRONIC OBSTRUCTIVE PULMONARY DISEASE WITH ACUTE EXACERBATION: ICD-10-CM

## 2025-07-16 DIAGNOSIS — H35.3221 EXUDATIVE AGE-RELATED MACULAR DEGENERATION OF LEFT EYE WITH ACTIVE CHOROIDAL NEOVASCULARIZATION: ICD-10-CM

## 2025-07-16 DIAGNOSIS — I10 ESSENTIAL HYPERTENSION: Chronic | ICD-10-CM

## 2025-07-16 DIAGNOSIS — J96.11 CHRONIC RESPIRATORY FAILURE WITH HYPOXIA, ON HOME O2 THERAPY: ICD-10-CM

## 2025-07-16 DIAGNOSIS — Z00.00 ENCOUNTER FOR MEDICARE ANNUAL WELLNESS EXAM: Primary | ICD-10-CM

## 2025-07-16 DIAGNOSIS — Z99.81 CHRONIC RESPIRATORY FAILURE WITH HYPOXIA, ON HOME O2 THERAPY: ICD-10-CM

## 2025-07-16 DIAGNOSIS — C77.3 SECONDARY AND UNSPECIFIED MALIGNANT NEOPLASM OF AXILLA AND UPPER LIMB LYMPH NODES: ICD-10-CM

## 2025-07-16 DIAGNOSIS — Z99.89 DEPENDENCE ON OTHER ENABLING MACHINES AND DEVICES: ICD-10-CM

## 2025-07-16 DIAGNOSIS — I70.0 ATHEROSCLEROSIS OF AORTA: ICD-10-CM

## 2025-07-16 DIAGNOSIS — R26.9 ABNORMALITY OF GAIT AND MOBILITY: ICD-10-CM

## 2025-07-16 DIAGNOSIS — R53.1 WEAKNESS: Chronic | ICD-10-CM

## 2025-07-16 DIAGNOSIS — F33.0 MAJOR DEPRESSIVE DISORDER, RECURRENT, MILD: ICD-10-CM

## 2025-07-16 DIAGNOSIS — R73.03 PRE-DIABETES: ICD-10-CM

## 2025-07-16 DIAGNOSIS — Z99.81 DEPENDENCE ON SUPPLEMENTAL OXYGEN: ICD-10-CM

## 2025-07-16 DIAGNOSIS — I73.9 PAD (PERIPHERAL ARTERY DISEASE): ICD-10-CM

## 2025-07-16 DIAGNOSIS — I27.20 PULMONARY HYPERTENSION: ICD-10-CM

## 2025-07-16 PROBLEM — R11.2 NAUSEA & VOMITING: Status: RESOLVED | Noted: 2019-10-14 | Resolved: 2025-07-16

## 2025-07-16 PROCEDURE — 1159F MED LIST DOCD IN RCRD: CPT | Mod: CPTII,S$GLB,,

## 2025-07-16 PROCEDURE — 3288F FALL RISK ASSESSMENT DOCD: CPT | Mod: CPTII,S$GLB,,

## 2025-07-16 PROCEDURE — 1158F ADVNC CARE PLAN TLK DOCD: CPT | Mod: CPTII,S$GLB,,

## 2025-07-16 PROCEDURE — 1100F PTFALLS ASSESS-DOCD GE2>/YR: CPT | Mod: CPTII,S$GLB,,

## 2025-07-16 PROCEDURE — 1160F RVW MEDS BY RX/DR IN RCRD: CPT | Mod: CPTII,S$GLB,,

## 2025-07-16 PROCEDURE — 1126F AMNT PAIN NOTED NONE PRSNT: CPT | Mod: CPTII,S$GLB,,

## 2025-07-16 PROCEDURE — 1170F FXNL STATUS ASSESSED: CPT | Mod: CPTII,S$GLB,,

## 2025-07-16 PROCEDURE — G0439 PPPS, SUBSEQ VISIT: HCPCS | Mod: S$GLB,,,

## 2025-07-16 NOTE — PATIENT INSTRUCTIONS
Counseling and Referral of Other Preventative  (Italic type indicates deductible and co-insurance are waived)    Patient Name: Anahi Cook  Today's Date: 7/16/2025    Health Maintenance       Date Due Completion Date    DEXA Scan 01/10/2025 1/10/2023    Influenza Vaccine (1) 09/01/2025 11/19/2024    Override on 9/12/2022: Declined ([facility] hope msg received on 9.12.2022 from Dr. Isaac's office requesting flu be declined)    Hemoglobin A1c (Prediabetes) 01/06/2026 1/6/2025    Lipid Panel 01/06/2030 1/6/2025    TETANUS VACCINE 02/03/2032 2/3/2022        No orders of the defined types were placed in this encounter.    The following information is provided to all patients.  This information is to help you find resources for any of the problems found today that may be affecting your health:                  Living healthy guide: www.[facility].[place].[facility]      Understanding Diabetes: www.diabetes.org      Eating healthy: www.cdc.gov/healthyweight      CDC home safety checklist: www.cdc.gov/steadi/patient.html      Agency on Aging: www.goea.[place].[facility]      Alcoholics anonymous (AA): www.aa.org      Physical Activity: www.deonte.nih.gov/cr2ygyp      Tobacco use: www.quitwithusla.org

## 2025-07-16 NOTE — PROGRESS NOTES
"Anahi Cook presented for a follow-up Medicare AWV today. The following components were reviewed and updated:    Medical history  Family History  Social history  Allergies and Current Medications  Health Risk Assessment  Health Maintenance  Care Team    **See Completed Assessments for Annual Wellness visit with in the encounter summary    The following assessments were completed:  Depression Screening  Cognitive function Screening    Timed Get Up Test: Deferred, impaired mobility  Whisper Test      Opioid documentation:      Patient does have a current opioid prescription.      Patient accepted further discussion regarding opioid medication use.      Patient is currently taking tramadol narcotic for back pain.        Pain level today is 0/10.       In addition to narcotic pain medications, patient is also using physical therapy and acetaminophen for pain control.       Patient is not followed by a specialist currently for their pain and will not be referred today.       Patient's opioid risk potential based on ORT-OUD tool:       Tod each box that applies   No   Yes     Family history of substance abuse   Alcohol [x] []   Illegal drugs [x] []   Rx drugs [x] []     Personal history of substance abuse   Alcohol [x] []   Illegal drugs [x] []   Rx drugs [x] []     Age between 16-45 years   [x]   []     Patient with ADD, OCD, Bipolar disorder, schizoprenia   [x]   []     Patient with depression   []   [x]                         Scoring total                                                        1         Non-opioid treatment options have been discussed today and added to the patient's after visit summary.          Vitals:    07/16/25 0956   BP: (!) 128/58   BP Location: Left arm   Patient Position: Sitting   Pulse: 84   Resp: 16   Temp: 98.2 °F (36.8 °C)   SpO2: (!) 94%   Weight: 68.3 kg (150 lb 9.2 oz)   Height: 5' 3" (1.6 m)     Body mass index is 26.67 kg/m².       Physical Exam  Vitals reviewed.   Constitutional:  "      General: She is not in acute distress.     Appearance: Normal appearance.      Interventions: Nasal cannula in place.   HENT:      Head: Normocephalic.   Cardiovascular:      Rate and Rhythm: Normal rate and regular rhythm.      Pulses: Normal pulses.      Heart sounds: Normal heart sounds.   Pulmonary:      Effort: Pulmonary effort is normal. No respiratory distress.      Breath sounds: Normal breath sounds. No wheezing.   Abdominal:      General: Bowel sounds are normal.      Tenderness: There is no abdominal tenderness.   Musculoskeletal:         General: Normal range of motion.      Cervical back: Normal range of motion.      Right lower leg: No edema.      Left lower leg: No edema.   Skin:     General: Skin is warm and dry.      Capillary Refill: Capillary refill takes less than 2 seconds.   Neurological:      General: No focal deficit present.      Mental Status: She is alert and oriented to person, place, and time.      Gait: Gait abnormal.   Psychiatric:         Mood and Affect: Mood normal.         Behavior: Behavior normal.           Diagnoses and health risks identified today and associated recommendations/orders:    1. Encounter for Medicare annual wellness exam  Assessments completed.   recommendations reviewed.  F/u with PCP as instructed.      - Referral to Enhanced Annual Wellness Visit (eAWV) W+1    2. Exudative age-related macular degeneration of left eye with active choroidal neovascularization  Chronic, stable on current regimen. Followed by Ophthalmology.    3. Chronic obstructive pulmonary disease with acute exacerbation  Chronic, stable on current Breo Ellipta regimen. Followed by PCP.     4. Pulmonary hypertension  Chronic, stable on current regimen. Followed by Cardiology.     5. Chronic respiratory failure with hypoxia, on home O2 therapy  Chronic, stable on current home oxygen regimen. Followed by PCP.     6. Secondary and unspecified malignant neoplasm of axilla and upper limb  lymph nodes  Chronic, stable on current Femara regimen. Followed by Hem/Onc.     7. Atherosclerosis of aorta  Chronic, stable on current statin regimen. Followed by PCP.     8. PAD (peripheral artery disease)  Chronic, stable on current regimen. Followed by PCP.     9. Essential hypertension  Chronic, stable on current Lotensin, Hydralazine regimen. Followed by PCP.     10. Mixed hyperlipidemia  Chronic, stable on current statin regimen. Followed by PCP.     11. CKD (chronic kidney disease) stage 2, GFR 60-89 ml/min  Chronic, stable on current regimen. Followed by PCP.   Lab Results   Component Value Date    CREATININE 0.7 07/07/2025    BUN 10 07/07/2025     07/07/2025    K 3.6 07/07/2025     07/07/2025    CO2 30 (H) 07/07/2025     12. Pre-diabetes  Chronic, stable on current regimen. Followed by PCP.   Lab Results   Component Value Date    HGBA1C 5.4 01/06/2025     13. Age-related osteoporosis without current pathological fracture  Chronic, stable on current regimen. Followed by PCP.     - DXA Bone Density Axial Skeleton 1 or more sites; Future    14. Major depressive disorder, recurrent, mild  Chronic, stable on current Celexa regimen. Followed by PCP.     15. Weakness  Wants home PT.     - Ambulatory Referral/Consult to Physical Therapy    16. Abnormality of gait and mobility  Unable to safely ambulate without assistance.     - Ambulatory Referral/Consult to Physical Therapy    17. Dependence on supplemental oxygen  On 2L continuous.     18. Dependence on other enabling machines and devices  Uses wheeled walker.      Provided Anahi with a 5-10 year written screening schedule and personal prevention plan. Recommendations were developed using the USPSTF age appropriate recommendations. Education, counseling, and referrals were provided as needed.  After Visit Summary printed and given to patient which includes a list of additional screenings\tests needed.    Follow up in about 1 year (around 7/16/2026)  for Medicare BRENNA.      Shelli Ghosh NP    I offered to discuss advanced care planning, including how to pick a person who would make decisions for you if you were unable to make them for yourself, called a health care power of , and what kind of decisions you might make such as use of life sustaining treatments such as ventilators and tube feeding when faced with a life limiting illness recorded on a living will that they will need to know. (How you want to be cared for as you near the end of your natural life)     X  Patient has advanced directives on file, which we reviewed, and they do not wish to make changes.

## 2025-08-02 DIAGNOSIS — Z17.0 MALIGNANT NEOPLASM OF NIPPLE OF LEFT BREAST IN FEMALE, ESTROGEN RECEPTOR POSITIVE: ICD-10-CM

## 2025-08-02 DIAGNOSIS — C50.012 MALIGNANT NEOPLASM OF NIPPLE OF LEFT BREAST IN FEMALE, ESTROGEN RECEPTOR POSITIVE: ICD-10-CM

## 2025-08-04 RX ORDER — LETROZOLE 2.5 MG/1
TABLET, FILM COATED ORAL
Qty: 30 TABLET | Refills: 0 | Status: SHIPPED | OUTPATIENT
Start: 2025-08-04

## 2025-08-15 ENCOUNTER — PATIENT MESSAGE (OUTPATIENT)
Dept: OPTOMETRY | Facility: CLINIC | Age: OVER 89
End: 2025-08-15
Payer: MEDICARE

## 2025-08-19 ENCOUNTER — OFFICE VISIT (OUTPATIENT)
Dept: PAIN MEDICINE | Facility: CLINIC | Age: OVER 89
End: 2025-08-19
Payer: MEDICARE

## 2025-08-19 DIAGNOSIS — M25.561 CHRONIC PAIN OF BOTH KNEES: ICD-10-CM

## 2025-08-19 DIAGNOSIS — G89.29 CHRONIC PAIN OF BOTH KNEES: ICD-10-CM

## 2025-08-19 DIAGNOSIS — M25.562 CHRONIC PAIN OF BOTH KNEES: ICD-10-CM

## 2025-08-19 DIAGNOSIS — M25.512 CHRONIC PAIN OF BOTH SHOULDERS: ICD-10-CM

## 2025-08-19 DIAGNOSIS — G89.29 CHRONIC PAIN OF BOTH SHOULDERS: ICD-10-CM

## 2025-08-19 DIAGNOSIS — M25.512 ACROMIOCLAVICULAR JOINT PAIN, BILATERAL: ICD-10-CM

## 2025-08-19 DIAGNOSIS — M25.511 CHRONIC PAIN OF BOTH SHOULDERS: ICD-10-CM

## 2025-08-19 DIAGNOSIS — M54.16 LUMBAR RADICULOPATHY: ICD-10-CM

## 2025-08-19 DIAGNOSIS — M25.511 ACROMIOCLAVICULAR JOINT PAIN, BILATERAL: ICD-10-CM

## 2025-08-19 DIAGNOSIS — M51.362 DEGENERATION OF INTERVERTEBRAL DISC OF LUMBAR REGION WITH DISCOGENIC BACK PAIN AND LOWER EXTREMITY PAIN: ICD-10-CM

## 2025-08-19 DIAGNOSIS — G89.4 CHRONIC PAIN SYNDROME: Primary | ICD-10-CM

## 2025-08-19 DIAGNOSIS — M47.816 LUMBAR SPONDYLOSIS: ICD-10-CM

## 2025-08-19 DIAGNOSIS — M17.0 PRIMARY OSTEOARTHRITIS OF BOTH KNEES: ICD-10-CM

## 2025-08-19 PROCEDURE — 1159F MED LIST DOCD IN RCRD: CPT | Mod: CPTII,95,, | Performed by: NURSE PRACTITIONER

## 2025-08-19 PROCEDURE — 1160F RVW MEDS BY RX/DR IN RCRD: CPT | Mod: CPTII,95,, | Performed by: NURSE PRACTITIONER

## 2025-08-19 PROCEDURE — 98005 SYNCH AUDIO-VIDEO EST LOW 20: CPT | Mod: 95,,, | Performed by: NURSE PRACTITIONER

## 2025-08-20 DIAGNOSIS — M54.16 LUMBAR RADICULOPATHY: Primary | ICD-10-CM

## 2025-08-28 ENCOUNTER — PATIENT MESSAGE (OUTPATIENT)
Dept: PAIN MEDICINE | Facility: OTHER | Age: OVER 89
End: 2025-08-28
Payer: MEDICARE

## 2025-08-29 ENCOUNTER — PATIENT MESSAGE (OUTPATIENT)
Dept: PAIN MEDICINE | Facility: OTHER | Age: OVER 89
End: 2025-08-29
Payer: MEDICARE

## 2025-08-29 DIAGNOSIS — M17.11 PRIMARY OSTEOARTHRITIS OF RIGHT KNEE: ICD-10-CM

## 2025-08-29 DIAGNOSIS — M47.816 LUMBAR SPONDYLOSIS: ICD-10-CM

## 2025-08-29 RX ORDER — TRAMADOL HYDROCHLORIDE 50 MG/1
50 TABLET, FILM COATED ORAL EVERY 8 HOURS PRN
Qty: 90 TABLET | Refills: 2 | Status: CANCELLED | OUTPATIENT
Start: 2025-08-29

## (undated) DEVICE — SYR DISP LL 5CC

## (undated) DEVICE — DRAIN CHANNEL ROUND 19FR

## (undated) DEVICE — TRAY NERVE BLOCK

## (undated) DEVICE — KIT BIOPSY IVAS 11G

## (undated) DEVICE — SUT MONOCRYL PLUS 4-0 P3

## (undated) DEVICE — NDL TUOHY 20 X 3.5

## (undated) DEVICE — TOURNIQUET SB QC DP 18X4IN

## (undated) DEVICE — SUT VICRYL 3-0 27 SH

## (undated) DEVICE — SUT 4/0 18IN ETHILON BL P3

## (undated) DEVICE — GAUZE SPONGE 4X4 12PLY

## (undated) DEVICE — GLOVE BIOGEL SKINSENSE PI 6.5

## (undated) DEVICE — EVACUATOR WOUND BULB 100CC

## (undated) DEVICE — NDL SPINAL SPINOCAN 22GX3.5

## (undated) DEVICE — SCALPEL #15 BLADE STRL DISP.

## (undated) DEVICE — APPLIER CLIP LIAGCLIP 9.375IN

## (undated) DEVICE — IRRIGATOR ENDOSCOPY DISP.

## (undated) DEVICE — SOL ELECTROLUBE ANTI-STIC

## (undated) DEVICE — COVERS PROBE NR-48 STERILE

## (undated) DEVICE — BANDAGE ADHESIVE

## (undated) DEVICE — CORD BIPOLAR 12 FOOT

## (undated) DEVICE — PACK UNIVERSAL SPLIT II

## (undated) DEVICE — ELECTRODE BLADE INSULATED 1 IN

## (undated) DEVICE — SHEET DRAPE FAN-FOLDED 3/4

## (undated) DEVICE — COVER PROBE NL STRL 3.6X96IN

## (undated) DEVICE — SUT MCRYL PLUS 4-0 PS2 27IN

## (undated) DEVICE — OMNIPAQUE 300MG 50ML

## (undated) DEVICE — DRESSING LEUKOPLAST FLEX 1X3IN

## (undated) DEVICE — Device

## (undated) DEVICE — POSITIONER HEAD ADULT

## (undated) DEVICE — UNDERGLOVES BIOGEL PI SZ 7 LF

## (undated) DEVICE — DRAPE COLUMN DAVINCI XI

## (undated) DEVICE — ELECTRODE REM PLYHSV RETURN 9

## (undated) DEVICE — PORT ACCESS 8MM W/120MM LOW

## (undated) DEVICE — GAUZE FLUFF XXLG 36X36 2 PLY

## (undated) DEVICE — SOL WATER STRL IRR 1000ML

## (undated) DEVICE — ADHESIVE DERMABOND ADVANCED

## (undated) DEVICE — TRAY MINOR GEN SURG

## (undated) DEVICE — DRAPE STERI INSTRUMENT 1018

## (undated) DEVICE — BLADE 4IN EDGE INSULATED

## (undated) DEVICE — SEE MEDLINE ITEM 157128

## (undated) DEVICE — DEVICE STAT LOCK CATH SECURE

## (undated) DEVICE — SUT VICRYL PLUS 4-0 P3 18IN

## (undated) DEVICE — NDL INSUF ULTRA VERESS 120MM

## (undated) DEVICE — STOCKINET 4INX48

## (undated) DEVICE — BANDAGE MATRIX HK LOOP 2IN 5YD

## (undated) DEVICE — JELLY KY LUBRICATING 5G PACKET

## (undated) DEVICE — COVER TIP CURVED SCISSORS XI

## (undated) DEVICE — COVER CAMERA OPERATING ROOM

## (undated) DEVICE — PHOTON GUIDE WIDE/FLAT

## (undated) DEVICE — NEOGUARD COVER 4X30CM STERILE

## (undated) DEVICE — SET TRI-LUMEN FILTERED TUBE

## (undated) DEVICE — SYR 10CC LUER LOCK

## (undated) DEVICE — SOL NS 1000CC

## (undated) DEVICE — SOL 0.9% NACL IRRI.IN STERIL

## (undated) DEVICE — SUT CTD VICRYL 4-0 P-3 18IN

## (undated) DEVICE — SEAL UNIVERSAL 5MM-8MM XI

## (undated) DEVICE — DRAPE STERI-DRAPE 1000 17X11IN

## (undated) DEVICE — INSERT CUSHIONPRONE VIEW LARGE

## (undated) DEVICE — OBTURATOR BLADELESS 8MM XI

## (undated) DEVICE — SPONGE LAP 18X18 PREWASHED

## (undated) DEVICE — SYR 10CC LOSS OF RESISTANCE

## (undated) DEVICE — GLOVE BIOGEL PI MICRO INDIC 7

## (undated) DEVICE — SUT CTD VICRYL 3-0 CR/SH

## (undated) DEVICE — PAD ABD 8X10 STERILE

## (undated) DEVICE — SUT ETHILON 2-0 PSLX 30IN

## (undated) DEVICE — KIT WING PAD POSITIONING

## (undated) DEVICE — NDL 22GA X1 1/2 REG BEVEL

## (undated) DEVICE — SEE MEDLINE ITEM 152622

## (undated) DEVICE — SEE MEDLINE ITEM 152572

## (undated) DEVICE — SOL CLEARIFY VISUALIZATION LAP

## (undated) DEVICE — SEE MEDLINE ITEM 156923

## (undated) DEVICE — DRESSING N ADH OIL EMUL 3X3

## (undated) DEVICE — SEE MEDLINE ITEM 146417

## (undated) DEVICE — UNDERGLOVE BIOGEL PI SZ 6.5 LF

## (undated) DEVICE — NDL 18GA X1 1/2 REG BEVEL

## (undated) DEVICE — GLOVE BIOGEL SKINSENSE PI 7.0

## (undated) DEVICE — SYS LABEL CORRECT MED

## (undated) DEVICE — SUT VICRYL CTD 2-0 GI 27 SH

## (undated) DEVICE — KIT FRACTR IVAS ELITE 11G 15MM

## (undated) DEVICE — SUT SILK 0 STRANDS 30IN BLK

## (undated) DEVICE — DRAPE ARM DAVINCI XI

## (undated) DEVICE — SYR B-D DISP CONTROL 10CC100/C

## (undated) DEVICE — GLOVE BIOGEL ECLIPSE SZ 7

## (undated) DEVICE — SUT PROLENE 0 CT1 30IN BLUE

## (undated) DEVICE — BRA POST SURGICAL 34-36 B-D

## (undated) DEVICE — SEE L#120831

## (undated) DEVICE — SCRUB 10% POVIDONE IODINE 4OZ

## (undated) DEVICE — DRAPE THYROID WITH ARMBOARD